# Patient Record
Sex: FEMALE | Race: WHITE | NOT HISPANIC OR LATINO | Employment: UNEMPLOYED | URBAN - METROPOLITAN AREA
[De-identification: names, ages, dates, MRNs, and addresses within clinical notes are randomized per-mention and may not be internally consistent; named-entity substitution may affect disease eponyms.]

---

## 2018-04-04 LAB
AMORPHOUS MATERIAL (HISTORICAL): NORMAL
BACTERIA UR QL AUTO: NORMAL
BILIRUB UR QL STRIP: NEGATIVE MG/DL
CASTS/CASTS TYPE (HISTORICAL): NORMAL /LPF
CLARITY UR: CLEAR
COLOR UR: YELLOW
CRYSTAL TYPE (HISTORICAL): NORMAL /HPF
DEPRECATED RDW RBC AUTO: 14.3 %
GLUCOSE UR STRIP-MCNC: NEGATIVE MG/DL
HCT VFR BLD AUTO: 27.9 % (ref 36–46)
HGB BLD-MCNC: 9.2 G/DL (ref 12–16)
HGB UR QL STRIP.AUTO: ABNORMAL
KETONES UR STRIP-MCNC: 50 MG/DL
LEUKOCYTE ESTERASE UR QL STRIP: NEGATIVE
MCH RBC QN AUTO: 27.7 PG (ref 26–34)
MCHC RBC AUTO-ENTMCNC: 33 % (ref 31–36)
MCV RBC AUTO: 84 FL (ref 80–100)
MUCOUS THREADS URNS QL MICRO: NORMAL
NITRITE UR QL STRIP: NEGATIVE
NON-SQ EPI CELLS URNS QL MICRO: NORMAL
OTHER STN SPEC: NORMAL
PH UR STRIP.AUTO: 6 [PH] (ref 4.5–8)
PLATELET # BLD AUTO: 430 K/MCL (ref 150–450)
PROT UR STRIP-MCNC: NEGATIVE MG/DL
RBC # BLD AUTO: 3.32 M/MCL (ref 4–5.2)
RBC #/AREA URNS AUTO: NORMAL /HPF
SP GR UR STRIP.AUTO: 1.02 (ref 1–1.04)
UROBILINOGEN UR QL STRIP.AUTO: NEGATIVE MG/DL (ref 0–1)
WBC # BLD AUTO: 9.4 K/MCL (ref 4.5–11)
WBC #/AREA URNS AUTO: NORMAL /HPF

## 2018-04-05 LAB
ABSOL LYMPHOCYTES (HISTORICAL): 1.2 K/UL (ref 0.5–4)
ALBUMIN SERPL BCP-MCNC: 2.3 G/DL (ref 3–5.2)
ANION GAP SERPL CALCULATED.3IONS-SCNC: 8 MMOL/L (ref 5–14)
BUN SERPL-MCNC: 7 MG/DL (ref 5–25)
CALCIUM SERPL-MCNC: 7.5 MG/DL (ref 8.4–10.2)
CHLORIDE SERPL-SCNC: 97 MEQ/L (ref 97–108)
CO2 SERPL-SCNC: 25 MMOL/L (ref 22–30)
COMMENT (HISTORICAL): ABNORMAL
CREATINE, SERUM (HISTORICAL): 0.46 MG/DL (ref 0.6–1.2)
DEPRECATED RDW RBC AUTO: 14.5 %
EGFR (HISTORICAL): >60 ML/MIN/1.73 M2
GLUCOSE FASTING (HISTORICAL): 199 MG/DL (ref 70–99)
HCT VFR BLD AUTO: 24.6 % (ref 36–46)
HGB BLD-MCNC: 8 G/DL (ref 12–16)
LYMPHOCYTES NFR BLD AUTO: 10 % (ref 25–45)
MCH RBC QN AUTO: 27.2 PG (ref 26–34)
MCHC RBC AUTO-ENTMCNC: 32.5 % (ref 31–36)
MCV RBC AUTO: 83 FL (ref 80–100)
MONOCYTES # BLD AUTO: 0.7 K/UL (ref 0.2–0.9)
MONOCYTES NFR BLD AUTO: 6 % (ref 1–10)
NEUTROPHILS ABS COUNT (HISTORICAL): 10.4 K/UL (ref 1.8–7.8)
NEUTS SEG NFR BLD AUTO: 84 % (ref 45–65)
PLATELET # BLD AUTO: 446 K/MCL (ref 150–450)
POTASSIUM SERPL-SCNC: 4.1 MEQ/L (ref 3.6–5)
RBC # BLD AUTO: 2.95 M/MCL (ref 4–5.2)
RBC MORPHOLOGY (HISTORICAL): ABNORMAL
SODIUM SERPL-SCNC: 130 MEQ/L (ref 137–147)
WBC # BLD AUTO: 12.3 K/MCL (ref 4.5–11)

## 2018-04-06 LAB
ABSOL LYMPHOCYTES (HISTORICAL): 2.1 K/UL (ref 0.5–4)
ANION GAP SERPL CALCULATED.3IONS-SCNC: 3 MMOL/L (ref 5–14)
BASOPHILS # BLD AUTO: 0.1 K/UL (ref 0–0.1)
BASOPHILS # BLD AUTO: 1 % (ref 0–1)
BUN SERPL-MCNC: 12 MG/DL (ref 5–25)
CALCIUM SERPL-MCNC: 7.3 MG/DL (ref 8.4–10.2)
CHLORIDE SERPL-SCNC: 105 MEQ/L (ref 97–108)
CO2 SERPL-SCNC: 27 MMOL/L (ref 22–30)
CREATINE, SERUM (HISTORICAL): 0.46 MG/DL (ref 0.6–1.2)
DEPRECATED RDW RBC AUTO: 14.2 %
EGFR (HISTORICAL): >60 ML/MIN/1.73 M2
EOSINOPHIL # BLD AUTO: 0.1 K/UL (ref 0–0.4)
EOSINOPHIL NFR BLD AUTO: 1 % (ref 0–6)
GLUCOSE FASTING (HISTORICAL): 96 MG/DL (ref 70–99)
HCT VFR BLD AUTO: 28.3 % (ref 36–46)
HGB BLD-MCNC: 9.6 G/DL (ref 12–16)
LYMPHOCYTES NFR BLD AUTO: 22 % (ref 25–45)
MCH RBC QN AUTO: 28.2 PG (ref 26–34)
MCHC RBC AUTO-ENTMCNC: 33.8 % (ref 31–36)
MCV RBC AUTO: 83 FL (ref 80–100)
MONOCYTES # BLD AUTO: 0.8 K/UL (ref 0.2–0.9)
MONOCYTES NFR BLD AUTO: 8 % (ref 1–10)
NEUTROPHILS ABS COUNT (HISTORICAL): 6.8 K/UL (ref 1.8–7.8)
NEUTS SEG NFR BLD AUTO: 68 % (ref 45–65)
PLATELET # BLD AUTO: 361 K/MCL (ref 150–450)
POTASSIUM SERPL-SCNC: 4 MEQ/L (ref 3.6–5)
RBC # BLD AUTO: 3.4 M/MCL (ref 4–5.2)
SODIUM SERPL-SCNC: 135 MEQ/L (ref 137–147)
WBC # BLD AUTO: 9.9 K/MCL (ref 4.5–11)

## 2018-04-17 LAB
ANION GAP SERPL CALCULATED.3IONS-SCNC: 8 MMOL/L (ref 5–14)
BUN SERPL-MCNC: 32 MG/DL (ref 5–25)
CALCIUM SERPL-MCNC: 8.9 MG/DL (ref 8.4–10.2)
CHLORIDE SERPL-SCNC: 100 MEQ/L (ref 97–108)
CO2 SERPL-SCNC: 29 MMOL/L (ref 22–30)
CREATINE, SERUM (HISTORICAL): 0.53 MG/DL (ref 0.6–1.2)
EGFR (HISTORICAL): >60 ML/MIN/1.73 M2
GLUCOSE SERPL-MCNC: 86 MG/DL (ref 70–99)
POTASSIUM SERPL-SCNC: 4.3 MEQ/L (ref 3.6–5)
SODIUM SERPL-SCNC: 137 MEQ/L (ref 137–147)

## 2018-04-19 LAB
DEPRECATED RDW RBC AUTO: 16.6 %
HCT VFR BLD AUTO: 32.3 % (ref 36–46)
HGB BLD-MCNC: 10.4 G/DL (ref 12–16)
MCH RBC QN AUTO: 27.7 PG (ref 26–34)
MCHC RBC AUTO-ENTMCNC: 32.3 % (ref 31–36)
MCV RBC AUTO: 86 FL (ref 80–100)
PLATELET # BLD AUTO: 341 K/MCL (ref 150–450)
RBC # BLD AUTO: 3.77 M/MCL (ref 4–5.2)
WBC # BLD AUTO: 8 K/MCL (ref 4.5–11)

## 2018-04-22 LAB — GLUCOSE SERPL-MCNC: 98 MG/DL (ref 70–99)

## 2018-04-23 LAB
ABSOL LYMPHOCYTES (HISTORICAL): 2 K/UL (ref 0.5–4)
ANION GAP SERPL CALCULATED.3IONS-SCNC: 9 MMOL/L (ref 5–14)
BASOPHILS # BLD AUTO: 0.1 K/UL (ref 0–0.1)
BASOPHILS # BLD AUTO: 1 % (ref 0–1)
BUN SERPL-MCNC: 26 MG/DL (ref 5–25)
CALCIUM SERPL-MCNC: 8.6 MG/DL (ref 8.4–10.2)
CHLORIDE SERPL-SCNC: 102 MEQ/L (ref 97–108)
CO2 SERPL-SCNC: 27 MMOL/L (ref 22–30)
CREATINE, SERUM (HISTORICAL): 0.54 MG/DL (ref 0.6–1.2)
DEPRECATED RDW RBC AUTO: 16.7 %
EGFR (HISTORICAL): >60 ML/MIN/1.73 M2
EOSINOPHIL # BLD AUTO: 0.4 K/UL (ref 0–0.4)
EOSINOPHIL NFR BLD AUTO: 6 % (ref 0–6)
GLUCOSE SERPL-MCNC: 89 MG/DL (ref 70–99)
HCT VFR BLD AUTO: 30.4 % (ref 36–46)
HGB BLD-MCNC: 10 G/DL (ref 12–16)
LYMPHOCYTES NFR BLD AUTO: 32 % (ref 25–45)
MCH RBC QN AUTO: 27.9 PG (ref 26–34)
MCHC RBC AUTO-ENTMCNC: 32.7 % (ref 31–36)
MCV RBC AUTO: 85 FL (ref 80–100)
MONOCYTES # BLD AUTO: 0.7 K/UL (ref 0.2–0.9)
MONOCYTES NFR BLD AUTO: 10 % (ref 1–10)
NEUTROPHILS ABS COUNT (HISTORICAL): 3.2 K/UL (ref 1.8–7.8)
NEUTS SEG NFR BLD AUTO: 51 % (ref 45–65)
PLATELET # BLD AUTO: 318 K/MCL (ref 150–450)
POTASSIUM SERPL-SCNC: 4.7 MEQ/L (ref 3.6–5)
RBC # BLD AUTO: 3.56 M/MCL (ref 4–5.2)
SODIUM SERPL-SCNC: 138 MEQ/L (ref 137–147)
WBC # BLD AUTO: 6.4 K/MCL (ref 4.5–11)

## 2018-04-30 LAB
ABSOL LYMPHOCYTES (HISTORICAL): 1.8 K/UL (ref 0.5–4)
ANION GAP SERPL CALCULATED.3IONS-SCNC: 7 MMOL/L (ref 5–14)
BASOPHILS # BLD AUTO: 0.1 K/UL (ref 0–0.1)
BASOPHILS # BLD AUTO: 1 % (ref 0–1)
BUN SERPL-MCNC: 22 MG/DL (ref 5–25)
CALCIUM SERPL-MCNC: 8.8 MG/DL (ref 8.4–10.2)
CHLORIDE SERPL-SCNC: 100 MEQ/L (ref 97–108)
CO2 SERPL-SCNC: 29 MMOL/L (ref 22–30)
CREATINE, SERUM (HISTORICAL): 0.43 MG/DL (ref 0.6–1.2)
DEPRECATED RDW RBC AUTO: 16.7 %
EGFR (HISTORICAL): >60 ML/MIN/1.73 M2
EOSINOPHIL # BLD AUTO: 0.4 K/UL (ref 0–0.4)
EOSINOPHIL NFR BLD AUTO: 6 % (ref 0–6)
GLUCOSE SERPL-MCNC: 84 MG/DL (ref 70–99)
HCT VFR BLD AUTO: 31.4 % (ref 36–46)
HGB BLD-MCNC: 10.5 G/DL (ref 12–16)
LYMPHOCYTES NFR BLD AUTO: 29 % (ref 25–45)
MCH RBC QN AUTO: 28.4 PG (ref 26–34)
MCHC RBC AUTO-ENTMCNC: 33.4 % (ref 31–36)
MCV RBC AUTO: 85 FL (ref 80–100)
MONOCYTES # BLD AUTO: 0.7 K/UL (ref 0.2–0.9)
MONOCYTES NFR BLD AUTO: 10 % (ref 1–10)
NEUTROPHILS ABS COUNT (HISTORICAL): 3.5 K/UL (ref 1.8–7.8)
NEUTS SEG NFR BLD AUTO: 54 % (ref 45–65)
PLATELET # BLD AUTO: 314 K/MCL (ref 150–450)
POTASSIUM SERPL-SCNC: 4.5 MEQ/L (ref 3.6–5)
RBC # BLD AUTO: 3.7 M/MCL (ref 4–5.2)
SODIUM SERPL-SCNC: 136 MEQ/L (ref 137–147)
WBC # BLD AUTO: 6.4 K/MCL (ref 4.5–11)

## 2018-05-07 LAB
ABSOL LYMPHOCYTES (HISTORICAL): 1.8 K/UL (ref 0.5–4)
ANION GAP SERPL CALCULATED.3IONS-SCNC: 9 MMOL/L (ref 5–14)
BASOPHILS # BLD AUTO: 0 K/UL (ref 0–0.1)
BASOPHILS # BLD AUTO: 1 % (ref 0–1)
BUN SERPL-MCNC: 30 MG/DL (ref 5–25)
CALCIUM SERPL-MCNC: 8.8 MG/DL (ref 8.4–10.2)
CHLORIDE SERPL-SCNC: 101 MEQ/L (ref 97–108)
CO2 SERPL-SCNC: 28 MMOL/L (ref 22–30)
CREATINE, SERUM (HISTORICAL): 0.46 MG/DL (ref 0.6–1.2)
DEPRECATED RDW RBC AUTO: 17 %
EGFR (HISTORICAL): >60 ML/MIN/1.73 M2
EOSINOPHIL # BLD AUTO: 0.4 K/UL (ref 0–0.4)
EOSINOPHIL NFR BLD AUTO: 8 % (ref 0–6)
GLUCOSE SERPL-MCNC: 91 MG/DL (ref 70–99)
HCT VFR BLD AUTO: 31.8 % (ref 36–46)
HGB BLD-MCNC: 10.7 G/DL (ref 12–16)
LYMPHOCYTES NFR BLD AUTO: 32 % (ref 25–45)
MCH RBC QN AUTO: 28.4 PG (ref 26–34)
MCHC RBC AUTO-ENTMCNC: 33.5 % (ref 31–36)
MCV RBC AUTO: 85 FL (ref 80–100)
MONOCYTES # BLD AUTO: 0.5 K/UL (ref 0.2–0.9)
MONOCYTES NFR BLD AUTO: 10 % (ref 1–10)
NEUTROPHILS ABS COUNT (HISTORICAL): 2.8 K/UL (ref 1.8–7.8)
NEUTS SEG NFR BLD AUTO: 49 % (ref 45–65)
PLATELET # BLD AUTO: 260 K/MCL (ref 150–450)
POTASSIUM SERPL-SCNC: 4.5 MEQ/L (ref 3.6–5)
RBC # BLD AUTO: 3.75 M/MCL (ref 4–5.2)
SODIUM SERPL-SCNC: 137 MEQ/L (ref 137–147)
WBC # BLD AUTO: 5.6 K/MCL (ref 4.5–11)

## 2018-05-14 LAB
ABSOL LYMPHOCYTES (HISTORICAL): 2.2 K/UL (ref 0.5–4)
ANION GAP SERPL CALCULATED.3IONS-SCNC: 7 MMOL/L (ref 5–14)
BASOPHILS # BLD AUTO: 0.1 K/UL (ref 0–0.1)
BASOPHILS # BLD AUTO: 1 % (ref 0–1)
BUN SERPL-MCNC: 22 MG/DL (ref 5–25)
CALCIUM SERPL-MCNC: 8.6 MG/DL (ref 8.4–10.2)
CHLORIDE SERPL-SCNC: 102 MEQ/L (ref 97–108)
CO2 SERPL-SCNC: 29 MMOL/L (ref 22–30)
CREATINE, SERUM (HISTORICAL): 0.59 MG/DL (ref 0.6–1.2)
DEPRECATED RDW RBC AUTO: 17.5 %
EGFR (HISTORICAL): >60 ML/MIN/1.73 M2
EOSINOPHIL # BLD AUTO: 0.3 K/UL (ref 0–0.4)
EOSINOPHIL NFR BLD AUTO: 5 % (ref 0–6)
GLUCOSE SERPL-MCNC: 77 MG/DL (ref 70–99)
HCT VFR BLD AUTO: 32.5 % (ref 36–46)
HGB BLD-MCNC: 10.6 G/DL (ref 12–16)
LYMPHOCYTES NFR BLD AUTO: 38 % (ref 25–45)
MCH RBC QN AUTO: 28.1 PG (ref 26–34)
MCHC RBC AUTO-ENTMCNC: 32.7 % (ref 31–36)
MCV RBC AUTO: 86 FL (ref 80–100)
MONOCYTES # BLD AUTO: 0.5 K/UL (ref 0.2–0.9)
MONOCYTES NFR BLD AUTO: 8 % (ref 1–10)
NEUTROPHILS ABS COUNT (HISTORICAL): 2.9 K/UL (ref 1.8–7.8)
NEUTS SEG NFR BLD AUTO: 48 % (ref 45–65)
PLATELET # BLD AUTO: 250 K/MCL (ref 150–450)
POTASSIUM SERPL-SCNC: 4.6 MEQ/L (ref 3.6–5)
RBC # BLD AUTO: 3.78 M/MCL (ref 4–5.2)
SODIUM SERPL-SCNC: 138 MEQ/L (ref 137–147)
WBC # BLD AUTO: 6 K/MCL (ref 4.5–11)

## 2018-05-22 LAB
CREATINE, SERUM (HISTORICAL): 0.43 MG/DL (ref 0.6–1.2)
DEPRECATED RDW RBC AUTO: 17.5 %
EGFR (HISTORICAL): >60 ML/MIN/1.73 M2
HCT VFR BLD AUTO: 29.5 % (ref 36–46)
HGB BLD-MCNC: 9.9 G/DL (ref 12–16)
MCH RBC QN AUTO: 29.2 PG (ref 26–34)
MCHC RBC AUTO-ENTMCNC: 33.7 % (ref 31–36)
MCV RBC AUTO: 87 FL (ref 80–100)
PLATELET # BLD AUTO: 222 K/MCL (ref 150–450)
RBC # BLD AUTO: 3.41 M/MCL (ref 4–5.2)
WBC # BLD AUTO: 8 K/MCL (ref 4.5–11)

## 2018-05-28 LAB
ABSOL LYMPHOCYTES (HISTORICAL): 2.2 K/UL (ref 0.5–4)
ANION GAP SERPL CALCULATED.3IONS-SCNC: 8 MMOL/L (ref 5–14)
BASOPHILS # BLD AUTO: 0.1 K/UL (ref 0–0.1)
BASOPHILS # BLD AUTO: 1 % (ref 0–1)
BUN SERPL-MCNC: 16 MG/DL (ref 5–25)
CALCIUM SERPL-MCNC: 8.8 MG/DL (ref 8.4–10.2)
CHLORIDE SERPL-SCNC: 102 MEQ/L (ref 97–108)
CO2 SERPL-SCNC: 28 MMOL/L (ref 22–30)
CREATINE, SERUM (HISTORICAL): 0.53 MG/DL (ref 0.6–1.2)
DEPRECATED RDW RBC AUTO: 17.6 %
EGFR (HISTORICAL): >60 ML/MIN/1.73 M2
EOSINOPHIL # BLD AUTO: 0.4 K/UL (ref 0–0.4)
EOSINOPHIL NFR BLD AUTO: 6 % (ref 0–6)
GLUCOSE FASTING (HISTORICAL): 97 MG/DL (ref 70–99)
HCT VFR BLD AUTO: 30 % (ref 36–46)
HGB BLD-MCNC: 10.1 G/DL (ref 12–16)
LYMPHOCYTES NFR BLD AUTO: 35 % (ref 25–45)
MCH RBC QN AUTO: 29.4 PG (ref 26–34)
MCHC RBC AUTO-ENTMCNC: 33.7 % (ref 31–36)
MCV RBC AUTO: 87 FL (ref 80–100)
MONOCYTES # BLD AUTO: 0.7 K/UL (ref 0.2–0.9)
MONOCYTES NFR BLD AUTO: 11 % (ref 1–10)
NEUTROPHILS ABS COUNT (HISTORICAL): 3 K/UL (ref 1.8–7.8)
NEUTS SEG NFR BLD AUTO: 47 % (ref 45–65)
PLATELET # BLD AUTO: 450 K/MCL (ref 150–450)
POTASSIUM SERPL-SCNC: 4.9 MEQ/L (ref 3.6–5)
RBC # BLD AUTO: 3.43 M/MCL (ref 4–5.2)
SODIUM SERPL-SCNC: 138 MEQ/L (ref 137–147)
WBC # BLD AUTO: 6.4 K/MCL (ref 4.5–11)

## 2018-09-14 ENCOUNTER — APPOINTMENT (OUTPATIENT)
Dept: LAB | Facility: HOSPITAL | Age: 68
End: 2018-09-14
Payer: COMMERCIAL

## 2018-09-14 ENCOUNTER — TRANSCRIBE ORDERS (OUTPATIENT)
Dept: ADMINISTRATIVE | Facility: HOSPITAL | Age: 68
End: 2018-09-14

## 2018-09-14 ENCOUNTER — APPOINTMENT (OUTPATIENT)
Dept: WOUND CARE | Facility: CLINIC | Age: 68
End: 2018-09-14
Payer: COMMERCIAL

## 2018-09-14 DIAGNOSIS — L89.214 PRESSURE ULCER OF TROCHANTER, RIGHT, STAGE IV (HCC): ICD-10-CM

## 2018-09-14 DIAGNOSIS — L89.214 PRESSURE ULCER OF TROCHANTER, RIGHT, STAGE IV (HCC): Primary | ICD-10-CM

## 2018-09-14 PROCEDURE — 87186 SC STD MICRODIL/AGAR DIL: CPT

## 2018-09-14 PROCEDURE — 87077 CULTURE AEROBIC IDENTIFY: CPT

## 2018-09-14 PROCEDURE — 87205 SMEAR GRAM STAIN: CPT

## 2018-09-14 PROCEDURE — 87070 CULTURE OTHR SPECIMN AEROBIC: CPT

## 2018-09-16 LAB
BACTERIA WND AEROBE CULT: ABNORMAL
BACTERIA WND AEROBE CULT: ABNORMAL
GRAM STN SPEC: ABNORMAL

## 2018-09-18 ENCOUNTER — ANESTHESIA EVENT (OUTPATIENT)
Dept: PERIOP | Facility: HOSPITAL | Age: 68
DRG: 570 | End: 2018-09-18
Payer: COMMERCIAL

## 2018-09-18 RX ORDER — SENNA AND DOCUSATE SODIUM 50; 8.6 MG/1; MG/1
1 TABLET, FILM COATED ORAL DAILY
Status: ON HOLD | COMMUNITY
End: 2019-02-27 | Stop reason: ALTCHOICE

## 2018-09-18 RX ORDER — DIPHENOXYLATE HYDROCHLORIDE AND ATROPINE SULFATE 2.5; .025 MG/1; MG/1
1 TABLET ORAL DAILY
COMMUNITY

## 2018-09-18 RX ORDER — PYRIDOXINE HCL (VITAMIN B6) 100 MG
1 TABLET ORAL DAILY
Status: ON HOLD | COMMUNITY
End: 2021-06-13 | Stop reason: ALTCHOICE

## 2018-09-18 RX ORDER — MELATONIN
1000 DAILY
Status: ON HOLD | COMMUNITY
End: 2021-06-13 | Stop reason: ALTCHOICE

## 2018-09-18 NOTE — ANESTHESIA PREPROCEDURE EVALUATION
Review of Systems/Medical History          Cardiovascular   Pulmonary  Negative pulmonary ROS        GI/Hepatic  Negative GI/hepatic ROS          Chronic kidney disease ,        Endo/Other     GYN       Hematology  Anemia iron deficiency anemia,     Musculoskeletal    Arthritis     Neurology  Negative neurology ROS   Motor deficit , paraplegia,    Psychology   Depression ,              Physical Exam    Airway    Mallampati score: I  TM Distance: >3 FB  Neck ROM: full     Dental       Cardiovascular  Cardiovascular exam normal    Pulmonary  Pulmonary exam normal     Other Findings        Anesthesia Plan  ASA Score- 3     Anesthesia Type- IV sedation with anesthesia and general with ASA Monitors  Additional Monitors:   Airway Plan:         Plan Factors-    Induction-     Postoperative Plan-     Informed Consent- Anesthetic plan and risks discussed with patient  I personally reviewed this patient with the CRNA  Discussed and agreed on the Anesthesia Plan with the CRNA  Zana Gaxiola

## 2018-09-19 ENCOUNTER — HOSPITAL ENCOUNTER (INPATIENT)
Facility: HOSPITAL | Age: 68
LOS: 2 days | Discharge: HOME | DRG: 570 | End: 2018-09-22
Attending: PLASTIC SURGERY | Admitting: PLASTIC SURGERY
Payer: COMMERCIAL

## 2018-09-19 ENCOUNTER — ANESTHESIA (OUTPATIENT)
Dept: PERIOP | Facility: HOSPITAL | Age: 68
DRG: 570 | End: 2018-09-19
Payer: COMMERCIAL

## 2018-09-19 DIAGNOSIS — L89.210: Primary | ICD-10-CM

## 2018-09-19 DIAGNOSIS — L89.213 DECUBITUS ULCER OF RIGHT HIP, STAGE 3 (HCC): ICD-10-CM

## 2018-09-19 DIAGNOSIS — L98.8 OTHER SPECIFIED DISORDERS OF THE SKIN AND SUBCUTANEOUS TISSUE: ICD-10-CM

## 2018-09-19 DIAGNOSIS — G82.20 PARAPLEGIA FOLLOWING SPINAL CORD INJURY (HCC): ICD-10-CM

## 2018-09-19 DIAGNOSIS — L89.219: ICD-10-CM

## 2018-09-19 DIAGNOSIS — L89.213 STAGE III PRESSURE ULCER OF RIGHT HIP (HCC): ICD-10-CM

## 2018-09-19 DIAGNOSIS — Z98.890 STATUS POST DEBRIDEMENT: ICD-10-CM

## 2018-09-19 PROBLEM — D50.9 IRON DEFICIENCY ANEMIA: Chronic | Status: ACTIVE | Noted: 2018-09-19

## 2018-09-19 PROBLEM — E87.6 HYPOKALEMIA: Status: ACTIVE | Noted: 2018-09-19

## 2018-09-19 PROBLEM — L89.159 PRESSURE ULCER, SACRUM: Chronic | Status: ACTIVE | Noted: 2018-09-19

## 2018-09-19 PROBLEM — R63.6 UNDERWEIGHT: Chronic | Status: ACTIVE | Noted: 2018-09-19

## 2018-09-19 PROBLEM — M85.80 OSTEOPENIA: Status: ACTIVE | Noted: 2018-09-19

## 2018-09-19 PROBLEM — D50.9 IRON DEFICIENCY ANEMIA: Status: ACTIVE | Noted: 2018-09-19

## 2018-09-19 PROBLEM — R63.6 UNDERWEIGHT: Status: ACTIVE | Noted: 2018-09-19

## 2018-09-19 PROBLEM — L89.159 PRESSURE ULCER, SACRUM: Status: ACTIVE | Noted: 2018-09-19

## 2018-09-19 PROBLEM — M85.80 OSTEOPENIA: Chronic | Status: ACTIVE | Noted: 2018-09-19

## 2018-09-19 LAB
ANION GAP SERPL CALCULATED.3IONS-SCNC: 6 MMOL/L (ref 5–14)
ANION GAP SERPL CALCULATED.3IONS-SCNC: 8 MMOL/L (ref 5–14)
BASOPHILS # BLD AUTO: 0 THOUSANDS/ΜL (ref 0–0.1)
BASOPHILS NFR BLD AUTO: 0 % (ref 0–1)
BUN SERPL-MCNC: 11 MG/DL (ref 5–25)
BUN SERPL-MCNC: 8 MG/DL (ref 5–25)
CALCIUM SERPL-MCNC: 8.2 MG/DL (ref 8.4–10.2)
CALCIUM SERPL-MCNC: 8.3 MG/DL (ref 8.4–10.2)
CHLORIDE SERPL-SCNC: 100 MMOL/L (ref 97–108)
CHLORIDE SERPL-SCNC: 102 MMOL/L (ref 97–108)
CO2 SERPL-SCNC: 28 MMOL/L (ref 22–30)
CO2 SERPL-SCNC: 29 MMOL/L (ref 22–30)
CREAT SERPL-MCNC: 0.46 MG/DL (ref 0.6–1.2)
CREAT SERPL-MCNC: 0.57 MG/DL (ref 0.6–1.2)
EOSINOPHIL # BLD AUTO: 0.1 THOUSAND/ΜL (ref 0–0.4)
EOSINOPHIL NFR BLD AUTO: 1 % (ref 0–6)
ERYTHROCYTE [DISTWIDTH] IN BLOOD BY AUTOMATED COUNT: 15.2 %
GFR SERPL CREATININE-BSD FRML MDRD: 103 ML/MIN/1.73SQ M
GFR SERPL CREATININE-BSD FRML MDRD: 96 ML/MIN/1.73SQ M
GLUCOSE SERPL-MCNC: 120 MG/DL (ref 70–99)
GLUCOSE SERPL-MCNC: 155 MG/DL (ref 70–99)
HCT VFR BLD AUTO: 26.5 % (ref 36–46)
HCT VFR BLD AUTO: 31.5 % (ref 36–46)
HGB BLD-MCNC: 10.7 G/DL (ref 12–16)
HGB BLD-MCNC: 8.8 G/DL (ref 12–16)
LYMPHOCYTES # BLD AUTO: 1.4 THOUSANDS/ΜL (ref 0.5–4)
LYMPHOCYTES NFR BLD AUTO: 19 % (ref 20–50)
MAGNESIUM SERPL-MCNC: 2.1 MG/DL (ref 1.6–2.3)
MCH RBC QN AUTO: 28.5 PG (ref 26–34)
MCHC RBC AUTO-ENTMCNC: 34 G/DL (ref 31–36)
MCV RBC AUTO: 84 FL (ref 80–100)
MONOCYTES # BLD AUTO: 0.8 THOUSAND/ΜL (ref 0.2–0.9)
MONOCYTES NFR BLD AUTO: 11 % (ref 1–10)
NEUTROPHILS # BLD AUTO: 5.1 THOUSANDS/ΜL (ref 1.8–7.8)
NEUTS SEG NFR BLD AUTO: 69 % (ref 45–65)
PLATELET # BLD AUTO: 365 THOUSANDS/UL (ref 150–450)
PMV BLD AUTO: 8.4 FL (ref 8.9–12.7)
POTASSIUM SERPL-SCNC: 3.3 MMOL/L (ref 3.6–5)
POTASSIUM SERPL-SCNC: 3.5 MMOL/L (ref 3.6–5)
RBC # BLD AUTO: 3.76 MILLION/UL (ref 4–5.2)
SODIUM SERPL-SCNC: 136 MMOL/L (ref 137–147)
SODIUM SERPL-SCNC: 137 MMOL/L (ref 137–147)
WBC # BLD AUTO: 7.4 THOUSAND/UL (ref 4.5–11)

## 2018-09-19 PROCEDURE — 0JBL0ZZ EXCISION OF RIGHT UPPER LEG SUBCUTANEOUS TISSUE AND FASCIA, OPEN APPROACH: ICD-10-PCS | Performed by: PLASTIC SURGERY

## 2018-09-19 PROCEDURE — 88304 TISSUE EXAM BY PATHOLOGIST: CPT | Performed by: PATHOLOGY

## 2018-09-19 PROCEDURE — 87186 SC STD MICRODIL/AGAR DIL: CPT | Performed by: PLASTIC SURGERY

## 2018-09-19 PROCEDURE — 87077 CULTURE AEROBIC IDENTIFY: CPT | Performed by: PLASTIC SURGERY

## 2018-09-19 PROCEDURE — 99220 PR INITIAL OBSERVATION CARE/DAY 70 MINUTES: CPT | Performed by: HOSPITALIST

## 2018-09-19 PROCEDURE — 80048 BASIC METABOLIC PNL TOTAL CA: CPT | Performed by: HOSPITALIST

## 2018-09-19 PROCEDURE — 85018 HEMOGLOBIN: CPT | Performed by: HOSPITALIST

## 2018-09-19 PROCEDURE — 87147 CULTURE TYPE IMMUNOLOGIC: CPT | Performed by: PLASTIC SURGERY

## 2018-09-19 PROCEDURE — 85014 HEMATOCRIT: CPT | Performed by: HOSPITALIST

## 2018-09-19 PROCEDURE — 80048 BASIC METABOLIC PNL TOTAL CA: CPT | Performed by: PLASTIC SURGERY

## 2018-09-19 PROCEDURE — 83735 ASSAY OF MAGNESIUM: CPT | Performed by: HOSPITALIST

## 2018-09-19 PROCEDURE — 85025 COMPLETE CBC W/AUTO DIFF WBC: CPT | Performed by: PLASTIC SURGERY

## 2018-09-19 PROCEDURE — 87070 CULTURE OTHR SPECIMN AEROBIC: CPT | Performed by: PLASTIC SURGERY

## 2018-09-19 PROCEDURE — 93005 ELECTROCARDIOGRAM TRACING: CPT

## 2018-09-19 PROCEDURE — 87176 TISSUE HOMOGENIZATION CULTR: CPT | Performed by: PLASTIC SURGERY

## 2018-09-19 PROCEDURE — 87205 SMEAR GRAM STAIN: CPT | Performed by: PLASTIC SURGERY

## 2018-09-19 RX ORDER — MELATONIN
1000 DAILY
Status: DISCONTINUED | OUTPATIENT
Start: 2018-09-20 | End: 2018-09-22 | Stop reason: HOSPADM

## 2018-09-19 RX ORDER — B-COMPLEX WITH VITAMIN C
1 TABLET ORAL DAILY
Status: DISCONTINUED | OUTPATIENT
Start: 2018-09-20 | End: 2018-09-22 | Stop reason: HOSPADM

## 2018-09-19 RX ORDER — LANOLIN ALCOHOL/MO/W.PET/CERES
500 CREAM (GRAM) TOPICAL DAILY
Status: DISCONTINUED | OUTPATIENT
Start: 2018-09-20 | End: 2018-09-22 | Stop reason: HOSPADM

## 2018-09-19 RX ORDER — SODIUM CHLORIDE, SODIUM LACTATE, POTASSIUM CHLORIDE, CALCIUM CHLORIDE 600; 310; 30; 20 MG/100ML; MG/100ML; MG/100ML; MG/100ML
125 INJECTION, SOLUTION INTRAVENOUS CONTINUOUS
Status: DISCONTINUED | OUTPATIENT
Start: 2018-09-19 | End: 2018-09-20

## 2018-09-19 RX ORDER — FENTANYL CITRATE 50 UG/ML
INJECTION, SOLUTION INTRAMUSCULAR; INTRAVENOUS AS NEEDED
Status: DISCONTINUED | OUTPATIENT
Start: 2018-09-19 | End: 2018-09-19 | Stop reason: SURG

## 2018-09-19 RX ORDER — MAGNESIUM HYDROXIDE 1200 MG/15ML
LIQUID ORAL AS NEEDED
Status: DISCONTINUED | OUTPATIENT
Start: 2018-09-19 | End: 2018-09-19 | Stop reason: HOSPADM

## 2018-09-19 RX ORDER — HYDROCODONE BITARTRATE AND ACETAMINOPHEN 5; 325 MG/1; MG/1
1 TABLET ORAL EVERY 4 HOURS PRN
Status: DISCONTINUED | OUTPATIENT
Start: 2018-09-19 | End: 2018-09-22 | Stop reason: HOSPADM

## 2018-09-19 RX ORDER — ACETIC ACID 0.25 G/100ML
IRRIGANT IRRIGATION AS NEEDED
Status: DISCONTINUED | OUTPATIENT
Start: 2018-09-19 | End: 2018-09-19 | Stop reason: HOSPADM

## 2018-09-19 RX ORDER — DIPHENHYDRAMINE HYDROCHLORIDE 50 MG/ML
12.5 INJECTION INTRAMUSCULAR; INTRAVENOUS ONCE AS NEEDED
Status: DISCONTINUED | OUTPATIENT
Start: 2018-09-19 | End: 2018-09-19 | Stop reason: HOSPADM

## 2018-09-19 RX ORDER — MIDAZOLAM HYDROCHLORIDE 1 MG/ML
INJECTION INTRAMUSCULAR; INTRAVENOUS AS NEEDED
Status: DISCONTINUED | OUTPATIENT
Start: 2018-09-19 | End: 2018-09-19 | Stop reason: SURG

## 2018-09-19 RX ORDER — ONDANSETRON 4 MG/1
4 TABLET, ORALLY DISINTEGRATING ORAL EVERY 6 HOURS PRN
Status: DISCONTINUED | OUTPATIENT
Start: 2018-09-19 | End: 2018-09-22 | Stop reason: HOSPADM

## 2018-09-19 RX ORDER — AMOXICILLIN 250 MG
1 CAPSULE ORAL DAILY
Status: DISCONTINUED | OUTPATIENT
Start: 2018-09-20 | End: 2018-09-22 | Stop reason: HOSPADM

## 2018-09-19 RX ORDER — POTASSIUM CHLORIDE 20 MEQ/1
40 TABLET, EXTENDED RELEASE ORAL ONCE
Status: COMPLETED | OUTPATIENT
Start: 2018-09-19 | End: 2018-09-19

## 2018-09-19 RX ORDER — HEPARIN SODIUM 5000 [USP'U]/ML
5000 INJECTION, SOLUTION INTRAVENOUS; SUBCUTANEOUS EVERY 8 HOURS SCHEDULED
Status: DISCONTINUED | OUTPATIENT
Start: 2018-09-19 | End: 2018-09-22 | Stop reason: HOSPADM

## 2018-09-19 RX ORDER — LANOLIN ALCOHOL/MO/W.PET/CERES
3 CREAM (GRAM) TOPICAL
Status: DISCONTINUED | OUTPATIENT
Start: 2018-09-19 | End: 2018-09-22 | Stop reason: HOSPADM

## 2018-09-19 RX ORDER — HEPARIN SODIUM 5000 [USP'U]/ML
5000 INJECTION, SOLUTION INTRAVENOUS; SUBCUTANEOUS EVERY 8 HOURS SCHEDULED
Status: DISCONTINUED | OUTPATIENT
Start: 2018-09-19 | End: 2018-09-19

## 2018-09-19 RX ORDER — LIDOCAINE HYDROCHLORIDE AND EPINEPHRINE 5; 5 MG/ML; UG/ML
INJECTION, SOLUTION INFILTRATION; PERINEURAL AS NEEDED
Status: DISCONTINUED | OUTPATIENT
Start: 2018-09-19 | End: 2018-09-19 | Stop reason: HOSPADM

## 2018-09-19 RX ORDER — FENTANYL CITRATE/PF 50 MCG/ML
25 SYRINGE (ML) INJECTION
Status: DISCONTINUED | OUTPATIENT
Start: 2018-09-19 | End: 2018-09-19 | Stop reason: HOSPADM

## 2018-09-19 RX ORDER — PROPOFOL 10 MG/ML
INJECTION, EMULSION INTRAVENOUS CONTINUOUS PRN
Status: DISCONTINUED | OUTPATIENT
Start: 2018-09-19 | End: 2018-09-19 | Stop reason: SURG

## 2018-09-19 RX ORDER — EPHEDRINE SULFATE 50 MG/ML
INJECTION, SOLUTION INTRAVENOUS AS NEEDED
Status: DISCONTINUED | OUTPATIENT
Start: 2018-09-19 | End: 2018-09-19 | Stop reason: SURG

## 2018-09-19 RX ADMIN — POTASSIUM CHLORIDE 40 MEQ: 20 TABLET, EXTENDED RELEASE ORAL at 23:46

## 2018-09-19 RX ADMIN — EPHEDRINE SULFATE 10 MG: 50 INJECTION INTRAMUSCULAR; INTRAVENOUS; SUBCUTANEOUS at 15:39

## 2018-09-19 RX ADMIN — SODIUM CHLORIDE, POTASSIUM CHLORIDE, SODIUM LACTATE AND CALCIUM CHLORIDE: 600; 310; 30; 20 INJECTION, SOLUTION INTRAVENOUS at 15:13

## 2018-09-19 RX ADMIN — SODIUM CHLORIDE, POTASSIUM CHLORIDE, SODIUM LACTATE AND CALCIUM CHLORIDE 125 ML/HR: 600; 310; 30; 20 INJECTION, SOLUTION INTRAVENOUS at 20:35

## 2018-09-19 RX ADMIN — CEFEPIME 1 G: 2 INJECTION, POWDER, FOR SOLUTION INTRAVENOUS at 15:29

## 2018-09-19 RX ADMIN — SODIUM CHLORIDE, POTASSIUM CHLORIDE, SODIUM LACTATE AND CALCIUM CHLORIDE 125 ML/HR: 600; 310; 30; 20 INJECTION, SOLUTION INTRAVENOUS at 15:16

## 2018-09-19 RX ADMIN — FENTANYL CITRATE 100 MCG: 50 INJECTION INTRAMUSCULAR; INTRAVENOUS at 15:25

## 2018-09-19 RX ADMIN — PROPOFOL 100 MCG/KG/MIN: 10 INJECTION, EMULSION INTRAVENOUS at 15:25

## 2018-09-19 RX ADMIN — EPHEDRINE SULFATE 10 MG: 50 INJECTION INTRAMUSCULAR; INTRAVENOUS; SUBCUTANEOUS at 15:30

## 2018-09-19 RX ADMIN — FENTANYL CITRATE 50 MCG: 50 INJECTION INTRAMUSCULAR; INTRAVENOUS at 16:10

## 2018-09-19 RX ADMIN — MIDAZOLAM HYDROCHLORIDE 2 MG: 1 INJECTION, SOLUTION INTRAMUSCULAR; INTRAVENOUS at 15:25

## 2018-09-19 RX ADMIN — EPHEDRINE SULFATE 10 MG: 50 INJECTION INTRAMUSCULAR; INTRAVENOUS; SUBCUTANEOUS at 15:55

## 2018-09-19 RX ADMIN — FENTANYL CITRATE 50 MCG: 50 INJECTION INTRAMUSCULAR; INTRAVENOUS at 15:49

## 2018-09-19 RX ADMIN — HEPARIN SODIUM 5000 UNITS: 5000 INJECTION, SOLUTION INTRAVENOUS; SUBCUTANEOUS at 21:08

## 2018-09-19 RX ADMIN — EPHEDRINE SULFATE 10 MG: 50 INJECTION INTRAMUSCULAR; INTRAVENOUS; SUBCUTANEOUS at 15:45

## 2018-09-19 NOTE — PLAN OF CARE
Problem: INFECTION - ADULT  Goal: Absence of fever/infection during neutropenic period  INTERVENTIONS:  - Monitor WBC  - Implement neutropenic guidelines  Outcome: Completed Date Met: 09/19/18  Patient does not have neutropenia

## 2018-09-19 NOTE — OP NOTE
OPERATIVE REPORT  PATIENT NAME: Courtney Cabrera    :  1950  MRN: [id number]  Pt Location:  OR ROOM 07    SURGERY DATE: 2018    Surgeon(s) and Role:     * Kalli Jimenez MD - Primary    Preop and postop Diagnosis:   Right hip pressure sore    Operative Procedure(s) (LRB):  DEBRIDEMENT OF HIP PRESSURE SORE (Right)    Operative history: This T8 level fracture paraplegic, about a month ago was lying on her right hip on a mirror she was unaware of and developed a pressure sore  She has purulent drainage from this area  At this time what appeared to be necrotic calcified fat infected fat was removed, with a large well walled off cavity about it  No bone was exposed  All gross debris was removed  Operative procedure:   Patient was taken to the operating room placed in a lateral position with her left side down on the operating table  She was prepped and draped in the usual fashion  Anesthesia was general supplemented with xylocaine 1% with epinephrine  A currette   was used to remove as much debris as possible from right hip  Sharp excision otherwise was necessary with a scissors and scalpel, all supplemented with the Bovie for cutting and coagulation purposes  The cavity was copiously irrigated and then Kerlix soaked in 0 25% ascetic acid was placed for dressings  The patient tolerated the procedure well and taken to recovery area in good condition       Specimen(s):  ID Type Source Tests Collected by Time Destination   1 : right hip pressure sore tissue Tissue Soft Tissue, Other TISSUE EXAM Kalli Jimenez MD 2018 1552    A : right hip pressure sore tissue culture Tissue Soft Tissue, Debridement CULTURE, TISSUE AND GRAM STAIN Kalli Jimenez MD 2018 1553          Drains:  Urethral Catheter (Active)   Collection Container Leg bag 2018 12:32 PM   Number of days:          SIGNATURE: Kalli Jimenez MD  DATE: 2018  TIME: 4:39 PM

## 2018-09-19 NOTE — NURSING NOTE
Pt admitted for debridement right pressure sore  Pt has dry dressing intact on right hip and sacral area  Pt has isabel catheter to leg bag   Urine C&S sent to lab

## 2018-09-19 NOTE — ANESTHESIA POSTPROCEDURE EVALUATION
Post-Op Assessment Note      CV Status:  Stable    Mental Status:  Alert and awake    Hydration Status:  Euvolemic    PONV Controlled:  Controlled    Airway Patency:  Patent    Post Op Vitals Reviewed: Yes          Staff: Anesthesiologist           BP   /56   Temp   98 3   Pulse  85   Resp   16   SpO2   98 %

## 2018-09-20 PROBLEM — L89.202: Status: ACTIVE | Noted: 2018-09-19

## 2018-09-20 PROBLEM — L89.219 DECUBITUS ULCER OF RIGHT HIP: Status: ACTIVE | Noted: 2018-09-19

## 2018-09-20 LAB
ANION GAP SERPL CALCULATED.3IONS-SCNC: 3 MMOL/L (ref 5–14)
ATRIAL RATE: 93 BPM
BASOPHILS # BLD AUTO: 0.1 THOUSANDS/ΜL (ref 0–0.1)
BASOPHILS NFR BLD AUTO: 1 % (ref 0–1)
BUN SERPL-MCNC: 11 MG/DL (ref 5–25)
CALCIUM SERPL-MCNC: 7.7 MG/DL (ref 8.4–10.2)
CHLORIDE SERPL-SCNC: 105 MMOL/L (ref 97–108)
CO2 SERPL-SCNC: 28 MMOL/L (ref 22–30)
CREAT SERPL-MCNC: 0.51 MG/DL (ref 0.6–1.2)
EOSINOPHIL # BLD AUTO: 0.2 THOUSAND/ΜL (ref 0–0.4)
EOSINOPHIL NFR BLD AUTO: 4 % (ref 0–6)
ERYTHROCYTE [DISTWIDTH] IN BLOOD BY AUTOMATED COUNT: 15.1 %
FERRITIN SERPL-MCNC: 327 NG/ML (ref 8–388)
GFR SERPL CREATININE-BSD FRML MDRD: 100 ML/MIN/1.73SQ M
GLUCOSE SERPL-MCNC: 106 MG/DL (ref 70–99)
HCT VFR BLD AUTO: 25.5 % (ref 36–46)
HCT VFR BLD AUTO: 25.6 % (ref 36–46)
HGB BLD-MCNC: 8.5 G/DL (ref 12–16)
HGB BLD-MCNC: 8.6 G/DL (ref 12–16)
LYMPHOCYTES # BLD AUTO: 1.6 THOUSANDS/ΜL (ref 0.5–4)
LYMPHOCYTES NFR BLD AUTO: 30 % (ref 20–50)
MCH RBC QN AUTO: 28 PG (ref 26–34)
MCHC RBC AUTO-ENTMCNC: 33.2 G/DL (ref 31–36)
MCV RBC AUTO: 84 FL (ref 80–100)
MONOCYTES # BLD AUTO: 0.5 THOUSAND/ΜL (ref 0.2–0.9)
MONOCYTES NFR BLD AUTO: 10 % (ref 1–10)
NEUTROPHILS # BLD AUTO: 3 THOUSANDS/ΜL (ref 1.8–7.8)
NEUTS SEG NFR BLD AUTO: 56 % (ref 45–65)
P AXIS: 36 DEGREES
PLATELET # BLD AUTO: 285 THOUSANDS/UL (ref 150–450)
PMV BLD AUTO: 8.9 FL (ref 8.9–12.7)
POTASSIUM SERPL-SCNC: 4.1 MMOL/L (ref 3.6–5)
PR INTERVAL: 152 MS
QRS AXIS: 35 DEGREES
QRSD INTERVAL: 84 MS
QT INTERVAL: 606 MS
QTC INTERVAL: 753 MS
RBC # BLD AUTO: 3.02 MILLION/UL (ref 4–5.2)
SODIUM SERPL-SCNC: 136 MMOL/L (ref 137–147)
T WAVE AXIS: 16 DEGREES
VENTRICULAR RATE: 93 BPM
WBC # BLD AUTO: 5.4 THOUSAND/UL (ref 4.5–11)

## 2018-09-20 PROCEDURE — 85014 HEMATOCRIT: CPT | Performed by: HOSPITALIST

## 2018-09-20 PROCEDURE — 93010 ELECTROCARDIOGRAM REPORT: CPT | Performed by: INTERNAL MEDICINE

## 2018-09-20 PROCEDURE — 85018 HEMOGLOBIN: CPT | Performed by: HOSPITALIST

## 2018-09-20 PROCEDURE — 99232 SBSQ HOSP IP/OBS MODERATE 35: CPT | Performed by: FAMILY MEDICINE

## 2018-09-20 PROCEDURE — 80048 BASIC METABOLIC PNL TOTAL CA: CPT | Performed by: HOSPITALIST

## 2018-09-20 PROCEDURE — 85025 COMPLETE CBC W/AUTO DIFF WBC: CPT | Performed by: HOSPITALIST

## 2018-09-20 PROCEDURE — 82728 ASSAY OF FERRITIN: CPT | Performed by: FAMILY MEDICINE

## 2018-09-20 PROCEDURE — 99222 1ST HOSP IP/OBS MODERATE 55: CPT | Performed by: INTERNAL MEDICINE

## 2018-09-20 RX ADMIN — CEFEPIME 2000 MG: 2 INJECTION, POWDER, FOR SOLUTION INTRAVENOUS at 17:28

## 2018-09-20 RX ADMIN — OYSTER SHELL CALCIUM WITH VITAMIN D 1 TABLET: 500; 200 TABLET, FILM COATED ORAL at 08:54

## 2018-09-20 RX ADMIN — HEPARIN SODIUM 5000 UNITS: 5000 INJECTION, SOLUTION INTRAVENOUS; SUBCUTANEOUS at 05:00

## 2018-09-20 RX ADMIN — CEFTAROLINE FOSAMIL 600 MG: 600 POWDER, FOR SOLUTION INTRAVENOUS at 11:26

## 2018-09-20 RX ADMIN — SENNOSIDES AND DOCUSATE SODIUM 1 TABLET: 8.6; 5 TABLET ORAL at 08:54

## 2018-09-20 RX ADMIN — Medication 1 TABLET: at 08:55

## 2018-09-20 RX ADMIN — CYANOCOBALAMIN TAB 1000 MCG 500 MCG: 1000 TAB at 08:54

## 2018-09-20 RX ADMIN — SODIUM CHLORIDE, POTASSIUM CHLORIDE, SODIUM LACTATE AND CALCIUM CHLORIDE 125 ML/HR: 600; 310; 30; 20 INJECTION, SOLUTION INTRAVENOUS at 04:32

## 2018-09-20 RX ADMIN — HEPARIN SODIUM 5000 UNITS: 5000 INJECTION, SOLUTION INTRAVENOUS; SUBCUTANEOUS at 21:40

## 2018-09-20 RX ADMIN — VITAMIN D, TAB 1000IU (100/BT) 1000 UNITS: 25 TAB at 08:55

## 2018-09-20 RX ADMIN — HEPARIN SODIUM 5000 UNITS: 5000 INJECTION, SOLUTION INTRAVENOUS; SUBCUTANEOUS at 15:27

## 2018-09-20 NOTE — NURSING NOTE
Assessment unchanged   PT resting comfortable no distress noted  Denies any pain  Will continue to monitor call bell within reach  [facility]

## 2018-09-20 NOTE — CONSULTS
Patient well known to me from previous admission [facility] unit  Does not want [facility] Shidler as previously  Explained protein needs of 70 gm/day for healing-pt agreeable to try Ensure Enlive once daily  Patient reports possible discharge tomorrow  Full assessment to be completed if patient remains here
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
## 2018-09-20 NOTE — CASE MANAGEMENT
Initial Clinical Review    Admission: Date/Time/Statement:   Admission, Transfer, Discharge Orders   Comment  Expand  Hide      Last edited by  on  at    Start   Ordered   09/20/18 1007  Inpatient Admission Once      09/20/18 1014       History of Illness: A 80 y/o female stayed in the hospital, post elective surgery      Procedure: DEBRIDEMENT OF HIP PRESSURE SORE (Right)      Temperature Pulse Respirations Blood Pressure SpO2   09/19/18 1159 09/19/18 1159 09/19/18 1159 09/19/18 1159 09/19/18 1159   98 5 °F (36 9 °C) 97 16 114/64 96 %      Temp Source Heart Rate Source Patient Position - Orthostatic VS BP Location FiO2 (%)   09/19/18 1159 09/19/18 1730 09/19/18 1730 09/19/18 1730 --   Temporal Radial Lying Right arm       Pain Score       09/19/18 1215       5        Wt Readings from Last 1 Encounters:   09/19/18 54 4 kg (120 lb)     Past Medical/Surgical History:   Diagnosis    Anemia    Arthritis    Back injury    Chronic kidney disease    Depression    Osteopenia    Osteoporosis    Paraplegia (HCC)    Poor circulation    Pressure injury of skin    Pressure sore of hip    Tibial plateau fracture       Admitting Diagnosis: Stage III pressure ulcer of right hip (HCC) [C72 692]  Other specified disorders of the skin and subcutaneous tissue [L98 8]    Age/Sex: 79 y o  female    Assessment/Plan:   * Status post debridement   Assessment & Plan     Patient developed a purulent draining right hip pressure ulcer about a month ago after she was lying  on her right hip on a mirror she was unaware of     Dr Omid Woodard proceeded with debridement of the wound today in OR  Patient denies any pain given paraplegia at T 8 level  Continue DVT prophylaxis as per plastic surgeon  IV fluids          Paraplegia following spinal cord injury [facility])   Assessment & Plan     Supportive care  Patient is wheelchair-bound  PT OT evaluation          Pressure ulcer, sacrum   Assessment & Plan     Unable to assess  depth and size , wound covered with dressing  Patient follows with plastic surgeon ,wound care specialist          Iron deficiency anemia   Assessment & Plan     Hemoglobin 10 7 at baseline   Will repeat post hope hemoglobin hematocrit  Off iron supplement on B12 supplement  Follow up with PCP          Osteopenia   Assessment & Plan     Continue vitamin-D   and calcium supplement  Patient follows with endocrinologist Dr Gigi Armstrong at [facility]          Hypokalemia   Assessment & Plan     Will repeat BMP          Underweight   Assessment & Plan     BMI 19 9 most likely secondary to decreased calories intake  Will request nutritionist consult to optimize nutrition need            VTE Prophylaxis: Heparin  / sequential compression device      Recommendations for Discharge:  · Follow up with PCP Plastic Surgeon Endocrinology         Admission Orders:  Inpatient/MedSurg  OP Care per Protocol  Consult Internal Medicine r/e medical management  Consult ID r/e  Pressure sore of right hip  Bilateral Sequential Compression Device  Urine Culture Pending  PT Evaluation  [facility]  LR @ 125    Scheduled Meds:   Current Facility-Administered Medications:  calcium carbonate-vitamin D 1 tablet Oral Daily   cholecalciferol 1,000 Units Oral Daily   cyanocobalamin 500 mcg Oral Daily   heparin (porcine) 5,000 Units Subcutaneous Q8H [address]   multivitamin-minerals 1 tablet Oral Daily   senna-docusate sodium 1 tablet Oral Daily

## 2018-09-20 NOTE — CONSULTS
Consultation - Infectious Disease   Mary Hernandez 79 y o  female MRN: [id number]  Unit/Bed#: 7T [facility] 701-01 Encounter: [id number]      IMPRESSION & RECOMMENDATIONS:   1  Infected right hip wound-status post I and D of the wound  Wound culture previously had grown Morganella  The current wound culture is already growing 4+ gram-negative rods  I suspect that this is the same organism  Fortunately the patient is not systemically ill, is hemodynamically stable, nontoxic  The wound did not track down to any osteoarticular structures   -discontinue ceftaroline  -cefepime 2 g IV q 12 hours  -follow up wound cultures and adjust antibiotics as needed  -if the patient is found to have have the same a organism, Morganella, can likely transition to oral Bactrim DS tab p o  q 12 hours  -would plan 7 days of antibiotics postoperatively  -local wound care  -close plastics follow-up    2  Fever-no recurrence during the patient's brief hospital stay  Possibly all secondary to 1  No other clear source appreciated   -antibiotics as above  -if the patient would spike a fever, recommend blood cultures x2 sets and chest x-ray  -monitor temperature  -no additional ID workup for now    3  Anemia-felt to be iron deficiency  H&H has dropped during her brief admission  Felt to be secondary to blood loss with some delusional flecks from IV fluid   -monitor CBC with diff  -no additional ID workup for now    4  Paraplegia-following spinal cord injury  HISTORY OF PRESENT ILLNESS:  Reason for Consult:   Infected right hip wound  HPI: Mary Hernandez is a 79y o  year old female with a history of paraplegia admitted to [facility] yesterday for persistent, infected right hip wound who I am asked to assist with management  The patient has a history of paraplegia related to a T8 traumatic injury in the past   Back in April of this year she had undergone a flap procedure for sacral decubitus ulceration    Flap is never completely healed and there has been and persistent open clean base wound for quite some time  More recently over the last month she developed a right hip ulceration  She had been seen at the wound Center but the wound had persisted  Last week there was a concerned that the hip wound may be infected as she was having some subjective fever and while the [address] [facility] [address] [address] actually spiked a fever  She had a wound culture obtained and was started on Keflex  Her fever and drainage persisted and she was seen back on Monday and changed to doxycycline  Since starting the doxycycline the fever has resolved  She was seen by Dr Lillie Barnett on Tuesday and he was concerned about the appearance of the wound  He therefore admitted the patient, took her to the OR and debrided with a right hip wound  The wound did not appear as severe as he had initially anticipated and he evacuated the entire abscess  There is no tunneling down to any osteoarticular structures  During the patient's brief hospital stay she has remained afebrile and hemodynamically stable  She received a dose of cefepime yesterday and today was started on ceftaroline  She denies any headache or stiff neck, denies any sore throat or rhinorrhea or nasal congestion, denies any cough or difficulty breathing, denies any chest pain or abdominal pain, denies any other rash or skin lesions  REVIEW OF SYSTEMS:  A complete 12 point system-based review of systems is negative other than that noted in the HPI      PAST MEDICAL HISTORY:  Past Medical History:   Diagnosis Date    Anemia     iron defiencey    Arthritis     osteo    Back injury     Chronic kidney disease     nephritis    Depression     Osteopenia     Osteoporosis     Paraplegia (HCC)     Poor circulation     Pressure injury of skin     right hip    Pressure sore of hip     right    Tibial plateau fracture      Past Surgical History:   Procedure Laterality Date    HIP DEBRIDEMENT Right 2017    right hip sore debridement    NH DEBRIDEMENT, SKIN, SUB-Q TISSUE,MUSCLE,BONE,=<20 SQ CM Right 2018    Procedure: DEBRIDEMENT OF HIP PRESSURE SORE;  Surgeon: Dallas Pandya MD;  Location: [facility] MAIN OR;  Service: Plastics    TOE AMPUTATION Left 2017    small toe left foot amputation    WISDOM TOOTH EXTRACTION         FAMILY HISTORY:  Non-contributory    SOCIAL HISTORY:  Social History   History   Alcohol Use    4 2 oz/week    7 Glasses of wine per week     Comment: per weekly, 1 glass HS     History   Drug Use No     History   Smoking Status    Never Smoker   Smokeless Tobacco    Never Used       ALLERGIES:  Allergies   Allergen Reactions    Vancomycin      Unknown     Ciprofloxacin Rash and Swelling     Looked like suburn, itching  Bed sores  Swelling, itching    Latex Rash       MEDICATIONS:  All current active medications have been reviewed  Antibiotics ceftaroline 1, total antibiotics 2, postop day 1    PHYSICAL EXAM:  HR:  [80-89] 89  Resp:  [18-22] 18  BP: ()/(48-56) 98/55  SpO2:  [95 %-100 %] 97 %  Temp (24hrs), Av 1 °F (36 2 °C), Min:96 °F (35 6 °C), Max:98 3 °F (36 8 °C)  Current: Temperature: (!) 97 4 °F (36 3 °C)    Intake/Output Summary (Last 24 hours) at 18 1415  Last data filed at 18 1246   Gross per 24 hour   Intake             2590 ml   Output             1175 ml   Net             1415 ml       General Appearance:  Appearing well, nontoxic, and in no distress   Head:  Normocephalic, without obvious abnormality, atraumatic   Eyes:  Conjunctiva pink and sclera anicteric, both eyes   Nose: Nares normal, mucosa normal, no drainage   Throat: Oropharynx moist without lesions   Neck: Supple, symmetrical, no adenopathy, no tenderness/mass/nodules   Back:   Symmetric, no curvature, ROM normal, no CVA tenderness  Sacrum status post flap with clean base ulceration noted the incision line    No purulent drainage   Lungs:   Clear to auscultation bilaterally, respirations unlabored   Chest Wall:  No tenderness or deformity   Heart:  RRR; no murmur, rub or gallop   Abdomen:   Soft, non-tender, non-distended, positive bowel sounds    Extremities: No cyanosis, clubbing or edema  Right hip ulcerations status post I and D with some faint surrounding erythema but no purulence  Skin: No other rashes or lesions  No other draining wounds noted  Lymph nodes: Cervical, supraclavicular nodes normal   Neurologic: Alert and oriented times 3, paraplegic       LABS, IMAGING, & OTHER STUDIES:  Lab Results:  I have personally reviewed pertinent labs      Results from last 7 days  Lab Units 09/20/18  0436 09/20/18  0147 09/19/18  2218 09/19/18  1205   WBC Thousand/uL 5 40  --   --  7 40   HEMOGLOBIN g/dL 8 5* 8 6* 8 8* 10 7*   PLATELETS Thousands/uL 285  --   --  365       Results from last 7 days  Lab Units 09/20/18  0435 09/19/18  2217 09/19/18  1205   SODIUM mmol/L 136* 136* 137   POTASSIUM mmol/L 4 1 3 5* 3 3*   CHLORIDE mmol/L 105 102 100   CO2 mmol/L 28 28 29   BUN mg/dL 11 11 8   CREATININE mg/dL 0 51* 0 57* 0 46*   EGFR ml/min/1 73sq m 100 96 103   CALCIUM mg/dL 7 7* 8 2* 8 3*       Results from last 7 days  Lab Units 09/19/18  1553 09/14/18  1626   GRAM STAIN RESULT  1+ Polys  4+ Gram positive cocci in pairs  3+ Gram positive cocci in pairs  3+ Gram negative rods 1+ Polys  Rare Gram positive rods  Rare Gram negative rods  Rare Gram positive cocci in pairs   WOUND CULTURE   --  2+ Growth of Morganella morganii*  1+ Growth of

## 2018-09-20 NOTE — SOCIAL WORK
Pt admitted undergoing a debridement of (R) hip decubitus ulcer yesterday  Awaiting ID eval for duration and type of Abx needed  If none required could potentially be d/c home from a surgical/medical standpoint- will make referral to [facility] and Home infusion if long term Abx needed- pt has administered by self in the past and prefers this  Pt lives alone in a 1st floor apartment with direct access-no steps  She is a paraplegic using a manual w/c (in room) and is independent with all aspects of care as well as driving  Pt was brought to hospital using the [facility] and will need assistance with same when d/c'd  Pt has a h/o of depression not on medication  PCP is Dr Jose Angel Harrington with pt using [facility] on [address] in [place]    Will continue to follow for d/c needs/disposition

## 2018-09-20 NOTE — PROGRESS NOTES
Progress Note - Nam Young 1950, 79 y o  female MRN: [id number]    Unit/Bed#: 7T [facility] 701-01 Encounter: [id number]    Primary Care Provider: Radha Restrepo   Date and time admitted to hospital: 9/19/2018 11:41 AM        Underweight   Assessment & Plan    BMI 19 9 most likely secondary to decreased calories intake  Will request nutritionist consult to optimize nutrition need        Hypokalemia   Assessment & Plan    Resolved        Osteopenia   Assessment & Plan    Continue vitamin-D   and calcium supplement  Patient follows with endocrinologist Dr Lew Connelly at [address]        Iron deficiency anemia   Assessment & Plan    Hemoglobin 10 7 at baseline and now dropped to 8 5 prob secondary to some blood loss with surgery and dilutional from iv fluids  check iron panel and ferritin stop iv fluids        Pressure ulcer, sacrum   Assessment & Plan    Unable to assess  depth as it tracts deep is 2x2 cm sacral decubitus pressure ulcer   Patient following with dr Sheree Cohen plastic surgeon  further as per them  On antibiotics        Paraplegia following spinal cord injury [facility])   Assessment & Plan    Supportive care  Patient is wheelchair-bound  PT OT evaluation        * Decubitus ulcer of hip, stage 2   Assessment & Plan    Patient developed a purulent draining right hip pressure ulcer for about a month after she was lying  on her right hip on a mirror she was unaware of and underwent debridement yesterday by dr Sheree Cohen  now on teflaro as per ID  WILL DISCUSS ANTIBIOTIC USE WITH INFECTIOUS DISEASE and discussed with case management about discharge planning  Patient denies any pain given paraplegia at T 8 level  Continue DVT prophylaxis as per plastic surgeon          acute blood loss anemia:secondary to recent surgery and iv fluids as expected  check iron panel and stop iv fluids and observe    VTE Pharmacologic Prophylaxis:   Pharmacologic: Heparin  Mechanical VTE Prophylaxis in Place: Yes    Patient Centered Rounds: I have performed bedside rounds with nursing staff today  Discussions with Specialists or Other Care Team Provider:   Will discuss the case with Infectious Disease    Education and Discussions with Family / Patient:   Discussed with the patient about her hospital course    Time Spent for Care: 30 minutes  More than 50% of total time spent on counseling and coordination of care as described above  Current Length of Stay: 0 day(s)    Current Patient Status: Outpatient Surgery   Certification Statement: The patient will continue to require additional inpatient hospital stay due to as determined by plastic surgery for wound care needs    Discharge Plan: as per plastics    Code Status: Level 1 - Full Code      Subjective:   Patient denies any chest pain or shortness of breath  No nausea vomiting or abdominal pain  She has no sensation below her waist and hence does not feel any pain in her pressure ulcers  Objective:     Vitals:   Temp (24hrs), Av 3 °F (36 3 °C), Min:96 °F (35 6 °C), Max:98 5 °F (36 9 °C)    HR:  [80-97] 84  Resp:  [16-22] 18  BP: ()/(48-64) 95/48  SpO2:  [95 %-100 %] 98 %  Body mass index is 19 97 kg/m²  Input and Output Summary (last 24 hours): Intake/Output Summary (Last 24 hours) at 18 1006  Last data filed at 18 6817   Gross per 24 hour   Intake             2190 ml   Output              675 ml   Net             1515 ml       Physical Exam:     Physical Exam   Constitutional: She is oriented to person, place, and time  She appears well-developed and well-nourished  HENT:   Head: Normocephalic and atraumatic  Right Ear: External ear normal    Left Ear: External ear normal    Mouth/Throat: Oropharynx is clear and moist    Eyes: Conjunctivae and EOM are normal  Pupils are equal, round, and reactive to light  Neck: Normal range of motion  Neck supple  Cardiovascular: Normal rate, regular rhythm, normal heart sounds and intact distal pulses  Pulmonary/Chest: Effort normal and breath sounds normal    Abdominal: Soft  Bowel sounds are normal  She exhibits no mass  There is no tenderness  There is no rebound and no guarding  Genitourinary:   Genitourinary Comments: deferred   Musculoskeletal:   2x2 cm stage II pressure ulcer on the right hip status post debridement  2x2 cm stage 2-3 pressure ulcer on the sacrum    Neurological: She is alert and oriented to person, place, and time  She has normal reflexes  Skin: Skin is warm and dry  No rash noted  Psychiatric: She has a normal mood and affect  Nursing note and vitals reviewed  Additional Data:     Labs:      Results from last 7 days  Lab Units 09/20/18  0436   WBC Thousand/uL 5 40   HEMOGLOBIN g/dL 8 5*   HEMATOCRIT % 25 5*   PLATELETS Thousands/uL 285   NEUTROS PCT % 56   LYMPHS PCT % 30   MONOS PCT % 10   EOS PCT % 4       Results from last 7 days  Lab Units 09/20/18  0435   SODIUM mmol/L 136*   POTASSIUM mmol/L 4 1   CHLORIDE mmol/L 105   CO2 mmol/L 28   BUN mg/dL 11   CREATININE mg/dL 0 51*   CALCIUM mg/dL 7 7*                     * I Have Reviewed All Lab Data Listed Above  * Additional Pertinent Lab Tests Reviewed:  [facility] Admission Reviewed    Imaging:    Imaging Reports Reviewed Today Include: none  Imaging Personally Reviewed by Myself Includes:  none    Recent Cultures (last 7 days):       Results from last 7 days  Lab Units 09/14/18  1626   GRAM STAIN RESULT  1+ Polys  Rare Gram positive rods  Rare Gram negative rods  Rare Gram positive cocci in pairs   WOUND CULTURE  2+ Growth of Morganella morganii*  1+ Growth of        Last 24 Hours Medication List:     Current Facility-Administered Medications:  calcium carbonate-vitamin D 1 tablet Oral Daily Mabel Damon MD   ceftaroline (TEFLARO)  mg Intravenous Q12H Chantal Muniz MD   cholecalciferol 1,000 Units Oral Daily Mabel Damon MD   cyanocobalamin 500 mcg Oral Daily Mabel Damon MD heparin (porcine) 5,000 Units Subcutaneous [facility] Jacquelin Burns MD   HYDROcodone-acetaminophen 1 tablet Oral Q4H PRN Jacquelin Burns MD   melatonin 3 mg Oral HS PRN Jed Morocho MD   multivitamin-minerals 1 tablet Oral Daily Jacquelin Burns MD   ondansetron 4 mg Oral Q6H PRN Jacquelin Burns MD   senna-docusate sodium 1 tablet Oral Daily Jacquelin Burns MD        Today, Patient Was Seen By: Nesha Barlow MD    ** Please Note: Dictation voice to text software may have been used in the creation of this document   **

## 2018-09-20 NOTE — NURSING NOTE
AOX4 HR regular Lungs decreased clear + bowel sounds x4 + PP ABD soft  R hip with dressing dry intact  Denies any pain  Will continue to monitor call bell within reach

## 2018-09-20 NOTE — PROGRESS NOTES
Right hip wound inspected  No pus  Tract about 5 cm deep by about 2 cm in width  Will check in 48 hours to see if will heal by itself was otherwise a flap will be necessary to fill this defect with skin closure as well  That would be planned for the Monday morning the 24th September  Await cultures as well to see how antibiotic coverage will be provided

## 2018-09-21 ENCOUNTER — ANESTHESIA EVENT (OUTPATIENT)
Dept: PERIOP | Facility: HOSPITAL | Age: 68
End: 2018-09-21

## 2018-09-21 PROBLEM — E87.6 HYPOKALEMIA: Status: RESOLVED | Noted: 2018-09-19 | Resolved: 2018-09-21

## 2018-09-21 PROBLEM — R21 RASH DUE TO ALLERGY: Status: ACTIVE | Noted: 2018-09-21

## 2018-09-21 PROBLEM — T78.40XA RASH DUE TO ALLERGY: Status: ACTIVE | Noted: 2018-09-21

## 2018-09-21 LAB
BASOPHILS # BLD AUTO: 0 THOUSANDS/ΜL (ref 0–0.1)
BASOPHILS NFR BLD AUTO: 1 % (ref 0–1)
EOSINOPHIL # BLD AUTO: 0.5 THOUSAND/ΜL (ref 0–0.4)
EOSINOPHIL NFR BLD AUTO: 7 % (ref 0–6)
ERYTHROCYTE [DISTWIDTH] IN BLOOD BY AUTOMATED COUNT: 15.6 %
HCT VFR BLD AUTO: 32.1 % (ref 36–46)
HGB BLD-MCNC: 10.6 G/DL (ref 12–16)
LYMPHOCYTES # BLD AUTO: 1.7 THOUSANDS/ΜL (ref 0.5–4)
LYMPHOCYTES NFR BLD AUTO: 23 % (ref 20–50)
MCH RBC QN AUTO: 27.9 PG (ref 26–34)
MCHC RBC AUTO-ENTMCNC: 33.1 G/DL (ref 31–36)
MCV RBC AUTO: 84 FL (ref 80–100)
MONOCYTES # BLD AUTO: 0.6 THOUSAND/ΜL (ref 0.2–0.9)
MONOCYTES NFR BLD AUTO: 9 % (ref 1–10)
NEUTROPHILS # BLD AUTO: 4.4 THOUSANDS/ΜL (ref 1.8–7.8)
NEUTS SEG NFR BLD AUTO: 61 % (ref 45–65)
PLATELET # BLD AUTO: 420 THOUSANDS/UL (ref 150–450)
PMV BLD AUTO: 8.3 FL (ref 8.9–12.7)
RBC # BLD AUTO: 3.8 MILLION/UL (ref 4–5.2)
WBC # BLD AUTO: 7.2 THOUSAND/UL (ref 4.5–11)

## 2018-09-21 PROCEDURE — 85025 COMPLETE CBC W/AUTO DIFF WBC: CPT | Performed by: FAMILY MEDICINE

## 2018-09-21 PROCEDURE — 99233 SBSQ HOSP IP/OBS HIGH 50: CPT | Performed by: INTERNAL MEDICINE

## 2018-09-21 PROCEDURE — 99232 SBSQ HOSP IP/OBS MODERATE 35: CPT | Performed by: FAMILY MEDICINE

## 2018-09-21 RX ORDER — DIPHENHYDRAMINE HYDROCHLORIDE 50 MG/ML
12.5 INJECTION INTRAMUSCULAR; INTRAVENOUS ONCE
Status: COMPLETED | OUTPATIENT
Start: 2018-09-21 | End: 2018-09-21

## 2018-09-21 RX ORDER — DIPHENHYDRAMINE HYDROCHLORIDE 50 MG/ML
12.5 INJECTION INTRAMUSCULAR; INTRAVENOUS EVERY 6 HOURS PRN
Status: DISCONTINUED | OUTPATIENT
Start: 2018-09-21 | End: 2018-09-22 | Stop reason: HOSPADM

## 2018-09-21 RX ADMIN — PIPERACILLIN AND TAZOBACTAM 3.38 G: 3; .375 INJECTION, POWDER, LYOPHILIZED, FOR SOLUTION INTRAVENOUS; PARENTERAL at 22:20

## 2018-09-21 RX ADMIN — OYSTER SHELL CALCIUM WITH VITAMIN D 1 TABLET: 500; 200 TABLET, FILM COATED ORAL at 09:23

## 2018-09-21 RX ADMIN — SENNOSIDES AND DOCUSATE SODIUM 1 TABLET: 8.6; 5 TABLET ORAL at 09:23

## 2018-09-21 RX ADMIN — PIPERACILLIN SODIUM,TAZOBACTAM SODIUM 4.5 G: 4; .5 INJECTION, POWDER, FOR SOLUTION INTRAVENOUS at 12:10

## 2018-09-21 RX ADMIN — Medication 1 TABLET: at 09:23

## 2018-09-21 RX ADMIN — HEPARIN SODIUM 5000 UNITS: 5000 INJECTION, SOLUTION INTRAVENOUS; SUBCUTANEOUS at 13:33

## 2018-09-21 RX ADMIN — DIPHENHYDRAMINE HYDROCHLORIDE 12.5 MG: 50 INJECTION INTRAMUSCULAR; INTRAVENOUS at 06:54

## 2018-09-21 RX ADMIN — VITAMIN D, TAB 1000IU (100/BT) 1000 UNITS: 25 TAB at 09:23

## 2018-09-21 RX ADMIN — HEPARIN SODIUM 5000 UNITS: 5000 INJECTION, SOLUTION INTRAVENOUS; SUBCUTANEOUS at 05:20

## 2018-09-21 RX ADMIN — DIPHENHYDRAMINE HYDROCHLORIDE 12.5 MG: 50 INJECTION INTRAMUSCULAR; INTRAVENOUS at 06:00

## 2018-09-21 RX ADMIN — CYANOCOBALAMIN TAB 1000 MCG 500 MCG: 1000 TAB at 09:23

## 2018-09-21 RX ADMIN — HEPARIN SODIUM 5000 UNITS: 5000 INJECTION, SOLUTION INTRAVENOUS; SUBCUTANEOUS at 22:04

## 2018-09-21 RX ADMIN — PIPERACILLIN AND TAZOBACTAM 3.38 G: 3; .375 INJECTION, POWDER, LYOPHILIZED, FOR SOLUTION INTRAVENOUS; PARENTERAL at 16:25

## 2018-09-21 NOTE — PROGRESS NOTES
Progress Note - Infectious Disease   Nathalia Powell 79 y o  female MRN: [id number]  Unit/Bed#: 7T [facility] 701-02 Encounter: [id number]      Impression/Plan:  1  Infected right hip wound-status post I and D of the wound  Polymicrobial including Morganella and Enterococcus  Fortunately the patient is not systemically ill, is hemodynamically stable, nontoxic  The wound did not track down to any osteoarticular structures   -discontinue cefepime  -Zosyn 3 375 g IV q 6 hours  -follow up wound cultures and adjust antibiotics as needed  -will continue the IV antibiotics until a decision is made on Monday whether to proceed with additional surgery  -likely to oral antibiotics on Monday  -local wound care  -close plastics follow-up  -recheck CBC with diff and BMP 2018     2  Fever-no recurrence during the patient's brief hospital stay  Possibly all secondary to 1  No other clear source appreciated   -antibiotics as above  -if the patient would spike a fever, recommend blood cultures x2 sets and chest x-ray  -monitor temperature  -no additional ID workup for now     3  Anemia-felt to be iron deficiency  H&H has dropped during her brief admission  Felt to be secondary to blood loss  He H&H now increased  -monitor CBC with diff  -no additional ID workup for now     4   Paraplegia-following spinal cord injury  5 Rash-presumed secondary to cefepime  Seems to have tolerated penicillins in the past   -no additional cefepime  -monitor rash    Discussed in detail with [facility] Internal Medicine    Antibiotics:  Zosyn 1  Antibiotics 2  Postop day 2    Subjective:  Patient has no fever, chills, sweats; no nausea, vomiting, diarrhea; no cough, shortness of breath; no pain  She developed a rash yesterday on cefepime and therefore the cefepime was discontinued      Objective:  Vitals:  HR:  [71-82] 71  Resp:  [18-20] 18  BP: ()/(50-62) 91/50  SpO2:  [93 %-96 %] 95 %  Temp (24hrs), Av 6 °F (36 4 °C), Min:97 °F (36 1 °C), Max:98 °F (36 7 °C)  Current: Temperature: 97 5 °F (36 4 °C)    Physical Exam:   General Appearance:  Alert, interactive, nontoxic, no acute distress  Throat: Oropharynx moist without lesions  Lungs:   Clear to auscultation bilaterally; no wheezes, rhonchi or rales; respirations unlabored   Heart:  RRR; no murmur, rub or gallop   Abdomen:   Soft, non-tender, non-distended, positive bowel sounds  Extremities: No clubbing, cyanosis or edema  Right hip wound with stable very faint erythema without purulence  Skin: Fading maculopapular rash  Labs, Imaging, & Other studies:   All pertinent labs and imaging studies were personally reviewed    Results from last 7 days  Lab Units 09/21/18  0512 09/20/18  0436 09/20/18  0147  09/19/18  1205   WBC Thousand/uL 7 20 5 40  --   --  7 40   HEMOGLOBIN g/dL 10 6* 8 5* 8 6*  < > 10 7*   PLATELETS Thousands/uL 420 285  --   --  365   < > = values in this interval not displayed      Results from last 7 days  Lab Units 09/20/18  0435 09/19/18  2217 09/19/18  1205   SODIUM mmol/L 136* 136* 137   POTASSIUM mmol/L 4 1 3 5* 3 3*   CHLORIDE mmol/L 105 102 100   CO2 mmol/L 28 28 29   BUN mg/dL 11 11 8   CREATININE mg/dL 0 51* 0 57* 0 46*   EGFR ml/min/1 73sq m 100 96 103   CALCIUM mg/dL 7 7* 8 2* 8 3*       Results from last 7 days  Lab Units 09/19/18  1553 09/14/18  1626   GRAM STAIN RESULT  1+ Polys  4+ Gram positive cocci in pairs  3+ Gram positive cocci in pairs  3+ Gram negative rods 1+ Polys  Rare Gram positive rods  Rare Gram negative rods  Rare Gram positive cocci in pairs   WOUND CULTURE   --  2+ Growth of Morganella morganii*  1+ Growth of

## 2018-09-21 NOTE — SOCIAL WORK
Per attending, pt with a tract from hip ulcer to be monitored for 48 hrs if to close which will direct further POC  Pt cont on IV Abx with final wound cx's pending  Pt will need transportation with Martha Manzanaresl home when medically/surgically cleared  Will cont to follow

## 2018-09-21 NOTE — NURSING NOTE
Pt received this am, a+o, no distress, denies pain, placed on air matress, hip dressing and sacrum dressing changed, offers no complaints, will monitor

## 2018-09-21 NOTE — NURSING NOTE
On initial rounds patient in no apparent distress  Skin warm and dry to touch  Pleasant and cooperative  Denies pain but verbalizes understanding of pain scale 0-10  Hays to leg bag with adequate amount of clear yellow urine  Legs remain contracted  Dressing to right hip intact  Vitals stable  All safety maintained  Will continue to monitor

## 2018-09-21 NOTE — PROGRESS NOTES
Patient got a 1st dose of cefepime tonight ,at 5:00 a m  she reported itchy  rash on her back   upon assessment she found to have  maculopapular rash on the top of her back  Patient will be given Benadryl   ID will be informed by RN nurse early in a m  MS  was instructed to stop antibiotic until ID further recommendations  Patient also allergic to vancomycin and fluoroquinolones

## 2018-09-21 NOTE — CONSULTS
Consult- Joan Cayden 1950, 79 y o  female MRN: [id number]    Unit/Bed#: 7T [facility] 701-01 Encounter: [id number]    Primary Care Provider: Maxx Young   Date and time admitted to hospital: 9/19/2018 11:41 AM      Consults    Rash due to allergy   Assessment & Plan    · Hives on her back secondary to cefepime now resolved cefepime was stopped Benadryl was given        Wound infection   Assessment & Plan    · Right  hip s/p I and d - she was placed on cefepime but developed allergic reaction with rash - stop  · Her tissue culture grew grew Morganella and  enterococci is for now ID placed on Zosyn final antibiotics duration t i d  · There is a trach at the ulcer area  Plastic surgery awaiting 48 hours to see if it resolves not she is planned to have a flap done on Monday does which she will remain inpatient        Osteopenia   Assessment & Plan    Continue vitamin-D   and calcium supplement  Patient follows with endocrinologist Dr Cornelio Baptiste at [address]        Iron deficiency anemia   Assessment & Plan    Hemoglobin 10 6  Very to 327 but that could be acute phase respond and iron panel still pending        Paraplegia following spinal cord injury [facility])   Assessment & Plan    Supportive care  Patient is wheelchair-bound  PT OT evaluation- to see        * Decubitus ulcer of right hip   Assessment & Plan    · Patient developed a purulent draining right hip pressure ulcer for about a month after she was lying  on her right hip on a mirror she was unaware of and underwent debridement yesterday by dr Geovanna Diaz  now on teflaro as per ID  · Cefepime was stopped she had developed allergic reaction to it Zosyn placed by ID final tissue cultures Morganella and Enterococcus species final antibiotic duration to be determined by Infectious Disease there is also a tract and the ulcer    Plastic surgery monitor for 48 hours if it does not resolve plan flow flap on Monday will remain inpatient                VTE Pharmacologic Prophylaxis:   Pharmacologic: Heparin  Mechanical VTE Prophylaxis in Place: Yes    Patient Centered Rounds: I have performed bedside rounds with nursing staff today  Discussions with Specialists or Other Care Team Provider: dr Geovanna Diaz note     Education and Discussions with Family / Patient: patient     Time Spent for Care: 45 minutes  More than 50% of total time spent on counseling and coordination of care as described above  Current Length of Stay: 1 day(s)    Current Patient Status: Inpatient   Certification Statement: The patient will continue to require additional inpatient hospital stay due to infected wound with track - needs monitoring for 48 hrs and then possible flap on monday    Discharge Plan: when cleared by plastics     Code Status: Level 1 - Full Code      Subjective:   Patient seen examined denies any chest pain or shortness of breath eating  An abdominal pain and nausea vomiting  Objective:     Vitals:   Temp (24hrs), Av 6 °F (36 4 °C), Min:97 °F (36 1 °C), Max:98 °F (36 7 °C)    HR:  [71-82] 71  Resp:  [18-20] 18  BP: ()/(50-62) 91/50  SpO2:  [93 %-96 %] 95 %  Body mass index is 19 97 kg/m²  Input and Output Summary (last 24 hours): Intake/Output Summary (Last 24 hours) at 18 1119  Last data filed at 18 6362   Gross per 24 hour   Intake             1780 ml   Output             2050 ml   Net             -270 ml       Physical Exam:     Physical Exam   Constitutional: She is oriented to person, place, and time  She appears cachectic  HENT:   Head: Normocephalic and atraumatic  Eyes: EOM are normal  Pupils are equal, round, and reactive to light  Neck: Normal range of motion  Cardiovascular: Normal rate, regular rhythm and normal heart sounds  Pulmonary/Chest: Effort normal and breath sounds normal    Abdominal: Soft  Bowel sounds are normal    Musculoskeletal: Normal range of motion     Right hip dressing clean   Neurological: She is alert and oriented to person, place, and time  parapalegic lower extremities   Skin: Skin is warm  No rash on back   Psychiatric: She has a normal mood and affect  Additional Data:     Labs:      Results from last 7 days  Lab Units 09/21/18  0512   WBC Thousand/uL 7 20   HEMOGLOBIN g/dL 10 6*   HEMATOCRIT % 32 1*   PLATELETS Thousands/uL 420   NEUTROS PCT % 61   LYMPHS PCT % 23   MONOS PCT % 9   EOS PCT % 7*       Results from last 7 days  Lab Units 09/20/18  0435   SODIUM mmol/L 136*   POTASSIUM mmol/L 4 1   CHLORIDE mmol/L 105   CO2 mmol/L 28   BUN mg/dL 11   CREATININE mg/dL 0 51*   CALCIUM mg/dL 7 7*                     * I Have Reviewed All Lab Data Listed Above  * Additional Pertinent Lab Tests Reviewed:  All Labs Within Last 24 Hours Reviewed    Imaging:    Imaging Reports Reviewed Today Include: none ordered  Imaging Personally Reviewed by Myself Includes:      Recent Cultures (last 7 days):       Results from last 7 days  Lab Units 09/19/18  1553 09/14/18  1626   GRAM STAIN RESULT  1+ Polys  4+ Gram positive cocci in pairs  3+ Gram positive cocci in pairs  3+ Gram negative rods 1+ Polys  Rare Gram positive rods  Rare Gram negative rods  Rare Gram positive cocci in pairs   WOUND CULTURE   --  2+ Growth of Morganella morganii*  1+ Growth of        Last 24 Hours Medication List:     Current Facility-Administered Medications:  calcium carbonate-vitamin D 1 tablet Oral Daily Aline Tom MD   cholecalciferol 1,000 Units Oral Daily Aline Tom MD   cyanocobalamin 500 mcg Oral Daily Aline Tom MD   diphenhydrAMINE 12 5 mg Intravenous Q6H PRN Jacquelin York MD   heparin (porcine) 5,000 Units Subcutaneous Q8H [address] Aline Tom MD   HYDROcodone-acetaminophen 1 tablet Oral Q4H PRN Aline Tom MD   melatonin 3 mg Oral HS PRN Jacquelin York MD   multivitamin-minerals 1 tablet Oral Daily Aline Tom MD   ondansetron 4 mg Oral Q6H PRN Aline Tom MD piperacillin-tazobactam 4 5 g Intravenous Q6H Sharmin Zacarias MD   senna-docusate sodium 1 tablet Oral Daily Karla Montes De Oca MD        Today, Patient Was Seen By: Kerri Mccurdy MD    ** Please Note: Dictation voice to text software may have been used in the creation of this document   **

## 2018-09-21 NOTE — NURSING NOTE
Right hip dressing changed  About a quarter size opening, packed with a piece of kerlex soaked in acetic acid and covered with 2 ABDs  Slight reddness around the opening  Tanish  drainage was on the packing which was taken out  This morning around 0430, had to change pts gown, pillow case and top sheet due to sweating over night  Did not have a fever  Continue to monitor

## 2018-09-21 NOTE — NURSING NOTE
Medicated with benadryl 12 5mg IV as per order  Will hold maxipime for now, 0600 dose, as per Hospitalist

## 2018-09-21 NOTE — NURSING NOTE
Pt alert and oriented times three  Vital signs stable, afebrile  Right hip dressing dry and intact  No complaints of any pain  This am noticed a rash on back, she states she has been itching  Trying to page physcian  Will continue to monitor  Hays patent for yellow, foul smelling urine

## 2018-09-21 NOTE — ANESTHESIA PREPROCEDURE EVALUATION
Review of Systems/Medical History  Patient summary reviewed  Chart reviewed      Cardiovascular  No hypertension (low BP - labile) ,    Pulmonary  Negative pulmonary ROS        GI/Hepatic  Negative GI/hepatic ROS          Negative  ROS        Endo/Other  Negative endo/other ROS      GYN       Hematology  Anemia iron deficiency anemia,     Musculoskeletal    Comment: Rash - due to cefepime D/C ed Arthritis     Neurology    Motor deficit , paraplegia,    Psychology   Depression ,              Physical Exam    Airway    Mallampati score: I  TM Distance: >3 FB  Neck ROM: full     Dental       Cardiovascular  Cardiovascular exam normal    Pulmonary  Pulmonary exam normal     Other Findings        Anesthesia Plan  ASA Score- 3     Anesthesia Type- IV sedation with anesthesia with ASA Monitors.   Additional Monitors:   Airway Plan:         Plan Factors-    Induction-     Postoperative Plan-     Informed Consent- Anesthetic plan and risks discussed with patient.  I personally reviewed this patient with the CRNA. Discussed and agreed on the Anesthesia Plan with the CRNA..

## 2018-09-22 VITALS
BODY MASS INDEX: 19.99 KG/M2 | TEMPERATURE: 97.4 F | OXYGEN SATURATION: 94 % | RESPIRATION RATE: 18 BRPM | SYSTOLIC BLOOD PRESSURE: 100 MMHG | WEIGHT: 120 LBS | HEIGHT: 65 IN | HEART RATE: 80 BPM | DIASTOLIC BLOOD PRESSURE: 58 MMHG

## 2018-09-22 LAB
BACTERIA TISS AEROBE CULT: ABNORMAL
BACTERIA TISS AEROBE CULT: ABNORMAL
GLUCOSE SERPL-MCNC: 170 MG/DL (ref 70–99)
GRAM STN SPEC: ABNORMAL

## 2018-09-22 PROCEDURE — 82948 REAGENT STRIP/BLOOD GLUCOSE: CPT

## 2018-09-22 PROCEDURE — 99232 SBSQ HOSP IP/OBS MODERATE 35: CPT | Performed by: FAMILY MEDICINE

## 2018-09-22 RX ORDER — SULFAMETHOXAZOLE AND TRIMETHOPRIM 800; 160 MG/1; MG/1
1 TABLET ORAL EVERY 12 HOURS SCHEDULED
Qty: 28 TABLET | Refills: 0 | Status: SHIPPED | OUTPATIENT
Start: 2018-09-22 | End: 2018-09-22

## 2018-09-22 RX ORDER — SULFAMETHOXAZOLE AND TRIMETHOPRIM 800; 160 MG/1; MG/1
1 TABLET ORAL EVERY 12 HOURS SCHEDULED
Qty: 28 TABLET | Refills: 0 | Status: SHIPPED | OUTPATIENT
Start: 2018-09-22 | End: 2018-10-06

## 2018-09-22 RX ORDER — SODIUM CHLORIDE, SODIUM LACTATE, POTASSIUM CHLORIDE, CALCIUM CHLORIDE 600; 310; 30; 20 MG/100ML; MG/100ML; MG/100ML; MG/100ML
75 INJECTION, SOLUTION INTRAVENOUS CONTINUOUS
Status: CANCELLED | OUTPATIENT
Start: 2018-09-22

## 2018-09-22 RX ADMIN — Medication 1 TABLET: at 08:48

## 2018-09-22 RX ADMIN — VITAMIN D, TAB 1000IU (100/BT) 1000 UNITS: 25 TAB at 08:48

## 2018-09-22 RX ADMIN — OYSTER SHELL CALCIUM WITH VITAMIN D 1 TABLET: 500; 200 TABLET, FILM COATED ORAL at 08:48

## 2018-09-22 RX ADMIN — CYANOCOBALAMIN TAB 1000 MCG 500 MCG: 1000 TAB at 08:49

## 2018-09-22 RX ADMIN — PIPERACILLIN AND TAZOBACTAM 3.38 G: 3; .375 INJECTION, POWDER, LYOPHILIZED, FOR SOLUTION INTRAVENOUS; PARENTERAL at 05:13

## 2018-09-22 RX ADMIN — HEPARIN SODIUM 5000 UNITS: 5000 INJECTION, SOLUTION INTRAVENOUS; SUBCUTANEOUS at 05:13

## 2018-09-22 RX ADMIN — SENNOSIDES AND DOCUSATE SODIUM 1 TABLET: 8.6; 5 TABLET ORAL at 08:49

## 2018-09-22 NOTE — CASE MANAGEMENT
Continued Stay Review  Date: 9/22/18  Vital Signs: BP 95/51 (BP Location: Right arm)   Pulse 77   Temp 99 2 °F (37 3 °C) (Temporal)   Resp 20   Ht 5' 5" (1 651 m)   Wt 54 4 kg (120 lb)   SpO2 92%   Breastfeeding? No   BMI 19 97 kg/m²   Medications:   Scheduled Meds:   Current Facility-Administered Medications:  calcium carbonate-vitamin D 1 tablet Oral Daily Janie Morris MD    cholecalciferol 1,000 Units Oral Daily Janie Morris MD    cyanocobalamin 500 mcg Oral Daily Janie Morris MD    diphenhydrAMINE 12 5 mg Intravenous Q6H PRN Kingston Stanley MD    heparin (porcine) 5,000 Units Subcutaneous [facility] Janie Morris MD    HYDROcodone-acetaminophen 1 tablet Oral Q4H PRN Janie Morris MD    melatonin 3 mg Oral HS PRN Kingston Stanley MD    multivitamin-minerals 1 tablet Oral Daily Janie Morris MD    ondansetron 4 mg Oral Q6H PRN Janie Morris MD    piperacillin-tazobactam 3 375 g Intravenous Naoma MD Magan    senna-docusate sodium 1 tablet Oral Daily Janie Morris MD    Continuous Infusions:    PRN Meds: diphenhydrAMINE    HYDROcodone-acetaminophen    melatonin    ondansetron  Abnormal Labs/Diagnostic Results:   Age/Sex: 79 y o  female   Assessment/Plan:   IVABT THERAPY PER ID IN PROGRESS FOR INFECTED R HIP  S/P DEBRIDEMENT R HIP PRESSURE SORE WITH TRACKING  ABT CHANGED TO ZOSYN FOR ALLERGIC RASH FROM CEFEPIME, AWAIT FINAL WOUND CULTURE RESULTS  USING IV BENADRYL PRN FOR ITCHING  R HIP DRESSING C,D,&I, NO PAIN ISSUES 2ND PARAPLEGIA  Discharge Plan: TBD  Thank you,  [facility] Utilization Review Department  Phone: [phone]; Fax [phone]  ATTENTION: Please call with any questions or concerns to [phone]  and carefully follow the prompts so that you are directed to the right person  Send all requests for admission clinical reviews, approved or denied determinations and any other requests to fax [phone]   All voicemails are confidential

## 2018-09-22 NOTE — NURSING NOTE
Patient in bed  Dressing changed by provider  Patient tolerated well  See shift flowsheet for details  Call bell in reach  Will continue to monitor

## 2018-09-22 NOTE — SOCIAL WORK
CM informed by surgeon of pt being surgically cleared for d/c- pt's friend to transport home  Wound care ordered with pt declining VNA as able to perform task independently  Maikel Hebert Dr notified of need for supplies which will be provided for pt on d/c  No further d/c needs identified  AMOL#2 does not require signature (admitted within 48hrs)

## 2018-09-22 NOTE — NURSING NOTE
Pt alert and oriented times three  No complaints of itching, back rash much better  Low grade temp 99 2  Dressing intact on right hip  Lungs clear  Continue to monitor

## 2018-09-22 NOTE — DISCHARGE SUMMARY
Date of admission:   19 September 2018    Date of discharge:  2 2 September 2018    Diagnosis treated on this admission:  Infected right hip pressure sore    Secondary diagnoses:  1  Left ischial pressure sore  2  T8 level paraplegia  3  Iron deficiency anemia  4  Malnutrition    Consultation:  1  Infectious disease  2  [facility]    Operations performed:   Debridement right hip pressure sore by Dr Cirilo Thomas 19 September 2018    History & physical:   This patient has some 20 years ago sustained the T8  Level fracture while hang gliding  She was lying on a mirror on her right hip about a month ago and developed a pressure sore  Pus was pouring out of the wound when seen in my office the day prior to admission  Pertinent physical examination revealed the material losing out of the right hip with a 2 cm orifice  She has no sensation below the umbilicus that is normal  She is unable to ambulate  She has a 1 x 2 cm open area on the left ischium with the bone palpable below this  Hospital course: The patient  was taken to the operating room on the date of admission for debridement of the right hip pressure sore  All fat was necrotic but no bone was exposed  There was a well off cavity about this  She was started on intravenous cefepime  The the erythema is dissipated  The wound is omari spontaneously  Disposition:  Home  Condition on discharge: improved    Discharge instructions: The patient is to keep pressure off all potential areas as always  Medications in addition to her outpatient medications as per epic will be Bactrim DS number b i d  she is to do 0 25% ascetic acid dressings b i d  to both the right hip and left ischial pressure sores    She will return to my office in follow-up in 2 weeks

## 2018-09-22 NOTE — PROGRESS NOTES
Progress Note - Nicolasa Ray 1950, 79 y o  female MRN: [id number]    Unit/Bed#: 7T [facility] 701-02 Encounter: [id number]    Primary Care Provider: Isabelle Rodrigues   Date and time admitted to hospital: 9/19/2018 11:41 AM        Osteopenia   Assessment & Plan    Continue vitamin-D   and calcium supplement  Patient follows with endocrinologist Dr Blayne Li at [facility]        Iron deficiency anemia   Assessment & Plan    Hemoglobin 10 6 and ferritin level is normal   Continue oral supplements and vitamins  Pressure ulcer, sacrum   Assessment & Plan    Present on admission  Stage 2-3 sacral decubitus ulcer  Unable to assess  depth as it tracts deep is 2x2 cm sacral decubitus pressure ulcer   Patient following with dr Sandhya Lackey plastic surgeon  further as per them  On antibiotics        Paraplegia following spinal cord injury [facility])   Assessment & Plan    Supportive care  Patient is wheelchair-bound          * Decubitus ulcer of right hip   Assessment & Plan    · Patient developed a purulent draining right hip pressure ulcer for about a month after she was lying  on her right hip on a mirror she was unaware of and underwent debridement yesterday by dr Sandhya Lackey  · right hip decubitus ulcer infected was present on admission  It was draining pus on admission and she was taken to the OR for debridement by Dr Sandhya Lackey  · Cefepime was stopped she had developed allergic reaction to it Zosyn placed by ID final tissue cultures Morganella and Enterococcus species   · The ulcer is deep   I discussed with Plastic surgery Dr Sandhya Lackey and placed on a 2 week course of Bactrim and have her follow up in the office to see if any further interventions required  VTE Pharmacologic Prophylaxis:   Pharmacologic: Heparin  Mechanical VTE Prophylaxis in Place: Yes    Patient Centered Rounds: I have performed bedside rounds with nursing staff today      Discussions with Specialists or Other Care Team Provider: Discussed with Plastic surgery    Education and Discussions with Family / Patient:   Discussed with the patient about her discharge and antibiotics and wound care  Time Spent for Care: 30 minutes  More than 50% of total time spent on counseling and coordination of care as described above  Current Length of Stay: 2 day(s)    Current Patient Status: Inpatient   Certification Statement:     Discharge Plan:   Discharge home today    Code Status: Level 1 - Full Code      Subjective:   Patient denies any chest pain or shortness of breath  She has no feeling below her waist and hence does not feel any pain in her hip area    Objective:     Vitals:   Temp (24hrs), Av 3 °F (36 8 °C), Min:97 4 °F (36 3 °C), Max:99 2 °F (37 3 °C)    HR:  [69-80] 80  Resp:  [18-20] 18  BP: ()/(51-58) 100/58  SpO2:  [92 %-96 %] 94 %  Body mass index is 19 97 kg/m²  Input and Output Summary (last 24 hours): Intake/Output Summary (Last 24 hours) at 18 1107  Last data filed at 18 0601   Gross per 24 hour   Intake              540 ml   Output             2051 ml   Net            -1511 ml       Physical Exam:     Physical Exam   Constitutional: She is oriented to person, place, and time  She appears well-developed and well-nourished  HENT:   Head: Normocephalic and atraumatic  Right Ear: External ear normal    Left Ear: External ear normal    Mouth/Throat: Oropharynx is clear and moist    Eyes: Conjunctivae and EOM are normal  Pupils are equal, round, and reactive to light  Neck: Normal range of motion  Neck supple  Cardiovascular: Normal rate, regular rhythm, normal heart sounds and intact distal pulses  Pulmonary/Chest: Effort normal and breath sounds normal    Abdominal: Soft  Bowel sounds are normal  She exhibits no mass  There is no tenderness  There is no rebound and no guarding     Genitourinary:   Genitourinary Comments: deferred   Musculoskeletal:   2 x 2 cm deep wound on the right hip with clean margins  And packing in as it is deep  2 x 2 cm ulcer on the sacrum with mild serosanguineous drainage noted  Neurological: She is alert and oriented to person, place, and time  She has normal reflexes  Skin: Skin is warm and dry  No rash noted  Psychiatric: She has a normal mood and affect  Nursing note and vitals reviewed  Additional Data:     Labs:      Results from last 7 days  Lab Units 09/21/18  0512   WBC Thousand/uL 7 20   HEMOGLOBIN g/dL 10 6*   HEMATOCRIT % 32 1*   PLATELETS Thousands/uL 420   NEUTROS PCT % 61   LYMPHS PCT % 23   MONOS PCT % 9   EOS PCT % 7*       Results from last 7 days  Lab Units 09/20/18  0435   SODIUM mmol/L 136*   POTASSIUM mmol/L 4 1   CHLORIDE mmol/L 105   CO2 mmol/L 28   BUN mg/dL 11   CREATININE mg/dL 0 51*   CALCIUM mg/dL 7 7*                     * I Have Reviewed All Lab Data Listed Above  * Additional Pertinent Lab Tests Reviewed:  [facility] Admission Reviewed    Imaging:    Imaging Reports Reviewed Today Include: none  Imaging Personally Reviewed by Myself Includes:  none    Recent Cultures (last 7 days):       Results from last 7 days  Lab Units 09/19/18  1553   GRAM STAIN RESULT  1+ Polys  4+ Gram positive cocci in pairs  3+ Gram positive cocci in pairs  3+ Gram negative rods       Last 24 Hours Medication List:     Current Facility-Administered Medications:  calcium carbonate-vitamin D 1 tablet Oral Daily Renita Rm MD    cholecalciferol 1,000 Units Oral Daily Reinta Rm MD    cyanocobalamin 500 mcg Oral Daily Renita Rm MD    diphenhydrAMINE 12 5 mg Intravenous Q6H PRN Babar Hamilton MD    heparin (porcine) 5,000 Units Subcutaneous [facility] Renita Rm MD    HYDROcodone-acetaminophen 1 tablet Oral Q4H PRN Renita Rm MD    melatonin 3 mg Oral HS PRN Babar Hamilton MD    multivitamin-minerals 1 tablet Oral Daily Renita Rm MD    ondansetron 4 mg Oral Q6H PRN Renita Rm MD piperacillin-tazobactam 3 375 g Intravenous Q6H Melissa Hathaway MD Last Rate: 3 375 g (09/22/18 0513)   senna-docusate sodium 1 tablet Oral Daily Sarah Noel MD         Today, Patient Was Seen By: Eugene Adamson MD    ** Please Note: Dictation voice to text software may have been used in the creation of this document   **

## 2018-09-22 NOTE — PROGRESS NOTES
Right hip pressure sore closing and spontaneously by contraction  Wound is clean  Erythema gone  Patient does not want to stay in hospital to close left ischial pressure sore  She wants to do that this winter  There is no reason she just can't go home now and be followed in the outpatient clinic  She will do her own ascetic acid dressings  Follow-up will be in my office in 2 weeks

## 2018-09-24 ENCOUNTER — ANESTHESIA (OUTPATIENT)
Dept: PERIOP | Facility: HOSPITAL | Age: 68
End: 2018-09-24

## 2018-09-24 NOTE — CASE MANAGEMENT
Notification of Discharge  This is a Notification of Discharge from our facility [address]  Please be advised that this patient has been discharge from our facility  Below you will find the admission and discharge date and time including the patients disposition  PRESENTATION DATE: 9/19/2018 11:41 AM  IP ADMISSION DATE: 9/20/18 1014  DISCHARGE DATE: 9/22/2018  2:00 PM  DISPOSITION: Home/Self Care    [facility] in the [facility] by [place] Utilization Review Department  Phone: [phone]; Fax [phone]  ATTENTION: The Network Utilization Review Department is now centralized for our 9 Facilities  Make a note that we have a new phone and fax numbers for our Department  Please call with any questions or concerns to [phone] and carefully follow the prompts so that you are directed to the right person  All voicemails are confidential  Fax any determinations, approvals, denials, and requests for initial or continue stay review clinical to [phone]  Due to HIGH CALL volume, it would be easier if you could please send faxed requests to expedite your requests and in part, help us provide discharge notifications faster

## 2018-10-08 ENCOUNTER — HOSPITAL ENCOUNTER (OUTPATIENT)
Dept: RADIOLOGY | Facility: HOSPITAL | Age: 68
Discharge: HOME | End: 2018-10-08
Payer: COMMERCIAL

## 2018-10-08 ENCOUNTER — TRANSCRIBE ORDERS (OUTPATIENT)
Dept: ADMINISTRATIVE | Facility: HOSPITAL | Age: 68
End: 2018-10-08

## 2018-10-08 ENCOUNTER — HOSPITAL ENCOUNTER (EMERGENCY)
Facility: HOSPITAL | Age: 68
Discharge: HOME | End: 2018-10-08
Attending: EMERGENCY MEDICINE | Admitting: EMERGENCY MEDICINE
Payer: COMMERCIAL

## 2018-10-08 VITALS
RESPIRATION RATE: 18 BRPM | OXYGEN SATURATION: 94 % | TEMPERATURE: 97.4 F | SYSTOLIC BLOOD PRESSURE: 124 MMHG | BODY MASS INDEX: 19.63 KG/M2 | HEART RATE: 94 BPM | WEIGHT: 115 LBS | DIASTOLIC BLOOD PRESSURE: 62 MMHG | HEIGHT: 64 IN

## 2018-10-08 DIAGNOSIS — S72.402A CLOSED FRACTURE OF LEFT DISTAL FEMUR (HCC): Primary | ICD-10-CM

## 2018-10-08 DIAGNOSIS — M25.462 SWELLING OF LEFT KNEE JOINT: ICD-10-CM

## 2018-10-08 DIAGNOSIS — M21.952 ACQUIRED DEFORMITY OF LEFT THIGH: ICD-10-CM

## 2018-10-08 DIAGNOSIS — M21.961 DEFORMITY OF BOTH FEET: ICD-10-CM

## 2018-10-08 DIAGNOSIS — M21.962 DEFORMITY OF BOTH FEET: ICD-10-CM

## 2018-10-08 DIAGNOSIS — M25.462 SWELLING OF LEFT KNEE JOINT: Primary | ICD-10-CM

## 2018-10-08 PROCEDURE — 73552 X-RAY EXAM OF FEMUR 2/>: CPT

## 2018-10-08 PROCEDURE — 99283 EMERGENCY DEPT VISIT LOW MDM: CPT

## 2018-10-08 PROCEDURE — 73560 X-RAY EXAM OF KNEE 1 OR 2: CPT

## 2018-10-08 PROCEDURE — 73590 X-RAY EXAM OF LOWER LEG: CPT

## 2018-10-08 RX ORDER — AMOXICILLIN AND CLAVULANATE POTASSIUM 875; 125 MG/1; MG/1
TABLET, FILM COATED ORAL
Status: ON HOLD | COMMUNITY
End: 2019-02-27 | Stop reason: ALTCHOICE

## 2018-10-08 NOTE — ED NOTES
Patient got into wheel chair from stretcher with little assistance by this RN  This RN and ED ishaan Diaz with patient during discharge to car  Patient refused assistance from staff with pushing wheelchair  Patient was able to transfer herself to car with very minimal assistance   Patient states, "I can always call people at home to come help me but I need to take my car home "     Akbar Albright, STEPAN  10/08/18 0310

## 2018-10-08 NOTE — DISCHARGE INSTRUCTIONS
Elevate leg  Call ortho tomorrow for a follow up appointment  Wear knee immobilizer until seen by ortho  Take aspirin daily  If change in skin color or temperature of skin call ortho sooner  Leg Fracture   WHAT YOU NEED TO KNOW:   A leg fracture is a break in any of the 3 long bones of your leg  The femur is the largest bone and goes from your hip to your knee  The fibula and tibia are the 2 bones in your lower leg that go from your knee to your ankle  DISCHARGE INSTRUCTIONS:   Return to the emergency department if:   · Your leg feels warm, tender, and painful  It may look swollen and red  · The pain in your injured leg gets worse even after you rest and take medicine  · Your cast gets wet or damaged  · Your leg or toes are numb  · The skin or toes of your injured leg become swollen, cold, or blue  Contact your healthcare provider or bone specialist if:   · You have a fever  · Your cast or brace is too tight  · There are new blood stains or a bad smell coming from under the cast     · You have new or worsening trouble moving your leg  · You have questions or concerns about your condition or care  Medicines:   · Prescription pain medicine  may be given  Ask how to take this medicine safely  · NSAIDs , such as ibuprofen, help decrease swelling, pain, and fever  NSAIDs can cause stomach bleeding or kidney problems in certain people  If you take blood thinner medicine, always ask your healthcare provider if NSAIDs are safe for you  Always read the medicine label and follow directions  · Acetaminophen  decreases pain and fever  It is available without a doctor's order  Ask how much to take and how often to take it  Follow directions  Read the labels of all other medicines you are using to see if they also contain acetaminophen, or ask your doctor or pharmacist  Acetaminophen can cause liver damage if not taken correctly   Do not use more than 4 grams (4,000 milligrams) total of acetaminophen in one day  · Take your medicine as directed  Contact your healthcare provider if you think your medicine is not helping or if you have side effects  Tell him of her if you are allergic to any medicine  Keep a list of the medicines, vitamins, and herbs you take  Include the amounts, and when and why you take them  Bring the list or the pill bottles to follow-up visits  Carry your medicine list with you in case of an emergency  Cast or splint care:   · Check the skin around your cast and splint daily for any redness or open areas  · Do not use a sharp or pointed object to scratch your skin under the cast or splint  · Do not remove your splint unless your healthcare provider says it is okay  Bathing with a cast or splint:  Do not let your cast or splint get wet  Before bathing, cover the cast or splint with a plastic bag  Tape the bag to your skin above the cast or splint to seal out the water  Keep your leg out of the water in case the bag leaks  Ask when it is okay to take a bath or shower  Self-care:   · Rest  your leg as directed and avoid activities that cause leg pain  · Apply ice  on your leg for 15 to 20 minutes every hour or as directed  Use an ice pack, or put crushed ice in a plastic bag  Cover it with a towel  Ice helps prevent tissue damage and decreases swelling and pain  · Elevate  your leg above the level of your heart as often as you can  This will help decrease swelling and pain  Prop your leg on pillows or blankets to keep it elevated comfortably  · Use crutches or a walker  as directed  Crutches will help you walk and take some weight off your injured leg while it heals  · Physical therapy  may be recommended  A physical therapist teaches you exercises to help improve movement and strength, and to decrease pain  Follow up with your healthcare provider or bone specialist as directed: You may need to return to have your splint or cast removed   You may need an x-ray of your leg to check how well the bone has healed  Write down your questions so you remember to ask them during your visits  © 2017 [address] [employer] Information is for End User's use only and may not be sold, redistributed or otherwise used for commercial purposes  All illustrations and images included in CareNotes® are the copyrighted property of A D A M [employer] Inc  or [employer]  The above information is an  only  It is not intended as medical advice for individual conditions or treatments  Talk to your doctor, nurse or pharmacist before following any medical regimen to see if it is safe and effective for you

## 2018-10-08 NOTE — ED PROVIDER NOTES
History  Chief Complaint   Patient presents with    Leg Injury - Major     Patient brought over from xray with a R dital femur fx  Patient denies any pain states she felt something crunch  Patient is a 67y/o F that presents to the ED from xray dept  For left leg femur fracture  Patient is a paraplegic and states she was putting her pants on yesterday at [facility] and heard a crack  Today she noticed her left leg was swollen and deformed  She had outpatient xrays and was found to have a displaced distal femur fracture  Patient denies any other injuries  She states she had a tibial plateau fracture right leg 1 month ago while inpatient at [facility]  History provided by:  Patient  Leg Pain   Location:  Leg  Time since incident:  1 day  Injury: no    Leg location:  L upper leg  Pain details:     Quality: no pain  Chronicity:  New  Prior injury to area:  No  Ineffective treatments:  None tried  Associated symptoms: swelling    Associated symptoms: no fever        Prior to Admission Medications   Prescriptions Last Dose Informant Patient Reported? Taking?    Calcium Carb-Cholecalciferol 600-200 MG-UNIT TABS   Yes No   Sig: Take 1 tablet by mouth daily   amoxicillin-clavulanate (AUGMENTIN) 875-125 mg per tablet   Yes No   Sig: amoxicillin 875 mg-potassium clavulanate 125 mg tablet   cholecalciferol (VITAMIN D3) 1,000 units tablet   Yes No   Sig: Take 1,000 Units by mouth daily   cyanocobalamin 100 MCG tablet   Yes No   Sig: Take 100 mcg by mouth daily   multivitamin (THERAGRAN) TABS   Yes No   Sig: Take 1 tablet by mouth daily   senna-docusate sodium (SENOKOT-S) 8 6-50 mg per tablet   Yes No   Sig: Take 1 tablet by mouth daily      Facility-Administered Medications: None       Past Medical History:   Diagnosis Date    Anemia     iron defiencey    Arthritis     osteo    Back injury     Chronic kidney disease     nephritis    Depression     Osteopenia     Osteoporosis     Paraplegia (HCC)     Poor circulation     Pressure injury of skin     right hip    Pressure sore of hip     right    Tibial plateau fracture        Past Surgical History:   Procedure Laterality Date    HIP DEBRIDEMENT Right 2017    right hip sore debridement    HI DEBRIDEMENT, SKIN, SUB-Q TISSUE,MUSCLE,BONE,=<20 SQ CM Right 9/19/2018    Procedure: DEBRIDEMENT OF HIP PRESSURE SORE;  Surgeon: Francis Maza MD;  Location: [address] [place] [address] OR;  Service: Plastics    TOE AMPUTATION Left 2017    small toe left foot amputation    WISDOM TOOTH EXTRACTION         Family History   Problem Relation Age of Onset    Depression Father      I have reviewed and agree with the history as documented  Social History   Substance Use Topics    Smoking status: Never Smoker    Smokeless tobacco: Never Used    Alcohol use 4 2 oz/week     7 Glasses of wine per week      Comment: per weekly, 1 glass HS        Review of Systems   Constitutional: Negative for chills and fever  Musculoskeletal:        Left leg deformity  Skin: Negative for color change and wound  Neurological: Positive for numbness (chronic)  Psychiatric/Behavioral: Negative for confusion  All other systems reviewed and are negative  Physical Exam  Physical Exam   Constitutional: She appears well-developed and well-nourished  HENT:   Head: Normocephalic and atraumatic  Eyes: Conjunctivae are normal    Cardiovascular: Normal rate and regular rhythm  Pulses:       Dorsalis pedis pulses are 2+ on the right side, and 2+ on the left side  Pulmonary/Chest: Effort normal    Musculoskeletal:        Left knee: She exhibits swelling and deformity  Skin: Skin is warm and dry  No bruising and no rash noted  She is not diaphoretic  No pallor  Nursing note and vitals reviewed        Vital Signs  ED Triage Vitals [10/08/18 1526]   Temperature Pulse Respirations Blood Pressure SpO2   (!) 97 4 °F (36 3 °C) (!) 107 20 111/65 100 %      Temp src Heart Rate Source Patient Position - Orthostatic VS BP Location FiO2 (%)   -- -- -- -- --      Pain Score       No Pain           Vitals:    10/08/18 1526   BP: 111/65   Pulse: (!) 107       Visual Acuity      ED Medications  Medications - No data to display    Diagnostic Studies  Results Reviewed     None                 No orders to display              Procedures  Orthopedic Injury  Date/Time: 10/8/2018 4:15 PM  Performed by: Radha Means by: Duong Lombardi     Patient Location:  ED  Other Assisting Provider: Yes (comment) (Dr Kristen Chaudhary, applied traction while knee immobilizer was applied  )    Injury location:  Upper leg  Location details:  Left upper leg  Injury type:  Fracture  Fracture type: femoral shaft    Distal perfusion: normal    Range of motion: reduced    Range of motion comment:  Patient paraplegic  Skeletal traction used?: Yes    Immobilization:  Knee immobilizer  Distal perfusion: normal    Patient tolerance:  Patient tolerated the procedure well with no immediate complications           Phone Contacts  ED Phone Contact    ED Course  ED Course as of Oct 08 1637   [facility] Oct 08, 2018   1540 Ortho paged    9036 SPoke with Pj Benedict PA-C she suggests applying traction and knee immobilizer and f/u in the office  MDM  Number of Diagnoses or Management Options  Closed fracture of left distal femur [facility]): new and requires workup  Diagnosis management comments: Patient with distal femur fracture  D/w ortho, they suggest applying knee immobilizer and f/u in the office           Amount and/or Complexity of Data Reviewed  Tests in the radiology section of CPT®: reviewed  Discuss the patient with other providers: yes Yamile Tamez PA-C and Dr Rasheeda Blandon  )  Independent visualization of images, tracings, or specimens: yes    Patient Progress  Patient progress: stable    CritCare Time    Disposition  Final diagnoses:   Closed fracture of left distal femur [facility])     Time reflects when diagnosis was documented in both MDM as applicable and the Disposition within this note     Time User Action Codes Description Comment    10/8/2018  3:40 PM Rubi Graves Add [D93 402A] Closed fracture of left distal femur [facility])       ED Disposition     ED Disposition Condition Comment    Discharge  Suyapa Kirby discharge to home/self care  Condition at discharge: Stable        Follow-up Information     Follow up With Specialties Details Why [address], MD Orthopedic Surgery Call in 1 day For recheck [address]  [phone]            Patient's Medications   Discharge Prescriptions    No medications on file     No discharge procedures on file      ED Provider  Electronically Signed by           Delio Granda PA-C  10/08/18 9694

## 2018-10-11 ENCOUNTER — TELEPHONE (OUTPATIENT)
Dept: OBGYN CLINIC | Facility: HOSPITAL | Age: 68
End: 2018-10-11

## 2018-10-11 NOTE — TELEPHONE ENCOUNTER
Patient Evelyn York broke her left leg on Sun 10/07/18  She was seen @ [facility] ED on 10/08/18, had testing done  Patient cannot drive and needs transportation to see an orthopedic doctor  Info link advised patient to have the doctor request transport through [employer]  Please reach out to patient to schedule appointment and transportation as soon as possible      Patient callback # [phone]

## 2018-10-15 ENCOUNTER — TELEPHONE (OUTPATIENT)
Dept: OBGYN CLINIC | Facility: HOSPITAL | Age: 68
End: 2018-10-15

## 2018-10-15 NOTE — TELEPHONE ENCOUNTER
Patient has a leg fracture and was referred from the ER  She has an appointment tomorrow with Dr Maricel Calero  She is calling to ask if she can remove her knee brace in order to drive to her appointment?

## 2018-10-15 NOTE — TELEPHONE ENCOUNTER
Spoke with pt  Who stated she is wheelchair bound and has a fracture of left femur and has no ride to appointment, pt  Also stated she has a brace on and wanted to know if she can take brace off and drive to her appointment since she drove home from the ED  I advised pt  Not to take off brace and it would not be a good idea to drive with injury  Pt  Also stated it would be weeks before she can schedule to have someone drive her  Pt  Will continue to look for a ride to go to appt  Tomorrow and will call back f she needs to cancel appt

## 2018-10-18 ENCOUNTER — OFFICE VISIT (OUTPATIENT)
Dept: OBGYN CLINIC | Facility: HOSPITAL | Age: 68
End: 2018-10-18
Payer: COMMERCIAL

## 2018-10-18 ENCOUNTER — HOSPITAL ENCOUNTER (OUTPATIENT)
Dept: RADIOLOGY | Facility: HOSPITAL | Age: 68
Discharge: HOME | End: 2018-10-18
Attending: ORTHOPAEDIC SURGERY
Payer: COMMERCIAL

## 2018-10-18 VITALS — HEART RATE: 89 BPM | DIASTOLIC BLOOD PRESSURE: 79 MMHG | SYSTOLIC BLOOD PRESSURE: 121 MMHG

## 2018-10-18 DIAGNOSIS — G82.20 PARAPLEGIA FOLLOWING SPINAL CORD INJURY (HCC): ICD-10-CM

## 2018-10-18 DIAGNOSIS — Z09 FRACTURE FOLLOW-UP: ICD-10-CM

## 2018-10-18 DIAGNOSIS — Z09 FRACTURE FOLLOW-UP: Primary | ICD-10-CM

## 2018-10-18 DIAGNOSIS — S72.492D OTHER CLOSED FRACTURE OF DISTAL END OF LEFT FEMUR WITH ROUTINE HEALING, SUBSEQUENT ENCOUNTER: ICD-10-CM

## 2018-10-18 PROBLEM — S72.402D CLOSED FRACTURE OF DISTAL END OF LEFT FEMUR WITH ROUTINE HEALING: Status: ACTIVE | Noted: 2018-10-18

## 2018-10-18 PROCEDURE — 73552 X-RAY EXAM OF FEMUR 2/>: CPT

## 2018-10-18 PROCEDURE — 99203 OFFICE O/P NEW LOW 30 MIN: CPT | Performed by: ORTHOPAEDIC SURGERY

## 2018-10-18 NOTE — PROGRESS NOTES
Assessment/Plan:        Patient seen and examined with Dr Marii Baez  She is a paraplegic with no motor or sensory function in the lower extremities  About 10 days ago she sustained a left distal femur fracture which is significantly displaced and comminuted  Today, there is significant erythema overlying obvious hematoma  Given this finding plan is to admit the patient for IV antibiotics and monitor  She is unable to be admitted to the hospital tonight due to social issues, so the plan is to have her return to the hospital for direct admission tomorrow  This has been set up are ready with patient access team and they will call her tomorrow morning with a bed  In the interim we will start her on Keflex  We will initiate IV Ancef tomorrow in the hospital     Operative versus non operative treatment for the distal femur fracture will be determined based on her clinical response to IV antibiotics  She will maintained in a knee immobilizer with NWB to the LLE  Problem List Items Addressed This Visit     Paraplegia following spinal cord injury ([facility] [address] )    Closed fracture of distal end of left femur with routine healing      Other Visit Diagnoses     Fracture follow-up    -  Primary    Relevant Orders    XR femur 2 vw left            Subjective:      Patient ID: Lupis Cobb is a 76 y o  female  HPI     The patient is a 59-year-old female who presents for initial evaluation with Dr Marii Baez  Patient states that about 10 days ago she presented to the ER after she was putting her pants on and felt a crack and crunching left leg  She is a paraplegic status post spinal cord injury in [address] and has no motor or sensation in the lower extremities  In the ER x-ray was done that showed a left distal femur fracture she was placed in a knee immobilizer with instructions for outpatient follow-up  No adds no additional complaints to today's visit    She has no sensation in the lower extremities  She denies any fever or chills  She notes increasing erythema distal femur region  The following portions of the patient's history were reviewed and updated as appropriate: allergies, current medications, past family history, past medical history, past social history, past surgical history and problem list     Review of Systems   Constitutional: Negative for chills, diaphoresis, fatigue and fever  Respiratory: Negative for shortness of breath and wheezing  Objective:      /79   Pulse 89   LMP  (LMP Unknown)          Physical Exam   Constitutional: She is oriented to person, place, and time  Musculoskeletal: She exhibits edema  Neurological: She is alert and oriented to person, place, and time  Skin: Skin is warm  There is erythema  Psychiatric: She has a normal mood and affect  Nursing note and vitals reviewed  Patient in no acute distress seated comfortably in a wheelchair  Left lower extremity is obviously deformed inspection reveals erythema and mild warmth overlying hematoma the distal femur region  There is no open area or drainage  She has no motor or sensory function of the bilateral lower extremities

## 2018-10-19 ENCOUNTER — HOSPITAL ENCOUNTER (INPATIENT)
Facility: HOSPITAL | Age: 68
LOS: 7 days | Discharge: HOME | DRG: 481 | End: 2018-10-26
Attending: ORTHOPAEDIC SURGERY | Admitting: ORTHOPAEDIC SURGERY
Payer: COMMERCIAL

## 2018-10-19 DIAGNOSIS — L03.116 CELLULITIS OF LEFT THIGH: Primary | ICD-10-CM

## 2018-10-19 DIAGNOSIS — S72.402D CLOSED FRACTURE OF DISTAL END OF LEFT FEMUR WITH ROUTINE HEALING, UNSPECIFIED FRACTURE MORPHOLOGY, SUBSEQUENT ENCOUNTER: ICD-10-CM

## 2018-10-19 LAB
ANION GAP SERPL CALCULATED.3IONS-SCNC: 6 MMOL/L (ref 4–13)
BUN SERPL-MCNC: 8 MG/DL (ref 5–25)
CALCIUM SERPL-MCNC: 8 MG/DL (ref 8.3–10.1)
CHLORIDE SERPL-SCNC: 102 MMOL/L (ref 100–108)
CO2 SERPL-SCNC: 27 MMOL/L (ref 21–32)
CREAT SERPL-MCNC: 0.42 MG/DL (ref 0.6–1.3)
ERYTHROCYTE [DISTWIDTH] IN BLOOD BY AUTOMATED COUNT: 16.4 % (ref 11.6–15.1)
GFR SERPL CREATININE-BSD FRML MDRD: 106 ML/MIN/1.73SQ M
GLUCOSE SERPL-MCNC: 87 MG/DL (ref 65–140)
HCT VFR BLD AUTO: 25.6 % (ref 34.8–46.1)
HGB BLD-MCNC: 8.1 G/DL (ref 11.5–15.4)
MCH RBC QN AUTO: 27.6 PG (ref 26.8–34.3)
MCHC RBC AUTO-ENTMCNC: 31.6 G/DL (ref 31.4–37.4)
MCV RBC AUTO: 87 FL (ref 82–98)
PLATELET # BLD AUTO: 555 THOUSANDS/UL (ref 149–390)
PMV BLD AUTO: 9.6 FL (ref 8.9–12.7)
POTASSIUM SERPL-SCNC: 3.6 MMOL/L (ref 3.5–5.3)
RBC # BLD AUTO: 2.94 MILLION/UL (ref 3.81–5.12)
SODIUM SERPL-SCNC: 135 MMOL/L (ref 136–145)
WBC # BLD AUTO: 9.22 THOUSAND/UL (ref 4.31–10.16)

## 2018-10-19 PROCEDURE — 80048 BASIC METABOLIC PNL TOTAL CA: CPT | Performed by: ORTHOPAEDIC SURGERY

## 2018-10-19 PROCEDURE — 99231 SBSQ HOSP IP/OBS SF/LOW 25: CPT | Performed by: PHYSICIAN ASSISTANT

## 2018-10-19 PROCEDURE — 85027 COMPLETE CBC AUTOMATED: CPT | Performed by: ORTHOPAEDIC SURGERY

## 2018-10-19 RX ORDER — UBIDECARENONE 75 MG
100 CAPSULE ORAL DAILY
Status: DISCONTINUED | OUTPATIENT
Start: 2018-10-20 | End: 2018-10-26 | Stop reason: HOSPADM

## 2018-10-19 RX ORDER — ONDANSETRON 2 MG/ML
4 INJECTION INTRAMUSCULAR; INTRAVENOUS EVERY 6 HOURS PRN
Status: DISCONTINUED | OUTPATIENT
Start: 2018-10-19 | End: 2018-10-26 | Stop reason: HOSPADM

## 2018-10-19 RX ORDER — MELATONIN
1000 DAILY
Status: DISCONTINUED | OUTPATIENT
Start: 2018-10-20 | End: 2018-10-26 | Stop reason: HOSPADM

## 2018-10-19 RX ORDER — DOCUSATE SODIUM 100 MG/1
100 CAPSULE, LIQUID FILLED ORAL 2 TIMES DAILY
Status: DISCONTINUED | OUTPATIENT
Start: 2018-10-19 | End: 2018-10-26 | Stop reason: HOSPADM

## 2018-10-19 RX ORDER — SENNOSIDES 8.6 MG
1 TABLET ORAL DAILY
Status: DISCONTINUED | OUTPATIENT
Start: 2018-10-19 | End: 2018-10-26 | Stop reason: HOSPADM

## 2018-10-19 RX ADMIN — CEFAZOLIN SODIUM 1000 MG: 1 SOLUTION INTRAVENOUS at 15:44

## 2018-10-19 RX ADMIN — CEFAZOLIN SODIUM 1000 MG: 1 SOLUTION INTRAVENOUS at 22:40

## 2018-10-19 RX ADMIN — SENNOSIDES 8.6 MG: 8.6 TABLET, FILM COATED ORAL at 15:23

## 2018-10-19 RX ADMIN — ENOXAPARIN SODIUM 40 MG: 40 INJECTION SUBCUTANEOUS at 15:23

## 2018-10-19 NOTE — H&P
H&P - Orthopedics   Roberto Felipe 76 y o  female MRN: [id number]  [facility]      Subjective:    76 y  o female who presented to the clinic on 10/18/2018 for initial evaluation with Dr René Ruiz  She stated that about 10 days ago she presented to the ER after putting on pants and felt a crack / crunch LLE  She is a paraplegic s/p spinal cord injury at T[address] in [address] and has no motor or sensation in the lower extremities  X-rays done showed a left distal femur fracture and she was placed in immobilizer and referred to Dr René Ruiz  She notes increasing erythema left distal femur region  No fever or chills  No history of surgery on the LLE          Labs:    0  Lab Value Date/Time   HCT 25 6 (L) 10/19/2018 1516   HCT 32 1 (L) 09/21/2018 0512   HCT 25 5 (L) 09/20/2018 0436   HCT 30 0 (L) 05/28/2018 0530   HCT 29 5 (L) 05/22/2018 0515   HCT 32 5 (L) 05/14/2018 0555   HGB 8 1 (L) 10/19/2018 1516   HGB 10 6 (L) 09/21/2018 0512   HGB 8 5 (L) 09/20/2018 0436   HGB 10 1 (L) 05/28/2018 0530   HGB 9 9 (L) 05/22/2018 0515   HGB 10 6 (L) 05/14/2018 0555   WBC 9 22 10/19/2018 1516   WBC 7 20 09/21/2018 0512   WBC 5 40 09/20/2018 0436   WBC 6 4 05/28/2018 0530   WBC 8 0 05/22/2018 0515   WBC 6 0 05/14/2018 0555       Meds:    Current Facility-Administered Medications:     ceFAZolin (ANCEF) IVPB (premix) 1,000 mg, 1,000 mg, Intravenous, Q8H, Fabrizio Amaral MD, Last Rate: 100 mL/hr at 10/19/18 1544, 1,000 mg at 10/19/18 1544    [START ON 10/20/2018] cholecalciferol (VITAMIN D3) tablet 1,000 Units, 1,000 Units, Oral, Daily, Fabrizio Amaral MD  [facility] ON 10/20/2018] cyanocobalamin (VITAMIN B-12) tablet 100 mcg, 100 mcg, Oral, Daily, Fabrizio Amaral MD    docusate sodium (COLACE) capsule 100 mg, 100 mg, Oral, BID, Fabrizio Amaral MD    enoxaparin (LOVENOX) subcutaneous injection 40 mg, 40 mg, Subcutaneous, Q24H, Fabrizio Amaral MD, 40 mg at 10/19/18 1523    ondansetron (ZOFRAN) injection 4 mg, 4 mg, Intravenous, Q6H PRN, Carrie Nuno Garza MD    [facility]) tablet 8 6 mg, 1 tablet, Oral, Daily, Amanda Marcano MD, 8 6 mg at 10/19/18 1523    Blood Culture:   No results found for: BLOODCX    Wound Culture:   Lab Results   Component Value Date    WOUNDCULT 2+ Growth of Morganella morganii (A) 09/14/2018    WOUNDCULT 1+ Growth of  09/14/2018       Ins and Outs:  No intake/output data recorded  Physical:  Vitals:    10/19/18 1644   BP: 119/58   Pulse: 96   Resp: 18   Temp: 98 5 °F (36 9 °C)   SpO2: 100%     · Musculoskeletal:    Patient in no acute distress seated comfortably in a wheelchair     Left lower extremity is obviously deformed inspection reveals erythema and mild warmth overlying hematoma the distal femur region   There is no open area or drainage  She has no motor or sensory function of the bilateral lower extremities     _*_*_*_*_*_*_*_*_*_*_*_*_*_*_*_*_*_*_*_*_*_*_*_*_*_*_*_*_*_*_*_*_*_*_*_*_*_*_*_*_*    Assessment:    76 y  o female paraplegic with no motor or sensory function in the lower extremities who sustained a left distal femur fracture about 10 days concern  Now concerning for cellulitis overlying hematoma formation  Plan:  · NWB LLE  · Continue standing IV Ancef  · Knee immobilizer LLE  · Operative vs non-operative treatment will be  Determined    Need to clear infection first   · Pain control  · DVT ppx with SQ Lovenox  · Dispo: Ortho admission        Serge Cohen PA-C

## 2018-10-20 LAB
ANION GAP SERPL CALCULATED.3IONS-SCNC: 7 MMOL/L (ref 4–13)
BASOPHILS # BLD AUTO: 0.05 THOUSANDS/ΜL (ref 0–0.1)
BASOPHILS NFR BLD AUTO: 1 % (ref 0–1)
BUN SERPL-MCNC: 10 MG/DL (ref 5–25)
CALCIUM SERPL-MCNC: 7.7 MG/DL (ref 8.3–10.1)
CHLORIDE SERPL-SCNC: 103 MMOL/L (ref 100–108)
CO2 SERPL-SCNC: 27 MMOL/L (ref 21–32)
CREAT SERPL-MCNC: 0.47 MG/DL (ref 0.6–1.3)
EOSINOPHIL # BLD AUTO: 0.23 THOUSAND/ΜL (ref 0–0.61)
EOSINOPHIL NFR BLD AUTO: 3 % (ref 0–6)
ERYTHROCYTE [DISTWIDTH] IN BLOOD BY AUTOMATED COUNT: 16.6 % (ref 11.6–15.1)
GFR SERPL CREATININE-BSD FRML MDRD: 102 ML/MIN/1.73SQ M
GLUCOSE SERPL-MCNC: 99 MG/DL (ref 65–140)
HCT VFR BLD AUTO: 24 % (ref 34.8–46.1)
HGB BLD-MCNC: 7.4 G/DL (ref 11.5–15.4)
IMM GRANULOCYTES # BLD AUTO: 0.04 THOUSAND/UL (ref 0–0.2)
IMM GRANULOCYTES NFR BLD AUTO: 1 % (ref 0–2)
LYMPHOCYTES # BLD AUTO: 2.06 THOUSANDS/ΜL (ref 0.6–4.47)
LYMPHOCYTES NFR BLD AUTO: 26 % (ref 14–44)
MCH RBC QN AUTO: 26.8 PG (ref 26.8–34.3)
MCHC RBC AUTO-ENTMCNC: 30.8 G/DL (ref 31.4–37.4)
MCV RBC AUTO: 87 FL (ref 82–98)
MONOCYTES # BLD AUTO: 0.9 THOUSAND/ΜL (ref 0.17–1.22)
MONOCYTES NFR BLD AUTO: 11 % (ref 4–12)
NEUTROPHILS # BLD AUTO: 4.71 THOUSANDS/ΜL (ref 1.85–7.62)
NEUTS SEG NFR BLD AUTO: 58 % (ref 43–75)
NRBC BLD AUTO-RTO: 0 /100 WBCS
PLATELET # BLD AUTO: 579 THOUSANDS/UL (ref 149–390)
PMV BLD AUTO: 10 FL (ref 8.9–12.7)
POTASSIUM SERPL-SCNC: 3.6 MMOL/L (ref 3.5–5.3)
RBC # BLD AUTO: 2.76 MILLION/UL (ref 3.81–5.12)
SODIUM SERPL-SCNC: 137 MMOL/L (ref 136–145)
WBC # BLD AUTO: 7.99 THOUSAND/UL (ref 4.31–10.16)

## 2018-10-20 PROCEDURE — G8988 SELF CARE GOAL STATUS: HCPCS

## 2018-10-20 PROCEDURE — 97167 OT EVAL HIGH COMPLEX 60 MIN: CPT

## 2018-10-20 PROCEDURE — 80048 BASIC METABOLIC PNL TOTAL CA: CPT | Performed by: ORTHOPAEDIC SURGERY

## 2018-10-20 PROCEDURE — G8979 MOBILITY GOAL STATUS: HCPCS

## 2018-10-20 PROCEDURE — G8987 SELF CARE CURRENT STATUS: HCPCS

## 2018-10-20 PROCEDURE — 97163 PT EVAL HIGH COMPLEX 45 MIN: CPT

## 2018-10-20 PROCEDURE — G8978 MOBILITY CURRENT STATUS: HCPCS

## 2018-10-20 PROCEDURE — 85025 COMPLETE CBC W/AUTO DIFF WBC: CPT | Performed by: ORTHOPAEDIC SURGERY

## 2018-10-20 RX ADMIN — ENOXAPARIN SODIUM 40 MG: 40 INJECTION SUBCUTANEOUS at 17:41

## 2018-10-20 RX ADMIN — CEFAZOLIN SODIUM 1000 MG: 1 SOLUTION INTRAVENOUS at 17:41

## 2018-10-20 RX ADMIN — CEFAZOLIN SODIUM 1000 MG: 1 SOLUTION INTRAVENOUS at 05:33

## 2018-10-20 RX ADMIN — VITAMIN D, TAB 1000IU (100/BT) 1000 UNITS: 25 TAB at 10:05

## 2018-10-20 RX ADMIN — CEFAZOLIN SODIUM 1000 MG: 1 SOLUTION INTRAVENOUS at 22:36

## 2018-10-20 RX ADMIN — VITAM B12 100 MCG: 100 TAB at 10:03

## 2018-10-20 NOTE — UTILIZATION REVIEW
Initial Clinical Review    Admission: Date/Time/Statement: 10/19/18 @ 1226     Orders Placed This Encounter   Procedures    Inpatient Admission     Standing Status:   Standing     Number of Occurrences:   1     Order Specific Question:   Admitting Physician     Answer:   Alejandro Fernandez [[id number]]     Order Specific Question:   Level of Care     Answer:   Med Surg [[id number]]     Order Specific Question:   Estimated length of stay     Answer:   More than 2 Midnights     Order Specific Question:   Certification     Answer:   I certify that inpatient services are medically necessary for this patient for a duration of greater than two midnights  See H&P and MD Progress Notes for additional information about the patient's course of treatment  ED: Date/Time/Mode of Arrival:  Not seen in ED -- direct admission    Chief Complaint: felt a crack / crunch in LLE    History of Illness: 76 y  o female who presented to the clinic on 10/18/2018 for initial evaluation with Dr Jinny Recinos  She stated that about 10 days ago she presented to the ER after putting on pants and felt a crack / crunch LLE  She is a paraplegic s/p spinal cord injury at T8 in [address] [place] [address] and has no motor or sensation in the lower extremities  X-rays done showed a left distal femur fracture and she was placed in immobilizer and referred to Dr Jinny Recinos  She notes increasing erythema left distal femur region        Vital Signs:  98 3--89--18--121/57  Pox 97% RA    Abnormal Labs/Diagnostic Test Results: Na 135, Cr 0 42, Ca 8/0  RBC 2 95, Hgb 8 1, Hct 25 6,     Past Medical/Surgical History:    Active Ambulatory Problems     Diagnosis Date Noted    Decubitus ulcer of right hip 09/19/2018    Paraplegia following spinal cord injury ([facility] [address] ) 09/19/2018    Pressure ulcer, sacrum 09/19/2018    Iron deficiency anemia 09/19/2018    Osteopenia 09/19/2018    Underweight 09/19/2018    Wound infection     Rash due to allergy 09/21/2018    Closed fracture of distal end of left femur with routine healing 10/18/2018     Past Medical History:   Diagnosis Date    Anemia     Arthritis     Back injury     Chronic kidney disease     Depression     Osteopenia     Osteoporosis     Paraplegia (HCC)     Poor circulation     Pressure injury of skin     Pressure sore of hip     Tibial plateau fracture        Admitting Diagnosis: Fracture follow-up [Z09]    Age/Sex: 76 y o  female    Assessment/Plan:   Assessment:    76 y  o female with left femur fracture and overlying cellulitis     Plan:  · NWB LLE  · Continue IV Abx  · Monitor for improvement in erythema  · Will discuss plans for fixation following improvement in cellulitis  · PT/OT  · Pain control  · DVT ppx  · Dispo: pending improvement in cellulitis          Admission Orders:  Scheduled Meds:   cefazolin 1,000 mg Intravenous Q8H   cholecalciferol 1,000 Units Oral Daily   cyanocobalamin 100 mcg Oral Daily   docusate sodium 100 mg Oral BID   enoxaparin 40 mg Subcutaneous Q24H   ondansetron 4 mg Intravenous Q6H PRN   senna 1 tablet Oral Daily     Regular diet  Consult ID  PT/OT jourdan  SCD's  Up with assist  IS q 1h  Knee immobilizer to L knee

## 2018-10-20 NOTE — OCCUPATIONAL THERAPY NOTE
[address] Evaluation     Patient Name: Leonor   Today's Date: 10/20/2018  Problem List  Patient Active Problem List   Diagnosis    Decubitus ulcer of right hip    Paraplegia following spinal cord injury ([facility] [address] )    Pressure ulcer, sacrum    Iron deficiency anemia    Osteopenia    Underweight    Wound infection    Rash due to allergy    Closed fracture of distal end of left femur with routine healing     Past Medical History  Past Medical History:   Diagnosis Date    Anemia     iron defiencey    Arthritis     osteo    Back injury     Chronic kidney disease     nephritis    Depression     Osteopenia     Osteoporosis     Paraplegia ([facility] [address] )     Poor circulation     Pressure injury of skin     right hip    Pressure sore of hip     right    Tibial plateau fracture      Past Surgical History  Past Surgical History:   Procedure Laterality Date    HIP DEBRIDEMENT Right 2017    right hip sore debridement    MI DEBRIDEMENT, SKIN, SUB-Q TISSUE,MUSCLE,BONE,=<20 SQ CM Right 9/19/2018    Procedure: DEBRIDEMENT OF HIP PRESSURE SORE;  Surgeon: Prasanth Lo MD;  Location: [address] [place] [address] OR;  Service: Plastics    TOE AMPUTATION Left 2017    small toe left foot amputation    WISDOM TOOTH EXTRACTION             10/20/18 1120   Note Type   Note type Eval only   Restrictions/Precautions   Weight Bearing Precautions Per Order Yes   RUE Weight Bearing Per Order WBAT   LUE Weight Bearing Per Order WBAT   RLE Weight Bearing Per Order (PARAPLEGIA)   LLE Weight Bearing Per Order (PARAPLEGIA)   Braces or Orthoses LE Immobilizer   Pain Assessment   Pain Assessment No/denies pain   Home Living   Type of Home Apartment   Home Layout Ramped entrance;Elevator   Prior Function   Level of [place] Independent with ADLs and functional mobility   Lives With Alone   Receives Help From Family; Neighbor   ADL Assistance Independent   IADLs Needs assistance   Falls in the last 6 months 0   Vocational Retired Lifestyle   Autonomy I ADLS AND TRANSFERS TO/FROM W/C - REPORTS SOME ASSIST FOR IADLS DEPENDING ON CURRENT STATE/LEVEL OF FUNCTION WITH ONGOING MEDICAL ISSUES - +   Reciprocal Relationships SUPPORTIVE FAMILY AND NEIGHBORS   Service to Others RETIRED   Intrinsic Gratification MOSTLY SEDENTARY   Subjective   Subjective OFFERS NO C/O    ADL   Eating Assistance 7  Independent   Grooming Assistance 5  Supervision/Setup   UB Bathing Assistance 5  Supervision/Setup   LB Bathing Assistance 2  Maximal Assistance   UB Dressing Assistance 4  Minimal Assistance   LB Dressing Assistance 2  Maximal Assistance   Bed Mobility   Supine to Sit 5  Supervision   Additional items (LONG SIT)   Transfers   Additional Comments MIN A X 2 BACKWARDS SCOOT INTO RECLINER CHAIR (ASSIST  X 1 FOR TRANSFER AND ASSIST OF 1 TO MANAGE LE'S)   Activity Tolerance   Activity Tolerance Patient tolerated treatment well   RUE Assessment   RUE Assessment WFL   LUE Assessment   LUE Assessment WFL   Cognition   Overall Cognitive Status WFL   Assessment   Limitation Decreased ADL status; Decreased endurance;Decreased self-care trans;Decreased high-level ADLs   Prognosis Good   Assessment Pt is a 76 y o  female who was [facility] to [facility] on 10/19/2018 with closed distal femur fx - pt reports she was dressing her LB and heard a pop   Pt's problem list also includes PMH of anemia, arthritis, ckd, depression, osteopenia, osteoporosis, paraplegia since 1981, decubitus ulcer, tibial plateau fx  At baseline pt was completing adls independently and was able to transfer to/from  independently  Pt lives alone in apt with support from family/neighbors prn  Currently pt requires mod  assist for overall ADLS (1* LB) and min assist for functional transfers   Pt currently presents with impairments in the following categories -limited home support, difficulty performing ADLS, difficulty performing IADLS  and health management  activity tolerance, endurance and sitting balance/tolerance  These impairments, as well as pt's fatigue, orthopedic restricitions , WBS  and decreased caregiver support  limit pt's ability to safely engage in all baseline areas of occupation, includingbathing, dressing, toileting, functional mobility/transfers, house maintenance, meal prep, cleaning, social participation  and leisure activities  From OT standpoint, recommend inpt rehab upon D/C  OT will continue to follow to address the below stated goals  Goals   Patient Goals sit up in chair   LTG Time Frame 10-14   Long Term Goal #1 refer to established goals below   Plan   Treatment Interventions ADL retraining;Functional transfer training;UE strengthening/ROM; Endurance training;Patient/family training;Equipment evaluation/education; Compensatory technique education; Activityengagement   Goal Expiration Date 11/03/18   OT Frequency 2-3x/wk   Recommendation   OT Discharge Recommendation Short Term Rehab   Barthel Index   Feeding 10   Bathing 0   Grooming Score 5   Dressing Score 5   Bladder Score 0   Bowels Score 5   Toilet Use Score 5   Transfers (Bed/Chair) Score 5   Mobility (Level Surface) Score 0   Stairs Score 0   Barthel Index Score 35       OCCUPATIONAL THERAPY GOALS:    *SBA ADLS AFTER SETUP WITH USE OF ADAPTIVE DEVICES PRN  *SBA TOILETING AND CLOTHING MANAGEMENT   *SBA FUNCTIONAL  TRANSFERS TO ALL SURFACES WITH FAIR TO FAIR+ BALANCE/SAFETY FOR PARTICIPATION IN DYNAMIC ADLS   *ASSESS DME NEEDS  *INCREASE ACTIVITY TOLERANCE TO 35-40 MIN FOR PARTICIPATION IN ADLS AND ENJOYABLE ACTIVITIES     * ASSIST WITH SAFE D/C RECOMMENDATIONS     Rock Shepherd, OT

## 2018-10-20 NOTE — PHYSICAL THERAPY NOTE
Physical Therapy Evaluation     Patient's Name: Lupis Cobb    Admitting Diagnosis  Fracture follow-up [Z09]    Problem List  Patient Active Problem List   Diagnosis    Decubitus ulcer of right hip    Paraplegia following spinal cord injury ([facility] [address] )    Pressure ulcer, sacrum    Iron deficiency anemia    Osteopenia    Underweight    Wound infection    Rash due to allergy    Closed fracture of distal end of left femur with routine healing       Past Medical History  Past Medical History:   Diagnosis Date    Anemia     iron defiencey    Arthritis     osteo    Back injury     Chronic kidney disease     nephritis    Depression     Osteopenia     Osteoporosis     Paraplegia ([facility] [address] )     Poor circulation     Pressure injury of skin     right hip    Pressure sore of hip     right    Tibial plateau fracture        Past Surgical History  Past Surgical History:   Procedure Laterality Date    HIP DEBRIDEMENT Right 2017    right hip sore debridement    IN DEBRIDEMENT, SKIN, SUB-Q TISSUE,MUSCLE,BONE,=<20 SQ CM Right 9/19/2018    Procedure: DEBRIDEMENT OF HIP PRESSURE SORE;  Surgeon: Lizette Taveras MD;  Location: [facility] MAIN OR;  Service: Plastics    TOE AMPUTATION Left 2017    small toe left foot amputation    WISDOM TOOTH EXTRACTION        10/20/18 1125   Note Type   Note type Eval/Treat   Pain Assessment   Pain Assessment No/denies pain   Pain Score No Pain   Home Living   Type of Home Apartment   Home Layout Ramped entrance   Prior Function   Level of [place] Independent with ADLs and functional mobility   Lives With Alone   Receives Help From Family; Neighbor   ADL Assistance Independent   IADLs Needs assistance   Falls in the last 6 months 0   Vocational Retired   Comments Pt is a T8 paraplegia since 1981   Restrictions/Precautions   Weight Bearing Precautions Per Order Yes   RLE Weight Bearing Per Order (paraplegia)   LLE Weight Bearing Per Order NWB  (pt is paraplegia)   Braces or Orthoses LE Immobilizer   Other Precautions Fall Risk;Multiple lines;WBS   Cognition   Orientation Level Oriented X4   RLE Assessment   RLE Assessment X   Strength RLE   RLE Overall Strength 0/5   Tone RLE   RLE Tone Hyperreflexia   LLE Assessment   LLE Assessment X   Strength LLE   LLE Overall Strength 0/5   Tone LLE   LLE Tone Hyperreflexia   Coordination   Movements are Fluid and Coordinated 0   Bed Mobility   Rolling R 4  Minimal assistance   Additional items Assist x 1; Increased time required   Supine to Sit 5  Supervision   Additional items (long sit)   Transfers   Sit to Stand Unable to assess   Additional items Assist x 2   Additional Comments Karely x2 to backward scoot to chair  Required A for buttock clearance   Ambulation/Elevation   Gait pattern Not appropriate; Not tested   Wheelchair Activities   Wheelchair Cushion Pressure relieving   Pressure Relief Type Push up   Level of Assistance for Pressure Relief Activities Directs care (Comment)   Balance   Static Sitting Good   Dynamic Sitting Poor +   Endurance Deficit   Endurance Deficit Yes   Endurance Deficit Description fatigue   Activity Tolerance   Activity Tolerance Patient tolerated treatment well   Nurse Made Aware yes, nsg [place] gave clearance to work with pt  Updated on backward scoot performance and level of A back to bed   Assessment   Prognosis Fair   Problem List Decreased endurance; Impaired balance;Decreased mobility; Decreased safety awareness;Orthopedic restrictions;Decreased strength;Decreased range of motion   Assessment Pt is 76 y o  female seen for PT evaluation s/p admit to [facility] on 10/19/2018 w/ distal femur fx  PT consulted to assess pt's functional mobility and d/c needs  Order placed for PT eval and tx, w/ up w/ A and NWB L LE order  Comorbidities affecting pt's physical performance at time of assessment include: T8 paraplegia, LE tone  PTA, pt was I with W/C mobility, I with driving and all transfers   Personal factors affecting pt at time of IE include: inability to navigate community distances, unable to perform physical activity, inability to perform IADLs and inability to perform ADLs  Please find objective findings from PT assessment regarding body systems outlined above with impairments and limitations including weakness, decreased ROM, impaired balance, decreased endurance, decreased activity tolerance, decreased functional mobility tolerance, decreased safety awareness and fall risk  Pt educated in NWB precautions  LE mobilizer adjusted as pt LE tone had her positioned in severe flexion and twisted stay which was irritating skin and allowing increased mobility at knee 2* to severe tone  Unable to determine exact transfer technique to chair at home although reports she places foot on W/C which explained to pt would still produce some WB despite that pt has no sensory or motor in B/L LE  Completed backward scoot with MinAx2 to ensure buttock clearance as pt presents with wounds  Pt excited to be OOB, requested frequent A for OOB  Anticipate at this time pt may require STR as she lives alone and unable to perform transfers as at baseline mobility  The following objective measures performed on IE also reveal limitations: Barthel Index: 35/100  Pt's clinical presentation is currently unstable/unpredictable seen in pt's presentation of distal femur fx, cellulitis  Pt to benefit from continued PT tx to address deficits as defined above and maximize level of functional independent mobility and consistency  From PT/mobility standpoint, recommendation at time of d/c would be IP rehab pending progress in order to facilitate return to PLOF  Barriers to Discharge Decreased caregiver support   Goals   Patient Goals To continue to be able to sit up in chair   STG Expiration Date 11/01/18   Short Term Goal #1 1  Complete bed mobility with distant S  2  Complete backward scoot with S and ability to clear buttock completely   3  I with W/C mobility x 100'   Plan   Treatment/Interventions Functional transfer training;LE strengthening/ROM; Endurance training;Patient/family training;Bed mobility;Spoke to nursing;OT   PT Frequency (3-5x/wk)   Recommendation   Recommendation Post acute IP rehab   PT - OK to Discharge (to rehab when medically stable)   Barthel Index   Feeding 10   Bathing 0   Grooming Score 5   Dressing Score 5   Bladder Score 0   Bowels Score 5   Toilet Use Score 5   Transfers (Bed/Chair) Score 5   Mobility (Level Surface) Score 0   Stairs Score 0   Barthel Index Score 35           Francisco Herndon, PT

## 2018-10-20 NOTE — PROGRESS NOTES
Progress Note - Orthopedics   Melany Walter 76 y o  female MRN: [id number]  [facility]      Subjective:    76 y  o female with left femur fracture  No acute events, no complaints  Pt doing well  Pain controlled  Denies fevers chills, CP, SOB    Labs:    0  Lab Value Date/Time   HCT 24 0 (L) 10/20/2018 0502   HCT 25 6 (L) 10/19/2018 1516   HCT 32 1 (L) 09/21/2018 0512   HCT 30 0 (L) 05/28/2018 0530   HCT 29 5 (L) 05/22/2018 0515   HCT 32 5 (L) 05/14/2018 0555   HGB 7 4 (L) 10/20/2018 0502   HGB 8 1 (L) 10/19/2018 1516   HGB 10 6 (L) 09/21/2018 0512   HGB 10 1 (L) 05/28/2018 0530   HGB 9 9 (L) 05/22/2018 0515   HGB 10 6 (L) 05/14/2018 0555   WBC 7 99 10/20/2018 0502   WBC 9 22 10/19/2018 1516   WBC 7 20 09/21/2018 0512   WBC 6 4 05/28/2018 0530   WBC 8 0 05/22/2018 0515   WBC 6 0 05/14/2018 0555       Meds:    Current Facility-Administered Medications:     ceFAZolin (ANCEF) IVPB (premix) 1,000 mg, 1,000 mg, Intravenous, Q8H, Ilene Ruiz MD, Stopped at 10/20/18 0610    cholecalciferol (VITAMIN D3) tablet 1,000 Units, 1,000 Units, Oral, Daily, Ilene Ruiz MD    cyanocobalamin (VITAMIN B-12) tablet 100 mcg, 100 mcg, Oral, Daily, Ilene Ruiz MD    docusate sodium (COLACE) capsule 100 mg, 100 mg, Oral, BID, Ilene Ruiz MD    enoxaparin (LOVENOX) subcutaneous injection 40 mg, 40 mg, Subcutaneous, Q24H, Ilene Ruiz MD, 40 mg at 10/19/18 1523    ondansetron (ZOFRAN) injection 4 mg, 4 mg, Intravenous, Q6H PRN, Ilene Ruiz MD    [facility]) tablet 8 6 mg, 1 tablet, Oral, Daily, Ilene Ruiz MD, 8 6 mg at 10/19/18 1523    Blood Culture:   No results found for: BLOODCX    Wound Culture:   Lab Results   Component Value Date    WOUNDCULT 2+ Growth of Morganella morganii (A) 09/14/2018    WOUNDCULT 1+ Growth of  09/14/2018       Ins and Outs:  I/O last 24 hours:   In: 460 [P O :360; IV Piggyback:100]  Out: 475 [Urine:475]          Physical:  Vitals:    10/19/18 2358   BP: 104/50   Pulse: 87   Resp: 18 Temp: 98 2 °F (36 8 °C)   SpO2: 98%     Musculoskeletal: left Lower Extremity    · Skin: erythema noted over fracture site appears to be improving based on previous discussions with staff  · Obvious deformity secondary to fracture  · Baseline patient has no sensation over s/s/sp/dp/t distribution  Patient without motor function in the LLE   +DP pulse    _*_*_*_*_*_*_*_*_*_*_*_*_*_*_*_*_*_*_*_*_*_*_*_*_*_*_*_*_*_*_*_*_*_*_*_*_*_*_*_*_*    Assessment:    76 y  o female with left femur fracture and overlying cellulitis    Plan:  · NWB LLE  · Continue IV Abx  · Monitor for improvement in erythema  · Will discuss plans for fixation following improvement in cellulitis  · PT/OT  · Pain control  · DVT ppx  · Dispo: pending improvement in cellulitis    Frankey Edman, MD

## 2018-10-20 NOTE — SOCIAL WORK
CM met with Pt to discuss the recommendation of SNF but Pt reported not being ready to discuss rehab at this time, as she would like to discuss the MD and work some more with PT  CM will follow up

## 2018-10-20 NOTE — PLAN OF CARE
Problem: DISCHARGE PLANNING - CARE MANAGEMENT  Goal: Discharge to post-acute care or home with appropriate resources  INTERVENTIONS:  - Conduct assessment to determine patient/family and health care team treatment goals, and need for post-acute services based on payer coverage, community resources, and patient preferences, and barriers to discharge  - Address psychosocial, clinical, and financial barriers to discharge as identified in assessment in conjunction with the patient/family and health care team  - Arrange appropriate level of post-acute services according to patient's   needs and preference and payer coverage in collaboration with the physician and health care team  - Communicate with and update the patient/family, physician, and health care team regarding progress on the discharge plan  - Arrange appropriate transportation to post-acute venues  - CM will assist with SNF placement upon d/c, Pt is agreeable  Outcome: Progressing

## 2018-10-20 NOTE — PLAN OF CARE
Problem: PHYSICAL THERAPY ADULT  Goal: Performs mobility at highest level of function for planned discharge setting  See evaluation for individualized goals  Treatment/Interventions: Functional transfer training, LE strengthening/ROM, Endurance training, Patient/family training, Bed mobility, Spoke to nursing, OT          See flowsheet documentation for full assessment, interventions and recommendations  Prognosis: Fair  Problem List: Decreased endurance, Impaired balance, Decreased mobility, Decreased safety awareness, Orthopedic restrictions, Decreased strength, Decreased range of motion  Assessment: Pt is 76 y o  female seen for PT evaluation s/p admit to [facility] on 10/19/2018 w/ distal femur fx  PT consulted to assess pt's functional mobility and d/c needs  Order placed for PT eval and tx, w/ up w/ A and NWB L LE order  Comorbidities affecting pt's physical performance at time of assessment include: T8 paraplegia, LE tone  PTA, pt was I with W/C mobility, I with driving and all transfers  Personal factors affecting pt at time of IE include: inability to navigate community distances, unable to perform physical activity, inability to perform IADLs and inability to perform ADLs  Please find objective findings from PT assessment regarding body systems outlined above with impairments and limitations including weakness, decreased ROM, impaired balance, decreased endurance, decreased activity tolerance, decreased functional mobility tolerance, decreased safety awareness and fall risk  Pt educated in NWB precautions  LE mobilizer adjusted as pt LE tone had her positioned in severe flexion and twisted stay which was irritating skin and allowing increased mobility at knee 2* to severe tone  Unable to determine exact transfer technique to chair at home although reports she places foot on W/C which explained to pt would still produce some WB despite that pt has no sensory or motor in B/L LE   Completed backward scoot with MinAx2 to ensure buttock clearance as pt presents with wounds  Pt excited to be OOB, requested frequent A for OOB  Anticipate at this time pt may require STR as she lives alone and unable to perform transfers as at baseline mobility  The following objective measures performed on IE also reveal limitations: Barthel Index: 35/100  Pt's clinical presentation is currently unstable/unpredictable seen in pt's presentation of distal femur fx, cellulitis  Pt to benefit from continued PT tx to address deficits as defined above and maximize level of functional independent mobility and consistency  From PT/mobility standpoint, recommendation at time of d/c would be IP rehab pending progress in order to facilitate return to PLOF  Barriers to Discharge: Decreased caregiver support     Recommendation: Post acute IP rehab     PT - OK to Discharge:  (to rehab when medically stable)    See flowsheet documentation for full assessment

## 2018-10-20 NOTE — SOCIAL WORK
CM met with Pt with an introduction and explanation of role  Pt reported residing alone in a 1st floor apt with the use of a wheelchair and not steps to enter the apt  Pt reported being independent with ADLs, has refused the recommendation of VNA in the past, no hx of SNF, mental health or drug/alcohol placements  Pt denied having a living will, reported using [address] on [facility] in [facility] and has Dr Irasema Mock as a PCP  CM reviewed d/c planning process including the following: identifying help at home, patient preference for d/c planning needs, Discharge Lounge, [facility] Meds to Bed program, availability of treatment team to discuss questions or concerns patient and/or family may have regarding understanding medications and recognizing signs and symptoms once discharged  CM also encouraged patient to follow up with all recommended appointments after discharge  Patient advised of importance for patient and family to participate in managing patients medical well being

## 2018-10-20 NOTE — PLAN OF CARE
Problem: OCCUPATIONAL THERAPY ADULT  Goal: Performs self-care activities at highest level of function for planned discharge setting  See evaluation for individualized goals  Treatment Interventions: ADL retraining, Functional transfer training, UE strengthening/ROM, Endurance training, Patient/family training, Equipment evaluation/education, Compensatory technique education, Activityengagement          See flowsheet documentation for full assessment, interventions and recommendations  Limitation: Decreased ADL status, Decreased endurance, Decreased self-care trans, Decreased high-level ADLs  Prognosis: Good  Assessment: Pt is a 76 y o  female who was admitted to [facility] on 10/19/2018 with closed distal femur fx - pt reports she was dressing her LB and heard a pop   Pt's problem list also includes PMH of anemia, arthritis, ckd, depression, osteopenia, osteoporosis, paraplegia since 1981, decubitus ulcer, tibial plateau fx  At baseline pt was completing adls independently and was able to transfer to/from  independently  Pt lives alone in apt with support from family/neighbors prn  Currently pt requires mod  assist for overall ADLS (1* LB) and min assist for functional transfers  Pt currently presents with impairments in the following categories -limited home support, difficulty performing ADLS, difficulty performing IADLS  and health management  activity tolerance, endurance and sitting balance/tolerance  These impairments, as well as pt's fatigue, orthopedic restricitions , WBS  and decreased caregiver support  limit pt's ability to safely engage in all baseline areas of occupation, includingbathing, dressing, toileting, functional mobility/transfers, house maintenance, meal prep, cleaning, social participation  and leisure activities  From OT standpoint, recommend inpt rehab upon D/C  OT will continue to follow to address the below stated goals        OT Discharge Recommendation: Short Term Rehab

## 2018-10-21 PROCEDURE — 99222 1ST HOSP IP/OBS MODERATE 55: CPT | Performed by: INTERNAL MEDICINE

## 2018-10-21 RX ADMIN — CEFAZOLIN SODIUM 1000 MG: 1 SOLUTION INTRAVENOUS at 21:07

## 2018-10-21 RX ADMIN — VITAMIN D, TAB 1000IU (100/BT) 1000 UNITS: 25 TAB at 10:47

## 2018-10-21 RX ADMIN — VITAM B12 100 MCG: 100 TAB at 10:47

## 2018-10-21 RX ADMIN — CEFAZOLIN SODIUM 1000 MG: 1 SOLUTION INTRAVENOUS at 05:38

## 2018-10-21 RX ADMIN — CEFAZOLIN SODIUM 1000 MG: 1 SOLUTION INTRAVENOUS at 15:01

## 2018-10-21 RX ADMIN — ENOXAPARIN SODIUM 40 MG: 40 INJECTION SUBCUTANEOUS at 18:32

## 2018-10-21 NOTE — PROGRESS NOTES
Progress Note - Orthopedics   Deangelo Love 76 y o  female MRN: [id number]  [facility]      Subjective:    76 y  o female with left femur fracture and overlying cellulitis  No acute events, no complaints  Pt doing well  Pain controlled  Denies fevers chills, CP, SOB    Labs:    0  Lab Value Date/Time   HCT 24 0 (L) 10/20/2018 0502   HCT 25 6 (L) 10/19/2018 1516   HCT 32 1 (L) 09/21/2018 0512   HCT 30 0 (L) 05/28/2018 0530   HCT 29 5 (L) 05/22/2018 0515   HCT 32 5 (L) 05/14/2018 0555   HGB 7 4 (L) 10/20/2018 0502   HGB 8 1 (L) 10/19/2018 1516   HGB 10 6 (L) 09/21/2018 0512   HGB 10 1 (L) 05/28/2018 0530   HGB 9 9 (L) 05/22/2018 0515   HGB 10 6 (L) 05/14/2018 0555   WBC 7 99 10/20/2018 0502   WBC 9 22 10/19/2018 1516   WBC 7 20 09/21/2018 0512   WBC 6 4 05/28/2018 0530   WBC 8 0 05/22/2018 0515   WBC 6 0 05/14/2018 0555       Meds:    Current Facility-Administered Medications:     ceFAZolin (ANCEF) IVPB (premix) 1,000 mg, 1,000 mg, Intravenous, Q8H, Lucio Oviedo MD, Last Rate: 100 mL/hr at 10/21/18 0538, 1,000 mg at 10/21/18 0538    cholecalciferol (VITAMIN D3) tablet 1,000 Units, 1,000 Units, Oral, Daily, Lucio Oviedo MD, 1,000 Units at 10/20/18 1005    cyanocobalamin (VITAMIN B-12) tablet 100 mcg, 100 mcg, Oral, Daily, Lucio Oviedo MD, 100 mcg at 10/20/18 1003    docusate sodium (COLACE) capsule 100 mg, 100 mg, Oral, BID, Lucio Oviedo MD    enoxaparin (LOVENOX) subcutaneous injection 40 mg, 40 mg, Subcutaneous, Q24H, Lucio Oviedo MD, 40 mg at 10/20/18 1741    ondansetron [facility]) injection 4 mg, 4 mg, Intravenous, Q6H PRN, Lucio Oviedo MD    [facility]) tablet 8 6 mg, 1 tablet, Oral, Daily, Lucio Oviedo MD, 8 6 mg at 10/19/18 1523    Blood Culture:   No results found for: BLOODCX    Wound Culture:   Lab Results   Component Value Date    WOUNDCULT 2+ Growth of Morganella morganii (A) 09/14/2018    WOUNDCULT 1+ Growth of  09/14/2018       Ins and Outs:  I/O last 24 hours:   In: 1270 [P O :1170; IV Piggyback:100]  Out: 1225 [Urine:1225]          Physical:  Vitals:    10/20/18 2300   BP: 123/58   Pulse: 71   Resp: 18   Temp: 98 1 °F (36 7 °C)   SpO2: 97%     Musculoskeletal: left Lower Extremity  · Skin erythema continues to improve  · Dressing: patient placed in KI  · Patient remains at there baseline of no motor or sensation to that extremity  · Patient has lightly palpable DP pulses    _*_*_*_*_*_*_*_*_*_*_*_*_*_*_*_*_*_*_*_*_*_*_*_*_*_*_*_*_*_*_*_*_*_*_*_*_*_*_*_*_*    Assessment:    76 y  o female sp fall with left femur fracture and overlying cellulitis     Plan:  · NWB LLE  · Appreciate ID input regarding skin infection   · Operative plans pending improvement in skin infection  · PT/OT  · Pain control  · DVT ppx  · Dispo: plan pending     Marcello Hansen MD

## 2018-10-21 NOTE — CONSULTS
Consultation - Infectious Disease   Manual Daniel 76 y o  female MRN: [id number]  Unit/Bed#: [facility] 928-01 Encounter: [id number]      IMPRESSION & RECOMMENDATIONS:   Impression/Recommendations:  1  Left thigh cellulitis  Mild in appearance without drainage or abscess  Consider all due to hematoma, fracture as this appears to correlate to displaced fracture on x-ray  Clinically stable without sepsis  Patient reports improvement since admission  Rec:  · Consider cefazolin for now  · Serial exams  · As this appears mild and possibly due to fracture itself, seems low risk from an ID perspective for planned orthopedic intervention  · If rachel purulence encountered intraoperatively, would avoid hardware placement at that time  · Follow temperatures closely  · Check CBC in AM  · Supportive care as per the primary service    2  Comminuted displaced left femur fracture  With delay to orthopedic evaluation  Rec:  · Continue immobilizer per Orthopedics  · Eventual operative intervention per Orthopedics    3  Chronic right hip decubitus wound with recent superinfection  Appears superficial and clean without purulence or cellulitis  Rec:  · Continue [facility]  · Outpatient Plastic surgery follow-up ongoing    4  History of sacral decubitus ulcer  Status phos flap with some nonhealing  No evidence of purulence or superinfection  Rec:  · Continue [facility]  · Outpatient Plastic surgery follow-up ongoing    5  T8 paraplegia  Due to remote traumatic spinal cord injury    Antibiotics:  Cefazolin # 3    Thank you for this consultation  We will follow along with you  HISTORY OF PRESENT ILLNESS:  Reason for Consult:  Left thigh cellulitis    HPI: Sher Sanchez is a 76 y o  female with a history of chronic paraplegia due to a T-spine fracture many years ago  She has a history of a sacral decubitus ulcer and underwent flap procedure in April  This is been complicated by poor wound healing    More recently over the last month she developed a right hip ulceration with subsequent wound infection  She was recently admitted at [facility] in mid September where she underwent debridement and evacuation of an abscess  This was felt to be superficial and did not track down to any osteoarticular structures  Wound cultures ultimately grew Morganella and enterococcus in the patient was discharged on Bactrim  More recently on 10/08 she developed a comminuted displaced left femur fracture  She was seen in the emergency department and sent home withDelong in the office on 10/18 and was noted to have significant erythema and hematoma over the fracture site  She was started on Keflex and admitted to the hospital on 10/19  Upon arrival she was noted to be afebrile with a normal heart rate  She was started on cefazolin  Since admission she thinks the redness of her leg is markedly improved  We are asked to comment on further evaluation and management  REVIEW OF SYSTEMS:  Denies fevers, chills, sweats, nausea, vomiting, or diarrhea  A complete system-based review of systems is otherwise negative      PAST MEDICAL HISTORY:  Past Medical History:   Diagnosis Date    Anemia     iron defiencey    Arthritis     osteo    Back injury     Chronic kidney disease     nephritis    Depression     Osteopenia     Osteoporosis     Paraplegia (HCC)     Poor circulation     Pressure injury of skin     right hip    Pressure sore of hip     right    Tibial plateau fracture      Past Surgical History:   Procedure Laterality Date    HIP DEBRIDEMENT Right 2017    right hip sore debridement    MN DEBRIDEMENT, SKIN, SUB-Q TISSUE,MUSCLE,BONE,=<20 SQ CM Right 9/19/2018    Procedure: DEBRIDEMENT OF HIP PRESSURE SORE;  Surgeon: Agnie Castillo MD;  Location[facility] MAIN OR;  Service: Plastics    TOE AMPUTATION Left 2017    small toe left foot amputation    WISDOM TOOTH EXTRACTION         FAMILY HISTORY:  Non-contributory    SOCIAL HISTORY:  History Alcohol Use    4 2 oz/week    7 Glasses of wine per week     Comment: per weekly, 1 glass HS     History   Drug Use No     History   Smoking Status    Never Smoker   Smokeless Tobacco    Never Used       ALLERGIES:  Allergies   Allergen Reactions    Vancomycin      Unknown     Cefepime Rash    Ciprofloxacin Rash and Swelling     Looked like suburn, itching  Bed sores  Swelling, itching    Latex Rash       MEDICATIONS:  All current active medications have been reviewed  PHYSICAL EXAM:  Vitals:  Temp:  [97 8 °F (36 6 °C)-98 1 °F (36 7 °C)] 98 °F (36 7 °C)  HR:  [71-94] 85  Resp:  [18-19] 18  BP: ()/(49-58) 102/49  SpO2:  [97 %-98 %] 98 %  Temp (24hrs), Av °F (36 7 °C), Min:97 8 °F (36 6 °C), Max:98 1 °F (36 7 °C)  Current: Temperature: 98 °F (36 7 °C)     Physical Exam:  General:  Thin, chronically ill-appearing female, in no acute distress  Eyes:  Conjunctive clear with no hemorrhages or effusions  Oropharynx:  No ulcers, no lesions  Neck:  Supple, no lymphadenopathy  Lungs:  Clear to auscultation bilaterally, no accessory muscle use  Cardiac:  Regular rate and rhythm, no murmurs  Abdomen:  Soft, non-tender, non-distended  Extremities:  No peripheral cyanosis, clubbing, or edema  Skin:  No rashes, sacral region with flap in place  Small 3 cm area of dehiscence and undermining without purulence, malodor, or cellulitis  2 cm oval Ryan skin ulcer over right hip with some brownish drainage on the gauze dressing  The wound base appears clean without purulence, fluctuance, or surrounding cellulitis  Neurological:  Paraplegia  Left thigh:  Approximately 5 x 5 cm area of swelling and faint erythema which is cool  There is no purulence or drainage noted  LABS, IMAGING, & OTHER STUDIES:  Lab Results:  I have personally reviewed pertinent labs      Results from last 7 days  Lab Units 10/20/18  0502 10/19/18  1516   SODIUM mmol/L 137 135*   POTASSIUM mmol/L 3 6 3 6   CHLORIDE mmol/L 103 102   CO2 mmol/L 27 27   BUN mg/dL 10 8   CREATININE mg/dL 0 47* 0 42*   EGFR ml/min/1 73sq m 102 106   CALCIUM mg/dL 7 7* 8 0*       Results from last 7 days  Lab Units 10/20/18  0502 10/19/18  1516   WBC Thousand/uL 7 99 9 22   HEMOGLOBIN g/dL 7 4* 8 1*   PLATELETS Thousands/uL 579* 555*           Imaging Studies:   I have personally reviewed pertinent imaging study reports and images in PACS  X-ray right femur comminuted displaced and angulated distal left femur fracture    EKG, Pathology, and Other Studies:   I have personally reviewed pertinent reports

## 2018-10-22 ENCOUNTER — ANESTHESIA EVENT (INPATIENT)
Dept: PERIOP | Facility: HOSPITAL | Age: 68
DRG: 481 | End: 2018-10-22
Payer: COMMERCIAL

## 2018-10-22 ENCOUNTER — ANESTHESIA (INPATIENT)
Dept: PERIOP | Facility: HOSPITAL | Age: 68
DRG: 481 | End: 2018-10-22
Payer: COMMERCIAL

## 2018-10-22 ENCOUNTER — APPOINTMENT (INPATIENT)
Dept: RADIOLOGY | Facility: HOSPITAL | Age: 68
DRG: 481 | End: 2018-10-22
Payer: COMMERCIAL

## 2018-10-22 PROBLEM — S72.402D CLOSED FRACTURE OF LOWER END OF LEFT FEMUR WITH ROUTINE HEALING: Status: ACTIVE | Noted: 2018-10-18

## 2018-10-22 LAB
ABO GROUP BLD: NORMAL
ANION GAP SERPL CALCULATED.3IONS-SCNC: 5 MMOL/L (ref 4–13)
APTT PPP: 34 SECONDS (ref 24–36)
ATRIAL RATE: 90 BPM
BLD GP AB SCN SERPL QL: NEGATIVE
BUN SERPL-MCNC: 10 MG/DL (ref 5–25)
CALCIUM SERPL-MCNC: 8 MG/DL (ref 8.3–10.1)
CHLORIDE SERPL-SCNC: 102 MMOL/L (ref 100–108)
CO2 SERPL-SCNC: 30 MMOL/L (ref 21–32)
CREAT SERPL-MCNC: 0.49 MG/DL (ref 0.6–1.3)
ERYTHROCYTE [DISTWIDTH] IN BLOOD BY AUTOMATED COUNT: 16.6 % (ref 11.6–15.1)
GFR SERPL CREATININE-BSD FRML MDRD: 100 ML/MIN/1.73SQ M
GLUCOSE SERPL-MCNC: 93 MG/DL (ref 65–140)
HCT VFR BLD AUTO: 26.8 % (ref 34.8–46.1)
HGB BLD-MCNC: 8.3 G/DL (ref 11.5–15.4)
INR PPP: 1.11 (ref 0.86–1.17)
MCH RBC QN AUTO: 26.9 PG (ref 26.8–34.3)
MCHC RBC AUTO-ENTMCNC: 31 G/DL (ref 31.4–37.4)
MCV RBC AUTO: 87 FL (ref 82–98)
P AXIS: 42 DEGREES
PLATELET # BLD AUTO: 567 THOUSANDS/UL (ref 149–390)
PMV BLD AUTO: 9.7 FL (ref 8.9–12.7)
POTASSIUM SERPL-SCNC: 4 MMOL/L (ref 3.5–5.3)
PR INTERVAL: 130 MS
PROTHROMBIN TIME: 14.4 SECONDS (ref 11.8–14.2)
QRS AXIS: 6 DEGREES
QRSD INTERVAL: 84 MS
QT INTERVAL: 372 MS
QTC INTERVAL: 455 MS
RBC # BLD AUTO: 3.08 MILLION/UL (ref 3.81–5.12)
RH BLD: POSITIVE
SODIUM SERPL-SCNC: 137 MMOL/L (ref 136–145)
SPECIMEN EXPIRATION DATE: NORMAL
T WAVE AXIS: 9 DEGREES
VENTRICULAR RATE: 90 BPM
WBC # BLD AUTO: 8.33 THOUSAND/UL (ref 4.31–10.16)

## 2018-10-22 PROCEDURE — 85027 COMPLETE CBC AUTOMATED: CPT | Performed by: STUDENT IN AN ORGANIZED HEALTH CARE EDUCATION/TRAINING PROGRAM

## 2018-10-22 PROCEDURE — C1713 ANCHOR/SCREW BN/BN,TIS/BN: HCPCS | Performed by: ORTHOPAEDIC SURGERY

## 2018-10-22 PROCEDURE — 86920 COMPATIBILITY TEST SPIN: CPT

## 2018-10-22 PROCEDURE — 71045 X-RAY EXAM CHEST 1 VIEW: CPT

## 2018-10-22 PROCEDURE — 93005 ELECTROCARDIOGRAM TRACING: CPT

## 2018-10-22 PROCEDURE — 99232 SBSQ HOSP IP/OBS MODERATE 35: CPT | Performed by: INTERNAL MEDICINE

## 2018-10-22 PROCEDURE — P9016 RBC LEUKOCYTES REDUCED: HCPCS

## 2018-10-22 PROCEDURE — 80048 BASIC METABOLIC PNL TOTAL CA: CPT | Performed by: STUDENT IN AN ORGANIZED HEALTH CARE EDUCATION/TRAINING PROGRAM

## 2018-10-22 PROCEDURE — 85730 THROMBOPLASTIN TIME PARTIAL: CPT | Performed by: STUDENT IN AN ORGANIZED HEALTH CARE EDUCATION/TRAINING PROGRAM

## 2018-10-22 PROCEDURE — 93010 ELECTROCARDIOGRAM REPORT: CPT | Performed by: INTERNAL MEDICINE

## 2018-10-22 PROCEDURE — 86850 RBC ANTIBODY SCREEN: CPT | Performed by: STUDENT IN AN ORGANIZED HEALTH CARE EDUCATION/TRAINING PROGRAM

## 2018-10-22 PROCEDURE — 30233N1 TRANSFUSION OF NONAUTOLOGOUS RED BLOOD CELLS INTO PERIPHERAL VEIN, PERCUTANEOUS APPROACH: ICD-10-PCS | Performed by: ORTHOPAEDIC SURGERY

## 2018-10-22 PROCEDURE — 86900 BLOOD TYPING SEROLOGIC ABO: CPT | Performed by: STUDENT IN AN ORGANIZED HEALTH CARE EDUCATION/TRAINING PROGRAM

## 2018-10-22 PROCEDURE — 0QS706Z REPOSITION LEFT UPPER FEMUR WITH INTRAMEDULLARY INTERNAL FIXATION DEVICE, OPEN APPROACH: ICD-10-PCS | Performed by: ORTHOPAEDIC SURGERY

## 2018-10-22 PROCEDURE — 86901 BLOOD TYPING SEROLOGIC RH(D): CPT | Performed by: STUDENT IN AN ORGANIZED HEALTH CARE EDUCATION/TRAINING PROGRAM

## 2018-10-22 PROCEDURE — 85610 PROTHROMBIN TIME: CPT | Performed by: STUDENT IN AN ORGANIZED HEALTH CARE EDUCATION/TRAINING PROGRAM

## 2018-10-22 PROCEDURE — 73552 X-RAY EXAM OF FEMUR 2/>: CPT

## 2018-10-22 PROCEDURE — 27506 TREATMENT OF THIGH FRACTURE: CPT | Performed by: ORTHOPAEDIC SURGERY

## 2018-10-22 PROCEDURE — C1769 GUIDE WIRE: HCPCS | Performed by: ORTHOPAEDIC SURGERY

## 2018-10-22 DEVICE — 5.0MM TI LOCKING SCREW W/T25 STARDRIVE 50MM F/IM NAIL-STER: Type: IMPLANTABLE DEVICE | Site: LEG | Status: FUNCTIONAL

## 2018-10-22 DEVICE — 12MM TI CANN RETRO/ANTEGRADE FEMORAL NAIL-EX/340MM-STERILE
Type: IMPLANTABLE DEVICE | Site: LEG | Status: FUNCTIONAL
Brand: EXPERT NAIL

## 2018-10-22 DEVICE — 5.0MM TI LOCKING SCREW W/T25 STARDRIVE 42MM F/IM NAIL-STER: Type: IMPLANTABLE DEVICE | Site: LEG | Status: FUNCTIONAL

## 2018-10-22 DEVICE — 5.0MM TI LOCKING SCREW W/T25 STARDRIVE 32MM F/IM NAIL-STER: Type: IMPLANTABLE DEVICE | Site: LEG | Status: FUNCTIONAL

## 2018-10-22 DEVICE — TI SPIRAL BLD 65MM-STER F/TI RETROGRADE FEMORAL NAILS-EX
Type: IMPLANTABLE DEVICE | Site: LEG | Status: FUNCTIONAL
Brand: EXPERT NAIL

## 2018-10-22 DEVICE — TI END CAP T40 STRDRV-0MM EXT RETR FEM NAIL-EX SPRL BLD-STER
Type: IMPLANTABLE DEVICE | Site: LEG | Status: FUNCTIONAL
Brand: EXPERT NAIL

## 2018-10-22 RX ORDER — FENTANYL CITRATE/PF 50 MCG/ML
25 SYRINGE (ML) INJECTION
Status: DISCONTINUED | OUTPATIENT
Start: 2018-10-22 | End: 2018-10-22 | Stop reason: HOSPADM

## 2018-10-22 RX ORDER — FENTANYL CITRATE 50 UG/ML
INJECTION, SOLUTION INTRAMUSCULAR; INTRAVENOUS AS NEEDED
Status: DISCONTINUED | OUTPATIENT
Start: 2018-10-22 | End: 2018-10-22 | Stop reason: SURG

## 2018-10-22 RX ORDER — SUCCINYLCHOLINE CHLORIDE 20 MG/ML
INJECTION INTRAMUSCULAR; INTRAVENOUS AS NEEDED
Status: DISCONTINUED | OUTPATIENT
Start: 2018-10-22 | End: 2018-10-22 | Stop reason: SURG

## 2018-10-22 RX ORDER — SODIUM CHLORIDE, SODIUM LACTATE, POTASSIUM CHLORIDE, CALCIUM CHLORIDE 600; 310; 30; 20 MG/100ML; MG/100ML; MG/100ML; MG/100ML
INJECTION, SOLUTION INTRAVENOUS CONTINUOUS PRN
Status: DISCONTINUED | OUTPATIENT
Start: 2018-10-22 | End: 2018-10-22 | Stop reason: SURG

## 2018-10-22 RX ORDER — MEPERIDINE HYDROCHLORIDE 25 MG/ML
12.5 INJECTION INTRAMUSCULAR; INTRAVENOUS; SUBCUTANEOUS ONCE AS NEEDED
Status: DISCONTINUED | OUTPATIENT
Start: 2018-10-22 | End: 2018-10-22 | Stop reason: HOSPADM

## 2018-10-22 RX ORDER — MAGNESIUM HYDROXIDE 1200 MG/15ML
LIQUID ORAL AS NEEDED
Status: DISCONTINUED | OUTPATIENT
Start: 2018-10-22 | End: 2018-10-22 | Stop reason: HOSPADM

## 2018-10-22 RX ORDER — OXYCODONE HYDROCHLORIDE 5 MG/1
TABLET ORAL
Qty: 30 TABLET | Refills: 0 | Status: ON HOLD | OUTPATIENT
Start: 2018-10-22 | End: 2019-01-28 | Stop reason: ALTCHOICE

## 2018-10-22 RX ORDER — SODIUM CHLORIDE, SODIUM LACTATE, POTASSIUM CHLORIDE, CALCIUM CHLORIDE 600; 310; 30; 20 MG/100ML; MG/100ML; MG/100ML; MG/100ML
50 INJECTION, SOLUTION INTRAVENOUS CONTINUOUS
Status: DISCONTINUED | OUTPATIENT
Start: 2018-10-22 | End: 2018-10-26 | Stop reason: HOSPADM

## 2018-10-22 RX ORDER — METOCLOPRAMIDE HYDROCHLORIDE 5 MG/ML
10 INJECTION INTRAMUSCULAR; INTRAVENOUS ONCE AS NEEDED
Status: DISCONTINUED | OUTPATIENT
Start: 2018-10-22 | End: 2018-10-22 | Stop reason: HOSPADM

## 2018-10-22 RX ORDER — ONDANSETRON 2 MG/ML
4 INJECTION INTRAMUSCULAR; INTRAVENOUS ONCE AS NEEDED
Status: DISCONTINUED | OUTPATIENT
Start: 2018-10-22 | End: 2018-10-22 | Stop reason: HOSPADM

## 2018-10-22 RX ORDER — PROPOFOL 10 MG/ML
INJECTION, EMULSION INTRAVENOUS AS NEEDED
Status: DISCONTINUED | OUTPATIENT
Start: 2018-10-22 | End: 2018-10-22 | Stop reason: SURG

## 2018-10-22 RX ADMIN — DEXAMETHASONE SODIUM PHOSPHATE 4 MG: 10 INJECTION INTRAMUSCULAR; INTRAVENOUS at 16:32

## 2018-10-22 RX ADMIN — CEFAZOLIN SODIUM 1000 MG: 1 SOLUTION INTRAVENOUS at 21:40

## 2018-10-22 RX ADMIN — ONDANSETRON 4 MG: 2 INJECTION INTRAMUSCULAR; INTRAVENOUS at 17:10

## 2018-10-22 RX ADMIN — CEFAZOLIN SODIUM 1000 MG: 1 SOLUTION INTRAVENOUS at 16:25

## 2018-10-22 RX ADMIN — PROPOFOL 100 MG: 10 INJECTION, EMULSION INTRAVENOUS at 16:02

## 2018-10-22 RX ADMIN — SODIUM CHLORIDE, SODIUM LACTATE, POTASSIUM CHLORIDE, AND CALCIUM CHLORIDE: .6; .31; .03; .02 INJECTION, SOLUTION INTRAVENOUS at 16:08

## 2018-10-22 RX ADMIN — PROPOFOL 50 MG: 10 INJECTION, EMULSION INTRAVENOUS at 16:03

## 2018-10-22 RX ADMIN — CEFAZOLIN SODIUM 1000 MG: 1 SOLUTION INTRAVENOUS at 05:39

## 2018-10-22 RX ADMIN — FENTANYL CITRATE 25 MCG: 50 INJECTION, SOLUTION INTRAMUSCULAR; INTRAVENOUS at 16:21

## 2018-10-22 RX ADMIN — FENTANYL CITRATE 50 MCG: 50 INJECTION, SOLUTION INTRAMUSCULAR; INTRAVENOUS at 17:07

## 2018-10-22 RX ADMIN — PROPOFOL 30 MG: 10 INJECTION, EMULSION INTRAVENOUS at 16:21

## 2018-10-22 RX ADMIN — CEFAZOLIN SODIUM 1000 MG: 1 SOLUTION INTRAVENOUS at 13:40

## 2018-10-22 RX ADMIN — SUCCINYLCHOLINE CHLORIDE 60 MG: 20 INJECTION, SOLUTION INTRAMUSCULAR; INTRAVENOUS at 16:04

## 2018-10-22 RX ADMIN — FENTANYL CITRATE 25 MCG: 50 INJECTION, SOLUTION INTRAMUSCULAR; INTRAVENOUS at 16:01
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
## 2018-10-22 NOTE — PROGRESS NOTES
Progress Note - Infectious Disease   Jennifer Vickers 76 y o  female MRN: [id number]  [facility] Encounter: [id number]      Impression/Recommendations:  1  Left thigh cellulitis  Mild in appearance without drainage or abscess  Consider all due to hematoma, fracture as this appears to correlate to displaced fracture on x-ray  Clinically stable without sepsis  Patient reports improvement of erythema since admission  Developed spontaneous drainage from hematoma overnight  Rec:  ? Continue cefazolin for now  ? Serial exams  ? As this appears mild and possibly due to fracture itself, seems low risk from an ID perspective for planned orthopedic intervention  ? If rachel purulence encountered intraoperatively, would send cultures and avoid hardware placement at that time  ? Follow temperatures and WBC closely  ? Supportive care as per the primary service     2  Comminuted displaced left femur fracture  With delay to orthopedic evaluation  Rec:  ? Continue immobilizer per Orthopedics  ? Await operative intervention today per Orthopedics     3  Chronic right hip decubitus wound with recent superinfection  Appears superficial and clean without purulence or cellulitis  Rec:  ? Continue [address] [facility] while in house  ? Outpatient Plastic surgery follow-up ongoing     4  History of sacral decubitus ulcer  Status phos flap with some nonhealing  No evidence of purulence or superinfection  Rec:  ? Continue [facility] while in house  ? Outpatient Plastic surgery follow-up ongoing     5  T8 paraplegia  Due to remote traumatic spinal cord injury     Antibiotics:  Cefazolin # 4    Subjective:  Patient seen on AM rounds  Developed spontaneous bloody drainage from left medial thigh hematoma  Leg now splinted and unable to be visualized  24 Hour Events:  No documented fevers, chills, sweats, nausea, vomiting, or diarrhea  OR pending later today      Objective:  Vitals:  Temp:  [98 1 °F (36 7 °C)-99 1 °F (37 3 °C)] 98 1 °F (36 7 °C)  HR:  [82-92] 92  Resp:  [17] 17  BP: (103-109)/(51-57) 109/57  SpO2:  [98 %-99 %] 99 %  Temp (24hrs), Av 6 °F (37 °C), Min:98 1 °F (36 7 °C), Max:99 1 °F (37 3 °C)  Current: Temperature: 98 1 °F (36 7 °C)    Physical Exam:   General:  No acute distress  Psychiatric:  Awake and alert  Pulmonary:  Normal respiratory excursion without accessory muscle use  Abdomen:  Soft, nontender  Extremities:  Left leg in splint/Ace wrap  Left thigh unable to be visualized  Skin:  No rashes    Lab Results:  I have personally reviewed pertinent labs  Results from last 7 days  Lab Units 10/22/18  0748 10/20/18  0502 10/19/18  1516   SODIUM mmol/L 137 137 135*   POTASSIUM mmol/L 4 0 3 6 3 6   CHLORIDE mmol/L 102 103 102   CO2 mmol/L 30 27 27   BUN mg/dL 10 10 8   CREATININE mg/dL 0 49* 0 47* 0 42*   EGFR ml/min/1 73sq m 100 102 106   CALCIUM mg/dL 8 0* 7 7* 8 0*       Results from last 7 days  Lab Units 10/22/18  0748 10/20/18  0502 10/19/18  1516   WBC Thousand/uL 8 33 7 99 9 22   HEMOGLOBIN g/dL 8 3* 7 4* 8 1*   PLATELETS Thousands/uL 567* 579* 555*           Imaging Studies:   I have personally reviewed pertinent imaging study reports and images in PACS  EKG, Pathology, and Other Studies:   I have personally reviewed pertinent reports

## 2018-10-22 NOTE — PLAN OF CARE
Problem: PAIN - ADULT  Goal: Verbalizes/displays adequate comfort level or baseline comfort level  Interventions:  - Encourage patient to monitor pain and request assistance  - Assess pain using appropriate pain scale  - Administer analgesics based on type and severity of pain and evaluate response  - Implement non-pharmacological measures as appropriate and evaluate response  - Consider cultural and social influences on pain and pain management  - Notify physician/advanced practitioner if interventions unsuccessful or patient reports new pain   Outcome: Progressing      Problem: INFECTION - ADULT  Goal: Absence or prevention of progression during hospitalization  INTERVENTIONS:  - Assess and monitor for signs and symptoms of infection  - Monitor lab/diagnostic results  - Monitor all insertion sites, i e  indwelling lines, tubes, and drains  - Monitor endotracheal (as able) and nasal secretions for changes in amount and color  - [place] appropriate cooling/warming therapies per order  - Administer medications as ordered  - Instruct and encourage patient and family to use good hand hygiene technique  - Identify and instruct in appropriate isolation precautions for identified infection/condition   Outcome: Progressing      Problem: SAFETY ADULT  Goal: Patient will remain free of falls  INTERVENTIONS:  - Assess patient frequently for physical needs  -  Identify cognitive and physical deficits and behaviors that affect risk of falls    -  [place] fall precautions as indicated by assessment   - Educate patient/family on patient safety including physical limitations  - Instruct patient to call for assistance with activity based on assessment  - Modify environment to reduce risk of injury  - Consider OT/PT consult to assist with strengthening/mobility   Outcome: Progressing    Goal: Maintain or return to baseline ADL function  INTERVENTIONS:  -  Assess patient's ability to carry out ADLs; assess patient's baseline for ADL function and identify physical deficits which impact ability to perform ADLs (bathing, care of mouth/teeth, toileting, grooming, dressing, etc )  - Assess/evaluate cause of self-care deficits   - Assess range of motion  - Assess patient's mobility; develop plan if impaired  - Assess patient's need for assistive devices and provide as appropriate  - Encourage maximum independence but intervene and supervise when necessary  ¯ Involve family in performance of ADLs  ¯ Assess for home care needs following discharge   ¯ Request OT consult to assist with ADL evaluation and planning for discharge  ¯ Provide patient education as appropriate   Outcome: Progressing    Goal: Maintain or return mobility status to optimal level  INTERVENTIONS:  - Assess patient's baseline mobility status (ambulation, transfers, stairs, etc )    - Identify cognitive and physical deficits and behaviors that affect mobility  - Identify mobility aids required to assist with transfers and/or ambulation (gait belt, sit-to-stand, lift, walker, cane, etc )  - [place] fall precautions as indicated by assessment  - Record patient progress and toleration of activity level on Mobility SBAR; progress patient to next Phase/Stage  - Instruct patient to call for assistance with activity based on assessment  - Request Rehabilitation consult to assist with strengthening/weightbearing, etc    Outcome: Progressing      Problem: DISCHARGE PLANNING  Goal: Discharge to home or other facility with appropriate resources  INTERVENTIONS:  - Identify barriers to discharge w/patient and caregiver  - Arrange for needed discharge resources and transportation as appropriate  - Identify discharge learning needs (meds, wound care, etc )  - Arrange for interpretive services to assist at discharge as needed  - Refer to Case Management Department for coordinating discharge planning if the patient needs post-hospital services based on physician/advanced practitioner order or complex needs related to functional status, cognitive ability, or social support system   Outcome: Progressing      Problem: Prexisting or High Potential for Compromised Skin Integrity  Goal: Skin integrity is maintained or improved  INTERVENTIONS:  - Identify patients at risk for skin breakdown  - Assess and monitor skin integrity  - Assess and monitor nutrition and hydration status  - Monitor labs (i e  albumin)  - Assess for incontinence   - Turn and reposition patient  - Assist with mobility/ambulation  - Relieve pressure over bony prominences  - Avoid friction and shearing  - Provide appropriate hygiene as needed including keeping skin clean and dry  - Evaluate need for skin moisturizer/barrier cream  - Collaborate with interdisciplinary team (i e  Nutrition, Rehabilitation, etc )   - Patient/family teaching   Outcome: Progressing      Problem: DISCHARGE PLANNING - CARE MANAGEMENT  Goal: Discharge to post-acute care or home with appropriate resources  INTERVENTIONS:  - Conduct assessment to determine patient/family and health care team treatment goals, and need for post-acute services based on payer coverage, community resources, and patient preferences, and barriers to discharge  - Address psychosocial, clinical, and financial barriers to discharge as identified in assessment in conjunction with the patient/family and health care team  - Arrange appropriate level of post-acute services according to patient's   needs and preference and payer coverage in collaboration with the physician and health care team  - Communicate with and update the patient/family, physician, and health care team regarding progress on the discharge plan  - Arrange appropriate transportation to post-acute venues  - CM will assist with SNF placement upon d/c, Pt is agreeable   Outcome: Progressing

## 2018-10-22 NOTE — CONSULTS
Progress Note - Wound   Ron Echevarria 76 y o  female MRN: [id number]  Unit/Bed#: [facility] Encounter: [id number]      Assessment: Patient is seen for wound care consult   The patient is a T 8 paraplegic from a spinal cord injury in 1981   The patient has a isabel catheter and does her own bowel program   The patient has her own wheelchair and seating cushion   The patient has pressure injuries all - POA and confirmed by the patient   She see's Dr Vickie Mendoza for a long time for management of the wounds   Sacral wound is a failed flap that the center has not closed and drains as per the patient   This is considered a surgical wound after a failed flap  Right hip is a unstageable pressure injury - POA with yellow slough in the wound bed that he debrides on the office visits   Left buttocks has 3  Areas  stage 3 - POA as confirmed by the patient  Wound beds are mixed with thin yellow slough and pink wound beds   Right heel is intact   Left heel covered with splint by ortho prior to going to the OR for the fractured femor   Patient is able to turn self   Will place on a low air loss mattress   Patient will follow with Dr Vickie Mendoza upon discharge for wound management   Plan:   1  Hydraguard to bilateral heels bid and prn   2  Skin nourishing cream daily to the entire skin   3  Turn and reposition every 2 hours to offload   4  Sacral - cleanse with NSS then loosely pack with mesalt ribbon and secure with tape to the sacral then top with ABD and secure with paper tape change daily   5  Right hip - cleanse with NSS then apply maxorb and DSD secure with tape change daily   6  Left buttocks 3 wounds - cleanse with NSS then apply maxorb top with allevyn foam change every other day   [place] with a T for treatment and date   7  Elevate heels off of the bed   8  Patient has her own seating cushion and wheelchair   9   P - 500 mattress           Objective:    Vitals: Blood pressure 99/58, pulse 80, temperature 98 4 °F (36 9 °C), resp  rate 16, height 5' (1 524 m), weight 52 2 kg (115 lb), SpO2 96 %, not currently breastfeeding  ,Body mass index is 22 46 kg/m²  Pressure Ulcer 09/19/18 R Hip (Active)   Staging Unstagable 10/22/2018  3:12 PM   Wound Description Fragile;Pink;Slough 10/22/2018  3:12 PM   Yasemin-wound Assessment Clean;Dry; Intact 10/22/2018  3:12 PM   Shape oval  10/22/2018  3:12 PM   Wound Length (cm) 1 5 cm 10/22/2018  3:12 PM   Wound Width (cm) 0 8 cm 10/22/2018  3:12 PM   Wound Depth (cm) 0 3 10/22/2018  3:12 PM   Calculated Wound Area (cm^2) 1 2 cm^2 10/22/2018  3:12 PM   Calculated Wound Volume (cm^3) 0 36 cm^3 10/22/2018  3:12 PM   Drainage Amount Small 10/22/2018  3:12 PM   Drainage Description Serous 10/22/2018  3:12 PM   Treatment Cleansed; Offload; Turn & reposition 10/22/2018  3:12 PM   Dressing Calcium Alginate 10/22/2018  3:12 PM   Dressing Changed New dressing applied 10/22/2018  3:12 PM   Patient Tolerance Tolerated well 10/22/2018  3:12 PM   Dressing Status Clean 10/22/2018  8:30 AM       Pressure Ulcer 10/19/18 Leg Right;Posterior (Active)   Staging Stage III 10/22/2018  8:30 AM   Wound Description COLEMAN 10/22/2018  3:12 PM   Yasemin-wound Assessment COLEMAN 10/22/2018  8:30 AM   Wound Length (cm) 2 5 cm 10/19/2018 12:30 PM   Wound Width (cm) 1 5 cm 10/19/2018 12:30 PM   Calculated Wound Area (cm^2) 3 75 cm^2 10/19/2018 12:30 PM   Drainage Amount COLEMAN 10/22/2018  8:30 AM   Drainage Description COLEMAN 10/22/2018  8:30 AM   Dressing Dry dressing 10/22/2018  8:30 AM   Dressing Changed Changed 10/21/2018  8:00 PM   Patient Tolerance Tolerated well 10/21/2018  8:00 PM   Dressing Status Clean;Dry; Intact 10/22/2018  8:30 AM       Pressure Ulcer 10/19/18 Buttocks Left;Upper lateral  (Active)   Staging Stage III 10/22/2018  3:12 PM   Wound Description Clean;Fragile;Pink;Slough 10/22/2018  3:12 PM   Yasemin-wound Assessment Clean;Dry; Intact 10/22/2018  3:12 PM   Wound Length (cm) 1 5 cm 10/22/2018  3:12 PM Wound Width (cm) 1 cm 10/22/2018  3:12 PM   Calculated Wound Area (cm^2) 1 5 cm^2 10/22/2018  3:12 PM   Drainage Amount Small 10/22/2018  3:12 PM   Drainage Description Serous 10/22/2018  3:12 PM   Treatment Cleansed; Offload; Turn & reposition 10/22/2018  3:12 PM   Dressing Calcium Alginate; Foam, Silicone (eg  Allevyn, etc) 10/22/2018  3:12 PM   Dressing Changed New dressing applied 10/22/2018  3:12 PM   Patient Tolerance Tolerated well 10/22/2018  3:12 PM   Dressing Status Clean;Dry; Intact 10/22/2018  8:30 AM       Pressure Ulcer 10/19/18 Buttocks Left; Lower (Active)   Staging Stage III 10/22/2018  2:46 PM   Wound Description Clean;Fragile;Pink;Slough 10/22/2018  2:46 PM   Yasemin-wound Assessment Clean;Dry; Intact 10/22/2018  2:46 PM   Wound Length (cm) 1 5 cm 10/22/2018  2:46 PM   Wound Width (cm) 2 cm 10/22/2018  2:46 PM   Calculated Wound Area (cm^2) 3 cm^2 10/22/2018  2:46 PM   Drainage Amount Scant 10/22/2018  2:46 PM   Drainage Description Serous 10/22/2018  2:46 PM   Treatment Cleansed; Offload 10/22/2018  2:46 PM   Dressing Calcium Alginate; Foam, Silicone (eg  Allevyn, etc) 10/22/2018  2:46 PM   Dressing Changed New dressing applied 10/22/2018  2:46 PM   Patient Tolerance Tolerated well 10/22/2018  2:46 PM   Dressing Status Clean;Dry; Intact 10/22/2018  8:30 AM       Pressure Ulcer 10/22/18 Buttocks Left; Inner POA as per the patient stage 3  (Active)   Staging Stage III 10/22/2018  3:12 PM   Wound Description Fragile;Pink;Slough 10/22/2018  3:12 PM   Yasemin-wound Assessment Clean;Dry; Intact 10/22/2018  3:12 PM   Wound Length (cm) 0 5 cm 10/22/2018  3:12 PM   Wound Width (cm) 1 5 cm 10/22/2018  3:12 PM   Calculated Wound Area (cm^2) 0 75 cm^2 10/22/2018  3:12 PM   Drainage Amount Small 10/22/2018  3:12 PM   Drainage Description Serous 10/22/2018  3:12 PM   Treatment Cleansed;Irrigation with NSS 10/22/2018  3:12 PM   Dressing Calcium Alginate; Foam, Silicone (eg   Allevyn, etc) 10/22/2018  3:12 PM   Dressing Changed New dressing applied 10/22/2018  3:12 PM   Patient Tolerance Tolerated well 10/22/2018  3:12 PM       Wound 10/22/18 Other (Comment) Sacrum Mid failed flap as per patient   - POA  (Active)   Wound Description Clean;Fragile;Pink;Slough 10/22/2018  3:12 PM   Yasemin-wound Assessment Clean;Fragile; Maceration 10/22/2018  3:12 PM   Shape linear oval  10/22/2018  3:12 PM   Wound Length (cm) 1 5 cm 10/22/2018  3:12 PM   Wound Width (cm) 2 5 cm 10/22/2018  3:12 PM   Wound Depth (cm) 3 10/22/2018  3:12 PM   Calculated Wound Volume (cm^3) 11 25 cm^3 10/22/2018  3:12 PM   Drainage Amount Moderate 10/22/2018  3:12 PM   Drainage Description Serous 10/22/2018  3:12 PM   Non-staged Wound Description Full thickness 10/22/2018  3:12 PM   Treatments Cleansed;Site care 10/22/2018  3:12 PM   Dressing Other (Comment) 10/22/2018  3:12 PM   Wound packed?  Yes 10/22/2018  3:12 PM   Packing- # removed 0 10/22/2018  3:12 PM   Packing- # inserted 1 10/22/2018  3:12 PM   Dressing Changed New 10/22/2018  3:12 PM   Patient Tolerance Tolerated well 10/22/2018  3:12 PM       Incision 09/19/18 Hip Right (Active)         Wound care will follow weekly     Wally RUFFIN [facility]

## 2018-10-22 NOTE — ANESTHESIA POSTPROCEDURE EVALUATION
Post-Op Assessment Note      CV Status:  Stable    Mental Status:  Alert and awake    Hydration Status:  Euvolemic    PONV Controlled:  Controlled    Airway Patency:  Patent    Post Op Vitals Reviewed: Yes          Staff: Anesthesiologist           /67 (10/22/18 1858)    Temp (!) 97 3 °F (36 3 °C) (10/22/18 1858)    Pulse 75 (10/22/18 1858)   Resp (P) 18 (10/22/18 1853)    SpO2 100 % (10/22/18 1858)

## 2018-10-22 NOTE — DISCHARGE INSTR - OTHER ORDERS
Plan:   1  Hydraguard to bilateral heels bid and prn   2  Skin nourishing cream daily to the entire skin   3  Turn and reposition every 2 hours to offload   4  Sacral - cleanse with NSS then loosely pack with mesalt ribbon and secure with tape to the sacral then top with ABD and secure with paper tape change daily   5  Right hip - cleanse with NSS then apply maxorb and DSD secure with tape change daily   6  Left buttocks 3 wounds - cleanse with NSS then apply maxorb top with allevyn foam change every other day   [place] with a T for treatment and date   7  Elevate heels off of the bed   8  Patient has her own seating cushion and wheelchair   9   P - 500 mattress

## 2018-10-22 NOTE — PROGRESS NOTES
Progress Note  Allan Guthrie 76 y o  female MRN: [id number]  Unit/Bed#: [facility] 928-01          ASSESSMENT: 68F L distal femur fx      PLAN:   -NWB LLE in splint  -Abx  -DVTppx  -Pain Mgmt  -PT/OT  -Dispo: Ortho will follow        PE:   LLE: at baseline patient has minimal sensation and no movement both of which continue, bloody drainage present from area of hematoma overlying left knee  Knee immobilizer in place         SUBJECTIVE: No acute events, no complaints    Data:     _*_*_*_*_*_*_*_*_*_*_*_*_*_*_*_*_*_*_*_*_*_*_*_*_*_*_*_*_*_*_*_*_*_*_*_*_*_*_*_*_*    Vitals:    10/21/18 2353   BP: 103/51   Pulse: 82   Resp: 17   Temp: 98 7 °F (37 1 °C)   SpO2: 98%         0  Lab Value Date/Time   HCT 24 0 (L) 10/20/2018 0502   HCT 30 0 (L) 05/28/2018 0530   HGB 7 4 (L) 10/20/2018 0502   HGB 10 1 (L) 05/28/2018 0530   WBC 7 99 10/20/2018 0502   WBC 6 4 05/28/2018 0530         Current Facility-Administered Medications:  cefazolin 1,000 mg Intravenous Bel Bennett MD Last Rate: 1,000 mg (10/22/18 0539)   cholecalciferol 1,000 Units Oral Daily Emma Salter MD    cyanocobalamin 100 mcg Oral Daily Emma Salter MD    docusate sodium 100 mg Oral BID Emma Salter MD    enoxaparin 40 mg Subcutaneous Q24H Emma Salter MD    ondansetron 4 mg Intravenous Q6H PRN Emma Salter MD    senna 1 tablet Oral Daily MD Maximiliano Ingram MD

## 2018-10-22 NOTE — OP NOTE
OPERATIVE REPORT  PATIENT NAME: Lupis Cobb    :  1950  MRN: [id number]  Pt Location: [facility] OR ROOM 18    SURGERY DATE: 10/22/2018    Surgeon(s) and Role:     * Cordelia Evans MD - Primary     * Bhaskar Healy PA-C - Assisting     * Wyatt Yousif - Assisting    Preop Diagnosis:  Closed fracture of distal end of left femur with routine healing, unspecified fracture morphology, subsequent encounter [S72 402D]    Post-Op Diagnosis Codes:     * Closed fracture of distal end of left femur with routine healing, unspecified fracture morphology, subsequent encounter [S72 402D]    Procedure(s) (LRB):  INSERTION NAIL IM LEFT FEMUR RETROGRADE (Left)    Specimen(s):  * No specimens in log *    Estimated Blood Loss:   100 mL    Drains:  Urethral Catheter Non-latex 14 Fr  (Active)   Site Assessment Clean;Skin intact 10/22/2018  6:53 PM   Collection Container Leg bag 10/22/2018  6:53 PM   Securement Method Leg strap 10/22/2018  6:53 PM   Output (mL) 400 mL 10/22/2018  2:01 PM   Number of days: 4       Anesthesia Type:   General    Operative Indications:  Closed fracture of distal end of left femur with routine healing, unspecified fracture morphology, subsequent encounter [S72 402D]  This was a distal femur fracture in a patient without protective sensation causing threatened skin at medial aspect of the distal femur  Operative Findings:  No deep communicating wounds medially at the area of hematoma  Significantly osteopenic bone  Satisfactory reduction and fixation with intramedullary retrograde nail  Complications:   None    Procedure and Technique:  Patient's operative extremity, laterality, site, planned procedure was confirmed in the preoperative area and the patient was transported to the operating room and put on the radiolucent flat top table  The left lower extremity was prepped and draped in the usual sterile fashion   After time-out for safety, A linear incision overlying the patellar tendon was made with the scalpel, using sharp dissection, flaps were elevated around the patellar tendon medially and laterally and the dissection was carried down to the level of the patellar tendon  This was sharply incised with scalpel providing access to the joint  After attempts to drive the guidewire to the appropriate starting point, it was noted that the patella was in a prohibitive position, and the incision was extended and a medial parapatellar approach incision was made with capsular incision with deep scalpel in usual fashion  At this point, the guidewire was advanced in line with the femoral canal on the AP view and at the apex of Blumensaat's line on the lateral view ensuring that the starting point was anterior to the cruciate ligaments  At this point the fracture was held reduced and the ball-tip guidewire was passed to a point proximal to the lesser trochanter and sequential reaming took place to 14  mm diameter reamer  This was carried out under direct fluoroscopic guidance  At this point the Synthes retrograde 12 mm x 340 mm  femoral nail was introduced into the femoral canal and was advanced without difficulty  Under fluoroscopic guidance the nail was advanced to a level proximal to the lesser trochanter and ensuring that the distal aspect of the nail was contained within the bone and not impinging into the knee joint  Ball-tip guidewire was removed, and Using the aiming arm, advancement of the guidewire took place for the distal locking blade, and while this was held in place, drilling took place for a single distal static locking bolt which was placed in usual fashion  At this point, drilling took place for the blade which was then introduced and advanced and locked in place with end cap  An additional static locking bolt was placed through separate small lateral based incision using perfect [place] technique   Using perfect [place] technique and a anterior based thigh incision, a single proximal locking bolt was placed in the static position  Aiming arm was removed, final fluoroscopic imaging revealed satisfactory reduction and maintenance of our reduction at the fracture  All wounds were copiously irrigated with sterile saline and the fascia layer at the ITB band was closed with interrupted 0 Vicryl sutures  Interrupted 0 Vicryl Sutures were used to repair the patellar tendon, 2-0 Vicryl sutures used to approximate the paratenon and deep layers at the knee  2-0 interrupted Vicryl sutures were used for subcutaneous layer closure at all surgically created incisions  Staples were used for skin closure of all surgically created incisions  Sterile dressing consisted of Acticoat for all wounds with staples, gauze, ABD pads, sterile Webril, and Ace wraps  A Mepilex square was placed at the proximal thigh dressing  All final instrument and sponge counts were correct  Dr Herrera Saenz was present for the entire procedure  The patient was extubated and transported to the PACU in stable condition        Patient Disposition:  PACU     SIGNATURE: Nguyễn Quintana  DATE: October 22, 2018  TIME: 7:17 PM

## 2018-10-22 NOTE — SOCIAL WORK
Cm reviewed patient during care coordination rounds  Patient is not medically stable for d/c today  Patient is for the OR today  Cm awaiting finale recommendations for d/c

## 2018-10-23 LAB
ABO GROUP BLD BPU: NORMAL
ABO GROUP BLD BPU: NORMAL
ANION GAP SERPL CALCULATED.3IONS-SCNC: 8 MMOL/L (ref 4–13)
BASOPHILS # BLD AUTO: 0.04 THOUSANDS/ΜL (ref 0–0.1)
BASOPHILS NFR BLD AUTO: 0 % (ref 0–1)
BPU ID: NORMAL
BPU ID: NORMAL
BUN SERPL-MCNC: 9 MG/DL (ref 5–25)
CALCIUM SERPL-MCNC: 7.4 MG/DL (ref 8.3–10.1)
CHLORIDE SERPL-SCNC: 103 MMOL/L (ref 100–108)
CO2 SERPL-SCNC: 26 MMOL/L (ref 21–32)
CREAT SERPL-MCNC: 0.7 MG/DL (ref 0.6–1.3)
CROSSMATCH: NORMAL
CROSSMATCH: NORMAL
EOSINOPHIL # BLD AUTO: 0 THOUSAND/ΜL (ref 0–0.61)
EOSINOPHIL NFR BLD AUTO: 0 % (ref 0–6)
ERYTHROCYTE [DISTWIDTH] IN BLOOD BY AUTOMATED COUNT: 16.1 % (ref 11.6–15.1)
GFR SERPL CREATININE-BSD FRML MDRD: 89 ML/MIN/1.73SQ M
GLUCOSE SERPL-MCNC: 164 MG/DL (ref 65–140)
HCT VFR BLD AUTO: 28 % (ref 34.8–46.1)
HGB BLD-MCNC: 9 G/DL (ref 11.5–15.4)
IMM GRANULOCYTES # BLD AUTO: 0.07 THOUSAND/UL (ref 0–0.2)
IMM GRANULOCYTES NFR BLD AUTO: 1 % (ref 0–2)
LYMPHOCYTES # BLD AUTO: 1.79 THOUSANDS/ΜL (ref 0.6–4.47)
LYMPHOCYTES NFR BLD AUTO: 15 % (ref 14–44)
MCH RBC QN AUTO: 28.1 PG (ref 26.8–34.3)
MCHC RBC AUTO-ENTMCNC: 32.1 G/DL (ref 31.4–37.4)
MCV RBC AUTO: 88 FL (ref 82–98)
MONOCYTES # BLD AUTO: 0.86 THOUSAND/ΜL (ref 0.17–1.22)
MONOCYTES NFR BLD AUTO: 7 % (ref 4–12)
NEUTROPHILS # BLD AUTO: 9.44 THOUSANDS/ΜL (ref 1.85–7.62)
NEUTS SEG NFR BLD AUTO: 77 % (ref 43–75)
NRBC BLD AUTO-RTO: 0 /100 WBCS
PLATELET # BLD AUTO: 457 THOUSANDS/UL (ref 149–390)
PMV BLD AUTO: 10.1 FL (ref 8.9–12.7)
POTASSIUM SERPL-SCNC: 3.9 MMOL/L (ref 3.5–5.3)
RBC # BLD AUTO: 3.2 MILLION/UL (ref 3.81–5.12)
SODIUM SERPL-SCNC: 137 MMOL/L (ref 136–145)
UNIT DISPENSE STATUS: NORMAL
UNIT DISPENSE STATUS: NORMAL
UNIT PRODUCT CODE: NORMAL
UNIT PRODUCT CODE: NORMAL
UNIT RH: NORMAL
UNIT RH: NORMAL
WBC # BLD AUTO: 12.2 THOUSAND/UL (ref 4.31–10.16)

## 2018-10-23 PROCEDURE — 85025 COMPLETE CBC W/AUTO DIFF WBC: CPT | Performed by: ORTHOPAEDIC SURGERY

## 2018-10-23 PROCEDURE — 80048 BASIC METABOLIC PNL TOTAL CA: CPT | Performed by: ORTHOPAEDIC SURGERY

## 2018-10-23 PROCEDURE — 99232 SBSQ HOSP IP/OBS MODERATE 35: CPT | Performed by: INTERNAL MEDICINE

## 2018-10-23 RX ADMIN — CEFAZOLIN SODIUM 1000 MG: 1 SOLUTION INTRAVENOUS at 05:49

## 2018-10-23 RX ADMIN — CEFAZOLIN SODIUM 1000 MG: 1 SOLUTION INTRAVENOUS at 22:10

## 2018-10-23 RX ADMIN — ENOXAPARIN SODIUM 40 MG: 40 INJECTION SUBCUTANEOUS at 09:32

## 2018-10-23 RX ADMIN — CEFAZOLIN SODIUM 1000 MG: 1 SOLUTION INTRAVENOUS at 15:02

## 2018-10-23 RX ADMIN — VITAMIN D, TAB 1000IU (100/BT) 1000 UNITS: 25 TAB at 09:31

## 2018-10-23 RX ADMIN — VITAM B12 100 MCG: 100 TAB at 09:31

## 2018-10-23 RX ADMIN — SODIUM CHLORIDE, SODIUM LACTATE, POTASSIUM CHLORIDE, AND CALCIUM CHLORIDE 50 ML/HR: .6; .31; .03; .02 INJECTION, SOLUTION INTRAVENOUS at 20:29

## 2018-10-23 RX ADMIN — SODIUM CHLORIDE, SODIUM LACTATE, POTASSIUM CHLORIDE, AND CALCIUM CHLORIDE 50 ML/HR: .6; .31; .03; .02 INJECTION, SOLUTION INTRAVENOUS at 00:15

## 2018-10-23 RX ADMIN — SENNOSIDES 8.6 MG: 8.6 TABLET, FILM COATED ORAL at 09:32

## 2018-10-23 NOTE — PROGRESS NOTES
Progress Note - Infectious Disease   Richelle Rios 76 y o  female MRN: [id number]  Unit/Bed#: [facility] 911-01 Encounter: [id number]      Impression/Recommendations:  1   Left thigh hematoma/cellulitis  Mild in appearance without drainage or abscess  Consider all due to hematoma, fracture as this appears to correlate to displaced fracture on x-ray  Clinically stable without sepsis  Patient reports improvement of erythema since admission  Not noted to communicate deep intraoperatively  Rec:  ? Continue cefazolin for 2 more days  ? Re-evaluate exam on postoperative dressing change  ? Follow temperatures and WBC closely  ? Supportive care as per the primary service     2   Comminuted displaced left femur fracture  With delay to orthopedic evaluation  Now status post retrograde IM nail  Rec:  ? Continue postoperative care per orthopedics     3   Chronic right hip decubitus wound with recent superinfection  Appears superficial and clean without purulence or cellulitis  Rec:  ? Continue [address] per [facility] while in house  ? Outpatient Plastic surgery follow-up ongoing     4   History of sacral decubitus ulcer  Status phos flap with some nonhealing  No evidence of purulence or superinfection  Rec:  ? Continue [facility] while in house  ? Outpatient Plastic surgery follow-up ongoing     5   T8 paraplegia  Due to remote traumatic spinal cord injury     Antibiotics:  Cefazolin # 5    Subjective:  Patient seen on AM rounds  Denies fevers, chills, sweats, nausea, vomiting, or diarrhea  Leg feels much better now postoperatively    24 Hour Events:  Went to the OR yesterday for retrograde IM nail insertion into femur  Area of hematoma noted to be separate and not communicating with deep bone structures  No documented fevers, chills, sweats, nausea, vomiting, or diarrhea      Objective:  Vitals:  Temp:  [97 3 °F (36 3 °C)-98 9 °F (37 2 °C)] 98 9 °F (37 2 °C)  HR:  [66-94] 94  Resp:  [14-24] 16  BP: ()/(54-67) 103/54  SpO2:  [95 %-100 %] 95 %  Temp (24hrs), Av 1 °F (36 7 °C), Min:97 3 °F (36 3 °C), Max:98 9 °F (37 2 °C)  Current: Temperature: 98 9 °F (37 2 °C)    Physical Exam:   General:  No acute distress  Psychiatric:  Awake and alert  Pulmonary:  Normal respiratory excursion without accessory muscle use  Abdomen:  Soft, nontender  Extremities:  Left leg in OR Ace wrap  Skin:  No rashes    Lab Results:  I have personally reviewed pertinent labs  Results from last 7 days  Lab Units 10/23/18  0451 10/22/18  0748 10/20/18  0502   SODIUM mmol/L 137 137 137   POTASSIUM mmol/L 3 9 4 0 3 6   CHLORIDE mmol/L 103 102 103   CO2 mmol/L 26 30 27   BUN mg/dL 9 10 10   CREATININE mg/dL 0 70 0 49* 0 47*   EGFR ml/min/1 73sq m 89 100 102   CALCIUM mg/dL 7 4* 8 0* 7 7*       Results from last 7 days  Lab Units 10/23/18  0451 10/22/18  0748 10/20/18  0502   WBC Thousand/uL 12 20* 8 33 7 99   HEMOGLOBIN g/dL 9 0* 8 3* 7 4*   PLATELETS Thousands/uL 457* 567* 579*           Imaging Studies:   I have personally reviewed pertinent imaging study reports and images in PACS  EKG, Pathology, and Other Studies:   I have personally reviewed pertinent reports

## 2018-10-23 NOTE — PROGRESS NOTES
Subjective:  Patient seen examined this morning, no acute events overnight  Patient's pain is well controlled  She has no other complaints at this time      Objective:  Lab Results   Component Value Date/Time    WBC 12 20 (H) 10/23/2018 04:51 AM    WBC 6 4 05/28/2018 05:30 AM    HGB 9 0 (L) 10/23/2018 04:51 AM    HGB 10 1 (L) 05/28/2018 05:30 AM       Vitals:    10/23/18 0244   BP: 111/54   Pulse: 83   Resp: 16   Temp: 98 9 °F (37 2 °C)   SpO2: 96%     Left lower extremity  Dressing c/d/i  Motor & sensation at baseline  Extremity well-perfused    Assessment: Post op from retrograde IM nail of the left femur an operative debridement of open fracture site    Plan:  Weightbear as tolerated to the left lower extremity  Continue Ancef q 8 hours for a total of 48 hours postop  Wound check tomorrow morning  Pain control  DVT ppx  PT  Dispo:  Ortho will continue to follow

## 2018-10-23 NOTE — SOCIAL WORK
Cm reviewed patient during care coordination rounds  Cm continues to work on discharge plan  Cm attempted to meet with patient to discuss discharge planning  Patient was open and receptive to the recommendation for rehab, but stated she wanted to discuss with physician  Cm contacted Ortho resident and requested that he follow up with patient  Cm also requested that patient provide Cm with an emergency contact  Per patient she does not have any family or friends that she is close with  Cm advise that the hospital would need to have someone to contact in case of emergencies  Patient stated that she will work on identifying someone

## 2018-10-24 PROCEDURE — 97535 SELF CARE MNGMENT TRAINING: CPT

## 2018-10-24 PROCEDURE — 99232 SBSQ HOSP IP/OBS MODERATE 35: CPT | Performed by: INTERNAL MEDICINE

## 2018-10-24 RX ORDER — CEPHALEXIN 500 MG/1
500 CAPSULE ORAL EVERY 6 HOURS SCHEDULED
Status: COMPLETED | OUTPATIENT
Start: 2018-10-24 | End: 2018-10-25

## 2018-10-24 RX ADMIN — ONDANSETRON 4 MG: 2 INJECTION INTRAMUSCULAR; INTRAVENOUS at 16:56

## 2018-10-24 RX ADMIN — DOCUSATE SODIUM 100 MG: 100 CAPSULE, LIQUID FILLED ORAL at 17:42

## 2018-10-24 RX ADMIN — CEPHALEXIN 500 MG: 500 CAPSULE ORAL at 17:42

## 2018-10-24 RX ADMIN — VITAM B12 100 MCG: 100 TAB at 10:05

## 2018-10-24 RX ADMIN — CEPHALEXIN 500 MG: 500 CAPSULE ORAL at 13:49

## 2018-10-24 RX ADMIN — CEFAZOLIN SODIUM 1000 MG: 1 SOLUTION INTRAVENOUS at 06:00

## 2018-10-24 RX ADMIN — VITAMIN D, TAB 1000IU (100/BT) 1000 UNITS: 25 TAB at 10:05

## 2018-10-24 RX ADMIN — ENOXAPARIN SODIUM 40 MG: 40 INJECTION SUBCUTANEOUS at 10:05

## 2018-10-24 NOTE — PLAN OF CARE
Problem: OCCUPATIONAL THERAPY ADULT  Goal: Performs self-care activities at highest level of function for planned discharge setting  See evaluation for individualized goals  Treatment Interventions: ADL retraining, Functional transfer training, UE strengthening/ROM, Endurance training, Patient/family training, Equipment evaluation/education, Compensatory technique education, Activityengagement          See flowsheet documentation for full assessment, interventions and recommendations  Outcome: Progressing  Limitation: Decreased ADL status, Decreased endurance, Decreased self-care trans, Decreased high-level ADLs  Prognosis: Good  Assessment: spoke to otr/l, pt was to or for im nail of l femur however goals and status remain unchanged therefore plan to continue focusing ot tx on goals from i e  pt is approved for transfers (was non ambulatory pta) pt participated in am ot session and was seen focusing on  am care, bed mobility, ub dressing, grooming and backward scoot transfer bed to chair  pt was able to weight shift self seated in drop arm recliner on cushion from home  pt requires  asst  for lb adls and transfers at this time  currently has with with footplates from home  pt needs to incrase independence with adls and transfers inorder to return home as she lives i'ly  plan to contniue to focus on goals from ie   pt is motivated and cooperative and was able to direct own care this session  pt usually sponge bathes from w/c or uses tub bench  a few times a week, prior to surgery       OT Discharge Recommendation: Short Term Rehab     April Albaro [place]

## 2018-10-24 NOTE — PLAN OF CARE
DISCHARGE PLANNING     Discharge to home or other facility with appropriate resources Progressing        DISCHARGE PLANNING - CARE MANAGEMENT     Discharge to post-acute care or home with appropriate resources Progressing        INFECTION - ADULT     Absence or prevention of progression during hospitalization Progressing        Nutrition/Hydration-ADULT     Nutrient/Hydration intake appropriate for improving, restoring or maintaining nutritional needs Progressing        PAIN - ADULT     Verbalizes/displays adequate comfort level or baseline comfort level Progressing        Potential for Falls     Patient will remain free of falls Progressing        Prexisting or High Potential for Compromised Skin Integrity     Skin integrity is maintained or improved Progressing        SAFETY ADULT     Patient will remain free of falls Progressing     Maintain or return to baseline ADL function Progressing     Maintain or return mobility status to optimal level Progressing

## 2018-10-24 NOTE — OCCUPATIONAL THERAPY NOTE
Occupational Therapy Treatment Note:       10/24/18 1136   Restrictions/Precautions   LLE Weight Bearing Per Order (non ambulatory , baseline transfers)   Pain Assessment   Pain Assessment No/denies pain   Pain Score No Pain   ADL   Grooming Assistance 5  Supervision/Setup   UB Bathing Assistance 5  Supervision/Setup   LB Bathing Assistance 4  Minimal Assistance   UB Dressing Assistance 5  Supervision/Setup   LB Dressing Assistance 3  Moderate Assistance   Toileting Comments pt with catheter, wears leg bag during day   Bed Mobility   Rolling R 5  Supervision   Supine to Sit 5  Supervision   Additional items (pt was able to get self into long sit position using ue's )   Transfers   Additional Comments pt performed backward scoot transfer from bed to wide drop arm recliner requiring min asst x 2   pt currently in a hosp bed with air matterss which is taller in jordana than pts w/c, thus  transfered to recliner this sesion  Activity Tolerance   Activity Tolerance Patient tolerated treatment well   Assessment   Assessment spoke to otr/l, pt was to or for im nail of l femur however goals and status remain unchanged therefore plan to continue focusing ot tx on goals from i e  pt is approved for transfers (was non ambulatory pta) pt participated in am ot session and was seen focusing on  am care, bed mobility, ub dressing, grooming and backward scoot transfer bed to chair  pt was able to weight shift self seated in drop arm recliner on cushion from home  pt requires  asst  for lb adls and transfers at this time  currently has with with footplates from home  pt needs to incrase independence with adls and transfers inorder to return home as she lives i'ly  plan to contniue to focus on goals from ie   pt is motivated and cooperative and was able to direct own care this session  pt usually sponge bathes from w/c or uses tub bench  a few times a week, prior to surgery     Plan   Treatment Interventions ADL retraining;Functional transfer training; Endurance training; Activityengagement;Patient/family training;Equipment evaluation/education   Goal Expiration Date 11/03/18   Treatment Day 1   OT Frequency 2-3x/wk   Recommendation   OT Discharge Recommendation Short Term Rehab   Barthel Index   Feeding 10   Bathing 5   Grooming Score 5   Dressing Score 5   Bladder Score 0  (catheter)   Bowels Score 5   Toilet Use Score 5   Transfers (Bed/Chair) Score 5   Mobility (Level Surface) Score 0   Stairs Score 0   Barthel Index Score 40   Modified Fortuna Scale   Modified Fortuna Scale 4   April A NICHOLAS Kearney

## 2018-10-24 NOTE — PROGRESS NOTES
Called ortho because patients isabel came out  Per Brissa Lobato, when she went in to patients room the entire isabel was lying on the bed and the balloon was still inflated  Called to clarify as to whether or not we should insert another isabel  Per lorrie Hgaan to insert one of our foleys  Raina Case RN who is oncoming nurse was notified

## 2018-10-24 NOTE — PROGRESS NOTES
Subjective: No acute events overnight  No acute distress  POD 2 s/p L retrograde femoral IMN      Objective:  Lab Results   Component Value Date/Time    WBC 12 20 (H) 10/23/2018 04:51 AM    WBC 6 4 05/28/2018 05:30 AM    HGB 9 0 (L) 10/23/2018 04:51 AM    HGB 10 1 (L) 05/28/2018 05:30 AM       Vitals:    10/24/18 0300   BP: 97/51   Pulse: 90   Resp: 18   Temp: 98 5 °F (36 9 °C)   SpO2: 98%     left extremity  Dressing c/d/i  Extremity warm and well perfused    Assessment: Post op from left retrograde femoral IMN    Plan:  Baseline non-ambulatory  Pain control  DVT ppx  PT  Dispo

## 2018-10-24 NOTE — RESTORATIVE TECHNICIAN NOTE
Restorative Specialist Mobility Note       Activity: Chair (Assisted OT, please refer to OT notes for all information)     Assistive Device: None     Ambulation Response: Tolerated well (Longsit backwards scoot)  Repositioned: Sitting, Up in chair         Patient left resting comfortably in bedside recliner, with OT present in room for end of session

## 2018-10-24 NOTE — UTILIZATION REVIEW
Continued Stay Review    Date: 10/24    Vital Signs: BP 97/52 (BP Location: Right arm)   Pulse 84   Temp 98 7 °F (37 1 °C) (Oral)   Resp 18   Ht 5' (1 524 m)   Wt 52 2 kg (115 lb)   LMP  (LMP Unknown)   SpO2 98%   BMI 22 46 kg/m²     Medications:   Scheduled Meds:   Current Facility-Administered Medications:  cephalexin 500 mg Oral Q6H [address]   cholecalciferol 1,000 Units Oral Daily   cyanocobalamin 100 mcg Oral Daily   docusate sodium 100 mg Oral BID   enoxaparin 40 mg Subcutaneous Q24H [address]   senna 1 tablet Oral Daily     Continuous Infusions:   lactated ringers 50 mL/hr Last Rate: 50 mL/hr (10/23/18 2029)     PRN Meds: ondansetron    Abnormal Labs/Diagnostic Results:   No new    Age/Sex: 76 y o  female     Assessment/Plan:   10/24 Progress notes:  Post op from left retrograde femoral IMN  Baseline non-ambulatory  Pain control  DVT ppx  PT    Left thigh hematoma/cellulitis  Mild in appearance without drainage or abscess  Consider all due to hematoma, fracture as this appears to correlate to displaced fracture on x-ray  Change to Keflex to continue for 1 more day  Continue with serial exams  Follow temperatures and [address]    Discharge Plan: TBD

## 2018-10-24 NOTE — PROGRESS NOTES
Progress Note - Infectious Disease   Kala Vincent 76 y o  female MRN: [id number]  Unit/Bed#: [facility] 911-01 Encounter: [id number]      Impression/Recommendations:  1   Left thigh hematoma/cellulitis  Mild in appearance without drainage or abscess  Consider all due to hematoma, fracture as this appears to correlate to displaced fracture on x-ray  Clinically stable without sepsis  Patient reports improvement of erythema since admission  Not noted to communicate deep intraoperatively  Overall improving  Rec:  ? Change to Keflex to continue for 1 more day  ? Continue with serial exams  ? Follow temperatures and WBC closely  ? Supportive care as per the primary service     2   Comminuted displaced left femur fracture  With delay to orthopedic evaluation  Now status post retrograde IM nail  Rec:  ? Continue postoperative care per orthopedics     3   Chronic right hip decubitus wound with recent superinfection  Appears superficial and clean without purulence or cellulitis  Rec:  ? Continue LWC per [facility] while in house  ? Outpatient Plastic surgery follow-up ongoing     4   History of sacral decubitus ulcer  Status phos flap with some nonhealing  No evidence of purulence or superinfection  Rec:  ? Continue [facility] while in house  ? Outpatient Plastic surgery follow-up ongoing     5   T8 paraplegia  Due to remote traumatic spinal cord injury    The patient is stable from an ID standpoint      Antibiotics:  Cefazolin # 6    Subjective:  Patient seen on AM rounds  Denies fevers, chills, sweats, nausea, vomiting, or diarrhea  24 Hour Events:  No documented fevers, chills, sweats, nausea, vomiting, or diarrhea      Objective:  Vitals:  Temp:  [98 4 °F (36 9 °C)-99 5 °F (37 5 °C)] 98 7 °F (37 1 °C)  HR:  [84-97] 84  Resp:  [16-18] 18  BP: ()/(51-60) 97/52  SpO2:  [96 %-99 %] 98 %  Temp (24hrs), Av 7 °F (37 1 °C), Min:98 4 °F (36 9 °C), Max:99 5 °F (37 5 °C)  Current: Temperature: 98 7 °F (37 1 °C)    Physical Exam:   General:  No acute distress  Psychiatric:  Awake and alert  Pulmonary:  Normal respiratory excursion without accessory muscle use  Abdomen:  Soft, nontender  Extremities:  Left distal medial thigh with resolving hematoma  Skin:  No rashes    Lab Results:  I have personally reviewed pertinent labs  Results from last 7 days  Lab Units 10/23/18  0451 10/22/18  0748 10/20/18  0502   SODIUM mmol/L 137 137 137   POTASSIUM mmol/L 3 9 4 0 3 6   CHLORIDE mmol/L 103 102 103   CO2 mmol/L 26 30 27   BUN mg/dL 9 10 10   CREATININE mg/dL 0 70 0 49* 0 47*   EGFR ml/min/1 73sq m 89 100 102   CALCIUM mg/dL 7 4* 8 0* 7 7*       Results from last 7 days  Lab Units 10/23/18  0451 10/22/18  0748 10/20/18  0502   WBC Thousand/uL 12 20* 8 33 7 99   HEMOGLOBIN g/dL 9 0* 8 3* 7 4*   PLATELETS Thousands/uL 457* 567* 579*           Imaging Studies:   I have personally reviewed pertinent imaging study reports and images in PACS  EKG, Pathology, and Other Studies:   I have personally reviewed pertinent reports

## 2018-10-24 NOTE — RESTORATIVE TECHNICIAN NOTE
Restorative Specialist Mobility Note       Activity: Other (Comment) (Patient returned BTB via sit pivot transfer)     Assistive Device: Other (Comment) (slide board)     Ambulation Response: Tolerated well  Repositioned: Supine      Patient left resting comfortably in bed, with PCA and RN present in room

## 2018-10-24 NOTE — PHYSICAL THERAPY NOTE
PT Cx:  Spoke nelson/ Gary Garrett PT re: need for PT re-eval post op  However, pt does not have active PT orders  Attempted to call ortho but unable to reach  Will re-evaluate pt when she has new PT orders     Larry Puckett PT

## 2018-10-25 PROCEDURE — 97164 PT RE-EVAL EST PLAN CARE: CPT

## 2018-10-25 PROCEDURE — 97530 THERAPEUTIC ACTIVITIES: CPT

## 2018-10-25 PROCEDURE — 97535 SELF CARE MNGMENT TRAINING: CPT

## 2018-10-25 PROCEDURE — 99232 SBSQ HOSP IP/OBS MODERATE 35: CPT | Performed by: INTERNAL MEDICINE

## 2018-10-25 RX ADMIN — VITAM B12 100 MCG: 100 TAB at 09:51

## 2018-10-25 RX ADMIN — CEPHALEXIN 500 MG: 500 CAPSULE ORAL at 11:54

## 2018-10-25 RX ADMIN — ENOXAPARIN SODIUM 40 MG: 40 INJECTION SUBCUTANEOUS at 09:50

## 2018-10-25 RX ADMIN — CEPHALEXIN 500 MG: 500 CAPSULE ORAL at 17:53

## 2018-10-25 RX ADMIN — CEPHALEXIN 500 MG: 500 CAPSULE ORAL at 01:51

## 2018-10-25 RX ADMIN — CEPHALEXIN 500 MG: 500 CAPSULE ORAL at 07:58

## 2018-10-25 RX ADMIN — VITAMIN D, TAB 1000IU (100/BT) 1000 UNITS: 25 TAB at 09:50

## 2018-10-25 RX ADMIN — DOCUSATE SODIUM 100 MG: 100 CAPSULE, LIQUID FILLED ORAL at 17:53

## 2018-10-25 NOTE — SOCIAL WORK
Cm reviewed patient during care coordination rounds  Cm continues to work on d/c plan  PT/OT continues to work on discharge recommendations  Patient is stating that if recommendations does not change to home discharge, patient will still want to d/c home  Patient is requesting that Cm contact [facility]ff[facility] to schedule transport home  Cm updated Ortho resident

## 2018-10-25 NOTE — SOCIAL WORK
CM met with patient regarding transport with [facility] for d/c tomorrow  Per pt she does not have an ID#  CM called [facility] [phone] spoke with Usha James who stated hours to make reservations are M-F 8am-4:30pm, per Usha James we can call back in the am to make reservations, however they cannot be made for the same day  CM will follow for d/c transport plan

## 2018-10-25 NOTE — PLAN OF CARE
Problem: OCCUPATIONAL THERAPY ADULT  Goal: Performs self-care activities at highest level of function for planned discharge setting  See evaluation for individualized goals  Treatment Interventions: ADL retraining, Functional transfer training, UE strengthening/ROM, Endurance training, Patient/family training, Equipment evaluation/education, Compensatory technique education, Activityengagement          See flowsheet documentation for full assessment, interventions and recommendations  Outcome: Progressing  Limitation: Decreased ADL status, Decreased endurance, Decreased self-care trans, Decreased high-level ADLs  Prognosis: Good  Assessment: pt participated in pm ot session and was seen focusing on ub and lb dressing, bed mobility, in flat homelike bed, sit pivot transfer to from  21" bed (even with w/c) pt was able to transfer with close sba and support for w/c as tire pressure slightly low  pt ws able to tessa over head shirt with sba and hosp pants and underwear with incresed time and close sba  pt was noted to get l le caught between w/c and bed during transfer  pt reports she needs to trnasfer to from bed, w/c and eventually car and tub   pt currently is not functionaning at mod I level to date therefore recommend in pt rehab while continuing to focus on goals from ie       OT Discharge Recommendation: Short Term Rehab    April Janeal [place], [place]

## 2018-10-25 NOTE — PROGRESS NOTES
Progress Note - Infectious Disease   [place] 76 y o  female MRN: [id number]  Unit/Bed#: [facility] 911-01 Encounter: [id number]      Impression/Recommendations:  1   Left thigh hematoma/cellulitis  Mild in appearance without drainage or abscess  Likely due to fracture as appears to correlate to displaced fracture on x-ray  Clinically stable without sepsis  Not noted to communicate deep intraoperatively  Overall improving  Rec:  ? Discontinue antibiotics to complete treatment course  ? Continue with serial exams  ? Follow temperatures and WBC closely  ? Supportive care as per the primary service     2   Comminuted displaced left femur fracture  With delay to orthopedic evaluation  Now status post retrograde IM nail  Rec:  ? Continue postoperative care per orthopedics     3   Chronic right hip decubitus wound with recent superinfection  Appears superficial and clean without purulence or cellulitis  Rec:  ? Continue LWC per [address] while in house  ? Outpatient Plastic surgery follow-up ongoing     4   History of sacral decubitus ulcer  Status phos flap with some nonhealing  No evidence of purulence or superinfection  Rec:  ? Continue LWC [address] while in house  ? Outpatient Plastic surgery follow-up ongoing     5   T8 paraplegia  Due to remote traumatic spinal cord injury     The patient is stable from an ID standpoint       Antibiotics:  Cefazolin # 6  Keflex #2  Antibiotics #7    Subjective:  Patient seen on AM rounds  Working with PT on transfers  Denies fevers, chills, sweats, nausea, vomiting, or diarrhea  24 Hour Events:  No documented fevers, chills, sweats, nausea, vomiting, or diarrhea      Objective:  Vitals:  Temp:  [97 4 °F (36 3 °C)-99 7 °F (37 6 °C)] 97 4 °F (36 3 °C)  HR:  [] 60  Resp:  [16-18] 18  BP: (102-120)/(52-65) 102/54  SpO2:  [97 %-99 %] 99 %  Temp (24hrs), Av 5 °F (36 9 °C), Min:97 4 °F (36 3 °C), Max:99 7 °F (37 6 °C)  Current: Temperature: (!) 97 4 °F (36 3 °C)    Physical Exam: General:  No acute distress  Psychiatric:  Awake and alert  Pulmonary:  Normal respiratory excursion without accessory muscle use  Abdomen:  Soft, nontender  Extremities:  Left leg Ace wrap intact without strike through  Skin:  No rashes    Lab Results:  I have personally reviewed pertinent labs  Results from last 7 days  Lab Units 10/23/18  0451 10/22/18  0748 10/20/18  0502   SODIUM mmol/L 137 137 137   POTASSIUM mmol/L 3 9 4 0 3 6   CHLORIDE mmol/L 103 102 103   CO2 mmol/L 26 30 27   BUN mg/dL 9 10 10   CREATININE mg/dL 0 70 0 49* 0 47*   EGFR ml/min/1 73sq m 89 100 102   CALCIUM mg/dL 7 4* 8 0* 7 7*       Results from last 7 days  Lab Units 10/23/18  0451 10/22/18  0748 10/20/18  0502   WBC Thousand/uL 12 20* 8 33 7 99   HEMOGLOBIN g/dL 9 0* 8 3* 7 4*   PLATELETS Thousands/uL 457* 567* 579*           Imaging Studies:   I have personally reviewed pertinent imaging study reports and images in PACS  EKG, Pathology, and Other Studies:   I have personally reviewed pertinent reports

## 2018-10-25 NOTE — PLAN OF CARE
Problem: PHYSICAL THERAPY ADULT  Goal: Performs mobility at highest level of function for planned discharge setting  See evaluation for individualized goals  Treatment/Interventions: Functional transfer training, Therapeutic exercise, Endurance training, Cognitive reorientation, Patient/family training, Bed mobility, Equipment eval/education, Compensatory technique education, Continued evaluation, Spoke to nursing, Spoke to case management, Spoke to MD, OT  Equipment Recommended: Wheelchair (pt owns one)       See flowsheet documentation for full assessment, interventions and recommendations  Prognosis: Fair  Problem List: Decreased strength, Decreased range of motion, Decreased endurance, Impaired balance, Decreased mobility, Decreased coordination, Impaired judgement, Decreased safety awareness, Pain, Orthopedic restrictions, Decreased skin integrity  Assessment: Pt reevaluated s/p IMN fixation of L fem fx currently WBAT; pt at baseline is paraplegic; requires min contact guard assist w setup for seated transf bed-chair wo transf board, from high to lower wc surface; able to direct care re positioning wc optimally and locking wc brakes; requires close supervision w wc locomotion - pt able to negotiate small and narrow spaces and environ obstacles; pt currently lives alone w limited support, has complex hx incl paraplegia- no funct use of LE, limited sensation high risk of LE injury and has ppreexisting skin breakdown; will benefit from cont PT establish safe mob, and to address goals as noted below  Barriers to Discharge: Decreased caregiver support     Recommendation: Post acute IP rehab     PT - OK to Discharge:  (to rehab when medically stable)    See flowsheet documentation for full assessment

## 2018-10-25 NOTE — OCCUPATIONAL THERAPY NOTE
Occupational Therapy Treatment Note:       10/25/18 1620   Restrictions/Precautions   RLE Weight Bearing Per Order WBAT   LLE Weight Bearing Per Order (non ambulatory, baseline transfers)   Pain Assessment   Pain Assessment No/denies pain   Pain Score No Pain   ADL   LB Dressing Assistance 5  Supervision/Setup   LB Dressing Deficit (increased time to tessa underwear and pants)   Toileting Assistance  5  Supervision/Setup   Toileting Comments pt was able to roll r and l to pull pants over hips however requires increased time  Bed Mobility   Rolling R 5  Supervision   Rolling L 5  Supervision   Supine to Sit 5  Supervision   Additional items (flat bed)   Transfers   Sit pivot 4  Minimal assistance   Additional items (spt x 4 to from tall bed and even bed with increased time)   Activity Tolerance   Activity Tolerance Patient tolerated treatment well   Assessment   Assessment pt participated in pm ot session and was seen focusing on ub and lb dressing, bed mobility, in flat homelike bed, sit pivot transfer to from  21" bed (even with w/c) pt was able to transfer with close sba and support for w/c as tire pressure slightly low  pt ws able to tessa over head shirt with sba and hosp pants and underwear with incresed time and close sba  pt was noted to get l le caught between w/c and bed during transfer  pt reports she needs to trnasfer to from bed, w/c and eventually car and tub   pt currently is not functionaning at mod I level to date therefore recommend in pt rehab while continuing to focus on goals from ie  Plan   Treatment Interventions ADL retraining;Functional transfer training; Activityengagement; Endurance training;Patient/family training;Equipment evaluation/education   Goal Expiration Date 11/03/18   Treatment Day 2   OT Frequency 3-5x/wk  (spoke to otr/l, incrase frequency to 3-5 days a week )   Recommendation   OT Discharge Recommendation Short Term Rehab   Barthel Index   Feeding 10   Bathing 0   Grooming Score 5   Dressing Score 10   Bladder Score 0   Bowels Score 0   Toilet Use Score 5   Transfers (Bed/Chair) Score 10   Mobility (Level Surface) Score 0   Stairs Score 0   Barthel Index Score 40   Modified McDonald Scale   Modified McDonald Scale 4   April A NICHOLAS Kearney

## 2018-10-25 NOTE — PHYSICAL THERAPY NOTE
PT Reevaluation      Patient's Name:Lizbeth Espinal    Today's Date:10/25/18    Patient Active Problem List   Diagnosis    Decubitus ulcer of right hip    Paraplegia following spinal cord injury ([facility] [address] )    Pressure ulcer, sacrum    Iron deficiency anemia    Osteopenia    Underweight    Wound infection    Rash due to allergy    Closed fracture of distal end of left femur with routine healing    Closed fracture of lower end of left femur with routine healing       Past Medical History:   Diagnosis Date    Anemia     iron defiencey    Arthritis     osteo    Back injury     Chronic kidney disease     nephritis    Depression     Osteopenia     Osteoporosis     Paraplegia ([facility] [address] [facility]     Poor circulation     Pressure injury of skin     right hip    Pressure sore of hip     right    Tibial plateau fracture        Past Surgical History:   Procedure Laterality Date    HIP DEBRIDEMENT Right 2017    right hip sore debridement    CT DEBRIDEMENT, SKIN, SUB-Q TISSUE,MUSCLE,BONE,=<20 SQ CM Right 9/19/2018    Procedure: DEBRIDEMENT OF HIP PRESSURE SORE;  Surgeon: Yandy Solomon MD;  Location: [facility] MAIN OR;  Service: Plastics    CT OPEN RX FEMUR FX+INTRAMED FELIX Left 10/22/2018    Procedure: INSERTION NAIL IM LEFT FEMUR RETROGRADE;  Surgeon: Reynold Murguia MD;  Location:  MAIN OR;  Service: Orthopedics    TOE AMPUTATION Left 2017    small toe left foot amputation    WISDOM TOOTH EXTRACTION          10/25/18 1040   Pain Assessment   Pain Assessment No/denies pain   Hospital Pain Intervention(s) Repositioned   Response to Interventions tolerated   Home Living   Type of Home Apartment   Home Layout Ramped entrance   Prior Function   Level of [place] Independent with ADLs and functional mobility   Lives With Alone   Comments pt reports no significant support - "everybody works"   Restrictions/Precautions   RLE [place] Bearing Per Order WBAT   General   Family/Caregiver Present No   Cognition Arousal/Participation Alert   Orientation Level Oriented to person;Oriented to place   Following Commands Follows multistep commands with increased time or repetition   RUE Assessment   RUE Assessment WFL   LUE Assessment   LUE Assessment WFL   RLE Assessment   RLE Assessment X   Tone RLE   RLE Tone Hypotonic   LLE Assessment   LLE Assessment X   Tone LLE   LLE Tone Hypotonic   Coordination   Movements are Fluid and Coordinated 0   Coordination and Movement Description paraplegic   Light Touch   RLE Light Touch Impaired   LLE Light Touch Impaired   Bed Mobility   Supine to Sit 5  Supervision   Additional items HOB elevated; Increased time required   Transfers   Sit pivot 4  Minimal assistance   Additional items Increased time required   Ambulation/Elevation   Gait pattern Not appropriate   Wheelchair Activities   Wheelchair Cushion Pressure relieving   Pressure Relief Type Push up;Self adjusts   Level of Assistance for Pressure Relief Activities Directs care (Comment); Independent   Wheelchair Parts Management Yes   All Wheelchair Parts Management able to direct need to lock brakes   Propulsion Yes   Propulsion Type 1 Manual   Level 1 Level tile   Method 1 Right upper extremity; Left upper extremity   Level of Assistance 1 Close supervision   Description/ Details 1 60 ft   Balance   Static Sitting Good   Dynamic Sitting Fair   Endurance Deficit   Endurance Deficit Yes   Endurance Deficit Description fatigue   Assessment   Prognosis Fair   Problem List Decreased strength;Decreased range of motion;Decreased endurance; Impaired balance;Decreased mobility; Decreased coordination; Impaired judgement;Decreased safety awareness;Pain;Orthopedic restrictions;Decreased skin integrity   Assessment Pt reevaluated s/p IMN fixation of L fem fx currently WBAT; pt at baseline is paraplegic; requires min contact guard assist w setup for seated transf bed-chair wo transf board, from high to lower wc surface; able to direct care re positioning wc optimally and locking wc brakes; requires close supervision w wc locomotion - pt able to negotiate small and narrow spaces and environ obstacles; pt currently lives alone w limited support, has complex hx incl paraplegia- no funct use of LE, limited sensation high risk of LE injury and has ppreexisting skin breakdown; will benefit from cont PT establish safe mob, and to address goals as noted below   Goals   Patient Goals need to know specifics of what I'm doing   STG Expiration Date 11/04/18   Short Term Goal #1 1  Independent with Bed Mobility Rolling Right and Left     2  Modified Independent with Bed Mobility Supine-Sit  To/from level surface   3  Modified Independent with seated transfer bed-wheelchair     4  Increase Dynamic Sitting Balance at least 1 Grade for improved stability with functional reach activities     5  Modified Independent with  wc locomotion 300 feet using BUE w safe negotiation of environ obstacles to facilitate home and community mobility 8  Able to perform wt shifts off multi surfaces to minimize progression of gluteal surface skin breakdown 9  Able to maneuver LE safely during multisurface transfers to minimize injury/preserve skin integrity 10  Able to direct care re: repositioning and skin check needs to minimize risk of skin breakdown   Plan   Treatment/Interventions Functional transfer training; Therapeutic exercise; Endurance training;Cognitive reorientation;Patient/family training;Bed mobility; Equipment eval/education; Compensatory technique education;Continued evaluation;Spoke to nursing;Spoke to case management;Spoke to MD;OT   PT Frequency 2-3x/wk   Recommendation   Recommendation Post acute IP rehab   Equipment Recommended Wheelchair  (pt owns one)   Barthel Index   Feeding 10   Bathing 0   Grooming Score 0   Dressing Score 5   Bladder Score 0   Bowels Score 5   Toilet Use Score 5   Transfers (Bed/Chair) Score 5   Mobility (Level Surface) Score 0   Stairs Score 0 Barthel Index Score 30     PT Progress Note  Total Time: 25 min  Additional time from reeval required for pt education on skilled PT services, and to educate pt on safe mob, safe disposition and care coordination w MD for optimal safe d/c; at end of session pt expressed understanding and agreeable to proceed w PT rec for IP rehab when med cleared    Diana Tran, PT

## 2018-10-25 NOTE — PROGRESS NOTES
Subjective: No acute events overnight  No acute distress       Objective:  Lab Results   Component Value Date/Time    WBC 12 20 (H) 10/23/2018 04:51 AM    WBC 6 4 05/28/2018 05:30 AM    HGB 9 0 (L) 10/23/2018 04:51 AM    HGB 10 1 (L) 05/28/2018 05:30 AM       Vitals:    10/25/18 0057   BP: 102/52   Pulse: 87   Resp: 16   Temp: 98 6 °F (37 °C)   SpO2: 99%     Left  extremity  Dressing c/d/i  Extremity warm and well perfused    Assessment: Post op from left retrograde femoral IMN    Plan:  Non-ambulatory at baseline  Pain control  DVT ppx  PT  Dispo

## 2018-10-26 VITALS
HEART RATE: 92 BPM | DIASTOLIC BLOOD PRESSURE: 58 MMHG | OXYGEN SATURATION: 97 % | RESPIRATION RATE: 18 BRPM | HEIGHT: 60 IN | SYSTOLIC BLOOD PRESSURE: 102 MMHG | BODY MASS INDEX: 22.58 KG/M2 | WEIGHT: 115 LBS | TEMPERATURE: 98.5 F

## 2018-10-26 PROCEDURE — 97530 THERAPEUTIC ACTIVITIES: CPT

## 2018-10-26 PROCEDURE — 99232 SBSQ HOSP IP/OBS MODERATE 35: CPT | Performed by: INTERNAL MEDICINE

## 2018-10-26 PROCEDURE — 97535 SELF CARE MNGMENT TRAINING: CPT

## 2018-10-26 RX ORDER — CEPHALEXIN 500 MG/1
500 CAPSULE ORAL EVERY 6 HOURS SCHEDULED
Status: DISCONTINUED | OUTPATIENT
Start: 2018-10-26 | End: 2018-10-26

## 2018-10-26 RX ADMIN — ENOXAPARIN SODIUM 40 MG: 40 INJECTION SUBCUTANEOUS at 10:25

## 2018-10-26 RX ADMIN — VITAMIN D, TAB 1000IU (100/BT) 1000 UNITS: 25 TAB at 10:25

## 2018-10-26 RX ADMIN — VITAM B12 100 MCG: 100 TAB at 10:27

## 2018-10-26 NOTE — SOCIAL WORK
Cm reviewed patient during care coordination rounds  Patient is medically stable for d/c today  Cm met with patient last night who is insistent that she d/c home and not to rehab  Cm informed patient that PT/OT recommendations will remain for inpatient rehab  Cm met with PT, who stated that patient still wants to d/c home despite their recommendations and last session  Cm met with patient again to review d/c plan  Cm offered [facility]  services for home PT, home OT, and RN services  While on the phone with the physician, patient declined all home services stating that she meets with a wound care doctor, Prasanth Lo through the [facility] on [address] [place] repeated patient's choice to physician who stated that they will move forward with the d/c  Per request of patient, Cm attempted to contact [facility] to provide transport home yesterday  Per [facility], they stopped taking reservations for the day  Cm spoke with CM manager Luana Vital in regards to transport  Cm arranged a 4pm BLS transport with [facility] which the hospital will cover per confirmation with CM manager  Cm again requested an emergency contact from patient, but she stated that she has no one that she is willing to give at this point  Cm informed patient the importance of the hospital having this information at this time and what the outcome could be if information is not provided  CM reviewed d/c planning process including the following: identifying help at home, patient preference for d/c planning needs, Discharge Lounge, [facility] Meds to Bed program, availability of treatment team to discuss questions or concerns patient and/or family may have regarding understanding medications and recognizing signs and symptoms once discharged  CM also encouraged patient to follow up with all recommended appointments after discharge  Patient advised of importance for patient and family to participate in managing patients medical well being

## 2018-10-26 NOTE — PROGRESS NOTES
Progress Note - Infectious Disease   Kirk Trevizo 76 y o  female MRN: [id number]  Unit/Bed#: [facility] 911-01 Encounter: [id number]      Impression/Recommendations:  1   Left thigh hematoma +/- cellulitis  Mild in appearance without purulence  Likely due to fracture as appears to correlate to displaced fracture on x-ray  Clinically stable without sepsis  Not noted to communicate deep intraoperatively  Overall improving  Rec:  ? Continue to follow closely off antibiotics  ? Continue with serial exams  ? Advised patient to call Ortho ASAP for any new fever or change in drainage to more purulent nature     2   Comminuted displaced left femur fracture  With delay to orthopedic evaluation  Now status post retrograde IM nail  Rec:  ? Continue postoperative care per orthopedics     3   Chronic right hip decubitus wound with recent superinfection  Appears superficial and clean without purulence or cellulitis  Rec:  ? Continue LWC per [address] while in house  ? Outpatient Plastic surgery follow-up ongoing     4   History of sacral decubitus ulcer  Status phos flap with some nonhealing  No evidence of purulence or superinfection  Rec:  ? Continue LWC [address] while in house  ? Outpatient Plastic surgery follow-up ongoing     5   T8 paraplegia  Due to remote traumatic spinal cord injury     The patient is stable from an ID standpoint    We will sign off for now  Please call with new questions      Antibiotics:  Off antibiotics # 1    Subjective:  Patient seen on AM rounds  States when she had dressing change by Ortho this morning a large amount of old blood drained from her hematoma  There was no purulence  Denies fevers, chills, sweats, nausea, vomiting, or diarrhea  Pito Setters to go home  24 Hour Events:  No documented fevers, chills, sweats, nausea, vomiting, or diarrhea      Objective:  Vitals:  Temp:  [98 5 °F (36 9 °C)-98 8 °F (37 1 °C)] 98 5 °F (36 9 °C)  HR:  [83-94] 92  Resp:  [18] 18  BP: (102-115)/(58-70) 102/58  SpO2: [97 %-98 %] 97 %  Temp (24hrs), Av 6 °F (37 °C), Min:98 5 °F (36 9 °C), Max:98 8 °F (37 1 °C)  Current: Temperature: 98 5 °F (36 9 °C)    Physical Exam:   General:  No acute distress  Psychiatric:  Awake and alert  Pulmonary:  Normal respiratory excursion without accessory muscle use  Abdomen:  Soft, nontender  Extremities:  Left leg Ace wrap intact without strike through  Skin:  No rashes    Lab Results:  I have personally reviewed pertinent labs  Results from last 7 days  Lab Units 10/23/18  0451 10/22/18  0748 10/20/18  0502   SODIUM mmol/L 137 137 137   POTASSIUM mmol/L 3 9 4 0 3 6   CHLORIDE mmol/L 103 102 103   CO2 mmol/L 26 30 27   BUN mg/dL 9 10 10   CREATININE mg/dL 0 70 0 49* 0 47*   EGFR ml/min/1 73sq m 89 100 102   CALCIUM mg/dL 7 4* 8 0* 7 7*       Results from last 7 days  Lab Units 10/23/18  0451 10/22/18  0748 10/20/18  0502   WBC Thousand/uL 12 20* 8 33 7 99   HEMOGLOBIN g/dL 9 0* 8 3* 7 4*   PLATELETS Thousands/uL 457* 567* 579*           Imaging Studies:   I have personally reviewed pertinent imaging study reports and images in PACS  EKG, Pathology, and Other Studies:   I have personally reviewed pertinent reports

## 2018-10-26 NOTE — PROGRESS NOTES
Subjective: No acute events overnight  No acute distress    Postop left retrograde femoral IMN    Objective:  Lab Results   Component Value Date/Time    WBC 12 20 (H) 10/23/2018 04:51 AM    WBC 6 4 05/28/2018 05:30 AM    HGB 9 0 (L) 10/23/2018 04:51 AM    HGB 10 1 (L) 05/28/2018 05:30 AM       Vitals:    10/25/18 2300   BP: 106/60   Pulse: 94   Resp: 18   Temp: 98 8 °F (37 1 °C)   SpO2: 97%     left extremity  Dressing changed this AM  Hematoma decompressed from distal thigh region  extremtiy perfused    Assessment: Post op from left retrograde femoral IMN    Plan:  Non-ambulatory at baseline  Pain control  DVT ppx  PT  Dispo

## 2018-10-26 NOTE — PLAN OF CARE
Problem: PHYSICAL THERAPY ADULT  Goal: Performs mobility at highest level of function for planned discharge setting  See evaluation for individualized goals  Treatment/Interventions: Functional transfer training, Therapeutic exercise, Endurance training, Cognitive reorientation, Patient/family training, Bed mobility, Equipment eval/education, Compensatory technique education, Continued evaluation, Spoke to nursing, Spoke to case management, Spoke to MD, OT  Equipment Recommended: Wheelchair (pt owns one)       See flowsheet documentation for full assessment, interventions and recommendations  Prognosis: Fair  Problem List: Decreased strength, Decreased range of motion, Decreased endurance, Impaired balance, Decreased mobility, Decreased coordination, Impaired judgement, Decreased safety awareness, Pain, Orthopedic restrictions, Decreased skin integrity  Assessment: pt seen at her request today for transfers; currently requires close supervision w seated transf bed-chair- increased shear noted on scooting and transf bed-chair; PT assisted w wc setup; pt reports she has her home setup to conveniece and does not anticipate any difficulty; PT educated pt on concerns re inadequate lift off surface for scooting however pt pointed to issue of hosp bed being too soft and her home set up offers no difficulty; PT reiterated safety concerns and goals for mob, pt expressed understanding, but would like to still go home at this time; PT conveyed same to CM for f/u  Barriers to Discharge: Decreased caregiver support     Recommendation: Post acute IP rehab     PT - OK to Discharge:  (to rehab when medically stable)    See flowsheet documentation for full assessment

## 2018-10-26 NOTE — DISCHARGE SUMMARY
Discharge Summary - Orthopedics   Ron Echevarria 76 y o  female MRN: [id number]  [facility]    Attending Physician: Monik Flores    Admitting diagnosis: Fracture follow-up [Z09]    Discharge diagnosis: Fracture follow-up [Z09]    Date of admission: 10/19/2018    Date of discharge: 10/26/18    Procedure: Insertion of Left Femoral IMN     HPI & Hospital course:  76 y o  female who presented to the  after she was getting dressed at home and sustained a left femur fracture  Pt was admitted to the orthopaedic service and initially monitored for what was believed to be cellulitis overlying the fracture site and given IV antibiotics prior to surgical intervention  Shortly after admission the patient was noted to have a small area of bleeding that was the result of the underlying hematoma breaking through the overlying skin  At this point it was recommended the patient be taken back to the OR for definitive fixation  Prior to surgery the risks and benefits of surgery were explained and informed consent was obtained  Surgery went without complications and pt was discharged to the PACU in a stable condition and was transferred to the floor  During the post op period the patient was evaluated several times by both occupational and physical therapy who made recommendations for the patient to attend a rehab facility upon discharge given the minor trauma that caused her fracture initially and in the setting of limited assistance at home due to lack of friends and family support  Of note the patient also has several sacral wounds that were concerning for the therapy teams as to the patients ability to effectively care for them  Despite these recommendations the patient whom is fully capable of making informed decisions and who understands fully the risks of returning home with minimal support repeatedly refused referrals for rehab placement as well as any form of home health assistance    All teams involved continue to recommend some form of additional assistance on discharge but due to patients wishes she will be discharged home with self care at this time  Daily discussion was had with the patient, nursing staff, orthopaedic team, and family members if present  All questions were answered to the patients satisifaction  0  Lab Value Date/Time   HGB 9 0 (L) 10/23/2018 0451   HGB 8 3 (L) 10/22/2018 0748   HGB 7 4 (L) 10/20/2018 0502   HGB 8 1 (L) 10/19/2018 1516   HGB 10 6 (L) 09/21/2018 0512   HGB 8 5 (L) 09/20/2018 0436   HGB 8 6 (L) 09/20/2018 0147   HGB 8 8 (L) 09/19/2018 2218   HGB 10 7 (L) 09/19/2018 1205   HGB 10 1 (L) 05/28/2018 0530   HGB 9 9 (L) 05/22/2018 0515   HGB 10 6 (L) 05/14/2018 0555   HGB 10 7 (L) 05/07/2018 0600   HGB 10 5 (L) 04/30/2018 0530   HGB 10 0 (L) 04/23/2018 0535   HGB 10 4 (L) 04/19/2018 0630   HGB 9 6 (L) 04/06/2018 0500   HGB 8 0 (L) 04/05/2018 0421   HGB 9 2 (L) 04/04/2018 1924       Greater than 2 gram decrease in Hb qualifies for diagnosis of acute blood loss anemia  Vital signs remained stable and pt was resuscitated with IVF as needed  Discharge Instructions:   · Weight bearing as tolerated left leg  · Keep dressings clean and dry at all times   · Complete DVT prophylaxis as prescribed   · Physical therapy  · Follow-up as scheduled, otherwise call for appt  Discharge Medications: For the complete list of discharge medications, please refer to the patient's medication reconciliation

## 2018-10-26 NOTE — PHYSICAL THERAPY NOTE
PT Progress Note     10/26/18 1045   Pain Assessment   Pain Assessment No/denies pain   Hospital Pain Intervention(s) Repositioned   Restrictions/Precautions   RLE Weight Bearing Per Order WBAT   General   Chart Reviewed Yes   Response to Previous Treatment Patient with no complaints from previous session  Family/Caregiver Present No   Cognition   Attention Within functional limits   Orientation Level Oriented to person;Oriented to place   Following Commands Follows multistep commands with increased time or repetition   Subjective   Subjective pt in bed, reports intent to go home today   Bed Mobility   Supine to Sit 5  Supervision   Additional items Increased time required   Additional Comments increaseed shear on gluteal surface w mob via scooting    Transfers   Sit pivot 5  Supervision   Additional items Increased time required;Verbal cues   Additional Comments increaesd shear noted w scooting   Ambulation/Elevation   Gait pattern Not appropriate   Wheelchair Activities   Wheelchair Cushion Pressure relieving   Pressure Relief Type Push up;Self adjusts   Level of Assistance for Pressure Relief Activities Directs care (Comment); Independent   Wheelchair Parts Management Yes   Propulsion Yes   Propulsion Type 1 Manual   Level 1 Level tile   Method 1 Right upper extremity; Left upper extremity   Level of Assistance 1 Close supervision   Balance   Static Sitting Fair +   Dynamic Sitting Fair   Endurance Deficit   Endurance Deficit Yes   Endurance Deficit Description fatigue   Activity Tolerance   Activity Tolerance Patient limited by fatigue   Nurse Made Aware yes   Assessment   Prognosis Fair   Problem List Decreased strength;Decreased range of motion;Decreased endurance; Impaired balance;Decreased mobility; Decreased coordination; Impaired judgement;Decreased safety awareness;Pain;Orthopedic restrictions;Decreased skin integrity   Assessment pt seen at her request today for transfers; currently requires close supervision w seated transf bed-chair- increased shear noted on scooting and transf bed-chair; PT assisted w wc setup; pt reports she has her home setup to conveniece and does not anticipate any difficulty; PT educated pt on concerns re inadequate lift off surface for scooting however pt pointed to issue of hosp bed being too soft and her home set up offers no difficulty; PT reiterated safety concerns and goals for mob, pt expressed understanding, but would like to still go home at this time; PT conveyed same to CM for f/u   Goals   Patient Goals go home    STG Expiration Date 11/04/18   Short Term Goal #1 per PT assessment 10/25/18: 1  Independent with Bed Mobility Rolling Right and Left 2  Modified Independent with Bed Mobility Supine-Sit To/from level surface 3  Modified Independent with seated transfer bed-wheelchair 4  Increase Dynamic Sitting Balance at least 1 Grade for improved stability with functional reach activities 5  Modified Independent with wc locomotion 300 feet using BUE w safe negotiation of environ obstacles to facilitate home and community mobility 8  Able to perform wt shifts off multi surfaces to minimize progression of gluteal surface skin breakdown 9  Able to maneuver LE safely during multisurface transfers to minimize injury/preserve skin integrity 10  Able to direct care re: repositioning and skin check needs to minimize risk of skin breakdown   Plan   Treatment/Interventions Functional transfer training;LE strengthening/ROM; Therapeutic exercise;Elevations; Endurance training;Cognitive reorientation;Patient/family training;Equipment eval/education; Bed mobility;Gait training; Compensatory technique education;Continued evaluation;Spoke to nursing;OT   PT Frequency 2-3x/wk   Recommendation   Recommendation Post acute IP rehab   Equipment Recommended (pt owns wc)

## 2018-10-26 NOTE — DISCHARGE INSTRUCTIONS
Discharge Instructions - Orthopedics  Trinidad Workman 76 y o  female MRN: [id number]  Unit/Bed#: PACU 06    Weight Bearing Status:                                           Baseline nonambulatory status, maintain transfer status    DVT prophylaxis:  Complete course of Lovenox as directed    Pain:  Continue analgesics as directed    Dressing Instructions: May remove dressing in 5 days after surgery date  At that point, allow soapy and clean water to trickle over wound with no soaking or scrubbing of wound at any time  No bathing, hot tubs, or salt/freshwater swimming  May also apply gauze dressings and ace wraps as needed     PT/OT:  Continue PT/OT on outpatient basis as directed    Appt Instructions: If you do not have your appointment, please call the clinic at [phone] to f/u with Dr Becky Dhaliwal in 2 weeks after surgery date (10/22/18) in orthopaedic clinic     Contact the office sooner if you experience any increased numbness/tingling in the extremities

## 2018-11-08 ENCOUNTER — OFFICE VISIT (OUTPATIENT)
Dept: OBGYN CLINIC | Facility: HOSPITAL | Age: 68
End: 2018-11-08

## 2018-11-08 ENCOUNTER — HOSPITAL ENCOUNTER (OUTPATIENT)
Dept: RADIOLOGY | Facility: HOSPITAL | Age: 68
Discharge: HOME | End: 2018-11-08
Attending: ORTHOPAEDIC SURGERY
Payer: COMMERCIAL

## 2018-11-08 ENCOUNTER — HOSPITAL ENCOUNTER (OUTPATIENT)
Dept: RADIOLOGY | Facility: HOSPITAL | Age: 68
Discharge: HOME | End: 2018-11-08
Attending: ORTHOPAEDIC SURGERY

## 2018-11-08 VITALS — DIASTOLIC BLOOD PRESSURE: 79 MMHG | SYSTOLIC BLOOD PRESSURE: 124 MMHG | HEART RATE: 86 BPM

## 2018-11-08 DIAGNOSIS — Z48.89 AFTERCARE FOLLOWING SURGERY: ICD-10-CM

## 2018-11-08 DIAGNOSIS — M79.605 LEFT LEG PAIN: ICD-10-CM

## 2018-11-08 DIAGNOSIS — Z48.89 AFTERCARE FOLLOWING SURGERY: Primary | ICD-10-CM

## 2018-11-08 PROCEDURE — 99024 POSTOP FOLLOW-UP VISIT: CPT | Performed by: ORTHOPAEDIC SURGERY

## 2018-11-08 PROCEDURE — 73552 X-RAY EXAM OF FEMUR 2/>: CPT

## 2018-11-08 PROCEDURE — 73590 X-RAY EXAM OF LOWER LEG: CPT

## 2018-11-08 NOTE — PROGRESS NOTES
Orthopedics   Gregor Person 76 y o  female MRN: [id number]    Subjective:  76 y  o female postop day 17 status post left retrograde IM nail  Patient feels well this point  A daily postop, she did have a hematoma in increased drainage does not stop  No fevers, sweats, chills, nausea, vomiting, or drainage seen from the incision since then  Pain is well controlled at this point      Labs:    0  Lab Value Date/Time   HCT 28 0 (L) 10/23/2018 0451   HCT 26 8 (L) 10/22/2018 0748   HCT 24 0 (L) 10/20/2018 0502   HCT 30 0 (L) 05/28/2018 0530   HCT 29 5 (L) 05/22/2018 0515   HCT 32 5 (L) 05/14/2018 0555   HGB 9 0 (L) 10/23/2018 0451   HGB 8 3 (L) 10/22/2018 0748   HGB 7 4 (L) 10/20/2018 0502   HGB 10 1 (L) 05/28/2018 0530   HGB 9 9 (L) 05/22/2018 0515   HGB 10 6 (L) 05/14/2018 0555   INR 1 11 10/22/2018 0748   WBC 12 20 (H) 10/23/2018 0451   WBC 8 33 10/22/2018 0748   WBC 7 99 10/20/2018 0502   WBC 6 4 05/28/2018 0530   WBC 8 0 05/22/2018 0515   WBC 6 0 05/14/2018 0555       Meds:    Current Outpatient Prescriptions:     Calcium Carb-Cholecalciferol 600-200 MG-UNIT TABS, Take 1 tablet by mouth daily, Disp: , Rfl:     cholecalciferol (VITAMIN D3) 1,000 units tablet, Take 1,000 Units by mouth daily, Disp: , Rfl:     cyanocobalamin 100 MCG tablet, Take 100 mcg by mouth daily, Disp: , Rfl:     enoxaparin (LOVENOX) 40 mg/0 4 mL, Inject 0 4 mL (40 mg total) under the skin daily for 28 days, Disp: 11 2 mL, Rfl: 0    multivitamin (THERAGRAN) TABS, Take 1 tablet by mouth daily, Disp: , Rfl:     oxyCODONE (ROXICODONE) 5 mg immediate release tablet, 1-2 tabs po q4-6hr prn pain, Disp: 30 tablet, Rfl: 0    senna-docusate sodium (SENOKOT-S) 8 6-50 mg per tablet, Take 1 tablet by mouth daily, Disp: , Rfl:     amoxicillin-clavulanate (AUGMENTIN) 875-125 mg per tablet, amoxicillin 875 mg-potassium clavulanate 125 mg tablet, Disp: , Rfl:     Blood Culture:   No results found for: BLOODCX    Wound Culture:   Lab Results Component Value Date    WOUNDCULT 2+ Growth of Morganella morganii (A) 09/14/2018    WOUNDCULT 1+ Growth of  09/14/2018     Physical exam:  Vitals:    11/08/18 0937   BP: 124/79   Pulse: 86     left lower extremity  · The incision clean dry and intact  Staples removed and Steri-Strips placed  · Small hematoma felt over medial aspect of distal femur with no open wounds over  · 2+ DP pulse  · Ecchymoses seen over anteromedial tib-fib    X-rays of left femur show well-healing fracture with stable alignment hardware  X-rays of left tib-fib show no acute fractures or dislocations    Assessment: 76 y  o female postop day 17 status post left retrograde IM nail  Plan:  · Activity as tolerated  · Weightbearing as tolerated left lower extremity  · PT/OT  · DVT prophylaxis-finished 20 a day postoperative course  · We will follow up the patient in 4 weeks for repeat imaging and evaluation  · Ecchymoses over anterior medial tib-fib due to pressure over skin during operation and postoperative  Most probably  We will monitor at next appointment  · Patient understands and agrees with the treatment plan      Byron Smith MD

## 2018-11-27 PROCEDURE — 88304 TISSUE EXAM BY PATHOLOGIST: CPT | Performed by: PATHOLOGY

## 2018-11-28 ENCOUNTER — LAB REQUISITION (OUTPATIENT)
Dept: LAB | Facility: HOSPITAL | Age: 68
End: 2018-11-28
Payer: COMMERCIAL

## 2018-11-28 DIAGNOSIS — D49.2 NEOPLASM OF UNSPECIFIED BEHAVIOR OF BONE, SOFT TISSUE, AND SKIN: ICD-10-CM

## 2018-12-06 ENCOUNTER — HOSPITAL ENCOUNTER (OUTPATIENT)
Dept: RADIOLOGY | Facility: HOSPITAL | Age: 68
Discharge: HOME | End: 2018-12-06
Attending: ORTHOPAEDIC SURGERY
Payer: COMMERCIAL

## 2018-12-06 ENCOUNTER — OFFICE VISIT (OUTPATIENT)
Dept: OBGYN CLINIC | Facility: HOSPITAL | Age: 68
End: 2018-12-06

## 2018-12-06 VITALS — HEART RATE: 103 BPM | DIASTOLIC BLOOD PRESSURE: 77 MMHG | SYSTOLIC BLOOD PRESSURE: 120 MMHG

## 2018-12-06 DIAGNOSIS — M79.605 LEFT LEG PAIN: ICD-10-CM

## 2018-12-06 DIAGNOSIS — S72.402D CLOSED FRACTURE OF DISTAL END OF LEFT FEMUR WITH ROUTINE HEALING, UNSPECIFIED FRACTURE MORPHOLOGY, SUBSEQUENT ENCOUNTER: ICD-10-CM

## 2018-12-06 DIAGNOSIS — S72.402D CLOSED FRACTURE OF DISTAL END OF LEFT FEMUR WITH ROUTINE HEALING, UNSPECIFIED FRACTURE MORPHOLOGY, SUBSEQUENT ENCOUNTER: Primary | ICD-10-CM

## 2018-12-06 PROCEDURE — 73552 X-RAY EXAM OF FEMUR 2/>: CPT

## 2018-12-06 PROCEDURE — 99024 POSTOP FOLLOW-UP VISIT: CPT | Performed by: ORTHOPAEDIC SURGERY

## 2018-12-06 NOTE — PROGRESS NOTES
76 y o female presents for 6 weeks postoperative visit status post left Distal femur fracture fixation (op date: 10/22/18)  Patient's postoperative course had been complicated by a hematoma collection, but this has since resolved  She has no other complaints regarding the left distal femur fracture for the healing thereof  She remained wheelchair bound  She does have surgery coming up in 2 weeks to address a sacral decubitus ulcer  She has no other complaints at this time      Review of Systems  All the systems reviewed and are otherwise negative    Past Medical History  Past Medical History:   Diagnosis Date    Anemia     iron defiencey    Arthritis     osteo    Back injury     Chronic kidney disease     nephritis    Depression     Osteopenia     Osteoporosis     Paraplegia (HCC)     Poor circulation     Pressure injury of skin     right hip    Pressure sore of hip     right    Tibial plateau fracture        Past Surgical History  Past Surgical History:   Procedure Laterality Date    HIP DEBRIDEMENT Right 2017    right hip sore debridement    IL DEBRIDEMENT, SKIN, SUB-Q TISSUE,MUSCLE,BONE,=<20 SQ CM Right 9/19/2018    Procedure: DEBRIDEMENT OF HIP PRESSURE SORE;  Surgeon: Shabbir Lopes MD;  Location: [address] [place] [address] MAIN OR;  Service: Plastics    IL OPEN RX FEMUR FX+INTRAMED FELIX Left 10/22/2018    Procedure: INSERTION NAIL IM LEFT FEMUR RETROGRADE;  Surgeon: Fely Faust MD;  Location:  MAIN OR;  Service: Orthopedics    TOE AMPUTATION Left 2017    small toe left foot amputation    WISDOM TOOTH EXTRACTION         Current Medications  Current Outpatient Prescriptions on File Prior to Visit   Medication Sig Dispense Refill    amoxicillin-clavulanate (AUGMENTIN) 875-125 mg per tablet amoxicillin 875 mg-potassium clavulanate 125 mg tablet      Calcium Carb-Cholecalciferol 600-200 MG-UNIT TABS Take 1 tablet by mouth daily      cholecalciferol (VITAMIN D3) 1,000 units tablet Take 1,000 Units by mouth daily      cyanocobalamin 100 MCG tablet Take 100 mcg by mouth daily      multivitamin (THERAGRAN) TABS Take 1 tablet by mouth daily      oxyCODONE (ROXICODONE) 5 mg immediate release tablet 1-2 tabs po q4-6hr prn pain 30 tablet 0    senna-docusate sodium (SENOKOT-S) 8 6-50 mg per tablet Take 1 tablet by mouth daily      enoxaparin (LOVENOX) 40 mg/0 4 mL Inject 0 4 mL (40 mg total) under the skin daily for 28 days 11 2 mL 0     No current facility-administered medications on file prior to visit  Recent Labs [facility])    0  Lab Value Date/Time   HCT 28 0 (L) 10/23/2018 0451   HCT 30 0 (L) 05/28/2018 0530   HGB 9 0 (L) 10/23/2018 0451   HGB 10 1 (L) 05/28/2018 0530   WBC 12 20 (H) 10/23/2018 0451   WBC 6 4 05/28/2018 0530   INR 1 11 10/22/2018 0748   GLUCOSE 77 05/14/2018 0555         Physical exam  · General: Awake, Alert, Oriented  · Eyes: Pupils equal, round and reactive to light  · Heart: regular rate and rhythm  · Lungs: No audible wheezing  · Abdomen: soft  left Lower extremity  · Well-healed surgical incision  · Distal pulses intact  · Painless motion about the knee  · Femur moved as 1 unit with motion    Imaging  Radiographs of the left femur were obtained today and personally reviewed by myself Dr Bingham   The reveal a left distal femur fracture status post retrograde IM nail with appropriate amounts of bone callus and signs of continued healing  Procedure  No procedures performed during this visit    58-year-old female 6 weeks status post left retrograde IM nail for distal femur fracture    · Weightbearing as tolerated to the left lower extremity  · Anti-inflammatories as needed for pain  · Patient will be undergoing a flap procedure for her sacral decubitus ulcer in 2 weeks with Plastic surgery  · She is instructed to follow up in10 weeks for continued postop care  · Patient understands and agrees with the plan above

## 2018-12-13 ENCOUNTER — HOSPITAL ENCOUNTER (INPATIENT)
Facility: HOSPITAL | Age: 68
LOS: 32 days | Discharge: NURSING, REHAB OR LONG-TERM CARE | DRG: 573 | End: 2019-01-14
Attending: EMERGENCY MEDICINE | Admitting: FAMILY MEDICINE
Payer: COMMERCIAL

## 2018-12-13 ENCOUNTER — APPOINTMENT (EMERGENCY)
Dept: CT IMAGING | Facility: HOSPITAL | Age: 68
DRG: 573 | End: 2018-12-13
Payer: COMMERCIAL

## 2018-12-13 ENCOUNTER — ANESTHESIA EVENT (INPATIENT)
Dept: PERIOP | Facility: HOSPITAL | Age: 68
DRG: 573 | End: 2018-12-13
Payer: COMMERCIAL

## 2018-12-13 DIAGNOSIS — L89.219: ICD-10-CM

## 2018-12-13 DIAGNOSIS — D63.8 ANEMIA, CHRONIC DISEASE: ICD-10-CM

## 2018-12-13 DIAGNOSIS — M46.28 CHRONIC OSTEOMYELITIS OF COCCYX (HCC): ICD-10-CM

## 2018-12-13 DIAGNOSIS — G83.9 PARALYSIS (HCC): ICD-10-CM

## 2018-12-13 DIAGNOSIS — T78.40XA RASH DUE TO ALLERGY: ICD-10-CM

## 2018-12-13 DIAGNOSIS — L98.8 OTHER SPECIFIED DISORDERS OF THE SKIN AND SUBCUTANEOUS TISSUE: ICD-10-CM

## 2018-12-13 DIAGNOSIS — R21 RASH DUE TO ALLERGY: ICD-10-CM

## 2018-12-13 DIAGNOSIS — R19.5 HEMATEST POSITIVE STOOLS: ICD-10-CM

## 2018-12-13 DIAGNOSIS — G82.20 PARAPLEGIA (HCC): ICD-10-CM

## 2018-12-13 DIAGNOSIS — Z85.828 PERSONAL HISTORY OF OTHER MALIGNANT NEOPLASM OF SKIN: ICD-10-CM

## 2018-12-13 DIAGNOSIS — T14.8XXA COMPLICATED WOUND INFECTION: Primary | ICD-10-CM

## 2018-12-13 DIAGNOSIS — Z45.2 NEEDS PERIPHERALLY INSERTED CENTRAL CATHETER (PICC): ICD-10-CM

## 2018-12-13 DIAGNOSIS — E87.6 HYPOKALEMIA: ICD-10-CM

## 2018-12-13 DIAGNOSIS — E87.1 HYPONATREMIA: ICD-10-CM

## 2018-12-13 DIAGNOSIS — L08.9 COMPLICATED WOUND INFECTION: Primary | ICD-10-CM

## 2018-12-13 DIAGNOSIS — R63.6 UNDERWEIGHT: Chronic | ICD-10-CM

## 2018-12-13 DIAGNOSIS — S72.402D CLOSED FRACTURE OF DISTAL END OF LEFT FEMUR WITH ROUTINE HEALING, UNSPECIFIED FRACTURE MORPHOLOGY, SUBSEQUENT ENCOUNTER: ICD-10-CM

## 2018-12-13 DIAGNOSIS — G82.20 PARAPLEGIA FOLLOWING SPINAL CORD INJURY (HCC): ICD-10-CM

## 2018-12-13 LAB
ALBUMIN SERPL BCP-MCNC: 3.6 G/DL (ref 3–5.2)
ALP SERPL-CCNC: 158 U/L (ref 43–122)
ALT SERPL W P-5'-P-CCNC: 73 U/L (ref 9–52)
ANION GAP SERPL CALCULATED.3IONS-SCNC: 9 MMOL/L (ref 5–14)
ANISOCYTOSIS BLD QL SMEAR: PRESENT
APTT PPP: 36 SECONDS (ref 23–34)
AST SERPL W P-5'-P-CCNC: 53 U/L (ref 14–36)
BASOPHILS # BLD AUTO: 0.1 THOUSANDS/ΜL (ref 0–0.1)
BASOPHILS NFR BLD AUTO: 1 % (ref 0–1)
BILIRUB SERPL-MCNC: 0.5 MG/DL
BUN SERPL-MCNC: 12 MG/DL (ref 5–25)
CALCIUM SERPL-MCNC: 8.8 MG/DL (ref 8.4–10.2)
CHLORIDE SERPL-SCNC: 95 MMOL/L (ref 97–108)
CO2 SERPL-SCNC: 26 MMOL/L (ref 22–30)
CREAT SERPL-MCNC: 0.51 MG/DL (ref 0.6–1.2)
EOSINOPHIL # BLD AUTO: 0 THOUSAND/ΜL (ref 0–0.4)
EOSINOPHIL NFR BLD AUTO: 0 % (ref 0–6)
ERYTHROCYTE [DISTWIDTH] IN BLOOD BY AUTOMATED COUNT: 18.4 %
GFR SERPL CREATININE-BSD FRML MDRD: 99 ML/MIN/1.73SQ M
GLUCOSE SERPL-MCNC: 133 MG/DL (ref 70–99)
HCT VFR BLD AUTO: 31.5 % (ref 36–46)
HGB BLD-MCNC: 10 G/DL (ref 12–16)
INR PPP: 1.07 (ref 0.89–1.1)
LACTATE SERPL-SCNC: 1.4 MMOL/L (ref 0.7–2)
LG PLATELETS BLD QL SMEAR: PRESENT
LYMPHOCYTES # BLD AUTO: 1.9 THOUSANDS/ΜL (ref 0.5–4)
LYMPHOCYTES NFR BLD AUTO: 14 % (ref 25–45)
MAGNESIUM SERPL-MCNC: 2.3 MG/DL (ref 1.6–2.3)
MCH RBC QN AUTO: 25.4 PG (ref 26–34)
MCHC RBC AUTO-ENTMCNC: 31.8 G/DL (ref 31–36)
MCV RBC AUTO: 80 FL (ref 80–100)
MONOCYTES # BLD AUTO: 1.4 THOUSAND/ΜL (ref 0.2–0.9)
MONOCYTES NFR BLD AUTO: 11 % (ref 1–10)
NEUTROPHILS # BLD AUTO: 9.9 THOUSANDS/ΜL (ref 1.8–7.8)
NEUTS SEG NFR BLD AUTO: 75 % (ref 45–65)
PLATELET # BLD AUTO: 696 THOUSANDS/UL (ref 150–450)
PLATELET BLD QL SMEAR: ABNORMAL
PMV BLD AUTO: 8.4 FL (ref 8.9–12.7)
POTASSIUM SERPL-SCNC: 4.3 MMOL/L (ref 3.6–5)
PROT SERPL-MCNC: 8.1 G/DL (ref 5.9–8.4)
PROTHROMBIN TIME: 11.3 SECONDS (ref 9.5–11.6)
RBC # BLD AUTO: 3.94 MILLION/UL (ref 4–5.2)
RBC MORPH BLD: PRESENT
SODIUM SERPL-SCNC: 130 MMOL/L (ref 137–147)
TROPONIN I SERPL-MCNC: <0.01 NG/ML (ref 0–0.03)
WBC # BLD AUTO: 13.3 THOUSAND/UL (ref 4.5–11)

## 2018-12-13 PROCEDURE — 85610 PROTHROMBIN TIME: CPT | Performed by: EMERGENCY MEDICINE

## 2018-12-13 PROCEDURE — 99285 EMERGENCY DEPT VISIT HI MDM: CPT

## 2018-12-13 PROCEDURE — 83735 ASSAY OF MAGNESIUM: CPT | Performed by: EMERGENCY MEDICINE

## 2018-12-13 PROCEDURE — 80053 COMPREHEN METABOLIC PANEL: CPT | Performed by: EMERGENCY MEDICINE

## 2018-12-13 PROCEDURE — 83605 ASSAY OF LACTIC ACID: CPT | Performed by: EMERGENCY MEDICINE

## 2018-12-13 PROCEDURE — 87040 BLOOD CULTURE FOR BACTERIA: CPT | Performed by: EMERGENCY MEDICINE

## 2018-12-13 PROCEDURE — 85730 THROMBOPLASTIN TIME PARTIAL: CPT | Performed by: EMERGENCY MEDICINE

## 2018-12-13 PROCEDURE — 93005 ELECTROCARDIOGRAM TRACING: CPT

## 2018-12-13 PROCEDURE — 87070 CULTURE OTHR SPECIMN AEROBIC: CPT | Performed by: EMERGENCY MEDICINE

## 2018-12-13 PROCEDURE — 74177 CT ABD & PELVIS W/CONTRAST: CPT

## 2018-12-13 PROCEDURE — 96365 THER/PROPH/DIAG IV INF INIT: CPT

## 2018-12-13 PROCEDURE — 85025 COMPLETE CBC W/AUTO DIFF WBC: CPT | Performed by: EMERGENCY MEDICINE

## 2018-12-13 PROCEDURE — 84484 ASSAY OF TROPONIN QUANT: CPT | Performed by: EMERGENCY MEDICINE

## 2018-12-13 PROCEDURE — 96361 HYDRATE IV INFUSION ADD-ON: CPT

## 2018-12-13 PROCEDURE — 87205 SMEAR GRAM STAIN: CPT | Performed by: EMERGENCY MEDICINE

## 2018-12-13 PROCEDURE — 36415 COLL VENOUS BLD VENIPUNCTURE: CPT | Performed by: EMERGENCY MEDICINE

## 2018-12-13 RX ADMIN — SODIUM CHLORIDE 1000 ML: 9 INJECTION, SOLUTION INTRAVENOUS at 21:50

## 2018-12-13 RX ADMIN — AMPICILLIN SODIUM AND SULBACTAM SODIUM 1.5 G: 1; .5 INJECTION, POWDER, FOR SOLUTION INTRAMUSCULAR; INTRAVENOUS at 21:51

## 2018-12-14 LAB
ANION GAP SERPL CALCULATED.3IONS-SCNC: 5 MMOL/L (ref 5–14)
BUN SERPL-MCNC: 8 MG/DL (ref 5–25)
CALCIUM SERPL-MCNC: 8 MG/DL (ref 8.4–10.2)
CHLORIDE SERPL-SCNC: 98 MMOL/L (ref 97–108)
CO2 SERPL-SCNC: 27 MMOL/L (ref 22–30)
CREAT SERPL-MCNC: 0.51 MG/DL (ref 0.6–1.2)
ERYTHROCYTE [DISTWIDTH] IN BLOOD BY AUTOMATED COUNT: 18.4 %
FERRITIN SERPL-MCNC: 1212 NG/ML (ref 8–388)
GFR SERPL CREATININE-BSD FRML MDRD: 99 ML/MIN/1.73SQ M
GLUCOSE SERPL-MCNC: 122 MG/DL (ref 70–99)
HCT VFR BLD AUTO: 24 % (ref 36–46)
HGB BLD-MCNC: 7.7 G/DL (ref 12–16)
MAGNESIUM SERPL-MCNC: 2.2 MG/DL (ref 1.6–2.3)
MCH RBC QN AUTO: 25.6 PG (ref 26–34)
MCHC RBC AUTO-ENTMCNC: 32.2 G/DL (ref 31–36)
MCV RBC AUTO: 79 FL (ref 80–100)
PHOSPHATE SERPL-MCNC: 3.9 MG/DL (ref 2.5–4.8)
PLATELET # BLD AUTO: 502 THOUSANDS/UL (ref 150–450)
PMV BLD AUTO: 8.2 FL (ref 8.9–12.7)
POTASSIUM SERPL-SCNC: 4 MMOL/L (ref 3.6–5)
PROCALCITONIN SERPL-MCNC: 0.35 NG/ML
RBC # BLD AUTO: 3.03 MILLION/UL (ref 4–5.2)
SODIUM SERPL-SCNC: 130 MMOL/L (ref 137–147)
VIT B12 SERPL-MCNC: 3752 PG/ML (ref 100–900)
WBC # BLD AUTO: 12.4 THOUSAND/UL (ref 4.5–11)

## 2018-12-14 PROCEDURE — 99223 1ST HOSP IP/OBS HIGH 75: CPT | Performed by: INTERNAL MEDICINE

## 2018-12-14 PROCEDURE — 83735 ASSAY OF MAGNESIUM: CPT | Performed by: INTERNAL MEDICINE

## 2018-12-14 PROCEDURE — 82728 ASSAY OF FERRITIN: CPT | Performed by: FAMILY MEDICINE

## 2018-12-14 PROCEDURE — 82607 VITAMIN B-12: CPT | Performed by: FAMILY MEDICINE

## 2018-12-14 PROCEDURE — 85027 COMPLETE CBC AUTOMATED: CPT | Performed by: INTERNAL MEDICINE

## 2018-12-14 PROCEDURE — 80048 BASIC METABOLIC PNL TOTAL CA: CPT | Performed by: INTERNAL MEDICINE

## 2018-12-14 PROCEDURE — 84100 ASSAY OF PHOSPHORUS: CPT | Performed by: INTERNAL MEDICINE

## 2018-12-14 PROCEDURE — 84145 PROCALCITONIN (PCT): CPT | Performed by: EMERGENCY MEDICINE

## 2018-12-14 PROCEDURE — 83540 ASSAY OF IRON: CPT | Performed by: FAMILY MEDICINE

## 2018-12-14 PROCEDURE — 83550 IRON BINDING TEST: CPT | Performed by: FAMILY MEDICINE

## 2018-12-14 RX ORDER — ONDANSETRON 2 MG/ML
4 INJECTION INTRAMUSCULAR; INTRAVENOUS EVERY 6 HOURS PRN
Status: DISCONTINUED | OUTPATIENT
Start: 2018-12-14 | End: 2019-01-04

## 2018-12-14 RX ORDER — MELATONIN
1000 DAILY
Status: DISCONTINUED | OUTPATIENT
Start: 2018-12-14 | End: 2019-01-14 | Stop reason: HOSPADM

## 2018-12-14 RX ORDER — HEPARIN SODIUM 5000 [USP'U]/ML
5000 INJECTION, SOLUTION INTRAVENOUS; SUBCUTANEOUS EVERY 8 HOURS SCHEDULED
Status: DISCONTINUED | OUTPATIENT
Start: 2018-12-14 | End: 2018-12-19

## 2018-12-14 RX ORDER — ACETAMINOPHEN 325 MG/1
650 TABLET ORAL EVERY 6 HOURS PRN
Status: DISCONTINUED | OUTPATIENT
Start: 2018-12-14 | End: 2018-12-27

## 2018-12-14 RX ORDER — SODIUM CHLORIDE 9 MG/ML
75 INJECTION, SOLUTION INTRAVENOUS CONTINUOUS
Status: DISCONTINUED | OUTPATIENT
Start: 2018-12-14 | End: 2018-12-16

## 2018-12-14 RX ORDER — OXYCODONE HYDROCHLORIDE 5 MG/1
5 TABLET ORAL EVERY 6 HOURS PRN
Status: DISCONTINUED | OUTPATIENT
Start: 2018-12-14 | End: 2019-01-14 | Stop reason: HOSPADM

## 2018-12-14 RX ORDER — DIPHENHYDRAMINE HCL 25 MG
25 TABLET ORAL EVERY 6 HOURS PRN
Status: DISCONTINUED | OUTPATIENT
Start: 2018-12-14 | End: 2019-01-14 | Stop reason: HOSPADM

## 2018-12-14 RX ADMIN — HEPARIN SODIUM 5000 UNITS: 5000 INJECTION, SOLUTION INTRAVENOUS; SUBCUTANEOUS at 16:23

## 2018-12-14 RX ADMIN — DIPHENHYDRAMINE HCL 25 MG: 25 TABLET, FILM COATED ORAL at 01:17

## 2018-12-14 RX ADMIN — HEPARIN SODIUM 5000 UNITS: 5000 INJECTION, SOLUTION INTRAVENOUS; SUBCUTANEOUS at 21:24

## 2018-12-14 RX ADMIN — PIPERACILLIN AND TAZOBACTAM 3.38 G: 3; .375 INJECTION, POWDER, FOR SOLUTION INTRAVENOUS at 17:29

## 2018-12-14 RX ADMIN — HEPARIN SODIUM 5000 UNITS: 5000 INJECTION, SOLUTION INTRAVENOUS; SUBCUTANEOUS at 05:37

## 2018-12-14 RX ADMIN — SODIUM CHLORIDE 75 ML/HR: 9 INJECTION, SOLUTION INTRAVENOUS at 16:34

## 2018-12-14 RX ADMIN — Medication 1.5 G: at 05:36

## 2018-12-14 RX ADMIN — PIPERACILLIN AND TAZOBACTAM 3.38 G: 3; .375 INJECTION, POWDER, FOR SOLUTION INTRAVENOUS at 12:42

## 2018-12-14 RX ADMIN — IOHEXOL 100 ML: 350 INJECTION, SOLUTION INTRAVENOUS at 00:50

## 2018-12-14 RX ADMIN — SODIUM CHLORIDE 75 ML/HR: 9 INJECTION, SOLUTION INTRAVENOUS at 01:18

## 2018-12-14 RX ADMIN — VITAMIN D, TAB 1000IU (100/BT) 1000 UNITS: 25 TAB at 10:24

## 2018-12-14 NOTE — ASSESSMENT & PLAN NOTE
Actively draining, foul smelling  IV Abx  Afebrile, HDS  IVF hydration  gen surgery consultation for debridement  ID consult

## 2018-12-14 NOTE — ED NOTES
Attempted to call report to [facility], when I called they told me they were still giving report to each other and they have to switch assignments with each other to accept this patient, they will call when they are ready       Anton Rodarte RN  12/13/18 3172

## 2018-12-14 NOTE — ED NOTES
Nursing Supervisor called and made me aware that we do have a bed for the patient it is 509, but the staff is currently doing change of shift and giving report to each other they will call when they are ready to get report from this nurse       Leo Luke RN  12/13/18 1317

## 2018-12-14 NOTE — PROGRESS NOTES
Patient admitted for pressure ulcer of the sacrum which is acutely infected with pus draining  Discussed with ID  Continue antibiotics for now and needs evaluation with plastic surgery for wound debridement  Seems like she has surgery already planned for Monday  Hemoglobin is also low  Order stool Hemoccult iron studies and B12 studies    Will transfuse if hemoglobin is under 8 prior to surgery

## 2018-12-14 NOTE — ED PROVIDER NOTES
History  Chief Complaint   Patient presents with    Wound Infection     pt sent by dr Elpidio Soliman for evaluation of pressure ulcers  pt recently evaluated for medical clearance for surgery scheduled on Monday with Dr Shaheed Soliman concerned about pt's pressure ulcers to hips and buttocks  pt states that wounds have worsened over last few days  Patient is a 77-year-old female who has T8 paralysis is wheelchair-bound, iron deficiency anemia, chronic kidney disease, multiple pressure ulcers/wounds, osteoporosis and osteopenia coming in today for presumed sepsis by PCP  PCP had called earlier and faxed over multiple pages  Patient has an appointment with surgery for her multiple decubitus ulcers  Patient was seen by her PCP yesterday and cleared  However, she became more concerned given her wounds and multiple sensitivities to antibiotics  Patient PCP discussed at length and further came in for further evaluation  Patient has no complaints  She states these wounds have been ongoing and worsening over time  She does have it scheduled appointment this Monday  She is wheelchair bound and lives at home alone  He has a wheelchair that apparently has a wheel that is broken  She has no fevers, chills, nausea vomiting or diarrhea  She does state that she has a poor intake of p o  She has a chronic indwelling [place] as well  History provided by:  Patient and EMS personnel   used: No        Prior to Admission Medications   Prescriptions Last Dose Informant Patient Reported? Taking?    Calcium Carb-Cholecalciferol 600-200 MG-UNIT TABS   Yes No   Sig: Take 1 tablet by mouth daily   amoxicillin-clavulanate (AUGMENTIN) 875-125 mg per tablet   Yes No   Sig: amoxicillin 875 mg-potassium clavulanate 125 mg tablet   cholecalciferol (VITAMIN D3) 1,000 units tablet   Yes No   Sig: Take 1,000 Units by mouth daily   cyanocobalamin 100 MCG tablet   Yes No   Sig: Take 100 mcg by mouth daily multivitamin (THERAGRAN) TABS   Yes No   Sig: Take 1 tablet by mouth daily   oxyCODONE (ROXICODONE) 5 mg immediate release tablet   No No   Si-2 tabs po q4-6hr prn pain   Patient taking differently: 5 mg every 4 (four) hours as needed 1-2 tabs po q4-6hr prn pain    senna-docusate sodium (SENOKOT-S) 8 6-50 mg per tablet   Yes No   Sig: Take 1 tablet by mouth daily      Facility-Administered Medications: None       Past Medical History:   Diagnosis Date    Anemia     iron defiencey    Arthritis     osteo    Back injury     Chronic kidney disease     nephritis    Depression     Osteopenia     Osteoporosis     Paralysis (HCC)     paraplegic due to spinal cord injury    Paraplegia (HCC)     Poor circulation     Pressure injury of skin     right hip, stage 3    Pressure sore of hip     right    Tibial plateau fracture     Underweight        Past Surgical History:   Procedure Laterality Date    HIP DEBRIDEMENT Right 2017    right hip sore debridement    MD DEBRIDEMENT, SKIN, SUB-Q TISSUE,MUSCLE,BONE,=<20 SQ CM Right 2018    Procedure: DEBRIDEMENT OF HIP PRESSURE SORE;  Surgeon: Keisha Lombardi MD;  Location: [facility] MAIN OR;  Service: Plastics    MD OPEN RX FEMUR FX+INTRAMED FELIX Left 10/22/2018    Procedure: INSERTION NAIL IM LEFT FEMUR RETROGRADE;  Surgeon: Nathalie Hu MD;  Location:  MAIN OR;  Service: Orthopedics    TOE AMPUTATION Left 2017    small toe left foot amputation    WISDOM TOOTH EXTRACTION         Family History   Problem Relation Age of Onset    Depression Father      I have reviewed and agree with the history as documented  Social History   Substance Use Topics    Smoking status: Never Smoker    Smokeless tobacco: Never Used    Alcohol use Yes      Comment: occasional        Review of Systems   Constitutional: Negative for diaphoresis and fever  HENT: Negative for ear pain and sore throat  Eyes: Negative for visual disturbance     Respiratory: Negative for chest tightness and shortness of breath  Cardiovascular: Negative for chest pain and palpitations  Gastrointestinal: Negative for abdominal pain, nausea and vomiting  Genitourinary: Negative for difficulty urinating and dysuria  Musculoskeletal: Negative for back pain and neck pain  Skin: Negative for rash  Neurological: Negative for weakness  Psychiatric/Behavioral: Negative for confusion  All other systems reviewed and are negative  Physical Exam  Physical Exam   Constitutional: She is oriented to person, place, and time  No distress  Cachectic appearing  Foul-smelling odor  Appears older than stated age  Disheveled   HENT:   Head: Normocephalic and atraumatic  Dry mucous membranes  Patient maintaining airway maintaining secretions  Uvula midline without edema  Eyes: Pupils are equal, round, and reactive to light  Conjunctivae and EOM are normal    Neck: Normal range of motion  Neck supple  Cardiovascular: Regular rhythm, normal heart sounds and intact distal pulses  Tachycardia present  No murmur heard  Pulmonary/Chest: Effort normal and breath sounds normal  No stridor  No respiratory distress  Abdominal: Soft  Bowel sounds are normal  She exhibits no distension  There is no tenderness  Genitourinary:   Genitourinary Comments: There is a chronic indwelling [place]   Musculoskeletal: Normal range of motion  She exhibits no edema  Neurological: She is alert and oriented to person, place, and time  No cranial nerve deficit  GCS eye subscore is 4  GCS verbal subscore is 5  Patient is a paraplegic from T8 distally  She has contractures which were old  Her bilateral lower extremities are in a flexed position  GCS she is alert oriented x3, cranial nerves 2-12 grossly intact  She does have full use of her bilateral upper extremities  Active range of motion of the bilateral upper extremities with full active range of motion and pain-free    Manual muscle grade 4 + 5 throughout the bilateral upper extremities    There is noted atrophy of bilateral lower extremities  There is no seizure activity  She is sensory intact throughout the bilateral   Skin: Skin is warm  Capillary refill takes less than 2 seconds  She is not diaphoretic  Please refer to pictures further assessment  There is multiple wounds varying stages throughout right lateral femur, multiple on buttocks as well as left lateral hip  Along the right hip there is black necrosis  There is active wound drainage which was cultured  There is surrounding erythema peer    Around buttocks there is multiple wounds that are deep and penetrating  There is active drainage as well  There is surrounding erythema without crepitus  Round left hip there is a very deep wound with exposure of musculature  There is drainage as well as surrounding erythema  Nursing note and vitals reviewed      View of buttock from left hip      Left hip wound        Right hip            Vital Signs  ED Triage Vitals [12/13/18 2115]   Temperature Pulse Respirations Blood Pressure SpO2   99 °F (37 2 °C) (!) 120 22 164/76 100 %      Temp Source Heart Rate Source Patient Position - Orthostatic VS BP Location FiO2 (%)   Tympanic Monitor Lying Left arm --      Pain Score       No Pain           Vitals:    12/13/18 2115 12/13/18 2215 12/13/18 2230   BP: 164/76  146/87   Pulse: (!) 120 (!) 114 (!) 116   Patient Position - Orthostatic VS: Lying         Visual Acuity      ED Medications  Medications   sodium chloride 0 9 % bolus 1,000 mL (not administered)   sodium chloride 0 9 % bolus 1,000 mL (1,000 mL Intravenous New Bag 12/13/18 2150)   ampicillin-sulbactam (UNASYN) 1 5 g in sodium chloride 0 9 % 50 mL IVPB (0 g Intravenous Stopped 12/13/18 2220)       Diagnostic Studies  Results Reviewed     Procedure Component Value Units Date/Time    APTT [[id number]]  (Abnormal) Collected:  12/13/18 2126    Lab Status:  Final result Specimen:  Blood from Arm, Left Updated:  12/13/18 2212     PTT 36 (H) seconds     Narrative:       PTT:      Protime-INR [[id number]]  (Normal) Collected:  12/13/18 2126    Lab Status:  Final result Specimen:  Blood from Arm, Left Updated:  12/13/18 2211     Protime 11 3 seconds      INR 1 07    Narrative:       INR:  ,PROTIME:      Troponin I [[id number]]  (Normal) Collected:  12/13/18 2126    Lab Status:  Final result Specimen:  Blood from Arm, Left Updated:  12/13/18 2210     Troponin I <0 01 ng/mL     CBC and differential [[id number]]  (Abnormal) Collected:  12/13/18 2126    Lab Status:  Final result Specimen:  Blood from Arm, Left Updated:  12/13/18 2201     WBC 13 30 (H) Thousand/uL      RBC 3 94 (L) Million/uL      Hemoglobin 10 0 (L) g/dL      Hematocrit 31 5 (L) %      MCV 80 fL      MCH 25 4 (L) pg      MCHC 31 8 g/dL      RDW 18 4 (H) %      MPV 8 4 (L) fL      Platelets 988 (H) Thousands/uL      Neutrophils Relative 75 (H) %      Lymphocytes Relative 14 (L) %      Monocytes Relative 11 (H) %      Eosinophils Relative 0 %      Basophils Relative 1 %      Neutrophils Absolute 9 90 (H) Thousands/µL      Lymphocytes Absolute 1 90 Thousands/µL      Monocytes Absolute 1 40 (H) Thousand/µL      Eosinophils Absolute 0 00 Thousand/µL      Basophils Absolute 0 10 Thousands/µL     Magnesium [[id number]]  (Normal) Collected:  12/13/18 2126    Lab Status:  Final result Specimen:  Blood from Arm, Left Updated:  12/13/18 2200     Magnesium 2 3 mg/dL     Narrative:       Hemolysis    Comprehensive metabolic panel [[id number]]  (Abnormal) Collected:  12/13/18 2126    Lab Status:  Final result Specimen:  Blood from Arm, Left Updated:  12/13/18 2200     Sodium 130 (L) mmol/L      Potassium 4 3 mmol/L      Chloride 95 (L) mmol/L      CO2 26 mmol/L      ANION GAP 9 mmol/L      BUN 12 mg/dL      Creatinine 0 51 (L) mg/dL      Glucose 133 (H) mg/dL      Calcium 8 8 mg/dL      AST 53 (H) U/L      ALT 73 (H) U/L      Alkaline Phosphatase 158 (H) U/L      Total Protein 8 1 g/dL      Albumin 3 6 g/dL      Total Bilirubin 0 50 mg/dL      eGFR 99 ml/min/1 73sq m     Narrative:       Hemolysis  National Kidney Disease Education Program recommendations are as follows:  GFR calculation is accurate only with a steady state creatinine  Chronic Kidney disease less than 60 ml/min/1 73 sq  meters  Kidney failure less than 15 ml/min/1 73 sq  meters  Lactic acid, plasma [[id number]]  (Normal) Collected:  12/13/18 2126    Lab Status:  Final result Specimen:  Blood from Arm, Left Updated:  12/13/18 2159     LACTIC ACID 1 4 mmol/L     Narrative:         Result may be elevated if tourniquet was used during collection  Wound culture and Gram stain [[id number]] Collected:  12/13/18 2126    Lab Status: In process Specimen:  Wound from Leg, Right Updated:  12/13/18 2143    Blood culture #2 [[id number]] Collected:  12/13/18 2127    Lab Status: In process Specimen:  Blood from Hand, Left Updated:  12/13/18 2143    Blood culture #1 [[id number]] Collected:  12/13/18 2127    Lab Status: In process Specimen:  Blood from Arm, Left Updated:  12/13/18 2143    Procalcitonin [[id number]]     Lab Status:  No result Specimen:  Blood     UA w Reflex to Microscopic w Reflex to Culture [[id number]]     Lab Status:  No result Specimen:  Urine from Urine, Indwelling [place] Catheter                  CT abdomen pelvis with contrast    (Results Pending)              Procedures  Procedures       Phone Contacts  ED Phone Contact    ED Course                               MDM  Number of Diagnoses or Management Options  Complicated wound infection:   Hyponatremia:   Paralysis [facility]):   Diagnosis management comments: Patient is a 25-year-old female coming in today with multiple wounds  On exam there is foul-smelling discharge from multiple wounds  There is visible musculature from multiple wounds  Please refer to pictures in media  Patient is tachycardic but afebrile  Will start sepsis workup    She does have a scheduled appointment with Dr Raffaele Hay for surgery  EKG INTERPRETATION @ 2132  RHYTHM:  Sinus tachycardia 120 beats per minute  AXIS:  Normal axis  INTERVALS:  NM interval measured at 118 millisecond  QRS COMPLEX:  QRS measured at 74 milliseconds  ST SEGMENT:  Nonspecific ST segment changes  Diffuse artifact  QT INTERVAL:  QTC measured at 407 milliseconde  COMPARED WITH PRIOR   Interpretation by Joel Haney DO    10:12 PM  Patient has mild leukocytosis  No evidence of lactic acidosis or renal failure  Mild transaminitis with no old recently to compare  She does have a hemoglobin of 10 which is her old  Patient to CT now  Patient did tolerate cephalosporins in October  10:52 PM  Discussed with admitting regarding admission  She will need infectious disease consult as well as surgery consult  Her surgeon does come to this hospital   Discuss other options for antibiotics but they agree with [facility] at this time  Pending CT  Patient states she is due for [place] change  Will place that as well  Portions of the record may have been created with voice recognition software  Occasional wrong word or "sound a like" substitutions may have occurred due to the inherent limitations of voice recognition software  Read the chart carefully and recognize, using context, where substitutions have occurred  Amount and/or Complexity of Data Reviewed  Clinical lab tests: ordered and reviewed  Tests in the radiology section of CPT®: ordered  Tests in the medicine section of CPT®: ordered and reviewed      CritCare Time    Disposition  Final diagnoses:   Complicated wound infection   Hyponatremia   Paralysis ([address] )     Time reflects when diagnosis was documented in both MDM as applicable and the Disposition within this note     Time User Action Codes Description Comment    12/13/2018 10:15 PM [facility]  8XXA,  J83 3] Complicated wound infection     12/13/2018 10:15 PM Maurilio Jordan [E87 1] Hyponatremia     12/13/2018 10:15 PM Carine Jordan Add [G83 9] Paralysis [facility])       ED Disposition     ED Disposition Condition Comment    Admit  Case was discussed with Dr Juani Webster and the patient's admission status was agreed to be Admission Status: inpatient status to the service of Dr Juani Webster   Follow-up Information    None         Patient's Medications   Discharge Prescriptions    No medications on file     No discharge procedures on file      ED Provider  Electronically Signed by           Savanah Bennett DO  12/19/18 5634

## 2018-12-14 NOTE — H&P
H&P- Marissa Son 1950, 76 y o  female MRN: [id number]    Unit/Bed#: 5T -01 Encounter: [id number]    Primary Care Provider: Melanie Zuluaga   Date and time admitted to hospital: 12/13/2018  9:04 PM        Pressure ulcer, sacrum   Assessment & Plan    Wound care consult       Decubitus ulcer of right hip   Assessment & Plan    Wound care consult     * Wound infection   Assessment & Plan    Actively draining, foul smelling  IV Abx  Afebrile, HDS  IVF hydration  gen surgery consultation for debridement  ID consult         Anticipated Length of Stay:  Patient will be admitted on an Inpatient basis with an anticipated length of stay of   2 midnights  Justification for Hospital Stay: wound infection    Total Time for Visit, including Counseling / Coordination of Care: 30 minutes  Greater than 50% of this total time spent on direct patient counseling and coordination of care  Chief Complaint:   Wound infection    History of Present Illness:    Marissa Son is a 76 y o  female sent by PCP for worsening sacral wound infections  Pt was evaluated by PCP and referred for surgical debdridement and has an upcoming appt but due to acute worsening of her wounds, her PCP referred her to ED  Pt was medically cleared for hip and buttock wound debridement  Pt is wheelchair 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  Denies any f/c/n/v/d//abd pain  Review of Systems:    12 point review of systems is grossly negative       Past Medical and Surgical History:     Past Medical History:   Diagnosis Date    Anemia     iron defiencey    Arthritis     osteo    Back injury     Chronic kidney disease     nephritis    Depression     Osteopenia     Osteoporosis     Paralysis ([address] )     paraplegic due to spinal cord injury    Paraplegia (HCC)     Poor circulation     Pressure injury of skin     right hip, stage 3    Pressure sore of hip     right    Tibial plateau fracture     Underweight        Past Surgical History:   Procedure Laterality Date    HIP DEBRIDEMENT Right 2017    right hip sore debridement    GA DEBRIDEMENT, SKIN, SUB-Q TISSUE,MUSCLE,BONE,=<20 SQ CM Right 9/19/2018    Procedure: DEBRIDEMENT OF HIP PRESSURE SORE;  Surgeon: Kayleigh Rob MD;  Location: [facility] MAIN OR;  Service: Plastics    GA OPEN RX FEMUR FX+INTRAMED FELIX Left 10/22/2018    Procedure: INSERTION NAIL IM LEFT FEMUR RETROGRADE;  Surgeon: Moshe Milan MD;  Location:  MAIN OR;  Service: Orthopedics    TOE AMPUTATION Left 2017    small toe left foot amputation    WISDOM TOOTH EXTRACTION         Meds/Allergies:    Prior to Admission medications    Medication Sig Start Date End Date Taking? Authorizing Provider   Calcium Carb-Cholecalciferol 600-200 MG-UNIT TABS Take 1 tablet by mouth daily   Yes Historical Provider, MD   cholecalciferol (VITAMIN D3) 1,000 units tablet Take 1,000 Units by mouth daily   Yes Historical Provider, MD   cyanocobalamin 100 MCG tablet Take 100 mcg by mouth daily   Yes Historical Provider, MD   multivitamin (THERAGRAN) TABS Take 1 tablet by mouth daily   Yes Historical Provider, MD   senna-docusate sodium (SENOKOT-S) 8 6-50 mg per tablet Take 1 tablet by mouth daily   Yes Historical Provider, MD   amoxicillin-clavulanate (AUGMENTIN) 875-125 mg per tablet amoxicillin 875 mg-potassium clavulanate 125 mg tablet    Historical Provider, MD   oxyCODONE (ROXICODONE) 5 mg immediate release tablet 1-2 tabs po q4-6hr prn pain  Patient taking differently: 5 mg every 4 (four) hours as needed 1-2 tabs po q4-6hr prn pain  10/22/18   Camillia Lay     I have reviewed home medications using allscripts  Allergies:    Allergies   Allergen Reactions    Vancomycin      Unknown     Cefepime Rash    Ciprofloxacin Rash and Swelling     Looked like suburn, itching  Bed sores  Swelling, itching    Latex Rash       Social History:     Marital Status: Single     Substance Use History:   History   Alcohol Use    Yes     Comment: occasional     History   Smoking Status    Never Smoker   Smokeless Tobacco    Never Used     History   Drug Use No       Family History:    Family History   Problem Relation Age of Onset    Depression Father        Physical Exam:     Vitals:   Blood Pressure: (!) 104/48 (12/14/18 0100)  Pulse: (!) 113 (12/14/18 0100)  Temperature: 98 9 °F (37 2 °C) (12/14/18 0100)  Temp Source: Temporal (12/14/18 0100)  Respirations: 20 (12/14/18 0100)  Weight - Scale: 52 2 kg (115 lb) (12/13/18 2115)  SpO2: 96 % (12/14/18 0100)    General:  Cachectic appearing  HEENT:  EOMI  Mucous membranes moist   Cardiovascular:  S1-S2  Pulmonary: Clear to auscultation b/l  No rales, rhonchi   Abdomen: soft, NT/ND  BS +ve  Extremities: LE muscle wasting  Skin: multiple hip and sacral skin wounds deep  Neurological: Alert and oriented x 3  Additional Data:     Lab Results: I have personally reviewed pertinent reports  Results from last 7 days  Lab Units 12/13/18 2126   WBC Thousand/uL 13 30*   HEMOGLOBIN g/dL 10 0*   HEMATOCRIT % 31 5*   PLATELETS Thousands/uL 696*   NEUTROS PCT % 75*   LYMPHS PCT % 14*   MONOS PCT % 11*   EOS PCT % 0       Results from last 7 days  Lab Units 12/13/18 2126   POTASSIUM mmol/L 4 3   CHLORIDE mmol/L 95*   CO2 mmol/L 26   BUN mg/dL 12   CREATININE mg/dL 0 51*   CALCIUM mg/dL 8 8   ALK PHOS U/L 158*   ALT U/L 73*   AST U/L 53*       Results from last 7 days  Lab Units 12/13/18 2126   INR  1 07       Imaging: I have personally reviewed pertinent reports  Xr Femur 2 Vw Left    Result Date: 12/12/2018  Narrative: LEFT FEMUR INDICATION:   S72 [id number]: Unspecified fracture of lower end of left femur, subsequent encounter for closed fracture with routine healing  COMPARISON:  11/8/2018 VIEWS:  XR FEMUR 2 VW LEFT FINDINGS: Previously seen complex fracture of the distal left femur has been repaired with ORIF and placement of an intramedullary nail 3 distal and 2 proximal interlocking screws  Unchanged alignment  No evidence for hardware-related complication  Unchanged disuse osteopenia  Interval removal of the skin staples  Some calcified formation is noted  No degenerative changes  No lytic or blastic lesions are seen  Soft tissues are unremarkable  Impression: Continued healing post ORIF of distal femoral fracture in unchanged satisfactory alignment    Workstation performed: [id number]       EKG, Pathology, and Other Studies Reviewed on Admission:   · EKG: ST @ 120 bpm    Allscripts / Epic Records Reviewed: Yes     ** Please Note: This note has been constructed using a voice recognition system   **

## 2018-12-14 NOTE — ED NOTES
Patient will go to CT scan for CT of abdomen and pelvis prior to admission to the floor       Sia Knox RN  12/14/18 0097

## 2018-12-14 NOTE — SOCIAL WORK
Pt sent to [facility] ED by her PCP due to worsening infected pressure ulcers of the sacrum and decub ulcers of right and left hip  ID and plastic surgery have been consulted  Pt has a hx of parapalegia s/p T8 spinal cord injury  Pt lives alone in a 1st floor apartment with no AMY and is ambulatory via electric w/c  Pt is independent with self-care tasks and drives or uses [employer]  Pt also is self employed as an   In the past pt has declined VNA referrals and has had no recent SNF admissions  Pt's PCP is Dr Brielle Betancourt and plastic surgeon is Dr Kayleigh Rob  Preferred pharmacy is the [facility] on [address] in [facility]  No MH or substance use reported  Pt's most recent hospital admission was 10/19/18 at [facility] for a left femur fracture  Josej  placement was completed by Dr Moshe Milan on 10/22/18  Pt was evaluated s/p procedure and was recommended for rehab; however, pt declined preferring to discharge home w/o VNA services  Although there was concern due to minimal support from family or friends (pt declines to provide an emergency contact), pt was noted to have the capacity to make informed decisions  Pt reports to been seen by her plastic surgeon frequently for debridements and wound assessments  ID evaluated pt today and is recommending continuation of IV ABX, wound/blood cultures, ortho consult, and CT imaging of the femur fracture due to small draining lesion  If skin grafting is planned, ID recommended possibly ostomy to promote wound healing  SW will continue to follow for plan of care

## 2018-12-14 NOTE — MALNUTRITION/BMI
This medical record reflects one or more clinical indicators suggestive of malnutrition and/or morbid obesity  Malnutrition Findings:   Malnutrition type: Chronic illness  Degree of Malnutrition: Malnutrition of moderate degree  Malnutrition Characteristics: Muscle loss, Inadequate energy, Other (comment) (multiple areas of impaired skin integrity, poor wound healing)    BMI Findings: Body mass index is 20 15 kg/m²  See Nutrition note dated 12/14/18 for additional details  Completed nutrition assessment is viewable in the nutrition documentation

## 2018-12-14 NOTE — ANESTHESIA PREPROCEDURE EVALUATION
Review of Systems/Medical History  Patient summary reviewed  Chart reviewed      Cardiovascular  Negative cardio ROS    Pulmonary  Negative pulmonary ROS        GI/Hepatic  Negative GI/hepatic ROS   GERD ,        Negative  ROS   Comment: Hyponatremia     Endo/Other  Negative endo/other ROS      GYN       Hematology  Anemia ,     Musculoskeletal  Osteoarthritis,   Arthritis     Neurology      Comment: Paraplegia due to T8 injury 1981 Psychology   Depression ,              Physical Exam    Airway    Mallampati score: I  TM Distance: >3 FB  Neck ROM: full     Dental       Cardiovascular  Comment: Negative ROS, Cardiovascular exam normal    Pulmonary  Pulmonary exam normal     Other Findings        Anesthesia Plan  ASA Score- 3     Anesthesia Type- general with ASA Monitors  Additional Monitors:   Airway Plan: ETT  Comment: Autonomic hyperreflexia, check H/H  Plan Factors-    Induction- intravenous  Postoperative Plan-     Informed Consent- Anesthetic plan and risks discussed with patient  I personally reviewed this patient with the CRNA  Discussed and agreed on the Anesthesia Plan with the CRNA  Nena Forrest

## 2018-12-14 NOTE — RESPIRATORY THERAPY NOTE
RT Protocol Note  Wesley Majestic 76 y o  female MRN: [id number]  Unit/Bed#: 5T -01 Encounter: [id number]    Assessment    Principal Problem:    Wound infection  Active Problems:    Decubitus ulcer of right hip    Pressure ulcer, sacrum      Home Pulmonary Medications:         Past Medical History:   Diagnosis Date    Anemia     iron defiencey    Arthritis     osteo    Back injury     Chronic kidney disease     nephritis    Depression     Osteopenia     Osteoporosis     Paralysis ([address] )     paraplegic due to spinal cord injury    Paraplegia (HCC)     Poor circulation     Pressure injury of skin     right hip, stage 3    Pressure sore of hip     right    Tibial plateau fracture     Underweight      Social History     Social History    Marital status: Single     Spouse name: N/A    Number of children: N/A    Years of education: N/A     Social History Main Topics    Smoking status: Never Smoker    Smokeless tobacco: Never Used    Alcohol use Yes      Comment: occasional    Drug use: No    Sexual activity: Not Asked     Other Topics Concern    None     Social History Narrative    None       Subjective         Objective no respiratory distress noted    Physical Exam:   Assessment Type: (P) Assess only  General Appearance: (P) Alert, Awake  Respiratory Pattern: (P) Normal  Chest Assessment: (P) Chest expansion symmetrical  Bilateral Breath Sounds: (P) Clear  Cough: (P) None    Vitals:  Blood pressure 120/60, pulse (P) 88, temperature (!) 97 °F (36 1 °C), temperature source Temporal, resp  rate (P) 16, height 5' (1 524 m), weight 46 8 kg (103 lb 2 8 oz), SpO2 (P) 97 %, not currently breastfeeding  Imaging and other studies: I have personally reviewed pertinent reports              Plan    Respiratory Plan: (P) Discontinue Protocol

## 2018-12-14 NOTE — ED NOTES
Pt requesting water- Ruma Ba made aware, ok to give pt water   Water given to pt     Corky Walter RN  12/13/18 1838

## 2018-12-14 NOTE — UTILIZATION REVIEW
Initial Clinical Review    Admission: Date/Time/Statement: 12/13/18 @ 2254 INPATIENT    Orders Placed This Encounter   Procedures    Inpatient Admission (expected length of stay for this patient is greater than two midnights)     Standing Status:   Standing     Number of Occurrences:   1     Order Specific Question:   Admitting Physician     Answer:   Cherelle Snow     Order Specific Question:   Level of Care     Answer:   Med Surg [16]     Order Specific Question:   Estimated length of stay     Answer:   More than 2 Midnights     Order Specific Question:   Certification     Answer:   I certify that inpatient services are medically necessary for this patient for a duration of greater than two midnights  See H&P and MD Progress Notes for additional information about the patient's course of treatment  ED: Date/Time/Mode of Arrival:   ED Arrival Information     Expected Arrival Acuity Means of Arrival Escorted By Service Admission Type    - 12/13/2018 21:04 Urgent [facility] [address] [facility] EMS General Medicine Urgent    Arrival Complaint    bedsores        Chief Complaint:   Chief Complaint   Patient presents with    Wound Infection     pt sent by dr Jacques Condon for evaluation of pressure ulcers  pt recently evaluated for medical clearance for surgery scheduled on Monday with Dr Erica Condon concerned about pt's pressure ulcers to hips and buttocks  pt states that wounds have worsened over last few days  History of Illness:      Patient is a 80-year-old female who has T8 paralysis is wheelchair-bound, iron deficiency anemia, chronic kidney disease, multiple pressure ulcers/wounds, osteoporosis and osteopenia coming in today for presumed sepsis by PCP  Patient states the wounds have been ongoing and worsening over time  She is wheelchair bound and lives at home alone  She does state that she has a poor intake of p o    She has a chronic indwelling [place] as well        ED Vital Signs:   ED Triage Vitals [12/13/18 2115]   Temperature Pulse Respirations Blood Pressure SpO2   99 °F (37 2 °C) (!) 120 22 164/76 100 %   No Pain        Wt Readings from Last 1 Encounters:   12/14/18 46 8 kg (103 lb 2 8 oz)       Vital Signs (abnormal):     12/14/18 0410  98 4 °F (36 9 °C)  101  20 112/56   12/14/18 0100  98 9 °F (37 2 °C)   113  20  104/48   12/13/18 2330  --   109   24 107/57   12/13/18 2300  --   120  18 114/62   12/13/18 2239  99 8 °F (37 7 °C)  --  -- --   12/13/18 2230  --   116   32 146/87   12/13/18 2215  --   114   28 --     Physical Exam:   She has an open ulcer over the tailbone with clear drainage and no signs of cellulitis surrounding  She has a lesion on the left hip that seems to track deep and has some ongoing cellulitis around the skin  The wound on the right hip is noted with a large patch of necrotic tissue and there is an opening that is just draining purulent material upon palpation  Over her left leg there is a small area of erythema which the patient reports was a previous hematoma after her fracture  Again there is a small hole in that area draining purulent material     Abnormal Labs/Diagnostic Test Results:     CT abdomen and pelvis:     Numerous decubitus ulcers are noted  A large large ulcer overlies the right greater trochanter and is associated with soft tissue emphysema, calcification and induration  Another large decubitus ulcer overlies the coccyx with exposure of the coccyx bone  Additional large ulcer overlies left greater trochanter  There is destructive changes and increased sclerosis at the coccyx, consistent with possible chronic osteomyelitis      Sodium = 130, repeat in a m  = 130, Glucose = 133, AST = 53, ALT = 73, Alk Phos = 158, WBC = 13 30, ANC = 9 90, H&H = 10 0/31 5, repeat in a m  = 7 7/24 0, platelets = 658,117, Procalcitonin = 0 35    ED Treatment:   Medication Administration from 12/13/2018 2103 to 12/14/2018 0050       Date/Time Order Dose Route Action 12/13/2018 2150 sodium chloride 0 9 % bolus 1,000 mL 1,000 mL Intravenous New Bag     12/13/2018 2151 ampicillin-sulbactam (UNASYN) 1 5 g in sodium chloride 0 9 % 50 mL IVPB 1 5 g Intravenous New Bag     12/14/2018 0050 iohexol (OMNIPAQUE) 350 MG/ML injection (MULTI-DOSE) 100 mL 100 mL Intravenous Given        Past Medical/Surgical History:     Past Medical History:   Diagnosis Date    Anemia     Arthritis     Back injury     Chronic kidney disease     Depression     Osteopenia     Osteoporosis     Paralysis (HCC)     Paraplegia (HCC)     Poor circulation     Pressure injury of skin     Pressure sore of hip     Tibial plateau fracture     Underweight        Admitting Diagnosis: Hyponatremia [E87 1]  Paralysis (HCC) [G83 9]  Wound infection [T14  8XXA, W40 6]  Complicated wound infection [T14  8XXA, L08 9]    Age/Sex: 76 y o  female    Assessment/Plan: Trinidad Workman is a 76 y o  female sent to ED by PCP  for worsening sacral wound infections  Patient is scheduled to have surgical debridement on Monday  Pt is wheelchair bound secondary to T8 paralysis  Patient with actively draining, foul smelling wounds  Admit to Medical Surgical unit, Wound Care, Infectious Disease and General Surgery consult, IV hydration, IV antibiotics  Admission Orders: Infectious Disease and Acute Care Surgery consults, blood cultures, urine culture, up with assistance, sequential compression device, incentive spirometry  12/14 Infectious Disease on consult: continue IV antibiotics, monitor CBC and chemistry, follow up on would cultures       Scheduled Meds:   Current Facility-Administered Medications:  acetaminophen 650 mg Oral Q6H PRN   cholecalciferol 1,000 Units Oral Daily   diphenhydrAMINE 25 mg Oral Q6H PRN   heparin (porcine) 5,000 Units Subcutaneous Q8H [address]   ondansetron 4 mg Intravenous Q6H PRN   oxyCODONE 5 mg Oral Q6H PRN   piperacillin-tazobactam 3 375 g Intravenous Q6H           Continuous Infusions:   sodium chloride 75 mL/hr Last Rate: 75 mL/hr (12/14/18 0118)     ========================================================               [address] Utilization Review Department  Phone: [phone]; Fax [phone]  [email or web address]  ATTENTION: Please call with any questions or concerns to [phone]  and carefully listen to the prompts so that you are directed to the right person  Send all requests for admission clinical reviews, approved or denied determinations and any other requests to fax [phone]   All voicemails are confidential

## 2018-12-14 NOTE — CONSULTS
Consultation - Infectious Disease   Sabine Little 76 y o  female MRN: [id number]  Unit/Bed#: [facility] 509-01 Encounter: [id number]      IMPRESSION & RECOMMENDATIONS:   1  Pressure ulcers of the sacrum:  Patient's chronic ulcer on her sacrum noted without significant amount of erythema present or drainage  I suspect that this wound itself has been chronic in progression it may not actually be acutely infected at this time  Though the patient's other wounds are unfortunately infected  -will continue antibiotics as below  -will continue to monitor patient's fever curve/vitals  -will continue to trend white blood cell count  -will continue to monitor the patient for tolerance of antibiotics  -additional wound care as per plastics  -possible wound debridement on Monday  -additional supportive care as per primary    2  Decubitus ulcer right hip and left hip:  Ulcer on the patient's right hip is clearly infected and has overt purulent material draining when palpated  She is otherwise hemodynamically stable but is noted to have leukocytosis and relative hypotension  At this time will continue antibiotics pending debridement in the OR and additional surgical finding  Ultimately course of antibiotics will depend on if the patient is plan for additional procedure such as skin flap for closure of her wounds   -will change patient's Zosyn based on prior culture results  -will follow up pending wound cultures  -continue to monitor CBC and chemistry while inpatient  -continue to trend fever curve/vitals  -please order blood cultures if patient is febrile  -ongoing care as per plastics  -additional supportive care as per primary    3  Chronic osteomyelitis at the coccyx:  Based on CT patient's history is likely that the breakdown at the patient's sacrum is chronic in nature will remain a chronic draining sinus  No acute skin changes noted at this time    Patient is plan for antibiotics as above given the changes noted at her right hip  Additional antibiotic course for this issue if the patient is plan for skin grafting and closure  4  Displaced femur fracture status post jony placement:  Patient was noted with a prior hematoma at this area  She reports that the overall lesion decreased in size and now is left with a small punctate lesion that seems to be draining some purulent material   Unclear if there is any deeper infection at this site or if this is just a superficial skin and soft tissue infection   -continue antibiotics as above  -would recommend CT imaging or US of this area to further evaluate  -wound consult Orthopedics to evaluate  -continue to trend fever curve/vitals  -continue monitor CBC and chemistry    5  Paraplegia:  Patient at baseline is wheelchair bound and provides for ongoing care at home  If she is ultimately planned for skin grafting would discuss possibility ostomy with the patient to promote wound healing  Above plan discussed in detail with the patient  ID consult service will formally re-evaluate the patient again on Monday  Please call if any additional questions or concerns  HISTORY OF PRESENT ILLNESS:  Reason for Consult:  Complicated wound infection    HPI: Allan Guthrie is a 76y o  year old female patient was sent in by her primary care physician for worsening sacral wounds  Patient is paraplegic and wheelchair bound due to spinal cord injury  She has baseline chronic kidney disease and these chronic ulcers  Patient was noted to be afebrile and hemodynamically stable  Wounds were noted with foul smell  She was started on IV antibiotics and general surgery was consulted for debridement  White blood cell count has trended down from 13 to 12 from admission  She remains on Unasyn  Blood and wound cultures are pending  CT abdomen pelvis was done and changes are consistent with chronic osteomyelitis    Patient noted to have tachycardia on admission and variable blood pressures  Patient has a history of paraplegia related to a T8 traumatic injury in the past and back in April she had undergone a flap procedure for sacral decubitus ulceration  The flap itself never completely healed  Patient had an admission in September for infection at the flap site with abscess which drained is treated as severe soft tissue infection  She also had an admission in October for left thigh cellulitis after a distal left femur fracture  Her skin changes were all felt to be due to hematoma at the fracture site  Antibiotics were eventually stopped at that time  Images reviewed from the emergency department  Patient's prior culture result reviewed  We are consulted at this time to evaluate and given the patient's complicated wound previous history of infection  On evaluation, patient reports that she was seen by Plastic surgery earlier in the week and then was subsequently evaluated by her primary care physician and both in discussion had decided that the patient should be admitted  She was noted to have low blood pressures on her visit  Systemically she denied having any fevers, chills, nausea or any vomiting  Baseline she has a chronic [place] catheter and she is able to provide her own self-care at home  She sees plastic surgery on a weekly basis for frequent debridements and wound assessment  She has had previous flap on her right hip as well as her sacral which have both ended up with further opening and drainage from the wounds  She reports having purulent drainage now from the right hip  Reviewed her allergies and she reports a clear allergy of facial swelling with ciprofloxacin and a rash that developed all while she was on cefepime admitted in the hospital   She noted that she was on vancomycin at the time of her ciprofloxacin allergy but her symptoms persisted after stopping vancomycin she suspect that she is not allergic to it    She was previously asked about ostomy placement to promote wound healing which she did not want  She has no other complaints on exam at this time  REVIEW OF SYSTEMS:  A complete 12 point system-based review of systems is negative other than that noted in the HPI  PAST MEDICAL HISTORY:  Past Medical History:   Diagnosis Date    Anemia     iron defiencey    Arthritis     osteo    Back injury     Chronic kidney disease     nephritis    Depression     Osteopenia     Osteoporosis     Paralysis (HCC)     paraplegic due to spinal cord injury    Paraplegia (HCC)     Poor circulation     Pressure injury of skin     right hip, stage 3    Pressure sore of hip     right    Tibial plateau fracture     Underweight      Past Surgical History:   Procedure Laterality Date    HIP DEBRIDEMENT Right 2017    right hip sore debridement    MI DEBRIDEMENT, SKIN, SUB-Q TISSUE,MUSCLE,BONE,=<20 SQ CM Right 9/19/2018    Procedure: DEBRIDEMENT OF HIP PRESSURE SORE;  Surgeon: Shu Means MD;  Location: [facility] MAIN OR;  Service: Plastics    MI OPEN RX FEMUR FX+INTRAMED FELIX Left 10/22/2018    Procedure: INSERTION NAIL IM LEFT FEMUR RETROGRADE;  Surgeon: Anna Harding MD;  Location:  MAIN OR;  Service: Orthopedics    TOE AMPUTATION Left 2017    small toe left foot amputation    WISDOM TOOTH EXTRACTION         FAMILY HISTORY:  Non-contributory    SOCIAL HISTORY:  Social History   History   Alcohol Use    Yes     Comment: occasional     History   Drug Use No     History   Smoking Status    Never Smoker   Smokeless Tobacco    Never Used       ALLERGIES:  Allergies   Allergen Reactions    Vancomycin      Unknown     Cefepime Rash    Ciprofloxacin Rash and Swelling     Looked like suburn, itching  Bed sores  Swelling, itching    Latex Rash       MEDICATIONS:  All current active medications have been reviewed      PHYSICAL EXAM:  Temp:  [97 °F (36 1 °C)-99 8 °F (37 7 °C)] 97 °F (36 1 °C)  HR:  [] 95  Resp:  [18-32] 20  BP: ()/(47-87) 84/47  SpO2:  [81 %-100 %] 98 %  Temp (24hrs), Av 6 °F (37 °C), Min:97 °F (36 1 °C), Max:99 8 °F (37 7 °C)  Current: Temperature: (!) 97 °F (36 1 °C)    Intake/Output Summary (Last 24 hours) at 18 0846  Last data filed at 18 0600   Gross per 24 hour   Intake            472 5 ml   Output              450 ml   Net             22 5 ml       General Appearance:  Chronically ill-appearing in no acute distress  Head:  Normocephalic, without obvious abnormality, atraumatic   Eyes:  Conjunctiva pink and sclera anicteric, both eyes   Nose: Nares normal, mucosa normal, no drainage   Throat: Oropharynx moist without lesions   Neck: Supple, symmetrical, no adenopathy, no tenderness/mass/nodules   Back:   Sacral lesions noted on the patient's back  She has an open ulcer over the tailbone with clear drainage and no signs of cellulitis surrounding  She has a lesion on the left hip that seems to track deep and has some ongoing cellulitis around the skin  The wound on the right hip is noted with a large patch of necrotic tissue and there is an opening that is just draining purulent material upon palpation  Over her left leg there is a small area of erythema which the patient reports was a previous hematoma after her fracture  Again there is a small hole in that area draining purulent material    Lungs:   Clear to auscultation bilaterally, respirations unlabored   Chest Wall:  No tenderness or deformity   Heart:  RRR; no murmur, rub or gallop   Abdomen:   Soft, non-tender, non-distended, positive bowel sounds    Extremities: No cyanosis, clubbing or edema; legs are contracted and have no mobility  Skin: As above  Patient has no other draining wounds on exposed skin  Lymph nodes: Cervical, supraclavicular nodes normal   Neurologic: Alert and oriented times 3, patient has no mobility or strength in her lower extremities bilaterally    She is able to freely move her upper extremities and no cranial nerve deficits noted on exam        LABS, IMAGING, & OTHER STUDIES:  Lab Results:  I have personally reviewed pertinent labs  Results from last 7 days  Lab Units 12/14/18  0519 12/13/18 2126   WBC Thousand/uL 12 40* 13 30*   HEMOGLOBIN g/dL 7 7* 10 0*   PLATELETS Thousands/uL 502* 696*       Results from last 7 days  Lab Units 12/14/18  0519 12/13/18 2126   POTASSIUM mmol/L 4 0 4 3   CHLORIDE mmol/L 98 95*   CO2 mmol/L 27 26   BUN mg/dL 8 12   CREATININE mg/dL 0 51* 0 51*   EGFR ml/min/1 73sq m 99 99   CALCIUM mg/dL 8 0* 8 8   AST U/L  --  53*   ALT U/L  --  73*   ALK PHOS U/L  --  158*           Imaging Studies:   I have personally reviewed pertinent imaging study reports and images in PACS  Other Studies:   I have personally reviewed pertinent reports

## 2018-12-15 PROBLEM — E87.1 HYPONATREMIA: Status: ACTIVE | Noted: 2018-12-15

## 2018-12-15 PROBLEM — M46.28 CHRONIC OSTEOMYELITIS OF COCCYX (HCC): Status: ACTIVE | Noted: 2018-12-15

## 2018-12-15 PROBLEM — D63.8 ANEMIA, CHRONIC DISEASE: Status: ACTIVE | Noted: 2018-12-15

## 2018-12-15 PROBLEM — K59.00 CONSTIPATION: Status: ACTIVE | Noted: 2018-12-15

## 2018-12-15 LAB
AMORPH URATE CRY URNS QL MICRO: ABNORMAL /HPF
BACTERIA UR QL AUTO: ABNORMAL /HPF
BILIRUB UR QL STRIP: NEGATIVE
CAOX CRY URNS QL MICRO: ABNORMAL /HPF
CLARITY UR: ABNORMAL
COLOR UR: YELLOW
EOSINOPHIL # BLD AUTO: 0.12 THOUSAND/UL (ref 0–0.4)
EOSINOPHIL NFR BLD MANUAL: 1 % (ref 0–6)
ERYTHROCYTE [DISTWIDTH] IN BLOOD BY AUTOMATED COUNT: 18.4 %
FERRITIN SERPL-MCNC: 1192 NG/ML (ref 8–388)
GLUCOSE UR STRIP-MCNC: NEGATIVE MG/DL
HCT VFR BLD AUTO: 25.7 % (ref 36–46)
HGB BLD-MCNC: 8.4 G/DL (ref 12–16)
HGB UR QL STRIP.AUTO: 50
HYPERCHROMIA BLD QL SMEAR: PRESENT
IRON SATN MFR SERPL: 12 %
IRON SERPL-MCNC: 13 UG/DL (ref 50–170)
KETONES UR STRIP-MCNC: NEGATIVE MG/DL
LEUKOCYTE ESTERASE UR QL STRIP: 500
LYMPHOCYTES # BLD AUTO: 1.74 THOUSAND/UL (ref 0.5–4)
LYMPHOCYTES # BLD AUTO: 15 % (ref 25–45)
MCH RBC QN AUTO: 25.7 PG (ref 26–34)
MCHC RBC AUTO-ENTMCNC: 32.5 G/DL (ref 31–36)
MCV RBC AUTO: 79 FL (ref 80–100)
MONOCYTES # BLD AUTO: 0.81 THOUSAND/UL (ref 0.2–0.9)
MONOCYTES NFR BLD AUTO: 7 % (ref 1–10)
NEUTS BAND NFR BLD MANUAL: 6 % (ref 0–8)
NEUTS SEG # BLD: 8.93 THOUSAND/UL (ref 1.8–7.8)
NEUTS SEG NFR BLD AUTO: 71 %
NITRITE UR QL STRIP: NEGATIVE
NON-SQ EPI CELLS URNS QL MICRO: ABNORMAL /HPF
PH UR STRIP.AUTO: 5 [PH] (ref 4.5–8)
PLATELET # BLD AUTO: 465 THOUSANDS/UL (ref 150–450)
PLATELET BLD QL SMEAR: ABNORMAL
PMV BLD AUTO: 9 FL (ref 8.9–12.7)
PROT UR STRIP-MCNC: ABNORMAL MG/DL
RBC # BLD AUTO: 3.25 MILLION/UL (ref 4–5.2)
RBC #/AREA URNS AUTO: ABNORMAL /HPF
RBC MORPH BLD: ABNORMAL
SP GR UR STRIP.AUTO: 1.01 (ref 1–1.04)
TIBC SERPL-MCNC: 111 UG/DL (ref 250–450)
TOTAL CELLS COUNTED SPEC: 100
UROBILINOGEN UA: NEGATIVE MG/DL
WBC # BLD AUTO: 11.6 THOUSAND/UL (ref 4.5–11)
WBC #/AREA URNS AUTO: ABNORMAL /HPF

## 2018-12-15 PROCEDURE — 85007 BL SMEAR W/DIFF WBC COUNT: CPT | Performed by: FAMILY MEDICINE

## 2018-12-15 PROCEDURE — 81001 URINALYSIS AUTO W/SCOPE: CPT | Performed by: EMERGENCY MEDICINE

## 2018-12-15 PROCEDURE — 81003 URINALYSIS AUTO W/O SCOPE: CPT | Performed by: EMERGENCY MEDICINE

## 2018-12-15 PROCEDURE — 87086 URINE CULTURE/COLONY COUNT: CPT | Performed by: EMERGENCY MEDICINE

## 2018-12-15 PROCEDURE — 99232 SBSQ HOSP IP/OBS MODERATE 35: CPT | Performed by: INTERNAL MEDICINE

## 2018-12-15 PROCEDURE — 85027 COMPLETE CBC AUTOMATED: CPT | Performed by: FAMILY MEDICINE

## 2018-12-15 RX ORDER — SENNOSIDES 8.6 MG
2 TABLET ORAL DAILY
Status: DISCONTINUED | OUTPATIENT
Start: 2018-12-15 | End: 2019-01-02

## 2018-12-15 RX ADMIN — PIPERACILLIN AND TAZOBACTAM 3.38 G: 3; .375 INJECTION, POWDER, FOR SOLUTION INTRAVENOUS at 12:16

## 2018-12-15 RX ADMIN — PIPERACILLIN AND TAZOBACTAM 3.38 G: 3; .375 INJECTION, POWDER, FOR SOLUTION INTRAVENOUS at 06:39

## 2018-12-15 RX ADMIN — VITAMIN D, TAB 1000IU (100/BT) 1000 UNITS: 25 TAB at 08:52

## 2018-12-15 RX ADMIN — HEPARIN SODIUM 5000 UNITS: 5000 INJECTION, SOLUTION INTRAVENOUS; SUBCUTANEOUS at 13:57

## 2018-12-15 RX ADMIN — HEPARIN SODIUM 5000 UNITS: 5000 INJECTION, SOLUTION INTRAVENOUS; SUBCUTANEOUS at 06:39

## 2018-12-15 RX ADMIN — PIPERACILLIN AND TAZOBACTAM 3.38 G: 3; .375 INJECTION, POWDER, FOR SOLUTION INTRAVENOUS at 00:22

## 2018-12-15 RX ADMIN — SODIUM CHLORIDE 75 ML/HR: 9 INJECTION, SOLUTION INTRAVENOUS at 22:22

## 2018-12-15 RX ADMIN — SENNOSIDES 17.2 MG: 8.6 TABLET, FILM COATED ORAL at 14:00

## 2018-12-15 RX ADMIN — HEPARIN SODIUM 5000 UNITS: 5000 INJECTION, SOLUTION INTRAVENOUS; SUBCUTANEOUS at 20:54

## 2018-12-15 RX ADMIN — PIPERACILLIN AND TAZOBACTAM 3.38 G: 3; .375 INJECTION, POWDER, FOR SOLUTION INTRAVENOUS at 17:18

## 2018-12-15 NOTE — PROGRESS NOTES
Progress Note - Silver Friends 1950, 76 y o  female MRN: [id number]    Unit/Bed#: 5T -01 Encounter: [id number]    Primary Care Provider: Jessica Gutierrez   Date and time admitted to hospital: 12/13/2018  9:04 PM        Assessment and Plan  * Decubitus skin ulcer   Assessment & Plan    Appreciate infectious disease evaluation  Plastic surgery consulted as surgery scheduled for Monday  Continue zosyn  Hyponatremia   Assessment & Plan    For completeness check TSH and a m  cortisol     Anemia, chronic disease   Assessment & Plan    Anemia chronic disease with evidence of dilutional effect     Constipation   Assessment & Plan    Patient requesting senna  Chronic osteomyelitis of coccyx [facility])   Assessment & Plan    Chronic osteomyelitis of coccyx  Patient be evaluated by Plastic surgery     Paraplegia following spinal cord injury [facility])   Assessment & Plan    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     VTE Pharmacologic Prophylaxis: Heparin    Patient Centered Rounds: I have performed bedside rounds with nursing staff today  Discussions with Specialists or Other Care Team Provider:   Education and Discussions with Family / Patient:     Discharge Plan / Estimated Discharge Date:   ______________________________________________________________________________    Subjective:   Patient seen and examined  Complains of constipation  Objective:   Vitals: Blood pressure 112/54, pulse 90, temperature (!) 97 4 °F (36 3 °C), temperature source Tympanic, resp  rate 20, height 5' (1 524 m), weight 47 4 kg (104 lb 8 oz), SpO2 95 %, not currently breastfeeding      Physical Exam:   General appearance: alert, appears stated age and cooperative  Head: atraumatic  Lungs: clear to auscultation bilaterally  Heart: regular rate and rhythm  Abdomen: soft, non-tender, positive bowel sounds   Extremities: Contracted lower extremities    Additional Data:   Labs:    Results from last 7 days  Lab Units 12/15/18  0443 12/14/18 0519 12/13/18 2126   WBC Thousand/uL 11 60* 12 40* 13 30*   HEMOGLOBIN g/dL 8 4* 7 7* 10 0*   HEMATOCRIT % 25 7* 24 0* 31 5*   MCV fL 79* 79* 80   BANDS PCT % 6  --   --    PLATELETS Thousands/uL 465* 502* 696*   INR   --   --  1 07       Results from last 7 days  Lab Units 12/14/18 0519 12/13/18 2126   SODIUM mmol/L 130* 130*   POTASSIUM mmol/L 4 0 4 3   CHLORIDE mmol/L 98 95*   CO2 mmol/L 27 26   ANION GAP mmol/L 5 9   BUN mg/dL 8 12   CREATININE mg/dL 0 51* 0 51*   CALCIUM mg/dL 8 0* 8 8   ALBUMIN g/dL  --  3 6   TOTAL BILIRUBIN mg/dL  --  0 50   ALK PHOS U/L  --  158*   ALT U/L  --  73*   AST U/L  --  53*   EGFR ml/min/1 73sq m 99 99   GLUCOSE RANDOM mg/dL 122* 133*       Results from last 7 days  Lab Units 12/14/18 0519 12/13/18 2126   MAGNESIUM mg/dL 2 2 2 3   PHOSPHORUS mg/dL 3 9  --        Results from last 7 days  Lab Units 12/13/18 2126   TROPONIN I ng/mL <0 01            Results from last 7 days  Lab Units 12/14/18 0527 12/13/18 2126   LACTIC ACID mmol/L  --  1 4   PROCALCITONIN ng/ml 0 35*  --                  * I Have Reviewed All Lab Data Listed Above  Cultures:     Results from last 7 days  Lab Units 12/13/18 2127 12/13/18 2126   BLOOD CULTURE  No Growth at 24 hrs  No Growth at 24 hrs   --    GRAM STAIN RESULT   --  1+ Polys  2+ Gram negative rods  3+ Gram positive cocci in pairs   WOUND CULTURE   --  2+ Growth of Staphylococcus species*         Imaging:  Imaging Reports Reviewed Today Include:   Procedure: Ct Abdomen Pelvis With Contrast  Result Date: 12/14/2018  Impression: Numerous large decubitus ulcers as detailed above overlying the bilateral greater trochanters and coccyx  Destructive change and increased sclerosis at the coccyx may represent underlying chronic osteomyelitis  The study was marked in [facility] for immediate notification   Workstation performed: [id number]     Scheduled Meds:  Current Facility-Administered Medications:  acetaminophen 650 mg Oral Q6H PRN Bilal A Taras Severs, MD    cholecalciferol 1,000 Units Oral Daily Bilkoki Lenz MD    diphenhydrAMINE 25 mg Oral Q6H PRN Bilal A Taras Severs, MD    heparin (porcine) 5,000 Units Subcutaneous Q8H [facility] Ginette Lenz MD    ondansetron 4 mg Intravenous Q6H PRN Bilkoki Lenz MD    oxyCODONE 5 mg Oral Q6H PRN Bilal A Taras Severs, MD    piperacillin-tazobactam 3 375 g Intravenous Q6H Dagoberto Rodriguez MD Last Rate: 3 375 g (12/15/18 1216)   sodium chloride 1,000 mL Intravenous Once Maria C Jordan DO Last Rate: Stopped (12/14/18 0109)   sodium chloride 75 mL/hr Intravenous Continuous Bilal A Taras Severs, MD Last Rate: Stopped (12/15/18 0643)     Current Length of Stay: 2 day(s)  Current Patient Status: Inpatient   Certification Statement: The patient will continue to require additional inpatient hospital stay due to Decubitus skin ulcer  Code Status: Level 1 - Full Code  Time Spent for Care: 25 mins  More than 50% of total time spent on counseling and coordination of care as described above  DO Mica Burns 73 Internal Medicine  Hospitalist    ** Please Note: This note has been constructed using a voice recognition system   **

## 2018-12-15 NOTE — ASSESSMENT & PLAN NOTE
Appreciate infectious disease evaluation  Plastic surgery consulted as surgery scheduled for Monday  Continue zosyn

## 2018-12-16 PROBLEM — K21.9 GERD (GASTROESOPHAGEAL REFLUX DISEASE): Status: ACTIVE | Noted: 2018-12-16

## 2018-12-16 LAB
ABO GROUP BLD: NORMAL
ALBUMIN SERPL BCP-MCNC: 2.3 G/DL (ref 3–5.2)
ALP SERPL-CCNC: 100 U/L (ref 43–122)
ALT SERPL W P-5'-P-CCNC: 35 U/L (ref 9–52)
ANION GAP SERPL CALCULATED.3IONS-SCNC: 5 MMOL/L (ref 5–14)
AST SERPL W P-5'-P-CCNC: 31 U/L (ref 14–36)
ATRIAL RATE: 117 BPM
BACTERIA UR CULT: NORMAL
BACTERIA WND AEROBE CULT: ABNORMAL
BACTERIA WND AEROBE CULT: ABNORMAL
BILIRUB SERPL-MCNC: 0.2 MG/DL
BLD GP AB SCN SERPL QL: NEGATIVE
BUN SERPL-MCNC: 8 MG/DL (ref 5–25)
CALCIUM SERPL-MCNC: 7.7 MG/DL (ref 8.4–10.2)
CHLORIDE SERPL-SCNC: 103 MMOL/L (ref 97–108)
CO2 SERPL-SCNC: 26 MMOL/L (ref 22–30)
CORTIS AM PEAK SERPL-MCNC: 12.5 UG/DL (ref 4.2–22.4)
CREAT SERPL-MCNC: 0.56 MG/DL (ref 0.6–1.2)
ERYTHROCYTE [DISTWIDTH] IN BLOOD BY AUTOMATED COUNT: 17.9 %
GFR SERPL CREATININE-BSD FRML MDRD: 96 ML/MIN/1.73SQ M
GLUCOSE SERPL-MCNC: 103 MG/DL (ref 70–99)
GRAM STN SPEC: ABNORMAL
HCT VFR BLD AUTO: 22 % (ref 36–46)
HEMOCCULT STL QL: POSITIVE
HGB BLD-MCNC: 7 G/DL (ref 12–16)
MCH RBC QN AUTO: 25.1 PG (ref 26–34)
MCHC RBC AUTO-ENTMCNC: 31.6 G/DL (ref 31–36)
MCV RBC AUTO: 79 FL (ref 80–100)
P AXIS: 21 DEGREES
PLATELET # BLD AUTO: 513 THOUSANDS/UL (ref 150–450)
PMV BLD AUTO: 8.1 FL (ref 8.9–12.7)
POTASSIUM SERPL-SCNC: 3.4 MMOL/L (ref 3.6–5)
PR INTERVAL: 118 MS
PROT SERPL-MCNC: 5.5 G/DL (ref 5.9–8.4)
QRS AXIS: -4 DEGREES
QRSD INTERVAL: 74 MS
QT INTERVAL: 292 MS
QTC INTERVAL: 407 MS
RBC # BLD AUTO: 2.77 MILLION/UL (ref 4–5.2)
RH BLD: POSITIVE
SODIUM SERPL-SCNC: 134 MMOL/L (ref 137–147)
SPECIMEN EXPIRATION DATE: NORMAL
T WAVE AXIS: 0 DEGREES
TSH SERPL DL<=0.05 MIU/L-ACNC: 2.92 UIU/ML (ref 0.47–4.68)
VENTRICULAR RATE: 117 BPM
WBC # BLD AUTO: 9.9 THOUSAND/UL (ref 4.5–11)

## 2018-12-16 PROCEDURE — 80053 COMPREHEN METABOLIC PANEL: CPT | Performed by: INTERNAL MEDICINE

## 2018-12-16 PROCEDURE — 99232 SBSQ HOSP IP/OBS MODERATE 35: CPT | Performed by: INTERNAL MEDICINE

## 2018-12-16 PROCEDURE — 86900 BLOOD TYPING SEROLOGIC ABO: CPT | Performed by: INTERNAL MEDICINE

## 2018-12-16 PROCEDURE — 82533 TOTAL CORTISOL: CPT | Performed by: INTERNAL MEDICINE

## 2018-12-16 PROCEDURE — 86920 COMPATIBILITY TEST SPIN: CPT

## 2018-12-16 PROCEDURE — 30233N1 TRANSFUSION OF NONAUTOLOGOUS RED BLOOD CELLS INTO PERIPHERAL VEIN, PERCUTANEOUS APPROACH: ICD-10-PCS | Performed by: INTERNAL MEDICINE

## 2018-12-16 PROCEDURE — 86850 RBC ANTIBODY SCREEN: CPT | Performed by: INTERNAL MEDICINE

## 2018-12-16 PROCEDURE — 82272 OCCULT BLD FECES 1-3 TESTS: CPT | Performed by: FAMILY MEDICINE

## 2018-12-16 PROCEDURE — P9021 RED BLOOD CELLS UNIT: HCPCS

## 2018-12-16 PROCEDURE — 87081 CULTURE SCREEN ONLY: CPT | Performed by: INTERNAL MEDICINE

## 2018-12-16 PROCEDURE — 86901 BLOOD TYPING SEROLOGIC RH(D): CPT | Performed by: INTERNAL MEDICINE

## 2018-12-16 PROCEDURE — 85027 COMPLETE CBC AUTOMATED: CPT | Performed by: INTERNAL MEDICINE

## 2018-12-16 PROCEDURE — 93010 ELECTROCARDIOGRAM REPORT: CPT | Performed by: INTERNAL MEDICINE

## 2018-12-16 PROCEDURE — 84443 ASSAY THYROID STIM HORMONE: CPT | Performed by: INTERNAL MEDICINE

## 2018-12-16 RX ORDER — SODIUM CHLORIDE AND POTASSIUM CHLORIDE .9; .15 G/100ML; G/100ML
75 SOLUTION INTRAVENOUS CONTINUOUS
Status: DISCONTINUED | OUTPATIENT
Start: 2018-12-16 | End: 2018-12-18

## 2018-12-16 RX ORDER — PANTOPRAZOLE SODIUM 40 MG/1
40 TABLET, DELAYED RELEASE ORAL
Status: DISCONTINUED | OUTPATIENT
Start: 2018-12-16 | End: 2018-12-16

## 2018-12-16 RX ADMIN — PIPERACILLIN AND TAZOBACTAM 3.38 G: 3; .375 INJECTION, POWDER, FOR SOLUTION INTRAVENOUS at 11:39

## 2018-12-16 RX ADMIN — SENNOSIDES 17.2 MG: 8.6 TABLET, FILM COATED ORAL at 17:33

## 2018-12-16 RX ADMIN — B-COMPLEX W/ C & FOLIC ACID TAB 1 TABLET: TAB at 09:37

## 2018-12-16 RX ADMIN — HEPARIN SODIUM 5000 UNITS: 5000 INJECTION, SOLUTION INTRAVENOUS; SUBCUTANEOUS at 06:08

## 2018-12-16 RX ADMIN — VITAMIN D, TAB 1000IU (100/BT) 1000 UNITS: 25 TAB at 08:07

## 2018-12-16 RX ADMIN — PIPERACILLIN AND TAZOBACTAM 3.38 G: 3; .375 INJECTION, POWDER, FOR SOLUTION INTRAVENOUS at 00:27

## 2018-12-16 RX ADMIN — HEPARIN SODIUM 5000 UNITS: 5000 INJECTION, SOLUTION INTRAVENOUS; SUBCUTANEOUS at 22:32

## 2018-12-16 RX ADMIN — PIPERACILLIN AND TAZOBACTAM 3.38 G: 3; .375 INJECTION, POWDER, FOR SOLUTION INTRAVENOUS at 06:08

## 2018-12-16 RX ADMIN — POTASSIUM CHLORIDE AND SODIUM CHLORIDE 75 ML/HR: 900; 150 INJECTION, SOLUTION INTRAVENOUS at 09:36

## 2018-12-16 NOTE — PROGRESS NOTES
Progress Note - Mary [place] 1950, 76 y o  female MRN: [id number]    Unit/Bed#: 5T -01 Encounter: [id number]    Primary Care Provider: Malena Rocha   Date and time admitted to hospital: 12/13/2018  9:04 PM        Assessment and Plan  * Decubitus skin ulcer   Assessment & Plan    Appreciate infectious disease evaluation  Plastic surgery consulted as surgery scheduled for Monday  Continue zosyn  GERD (gastroesophageal reflux disease)   Assessment & Plan    GERD with cough  Patient's PCP at recommended medication however patient declined at this moment     Hyponatremia   Assessment & Plan    Hyponatremia  Improved with IV fluids  TSH within limits  A m  cortisol pending  Anemia, chronic disease   Assessment & Plan    Anemia chronic disease with evidence of dilutional effect  Hemoglobin within threshold for transfusion  Patient agreeable  Will transfuse 2 units PRBC and give Venofer x1 stool occult pending     Constipation   Assessment & Plan    Senna added at patient request     Chronic osteomyelitis of coccyx [facility])   Assessment & Plan    Chronic osteomyelitis of coccyx  Patient to be evaluated by plastic surgery     Hypokalemia   Assessment & Plan    Add potassium to IV fluids     Paraplegia following spinal cord injury [facility])   Assessment & Plan    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     VTE Pharmacologic Prophylaxis: Heparin    Patient Centered Rounds: I have performed bedside rounds with nursing staff today  Discussions with Specialists or Other Care Team Provider:   Education and Discussions with Family / Patient:     Discharge Plan / Estimated Discharge Date:   ______________________________________________________________________________    Subjective:   Patient seen and examined  Still no bowel movement    Complains of insomnia but does not want medication    Objective:   Vitals: Blood pressure 100/50, pulse 88, temperature 97 6 °F (36 4 °C), temperature source Temporal, resp  rate 16, height 5' (1 524 m), weight 51 1 kg (112 lb 10 5 oz), SpO2 93 %, not currently breastfeeding  Physical Exam:   General appearance: alert, appears stated age and cooperative  Head: Normocephalic, without obvious abnormality, atraumatic  Lungs: diminished breath sounds  Heart: regular rate and rhythm  Abdomen: soft, non-tender, positive bowel sounds   Extremities: Contracted lower extremities  Neurologic:  Speech intact    Additional Data:   Labs:    Results from last 7 days  Lab Units 12/16/18  0511 12/15/18  0443 12/14/18 0519 12/13/18 2126   WBC Thousand/uL 9 90 11 60* 12 40* 13 30*   HEMOGLOBIN g/dL 7 0* 8 4* 7 7* 10 0*   HEMATOCRIT % 22 0* 25 7* 24 0* 31 5*   MCV fL 79* 79* 79* 80   BANDS PCT %  --  6  --   --    PLATELETS Thousands/uL 513* 465* 502* 696*   INR   --   --   --  1 07       Results from last 7 days  Lab Units 12/16/18  0511 12/14/18 0519 12/13/18 2126   SODIUM mmol/L 134* 130* 130*   POTASSIUM mmol/L 3 4* 4 0 4 3   CHLORIDE mmol/L 103 98 95*   CO2 mmol/L 26 27 26   ANION GAP mmol/L 5 5 9   BUN mg/dL 8 8 12   CREATININE mg/dL 0 56* 0 51* 0 51*   CALCIUM mg/dL 7 7* 8 0* 8 8   ALBUMIN g/dL 2 3*  --  3 6   TOTAL BILIRUBIN mg/dL 0 20  --  0 50   ALK PHOS U/L 100  --  158*   ALT U/L 35  --  73*   AST U/L 31  --  53*   EGFR ml/min/1 73sq m 96 99 99   GLUCOSE RANDOM mg/dL 103* 122* 133*       Results from last 7 days  Lab Units 12/14/18  0519 12/13/18 2126   MAGNESIUM mg/dL 2 2 2 3   PHOSPHORUS mg/dL 3 9  --        Results from last 7 days  Lab Units 12/13/18 2126   TROPONIN I ng/mL <0 01            Results from last 7 days  Lab Units 12/14/18  0527 12/13/18 2126   LACTIC ACID mmol/L  --  1 4   PROCALCITONIN ng/ml 0 35*  --                Results from last 7 days  Lab Units 12/16/18  0511   TSH 3RD GENERATON uIU/mL 2 920     * I Have Reviewed All Lab Data Listed Above      Cultures:     Results from last 7 days  Lab Units 12/13/18 2127 12/13/18 2126 BLOOD CULTURE  No Growth at 24 hrs  No Growth at 24 hrs   --    GRAM STAIN RESULT   --  1+ Polys  2+ Gram negative rods  3+ Gram positive cocci in pairs   WOUND CULTURE   --  2+ Growth of Staphylococcus species*         Imaging:  Imaging Reports Reviewed Today Include:   Procedure: Ct Abdomen Pelvis With Contrast  Result Date: 12/14/2018  Impression: Numerous large decubitus ulcers as detailed above overlying the bilateral greater trochanters and coccyx  Destructive change and increased sclerosis at the coccyx may represent underlying chronic osteomyelitis  The study was marked in [facility] for immediate notification  Workstation performed: [id number]     Scheduled Meds:  Current Facility-Administered Medications:  acetaminophen 650 mg Oral Q6H PRN Bilkoki Zavala MD    cholecalciferol 1,000 Units Oral Daily Ginette Lenz MD    diphenhydrAMINE 25 mg Oral Q6H PRN Ginette Zavala MD    heparin (porcine) 5,000 Units Subcutaneous Q8H [address] Bilkoki Lenz MD    ondansetron 4 mg Intravenous Q6H PRN Ginette Lenz MD    oxyCODONE 5 mg Oral Q6H PRN Bilkoki Zavala MD    piperacillin-tazobactam 3 375 g Intravenous Q6H aPtty Silva MD Last Rate: 3 375 g (12/16/18 4081)   senna 2 tablet Oral Daily Deshaun Chandler DO    sodium chloride 1,000 mL Intravenous Once Dotty Benigno Jordan DO Last Rate: Stopped (12/14/18 0109)   sodium chloride 75 mL/hr Intravenous Continuous Bilal LINDA Zavala MD Last Rate: 75 mL/hr (12/15/18 2222)     Current Length of Stay: 3 day(s)  Current Patient Status: Inpatient   Certification Statement: The patient will continue to require additional inpatient hospital stay due to Decubitus skin ulcer  Code Status: Level 1 - Full Code  Time Spent for Care: 25 mins  More than 50% of total time spent on counseling and coordination of care as described above  DO Mica Guerin 73 Internal Medicine  Hospitalist    ** Please Note: This note has been constructed using a voice recognition system  **

## 2018-12-16 NOTE — ASSESSMENT & PLAN NOTE
Anemia chronic disease with evidence of dilutional effect  Hemoglobin within threshold for transfusion  Patient agreeable    Will transfuse 2 units PRBC and give Venofer x1 stool occult pending

## 2018-12-16 NOTE — CONSULTS
Some 20 years ago in hang gliding accident she a spinal injury leading her paraplegic although she does have some sensation below the pelvis  In any event being wheelchair-bound in the last few years she has had recurrent sacral and hip pressure sores  Last year a right trochanteric pressure sore was covered with a right anterior lateral thigh local flap  In this spring she developed a left ischial pressure sore from too much wheelchair sitting  That was treated with a thigh rotation flap based on a profunda artery , it never really healed however the tissues underneath viable local bone can be palpated  She was doing well on till this fall she sustained a left femur fracture  This required her to the spinning lot of her time on the right hip to protect the fracture site  She now developed an eschar in the center of the flap of the right hip as a consequence of that recovery  To minimize the pressure on the right hip she Decided to sit more in her left hip  The pressure sores there over the left trochanter are probably now down to bone with minimal debris  So then she decided to put more pressure on the right hip to protect that developing probably which now has an abscess in that area  Impression:  1  Bilateral trochanteric pressure sores: The plan is to debride both these areas tomorrow and then proceed to coverage as to what is most practical for least 1 hip  It will take a least 4-6 weeks for that flap to heal satisfactory performing proceed to take care of the other hip pressure sore  2  Left ischial pressure sore:  Attempted to recovery to the left ischial pressure sore will follow treatment of the left trochanteric pressure sore  Surgery for debridement of pressure sores will be in the a m

## 2018-12-17 ENCOUNTER — ANESTHESIA (INPATIENT)
Dept: PERIOP | Facility: HOSPITAL | Age: 68
DRG: 573 | End: 2018-12-17
Payer: COMMERCIAL

## 2018-12-17 LAB
HCT VFR BLD AUTO: 33.3 % (ref 36–46)
HCT VFR BLD AUTO: 34.6 % (ref 36–46)
HGB BLD-MCNC: 10.7 G/DL (ref 12–16)
HGB BLD-MCNC: 11.4 G/DL (ref 12–16)

## 2018-12-17 PROCEDURE — 0QB20ZZ EXCISION OF RIGHT PELVIC BONE, OPEN APPROACH: ICD-10-PCS | Performed by: INTERNAL MEDICINE

## 2018-12-17 PROCEDURE — 85018 HEMOGLOBIN: CPT | Performed by: INTERNAL MEDICINE

## 2018-12-17 PROCEDURE — 87186 SC STD MICRODIL/AGAR DIL: CPT | Performed by: PLASTIC SURGERY

## 2018-12-17 PROCEDURE — 87176 TISSUE HOMOGENIZATION CULTR: CPT | Performed by: PLASTIC SURGERY

## 2018-12-17 PROCEDURE — 87205 SMEAR GRAM STAIN: CPT | Performed by: PLASTIC SURGERY

## 2018-12-17 PROCEDURE — 99232 SBSQ HOSP IP/OBS MODERATE 35: CPT | Performed by: INTERNAL MEDICINE

## 2018-12-17 PROCEDURE — 0HBHXZZ EXCISION OF RIGHT UPPER LEG SKIN, EXTERNAL APPROACH: ICD-10-PCS | Performed by: PLASTIC SURGERY

## 2018-12-17 PROCEDURE — 0QB30ZZ EXCISION OF LEFT PELVIC BONE, OPEN APPROACH: ICD-10-PCS | Performed by: INTERNAL MEDICINE

## 2018-12-17 PROCEDURE — 87076 CULTURE ANAEROBE IDENT EACH: CPT | Performed by: PLASTIC SURGERY

## 2018-12-17 PROCEDURE — 85014 HEMATOCRIT: CPT | Performed by: PLASTIC SURGERY

## 2018-12-17 PROCEDURE — 85018 HEMOGLOBIN: CPT | Performed by: PLASTIC SURGERY

## 2018-12-17 PROCEDURE — 88304 TISSUE EXAM BY PATHOLOGIST: CPT | Performed by: PATHOLOGY

## 2018-12-17 PROCEDURE — 87185 SC STD ENZYME DETCJ PER NZM: CPT | Performed by: PLASTIC SURGERY

## 2018-12-17 PROCEDURE — 85014 HEMATOCRIT: CPT | Performed by: INTERNAL MEDICINE

## 2018-12-17 PROCEDURE — 87147 CULTURE TYPE IMMUNOLOGIC: CPT | Performed by: PLASTIC SURGERY

## 2018-12-17 PROCEDURE — 87070 CULTURE OTHR SPECIMN AEROBIC: CPT | Performed by: PLASTIC SURGERY

## 2018-12-17 PROCEDURE — 87077 CULTURE AEROBIC IDENTIFY: CPT | Performed by: PLASTIC SURGERY

## 2018-12-17 PROCEDURE — 87075 CULTR BACTERIA EXCEPT BLOOD: CPT | Performed by: PLASTIC SURGERY

## 2018-12-17 PROCEDURE — 0HRHX74 REPLACEMENT OF RIGHT UPPER LEG SKIN WITH AUTOLOGOUS TISSUE SUBSTITUTE, PARTIAL THICKNESS, EXTERNAL APPROACH: ICD-10-PCS | Performed by: PLASTIC SURGERY

## 2018-12-17 RX ORDER — NEOSTIGMINE METHYLSULFATE 1 MG/ML
INJECTION INTRAVENOUS AS NEEDED
Status: DISCONTINUED | OUTPATIENT
Start: 2018-12-17 | End: 2018-12-17 | Stop reason: SURG

## 2018-12-17 RX ORDER — ACETIC ACID 0.25 G/100ML
IRRIGANT IRRIGATION AS NEEDED
Status: DISCONTINUED | OUTPATIENT
Start: 2018-12-17 | End: 2018-12-17 | Stop reason: HOSPADM

## 2018-12-17 RX ORDER — MAGNESIUM HYDROXIDE 1200 MG/15ML
LIQUID ORAL AS NEEDED
Status: DISCONTINUED | OUTPATIENT
Start: 2018-12-17 | End: 2018-12-17 | Stop reason: HOSPADM

## 2018-12-17 RX ORDER — ONDANSETRON 2 MG/ML
INJECTION INTRAMUSCULAR; INTRAVENOUS AS NEEDED
Status: DISCONTINUED | OUTPATIENT
Start: 2018-12-17 | End: 2018-12-17 | Stop reason: SURG

## 2018-12-17 RX ORDER — MIDAZOLAM HYDROCHLORIDE 1 MG/ML
INJECTION INTRAMUSCULAR; INTRAVENOUS AS NEEDED
Status: DISCONTINUED | OUTPATIENT
Start: 2018-12-17 | End: 2018-12-17 | Stop reason: SURG

## 2018-12-17 RX ORDER — GLYCOPYRROLATE 0.2 MG/ML
INJECTION INTRAMUSCULAR; INTRAVENOUS AS NEEDED
Status: DISCONTINUED | OUTPATIENT
Start: 2018-12-17 | End: 2018-12-17 | Stop reason: SURG

## 2018-12-17 RX ORDER — PROPOFOL 10 MG/ML
INJECTION, EMULSION INTRAVENOUS AS NEEDED
Status: DISCONTINUED | OUTPATIENT
Start: 2018-12-17 | End: 2018-12-17 | Stop reason: SURG

## 2018-12-17 RX ORDER — LIDOCAINE HYDROCHLORIDE 10 MG/ML
INJECTION, SOLUTION INFILTRATION; PERINEURAL AS NEEDED
Status: DISCONTINUED | OUTPATIENT
Start: 2018-12-17 | End: 2018-12-17 | Stop reason: SURG

## 2018-12-17 RX ORDER — SODIUM CHLORIDE, SODIUM LACTATE, POTASSIUM CHLORIDE, CALCIUM CHLORIDE 600; 310; 30; 20 MG/100ML; MG/100ML; MG/100ML; MG/100ML
INJECTION, SOLUTION INTRAVENOUS CONTINUOUS PRN
Status: DISCONTINUED | OUTPATIENT
Start: 2018-12-17 | End: 2018-12-17 | Stop reason: SURG

## 2018-12-17 RX ORDER — FENTANYL CITRATE 50 UG/ML
INJECTION, SOLUTION INTRAMUSCULAR; INTRAVENOUS AS NEEDED
Status: DISCONTINUED | OUTPATIENT
Start: 2018-12-17 | End: 2018-12-17 | Stop reason: SURG

## 2018-12-17 RX ORDER — MINERAL OIL
OIL (ML) MISCELLANEOUS AS NEEDED
Status: DISCONTINUED | OUTPATIENT
Start: 2018-12-17 | End: 2018-12-17 | Stop reason: HOSPADM

## 2018-12-17 RX ORDER — ROCURONIUM BROMIDE 10 MG/ML
INJECTION, SOLUTION INTRAVENOUS AS NEEDED
Status: DISCONTINUED | OUTPATIENT
Start: 2018-12-17 | End: 2018-12-17 | Stop reason: SURG

## 2018-12-17 RX ADMIN — NEOSTIGMINE METHYLSULFATE 2 MG: 1 INJECTION INTRAVENOUS at 12:28

## 2018-12-17 RX ADMIN — DEXAMETHASONE SODIUM PHOSPHATE 4 MG: 10 INJECTION INTRAMUSCULAR; INTRAVENOUS at 08:20

## 2018-12-17 RX ADMIN — IRON SUCROSE 200 MG: 20 INJECTION, SOLUTION INTRAVENOUS at 02:25

## 2018-12-17 RX ADMIN — POTASSIUM CHLORIDE AND SODIUM CHLORIDE 75 ML/HR: 900; 150 INJECTION, SOLUTION INTRAVENOUS at 14:11

## 2018-12-17 RX ADMIN — SODIUM CHLORIDE, SODIUM LACTATE, POTASSIUM CHLORIDE, AND CALCIUM CHLORIDE: .6; .31; .03; .02 INJECTION, SOLUTION INTRAVENOUS at 10:59

## 2018-12-17 RX ADMIN — ONDANSETRON HYDROCHLORIDE 4 MG: 2 INJECTION, SOLUTION INTRAMUSCULAR; INTRAVENOUS at 08:20

## 2018-12-17 RX ADMIN — FENTANYL CITRATE 50 MCG: 50 INJECTION INTRAMUSCULAR; INTRAVENOUS at 08:14

## 2018-12-17 RX ADMIN — MIDAZOLAM HYDROCHLORIDE 1 MG: 1 INJECTION, SOLUTION INTRAMUSCULAR; INTRAVENOUS at 08:11

## 2018-12-17 RX ADMIN — HEPARIN SODIUM 5000 UNITS: 5000 INJECTION, SOLUTION INTRAVENOUS; SUBCUTANEOUS at 21:47

## 2018-12-17 RX ADMIN — PROPOFOL 110 MG: 10 INJECTION, EMULSION INTRAVENOUS at 08:14

## 2018-12-17 RX ADMIN — PIPERACILLIN AND TAZOBACTAM 3.38 G: 3; .375 INJECTION, POWDER, FOR SOLUTION INTRAVENOUS at 05:03

## 2018-12-17 RX ADMIN — Medication 3.38 G: at 10:20

## 2018-12-17 RX ADMIN — LIDOCAINE HYDROCHLORIDE 50 MG: 10 INJECTION, SOLUTION INFILTRATION; PERINEURAL at 08:14

## 2018-12-17 RX ADMIN — FENTANYL CITRATE 50 MCG: 50 INJECTION INTRAMUSCULAR; INTRAVENOUS at 11:03

## 2018-12-17 RX ADMIN — ROCURONIUM BROMIDE 10 MG: 10 INJECTION INTRAVENOUS at 10:03

## 2018-12-17 RX ADMIN — PIPERACILLIN AND TAZOBACTAM 3.38 G: 3; .375 INJECTION, POWDER, FOR SOLUTION INTRAVENOUS at 17:57

## 2018-12-17 RX ADMIN — MIDAZOLAM HYDROCHLORIDE 1 MG: 1 INJECTION, SOLUTION INTRAMUSCULAR; INTRAVENOUS at 08:42

## 2018-12-17 RX ADMIN — ROCURONIUM BROMIDE 40 MG: 10 INJECTION INTRAVENOUS at 08:15

## 2018-12-17 RX ADMIN — GLYCOPYRROLATE 0.4 MG: 0.2 INJECTION, SOLUTION INTRAMUSCULAR; INTRAVENOUS at 12:28

## 2018-12-17 RX ADMIN — FENTANYL CITRATE 25 MCG: 50 INJECTION INTRAMUSCULAR; INTRAVENOUS at 12:30

## 2018-12-17 RX ADMIN — SODIUM CHLORIDE, SODIUM LACTATE, POTASSIUM CHLORIDE, AND CALCIUM CHLORIDE: .6; .31; .03; .02 INJECTION, SOLUTION INTRAVENOUS at 08:01

## 2018-12-17 NOTE — OP NOTE
OPERATIVE REPORT  PATIENT NAME: Kala Vincent    :  1950  MRN: [id number]  Pt Location:  OR ROOM 08    SURGERY DATE: 2018    Surgeon(s) and Role:     * Shaq Pelaez MD - Primary     * Jie Glover - Assisting    Preop and postoperative Diagnosis:  Bilateral hip pressure sores    Operative Procedure(s) (LRB):  DEBRIDE/FLAP CLOSURE HIP PRESSURE SORE W/SKIN GRAFT (Right)  DEBRIDEMENT HIP PRESSURE SORE (Left)    Operative history:  Patient had a an anterolateral thigh local flap to cover a right trochanteric pressure sore 1 year ago  This summer she sustained a left femur fracture and could not lie in her left hip  Consequently she developed wound breakdown over the right hip right through the previously placed local flap  At this time this was debrided and going down exposing the femur  Local options were limited  A tensor fascia alonso proximal based peninsular flap measuring 10 x 15 cm in size was rotated over the area bring good fat padding  The tip of the flap was somewhat cyanotic at the completion of the procedure  A skin graft was necessary over the vastus lateralis muscle at the site of the donor site  Operative procedure:  Patient was taken to the operating room placed supine a slightly elevated quarter left side to expose the right hip  Anesthesia was general via endotracheal tube  She was prepped and draped in the usual fashion  An incision was made around the eschar the right hip  Using the Bovie for cutting coagulation purposes this was taken down to a cavity underneath the previous flap  The somewhat soupy lining of that flap was totally removed as well as all necrotic tissue  Careful hemostasis was achieved with the Bovie  A flap was then designed using the anterior border as the previous scar from the anterior lateral thigh flap being based over the lateral thigh and tensor fascia alonso  The width was that of the defect    This was incised and at elevated using the Bovie cutting coagulation purposes taken of the fascia alonso back to the flap could be rotated into the defect  The intervening skin bridge between the flap and the defect had to be divided as it was too thick to be placed and an interpolation fashion  All flaps after hemostasis with the Bovie were inset with 3-0 nylon interrupted simple and vertical mattress sutures  3 draining site distally that was curretted a Bard fluted drain was placed  More distally and posteriorly a 2nd Bard drain was placed through a stab wound  These were sewn to the skin with a oa silk whipstitch  The flaps were advanced slightly to make sure the femur was covered anterior using 3-0 nylon horizontal half buried mattress sutures  A Bette electric dermatome was used to harvest a 14 1000 of an inch split-thickness skin graft from the right lateral thigh  The graft was meshed 1/2 to 1 and placed over the donor site of the flap using staples for fixation  A bulky dressing was then placed over the skin graft much like a stent being held in place with 2-0 silk simple sutures  Light dressings were placed around the flap edges  Patient was then turned quite low laterally to expose the left trochanteric pressure sore  Sharp debridement minimal necrotic tissue was performed  0 25% acetic acid dressings were placed on this area  The patient tolerated these procedures well taken to recovery in good condition    Specimen(s):  ID Type Source Tests Collected by Time Destination   3 : products of debridement right hip Tissue Wound TISSUE EXAM Devonte Razo MD 12/17/2018 4918    4 : products of debridement right hip Tissue Wound TISSUE EXAM Devonte Razo MD 12/17/2018 1127    A : right hip tissue    please do aerobic culture Tissue Wound ANAEROBIC CULTURE AND GRAM STAIN, CULTURE, TISSUE AND GRAM STAIN Devonte Razo MD 12/17/2018 8521    B : bone culture right hip Tissue Bone CULTURE, TISSUE AND GRAM STAIN Dafne Chain Elsy Tellez MD 12/17/2018 5699        Estimated Blood Loss:   275 mL    Drains:  Closed/Suction Drain Right Hip Bulb 10 Fr  (Active)   Number of days: 0       Closed/Suction Drain Right Hip Bulb 10 Fr  (Active)   Number of days: 0       Urethral Catheter Non-latex 14 Fr   (Active)   Amt returned on insertion(mL) 100 mL 12/16/2018  8:30 PM   Site Assessment Clean 12/16/2018  8:30 PM   Collection Container Standard drainage bag 12/16/2018  8:30 PM   Securement Method Securing device (Describe) 12/16/2018  8:30 PM   Number of days: 1         SIGNATURE: Luther Carlson MD  DATE: December 17, 2018  TIME: 12:54 PM

## 2018-12-17 NOTE — ASSESSMENT & PLAN NOTE
Anemia chronic disease with iron deficiency  Given Venofer and 2 unit PRBC  Hemoglobin stable  Unfortunately stool occult positive but patient declined gastroenterology evaluation    Will recheck heme occult

## 2018-12-17 NOTE — PROGRESS NOTES
Progress Note - Leonor  1950, 76 y o  female MRN: [id number]    Unit/Bed#: 5T -01 Encounter: [id number]    Primary Care Provider: Skip Claros   Date and time admitted to hospital: 12/13/2018  9:04 PM        Assessment and Plan  * Decubitus skin ulcer   Assessment & Plan    Appreciate infectious disease evaluation  Plastic surgery consulted as s/p surgery today     GERD (gastroesophageal reflux disease)   Assessment & Plan    GERD with cough  Patient's PCP at recommended medication however patient declined at this moment     Hyponatremia   Assessment & Plan    Hyponatremia  Improved with IV fluids  TSH within limits  A m  cortisol within limits  Anemia, chronic disease   Assessment & Plan    Anemia chronic disease with iron deficiency  Given Venofer and 2 unit PRBC  Hemoglobin stable  Unfortunately stool occult positive but patient declined gastroenterology evaluation  Will recheck heme occult     Constipation   Assessment & Plan    Continue senna     Chronic osteomyelitis of coccyx (HCC)   Assessment & Plan    Chronic osteomyelitis of coccyx  Continue on zosyn per ID     Hypokalemia   Assessment & Plan    Add potassium to IV fluids; recheck in am     Paraplegia following spinal cord injury [facility])   Assessment & Plan    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     VTE Pharmacologic Prophylaxis: Heparin  Discharge Plan / Estimated Discharge Date:   ______________________________________________________________________________    Subjective:   Patient seen and examined  Returns from [address] [place] [address]  Was noted to be hypotensive but hemoglobin appropriate after transfusion    Objective:   Vitals: Blood pressure 96/68, pulse 70, temperature 98 1 °F (36 7 °C), temperature source Temporal, resp  rate 18, height 5' (1 524 m), weight 52 3 kg (115 lb 4 8 oz), SpO2 95 %, not currently breastfeeding      Physical Exam:   General appearance: alert, appears stated age and cooperative  Head: atraumatic  Lungs: clear to auscultation bilaterally  Heart: regular rate and rhythm  Abdomen: soft, non-tender; bowel sounds normal; no masses,  no organomegaly  Extremities: contracted lower extremities  Neurologic: speech intact    Additional Data:   Labs:    Results from last 7 days  Lab Units 12/17/18  1546 12/17/18  0454 12/16/18  0511 12/15/18  0443 12/14/18  0519 12/13/18 2126   WBC Thousand/uL  --   --  9 90 11 60* 12 40* 13 30*   HEMOGLOBIN g/dL 11 4* 10 7* 7 0* 8 4* 7 7* 10 0*   HEMATOCRIT % 34 6* 33 3* 22 0* 25 7* 24 0* 31 5*   MCV fL  --   --  79* 79* 79* 80   BANDS PCT %  --   --   --  6  --   --    PLATELETS Thousands/uL  --   --  513* 465* 502* 696*   INR   --   --   --   --   --  1 07       Results from last 7 days  Lab Units 12/16/18  0511 12/14/18  0519 12/13/18 2126   SODIUM mmol/L 134* 130* 130*   POTASSIUM mmol/L 3 4* 4 0 4 3   CHLORIDE mmol/L 103 98 95*   CO2 mmol/L 26 27 26   ANION GAP mmol/L 5 5 9   BUN mg/dL 8 8 12   CREATININE mg/dL 0 56* 0 51* 0 51*   CALCIUM mg/dL 7 7* 8 0* 8 8   ALBUMIN g/dL 2 3*  --  3 6   TOTAL BILIRUBIN mg/dL 0 20  --  0 50   ALK PHOS U/L 100  --  158*   ALT U/L 35  --  73*   AST U/L 31  --  53*   EGFR ml/min/1 73sq m 96 99 99   GLUCOSE RANDOM mg/dL 103* 122* 133*       Results from last 7 days  Lab Units 12/14/18  0519 12/13/18 2126   MAGNESIUM mg/dL 2 2 2 3   PHOSPHORUS mg/dL 3 9  --        Results from last 7 days  Lab Units 12/13/18 2126   TROPONIN I ng/mL <0 01            Results from last 7 days  Lab Units 12/14/18  0527 12/13/18 2126   LACTIC ACID mmol/L  --  1 4   PROCALCITONIN ng/ml 0 35*  --                Results from last 7 days  Lab Units 12/16/18  0511   TSH 3RD GENERATON uIU/mL 2 920     * I Have Reviewed All Lab Data Listed Above  Cultures:     Results from last 7 days  Lab Units 12/17/18  0906 12/17/18  0857 12/15/18  0101 12/13/18 2127 12/13/18 2126   BLOOD CULTURE   --   --   --  No Growth at 72 hrs    No Growth at 72 hrs   --    GRAM STAIN RESULT  No Polys or Bacteria seen No Polys or Bacteria seen  --   --  1+ Polys  2+ Gram negative rods  3+ Gram positive cocci in pairs   URINE CULTURE   --   --  No Growth <1000 cfu/mL  --   --    WOUND CULTURE   --   --   --   --  2+ Growth of Corynebacterium striatum*  Growth in Broth culture only Staphylococcus coagulase negative*       Results from last 7 days  Lab Units 12/16/18  1844   FECAL OCCULT BLOOD DIAGNOSTIC  Positive*     Imaging:  Imaging Reports Reviewed Today Include:   No results found  Scheduled Meds:  Current Facility-Administered Medications:  acetaminophen 650 mg Oral Q6H PRN Ginette Mcintyre MD    cholecalciferol 1,000 Units Oral Daily Ginette Lenz MD    diphenhydrAMINE 25 mg Oral Q6H PRN Ginette Mcintyre MD    heparin (porcine) 5,000 Units Subcutaneous Q8H [facility] Ginette Lenz MD    multivitamin stress formula 1 tablet Oral Daily Deshaun Chandler DO    ondansetron 4 mg Intravenous Q6H PRN Ginette Lenz MD    oxyCODONE 5 mg Oral Q6H PRN Ginette Mcintyre MD    piperacillin-tazobactam 3 375 g Intravenous Q6H Nguyễn Rojas MD Last Rate: 3 375 g (12/17/18 0503)   senna 2 tablet Oral Daily Deshaun Chandler DO    sodium chloride 0 9 % with KCl 20 mEq/L 75 mL/hr Intravenous Continuous Jesus Urbano DO Last Rate: 75 mL/hr (12/17/18 1411)     Patient Centered Rounds: I have performed bedside rounds with nursing staff today  Discussions with Specialists or Other Care Team Provider:   Education and Discussions with Family / Patient:   Current Length of Stay: 4 day(s)  Current Patient Status: Inpatient   Certification Statement: The patient will continue to require additional inpatient hospital stay due to Decubitus skin ulcer  Code Status: Level 1 - Full Code  Time Spent for Care: 25 mins  More than 50% of total time spent on counseling and coordination of care as described above      DO Mica Pagan [facility] Internal Medicine  Hospitalist    ** Please Note: This note has been constructed using a voice recognition system   **

## 2018-12-17 NOTE — QUICK NOTE
Unable to see patient today and she has done in the OR for wound debridement  Noted that the patient's leukocytosis has resolved  She has not had any fevers over the weekend  Wound cultures largely with skin contaminant  Blood cultures remain without growth and fecal occult blood noted to be positive in the stool  No other acute events were noted overnight  Will continue the patient on Zosyn at this time pending surgical findings and OR cultures  Will formally re-evaluate the patient again tomorrow  Please call if any additional questions or concerns

## 2018-12-17 NOTE — ANESTHESIA POSTPROCEDURE EVALUATION
Post-Op Assessment Note      CV Status:  Stable    Mental Status:  Alert and awake    Hydration Status:  Euvolemic    PONV Controlled:  Controlled    Airway Patency:  Patent    Post Op Vitals Reviewed: Yes          Staff: CRNA           BP   103/58   Temp   97 6   Pulse  70   Resp   16   SpO2   97

## 2018-12-17 NOTE — PLAN OF CARE
PAIN - ADULT     Verbalizes/displays adequate comfort level or baseline comfort level Adequate for Discharge        Potential for Falls     Patient will remain free of falls Adequate for Discharge          DISCHARGE PLANNING     Discharge to home or other facility with appropriate resources Progressing        INFECTION - ADULT     Absence or prevention of progression during hospitalization Progressing        Knowledge Deficit     Patient/family/caregiver demonstrates understanding of disease process, treatment plan, medications, and discharge instructions Progressing        Nutrition/Hydration-ADULT     Nutrient/Hydration intake appropriate for improving, restoring or maintaining nutritional needs Progressing        Prexisting or High Potential for Compromised Skin Integrity     Skin integrity is maintained or improved Progressing        SAFETY ADULT     Maintain or return to baseline ADL function Progressing     Maintain or return mobility status to optimal level Progressing

## 2018-12-18 PROBLEM — E87.6 HYPOKALEMIA: Status: RESOLVED | Noted: 2018-09-19 | Resolved: 2018-12-18

## 2018-12-18 LAB
ANION GAP SERPL CALCULATED.3IONS-SCNC: 4 MMOL/L (ref 5–14)
BUN SERPL-MCNC: 12 MG/DL (ref 5–25)
CALCIUM SERPL-MCNC: 7.9 MG/DL (ref 8.4–10.2)
CHLORIDE SERPL-SCNC: 103 MMOL/L (ref 97–108)
CO2 SERPL-SCNC: 28 MMOL/L (ref 22–30)
CREAT SERPL-MCNC: 0.68 MG/DL (ref 0.6–1.2)
ERYTHROCYTE [DISTWIDTH] IN BLOOD BY AUTOMATED COUNT: 17.7 %
GFR SERPL CREATININE-BSD FRML MDRD: 90 ML/MIN/1.73SQ M
GLUCOSE SERPL-MCNC: 110 MG/DL (ref 70–99)
HCT VFR BLD AUTO: 25.6 % (ref 36–46)
HGB BLD-MCNC: 8.4 G/DL (ref 12–16)
MCH RBC QN AUTO: 27 PG (ref 26–34)
MCHC RBC AUTO-ENTMCNC: 32.7 G/DL (ref 31–36)
MCV RBC AUTO: 83 FL (ref 80–100)
PLATELET # BLD AUTO: 429 THOUSANDS/UL (ref 150–450)
PMV BLD AUTO: 7.9 FL (ref 8.9–12.7)
POTASSIUM SERPL-SCNC: 4.3 MMOL/L (ref 3.6–5)
RBC # BLD AUTO: 3.1 MILLION/UL (ref 4–5.2)
SODIUM SERPL-SCNC: 135 MMOL/L (ref 137–147)
WBC # BLD AUTO: 9.6 THOUSAND/UL (ref 4.5–11)

## 2018-12-18 PROCEDURE — 85027 COMPLETE CBC AUTOMATED: CPT | Performed by: INTERNAL MEDICINE

## 2018-12-18 PROCEDURE — 99232 SBSQ HOSP IP/OBS MODERATE 35: CPT | Performed by: INTERNAL MEDICINE

## 2018-12-18 PROCEDURE — 80048 BASIC METABOLIC PNL TOTAL CA: CPT | Performed by: INTERNAL MEDICINE

## 2018-12-18 PROCEDURE — 99233 SBSQ HOSP IP/OBS HIGH 50: CPT | Performed by: INTERNAL MEDICINE

## 2018-12-18 RX ADMIN — HEPARIN SODIUM 5000 UNITS: 5000 INJECTION, SOLUTION INTRAVENOUS; SUBCUTANEOUS at 14:49

## 2018-12-18 RX ADMIN — PIPERACILLIN AND TAZOBACTAM 3.38 G: 3; .375 INJECTION, POWDER, FOR SOLUTION INTRAVENOUS at 11:42

## 2018-12-18 RX ADMIN — PIPERACILLIN AND TAZOBACTAM 3.38 G: 3; .375 INJECTION, POWDER, FOR SOLUTION INTRAVENOUS at 23:27

## 2018-12-18 RX ADMIN — HEPARIN SODIUM 5000 UNITS: 5000 INJECTION, SOLUTION INTRAVENOUS; SUBCUTANEOUS at 06:12

## 2018-12-18 RX ADMIN — HEPARIN SODIUM 5000 UNITS: 5000 INJECTION, SOLUTION INTRAVENOUS; SUBCUTANEOUS at 21:14

## 2018-12-18 RX ADMIN — VITAMIN D, TAB 1000IU (100/BT) 1000 UNITS: 25 TAB at 08:58

## 2018-12-18 RX ADMIN — SENNOSIDES 17.2 MG: 8.6 TABLET, FILM COATED ORAL at 14:49

## 2018-12-18 RX ADMIN — PIPERACILLIN AND TAZOBACTAM 3.38 G: 3; .375 INJECTION, POWDER, FOR SOLUTION INTRAVENOUS at 17:56

## 2018-12-18 RX ADMIN — PIPERACILLIN AND TAZOBACTAM 3.38 G: 3; .375 INJECTION, POWDER, FOR SOLUTION INTRAVENOUS at 01:37

## 2018-12-18 RX ADMIN — PIPERACILLIN AND TAZOBACTAM 3.38 G: 3; .375 INJECTION, POWDER, FOR SOLUTION INTRAVENOUS at 06:11

## 2018-12-18 RX ADMIN — POTASSIUM CHLORIDE AND SODIUM CHLORIDE 75 ML/HR: 900; 150 INJECTION, SOLUTION INTRAVENOUS at 03:33

## 2018-12-18 RX ADMIN — B-COMPLEX W/ C & FOLIC ACID TAB 1 TABLET: TAB at 08:58

## 2018-12-18 NOTE — PROGRESS NOTES
Patient resting on left side  Flap appears 100% viable  All visible sutures intact  Dressings intact

## 2018-12-18 NOTE — ASSESSMENT & PLAN NOTE
Anemia chronic disease with iron deficiency  Given Venofer and 2 unit PRBC  Unfortunately stool occult positive but patient declined gastroenterology evaluation    Recheck heme occult pending

## 2018-12-18 NOTE — PROGRESS NOTES
Progress Note - Jose Manuel Mendoza 1950, 76 y o  female MRN: [id number]    Unit/Bed#: 5T -01 Encounter: [id number]    Primary Care Provider: Marilee Coronel   Date and time admitted to hospital: 12/13/2018  9:04 PM        Assessment and Plan  * Decubitus skin ulcer   Assessment & Plan    Appreciate infectious disease and plastic surgery evaluations  S/p debridement and skin grafting     GERD (gastroesophageal reflux disease)   Assessment & Plan    GERD with cough  Patient's PCP recommended medication however patient declined at this moment     Hyponatremia   Assessment & Plan    Hyponatremia  Improved with IV fluids  TSH and am cortisol within limits  Anemia, chronic disease   Assessment & Plan    Anemia chronic disease with iron deficiency  Given Venofer and 2 unit PRBC  Unfortunately stool occult positive but patient declined gastroenterology evaluation  Recheck heme occult pending     Constipation   Assessment & Plan    Continue senna     Chronic osteomyelitis of coccyx (HCC)   Assessment & Plan    Chronic osteomyelitis of coccyx  Continue zosyn per ID     Paraplegia following spinal cord injury [facility])   Assessment & Plan    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     Hypokalemiaresolved as of 12/18/2018   Assessment & Plan    REPLETED     VTE Pharmacologic Prophylaxis: Heparin  Discharge Plan / Estimated Discharge Date:   ______________________________________________________________________________    Subjective:   Patient seen and examined  No new complaints  Objective:   Vitals: Blood pressure 108/59, pulse 62, temperature 97 8 °F (36 6 °C), temperature source Tympanic, resp  rate 18, height 5' (1 524 m), weight 53 3 kg (117 lb 8 1 oz), SpO2 93 %, not currently breastfeeding      Physical Exam:   General appearance: alert, appears stated age and cooperative  Head: Normocephalic, without obvious abnormality, atraumatic  Lungs: diminished breath sounds  Heart: regular rate and rhythm  Abdomen: soft, non-tender, positive bowel sounds   Extremities: No edema lower extremities  Neurologic:  Speech and cranial nerves grossly intact    Additional Data:   Labs:    Results from last 7 days  Lab Units 12/18/18  0435 12/17/18  1546 12/17/18  0454 12/16/18  0511 12/15/18  0443 12/14/18  0519 12/13/18  2126   WBC Thousand/uL 9 60  --   --  9 90 11 60* 12 40* 13 30*   HEMOGLOBIN g/dL 8 4* 11 4* 10 7* 7 0* 8 4* 7 7* 10 0*   HEMATOCRIT % 25 6* 34 6* 33 3* 22 0* 25 7* 24 0* 31 5*   MCV fL 83  --   --  79* 79* 79* 80   BANDS PCT %  --   --   --   --  6  --   --    PLATELETS Thousands/uL 429  --   --  513* 465* 502* 696*   INR   --   --   --   --   --   --  1 07       Results from last 7 days  Lab Units 12/18/18  0702 12/16/18  0511 12/14/18  0519 12/13/18  2126   SODIUM mmol/L 135* 134* 130* 130*   POTASSIUM mmol/L 4 3 3 4* 4 0 4 3   CHLORIDE mmol/L 103 103 98 95*   CO2 mmol/L 28 26 27 26   ANION GAP mmol/L 4* 5 5 9   BUN mg/dL 12 8 8 12   CREATININE mg/dL 0 68 0 56* 0 51* 0 51*   CALCIUM mg/dL 7 9* 7 7* 8 0* 8 8   ALBUMIN g/dL  --  2 3*  --  3 6   TOTAL BILIRUBIN mg/dL  --  0 20  --  0 50   ALK PHOS U/L  --  100  --  158*   ALT U/L  --  35  --  73*   AST U/L  --  31  --  53*   EGFR ml/min/1 73sq m 90 96 99 99   GLUCOSE RANDOM mg/dL 110* 103* 122* 133*       Results from last 7 days  Lab Units 12/14/18  0519 12/13/18 2126   MAGNESIUM mg/dL 2 2 2 3   PHOSPHORUS mg/dL 3 9  --        Results from last 7 days  Lab Units 12/13/18  2126   TROPONIN I ng/mL <0 01            Results from last 7 days  Lab Units 12/14/18  0527 12/13/18 2126   LACTIC ACID mmol/L  --  1 4   PROCALCITONIN ng/ml 0 35*  --                Results from last 7 days  Lab Units 12/16/18  0511   TSH 3RD GENERATON uIU/mL 2 920     * I Have Reviewed All Lab Data Listed Above      Cultures:     Results from last 7 days  Lab Units 12/17/18  0906 12/17/18  0857 12/15/18  0101 12/13/18 2127 12/13/18 2126   BLOOD CULTURE   --   --   -- No Growth After 4 Days  No Growth After 4 Days  --    GRAM STAIN RESULT  No Polys or Bacteria seen No Polys or Bacteria seen  --   --  1+ Polys  2+ Gram negative rods  3+ Gram positive cocci in pairs   URINE CULTURE   --   --  No Growth <1000 cfu/mL  --   --    WOUND CULTURE   --   --   --   --  2+ Growth of Corynebacterium striatum*  Growth in Broth culture only Staphylococcus coagulase negative*       Results from last 7 days  Lab Units 12/16/18  1844   FECAL OCCULT BLOOD DIAGNOSTIC  Positive*     Imaging:  Imaging Reports Reviewed Today Include:   No results found  Scheduled Meds:  Current Facility-Administered Medications:  acetaminophen 650 mg Oral Q6H PRN Ginette Zavala MD    cholecalciferol 1,000 Units Oral Daily Ginette Lenz MD    diphenhydrAMINE 25 mg Oral Q6H PRN Ginette Zavala MD    heparin (porcine) 5,000 Units Subcutaneous Q8H [address] Bilkoki Lenz MD    multivitamin stress formula 1 tablet Oral Daily Deshaun Chandler DO    ondansetron 4 mg Intravenous Q6H PRN Ginette Lenz MD    oxyCODONE 5 mg Oral Q6H PRN Ginette Zavala MD    piperacillin-tazobactam 3 375 g Intravenous Q6H Patty Silva MD Last Rate: Stopped (12/18/18 1242)   senna 2 tablet Oral Daily Eze August DO      Patient Centered Rounds: I have performed bedside rounds with nursing staff today  Discussions with Specialists or Other Care Team Provider:  ID  Education and Discussions with Family / Patient:   Current Length of Stay: 5 day(s)  Current Patient Status: Inpatient   Certification Statement: The patient will continue to require additional inpatient hospital stay due to Decubitus skin ulcer  Code Status: Level 1 - Full Code  Time Spent for Care: 30 mins  More than 50% of total time spent on counseling and coordination of care as described above  DO Jose Manuel Guerin Internal Medicine  Hospitalist    ** Please Note: This note has been constructed using a voice recognition system   **

## 2018-12-18 NOTE — PROGRESS NOTES
Progress Note - Infectious Disease   Wyatt Bee 76 y o  female MRN: [id number]  Unit/Bed#:  -01 Encounter: [id number]      Impression/Plan:  1  Pressure ulcer of the right hip with flap necrosis:  Patient is status post wound debridement of her right hip the OR  Reviewed images with Radiology and no signs of changes of osteomyelitis on the right hip  Reviewed OR findings with plastics though the bone was visualized on debridement there was no concern for bone being infected at this time  OR cultures are pending  Blood cultures are without growth  Patient otherwise hemodynamically stable and without leukocytosis  -will continue Zosyn for now pending OR cultures  -will continue to monitor CBC and creatinine  -will continue to monitor fever curve/vitals  -will ultimately plan to treat this site as skin and soft tissue infection    2  Pressure ulcer of the sacrum and chronic osteomyelitis of the coccyx:   based on patient's imaging along with exam this is likely a chronic osteomyelitis with a draining sinus  The site itself did not appear acutely infected on initial evaluation  After discussion with plastics there is plan for possible skin grafting to this site in the near future   -will continue antibiotics above for right hip wound  -if flap closure is undertaken on this admission for this ulcer would recommend bone biopsy and will plan for prolonged IV antibiotic course after closure  -would continue to monitor this site clinically for now  -continue local care as per plastics    3  Decubitus ulcer of left hip:  Ulcer on the patient's right hip was clearly infected on admission  Left hip wound appeared stable on exam initially  CT was without findings consistent for osteo at this site  It is unclear at this time the timeline for closure for this wound    If closure is undertaken on this admission will need to evaluate OR findings to determine course of antibiotics for this issue   -for now will continue antibiotics as above for problem 1   -continue local care as per plastics        4  Displaced femur fracture status post jony placement:  Patient was noted with a prior hematoma at this area  She reports that the overall lesion decreased in size and now is left with a small punctate lesion that seems to be draining some purulent material   Unclear if there is any deeper infection at this site or if this is just a superficial skin and soft tissue infection   -continue antibiotics as above  -will follow this site clinically  -if there is progression would recommend orthopedic evaluation imaging  -continue to trend fever curve/vitals as above  -continue monitor CBC and chemistry above     5  Paraplegia:  Patient at baseline is wheelchair bound and provides for ongoing care at home  If she is ultimately planned for skin grafting would discuss possibility ostomy with the patient to promote further wound healing      Case reviewed with Radiology and plastics  Above plan discussed in detail with medicine, plastics and the patient  ID consult service will continue to follow  24 hr events:  Patient was taken to the operating room given the significant wound breakdown of her right hip flap  For operative report debridement of necrotic tissue and drainage was taken down to the femur  New flap was put into place  Operative cultures collected  Re-evaluation by Plastic this morning notes a viable graft  Patient noted to be afebrile overnight  White blood cell count 9 6  OR cultures pending  Patient's other vitals are stable    Antibiotics:  Zosyn 5    Subjective:  Patient patient denies having any nausea, vomiting, chest pain or shortness of breath  She denies having significant pain at her surgical site  She is tolerating p o  She notes some mild pain at her other wounds sites  She denies any issues with the current antibiotics including rash or diarrhea      Objective:  Vitals:  Temp:  [96 7 °F (35 9 °C)-120 °F (48 9 °C)] 97 4 °F (36 3 °C)  HR:  [59-74] 74  Resp:  [16-22] 18  BP: ()/(45-68) 93/52  SpO2:  [94 %-98 %] 97 %  Temp (24hrs), Av 7 °F (37 6 °C), Min:96 7 °F (35 9 °C), Max:120 °F (48 9 °C)  Current: Temperature: (!) 97 4 °F (36 3 °C)    Physical Exam:   General Appearance:  Alert, interactive, nontoxic, no acute distress  Throat: Oropharynx moist without lesions  Lungs:   Clear to auscultation bilaterally; no wheezes, rhonchi or rales; respirations unlabored   Heart:  RRR; no murmur, rub or gallop   Abdomen:   Soft, non-tender, non-distended, positive bowel sounds  Neuro: Patient is alert and oriented x3  No cranial nerve deficits noted on exam   She has no movement in her lower extremities bilaterally  She is able to freely move her upper extremities against gravity  Extremities: No clubbing, cyanosis or edema   Skin: Patient's right hip surgical site noted with healthy healing tissue  CECI drains with serosanguineous drainage  Suture sites appear intact without any drainage  Labs, Imaging, & Other studies:   All pertinent labs and imaging studies were personally reviewed    Results from last 7 days  Lab Units 18  0435 18  1546 18  0454 18  0511 12/15/18  0443   WBC Thousand/uL 9 60  --   --  9 90 11 60*   HEMOGLOBIN g/dL 8 4* 11 4* 10 7* 7 0* 8 4*   PLATELETS Thousands/uL 429  --   --  513* 465*       Results from last 7 days  Lab Units 18  0702 18  0511  18  2126   POTASSIUM mmol/L 4 3 3 4*  < > 4 3   CHLORIDE mmol/L 103 103  < > 95*   CO2 mmol/L 28 26  < > 26   BUN mg/dL 12 8  < > 12   CREATININE mg/dL 0 68 0 56*  < > 0 51*   EGFR ml/min/1 73sq m 90 96  < > 99   CALCIUM mg/dL 7 9* 7 7*  < > 8 8   AST U/L  --  31  --  53*   ALT U/L  --  35  --  73*   ALK PHOS U/L  --  100  --  158*   < > = values in this interval not displayed      Results from last 7 days  Lab Units 18  0906 18  0857 12/15/18  0101 18  9713 12/13/18 2126   BLOOD CULTURE   --   --   --  No Growth at 72 hrs    No Growth at 72 hrs   --    GRAM STAIN RESULT  No Polys or Bacteria seen No Polys or Bacteria seen  --   --  1+ Polys  2+ Gram negative rods  3+ Gram positive cocci in pairs   URINE CULTURE   --   --  No Growth <1000 cfu/mL  --   --    WOUND CULTURE   --   --   --   --  2+ Growth of Corynebacterium striatum*  Growth in Broth culture only Staphylococcus coagulase negative*

## 2018-12-18 NOTE — UTILIZATION REVIEW
Continued Stay Review    Date: 12/18/18     Vital Signs: /55 (BP Location: Right arm)   Pulse 90   Temp 97 8 °F (36 6 °C) (Temporal)   Resp 18   Ht 5' (1 524 m)   Wt 53 3 kg (117 lb 8 1 oz)   LMP  (LMP Unknown)   SpO2 91%   BMI 22 95 kg/m²     Medications:   Scheduled Meds:   Current Facility-Administered Medications:  acetaminophen 650 mg Oral Q6H PRN   cholecalciferol 1,000 Units Oral Daily   diphenhydrAMINE 25 mg Oral Q6H PRN   heparin (porcine) 5,000 Units Subcutaneous Q8H [facility]   multivitamin stress formula 1 tablet Oral Daily   ondansetron 4 mg Intravenous Q6H PRN   oxyCODONE 5 mg Oral Q6H PRN   piperacillin-tazobactam 3 375 g Intravenous Q6H   senna 2 tablet Oral Daily       Abnormal Labs/Diagnostic Results:     Age/Sex: 76 y o  female     Assessment/Plan:       * Decubitus skin ulcer   Assessment & Plan     S/p debridement and skin grafting 12/17              Anemia, chronic disease   Assessment & Plan     Anemia chronic disease with iron deficiency  Given Venofer and 2 unit PRBC  Unfortunately stool occult positive but patient declined gastroenterology evaluation  Recheck heme occult pending  Chronic osteomyelitis of coccyx [facility])   Assessment & Plan     Chronic osteomyelitis of coccyx  Continue zosyn per ID       12/18 Infectious Disease Progress Note:     Continue Zosyn pending OR cultures, monitor CBC and creatinine, fever curve/vitals  12/17 Operative Note:    Operative Procedure(s) (LRB):  DEBRIDE/FLAP CLOSURE HIP PRESSURE SORE W/SKIN GRAFT (Right)  DEBRIDEMENT HIP PRESSURE SORE (Left)      Operative history:  Patient had a an anterolateral thigh local flap to cover a right trochanteric pressure sore 1 year ago  This summer she sustained a left femur fracture and could not lie in her left hip  Consequently she developed wound breakdown over the right hip right through the previously placed local flap  At this time this was debrided and going down exposing the femur    Local options were limited  A tensor fascia alonso proximal based peninsular flap measuring 10 x 15 cm in size was rotated over the area bring good fat padding  The tip of the flap was somewhat cyanotic at the completion of the procedure  A skin graft was necessary over the vastus lateralis muscle at the site of the donor site  Discharge Plan: TBD          [address]  Utilization Review Department  Phone: [phone]; Fax [phone]  [email or web address]  ATTENTION: Please call with any questions or concerns to [phone]  and carefully listen to the prompts so that you are directed to the right person  Send all requests for admission clinical reviews, approved or denied determinations and any other requests to fax [phone]   All voicemails are confidential

## 2018-12-18 NOTE — CONSULTS
Nutrition consult ordered for pt 11/17/18  Full nutrition assessment completed 11/14/18  Nutrition services following pt as high nutrition risk

## 2018-12-19 ENCOUNTER — APPOINTMENT (INPATIENT)
Dept: RADIOLOGY | Facility: HOSPITAL | Age: 68
DRG: 573 | End: 2018-12-19
Payer: COMMERCIAL

## 2018-12-19 ENCOUNTER — APPOINTMENT (INPATIENT)
Dept: INTERVENTIONAL RADIOLOGY/VASCULAR | Facility: HOSPITAL | Age: 68
DRG: 573 | End: 2018-12-19
Payer: COMMERCIAL

## 2018-12-19 LAB
ALBUMIN SERPL BCP-MCNC: 2.6 G/DL (ref 3–5.2)
ALP SERPL-CCNC: 94 U/L (ref 43–122)
ALT SERPL W P-5'-P-CCNC: 33 U/L (ref 9–52)
ANION GAP SERPL CALCULATED.3IONS-SCNC: 4 MMOL/L (ref 5–14)
AST SERPL W P-5'-P-CCNC: 29 U/L (ref 14–36)
BACTERIA BLD CULT: NORMAL
BACTERIA BLD CULT: NORMAL
BILIRUB SERPL-MCNC: 0.2 MG/DL
BUN SERPL-MCNC: 17 MG/DL (ref 5–25)
CALCIUM SERPL-MCNC: 8 MG/DL (ref 8.4–10.2)
CHLORIDE SERPL-SCNC: 101 MMOL/L (ref 97–108)
CO2 SERPL-SCNC: 29 MMOL/L (ref 22–30)
CREAT SERPL-MCNC: 0.6 MG/DL (ref 0.6–1.2)
ERYTHROCYTE [DISTWIDTH] IN BLOOD BY AUTOMATED COUNT: 18.4 %
GFR SERPL CREATININE-BSD FRML MDRD: 94 ML/MIN/1.73SQ M
GLUCOSE SERPL-MCNC: 114 MG/DL (ref 70–99)
HCT VFR BLD AUTO: 30.5 % (ref 36–46)
HGB BLD-MCNC: 9.8 G/DL (ref 12–16)
MCH RBC QN AUTO: 26.4 PG (ref 26–34)
MCHC RBC AUTO-ENTMCNC: 32.1 G/DL (ref 31–36)
MCV RBC AUTO: 82 FL (ref 80–100)
MRSA NOSE QL CULT: NORMAL
PLATELET # BLD AUTO: 515 THOUSANDS/UL (ref 150–450)
PMV BLD AUTO: 7.6 FL (ref 8.9–12.7)
POTASSIUM SERPL-SCNC: 4.3 MMOL/L (ref 3.6–5)
PROT SERPL-MCNC: 5.8 G/DL (ref 5.9–8.4)
RBC # BLD AUTO: 3.71 MILLION/UL (ref 4–5.2)
SODIUM SERPL-SCNC: 134 MMOL/L (ref 137–147)
WBC # BLD AUTO: 12.1 THOUSAND/UL (ref 4.5–11)

## 2018-12-19 PROCEDURE — 02HV33Z INSERTION OF INFUSION DEVICE INTO SUPERIOR VENA CAVA, PERCUTANEOUS APPROACH: ICD-10-PCS | Performed by: INTERNAL MEDICINE

## 2018-12-19 PROCEDURE — 80053 COMPREHEN METABOLIC PANEL: CPT | Performed by: INTERNAL MEDICINE

## 2018-12-19 PROCEDURE — 36569 INSJ PICC 5 YR+ W/O IMAGING: CPT

## 2018-12-19 PROCEDURE — 99232 SBSQ HOSP IP/OBS MODERATE 35: CPT | Performed by: INTERNAL MEDICINE

## 2018-12-19 PROCEDURE — 76937 US GUIDE VASCULAR ACCESS: CPT

## 2018-12-19 PROCEDURE — 71045 X-RAY EXAM CHEST 1 VIEW: CPT

## 2018-12-19 PROCEDURE — C1751 CATH, INF, PER/CENT/MIDLINE: HCPCS

## 2018-12-19 PROCEDURE — 85027 COMPLETE CBC AUTOMATED: CPT | Performed by: INTERNAL MEDICINE

## 2018-12-19 RX ORDER — LIDOCAINE HYDROCHLORIDE 10 MG/ML
INJECTION, SOLUTION INFILTRATION; PERINEURAL CODE/TRAUMA/SEDATION MEDICATION
Status: COMPLETED | OUTPATIENT
Start: 2018-12-19 | End: 2018-12-19

## 2018-12-19 RX ADMIN — LIDOCAINE HYDROCHLORIDE 5 ML: 10 INJECTION, SOLUTION INFILTRATION; PERINEURAL at 08:48

## 2018-12-19 RX ADMIN — PIPERACILLIN AND TAZOBACTAM 3.38 G: 3; .375 INJECTION, POWDER, FOR SOLUTION INTRAVENOUS at 12:17

## 2018-12-19 RX ADMIN — B-COMPLEX W/ C & FOLIC ACID TAB 1 TABLET: TAB at 08:14

## 2018-12-19 RX ADMIN — HEPARIN SODIUM 5000 UNITS: 5000 INJECTION, SOLUTION INTRAVENOUS; SUBCUTANEOUS at 06:26

## 2018-12-19 RX ADMIN — PIPERACILLIN AND TAZOBACTAM 3.38 G: 3; .375 INJECTION, POWDER, FOR SOLUTION INTRAVENOUS at 06:26

## 2018-12-19 RX ADMIN — VITAMIN D, TAB 1000IU (100/BT) 1000 UNITS: 25 TAB at 08:14

## 2018-12-19 RX ADMIN — ENOXAPARIN SODIUM 40 MG: 40 INJECTION SUBCUTANEOUS at 13:29

## 2018-12-19 RX ADMIN — PIPERACILLIN AND TAZOBACTAM 3.38 G: 3; .375 INJECTION, POWDER, FOR SOLUTION INTRAVENOUS at 17:32

## 2018-12-19 RX ADMIN — SENNOSIDES 8.6 MG: 8.6 TABLET, FILM COATED ORAL at 13:29

## 2018-12-19 NOTE — DISCHARGE INSTRUCTIONS
How to Care for Your Peripherally Inserted Central Catheter   AMBULATORY CARE:   A peripherally inserted central catheter (PICC)  is an IV placed into a large blood vessel near your heart  It is usually inserted through a blood vessel in your arm  Your PICC may have multiple ports  Ports are tubes where you can inject medicine  A PICC can stay in place for several weeks or months  You may need a PICC to get nutrition, medicine, or fluids  Blood samples can be removed from your PICC and sent to the lab for tests  How a PICC is inserted:   · You may be given local anesthesia to numb the area  With local anesthesia you may feel pressure or pushing, but you should not feel any pain  Your healthcare provider will place a tight band around your arm  This helps your healthcare provider see your veins  Your healthcare provider will insert a needle into a vein in your arm  He will guide a wire through the needle into a larger vein near your heart  He may use ultrasound or x-ray pictures to place the wire in the correct place  When the wire is in the correct place, he will move a catheter over the wire  · The needle and wire will be removed, and the catheter will be left in your vein  Healthcare providers may secure the catheter to your skin with a securement device or stitches  A bandage will be placed over your catheter  Your arm may be bruised, swollen, or sore after the procedure  This should get better in a few days  Rest as directed after your procedure  Do not lift anything heavier than 5 pounds  Do not bathe, swim, or drive until your healthcare provider says it is okay  Call 911 for any of the following:   · You feel lightheaded, short of breath, and have chest pain  · You cough up blood  · You have trouble breathing  Seek care immediately if:   · Blood soaks through your bandage  · Your arm or leg feels warm, tender, and painful  It may look swollen and red  · You have trouble moving your arm  · Your catheter falls out  Contact your healthcare provider if:   · You have a fever or swelling, redness, pain, or pus where the catheter was inserted  · You cannot flush your catheter, or you feel pain when you flush your catheter  · You see a hole or crack in the tubing of your catheter  · You see fluid leaking from the insertion site  · You run out of supplies to care for your catheter  · You have questions or concerns about your condition or care  How to change your bandage and clean your skin:  Change your bandage every 7 days or as directed  Change the bandage any time it becomes wet, dirty, or moves out of place  Keep your catheter covered with a bandage at all times  · Get a bandage kit and place it on a clean surface  · Wash your hands with soap and water or use an alcohol-based hand rub  · Put on clean gloves and a mask  If someone else is helping you, that person also needs to wear a mask and gloves  · Carefully remove the old bandage and securement device  Remove your gloves and throw them away  · Wash your hands with soap and water or use an alcohol-based hand rub  · Open the bandage kit with the folded side facing up  Carefully unfold the corners of the bandage kit  Do not touch anything inside of the bandage kit  Everything inside of the bandage kit is sterile  ·  each glove by the folded part and put them on  Do not touch the outside of the gloves with your bare hand  Do not let them touch anything that is not sterile  · Open the cleaning pads and lay them on the bandage kit  · Use the cleaning pads to scrub the area where the catheter is inserted into your skin (insertion site)  Scrub the area around the insertion site as directed  Start at the insertion site and clean outward from it in circles  · Clean the tubing that comes out of your skin as directed  · Place the pad that fits around the insertion site as directed   Place the securement device around your catheter as directed  · Apply the bandage as directed  If the bandage is clear, make sure you can see the insertion site  How to care for caps and tubing:   · Clean the injection cap before and after each use  Wash your hands and put on gloves before you clean each injection cap  Hold the catheter above the cap with 1 hand  Scrub the injection cap with an alcohol pad for 15 seconds  · Change the injection caps every 3 to 7 days or as directed  Wash your hands and put on gloves before you change the caps  If there are clamps on your catheter, close the clamps on each port  Twist the caps to remove them from the end of each port  Scrub the end of each port with an alcohol pad for 15 seconds  Place a new cap on the end of each port  Your healthcare provider may tell you to place protective caps over the injection caps  The caps will protect your catheter from infection when it is not being used  · Change and clean the medicine tubing as directed  You may need to attach extra tubing to your catheter when you get medicine  Ask your healthcare provider how often to change medicine tubing  Wash your hands and put on gloves before you touch medicine tubing  Wipe the end of the tubing with an alcohol wipe before you attach it the injection cap  Always place a cap over the end of medicine tubing when you are not using it  How to flush your PICC:  Your healthcare provider will tell you how often to flush your catheter  He will also tell you how much saline or heparin to flush the catheter with  Always flush your catheter before and after you get medicine through it  Do the following to flush your catheter:  · Wash your hands with soap and water or an alcohol-based hand rub  Put on clean gloves  · Scrub the injection cap with an alcohol pad for 15 seconds  · Attach the saline or heparin syringe to the injection cap  Open the clamp if your catheter has one       · Slowly push on the plunger of the syringe to flush your catheter  Do not force the saline or heparin into your catheter  This could damage the catheter or your vein  Call your healthcare provider if you cannot flush your catheter  · Detach the syringe and throw it away  Scrub the injection cap with an alcohol pad for 15 seconds  Prevent a bloodstream infection:   · Wash your hands often  Wash your hands before and after you touch your catheter  Use soap and water or an alcohol-based hand rub  Tell others to wash their hands before and after they visit  This will decrease germs in your home  · Limit contact with your catheter  Only touch your catheter when you need to give yourself medicine or clean it  Do not let others touch your catheter or medicine tubing  · Keep the tubing clamped when not in use  This will prevent air and water from getting into your catheter  · Do not swim or take a bath  These actions can cause germs to get into your catheter  · Cover your catheter with a waterproof cover before you shower  Ask your healthcare provider where to buy a waterproof cover  He may instead tell you to place a plastic bag or wrap over your catheter  Keep your catheter out of the water as much as possible  Change the bandage if it gets wet  · Check your catheter every day for signs of infection  Look for redness, swelling, pus, or fluid  Report any pain at the insertion site or signs of infection to your healthcare provider right away  Self-care:   · Ask your healthcare provider which activities are safe to do  Do not lift anything heavier than 10 pounds with your arm that has the catheter  · Drink plenty of liquids  Liquids will help prevent dehydration and blood clots  Ask your healthcare provider how much liquid to drink each day and which liquids are best for you  · Tell healthcare providers that you have a catheter    Tell them not to do, IVs, blood draws, and blood pressure readings in the arm with your catheter  Do not allow flu shots or vaccinations in the arm with your catheter  Follow up with your healthcare provider as directed:  Write down your questions so you remember to ask them during your visits  © 2017 [address] [employer] Information is for End User's use only and may not be sold, redistributed or otherwise used for commercial purposes  All illustrations and images included in CareNotes® are the copyrighted property of [employer]  or [employer]  The above information is an  only  It is not intended as medical advice for individual conditions or treatments  Talk to your doctor, nurse or pharmacist before following any medical regimen to see if it is safe and effective for you

## 2018-12-19 NOTE — ASSESSMENT & PLAN NOTE
Anemia chronic disease with iron deficiency  Given Venofer and 2 unit PRBC  Unfortunately stool occult positive but patient declined gastroenterology evaluation  Hemoglobin stabilized    Recheck hemoccult pending

## 2018-12-19 NOTE — ASSESSMENT & PLAN NOTE
Decubitus skin ulcer, multiple  Appreciate infectious disease and plastic surgery evaluations  S/p debridement of bilateral hips and flap of right hip    On zosyn per ID

## 2018-12-19 NOTE — PROCEDURES
PICC Line Insertion  Date/Time: 12/19/2018 9:01 AM  Performed by: Rob Ross by: Alysha Luna     Patient location:  Bedside  Consent:     Consent obtained:  Written    Consent given by:  Patient  Universal protocol:     Procedure explained and questions answered to patient or proxy's satisfaction: yes      Relevant documents present and verified: yes      Test results available and properly labeled: yes      Radiology Images displayed and confirmed  If images not available, report reviewed: yes      Required blood products, implants, devices, and special equipment available: yes      Site/side marked: yes      Immediately prior to procedure, a time out was called: yes      Patient identity confirmed:  Verbally with patient, arm band and hospital-assigned identification number  Pre-procedure details:     Hand hygiene: Hand hygiene performed prior to insertion      Sterile barrier technique: All elements of maximal sterile technique followed      Skin preparation:  ChloraPrep    Skin preparation agent: Skin preparation agent completely dried prior to procedure    Indications:     PICC line indications: long term antibiotics    Procedure details:     Location:  [place]    Vessel type: vein      Laterality:  Left    Site selection rationale:  Per patient    Approach: percutaneous technique used      Procedural supplies:  Single lumen    Catheter size:  4 Fr    Landmarks identified: yes      Ultrasound guidance: yes      Sterile ultrasound techniques: Sterile gel and sterile probe covers were used      Number of attempts:  1    Successful placement: yes      Vessel of catheter tip end:  Chest Xray needed to confirm placement    Total catheter length (cm):  45    Catheter out on skin (cm):  5    Arm circumference:  24  Post-procedure details:     Post-procedure:  Dressing applied    Assessment:  Blood return through all ports    Patient tolerance of procedure:   Tolerated well, no immediate complications

## 2018-12-19 NOTE — PROGRESS NOTES
Progress Note - Infectious Disease   Glenys Caballero 76 y o  female MRN: [id number]  Unit/Bed#: 5T -01 Encounter: [id number]      Impression/Plan:  1  Pressure ulcer of the right hip with flap necrosis:  Patient is status post wound debridement of her right hip the OR  Reviewed images with Radiology and no signs of changes of osteomyelitis on the right hip  Reviewed OR findings with plastics though the bone was visualized on debridement there was no concern for bone being infected at this time  OR cultures are pending  Blood cultures are without growth  Patient otherwise hemodynamically stable and without leukocytosis  -will continue Zosyn for now pending OR cultures  -will continue to monitor CBC and creatinine  -will continue to monitor fever curve/vitals  -will ultimately plan to treat this site as skin and soft tissue infection with 10 days of antibiotics      2  Pressure ulcer of the sacrum and chronic osteomyelitis of the coccyx:   based on patient's imaging along with exam this is likely a chronic osteomyelitis with a draining sinus  The site itself did not appear acutely infected on initial evaluation  After discussion with plastics there is plan for possible skin grafting to this site in the near future   -will continue antibiotics above for right hip wound  -if flap closure is undertaken on this admission for this ulcer would recommend bone biopsy and will plan for prolonged IV antibiotic course prior to after closure  -would continue to monitor this site clinically for now  -continue local care as per plastics     3  Decubitus ulcer of left hip:  Ulcer on the patient's right hip was clearly infected on admission  Left hip wound appeared stable on exam initially  CT was without findings consistent for osteo at this site  It is unclear at this time the timeline for closure for this wound    If closure is undertaken on this admission will need to evaluate OR findings to determine course of antibiotics for this issue   -for now will continue antibiotics as above for problem 1   -continue local care as per plastics        4  Displaced femur fracture status post jony placement:  Patient was noted with a prior hematoma at this area   She reports that the overall lesion decreased in size and now is left with a small punctate lesion that seems to be draining   Unclear if there is any deeper infection at this site or if this is just a superficial skin and soft tissue infection   -continue antibiotics as above  -will follow this site clinically  -if there is progression would recommend orthopedic evaluation imaging  -continue to trend fever curve/vitals as above  -continue monitor CBC and chemistry above     5  Paraplegia:  Patient at baseline is wheelchair bound and provides for ongoing care at home   If she is ultimately planned for skin grafting would discuss possibility ostomy with the patient to promote further wound healing      Above plan reviewed in detail with the patient      ID consult service will continue to follow  Antibiotics:  Zosyn 6    24 hr events:  No acute events noted overnight  Patient remains afebrile  White blood cell count of 12 1  Wound cultures remain without growth  No new images  Patient's other vitals are stable    Subjective:  Patient reports having intermittent chills and sweats which she believes is due to pain  She is unable to sense pain in her lower extremity  She otherwise denies any nausea, vomiting, diarrhea, shortness of breath or rash  She is tolerating antibiotics without other issue      Objective:  Vitals:  Temp:  [97 5 °F (36 4 °C)-98 3 °F (36 8 °C)] 97 5 °F (36 4 °C)  HR:  [62-97] 90  Resp:  [18-20] 18  BP: ()/(55-59) 92/55  SpO2:  [91 %-98 %] 98 %  Temp (24hrs), Av 9 °F (36 6 °C), Min:97 5 °F (36 4 °C), Max:98 3 °F (36 8 °C)  Current: Temperature: 97 5 °F (36 4 °C)    Physical Exam:   General Appearance:  Alert, interactive, nontoxic, no acute distress  Throat: Oropharynx moist without lesions  Lungs:   Clear to auscultation bilaterally; no wheezes, rhonchi or rales; respirations unlabored   Heart:  Tachycardic; no murmur, rub or gallop   Abdomen:   Soft, non-tender, non-distended, positive bowel sounds  Extremities: No clubbing, cyanosis or edema; lower extremities remain contracted   Skin: No new rashes or lesions  No new draining wounds noted  Patient's surgical site with sutures noted to be intact and without any drainage or erythema  Majority of the wound is covered still by surgical dressing  Labs, Imaging, & Other studies:   All pertinent labs and imaging studies were personally reviewed    Results from last 7 days  Lab Units 12/19/18  0511 12/18/18  0435 12/17/18  1546  12/16/18  0511   WBC Thousand/uL 12 10* 9 60  --   --  9 90   HEMOGLOBIN g/dL 9 8* 8 4* 11 4*  < > 7 0*   PLATELETS Thousands/uL 515* 429  --   --  513*   < > = values in this interval not displayed  Results from last 7 days  Lab Units 12/19/18  0511  12/16/18  0511  12/13/18 2126   POTASSIUM mmol/L 4 3  < > 3 4*  < > 4 3   CHLORIDE mmol/L 101  < > 103  < > 95*   CO2 mmol/L 29  < > 26  < > 26   BUN mg/dL 17  < > 8  < > 12   CREATININE mg/dL 0 60  < > 0 56*  < > 0 51*   EGFR ml/min/1 73sq m 94  < > 96  < > 99   CALCIUM mg/dL 8 0*  < > 7 7*  < > 8 8   AST U/L 29  --  31  --  53*   ALT U/L 33  --  35  --  73*   ALK PHOS U/L 94  --  100  --  158*   < > = values in this interval not displayed  Results from last 7 days  Lab Units 12/17/18  0906 12/17/18  0857 12/16/18  1844 12/15/18  0101 12/13/18 2127 12/13/18 2126   BLOOD CULTURE   --   --   --   --  No Growth After 4 Days  No Growth After 4 Days    --    GRAM STAIN RESULT  No Polys or Bacteria seen No Polys or Bacteria seen  --   --   --  1+ Polys  2+ Gram negative rods  3+ Gram positive cocci in pairs   URINE CULTURE   --   --   --  No Growth <1000 cfu/mL  --   --    WOUND CULTURE   --   --   --   --   -- 2+ Growth of Corynebacterium striatum*  Growth in Broth culture only Staphylococcus coagulase negative*   MRSA CULTURE ONLY   --   --  Culture results to follow    --   --   --

## 2018-12-19 NOTE — PROGRESS NOTES
Progress Note - Gregor Person 1950, 76 y o  female MRN: [id number]    Unit/Bed#: 5T -01 Encounter: [id number]    Primary Care Provider: Rina Junior   Date and time admitted to hospital: 12/13/2018  9:04 PM        Assessment and Plan  * Decubitus skin ulcer   Assessment & Plan    Decubitus skin ulcer, multiple  Appreciate infectious disease and plastic surgery evaluations  S/p debridement of bilateral hips and flap of right hip  On zosyn per ID     GERD (gastroesophageal reflux disease)   Assessment & Plan    GERD with cough  Patient's PCP recommended medication however patient declined at this moment     Hyponatremia   Assessment & Plan    Hyponatremia  Improved with and stable off IV fluids  TSH and am cortisol within limits  Anemia, chronic disease   Assessment & Plan    Anemia chronic disease with iron deficiency  Given Venofer and 2 unit PRBC  Unfortunately stool occult positive but patient declined gastroenterology evaluation  Hemoglobin stabilized  Recheck hemoccult pending     Constipation   Assessment & Plan    Continue senna     Chronic osteomyelitis of coccyx (HCC)   Assessment & Plan    Chronic osteomyelitis of coccyx  Continue zosyn per ID     Paraplegia following spinal cord injury [facility])   Assessment & Plan    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     Hypokalemiaresolved as of 12/18/2018   Assessment & Plan    REPLETED     VTE Pharmacologic Prophylaxis:  Change heparin to lovenox at patient request  Discharge Plan / Estimated Discharge Date:   ______________________________________________________________________________    Subjective:   Patient seen and examined  Requesting changing heparin to lovenox for ease    Objective:   Vitals: Blood pressure 92/55, pulse 90, temperature 97 5 °F (36 4 °C), temperature source Temporal, resp  rate 18, height 5' (1 524 m), weight 53 6 kg (118 lb 2 7 oz), SpO2 98 %, not currently breastfeeding      Physical Exam: General appearance: alert, appears stated age and cooperative  Head: Normocephalic, without obvious abnormality, atraumatic  Lungs: clear to auscultation bilaterally  Heart: regular rate and rhythm  Abdomen: soft, non-tender; bowel sounds normal; no masses,  no organomegaly  Extremities: edema with contractures lower extremities  Neurologic:  paraplegic    Additional Data:   Labs:    Results from last 7 days  Lab Units 12/19/18  0511 12/18/18  0435 12/17/18  1546 12/17/18  0454 12/16/18  0511 12/15/18  0443 12/14/18  0519 12/13/18  2126   WBC Thousand/uL 12 10* 9 60  --   --  9 90 11 60* 12 40* 13 30*   HEMOGLOBIN g/dL 9 8* 8 4* 11 4* 10 7* 7 0* 8 4* 7 7* 10 0*   HEMATOCRIT % 30 5* 25 6* 34 6* 33 3* 22 0* 25 7* 24 0* 31 5*   MCV fL 82 83  --   --  79* 79* 79* 80   BANDS PCT %  --   --   --   --   --  6  --   --    PLATELETS Thousands/uL 515* 429  --   --  513* 465* 502* 696*   INR   --   --   --   --   --   --   --  1 07       Results from last 7 days  Lab Units 12/19/18  0511 12/18/18  0702 12/16/18  0511 12/14/18  0519 12/13/18  2126   SODIUM mmol/L 134* 135* 134* 130* 130*   POTASSIUM mmol/L 4 3 4 3 3 4* 4 0 4 3   CHLORIDE mmol/L 101 103 103 98 95*   CO2 mmol/L 29 28 26 27 26   ANION GAP mmol/L 4* 4* 5 5 9   BUN mg/dL 17 12 8 8 12   CREATININE mg/dL 0 60 0 68 0 56* 0 51* 0 51*   CALCIUM mg/dL 8 0* 7 9* 7 7* 8 0* 8 8   ALBUMIN g/dL 2 6*  --  2 3*  --  3 6   TOTAL BILIRUBIN mg/dL 0 20  --  0 20  --  0 50   ALK PHOS U/L 94  --  100  --  158*   ALT U/L 33  --  35  --  73*   AST U/L 29  --  31  --  53*   EGFR ml/min/1 73sq m 94 90 96 99 99   GLUCOSE RANDOM mg/dL 114* 110* 103* 122* 133*       Results from last 7 days  Lab Units 12/14/18  0519 12/13/18 2126   MAGNESIUM mg/dL 2 2 2 3   PHOSPHORUS mg/dL 3 9  --        Results from last 7 days  Lab Units 12/13/18 2126   TROPONIN I ng/mL <0 01            Results from last 7 days  Lab Units 12/14/18  0527 12/13/18 2126   LACTIC ACID mmol/L  --  1 4   PROCALCITONIN ng/ml 0 35*  --                Results from last 7 days  Lab Units 12/16/18  0511   TSH 3RD GENERATON uIU/mL 2 920     * I Have Reviewed All Lab Data Listed Above  Cultures:     Results from last 7 days  Lab Units 12/17/18  0906 12/17/18  0857 12/15/18  0101 12/13/18 2127 12/13/18 2126   BLOOD CULTURE   --   --   --  No Growth After 5 Days  No Growth After 5 Days  --    GRAM STAIN RESULT  No Polys or Bacteria seen No Polys or Bacteria seen  --   --  1+ Polys  2+ Gram negative rods  3+ Gram positive cocci in pairs   URINE CULTURE   --   --  No Growth <1000 cfu/mL  --   --    WOUND CULTURE   --   --   --   --  2+ Growth of Corynebacterium striatum*  Growth in Broth culture only Staphylococcus coagulase negative*       Results from last 7 days  Lab Units 12/16/18  1844   FECAL OCCULT BLOOD DIAGNOSTIC  Positive*     Imaging:  Imaging Reports Reviewed Today Include:   Procedure: Xr Chest Picc Line Portable  Result Date: 12/19/2018  Impression: Limited study  Left PICC appears to be in satisfactory position  No acute cardiopulmonary disease  Workstation performed: [id number]     Scheduled Meds:  Current Facility-Administered Medications:  acetaminophen 650 mg Oral Q6H PRN Ginette Mcintyre MD    cholecalciferol 1,000 Units Oral Daily Ginette Lenz MD    diphenhydrAMINE 25 mg Oral Q6H PRN Ginette Mcintyre MD    heparin (porcine) 5,000 Units Subcutaneous Q8H [address] Ginette Lenz MD    multivitamin stress formula 1 tablet Oral Daily Deshaun Chandler DO    ondansetron 4 mg Intravenous Q6H PRN Ginette Lenz MD    oxyCODONE 5 mg Oral Q6H PRN Ginette Mcintyre MD    piperacillin-tazobactam 3 375 g Intravenous Q6H Nguyễn Rojas MD Last Rate: 3 375 g (12/19/18 2138)   senna 2 tablet Oral Daily Jesus Urbano DO      Patient Centered Rounds: I have performed bedside rounds with nursing staff today    Discussions with Specialists or Other Care Team Provider:   Education and Discussions with Family / Patient: Current Length of Stay: 6 day(s)  Current Patient Status: Inpatient   Certification Statement: The patient will continue to require additional inpatient hospital stay due to Decubitus skin ulcer  Code Status: Level 1 - Full Code  Time Spent for Care: 25 mins  More than 50% of total time spent on counseling and coordination of care as described above  DO Mica Rivera [facility] Internal Medicine  Hospitalist    ** Please Note: This note has been constructed using a voice recognition system   **

## 2018-12-20 PROBLEM — E44.0 MODERATE PROTEIN-CALORIE MALNUTRITION (HCC): Status: ACTIVE | Noted: 2018-12-20

## 2018-12-20 LAB
ANION GAP SERPL CALCULATED.3IONS-SCNC: 5 MMOL/L (ref 5–14)
BACTERIA TISS AEROBE CULT: ABNORMAL
BUN SERPL-MCNC: 13 MG/DL (ref 5–25)
CALCIUM SERPL-MCNC: 8.2 MG/DL (ref 8.4–10.2)
CHLORIDE SERPL-SCNC: 100 MMOL/L (ref 97–108)
CK SERPL-CCNC: <20 U/L (ref 30–135)
CO2 SERPL-SCNC: 28 MMOL/L (ref 22–30)
CREAT SERPL-MCNC: 0.64 MG/DL (ref 0.6–1.2)
ERYTHROCYTE [DISTWIDTH] IN BLOOD BY AUTOMATED COUNT: 19.4 %
GFR SERPL CREATININE-BSD FRML MDRD: 92 ML/MIN/1.73SQ M
GIANT PLATELETS BLD QL SMEAR: PRESENT
GLUCOSE SERPL-MCNC: 119 MG/DL (ref 70–99)
GRAM STN SPEC: ABNORMAL
GRAM STN SPEC: ABNORMAL
HCT VFR BLD AUTO: 31.5 % (ref 36–46)
HGB BLD-MCNC: 10.2 G/DL (ref 12–16)
LYMPHOCYTES # BLD AUTO: 2.32 THOUSAND/UL (ref 0.5–4)
LYMPHOCYTES # BLD AUTO: 20 % (ref 25–45)
MCH RBC QN AUTO: 26.6 PG (ref 26–34)
MCHC RBC AUTO-ENTMCNC: 32.3 G/DL (ref 31–36)
MCV RBC AUTO: 83 FL (ref 80–100)
MONOCYTES # BLD AUTO: 0.81 THOUSAND/UL (ref 0.2–0.9)
MONOCYTES NFR BLD AUTO: 7 % (ref 1–10)
NEUTS SEG # BLD: 8.47 THOUSAND/UL (ref 1.8–7.8)
NEUTS SEG NFR BLD AUTO: 73 %
PLATELET # BLD AUTO: 541 THOUSANDS/UL (ref 150–450)
PLATELET BLD QL SMEAR: ADEQUATE
PMV BLD AUTO: 7.6 FL (ref 8.9–12.7)
POIKILOCYTOSIS BLD QL SMEAR: PRESENT
POTASSIUM SERPL-SCNC: 4.4 MMOL/L (ref 3.6–5)
RBC # BLD AUTO: 3.82 MILLION/UL (ref 4–5.2)
RBC MORPH BLD: PRESENT
SODIUM SERPL-SCNC: 133 MMOL/L (ref 137–147)
TOTAL CELLS COUNTED SPEC: 100
WBC # BLD AUTO: 11.6 THOUSAND/UL (ref 4.5–11)

## 2018-12-20 PROCEDURE — 85027 COMPLETE CBC AUTOMATED: CPT | Performed by: INTERNAL MEDICINE

## 2018-12-20 PROCEDURE — 85007 BL SMEAR W/DIFF WBC COUNT: CPT | Performed by: INTERNAL MEDICINE

## 2018-12-20 PROCEDURE — 82550 ASSAY OF CK (CPK): CPT | Performed by: INTERNAL MEDICINE

## 2018-12-20 PROCEDURE — 80048 BASIC METABOLIC PNL TOTAL CA: CPT | Performed by: INTERNAL MEDICINE

## 2018-12-20 PROCEDURE — 99233 SBSQ HOSP IP/OBS HIGH 50: CPT | Performed by: INTERNAL MEDICINE

## 2018-12-20 RX ADMIN — PIPERACILLIN AND TAZOBACTAM 3.38 G: 3; .375 INJECTION, POWDER, FOR SOLUTION INTRAVENOUS at 05:20

## 2018-12-20 RX ADMIN — PIPERACILLIN AND TAZOBACTAM 3.38 G: 3; .375 INJECTION, POWDER, FOR SOLUTION INTRAVENOUS at 00:23

## 2018-12-20 RX ADMIN — DAPTOMYCIN 325 MG: 500 INJECTION, POWDER, LYOPHILIZED, FOR SOLUTION INTRAVENOUS at 09:40

## 2018-12-20 RX ADMIN — VITAMIN D, TAB 1000IU (100/BT) 1000 UNITS: 25 TAB at 08:29

## 2018-12-20 RX ADMIN — B-COMPLEX W/ C & FOLIC ACID TAB 1 TABLET: TAB at 08:29

## 2018-12-20 RX ADMIN — ENOXAPARIN SODIUM 40 MG: 40 INJECTION SUBCUTANEOUS at 08:29

## 2018-12-20 NOTE — PLAN OF CARE
INFECTION - ADULT     Absence or prevention of progression during hospitalization Not Progressing          DISCHARGE PLANNING     Discharge to home or other facility with appropriate resources Progressing        Knowledge Deficit     Patient/family/caregiver demonstrates understanding of disease process, treatment plan, medications, and discharge instructions Progressing        Nutrition/Hydration-ADULT     Nutrient/Hydration intake appropriate for improving, restoring or maintaining nutritional needs Progressing        PAIN - ADULT     Verbalizes/displays adequate comfort level or baseline comfort level Progressing        Potential for Falls     Patient will remain free of falls Progressing        Prexisting or High Potential for Compromised Skin Integrity     Skin integrity is maintained or improved Progressing        SAFETY ADULT     Maintain or return to baseline ADL function Progressing     Maintain or return mobility status to optimal level Progressing

## 2018-12-20 NOTE — SOCIAL WORK
Pt presented to [facility] with a R hip pressure ulcer, a sacral ulcer, chronic osteo of the coccyx, 2 stage II L hip ulcers and underwent debridement with flap graft of the R hip with Dr Blaise King on 12/17/18  Pt was seen by ID today and documentation indicates the R hip flap is now showing necrosis  Pt has a PICC line and has been receiving IV abx  Her L hip ulcers are being covered with dry dressings  Pt is normally independent at home with all of her activities which includes self-employment as an  and regular use of an electric wheelchair due to LE paraplegia  Pt has been dealing with issues regarding wounds for several years  She prefers to return home after hospital stays regardless of need and has done IV abx by herself at home  SW is following the pt's progress in treatment

## 2018-12-20 NOTE — ASSESSMENT & PLAN NOTE
Decubitus skin ulcer, multiple  Appreciate infectious disease and plastic surgery evaluations  S/p debridement of bilateral hips and flap of right hip    On zosyn per ID, cultures reviewed bone cultures growing MRSA  This was discussed with ID attending currently contemplating vancomycin versus daptomycin

## 2018-12-20 NOTE — PROGRESS NOTES
Progress Note - Leonor  1950, 76 y o  female MRN: [id number]    Unit/Bed#: 5T -01 Encounter: [id number]    Primary Care Provider: Skip Claros   Date and time admitted to hospital: 12/13/2018  9:04 PM        Assessment and Plan  GERD (gastroesophageal reflux disease)   Assessment & Plan    GERD with cough  Patient's PCP recommended medication however patient declined at this moment     Hyponatremia   Assessment & Plan    Hyponatremia  Improved with and stable off IV fluids  TSH and am cortisol within limits  Anemia, chronic disease   Assessment & Plan    Anemia chronic disease with iron deficiency  Given Venofer and 2 unit PRBC  Unfortunately stool occult positive but patient declined gastroenterology evaluation  Hemoglobin stabilized  Recheck hemoccult pending     Constipation   Assessment & Plan    Continue senna     Chronic osteomyelitis of coccyx (HCC)   Assessment & Plan    Chronic osteomyelitis of coccyx  Continue zosyn per ID     Pressure ulcer, sacrum   Assessment & Plan    Wound care consult       Paraplegia following spinal cord injury [facility])   Assessment & Plan    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  No new symptoms reported  Continue current treatment plan including frequent turning  To avoid complications with decubitus     * Decubitus skin ulcer   Assessment & Plan    Decubitus skin ulcer, multiple  Appreciate infectious disease and plastic surgery evaluations  S/p debridement of bilateral hips and flap of right hip  On zosyn per ID, cultures reviewed bone cultures growing MRSA  This was discussed with ID attending currently contemplating vancomycin versus daptomycin     VTE Pharmacologic Prophylaxis:  Change heparin to lovenox at patient request  Discharge Plan / Estimated Discharge Date:   ______________________________________________________________________________    Subjective:   Patient seen and examined    Requesting changing heparin to lovenox for ease    Objective:   Vitals: Blood pressure 103/51, pulse 97, temperature (!) 97 °F (36 1 °C), temperature source Tympanic, resp  rate 18, height 5' (1 524 m), weight 52 6 kg (115 lb 15 4 oz), SpO2 91 %, not currently breastfeeding      Physical Exam:   General appearance: alert, appears stated age and cooperative  Head: Normocephalic, without obvious abnormality, atraumatic  Lungs: clear to auscultation bilaterally  Heart: regular rate and rhythm  Abdomen: soft, non-tender; bowel sounds normal; no masses,  no organomegaly  Extremities: edema with contractures lower extremities  Neurologic:  paraplegic    Additional Data:   Labs:    Results from last 7 days  Lab Units 12/20/18  0525 12/19/18  0511 12/18/18  0435 12/17/18  1546 12/17/18  0454 12/16/18  0511 12/15/18  0443 12/14/18  0519 12/13/18  2126   WBC Thousand/uL 11 60* 12 10* 9 60  --   --  9 90 11 60* 12 40* 13 30*   HEMOGLOBIN g/dL 10 2* 9 8* 8 4* 11 4* 10 7* 7 0* 8 4* 7 7* 10 0*   HEMATOCRIT % 31 5* 30 5* 25 6* 34 6* 33 3* 22 0* 25 7* 24 0* 31 5*   MCV fL 83 82 83  --   --  79* 79* 79* 80   BANDS PCT %  --   --   --   --   --   --  6  --   --    PLATELETS Thousands/uL 541* 515* 429  --   --  513* 465* 502* 696*   INR   --   --   --   --   --   --   --   --  1 07       Results from last 7 days  Lab Units 12/20/18  0525 12/19/18  0511 12/18/18  0702 12/16/18  0511 12/14/18  0519 12/13/18  2126   SODIUM mmol/L 133* 134* 135* 134* 130* 130*   POTASSIUM mmol/L 4 4 4 3 4 3 3 4* 4 0 4 3   CHLORIDE mmol/L 100 101 103 103 98 95*   CO2 mmol/L 28 29 28 26 27 26   ANION GAP mmol/L 5 4* 4* 5 5 9   BUN mg/dL 13 17 12 8 8 12   CREATININE mg/dL 0 64 0 60 0 68 0 56* 0 51* 0 51*   CALCIUM mg/dL 8 2* 8 0* 7 9* 7 7* 8 0* 8 8   ALBUMIN g/dL  --  2 6*  --  2 3*  --  3 6   TOTAL BILIRUBIN mg/dL  --  0 20  --  0 20  --  0 50   ALK PHOS U/L  --  94  --  100  --  158*   ALT U/L  --  33  --  35  --  73*   AST U/L  --  29  --  31  --  53*   EGFR ml/min/1 73sq m 92 94 90 96 99 99   GLUCOSE RANDOM mg/dL 119* 114* 110* 103* 122* 133*       Results from last 7 days  Lab Units 12/14/18  0519 12/13/18 2126   MAGNESIUM mg/dL 2 2 2 3   PHOSPHORUS mg/dL 3 9  --        Results from last 7 days  Lab Units 12/13/18 2126   TROPONIN I ng/mL <0 01            Results from last 7 days  Lab Units 12/14/18  0527 12/13/18 2126   LACTIC ACID mmol/L  --  1 4   PROCALCITONIN ng/ml 0 35*  --                Results from last 7 days  Lab Units 12/16/18  0511   TSH 3RD GENERATON uIU/mL 2 920     * I Have Reviewed All Lab Data Listed Above  Cultures:     Results from last 7 days  Lab Units 12/17/18  0906 12/17/18  0857 12/15/18  0101 12/13/18 2127 12/13/18 2126   BLOOD CULTURE   --   --   --  No Growth After 5 Days  No Growth After 5 Days  --    GRAM STAIN RESULT  No Polys or Bacteria seen No Polys or Bacteria seen  --   --  1+ Polys  2+ Gram negative rods  3+ Gram positive cocci in pairs   URINE CULTURE   --   --  No Growth <1000 cfu/mL  --   --    WOUND CULTURE   --   --   --   --  2+ Growth of Corynebacterium striatum*  Growth in Broth culture only Staphylococcus coagulase negative*       Results from last 7 days  Lab Units 12/16/18  1844   FECAL OCCULT BLOOD DIAGNOSTIC  Positive*     Imaging:  Imaging Reports Reviewed Today Include:   Procedure: Xr Chest Picc Line Portable  Result Date: 12/19/2018  Impression: Limited study  Left PICC appears to be in satisfactory position  No acute cardiopulmonary disease  Workstation performed: [id number]     Scheduled Meds:    Current Facility-Administered Medications:  acetaminophen 650 mg Oral Q6H PRN Ginette Carroll MD   cholecalciferol 1,000 Units Oral Daily Ginette Lenz MD   DAPTOmycin 6 mg/kg Intravenous Q24H Reyes Galaviz MD   diphenhydrAMINE 25 mg Oral Q6H PRN Ginette Lenz MD   enoxaparin 40 mg Subcutaneous Q24H [address] Deshaun Chandler,    multivitamin stress formula 1 tablet Oral Daily Deshaun Chandler DO   ondansetron 4 mg Intravenous Q6H PRN Ginette Lenz MD   oxyCODONE 5 mg Oral Q6H PRN Ginette Ga MD   senna 2 tablet Oral Daily Maribell Shelton DO     Patient Centered Rounds: I have performed bedside rounds with nursing staff today  Discussions with Specialists or Other Care Team Provider:   Education and Discussions with Family / Patient:   Current Length of Stay: 7 day(s)  Current Patient Status: Inpatient   Certification Statement: The patient will continue to require additional inpatient hospital stay due to Decubitus skin ulcer  Code Status: Level 1 - Full Code  Time Spent for Care: 25 mins  More than 50% of total time spent on counseling and coordination of care as described above  DO Mica Burris [facility] Internal Medicine  Hospitalist    ** Please Note: This note has been constructed using a voice recognition system   **      Progress Note - Glenys Caballero 1950, 76 y o  female MRN: [id number]    Unit/Bed#: 5T -01 Encounter: [id number]    Primary Care Provider: Ronnie Vargas   Date and time admitted to hospital: 12/13/2018  9:04 PM

## 2018-12-20 NOTE — PROGRESS NOTES
Dressings changed both hips  Rt hip flaps fine  Reasonably clean left hip, ischium flaps  Continue nutrition

## 2018-12-20 NOTE — PROGRESS NOTES
Progress Note - Infectious Disease   Nimco Sinha 76 y o  female MRN: [id number]  Unit/Bed#: 5T -01 Encounter: [id number]      Impression/Plan:  1  Pressure ulcer of the right hip with flap necrosis:  Patient is status post wound debridement of her right hip in the OR   Reviewed images with Radiology and no signs of changes of osteomyelitis on the right hip   Reviewed OR findings with plastics though the bone was visualized on debridement there was no concern for bone being infected at this time  Blood cultures are without growth  Patient currently hemodynamically stable though OR cultures have now isolated MRSA along with a 2nd staph organism  -will change the patient to daptomycin  -baseline CPK added to morning labs  -continue to monitor CBC and creatinine  -continue to monitor fever curve/vitals  -tentative plan is to continue antibiotics through 12/27 given operative findings     2  Leukocytosis:  Patient noted to have elevation in her white blood cell count after procedure on 12/17  Currently this it remains elevated 11 6  She is otherwise not having any fevers  I suspect that this is due to her recent procedure   -will monitor CBC  -will continue to monitor exam for additional possible sources  -additional care as per primary  -continue antibiotics as above      3   Pressure ulcer of the sacrum and chronic osteomyelitis of the coccyx:   based on patient's imaging along with exam this is likely a chronic osteomyelitis with a draining sinus   The site itself did not appear acutely infected on initial evaluation   After discussion with plastics there is plan for possible skin grafting to this site in the near future   -will continue antibiotics above for right hip wound  -if flap closure is undertaken on this admission for this ulcer would recommend bone biopsy and will plan for prolonged IV antibiotic course prior to/after closure  -would continue to monitor this site clinically for now  -continue local care as per plastics     4  Decubitus ulcer of left hip: Ko Pack on the patient's right hip was clearly infected on admission   Left hip wound appeared stable on exam initially   CT was without findings consistent for osteo at this site  Gia Hector is unclear at this time the timeline for closure for this wound   If closure is undertaken on this admission will need to evaluate OR findings to determine course of antibiotics for this issue   -for now will continue antibiotics as above for problem 1   -continue local care as per plastics        5  Displaced femur fracture status post jony placement:  Patient was noted with a prior hematoma at this area   She reports that the overall lesion decreased in size and now is left with a small punctate lesion that seems to be draining   Unclear if there is any deeper infection at this site or if this is just a superficial skin and soft tissue infection   -continue antibiotics as above  -will follow this site clinically  -if there is progression would recommend orthopedic evaluation imaging  -continue to trend fever curve/vitals as above  -continue monitor CBC and chemistry above     6  Paraplegia:  Patient at baseline is wheelchair bound and provides for ongoing care at home   If she is ultimately planned for skin grafting would discuss possibility ostomy with the patient to promote further wound healing      Above plan reviewed in detail with the patient and primary service       ID consult service will continue to follow  Antibiotics:  Zosyn 6     24 hr events:  No acute events noted overnight  Patient remains afebrile  White blood cell count 11 6  Cultures noted with Staph aureus and second staph species   No images  Other vitals stable  Subjective:  Patient denies having any nausea, vomiting, chest pain or shortness of breath  She is currently tolerating antibiotics and denies any development of rash or diarrhea    Updated the patient on her culture results and she is willing to make change to her antibiotics  She is tolerating p o  And reports adequate appetite  Objective:  Vitals:  Temp:  [97 4 °F (36 3 °C)-98 7 °F (37 1 °C)] 97 8 °F (36 6 °C)  HR:  [] 86  Resp:  [18-20] 20  BP: ()/(53-64) 92/53  SpO2:  [92 %-98 %] 92 %  Temp (24hrs), Av °F (36 7 °C), Min:97 4 °F (36 3 °C), Max:98 7 °F (37 1 °C)  Current: Temperature: 97 8 °F (36 6 °C)    Physical Exam:   General Appearance:  Alert, interactive, nontoxic, no acute distress  Throat: Oropharynx moist without lesions  Lungs:   Clear to auscultation bilaterally; no wheezes, rhonchi or rales; respirations unlabored   Heart:  RRR; no murmur, rub or gallop   Abdomen:   Soft, non-tender, non-distended, positive bowel sounds  Extremities: No clubbing, cyanosis or edema; lower extremities remain contracted  Skin: No new rashes or lesions  No new draining wounds noted  Patient's skin graft site remains intact and without any drainage or breakdown  Labs, Imaging, & Other studies:   All pertinent labs and imaging studies were personally reviewed    Results from last 7 days  Lab Units 18  0525 18  0511 18  0435   WBC Thousand/uL 11 60* 12 10* 9 60   HEMOGLOBIN g/dL 10 2* 9 8* 8 4*   PLATELETS Thousands/uL 541* 515* 429       Results from last 7 days  Lab Units 18  0525 18  0511  18  0511  18  2126   POTASSIUM mmol/L 4 4 4 3  < > 3 4*  < > 4 3   CHLORIDE mmol/L 100 101  < > 103  < > 95*   CO2 mmol/L 28 29  < > 26  < > 26   BUN mg/dL 13 17  < > 8  < > 12   CREATININE mg/dL 0 64 0 60  < > 0 56*  < > 0 51*   EGFR ml/min/1 73sq m 92 94  < > 96  < > 99   CALCIUM mg/dL 8 2* 8 0*  < > 7 7*  < > 8 8   AST U/L  --  29  --  31  --  53*   ALT U/L  --  33  --  35  --  73*   ALK PHOS U/L  --  94  --  100  --  158*   < > = values in this interval not displayed      Results from last 7 days  Lab Units 18  0906 18  0857 18  1844 12/15/18  0101 12/13/18 2127 12/13/18 2126   BLOOD CULTURE   --   --   --   --  No Growth After 5 Days  No Growth After 5 Days    --    GRAM STAIN RESULT  No Polys or Bacteria seen No Polys or Bacteria seen  --   --   --  1+ Polys  2+ Gram negative rods  3+ Gram positive cocci in pairs   URINE CULTURE   --   --   --  No Growth <1000 cfu/mL  --   --    WOUND CULTURE   --   --   --   --   --  2+ Growth of Corynebacterium striatum*  Growth in Broth culture only Staphylococcus coagulase negative*   MRSA CULTURE ONLY   --   --  No Methicillin Resistant Staphlyococcus aureus (MRSA) isolated  --   --   --

## 2018-12-20 NOTE — ASSESSMENT & PLAN NOTE
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

## 2018-12-21 LAB — BACTERIA SPEC ANAEROBE CULT: ABNORMAL

## 2018-12-21 PROCEDURE — 99233 SBSQ HOSP IP/OBS HIGH 50: CPT | Performed by: INTERNAL MEDICINE

## 2018-12-21 PROCEDURE — 99232 SBSQ HOSP IP/OBS MODERATE 35: CPT | Performed by: NURSE PRACTITIONER

## 2018-12-21 RX ORDER — MAGNESIUM HYDROXIDE/ALUMINUM HYDROXICE/SIMETHICONE 120; 1200; 1200 MG/30ML; MG/30ML; MG/30ML
30 SUSPENSION ORAL EVERY 4 HOURS PRN
Status: DISCONTINUED | OUTPATIENT
Start: 2018-12-21 | End: 2019-01-14 | Stop reason: HOSPADM

## 2018-12-21 RX ORDER — PANTOPRAZOLE SODIUM 40 MG/1
40 TABLET, DELAYED RELEASE ORAL
Status: DISCONTINUED | OUTPATIENT
Start: 2018-12-22 | End: 2019-01-14 | Stop reason: HOSPADM

## 2018-12-21 RX ORDER — METRONIDAZOLE 500 MG/1
500 TABLET ORAL EVERY 8 HOURS SCHEDULED
Status: DISPENSED | OUTPATIENT
Start: 2018-12-21 | End: 2018-12-26

## 2018-12-21 RX ADMIN — DAPTOMYCIN 325 MG: 500 INJECTION, POWDER, LYOPHILIZED, FOR SOLUTION INTRAVENOUS at 09:29

## 2018-12-21 RX ADMIN — VITAMIN D, TAB 1000IU (100/BT) 1000 UNITS: 25 TAB at 09:28

## 2018-12-21 RX ADMIN — METRONIDAZOLE 500 MG: 500 TABLET ORAL at 21:58

## 2018-12-21 RX ADMIN — METRONIDAZOLE 500 MG: 500 TABLET ORAL at 09:28

## 2018-12-21 RX ADMIN — B-COMPLEX W/ C & FOLIC ACID TAB 1 TABLET: TAB at 09:29

## 2018-12-21 RX ADMIN — ENOXAPARIN SODIUM 40 MG: 40 INJECTION SUBCUTANEOUS at 09:28

## 2018-12-21 RX ADMIN — METRONIDAZOLE 500 MG: 500 TABLET ORAL at 15:14

## 2018-12-21 NOTE — ASSESSMENT & PLAN NOTE
Hyponatremia  Reportedly improved with IVF  Last sodium was 133  Will repeat in AM   TSH and am cortisol within limits

## 2018-12-21 NOTE — ASSESSMENT & PLAN NOTE
Malnutrition Findings:   Malnutrition type: Chronic illness  Degree of Malnutrition: Malnutrition of moderate degree    BMI Findings: Body mass index is 22 13 kg/m²  Continue supplements

## 2018-12-21 NOTE — PROGRESS NOTES
Progress Note - Jennifer Vickers 1950, 76 y o  female MRN: [id number]    Unit/Bed#: 5T -01 Encounter: [id number]    Primary Care Provider: Zuhair Turk   Date and time admitted to hospital: 12/13/2018  9:04 PM        * Decubitus skin ulcer   Assessment & Plan    Decubitus skin ulcer, multiple sites: b/l hips and coccyx  See infectious disease and plastic surgery evaluations  S/p debridement of bilateral hips and flap of right hip  On flagyl and daptomycin per ID  Cultures reviewed and growing MRSA  -repeat CPK on Monday  -repeat CBC and creatinine on Monday  -continue to monitor fever curve/vitals  -tentative plan is to continue antibiotics through 12/27 given operative findings    Per Dr Juan Alberto Sesay: It will take a least 4-6 weeks for that flap to heal satisfactory  Chronic osteomyelitis of coccyx [facility])   Assessment & Plan    Chronic osteomyelitis of coccyx  Continue current treatment per ID  Paraplegia following spinal cord injury [facility])   Assessment & Plan    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  No new symptoms reported  Continue current treatment plan including frequent turning  To avoid complications with decubitus     Moderate protein-calorie malnutrition (HCC)   Assessment & Plan    Malnutrition Findings:   Malnutrition type: Chronic illness  Degree of Malnutrition: Malnutrition of moderate degree    BMI Findings: Body mass index is 22 13 kg/m²  Continue supplements  GERD (gastroesophageal reflux disease)   Assessment & Plan    GERD with cough  Will place on protonix and also order maalox prn  Hyponatremia   Assessment & Plan    Hyponatremia  Reportedly improved with IVF  Last sodium was 133  Will repeat in AM   TSH and am cortisol within limits  Anemia, chronic disease   Assessment & Plan    Anemia chronic disease with iron deficiency  Previously given Venofer and 2 unit PRBC    Unfortunately stool occult positive but patient reportedly declined gastroenterology evaluation  Hemoglobin stabilize at 10 2  Repeat in AM       Constipation   Assessment & Plan    Continue senna daily  VTE Pharmacologic Prophylaxis:   Pharmacologic: Enoxaparin (Lovenox)  Mechanical VTE Prophylaxis in Place: No    Patient Centered Rounds: I have performed bedside rounds with nursing staff today  Discussions with Specialists or Other Care Team Provider:     Education and Discussions with Family / Patient: Problems and plan of care discussed with pt  Time Spent for Care: 20 minutes  More than 50% of total time spent on counseling and coordination of care as described above  Current Length of Stay: 8 day(s)    Current Patient Status: Inpatient   Certification Statement: The patient will continue to require additional inpatient hospital stay due to Infected decubitius ulcers    Discharge Plan: Pt is not yet medically stable for d/c  Code Status: Level 1 - Full Code      Subjective:   Patient reports she has burning epigastric pain after she eats, and has diagnosed GERD,but previously refused medication  She is currently asking to have something now  No chest pain or shortness of breath she does have some chronic nausea related to her GERD  No vomiting  Appetite is good  No fever chills    Objective:     Vitals:   Temp (24hrs), Av 5 °F (36 4 °C), Min:97 2 °F (36 2 °C), Max:98 3 °F (36 8 °C)    Temp:  [97 2 °F (36 2 °C)-98 3 °F (36 8 °C)] 97 2 °F (36 2 °C)  HR:  [] 95  Resp:  [18-20] 20  BP: ()/(44-68) 100/56  SpO2:  [93 %-100 %] 93 %  Body mass index is 22 13 kg/m²  Input and Output Summary (last 24 hours): Intake/Output Summary (Last 24 hours) at 18 1503  Last data filed at 18 1400   Gross per 24 hour   Intake             1160 ml   Output             1275 ml   Net             -115 ml       Physical Exam:     Physical Exam   Constitutional: She is oriented to person, place, and time  No distress     HENT:   Head: Normocephalic and atraumatic  Eyes: Right eye exhibits no discharge  Left eye exhibits no discharge  Neck: No JVD present  Cardiovascular: Normal rate, regular rhythm, normal heart sounds and intact distal pulses  Exam reveals no gallop and no friction rub  No murmur heard  Pulmonary/Chest: Effort normal and breath sounds normal  No stridor  No respiratory distress  She has no wheezes  She has no rales  Abdominal: Soft  Bowel sounds are normal  She exhibits no distension  There is no tenderness  There is no rebound and no guarding  Genitourinary:   Genitourinary Comments: Chronic isabel with clear yellow  Urine     Musculoskeletal: She exhibits no edema  Neurological: She is alert and oriented to person, place, and time  Skin: Skin is warm and dry  She is not diaphoretic  Ulcers on hips and sacrum covered with dressing - dry   Psychiatric: She has a normal mood and affect  Additional Data:     Labs:      Results from last 7 days  Lab Units 12/20/18  0525  12/15/18  0443   WBC Thousand/uL 11 60*  < > 11 60*   HEMOGLOBIN g/dL 10 2*  < > 8 4*   HEMATOCRIT % 31 5*  < > 25 7*   PLATELETS Thousands/uL 541*  < > 465*   BANDS PCT %  --   --  6   LYMPHO PCT % 20*  --  15*   MONO PCT % 7  --  7   EOS PCT %  --   --  1   < > = values in this interval not displayed  Results from last 7 days  Lab Units 12/20/18  0525 12/19/18  0511   SODIUM mmol/L 133* 134*   POTASSIUM mmol/L 4 4 4 3   CHLORIDE mmol/L 100 101   CO2 mmol/L 28 29   BUN mg/dL 13 17   CREATININE mg/dL 0 64 0 60   ANION GAP mmol/L 5 4*   CALCIUM mg/dL 8 2* 8 0*   ALBUMIN g/dL  --  2 6*   TOTAL BILIRUBIN mg/dL  --  0 20   ALK PHOS U/L  --  94   ALT U/L  --  33   AST U/L  --  29   GLUCOSE RANDOM mg/dL 119* 114*                           * I Have Reviewed All Lab Data Listed Above  * Additional Pertinent Lab Tests Reviewed:  Most recent labs reviewed    Imaging:    Imaging Reports Reviewed Today Include: none  Imaging Personally Reviewed by Myself Includes:  none    Recent Cultures (last 7 days):       Results from last 7 days  Lab Units 12/17/18  0906 12/17/18  0857 12/15/18  0101   GRAM STAIN RESULT  No Polys or Bacteria seen No Polys or Bacteria seen  --    URINE CULTURE   --   --  No Growth <1000 cfu/mL       Last 24 Hours Medication List:     Current Facility-Administered Medications:  acetaminophen 650 mg Oral Q6H PRN Ginette Ashley MD    cholecalciferol 1,000 Units Oral Daily Ginette Lenz MD    DAPTOmycin 6 mg/kg Intravenous Q24H Jose F Cabezas MD Last Rate: 325 mg (12/21/18 0929)   diphenhydrAMINE 25 mg Oral Q6H PRN Ginette Ashley MD    enoxaparin 40 mg Subcutaneous Q24H [facility] Deshaun Chandler DO    metroNIDAZOLE 500 mg Oral [facility] Jose F Cabezas MD    multivitamin stress formula 1 tablet Oral Daily Deshaun Chandler DO    ondansetron 4 mg Intravenous Q6H PRN Ginette Lenz MD    oxyCODONE 5 mg Oral Q6H PRN Ginette Ashley MD    senna 2 tablet Oral Daily Radha Em, DO         Today, Patient Was Seen By: CONCEPCION Marte

## 2018-12-21 NOTE — ASSESSMENT & PLAN NOTE
Decubitus skin ulcer, multiple sites: b/l hips and coccyx  See infectious disease and plastic surgery evaluations  S/p debridement of bilateral hips and flap of right hip  On flagyl and daptomycin per ID  Cultures reviewed and growing MRSA  -repeat CPK on Monday  -repeat CBC and creatinine on Monday  -continue to monitor fever curve/vitals  -tentative plan is to continue antibiotics through 12/27 given operative findings    Per Dr Judie Cowden: It will take a least 4-6 weeks for that flap to heal satisfactory

## 2018-12-21 NOTE — PROGRESS NOTES
Progress Note - Infectious Disease   Roberto Felipe 76 y o  female MRN: [id number]  Unit/Bed#:  -01 Encounter: [id number]      Impression/Plan:  1  Pressure ulcer of the right hip with flap necrosis with Staph aureus:  Patient is status post wound debridement of her right hip in the OR   Reviewed images with Radiology and no signs of changes of osteomyelitis on the right hip   Reviewed OR findings with plastics though the bone was visualized on debridement there was no concern for bone being infected at this time  Blood cultures are without growth  Patient remains hemodynamically stable  New culture results noted  -continue daptomycin  -Flagyl added  -repeat CPK on Monday  -repeat CBC and creatinine on Monday  -continue to monitor fever curve/vitals  -tentative plan is to continue antibiotics through 12/27 given operative findings     2  Leukocytosis:  Patient noted to have elevation in her white blood cell count after procedure on 12/17  Remains elevated 11 6 yesterday  She is otherwise not having any fevers  I suspect that this is due to her recent procedure   -will monitor CBC  -will continue to monitor exam for additional possible sources  -additional care as per primary  -continue antibiotics as above      3  Pressure ulcer of the sacrum and chronic osteomyelitis of the coccyx:   based on patient's imaging along with exam this is likely a chronic osteomyelitis with a draining sinus   The site itself did not appear acutely infected on initial evaluation   After discussion with plastics there is plan for possible skin grafting to this site in the future   -will continue antibiotics above for right hip wound  -if flap closure is undertaken would recommend bone biopsy and possible prolonged antibiotic after closure  -would discuss ostomy with patient to prevent further contamination of wound  -would continue to monitor this site clinically for now  -continue local care as per plastics     4  Decubitus ulcer of left hip: Ezzard American on the patient's right hip was clearly infected on admission   Left hip wound appeared stable on exam initially   CT was without findings consistent for osteo at this site  Godwin Dixon is unclear at this time the timeline for closure for this wound   If closure is undertaken on this admission will need to evaluate OR findings to determine course of antibiotics for this issue   -for now will continue antibiotics as above for problem 1   -continue local care as per plastics        5  Displaced femur fracture status post jony placement:  Patient was noted with a prior hematoma at this area   She reports that the overall lesion decreased in size and now is left with a small punctate lesion that seems to be draining   Unclear if there is any deeper infection at this site or if this is just a superficial skin and soft tissue infection   -continue antibiotics as above  -will follow this site clinically  -continue to trend fever curve/vitals as above  -continue monitor CBC and chemistry above     6  Paraplegia:  Patient at baseline is wheelchair bound and provides for ongoing care at home   If she is ultimately planned for skin grafting would discuss possibility ostomy with the patient to promote further wound healing  Unclear if patient is plan for discharge to facility she continues to go through these various skin grafting procedures      Above plan reviewed in detail with the patient and primary service       ID consult service will formally re-evaluate the patient again on Monday  Please call ID attending on call if any additional questions or concerns  Antibiotics:  Daptomycin 2     24 hr events:  No acute events noted overnight on chart review  Patient remains afebrile  Cultures with MRSA, Corynebacterium, Bacteroides, S  pseudintermedius  No new images  Patient's other vitals are stable    Subjective:  Patient reports some uneasyness notice of her stomach    She denies any vomiting, chest pain, shortness of breath  She denies any rash or facial swelling while being started on daptomycin  She is currently tolerating breakfast   She continues to be on offloading as her right hip wound is healing  Objective:  Vitals:  Temp:  [97 °F (36 1 °C)-98 3 °F (36 8 °C)] 97 2 °F (36 2 °C)  HR:  [] 95  Resp:  [18-20] 20  BP: ()/(44-68) 100/56  SpO2:  [91 %-100 %] 93 %  Temp (24hrs), Av 4 °F (36 3 °C), Min:97 °F (36 1 °C), Max:98 3 °F (36 8 °C)  Current: Temperature: (!) 97 2 °F (36 2 °C)    Physical Exam:   General Appearance:  Alert, interactive, nontoxic, no acute distress  Throat: Oropharynx moist without lesions  Poor dentition noted   Lungs:   Clear to auscultation anteriorly bilaterally; no wheezes, rhonchi or rales; respirations unlabored   Heart:  Tachycardia; no murmur, rub or gallop   Abdomen:   Soft, non-tender, non-distended, positive bowel sounds  Extremities: No clubbing, cyanosis or edema; legs remain contracted   Skin: No new rashes or lesions  No new draining wounds noted  Patient does not wish to be turned at this time to evaluate her other wounds and she has just been cleaned  Her right hip wound is noted with sutures intact and without any erythema or drainage  CECI drains remain in place         Labs, Imaging, & Other studies:   All pertinent labs and imaging studies were personally reviewed    Results from last 7 days  Lab Units 18  0518  0511 18  0435   WBC Thousand/uL 11 60* 12 10* 9 60   HEMOGLOBIN g/dL 10 2* 9 8* 8 4*   PLATELETS Thousands/uL 541* 515* 429       Results from last 7 days  Lab Units 18  0525 18  0511  18  0511   POTASSIUM mmol/L 4 4 4 3  < > 3 4*   CHLORIDE mmol/L 100 101  < > 103   CO2 mmol/L 28 29  < > 26   BUN mg/dL 13 17  < > 8   CREATININE mg/dL 0 64 0 60  < > 0 56*   EGFR ml/min/1 73sq m 92 94  < > 96   CALCIUM mg/dL 8 2* 8 0*  < > 7 7*   AST U/L  --  29  --  31   ALT U/L  --  33  --  35   ALK PHOS U/L  --  94  --  100   < > = values in this interval not displayed      Results from last 7 days  Lab Units 12/17/18  0906 12/17/18  0857 12/16/18  1844 12/15/18  0101   GRAM STAIN RESULT  No Polys or Bacteria seen No Polys or Bacteria seen  --   --    URINE CULTURE   --   --   --  No Growth <1000 cfu/mL   MRSA CULTURE ONLY   --   --  No Methicillin Resistant Staphlyococcus aureus (MRSA) isolated  --

## 2018-12-21 NOTE — PLAN OF CARE
Problem: DISCHARGE PLANNING - CARE MANAGEMENT  Goal: Discharge to post-acute care or home with appropriate resources  INTERVENTIONS:  - Conduct assessment to determine patient/family and health care team treatment goals, and need for post-acute services based on payer coverage, community resources, and patient preferences, and barriers to discharge  - Address psychosocial, clinical, and financial barriers to discharge as identified in assessment in conjunction with the patient/family and health care team  - Arrange appropriate level of post-acute services according to patients   needs and preference and payer coverage in collaboration with the physician and health care team  - Communicate with and update the patient/family, physician, and health care team regarding progress on the discharge plan  - Arrange appropriate transportation to post-acute venues  Outcome: Progressing  Plan for pt at this time is for SNF follow-up with wound care and flap maintenance, likely including IV abx  SW will continue to follow pt's case for additional needs, as pt is stubbornly independent and may refuse SNF

## 2018-12-21 NOTE — ASSESSMENT & PLAN NOTE
Anemia chronic disease with iron deficiency  Previously given Venofer and 2 unit PRBC  Unfortunately stool occult positive but patient reportedly declined gastroenterology evaluation  Hemoglobin stabilize at 10 2    Repeat in AM

## 2018-12-22 LAB
ANION GAP SERPL CALCULATED.3IONS-SCNC: 6 MMOL/L (ref 5–14)
ANISOCYTOSIS BLD QL SMEAR: PRESENT
BUN SERPL-MCNC: 21 MG/DL (ref 5–25)
CALCIUM SERPL-MCNC: 8.4 MG/DL (ref 8.4–10.2)
CHLORIDE SERPL-SCNC: 98 MMOL/L (ref 97–108)
CO2 SERPL-SCNC: 29 MMOL/L (ref 22–30)
CREAT SERPL-MCNC: 0.55 MG/DL (ref 0.6–1.2)
EOSINOPHIL # BLD AUTO: 0.39 THOUSAND/UL (ref 0–0.4)
EOSINOPHIL NFR BLD MANUAL: 3 % (ref 0–6)
ERYTHROCYTE [DISTWIDTH] IN BLOOD BY AUTOMATED COUNT: 20.2 %
GFR SERPL CREATININE-BSD FRML MDRD: 97 ML/MIN/1.73SQ M
GIANT PLATELETS BLD QL SMEAR: PRESENT
GLUCOSE SERPL-MCNC: 119 MG/DL (ref 70–99)
HCT VFR BLD AUTO: 34.5 % (ref 36–46)
HGB BLD-MCNC: 11 G/DL (ref 12–16)
LG PLATELETS BLD QL SMEAR: PRESENT
LYMPHOCYTES # BLD AUTO: 17 % (ref 25–45)
LYMPHOCYTES # BLD AUTO: 2.19 THOUSAND/UL (ref 0.5–4)
MCH RBC QN AUTO: 26.3 PG (ref 26–34)
MCHC RBC AUTO-ENTMCNC: 31.8 G/DL (ref 31–36)
MCV RBC AUTO: 83 FL (ref 80–100)
MONOCYTES # BLD AUTO: 0.77 THOUSAND/UL (ref 0.2–0.9)
MONOCYTES NFR BLD AUTO: 6 % (ref 1–10)
NEUTS BAND NFR BLD MANUAL: 1 % (ref 0–8)
NEUTS SEG # BLD: 9.55 THOUSAND/UL (ref 1.8–7.8)
NEUTS SEG NFR BLD AUTO: 73 %
PLATELET # BLD AUTO: 589 THOUSANDS/UL (ref 150–450)
PLATELET BLD QL SMEAR: ABNORMAL
PMV BLD AUTO: 7.6 FL (ref 8.9–12.7)
POIKILOCYTOSIS BLD QL SMEAR: PRESENT
POTASSIUM SERPL-SCNC: 3.9 MMOL/L (ref 3.6–5)
RBC # BLD AUTO: 4.17 MILLION/UL (ref 4–5.2)
RBC MORPH BLD: PRESENT
SODIUM SERPL-SCNC: 133 MMOL/L (ref 137–147)
TOTAL CELLS COUNTED SPEC: 100
WBC # BLD AUTO: 12.9 THOUSAND/UL (ref 4.5–11)

## 2018-12-22 PROCEDURE — 87070 CULTURE OTHR SPECIMN AEROBIC: CPT | Performed by: PHYSICIAN ASSISTANT

## 2018-12-22 PROCEDURE — 85007 BL SMEAR W/DIFF WBC COUNT: CPT | Performed by: NURSE PRACTITIONER

## 2018-12-22 PROCEDURE — 80048 BASIC METABOLIC PNL TOTAL CA: CPT | Performed by: NURSE PRACTITIONER

## 2018-12-22 PROCEDURE — 99233 SBSQ HOSP IP/OBS HIGH 50: CPT | Performed by: INTERNAL MEDICINE

## 2018-12-22 PROCEDURE — 85027 COMPLETE CBC AUTOMATED: CPT | Performed by: NURSE PRACTITIONER

## 2018-12-22 PROCEDURE — 87205 SMEAR GRAM STAIN: CPT | Performed by: PHYSICIAN ASSISTANT

## 2018-12-22 RX ADMIN — METRONIDAZOLE 500 MG: 500 TABLET ORAL at 22:32

## 2018-12-22 RX ADMIN — METRONIDAZOLE 500 MG: 500 TABLET ORAL at 05:43

## 2018-12-22 RX ADMIN — PANTOPRAZOLE SODIUM 40 MG: 40 TABLET, DELAYED RELEASE ORAL at 05:43

## 2018-12-22 RX ADMIN — VITAMIN D, TAB 1000IU (100/BT) 1000 UNITS: 25 TAB at 08:58

## 2018-12-22 RX ADMIN — ENOXAPARIN SODIUM 40 MG: 40 INJECTION SUBCUTANEOUS at 08:58

## 2018-12-22 RX ADMIN — METRONIDAZOLE 500 MG: 500 TABLET ORAL at 15:23

## 2018-12-22 RX ADMIN — B-COMPLEX W/ C & FOLIC ACID TAB 1 TABLET: TAB at 08:58

## 2018-12-22 RX ADMIN — DAPTOMYCIN 325 MG: 500 INJECTION, POWDER, LYOPHILIZED, FOR SOLUTION INTRAVENOUS at 08:58

## 2018-12-22 NOTE — ASSESSMENT & PLAN NOTE
Decubitus skin ulcer, multiple sites: b/l hips and coccyx  See infectious disease and plastic surgery evaluations  S/p debridement of bilateral hips and flap of right hip  On flagyl and daptomycin per ID  Cultures reviewed and growing MRSA  -repeat CPK on Monday  -repeat CBC and creatinine on Monday  -continue to monitor fever curve/vitals  -tentative plan is to continue antibiotics through 12/27 given operative findings    Per Dr Brant Suarez: It will take a least 4-6 weeks for that flap to heal satisfactory

## 2018-12-22 NOTE — PROGRESS NOTES
Progress Note - Leonor  1950, 76 y o  female MRN: [id number]    Unit/Bed#: 5T -01 Encounter: [id number]    Primary Care Provider: Skip Claros   Date and time admitted to hospital: 12/13/2018  9:04 PM        Moderate protein-calorie malnutrition ([address] )   Assessment & Plan    Malnutrition Findings:   Malnutrition type: Chronic illness  Degree of Malnutrition: Malnutrition of moderate degree    BMI Findings: Body mass index is 22 13 kg/m²  Continue supplements  GERD (gastroesophageal reflux disease)   Assessment & Plan    GERD with cough  Will place on protonix and also order maalox prn  Hyponatremia   Assessment & Plan    Hyponatremia  Reportedly improved with IVF  Last sodium was 133  Will repeat in AM   TSH and am cortisol within limits  Anemia, chronic disease   Assessment & Plan    Anemia chronic disease with iron deficiency  Previously given Venofer and 2 unit PRBC  Unfortunately stool occult positive but patient reportedly declined gastroenterology evaluation  Hemoglobin stabilize at 10 2  Repeat in AM       Constipation   Assessment & Plan    Continue senna daily  Chronic osteomyelitis of coccyx [facility])   Assessment & Plan    Chronic osteomyelitis of coccyx  Continue current treatment per ID  Pressure ulcer, sacrum   Assessment & Plan    Overall patient has remained hemodynamically stable continues to make slow steady progress  No fundamental changes will be required to current treatment plan today  Labs in a m  Paraplegia following spinal cord injury [facility])   Assessment & Plan    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  No new symptoms reported  Continue current treatment plan including frequent turning  To avoid complications with decubitus     * Decubitus skin ulcer   Assessment & Plan    Decubitus skin ulcer, multiple sites: b/l hips and coccyx  See infectious disease and plastic surgery evaluations    S/p debridement of bilateral hips and flap of right hip  On flagyl and daptomycin per ID  Cultures reviewed and growing MRSA  -repeat CPK on Monday  -repeat CBC and creatinine on Monday  -continue to monitor fever curve/vitals  -tentative plan is to continue antibiotics through  given operative findings    Per Dr Zara Del Rio: It will take a least 4-6 weeks for that flap to heal satisfactory  VTE Pharmacologic Prophylaxis:   Pharmacologic: Enoxaparin (Lovenox)  Mechanical VTE Prophylaxis in Place: No    Patient Centered Rounds: I have performed bedside rounds with nursing staff today  Discussions with Specialists or Other Care Team Provider:     Education and Discussions with Family / Patient: Problems and plan of care discussed with pt  Time Spent for Care: 20 minutes  More than 50% of total time spent on counseling and coordination of care as described above  Current Length of Stay: 9 day(s)    Current Patient Status: Inpatient   Certification Statement: The patient will continue to require additional inpatient hospital stay due to Infected decubitius ulcers    Discharge Plan: Pt is not yet medically stable for d/c  Code Status: Level 1 - Full Code      Subjective:   Patient reports no new symptoms today    Objective:     Vitals:   Temp (24hrs), Av 5 °F (36 4 °C), Min:97 °F (36 1 °C), Max:98 5 °F (36 9 °C)    Temp:  [97 °F (36 1 °C)-98 5 °F (36 9 °C)] 97 1 °F (36 2 °C)  HR:  [] 90  Resp:  [18-20] 18  BP: ()/(50-63) 99/63  SpO2:  [91 %-92 %] 92 %  Body mass index is 22 13 kg/m²  Input and Output Summary (last 24 hours): Intake/Output Summary (Last 24 hours) at 18 0848  Last data filed at 18 0600   Gross per 24 hour   Intake             1020 ml   Output              725 ml   Net              295 ml       Physical Exam:     Physical Exam   Constitutional: She is oriented to person, place, and time  No distress  HENT:   Head: Normocephalic and atraumatic     Eyes: Right eye exhibits no discharge  Left eye exhibits no discharge  Neck: No JVD present  Cardiovascular: Normal rate, regular rhythm, normal heart sounds and intact distal pulses  Exam reveals no gallop and no friction rub  No murmur heard  Pulmonary/Chest: Effort normal and breath sounds normal  No stridor  No respiratory distress  She has no wheezes  She has no rales  Abdominal: Soft  Bowel sounds are normal  She exhibits no distension  There is no tenderness  There is no rebound and no guarding  Genitourinary:   Genitourinary Comments: Chronic isabel with clear yellow  Urine     Musculoskeletal: She exhibits no edema  Neurological: She is alert and oriented to person, place, and time  Skin: Skin is warm and dry  She is not diaphoretic  Ulcers on hips and sacrum covered with dressing - dry   Psychiatric: She has a normal mood and affect  Additional Data:     Labs:      Results from last 7 days  Lab Units 12/22/18  0501   WBC Thousand/uL 12 90*   HEMOGLOBIN g/dL 11 0*   HEMATOCRIT % 34 5*   PLATELETS Thousands/uL 589*   BANDS PCT % 1   LYMPHO PCT % 17*   MONO PCT % 6   EOS PCT % 3       Results from last 7 days  Lab Units 12/22/18  0501  12/19/18  0511   SODIUM mmol/L 133*  < > 134*   POTASSIUM mmol/L 3 9  < > 4 3   CHLORIDE mmol/L 98  < > 101   CO2 mmol/L 29  < > 29   BUN mg/dL 21  < > 17   CREATININE mg/dL 0 55*  < > 0 60   ANION GAP mmol/L 6  < > 4*   CALCIUM mg/dL 8 4  < > 8 0*   ALBUMIN g/dL  --   --  2 6*   TOTAL BILIRUBIN mg/dL  --   --  0 20   ALK PHOS U/L  --   --  94   ALT U/L  --   --  33   AST U/L  --   --  29   GLUCOSE RANDOM mg/dL 119*  < > 114*   < > = values in this interval not displayed  * I Have Reviewed All Lab Data Listed Above  * Additional Pertinent Lab Tests Reviewed:  Most recent labs reviewed    Imaging:    Imaging Reports Reviewed Today Include: none  Imaging Personally Reviewed by Myself Includes:  none    Recent Cultures (last 7 days): Results from last 7 days  Lab Units 12/17/18  0906 12/17/18  0857   GRAM STAIN RESULT  No Polys or Bacteria seen No Polys or Bacteria seen       Last 24 Hours Medication List:     Current Facility-Administered Medications:  acetaminophen 650 mg Oral Q6H PRN Ginette Lenz MD    aluminum-magnesium hydroxide-simethicone 30 mL Oral Q4H PRN CONCEPCION Jackson    cholecalciferol 1,000 Units Oral Daily Bilkoki Lenz MD    DAPTOmycin 6 mg/kg Intravenous Q24H Gia Lincoln MD Last Rate: 325 mg (12/21/18 0929)   diphenhydrAMINE 25 mg Oral Q6H PRN Ginette Juan MD    enoxaparin 40 mg Subcutaneous Q24H [address] Deshaun Chandler DO    metroNIDAZOLE 500 mg Oral [facility] Gia Lincoln MD    multivitamin stress formula 1 tablet Oral Daily Deshaun Chandler DO    ondansetron 4 mg Intravenous Q6H PRN Ginette Lenz MD    oxyCODONE 5 mg Oral Q6H PRN Ginette Juan MD    pantoprazole 40 mg Oral Early Morning CONCEPCION Jackson    senna 2 tablet Oral Daily Gauri Davis DO         Today, Patient Was Seen By: Sherryle Shells

## 2018-12-22 NOTE — ASSESSMENT & PLAN NOTE
Overall patient has remained hemodynamically stable continues to make slow steady progress  No fundamental changes will be required to current treatment plan today  Labs in a m

## 2018-12-23 LAB — GLUCOSE SERPL-MCNC: 115 MG/DL (ref 70–99)

## 2018-12-23 PROCEDURE — 99233 SBSQ HOSP IP/OBS HIGH 50: CPT | Performed by: INTERNAL MEDICINE

## 2018-12-23 PROCEDURE — 82948 REAGENT STRIP/BLOOD GLUCOSE: CPT

## 2018-12-23 RX ADMIN — METRONIDAZOLE 500 MG: 500 TABLET ORAL at 15:23

## 2018-12-23 RX ADMIN — B-COMPLEX W/ C & FOLIC ACID TAB 1 TABLET: TAB at 08:36

## 2018-12-23 RX ADMIN — METRONIDAZOLE 500 MG: 500 TABLET ORAL at 05:23

## 2018-12-23 RX ADMIN — ONDANSETRON 4 MG: 2 INJECTION INTRAMUSCULAR; INTRAVENOUS at 12:19

## 2018-12-23 RX ADMIN — DAPTOMYCIN 325 MG: 500 INJECTION, POWDER, LYOPHILIZED, FOR SOLUTION INTRAVENOUS at 08:35

## 2018-12-23 RX ADMIN — PANTOPRAZOLE SODIUM 40 MG: 40 TABLET, DELAYED RELEASE ORAL at 05:23

## 2018-12-23 RX ADMIN — METRONIDAZOLE 500 MG: 500 TABLET ORAL at 22:33

## 2018-12-23 RX ADMIN — ENOXAPARIN SODIUM 40 MG: 40 INJECTION SUBCUTANEOUS at 08:36

## 2018-12-23 RX ADMIN — VITAMIN D, TAB 1000IU (100/BT) 1000 UNITS: 25 TAB at 08:36

## 2018-12-23 NOTE — PLAN OF CARE
PAIN - ADULT     Verbalizes/displays adequate comfort level or baseline comfort level Adequate for Discharge        Potential for Falls     Patient will remain free of falls Adequate for Discharge          DISCHARGE PLANNING     Discharge to home or other facility with appropriate resources Progressing        DISCHARGE PLANNING - CARE MANAGEMENT     Discharge to post-acute care or home with appropriate resources Progressing        INFECTION - ADULT     Absence or prevention of progression during hospitalization Progressing        Knowledge Deficit     Patient/family/caregiver demonstrates understanding of disease process, treatment plan, medications, and discharge instructions Progressing        Nutrition/Hydration-ADULT     Nutrient/Hydration intake appropriate for improving, restoring or maintaining nutritional needs Progressing        Prexisting or High Potential for Compromised Skin Integrity     Skin integrity is maintained or improved Progressing        SAFETY ADULT     Maintain or return to baseline ADL function Progressing     Maintain or return mobility status to optimal level Progressing

## 2018-12-23 NOTE — QUICK NOTE
Notified per nursing for new area of drainage on the inside of the patient's left thigh just above the knee  Patient is currently on antibiotics with MRSA coverage, ID following  Patient had noticed earlier this evening her left thigh was damp, upon assessment found to have yellow purulent drainage from site above left knee which was reportedly covered with Mepilex for protection  Approximately 1 in x 1 in area of mild erythema noted; patient states that is a site of an old hematoma from her femur fracture  On palpation able to express small amount of purulent-blood tinged discharge from pintpoint opening in skin  Area is somewhat fluctuant, suspicious for abscess    Will make day team aware, may need consult to general surgery for possible I & D

## 2018-12-23 NOTE — PROGRESS NOTES
Progress Note - Deangelo Love 1950, 76 y o  female MRN: [id number]    Unit/Bed#: 5T -01 Encounter: [id number]    Primary Care Provider: Elsy Davenport   Date and time admitted to hospital: 12/13/2018  9:04 PM        Moderate protein-calorie malnutrition ([address] )   Assessment & Plan    Malnutrition Findings:   Malnutrition type: Chronic illness  Degree of Malnutrition: Malnutrition of moderate degree    BMI Findings: Body mass index is 20 18 kg/m²  Continue supplements  GERD (gastroesophageal reflux disease)   Assessment & Plan    GERD with cough  Will place on protonix and also order maalox prn  Hyponatremia   Assessment & Plan    Hyponatremia  Reportedly improved with IVF  Last sodium was 133  Will repeat in AM   TSH and am cortisol within limits  Anemia, chronic disease   Assessment & Plan    Anemia chronic disease with iron deficiency  Previously given Venofer and 2 unit PRBC  Unfortunately stool occult positive but patient reportedly declined gastroenterology evaluation  Hemoglobin stabilize at 10 2  Repeat in AM         Constipation   Assessment & Plan    Continue senna daily  Chronic osteomyelitis of coccyx [facility])   Assessment & Plan    Chronic osteomyelitis of coccyx  Continue current treatment per ID  Pressure ulcer, sacrum   Assessment & Plan    Overall patient has remained hemodynamically stable continues to make slow steady progress  No fundamental changes will be required to current treatment plan today  Labs in a m         Paraplegia following spinal cord injury [facility])   Assessment & Plan    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  No new symptoms reported  Continue current treatment plan including frequent turning  To avoid complications with decubitus  A punctuate new site of drainage around me was seen  Currently not draining  Patient is on broad-spectrum antibiotics Will continue with current treatment  Will await for surgery to evaluate the patient     * Decubitus skin ulcer   Assessment & Plan    Decubitus skin ulcer, multiple sites: b/l hips and coccyx  See infectious disease and plastic surgery evaluations  S/p debridement of bilateral hips and flap of right hip  On flagyl and daptomycin per ID  Cultures reviewed and growing MRSA  -repeat CPK on Monday  -repeat CBC and creatinine on Monday  -continue to monitor fever curve/vitals  -tentative plan is to continue antibiotics through  given operative findings    Per Dr Vickie Mendoza: It will take a least 4-6 weeks for that flap to heal satisfactory  VTE Pharmacologic Prophylaxis:   Pharmacologic: Enoxaparin (Lovenox)  Mechanical VTE Prophylaxis in Place: No    Patient Centered Rounds: I have performed bedside rounds with nursing staff today  Discussions with Specialists or Other Care Team Provider:     Education and Discussions with Family / Patient: Problems and plan of care discussed with pt  Time Spent for Care: 20 minutes  More than 50% of total time spent on counseling and coordination of care as described above  Current Length of Stay: 10 day(s)    Current Patient Status: Inpatient   Certification Statement: The patient will continue to require additional inpatient hospital stay due to Infected decubitius ulcers    Discharge Plan: Pt is not yet medically stable for d/c  Code Status: Level 1 - Full Code    Subjective:     Patient reports no new symptoms today  Drainage from my knee is where I had my hematoma     Objective:     Vitals:   Temp (24hrs), Av 3 °F (36 8 °C), Min:97 3 °F (36 3 °C), Max:99 6 °F (37 6 °C)    Temp:  [97 3 °F (36 3 °C)-99 6 °F (37 6 °C)] 97 3 °F (36 3 °C)  HR:  [83-87] 83  Resp:  [18-20] 18  BP: ()/(58-69) 97/58  SpO2:  [93 %-96 %] 95 %  Body mass index is 20 18 kg/m²  Input and Output Summary (last 24 hours):        Intake/Output Summary (Last 24 hours) at 18 0952  Last data filed at 18 2206   Gross per 24 hour Intake             1330 ml   Output              150 ml   Net             1180 ml       Physical Exam:     Physical Exam   Constitutional: She is oriented to person, place, and time  No distress  HENT:   Head: Normocephalic and atraumatic  Eyes: Right eye exhibits no discharge  Left eye exhibits no discharge  Neck: No JVD present  Cardiovascular: Normal rate, regular rhythm, normal heart sounds and intact distal pulses  Exam reveals no gallop and no friction rub  No murmur heard  Pulmonary/Chest: Effort normal and breath sounds normal  No stridor  No respiratory distress  She has no wheezes  She has no rales  Abdominal: Soft  Bowel sounds are normal  She exhibits no distension  There is no tenderness  There is no rebound and no guarding  Genitourinary:   Genitourinary Comments: Chronic isabel with clear yellow  Urine     Musculoskeletal: She exhibits no edema  Neurological: She is alert and oriented to person, place, and time  Skin: Skin is warm and dry  She is not diaphoretic  Ulcers on hips and sacrum covered with dressing - dry   Psychiatric: She has a normal mood and affect  Additional Data:     Labs:      Results from last 7 days  Lab Units 12/22/18  0501   WBC Thousand/uL 12 90*   HEMOGLOBIN g/dL 11 0*   HEMATOCRIT % 34 5*   PLATELETS Thousands/uL 589*   BANDS PCT % 1   LYMPHO PCT % 17*   MONO PCT % 6   EOS PCT % 3       Results from last 7 days  Lab Units 12/22/18  0501  12/19/18  0511   SODIUM mmol/L 133*  < > 134*   POTASSIUM mmol/L 3 9  < > 4 3   CHLORIDE mmol/L 98  < > 101   CO2 mmol/L 29  < > 29   BUN mg/dL 21  < > 17   CREATININE mg/dL 0 55*  < > 0 60   ANION GAP mmol/L 6  < > 4*   CALCIUM mg/dL 8 4  < > 8 0*   ALBUMIN g/dL  --   --  2 6*   TOTAL BILIRUBIN mg/dL  --   --  0 20   ALK PHOS U/L  --   --  94   ALT U/L  --   --  33   AST U/L  --   --  29   GLUCOSE RANDOM mg/dL 119*  < > 114*   < > = values in this interval not displayed          Results from last 7 days  Lab Units 12/23/18  0653   POC GLUCOSE mg/dl 115*                   * I Have Reviewed All Lab Data Listed Above  * Additional Pertinent Lab Tests Reviewed: Most recent labs reviewed    Imaging:    Imaging Reports Reviewed Today Include: none  Imaging Personally Reviewed by Myself Includes:  none    Recent Cultures (last 7 days):       Results from last 7 days  Lab Units 12/17/18  0906 12/17/18  0857   GRAM STAIN RESULT  No Polys or Bacteria seen No Polys or Bacteria seen       Last 24 Hours Medication List:     Current Facility-Administered Medications:  acetaminophen 650 mg Oral Q6H PRN Bilkoki Lenz MD    aluminum-magnesium hydroxide-simethicone 30 mL Oral Q4H PRN CONCEPCION Jackson    cholecalciferol 1,000 Units Oral Daily Bilkoki Lenz MD    DAPTOmycin 6 mg/kg Intravenous Q24H Mor Downey MD Last Rate: 325 mg (12/23/18 0835)   diphenhydrAMINE 25 mg Oral Q6H PRN Ginette May MD    enoxaparin 40 mg Subcutaneous Q24H [address] Deshaun Chandler DO    metroNIDAZOLE 500 mg Oral [facility] Mor Downey MD    multivitamin stress formula 1 tablet Oral Daily Deshaun Chandler DO    ondansetron 4 mg Intravenous Q6H PRN Ginette Lenz MD    oxyCODONE 5 mg Oral Q6H PRN Ginette May MD    pantoprazole 40 mg Oral Early Morning CONCEPCION Jackson    senna 2 tablet Oral Daily Kapil Owusu DO         Today, Patient Was Seen By: Josselin Martins

## 2018-12-23 NOTE — ASSESSMENT & PLAN NOTE
Malnutrition Findings:   Malnutrition type: Chronic illness  Degree of Malnutrition: Malnutrition of moderate degree    BMI Findings: Body mass index is 20 18 kg/m²  Continue supplements

## 2018-12-23 NOTE — PROGRESS NOTES
Flap ok rt hip  She says she expressed pus from where hematoma lt medial thigh after femur fx  Always worry about infected jony  Will change donor site sTSG area Wed 26Dec

## 2018-12-23 NOTE — ASSESSMENT & PLAN NOTE
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  No new symptoms reported  Continue current treatment plan including frequent turning  To avoid complications with decubitus  A punctuate new site of drainage around me was seen  Currently not draining  Patient is on broad-spectrum antibiotics Will continue with current treatment  Will await for surgery to evaluate the patient

## 2018-12-23 NOTE — QUICK NOTE
Notified per nursing patient with new area of drainage located on her left medial thigh just above the knee  Patient currently on antibiotics including MRSA coverage  Patient states that this area was the old site of a hematoma after femur fracture  The areas located just above the knee on the medial aspect, patient had Mepilex applied for protection as patient is bed-bound  On examination, patient approximately has a 2 inch x 2 inch area of erythema and skin breakdown  Able to express a small amount of purulent blood-tinged discharge through pinhole opening and skin  Area appears mildly fluctuant, possible underlying abscess  Will make day team aware  Surgery currently following  Patient with no complaints at this time, has remained afebrile

## 2018-12-23 NOTE — ASSESSMENT & PLAN NOTE
Decubitus skin ulcer, multiple sites: b/l hips and coccyx  See infectious disease and plastic surgery evaluations  S/p debridement of bilateral hips and flap of right hip  On flagyl and daptomycin per ID  Cultures reviewed and growing MRSA  -repeat CPK on Monday  -repeat CBC and creatinine on Monday  -continue to monitor fever curve/vitals  -tentative plan is to continue antibiotics through 12/27 given operative findings    Per Dr Fabiana Byrnes: It will take a least 4-6 weeks for that flap to heal satisfactory

## 2018-12-24 ENCOUNTER — APPOINTMENT (INPATIENT)
Dept: RADIOLOGY | Facility: HOSPITAL | Age: 68
DRG: 573 | End: 2018-12-24
Payer: COMMERCIAL

## 2018-12-24 ENCOUNTER — APPOINTMENT (INPATIENT)
Dept: CT IMAGING | Facility: HOSPITAL | Age: 68
DRG: 573 | End: 2018-12-24
Payer: COMMERCIAL

## 2018-12-24 LAB
ANION GAP SERPL CALCULATED.3IONS-SCNC: 5 MMOL/L (ref 5–14)
BASOPHILS # BLD AUTO: 0.1 THOUSANDS/ΜL (ref 0–0.1)
BASOPHILS NFR BLD AUTO: 1 % (ref 0–1)
BUN SERPL-MCNC: 17 MG/DL (ref 5–25)
CALCIUM SERPL-MCNC: 8.4 MG/DL (ref 8.4–10.2)
CHLORIDE SERPL-SCNC: 99 MMOL/L (ref 97–108)
CK SERPL-CCNC: <20 U/L (ref 30–135)
CO2 SERPL-SCNC: 30 MMOL/L (ref 22–30)
CREAT SERPL-MCNC: 0.67 MG/DL (ref 0.6–1.2)
EOSINOPHIL # BLD AUTO: 0.3 THOUSAND/ΜL (ref 0–0.4)
EOSINOPHIL NFR BLD AUTO: 3 % (ref 0–6)
ERYTHROCYTE [DISTWIDTH] IN BLOOD BY AUTOMATED COUNT: 20.3 %
GFR SERPL CREATININE-BSD FRML MDRD: 91 ML/MIN/1.73SQ M
GLUCOSE SERPL-MCNC: 109 MG/DL (ref 70–99)
HCT VFR BLD AUTO: 34.4 % (ref 36–46)
HGB BLD-MCNC: 11.1 G/DL (ref 12–16)
LYMPHOCYTES # BLD AUTO: 2.5 THOUSANDS/ΜL (ref 0.5–4)
LYMPHOCYTES NFR BLD AUTO: 24 % (ref 25–45)
MCH RBC QN AUTO: 26.8 PG (ref 26–34)
MCHC RBC AUTO-ENTMCNC: 32.3 G/DL (ref 31–36)
MCV RBC AUTO: 83 FL (ref 80–100)
MONOCYTES # BLD AUTO: 0.8 THOUSAND/ΜL (ref 0.2–0.9)
MONOCYTES NFR BLD AUTO: 8 % (ref 1–10)
NEUTROPHILS # BLD AUTO: 6.4 THOUSANDS/ΜL (ref 1.8–7.8)
NEUTS SEG NFR BLD AUTO: 64 % (ref 45–65)
PLATELET # BLD AUTO: 548 THOUSANDS/UL (ref 150–450)
PMV BLD AUTO: 7.9 FL (ref 8.9–12.7)
POTASSIUM SERPL-SCNC: 4 MMOL/L (ref 3.6–5)
RBC # BLD AUTO: 4.14 MILLION/UL (ref 4–5.2)
SODIUM SERPL-SCNC: 134 MMOL/L (ref 137–147)
WBC # BLD AUTO: 10.1 THOUSAND/UL (ref 4.5–11)

## 2018-12-24 PROCEDURE — 73701 CT LOWER EXTREMITY W/DYE: CPT

## 2018-12-24 PROCEDURE — 85025 COMPLETE CBC W/AUTO DIFF WBC: CPT | Performed by: NURSE PRACTITIONER

## 2018-12-24 PROCEDURE — 82550 ASSAY OF CK (CPK): CPT | Performed by: NURSE PRACTITIONER

## 2018-12-24 PROCEDURE — 99232 SBSQ HOSP IP/OBS MODERATE 35: CPT | Performed by: INTERNAL MEDICINE

## 2018-12-24 PROCEDURE — 80048 BASIC METABOLIC PNL TOTAL CA: CPT | Performed by: NURSE PRACTITIONER

## 2018-12-24 PROCEDURE — 99233 SBSQ HOSP IP/OBS HIGH 50: CPT | Performed by: INTERNAL MEDICINE

## 2018-12-24 RX ORDER — DIPHENHYDRAMINE HYDROCHLORIDE 50 MG/ML
INJECTION INTRAMUSCULAR; INTRAVENOUS
Status: COMPLETED
Start: 2018-12-24 | End: 2018-12-24

## 2018-12-24 RX ORDER — METHYLPREDNISOLONE SODIUM SUCCINATE 40 MG/ML
INJECTION, POWDER, LYOPHILIZED, FOR SOLUTION INTRAMUSCULAR; INTRAVENOUS
Status: DISCONTINUED
Start: 2018-12-24 | End: 2018-12-24 | Stop reason: WASHOUT

## 2018-12-24 RX ORDER — METHYLPREDNISOLONE SODIUM SUCCINATE 125 MG/2ML
60 INJECTION, POWDER, LYOPHILIZED, FOR SOLUTION INTRAMUSCULAR; INTRAVENOUS ONCE
Status: COMPLETED | OUTPATIENT
Start: 2018-12-24 | End: 2018-12-24

## 2018-12-24 RX ORDER — METHYLPREDNISOLONE SODIUM SUCCINATE 125 MG/2ML
INJECTION, POWDER, LYOPHILIZED, FOR SOLUTION INTRAMUSCULAR; INTRAVENOUS
Status: COMPLETED
Start: 2018-12-24 | End: 2018-12-24

## 2018-12-24 RX ADMIN — METHYLPREDNISOLONE SODIUM SUCCINATE 60 MG: 125 INJECTION, POWDER, FOR SOLUTION INTRAMUSCULAR; INTRAVENOUS at 15:16

## 2018-12-24 RX ADMIN — DIPHENHYDRAMINE HYDROCHLORIDE 50 MG: 50 INJECTION, SOLUTION INTRAMUSCULAR; INTRAVENOUS at 15:15

## 2018-12-24 RX ADMIN — PANTOPRAZOLE SODIUM 40 MG: 40 TABLET, DELAYED RELEASE ORAL at 05:31

## 2018-12-24 RX ADMIN — VITAMIN D, TAB 1000IU (100/BT) 1000 UNITS: 25 TAB at 10:13

## 2018-12-24 RX ADMIN — DAPTOMYCIN 325 MG: 500 INJECTION, POWDER, LYOPHILIZED, FOR SOLUTION INTRAVENOUS at 10:15

## 2018-12-24 RX ADMIN — METRONIDAZOLE 500 MG: 500 TABLET ORAL at 21:39

## 2018-12-24 RX ADMIN — METHYLPREDNISOLONE SODIUM SUCCINATE 60 MG: 125 INJECTION, POWDER, LYOPHILIZED, FOR SOLUTION INTRAMUSCULAR; INTRAVENOUS at 15:16

## 2018-12-24 RX ADMIN — IOHEXOL 100 ML: 350 INJECTION, SOLUTION INTRAVENOUS at 14:37

## 2018-12-24 RX ADMIN — B-COMPLEX W/ C & FOLIC ACID TAB 1 TABLET: TAB at 10:12

## 2018-12-24 RX ADMIN — METRONIDAZOLE 500 MG: 500 TABLET ORAL at 05:31

## 2018-12-24 RX ADMIN — ENOXAPARIN SODIUM 40 MG: 40 INJECTION SUBCUTANEOUS at 10:12

## 2018-12-24 RX ADMIN — DIPHENHYDRAMINE HYDROCHLORIDE 25 MG: 50 INJECTION, SOLUTION INTRAMUSCULAR; INTRAVENOUS at 11:38

## 2018-12-24 NOTE — UTILIZATION REVIEW
Continued Stay Review    ADMISSION 12/13 -- CONTINUE ACUTE CARE LEVEL OF CARE --- MED/SURG UNIT    Date: 12/23/18    Vital Signs:  98 1--86--20--120/60  POX 93% RA    Medications:   Scheduled Meds:   acetaminophen 650 mg Oral Q6H PRN   aluminum-magnesium hydroxide-simethicone 30 mL Oral Q4H PRN   cholecalciferol 1,000 Units Oral Daily   DAPTOmycin 6 mg/kg Intravenous Q24H   diphenhydrAMINE 25 mg Oral Q6H PRN   enoxaparin 40 mg Subcutaneous Q24H JESSEE   metroNIDAZOLE 500 mg Oral Q8H [address]   multivitamin stress formula 1 tablet Oral Daily   ondansetron 4 mg Intravenous Q6H PRN 12/23 X1   oxyCODONE 5 mg Oral Q6H PRN   pantoprazole 40 mg Oral Early Morning   senna 2 tablet Oral Daily     Abnormal Labs/Diagnostic Results:  12/22 -- WBC 12 90, HGB 11 0, HCT 34 5, , , CR 0 55, GLUCOSE 119    Age/Sex: 76 y o  female     Assessment/Plan:   Chronic osteomyelitis of coccyx (HCC)   Assessment & Plan     Chronic osteomyelitis of coccyx  Continue current treatment per ID       Pressure ulcer, sacrum   Assessment & Plan     Overall patient has remained hemodynamically stable continues to make slow steady progress  No fundamental changes will be required to current treatment plan today  Labs in a m          * Decubitus skin ulcer   Assessment & Plan     Decubitus skin ulcer, multiple sites: b/l hips and coccyx  See infectious disease and plastic surgery evaluations  S/p debridement of bilateral hips and flap of right hip  On flagyl and daptomycin per ID  Cultures reviewed and growing MRSA  -repeat CPK on Monday  -repeat CBC and creatinine on Monday  -continue to monitor fever curve/vitals  -tentative plan is to continue antibiotics through 12/27 given operative findings     Per Dr Sandrita Luna: It will take a least 4-6 weeks for that flap to heal satisfactory  ID NOTE 12/23---  Impression/Plan:  1   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:  Patient is status post wound debridement of her right hip in the OR   Reviewed images with Radiology and no signs of changes of osteomyelitis on the right hip  Reviewed OR findings with plastics though the bone was visualized on debridement there was no concern for bone being infected at this time   Blood cultures are without growth   Patient remains hemodynamically stable   Culture results noted    -continue daptomycin  -continue flagyl   -would continue to trend CBC and creatinine  -continue to monitor fever curve/vitals  -tentative plan is to continue antibiotics through 12/26 given operative findings (7 days based on culture)

## 2018-12-24 NOTE — TREATMENT PLAN
Due to newly found sinus tract drainage  In patient's left anterior medial femur consultation was obtained with Dr Farrell/orthopedics from bedtime  They have recommended to continue current antibiotics and transfer the patient for orthopedic evaluation on Wednesday 12/26 2008  For formal orthopedic evaluation and possibly removal of hardware  This was discussed with patient she agrees with current treatment plan  Dr Hipolito Burgos

## 2018-12-24 NOTE — PROGRESS NOTES
Progress Note - Infectious Disease   Gregor Person 76 y o  female MRN: [id number]  Unit/Bed#:  -01 Encounter: [id number]      Impression/Plan:  1  Pressure ulcer of the right hip with flap necrosis with Staph aureus:  Patient is status post wound debridement of her right hip in the OR   Reviewed images with Radiology and no signs of changes of osteomyelitis on the right hip  Reviewed OR findings with plastics though the bone was visualized on debridement there was no concern for bone being infected at this time   Blood cultures are without growth  Patient remains hemodynamically stable  Culture results noted  -continue daptomycin  -continue flagyl   -would continue to trend CBC and creatinine  -continue to monitor fever curve/vitals  -tentative plan is to continue antibiotics through 12/26 given operative findings (7 days based on culture)     2  Displaced femur fracture status post jony placement now with suspected abscess:  Patient was noted with a prior hematoma at this area   She reports that the overall lesion decreased in size and had small punctate lesion that seemed to be draining on initial evaluations that seemed to stop subsequently  Drainage worsened over the weekend and cultures were collected  Patient otherwise is systemically well   -continue antibiotics as above  -will follow up wound culture  -would obtain CT with contrast of the site to look for collection/osteomyelitis  -patient will require orthopedics evaluation  -continue to trend fever curve/vitals as above  -continue monitor CBC and chemistry    3  Leukocytosis:  Patient noted to have elevation in her white blood cell count after procedure on 12/17  White blood cell count has trended down   She is otherwise not having any fevers   I suspect that this was due to her recent procedure    Unclear if problem 2 was contributing   -will monitor CBC  -will continue to monitor exam   -additional care as per primary  -continue antibiotics as above     4  Pressure ulcer of the sacrum and chronic osteomyelitis of the coccyx:   based on patient's imaging along with exam this is likely a chronic osteomyelitis with a draining sinus   The site itself did not appear acutely infected on initial evaluation   After discussion with plastics there is plan for possible skin grafting to this site in the future   -will continue antibiotics above for right hip wound  -if flap closure is undertaken would recommend bone biopsy and possible prolonged antibiotic after closure  -would discuss ostomy with patient to prevent further contamination of wound  -would continue to monitor this site clinically for now  -continue local care as per plastics     5  Decubitus ulcer of left hip: Viviane Counter on the patient's right hip was clearly infected on admission   Left hip wound appeared stable on exam initially   CT was without findings consistent for osteo at this site  Tyler Christianson is unclear at this time the timeline for closure for this wound   If closure is undertaken on this admission will need to evaluate OR findings to determine course of antibiotics for this issue   -for now will continue antibiotics as above for problem 1   -continue local care as per plastics        6  Paraplegia:  Patient at baseline is wheelchair bound and provides for ongoing care at home   If she is ultimately planned for skin grafting would discuss possibility ostomy with the patient to promote further wound healing  Unclear if patient is plan for discharge to facility she continues to go through these various skin grafting procedures  Above plan discussed in detail with the patient and primary service  ID consult service will continue to follow      Antibiotics:  Daptomycin/flagyl 5     24 hr events:  Patient noted to still have drainage from the right prior hematoma  Patient remains afebrile  White blood cell count 10  Wound cultures were collected from that site on 12/22  No new images  Patient's other vitals remained stable    Subjective:  Patient reports that over the weekend the lesion on her left knee had started to open up  She reported that she felt some drainage running down her leg over the weekend which prompted her to asked nursing to remove the dressing to evaluate the wound  There is purulent drainage that was present and that was sent for culture  She is systemically otherwise denies having any fevers, chills, nausea, vomiting or chest pain  She denies any rash or diarrhea due to antibiotics  She is upset about the possibility of a deeper infection involving her hardware  Objective:  Vitals:  Temp:  [97 8 °F (36 6 °C)-98 3 °F (36 8 °C)] 98 1 °F (36 7 °C)  HR:  [90-93] 90  Resp:  [18-20] 18  BP: ()/(55-66) 101/66  SpO2:  [92 %-93 %] 93 %  Temp (24hrs), Av °F (36 7 °C), Min:97 8 °F (36 6 °C), Max:98 3 °F (36 8 °C)  Current: Temperature: 98 1 °F (36 7 °C)    Physical Exam:   General Appearance:  Alert, interactive, nontoxic, no acute distress  Throat: Oropharynx moist without lesions  Lungs:   Clear to auscultation bilaterally anteriorly; no wheezes, rhonchi or rales; respirations unlabored   Heart:  RRR; no murmur, rub or gallop   Abdomen:   Soft, non-tender, non-distended, positive bowel sounds  Extremities: No clubbing, cyanosis or edema; lower extremities are contracted inward   Skin: Patient's right hip site appears without any erythema or significant skin changes in the areas that are exposed from the dressing  Her left knee site is noted with cloudy yellow drainage that starts to drain further with only minimal pressure for palpation  No surrounding skin changes noted         Labs, Imaging, & Other studies:   All pertinent labs and imaging studies were personally reviewed    Results from last 7 days  Lab Units 18  0432 18  0501 18  0525   WBC Thousand/uL 10 10 12 90* 11 60*   HEMOGLOBIN g/dL 11 1* 11 0* 10 2*   PLATELETS Thousands/uL 548* 589* 541*       Results from last 7 days  Lab Units 12/24/18  0432  12/19/18  0511   POTASSIUM mmol/L 4 0  < > 4 3   CHLORIDE mmol/L 99  < > 101   CO2 mmol/L 30  < > 29   BUN mg/dL 17  < > 17   CREATININE mg/dL 0 67  < > 0 60   EGFR ml/min/1 73sq m 91  < > 94   CALCIUM mg/dL 8 4  < > 8 0*   AST U/L  --   --  29   ALT U/L  --   --  33   ALK PHOS U/L  --   --  94   < > = values in this interval not displayed      Results from last 7 days  Lab Units 12/22/18  2236 12/17/18  0906 12/17/18  0857   GRAM STAIN RESULT  No Polys or Bacteria seen No Polys or Bacteria seen No Polys or Bacteria seen

## 2018-12-24 NOTE — ASSESSMENT & PLAN NOTE
Decubitus skin ulcer, multiple sites: b/l hips and coccyx  See infectious disease and plastic surgery evaluations  S/p debridement of bilateral hips and flap of right hip  On flagyl and daptomycin per ID  Cultures reviewed and growing MRSA  -repeat CPK on Monday  -repeat CBC and creatinine on Monday  -continue to monitor fever curve/vitals  -tentative plan is to continue antibiotics through 12/27 given operative findings  Per Dr Fabiana Byrnes: It will take a least 4-6 weeks for that flap to heal satisfactory  New sinus tract infection at distal femur/left will obtain CT of the left thigh to further evaluate surgeries aware  Case discussed with ID attending as well  Please follow up on that CT

## 2018-12-24 NOTE — ASSESSMENT & PLAN NOTE
Malnutrition Findings:   Malnutrition type: Chronic illness  Degree of Malnutrition: Malnutrition of moderate degree    BMI Findings: Body mass index is 20 51 kg/m²  Continue supplements  Continue all current care

## 2018-12-24 NOTE — ASSESSMENT & PLAN NOTE
Stable  Despite melena positive FOB T  Patient does not want any further diagnostic management studies or procedures

## 2018-12-24 NOTE — ASSESSMENT & PLAN NOTE
Anemia chronic disease with iron deficiency  Previously given Venofer and 2 unit PRBC  Unfortunately stool occult positive but patient reportedly declined gastroenterology evaluation  Hemoglobin stable  Repeat in AM    Transfuse x1 unit if hemoglobin less than 7 grams/deciliter

## 2018-12-24 NOTE — PROGRESS NOTES
Progress Note - Mary Stapleton 1950, 76 y o  female MRN: [id number]    Unit/Bed#: 5T -01 Encounter: [id number]    Primary Care Provider: Malena Rocha   Date and time admitted to hospital: 12/13/2018  9:04 PM        Moderate protein-calorie malnutrition ([address] )   Assessment & Plan    Malnutrition Findings:   Malnutrition type: Chronic illness  Degree of Malnutrition: Malnutrition of moderate degree    BMI Findings: Body mass index is 20 51 kg/m²  Continue supplements  Continue all current care  GERD (gastroesophageal reflux disease)   Assessment & Plan    GERD with cough  Will place on protonix and also order maalox prn  No symptoms reported today  Hyponatremia   Assessment & Plan    Resolved     Anemia, chronic disease   Assessment & Plan    Anemia chronic disease with iron deficiency  Previously given Venofer and 2 unit PRBC  Unfortunately stool occult positive but patient reportedly declined gastroenterology evaluation  Hemoglobin stable  Repeat in AM    Transfuse x1 unit if hemoglobin less than 7 grams/deciliter  Constipation   Assessment & Plan    Soap jony enema today x1  Continue senna daily  Chronic osteomyelitis of coccyx [facility])   Assessment & Plan    Chronic osteomyelitis of coccyx  Continue current treatment per ID  Iron deficiency anemia   Assessment & Plan    Stable  Despite melena positive FOB T  Patient does not want any further diagnostic management studies or procedures  Pressure ulcer, sacrum   Assessment & Plan    Overall patient has remained hemodynamically stable continues to make slow steady progress  No fundamental changes will be required to current treatment plan today  Labs in a m  Unfortunately there is a new sinus tract drainage concerning for possible hardware infection of the left knee  Please follow up on CT results in a m         Paraplegia following spinal cord injury [facility])   Assessment & Plan    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  No new symptoms reported  Continue current treatment plan including frequent turning  To avoid complications with decubitus  A punctuate new site of drainage around me was seen  Currently not draining  Patient is on broad-spectrum antibiotics Will continue with current treatment  Will await for surgery to evaluate the patient     * Decubitus skin ulcer   Assessment & Plan    Decubitus skin ulcer, multiple sites: b/l hips and coccyx  See infectious disease and plastic surgery evaluations  S/p debridement of bilateral hips and flap of right hip  On flagyl and daptomycin per ID  Cultures reviewed and growing MRSA  -repeat CPK on Monday  -repeat CBC and creatinine on Monday  -continue to monitor fever curve/vitals  -tentative plan is to continue antibiotics through 12/27 given operative findings  Per Dr Brant Suarez: It will take a least 4-6 weeks for that flap to heal satisfactory  New sinus tract infection at distal femur/left will obtain CT of the left thigh to further evaluate surgeries aware  Case discussed with ID attending as well  Please follow up on that CT        VTE Pharmacologic Prophylaxis:   Pharmacologic: Enoxaparin (Lovenox)  Mechanical VTE Prophylaxis in Place: No    Patient Centered Rounds: I have performed bedside rounds with nursing staff today  Discussions with Specialists or Other Care Team Provider:     Education and Discussions with Family / Patient: Problems and plan of care discussed with pt  Time Spent for Care: 20 minutes  More than 50% of total time spent on counseling and coordination of care as described above  Current Length of Stay: 11 day(s)    Current Patient Status: Inpatient   Certification Statement: The patient will continue to require additional inpatient hospital stay due to Infected decubitius ulcers    Discharge Plan: Pt is not yet medically stable for d/c      Code Status: Level 1 - Full Code    Subjective:     Patient reports no new symptoms today  Drainage from my knee is where I had my hematoma I hope it is not infected   Reports increased drainage from new sinus tract    Objective:     Vitals:   Temp (24hrs), Av °F (36 7 °C), Min:97 8 °F (36 6 °C), Max:98 3 °F (36 8 °C)    Temp:  [97 8 °F (36 6 °C)-98 3 °F (36 8 °C)] 98 1 °F (36 7 °C)  HR:  [90-93] 90  Resp:  [18-20] 18  BP: ()/(55-66) 101/66  SpO2:  [92 %-93 %] 93 %  Body mass index is 20 51 kg/m²  Input and Output Summary (last 24 hours): Intake/Output Summary (Last 24 hours) at 18 1205  Last data filed at 18 0601   Gross per 24 hour   Intake              210 ml   Output              700 ml   Net             -490 ml       Physical Exam:     Physical Exam   Constitutional: She is oriented to person, place, and time  No distress  HENT:   Head: Normocephalic and atraumatic  Eyes: Right eye exhibits no discharge  Left eye exhibits no discharge  Neck: No JVD present  Cardiovascular: Normal rate, regular rhythm, normal heart sounds and intact distal pulses  Exam reveals no gallop and no friction rub  No murmur heard  Pulmonary/Chest: Effort normal and breath sounds normal  No stridor  No respiratory distress  She has no wheezes  She has no rales  Abdominal: Soft  Bowel sounds are normal  She exhibits no distension  There is no tenderness  There is no rebound and no guarding  Genitourinary:   Genitourinary Comments: Chronic isabel with clear yellow  Urine     Musculoskeletal: She exhibits no edema  Neurological: She is alert and oriented to person, place, and time  Skin: Skin is warm and dry  She is not diaphoretic  Ulcers on hips and sacrum covered with dressing - dry   Psychiatric: She has a normal mood and affect           Additional Data:     Labs:      Results from last 7 days  Lab Units 18  0432 18  0501   WBC Thousand/uL 10 10 12 90*   HEMOGLOBIN g/dL 11 1* 11 0*   HEMATOCRIT % 34 4* 34 5*   PLATELETS Thousands/uL 548* 589* BANDS PCT %  --  1   NEUTROS PCT % 64  --    LYMPHS PCT % 24*  --    LYMPHO PCT %  --  17*   MONOS PCT % 8  --    MONO PCT %  --  6   EOS PCT % 3 3       Results from last 7 days  Lab Units 12/24/18  0432  12/19/18  0511   SODIUM mmol/L 134*  < > 134*   POTASSIUM mmol/L 4 0  < > 4 3   CHLORIDE mmol/L 99  < > 101   CO2 mmol/L 30  < > 29   BUN mg/dL 17  < > 17   CREATININE mg/dL 0 67  < > 0 60   ANION GAP mmol/L 5  < > 4*   CALCIUM mg/dL 8 4  < > 8 0*   ALBUMIN g/dL  --   --  2 6*   TOTAL BILIRUBIN mg/dL  --   --  0 20   ALK PHOS U/L  --   --  94   ALT U/L  --   --  33   AST U/L  --   --  29   GLUCOSE RANDOM mg/dL 109*  < > 114*   < > = values in this interval not displayed  Results from last 7 days  Lab Units 12/23/18  0653   POC GLUCOSE mg/dl 115*                   * I Have Reviewed All Lab Data Listed Above  * Additional Pertinent Lab Tests Reviewed: Most recent labs reviewed    Imaging:    Imaging Reports Reviewed Today Include: none  Imaging Personally Reviewed by Myself Includes:  none    Recent Cultures (last 7 days):       Results from last 7 days  Lab Units 12/22/18  2236   GRAM STAIN RESULT  No Polys or Bacteria seen   WOUND CULTURE  Culture too young- will reincubate       Last 24 Hours Medication List:     Current Facility-Administered Medications:  acetaminophen 650 mg Oral Q6H PRN Ginette Lenz MD    aluminum-magnesium hydroxide-simethicone 30 mL Oral Q4H PRN CONCEPCION Jackson    cholecalciferol 1,000 Units Oral Daily Ginette Lenz MD    DAPTOmycin 6 mg/kg Intravenous Q24H Edison Henry MD Last Rate: 325 mg (12/24/18 1015)   diphenhydrAMINE (BENADRYL) IVPB 25 mg Intravenous Once Melinda Carter    diphenhydrAMINE 25 mg Oral Q6H PRN Ginette Mendez MD    enoxaparin 40 mg Subcutaneous Q24H [facility] Deshaun Chandler DO    metroNIDAZOLE 500 mg Oral [facility] Edison Henry MD    multivitamin stress formula 1 tablet Oral Daily Deshaun Chandler DO    ondansetron 4 mg Intravenous Q6H PRN Bilal A  MD Yoanna    oxyCODONE 5 mg Oral Q6H PRN Bilal A Rozanne Spatz, MD    pantoprazole 40 mg Oral Early Morning CONCEPCION Jackson 2 tablet Oral Daily Emely Murguia DO         Today, Patient Was Seen By: Crispin Espinosa

## 2018-12-24 NOTE — ASSESSMENT & PLAN NOTE
Overall patient has remained hemodynamically stable continues to make slow steady progress  No fundamental changes will be required to current treatment plan today  Labs in a m  Unfortunately there is a new sinus tract drainage concerning for possible hardware infection of the left knee  Please follow up on CT results in a m

## 2018-12-25 LAB
ANION GAP SERPL CALCULATED.3IONS-SCNC: 7 MMOL/L (ref 5–14)
BACTERIA WND AEROBE CULT: NORMAL
BASOPHILS # BLD AUTO: 0 THOUSANDS/ΜL (ref 0–0.1)
BASOPHILS NFR BLD AUTO: 0 % (ref 0–1)
BUN SERPL-MCNC: 23 MG/DL (ref 5–25)
CALCIUM SERPL-MCNC: 8.7 MG/DL (ref 8.4–10.2)
CHLORIDE SERPL-SCNC: 101 MMOL/L (ref 97–108)
CO2 SERPL-SCNC: 27 MMOL/L (ref 22–30)
CREAT SERPL-MCNC: 0.71 MG/DL (ref 0.6–1.2)
EOSINOPHIL # BLD AUTO: 0 THOUSAND/ΜL (ref 0–0.4)
EOSINOPHIL NFR BLD AUTO: 0 % (ref 0–6)
ERYTHROCYTE [DISTWIDTH] IN BLOOD BY AUTOMATED COUNT: 20.6 %
GFR SERPL CREATININE-BSD FRML MDRD: 88 ML/MIN/1.73SQ M
GLUCOSE SERPL-MCNC: 170 MG/DL (ref 70–99)
GRAM STN SPEC: NORMAL
HCT VFR BLD AUTO: 36 % (ref 36–46)
HEMOCCULT STL QL: POSITIVE
HGB BLD-MCNC: 11.5 G/DL (ref 12–16)
LYMPHOCYTES # BLD AUTO: 1.9 THOUSANDS/ΜL (ref 0.5–4)
LYMPHOCYTES NFR BLD AUTO: 16 % (ref 25–45)
MCH RBC QN AUTO: 26.8 PG (ref 26–34)
MCHC RBC AUTO-ENTMCNC: 32 G/DL (ref 31–36)
MCV RBC AUTO: 84 FL (ref 80–100)
MONOCYTES # BLD AUTO: 0.6 THOUSAND/ΜL (ref 0.2–0.9)
MONOCYTES NFR BLD AUTO: 5 % (ref 1–10)
NEUTROPHILS # BLD AUTO: 9.9 THOUSANDS/ΜL (ref 1.8–7.8)
NEUTS SEG NFR BLD AUTO: 80 % (ref 45–65)
PLATELET # BLD AUTO: 552 THOUSANDS/UL (ref 150–450)
PMV BLD AUTO: 7.9 FL (ref 8.9–12.7)
POTASSIUM SERPL-SCNC: 4.3 MMOL/L (ref 3.6–5)
RBC # BLD AUTO: 4.3 MILLION/UL (ref 4–5.2)
SODIUM SERPL-SCNC: 135 MMOL/L (ref 137–147)
WBC # BLD AUTO: 12.4 THOUSAND/UL (ref 4.5–11)

## 2018-12-25 PROCEDURE — 80048 BASIC METABOLIC PNL TOTAL CA: CPT | Performed by: INTERNAL MEDICINE

## 2018-12-25 PROCEDURE — 82272 OCCULT BLD FECES 1-3 TESTS: CPT | Performed by: INTERNAL MEDICINE

## 2018-12-25 PROCEDURE — 99232 SBSQ HOSP IP/OBS MODERATE 35: CPT | Performed by: INTERNAL MEDICINE

## 2018-12-25 PROCEDURE — 85025 COMPLETE CBC W/AUTO DIFF WBC: CPT | Performed by: INTERNAL MEDICINE

## 2018-12-25 RX ADMIN — METRONIDAZOLE 500 MG: 500 TABLET ORAL at 13:55

## 2018-12-25 RX ADMIN — PANTOPRAZOLE SODIUM 40 MG: 40 TABLET, DELAYED RELEASE ORAL at 05:21

## 2018-12-25 RX ADMIN — METRONIDAZOLE 500 MG: 500 TABLET ORAL at 05:20

## 2018-12-25 RX ADMIN — METRONIDAZOLE 500 MG: 500 TABLET ORAL at 21:32

## 2018-12-25 RX ADMIN — B-COMPLEX W/ C & FOLIC ACID TAB 1 TABLET: TAB at 08:09

## 2018-12-25 RX ADMIN — DAPTOMYCIN 325 MG: 500 INJECTION, POWDER, LYOPHILIZED, FOR SOLUTION INTRAVENOUS at 08:11

## 2018-12-25 RX ADMIN — VITAMIN D, TAB 1000IU (100/BT) 1000 UNITS: 25 TAB at 08:10

## 2018-12-25 RX ADMIN — ENOXAPARIN SODIUM 40 MG: 40 INJECTION SUBCUTANEOUS at 08:10

## 2018-12-25 NOTE — ASSESSMENT & PLAN NOTE
Soap jony enema today x1  Continue senna daily    Patient refused enema no BM till morning  Discussed with the patient no BM may be again try to have enema she agrees

## 2018-12-25 NOTE — PROGRESS NOTES
Progress Note - Infectious Disease   Deangelo Love 76 y o  female MRN: [id number]  Unit/Bed#:  -01 Encounter: [id number]      Impression/Plan:  1  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:  Patient is status post wound debridement of her right hip in the OR   Reviewed images with Radiology and no signs of changes of osteomyelitis on the right hip  Reviewed OR findings with plastics though the bone was visualized on debridement there was no concern for bone being infected at this time   Blood cultures are without growth   Patient remains hemodynamically stable   Culture results noted  -continue daptomycin  -continue flagyl   -would continue to trend CBC and creatinine  -continue to monitor fever curve/vitals  -tentative plan is to continue antibiotics through 12/26 given operative findings (7 days based on culture)     2  Displaced femur fracture status post jony placement now with abscess and bone infection:  Patient was noted with a prior hematoma at this area   She reports that the overall lesion decreased in size and had small punctate lesion that seemed to be draining on initial evaluations that stopped subsequently  Drainage worsened over the weekend and cultures were collected  CT imaging consistent with bone infection and drainage tracks straight to the fracture site  Wound cultures only with skin naun   -monitor off antibiotics after tomorrow, may improve the yield of OR cultures  -OR intervention as per Orthopedics, possible hardware removal  -patient is plan for transfer to [facility]  -continue to trend fever curve/vitals as above  -continue monitor CBC and chemistry     3  Leukocytosis:  Patient noted to have elevation in her white blood cell count after procedure on 12/17  White blood cell count noted to remain elevated today    This is likely multifactorial with her patient's recent procedure and now due to problem 2     -will monitor CBC  -will continue to monitor exam   -additional care as per primary  -continue antibiotics as above     4  Pressure ulcer of the sacrum and chronic osteomyelitis of the coccyx:   based on patient's imaging along with exam this is likely a chronic osteomyelitis with a draining sinus   The site itself did not appear acutely infected on evaluation   After discussion with plastics there is plan for possible skin grafting in the future   -if flap closure is undertaken would recommend bone biopsy and possible prolonged antibiotic after closure  -would discuss ostomy with patient to prevent further contamination of wound  -would continue to monitor this site clinically for now  -continue local care as per plastics     5  Decubitus ulcer of left hip: Tyrel Jackson on the patient's right hip was clearly infected on admission   Left hip wound appears stable on exam   CT was without findings consistent for osteo at this site  Camacho Sergio is unclear at this time the timeline for closure for this wound   If closure is undertaken on this admission will need to evaluate OR findings to determine course of antibiotics for this issue   -for now will continue antibiotics as above for problem 1   -continue local care as per plastics        6  Paraplegia:  Patient at baseline is wheelchair bound and provides for ongoing care at home   If she is ultimately planned for skin grafting would discuss possibility ostomy with the patient to promote further wound healing        Above plan discussed in detail with the patient and primary service      ID consult service will continue to follow  Antibiotics:  Daptomycin/flagyl 6     24 hr events:  Patient case discussed with Ortho yesterday  She had allergic reaction to IV contrast yesterday  WBC noted at 12  Patient remains afebrile  Wound cultures only with mixed skin naun  CT images show a tract from the fracture site to the skin (likely infected)  Patients other vitals are stable       Subjective:  Patient is upset by the fact that her hardware is infected  She reports again that she was seen by Orthopedics a week prior to her admission and there were no issues at the site  She noted that the hematoma there never completely healed and remained always discolored with intermittent drainage but she never felt poorly or thought that it could be infected  Her graft site is not causing her any issues otherwise and she is tolerating antibiotics without any nausea, vomiting, diarrhea or rash  She did experience rash yesterday with IV contrast     Objective:  Vitals:  Temp:  [97 2 °F (36 2 °C)-98 3 °F (36 8 °C)] 97 8 °F (36 6 °C)  HR:  [74-95] 74  Resp:  [16-18] 18  BP: (100-108)/(51-60) 100/58  SpO2:  [90 %-100 %] 93 %  Temp (24hrs), Av 8 °F (36 6 °C), Min:97 2 °F (36 2 °C), Max:98 3 °F (36 8 °C)  Current: Temperature: 97 8 °F (36 6 °C)    Physical Exam:   General Appearance:  Alert, interactive, nontoxic, no acute distress  Chronically ill-appearing female that appears older than stated age  Throat: Oropharynx moist without lesions  Lungs:   Clear to auscultation bilaterally; no wheezes, rhonchi or rales; respirations unlabored   Heart:  RRR; no murmur, rub or gallop   Abdomen:   Soft, non-tender, non-distended, positive bowel sounds  Extremities: No clubbing, cyanosis or edema; legs remain contracted   Skin: No new rashes or lesions  No new draining wounds noted  The patient's right hip graft site continues to do well without any signs of necrosis or drainage  Unable to fully examine the wound its full extent as some of the dressings have been sutured down  The left hip wound has a pink base and is without any foul smell  The lesion on the left lower leg continues to drain purulent material with just minimal pressure applied  There is no erythema or other skin changes at that site  Sacral wound remains unchanged per nursing         Labs, Imaging, & Other studies:   All pertinent labs and imaging studies were personally reviewed    Results from last 7 days  Lab Units 12/25/18  0519 12/24/18  0432 12/22/18  0501   WBC Thousand/uL 12 40* 10 10 12 90*   HEMOGLOBIN g/dL 11 5* 11 1* 11 0*   PLATELETS Thousands/uL 552* 548* 589*       Results from last 7 days  Lab Units 12/25/18  0519  12/19/18  0511   POTASSIUM mmol/L 4 3  < > 4 3   CHLORIDE mmol/L 101  < > 101   CO2 mmol/L 27  < > 29   BUN mg/dL 23  < > 17   CREATININE mg/dL 0 71  < > 0 60   EGFR ml/min/1 73sq m 88  < > 94   CALCIUM mg/dL 8 7  < > 8 0*   AST U/L  --   --  29   ALT U/L  --   --  33   ALK PHOS U/L  --   --  94   < > = values in this interval not displayed      Results from last 7 days  Lab Units 12/22/18  3376   GRAM STAIN RESULT  No Polys or Bacteria seen   WOUND CULTURE  1+ Growth of

## 2018-12-25 NOTE — ASSESSMENT & PLAN NOTE
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  No new symptoms reported  Continue current treatment plan including frequent turning  To avoid complications with decubitus  A punctuate new site of drainage around me was seen  Currently not draining  Patient is on broad-spectrum antibiotics Will continue with current treatment  Will await for surgery to evaluate the patient patient will be going tomorrow to orthopedic service for left hip infection

## 2018-12-25 NOTE — ASSESSMENT & PLAN NOTE
Decubitus skin ulcer, multiple sites: b/l hips and coccyx  See infectious disease and plastic surgery evaluations  S/p debridement of bilateral hips and flap of right hip  On flagyl and daptomycin per ID  Cultures reviewed and growing MRSA  -repeat CPK on Monday  -repeat CBC and creatinine on Monday  -continue to monitor fever curve/vitals  -tentative plan is to continue antibiotics through 12/27 given operative findings  Per Dr Kristen Hester: It will take a least 4-6 weeks for that flap to heal satisfactory  New sinus tract infection at distal femur/left will obtain CT of the left thigh to further evaluate surgeries aware  Case discussed with ID attending as well  Please follow up on that CT    Id recommended it transfer the patient tomorrow to [facility] for left have reassessment after seen by Dr Kristen Hester

## 2018-12-25 NOTE — ASSESSMENT & PLAN NOTE
GERD with cough  Will place on protonix and also order maalox prn  No symptoms reported today no GI complaints

## 2018-12-25 NOTE — PROGRESS NOTES
Progress Note - Jose Manuel Mendoza 1950, 76 y o  female MRN: [id number]    Unit/Bed#: 5T -01 Encounter: [id number]    Primary Care Provider: Marilee Coronel   Date and time admitted to hospital: 12/13/2018  9:04 PM        Moderate protein-calorie malnutrition ([address] )   Assessment & Plan    Malnutrition Findings:   Malnutrition type: Chronic illness  Degree of Malnutrition: Malnutrition of moderate degree    BMI Findings: Body mass index is 17 83 kg/m²  Continue supplements  Continue all current care  GERD (gastroesophageal reflux disease)   Assessment & Plan    GERD with cough  Will place on protonix and also order maalox prn  No symptoms reported today no GI complaints  Hyponatremia   Assessment & Plan    Resolved     Constipation   Assessment & Plan    Soap jony enema today x1  Continue senna daily  Patient refused enema no BM till morning  Discussed with the patient no BM may be again try to have enema she agrees     Chronic osteomyelitis of coccyx [facility])   Assessment & Plan    Chronic osteomyelitis of coccyx  Continue current treatment per ID  Iron deficiency anemia   Assessment & Plan    Stable  Despite melena positive FOB T  Patient does not want any further diagnostic management studies or procedures  Paraplegia following spinal cord injury [facility])   Assessment & Plan    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  No new symptoms reported  Continue current treatment plan including frequent turning  To avoid complications with decubitus  A punctuate new site of drainage around me was seen  Currently not draining  Patient is on broad-spectrum antibiotics Will continue with current treatment  Will await for surgery to evaluate the patient patient will be going tomorrow to orthopedic service for left hip infection     * Decubitus skin ulcer   Assessment & Plan    Decubitus skin ulcer, multiple sites: b/l hips and coccyx    See infectious disease and plastic surgery evaluations  S/p debridement of bilateral hips and flap of right hip  On flagyl and daptomycin per ID  Cultures reviewed and growing MRSA  -repeat CPK on Monday  -repeat CBC and creatinine on Monday  -continue to monitor fever curve/vitals  -tentative plan is to continue antibiotics through 12/27 given operative findings  Per Dr Prema Hu: It will take a least 4-6 weeks for that flap to heal satisfactory  New sinus tract infection at distal femur/left will obtain CT of the left thigh to further evaluate surgeries aware  Case discussed with ID attending as well  Please follow up on that CT  Id recommended it transfer the patient tomorrow to [facility] for left have reassessment after seen by Dr Prema Hu         VTE Pharmacologic Prophylaxis:   Pharmacologic: Enoxaparin (Lovenox)  Mechanical VTE Prophylaxis in Place: Yes    Patient Centered Rounds: I have performed bedside rounds with nursing staff today  Discussions with Specialists or Other Care Team Provider:  Id    Education and Discussions with Family / Patient:  Patient    Time Spent for Care: 30 minutes  More than 50% of total time spent on counseling and coordination of care as described above      Current Length of Stay: 12 day(s)    Current Patient Status: Inpatient   Certification Statement: The patient will continue to require additional inpatient hospital stay due to Wound care    Discharge Plan:  Surgical intervention    Code Status: Level 1 - Full Code      Subjective:   Patient wanted to see Dr Prema Hu in the morning before going to [facility] for left hip assessment GI symptom is better did not move bowel need enema discussed with patient yesterday she refused discussed with the patient no family member or emergency contact is available she refused she does not have anybody and does not want to be informed anybody if something happens nobody wants to know if she does not want to disclose any information    Objective:     Vitals:   Temp (24hrs), Av 8 °F (36 6 °C), Min:97 2 °F (36 2 °C), Max:98 3 °F (36 8 °C)    Temp:  [97 2 °F (36 2 °C)-98 3 °F (36 8 °C)] 97 8 °F (36 6 °C)  HR:  [74-95] 74  Resp:  [16-18] 18  BP: (100-108)/(51-60) 100/58  SpO2:  [90 %-100 %] 93 %  Body mass index is 17 83 kg/m²  Input and Output Summary (last 24 hours): Intake/Output Summary (Last 24 hours) at 18 1234  Last data filed at 18 0500   Gross per 24 hour   Intake              480 ml   Output              425 ml   Net               55 ml       Physical Exam:     Physical Exam      Additional Data:     Labs:      Results from last 7 days  Lab Units 18  0519  18  0501   WBC Thousand/uL 12 40*  < > 12 90*   HEMOGLOBIN g/dL 11 5*  < > 11 0*   HEMATOCRIT % 36 0  < > 34 5*   PLATELETS Thousands/uL 552*  < > 589*   BANDS PCT %  --   --  1   NEUTROS PCT % 80*  < >  --    LYMPHS PCT % 16*  < >  --    LYMPHO PCT %  --   --  17*   MONOS PCT % 5  < >  --    MONO PCT %  --   --  6   EOS PCT % 0  < > 3   < > = values in this interval not displayed  Results from last 7 days  Lab Units 18  0519  18  0511   SODIUM mmol/L 135*  < > 134*   POTASSIUM mmol/L 4 3  < > 4 3   CHLORIDE mmol/L 101  < > 101   CO2 mmol/L 27  < > 29   BUN mg/dL 23  < > 17   CREATININE mg/dL 0 71  < > 0 60   ANION GAP mmol/L 7  < > 4*   CALCIUM mg/dL 8 7  < > 8 0*   ALBUMIN g/dL  --   --  2 6*   TOTAL BILIRUBIN mg/dL  --   --  0 20   ALK PHOS U/L  --   --  94   ALT U/L  --   --  33   AST U/L  --   --  29   GLUCOSE RANDOM mg/dL 170*  < > 114*   < > = values in this interval not displayed  Results from last 7 days  Lab Units 18  0653   POC GLUCOSE mg/dl 115*                   * I Have Reviewed All Lab Data Listed Above  * Additional Pertinent Lab Tests Reviewed: All Labs For Current Hospital Admission Reviewed    Imaging:    Imaging Reports Reviewed Today Include:  None today  Imaging Personally Reviewed by Myself Includes:   Old reviewed    Recent Cultures (last 7 days):       Results from last 7 days  Lab Units 12/22/18  0196   GRAM STAIN RESULT  No Polys or Bacteria seen   WOUND CULTURE  1+ Growth of        Last 24 Hours Medication List:     Current Facility-Administered Medications:  acetaminophen 650 mg Oral Q6H PRN Ginette Lenz MD    aluminum-magnesium hydroxide-simethicone 30 mL Oral Q4H PRN CONCEPCION Jackson    cholecalciferol 1,000 Units Oral Daily Bilkoki Lenz MD    DAPTOmycin 6 mg/kg Intravenous Q24H Jane Bertrand MD Last Rate: 325 mg (12/25/18 0811)   diphenhydrAMINE 25 mg Oral Q6H PRN Bilal A Irish Canavan, MD    enoxaparin 40 mg Subcutaneous Q24H [facility] Deshaun Chandler DO    metroNIDAZOLE 500 mg Oral [facility] Jane Bertrand MD    multivitamin stress formula 1 tablet Oral Daily Deshaun Chandler DO    ondansetron 4 mg Intravenous Q6H PRN Ginette Lenz MD    oxyCODONE 5 mg Oral Q6H PRN Bilal A Irish Canavan, MD    pantoprazole 40 mg Oral Early Morning CONCEPCION Jackson    senna 2 tablet Oral Daily Sweta Ruiz DO         Today, Patient Was Seen By: Yadira Cordero MD    ** Please Note: Dictation voice to text software may have been used in the creation of this document   **

## 2018-12-25 NOTE — ASSESSMENT & PLAN NOTE
Malnutrition Findings:   Malnutrition type: Chronic illness  Degree of Malnutrition: Malnutrition of moderate degree    BMI Findings: Body mass index is 17 83 kg/m²  Continue supplements  Continue all current care

## 2018-12-25 NOTE — ACP (ADVANCE CARE PLANNING)
VSS  Awake, alert and oriented  No c/o pain  Lungs clear with decreased breath sounds at bases  Wound drain attached to 100mm suction on wall  Sero-sangious drainage noted in tubing  Currently eating breakfast  Call bell at pt's reach

## 2018-12-26 LAB
ANION GAP SERPL CALCULATED.3IONS-SCNC: 4 MMOL/L (ref 5–14)
BUN SERPL-MCNC: 26 MG/DL (ref 5–25)
CALCIUM SERPL-MCNC: 8.1 MG/DL (ref 8.4–10.2)
CHLORIDE SERPL-SCNC: 100 MMOL/L (ref 97–108)
CO2 SERPL-SCNC: 29 MMOL/L (ref 22–30)
CREAT SERPL-MCNC: 0.56 MG/DL (ref 0.6–1.2)
GFR SERPL CREATININE-BSD FRML MDRD: 96 ML/MIN/1.73SQ M
GLUCOSE SERPL-MCNC: 106 MG/DL (ref 70–99)
POTASSIUM SERPL-SCNC: 4.1 MMOL/L (ref 3.6–5)
SODIUM SERPL-SCNC: 133 MMOL/L (ref 137–147)

## 2018-12-26 PROCEDURE — 99233 SBSQ HOSP IP/OBS HIGH 50: CPT | Performed by: FAMILY MEDICINE

## 2018-12-26 PROCEDURE — 80048 BASIC METABOLIC PNL TOTAL CA: CPT | Performed by: INTERNAL MEDICINE

## 2018-12-26 PROCEDURE — 99232 SBSQ HOSP IP/OBS MODERATE 35: CPT | Performed by: INTERNAL MEDICINE

## 2018-12-26 RX ADMIN — DAPTOMYCIN 325 MG: 500 INJECTION, POWDER, LYOPHILIZED, FOR SOLUTION INTRAVENOUS at 08:32

## 2018-12-26 RX ADMIN — PANTOPRAZOLE SODIUM 40 MG: 40 TABLET, DELAYED RELEASE ORAL at 06:21

## 2018-12-26 RX ADMIN — METRONIDAZOLE 500 MG: 500 TABLET ORAL at 14:22

## 2018-12-26 RX ADMIN — B-COMPLEX W/ C & FOLIC ACID TAB 1 TABLET: TAB at 08:31

## 2018-12-26 RX ADMIN — ENOXAPARIN SODIUM 40 MG: 40 INJECTION SUBCUTANEOUS at 08:31

## 2018-12-26 RX ADMIN — METRONIDAZOLE 500 MG: 500 TABLET ORAL at 06:20

## 2018-12-26 RX ADMIN — VITAMIN D, TAB 1000IU (100/BT) 1000 UNITS: 25 TAB at 08:31

## 2018-12-26 NOTE — ASSESSMENT & PLAN NOTE
Decubitus skin ulcer, multiple sites: b/l hips and coccyx  See infectious disease and plastic surgery evaluations  S/p debridement of bilateral hips and flap of right hip  On flagyl and daptomycin per ID  Cultures reviewed and growing MRSA   -last dose of daptomycin and flagyl is today  -repeat CBC and creatinine tomorrow  -continue to monitor fever curve/vitals  Per Dr Scot Joaquin: It will take a least 4-6 weeks for that flap to heal satisfactory  New sinus tract infection at distal femur/left   Ct scan done and shows air flecks and sinus tract to fracture site  Case discussed previously with ortho and plastic surgery and ID  Will ask dr Sada Elmore with Orthopedics to see if hardware needs to be removed from the left hip or just wash out needs to be done  did also discuss with dr Al Palmer orthopedics at [place]  Dr Scot Joaquin plastic surgeon will then plan further skin grafting post removal of hardware

## 2018-12-26 NOTE — ASSESSMENT & PLAN NOTE
Malnutrition Findings:   Malnutrition type: Chronic illness  Degree of Malnutrition: Malnutrition of moderate degree    BMI Findings: Body mass index is 17 72 kg/m²  Continue supplements  Continue all current care

## 2018-12-26 NOTE — ASSESSMENT & PLAN NOTE
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  No new symptoms reported  Continue current treatment plan including frequent turning and specialty mattress

## 2018-12-26 NOTE — PROGRESS NOTES
Dressings changed  2/3 STSG take donor site rt thigh flap  Staples out  R  Hip flaps all viable  Lt UE sores with granulations  Lt femur XR ? Collection around fx site  Need ortho to give opinion as regards jony removal   To avoid fx management could fillet lt leg to cover 4 pressure sores she has with one flap  Need to discuss with ortho

## 2018-12-26 NOTE — ASSESSMENT & PLAN NOTE
Stable hb at 11 5  Despite melena positive FOBT  Patient does not want any further diagnostic management studies or procedures

## 2018-12-26 NOTE — PROGRESS NOTES
Progress Note - Trinidad Workman 1950, 76 y o  female MRN: [id number]    Unit/Bed#: 5T -01 Encounter: [id number]    Primary Care Provider: Sabine Leroy   Date and time admitted to hospital: 12/13/2018  9:04 PM        * Decubitus skin ulcer   Assessment & Plan    Decubitus skin ulcer, multiple sites: b/l hips and coccyx  See infectious disease and plastic surgery evaluations  S/p debridement of bilateral hips and flap of right hip  On flagyl and daptomycin per ID  Cultures reviewed and growing MRSA   -last dose of daptomycin and flagyl is today  -repeat CBC and creatinine tomorrow  -continue to monitor fever curve/vitals  Per Dr Tristen Sim: It will take a least 4-6 weeks for that flap to heal satisfactory  New sinus tract infection at distal femur/left   Ct scan done and shows air flecks and sinus tract to fracture site  Case discussed previously with ortho and plastic surgery and ID  Will ask dr Dodson Fleischer with Orthopedics to see if hardware needs to be removed from the left hip or just wash out needs to be done  did also discuss with dr Glenda Rodriguez orthopedics at [place]  Dr Tristen Sim plastic surgeon will then plan further skin grafting post removal of hardware  Moderate protein-calorie malnutrition ([address] )   Assessment & Plan    Malnutrition Findings:   Malnutrition type: Chronic illness  Degree of Malnutrition: Malnutrition of moderate degree    BMI Findings: Body mass index is 17 72 kg/m²  Continue supplements  Continue all current care  GERD (gastroesophageal reflux disease)   Assessment & Plan    GERD with cough  Will place on protonix and also order maalox prn  No symptoms reported today no GI complaints  Hyponatremia   Assessment & Plan    Resolved     Anemia, chronic disease   Assessment & Plan    Anemia chronic disease with iron deficiency  Previously given Venofer and 2 unit PRBC    Unfortunately stool occult positive but patient reportedly declined gastroenterology evaluation  Hemoglobin stable  Repeat in AM    Transfuse x1 unit if hemoglobin less than 7 grams/deciliter  Constipation   Assessment & Plan    Constipation resolved  Patient had bowel movement yesterday evening     Chronic osteomyelitis of coccyx (HCC)   Assessment & Plan    Chronic osteomyelitis of coccyx  Continue current treatment per ID  Iron deficiency anemia   Assessment & Plan    Stable hb at 11 5  Despite melena positive FOBT  Patient does not want any further diagnostic management studies or procedures  Paraplegia following spinal cord injury [facility])   Assessment & Plan    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  No new symptoms reported  Continue current treatment plan including frequent turning and specialty mattress  VTE Pharmacologic Prophylaxis:   Pharmacologic: Enoxaparin (Lovenox)  Mechanical VTE Prophylaxis in Place: No    Patient Centered Rounds: I have performed bedside rounds with nursing staff today  Discussions with Specialists or Other Care Team Provider: orthopedics,id and plastic surgery    Education and Discussions with Family / Patient: discussed with patient at bedside about hospital course  Time Spent for Care: 45 minutes  More than 50% of total time spent on counseling and coordination of care as described above  Current Length of Stay: 13 day(s)    Current Patient Status: Inpatient   Certification Statement: The patient will continue to require additional inpatient hospital stay due to infected left femur    Discharge Plan: unclear at this time    Code Status: Level 1 - Full Code      Subjective:   Patient denies any chest pain or sob  she denies any pain anywhere  anxious to know what will happen to her left leg    Objective:     Vitals:   Temp (24hrs), Av 8 °F (36 6 °C), Min:97 5 °F (36 4 °C), Max:98 2 °F (36 8 °C)    Temp:  [97 5 °F (36 4 °C)-98 2 °F (36 8 °C)] 98 2 °F (36 8 °C)  HR:  [82-90] 85  Resp:  [18] 18  BP: (107-135)/(58-65) 107/58  SpO2:  [96 %] 96 %  Body mass index is 17 72 kg/m²  Input and Output Summary (last 24 hours): Intake/Output Summary (Last 24 hours) at 12/26/18 1307  Last data filed at 12/26/18 0700   Gross per 24 hour   Intake              660 ml   Output              435 ml   Net              225 ml       Physical Exam:     Physical Exam   Constitutional: She is oriented to person, place, and time  She appears well-developed  HENT:   Head: Normocephalic and atraumatic  Eyes: Conjunctivae and EOM are normal    Neck: Normal range of motion  Neck supple  Cardiovascular: Normal rate, regular rhythm and normal heart sounds  Pulmonary/Chest: Effort normal and breath sounds normal  No respiratory distress  She has no wheezes  She has no rales  She exhibits no tenderness  Abdominal: Soft  Bowel sounds are normal  She exhibits no distension  There is no tenderness  There is no rebound  Musculoskeletal:   Multiple open pressure ulcers stage 3 on sacral area and dressing on right hip graft site small area of crusting noted on left anteromedial thigh   Lymphadenopathy:     She has no cervical adenopathy  Neurological: She is alert and oriented to person, place, and time  Skin: Skin is warm and dry  Psychiatric:   anxious         Additional Data:     Labs:      Results from last 7 days  Lab Units 12/25/18  0519  12/22/18  0501   WBC Thousand/uL 12 40*  < > 12 90*   HEMOGLOBIN g/dL 11 5*  < > 11 0*   HEMATOCRIT % 36 0  < > 34 5*   PLATELETS Thousands/uL 552*  < > 589*   BANDS PCT %  --   --  1   NEUTROS PCT % 80*  < >  --    LYMPHS PCT % 16*  < >  --    LYMPHO PCT %  --   --  17*   MONOS PCT % 5  < >  --    MONO PCT %  --   --  6   EOS PCT % 0  < > 3   < > = values in this interval not displayed      Results from last 7 days  Lab Units 12/26/18  0448   SODIUM mmol/L 133*   POTASSIUM mmol/L 4 1   CHLORIDE mmol/L 100   CO2 mmol/L 29   BUN mg/dL 26*   CREATININE mg/dL 0 56*   ANION GAP mmol/L 4*   CALCIUM mg/dL 8 1*   GLUCOSE RANDOM mg/dL 106*           Results from last 7 days  Lab Units 12/23/18  0653   POC GLUCOSE mg/dl 115*                   * I Have Reviewed All Lab Data Listed Above  * Additional Pertinent Lab Tests Reviewed: [address] Admission Reviewed    Imaging:    Imaging Reports Reviewed Today Include: ct left leg  Imaging Personally Reviewed by Myself Includes:  Ct left leg    Recent Cultures (last 7 days):       Results from last 7 days  Lab Units 12/22/18  2236   GRAM STAIN RESULT  No Polys or Bacteria seen   WOUND CULTURE  1+ Growth of        Last 24 Hours Medication List:     Current Facility-Administered Medications:  acetaminophen 650 mg Oral Q6H PRN Ginette Lenz MD   aluminum-magnesium hydroxide-simethicone 30 mL Oral Q4H PRN CONCEPCION Jackson   cholecalciferol 1,000 Units Oral Daily Ginette Lenz MD   diphenhydrAMINE 25 mg Oral Q6H PRN Ginette Carroll MD   enoxaparin 40 mg Subcutaneous Q24H [address] Deshaun Chandler DO   metroNIDAZOLE 500 mg Oral [facility] Reyes Galaviz MD   multivitamin stress formula 1 tablet Oral Daily Deshaun Chandler DO   ondansetron 4 mg Intravenous Q6H PRN Ginette Lenz MD   oxyCODONE 5 mg Oral Q6H PRN Ginette Carroll MD   pantoprazole 40 mg Oral Early Morning CONCEPCION Jackson   senna 2 tablet Oral Daily Sergio Arriaga DO        Today, Patient Was Seen By: Ayden Mcrae MD    ** Please Note: Dictation voice to text software may have been used in the creation of this document   **

## 2018-12-26 NOTE — PROGRESS NOTES
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:  Patient is status post wound debridement of her right hip in the OR   Reviewed images with Radiology and no signs of changes of osteomyelitis on the right hip  Reviewed OR findings with plastics though the bone was visualized on debridement there was no concern for bone being infected at this time   Blood cultures were without growth   Patient remains hemodynamically stable   Culture results noted  This area was treated largely as a skin and soft tissue infection with graft failure   -last dose of daptomycin today  -last dose of flagyl today  -holding further antibiotics as below  -would repeat CBC and Cre tomorrow  -continue to monitor fever curve/vitals     2  Displaced femur fracture status post jony placement now with abscess and bone infection:  Patient was noted with a prior hematoma at this area   She reports that the overall lesion decreased in size and had small punctate lesion that seemed to be draining on initial evaluations that stopped subsequently   Drainage worsened over the weekend and cultures were collected  CT imaging consistent with bone infection and drainage tracks straight to the fracture site  Wound cultures only with skin naun  No further drainage on exam today  Patient is plan for evaluation in the OR with Orthopedics and possible hardware removal   -will monitor off antibiotics after today, may improve the yield of OR cultures  -OR intervention as per Orthopedics, possible hardware removal  -patient is planned for transfer to [place] today  -continue to trend fever curve/vitals as above  -would repeat CBC and creatinine tomorrow      3  Leukocytosis:  Patient noted to have elevation in her white blood cell count after procedure on 12/17    White blood cell count noted to remain elevated yesterday  This is likely multifactorial with the patient's recent procedure, her multiple other wounds and now due to problem 2     -will monitor CBC  -will continue to monitor exam   -additional care as per primary  -continue antibiotics as above     4  Pressure ulcer of the sacrum and chronic osteomyelitis of the coccyx:   based on patient's imaging along with exam this is likely a chronic osteomyelitis with a draining sinus   The site itself did not appear acutely infected on evaluation again today   After discussion with plastics there is plan for possible skin grafting in the future   -would discuss ostomy with patient to prevent further contamination of wound  -would continue to monitor this site clinically for now  -continue local care as per plastics     5  Decubitus ulcer of left hip: Goodman Bolds on the patient's right hip was clearly infected on admission   Left hip wound appears stable on exam again today   CT was without findings consistent for osteo at this site  Stephen Laser is unclear at this time the timeline for closure for this wound   If closure is undertaken on this admission will need to evaluate OR findings to determine course of antibiotics for this issue   -continue local care as per plastics        6  Paraplegia:  Patient at baseline is wheelchair bound and provides for ongoing care at home   If she is ultimately planned for skin grafting would discuss possibility ostomy with the patient to promote further wound healing  It remains unclear to me how the patient is able to keep any of these wounds adequately clean on her own       Above plan discussed in detail with the patient and primary service      ID consult service will continue to follow  Antibiotics:  Daptomycin/flagyl 7     24 hr events:  Patient had dark black stools yesterday-FOBT +  She remains afebrile  No new micro data  No new images  Patients other vitals are stable  No labs collected today  Subjective:  Patient denies any nausea, vomiting, chest pain or shortness of breath  She is tolerating antibiotics without development of rash or diarrhea  She remains upset at the possibility of having have hardware removal from her left knee  She is currently tolerating p o  Objective:  Vitals:  Temp:  [97 5 °F (36 4 °C)-98 2 °F (36 8 °C)] 98 2 °F (36 8 °C)  HR:  [82-90] 85  Resp:  [18] 18  BP: (107-135)/(58-65) 107/58  SpO2:  [96 %] 96 %  Temp (24hrs), Av 8 °F (36 6 °C), Min:97 5 °F (36 4 °C), Max:98 2 °F (36 8 °C)  Current: Temperature: 98 2 °F (36 8 °C)    Physical Exam:   General Appearance:  Alert, interactive, nontoxic, no acute distress  Chronically ill-appearing woman with depressed affect  Throat: Oropharynx moist without lesions  Lungs:   Clear to auscultation bilaterally; no wheezes, rhonchi or rales; respirations unlabored   Heart:  RRR; no murmur, rub or gallop   Abdomen:   Soft, non-tender, non-distended, positive bowel sounds  Extremities: No clubbing, cyanosis or edema; lower extremities are contracted and words and lower back noted to be curved in words  Patient has no sensation in her lower extremities  She is able to freely move her upper extremities without issue  Skin: No new rashes or lesions  No new draining wounds noted  Patient's right hip site appears to be healing well  Some dressings remains sutured in place  Patient's left hip wound appears to have a clean base without significant drainage  Patient's sacral wound again with clean base and without significant drainage  Patient's medial left leg wound without any erythema or skin changes and no drainage could be expressed on exam today  No new wounds noted on exam with nursing         Labs, Imaging, & Other studies:   All pertinent labs and imaging studies were personally reviewed    Results from last 7 days  Lab Units 18  0519 18  0432 18  0501   WBC Thousand/uL 12 40* 10 10 12 90* HEMOGLOBIN g/dL 11 5* 11 1* 11 0*   PLATELETS Thousands/uL 552* 548* 589*       Results from last 7 days  Lab Units 12/26/18  0448   POTASSIUM mmol/L 4 1   CHLORIDE mmol/L 100   CO2 mmol/L 29   BUN mg/dL 26*   CREATININE mg/dL 0 56*   EGFR ml/min/1 73sq m 96   CALCIUM mg/dL 8 1*       Results from last 7 days  Lab Units 12/22/18  2236   GRAM STAIN RESULT  No Polys or Bacteria seen   WOUND CULTURE  1+ Growth of

## 2018-12-27 PROBLEM — R73.9 HYPERGLYCEMIA: Status: ACTIVE | Noted: 2018-12-27

## 2018-12-27 PROBLEM — R79.89 ELEVATED PLATELET COUNT: Status: ACTIVE | Noted: 2018-12-27

## 2018-12-27 LAB
ANION GAP SERPL CALCULATED.3IONS-SCNC: 5 MMOL/L (ref 5–14)
BUN SERPL-MCNC: 18 MG/DL (ref 5–25)
CALCIUM SERPL-MCNC: 8.7 MG/DL (ref 8.4–10.2)
CHLORIDE SERPL-SCNC: 100 MMOL/L (ref 97–108)
CO2 SERPL-SCNC: 30 MMOL/L (ref 22–30)
CREAT SERPL-MCNC: 0.56 MG/DL (ref 0.6–1.2)
GFR SERPL CREATININE-BSD FRML MDRD: 96 ML/MIN/1.73SQ M
GLUCOSE SERPL-MCNC: 118 MG/DL (ref 70–99)
POTASSIUM SERPL-SCNC: 4.2 MMOL/L (ref 3.6–5)
SODIUM SERPL-SCNC: 135 MMOL/L (ref 137–147)

## 2018-12-27 PROCEDURE — 87205 SMEAR GRAM STAIN: CPT | Performed by: INTERNAL MEDICINE

## 2018-12-27 PROCEDURE — 99232 SBSQ HOSP IP/OBS MODERATE 35: CPT | Performed by: INTERNAL MEDICINE

## 2018-12-27 PROCEDURE — 99024 POSTOP FOLLOW-UP VISIT: CPT | Performed by: ORTHOPAEDIC SURGERY

## 2018-12-27 PROCEDURE — 80048 BASIC METABOLIC PNL TOTAL CA: CPT | Performed by: INTERNAL MEDICINE

## 2018-12-27 PROCEDURE — 87070 CULTURE OTHR SPECIMN AEROBIC: CPT | Performed by: INTERNAL MEDICINE

## 2018-12-27 RX ORDER — NYSTATIN 100000 [USP'U]/G
POWDER TOPICAL 2 TIMES DAILY
Status: DISCONTINUED | OUTPATIENT
Start: 2018-12-27 | End: 2019-01-14 | Stop reason: HOSPADM

## 2018-12-27 RX ORDER — B-COMPLEX WITH VITAMIN C
1 TABLET ORAL
Status: DISCONTINUED | OUTPATIENT
Start: 2018-12-28 | End: 2019-01-14 | Stop reason: HOSPADM

## 2018-12-27 RX ORDER — LANOLIN ALCOHOL/MO/W.PET/CERES
500 CREAM (GRAM) TOPICAL DAILY
Status: DISCONTINUED | OUTPATIENT
Start: 2018-12-28 | End: 2019-01-14 | Stop reason: HOSPADM

## 2018-12-27 RX ORDER — ACETAMINOPHEN 325 MG/1
650 TABLET ORAL EVERY 6 HOURS PRN
Status: DISCONTINUED | OUTPATIENT
Start: 2018-12-27 | End: 2019-01-05

## 2018-12-27 RX ADMIN — PANTOPRAZOLE SODIUM 40 MG: 40 TABLET, DELAYED RELEASE ORAL at 05:03

## 2018-12-27 RX ADMIN — VITAMIN D, TAB 1000IU (100/BT) 1000 UNITS: 25 TAB at 09:58

## 2018-12-27 RX ADMIN — ENOXAPARIN SODIUM 40 MG: 40 INJECTION SUBCUTANEOUS at 09:58

## 2018-12-27 RX ADMIN — B-COMPLEX W/ C & FOLIC ACID TAB 1 TABLET: TAB at 09:58

## 2018-12-27 NOTE — ASSESSMENT & PLAN NOTE
· 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  · No new symptoms reported  · Continue current treatment plan including frequent turning and specialty mattress

## 2018-12-27 NOTE — ASSESSMENT & PLAN NOTE
· Anemia of chronic disease with iron deficiency  · Previously given IV Venofer and 2 units of PRBC's    · Unfortunately, stool occult testing was positive, but the patient reportedly declined a Gastroenterology evaluation at this time  · The hemoglobin is stable  · Transfuse for a hemoglobin less than 7

## 2018-12-27 NOTE — ASSESSMENT & PLAN NOTE
· Decubitus skin ulcer, multiple sites: b/l hips and coccyx  · Please see Infectious Disease and Plastic Surgery evaluations  · S/p debridement of bilateral hips and flap of right hip  · Previously on flagyl and daptomycin per Infectious Disease  Cultures reviewed and growing MRSA  · Last dose of daptomycin and flagyl was on 12/26/2018  · Per Dr Zara Del Rio (Plastic Surgery): It will take a least 4-6 weeks for that flap to heal satisfactory  · New sinus tract infection at the distal femur/left   · CT scan of the left femur was completed on 12/24/2018 with the following results/impression:  IMPRESSION:     Patient is status post intramedullary nail fixation of comminuted distal left femur fracture    There is sinus tract from the anterior medial aspect of the distal thigh leading to the fracture site (series 5 images 102 through 117 ) There are several small flecks of air within the distal femoral metaphysis portion of the fracture site (series 5 images 116 through 127) therefore the bony structures are likely infected      · Wound cultures were taken today, 12/27/2018, by Orthopedic Surgery  · I discussed the case with Dr Poli Echevarria (Infectious Disease)  · We will hold on any additional IV antibiotics until the cultures are resulted  · Additional known decubitus ulcers proximally as described above  · Dr Zara Del Rio (Plastic surgery) will then plan on further skin grafting status-post removal of hardware

## 2018-12-27 NOTE — ASSESSMENT & PLAN NOTE
Malnutrition Findings:   Malnutrition type: Chronic illness  Degree of Malnutrition: Malnutrition of moderate degree    BMI Findings: Body mass index is 17 98 kg/m²       · Continue nutritional supplements

## 2018-12-27 NOTE — PROGRESS NOTES
Progress Note - Infectious Disease   Silver Parish 76 y o  female MRN: [id number]  Unit/Bed#: 5T -01 Encounter: [id number]      Impression/Plan:  1  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:  Patient is status post wound debridement of her right hip in the OR   Reviewed images with Radiology and no signs of changes of osteomyelitis on the right hip  Reviewed OR findings with plastics though the bone was visualized on debridement there was no concern for bone being infected at this time   Blood cultures were without growth   Patient remains hemodynamically stable   Culture results noted  This area was treated largely as a skin and soft tissue infection with graft failure   -no further antibiotics for this issue    -continue to monitor fever curve/vitals  -continue to monitor site on exam       2  Displaced femur fracture status post jony placement now with abscess and bone infection:  Patient was noted with a prior hematoma at this area   She reports that the overall lesion decreased in size and had small punctate lesion that seemed to be draining on initial evaluations that stopped subsequently   Drainage worsened over the weekend and cultures were collected   CT imaging consistent with bone infection and drainage tracks straight to the fracture site  Wound cultures only with skin naun  Drainage expressed with light palpation  She is pending Ortho eval   Fortunately the patient is hemodynamically stable and afebrile    Monitoring off antibiotics at this time to potentially improve the yield of cultures in the OR given the patient has isolated many multi-drug resistant pathogens and antibiotics options are limited    -continue to monitor off antibiotics  -orthopedic evaluation pending  -continue to trend fever curve/vitals as above  -would continue to monitor CBC and creatinine  -would order procalcitonin if there is clinical change      3  Leukocytosis:  Patient noted to have elevation in her white blood cell count after procedure on 12/17   White blood cell count noted to remain elevated   This is likely multifactorial with the patient's recent procedure, her multiple other wounds and now due to problem 2     -will monitor CBC  -will continue to monitor exam   -additional care as per primary     4  Pressure ulcer of the sacrum and chronic osteomyelitis of the coccyx:   based on patient's imaging along with exam this is likely a chronic osteomyelitis with a draining sinus   The site remains stable on subsequent evaluations   After discussion with plastics there is plan for possible skin grafting in the future   -would discuss ostomy with patient to prevent further contamination of wound  -would continue to monitor this site clinically for now  -continue local care as per plastics     5  Decubitus ulcer of left hip: Raoul Keto on the patient's right hip was clearly infected on admission   Left hip wound appears stable on subsequent evaluations   CT was without findings consistent for osteo at this site  Letitia Fleming is unclear at this time the timeline for closure for this wound   If closure is undertaken on this admission will need to evaluate OR findings to determine course of antibiotics for this issue   -continue local care as per plastics        6  Paraplegia:  Patient at baseline is wheelchair bound and provides for ongoing care at home   If she is ultimately planned for skin grafting would discuss possibility ostomy with the patient to promote further wound healing  It remains unclear to me how the patient is able to keep any of these wounds adequately clean on her own       Above plan discussed in detail with the patient and primary service       ID consult service will continue to follow      Antibiotics:  none    24 hour events:  Patient re-evaluated by plastics yesterday with staples taken down  She is pending evaluation by Orthopedics  Patient remains afebrile  No new culture data  Patient's other vitals are stable  Creatinine remains stable on lab    Subjective:  Patient denies having any nausea, vomiting, chest pain or shortness of breath  She denies development of any rash or diarrhea  She is unable to feel any sensation from her hips down at baseline  She is nervous/anxious about potential surgeries coming up and her evaluation by Orthopedics  She is also depressed given her complicated hospital course thus far  Objective:  Vitals:  Temp:  [96 8 °F (36 °C)-98 5 °F (36 9 °C)] 96 8 °F (36 °C)  HR:  [74-84] 74  Resp:  [18] 18  BP: (100-119)/(61-87) 100/61  SpO2:  [95 %-98 %] 95 %  Temp (24hrs), Av 5 °F (36 4 °C), Min:96 8 °F (36 °C), Max:98 5 °F (36 9 °C)  Current: Temperature: (!) 96 8 °F (36 °C)    Physical Exam:   General Appearance:  Alert, interactive, nontoxic, no acute distress  She appears older than her stated age   Throat: Oropharynx moist without lesions  Poor dentition noted   Lungs:   Clear to auscultation bilaterally; no wheezes, rhonchi or rales; respirations unlabored   Heart:  RRR; no murmur, rub or gallop   Abdomen:   Soft, non-tender, non-distended, positive bowel sounds  Extremities: No clubbing, cyanosis or edema; legs remain contracted inward without any sensation on exam    Skin: No new rashes or lesions  No new draining wounds noted  The right hip site appears to be healing well and is without significant drainage or erythema  The left hip appears stable with good granulation tissue  Sacral lesion also appears stable  The medial knee drainage tract is without significant erythema, induration or other skin changes  There continues to be yellowish thick drainage on just light palpation         Labs, Imaging, & Other studies:   All pertinent labs and imaging studies were personally reviewed    Results from last 7 days  Lab Units 18  0519 18  0432 18  0501   WBC Thousand/uL 12 40* 10 10 12 90*   HEMOGLOBIN g/dL 11 5* 11 1* 11 0* PLATELETS Thousands/uL 552* 548* 589*       Results from last 7 days  Lab Units 12/27/18  0523   POTASSIUM mmol/L 4 2   CHLORIDE mmol/L 100   CO2 mmol/L 30   BUN mg/dL 18   CREATININE mg/dL 0 56*   EGFR ml/min/1 73sq m 96   CALCIUM mg/dL 8 7       Results from last 7 days  Lab Units 12/22/18  2236   GRAM STAIN RESULT  No Polys or Bacteria seen   WOUND CULTURE  1+ Growth of

## 2018-12-27 NOTE — CONSULTS
Chief Complaint   Patient presents with    Wound Infection     pt sent by dr Gregorio Eastman for evaluation of pressure ulcers  pt recently evaluated for medical clearance for surgery scheduled on Monday with Dr Jan Eastman concerned about pt's pressure ulcers to hips and buttocks  pt states that wounds have worsened over last few days  Assessment:   Sinus tract left distal femur-rule out osteomyelitis left femur            Status post retrograde IM nailing  of comminuted fracture left distal femoral shaft-Dr Cruz-10/22/2018    Plan : This is a very difficult and complicated problem  The patient has infected sacral decubitus I over the right hip and now has developed a draining sinus from the left distal femur at the site of the previous IM jony inserted for a comminuted fracture of her distal femur  The x-rays show that the jony is in good position and there is no breakage of the metal and there is beginning of periosteal callus formation seen  This drainage is almost certainly related to the infection of the sacrum from above which is growing a very resistant MRSA organism and this  complicates matters worse  We cannot remove the jony now as this will leave an  unstable fracture with infection and the usual orthopedic treatment course is to leave the jony in until the fracture heals, use suppressive oral antibiotics to quiet the infection, and then remove the jony once there is bony stability enough that the jony can be removed  Unfortunately, this MRSA organism is resistant to almost all the oral antibiotics and this will cause infectious disease problems on how to adequately treat this patient  I cultured the drainage after sterilely prepping the skin today and even put a needle down through the sinus to the bone and no gross pus was encountered  I sent what small amount of fluid that I obtained to the lab for culture and sensitivity    If this is definitive and similar to the organisms found in the sacrum, then antibiotics alone will be started  If this culture is not successful in delineating a specific organism, then formal incision, drainage, and irrigation followed by wound VAC may be needed  This appears that the bone itself is infected and this is a major complication in this patient with paraplegia    HPI:     This unfortunate 44-year-old white female has had paraplegia now for over 45 years  She developed sacral decubiti Iseveral months ago which had been treated progressively by the plastic surgeon, leaving tried skin grafting over the sacrum but the graft broke down and necrosed  She is growing MRSA from the right sacral wound which is resistant to almost all oral antibiotics and she has been treated with daptomycin, because she is allergic to vancomycin which was the only antibiotic that this organism was sensitive to  During this time she sustained a fracture of her left distal femur which was comminuted supracondylar  This was treated by Dr Luiz Woo at [facility] with a retrograde IM jony on 10/22/2018 and x-rays had showed good position and early healing when he had last seen the patient on 12/6/18  There was no drainage, swelling, redness of her leg seen at that visit she had developed postop hematoma but that resolved on its own  She then had a sacral flap procedure as noted above which did break down and she was readmitted to the hospital with skin breakdown and infection over the right sacrum on 12/13/2018  IV antibiotics were given and then just about 5 days ago she developed a sinus tract draining some pus through the left distal femur at the site of the fracture  There is only a pinhole opening and therefore appropriate cultures were difficult to obtain  The patient has no feeling from waist down and this is not painful to her    She has been on a 10 days of IV daptomycin but the drainage persists from this distal femoral site    PMHx:         Past Medical History:   Diagnosis Date    Anemia     iron defiencey    Arthritis     osteo    Back injury     Chronic kidney disease     nephritis    Depression     Osteopenia     Osteoporosis     Paralysis (HCC)     paraplegic due to spinal cord injury    Paraplegia (HCC)     Poor circulation     Pressure injury of skin     right hip, stage 3    Pressure sore of hip     right    Tibial plateau fracture     Underweight        Past Surgical History:   Procedure Laterality Date    FLAP LOCAL TRUNK Right 12/17/2018    Procedure: DEBRIDE/FLAP CLOSURE HIP PRESSURE SORE Sarah Cutting GRAFT;  Surgeon: Niharika Miller MD;  Location: [facility] MAIN OR;  Service: Plastics    HIP DEBRIDEMENT Right 2017    right hip sore debridement    MT DEBRIDEMENT, SKIN, SUB-Q TISSUE,MUSCLE,BONE,=<20 SQ CM Right 9/19/2018    Procedure: DEBRIDEMENT OF HIP PRESSURE SORE;  Surgeon: Niharika Miller MD;  Location: [facility] MAIN OR;  Service: Plastics    MT OPEN RX FEMUR FX+INTRAMED FELIX Left 10/22/2018    Procedure: INSERTION NAIL IM LEFT FEMUR RETROGRADE;  Surgeon: Volodymyr Grace MD;  Location:  MAIN OR;  Service: Orthopedics    TOE AMPUTATION Left 2017    small toe left foot amputation    WISDOM TOOTH EXTRACTION      WOUND DEBRIDEMENT Left 12/17/2018    Procedure: DEBRIDEMENT HIP PRESSURE SORE;  Surgeon: Niharika Miller MD;  Location: [facility] MAIN OR;  Service: Plastics       Family History   Problem Relation Age of Onset    Depression Father        Social History     Social History    Marital status: Single     Spouse name: N/A    Number of children: N/A    Years of education: N/A     Occupational History    Not on file       Social History Main Topics    Smoking status: Never Smoker    Smokeless tobacco: Never Used    Alcohol use Yes      Comment: occasional    Drug use: No    Sexual activity: Not on file     Other Topics Concern    Not on file     Social History Narrative    No narrative on file       Current Facility-Administered Medications   Medication Dose Route Frequency Provider Last Rate Last Dose    acetaminophen (TYLENOL) tablet 650 mg  650 mg Oral Q6H PRN Ginette Lenz MD        aluminum-magnesium hydroxide-simethicone (MYLANTA) 200-200-20 mg/5 mL oral suspension 30 mL  30 mL Oral Q4H PRN CONCEPCION Jackson        cholecalciferol (VITAMIN D3) tablet 1,000 Units  1,000 Units Oral Daily Ginette Ashley MD   1,000 Units at 12/27/18 0958    diphenhydrAMINE (BENADRYL) tablet 25 mg  25 mg Oral Q6H PRN Ginette Lenz MD   25 mg at 12/14/18 0117    enoxaparin (LOVENOX) subcutaneous injection 40 mg  40 mg Subcutaneous Q24H [facility], DO   40 mg at 12/27/18 8817    multivitamin stress formula tablet 1 tablet  1 tablet Oral Daily Radha Em, DO   1 tablet at 12/27/18 0958    ondansetron (ZOFRAN) injection 4 mg  4 mg Intravenous Q6H PRN Ginette Ashley MD   4 mg at 12/23/18 1219    oxyCODONE (ROXICODONE) IR tablet 5 mg  5 mg Oral Q6H PRN Ginette Lenz MD        pantoprazole (PROTONIX) EC tablet 40 mg  40 mg Oral Early Morning CONCEPCION Jackson   40 mg at 12/27/18 0503    senna (SENOKOT) tablet 17 2 mg  2 tablet Oral Daily Radha Em, DO   Stopped at 12/24/18 1521       Allergies: Iv contrast [iodinated diagnostic agents]; Cefepime; Ciprofloxacin; Latex; and Vancomycin    ROS:  Positive for depression, skin wounds, back pain, mobility issues, poor appetite, and the orthopedic complaints as noted above  The remaining 7/12 systems on the intake sheet that I reviewed were negative  PE:  /61 (BP Location: Right arm)   Pulse 74   Temp (!) 96 8 °F (36 °C) (Temporal)   Resp 18   Ht 5' 5" (1 651 m)   Wt 49 kg (108 lb 0 4 oz)   LMP  (LMP Unknown)   SpO2 95%   BMI 17 98 kg/m²   Constitutional: The patient was  oriented to person, place, and time  She was thin and contracted in her lower legs  In no acute distress  HEENT: Vision intact  Hearing normal  Swallowing normal   Head: Normocephalic  Cardiovascular: Intact distal pulses  Pulse regular  Pulmonary/Chest: Effort normal  No respiratory distress  Neurological: Alert and oriented to person, place, and time  Skin: Skin is warm  Psychiatric: Normal mood and affect  Ortho Exam:  She had a well-healed scar over her left knee with no knee effusion or redness  There is a small pinpoint opening in the distal medial left femur approximately 3 inches above the knee  There is a firm area medial to that which was not fluctuant  She had no cellulitis over the area  She had some cloudy pus that could be expressed from the sinus tract  Her knees were contracted at about 100° of flexion with no feeling in her lower extremities  She has spasticity with involuntary motion noted  She had no voluntary motion of her toes but capillary refill  appeared intact  Her thighs were quite thin    Studies reviewed:  X-rays of her femur personally reviewed, as well as the radiologist's report, and showed the distal 3rd/supracondylar left femoral fracture holding in good position  The jony and locking screws are intact both proximally and distally  There is periosteal callus formation noted  I cannot say that there is any true bone destruction noted on these films

## 2018-12-27 NOTE — PROGRESS NOTES
Progress Note - Gupta Panning 1950, 76 y o  female MRN: [id number]    Unit/Bed#: 5T -01 Encounter: [id number]    Primary Care Provider: Danielle Walter   Date and time admitted to hospital: 12/13/2018  9:04 PM        * Decubitus skin ulcer   Assessment & Plan    · Decubitus skin ulcer, multiple sites: b/l hips and coccyx  · Please see Infectious Disease and Plastic Surgery evaluations  · S/p debridement of bilateral hips and flap of right hip  · Previously on flagyl and daptomycin per Infectious Disease  Cultures reviewed and growing MRSA  · Last dose of daptomycin and flagyl was on 12/26/2018  · Per Dr Yani Ware (Plastic Surgery): It will take a least 4-6 weeks for that flap to heal satisfactory  · New sinus tract infection at the distal femur/left   · CT scan of the left femur was completed on 12/24/2018 with the following results/impression:  IMPRESSION:     Patient is status post intramedullary nail fixation of comminuted distal left femur fracture    There is sinus tract from the anterior medial aspect of the distal thigh leading to the fracture site (series 5 images 102 through 117 ) There are several small flecks of air within the distal femoral metaphysis portion of the fracture site (series 5 images 116 through 127) therefore the bony structures are likely infected      · Wound cultures were taken today, 12/27/2018, by Orthopedic Surgery  · I discussed the case with Dr Leandro Browning (Infectious Disease)  · We will hold on any additional IV antibiotics until the cultures are resulted  · Additional known decubitus ulcers proximally as described above  · Dr Yani Ware (Plastic surgery) will then plan on further skin grafting status-post removal of hardware     BMI less than 19,adult   Assessment & Plan    · Continue nutritional supplements     Hyperglycemia   Assessment & Plan    · Check a HgbA1c level     Elevated platelet count   Assessment & Plan    · Likely reactive in the setting of acute infection  · Follow the CBC     Moderate protein-calorie malnutrition ([facility] [address] )   Assessment & Plan    Malnutrition Findings:   Malnutrition type: Chronic illness  Degree of Malnutrition: Malnutrition of moderate degree    BMI Findings: Body mass index is 17 98 kg/m²  · Continue nutritional supplements     GERD (gastroesophageal reflux disease)   Assessment & Plan    · Continue PPI     Hyponatremia   Assessment & Plan    · Mild hyponatremia  · Follow the sodium level     Anemia of chronic disease   Assessment & Plan    · Anemia of chronic disease with iron deficiency  · Previously given IV Venofer and 2 units of PRBC's  · Unfortunately, stool occult testing was positive, but the patient reportedly declined a Gastroenterology evaluation at this time  · The hemoglobin is stable  · Transfuse for a hemoglobin less than 7       Constipation   Assessment & Plan    · Improved  · Continue senokot     Chronic osteomyelitis of coccyx (HCC)   Assessment & Plan    · Chronic osteomyelitis of coccyx  · I appreciate Infectious Disease's input     Paraplegia following spinal cord injury ([facility] [address] )   Assessment & Plan    · 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  · No new symptoms reported  · Continue current treatment plan including frequent turning and specialty mattress         VTE Pharmacologic Prophylaxis:   Pharmacologic: Enoxaparin (Lovenox)  Mechanical VTE Prophylaxis in Place: Yes    Discussions with Specialists or Other Care Team Provider:  I discussed the case with Dr Gurjit Boykin (Infectious Disease)  Time Spent for Care: 30 minutes  More than 50% of total time spent on counseling and coordination of care as described above  Current Length of Stay: 14 day(s)    Current Patient Status: Inpatient   Certification Statement: The patient will continue to require additional inpatient hospital stay due to awaiting culture results and the need for additional IV antibiotics        Code Status: Level 1 - Full Code      Subjective: The patient was seen and examined  The patient is doing better  No chest pain  No shortness of breath  No abdominal pain  No nausea or vomiting  No fevers or chills  Objective:     Vitals:   Temp (24hrs), Av 7 °F (36 5 °C), Min:96 8 °F (36 °C), Max:98 5 °F (36 9 °C)    Temp:  [96 8 °F (36 °C)-98 5 °F (36 9 °C)] 96 8 °F (36 °C)  HR:  [74-77] 74  Resp:  [18] 18  BP: (100-109)/(61-87) 100/61  SpO2:  [95 %-98 %] 95 %  Body mass index is 17 98 kg/m²  Input and Output Summary (last 24 hours): Intake/Output Summary (Last 24 hours) at 18 1636  Last data filed at 18 1400   Gross per 24 hour   Intake             1120 ml   Output              650 ml   Net              470 ml       Physical Exam:     Physical Exam  General:  NAD, awake, alert, follows commands  HEENT:  NC/AT, mucous membranes moist  Neck:  Supple, No JVP elevation  CV:  + S1, + S2, RRR  Pulm:  Lung fields are CTA bilaterally  Abd:  Soft, Non-tender, Non-distended  Ext:  No clubbing/cyanosis/edema  Skin:  No rashes      Additional Data:    Labs:      Results from last 7 days  Lab Units 18  0519  18  0501   WBC Thousand/uL 12 40*  < > 12 90*   HEMOGLOBIN g/dL 11 5*  < > 11 0*   HEMATOCRIT % 36 0  < > 34 5*   PLATELETS Thousands/uL 552*  < > 589*   BANDS PCT %  --   --  1   NEUTROS PCT % 80*  < >  --    LYMPHS PCT % 16*  < >  --    LYMPHO PCT %  --   --  17*   MONOS PCT % 5  < >  --    MONO PCT %  --   --  6   EOS PCT % 0  < > 3   < > = values in this interval not displayed  Results from last 7 days  Lab Units 18  0523   SODIUM mmol/L 135*   POTASSIUM mmol/L 4 2   CHLORIDE mmol/L 100   CO2 mmol/L 30   BUN mg/dL 18   CREATININE mg/dL 0 56*   ANION GAP mmol/L 5   CALCIUM mg/dL 8 7   GLUCOSE RANDOM mg/dL 118*           Results from last 7 days  Lab Units 18  0653   POC GLUCOSE mg/dl 115*                   * I Have Reviewed All Lab Data Listed Above    * Additional Pertinent Lab Tests Reviewed: [address] Admission Reviewed      Recent Cultures (last 7 days):       Results from last 7 days  Lab Units 12/22/18  2236   GRAM STAIN RESULT  No Polys or Bacteria seen   WOUND CULTURE  1+ Growth of        Last 24 Hours Medication List:     Current Facility-Administered Medications:  acetaminophen 650 mg Oral Q6H PRN Carley Abdul DO   aluminum-magnesium hydroxide-simethicone 30 mL Oral Q4H PRN CONCEPCION Jackson   [START ON 12/28/2018] calcium carbonate-vitamin D 1 tablet Oral Daily With Breakfast Carley Abdul DO   cholecalciferol 1,000 Units Oral Daily Ginette Hickman MD   [START ON 12/28/2018] vitamin B-12 500 mcg Oral Daily Carley Abdul DO   diphenhydrAMINE 25 mg Oral Q6H PRN Ginette Lenz MD   enoxaparin 40 mg Subcutaneous Q24H [facility] Deshaun Chandler DO   multivitamin stress formula 1 tablet Oral Daily Deshaun Chandler DO   nystatin  Topical BID Mj Howard DO   ondansetron 4 mg Intravenous Q6H PRN Ginette Lenz MD   oxyCODONE 5 mg Oral Q6H PRN Ginette Hickman MD   pantoprazole 40 mg Oral Early Morning CONCEPCION Jackson   senna 2 tablet Oral Daily Carley Abdul DO        Today, Patient Was Seen By: Carley Abdul DO    ** Please Note: Dictation voice to text software may have been used in the creation of this document   **

## 2018-12-28 PROBLEM — E83.41 HYPERMAGNESEMIA: Status: ACTIVE | Noted: 2018-12-28

## 2018-12-28 LAB
ALBUMIN SERPL BCP-MCNC: 3.1 G/DL (ref 3–5.2)
ALP SERPL-CCNC: 87 U/L (ref 43–122)
ALT SERPL W P-5'-P-CCNC: 26 U/L (ref 9–52)
ANION GAP SERPL CALCULATED.3IONS-SCNC: 6 MMOL/L (ref 5–14)
AST SERPL W P-5'-P-CCNC: 22 U/L (ref 14–36)
BASOPHILS # BLD AUTO: 0.1 THOUSANDS/ΜL (ref 0–0.1)
BASOPHILS NFR BLD AUTO: 1 % (ref 0–1)
BILIRUB SERPL-MCNC: 0.2 MG/DL
BUN SERPL-MCNC: 21 MG/DL (ref 5–25)
CALCIUM SERPL-MCNC: 8.8 MG/DL (ref 8.4–10.2)
CHLORIDE SERPL-SCNC: 101 MMOL/L (ref 97–108)
CK SERPL-CCNC: <20 U/L (ref 30–135)
CO2 SERPL-SCNC: 29 MMOL/L (ref 22–30)
CREAT SERPL-MCNC: 0.59 MG/DL (ref 0.6–1.2)
EOSINOPHIL # BLD AUTO: 0.2 THOUSAND/ΜL (ref 0–0.4)
EOSINOPHIL NFR BLD AUTO: 2 % (ref 0–6)
ERYTHROCYTE [DISTWIDTH] IN BLOOD BY AUTOMATED COUNT: 21.6 %
EST. AVERAGE GLUCOSE BLD GHB EST-MCNC: 137 MG/DL
GFR SERPL CREATININE-BSD FRML MDRD: 94 ML/MIN/1.73SQ M
GLUCOSE SERPL-MCNC: 132 MG/DL (ref 70–99)
HBA1C MFR BLD: 6.4 % (ref 4.2–6.3)
HCT VFR BLD AUTO: 35.7 % (ref 36–46)
HGB BLD-MCNC: 11.8 G/DL (ref 12–16)
LACTATE SERPL-SCNC: 1 MMOL/L (ref 0.7–2)
LYMPHOCYTES # BLD AUTO: 2.4 THOUSANDS/ΜL (ref 0.5–4)
LYMPHOCYTES NFR BLD AUTO: 22 % (ref 25–45)
MAGNESIUM SERPL-MCNC: 2.4 MG/DL (ref 1.6–2.3)
MCH RBC QN AUTO: 27.4 PG (ref 26–34)
MCHC RBC AUTO-ENTMCNC: 33.1 G/DL (ref 31–36)
MCV RBC AUTO: 83 FL (ref 80–100)
MONOCYTES # BLD AUTO: 0.8 THOUSAND/ΜL (ref 0.2–0.9)
MONOCYTES NFR BLD AUTO: 7 % (ref 1–10)
NEUTROPHILS # BLD AUTO: 7.3 THOUSANDS/ΜL (ref 1.8–7.8)
NEUTS SEG NFR BLD AUTO: 67 % (ref 45–65)
PHOSPHATE SERPL-MCNC: 4.1 MG/DL (ref 2.5–4.8)
PLATELET # BLD AUTO: 485 THOUSANDS/UL (ref 150–450)
PMV BLD AUTO: 8.4 FL (ref 8.9–12.7)
POTASSIUM SERPL-SCNC: 3.9 MMOL/L (ref 3.6–5)
PROCALCITONIN SERPL-MCNC: 0.07 NG/ML
PROT SERPL-MCNC: 7 G/DL (ref 5.9–8.4)
RBC # BLD AUTO: 4.31 MILLION/UL (ref 4–5.2)
SODIUM SERPL-SCNC: 136 MMOL/L (ref 137–147)
WBC # BLD AUTO: 10.8 THOUSAND/UL (ref 4.5–11)

## 2018-12-28 PROCEDURE — 85025 COMPLETE CBC W/AUTO DIFF WBC: CPT | Performed by: INTERNAL MEDICINE

## 2018-12-28 PROCEDURE — 80053 COMPREHEN METABOLIC PANEL: CPT | Performed by: INTERNAL MEDICINE

## 2018-12-28 PROCEDURE — 82550 ASSAY OF CK (CPK): CPT | Performed by: INTERNAL MEDICINE

## 2018-12-28 PROCEDURE — 84100 ASSAY OF PHOSPHORUS: CPT | Performed by: INTERNAL MEDICINE

## 2018-12-28 PROCEDURE — 99232 SBSQ HOSP IP/OBS MODERATE 35: CPT | Performed by: INTERNAL MEDICINE

## 2018-12-28 PROCEDURE — 83735 ASSAY OF MAGNESIUM: CPT | Performed by: INTERNAL MEDICINE

## 2018-12-28 PROCEDURE — 99233 SBSQ HOSP IP/OBS HIGH 50: CPT | Performed by: INTERNAL MEDICINE

## 2018-12-28 PROCEDURE — 83036 HEMOGLOBIN GLYCOSYLATED A1C: CPT | Performed by: INTERNAL MEDICINE

## 2018-12-28 PROCEDURE — 84145 PROCALCITONIN (PCT): CPT | Performed by: INTERNAL MEDICINE

## 2018-12-28 PROCEDURE — 83605 ASSAY OF LACTIC ACID: CPT | Performed by: INTERNAL MEDICINE

## 2018-12-28 RX ORDER — SODIUM PHOSPHATE, DIBASIC AND SODIUM PHOSPHATE, MONOBASIC 7; 19 G/133ML; G/133ML
1 ENEMA RECTAL ONCE
Status: COMPLETED | OUTPATIENT
Start: 2018-12-28 | End: 2018-12-28

## 2018-12-28 RX ORDER — ACETIC ACID 0.25 G/100ML
IRRIGANT IRRIGATION DAILY
Status: DISCONTINUED | OUTPATIENT
Start: 2018-12-28 | End: 2019-01-14 | Stop reason: HOSPADM

## 2018-12-28 RX ADMIN — SODIUM PHOSPHATE, DIBASIC AND SODIUM PHOSPHATE, MONOBASIC 1 ENEMA: 7; 19 ENEMA RECTAL at 17:54

## 2018-12-28 RX ADMIN — PANTOPRAZOLE SODIUM 40 MG: 40 TABLET, DELAYED RELEASE ORAL at 05:06

## 2018-12-28 RX ADMIN — VITAMIN D, TAB 1000IU (100/BT) 1000 UNITS: 25 TAB at 09:22

## 2018-12-28 RX ADMIN — ENOXAPARIN SODIUM 40 MG: 40 INJECTION SUBCUTANEOUS at 09:23

## 2018-12-28 RX ADMIN — B-COMPLEX W/ C & FOLIC ACID TAB 1 TABLET: TAB at 09:22

## 2018-12-28 RX ADMIN — OYSTER SHELL CALCIUM WITH VITAMIN D 1 TABLET: 500; 200 TABLET, FILM COATED ORAL at 09:22

## 2018-12-28 RX ADMIN — ACETIC ACID: 250 IRRIGANT IRRIGATION at 17:53

## 2018-12-28 RX ADMIN — CYANOCOBALAMIN TAB 1000 MCG 500 MCG: 1000 TAB at 09:22

## 2018-12-28 NOTE — ASSESSMENT & PLAN NOTE
Malnutrition Findings:   Malnutrition type: Chronic illness  Degree of Malnutrition: Malnutrition of moderate degree    BMI Findings: Body mass index is 18 01 kg/m²       · Continue nutritional supplements

## 2018-12-28 NOTE — ASSESSMENT & PLAN NOTE
Results for Bubba Link (MRN [id number]) as of 12/28/2018 15:43   Ref   Range 12/28/2018 04:32   Hemoglobin A1C Latest Ref Range: 4 2 - 6 3 % 6 4 (H)   EAG Latest Units: mg/dl 137     · Outpatient follow-up with her PCP in regards to this matter

## 2018-12-28 NOTE — ASSESSMENT & PLAN NOTE
· Decubitus skin ulcer, multiple sites: b/l hips and coccyx  · Please see Infectious Disease and Plastic Surgery evaluations  · S/p debridement of bilateral hips and flap of right hip  · Previously on flagyl and daptomycin per Infectious Disease  Cultures reviewed and growing MRSA  · Last dose of daptomycin and flagyl was on 12/26/2018  · Per Dr Linda Sinclair (Plastic Surgery): It will take a least 4-6 weeks for that flap to heal satisfactory  · New sinus tract infection at the distal femur/left   · CT scan of the left femur was completed on 12/24/2018 with the following results/impression:  IMPRESSION:     Patient is status post intramedullary nail fixation of comminuted distal left femur fracture    There is sinus tract from the anterior medial aspect of the distal thigh leading to the fracture site (series 5 images 102 through 117 ) There are several small flecks of air within the distal femoral metaphysis portion of the fracture site (series 5 images 116 through 127) therefore the bony structures are likely infected      · Wound cultures were taken on 12/27/2018 by Orthopedic Surgery  · I discussed the case with Dr Hayden Craig (Infectious Disease)  · We will hold on any additional IV antibiotics until the cultures are resulted  · Additional known decubitus ulcers proximally as described above  · Dr Linda Sinclair (Plastic surgery) will then plan on further skin grafting status-post removal of hardware

## 2018-12-28 NOTE — PROGRESS NOTES
Progress Note - Infectious Disease   Silver Parish 76 y o  female MRN: [id number]  Unit/Bed#: 5T -01 Encounter: [id number]      Impression/Plan:  1  Displaced femur fracture status post jony placement now with drainage and bone infection:  Patient was noted with a prior hematoma at this area   She reports that the overall lesion decreased in size and had small punctate lesion that seemed to be draining on initial evaluations that stopped subsequently   Drainage worsened over the weekend and cultures were collected   CT imaging consistent with bone infection and drainage tracks straight to the fracture site  Wound cultures only with skin naun  Patient seen by Orthopedics yesterday and attempt made at bedside needle aspiration  Cultures are pending  Had detailed discussion with Orthopedics and given that the jony will need to remain in place to stabilize her bone until it is healed have discussed the possibility of OR washout and deep cultures off antibiotics cultures from yesterday are without growth  Unfortunately the patient has isolated various multi-drug resistant organisms and options for antibiotics are limited and so culture data will be essential for prolonged therapy  -continue to monitor off antibiotics  -follow-up wound cultures from yesterday  -continue to trend fever curve/vitals as above  -would repeat CBC and procalcitonin if there is a clinical change   -will follow-up with orthopedics for further intervention pending culture results    2   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:  Patient is status post wound debridement of her right hip in the OR   Reviewed images with Radiology and no signs of changes of osteomyelitis on the right hip  Reviewed OR findings with plastics though the bone was visualized on debridement there was no concern for bone being infected at this time   Blood cultures were without growth   Patient remains hemodynamically stable   Culture results noted  This area was treated largely as a skin and soft tissue infection with graft failure   -no further antibiotics for this issue    -continue to monitor fever curve/vitals  -continue to monitor site on exam       3  Leukocytosis:  Patient noted to have elevation in her white blood cell count after procedure on 12/17   White blood cell count noted to remain elevated   This is likely multifactorial with the patient's recent procedure, her multiple other wounds and now due to problem 2  Currently normalized on labs today   -would repeat CBC and procalcitonin if there is a clinical change   -will continue to monitor exam   -additional care as per primary     4  Pressure ulcer of the sacrum and chronic osteomyelitis of the coccyx:   based on patient's imaging along with exam this is likely a chronic osteomyelitis with a draining sinus   The site remains stable on subsequent evaluations   After discussion with plastics there is plan for possible skin grafting in the future   -would discuss ostomy with patient to prevent further contamination of wound  -would continue to monitor this site clinically for now  -continue local care as per plastics     5  Decubitus ulcer of left hip: Brie Dingwall on the patient's right hip was clearly infected on admission   Left hip wound appears stable on subsequent evaluations   CT was without findings consistent for osteo at this site  Candelario Mooring is unclear at this time the timeline for closure for this wound   If closure is undertaken on this admission will need to evaluate OR findings to determine course of antibiotics for this issue   -continue local care as per plastics        6   Paraplegia:  Patient at baseline is wheelchair bound and provides for ongoing care at home   If she is ultimately planned for skin grafting would discuss possibility ostomy with the patient to promote further wound healing   It remains unclear to me how the patient is able to keep any of these wounds adequately clean on her own       Above plan discussed in detail with the patient and primary service and in great detail with Orthopedics yesterday      ID consult service will formally re-evaluate the patient again on Monday  Please contact ID attending on call if any additional questions or concerns      Antibiotics:  None    24 hr events:  Patient is status post bedside wound sampling by Orthopedics  White blood cell count 10 8  Patient remains afebrile  Wound cultures without growth thus far  No new images  Patient's other vitals remained stable    Subjective:  Patient denies having any nausea, vomiting, chest pain or shortness of breath  She currently remains off antibiotics  She tolerated bedside needle aspiration yesterday  She is currently tolerating p o  She remains anxious and nervous about culture data and the possibility of antibiotics prolonged to save her leg  She again reiterates to me that she does not wish to lose her leg  Objective:  Vitals:  Temp:  [96 8 °F (36 °C)-98 7 °F (37 1 °C)] 97 5 °F (36 4 °C)  HR:  [74-90] 90  Resp:  [18] 18  BP: ()/(61-74) 109/74  SpO2:  [95 %-96 %] 95 %  Temp (24hrs), Av 7 °F (36 5 °C), Min:96 8 °F (36 °C), Max:98 7 °F (37 1 °C)  Current: Temperature: 97 5 °F (36 4 °C)    Physical Exam:   General Appearance:  Alert, interactive, nontoxic, no acute distress  Appears older than stated age   Throat: Oropharynx moist without lesions  Lungs:   Clear to auscultation bilaterally; no wheezes, rhonchi or rales; respirations unlabored   Heart:  RRR; no murmur, rub or gallop   Abdomen:   Soft, non-tender, non-distended, positive bowel sounds  Extremities: No clubbing, cyanosis or edema lower extremities are contracted   Skin: No new rashes or lesions  No new draining wounds noted  Patient's skin graft site remains intact  Sutures are in place along with a significant amount of dressing that has essentially caked onto the patient's wound    The medial knee wound is noted with just some bloody drainage but no pus and there is no redness or erythema around the site  Her other wounds are stable per nursing assessment         Labs, Imaging, & Other studies:   All pertinent labs and imaging studies were personally reviewed    Results from last 7 days  Lab Units 12/28/18  0432 12/25/18  0519 12/24/18  0432   WBC Thousand/uL 10 80 12 40* 10 10   HEMOGLOBIN g/dL 11 8* 11 5* 11 1*   PLATELETS Thousands/uL 485* 552* 548*       Results from last 7 days  Lab Units 12/28/18  0432   POTASSIUM mmol/L 3 9   CHLORIDE mmol/L 101   CO2 mmol/L 29   BUN mg/dL 21   CREATININE mg/dL 0 59*   EGFR ml/min/1 73sq m 94   CALCIUM mg/dL 8 8   AST U/L 22   ALT U/L 26   ALK PHOS U/L 87       Results from last 7 days  Lab Units 12/27/18  1118 12/22/18  2236   GRAM STAIN RESULT  No Polys or Bacteria seen  Rare Polys  No bacteria seen No Polys or Bacteria seen   WOUND CULTURE   --  1+ Growth of

## 2018-12-28 NOTE — PROGRESS NOTES
Progress Note - Allan Guthrie 1950, 76 y o  female MRN: [id number]    Unit/Bed#: 5T -01 Encounter: [id number]    Primary Care Provider: Shelby Ceja   Date and time admitted to hospital: 12/13/2018  9:04 PM        * Decubitus skin ulcer   Assessment & Plan    · Decubitus skin ulcer, multiple sites: b/l hips and coccyx  · Please see Infectious Disease and Plastic Surgery evaluations  · S/p debridement of bilateral hips and flap of right hip  · Previously on flagyl and daptomycin per Infectious Disease  Cultures reviewed and growing MRSA  · Last dose of daptomycin and flagyl was on 12/26/2018  · Per Dr Ruthie Dinero (Plastic Surgery): It will take a least 4-6 weeks for that flap to heal satisfactory  · New sinus tract infection at the distal femur/left   · CT scan of the left femur was completed on 12/24/2018 with the following results/impression:  IMPRESSION:     Patient is status post intramedullary nail fixation of comminuted distal left femur fracture    There is sinus tract from the anterior medial aspect of the distal thigh leading to the fracture site (series 5 images 102 through 117 ) There are several small flecks of air within the distal femoral metaphysis portion of the fracture site (series 5 images 116 through 127) therefore the bony structures are likely infected      · Wound cultures were taken on 12/27/2018 by Orthopedic Surgery  · I discussed the case with Dr Clinton Ball (Infectious Disease)  · We will hold on any additional IV antibiotics until the cultures are resulted  · Additional known decubitus ulcers proximally as described above  · Dr Ruthie Dinero (Plastic surgery) will then plan on further skin grafting status-post removal of hardware     Hypermagnesemia   Assessment & Plan    · Follow the magnesium level     BMI less than 19,adult   Assessment & Plan    · Continue nutritional supplements     Hyperglycemia   Assessment & Plan    Results for Duong Mrurayhernandez (MRN [id number]) as of 12/28/2018 15:43   Ref  Range 12/28/2018 04:32   Hemoglobin A1C Latest Ref Range: 4 2 - 6 3 % 6 4 (H)   EAG Latest Units: mg/dl 137     · Outpatient follow-up with her PCP in regards to this matter     Elevated platelet count   Assessment & Plan    · Likely reactive in the setting of acute infection  · Follow the CBC     Moderate protein-calorie malnutrition ([facility] [address] )   Assessment & Plan    Malnutrition Findings:   Malnutrition type: Chronic illness  Degree of Malnutrition: Malnutrition of moderate degree    BMI Findings: Body mass index is 18 01 kg/m²  · Continue nutritional supplements     GERD (gastroesophageal reflux disease)   Assessment & Plan    · Continue PPI     Hyponatremia   Assessment & Plan    · Mild hyponatremia  · Improving  · Follow the sodium level     Anemia of chronic disease   Assessment & Plan    · Anemia of chronic disease with iron deficiency  · Previously given IV Venofer and 2 units of PRBC's  · Unfortunately, stool occult testing was positive, but the patient reportedly declined a Gastroenterology evaluation at this time  · The hemoglobin is stable  · Transfuse for a hemoglobin less than 7       Constipation   Assessment & Plan    · Improved  · The patient is refusing senokot today  · She is agreeable to a fleet enema today, 12/28/2018     Chronic osteomyelitis of coccyx ([facility] [address] )   Assessment & Plan    · Chronic osteomyelitis of coccyx  · I appreciate Infectious Disease's input     Paraplegia following spinal cord injury ([facility] [address] )   Assessment & Plan    · 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  · No new symptoms reported  · Continue current treatment plan including frequent turning and specialty mattress         VTE Pharmacologic Prophylaxis:   Pharmacologic: Enoxaparin (Lovenox)  Mechanical VTE Prophylaxis in Place: Yes    Time Spent for Care: 20 minutes  More than 50% of total time spent on counseling and coordination of care as described above      Current Length of Stay: 15 day(s)    Current Patient Status: Inpatient   Certification Statement: The patient will continue to require additional inpatient hospital stay due to awaiting wound culture results and for possible additional IV antibiotics  Code Status: Level 1 - Full Code      Subjective: The patient was seen and examined  The patient complains of constipation  No chest pain  No shortness of breath  Objective:     Vitals:   Temp (24hrs), Av 9 °F (36 6 °C), Min:97 5 °F (36 4 °C), Max:98 3 °F (36 8 °C)    Temp:  [97 5 °F (36 4 °C)-98 3 °F (36 8 °C)] 98 3 °F (36 8 °C)  HR:  [78-90] 78  Resp:  [18] 18  BP: (109-111)/(61-74) 111/61  SpO2:  [95 %-96 %] 96 %  Body mass index is 18 01 kg/m²  Input and Output Summary (last 24 hours): Intake/Output Summary (Last 24 hours) at 18 1605  Last data filed at 18 1100   Gross per 24 hour   Intake              960 ml   Output              800 ml   Net              160 ml       Physical Exam:     Physical Exam  General:  NAD, awake, alert, follows commands  HEENT:  NC/AT, mucous membranes moist  Neck:  Supple, No JVP elevation  CV:  + S1, + S2, RRR  Pulm:  Lung fields are CTA bilaterally  Abd:  Soft, Non-tender, Non-distended  Ext:  No clubbing/cyanosis/edema  Skin:  No rashes      Additional Data:    Labs:      Results from last 7 days  Lab Units 18  0432  18  0501   WBC Thousand/uL 10 80  < > 12 90*   HEMOGLOBIN g/dL 11 8*  < > 11 0*   HEMATOCRIT % 35 7*  < > 34 5*   PLATELETS Thousands/uL 485*  < > 589*   BANDS PCT %  --   --  1   NEUTROS PCT % 67*  < >  --    LYMPHS PCT % 22*  < >  --    LYMPHO PCT %  --   --  17*   MONOS PCT % 7  < >  --    MONO PCT %  --   --  6   EOS PCT % 2  < > 3   < > = values in this interval not displayed      Results from last 7 days  Lab Units 18  0432   SODIUM mmol/L 136*   POTASSIUM mmol/L 3 9   CHLORIDE mmol/L 101   CO2 mmol/L 29   BUN mg/dL 21   CREATININE mg/dL 0 59*   ANION GAP mmol/L 6   CALCIUM mg/dL 8  8   ALBUMIN g/dL 3 1   TOTAL BILIRUBIN mg/dL 0 20   ALK PHOS U/L 87   ALT U/L 26   AST U/L 22   GLUCOSE RANDOM mg/dL 132*           Results from last 7 days  Lab Units 12/23/18  0653   POC GLUCOSE mg/dl 115*       Results from last 7 days  Lab Units 12/28/18  0432   HEMOGLOBIN A1C % 6 4*       Results from last 7 days  Lab Units 12/28/18  0432   LACTIC ACID mmol/L 1 0   PROCALCITONIN ng/ml 0 07           * I Have Reviewed All Lab Data Listed Above  * Additional Pertinent Lab Tests Reviewed: [address] Admission Reviewed      Recent Cultures (last 7 days):       Results from last 7 days  Lab Units 12/27/18  1118 12/22/18  2236   GRAM STAIN RESULT  No Polys or Bacteria seen  Rare Polys  No bacteria seen No Polys or Bacteria seen   WOUND CULTURE  No growth  No growth 1+ Growth of        Last 24 Hours Medication List:     Current Facility-Administered Medications:  acetaminophen 650 mg Oral Q6H PRN Dulce Granda DO   acetic acid  Topical Daily Kylee Dumont MD   aluminum-magnesium hydroxide-simethicone 30 mL Oral Q4H PRN CONCEPCION Jackson   calcium carbonate-vitamin D 1 tablet Oral Daily With Breakfast Dulce Granda DO   cholecalciferol 1,000 Units Oral Daily Ginette Lenz MD   vitamin B-12 500 mcg Oral Daily Dulce Granda DO   diphenhydrAMINE 25 mg Oral Q6H PRN Ginette Lenz MD   enoxaparin 40 mg Subcutaneous Q24H [address] Deshaun Chandler DO   multivitamin stress formula 1 tablet Oral Daily Deshaun Chandler DO   nystatin  Topical BID Mj Howard DO   ondansetron 4 mg Intravenous Q6H PRN Ginette Lenz MD   oxyCODONE 5 mg Oral Q6H PRN Ginette Lenz MD   pantoprazole 40 mg Oral Early Morning CONCEPCION Jackson   senna 2 tablet Oral Daily Dulce Granda DO   sodium phosphate-biphosphate 1 enema Rectal Once Dulce Granda DO        Today, Patient Was Seen By: Dulce Granda DO    ** Please Note: Dictation voice to text software may have been used in the creation of this document   **

## 2018-12-28 NOTE — ASSESSMENT & PLAN NOTE
· Improved  · The patient is refusing senokot today  · She is agreeable to a fleet enema today, 12/28/2018

## 2018-12-29 PROBLEM — D75.839 THROMBOCYTOSIS: Status: ACTIVE | Noted: 2018-12-27

## 2018-12-29 LAB
ALBUMIN SERPL BCP-MCNC: 3.2 G/DL (ref 3–5.2)
ALP SERPL-CCNC: 88 U/L (ref 43–122)
ALT SERPL W P-5'-P-CCNC: 24 U/L (ref 9–52)
ANION GAP SERPL CALCULATED.3IONS-SCNC: 7 MMOL/L (ref 5–14)
AST SERPL W P-5'-P-CCNC: 25 U/L (ref 14–36)
BACTERIA WND AEROBE CULT: NORMAL
BASOPHILS # BLD AUTO: 0.1 THOUSANDS/ΜL (ref 0–0.1)
BASOPHILS NFR BLD AUTO: 1 % (ref 0–1)
BILIRUB SERPL-MCNC: 0.3 MG/DL
BUN SERPL-MCNC: 21 MG/DL (ref 5–25)
CALCIUM SERPL-MCNC: 8.8 MG/DL (ref 8.4–10.2)
CHLORIDE SERPL-SCNC: 99 MMOL/L (ref 97–108)
CO2 SERPL-SCNC: 29 MMOL/L (ref 22–30)
CREAT SERPL-MCNC: 0.52 MG/DL (ref 0.6–1.2)
EOSINOPHIL # BLD AUTO: 0.2 THOUSAND/ΜL (ref 0–0.4)
EOSINOPHIL NFR BLD AUTO: 1 % (ref 0–6)
ERYTHROCYTE [DISTWIDTH] IN BLOOD BY AUTOMATED COUNT: 21.7 %
GFR SERPL CREATININE-BSD FRML MDRD: 98 ML/MIN/1.73SQ M
GLUCOSE SERPL-MCNC: 141 MG/DL (ref 70–99)
GRAM STN SPEC: NORMAL
GRAM STN SPEC: NORMAL
HCT VFR BLD AUTO: 36.8 % (ref 36–46)
HGB BLD-MCNC: 11.8 G/DL (ref 12–16)
LYMPHOCYTES # BLD AUTO: 2.2 THOUSANDS/ΜL (ref 0.5–4)
LYMPHOCYTES NFR BLD AUTO: 16 % (ref 25–45)
MAGNESIUM SERPL-MCNC: 2.1 MG/DL (ref 1.6–2.3)
MCH RBC QN AUTO: 26.8 PG (ref 26–34)
MCHC RBC AUTO-ENTMCNC: 32.1 G/DL (ref 31–36)
MCV RBC AUTO: 83 FL (ref 80–100)
MONOCYTES # BLD AUTO: 1 THOUSAND/ΜL (ref 0.2–0.9)
MONOCYTES NFR BLD AUTO: 8 % (ref 1–10)
NEUTROPHILS # BLD AUTO: 10 THOUSANDS/ΜL (ref 1.8–7.8)
NEUTS SEG NFR BLD AUTO: 74 % (ref 45–65)
PHOSPHATE SERPL-MCNC: 4 MG/DL (ref 2.5–4.8)
PLATELET # BLD AUTO: 506 THOUSANDS/UL (ref 150–450)
PMV BLD AUTO: 8.5 FL (ref 8.9–12.7)
POTASSIUM SERPL-SCNC: 3.7 MMOL/L (ref 3.6–5)
PROCALCITONIN SERPL-MCNC: 0.08 NG/ML
PROT SERPL-MCNC: 7 G/DL (ref 5.9–8.4)
RBC # BLD AUTO: 4.42 MILLION/UL (ref 4–5.2)
SODIUM SERPL-SCNC: 135 MMOL/L (ref 137–147)
WBC # BLD AUTO: 13.5 THOUSAND/UL (ref 4.5–11)

## 2018-12-29 PROCEDURE — 80053 COMPREHEN METABOLIC PANEL: CPT | Performed by: INTERNAL MEDICINE

## 2018-12-29 PROCEDURE — 84100 ASSAY OF PHOSPHORUS: CPT | Performed by: INTERNAL MEDICINE

## 2018-12-29 PROCEDURE — 85025 COMPLETE CBC W/AUTO DIFF WBC: CPT | Performed by: INTERNAL MEDICINE

## 2018-12-29 PROCEDURE — 99232 SBSQ HOSP IP/OBS MODERATE 35: CPT | Performed by: INTERNAL MEDICINE

## 2018-12-29 PROCEDURE — 83735 ASSAY OF MAGNESIUM: CPT | Performed by: INTERNAL MEDICINE

## 2018-12-29 PROCEDURE — 84145 PROCALCITONIN (PCT): CPT | Performed by: INTERNAL MEDICINE

## 2018-12-29 RX ADMIN — CYANOCOBALAMIN TAB 1000 MCG 500 MCG: 1000 TAB at 09:29

## 2018-12-29 RX ADMIN — B-COMPLEX W/ C & FOLIC ACID TAB 1 TABLET: TAB at 09:29

## 2018-12-29 RX ADMIN — NYSTATIN: 100000 POWDER TOPICAL at 09:29

## 2018-12-29 RX ADMIN — OYSTER SHELL CALCIUM WITH VITAMIN D 1 TABLET: 500; 200 TABLET, FILM COATED ORAL at 09:29

## 2018-12-29 RX ADMIN — ACETIC ACID 1000 ML: 250 IRRIGANT IRRIGATION at 09:29

## 2018-12-29 RX ADMIN — VITAMIN D, TAB 1000IU (100/BT) 1000 UNITS: 25 TAB at 09:29

## 2018-12-29 RX ADMIN — ENOXAPARIN SODIUM 40 MG: 40 INJECTION SUBCUTANEOUS at 09:29

## 2018-12-29 RX ADMIN — PANTOPRAZOLE SODIUM 40 MG: 40 TABLET, DELAYED RELEASE ORAL at 05:02

## 2018-12-29 RX ADMIN — NYSTATIN 1 APPLICATION: 100000 POWDER TOPICAL at 17:44

## 2018-12-29 NOTE — PROGRESS NOTES
Progress Note - Wyatt Bee 1950, 76 y o  female MRN: [id number]    Unit/Bed#: 5T -01 Encounter: [id number]    Primary Care Provider: Sandra Rojas   Date and time admitted to hospital: 12/13/2018  9:04 PM        Assessment and Plan  * Decubitus skin ulcer   Assessment & Plan    Multiple decubitus skin ulcers including bilateral hips and coccyx  Being followed closely by orthopedics, plastic surgery, and ID; the the finished course of antibiotics most recently metronidazole and daptomycin (last dose 12/26/2018) -- holding further antibiotics until the cultures with sensitivities available  Status post flap by plastic Dr Bradford Elizabeth  Unfortunately now has sinus tract to left distal femur at site of previous IM jony -- orthopedics following  BMI less than 19,adult   Assessment & Plan    Body mass index is 17 35 kg/m²  Continue Ensure supplement     Thrombocytosis (HCC)   Assessment & Plan    Thrombocytosis secondary to acute/chronic infection     Moderate protein-calorie malnutrition (HCC)   Assessment & Plan    Malnutrition Findings:   Malnutrition type: Chronic illness  Degree of Malnutrition: Malnutrition of moderate degree  BMI Findings: Body mass index is 17 35 kg/m²  Continue nutritional supplements     GERD (gastroesophageal reflux disease)   Assessment & Plan    Continue pantoprazole     Anemia of chronic disease   Assessment & Plan    Anemia of chronic disease with iron deficiency; status post venofer and 2 units PRBC  Stool occult was positive but patient declined GI evaluation     Constipation   Assessment & Plan    Has had several bowel movements after fleets enema yesterday       Chronic osteomyelitis of coccyx [facility])   Assessment & Plan    Chronic osteomyelitis of coccyx     Paraplegia following spinal cord injury [facility])   Assessment & Plan    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; continue frequent turning and specialty mattress     VTE Pharmacologic Prophylaxis: Enoxaparin (Lovenox)  Discharge Plan / Estimated Discharge Date:   ______________________________________________________________________________    Subjective:   Patient seen and examined  States that after enema has had frequent stooling however requesting manual disimpaction of solid stool  Nursing notes sedimentation in urinary catheter    Objective:   Vitals: Blood pressure 137/65, pulse 72, temperature 97 6 °F (36 4 °C), temperature source Temporal, resp  rate 18, height 5' 5" (1 651 m), weight 47 3 kg (104 lb 4 4 oz), SpO2 98 %, not currently breastfeeding      Physical Exam:   General appearance: alert, appears stated age and cooperative  Head: Normocephalic, without obvious abnormality, atraumatic  Lungs: diminished breath sounds  Heart: regular rate and rhythm  Abdomen: soft, non-tender, positive bowel sounds   Extremities: contracted lower extremities  Neurologic:  Speech intact    Additional Data:   Labs:    Results from last 7 days  Lab Units 12/29/18  0439 12/28/18  0432 12/25/18  0519 12/24/18  0432   WBC Thousand/uL 13 50* 10 80 12 40* 10 10   HEMOGLOBIN g/dL 11 8* 11 8* 11 5* 11 1*   HEMATOCRIT % 36 8 35 7* 36 0 34 4*   MCV fL 83 83 84 83   PLATELETS Thousands/uL 506* 485* 552* 548*       Results from last 7 days  Lab Units 12/29/18  0439 12/28/18  0432 12/27/18  0523 12/26/18  0448 12/25/18  0519 12/24/18  0432   SODIUM mmol/L 135* 136* 135* 133* 135* 134*   POTASSIUM mmol/L 3 7 3 9 4 2 4 1 4 3 4 0   CHLORIDE mmol/L 99 101 100 100 101 99   CO2 mmol/L 29 29 30 29 27 30   ANION GAP mmol/L 7 6 5 4* 7 5   BUN mg/dL 21 21 18 26* 23 17   CREATININE mg/dL 0 52* 0 59* 0 56* 0 56* 0 71 0 67   CALCIUM mg/dL 8 8 8 8 8 7 8 1* 8 7 8 4   ALBUMIN g/dL 3 2 3 1  --   --   --   --    TOTAL BILIRUBIN mg/dL 0 30 0 20  --   --   --   --    ALK PHOS U/L 88 87  --   --   --   --    ALT U/L 24 26  --   --   --   --    AST U/L 25 22  --   --   --   --    EGFR ml/min/1 73sq m 98 94 96 96 88 91   GLUCOSE RANDOM mg/dL 141* 132* 118* 106* 170* 109*       Results from last 7 days  Lab Units 12/29/18  0439 12/28/18  0432   MAGNESIUM mg/dL 2 1 2 4*   PHOSPHORUS mg/dL 4 0 4 1       Results from last 7 days  Lab Units 12/28/18  0432 12/24/18  0432   CK TOTAL U/L <20* <20*            Results from last 7 days  Lab Units 12/28/18  0432   LACTIC ACID mmol/L 1 0   PROCALCITONIN ng/ml 0 07       Results from last 7 days  Lab Units 12/23/18  0653   POC GLUCOSE mg/dl 115*       Results from last 7 days  Lab Units 12/28/18  0432   HEMOGLOBIN A1C % 6 4*         * I Have Reviewed All Lab Data Listed Above  Cultures:     Results from last 7 days  Lab Units 12/27/18  1118 12/22/18  2236   GRAM STAIN RESULT  No Polys or Bacteria seen  Rare Polys  No bacteria seen No Polys or Bacteria seen   WOUND CULTURE  No growth  Few Colonies of Diphtheroids 1+ Growth of        Results from last 7 days  Lab Units 12/25/18  1418   FECAL OCCULT BLOOD DIAGNOSTIC  Positive*     Imaging:  Imaging Reports Reviewed Today Include:   No results found  Scheduled Meds:  Current Facility-Administered Medications:  acetaminophen 650 mg Oral Q6H PRN Chelsey Gagnon DO   acetic acid  Topical Daily Luther Carlson MD   aluminum-magnesium hydroxide-simethicone 30 mL Oral Q4H PRN CONCEPCION Jackson   calcium carbonate-vitamin D 1 tablet Oral Daily With Breakfast Chelsey Gagnon DO   cholecalciferol 1,000 Units Oral Daily Ginette Lenz MD   vitamin B-12 500 mcg Oral Daily Chelsey Gagnon DO   diphenhydrAMINE 25 mg Oral Q6H PRN Ginette Lenz MD   enoxaparin 40 mg Subcutaneous Q24H [facility] Deshaun Chandler DO   multivitamin stress formula 1 tablet Oral Daily Deshaun Chandler DO   nystatin  Topical BID Mj Howard DO   ondansetron 4 mg Intravenous Q6H PRN Ginette Lenz MD   oxyCODONE 5 mg Oral Q6H PRN Ginette Lenz MD   pantoprazole 40 mg Oral Early Morning CONCEPCION Jackson   senna 2 tablet Oral Daily Chelsey Gagnon DO     Patient Centered Rounds: I have performed bedside rounds with nursing staff today  Discussions with Specialists or Other Care Team Provider:   Education and Discussions with Family / Patient:   Current Length of Stay: 16 day(s)  Current Patient Status: Inpatient   Certification Statement: The patient will continue to require additional inpatient hospital stay due to Decubitus skin ulcer  Code Status: Level 1 - Full Code  Time Spent for Care: 25 mins  More than 50% of total time spent on counseling and coordination of care as described above  DO Mica Portillo [facility] Internal Medicine  Hospitalist    ** Please Note: This note has been constructed using a voice recognition system   **

## 2018-12-29 NOTE — ASSESSMENT & PLAN NOTE
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; continue frequent turning and specialty mattress

## 2018-12-29 NOTE — ASSESSMENT & PLAN NOTE
Anemia of chronic disease with iron deficiency; status post venofer and 2 units PRBC    Stool occult was positive but patient declined GI evaluation

## 2018-12-29 NOTE — ASSESSMENT & PLAN NOTE
Multiple decubitus skin ulcers including bilateral hips and coccyx  Being followed closely by orthopedics, plastic surgery, and ID; the the finished course of antibiotics most recently metronidazole and daptomycin (last dose 12/26/2018) -- holding further antibiotics until the cultures with sensitivities available  Status post flap by plastic Dr Brant Suarez  Unfortunately now has sinus tract to left distal femur at site of previous IM jony -- orthopedics following

## 2018-12-29 NOTE — ASSESSMENT & PLAN NOTE
Malnutrition Findings:   Malnutrition type: Chronic illness  Degree of Malnutrition: Malnutrition of moderate degree  BMI Findings: Body mass index is 17 35 kg/m²     Continue nutritional supplements

## 2018-12-30 LAB
BACTERIA WND AEROBE CULT: NO GROWTH
GRAM STN SPEC: NORMAL

## 2018-12-30 PROCEDURE — 99232 SBSQ HOSP IP/OBS MODERATE 35: CPT | Performed by: INTERNAL MEDICINE

## 2018-12-30 RX ADMIN — ACETIC ACID 1 ML: 250 IRRIGANT IRRIGATION at 08:55

## 2018-12-30 RX ADMIN — OYSTER SHELL CALCIUM WITH VITAMIN D 1 TABLET: 500; 200 TABLET, FILM COATED ORAL at 08:49

## 2018-12-30 RX ADMIN — VITAMIN D, TAB 1000IU (100/BT) 1000 UNITS: 25 TAB at 08:49

## 2018-12-30 RX ADMIN — NYSTATIN 1 APPLICATION: 100000 POWDER TOPICAL at 17:31

## 2018-12-30 RX ADMIN — PANTOPRAZOLE SODIUM 40 MG: 40 TABLET, DELAYED RELEASE ORAL at 06:00

## 2018-12-30 RX ADMIN — ENOXAPARIN SODIUM 40 MG: 40 INJECTION SUBCUTANEOUS at 08:49

## 2018-12-30 RX ADMIN — B-COMPLEX W/ C & FOLIC ACID TAB 1 TABLET: TAB at 08:50

## 2018-12-30 RX ADMIN — NYSTATIN 1 APPLICATION: 100000 POWDER TOPICAL at 08:55

## 2018-12-30 RX ADMIN — CYANOCOBALAMIN TAB 1000 MCG 500 MCG: 1000 TAB at 08:49

## 2018-12-30 NOTE — ASSESSMENT & PLAN NOTE
Multiple decubitus skin ulcers including bilateral hips and coccyx  Being followed closely by orthopedics, plastic surgery, and ID; finished course of antibiotics most recently metronidazole and daptomycin (last dose 12/26/2018) -- holding further antibiotics until deep cultures with sensitivities available  Status post flap by plastic Dr Imani Anderson  Unfortunately now has sinus tract to left distal femur at site of previous IM jony -- orthopedics following

## 2018-12-30 NOTE — ASSESSMENT & PLAN NOTE
Malnutrition Findings:   Malnutrition type: Chronic illness  Degree of Malnutrition: Malnutrition of moderate degree  BMI Findings: Body mass index is 19 08 kg/m²     Continue nutritional supplements

## 2018-12-30 NOTE — PROGRESS NOTES
Progress Note - Neo Weller 1950, 76 y o  female MRN: [id number]    Unit/Bed#: 5T -01 Encounter: [id number]    Primary Care Provider: Darvin Srinivasan   Date and time admitted to hospital: 12/13/2018  9:04 PM        Assessment and Plan  * Decubitus skin ulcer   Assessment & Plan    Multiple decubitus skin ulcers including bilateral hips and coccyx  Being followed closely by orthopedics, plastic surgery, and ID; finished course of antibiotics most recently metronidazole and daptomycin (last dose 12/26/2018) -- holding further antibiotics until deep cultures with sensitivities available  Status post flap by plastic Dr Thony Coppola  Unfortunately now has sinus tract to left distal femur at site of previous IM jony -- orthopedics following  BMI less than 19,adult   Assessment & Plan    Body mass index is 19 08 kg/m²  Continue Ensure supplement     Hyperglycemia   Assessment & Plan    Hyperglycemia with borderline diabetes based on A1c  Results from last 7 days  Lab Units 12/28/18  0432   HEMOGLOBIN A1C % 6 4*        Thrombocytosis (HCC)   Assessment & Plan    Thrombocytosis secondary to acute/chronic infection     Moderate protein-calorie malnutrition (HCC)   Assessment & Plan    Malnutrition Findings:   Malnutrition type: Chronic illness  Degree of Malnutrition: Malnutrition of moderate degree  BMI Findings: Body mass index is 19 08 kg/m²  Continue nutritional supplements     GERD (gastroesophageal reflux disease)   Assessment & Plan    Continue pantoprazole     Anemia of chronic disease   Assessment & Plan    Anemia of chronic disease with iron deficiency; status post venofer and 2 units PRBC  Stool occult was positive but patient declined GI evaluation     Constipation   Assessment & Plan    Has had several bowel movements after fleets enema    On senna     Chronic osteomyelitis of coccyx [facility])   Assessment & Plan    Chronic osteomyelitis of coccyx     Paraplegia following spinal cord injury [facility])   Assessment & Plan    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; continue frequent turning and specialty mattress     VTE Pharmacologic Prophylaxis: Enoxaparin (Lovenox)    Patient Centered Rounds: I have performed bedside rounds with nursing staff today  Discussions with Specialists or Other Care Team Provider:   Education and Discussions with Family / Patient:     Time Spent for Care: 25 mins  More than 50% of total time spent on counseling and coordination of care as described above  Current Length of Stay: 17 day(s)  Current Patient Status: Inpatient     Certification Statement: The patient will continue to require additional inpatient hospital stay due to Decubitus skin ulcer  Discharge Plan / Estimated Discharge Date:  Awaiting surgical plans this coming week    Code Status: Level 1 - Full Code  ______________________________________________________________________________    Subjective:   Patient seen and examined  Still complains of overflow incontinence    Objective:   Vitals: Blood pressure 99/55, pulse 94, temperature 97 8 °F (36 6 °C), temperature source Temporal, resp  rate 21, height 5' 5" (1 651 m), weight 52 kg (114 lb 10 2 oz), SpO2 95 %, not currently breastfeeding      Physical Exam:   General appearance: alert, appears stated age and cooperative  Head: Normocephalic, without obvious abnormality, atraumatic  Lungs: clear to auscultation bilaterally  Heart: regular rate and rhythm  Abdomen: soft, non-tender, positive bowel sounds     Extremities: contracted lower extremities  Neurologic: CN grossly intact    Additional Data:   Labs:    Results from last 7 days  Lab Units 12/29/18 0439 12/28/18 0432 12/25/18  0519 12/24/18 0432   WBC Thousand/uL 13 50* 10 80 12 40* 10 10   HEMOGLOBIN g/dL 11 8* 11 8* 11 5* 11 1*   HEMATOCRIT % 36 8 35 7* 36 0 34 4*   MCV fL 83 83 84 83   PLATELETS Thousands/uL 506* 485* 552* 548*       Results from last 7 days  Lab Units 12/29/18  0439 12/28/18  0432 12/27/18  0523 12/26/18  0448 12/25/18  0519 12/24/18  0432   SODIUM mmol/L 135* 136* 135* 133* 135* 134*   POTASSIUM mmol/L 3 7 3 9 4 2 4 1 4 3 4 0   CHLORIDE mmol/L 99 101 100 100 101 99   CO2 mmol/L 29 29 30 29 27 30   ANION GAP mmol/L 7 6 5 4* 7 5   BUN mg/dL 21 21 18 26* 23 17   CREATININE mg/dL 0 52* 0 59* 0 56* 0 56* 0 71 0 67   CALCIUM mg/dL 8 8 8 8 8 7 8 1* 8 7 8 4   ALBUMIN g/dL 3 2 3 1  --   --   --   --    TOTAL BILIRUBIN mg/dL 0 30 0 20  --   --   --   --    ALK PHOS U/L 88 87  --   --   --   --    ALT U/L 24 26  --   --   --   --    AST U/L 25 22  --   --   --   --    EGFR ml/min/1 73sq m 98 94 96 96 88 91   GLUCOSE RANDOM mg/dL 141* 132* 118* 106* 170* 109*       Results from last 7 days  Lab Units 12/29/18  0439 12/28/18  0432   MAGNESIUM mg/dL 2 1 2 4*   PHOSPHORUS mg/dL 4 0 4 1       Results from last 7 days  Lab Units 12/28/18  0432 12/24/18  0432   CK TOTAL U/L <20* <20*            Results from last 7 days  Lab Units 12/29/18  0439 12/28/18  0432   LACTIC ACID mmol/L  --  1 0   PROCALCITONIN ng/ml 0 08 0 07           Results from last 7 days  Lab Units 12/28/18  0432   HEMOGLOBIN A1C % 6 4*         * I Have Reviewed All Lab Data Listed Above  Cultures:     Results from last 7 days  Lab Units 12/27/18  1118   GRAM STAIN RESULT  No Polys or Bacteria seen  Rare Polys  No bacteria seen   WOUND CULTURE  No growth  Few Colonies of Diphtheroids       Results from last 7 days  Lab Units 12/25/18  1418   FECAL OCCULT BLOOD DIAGNOSTIC  Positive*     Imaging:  Imaging Reports Reviewed Today Include:   No results found    Scheduled Meds:  Current Facility-Administered Medications:  acetaminophen 650 mg Oral Q6H PRN [place] Alysia, DO   acetic acid  Topical Daily Kiara Sánchez MD   aluminum-magnesium hydroxide-simethicone 30 mL Oral Q4H PRN CONCEPCION Jackson   calcium carbonate-vitamin D 1 tablet Oral Daily With Breakfast Preston Hatfield DO   cholecalciferol 1,000 Units Oral Daily Ginette Lenz MD   vitamin B-12 500 mcg Oral Daily Asiya Johnson,    diphenhydrAMINE 25 mg Oral Q6H PRN Ginette Lenz MD   enoxaparin 40 mg Subcutaneous Q24H [facility] Deshaun Chandler,    multivitamin stress formula 1 tablet Oral Daily Deshaun Chandler DO   nystatin  Topical BID Mj Howard DO   ondansetron 4 mg Intravenous Q6H PRN Ginette Lenz MD   oxyCODONE 5 mg Oral Q6H PRN Ginette Lenz MD   pantoprazole 40 mg Oral Early Morning Jenifer Ciminichayo, CONCEPCION   senna 2 tablet Oral Daily DO Josef Newman DO [address] Internal Medicine  Hospitalist    ** Please Note: This note has been constructed using a voice recognition system   **

## 2018-12-31 ENCOUNTER — TELEPHONE (OUTPATIENT)
Dept: PAIN MEDICINE | Facility: CLINIC | Age: 68
End: 2018-12-31

## 2018-12-31 PROBLEM — L08.9 COMPLICATED WOUND INFECTION: Status: ACTIVE | Noted: 2018-12-13

## 2018-12-31 PROBLEM — T14.8XXA COMPLICATED WOUND INFECTION: Status: ACTIVE | Noted: 2018-12-13

## 2018-12-31 LAB
ALBUMIN SERPL BCP-MCNC: 3 G/DL (ref 3–5.2)
ALP SERPL-CCNC: 80 U/L (ref 43–122)
ALT SERPL W P-5'-P-CCNC: 25 U/L (ref 9–52)
ANION GAP SERPL CALCULATED.3IONS-SCNC: 5 MMOL/L (ref 5–14)
AST SERPL W P-5'-P-CCNC: 19 U/L (ref 14–36)
BILIRUB SERPL-MCNC: 0.3 MG/DL
BUN SERPL-MCNC: 18 MG/DL (ref 5–25)
CALCIUM SERPL-MCNC: 8.4 MG/DL (ref 8.4–10.2)
CHLORIDE SERPL-SCNC: 98 MMOL/L (ref 97–108)
CO2 SERPL-SCNC: 29 MMOL/L (ref 22–30)
CREAT SERPL-MCNC: 0.55 MG/DL (ref 0.6–1.2)
ERYTHROCYTE [DISTWIDTH] IN BLOOD BY AUTOMATED COUNT: 22.1 %
GFR SERPL CREATININE-BSD FRML MDRD: 97 ML/MIN/1.73SQ M
GLUCOSE SERPL-MCNC: 141 MG/DL (ref 70–99)
HCT VFR BLD AUTO: 32.5 % (ref 36–46)
HGB BLD-MCNC: 10.5 G/DL (ref 12–16)
MCH RBC QN AUTO: 27 PG (ref 26–34)
MCHC RBC AUTO-ENTMCNC: 32.4 G/DL (ref 31–36)
MCV RBC AUTO: 83 FL (ref 80–100)
PLATELET # BLD AUTO: 411 THOUSANDS/UL (ref 150–450)
PMV BLD AUTO: 8.6 FL (ref 8.9–12.7)
POTASSIUM SERPL-SCNC: 3.8 MMOL/L (ref 3.6–5)
PROT SERPL-MCNC: 6.6 G/DL (ref 5.9–8.4)
RBC # BLD AUTO: 3.9 MILLION/UL (ref 4–5.2)
SODIUM SERPL-SCNC: 132 MMOL/L (ref 137–147)
WBC # BLD AUTO: 10.8 THOUSAND/UL (ref 4.5–11)

## 2018-12-31 PROCEDURE — 85027 COMPLETE CBC AUTOMATED: CPT | Performed by: INTERNAL MEDICINE

## 2018-12-31 PROCEDURE — 99232 SBSQ HOSP IP/OBS MODERATE 35: CPT | Performed by: INTERNAL MEDICINE

## 2018-12-31 PROCEDURE — 99233 SBSQ HOSP IP/OBS HIGH 50: CPT | Performed by: INTERNAL MEDICINE

## 2018-12-31 PROCEDURE — 80053 COMPREHEN METABOLIC PANEL: CPT | Performed by: INTERNAL MEDICINE

## 2018-12-31 RX ADMIN — NYSTATIN: 100000 POWDER TOPICAL at 18:30

## 2018-12-31 RX ADMIN — B-COMPLEX W/ C & FOLIC ACID TAB 1 TABLET: TAB at 09:42

## 2018-12-31 RX ADMIN — VITAMIN D, TAB 1000IU (100/BT) 1000 UNITS: 25 TAB at 09:42

## 2018-12-31 RX ADMIN — OYSTER SHELL CALCIUM WITH VITAMIN D 1 TABLET: 500; 200 TABLET, FILM COATED ORAL at 09:42

## 2018-12-31 RX ADMIN — ACETIC ACID 1000 ML: 250 IRRIGANT IRRIGATION at 10:14

## 2018-12-31 RX ADMIN — PANTOPRAZOLE SODIUM 40 MG: 40 TABLET, DELAYED RELEASE ORAL at 06:13

## 2018-12-31 RX ADMIN — CYANOCOBALAMIN TAB 1000 MCG 500 MCG: 1000 TAB at 09:42

## 2018-12-31 RX ADMIN — ENOXAPARIN SODIUM 40 MG: 40 INJECTION SUBCUTANEOUS at 09:42

## 2018-12-31 NOTE — UTILIZATION REVIEW
Continued Stay Review    Date: 12/28/18    Vital Signs:      Temp:  [97 5 °F (36 4 °C)-98 3 °F (36 8 °C)] 98 3 °F (36 8 °C)  HR:  [78-90] 78  Resp:  [18] 18  BP: (109-111)/(61-74) 111/61  SpO2:  [95 %-96 %] 96 %  Body mass index is 18 01 kg/m²  Abnormal Labs/Diagnostic Results:     Lab Units 12/28/18  0432   HEMOGLOBIN g/dL 11 8*   HEMATOCRIT % 35 7*   PLATELETS Thousands/uL 485*   BANDS PCT %  --    NEUTROS PCT % 67*   LYMPHS PCT % 22*       Lab Units 12/28/18  0432   SODIUM mmol/L 136*   POTASSIUM mmol/L 3 9   CHLORIDE mmol/L 101   CO2 mmol/L 29   BUN mg/dL 21   CREATININE mg/dL 0 59*   ANION GAP mmol/L 6   CALCIUM mg/dL 8 8   ALBUMIN g/dL 3 1   TOTAL BILIRUBIN mg/dL 0 20   ALK PHOS U/L 87   ALT U/L 26   AST U/L 22   GLUCOSE RANDOM mg/dL 132*       Age/Sex: 76 y o  female     Assessment/Plan:     * Decubitus skin ulcer   Assessment & Plan     · Decubitus skin ulcer, multiple sites: b/l hips and coccyx  · Please see Infectious Disease evaluation  · S/p debridement of bilateral hips and flap of right hip  · Previously on flagyl and daptomycin per Infectious Disease  Cultures reviewed and growing MRSA  · Last dose of daptomycin and flagyl was on 12/26/2018  · Per Dr Rik Golden (Plastic Surgery): It will take a least 4-6 weeks for that flap to heal satisfactory     · New sinus tract infection at the distal femur/left     Wound cultures were taken on 12/27/2018 by Orthopedic Surgery  · I discussed the case with Dr Naren Coronado (Infectious Disease)  · We will hold on any additional IV antibiotics until the cultures are resulted  · Additional known decubitus ulcers proximally as described above  · Dr Rik Golden (Plastic surgery) will then plan on further skin grafting status-post removal of hardware      Hypermagnesemia   Assessment & Plan     · Follow the magnesium level      BMI less than 19,adult   Assessment & Plan     · Continue nutritional supplements      Hyperglycemia   Assessment & Plan     Results for [facility], Stormy Torres (MRN [id number]) as of 12/28/2018 15:43    Ref  Range 12/28/2018 04:32   Hemoglobin A1C Latest Ref Range: 4 2 - 6 3 % 6 4 (H)   EAG Latest Units: mg/dl 137      · Outpatient follow-up with her PCP in regards to this matter      Elevated platelet count   Assessment & Plan     · Likely reactive in the setting of acute infection  · Follow the CBC      Moderate protein-calorie malnutrition (HCC)   Assessment & Plan     Malnutrition Findings:   Malnutrition type: Chronic illness  Degree of Malnutrition: Malnutrition of moderate degree     BMI Findings: Body mass index is 18 01 kg/m²       · Continue nutritional supplements      GERD (gastroesophageal reflux disease)   Assessment & Plan     · Continue PPI      Hyponatremia   Assessment & Plan     · Mild hyponatremia  · Improving  · Follow the sodium level      Anemia of chronic disease   Assessment & Plan     · Anemia of chronic disease with iron deficiency  · Previously given IV Venofer and 2 units of PRBC's  · Unfortunately, stool occult testing was positive, but the patient reportedly declined a Gastroenterology evaluation at this time  · The hemoglobin is stable  · Transfuse for a hemoglobin less than 7         Constipation   Assessment & Plan     · Improved  · The patient is refusing senokot today  · She is agreeable to a fleet enema today, 12/28/2018      Chronic osteomyelitis of coccyx (HCC)   Assessment & Plan     · Chronic osteomyelitis of coccyx  · I appreciate Infectious Disease's input      Paraplegia following spinal cord injury [facility])   Assessment & Plan     · 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  · No new symptoms reported  · Continue current treatment plan including frequent turning and specialty mattress       12/28 Infectious Disease Progress Note:    1   Displaced femur fracture status post jony placement now with drainage and bone infection:  Patient was noted with a prior hematoma at this area   She reports that the overall lesion decreased in size and had small punctate lesion that seemed to be draining on initial evaluations that stopped subsequently   Drainage worsened over the weekend and cultures were collected   CT imaging consistent with bone infection and drainage tracks straight to the fracture site  Wound cultures only with skin naun  Patient seen by Orthopedics yesterday and attempt made at bedside needle aspiration  Cultures are pending  Had detailed discussion with Orthopedics and given that the jony will need to remain in place to stabilize her bone until it is healed have discussed the possibility of OR washout and deep cultures off antibiotics cultures from yesterday are without growth  Unfortunately the patient has isolated various multi-drug resistant organisms and options for antibiotics are limited and so culture data will be essential for prolonged therapy  -continue to monitor off antibiotics  -follow-up wound cultures from yesterday  -continue to trend fever curve/vitals as above  -would repeat CBC and procalcitonin if there is a clinical change   -will follow-up with orthopedics for further intervention pending culture results     2  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:  Patient is status post wound debridement of her right hip in the OR   Reviewed images with Radiology and no signs of changes of osteomyelitis on the right hip  Reviewed OR findings with plastics though the bone was visualized on debridement there was no concern for bone being infected at this time   Blood cultures were without growth    Culture results noted   This area was treated largely as a skin and soft tissue infection with graft failure   -no further antibiotics for this issue    -continue to monitor fever curve/vitals  -continue to monitor site on exam       3  Leukocytosis:  Patient noted to have elevation in her white blood cell count after procedure on 12/17   White blood cell count noted to remain elevated   This is likely multifactorial with the patient's recent procedure, her multiple other wounds and now due to problem 2  Currently normalized on labs today   -would repeat CBC and procalcitonin if there is a clinical change   -will continue to monitor exam   -additional care as per primary     4  Pressure ulcer of the sacrum and chronic osteomyelitis of the coccyx:   based on patient's imaging along with exam this is likely a chronic osteomyelitis with a draining sinus   The site remains stable on subsequent evaluations   After discussion with plastics there is plan for possible skin grafting in the future   -would discuss ostomy with patient to prevent further contamination of wound  -would continue to monitor this site clinically for now  -continue local care as per plastics     5  Decubitus ulcer of left hip: Ronnie [place] Sch[place] on the patient's right hip was clearly infected on admission   Left hip wound appears stable on subsequent evaluations   CT was without findings consistent for osteo at this site  Gabi Boots is unclear at this time the timeline for closure for this wound   If closure is undertaken on this admission will need to evaluate OR findings to determine course of antibiotics for this issue   -continue local care as per plastics        6  Paraplegia:  Patient at baseline is wheelchair bound and provides for ongoing care at home   If she is ultimately planned for skin grafting would discuss possibility ostomy with the patient to promote further wound healing   It remains unclear to me how the patient is able to keep any of these wounds adequately clean on her own               Discharge Plan: LETA

## 2018-12-31 NOTE — ASSESSMENT & PLAN NOTE
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; continue frequent turning and specialty mattress  No changes in neurologic status

## 2018-12-31 NOTE — TELEPHONE ENCOUNTER
ID would like patient to have an I&D washout with deep cultures taken  This is not emergent   Vicky set up the case request

## 2018-12-31 NOTE — ASSESSMENT & PLAN NOTE
Multiple decubitus skin ulcers including bilateral hips and coccyx  Unfortunately feeling is be impeded by patient is bed ridden status and incontinence  Being followed closely by orthopedics, plastic surgery, and ID; finished course of antibiotics most recently metronidazole and daptomycin (last dose 12/26/2018) -- holding further antibiotics until deep cultures with sensitivities available  Status post flap by plastic Dr Classie Carrel  Unfortunately now has sinus tract to left distal femur at site of previous IM jony -- orthopedics following  Case discussed with ID, no new recommendations will be made today in terms of antibiotics  He still awaiting for cultures

## 2018-12-31 NOTE — PROGRESS NOTES
Progress Note - Allan Guthrie 1950, 76 y o  female MRN: [id number]    Unit/Bed#: 5T -01 Encounter: [id number]    Primary Care Provider: Shelby Ceja   Date and time admitted to hospital: 12/13/2018  9:04 PM        Hypermagnesemia   Assessment & Plan    · Follow the magnesium level     BMI less than 19,adult   Assessment & Plan    Body mass index is 19 19 kg/m²  Continue Ensure supplement     Hyperglycemia   Assessment & Plan    Hyperglycemia with borderline diabetes based on A1c  Results from last 7 days  Lab Units 12/28/18  0432   HEMOGLOBIN A1C % 6 4*        Thrombocytosis (HCC)   Assessment & Plan    Thrombocytosis secondary to acute/chronic infection     Moderate protein-calorie malnutrition (HCC)   Assessment & Plan    Malnutrition Findings:   Malnutrition type: Chronic illness  Degree of Malnutrition: Malnutrition of moderate degree  BMI Findings: Body mass index is 19 19 kg/m²  Continue nutritional supplements     GERD (gastroesophageal reflux disease)   Assessment & Plan    Continue pantoprazole     Hyponatremia   Assessment & Plan    · Mild hyponatremia  · Improving  · Follow the sodium level     Anemia of chronic disease   Assessment & Plan    Anemia of chronic disease with iron deficiency; status post venofer and 2 units PRBC  Stool occult was positive but patient declined GI evaluation  No need for transfusion unless hemoglobin drops to less than 7 grams/deciliter  Constipation   Assessment & Plan    Has had several bowel movements after fleets enema  On senna  Now complaining of having too many bowel movements that I can not control   I explained this could be due to Fleet's enema and she will be monitored to see if symptoms will resolve  Chronic osteomyelitis of coccyx [facility])   Assessment & Plan    Chronic osteomyelitis of coccyx  Treatment as per above       Iron deficiency anemia   Assessment & Plan    Stable hb at 11 5  Despite melena positive FOBT  Patient does not want any further diagnostic management studies or procedures  Pressure ulcer, sacrum   Assessment & Plan    Overall patient has remained hemodynamically stable continues to make slow steady progress  No fundamental changes will be required to current treatment plan today  Labs in a m  Unfortunately there is a new sinus tract drainage concerning for possible hardware infection of the left knee  Please follow up on CT results in a m  Paraplegia following spinal cord injury [facility])   Assessment & Plan    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; continue frequent turning and specialty mattress  No changes in neurologic status   * Decubitus skin ulcer   Assessment & Plan    Multiple decubitus skin ulcers including bilateral hips and coccyx  Unfortunately feeling is be impeded by patient is bed ridden status and incontinence  Being followed closely by orthopedics, plastic surgery, and ID; finished course of antibiotics most recently metronidazole and daptomycin (last dose 12/26/2018) -- holding further antibiotics until deep cultures with sensitivities available  Status post flap by plastic Dr Cory Hopper  Unfortunately now has sinus tract to left distal femur at site of previous IM jony -- orthopedics following  Case discussed with ID, no new recommendations will be made today in terms of antibiotics  He still awaiting for cultures  VTE Pharmacologic Prophylaxis:   Pharmacologic: Enoxaparin (Lovenox)  Mechanical VTE Prophylaxis in Place: Yes    Patient Centered Rounds: I have performed bedside rounds with nursing staff today  Discussions with Specialists or Other Care Team Provider:  Yes    Education and Discussions with Family / Patient:  No    Time Spent for Care: 30 minutes  More than 50% of total time spent on counseling and coordination of care as described above      Current Length of Stay: 18 day(s)    Current Patient Status: Inpatient Certification Statement: The patient will continue to require additional inpatient hospital stay due to Osteomyelitis/    Discharge Plan / Estimated Discharge Date:  Yet to be determined    Code Status: Level 1 - Full Code      Subjective:   No new complaints reported    Objective:     Vitals:   Temp (24hrs), Av 9 °F (36 6 °C), Min:97 2 °F (36 2 °C), Max:98 3 °F (36 8 °C)    Temp:  [97 2 °F (36 2 °C)-98 3 °F (36 8 °C)] 97 2 °F (36 2 °C)  HR:  [] 92  Resp:  [18] 18  BP: (101-114)/(55-66) 101/55  SpO2:  [94 %-97 %] 94 %  Body mass index is 19 19 kg/m²  Input and Output Summary (last 24 hours): Intake/Output Summary (Last 24 hours) at 18 1013  Last data filed at 18 0800   Gross per 24 hour   Intake             1380 ml   Output              650 ml   Net              730 ml       Physical Exam:     Physical Exam     Physical Exam:   General appearance: alert, appears stated age and cooperative  Head: Normocephalic, without obvious abnormality, atraumatic  Lungs: clear to auscultation bilaterally  Heart: regular rate and rhythm  Abdomen: soft, non-tender, positive bowel sounds      Extremities: contracted lower extremities  Neurologic: CN grossly intact     Additional Data:     Labs:      Results from last 7 days  Lab Units 18  0523 18  0439   WBC Thousand/uL 10 80 13 50*   HEMOGLOBIN g/dL 10 5* 11 8*   HEMATOCRIT % 32 5* 36 8   PLATELETS Thousands/uL 411 506*   NEUTROS PCT %  --  74*   LYMPHS PCT %  --  16*   MONOS PCT %  --  8   EOS PCT %  --  1       Results from last 7 days  Lab Units 18  0523   POTASSIUM mmol/L 3 8   CHLORIDE mmol/L 98   CO2 mmol/L 29   BUN mg/dL 18   CREATININE mg/dL 0 55*   CALCIUM mg/dL 8 4   ALK PHOS U/L 80   ALT U/L 25   AST U/L 19           * I Have Reviewed All Lab Data Listed Above  * Additional Pertinent Lab Tests Reviewed:  All Labs For Current Hospital Admission Reviewed    Imaging:    Imaging Reports Reviewed Today Include:  Yes  Imaging Personally Reviewed by Myself Includes:  S    Recent Cultures (last 7 days):       Results from last 7 days  Lab Units 12/27/18  1118   GRAM STAIN RESULT  No Polys or Bacteria seen  Rare Polys  No bacteria seen   WOUND CULTURE  No growth  Few Colonies of Diphtheroids       Last 24 Hours Medication List:     Current Facility-Administered Medications:  acetaminophen 650 mg Oral Q6H PRN Orvan Pott, DO   acetic acid  Topical Daily German Horton MD   aluminum-magnesium hydroxide-simethicone 30 mL Oral Q4H PRN CONCEPCION Jackson   calcium carbonate-vitamin D 1 tablet Oral Daily With Breakfast Leandro Weber, DO   cholecalciferol 1,000 Units Oral Daily Ginette Lenz MD   vitamin B-12 500 mcg Oral Daily Leanrdo Weber, DO   diphenhydrAMINE 25 mg Oral Q6H PRN Ginette Lenz MD   enoxaparin 40 mg Subcutaneous Q24H [address] Deshaun Chandler,    multivitamin stress formula 1 tablet Oral Daily Deshaun Chandler DO   nystatin  Topical BID Mj Howard DO   ondansetron 4 mg Intravenous Q6H PRN Ginette Lenz MD   oxyCODONE 5 mg Oral Q6H PRN Ginette Mendez MD   pantoprazole 40 mg Oral Early Morning CONCEPCION Jackson   senna 2 tablet Oral Daily Leandro Weber DO        Today, Patient Was Seen By: Germania Waters    ** Please Note: This note has been constructed using a voice recognition system   **

## 2018-12-31 NOTE — ASSESSMENT & PLAN NOTE
Has had several bowel movements after fleets enema  On senna  Now complaining of having too many bowel movements that I can not control   I explained this could be due to Fleet's enema and she will be monitored to see if symptoms will resolve

## 2018-12-31 NOTE — ASSESSMENT & PLAN NOTE
Anemia of chronic disease with iron deficiency; status post venofer and 2 units PRBC  Stool occult was positive but patient declined GI evaluation  No need for transfusion unless hemoglobin drops to less than 7 grams/deciliter

## 2018-12-31 NOTE — TELEPHONE ENCOUNTER
Dr Lynette Son - # Cell [phone]  Pager- [phone]    Dr Mor Son from internal medicine  called to speak with you about patient  She stated that patient was to be able to have surgery if her labs came back normal  They have and Dr Mor Son would like her on the schedule for patient to have her surgery asap  My issue is that Dr Lynette Burns is out of the office and Dr Mor Son stated that Dr Lynette Burns knew she would be calling to day if patients labs were good for her to have the surgery  I have no idea how to reach Dr Lynette Burns since he is not in the office  I contacted Ethyl Clear who was not able to help as there is nothing in patient chart stating what type of surgery  Is someone able to help Dr Mor Son with this situation?

## 2018-12-31 NOTE — ASSESSMENT & PLAN NOTE
Hyperglycemia with borderline diabetes based on A1c      Results from last 7 days  Lab Units 12/28/18  0432   HEMOGLOBIN A1C % 6 4*

## 2018-12-31 NOTE — ASSESSMENT & PLAN NOTE
Malnutrition Findings:   Malnutrition type: Chronic illness  Degree of Malnutrition: Malnutrition of moderate degree  BMI Findings: Body mass index is 19 19 kg/m²     Continue nutritional supplements

## 2019-01-01 PROCEDURE — 99232 SBSQ HOSP IP/OBS MODERATE 35: CPT | Performed by: INTERNAL MEDICINE

## 2019-01-01 PROCEDURE — 99233 SBSQ HOSP IP/OBS HIGH 50: CPT | Performed by: INTERNAL MEDICINE

## 2019-01-01 RX ADMIN — NYSTATIN: 100000 POWDER TOPICAL at 17:26

## 2019-01-01 RX ADMIN — ENOXAPARIN SODIUM 40 MG: 40 INJECTION SUBCUTANEOUS at 08:54

## 2019-01-01 RX ADMIN — VITAMIN D, TAB 1000IU (100/BT) 1000 UNITS: 25 TAB at 08:55

## 2019-01-01 RX ADMIN — NYSTATIN 1 APPLICATION: 100000 POWDER TOPICAL at 08:55

## 2019-01-01 RX ADMIN — PANTOPRAZOLE SODIUM 40 MG: 40 TABLET, DELAYED RELEASE ORAL at 06:32

## 2019-01-01 RX ADMIN — CYANOCOBALAMIN TAB 1000 MCG 500 MCG: 1000 TAB at 08:55

## 2019-01-01 RX ADMIN — B-COMPLEX W/ C & FOLIC ACID TAB 1 TABLET: TAB at 08:54

## 2019-01-01 RX ADMIN — OYSTER SHELL CALCIUM WITH VITAMIN D 1 TABLET: 500; 200 TABLET, FILM COATED ORAL at 08:55

## 2019-01-01 RX ADMIN — ACETIC ACID 1000 ML: 250 IRRIGANT IRRIGATION at 08:56

## 2019-01-01 NOTE — PROGRESS NOTES
Progress Note - Lupis Jordyn 1950, 76 y o  female MRN: [id number]    Unit/Bed#: 5T -01 Encounter: [id number]    Primary Care Provider: Neema Nguyen   Date and time admitted to hospital: 12/13/2018  9:04 PM      Hypermagnesemia   Assessment & Plan     · Follow the magnesium level      BMI less than 19,adult   Assessment & Plan     Body mass index is 19 19 kg/m²  Continue Ensure supplement      Hyperglycemia   Assessment & Plan     Hyperglycemia with borderline diabetes based on A1c      Results from last 7 days  Lab Units 12/28/18  0432   HEMOGLOBIN A1C % 6 4*          Thrombocytosis (HCC)   Assessment & Plan     Thrombocytosis secondary to acute/chronic infection      Moderate protein-calorie malnutrition (HCC)   Assessment & Plan     Malnutrition Findings:   Malnutrition type: Chronic illness  Degree of Malnutrition: Malnutrition of moderate degree  BMI Findings: Body mass index is 19 19 kg/m²  Continue nutritional supplements      GERD (gastroesophageal reflux disease)   Assessment & Plan     Continue pantoprazole      Hyponatremia   Assessment & Plan     · Mild hyponatremia  · Improving  · Follow the sodium level      Anemia of chronic disease   Assessment & Plan     Anemia of chronic disease with iron deficiency; status post venofer and 2 units PRBC  Stool occult was positive but patient declined GI evaluation  No need for transfusion unless hemoglobin drops to less than 7 grams/deciliter       Constipation   Assessment & Plan     Has had several bowel movements after fleets enema    On senna  Now complaining of having too many bowel movements that I can not control   I explained this could be due to Fleet's enema and she will be monitored to see if symptoms will resolve          Chronic osteomyelitis of coccyx (HCC)   Assessment & Plan     Chronic osteomyelitis of coccyx  Treatment as per above       Iron deficiency anemia   Assessment & Plan     Stable hb at 11 5  Despite melena positive FOBT  Patient does not want any further diagnostic management studies or procedures       Pressure ulcer, sacrum   Assessment & Plan     Overall patient has remained hemodynamically stable continues to make slow steady progress  No fundamental changes will be required to current treatment plan today  Labs in a m  Unfortunately there is a new sinus tract drainage concerning for possible hardware infection of the left knee  Please follow up on CT results in a m             Paraplegia following spinal cord injury [facility])   Assessment & Plan     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; continue frequent turning and specialty mattress  No changes in neurologic status        * Decubitus skin ulcer   Assessment & Plan     Multiple decubitus skin ulcers including bilateral hips and coccyx  Unfortunately feeling is be impeded by patient is bed ridden status and incontinence  Being followed closely by orthopedics, plastic surgery, and ID; finished course of antibiotics most recently metronidazole and daptomycin (last dose 12/26/2018) -- holding further antibiotics until deep cultures with sensitivities available  Status post flap by plastic Dr Rik Golden  Unfortunately now has sinus tract to left distal femur at site of previous IM jony -- orthopedics following  Case discussed with ID, no new recommendations will be made today in terms of antibiotics  He still awaiting for cultures  Case discussed with Orthopedics and ID teams again today ideal treatment would be removal of hardware that appears to be infected repeating surgery is aware now contemplating about setting up surgery Thursday or Friday              VTE Pharmacologic Prophylaxis:   Pharmacologic: Enoxaparin (Lovenox)  Mechanical VTE Prophylaxis in Place: Yes    Patient Centered Rounds: I have performed bedside rounds with nursing staff today      Discussions with Specialists or Other Care Team Provider:  Yes    Education and Discussions with Family / Patient:  No    Time Spent for Care: 30 minutes  More than 50% of total time spent on counseling and coordination of care as described above  Current Length of Stay: 19 day(s)    Current Patient Status: Inpatient   Certification Statement: The patient will continue to require additional inpatient hospital stay due to Osteomyelitis/    Discharge Plan / Estimated Discharge Date:  Yet to be determined    Code Status: Level 1 - Full Code      Subjective:   No new complaints reported    Objective:     Vitals:   Temp (24hrs), Av 4 °F (36 3 °C), Min:96 8 °F (36 °C), Max:97 7 °F (36 5 °C)    Temp:  [96 8 °F (36 °C)-97 7 °F (36 5 °C)] 96 8 °F (36 °C)  HR:  [68-99] 68  Resp:  [16-20] 16  BP: ()/(61-63) 111/63  SpO2:  [95 %-96 %] 95 %  Body mass index is 21 13 kg/m²  Input and Output Summary (last 24 hours):        Intake/Output Summary (Last 24 hours) at 19 0952  Last data filed at 19 0467   Gross per 24 hour   Intake             1497 ml   Output              750 ml   Net              747 ml       Physical Exam:     Physical Exam     Physical Exam:   General appearance: alert, appears stated age and cooperative  Head: Normocephalic, without obvious abnormality, atraumatic  Lungs: clear to auscultation bilaterally  Heart: regular rate and rhythm  Abdomen: soft, non-tender, positive bowel sounds      Extremities: contracted lower extremities  Neurologic: CN grossly intact     Additional Data:     Labs:      Results from last 7 days  Lab Units 18  0523 18  0439   WBC Thousand/uL 10 80 13 50*   HEMOGLOBIN g/dL 10 5* 11 8*   HEMATOCRIT % 32 5* 36 8   PLATELETS Thousands/uL 411 506*   NEUTROS PCT %  --  74*   LYMPHS PCT %  --  16*   MONOS PCT %  --  8   EOS PCT %  --  1       Results from last 7 days  Lab Units 18  0523   POTASSIUM mmol/L 3 8   CHLORIDE mmol/L 98   CO2 mmol/L 29   BUN mg/dL 18   CREATININE mg/dL 0 55*   CALCIUM mg/dL 8 4   ALK PHOS U/L 80   ALT U/L 25 AST U/L 19           * I Have Reviewed All Lab Data Listed Above  * Additional Pertinent Lab Tests Reviewed: [address] Admission Reviewed    Imaging:    Imaging Reports Reviewed Today Include:  Yes  Imaging Personally Reviewed by Myself Includes:  S    Recent Cultures (last 7 days):       Results from last 7 days  Lab Units 12/27/18  1118   GRAM STAIN RESULT  No Polys or Bacteria seen  Rare Polys  No bacteria seen   WOUND CULTURE  No growth  Few Colonies of Diphtheroids       Last 24 Hours Medication List:     Current Facility-Administered Medications:  acetaminophen 650 mg Oral Q6H PRN [employer], DO   acetic acid  Topical Daily Robin Demarco MD   aluminum-magnesium hydroxide-simethicone 30 mL Oral Q4H PRN CONCEPCION Jackson   calcium carbonate-vitamin D 1 tablet Oral Daily With Breakfast [employer], DO   cholecalciferol 1,000 Units Oral Daily Ginette Lenz MD   vitamin B-12 500 mcg Oral Daily [employer], DO   diphenhydrAMINE 25 mg Oral Q6H PRN Ginette Lenz MD   enoxaparin 40 mg Subcutaneous Q24H [address] Deshaun Chandler,    multivitamin stress formula 1 tablet Oral Daily Deshaun Chandler, DO   nystatin  Topical BID Mj Howard DO   ondansetron 4 mg Intravenous Q6H PRN Ginette Lenz MD   oxyCODONE 5 mg Oral Q6H PRN Ginette Negro MD   pantoprazole 40 mg Oral Early Morning CONCEPCION Jackson   senna 2 tablet Oral Daily [employer], DO        Today, Patient Was Seen By: Matilda Rosa    ** Please Note: This note has been constructed using a voice recognition system   **

## 2019-01-01 NOTE — PROGRESS NOTES
Progress Note - Infectious Disease   Manual Daniel 76 y o  female MRN: [id number]  Unit/Bed#:  -01 Encounter: [id number]      Impression/Plan:  1  Displaced femur fracture status post jony placement now with drainage and bone infection:  Patient was noted with a prior hematoma at this area   She reports that the overall lesion decreased in size and had small punctate lesion that seemed to be draining on initial evaluations that stopped subsequently   Drainage worsened last week and cultures were collected   CT imaging consistent with bone infection and drainage tracks straight to the fracture site  Wound cultures along with the all aspiration by Ortho are unrevealing  Plan is for washout in the OR with deep surgical cultures this week  She is otherwise afebrile remains hemodynamically stable   -continue to monitor off antibiotics  -continue to trend fever curve/vitals as above  -would repeat CBC and procalcitonin if there is a clinical change   -additional surgical intervention as per Orthopedics     2  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:  Patient is status post wound debridement of her right hip in the OR   Per Radiology, no signs of changes of osteomyelitis on the right hip  Reviewed OR findings with plastics though the bone was visualized on debridement there was no concern for bone being infected at this time  This area was treated largely as a skin and soft tissue infection with graft failure   -no further antibiotics for this issue    -continue to monitor fever curve/vitals  -continue to monitor site on exam       3  Leukocytosis:  Patient noted to have intermittent elevation in her white blood cell count after procedure on 12/17  Likely multifactorial with patient's multiple wounds and problem 1    -would repeat CBC and procalcitonin if there is a clinical change   -will continue to monitor exam   -additional care as per primary     4   Pressure ulcer of the sacrum and chronic osteomyelitis of the coccyx:   based on patient's imaging along with exam this is chronic osteomyelitis with a draining sinus   The site remains stable on subsequent evaluations  Plan for possible skin grafting in the future   -would discuss ostomy with patient to prevent further contamination of wound  -continue to monitor this site clinically for now  -continue local care as per plastics     5  Decubitus ulcer of left hip:  Left hip wound appears stable on subsequent evaluations   CT was without findings consistent for osteo at this site  Tyler Christianson is unclear timeline for closure for this wound   If closure is undertaken on this admission will need to evaluate OR findings to determine course of antibiotics for this issue   -continue local care as per plastics        6  Paraplegia:  Patient at baseline is wheelchair bound and provides for ongoing care at home   If she is ultimately planned for skin grafting would discuss possibility ostomy with the patient to promote further wound healing       Above plan discussed in detail with the primary and discussed with Ortho call center yesterday for patient to be setup for the OR this week as per my initial discussion with Dr Eric Dacosta       ID consult service will continue to follow      Antibiotics:  None    24 hr events:  No acute events noted overnight on chart review  Patient remains afebrile  No CBC this morning  No new culture data  No new images  Patient's other vitals are stable    Subjective:  Patient denies any nausea, vomiting, chest pain or shortness of breath  She is tolerating p o  Without issue  She denies any new symptoms involving her wounds or new drainage felt  She denies any issues with her PICC line      Objective:  Vitals:  Temp:  [96 8 °F (36 °C)-97 7 °F (36 5 °C)] 96 8 °F (36 °C)  HR:  [68-99] 68  Resp:  [16-20] 16  BP: ()/(61-63) 111/63  SpO2:  [95 %-96 %] 95 %  Temp (24hrs), Av 4 °F (36 3 °C), Min:96 8 °F (36 °C), Max:97 7 °F (36 5 °C)  Current: Temperature: (!) 96 8 °F (36 °C)    Physical Exam:   General Appearance:  Alert, interactive, nontoxic, no acute distress  Appears older than stated age  Throat: Oropharynx moist without lesions  Lungs:   Clear to auscultation bilaterally; no wheezes, rhonchi or rales; respirations unlabored   Heart:  RRR; no rub or gallop; 2/6 systolic murmur   Abdomen:   Soft, non-tender, non-distended, positive bowel sounds  Neuro: Patient is alert and oriented x3, no cranial nerve deficits, she has no sensation in his unable to move her lower extremities bilaterally at baseline  She has full strength in her upper extremities bilaterally  Extremities: No clubbing, cyanosis or edema; lower extremities contracted in words  Skin: No new rashes or lesions  No new draining wounds noted  Patient's right hip graft site appears to be healing well and no drainage noted at sutures  The donor site remains with open wound and debris present but no smell or signs of cellulitis  Left hip wound is without any cellulitis and base is pink and healthy  Sacral wound also with healthy pink tissue at the base and no cellulitic changes  Left medial knee drainage tract with some seropurulent drainage but no local skin changes         Labs, Imaging, & Other studies:   All pertinent labs and imaging studies were personally reviewed    Results from last 7 days  Lab Units 12/31/18  0523 12/29/18  0439 12/28/18  0432   WBC Thousand/uL 10 80 13 50* 10 80   HEMOGLOBIN g/dL 10 5* 11 8* 11 8*   PLATELETS Thousands/uL 411 506* 485*       Results from last 7 days  Lab Units 12/31/18  0523 12/29/18  0439 12/28/18  0432   POTASSIUM mmol/L 3 8 3 7 3 9   CHLORIDE mmol/L 98 99 101   CO2 mmol/L 29 29 29   BUN mg/dL 18 21 21   CREATININE mg/dL 0 55* 0 52* 0 59*   EGFR ml/min/1 73sq m 97 98 94   CALCIUM mg/dL 8 4 8 8 8 8   AST U/L 19 25 22   ALT U/L 25 24 26   ALK PHOS U/L 80 88 87       Results from last 7 days  Lab Units 12/27/18  1118 GRAM STAIN RESULT  No Polys or Bacteria seen  Rare Polys  No bacteria seen   WOUND CULTURE  No growth  Few Colonies of Diphtheroids

## 2019-01-02 ENCOUNTER — TELEPHONE (OUTPATIENT)
Dept: OBGYN CLINIC | Facility: HOSPITAL | Age: 69
End: 2019-01-02

## 2019-01-02 LAB
ANION GAP SERPL CALCULATED.3IONS-SCNC: 6 MMOL/L (ref 5–14)
BASOPHILS # BLD AUTO: 0.1 THOUSANDS/ΜL (ref 0–0.1)
BASOPHILS NFR BLD AUTO: 1 % (ref 0–1)
BUN SERPL-MCNC: 20 MG/DL (ref 5–25)
CALCIUM SERPL-MCNC: 8.7 MG/DL (ref 8.4–10.2)
CHLORIDE SERPL-SCNC: 99 MMOL/L (ref 97–108)
CO2 SERPL-SCNC: 29 MMOL/L (ref 22–30)
CREAT SERPL-MCNC: 0.54 MG/DL (ref 0.6–1.2)
EOSINOPHIL # BLD AUTO: 0.4 THOUSAND/ΜL (ref 0–0.4)
EOSINOPHIL NFR BLD AUTO: 5 % (ref 0–6)
ERYTHROCYTE [DISTWIDTH] IN BLOOD BY AUTOMATED COUNT: 21.8 %
GFR SERPL CREATININE-BSD FRML MDRD: 97 ML/MIN/1.73SQ M
GLUCOSE SERPL-MCNC: 129 MG/DL (ref 70–99)
HCT VFR BLD AUTO: 33.2 % (ref 36–46)
HGB BLD-MCNC: 10.5 G/DL (ref 12–16)
LYMPHOCYTES # BLD AUTO: 2.1 THOUSANDS/ΜL (ref 0.5–4)
LYMPHOCYTES NFR BLD AUTO: 27 % (ref 25–45)
MCH RBC QN AUTO: 26.6 PG (ref 26–34)
MCHC RBC AUTO-ENTMCNC: 31.5 G/DL (ref 31–36)
MCV RBC AUTO: 84 FL (ref 80–100)
MONOCYTES # BLD AUTO: 0.8 THOUSAND/ΜL (ref 0.2–0.9)
MONOCYTES NFR BLD AUTO: 10 % (ref 1–10)
NEUTROPHILS # BLD AUTO: 4.6 THOUSANDS/ΜL (ref 1.8–7.8)
NEUTS SEG NFR BLD AUTO: 58 % (ref 45–65)
PLATELET # BLD AUTO: 404 THOUSANDS/UL (ref 150–450)
PLATELET BLD QL SMEAR: ABNORMAL
PMV BLD AUTO: 8.7 FL (ref 8.9–12.7)
POTASSIUM SERPL-SCNC: 3.9 MMOL/L (ref 3.6–5)
RBC # BLD AUTO: 3.93 MILLION/UL (ref 4–5.2)
RBC MORPH BLD: NORMAL
SODIUM SERPL-SCNC: 134 MMOL/L (ref 137–147)
WBC # BLD AUTO: 7.9 THOUSAND/UL (ref 4.5–11)

## 2019-01-02 PROCEDURE — 80048 BASIC METABOLIC PNL TOTAL CA: CPT | Performed by: INTERNAL MEDICINE

## 2019-01-02 PROCEDURE — 85025 COMPLETE CBC W/AUTO DIFF WBC: CPT | Performed by: INTERNAL MEDICINE

## 2019-01-02 PROCEDURE — 99232 SBSQ HOSP IP/OBS MODERATE 35: CPT | Performed by: FAMILY MEDICINE

## 2019-01-02 PROCEDURE — 99233 SBSQ HOSP IP/OBS HIGH 50: CPT | Performed by: INTERNAL MEDICINE

## 2019-01-02 RX ORDER — AMOXICILLIN 250 MG
2 CAPSULE ORAL 2 TIMES DAILY
Status: DISCONTINUED | OUTPATIENT
Start: 2019-01-02 | End: 2019-01-14 | Stop reason: HOSPADM

## 2019-01-02 RX ADMIN — CYANOCOBALAMIN TAB 1000 MCG 500 MCG: 1000 TAB at 08:05

## 2019-01-02 RX ADMIN — ACETIC ACID: 250 IRRIGANT IRRIGATION at 15:08

## 2019-01-02 RX ADMIN — OYSTER SHELL CALCIUM WITH VITAMIN D 1 TABLET: 500; 200 TABLET, FILM COATED ORAL at 08:05

## 2019-01-02 RX ADMIN — VITAMIN D, TAB 1000IU (100/BT) 1000 UNITS: 25 TAB at 08:05

## 2019-01-02 RX ADMIN — NYSTATIN: 100000 POWDER TOPICAL at 08:36

## 2019-01-02 RX ADMIN — B-COMPLEX W/ C & FOLIC ACID TAB 1 TABLET: TAB at 08:05

## 2019-01-02 RX ADMIN — ENOXAPARIN SODIUM 40 MG: 40 INJECTION SUBCUTANEOUS at 08:05

## 2019-01-02 RX ADMIN — PANTOPRAZOLE SODIUM 40 MG: 40 TABLET, DELAYED RELEASE ORAL at 06:07

## 2019-01-02 RX ADMIN — NYSTATIN: 100000 POWDER TOPICAL at 17:19

## 2019-01-02 NOTE — PROGRESS NOTES
Progress Note - Infectious Disease   Trinidad Workman 76 y o  female MRN: [id number]  Unit/Bed#: [facility] 509-01 Encounter: [id number]      Impression/Recommendations:  1  Left femur ORIF infection  In setting of recent displaced femur fracture 10/2018 with localized hematoma that was not noted to communicate deep during initial ORIF procedure  Draining sinus with air at fracture site now noted on admission CT  Clinically stable without cellulitis or sepsis  Wound aspirate by Ortho nondiagnostic  Rec:  · Continue to follow closely off antibiotics  · Await operative debridment and bone sampling per Ortho  · Once OR cultures obtained can start on Daptomycin 6 gm/kg IV Q24  · Will likely need 6 weeks of IV antibiotics followed by suppressive PO antibiotics until bone healed     2  Right hip pressure ulcer with flap necrosis   Status post debridement and flap readvancement   CT negative for osteomyelitis  Bone visualized intraoperatively after debridment but not overtly soft or infected and was subsequently covered  OR wound cultures with Staph pseudointermedius/B  Fragilis  OR bone cultures with 3 colonies MRSA  Status post 7 days daptomycin/Flagyl for soft tissue infection  Rec:  · LWC and offloading per Plastics  · Prolonged antibiotic course for # 1 should treat any residual osteomyelitis     3  Left hip pressure ulcer  No evidence of acute infection  Status post local debridement without purulence noted intraoperatively  No exposed bone  Rec:  · Continue LWC and offloading per Plastics  · Probably definitive management in 4-6 weeks once right hip flapped healed    4  Sacral pressure ulcer with probably chronic coccygeal osteomyelitis  Chronic draining sinus with destructive changes seen on admission CT  No evidence for cellulitis or sepsis  Rec:  · Continue LWC and offloading per Plastics    5   Paraplegia      Discussed in detail with Dr Barbara Pulido and   Donnell  Antibiotics:  None    Subjective:  Patient seen on AM rounds  Denies fevers, chills, sweats, nausea, vomiting, or diarrhea  24 Hour Events:  No documented fevers, chills, sweats, nausea, vomiting, or diarrhea  Objective:  Vitals:  Temp:  [97 9 °F (36 6 °C)-98 6 °F (37 °C)] 97 9 °F (36 6 °C)  HR:  [86-91] 88  Resp:  [18] 18  BP: (102-110)/(56-65) 109/65  SpO2:  [95 %-97 %] 97 %  Temp (24hrs), Av 3 °F (36 8 °C), Min:97 9 °F (36 6 °C), Max:98 6 °F (37 °C)  Current: Temperature: 97 9 °F (36 6 °C)    Physical Exam:   General:  No acute distress  Eyes:  Normal lids and conjunctivae  ENT:  Normal external ears and nose  Neck:  Neck symmetric with midline trachea  Pulmonary:  Normal respiratory effort without accessory muscle use  Cardiovascular:  Regular rate and rhythm; no peripheral edema  Gastrointestinal:  No tenderness or distention  Musculoskeletal:  No digital clubbing or cyanosis  Skin:  Left hip ulcer beefy red without purulence, necrosis, or cellulitis  Neurologic:  Sensation grossly intact to light touch  Psychiatric:  Alert and oriented; Normal mood    Lab Results:  I have personally reviewed pertinent labs      Results from last 7 days  Lab Units 19  0510 18  0523 18  0439 18  0432   POTASSIUM mmol/L 3 9 3 8 3 7 3 9   CHLORIDE mmol/L 99 98 99 101   CO2 mmol/L    BUN mg/dL 20 18 21 21   CREATININE mg/dL 0 54* 0 55* 0 52* 0 59*   EGFR ml/min/1 73sq m 97 97 98 94   CALCIUM mg/dL 8 7 8 4 8 8 8 8   AST U/L  --  19 25 22   ALT U/L  --  25 24 26   ALK PHOS U/L  --  80 88 87       Results from last 7 days  Lab Units 19  0508 18  0523 18  0439   WBC Thousand/uL 7 90 10 80 13 50*   HEMOGLOBIN g/dL 10 5* 10 5* 11 8*   PLATELETS Thousands/uL 404 411 506*       Results from last 7 days  Lab Units 18  1118   GRAM STAIN RESULT  No Polys or Bacteria seen  Rare Polys  No bacteria seen   WOUND CULTURE  No growth  Few Colonies of Diphtheroids Imaging Studies:   I have personally reviewed pertinent imaging study reports and images in PACS  CT left femur sinus tract from distal thigh leading to fracture site where there are several small flecks of air    EKG, Pathology, and Other Studies:   I have personally reviewed pertinent reports

## 2019-01-02 NOTE — TELEPHONE ENCOUNTER
Dr Emilia Molina is calling to speak to you regarding this patient  Her assistant is calling on her behalf and did not know what it was regarding specifically       Otilia gerard

## 2019-01-02 NOTE — PROGRESS NOTES
Progress Note - Mary Stapleton 1950, 76 y o  female MRN: [id number]    Unit/Bed#: 5T -01 Encounter: [id number]    Primary Care Provider: Malena Rocha   Date and time admitted to hospital: 12/13/2018  9:04 PM        * Decubitus skin ulcer   Assessment & Plan    Multiple decubitus skin ulcers including bilateral buttocks and sacral region  There stage 3-4  Unfortunately healing is be impeded by patient bed ridden status and incontinence  Being followed closely by orthopedics, plastic surgery, and ID; finished course of antibiotics most recently metronidazole and daptomycin (last dose 12/26/2018) -- holding further antibiotics    Status post flap by plastic Dr Brant Suarez  Unfortunately now has sinus tract to left distal femur at site of previous IM jony -- orthopedics following  Unlikely will have her jony removed but may require a washout of the area  To be revaluated by Orthopedics today     BMI less than 19,adult   Assessment & Plan    Body mass index is 20 43 kg/m²  Continue Ensure supplement  Patient has generalized muscle wasting secondary to being paraplegic     Hyperglycemia   Assessment & Plan    Hyperglycemia with borderline diabetes based on A1c  Results from last 7 days  Lab Units 12/28/18  0432   HEMOGLOBIN A1C % 6 4*    Blood sugars well controlled     Thrombocytosis (HCC)   Assessment & Plan    Thrombocytosis secondary to acute/chronic infection     Moderate protein-calorie malnutrition (HCC)   Assessment & Plan    Malnutrition Findings:   Malnutrition type: Chronic illness  Degree of Malnutrition: Malnutrition of moderate degree  BMI Findings: Body mass index is 20 43 kg/m²     Continue nutritional supplements     GERD (gastroesophageal reflux disease)   Assessment & Plan    Continue pantoprazole     Hyponatremia   Assessment & Plan    · Mild hyponatremia  · Improving  · Follow the sodium level     Anemia of chronic disease   Assessment & Plan    Anemia of chronic disease with iron deficiency; status post venofer and 2 units PRBC  Stool occult was positive but patient declined GI evaluation  No need for transfusion unless hemoglobin drops to less than 7 grams/deciliter  Hemoglobin today stable at 10 5     Constipation   Assessment & Plan    Complaining of constipation today  Will place her on enemas and increased dose of senna S       Chronic osteomyelitis of coccyx (HCC)   Assessment & Plan    Chronic osteomyelitis of coccyx  Treatment as per above  Closed fracture of lower end of left femur with routine healing   Assessment & Plan    Patient currently has a sinus tract to the area and she has of jony placed to stabilize the fracture site  Paraplegia following spinal cord injury [facility])   Assessment & Plan    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; continue frequent turning and specialty mattress  No changes in neurologic status   VTE Pharmacologic Prophylaxis:   Pharmacologic: Enoxaparin (Lovenox)  Mechanical VTE Prophylaxis in Place: Yes    Patient Centered Rounds: I have performed bedside rounds with nursing staff today  Discussions with Specialists or Other Care Team Provider: none  Will try to contact Orthopedics    Education and Discussions with Family / Patient: none    Time Spent for Care: 30 minutes  More than 50% of total time spent on counseling and coordination of care as described above  Current Length of Stay: 20 day(s)    Current Patient Status: Inpatient   Certification Statement: The patient will continue to require additional inpatient hospital stay due to Wound infection    Discharge Plan: unclear at this time    Code Status: Level 1 - Full Code      Subjective:   Patient denies any chest pain or shortness of breath  She complains of being constipated    She has no feeling below her waist and hence has no pain complaints    Objective:     Vitals:   Temp (24hrs), Av 3 °F (36 8 °C), Min:97 9 °F (36 6 °C), Max:98 6 °F (37 °C)    Temp:  [97 9 °F (36 6 °C)-98 6 °F (37 °C)] 97 9 °F (36 6 °C)  HR:  [86-91] 88  Resp:  [18] 18  BP: (102-110)/(56-65) 109/65  SpO2:  [95 %-97 %] 97 %  Body mass index is 20 43 kg/m²  Input and Output Summary (last 24 hours): Intake/Output Summary (Last 24 hours) at 01/02/19 1036  Last data filed at 01/02/19 0848   Gross per 24 hour   Intake              480 ml   Output              625 ml   Net             -145 ml       Physical Exam:     Physical Exam   Constitutional: She is oriented to person, place, and time  She appears well-developed and well-nourished  HENT:   Head: Normocephalic and atraumatic  Right Ear: External ear normal    Left Ear: External ear normal    Mouth/Throat: Oropharynx is clear and moist    Eyes: Pupils are equal, round, and reactive to light  Conjunctivae and EOM are normal    Neck: Normal range of motion  Neck supple  Cardiovascular: Normal rate, regular rhythm, normal heart sounds and intact distal pulses  Pulmonary/Chest: Effort normal and breath sounds normal    Abdominal: Soft  Bowel sounds are normal  She exhibits no mass  There is no tenderness  There is no rebound and no guarding  Genitourinary:   Genitourinary Comments: deferred   Musculoskeletal: She exhibits no edema or tenderness  Large buttock and sacral wounds   Neurological: She is alert and oriented to person, place, and time  Skin: Skin is warm and dry  No rash noted  Psychiatric: She has a normal mood and affect  Nursing note and vitals reviewed          Additional Data:     Labs:      Results from last 7 days  Lab Units 01/02/19  0508   WBC Thousand/uL 7 90   HEMOGLOBIN g/dL 10 5*   HEMATOCRIT % 33 2*   PLATELETS Thousands/uL 404   NEUTROS PCT % 58   LYMPHS PCT % 27   MONOS PCT % 10   EOS PCT % 5       Results from last 7 days  Lab Units 01/02/19  0510 12/31/18  0523   SODIUM mmol/L 134* 132*   POTASSIUM mmol/L 3 9 3 8   CHLORIDE mmol/L 99 98   CO2 mmol/L 29 29   BUN mg/dL 20 18 CREATININE mg/dL 0 54* 0 55*   ANION GAP mmol/L 6 5   CALCIUM mg/dL 8 7 8 4   ALBUMIN g/dL  --  3 0   TOTAL BILIRUBIN mg/dL  --  0 30   ALK PHOS U/L  --  80   ALT U/L  --  25   AST U/L  --  19   GLUCOSE RANDOM mg/dL 129* 141*               Results from last 7 days  Lab Units 12/28/18  0432   HEMOGLOBIN A1C % 6 4*       Results from last 7 days  Lab Units 12/29/18  0439 12/28/18  0432   LACTIC ACID mmol/L  --  1 0   PROCALCITONIN ng/ml 0 08 0 07           * I Have Reviewed All Lab Data Listed Above  * Additional Pertinent Lab Tests Reviewed: [place] [address] Admission Reviewed    Imaging:    Imaging Reports Reviewed Today Include: none  Imaging Personally Reviewed by Myself Includes:  none    Recent Cultures (last 7 days):       Results from last 7 days  Lab Units 12/27/18  1118   GRAM STAIN RESULT  No Polys or Bacteria seen  Rare Polys  No bacteria seen   WOUND CULTURE  No growth  Few Colonies of Diphtheroids       Last 24 Hours Medication List:     Current Facility-Administered Medications:  acetaminophen 650 mg Oral Q6H PRN Shirley Bal, DO   acetic acid  Topical Daily Estefani Arriaga MD   aluminum-magnesium hydroxide-simethicone 30 mL Oral Q4H PRN CONCEPCION Jackson   calcium carbonate-vitamin D 1 tablet Oral Daily With Breakfast Shirley Kee, DO   cholecalciferol 1,000 Units Oral Daily Ginette Lenz MD   vitamin B-12 500 mcg Oral Daily Shirley Chilango, DO   diphenhydrAMINE 25 mg Oral Q6H PRN Ginette Lenz MD   enoxaparin 40 mg Subcutaneous Q24H [facility] Deshaun Chandler, DO   multivitamin stress formula 1 tablet Oral Daily Deshaun Chandler, DO   nystatin  Topical BID Mj Howard DO   ondansetron 4 mg Intravenous Q6H PRN Ginette Lenz MD   oxyCODONE 5 mg Oral Q6H PRN Ginette Schumacher MD   pantoprazole 40 mg Oral Early Morning CONCEPCION Hernandez   senna-docusate sodium 2 tablet Oral BID Donte Martinez MD        Today, Patient Was Seen By: Donte Martinez MD    ** Please Note: Dictation voice to text software may have been used in the creation of this document   **

## 2019-01-02 NOTE — ASSESSMENT & PLAN NOTE
Multiple decubitus skin ulcers including bilateral buttocks and sacral region  There stage 3-4  Unfortunately healing is be impeded by patient bed ridden status and incontinence  Being followed closely by orthopedics, plastic surgery, and ID; finished course of antibiotics most recently metronidazole and daptomycin (last dose 12/26/2018) -- holding further antibiotics    Status post flap by plastic Dr Ruthie Dinero  Unfortunately now has sinus tract to left distal femur at site of previous IM jony -- orthopedics following  Unlikely will have her jony removed but may require a washout of the area    To be revaluated by Orthopedics today

## 2019-01-02 NOTE — QUICK NOTE
Apparently the superficial wound culture was not definitive enough to determine what antibiotic needs to be given to this patient long-term  For that reason, as we had discussed previously with the patient and the infectious disease consultant, I will do an I and D of the sinus tract down to the bone tomorrow in the operating room here  I will send wound cultures both aerobic and anaerobic, muscle, and bone cultures as indicated  I then intended thoroughly wash out the infected area with saline solution and either primarily close or close the wound over a wound VAC to promote any further drainage    This will not cure any underlying bone infection but will help in this patient's long-term treatment

## 2019-01-02 NOTE — PROGRESS NOTES
All dressings were changed on both hips  The flaps were doing fine on the right hip and the skin graft on the donor site is now may be 75% healed  The left hip has open wound with minor debris that was cleaned up at this time  She is actually healing at opening more posterior to this as well as with the left ischial flap was rotated  The left ischium wound is still open with a few cm diameter opening  All was re-dressed  She is going to be having a procedure on her left femur tomorrow and that will determine what the next course of action will be

## 2019-01-02 NOTE — ASSESSMENT & PLAN NOTE
Malnutrition Findings:   Malnutrition type: Chronic illness  Degree of Malnutrition: Malnutrition of moderate degree  BMI Findings: Body mass index is 20 43 kg/m²     Continue nutritional supplements

## 2019-01-02 NOTE — ASSESSMENT & PLAN NOTE
Patient currently has a sinus tract to the area and she has of jony placed to stabilize the fracture site

## 2019-01-02 NOTE — ASSESSMENT & PLAN NOTE
Body mass index is 20 43 kg/m²    Continue Ensure supplement  Patient has generalized muscle wasting secondary to being paraplegic

## 2019-01-02 NOTE — UTILIZATION REVIEW
Continued Stay Review    Date: 1/2/19    Vital Signs: /65 (BP Location: Right arm)   Pulse 88   Temp 97 9 °F (36 6 °C) (Tympanic)   Resp 18   Ht 5' 5" (1 651 m)   Wt 55 7 kg (122 lb 12 7 oz)   LMP  (LMP Unknown)   SpO2 97%   BMI 20 43 kg/m²     Medications:   Scheduled Meds:   Current Facility-Administered Medications:  acetaminophen 650 mg Oral Q6H PRN   acetic acid  Topical Daily   aluminum-magnesium hydroxide-simethicone 30 mL Oral Q4H PRN   calcium carbonate-vitamin D 1 tablet Oral Daily With Breakfast   cholecalciferol 1,000 Units Oral Daily   vitamin B-12 500 mcg Oral Daily   diphenhydrAMINE 25 mg Oral Q6H PRN   enoxaparin 40 mg Subcutaneous Q24H JESSEE   multivitamin stress formula 1 tablet Oral Daily   nystatin  Topical BID   ondansetron 4 mg Intravenous Q6H PRN   oxyCODONE 5 mg Oral Q6H PRN   pantoprazole 40 mg Oral Early Morning   senna-docusate sodium 2 tablet Oral BID       Abnormal Labs/Diagnostic Results:     Sodium = 134, Glucose = 129, H&H = 10 5/33 2    Age/Sex: 76 y o  female     Assessment/Plan:     * Decubitus skin ulcer   Assessment & Plan     Multiple decubitus skin ulcers including bilateral buttocks and sacral region  There stage 3-4  Unfortunately healing is be impeded by patient bed ridden status and incontinence  Being followed closely by orthopedics, plastic surgery, and ID; finished course of antibiotics most recently metronidazole and daptomycin (last dose 12/26/2018) -- holding further antibiotics       BMI less than 19,adult   Assessment & Plan     Body mass index is 20 43 kg/m²  Continue Ensure supplement  Patient has generalized muscle wasting secondary to being paraplegic      Hyponatremia   Assessment & Plan     · Mild hyponatremia  · Improving  · Follow the sodium level        1/2/19 Infectious Disease Progress Note:    Impression/Recommendations:    1  Left femur ORIF infection    In setting of recent displaced femur fracture 10/2018 with localized hematoma that was not noted to communicate deep during initial ORIF procedure  Draining sinus with air at fracture site now noted on admission CT  Wound aspirate by Ortho nondiagnostic  Rec:  ? Continue to follow closely off antibiotics  ? Await operative debridment and bone sampling per Ortho  ? Once OR cultures obtained can start on Daptomycin 6 gm/kg IV Q24  ? Will likely need 6 weeks of IV antibiotics followed by suppressive PO antibiotics until bone healed    2  Right hip pressure ulcer with flap necrosis   Status post debridement and flap readvancement   CT negative for osteomyelitis  Bone visualized intraoperatively after debridment but not overtly soft or infected and was subsequently covered  OR wound cultures with Staph pseudointermedius/B  Fragilis  OR bone cultures with 3 colonies MRSA  Status post 7 days daptomycin/Flagyl for soft tissue infection  Rec:  ? [address] and offloading per Plastics  ? Prolonged antibiotic course for # 1 should treat any residual osteomyelitis     3  Left hip pressure ulcer  No evidence of acute infection  Status post local debridement without purulence noted intraoperatively  No exposed bone  Rec:  ? Continue LWC and offloading per Plastics  ? Probably definitive management in 4-6 weeks once right hip flapped healed     4  Sacral pressure ulcer with probably chronic coccygeal osteomyelitis  Chronic draining sinus with destructive changes seen on admission CT  No evidence for cellulitis or sepsis  Rec:  ? Continue LWC and offloading per Plastics  =============================================================    1/2/19 Plastic Surgery Progress Note:    All dressings were changed on both hips  The flaps were doing fine on the right hip and the skin graft on the donor site is now may be 75% healed  The left hip has open wound with minor debris that was cleaned up at this time    She is actually healing at opening more posterior to this as well as with the left ischial flap was rotated  The left ischium wound is still open with a few cm diameter opening  All was re-dressed  She is going to be having a procedure on her left femur tomorrow and that will determine what the next course of action will be      Discharge Plan: [facility]

## 2019-01-02 NOTE — ASSESSMENT & PLAN NOTE
Anemia of chronic disease with iron deficiency; status post venofer and 2 units PRBC  Stool occult was positive but patient declined GI evaluation  No need for transfusion unless hemoglobin drops to less than 7 grams/deciliter    Hemoglobin today stable at 10 5

## 2019-01-02 NOTE — SOCIAL WORK
GORDON was informed by Dr Kristen Hester this afternoon that Ortho intends to take this pt to the OR on 1/3/19 to address the problem with her old hip replacement  When asked, Dr Chichi Martinez will largely depend on what happens with this surgical intervention  Apparently he is thinking of harvesting the pt's leg to utilize the healthy skin to cover her sacral and hip wounds  He reports he addressed the possibility with the pt today  GORDON continues to follow pt's progress

## 2019-01-02 NOTE — ASSESSMENT & PLAN NOTE
Hyperglycemia with borderline diabetes based on A1c      Results from last 7 days  Lab Units 12/28/18  0432   HEMOGLOBIN A1C % 6 4*    Blood sugars well controlled

## 2019-01-03 ENCOUNTER — ANESTHESIA (INPATIENT)
Dept: PERIOP | Facility: HOSPITAL | Age: 69
DRG: 573 | End: 2019-01-03
Payer: COMMERCIAL

## 2019-01-03 ENCOUNTER — ANESTHESIA EVENT (INPATIENT)
Dept: PERIOP | Facility: HOSPITAL | Age: 69
DRG: 573 | End: 2019-01-03
Payer: COMMERCIAL

## 2019-01-03 PROCEDURE — 87147 CULTURE TYPE IMMUNOLOGIC: CPT | Performed by: ORTHOPAEDIC SURGERY

## 2019-01-03 PROCEDURE — 87205 SMEAR GRAM STAIN: CPT | Performed by: ORTHOPAEDIC SURGERY

## 2019-01-03 PROCEDURE — 11044 DBRDMT BONE 1ST 20 SQ CM/<: CPT | Performed by: ORTHOPAEDIC SURGERY

## 2019-01-03 PROCEDURE — 87077 CULTURE AEROBIC IDENTIFY: CPT | Performed by: ORTHOPAEDIC SURGERY

## 2019-01-03 PROCEDURE — 11044 DBRDMT BONE 1ST 20 SQ CM/<: CPT | Performed by: PHYSICIAN ASSISTANT

## 2019-01-03 PROCEDURE — 87075 CULTR BACTERIA EXCEPT BLOOD: CPT | Performed by: ORTHOPAEDIC SURGERY

## 2019-01-03 PROCEDURE — 87176 TISSUE HOMOGENIZATION CULTR: CPT | Performed by: ORTHOPAEDIC SURGERY

## 2019-01-03 PROCEDURE — 99231 SBSQ HOSP IP/OBS SF/LOW 25: CPT | Performed by: FAMILY MEDICINE

## 2019-01-03 PROCEDURE — 87070 CULTURE OTHR SPECIMN AEROBIC: CPT | Performed by: ORTHOPAEDIC SURGERY

## 2019-01-03 PROCEDURE — 0J9M0ZZ DRAINAGE OF LEFT UPPER LEG SUBCUTANEOUS TISSUE AND FASCIA, OPEN APPROACH: ICD-10-PCS | Performed by: ORTHOPAEDIC SURGERY

## 2019-01-03 PROCEDURE — 10180 I&D COMPLEX PO WOUND INFCTJ: CPT | Performed by: PHYSICIAN ASSISTANT

## 2019-01-03 PROCEDURE — 10180 I&D COMPLEX PO WOUND INFCTJ: CPT | Performed by: ORTHOPAEDIC SURGERY

## 2019-01-03 PROCEDURE — 2W1PX6Z COMPRESSION OF LEFT UPPER LEG USING PRESSURE DRESSING: ICD-10-PCS | Performed by: ORTHOPAEDIC SURGERY

## 2019-01-03 RX ORDER — ROPIVACAINE HYDROCHLORIDE 2 MG/ML
INJECTION, SOLUTION EPIDURAL; INFILTRATION; PERINEURAL AS NEEDED
Status: DISCONTINUED | OUTPATIENT
Start: 2019-01-03 | End: 2019-01-03 | Stop reason: HOSPADM

## 2019-01-03 RX ORDER — ONDANSETRON 2 MG/ML
4 INJECTION INTRAMUSCULAR; INTRAVENOUS EVERY 6 HOURS PRN
Status: DISCONTINUED | OUTPATIENT
Start: 2019-01-03 | End: 2019-01-04

## 2019-01-03 RX ORDER — PROPOFOL 10 MG/ML
INJECTION, EMULSION INTRAVENOUS AS NEEDED
Status: DISCONTINUED | OUTPATIENT
Start: 2019-01-03 | End: 2019-01-03 | Stop reason: SURG

## 2019-01-03 RX ORDER — HYDROMORPHONE HCL/PF 1 MG/ML
0.5 SYRINGE (ML) INJECTION
Status: DISCONTINUED | OUTPATIENT
Start: 2019-01-03 | End: 2019-01-03 | Stop reason: HOSPADM

## 2019-01-03 RX ORDER — MAGNESIUM HYDROXIDE 1200 MG/15ML
LIQUID ORAL AS NEEDED
Status: DISCONTINUED | OUTPATIENT
Start: 2019-01-03 | End: 2019-01-03 | Stop reason: HOSPADM

## 2019-01-03 RX ORDER — SODIUM CHLORIDE, SODIUM LACTATE, POTASSIUM CHLORIDE, CALCIUM CHLORIDE 600; 310; 30; 20 MG/100ML; MG/100ML; MG/100ML; MG/100ML
INJECTION, SOLUTION INTRAVENOUS CONTINUOUS PRN
Status: DISCONTINUED | OUTPATIENT
Start: 2019-01-03 | End: 2019-01-03 | Stop reason: SURG

## 2019-01-03 RX ORDER — DIPHENHYDRAMINE HYDROCHLORIDE 50 MG/ML
12.5 INJECTION INTRAMUSCULAR; INTRAVENOUS ONCE AS NEEDED
Status: DISCONTINUED | OUTPATIENT
Start: 2019-01-03 | End: 2019-01-03 | Stop reason: HOSPADM

## 2019-01-03 RX ORDER — FENTANYL CITRATE/PF 50 MCG/ML
25 SYRINGE (ML) INJECTION
Status: DISCONTINUED | OUTPATIENT
Start: 2019-01-03 | End: 2019-01-03 | Stop reason: HOSPADM

## 2019-01-03 RX ORDER — SODIUM CHLORIDE 9 MG/ML
125 INJECTION, SOLUTION INTRAVENOUS CONTINUOUS
Status: DISCONTINUED | OUTPATIENT
Start: 2019-01-03 | End: 2019-01-04

## 2019-01-03 RX ORDER — MIDAZOLAM HYDROCHLORIDE 1 MG/ML
INJECTION INTRAMUSCULAR; INTRAVENOUS AS NEEDED
Status: DISCONTINUED | OUTPATIENT
Start: 2019-01-03 | End: 2019-01-03 | Stop reason: SURG

## 2019-01-03 RX ADMIN — OYSTER SHELL CALCIUM WITH VITAMIN D 1 TABLET: 500; 200 TABLET, FILM COATED ORAL at 16:39

## 2019-01-03 RX ADMIN — MIDAZOLAM HYDROCHLORIDE 1 MG: 1 INJECTION, SOLUTION INTRAMUSCULAR; INTRAVENOUS at 14:08

## 2019-01-03 RX ADMIN — ENOXAPARIN SODIUM 40 MG: 40 INJECTION SUBCUTANEOUS at 16:05

## 2019-01-03 RX ADMIN — ACETIC ACID 1000 ML: 250 IRRIGANT IRRIGATION at 10:22

## 2019-01-03 RX ADMIN — SODIUM CHLORIDE, SODIUM LACTATE, POTASSIUM CHLORIDE, AND CALCIUM CHLORIDE: .6; .31; .03; .02 INJECTION, SOLUTION INTRAVENOUS at 13:46

## 2019-01-03 RX ADMIN — PROPOFOL 20 MG: 10 INJECTION, EMULSION INTRAVENOUS at 14:26

## 2019-01-03 RX ADMIN — MIDAZOLAM HYDROCHLORIDE 1 MG: 1 INJECTION, SOLUTION INTRAMUSCULAR; INTRAVENOUS at 14:04

## 2019-01-03 RX ADMIN — PROPOFOL 20 MG: 10 INJECTION, EMULSION INTRAVENOUS at 14:12

## 2019-01-03 RX ADMIN — CYANOCOBALAMIN TAB 1000 MCG 500 MCG: 1000 TAB at 16:03

## 2019-01-03 RX ADMIN — SODIUM CHLORIDE 125 ML/HR: 9 INJECTION, SOLUTION INTRAVENOUS at 16:02

## 2019-01-03 RX ADMIN — NYSTATIN: 100000 POWDER TOPICAL at 10:23

## 2019-01-03 RX ADMIN — NYSTATIN: 100000 POWDER TOPICAL at 18:09

## 2019-01-03 RX ADMIN — VITAMIN D, TAB 1000IU (100/BT) 1000 UNITS: 25 TAB at 16:03

## 2019-01-03 RX ADMIN — DAPTOMYCIN 300 MG: 500 INJECTION, POWDER, LYOPHILIZED, FOR SOLUTION INTRAVENOUS at 16:59

## 2019-01-03 RX ADMIN — PANTOPRAZOLE SODIUM 40 MG: 40 TABLET, DELAYED RELEASE ORAL at 16:04

## 2019-01-03 RX ADMIN — B-COMPLEX W/ C & FOLIC ACID TAB 1 TABLET: TAB at 16:04

## 2019-01-03 NOTE — ASSESSMENT & PLAN NOTE
Malnutrition Findings:   Malnutrition type: Chronic illness  Degree of Malnutrition: Malnutrition of moderate degree  BMI Findings: Body mass index is 18 05 kg/m²     Continue nutritional supplements

## 2019-01-03 NOTE — OP NOTE
OPERATIVE REPORT  PATIENT NAME: Silver Parish    :  1950  MRN: [id number]  Pt Location:  OR ROOM 12    SURGERY DATE: 1/3/2019    Surgeon(s) and Role:     * Gonzalez Diane MD - Primary     * Vicky Ohara PA-C - Assisting    Preop Diagnosis:  Complicated wound infection [T14  8XXA, L08 9]  Closed fracture of distal end of left femur with routine healing, unspecified fracture morphology, subsequent encounter [S72 402D]    Post-Op Diagnosis Codes:     * Complicated wound infection [T14  8XXA, L08 9]     * Closed fracture of distal end of left femur with routine healing, unspecified fracture morphology, subsequent encounter [S72 402D]    Procedure(s) (LRB):  INCISION AND DRAINAGE (I&D) left distal femur  With deep wound cultures  Application of VAC DRAIN SPONGE (Left)    Specimen(s):  ID Type Source Tests Collected by Time Destination   A : Left thigh  Wound Wound ANAEROBIC CULTURE AND GRAM STAIN Gonzalez Diane MD 1/3/2019 1426    B : Necrotic Fat Left Thigh Tissue Soft Tissue, Other CULTURE, TISSUE AND GRAM STAIN Gonzalez Diane MD 1/3/2019 1428    C : Bone Biopsy Culture Left Thigh Tissue Bone CULTURE, TISSUE AND GRAM STAIN Gonzalez Diane MD 1/3/2019 1429        Estimated Blood Loss:   Minimal    Drains:  Negative pressure wound therapy ( VAC ) left distal femur  Closed/Suction Drain Right Hip Bulb 10 Fr  (Active)   Dressing Status Intact 2018  8:01 PM   Drainage Appearance Serosanguineous 2018  8:01 PM   Status Suction-low continuous 2018  8:01 PM   Intake (mL) 0 mL 2018 12:53 PM   Output (mL) 0 mL 2019  6:01 PM   Number of days: 17       Closed/Suction Drain Right Hip Bulb 10 Fr   (Active)   Dressing Status Intact 2018  8:01 PM   Drainage Appearance Serosanguineous 2018  8:01 PM   Status Suction-low continuous 2018  8:01 PM   Intake (mL) 45 mL 2018 12:53 PM   Output (mL) 200 mL 2018  3:00 PM   Number of days: 17       Negative Pressure Wound Therapy (V A C ) Other (Comment) Left (Active)   Number of days: 0       Urethral Catheter Non-latex 16 Fr  (Active)   Amt returned on insertion(mL) 80 mL 12/29/2018  8:01 PM   Site Assessment Clean 1/3/2019 12:00 PM   Collection Container Standard drainage bag 12/29/2018  8:01 PM   Securement Method Securing device (Describe) 1/3/2019 12:00 PM   Output (mL) 350 mL 1/3/2019  6:00 AM   Number of days: 5       [REMOVED] Urethral Catheter Latex 14 Fr  (Removed)   Number of days: 53       [REMOVED] Urethral Catheter (Removed)   Output (mL) 400 mL 12/16/2018  6:00 AM   Number of days: 3       [REMOVED] Urethral Catheter 16 Fr  (Removed)   Site Assessment Clean;Skin intact 12/16/2018  6:01 PM   Collection Container Standard drainage bag 12/16/2018  6:01 PM   Securement Method Securing device (Describe) 12/16/2018  6:01 PM   Output (mL) 100 mL 12/17/2018  6:45 AM   Number of days: 0       [REMOVED] Urethral Catheter Non-latex 14 Fr  (Removed)   Amt returned on insertion(mL) 100 mL 12/16/2018  8:30 PM   Site Assessment Clean;Skin intact 12/28/2018  2:01 PM   Collection Container Standard drainage bag 12/28/2018  2:01 PM   Securement Method Securing device (Describe) 12/22/2018  8:00 PM   Output (mL) 100 mL 12/29/2018  8:01 PM   Number of days: 13       Anesthesia Type:   Choice    Operative Indications:  Complicated wound infection [T14  8XXA, L08 9]  Closed fracture of distal end of left femur with routine healing, unspecified fracture morphology, subsequent encounter [B62 318Q]  Persistent sinus tract drainage left distal femur above site of distal femur fracture    Operative Findings:  Pus and fluid and necrotic fat in sinus tract area  No obvious bone destruction by direct vision at fracture site    Complications:   None    Procedure and Technique:  After satisfactory IV sedation was induced, the patient was carefully placed on the operating room table in the semi left lateral position with the left leg down    The the paralyzed right leg was abducted slightly for better visualization  The sinus tract was draining some fluid preoperatively and this area was scrubbed with ChloraPrep and draped in a sterile manner  Then the area around the sinus tract and the tract itself was injected with 8 cc 0 2% Naropin for supplemental sedation  Then a 3 5 cm incision over the sinus tract was made and deepened through skin and subcutaneous tissue  Egress of fluid came out immediately and aerobic and anaerobic cultures were taken  Necrotic fat was also sent to the lab for analysis  Further necrotic fat in this area was also removed down to the bone and the fracture site  The comminuted fragments could be palpated and a small portion bone was sent to the lab  A curette was used to curette down into the fracture site itself and small bone particles were also sent  There was no gross pus in the bone itself  The wound was then copiously irrigated with 6 L of saline using gravity irrigation to fully irrigate and clean out the area  Then a black VAC sponge was placed in depths of the wound with a larger black sponge on the skin to encompass the circumference of the suction catheter  The sponges were sealed to the skin with the adhesive dressing and then a small hole in the sponge was cut  The circular disc and suction drainage was then attached, also sealed and hooked up to wound VAC negative pressure  The sponge collapsed as per technique with no leak and this was deemed appropriate    The procedure was then terminated and the patient was  transferred to the recovery room in satisfactory stable condition   A physician assistant was required during the procedure for retraction tissue handling,dissection and suturing    Patient Disposition:  PACU     SIGNATURE: Aniket Nath MD  DATE: January 3, 2019  TIME: 3:04 PM

## 2019-01-03 NOTE — PROGRESS NOTES
Progress Note - Jose Manuel Mendoza 1950, 76 y o  female MRN: [id number]    Unit/Bed#: 5T -01 Encounter: [id number]    Primary Care Provider: Marilee Coronel   Date and time admitted to hospital: 12/13/2018  9:04 PM        * Decubitus skin ulcer   Assessment & Plan    Multiple decubitus skin ulcers including bilateral buttocks and sacral region  There stage 3-4  Unfortunately healing is being impeded by patient bed ridden status and incontinence  Being followed closely by orthopedics, plastic surgery, and ID; finished course of antibiotics most recently metronidazole and daptomycin (last dose 12/26/2018) -- holding further antibiotics    Status post flap by plastic Dr Blaise King  Unfortunately now has sinus tract to left distal femur at site of previous IM jony -- orthopedics following  Unlikely will have her jony removed but may require a washout of the area  For surgery this afternoon with Orthopedics     Thrombocytosis ([facility] [address] )   Assessment & Plan    Thrombocytosis secondary to acute/chronic infection     Moderate protein-calorie malnutrition (HCC)   Assessment & Plan    Malnutrition Findings:   Malnutrition type: Chronic illness  Degree of Malnutrition: Malnutrition of moderate degree  BMI Findings: Body mass index is 18 05 kg/m²  Continue nutritional supplements     GERD (gastroesophageal reflux disease)   Assessment & Plan    Continue pantoprazole     Hyponatremia   Assessment & Plan    · Mild hyponatremia  · Improving  · Follow the sodium level     Anemia of chronic disease   Assessment & Plan    Anemia of chronic disease with iron deficiency; status post venofer and 2 units PRBC  Stool occult was positive but patient declined GI evaluation  No need for transfusion unless hemoglobin drops to less than 7 grams/deciliter    Hemoglobin today stable at 10 5     Constipation   Assessment & Plan    Constipation resolved       Chronic osteomyelitis of coccyx [facility])   Assessment & Plan    Chronic osteomyelitis of coccyx  Treatment as per above  Closed fracture of lower end of left femur with routine healing   Assessment & Plan    Patient currently has a sinus tract to the area and she has of jony placed to stabilize the fracture site  Paraplegia following spinal cord injury [facility])   Assessment & Plan    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; continue frequent turning and specialty mattress  No changes in neurologic status   VTE Pharmacologic Prophylaxis:   Pharmacologic: Enoxaparin (Lovenox)  Mechanical VTE Prophylaxis in Place: Yes    Patient Centered Rounds: I have performed bedside rounds with nursing staff today  Discussions with Specialists or Other Care Team Provider: none    Education and Discussions with Family / Patient:  Discussed with patient at bedside about to surgery today    Time Spent for Care: 20 minutes  More than 50% of total time spent on counseling and coordination of care as described above  Current Length of Stay: 21 day(s)    Current Patient Status: Inpatient   Certification Statement: The patient will continue to require additional inpatient hospital stay due to Infected wounds    Discharge Plan:  Unclear at this time    Code Status: Level 1 - Full Code      Subjective:   Patient denies any chest pain or shortness of breath  She denies any pain and she does not have any sensation below her waist    Objective:     Vitals:   Temp (24hrs), Av 1 °F (36 7 °C), Min:97 2 °F (36 2 °C), Max:99 °F (37 2 °C)    Temp:  [97 2 °F (36 2 °C)-99 °F (37 2 °C)] 99 °F (37 2 °C)  HR:  [74-91] 76  Resp:  [18] 18  BP: ()/(60-66) 102/66  SpO2:  [96 %-97 %] 97 %  Body mass index is 18 05 kg/m²  Input and Output Summary (last 24 hours):        Intake/Output Summary (Last 24 hours) at 19 1131  Last data filed at 19 0858   Gross per 24 hour   Intake              760 ml   Output             1000 ml   Net             -240 ml       Physical Exam: Physical Exam   Constitutional: She is oriented to person, place, and time  She appears well-developed  HENT:   Head: Normocephalic and atraumatic  Eyes: Conjunctivae and EOM are normal    Neck: Neck supple  Cardiovascular: Normal rate, regular rhythm and normal heart sounds  Pulmonary/Chest: Effort normal and breath sounds normal  No respiratory distress  She has no wheezes  She has no rales  Abdominal: Soft  Bowel sounds are normal  She exhibits no distension  There is no tenderness  Musculoskeletal:   Right hip status post skin graft  Sutures present and healing well  Left hip and sacral area has 2 small decubitus ulcers which appear to be healthy and healing   Neurological: She is alert and oriented to person, place, and time  Skin: Skin is warm and dry  Psychiatric: She has a normal mood and affect  Additional Data:     Labs:      Results from last 7 days  Lab Units 01/02/19  0508   WBC Thousand/uL 7 90   HEMOGLOBIN g/dL 10 5*   HEMATOCRIT % 33 2*   PLATELETS Thousands/uL 404   NEUTROS PCT % 58   LYMPHS PCT % 27   MONOS PCT % 10   EOS PCT % 5       Results from last 7 days  Lab Units 01/02/19  0510 12/31/18  0523   SODIUM mmol/L 134* 132*   POTASSIUM mmol/L 3 9 3 8   CHLORIDE mmol/L 99 98   CO2 mmol/L 29 29   BUN mg/dL 20 18   CREATININE mg/dL 0 54* 0 55*   ANION GAP mmol/L 6 5   CALCIUM mg/dL 8 7 8 4   ALBUMIN g/dL  --  3 0   TOTAL BILIRUBIN mg/dL  --  0 30   ALK PHOS U/L  --  80   ALT U/L  --  25   AST U/L  --  19   GLUCOSE RANDOM mg/dL 129* 141*               Results from last 7 days  Lab Units 12/28/18  0432   HEMOGLOBIN A1C % 6 4*       Results from last 7 days  Lab Units 12/29/18  0439 12/28/18  0432   LACTIC ACID mmol/L  --  1 0   PROCALCITONIN ng/ml 0 08 0 07           * I Have Reviewed All Lab Data Listed Above  * Additional Pertinent Lab Tests Reviewed:  All Labs For Current Hospital Admission Reviewed    Imaging:    Imaging Reports Reviewed Today Include: none  Imaging Personally Reviewed by Myself Includes:  none    Recent Cultures (last 7 days):           Last 24 Hours Medication List:     Current Facility-Administered Medications:  acetaminophen 650 mg Oral Q6H PRN Madiha Diss, DO   acetic acid  Topical Daily Krista Magdaleno MD   aluminum-magnesium hydroxide-simethicone 30 mL Oral Q4H PRN CONCEPCION Jackson   calcium carbonate-vitamin D 1 tablet Oral Daily With Breakfast Madiha Govea, DO   cholecalciferol 1,000 Units Oral Daily Ginette Lenz MD   vitamin B-12 500 mcg Oral Daily Madiha Govea, DO   diphenhydrAMINE 25 mg Oral Q6H PRN Ginette Lenz MD   enoxaparin 40 mg Subcutaneous Q24H [facility] & half-way Deshaun Chandler, DO   multivitamin stress formula 1 tablet Oral Daily Deshaun Chandler, DO   nystatin  Topical BID Mj Howard DO   ondansetron 4 mg Intravenous Q6H PRN Ginette Lenz MD   oxyCODONE 5 mg Oral Q6H PRN Ginette Bronson MD   pantoprazole 40 mg Oral Early Morning Jacalyn Baumgarten, CRNP   senna-docusate sodium 2 tablet Oral BID Chioma Phillips MD        Today, Patient Was Seen By: Chioma Phillips MD    ** Please Note: Dictation voice to text software may have been used in the creation of this document   **

## 2019-01-03 NOTE — ASSESSMENT & PLAN NOTE
Multiple decubitus skin ulcers including bilateral buttocks and sacral region  There stage 3-4  Unfortunately healing is being impeded by patient bed ridden status and incontinence  Being followed closely by orthopedics, plastic surgery, and ID; finished course of antibiotics most recently metronidazole and daptomycin (last dose 12/26/2018) -- holding further antibiotics    Status post flap by plastic Dr Radha Mancuso  Unfortunately now has sinus tract to left distal femur at site of previous IM jony -- orthopedics following  Unlikely will have her jony removed but may require a washout of the area    For surgery this afternoon with Orthopedics

## 2019-01-03 NOTE — ANESTHESIA POSTPROCEDURE EVALUATION
Post-Op Assessment Note      CV Status:  Stable    Mental Status:  Alert and awake    Hydration Status:  Euvolemic    PONV Controlled:  Controlled    Airway Patency:  Patent    Post Op Vitals Reviewed: Yes          Staff: CRNA           BP   101/59   Temp   97 5   Pulse  76   Resp   16   SpO2   97

## 2019-01-03 NOTE — PROGRESS NOTES
Today discussed was the patient the fascia of a colostomy so that she would not have stool constantly beating all her wounds  She did admit to chronic obstipation  This would make life ear for herself and for her caregivers, as well as make management of her wounds easier  I also discussed with her orthopedic surgeon that if there were problems with healing of her left femur fracture, the fracture site and distal femur could be removed as well as the bones of the leg in a filet fashion with the foot then being transferred to service soft tissue coverage of all the pressure sores on her left lower extremity  This this option should always be kept and in mind and was see how she heals all her wounds

## 2019-01-04 LAB — GLUCOSE SERPL-MCNC: 118 MG/DL (ref 70–99)

## 2019-01-04 PROCEDURE — 99232 SBSQ HOSP IP/OBS MODERATE 35: CPT | Performed by: FAMILY MEDICINE

## 2019-01-04 PROCEDURE — 82948 REAGENT STRIP/BLOOD GLUCOSE: CPT

## 2019-01-04 PROCEDURE — 99232 SBSQ HOSP IP/OBS MODERATE 35: CPT | Performed by: INTERNAL MEDICINE

## 2019-01-04 RX ADMIN — PANTOPRAZOLE SODIUM 40 MG: 40 TABLET, DELAYED RELEASE ORAL at 05:05

## 2019-01-04 RX ADMIN — ENOXAPARIN SODIUM 40 MG: 40 INJECTION SUBCUTANEOUS at 10:06

## 2019-01-04 RX ADMIN — SODIUM CHLORIDE 125 ML/HR: 9 INJECTION, SOLUTION INTRAVENOUS at 00:35

## 2019-01-04 RX ADMIN — CYANOCOBALAMIN TAB 1000 MCG 500 MCG: 1000 TAB at 10:06

## 2019-01-04 RX ADMIN — VITAMIN D, TAB 1000IU (100/BT) 1000 UNITS: 25 TAB at 10:06

## 2019-01-04 RX ADMIN — ACETIC ACID 1000 ML: 250 IRRIGANT IRRIGATION at 10:10

## 2019-01-04 RX ADMIN — OYSTER SHELL CALCIUM WITH VITAMIN D 1 TABLET: 500; 200 TABLET, FILM COATED ORAL at 10:05

## 2019-01-04 RX ADMIN — DAPTOMYCIN 300 MG: 500 INJECTION, POWDER, LYOPHILIZED, FOR SOLUTION INTRAVENOUS at 16:29

## 2019-01-04 RX ADMIN — B-COMPLEX W/ C & FOLIC ACID TAB 1 TABLET: TAB at 10:06

## 2019-01-04 NOTE — PROGRESS NOTES
Progress Note - Infectious Disease   Mayr Stapleton 76 y o  female MRN: [id number]  Unit/Bed#: 5T -01 Encounter: [id number]      Impression/Recommendations:  1  Left femur ORIF infection  In setting of recent displaced femur fracture 10/2018 with localized hematoma  Draining sinus with air at fracture site now noted on admission CT  Now status post I&D of purulent/necrotic sinus tract that communicated to bone  Clinically stable without sepsis  Rec:  ? Continue daptomycin for now  ? Continue LWC/VAC per ortho  ? Follow up OR cultures and tailor antibiotics as indicated  ? Will likely need 6 weeks of IV antibiotics followed by suppressive PO antibiotics until bone healed     2  Right hip pressure ulcer with flap necrosis   Status post debridement and flap readvancement   CT negative for osteomyelitis  Bone visualized intraoperatively after debridment but not overtly soft or infected and was subsequently covered  OR wound cultures with Staph pseudointermedius/B  Fragilis  OR bone cultures with 3 colonies MRSA  Status post 7 days daptomycin/Flagyl for soft tissue infection  Rec:  ? [address] and offloading per Plastics  ? Prolonged antibiotic course for # 1 should treat any potential residual osteomyelitis     3  Left hip pressure ulcer  No evidence of acute infection  Status post local debridement without purulence noted intraoperatively  No exposed bone  Rec:  ? Continue LWC and offloading per Plastics  ? Probable definitive management in 4-6 weeks once right hip flap healed     4  Sacral pressure ulcer with probably chronic coccygeal osteomyelitis  Chronic draining sinus with destructive changes seen on admission CT  No evidence for cellulitis or sepsis  Rec:  ? Continue LWC and offloading per Plastics     5  Paraplegia       Discussed in detail with Dr Danae Govea  I will reassess the patient on Monday 1/7    Please call in the interim with new questions      Antibiotics:  Daptomycin #2    Subjective:  Patient seen on AM rounds  24 Hour Events:  Went to the OR yesterday with Ortho  Pus and necrotic fat was encountered and the sinus tract  This was per greeted down to bone and the fracture site  A small portion of bone was sent for culture  A VAC was placed  No documented fevers, chills, sweats, nausea, vomiting, or diarrhea  Objective:  Vitals:  Temp:  [97 5 °F (36 4 °C)-98 6 °F (37 °C)] 98 °F (36 7 °C)  HR:  [72-86] 79  Resp:  [12-20] 16  BP: ()/(44-59) 103/56  SpO2:  [92 %-98 %] 95 %  Temp (24hrs), Av 8 °F (36 6 °C), Min:97 5 °F (36 4 °C), Max:98 6 °F (37 °C)  Current: Temperature: 98 °F (36 7 °C)    Physical Exam:   General:  No acute distress  Psychiatric:  Awake and alert  Pulmonary:  Normal respiratory excursion without accessory muscle use  Abdomen:  Soft, nontender  Extremities:  No edema, VAC to inner left thigh without surrounding cellulitis  Skin:  No rashes    Lab Results:  I have personally reviewed pertinent labs  Results from last 7 days  Lab Units 19  0510 18  0523 18  0439   POTASSIUM mmol/L 3 9 3 8 3 7   CHLORIDE mmol/L 99 98 99   CO2 mmol/L 29 29 29   BUN mg/dL 20 18 21   CREATININE mg/dL 0 54* 0 55* 0 52*   EGFR ml/min/1 73sq m 97 97 98   CALCIUM mg/dL 8 7 8 4 8 8   AST U/L  --  19 25   ALT U/L  --  25 24   ALK PHOS U/L  --  80 88       Results from last 7 days  Lab Units 19  0508 18  0523 18  0439   WBC Thousand/uL 7 90 10 80 13 50*   HEMOGLOBIN g/dL 10 5* 10 5* 11 8*   PLATELETS Thousands/uL 404 411 506*       Results from last 7 days  Lab Units 19  1517 19  1429 19  1428   GRAM STAIN RESULT  1+ Polys  No bacteria seen Rare Polys  2+ RBC's  No bacteria seen Rare Polys  2+ RBC's  No bacteria seen       Imaging Studies:   I have personally reviewed pertinent imaging study reports and images in PACS  EKG, Pathology, and Other Studies:   I have personally reviewed pertinent reports

## 2019-01-04 NOTE — ASSESSMENT & PLAN NOTE
Patient currently has a sinus tract to the area and she has of jony placed to stabilize the fracture site  Will require 6 weeks of IV antibiotics  Final antibiotic selection will be based on cultures and sensitivities of left thigh INCISION AND DRAINAGE (I&D) left distal femur  With deep wound cultures  Currently has a wound VAC placed on the site  Will consult wound Care for further management  Discussed with the patient the need to consider SNF placement for IV antibiotics    She is considering it at this time

## 2019-01-04 NOTE — ASSESSMENT & PLAN NOTE
Multiple decubitus skin ulcers including bilateral buttocks and sacral region  These are stage 3-4  Unfortunately healing is being impeded by patient bed ridden status and incontinence  Being followed closely by orthopedics, plastic surgery, and ID; finished course of antibiotics most recently metronidazole and daptomycin (last dose 12/26/2018) --     Status post flap by plastic Dr Hu Velez  Now back on daptomycin IV  Will require 6 weeks of IV antibiotics  Final antibiotic selection will be based on cultures and sensitivities of left thigh INCISION AND DRAINAGE (I&D) left distal femur  With deep wound cultures    Patient was encouraged to consider a diverting colostomy to help with healing of her sacral and left buttock decubitus ulcers    Patient has so far declined

## 2019-01-04 NOTE — ASSESSMENT & PLAN NOTE
Malnutrition Findings:   Malnutrition type: Chronic illness  Degree of Malnutrition: Malnutrition of moderate degree  BMI Findings: Body mass index is 19 81 kg/m²     Continue nutritional supplements

## 2019-01-04 NOTE — PROGRESS NOTES
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, stage 3 ([address] )   Assessment & Plan    Multiple decubitus skin ulcers including bilateral buttocks and sacral region  These are stage 3-4  Unfortunately healing is being impeded by patient bed ridden status and incontinence  Being followed closely by orthopedics, plastic surgery, and ID; finished course of antibiotics most recently metronidazole and daptomycin (last dose 12/26/2018) --     Status post flap by plastic Dr Bradford Elizabeth  Now back on daptomycin IV  Will require 6 weeks of IV antibiotics  Final antibiotic selection will be based on cultures and sensitivities of left thigh INCISION AND DRAINAGE (I&D) left distal femur  With deep wound cultures    Patient was encouraged to consider a diverting colostomy to help with healing of her sacral and left buttock decubitus ulcers  Patient has so far declined     Hyperglycemia   Assessment & Plan    Hyperglycemia with borderline diabetes based on A1c  Blood sugars well controlled     Thrombocytosis (HCC)   Assessment & Plan    Thrombocytosis secondary to acute/chronic infection     Moderate protein-calorie malnutrition (HCC)   Assessment & Plan    Malnutrition Findings:   Malnutrition type: Chronic illness  Degree of Malnutrition: Malnutrition of moderate degree  BMI Findings: Body mass index is 19 81 kg/m²  Continue nutritional supplements     GERD (gastroesophageal reflux disease)   Assessment & Plan    Continue pantoprazole     Hyponatremia   Assessment & Plan    · Mild hyponatremia  · Improving  · Follow the sodium level     Anemia of chronic disease   Assessment & Plan    Anemia of chronic disease with iron deficiency; status post venofer and 2 units PRBC  Stool occult was positive but patient declined GI evaluation  No need for transfusion unless hemoglobin drops to less than 7 grams/deciliter  Hemoglobin today stable at 10 5     Constipation   Assessment & Plan    Constipation resolved       Chronic osteomyelitis of coccyx [facility])   Assessment & Plan    Chronic osteomyelitis of coccyx  Treatment as per above  Closed fracture of lower end of left femur with routine healing   Assessment & Plan    Patient currently has a sinus tract to the area and she has of jony placed to stabilize the fracture site  Will require 6 weeks of IV antibiotics  Final antibiotic selection will be based on cultures and sensitivities of left thigh INCISION AND DRAINAGE (I&D) left distal femur  With deep wound cultures  Currently has a wound VAC placed on the site  Will consult wound Care for further management  Discussed with the patient the need to consider SNF placement for IV antibiotics  She is considering it at this time     Paraplegia following spinal cord injury [facility])   Assessment & Plan    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; continue frequent turning and specialty mattress  No changes in neurologic status   VTE Pharmacologic Prophylaxis:   Pharmacologic: Enoxaparin (Lovenox)  Mechanical VTE Prophylaxis in Place: Yes    Patient Centered Rounds: I have performed bedside rounds with nursing staff today  Discussions with Specialists or Other Care Team Provider:  Discussed with Orthopedics and Plastic surgery yesterday  Education and Discussions with Family / Patient:  Discussed with patient at bedside about hospital course and long-term plans and the need for SNF placement for IV antibiotics    Time Spent for Care: 30 minutes  More than 50% of total time spent on counseling and coordination of care as described above      Current Length of Stay: 22 day(s)    Current Patient Status: Inpatient   Certification Statement: The patient will continue to require additional inpatient hospital stay due to IV antibiotics    Discharge Plan: To SNF on Monday    Code Status: Level 1 - Full Code      Subjective:   Patient denies any chest pain or shortness of breath  She denies any pain and she is paraplegic and does not feel any pain below her waist     Objective:     Vitals:   Temp (24hrs), Av 8 °F (36 6 °C), Min:97 5 °F (36 4 °C), Max:98 6 °F (37 °C)    Temp:  [97 5 °F (36 4 °C)-98 6 °F (37 °C)] 98 °F (36 7 °C)  HR:  [72-86] 79  Resp:  [12-20] 16  BP: ()/(44-59) 103/56  SpO2:  [92 %-98 %] 95 %  Body mass index is 19 81 kg/m²  Input and Output Summary (last 24 hours): Intake/Output Summary (Last 24 hours) at 19 1010  Last data filed at 19 0600   Gross per 24 hour   Intake             1000 ml   Output             1155 ml   Net             -155 ml       Physical Exam:     Physical Exam   Constitutional: She is oriented to person, place, and time  She appears well-developed and well-nourished  HENT:   Head: Normocephalic and atraumatic  Right Ear: External ear normal    Left Ear: External ear normal    Mouth/Throat: Oropharynx is clear and moist    Eyes: Pupils are equal, round, and reactive to light  Conjunctivae and EOM are normal    Neck: Normal range of motion  Neck supple  Cardiovascular: Normal rate, regular rhythm and normal heart sounds  Pulmonary/Chest: Effort normal and breath sounds normal    Abdominal: Soft  Bowel sounds are normal  She exhibits no mass  There is no tenderness  There is no rebound and no guarding  Genitourinary:   Genitourinary Comments: deferred   Musculoskeletal:   Left hip and sacral wound with dressings  Right hip wound with skin graft on  Left lower thigh with wound VAC on   Neurological: She is alert and oriented to person, place, and time  Skin: Skin is warm and dry  No rash noted  Psychiatric: She has a normal mood and affect  Nursing note and vitals reviewed          Additional Data: Labs:      Results from last 7 days  Lab Units 01/02/19  0508   WBC Thousand/uL 7 90   HEMOGLOBIN g/dL 10 5*   HEMATOCRIT % 33 2*   PLATELETS Thousands/uL 404   NEUTROS PCT % 58   LYMPHS PCT % 27   MONOS PCT % 10   EOS PCT % 5       Results from last 7 days  Lab Units 01/02/19  0510 12/31/18  0523   SODIUM mmol/L 134* 132*   POTASSIUM mmol/L 3 9 3 8   CHLORIDE mmol/L 99 98   CO2 mmol/L 29 29   BUN mg/dL 20 18   CREATININE mg/dL 0 54* 0 55*   ANION GAP mmol/L 6 5   CALCIUM mg/dL 8 7 8 4   ALBUMIN g/dL  --  3 0   TOTAL BILIRUBIN mg/dL  --  0 30   ALK PHOS U/L  --  80   ALT U/L  --  25   AST U/L  --  19   GLUCOSE RANDOM mg/dL 129* 141*                   Results from last 7 days  Lab Units 12/29/18  0439   PROCALCITONIN ng/ml 0 08           * I Have Reviewed All Lab Data Listed Above  * Additional Pertinent Lab Tests Reviewed: [address] Admission Reviewed    Imaging:    Imaging Reports Reviewed Today Include: none  Imaging Personally Reviewed by Myself Includes:  none    Recent Cultures (last 7 days):       Results from last 7 days  Lab Units 01/03/19  1517 01/03/19  1429 01/03/19  1428   GRAM STAIN RESULT  1+ Polys  No bacteria seen Rare Polys  2+ RBC's  No bacteria seen Rare Polys  2+ RBC's  No bacteria seen       Last 24 Hours Medication List:     Current Facility-Administered Medications:  acetaminophen 650 mg Oral Q6H PRN Delio Santacruz DO    acetic acid  Topical Daily Gio Yoder MD    aluminum-magnesium hydroxide-simethicone 30 mL Oral Q4H PRN CONCEPCION Jackson    calcium carbonate-vitamin D 1 tablet Oral Daily With Breakfast Delio Santacruz DO    cholecalciferol 1,000 Units Oral Daily Ginette Lenz MD    vitamin B-12 500 mcg Oral Daily Delio Santacruz DO    DAPTOmycin 6 mg/kg Intravenous Q24H Boubacar Houston MD Last Rate: 300 mg (01/03/19 0768)   diphenhydrAMINE 25 mg Oral Q6H PRN Ginette Lenz MD    enoxaparin 40 mg Subcutaneous Q24H [facility] Dee Dee Clements DO multivitamin stress formula 1 tablet Oral Daily Deshaun Chandler,     nystatin  Topical BID Vearl Marty, DO    oxyCODONE 5 mg Oral Q6H PRN Familiaal LINDA Hanley MD    pantoprazole 40 mg Oral Early Morning CONCEPCION Rhoades    senna-docusate sodium 2 tablet Oral BID Saran Galvan MD         Today, Patient Was Seen By: Saran Galvan MD    ** Please Note: Dictation voice to text software may have been used in the creation of this document   **

## 2019-01-05 ENCOUNTER — APPOINTMENT (INPATIENT)
Dept: RADIOLOGY | Facility: HOSPITAL | Age: 69
DRG: 573 | End: 2019-01-05
Payer: COMMERCIAL

## 2019-01-05 LAB — BACTERIA SPEC ANAEROBE CULT: NORMAL

## 2019-01-05 PROCEDURE — 87040 BLOOD CULTURE FOR BACTERIA: CPT | Performed by: PHYSICIAN ASSISTANT

## 2019-01-05 PROCEDURE — 71045 X-RAY EXAM CHEST 1 VIEW: CPT

## 2019-01-05 PROCEDURE — 99232 SBSQ HOSP IP/OBS MODERATE 35: CPT | Performed by: FAMILY MEDICINE

## 2019-01-05 RX ORDER — ACETAMINOPHEN 160 MG/5ML
650 SUSPENSION, ORAL (FINAL DOSE FORM) ORAL EVERY 4 HOURS PRN
Status: DISCONTINUED | OUTPATIENT
Start: 2019-01-05 | End: 2019-01-06

## 2019-01-05 RX ADMIN — DAPTOMYCIN 300 MG: 500 INJECTION, POWDER, LYOPHILIZED, FOR SOLUTION INTRAVENOUS at 17:11

## 2019-01-05 RX ADMIN — ACETAMINOPHEN 650 MG: 160 SUSPENSION ORAL at 22:38

## 2019-01-05 RX ADMIN — NYSTATIN 1 APPLICATION: 100000 POWDER TOPICAL at 08:51

## 2019-01-05 RX ADMIN — ACETIC ACID: 250 IRRIGANT IRRIGATION at 08:51

## 2019-01-05 RX ADMIN — NYSTATIN 1 APPLICATION: 100000 POWDER TOPICAL at 17:12

## 2019-01-05 RX ADMIN — ENOXAPARIN SODIUM 40 MG: 40 INJECTION SUBCUTANEOUS at 08:50

## 2019-01-05 RX ADMIN — OYSTER SHELL CALCIUM WITH VITAMIN D 1 TABLET: 500; 200 TABLET, FILM COATED ORAL at 08:49

## 2019-01-05 RX ADMIN — VITAMIN D, TAB 1000IU (100/BT) 1000 UNITS: 25 TAB at 08:50

## 2019-01-05 RX ADMIN — CYANOCOBALAMIN TAB 1000 MCG 500 MCG: 1000 TAB at 08:50

## 2019-01-05 RX ADMIN — PANTOPRAZOLE SODIUM 40 MG: 40 TABLET, DELAYED RELEASE ORAL at 05:26

## 2019-01-05 RX ADMIN — B-COMPLEX W/ C & FOLIC ACID TAB 1 TABLET: TAB at 08:50

## 2019-01-05 NOTE — ASSESSMENT & PLAN NOTE
Malnutrition Findings:   Malnutrition type: Chronic illness  Degree of Malnutrition: Malnutrition of moderate degree  BMI Findings: Body mass index is 17 61 kg/m²     Continue nutritional supplements

## 2019-01-05 NOTE — PROGRESS NOTES
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, stage 3 ([facility] )   Assessment & Plan    Multiple decubitus skin ulcers including bilateral buttocks and sacral region  These are stage 3-4  Unfortunately healing is being impeded by patient bed ridden status and incontinence  Being followed closely by orthopedics, plastic surgery, and ID; finished course of antibiotics most recently metronidazole and daptomycin (last dose 12/26/2018) --     Status post flap by plastic Dr Herrera Race  Now back on daptomycin IV  Will require 6 weeks of IV antibiotics  Final antibiotic selection will be based on cultures and sensitivities of left thigh INCISION AND DRAINAGE (I&D) left distal femur  With deep wound cultures  Culture so far going Staph coagulase negative  Sensitivities are pending at this time    Patient was encouraged to consider a diverting colostomy to help with healing of her sacral and left buttock decubitus ulcers  Patient has so far declined     Thrombocytosis ([facility] )   Assessment & Plan    Thrombocytosis secondary to acute/chronic infection     Moderate protein-calorie malnutrition (HCC)   Assessment & Plan    Malnutrition Findings:   Malnutrition type: Chronic illness  Degree of Malnutrition: Malnutrition of moderate degree  BMI Findings: Body mass index is 17 61 kg/m²  Continue nutritional supplements     GERD (gastroesophageal reflux disease)   Assessment & Plan    Continue pantoprazole     Hyponatremia   Assessment & Plan    · Mild hyponatremia  · Improving  · Follow the sodium level periodically     Anemia of chronic disease   Assessment & Plan    Anemia of chronic disease with iron deficiency; status post venofer and 2 units PRBC    Stool occult was positive but patient declined GI evaluation  No need for transfusion unless hemoglobin drops to less than 7 grams/deciliter  Hemoglobin stable at 10 5     Constipation   Assessment & Plan    Constipation resolved  PATIENT HAVING SOFT BOWEL MOVEMENTS     Chronic osteomyelitis of coccyx (HCC)   Assessment & Plan    Chronic osteomyelitis of coccyx  Treatment as per above  Closed fracture of lower end of left femur with routine healing   Assessment & Plan    Patient currently has a sinus tract to the area and she has of jony placed to stabilize the fracture site  Will require 6 weeks of IV antibiotics  Final antibiotic selection will be based on cultures and sensitivities of left thigh INCISION AND DRAINAGE (I&D) left distal femur  With deep wound cultures  Currently has a wound VAC placed on the site  Will consult wound Care for further management  Discussed with the patient the need to consider SNF placement for IV antibiotics  Agrees with TCF placement     Paraplegia following spinal cord injury [facility])   Assessment & Plan    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; continue frequent turning and specialty mattress  No changes in neurologic status   VTE Pharmacologic Prophylaxis:   Pharmacologic: Enoxaparin (Lovenox)  Mechanical VTE Prophylaxis in Place: Yes    Patient Centered Rounds: I have performed bedside rounds with nursing staff today  Discussions with Specialists or Other Care Team Provider:  None today    Education and Discussions with Family / Patient:  Discussed with patient at bedside about hospital course and need for subacute rehab placement for IV antibiotics    Time Spent for Care: 30 minutes  More than 50% of total time spent on counseling and coordination of care as described above      Current Length of Stay: 23 day(s)    Current Patient Status: Inpatient   Certification Statement: The patient will continue to require additional inpatient hospital stay due to Wound infection    Discharge Plan: To TCU in 1-2 days    Code Status: Level 1 - Full Code      Subjective:   Patient denies any chest pain or shortness of breath  She is having loose bowel movements but still wants to be manually disimpacted  Denies any pain    Objective:     Vitals:   Temp (24hrs), Av 3 °F (36 3 °C), Min:96 9 °F (36 1 °C), Max:97 7 °F (36 5 °C)    Temp:  [96 9 °F (36 1 °C)-97 7 °F (36 5 °C)] 97 2 °F (36 2 °C)  HR:  [] 117  Resp:  [18] 18  BP: ()/(57-72) 121/65  SpO2:  [91 %-98 %] 91 %  Body mass index is 17 61 kg/m²  Input and Output Summary (last 24 hours): Intake/Output Summary (Last 24 hours) at 19 0918  Last data filed at 19 1168   Gross per 24 hour   Intake              820 ml   Output              700 ml   Net              120 ml       Physical Exam:     Physical Exam   Constitutional: She is oriented to person, place, and time  She appears well-developed  Generalized muscle wasting and contractures noted   HENT:   Head: Normocephalic and atraumatic  Eyes: Pupils are equal, round, and reactive to light  Conjunctivae and EOM are normal    Neck: Normal range of motion  Neck supple  Cardiovascular: Normal rate, regular rhythm and normal heart sounds  Pulmonary/Chest: Effort normal and breath sounds normal    Abdominal: Soft  Bowel sounds are normal  She exhibits no mass  There is no tenderness  There is no rebound and no guarding  Genitourinary:   Genitourinary Comments: deferred   Musculoskeletal:   Right buttock wound status post skin grafting with donor site also appearing to be healing  Left buttock and sacral area still have open wounds which are stage III ulcers   Neurological: She is alert and oriented to person, place, and time  Skin: Skin is warm and dry  No rash noted  Psychiatric: She has a normal mood and affect  Nursing note and vitals reviewed          Additional Data:     Labs:      Results from last 7 days  Lab Units 01/02/19  0508   WBC Thousand/uL 7 90   HEMOGLOBIN g/dL 10 5*   HEMATOCRIT % 33 2*   PLATELETS Thousands/uL 404   NEUTROS PCT % 58   LYMPHS PCT % 27   MONOS PCT % 10   EOS PCT % 5       Results from last 7 days  Lab Units 01/02/19  0510 12/31/18  0523   SODIUM mmol/L 134* 132*   POTASSIUM mmol/L 3 9 3 8   CHLORIDE mmol/L 99 98   CO2 mmol/L 29 29   BUN mg/dL 20 18   CREATININE mg/dL 0 54* 0 55*   ANION GAP mmol/L 6 5   CALCIUM mg/dL 8 7 8 4   ALBUMIN g/dL  --  3 0   TOTAL BILIRUBIN mg/dL  --  0 30   ALK PHOS U/L  --  80   ALT U/L  --  25   AST U/L  --  19   GLUCOSE RANDOM mg/dL 129* 141*           Results from last 7 days  Lab Units 01/04/19  1141   POC GLUCOSE mg/dl 118*                   * I Have Reviewed All Lab Data Listed Above  * Additional Pertinent Lab Tests Reviewed: [address] Admission Reviewed    Imaging:    Imaging Reports Reviewed Today Include: none  Imaging Personally Reviewed by Myself Includes:  none    Recent Cultures (last 7 days):       Results from last 7 days  Lab Units 01/03/19  1517 01/03/19  1429 01/03/19  1428   GRAM STAIN RESULT  1+ Polys  No bacteria seen Rare Polys  2+ RBC's  No bacteria seen Rare Polys  2+ RBC's  No bacteria seen   WOUND CULTURE  1+ Growth of Staphylococcus coagulase negative*  --   --        Last 24 Hours Medication List:     Current Facility-Administered Medications:  acetaminophen 650 mg Oral Q6H PRN Renee Cola, DO    acetic acid  Topical Daily Shaq Pelaez MD    aluminum-magnesium hydroxide-simethicone 30 mL Oral Q4H PRN CONCEPCION Jackson    calcium carbonate-vitamin D 1 tablet Oral Daily With Breakfast Renee Cola, DO    cholecalciferol 1,000 Units Oral Daily Ginette Lenz MD    vitamin B-12 500 mcg Oral Daily Renee Cola, DO    DAPTOmycin 6 mg/kg Intravenous Q24H Curtis Andrade MD Last Rate: 300 mg (01/04/19 1629)   diphenhydrAMINE 25 mg Oral Q6H PRN Ginette Lenz MD    enoxaparin 40 mg Subcutaneous Q24H [address] Deshaun Chandler,     multivitamin stress formula 1 tablet Oral Daily Deshaun Chandler,     nystatin  Topical BID Martin Cisneros DO    oxyCODONE 5 mg Oral Q6H PRN Bilal LINDA Louis MD    pantoprazole 40 mg Oral Early Morning CONCEPCION Bravo    senna-docusate sodium 2 tablet Oral BID Judith Rodriguez MD         Today, Patient Was Seen By: Judith Rodriguez MD    ** Please Note: Dictation voice to text software may have been used in the creation of this document   **

## 2019-01-05 NOTE — ASSESSMENT & PLAN NOTE
Patient currently has a sinus tract to the area and she has of jony placed to stabilize the fracture site  Will require 6 weeks of IV antibiotics  Final antibiotic selection will be based on cultures and sensitivities of left thigh INCISION AND DRAINAGE (I&D) left distal femur  With deep wound cultures  Currently has a wound VAC placed on the site  Will consult wound Care for further management  Discussed with the patient the need to consider SNF placement for IV antibiotics    Agrees with TCF placement

## 2019-01-05 NOTE — ASSESSMENT & PLAN NOTE
· Mild hyponatremia  · Improving  · Follow the sodium level periodically Goal: Pt will perform sit to stand, bed <-> chair, toilet transfers with supervision and rolling walker in 4 weeks

## 2019-01-05 NOTE — SOCIAL WORK
Pt agreeable at this time to TCF admission for 6 weeks of IVABX  Possible discharge Monday-pending authorization and clearance from plastic surgery and ortho

## 2019-01-05 NOTE — PLAN OF CARE
Problem: DISCHARGE PLANNING - CARE MANAGEMENT  Goal: Discharge to post-acute care or home with appropriate resources  INTERVENTIONS:  - Conduct assessment to determine patient/family and health care team treatment goals, and need for post-acute services based on payer coverage, community resources, and patient preferences, and barriers to discharge  - Address psychosocial, clinical, and financial barriers to discharge as identified in assessment in conjunction with the patient/family and health care team  - Arrange appropriate level of post-acute services according to patients   needs and preference and payer coverage in collaboration with the physician and health care team  - Communicate with and update the patient/family, physician, and health care team regarding progress on the discharge plan  - Arrange appropriate transportation to post-acute venues    Outcome: Progressing  Pt preferring TCF admission for 6 weeks of IVABX once medically cleared and pending auth

## 2019-01-05 NOTE — ASSESSMENT & PLAN NOTE
Multiple decubitus skin ulcers including bilateral buttocks and sacral region  These are stage 3-4  Unfortunately healing is being impeded by patient bed ridden status and incontinence  Being followed closely by orthopedics, plastic surgery, and ID; finished course of antibiotics most recently metronidazole and daptomycin (last dose 12/26/2018) --     Status post flap by plastic Dr Sherren Corrigan  Now back on daptomycin IV  Will require 6 weeks of IV antibiotics  Final antibiotic selection will be based on cultures and sensitivities of left thigh INCISION AND DRAINAGE (I&D) left distal femur  With deep wound cultures  Culture so far going Staph coagulase negative  Sensitivities are pending at this time    Patient was encouraged to consider a diverting colostomy to help with healing of her sacral and left buttock decubitus ulcers    Patient has so far declined

## 2019-01-05 NOTE — ASSESSMENT & PLAN NOTE
Anemia of chronic disease with iron deficiency; status post venofer and 2 units PRBC  Stool occult was positive but patient declined GI evaluation  No need for transfusion unless hemoglobin drops to less than 7 grams/deciliter    Hemoglobin stable at 10 5

## 2019-01-06 PROBLEM — A41.4 SEPSIS DUE TO ANAEROBES (HCC): Status: ACTIVE | Noted: 2019-01-06

## 2019-01-06 LAB
ALBUMIN SERPL BCP-MCNC: 2.4 G/DL (ref 3–5.2)
ALP SERPL-CCNC: 71 U/L (ref 43–122)
ALT SERPL W P-5'-P-CCNC: 29 U/L (ref 9–52)
ANION GAP SERPL CALCULATED.3IONS-SCNC: 4 MMOL/L (ref 5–14)
ANION GAP SERPL CALCULATED.3IONS-SCNC: 7 MMOL/L (ref 5–14)
ANISOCYTOSIS BLD QL SMEAR: PRESENT
ANISOCYTOSIS BLD QL SMEAR: PRESENT
AST SERPL W P-5'-P-CCNC: 24 U/L (ref 14–36)
BACTERIA TISS AEROBE CULT: ABNORMAL
BACTERIA UR QL AUTO: ABNORMAL /HPF
BACTERIA WND AEROBE CULT: ABNORMAL
BILIRUB SERPL-MCNC: 0.1 MG/DL
BILIRUB UR QL STRIP: NEGATIVE
BUN SERPL-MCNC: 17 MG/DL (ref 5–25)
BUN SERPL-MCNC: 18 MG/DL (ref 5–25)
CALCIUM SERPL-MCNC: 7.8 MG/DL (ref 8.4–10.2)
CALCIUM SERPL-MCNC: 7.9 MG/DL (ref 8.4–10.2)
CHLORIDE SERPL-SCNC: 105 MMOL/L (ref 97–108)
CHLORIDE SERPL-SCNC: 107 MMOL/L (ref 97–108)
CLARITY UR: ABNORMAL
CO2 SERPL-SCNC: 25 MMOL/L (ref 22–30)
CO2 SERPL-SCNC: 26 MMOL/L (ref 22–30)
COLOR UR: ABNORMAL
CREAT SERPL-MCNC: 0.53 MG/DL (ref 0.6–1.2)
CREAT SERPL-MCNC: 0.59 MG/DL (ref 0.6–1.2)
EOSINOPHIL # BLD AUTO: 0.16 THOUSAND/UL (ref 0–0.4)
EOSINOPHIL NFR BLD MANUAL: 1 % (ref 0–6)
ERYTHROCYTE [DISTWIDTH] IN BLOOD BY AUTOMATED COUNT: 22.2 %
ERYTHROCYTE [DISTWIDTH] IN BLOOD BY AUTOMATED COUNT: 22.3 %
FLUAV + FLUBV RNA ISLT NAA+PROBE: NOT DETECTED
FLUAV + FLUBV RNA ISLT NAA+PROBE: NOT DETECTED
GFR SERPL CREATININE-BSD FRML MDRD: 94 ML/MIN/1.73SQ M
GFR SERPL CREATININE-BSD FRML MDRD: 98 ML/MIN/1.73SQ M
GLUCOSE SERPL-MCNC: 104 MG/DL (ref 70–99)
GLUCOSE SERPL-MCNC: 121 MG/DL (ref 70–99)
GLUCOSE UR STRIP-MCNC: NEGATIVE MG/DL
GRAM STN SPEC: ABNORMAL
HCT VFR BLD AUTO: 28 % (ref 36–46)
HCT VFR BLD AUTO: 28.2 % (ref 36–46)
HGB BLD-MCNC: 8.9 G/DL (ref 12–16)
HGB BLD-MCNC: 8.9 G/DL (ref 12–16)
HGB UR QL STRIP.AUTO: 250
KETONES UR STRIP-MCNC: NEGATIVE MG/DL
LACTATE SERPL-SCNC: 1.2 MMOL/L (ref 0.7–2)
LEUKOCYTE ESTERASE UR QL STRIP: 500
LYMPHOCYTES # BLD AUTO: 1.29 THOUSAND/UL (ref 0.5–4)
LYMPHOCYTES # BLD AUTO: 1.82 THOUSAND/UL (ref 0.5–4)
LYMPHOCYTES # BLD AUTO: 12 % (ref 25–45)
LYMPHOCYTES # BLD AUTO: 8 % (ref 25–45)
MCH RBC QN AUTO: 26.6 PG (ref 26–34)
MCH RBC QN AUTO: 27.1 PG (ref 26–34)
MCHC RBC AUTO-ENTMCNC: 31.6 G/DL (ref 31–36)
MCHC RBC AUTO-ENTMCNC: 31.9 G/DL (ref 31–36)
MCV RBC AUTO: 84 FL (ref 80–100)
MCV RBC AUTO: 85 FL (ref 80–100)
MONOCYTES # BLD AUTO: 0.46 THOUSAND/UL (ref 0.2–0.9)
MONOCYTES # BLD AUTO: 1.45 THOUSAND/UL (ref 0.2–0.9)
MONOCYTES NFR BLD AUTO: 3 % (ref 1–10)
MONOCYTES NFR BLD AUTO: 9 % (ref 1–10)
MUCOUS THREADS UR QL AUTO: ABNORMAL
NEUTS BAND NFR BLD MANUAL: 3 % (ref 0–8)
NEUTS BAND NFR BLD MANUAL: 4 % (ref 0–8)
NEUTS SEG # BLD: 12.92 THOUSAND/UL (ref 1.8–7.8)
NEUTS SEG # BLD: 13.2 THOUSAND/UL (ref 1.8–7.8)
NEUTS SEG NFR BLD AUTO: 78 %
NEUTS SEG NFR BLD AUTO: 82 %
NITRITE UR QL STRIP: POSITIVE
NON-SQ EPI CELLS URNS QL MICRO: ABNORMAL /HPF
PH UR STRIP.AUTO: 8 [PH] (ref 4.5–8)
PLATELET # BLD AUTO: 343 THOUSANDS/UL (ref 150–450)
PLATELET # BLD AUTO: 348 THOUSANDS/UL (ref 150–450)
PLATELET BLD QL SMEAR: ADEQUATE
PLATELET BLD QL SMEAR: ADEQUATE
PMV BLD AUTO: 8.3 FL (ref 8.9–12.7)
PMV BLD AUTO: 8.7 FL (ref 8.9–12.7)
POTASSIUM SERPL-SCNC: 3.4 MMOL/L (ref 3.6–5)
POTASSIUM SERPL-SCNC: 3.8 MMOL/L (ref 3.6–5)
PROCALCITONIN SERPL-MCNC: 27.73 NG/ML
PROT SERPL-MCNC: 5.4 G/DL (ref 5.9–8.4)
PROT UR STRIP-MCNC: ABNORMAL MG/DL
RBC # BLD AUTO: 3.29 MILLION/UL (ref 4–5.2)
RBC # BLD AUTO: 3.35 MILLION/UL (ref 4–5.2)
RBC #/AREA URNS AUTO: ABNORMAL /HPF
RBC MORPH BLD: ABNORMAL
RBC MORPH BLD: ABNORMAL
SODIUM SERPL-SCNC: 137 MMOL/L (ref 137–147)
SODIUM SERPL-SCNC: 137 MMOL/L (ref 137–147)
SP GR UR STRIP.AUTO: 1.01 (ref 1–1.04)
TOTAL CELLS COUNTED SPEC: 100
TOTAL CELLS COUNTED SPEC: 100
UROBILINOGEN UA: NEGATIVE MG/DL
WBC # BLD AUTO: 15.2 THOUSAND/UL (ref 4.5–11)
WBC # BLD AUTO: 16.1 THOUSAND/UL (ref 4.5–11)
WBC #/AREA URNS AUTO: ABNORMAL /HPF

## 2019-01-06 PROCEDURE — 83605 ASSAY OF LACTIC ACID: CPT | Performed by: FAMILY MEDICINE

## 2019-01-06 PROCEDURE — 87070 CULTURE OTHR SPECIMN AEROBIC: CPT | Performed by: FAMILY MEDICINE

## 2019-01-06 PROCEDURE — 81003 URINALYSIS AUTO W/O SCOPE: CPT | Performed by: PHYSICIAN ASSISTANT

## 2019-01-06 PROCEDURE — 87040 BLOOD CULTURE FOR BACTERIA: CPT | Performed by: FAMILY MEDICINE

## 2019-01-06 PROCEDURE — 85007 BL SMEAR W/DIFF WBC COUNT: CPT | Performed by: FAMILY MEDICINE

## 2019-01-06 PROCEDURE — 87077 CULTURE AEROBIC IDENTIFY: CPT | Performed by: FAMILY MEDICINE

## 2019-01-06 PROCEDURE — 87086 URINE CULTURE/COLONY COUNT: CPT | Performed by: PHYSICIAN ASSISTANT

## 2019-01-06 PROCEDURE — 85007 BL SMEAR W/DIFF WBC COUNT: CPT | Performed by: PHYSICIAN ASSISTANT

## 2019-01-06 PROCEDURE — 87077 CULTURE AEROBIC IDENTIFY: CPT | Performed by: PHYSICIAN ASSISTANT

## 2019-01-06 PROCEDURE — 85027 COMPLETE CBC AUTOMATED: CPT | Performed by: FAMILY MEDICINE

## 2019-01-06 PROCEDURE — 87205 SMEAR GRAM STAIN: CPT | Performed by: FAMILY MEDICINE

## 2019-01-06 PROCEDURE — 84145 PROCALCITONIN (PCT): CPT | Performed by: FAMILY MEDICINE

## 2019-01-06 PROCEDURE — 99291 CRITICAL CARE FIRST HOUR: CPT | Performed by: FAMILY MEDICINE

## 2019-01-06 PROCEDURE — 80048 BASIC METABOLIC PNL TOTAL CA: CPT | Performed by: FAMILY MEDICINE

## 2019-01-06 PROCEDURE — 87186 SC STD MICRODIL/AGAR DIL: CPT | Performed by: FAMILY MEDICINE

## 2019-01-06 PROCEDURE — 87186 SC STD MICRODIL/AGAR DIL: CPT | Performed by: PHYSICIAN ASSISTANT

## 2019-01-06 PROCEDURE — 87502 INFLUENZA DNA AMP PROBE: CPT | Performed by: PHYSICIAN ASSISTANT

## 2019-01-06 PROCEDURE — 81001 URINALYSIS AUTO W/SCOPE: CPT | Performed by: PHYSICIAN ASSISTANT

## 2019-01-06 PROCEDURE — 80053 COMPREHEN METABOLIC PANEL: CPT | Performed by: PHYSICIAN ASSISTANT

## 2019-01-06 PROCEDURE — 85027 COMPLETE CBC AUTOMATED: CPT | Performed by: PHYSICIAN ASSISTANT

## 2019-01-06 RX ORDER — POTASSIUM CHLORIDE 20 MEQ/1
40 TABLET, EXTENDED RELEASE ORAL ONCE
Status: COMPLETED | OUTPATIENT
Start: 2019-01-06 | End: 2019-01-06

## 2019-01-06 RX ORDER — SODIUM CHLORIDE 9 MG/ML
75 INJECTION, SOLUTION INTRAVENOUS CONTINUOUS
Status: DISCONTINUED | OUTPATIENT
Start: 2019-01-06 | End: 2019-01-07

## 2019-01-06 RX ADMIN — CYANOCOBALAMIN TAB 1000 MCG 500 MCG: 1000 TAB at 09:18

## 2019-01-06 RX ADMIN — VITAMIN D, TAB 1000IU (100/BT) 1000 UNITS: 25 TAB at 09:18

## 2019-01-06 RX ADMIN — OYSTER SHELL CALCIUM WITH VITAMIN D 1 TABLET: 500; 200 TABLET, FILM COATED ORAL at 09:18

## 2019-01-06 RX ADMIN — PANTOPRAZOLE SODIUM 40 MG: 40 TABLET, DELAYED RELEASE ORAL at 06:48

## 2019-01-06 RX ADMIN — ACETIC ACID: 250 IRRIGANT IRRIGATION at 18:25

## 2019-01-06 RX ADMIN — SODIUM CHLORIDE 1000 ML: 9 INJECTION, SOLUTION INTRAVENOUS at 03:19

## 2019-01-06 RX ADMIN — ENOXAPARIN SODIUM 40 MG: 40 INJECTION SUBCUTANEOUS at 09:22

## 2019-01-06 RX ADMIN — B-COMPLEX W/ C & FOLIC ACID TAB 1 TABLET: TAB at 09:41

## 2019-01-06 RX ADMIN — METRONIDAZOLE 500 MG: 500 INJECTION, SOLUTION INTRAVENOUS at 09:36

## 2019-01-06 RX ADMIN — POTASSIUM CHLORIDE 40 MEQ: 20 TABLET, EXTENDED RELEASE ORAL at 16:10

## 2019-01-06 RX ADMIN — NOREPINEPHRINE BITARTRATE 4 MCG/MIN: 1 INJECTION, SOLUTION, CONCENTRATE INTRAVENOUS at 10:50

## 2019-01-06 RX ADMIN — METRONIDAZOLE 500 MG: 500 INJECTION, SOLUTION INTRAVENOUS at 17:28

## 2019-01-06 RX ADMIN — SODIUM CHLORIDE 75 ML/HR: 9 INJECTION, SOLUTION INTRAVENOUS at 23:47

## 2019-01-06 RX ADMIN — SODIUM CHLORIDE 75 ML/HR: 9 INJECTION, SOLUTION INTRAVENOUS at 10:47

## 2019-01-06 RX ADMIN — DAPTOMYCIN 300 MG: 500 INJECTION, POWDER, LYOPHILIZED, FOR SOLUTION INTRAVENOUS at 16:18

## 2019-01-06 RX ADMIN — SODIUM CHLORIDE 500 ML: 9 INJECTION, SOLUTION INTRAVENOUS at 01:36

## 2019-01-06 NOTE — ASSESSMENT & PLAN NOTE
Patient started to have hypotension yesterday with blood pressure dropping to the 80s  She had worsening leukocytosis ,hypotension and tachycardia yesterday  She responded to a bolus of fluid transiently yesterday and again this morning was hypotensive again  I have repeated blood cultures and wound cultures  Will give a bolus of 1 L of normal saline and if required will place on Levophed to maintain a mean arterial pressure of more than 65  Continue IV daptomycin and add Flagyl IV as she does have history of previously having a lot of anaerobes in her cultures    Patient transferred to ICU

## 2019-01-06 NOTE — ASSESSMENT & PLAN NOTE
Body mass index is 17 79 kg/m²    Continue Ensure supplement  Patient has generalized muscle wasting secondary to being paraplegic

## 2019-01-06 NOTE — PROGRESS NOTES
Notified per nursing that patient spiked a fever of 101, experiencing shaking chills and non-productive cough  Also admits to mild nausea, felt like she was going to vomit but has not  Patient is currently on daptomycin due to multiple areas of ulcers/recent I&D of left distal femur  Vitals:  Febrile at 101 7, Tachycardic at 110, RR 20, /49, SpO2 93% on RA    Physical Exam:  · General: patient appears chronically ill, mild rigors noted  Patient does not appear in any acute distress  · Cardiac: tachycardic, no murmurs/rubs/gallops  · Pulm: no accessory muscle use, diminished to auscultation bilaterally but appear otherwise clear  · Abd: +BS, soft, non-tender  · Msk: no edema    Plan:  · Patient has been on daptomycin, given spike in temp will obtain new blood cultures  · Provide tylenol for fever  · Even though patient has been admitted for quite some time, r/o influenza  · Obtain UA  · CXR given new cough

## 2019-01-06 NOTE — PROGRESS NOTES
Progress Note - Valdo Given 1950, 76 y o  female MRN: [id number]    Unit/Bed#:  Encounter: [id number]    Primary Care Provider: Kirit Hudson   Date and time admitted to hospital: 12/13/2018  9:04 PM        Sepsis due to anaerobes [facility])   Assessment & Plan    Patient started to have hypotension yesterday with blood pressure dropping to the 80s  She had worsening leukocytosis ,hypotension and tachycardia yesterday  She responded to a bolus of fluid transiently yesterday and again this morning was hypotensive again  I have repeated blood cultures and wound cultures  Will give a bolus of 1 L of normal saline and if required will place on Levophed to maintain a mean arterial pressure of more than 65  Continue IV daptomycin and add Flagyl IV as she does have history of previously having a lot of anaerobes in her cultures  Patient transferred to ICU      * 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, stage 3 (HCC)   Assessment & Plan    Multiple decubitus skin ulcers including bilateral buttocks and sacral region  These are stage 3-4  Unfortunately healing is being impeded by patient bed ridden status and incontinence  Being followed closely by orthopedics, plastic surgery, and ID    Status post RIGHT HIP AND BUTTOCK flap by Dr Adnoay Gatica    Now back on daptomycin IV  Will require 6 weeks of IV antibiotics  Final antibiotic selection will be based on cultures and sensitivities of left thigh INCISION AND DRAINAGE (I&D) left distal femur  With deep wound cultures  Culture so far growing corynebacterium Sensitivities are pending at this time  Today she started to have leukocytosis, fever and hypotension and I have added Flagyl to her antibiotics and also ordered repeat blood cultures  None of the wounds look any worse than previous      Patient was encouraged to consider a diverting colostomy to help with healing of her sacral and left buttock decubitus ulcers  Patient has so far declined     BMI less than 19,adult   Assessment & Plan    Body mass index is 17 79 kg/m²  Continue Ensure supplement  Patient has generalized muscle wasting secondary to being paraplegic     Hyperglycemia   Assessment & Plan    Hyperglycemia with borderline diabetes based on A1c  Blood sugars well controlled     Thrombocytosis ([facility])   Assessment & Plan    Thrombocytosis secondary to acute/chronic infection     Moderate protein-calorie malnutrition ([facility])   Assessment & Plan    Malnutrition Findings:   Malnutrition type: Chronic illness  Degree of Malnutrition: Malnutrition of moderate degree  BMI Findings: Body mass index is 17 79 kg/m²  Continue nutritional supplements     GERD (gastroesophageal reflux disease)   Assessment & Plan    Continue pantoprazole     Hyponatremia   Assessment & Plan    · Mild hyponatremia  · Improving  · Follow the sodium level periodically     Anemia of chronic disease   Assessment & Plan    Anemia of chronic disease with iron deficiency; status post venofer and 2 units PRBC  Stool occult was positive but patient declined GI evaluation  No need for transfusion unless hemoglobin drops to less than 7 grams/deciliter  Hemoglobin stable at 8 9     Constipation   Assessment & Plan    Constipation resolved  PATIENT HAVING SOFT BOWEL MOVEMENTS     Chronic osteomyelitis of coccyx ([facility])   Assessment & Plan    Chronic osteomyelitis of coccyx  Treatment as per above  Closed fracture of lower end of left femur with routine healing   Assessment & Plan    Patient currently has a sinus tract to the area and she has of jony placed to stabilize the fracture site  Will require 6 weeks of IV antibiotics  Final antibiotic selection will be based on cultures and sensitivities of left thigh INCISION AND DRAINAGE (I&D) left distal femur  With deep wound cultures  Currently has a wound VAC placed on the site  Will consult wound Care for further management    Discussed with the patient the need to consider SNF placement for IV antibiotics  Agrees with TCF placement     Paraplegia following spinal cord injury [facility])   Assessment & Plan    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; continue frequent turning and specialty mattress  No changes in neurologic status   VTE Pharmacologic Prophylaxis:   Pharmacologic: Enoxaparin (Lovenox)  Mechanical VTE Prophylaxis in Place: Yes    Patient Centered Rounds: I have performed bedside rounds with nursing staff today  Discussions with Specialists or Other Care Team Provider:  Discussed with Infectious Disease over the phone    Education and Discussions with Family / Patient:  Discussed with patient at bedside about hospital course    Time Spent for Care: 45 minutes  More than 50% of total time spent on counseling and coordination of care as described above  More than 45 minutes spent in direct ICU care  Patient given a fluid bolus and placed on IV Flagyl and also transferred to the ICU    Current Length of Stay: 24 day(s)    Current Patient Status: Inpatient   Certification Statement: The patient will continue to require additional inpatient hospital stay due to Sepsis    Discharge Plan: To TCF once medically cleared    Code Status: Level 1 - Full Code      Subjective:   Patient states that yesterday evening she felt cold sweats and chills  She felt her heart racing and also felt very weak  She feels a little bit better this morning but still does not feel right  Denies any chest pain or shortness of breath  She has no feeling waist done hence has no complaints further    Objective:     Vitals:   Temp (24hrs), Av 6 °F (37 °C), Min:97 2 °F (36 2 °C), Max:101 7 °F (38 7 °C)    Temp:  [97 2 °F (36 2 °C)-101 7 °F (38 7 °C)] 98 1 °F (36 7 °C)  HR:  [] 86  Resp:  [0-62] 25  BP: ()/(36-71) 92/44  SpO2:  [93 %-100 %] 98 %  Body mass index is 17 79 kg/m²       Input and Output Summary (last 24 hours): Intake/Output Summary (Last 24 hours) at 01/06/19 1010  Last data filed at 01/06/19 0936   Gross per 24 hour   Intake             1600 ml   Output              505 ml   Net             1095 ml       Physical Exam:     Physical Exam   Constitutional: She is oriented to person, place, and time  She appears distressed  HENT:   Head: Normocephalic and atraumatic  Mouth/Throat: Oropharynx is clear and moist    Eyes: Pupils are equal, round, and reactive to light  Conjunctivae and EOM are normal    Neck: Normal range of motion  Neck supple  Cardiovascular: Normal rate, regular rhythm and normal heart sounds  Pulmonary/Chest: Effort normal and breath sounds normal    Mild decreased breath sounds bilateral bases   Abdominal: Soft  Bowel sounds are normal  She exhibits no distension and no mass  There is no tenderness  There is no rebound and no guarding  Genitourinary:   Genitourinary Comments: deferred   Musculoskeletal:   Generalized muscle wasting noted with bilateral contractures noted  No redness or increased erythema noticed around the multiple wound site involving the left lower thigh medially  It has a wound VAC on minimal drainage  Right thigh and buttock skin graft with mild serosanguineous drainage noted  Left sacral wound to be assessed later as she is currently hypotensive   Neurological: She is alert and oriented to person, place, and time  Skin: Skin is warm and dry  No rash noted  Psychiatric: She has a normal mood and affect  Nursing note and vitals reviewed          Additional Data:     Labs:      Results from last 7 days  Lab Units 01/06/19  0801 01/06/19  0544  01/02/19  0508   WBC Thousand/uL 15 20* 16 10*  --  7 90   HEMOGLOBIN g/dL 8 9* 8 9*  --  10 5*   HEMATOCRIT % 28 2* 28 0*  --  33 2*   PLATELETS Thousands/uL 348 343  --  404   BANDS PCT % 3 4  < >  --    NEUTROS PCT %  --   --   --  58   LYMPHS PCT %  --   --   --  27   LYMPHO PCT % 12* 8*  < >  --    MONOS PCT %  -- --   --  10   MONO PCT % 3 9  < >  --    EOS PCT %  --  1  --  5   < > = values in this interval not displayed  Results from last 7 days  Lab Units 01/06/19  0801 01/06/19  0544   SODIUM mmol/L 137 137   POTASSIUM mmol/L 3 8 3 4*   CHLORIDE mmol/L 107 105   CO2 mmol/L 26 25   BUN mg/dL 17 18   CREATININE mg/dL 0 53* 0 59*   ANION GAP mmol/L 4* 7   CALCIUM mg/dL 7 9* 7 8*   ALBUMIN g/dL  --  2 4*   TOTAL BILIRUBIN mg/dL  --  0 10   ALK PHOS U/L  --  71   ALT U/L  --  29   AST U/L  --  24   GLUCOSE RANDOM mg/dL 104* 121*           Results from last 7 days  Lab Units 01/04/19  1141   POC GLUCOSE mg/dl 118*           Results from last 7 days  Lab Units 01/06/19  0802   LACTIC ACID mmol/L 1 2           * I Have Reviewed All Lab Data Listed Above  * Additional Pertinent Lab Tests Reviewed: [address] Admission Reviewed    Imaging:    Imaging Reports Reviewed Today Include: none  Imaging Personally Reviewed by Myself Includes:  none    Recent Cultures (last 7 days):       Results from last 7 days  Lab Units 01/03/19  1517 01/03/19  1429 01/03/19  1428   GRAM STAIN RESULT  1+ Polys  No bacteria seen Rare Polys  2+ RBC's  No bacteria seen Rare Polys  2+ RBC's  No bacteria seen   WOUND CULTURE  1+ Growth of Corynebacterium striatum*  --   --        Last 24 Hours Medication List:     Current Facility-Administered Medications:  acetic acid  Topical Daily Aida Lyn MD    aluminum-magnesium hydroxide-simethicone 30 mL Oral Q4H PRN CONCEPCION Jackson    calcium carbonate-vitamin D 1 tablet Oral Daily With Breakfast [employer], DO    cholecalciferol 1,000 Units Oral Daily Ginette Lenz MD    vitamin B-12 500 mcg Oral Daily [employer], DO    DAPTOmycin 6 mg/kg Intravenous Q24H Simi Melendez MD    diphenhydrAMINE 25 mg Oral Q6H PRN Ginette Lenz MD    enoxaparin 40 mg Subcutaneous Q24H [address] Deshaun Chandler,     metroNIDAZOLE 500 mg Intravenous Q8H Simi Melendez MD Last Rate: 500 mg (01/06/19 0936)   multivitamin stress formula 1 tablet Oral Daily Deshaun Chandler DO    norepinephrine 1-30 mcg/min Intravenous Titrated Saran Galvan MD    nystatin  Topical BID Samina Ferguson DO    oxyCODONE 5 mg Oral Q6H PRN Familiaal LINDA Hanley MD    pantoprazole 40 mg Oral Early Morning CONCEPCION Jackson    senna-docusate sodium 2 tablet Oral BID Saran Galvan MD    sodium chloride 1,000 mL Intravenous Once Saran Galvan MD    sodium chloride 75 mL/hr Intravenous Continuous Saran Galvan MD         Today, Patient Was Seen By: Saran Galvan MD    ** Please Note: Dictation voice to text software may have been used in the creation of this document   **

## 2019-01-06 NOTE — ASSESSMENT & PLAN NOTE
Malnutrition Findings:   Malnutrition type: Chronic illness  Degree of Malnutrition: Malnutrition of moderate degree  BMI Findings: Body mass index is 17 79 kg/m²     Continue nutritional supplements

## 2019-01-06 NOTE — ASSESSMENT & PLAN NOTE
Anemia of chronic disease with iron deficiency; status post venofer and 2 units PRBC  Stool occult was positive but patient declined GI evaluation  No need for transfusion unless hemoglobin drops to less than 7 grams/deciliter    Hemoglobin stable at 8 9

## 2019-01-06 NOTE — ASSESSMENT & PLAN NOTE
Multiple decubitus skin ulcers including bilateral buttocks and sacral region  These are stage 3-4  Unfortunately healing is being impeded by patient bed ridden status and incontinence  Being followed closely by orthopedics, plastic surgery, and ID    Status post RIGHT HIP AND BUTTOCK flap by Dr Maryann Good    Now back on daptomycin IV  Will require 6 weeks of IV antibiotics  Final antibiotic selection will be based on cultures and sensitivities of left thigh INCISION AND DRAINAGE (I&D) left distal femur  With deep wound cultures  Culture so far growing corynebacterium Sensitivities are pending at this time  Today she started to have leukocytosis, fever and hypotension and I have added Flagyl to her antibiotics and also ordered repeat blood cultures  None of the wounds look any worse than previous  Patient was encouraged to consider a diverting colostomy to help with healing of her sacral and left buttock decubitus ulcers    Patient has so far declined

## 2019-01-07 LAB
ANION GAP SERPL CALCULATED.3IONS-SCNC: 7 MMOL/L (ref 5–14)
ANISOCYTOSIS BLD QL SMEAR: PRESENT
BASOPHILS # BLD AUTO: 0.1 THOUSANDS/ΜL (ref 0–0.1)
BASOPHILS NFR BLD AUTO: 1 % (ref 0–1)
BUN SERPL-MCNC: 7 MG/DL (ref 5–25)
CALCIUM SERPL-MCNC: 7.7 MG/DL (ref 8.4–10.2)
CHLORIDE SERPL-SCNC: 106 MMOL/L (ref 97–108)
CO2 SERPL-SCNC: 24 MMOL/L (ref 22–30)
CREAT SERPL-MCNC: 0.31 MG/DL (ref 0.6–1.2)
EOSINOPHIL # BLD AUTO: 0.5 THOUSAND/ΜL (ref 0–0.4)
EOSINOPHIL NFR BLD AUTO: 6 % (ref 0–6)
ERYTHROCYTE [DISTWIDTH] IN BLOOD BY AUTOMATED COUNT: 22.4 %
GFR SERPL CREATININE-BSD FRML MDRD: 117 ML/MIN/1.73SQ M
GLUCOSE SERPL-MCNC: 123 MG/DL (ref 70–99)
HCT VFR BLD AUTO: 26.8 % (ref 36–46)
HGB BLD-MCNC: 8.6 G/DL (ref 12–16)
HYPERCHROMIA BLD QL SMEAR: PRESENT
LYMPHOCYTES # BLD AUTO: 1.9 THOUSANDS/ΜL (ref 0.5–4)
LYMPHOCYTES NFR BLD AUTO: 20 % (ref 25–45)
MCH RBC QN AUTO: 27.3 PG (ref 26–34)
MCHC RBC AUTO-ENTMCNC: 32.2 G/DL (ref 31–36)
MCV RBC AUTO: 85 FL (ref 80–100)
MONOCYTES # BLD AUTO: 0.7 THOUSAND/ΜL (ref 0.2–0.9)
MONOCYTES NFR BLD AUTO: 8 % (ref 1–10)
NEUTROPHILS # BLD AUTO: 6.2 THOUSANDS/ΜL (ref 1.8–7.8)
NEUTS SEG NFR BLD AUTO: 66 % (ref 45–65)
PLATELET # BLD AUTO: 331 THOUSANDS/UL (ref 150–450)
PLATELET BLD QL SMEAR: ADEQUATE
PMV BLD AUTO: 8.2 FL (ref 8.9–12.7)
POTASSIUM SERPL-SCNC: 3.8 MMOL/L (ref 3.6–5)
RBC # BLD AUTO: 3.16 MILLION/UL (ref 4–5.2)
RBC MORPH BLD: NORMAL
SODIUM SERPL-SCNC: 137 MMOL/L (ref 137–147)
WBC # BLD AUTO: 9.5 THOUSAND/UL (ref 4.5–11)

## 2019-01-07 PROCEDURE — 85025 COMPLETE CBC W/AUTO DIFF WBC: CPT | Performed by: FAMILY MEDICINE

## 2019-01-07 PROCEDURE — 99233 SBSQ HOSP IP/OBS HIGH 50: CPT | Performed by: INTERNAL MEDICINE

## 2019-01-07 PROCEDURE — 99253 IP/OBS CNSLTJ NEW/EST LOW 45: CPT | Performed by: FAMILY MEDICINE

## 2019-01-07 PROCEDURE — 99232 SBSQ HOSP IP/OBS MODERATE 35: CPT | Performed by: PHYSICIAN ASSISTANT

## 2019-01-07 PROCEDURE — 80048 BASIC METABOLIC PNL TOTAL CA: CPT | Performed by: FAMILY MEDICINE

## 2019-01-07 RX ADMIN — B-COMPLEX W/ C & FOLIC ACID TAB 1 TABLET: TAB at 08:07

## 2019-01-07 RX ADMIN — VITAMIN D, TAB 1000IU (100/BT) 1000 UNITS: 25 TAB at 08:03

## 2019-01-07 RX ADMIN — ACETIC ACID: 250 IRRIGANT IRRIGATION at 08:08

## 2019-01-07 RX ADMIN — NOREPINEPHRINE BITARTRATE 2 MCG/MIN: 1 INJECTION, SOLUTION, CONCENTRATE INTRAVENOUS at 06:52

## 2019-01-07 RX ADMIN — SODIUM CHLORIDE 1000 MG: 900 INJECTION INTRAVENOUS at 11:16

## 2019-01-07 RX ADMIN — METRONIDAZOLE 500 MG: 500 INJECTION, SOLUTION INTRAVENOUS at 02:20

## 2019-01-07 RX ADMIN — DAPTOMYCIN 300 MG: 500 INJECTION, POWDER, LYOPHILIZED, FOR SOLUTION INTRAVENOUS at 16:49

## 2019-01-07 RX ADMIN — PANTOPRAZOLE SODIUM 40 MG: 40 TABLET, DELAYED RELEASE ORAL at 06:39

## 2019-01-07 RX ADMIN — SODIUM CHLORIDE 1000 MG: 900 INJECTION INTRAVENOUS at 19:52

## 2019-01-07 RX ADMIN — NYSTATIN: 100000 POWDER TOPICAL at 08:08

## 2019-01-07 RX ADMIN — ENOXAPARIN SODIUM 40 MG: 40 INJECTION SUBCUTANEOUS at 08:02

## 2019-01-07 RX ADMIN — CYANOCOBALAMIN TAB 1000 MCG 500 MCG: 1000 TAB at 08:03

## 2019-01-07 RX ADMIN — OYSTER SHELL CALCIUM WITH VITAMIN D 1 TABLET: 500; 200 TABLET, FILM COATED ORAL at 08:03

## 2019-01-07 NOTE — CONSULTS
Petra Willams [age] y o  female MRN: [id number]  Unit/Bed#:  Encounter: [id number]      Assessment:  Stage IV pressure ulcer of the sacrum with chronic osteomyelitis of the coccyx  Stage II pressure ulcer of the left ischium x2  Left buttock stage IV pressure ulcer with some necrotic tissue in the depth  Open postop wound of the left distal thigh from incision and drainage with washout  Current treatment with negative pressure wound therapy  Recent right hip flap without any signs of  infection  Right thigh donor site with open area  No signs of infection  Stage I pressure ulcer of the left heel  Malnutrition  Infectious Disease notes appreciated  Plan:  Continue with wound VAC on the postop wound of the left thigh  However, we will place Adaptic over the bone followed by black foam   Continue at 125 mm negative pressure, continuous  Continue wound care as ordered by Dr Judie Cowden for sacrum and left buttock  The left buttock pressure ulcer will need some debridement of necrotic tissue  Thin hydrocolloid to the stage II pressure ulcers of the left ischium  Recommend Xeroform to the open area of the donor site of the right thigh  Patient is receiving Ensure Clear at this time which does not have sufficient protein  Would recommend either Valentin b i d  or Ensure Max Protein  History of Present Illness   HPI:  Gregor Person is a 76 y o  female with T8 paraplegia and a history of multiple pressure ulcers  In December of 2018 she was admitted for revision of flap to the right hip  She also had a failed flap of the sacrum  In October of 2018 she suffered a comminuted fracture of the left femur  A jony was placed  However, she developed a hematoma and since the hematoma decreased there is a small sinus tract draining purulent material   During this admission, she was seen by orthopedics and had incision and drainage with washout performed    Cultures were obtained along with bone biopsy  The jony was not removed  A wound VAC was placed  Wound care is now asked for management suggestions of the wound VAC  Unfortunately, she also has other pressure ulcers  In addition to the sacrum that also has chronic osteomyelitis of the coccyx, she has two pressure ulcers on the left ischial area, a left buttock pressure ulcer and an open wound at the donor site on the right thigh  During this hospitalization, she developed sepsis and is now on intensive care unit improving  Patient also has malnutrition with low total protein and albumin  She is currently receiving Ensure Clear supplement  Because of her paraplegia, she is confined to her power chair and bed  During this hospitalization, she is on a air fluidized bed  Historical Information   Past Medical History:   Diagnosis Date    Anemia     iron defiencey    Arthritis     osteo    Back injury     Chronic kidney disease     nephritis    Depression     Osteopenia     Osteoporosis     Paralysis ([address] )     paraplegic due to spinal cord injury    [facility])     Poor circulation     Pressure injury of skin     right hip, stage 3    Pressure sore of hip     right    Tibial plateau fracture     Underweight      Past Surgical History:   Procedure Laterality Date    FLAP LOCAL TRUNK Right 12/17/2018    Procedure: DEBRIDE/FLAP CLOSURE HIP PRESSURE SORE Aris Zohaib GRAFT;  Surgeon: Jose R Kerr MD;  Location: [address] [place] [address];  Service: Plastics    HIP DEBRIDEMENT Right 2017    right hip sore debridement    INCISION AND DRAINAGE OF WOUND Left 1/3/2019    Procedure: INCISION AND DRAINAGE (I&D) left distal femur  With deep wound cultures   Application of VAC DRAIN SPONGE;  Surgeon: Delroy Puentes MD;  Location: [address] [place] [address];  Service: Orthopedics    VT DEBRIDEMENT, SKIN, SUB-Q TISSUE,MUSCLE,BONE,=<20 SQ CM Right 9/19/2018    Procedure: DEBRIDEMENT OF HIP PRESSURE SORE;  Surgeon: Jose R Kerr MD;  Location: [address] [place] [address] MAIN OR;  Service: Plastics    CA OPEN RX FEMUR FX+INTRAMED FELIX Left 10/22/2018    Procedure: INSERTION NAIL IM LEFT FEMUR RETROGRADE;  Surgeon: Fátima Argueta MD;  Location:  MAIN OR;  Service: Orthopedics    TOE AMPUTATION Left 2017    small toe left foot amputation    WISDOM TOOTH EXTRACTION      WOUND DEBRIDEMENT Left 12/17/2018    Procedure: DEBRIDEMENT HIP PRESSURE SORE;  Surgeon: Gio Yoder MD;  Location: [address] [place] [address] MAIN OR;  Service: Plastics     Family History:   Family History   Problem Relation Age of Onset    Depression Father      Social History      History   Smoking Status    Never Smoker   Smokeless Tobacco    Never Used                   History   Alcohol Use    Yes     Comment: occasional                   History   Drug Use No     Assistive devices:  Power chair    Meds/Allergies   all current active meds have been reviewed  Allergies   Allergen Reactions    Iv Contrast [Iodinated Diagnostic Agents]      Tingling face, itching    Cefepime Rash    Ciprofloxacin Rash and Swelling     Looked like suburn, itching  Bed sores  Swelling, itching    Latex Rash    Vancomycin Rash     Unknown        Review of Systems   Constitutional: Positive for fatigue  Negative for chills, diaphoresis and fever  Eyes: Negative  Respiratory: Positive for cough (occ)  Negative for chest tightness and shortness of breath  Cardiovascular: Negative  Gastrointestinal: Negative for abdominal pain, constipation, diarrhea and nausea  Endocrine: Negative  Genitourinary: [place]   Skin: Positive for wound (see description)  Neurological:        T8 paraplegia   Psychiatric/Behavioral: Negative for agitation and confusion  PHYSICAL EXAM:  Vitals: Blood pressure 102/51, pulse 86, temperature (!) 97 4 °F (36 3 °C), temperature source Temporal, resp  rate (!) 23, height 5' 5" (1 651 m), weight 50 9 kg (112 lb 3 4 oz), SpO2 92 %, not currently breastfeeding     Physical Exam   Constitutional: She appears well-developed  thin   Neurological: She displays atrophy  A sensory deficit (lower extremities) is present  paraplegia   Skin:   Multiple Pressure ulcers and post op wound left thigh  Healing flap right hip  Open wound donor site right thigh  Wounds:   Examination of the left distal thigh reveals an open wound that measures 0 8 x 2 4 x 1 6 cm  There is undermining between six at 12 o'clock with a maximum distance of 2 0 cm  The wound is clean and the walls are granular  There is an area of bone exposure in the base  Some surrounding erythema is noted but no malodor  Examination of the left buttock reveals an ulceration that measures 5 5 x 6 0 x 2 2 cm  There is undermining between five and seven o'clock with a maximum distance of 2 0 cm  In the base there is necrotic tissue but the walls are cleaning granular  No sign of infection is noted  No purulent drainage  Examination the sacrum reveals an ulceration that measures 2 0 x 3 0 x 1 5 cm with undermining between six and three o'clock  Maximum undermined areas 2 5 cm  The ulcer is mostly granular  Again, no signs of infection at this time  Examination of the left ischium reveals an ulceration that measures 2 5 x 2 0 x 0 1 cm  Bases clean and mostly dry  Granulation tissue noted  No signs of infection  Examination of the left ischium, distal to the previous ulcer is another ulcer that measures 2 0 x 1 5 x 0 1 cm with the same characteristic six as the more proximal one  Examination of the right thigh donor site reveals an open wound that measures 2 0 x 10 0 x 0 1 cm  This wound is mostly granular with some slough and fibrin but the gauze was sticking and causing bleeding  No signs of infection  The right hip has a flap that appears to be healing well with sutures in place  No obvious signs of infection  Examination of the left heel reveals stage I pressure ulcer without any open areas    Examination of the right heel reveals no erythema  It is also intact  Left post op wound    Left thigh donor site    Left ischial ulcers    sacrum    Left burrock          Results from last 7 days  Lab Units 01/07/19  0555 01/06/19  0801 01/06/19  0544 01/02/19  0508   WBC Thousand/uL 9 50 15 20* 16 10* 7 90   HEMOGLOBIN g/dL 8 6* 8 9* 8 9* 10 5*   HEMATOCRIT % 26 8* 28 2* 28 0* 33 2*   PLATELETS Thousands/uL 331 348 343 404   SEGS PCT %  --  82* 78*  --    BANDS PCT %  --  3 4  --    LYMPHS PCT % 20*  --   --  27   LYMPHO PCT %  --  12* 8*  --    MONOS PCT % 8  --   --  10   MONO PCT %  --  3 9  --    EOS PCT % 6  --  1 5   BASOS PCT % 1  --   --  1         Results from last 7 days  Lab Units 01/07/19  0555 01/06/19  0801 01/06/19  0544   GLUCOSE RANDOM mg/dL 123* 104* 121*   POTASSIUM mmol/L 3 8 3 8 3 4*   CHLORIDE mmol/L 106 107 105   CO2 mmol/L 24 26 25   BUN mg/dL 7 17 18   CREATININE mg/dL 0 31* 0 53* 0 59*   EGFR ml/min/1 73sq m 117 98 94   CALCIUM mg/dL 7 7* 7 9* 7 8*   AST U/L  --   --  24   ALT U/L  --   --  29   ALK PHOS U/L  --   --  71   TOTAL PROTEIN g/dL  --   --  5 4*   ALBUMIN g/dL  --   --  2 4*               Results from last 7 days  Lab Units 01/06/19  1245 01/06/19  1239 01/03/19  1517   WOUND CULTURE  Few Colonies of Oxidase Positive gram negative jose j* 4+ Growth of Oxidase Positive gram negative jose j*  Few Colonies of Gram Negative Jose J Enteric Like*  Few Colonies of Oxidase Positive gram negative jose j* 1+ Growth of Corynebacterium striatum*         Lab Results:  I have personally reviewed pertinent lab results  Imaging: I have personally reviewed the pertinent imaging  EKG, Pathology, and Other Studies:   I have personally reviewed the pertinent test results

## 2019-01-07 NOTE — ASSESSMENT & PLAN NOTE
Patient currently has a sinus tract to the area and she has of jony placed to stabilize the fracture site  Will require 6 weeks of IV antibiotics  Final antibiotic selection will be based on cultures and sensitivities of left thigh INCISION AND DRAINAGE (I&D) left distal femur  With deep wound cultures  Currently has a wound VAC placed on the site  Draining serosanguineous fluid  Wound Care consulted  Discussed with the patient the need to consider SNF placement for IV antibiotics    Agrees with TCF placement

## 2019-01-07 NOTE — ASSESSMENT & PLAN NOTE
Body mass index is 18 67 kg/m²    Continue Ensure supplement  Patient has generalized muscle wasting secondary to being paraplegic

## 2019-01-07 NOTE — ASSESSMENT & PLAN NOTE
· Patient was noted to have mild hyponatremia previously during hospitalization  · Improving, 137 today    · Continue trending BMP occasionally

## 2019-01-07 NOTE — ASSESSMENT & PLAN NOTE
Multiple decubitus skin ulcers including bilateral buttocks and sacral region  These are stage 3-4  Unfortunately healing is being impeded by patient bed ridden status and incontinence  Patient with specialty mattress, which she is tolerating well  Being followed closely by orthopedics, plastic surgery, and ID    Status post RIGHT HIP AND BUTTOCK flap by Dr Jerri Cruz    Now back on daptomycin IV  Will require 6 weeks of IV antibiotics  Final antibiotic selection will be based on cultures and sensitivities of left thigh INCISION AND DRAINAGE (I&D) left distal femur  With deep wound cultures  Culture so far growing corynebacterium Sensitivities are pending at this time  History the patient was noted to have leukocytosis, fever and hypotension  Patient started on Flagyl and blood cultures were repeated, these are pending  Patient was previously encouraged to consider a diverting colostomy to help with healing of her sacral and left buttock decubitus ulcers    Patient has so far declined

## 2019-01-07 NOTE — PROGRESS NOTES
[facility]  Progress Note - Marissa Granados 1950, 76 y o  female MRN: [id number]  Unit/Bed#:  Encounter: [id number]  Primary Care Provider: Melanie Zuluaga   Date and time admitted to hospital: 12/13/2018  9:04 PM    Sepsis due to anaerobes [facility])   Assessment & Plan    History patient was noted to have hypotension with blood pressures dropping into the 80s, patient started on Levophed at 4 mcg, patient titrated down to 2 mcg yesterday evening  Blood pressure is improved to systolic , diastolic 78-48  Will attempt to wean today as tolerated to maintain mean arterial pressure of more than 65  Patient noted to have leukocytosis yesterday  The improvement to 9 5 today from 15 2  Procalcitonin elevated at 27 yesterday  Patient remains afebrile today  Repeat Wound cultures negative except left leg showing 2+ Gram-negative rods, and 1+ positive cocci  Awaiting sensitivities  UA positive for UTI with leukocyte and nitrates, will an antibiotic with gram-negative coverage with ID recommendation    All will continue IV daptomycin and IV Flagyl-of note patient has had previous anaerobes in her cultures  Chest x-ray negative for any acute cardiopulmonary disease     Patient will maintain ICU status  * 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, stage 3 (HCC)   Assessment & Plan    Multiple decubitus skin ulcers including bilateral buttocks and sacral region  These are stage 3-4  Unfortunately healing is being impeded by patient bed ridden status and incontinence  Patient with specialty mattress, which she is tolerating well  Being followed closely by orthopedics, plastic surgery, and ID    Status post RIGHT HIP AND BUTTOCK flap by Dr Seth Fernandez    Now back on daptomycin IV  Will require 6 weeks of IV antibiotics    Final antibiotic selection will be based on cultures and sensitivities of left thigh INCISION AND DRAINAGE (I&D) left distal femur  With deep wound cultures  Culture so far growing corynebacterium Sensitivities are pending at this time  History the patient was noted to have leukocytosis, fever and hypotension  Patient started on Flagyl and blood cultures were repeated, these are pending  Patient was previously encouraged to consider a diverting colostomy to help with healing of her sacral and left buttock decubitus ulcers  Patient has so far declined     GERD (gastroesophageal reflux disease)   Assessment & Plan    Continue pantoprazole       Chronic osteomyelitis of coccyx (HCC)   Assessment & Plan    Chronic osteomyelitis of coccyx  Treatment as per above  Closed fracture of lower end of left femur with routine healing   Assessment & Plan    Patient currently has a sinus tract to the area and she has of jony placed to stabilize the fracture site  Will require 6 weeks of IV antibiotics  Final antibiotic selection will be based on cultures and sensitivities of left thigh INCISION AND DRAINAGE (I&D) left distal femur  With deep wound cultures  Currently has a wound VAC placed on the site  Draining serosanguineous fluid  Wound Care consulted  Discussed with the patient the need to consider SNF placement for IV antibiotics  Agrees with TCF placement     BMI less than 19,adult   Assessment & Plan    Body mass index is 18 67 kg/m²  Continue Ensure supplement  Patient has generalized muscle wasting secondary to being paraplegic     Moderate protein-calorie malnutrition (HCC)   Assessment & Plan    Malnutrition Findings:   Malnutrition type: Chronic illness  Degree of Malnutrition: Malnutrition of moderate degree  BMI Findings: Body mass index is 18 67 kg/m²  Continue nutritional supplements  Encourage oral intake     Hyponatremia   Assessment & Plan    · Patient was noted to have mild hyponatremia previously during hospitalization  · Improving, 137 today    · Continue trending BMP occasionally     Anemia of chronic disease   Assessment & Plan    Anemia of chronic disease with iron deficiency; status post venofer and 2 units PRBC  Stool occult was positive but patient declined GI evaluation  No need for transfusion unless hemoglobin drops to less than 7 grams/deciliter  Hemoglobin stable at 8 6 today     Paraplegia following spinal cord injury [facility])   Assessment & Plan    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; continue frequent turning and specialty mattress  No changes in neurologic status   VTE Pharmacologic Prophylaxis:   Pharmacologic: Enoxaparin (Lovenox)  Mechanical VTE Prophylaxis in Place: Yes    Patient Centered Rounds: I have performed bedside rounds with nursing staff today  Discussions with Specialists or Other Care Team Provider: none    Education and Discussions with Family / Patient: Discussed with patient at bedside    Time Spent for Care: 30 minutes  More than 50% of total time spent on counseling and coordination of care as described above  Current Length of Stay: 25 day(s)    Current Patient Status: Inpatient   Certification Statement: The patient will continue to require additional inpatient hospital stay due to hypotension requiring blood pressure support, infection requiring IV antibiotics    Discharge Plan: TCF when medically stable    Code Status: Level 1 - Full Code      Subjective:   Patient reports she is feeling about the same as she did yesterday  Patient denies any chills overnight  Patient reports she is eating and drinking well  Denies any pain  Denies shortness of breath or difficulty breathing  Objective:     Vitals:   Temp (24hrs), Av 2 °F (36 8 °C), Min:97 4 °F (36 3 °C), Max:98 8 °F (37 1 °C)    Temp:  [97 4 °F (36 3 °C)-98 8 °F (37 1 °C)] 97 4 °F (36 3 °C)  HR:  [72-99] 78  Resp:  [0-39] 18  BP: ()/(33-76) 88/45  SpO2:  [93 %-100 %] 97 %  Body mass index is 18 67 kg/m²  Input and Output Summary (last 24 hours):        Intake/Output Summary (Last 24 hours) at 01/07/19 1016  Last data filed at 01/07/19 0901   Gross per 24 hour   Intake          1428 99 ml   Output             3675 ml   Net         -2246 01 ml       Physical Exam:     Physical Exam   Constitutional: She is oriented to person, place, and time  No distress  Malnourished    HENT:   Head: Normocephalic and atraumatic  Mouth/Throat: Oropharynx is clear and moist    Eyes: Conjunctivae and EOM are normal  No scleral icterus  Neck: Neck supple  No tracheal deviation present  Cardiovascular: Normal rate, regular rhythm and normal heart sounds  No murmur heard  Pulmonary/Chest: Effort normal and breath sounds normal  No stridor  No respiratory distress  She has no wheezes  She has no rales  Abdominal: Soft  Bowel sounds are normal  She exhibits no distension  There is no tenderness  There is no rebound  Musculoskeletal: Normal range of motion  She exhibits no edema  Neurological: She is alert and oriented to person, place, and time  paraplegic    Skin: Skin is warm and dry  Lesion (multiple wounds on left leg, hip and sacrum, drainage of serosang fluid   ) noted  Right hip incision, clean dry and intact with sutures in place    Psychiatric: She has a normal mood and affect  Her behavior is normal  Thought content normal    Vitals reviewed          Additional Data:     Labs:      Results from last 7 days  Lab Units 01/07/19  0555 01/06/19  0801   WBC Thousand/uL 9 50 15 20*   HEMOGLOBIN g/dL 8 6* 8 9*   HEMATOCRIT % 26 8* 28 2*   PLATELETS Thousands/uL 331 348   BANDS PCT %  --  3   NEUTROS PCT % 66*  --    LYMPHS PCT % 20*  --    LYMPHO PCT %  --  12*   MONOS PCT % 8  --    MONO PCT %  --  3   EOS PCT % 6  --        Results from last 7 days  Lab Units 01/07/19  0555  01/06/19  0544   SODIUM mmol/L 137  < > 137   POTASSIUM mmol/L 3 8  < > 3 4*   CHLORIDE mmol/L 106  < > 105   CO2 mmol/L 24  < > 25   BUN mg/dL 7  < > 18   CREATININE mg/dL 0 31*  < > 0 59*   ANION GAP mmol/L 7  < > 7   CALCIUM mg/dL 7 7*  < > 7 8*   ALBUMIN g/dL  --   --  2 4*   TOTAL BILIRUBIN mg/dL  --   --  0 10   ALK PHOS U/L  --   --  71   ALT U/L  --   --  29   AST U/L  --   --  24   GLUCOSE RANDOM mg/dL 123*  < > 121*   < > = values in this interval not displayed  Results from last 7 days  Lab Units 01/04/19  1141   POC GLUCOSE mg/dl 118*           Results from last 7 days  Lab Units 01/06/19  0802 01/06/19  0801   LACTIC ACID mmol/L 1 2  --    PROCALCITONIN ng/ml  --  27 73*           * I Have Reviewed All Lab Data Listed Above  * Additional Pertinent Lab Tests Reviewed: All Labs Within Last 24 Hours Reviewed    Imaging:    Imaging Reports Reviewed Today Include: chest XR  Imaging Personally Reviewed by Myself Includes:  none    Recent Cultures (last 7 days):       Results from last 7 days  Lab Units 01/06/19  1245 01/06/19  1239 01/06/19  0633 01/03/19  1517 01/03/19  1429 01/03/19  1428   GRAM STAIN RESULT  No Polys or Bacteria seen No polys seen  2+ Gram negative rods  1+ Gram positive cocci in pairs  No Polys or Bacteria seen  --  1+ Polys  No bacteria seen Rare Polys  2+ RBC's  No bacteria seen Rare Polys  2+ RBC's  No bacteria seen   URINE CULTURE   --   --  >100,000 cfu/ml Gram Negative Jose J Enteric Like*  --   --   --    WOUND CULTURE   --   --   --  1+ Growth of Corynebacterium striatum*  --   --        Last 24 Hours Medication List:     Current Facility-Administered Medications:  acetic acid  Topical Daily Robin Demarco MD    aluminum-magnesium hydroxide-simethicone 30 mL Oral Q4H PRN CONCEPCION Jackson    aztreonam 1,000 mg Intravenous Q8H Salas Resendiz MD    calcium carbonate-vitamin D 1 tablet Oral Daily With Breakfast Jumana Ahn,     cholecalciferol 1,000 Units Oral Daily Ginette Lenz MD    vitamin B-12 500 mcg Oral Daily Haven Anabelle, DO    DAPTOmycin 6 mg/kg Intravenous Q24H Bhavani Dao MD Last Rate: 300 mg (01/06/19 1618)   diphenhydrAMINE 25 mg Oral Q6H PRN Ginette Lenz MD    enoxaparin 40 mg Subcutaneous Q24H [address] Deshaun Chandler DO    metroNIDAZOLE 500 mg Intravenous Q8H Bhavani Dao MD Last Rate: 500 mg (01/07/19 0220)   multivitamin stress formula 1 tablet Oral Daily Deshaun Chandler DO    norepinephrine 1-30 mcg/min Intravenous Titrated Bhavani Dao MD Last Rate: 2 mcg/min (01/07/19 3128)   nystatin  Topical BID [facility], DO    oxyCODONE 5 mg Oral Q6H PRN Ginette Lenz MD    pantoprazole 40 mg Oral Early Morning CONCEPCION Burch    senna-docusate sodium 2 tablet Oral BID Bhavani Dao MD    sodium chloride 1,000 mL Intravenous Once Bhavani Dao MD    sodium chloride 75 mL/hr Intravenous Continuous Bhavani Dao MD Last Rate: 75 mL/hr (01/06/19 7959)        Today, Patient Was Seen By: Chastity Moy PA-C    ** Please Note: Dictation voice to text software may have been used in the creation of this document   **

## 2019-01-07 NOTE — PROGRESS NOTES
Progress Note - Infectious Disease   Darrell Doan 76 y o  female MRN: [id number]  Unit/Bed#:  Encounter: [id number]      Impression/Recommendations:  1  Septic shock  Evolving since admission  Fever, leukocytosis, refractory hypotension  Consider UTI (see below)  Blood cultures negative  Chest x-ray shows no pneumonia  Wounds stable per nursing  Clinically stable and nontoxic despite low level pressor requirement  Rec:  · Start aztreonam as below  · D/C Flagyl given low suspicion for anaerobic infection  · Follow up final blood cultures  · Follow up urine cultures as below  · Follow temperatures and vital signs closely  · Check CBC in a m  · Supportive care as per the primary service    2  Probable UTI  In the setting of bladder spasms and Hays removal   Urine culture with GNRs  No history of MDR-GNRs  Rec:  · Start aztreonam STAT  · Follow up urine culture and tailor antibiotics as indicated  · Follow urine output closely    3   Left femur ORIF infection   In setting of recent displaced femur fracture 10/2018 with localized hematoma   Draining sinus with air at fracture site now noted on admission CT   Now status post I&D of purulent/necrotic sinus tract that communicated to bone  OR culture with Corynebacterium from wound and bone  Clinically stable without sepsis  Rec:  ? Continue daptomycin for 6 weeks of IV antibiotics followed by suppressive PO antibiotics until bone healed  ? Check CBC-diff, creatinine while on IV antibiotics  ? D/C PICC after last dose of IV antibiotics  ? Will ask micro lab to run susceptibilities to tetracycline, FQ, linezolid to allow choice of eventual PO option  ? Continue LWC/VAC per ortho    4   Right hip pressure ulcer with flap necrosis   Status post debridement and flap readvancement   CT negative for osteomyelitis   Bone visualized intraoperatively after debridment but not overtly soft or infected and was subsequently covered   OR wound cultures with Staph pseudointermedius/B  Fragilis   OR bone cultures with 3 colonies MRSA   Status post 7 days daptomycin/Flagyl for soft tissue infection  Rec:  ? [address] and offloading per Plastics  ? Prolonged antibiotic course for # 1 should treat any potential residual osteomyelitis     5   Left hip pressure ulcer   No evidence of acute infection   Status post local debridement without purulence noted intraoperatively   No exposed bone  Rec:  ? Continue LWC and offloading per Plastics  ? Probable definitive management in 4-6 weeks once right hip flap healed     6   Sacral pressure ulcer with probably chronic coccygeal osteomyelitis   Chronic draining sinus with destructive changes seen on admission CT   No evidence for cellulitis or sepsis  Rec:  ? Continue LWC and offloading per Plastics     7  Paraplegia       Discussed in detail with Dr Jose F Torres      Antibiotics:  Daptomycin #5  Flagyl #2    Subjective:  Patient seen on AM rounds  Patient states she developed bladder spasms over weekend and thing that her Hays was pushed out  Has difficult feeling sensations in lower pelvis due to paraplegia  Denies fevers, chills, sweats, nausea, vomiting, or diarrhea  24 Hour Events:  Patient developed fever on Saturday  Patient developed bladder spasms and Hays came now  Hays reinserted by nursing  Developed hypotension requiring IV fluids and transferred ICU  Was briefly on pressors  Flagyl was added      Objective:  Vitals:  Temp:  [97 4 °F (36 3 °C)-98 8 °F (37 1 °C)] 97 4 °F (36 3 °C)  HR:  [72-99] 95  Resp:  [0-39] 22  BP: ()/(42-76) 92/45  SpO2:  [93 %-100 %] 95 %  Temp (24hrs), Av 2 °F (36 8 °C), Min:97 4 °F (36 3 °C), Max:98 8 °F (37 1 °C)  Current: Temperature: (!) 97 4 °F (36 3 °C)    Physical Exam:   General:  No acute distress  Eyes:  Normal lids and conjunctivae  ENT:  Normal external ears and nose  Neck:  Neck symmetric with midline trachea  Pulmonary:  Normal respiratory effort without accessory muscle use  Cardiovascular:  Regular rate and rhythm; no peripheral edema  Gastrointestinal:  No tenderness or distention  Musculoskeletal:  No digital clubbing or cyanosis  Skin:  No visible rashes; No palpable nodules  Neurologic:  Sensation grossly intact to light touch  Psychiatric:  Alert and oriented; Normal mood    Lab Results:  I have personally reviewed pertinent labs  Results from last 7 days  Lab Units 01/07/19  0555 01/06/19  0801 01/06/19  0544   POTASSIUM mmol/L 3 8 3 8 3 4*   CHLORIDE mmol/L 106 107 105   CO2 mmol/L 24 26 25   BUN mg/dL 7 17 18   CREATININE mg/dL 0 31* 0 53* 0 59*   EGFR ml/min/1 73sq m 117 98 94   CALCIUM mg/dL 7 7* 7 9* 7 8*   AST U/L  --   --  24   ALT U/L  --   --  29   ALK PHOS U/L  --   --  71       Results from last 7 days  Lab Units 01/07/19  0555 01/06/19  0801 01/06/19  0544   WBC Thousand/uL 9 50 15 20* 16 10*   HEMOGLOBIN g/dL 8 6* 8 9* 8 9*   PLATELETS Thousands/uL 331 348 343       Results from last 7 days  Lab Units 01/06/19  1245 01/06/19  1239 01/06/19  0633 01/03/19  1517 01/03/19  1429 01/03/19  1428   GRAM STAIN RESULT  No Polys or Bacteria seen No polys seen  2+ Gram negative rods  1+ Gram positive cocci in pairs  No Polys or Bacteria seen  --  1+ Polys  No bacteria seen Rare Polys  2+ RBC's  No bacteria seen Rare Polys  2+ RBC's  No bacteria seen   URINE CULTURE   --   --  >100,000 cfu/ml Gram Negative Jose J Enteric Like*  --   --   --    WOUND CULTURE   --   --   --  1+ Growth of Corynebacterium striatum*  --   --        Imaging Studies:   I have personally reviewed pertinent imaging study reports and images in PACS  CXr no pneumonia    EKG, Pathology, and Other Studies:   I have personally reviewed pertinent reports

## 2019-01-07 NOTE — ASSESSMENT & PLAN NOTE
Malnutrition Findings:   Malnutrition type: Chronic illness  Degree of Malnutrition: Malnutrition of moderate degree  BMI Findings: Body mass index is 18 67 kg/m²     Continue nutritional supplements  Encourage oral intake

## 2019-01-07 NOTE — UTILIZATION REVIEW
Continued Stay Review    Date: 1/7/19    Vital Signs: /51   Pulse 86   Temp (!) 97 4 °F (36 3 °C) (Temporal)   Resp (!) 23   Ht 5' 5" (1 651 m)   Wt 50 9 kg (112 lb 3 4 oz)   LMP  (LMP Unknown)   SpO2 92% on Room Air  BMI 18 67 kg/m²     Medications:   Scheduled Meds:   Current Facility-Administered Medications:  acetic acid  Topical Daily   aluminum-magnesium hydroxide-simethicone 30 mL Oral Q4H PRN   aztreonam 1,000 mg Intravenous Q8H   calcium carbonate-vitamin D 1 tablet Oral Daily With Breakfast   cholecalciferol 1,000 Units Oral Daily   vitamin B-12 500 mcg Oral Daily   DAPTOmycin 6 mg/kg Intravenous Q24H   diphenhydrAMINE 25 mg Oral Q6H PRN   enoxaparin 40 mg Subcutaneous Q24H JESSEE   multivitamin stress formula 1 tablet Oral Daily   nystatin  Topical BID   oxyCODONE 5 mg Oral Q6H PRN   pantoprazole 40 mg Oral Early Morning   senna-docusate sodium 2 tablet Oral BID   sodium chloride 1,000 mL Intravenous Once       Abnormal Labs/Diagnostic Results:     Glucose = 123, Calcium = 7 7, H&H = 8 6/26 8    01/06/19 0801     Procalcitonin <=0 25 ng/ml 27 73       Age/Sex: 76 y o  female     Assessment/Plan:     Sepsis due to anaerobes [facility])   Assessment & Plan     Patient was noted to have hypotension with blood pressures dropping into the 80s, patient started on Levophed at 4 mcg, patient titrated down to 2 mcg yesterday evening  Blood pressure is improved to systolic , diastolic 98-45  Patient noted to have leukocytosis yesterday  The improvement to 9 5 today from 15  2  Procalcitonin elevated at 27 yesterday  Patient remains afebrile today  Repeat Wound cultures negative except left leg showing 2+ Gram-negative rods, and 1+ positive cocci  Awaiting sensitivities  UA positive for UTI with leukocyte and nitrates,  antibiotic with gram-negative coverage with ID recommendation, continue IV daptomycin and IV Flagyl-of note patient has had previous anaerobes in her cultures  Chest x-ray negative for any acute cardiopulmonary disease  Patient will maintain ICU status           * 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, stage 3 (HCC)   Assessment & Plan    Multiple decubitus skin ulcers including bilateral buttocks and sacral region  These are stage 3-4  Unfortunately healing is being impeded by patient bed ridden status and incontinence  Patient with specialty mattress, which she is tolerating well  Being followed closely by orthopedics, plastic surgery, and ID     Status post RIGHT HIP AND BUTTOCK flap by Dr Hedwig Boeck  Now back on daptomycin IV  Will require 6 weeks of IV antibiotics  Final antibiotic selection will be based on cultures and sensitivities of left thigh INCISION AND DRAINAGE (I&D) left distal femur  With deep wound cultures  Culture so far growing corynebacterium Sensitivities are pending at this time  History the patient was noted to have leukocytosis, fever and hypotension  Patient started on Flagyl and blood cultures were repeated, these are pending  Patient was previously encouraged to consider a diverting colostomy to help with healing of her sacral and left buttock decubitus ulcers  Patient has so far declined     BMI less than 19,adult   Assessment & Plan     Body mass index is 18 67 kg/m²  Continue Ensure supplement  Patient has generalized muscle wasting secondary to being paraplegic      Moderate protein-calorie malnutrition (HCC)   Assessment & Plan     Malnutrition Findings:   Malnutrition type: Chronic illness  Degree of Malnutrition: Malnutrition of moderate degree  BMI Findings: Body mass index is 18 67 kg/m²  Continue nutritional supplements  Encourage oral intake        Paraplegia following spinal cord injury [facility])   Assessment & Plan     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; continue frequent turning and specialty mattress  No changes in neurologic status   =========================================================  1/7/19 Infectious Disease Progress Note:    Impression/Recommendations:   Septic shock  Evolving since admission  Fever, leukocytosis, refractory hypotension  Consider UTI (see below)  Blood cultures negative  Chest x-ray shows no pneumonia  Wounds stable per nursing  Rec:  ? Start aztreonam as below  ? D/C Flagyl given low suspicion for anaerobic infection  ? Follow up final blood cultures  ? Follow up urine cultures as below  ? Follow temperatures and vital signs closely  ? Check CBC in a m   ? Supportive care as per the primary service       Probable UTI  In the setting of bladder spasms and Hays removal   Urine culture with GNRs  No history of MDR-GNRs  Rec:  ? Start aztreonam STAT  ? Follow up urine culture and tailor antibiotics as indicated  ? Follow urine output closely       Left femur ORIF infection   In setting of recent displaced femur fracture 10/2018 with localized hematoma   Draining sinus with air at fracture site now noted on admission CT   Now status post I&D of purulent/necrotic sinus tract that communicated to bone   OR culture with Corynebacterium from wound and bone  Rec:  ? Continue daptomycin for 6 weeks of IV antibiotics followed by suppressive PO antibiotics until bone healed  ? Check CBC-diff, creatinine while on IV antibiotics  ? D/C PICC after last dose of IV antibiotics  ? Will ask micro lab to run susceptibilities to tetracycline, FQ, linezolid to allow choice of eventual PO option  ? Continue LWC/VAC per ortho      Right hip pressure ulcer with flap necrosis   Status post debridement and flap readvancement   CT negative for osteomyelitis   Bone visualized intraoperatively after debridment but not overtly soft or infected and was subsequently covered   OR wound cultures with Staph pseudointermedius/B   Fragilis   OR bone cultures with 3 colonies MRSA   Status post 7 days daptomycin/Flagyl for soft tissue infection  Rec:  ? [address] and offloading per Plastics  ?  Prolonged antibiotic course for # 1 should treat any potential residual osteomyelitis            Discharge Plan: TBD

## 2019-01-07 NOTE — ASSESSMENT & PLAN NOTE
Anemia of chronic disease with iron deficiency; status post venofer and 2 units PRBC  Stool occult was positive but patient declined GI evaluation  No need for transfusion unless hemoglobin drops to less than 7 grams/deciliter    Hemoglobin stable at 8 6 today

## 2019-01-07 NOTE — ASSESSMENT & PLAN NOTE
History patient was noted to have hypotension with blood pressures dropping into the 80s, patient started on Levophed at 4 mcg, patient titrated down to 2 mcg yesterday evening  Blood pressure is improved to systolic , diastolic 48-36  Will attempt to wean today as tolerated to maintain mean arterial pressure of more than 65  Patient noted to have leukocytosis yesterday  The improvement to 9 5 today from 15 2  Procalcitonin elevated at 27 yesterday  Patient remains afebrile today  Repeat Wound cultures negative except left leg showing 2+ Gram-negative rods, and 1+ positive cocci  Awaiting sensitivities  UA positive for UTI with leukocyte and nitrates, will an antibiotic with gram-negative coverage with ID recommendation    All will continue IV daptomycin and IV Flagyl-of note patient has had previous anaerobes in her cultures  Chest x-ray negative for any acute cardiopulmonary disease     Patient will maintain ICU status

## 2019-01-08 LAB
ANION GAP SERPL CALCULATED.3IONS-SCNC: 3 MMOL/L (ref 5–14)
BACTERIA WND AEROBE CULT: ABNORMAL
BASOPHILS # BLD AUTO: 0 THOUSANDS/ΜL (ref 0–0.1)
BASOPHILS NFR BLD AUTO: 1 % (ref 0–1)
BUN SERPL-MCNC: 11 MG/DL (ref 5–25)
CALCIUM SERPL-MCNC: 8.3 MG/DL (ref 8.4–10.2)
CHLORIDE SERPL-SCNC: 103 MMOL/L (ref 97–108)
CO2 SERPL-SCNC: 27 MMOL/L (ref 22–30)
CREAT SERPL-MCNC: 0.42 MG/DL (ref 0.6–1.2)
EOSINOPHIL # BLD AUTO: 0.4 THOUSAND/ΜL (ref 0–0.4)
EOSINOPHIL NFR BLD AUTO: 5 % (ref 0–6)
ERYTHROCYTE [DISTWIDTH] IN BLOOD BY AUTOMATED COUNT: 22.8 %
GFR SERPL CREATININE-BSD FRML MDRD: 106 ML/MIN/1.73SQ M
GLUCOSE SERPL-MCNC: 96 MG/DL (ref 70–99)
GRAM STN SPEC: ABNORMAL
HCT VFR BLD AUTO: 27.6 % (ref 36–46)
HGB BLD-MCNC: 9 G/DL (ref 12–16)
LACTATE SERPL-SCNC: 0.7 MMOL/L (ref 0.7–2)
LYMPHOCYTES # BLD AUTO: 1.7 THOUSANDS/ΜL (ref 0.5–4)
LYMPHOCYTES NFR BLD AUTO: 21 % (ref 25–45)
MCH RBC QN AUTO: 27.5 PG (ref 26–34)
MCHC RBC AUTO-ENTMCNC: 32.5 G/DL (ref 31–36)
MCV RBC AUTO: 85 FL (ref 80–100)
MONOCYTES # BLD AUTO: 0.7 THOUSAND/ΜL (ref 0.2–0.9)
MONOCYTES NFR BLD AUTO: 8 % (ref 1–10)
NEUTROPHILS # BLD AUTO: 5.4 THOUSANDS/ΜL (ref 1.8–7.8)
NEUTS SEG NFR BLD AUTO: 66 % (ref 45–65)
PLATELET # BLD AUTO: 305 THOUSANDS/UL (ref 150–450)
PMV BLD AUTO: 8.1 FL (ref 8.9–12.7)
POTASSIUM SERPL-SCNC: 3.8 MMOL/L (ref 3.6–5)
RBC # BLD AUTO: 3.26 MILLION/UL (ref 4–5.2)
SODIUM SERPL-SCNC: 133 MMOL/L (ref 137–147)
WBC # BLD AUTO: 8.2 THOUSAND/UL (ref 4.5–11)

## 2019-01-08 PROCEDURE — 99232 SBSQ HOSP IP/OBS MODERATE 35: CPT | Performed by: FAMILY MEDICINE

## 2019-01-08 PROCEDURE — 99232 SBSQ HOSP IP/OBS MODERATE 35: CPT | Performed by: INTERNAL MEDICINE

## 2019-01-08 PROCEDURE — 83605 ASSAY OF LACTIC ACID: CPT | Performed by: PHYSICIAN ASSISTANT

## 2019-01-08 PROCEDURE — 80048 BASIC METABOLIC PNL TOTAL CA: CPT | Performed by: PHYSICIAN ASSISTANT

## 2019-01-08 PROCEDURE — 85025 COMPLETE CBC W/AUTO DIFF WBC: CPT | Performed by: PHYSICIAN ASSISTANT

## 2019-01-08 RX ADMIN — SODIUM CHLORIDE 1000 MG: 900 INJECTION INTRAVENOUS at 17:49

## 2019-01-08 RX ADMIN — VITAMIN D, TAB 1000IU (100/BT) 1000 UNITS: 25 TAB at 08:52

## 2019-01-08 RX ADMIN — ENOXAPARIN SODIUM 40 MG: 40 INJECTION SUBCUTANEOUS at 08:51

## 2019-01-08 RX ADMIN — OYSTER SHELL CALCIUM WITH VITAMIN D 1 TABLET: 500; 200 TABLET, FILM COATED ORAL at 08:51

## 2019-01-08 RX ADMIN — SODIUM CHLORIDE 1000 ML: 9 INJECTION, SOLUTION INTRAVENOUS at 04:27

## 2019-01-08 RX ADMIN — CYANOCOBALAMIN TAB 1000 MCG 500 MCG: 1000 TAB at 08:52

## 2019-01-08 RX ADMIN — SODIUM CHLORIDE 1000 MG: 900 INJECTION INTRAVENOUS at 11:00

## 2019-01-08 RX ADMIN — NYSTATIN: 100000 POWDER TOPICAL at 08:53

## 2019-01-08 RX ADMIN — B-COMPLEX W/ C & FOLIC ACID TAB 1 TABLET: TAB at 08:51

## 2019-01-08 RX ADMIN — PANTOPRAZOLE SODIUM 40 MG: 40 TABLET, DELAYED RELEASE ORAL at 06:05

## 2019-01-08 RX ADMIN — DAPTOMYCIN 300 MG: 500 INJECTION, POWDER, LYOPHILIZED, FOR SOLUTION INTRAVENOUS at 17:09

## 2019-01-08 RX ADMIN — ACETIC ACID: 250 IRRIGANT IRRIGATION at 08:52

## 2019-01-08 RX ADMIN — SODIUM CHLORIDE 1000 MG: 900 INJECTION INTRAVENOUS at 01:59

## 2019-01-08 NOTE — ASSESSMENT & PLAN NOTE
Multiple decubitus skin ulcers including bilateral buttocks and sacral region  These are stage 3-4  Unfortunately healing is being impeded by patient bed ridden status and incontinence  Patient with specialty mattress, which she is tolerating well  Being followed closely by orthopedics, plastic surgery, and ID    Status post RIGHT HIP AND BUTTOCK flap by Dr Trace Mills    Now back on daptomycin IV  Will require 6 weeks of IV antibiotics  Final antibiotic selection will be based on cultures and sensitivities of left thigh INCISION AND DRAINAGE (I&D) left distal femur  With deep wound cultures  Culture so far growing corynebacterium, most recent wound cultures growing Pseudomonas  Patient seen by ID placed on aztreonam   Sensitivity showing strain is sensitive to aztreonam     Patient's urine culture also returned positive for gram-negative rods, sensitive to aztreonam      Patient was previously encouraged to consider a diverting colostomy to help with healing of her sacral and left buttock decubitus ulcers    Patient has so far declined

## 2019-01-08 NOTE — SOCIAL WORK
Pt remains in ICU for continued care  Pt continues to require 6 weeks of IVABX Daptomycin  Currently on day #6  Pt also receiving ABX Aztreonam day #2 for UTI  Referral sent to [facility] who continues to follow  Pt at this time is not agreeable to transfer to outside SNF once medically cleared  SW will continue to follow

## 2019-01-08 NOTE — ASSESSMENT & PLAN NOTE
· Patient was noted to have mild hyponatremia previously during hospitalization  · Improving, 137  · Continue trending BMP occasionally

## 2019-01-08 NOTE — ASSESSMENT & PLAN NOTE
Anemia of chronic disease with iron deficiency; status post venofer and 2 units PRBC  Stool occult was positive but patient declined GI evaluation  No need for transfusion unless hemoglobin drops to less than 7 grams/deciliter     Will follow up CBC today

## 2019-01-08 NOTE — ASSESSMENT & PLAN NOTE
Malnutrition Findings:   Malnutrition type: Chronic illness  Degree of Malnutrition: Malnutrition of moderate degree  BMI Findings: Body mass index is 18 53 kg/m²     Continue nutritional supplements  Encourage oral intake

## 2019-01-08 NOTE — PROGRESS NOTES
[facility]  Progress Note - Mary Stapleton 1950, 76 y o  female MRN: [id number]  Unit/Bed#:  Encounter: [id number]  Primary Care Provider: Malena Rocha   Date and time admitted to hospital: 12/13/2018  9:04 PM    Sepsis due to anaerobes [facility])   Assessment & Plan    Patient was noted to have hypotension with blood pressures dropping into the 80s 1/6/19, patient started on Levophed at 4 mcg, patient titrated down to 2 mcg, then was able to be titrated off completely yesterday  Blood pressure is improved  Patient became acutely hypotensive last night  With systolic blood pressures dropping into the 80s again  Blood pressure is currently in the 90s  Will continue to monitor  Also draw new lactic acid and labs this morning  Patient was without leukocytosis yesterday, will follow up labs today  Procalcitonin elevated at 27 on 1/7/19  Patient continues to remain afebrile  Repeat wound culture showing growth of Pseudomonas, was seen by ID and placed on aztreonam    UA positive for UTI with leukocyte and nitrates, urine culture with growth of Gram-negative rods, sensitive to aztreonam   All will continue IV daptomycin  Likely will need 6 weeks of antibiotics  Chest x-ray negative for any acute cardiopulmonary disease               * 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, stage 3 (HCC)   Assessment & Plan    Multiple decubitus skin ulcers including bilateral buttocks and sacral region  These are stage 3-4  Unfortunately healing is being impeded by patient bed ridden status and incontinence  Patient with specialty mattress, which she is tolerating well  Being followed closely by orthopedics, plastic surgery, and ID    Status post RIGHT HIP AND BUTTOCK flap by Dr Say Rivera    Now back on daptomycin IV  Will require 6 weeks of IV antibiotics    Final antibiotic selection will be based on cultures and sensitivities of left thigh INCISION AND DRAINAGE (I&D) left distal femur  With deep wound cultures  Culture so far growing corynebacterium, most recent wound cultures growing Pseudomonas  Patient seen by ID placed on aztreonam   Sensitivity showing strain is sensitive to aztreonam     Patient's urine culture also returned positive for gram-negative rods, sensitive to aztreonam      Patient was previously encouraged to consider a diverting colostomy to help with healing of her sacral and left buttock decubitus ulcers  Patient has so far declined     GERD (gastroesophageal reflux disease)   Assessment & Plan    Continue pantoprazole       Chronic osteomyelitis of coccyx (HCC)   Assessment & Plan    Chronic osteomyelitis of coccyx  Treatment as per above  Closed fracture of lower end of left femur with routine healing   Assessment & Plan    Patient currently has a sinus tract to the area and she has of jony placed to stabilize the fracture site  Will require 6 weeks of IV antibiotics  Final antibiotic selection will be based on cultures and sensitivities of left thigh INCISION AND DRAINAGE (I&D) left distal femur  With deep wound cultures  Currently has a wound VAC placed on the site  Draining serosanguineous fluid  Wound Care consulted  Discussed with the patient the need to consider SNF placement for IV antibiotics  Agrees with [facility]CF placement     BMI less than 19,adult   Assessment & Plan    Body mass index is 18 53 kg/m²  Continue Ensure supplement  Patient has generalized muscle wasting secondary to being paraplegic     Moderate protein-calorie malnutrition (HCC)   Assessment & Plan    Malnutrition Findings:   Malnutrition type: Chronic illness  Degree of Malnutrition: Malnutrition of moderate degree  BMI Findings: Body mass index is 18 53 kg/m²     Continue nutritional supplements  Encourage oral intake     Hyponatremia   Assessment & Plan    · Patient was noted to have mild hyponatremia previously during hospitalization  · Improving, 137  · Continue trending BMP occasionally     Anemia of chronic disease   Assessment & Plan    Anemia of chronic disease with iron deficiency; status post venofer and 2 units PRBC  Stool occult was positive but patient declined GI evaluation  No need for transfusion unless hemoglobin drops to less than 7 grams/deciliter  Will follow up CBC today     Paraplegia following spinal cord injury [facility])   Assessment & Plan    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; continue frequent turning and specialty mattress  No changes in neurologic status   VTE Pharmacologic Prophylaxis:   Pharmacologic: Enoxaparin (Lovenox)  Mechanical VTE Prophylaxis in Place: Yes    Patient Centered Rounds: I have performed bedside rounds with nursing staff today  Discussions with Specialists or Other Care Team Provider:  None    Education and Discussions with Family / Patient:  Patient at bedside    Time Spent for Care: 20 minutes  More than 50% of total time spent on counseling and coordination of care as described above  Current Length of Stay: 26 day(s)    Current Patient Status: Inpatient   Certification Statement: The patient will continue to require additional inpatient hospital stay due to Continued hypotension, need for IV antibiotics for wound    Discharge Plan:  Likely rehab when medically stable    Code Status: Level 1 - Full Code      Subjective:   Patient but she is doing well today  Patient denies any pain, shortness of breath, chest pain or palpitations  Patient is aware that she needs to remain on IV antibiotics for her infections  Patient aware of hypotensive episode, but reports she remained asymptomatic throughout  The patient currently denying any dizziness lightheadedness changes in vision  Patient reports she is comfortably situated in bed      Objective:     Vitals:   Temp (24hrs), Av 9 °F (37 2 °C), Min:97 1 °F (36 2 °C), Max:100 °F (37 8 °C)    Temp:  [97 1 °F (36 2 °C)-100 °F (37 8 °C)] 99 3 °F (37 4 °C)  HR:  [] 68  Resp:  [16-67] 16  BP: ()/(22-77) 93/52  SpO2:  [92 %-100 %] 97 %  Body mass index is 18 53 kg/m²  Input and Output Summary (last 24 hours): Intake/Output Summary (Last 24 hours) at 01/08/19 0950  Last data filed at 01/08/19 0900   Gross per 24 hour   Intake             1903 ml   Output             2450 ml   Net             -547 ml       Physical Exam:     Physical Exam   Constitutional: She is oriented to person, place, and time  She is cooperative  No distress  HENT:   Head: Normocephalic and atraumatic  Mouth/Throat: Oropharynx is clear and moist    Eyes: Conjunctivae and EOM are normal  No scleral icterus  Neck: Normal range of motion  Neck supple  No JVD present  Cardiovascular: Normal rate, regular rhythm and normal heart sounds  No murmur heard  Pulmonary/Chest: Effort normal and breath sounds normal  No respiratory distress  She has no wheezes  She has no rales  Abdominal: Soft  Bowel sounds are normal  She exhibits no distension  There is no tenderness  There is no rebound  Musculoskeletal: Normal range of motion  She exhibits no edema  Neurological: She is alert and oriented to person, place, and time  Skin: Skin is warm and dry  No rash noted  She is not diaphoretic  Wound VAC draining serosanguineous fluid on thigh  Multiple wounds appear to be healing, no increased redness or drainage   Psychiatric: She has a normal mood and affect  Her behavior is normal  Thought content normal    Vitals reviewed          Additional Data:     Labs:      Results from last 7 days  Lab Units 01/07/19  0555 01/06/19  0801   WBC Thousand/uL 9 50 15 20*   HEMOGLOBIN g/dL 8 6* 8 9*   HEMATOCRIT % 26 8* 28 2*   PLATELETS Thousands/uL 331 348   BANDS PCT %  --  3   NEUTROS PCT % 66*  --    LYMPHS PCT % 20*  --    LYMPHO PCT %  --  12*   MONOS PCT % 8  --    MONO PCT %  --  3   EOS PCT % 6  --        Results from last 7 days  Lab Units 01/07/19  0555 01/06/19  0544   SODIUM mmol/L 137  < > 137   POTASSIUM mmol/L 3 8  < > 3 4*   CHLORIDE mmol/L 106  < > 105   CO2 mmol/L 24  < > 25   BUN mg/dL 7  < > 18   CREATININE mg/dL 0 31*  < > 0 59*   ANION GAP mmol/L 7  < > 7   CALCIUM mg/dL 7 7*  < > 7 8*   ALBUMIN g/dL  --   --  2 4*   TOTAL BILIRUBIN mg/dL  --   --  0 10   ALK PHOS U/L  --   --  71   ALT U/L  --   --  29   AST U/L  --   --  24   GLUCOSE RANDOM mg/dL 123*  < > 121*   < > = values in this interval not displayed  Results from last 7 days  Lab Units 01/04/19  1141   POC GLUCOSE mg/dl 118*           Results from last 7 days  Lab Units 01/06/19  0802 01/06/19  0801   LACTIC ACID mmol/L 1 2  --    PROCALCITONIN ng/ml  --  27 73*           * I Have Reviewed All Lab Data Listed Above  * Additional Pertinent Lab Tests Reviewed: [facility] Admission Reviewed    Imaging:    Imaging Reports Reviewed Today Include: none  Imaging Personally Reviewed by Myself Includes:  none    Recent Cultures (last 7 days):       Results from last 7 days  Lab Units 01/06/19  1245 01/06/19  1239 01/06/19  0927 01/06/19  0633 01/05/19  2242 01/03/19  1517 01/03/19  1429 01/03/19  1428   BLOOD CULTURE   --   --  No Growth at 24 hrs  No Growth at 24 hrs   --  No Growth at 24 hrs    No Growth at 24 hrs   --   --   --    Gregory Honey STAIN RESULT  No Polys or Bacteria seen No Polys or Bacteria seen  No polys seen  2+ Gram negative rods  1+ Gram positive cocci in pairs  --   --   --  1+ Polys  No bacteria seen Rare Polys  2+ RBC's  No bacteria seen Rare Polys  2+ RBC's  No bacteria seen   URINE CULTURE   --   --   --  >100,000 cfu/ml Gram Negative Jose J Enteric Like*  --   --   --   --    WOUND CULTURE  Few Colonies of Pseudomonas aeruginosa* Few Colonies of Escherichia coli*  Few Colonies of Pseudomonas aeruginosa*  4+ Growth of Pseudomonas aeruginosa*  3+ Growth of   --   --   --  1+ Growth of Corynebacterium striatum*  --   --        Last 24 Hours Medication List:     Current Facility-Administered Medications:  acetic acid  Topical Daily Latosha Borjas MD    aluminum-magnesium hydroxide-simethicone 30 mL Oral Q4H PRN CONCEPCION Jackson    aztreonam 1,000 mg Intravenous Q8H Joy Huber MD Last Rate: 1,000 mg (01/08/19 0159)   calcium carbonate-vitamin D 1 tablet Oral Daily With Breakfast [employer], DO    cholecalciferol 1,000 Units Oral Daily Ginette Lenz MD    vitamin B-12 500 mcg Oral Daily [employer], DO    DAPTOmycin 6 mg/kg Intravenous Q24H Azul Hein MD Last Rate: 300 mg (01/07/19 1649)   diphenhydrAMINE 25 mg Oral Q6H PRN Ginette Lenz MD    enoxaparin 40 mg Subcutaneous Q24H [address] Deshaun Chandler, DO    multivitamin stress formula 1 tablet Oral Daily Deshaun Chandler, DO    nystatin  Topical BID [employer], DO    oxyCODONE 5 mg Oral Q6H PRN Ginette May MD    pantoprazole 40 mg Oral Early Morning CONCEPCION Jackson    senna-docusate sodium 2 tablet Oral BID Azul Hein MD    sodium chloride 1,000 mL Intravenous Once Azul Hein MD         Today, Patient Was Seen By: Ginny Hammond PA-C    ** Please Note: Dictation voice to text software may have been used in the creation of this document   **

## 2019-01-08 NOTE — ASSESSMENT & PLAN NOTE
Patient was noted to have hypotension with blood pressures dropping into the 80s 1/6/19, patient started on Levophed at 4 mcg, patient titrated down to 2 mcg, then was able to be titrated off completely yesterday  Blood pressure is improved  Patient became acutely hypotensive last night  With systolic blood pressures dropping into the 80s again  Blood pressure is currently in the 90s  Will continue to monitor  Also draw new lactic acid and labs this morning  Patient was without leukocytosis yesterday, will follow up labs today  Procalcitonin elevated at 27 on 1/7/19  Patient continues to remain afebrile  Repeat wound culture showing growth of Pseudomonas, was seen by ID and placed on aztreonam    UA positive for UTI with leukocyte and nitrates, urine culture with growth of Gram-negative rods, sensitive to aztreonam   All will continue IV daptomycin  Likely will need 6 weeks of antibiotics  Chest x-ray negative for any acute cardiopulmonary disease  Jamila Calixto

## 2019-01-08 NOTE — ASSESSMENT & PLAN NOTE
Body mass index is 18 53 kg/m²    Continue Ensure supplement  Patient has generalized muscle wasting secondary to being paraplegic

## 2019-01-09 LAB
ANION GAP SERPL CALCULATED.3IONS-SCNC: 7 MMOL/L (ref 5–14)
BACTERIA TISS AEROBE CULT: ABNORMAL
BACTERIA UR CULT: ABNORMAL
BACTERIA UR CULT: ABNORMAL
BASOPHILS # BLD AUTO: 0.1 THOUSANDS/ΜL (ref 0–0.1)
BASOPHILS NFR BLD AUTO: 1 % (ref 0–1)
BUN SERPL-MCNC: 12 MG/DL (ref 5–25)
CALCIUM SERPL-MCNC: 8.6 MG/DL (ref 8.4–10.2)
CHLORIDE SERPL-SCNC: 99 MMOL/L (ref 97–108)
CO2 SERPL-SCNC: 27 MMOL/L (ref 22–30)
CREAT SERPL-MCNC: 0.44 MG/DL (ref 0.6–1.2)
EOSINOPHIL # BLD AUTO: 0.4 THOUSAND/ΜL (ref 0–0.4)
EOSINOPHIL NFR BLD AUTO: 4 % (ref 0–6)
ERYTHROCYTE [DISTWIDTH] IN BLOOD BY AUTOMATED COUNT: 23 %
GFR SERPL CREATININE-BSD FRML MDRD: 104 ML/MIN/1.73SQ M
GLUCOSE SERPL-MCNC: 122 MG/DL (ref 70–99)
GRAM STN SPEC: ABNORMAL
HCT VFR BLD AUTO: 29.7 % (ref 36–46)
HGB BLD-MCNC: 9.6 G/DL (ref 12–16)
LYMPHOCYTES # BLD AUTO: 2 THOUSANDS/ΜL (ref 0.5–4)
LYMPHOCYTES NFR BLD AUTO: 21 % (ref 25–45)
MCH RBC QN AUTO: 27 PG (ref 26–34)
MCHC RBC AUTO-ENTMCNC: 32.2 G/DL (ref 31–36)
MCV RBC AUTO: 84 FL (ref 80–100)
MONOCYTES # BLD AUTO: 0.8 THOUSAND/ΜL (ref 0.2–0.9)
MONOCYTES NFR BLD AUTO: 8 % (ref 1–10)
NEUTROPHILS # BLD AUTO: 6.1 THOUSANDS/ΜL (ref 1.8–7.8)
NEUTS SEG NFR BLD AUTO: 66 % (ref 45–65)
PLATELET # BLD AUTO: 361 THOUSANDS/UL (ref 150–450)
PMV BLD AUTO: 8.4 FL (ref 8.9–12.7)
POTASSIUM SERPL-SCNC: 4.1 MMOL/L (ref 3.6–5)
RBC # BLD AUTO: 3.54 MILLION/UL (ref 4–5.2)
SODIUM SERPL-SCNC: 133 MMOL/L (ref 137–147)
WBC # BLD AUTO: 9.2 THOUSAND/UL (ref 4.5–11)

## 2019-01-09 PROCEDURE — 99232 SBSQ HOSP IP/OBS MODERATE 35: CPT | Performed by: INTERNAL MEDICINE

## 2019-01-09 PROCEDURE — 99233 SBSQ HOSP IP/OBS HIGH 50: CPT | Performed by: INTERNAL MEDICINE

## 2019-01-09 PROCEDURE — 85025 COMPLETE CBC W/AUTO DIFF WBC: CPT | Performed by: FAMILY MEDICINE

## 2019-01-09 PROCEDURE — 80048 BASIC METABOLIC PNL TOTAL CA: CPT | Performed by: FAMILY MEDICINE

## 2019-01-09 PROCEDURE — 99024 POSTOP FOLLOW-UP VISIT: CPT | Performed by: ORTHOPAEDIC SURGERY

## 2019-01-09 RX ORDER — AMOXICILLIN AND CLAVULANATE POTASSIUM 875; 125 MG/1; MG/1
1 TABLET, FILM COATED ORAL EVERY 12 HOURS SCHEDULED
Status: DISCONTINUED | OUTPATIENT
Start: 2019-01-09 | End: 2019-01-14 | Stop reason: HOSPADM

## 2019-01-09 RX ADMIN — VITAMIN D, TAB 1000IU (100/BT) 1000 UNITS: 25 TAB at 09:38

## 2019-01-09 RX ADMIN — DAPTOMYCIN 300 MG: 500 INJECTION, POWDER, LYOPHILIZED, FOR SOLUTION INTRAVENOUS at 16:24

## 2019-01-09 RX ADMIN — PANTOPRAZOLE SODIUM 40 MG: 40 TABLET, DELAYED RELEASE ORAL at 06:34

## 2019-01-09 RX ADMIN — ACETIC ACID 1000 ML: 250 IRRIGANT IRRIGATION at 09:34

## 2019-01-09 RX ADMIN — OYSTER SHELL CALCIUM WITH VITAMIN D 1 TABLET: 500; 200 TABLET, FILM COATED ORAL at 09:38

## 2019-01-09 RX ADMIN — ENOXAPARIN SODIUM 40 MG: 40 INJECTION SUBCUTANEOUS at 09:38

## 2019-01-09 RX ADMIN — SENNOSIDES AND DOCUSATE SODIUM 2 TABLET: 8.6; 5 TABLET ORAL at 16:25

## 2019-01-09 RX ADMIN — AMOXICILLIN AND CLAVULANATE POTASSIUM 1 TABLET: 875; 125 TABLET, FILM COATED ORAL at 20:28

## 2019-01-09 RX ADMIN — B-COMPLEX W/ C & FOLIC ACID TAB 1 TABLET: TAB at 09:38

## 2019-01-09 RX ADMIN — SODIUM CHLORIDE 1000 MG: 900 INJECTION INTRAVENOUS at 02:42

## 2019-01-09 RX ADMIN — CYANOCOBALAMIN TAB 1000 MCG 500 MCG: 1000 TAB at 09:39

## 2019-01-09 RX ADMIN — SODIUM CHLORIDE 1000 MG: 900 INJECTION INTRAVENOUS at 09:38

## 2019-01-09 NOTE — ASSESSMENT & PLAN NOTE
Chronic osteomyelitis of coccyx  Treatment as per above    Dr Sherren Corrigan do dressing change this morning according to nurse wound looks good satisfy  Continue antibiotic

## 2019-01-09 NOTE — PROGRESS NOTES
The open sours of her left hip  and ischium and good clean granulations  There is no sign of infection  Most of the sutures were removed the right hip flap except where there was a were some scabs  That is doing well  Apparently she had hypotensive episode due to urinary tract infection is in the intensive care unit for monitoring of that  No surgeries immediately plan to take care of the left hip until all these other issues have been resolved

## 2019-01-09 NOTE — ASSESSMENT & PLAN NOTE
Body mass index is 18 53 kg/m²  Continue Ensure supplement  Patient has generalized muscle wasting secondary to being paraplegic  Continue to encourage p o   Intake

## 2019-01-09 NOTE — PROGRESS NOTES
VTE Pharmacologic Prophylaxis:   Pharmacologic: Enoxaparin (Lovenox)  Mechanical VTE Prophylaxis in Place: Yes    Patient Centered Rounds: I have performed bedside rounds with nursing staff today  Discussions with Specialists or Other Care Team Provider:  Plastic surgeon    Education and Discussions with Family / Patient:  Patient discuss in detail encouraged to take p o  Intake    Time Spent for Care: 30 minutes  More than 50% of total time spent on counseling and coordination of care as described above  Current Length of Stay: 27 day(s)    Current Patient Status: Inpatient   Certification Statement: The patient will continue to require additional inpatient hospital stay due to IV antibiotic and wound care    Discharge Plan:  Go to nursing home or [facility] when stable    Code Status: Level 1 - Full Code      Subjective:   No complain did have dressing this morning wound looks good plastic surgeon was happy patient denied any chest pain pain is well controlled p o  Intake fair did not have BM for 3 days reluctant to take stool softener for fear of diarrhea because with antibiotic she gets diarrhea    Objective:     Vitals:   Temp (24hrs), Av 6 °F (37 °C), Min:97 8 °F (36 6 °C), Max:99 2 °F (37 3 °C)    Temp:  [97 8 °F (36 6 °C)-99 2 °F (37 3 °C)] 98 8 °F (37 1 °C)  HR:  [84-94] 94  Resp:  [17-26] 20  BP: ()/(36-62) 89/50  SpO2:  [96 %-100 %] 96 %  Body mass index is 18 53 kg/m²  Input and Output Summary (last 24 hours): Intake/Output Summary (Last 24 hours) at 19 1114  Last data filed at 19 0900   Gross per 24 hour   Intake              830 ml   Output             2275 ml   Net            -1445 ml       Physical Exam:     Physical Exam   Constitutional: She is oriented to person, place, and time  Thin build paraplegic female   HENT:   Head: Normocephalic and atraumatic     Right Ear: External ear normal    Left Ear: External ear normal    Mouth/Throat: Oropharynx is clear and moist    Eyes: Pupils are equal, round, and reactive to light  Conjunctivae and EOM are normal    Neck: Normal range of motion  Neck supple  Cardiovascular: Normal rate, regular rhythm, normal heart sounds and intact distal pulses  Pulmonary/Chest: Effort normal and breath sounds normal    Abdominal: Soft  Bowel sounds are normal  She exhibits no mass  There is no tenderness  There is no rebound and no guarding  Genitourinary:   Genitourinary Comments: deferred   Musculoskeletal: She exhibits deformity  Right hip surgery status post 6 infection Paraplegic multiple decubitus I ulcer with dressing on did not remove it   Neurological: She is alert and oriented to person, place, and time  She has normal reflexes  Skin: Skin is dry  No rash noted  Multiple open area did not remove the dressing this morning done by plastic surgeon and re-dressing done   Psychiatric: She has a normal mood and affect  Nursing note and vitals reviewed  Additional Data:     Labs:      Results from last 7 days  Lab Units 01/09/19  0452  01/06/19  0801   WBC Thousand/uL 9 20  < > 15 20*   HEMOGLOBIN g/dL 9 6*  < > 8 9*   HEMATOCRIT % 29 7*  < > 28 2*   PLATELETS Thousands/uL 361  < > 348   BANDS PCT %  --   --  3   NEUTROS PCT % 66*  < >  --    LYMPHS PCT % 21*  < >  --    LYMPHO PCT %  --   --  12*   MONOS PCT % 8  < >  --    MONO PCT %  --   --  3   EOS PCT % 4  < >  --    < > = values in this interval not displayed      Results from last 7 days  Lab Units 01/09/19 0452  01/06/19  0544   SODIUM mmol/L 133*  < > 137   POTASSIUM mmol/L 4 1  < > 3 4*   CHLORIDE mmol/L 99  < > 105   CO2 mmol/L 27  < > 25   BUN mg/dL 12  < > 18   CREATININE mg/dL 0 44*  < > 0 59*   ANION GAP mmol/L 7  < > 7   CALCIUM mg/dL 8 6  < > 7 8*   ALBUMIN g/dL  --   --  2 4*   TOTAL BILIRUBIN mg/dL  --   --  0 10   ALK PHOS U/L  --   --  71   ALT U/L  --   --  29   AST U/L  --   --  24   GLUCOSE RANDOM mg/dL 122*  < > 121*   < > = values in this interval not displayed  Results from last 7 days  Lab Units 01/04/19  1141   POC GLUCOSE mg/dl 118*           Results from last 7 days  Lab Units 01/08/19  1101 01/06/19  0802 01/06/19  0801   LACTIC ACID mmol/L 0 7 1 2  --    PROCALCITONIN ng/ml  --   --  27 73*           * I Have Reviewed All Lab Data Listed Above  * Additional Pertinent Lab Tests Reviewed: All Labs For Current Hospital Admission Reviewed    Imaging:    Imaging Reports Reviewed Today Include:  None today  Imaging Personally Reviewed by Myself Includes: Old reviewed    Recent Cultures (last 7 days):       Results from last 7 days  Lab Units 01/06/19  1245 01/06/19  1239 01/06/19  0927 01/06/19  0633 01/05/19  2242 01/03/19  1517 01/03/19  1429 01/03/19  1428   BLOOD CULTURE   --   --  No Growth at 48 hrs  No Growth at 48 hrs  --  No Growth at 48 hrs    No Growth at 48 hrs   --   --   --    GRAM STAIN RESULT  No Polys or Bacteria seen No Polys or Bacteria seen  No polys seen  2+ Gram negative rods  1+ Gram positive cocci in pairs  --   --   --  1+ Polys  No bacteria seen Rare Polys  2+ RBC's  No bacteria seen Rare Polys  2+ RBC's  No bacteria seen   URINE CULTURE   --   --   --  >100,000 cfu/ml Escherichia coli*  >100,000 cfu/ml Providencia rettgeri*  --   --   --   --    WOUND CULTURE  Few Colonies of Pseudomonas aeruginosa* Few Colonies of Escherichia coli*  Few Colonies of Pseudomonas aeruginosa*  4+ Growth of Pseudomonas aeruginosa*  3+ Growth of   --   --   --  1+ Growth of Corynebacterium striatum*  --   --        Last 24 Hours Medication List:     Current Facility-Administered Medications:  acetic acid  Topical Daily Shaq Pelaez MD    aluminum-magnesium hydroxide-simethicone 30 mL Oral Q4H PRN CONCEPCION Jackson    aztreonam 1,000 mg Intravenous Q8H Curtis Andrade MD Last Rate: 1,000 mg (01/09/19 6746)   calcium carbonate-vitamin D 1 tablet Oral Daily With Breakfast Renee Cola, DO    cholecalciferol 1,000 Units Oral Daily Ginette Lenz MD    vitamin B-12 500 mcg Oral Daily Asim Fast, DO    DAPTOmycin 6 mg/kg Intravenous Q24H Azul Hein MD Last Rate: Stopped (01/08/19 3447)   diphenhydrAMINE 25 mg Oral Q6H PRN Ginette Lenz MD    enoxaparin 40 mg Subcutaneous Q24H [address] Deshaunmuna Chandler, DO    multivitamin stress formula 1 tablet Oral Daily Deshaun Chandler, DO    nystatin  Topical BID Asim Fast, DO    oxyCODONE 5 mg Oral Q6H PRN Ginette May MD    pantoprazole 40 mg Oral Early Morning CONCEPCION Jackson    senna-docusate sodium 2 tablet Oral BID Azul Hein MD    sodium chloride 1,000 mL Intravenous Once Azul Hein MD         Today, Patient Was Seen By: Maribell Felipe MD    ** Please Note: Dictation voice to text software may have been used in the creation of this document  **          Progress Note - Jennifershannan Vickers 1950, 76 y o  female MRN: [id number]    Unit/Bed#:  Encounter: [id number]    Primary Care Provider: Zuhair Turk   Date and time admitted to hospital: 12/13/2018  9:04 PM        Sepsis due to anaerobes [facility])   Assessment & Plan    Patient was noted to have hypotension with blood pressures dropping into the 80s 1/6/19, patient started on Levophed at 4 mcg, patient titrated down to 2 mcg, then was able to be titrated off completely yesterday  Blood pressure is improved  Patient became acutely hypotensive last night  With systolic blood pressures dropping into the 80s again  Blood pressure is currently in the 90s  Will continue to monitor  Also draw new lactic acid and labs this morning  Patient was without leukocytosis yesterday, will follow up labs today  Procalcitonin elevated at 27 on 1/7/19    Patient continues to remain afebrile  Repeat wound culture showing growth of Pseudomonas, was seen by ID and placed on aztreonam    UA positive for UTI with leukocyte and nitrates, urine culture with growth of Gram-negative rods, sensitive to aztreonam   All will continue IV daptomycin  Likely will need 6 weeks of antibiotics  Chest x-ray negative for any acute cardiopulmonary disease  [place] BMI less than 19,adult   Assessment & Plan    Body mass index is 18 53 kg/m²  Continue Ensure supplement  Patient has generalized muscle wasting secondary to being paraplegic  Continue to encourage p o  Intake     Hyperglycemia   Assessment & Plan    Hyperglycemia with borderline diabetes based on A1c  Blood sugars well controlled     Thrombocytosis (HCC)   Assessment & Plan    Thrombocytosis secondary to acute/chronic infection     Moderate protein-calorie malnutrition (HCC)   Assessment & Plan    Malnutrition Findings:   Malnutrition type: Chronic illness  Degree of Malnutrition: Malnutrition of moderate degree  BMI Findings: Body mass index is 18 53 kg/m²  Continue nutritional supplements  Encourage oral intake     GERD (gastroesophageal reflux disease)   Assessment & Plan    Continue pantoprazole       Hyponatremia   Assessment & Plan    · Patient was noted to have mild hyponatremia previously during hospitalization  · Improving, 137  · Continue trending BMP occasionally  · Serum sodium 133 continue monitor     Anemia of chronic disease   Assessment & Plan    Anemia of chronic disease with iron deficiency; status post venofer and 2 units PRBC  Stool occult was positive but patient declined GI evaluation  No need for transfusion unless hemoglobin drops to less than 7 grams/deciliter  Will follow up CBC today     Constipation   Assessment & Plan    Constipation resolved  PATIENT HAVING SOFT BOWEL MOVEMENTS  Did not go for 3 days concerned reluctant to take something because with antibiotic according to her she gets diarrhea     Chronic osteomyelitis of coccyx [facility])   Assessment & Plan    Chronic osteomyelitis of coccyx  Treatment as per above    Dr Gildardo Beckett do dressing change this morning according to nurse wound looks good satisfy  Continue antibiotic     Closed fracture of lower end of left femur with routine healing   Assessment & Plan    Patient currently has a sinus tract to the area and she has of jony placed to stabilize the fracture site  Will require 6 weeks of IV antibiotics  Final antibiotic selection will be based on cultures and sensitivities of left thigh INCISION AND DRAINAGE (I&D) left distal femur  With deep wound cultures  Currently has a wound VAC placed on the site  Draining serosanguineous fluid  Wound Care consulted  Discussed with the patient the need to consider SNF placement for IV antibiotics  Agrees with TCF placement     Paraplegia following spinal cord injury [facility])   Assessment & Plan    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; continue frequent turning and specialty mattress  No changes in neurologic status   * 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, stage 3 (HCC)   Assessment & Plan    Multiple decubitus skin ulcers including bilateral buttocks and sacral region  These are stage 3-4  Unfortunately healing is being impeded by patient bed ridden status and incontinence  Patient with specialty mattress, which she is tolerating well  Being followed closely by orthopedics, plastic surgery, and ID    Status post RIGHT HIP AND BUTTOCK flap by Dr Gui Sandoval    Now back on daptomycin IV  Will require 6 weeks of IV antibiotics  Final antibiotic selection will be based on cultures and sensitivities of left thigh INCISION AND DRAINAGE (I&D) left distal femur  With deep wound cultures  Culture so far growing corynebacterium, most recent wound cultures growing Pseudomonas    Patient seen by ID placed on aztreonam   Sensitivity showing strain is sensitive to aztreonam   Continue antibiotic seen by plastic surgeon morning to be pleased with wound care  Patient's urine culture also returned positive for gram-negative rods, sensitive to aztreonam      Patient was previously encouraged to consider a diverting colostomy to help with healing of her sacral and left buttock decubitus ulcers    Patient has so far declined

## 2019-01-09 NOTE — PLAN OF CARE
Problem: Potential for Falls  Goal: Patient will remain free of falls  INTERVENTIONS:  - Assess patient frequently for physical needs  -  Identify cognitive and physical deficits and behaviors that affect risk of falls  -  [place] fall precautions as indicated by assessment   - Educate patient/family on patient safety including physical limitations  - Instruct patient to call for assistance with activity based on assessment  - Modify environment to reduce risk of injury  - Consider OT/PT consult to assist with strengthening/mobility   Outcome: Progressing      Problem: Prexisting or High Potential for Compromised Skin Integrity  Goal: Skin integrity is maintained or improved  INTERVENTIONS:  - Identify patients at risk for skin breakdown  - Assess and monitor skin integrity  - Assess and monitor nutrition and hydration status  - Monitor labs (i e  albumin)  - Assess for incontinence   - Turn and reposition patient  - Assist with mobility/ambulation  - Relieve pressure over bony prominences  - Avoid friction and shearing  - Provide appropriate hygiene as needed including keeping skin clean and dry  - Evaluate need for skin moisturizer/barrier cream  - Collaborate with interdisciplinary team (i e  Nutrition, Rehabilitation, etc )   - Patient/family teaching   Outcome: Progressing      Problem: Nutrition/Hydration-ADULT  Goal: Nutrient/Hydration intake appropriate for improving, restoring or maintaining nutritional needs  Monitor and assess patient's nutrition/hydration status for malnutrition (ex- brittle hair, bruises, dry skin, pale skin and conjunctiva, muscle wasting, smooth red tongue, and disorientation)  Collaborate with interdisciplinary team and initiate plan and interventions as ordered  Monitor patient's weight and dietary intake as ordered or per policy  Utilize nutrition screening tool and intervene per policy   Determine patient's food preferences and provide high-protein, high-caloric foods as appropriate       INTERVENTIONS:  - Monitor oral intake, urinary output, labs, and treatment plans  - Assess nutrition and hydration status and recommend course of action  - Evaluate amount of meals eaten  - Assist patient with eating if necessary   - Allow adequate time for meals  - Recommend/ encourage appropriate diets, oral nutritional supplements, and vitamin/mineral supplements  - Order, calculate, and assess calorie counts as needed  - Recommend, monitor, and adjust tube feedings and TPN/PPN based on assessed needs  - Assess need for intravenous fluids  - Provide specific nutrition/hydration education as appropriate  - Include patient/family/caregiver in decisions related to nutrition   Outcome: Progressing      Problem: PAIN - ADULT  Goal: Verbalizes/displays adequate comfort level or baseline comfort level  Interventions:  - Encourage patient to monitor pain and request assistance  - Assess pain using appropriate pain scale  - Administer analgesics based on type and severity of pain and evaluate response  - Implement non-pharmacological measures as appropriate and evaluate response  - Consider cultural and social influences on pain and pain management  - Notify physician/advanced practitioner if interventions unsuccessful or patient reports new pain   Outcome: Progressing      Problem: INFECTION - ADULT  Goal: Absence or prevention of progression during hospitalization  INTERVENTIONS:  - Assess and monitor for signs and symptoms of infection  - Monitor lab/diagnostic results  - Monitor all insertion sites, i e  indwelling lines, tubes, and drains  - Monitor endotracheal (as able) and nasal secretions for changes in amount and color  - [place] appropriate cooling/warming therapies per order  - Administer medications as ordered  - Instruct and encourage patient and family to use good hand hygiene technique  - Identify and instruct in appropriate isolation precautions for identified infection/condition Outcome: Progressing      Problem: SAFETY ADULT  Goal: Maintain or return to baseline ADL function  INTERVENTIONS:  -  Assess patient's ability to carry out ADLs; assess patient's baseline for ADL function and identify physical deficits which impact ability to perform ADLs (bathing, care of mouth/teeth, toileting, grooming, dressing, etc )  - Assess/evaluate cause of self-care deficits   - Assess range of motion  - Assess patient's mobility; develop plan if impaired  - Assess patient's need for assistive devices and provide as appropriate  - Encourage maximum independence but intervene and supervise when necessary  ¯ Involve family in performance of ADLs  ¯ Assess for home care needs following discharge   ¯ Request OT consult to assist with ADL evaluation and planning for discharge  ¯ Provide patient education as appropriate   Outcome: Progressing    Goal: Maintain or return mobility status to optimal level  INTERVENTIONS:  - Assess patient's baseline mobility status (ambulation, transfers, stairs, etc )    - Identify cognitive and physical deficits and behaviors that affect mobility  - Identify mobility aids required to assist with transfers and/or ambulation (gait belt, sit-to-stand, lift, walker, cane, etc )  - [place] fall precautions as indicated by assessment  - Record patient progress and toleration of activity level on Mobility SBAR; progress patient to next Phase/Stage  - Instruct patient to call for assistance with activity based on assessment  - Request Rehabilitation consult to assist with strengthening/weightbearing, etc    Outcome: Progressing      Problem: DISCHARGE PLANNING  Goal: Discharge to home or other facility with appropriate resources  INTERVENTIONS:  - Identify barriers to discharge w/patient and caregiver  - Arrange for needed discharge resources and transportation as appropriate  - Identify discharge learning needs (meds, wound care, etc )  - Arrange for interpretive services to assist at discharge as needed  - Refer to Case Management Department for coordinating discharge planning if the patient needs post-hospital services based on physician/advanced practitioner order or complex needs related to functional status, cognitive ability, or social support system   Outcome: Progressing      Problem: Knowledge Deficit  Goal: Patient/family/caregiver demonstrates understanding of disease process, treatment plan, medications, and discharge instructions  Complete learning assessment and assess knowledge base    Interventions:  - Provide teaching at level of understanding  - Provide teaching via preferred learning methods   Outcome: Progressing      Problem: DISCHARGE PLANNING - CARE MANAGEMENT  Goal: Discharge to post-acute care or home with appropriate resources  INTERVENTIONS:  - Conduct assessment to determine patient/family and health care team treatment goals, and need for post-acute services based on payer coverage, community resources, and patient preferences, and barriers to discharge  - Address psychosocial, clinical, and financial barriers to discharge as identified in assessment in conjunction with the patient/family and health care team  - Arrange appropriate level of post-acute services according to patients   needs and preference and payer coverage in collaboration with the physician and health care team  - Communicate with and update the patient/family, physician, and health care team regarding progress on the discharge plan  - Arrange appropriate transportation to post-acute venues    Outcome: Progressing

## 2019-01-09 NOTE — ASSESSMENT & PLAN NOTE
Constipation resolved  PATIENT HAVING SOFT BOWEL MOVEMENTS  Did not go for 3 days concerned reluctant to take something because with antibiotic according to her she gets diarrhea

## 2019-01-09 NOTE — ASSESSMENT & PLAN NOTE
· Patient was noted to have mild hyponatremia previously during hospitalization  · Improving, 137  · Continue trending BMP occasionally  · Serum sodium 133 continue monitor

## 2019-01-09 NOTE — ASSESSMENT & PLAN NOTE
Patient was noted to have hypotension with blood pressures dropping into the 80s 1/6/19, patient started on Levophed at 4 mcg, patient titrated down to 2 mcg, then was able to be titrated off completely yesterday  Blood pressure is improved  Patient became acutely hypotensive last night  With systolic blood pressures dropping into the 80s again  Blood pressure is currently in the 90s  Will continue to monitor  Also draw new lactic acid and labs this morning  Patient was without leukocytosis yesterday, will follow up labs today  Procalcitonin elevated at 27 on 1/7/19  Patient continues to remain afebrile  Repeat wound culture showing growth of Pseudomonas, was seen by ID and placed on aztreonam    UA positive for UTI with leukocyte and nitrates, urine culture with growth of Gram-negative rods, sensitive to aztreonam   All will continue IV daptomycin  Likely will need 6 weeks of antibiotics  Chest x-ray negative for any acute cardiopulmonary disease  Boris Huber

## 2019-01-09 NOTE — PROGRESS NOTES
Progress Note - Infectious Disease   Darrell Doan 76 y o  female MRN: [id number]  Unit/Bed#:  Encounter: [id number]      Impression/Recommendations:  1   Septic shock   Evolving since admission   Fever, leukocytosis, refractory hypotension  Misbah Sleek due to #2   Blood cultures negative   Chest x-ray shows no pneumonia   Wounds without infection per Wound Care photos   Clinically stable and nontoxic  Improved with antibiotics  Rec:  ? Continue antibiotics as below  ? Follow temperatures and WBC closely  ? Supportive care as per the primary service     2   GNR UTI   E coli/Providencia  In the setting of bladder spasms and Hays removal   No history of MDR-GNRs  Rec:  ? Change antibiotics to Augmentin with plan to complete 7 days total of antibiotics through 1/14/19  ? Follow urine output closely     3   Left femur ORIF infection   In setting of recent displaced femur fracture 10/2018 with localized hematoma   Draining sinus with air at fracture site now noted on admission CT   Now status post I&D of purulent/necrotic sinus tract that communicated to bone   OR culture with Corynebacterium from wound and bone   Clinically stable without sepsis  Rec:  ? Continue daptomycin for 6 weeks of IV antibiotics followed by suppressive PO antibiotics until bone healed  ? Check weekly CBC-diff, creatinine, CPK while on IV antibiotics  ? D/C PICC after last dose of IV antibiotics  ? Anticipate transition to doxycycline after IV antibiotics complete until bone healed  (Intermediate susceptibilities although no other good PO options)  ? Continue LWC/VAC per ortho     4  Right hip pressure ulcer with flap necrosis   Status post debridement and flap readvancement   CT negative for osteomyelitis   Bone visualized intraoperatively after debridment but not overtly soft or infected and was subsequently covered   OR wound cultures with Staph pseudointermedius/B   Fragilis   OR bone cultures with 3 colonies MRSA   Status post 7 days daptomycin/Flagyl for soft tissue infection  Rec  ? [address] and offloading per Plastics  ? Prolonged antibiotic course for # 1 should treat any potential residual osteomyelitis     5   Left hip pressure ulcer   No evidence of acute infection   Status post local debridement without purulence noted intraoperatively   No exposed bone  Suspect Pseudomonas from recent wound cultures are colonizers  Rec:  ? No additional antibiotics for now  ? Continue LWC and offloading per Plastics  ? 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 No additional antibiotics for now  ? Continue LWC and offloading per Plastics     7  Paraplegia       The above plan was discussed in detail with Dr Prabhakar Suarez    Antibiotics:  Daptomycin #7  Aztreonam # 3    Subjective:  Patient seen on AM rounds  Denies fevers, chills, sweats, nausea, vomiting, or diarrhea  24 Hour Events:  Evaluated by Plastic surgery who feels that wounds of left hip and ischium are clean without infection  Right hip flap doing well  No documented fevers, chills, sweats, nausea, vomiting, or diarrhea      Objective:  Vitals:  Temp:  [97 8 °F (36 6 °C)-99 2 °F (37 3 °C)] 98 8 °F (37 1 °C)  HR:  [84-94] 94  Resp:  [17-26] 20  BP: ()/(36-62) 89/50  SpO2:  [96 %-100 %] 96 %  Temp (24hrs), Av 6 °F (37 °C), Min:97 8 °F (36 6 °C), Max:99 2 °F (37 3 °C)  Current: Temperature: 98 8 °F (37 1 °C)    Physical Exam:   General:  No acute distress  Eyes:  Normal lids and conjunctivae  ENT:  Normal external ears and nose  Neck:  Neck symmetric with midline trachea  Pulmonary:  Normal respiratory effort without accessory muscle use  Cardiovascular:  Regular rate and rhythm; no peripheral edema  Gastrointestinal:  No tenderness or distention  Musculoskeletal:  No digital clubbing or cyanosis  Skin:  No visible rashes; No palpable nodules  Neurologic:  Sensation grossly intact to light touch  Psychiatric:  Alert and oriented; Normal mood  :  Hays with clear yellow urine    Lab Results:  I have personally reviewed pertinent labs  Results from last 7 days  Lab Units 01/09/19  0452 01/08/19  1101 01/07/19  0555  01/06/19  0544   POTASSIUM mmol/L 4 1 3 8 3 8  < > 3 4*   CHLORIDE mmol/L 99 103 106  < > 105   CO2 mmol/L 27 27 24  < > 25   BUN mg/dL 12 11 7  < > 18   CREATININE mg/dL 0 44* 0 42* 0 31*  < > 0 59*   EGFR ml/min/1 73sq m 104 106 117  < > 94   CALCIUM mg/dL 8 6 8 3* 7 7*  < > 7 8*   AST U/L  --   --   --   --  24   ALT U/L  --   --   --   --  29   ALK PHOS U/L  --   --   --   --  71   < > = values in this interval not displayed  Results from last 7 days  Lab Units 01/09/19  0452 01/08/19  1101 01/07/19  0555   WBC Thousand/uL 9 20 8 20 9 50   HEMOGLOBIN g/dL 9 6* 9 0* 8 6*   PLATELETS Thousands/uL 361 305 331       Results from last 7 days  Lab Units 01/06/19  1245 01/06/19  1239 01/06/19  0927 01/06/19  0633 01/05/19  2242 01/03/19  1517 01/03/19  1429 01/03/19  1428   BLOOD CULTURE   --   --  No Growth at 48 hrs  No Growth at 48 hrs  --  No Growth at 48 hrs    No Growth at 48 hrs   --   --   --    GRAM STAIN RESULT  No Polys or Bacteria seen No Polys or Bacteria seen  No polys seen  2+ Gram negative rods  1+ Gram positive cocci in pairs  --   --   --  1+ Polys  No bacteria seen Rare Polys  2+ RBC's  No bacteria seen Rare Polys  2+ RBC's  No bacteria seen   URINE CULTURE   --   --   --  >100,000 cfu/ml Escherichia coli*  >100,000 cfu/ml Providencia rettgeri*  --   --   --   --    WOUND CULTURE  Few Colonies of Pseudomonas aeruginosa* Few Colonies of Escherichia coli*  Few Colonies of Pseudomonas aeruginosa*  4+ Growth of Pseudomonas aeruginosa*  3+ Growth of --   --   --  1+ Growth of Corynebacterium striatum*  --   --        Imaging Studies:   I have personally reviewed pertinent imaging study reports and images in PACS  EKG, Pathology, and Other Studies:   I have personally reviewed pertinent reports

## 2019-01-09 NOTE — ASSESSMENT & PLAN NOTE
Multiple decubitus skin ulcers including bilateral buttocks and sacral region  These are stage 3-4  Unfortunately healing is being impeded by patient bed ridden status and incontinence  Patient with specialty mattress, which she is tolerating well  Being followed closely by orthopedics, plastic surgery, and ID    Status post RIGHT HIP AND BUTTOCK flap by Dr Trace Mills    Now back on daptomycin IV  Will require 6 weeks of IV antibiotics  Final antibiotic selection will be based on cultures and sensitivities of left thigh INCISION AND DRAINAGE (I&D) left distal femur  With deep wound cultures  Culture so far growing corynebacterium, most recent wound cultures growing Pseudomonas  Patient seen by ID placed on aztreonam   Sensitivity showing strain is sensitive to aztreonam   Continue antibiotic seen by plastic surgeon morning to be pleased with wound care  Patient's urine culture also returned positive for gram-negative rods, sensitive to aztreonam      Patient was previously encouraged to consider a diverting colostomy to help with healing of her sacral and left buttock decubitus ulcers    Patient has so far declined

## 2019-01-09 NOTE — PROGRESS NOTES
Progress Note - Orthopedics   Glenys Caballero 76 y o  female MRN: [id number]  Unit/Bed#:  Encounter: [id number]    Assessment:  Sinus track left distal femur    Plan:  Continue with wound VAC with gradual reduction of size of black sponge in the wound to allow healing from inside out  Wound care note and treatment appreciated by Dr Malick Barrera  Continue Q 2-3 day VAC changes  On IV daptomycin from Infectious Disease  Left thigh wound culture shows Corynebacterium not staph  Needs to continue nutritional supplementation to aid in wound healing of her multiple wound breakdown areas  Weight bearing: N/A    VTE Pharmacologic Prophylaxis: Reason for no pharmacologic prophylaxis Paralysis  VTE Mechanical Prophylaxis: reason for no mechanical VTE prophylaxis Hemiplegia    Subjective:   41-year-old white female paraplegic is now 6 days after incision and drainage and application of negative pressure wound VAC dressing to his sinus tract and left distal femur above the site of a previous retrograde IM nailing of her femur fracture  Wound cultures did not show Staph but showed curve any bacterium and is being treated aggressively by Infectious Disease  Excellent nursing care has helped with frequent turning and mattress protection in promoting  wound care  Photographs by the wound care physician showed  clean granulating base in most of her wounds including the left thigh wound  Vitals: Blood pressure (!) 89/50, pulse 94, temperature 98 8 °F (37 1 °C), temperature source Temporal, resp  rate 20, height 5' 5" (1 651 m), weight 50 5 kg (111 lb 5 3 oz), SpO2 96 %, not currently breastfeeding  ,Body mass index is 18 53 kg/m²        Intake/Output Summary (Last 24 hours) at 01/09/19 1024  Last data filed at 01/09/19 0900   Gross per 24 hour   Intake              830 ml   Output             2275 ml   Net            -1445 ml       Invasive Devices     Peripherally Inserted Central Catheter Line            PICC Line 65/63/13 Left Basilic 21 days          Drain            Closed/Suction Drain Right Hip Bulb 10 Fr  22 days    Closed/Suction Drain Right Hip Bulb 10 Fr  22 days    Urethral Catheter Non-latex 16 Fr  10 days    Negative Pressure Wound Therapy (V A C ) Other (Comment) Left 1 day                Physical Exam: No exam performed today  Ortho Exam:   Wound VAC is in place in left distal thigh with minimal drainage  No cellulitis beyond area of the wound  Remains paralyzed with no active motion of leg or foot with contractures of feet and knees noted  Does have involuntary muscle spasm at times      Lab, Imaging and other studies:  As noted above in HPI

## 2019-01-10 PROCEDURE — 99232 SBSQ HOSP IP/OBS MODERATE 35: CPT | Performed by: INTERNAL MEDICINE

## 2019-01-10 RX ADMIN — PANTOPRAZOLE SODIUM 40 MG: 40 TABLET, DELAYED RELEASE ORAL at 05:17

## 2019-01-10 RX ADMIN — AMOXICILLIN AND CLAVULANATE POTASSIUM 1 TABLET: 875; 125 TABLET, FILM COATED ORAL at 08:44

## 2019-01-10 RX ADMIN — DAPTOMYCIN 300 MG: 500 INJECTION, POWDER, LYOPHILIZED, FOR SOLUTION INTRAVENOUS at 17:22

## 2019-01-10 RX ADMIN — OYSTER SHELL CALCIUM WITH VITAMIN D 1 TABLET: 500; 200 TABLET, FILM COATED ORAL at 08:41

## 2019-01-10 RX ADMIN — ENOXAPARIN SODIUM 40 MG: 40 INJECTION SUBCUTANEOUS at 08:40

## 2019-01-10 RX ADMIN — ACETIC ACID: 250 IRRIGANT IRRIGATION at 10:12

## 2019-01-10 RX ADMIN — VITAMIN D, TAB 1000IU (100/BT) 1000 UNITS: 25 TAB at 08:41

## 2019-01-10 RX ADMIN — CYANOCOBALAMIN TAB 1000 MCG 500 MCG: 1000 TAB at 08:41

## 2019-01-10 RX ADMIN — B-COMPLEX W/ C & FOLIC ACID TAB 1 TABLET: TAB at 08:40

## 2019-01-10 RX ADMIN — AMOXICILLIN AND CLAVULANATE POTASSIUM 1 TABLET: 875; 125 TABLET, FILM COATED ORAL at 21:18

## 2019-01-10 NOTE — ASSESSMENT & PLAN NOTE
Malnutrition Findings:   Malnutrition type: Chronic illness  Degree of Malnutrition: Malnutrition of moderate degree  BMI Findings: Body mass index is 18 01 kg/m²     Continue nutritional supplements  Encourage oral intake

## 2019-01-10 NOTE — ASSESSMENT & PLAN NOTE
Chronic osteomyelitis of coccyx  Treatment as per above  Dr Ellison Stands do dressing change this morning according to nurse wound looks good satisfy  Continue antibiotic  Continue present treatment she is on IV daptomycin  And p o   Augmentin that is for UTI

## 2019-01-10 NOTE — ASSESSMENT & PLAN NOTE
Patient was noted to have hypotension with blood pressures dropping into the 80s 1/6/19, patient started on Levophed at 4 mcg, patient titrated down to 2 mcg, then was able to be titrated off completely yesterday  Blood pressure is improved  Patient became acutely hypotensive last night  With systolic blood pressures dropping into the 80s again  Blood pressure is currently in the 90s  Will continue to monitor  Also draw new lactic acid and labs this morning  Patient was without leukocytosis yesterday, will follow up labs today  Procalcitonin elevated at 27 on 1/7/19  Patient continues to remain afebrile  Repeat wound culture showing growth of Pseudomonas, was seen by ID and placed on aztreonam    UA positive for UTI with leukocyte and nitrates, urine culture with growth of Gram-negative rods, sensitive to aztreonam   All will continue IV daptomycin  Likely will need 6 weeks of antibiotics  Chest x-ray negative for any acute cardiopulmonary disease     P o   Augmentin

## 2019-01-10 NOTE — PROGRESS NOTES
VTE Pharmacologic Prophylaxis:   Pharmacologic: Enoxaparin (Lovenox)  Mechanical VTE Prophylaxis in Place: Yes    Patient Centered Rounds: I have performed bedside rounds with nursing staff today  Discussions with Specialists or Other Care Team Provider:  None    Education and Discussions with Family / Patient:  Patient    Time Spent for Care: 30 minutes  More than 50% of total time spent on counseling and coordination of care as described above  Current Length of Stay: 28 day(s)    Current Patient Status: Inpatient   Certification Statement: The patient will continue to require additional inpatient hospital stay due to IV antibiotic wound care    Discharge Plan:  tcf    Code Status: Level 1 - Full Code      Subjective:   No complain did not move yet bowel take some stool softener but no chest pain or short of breath encourage p o  Intake of Ensure for protein    Objective:     Vitals:   Temp (24hrs), Av 6 °F (37 °C), Min:97 7 °F (36 5 °C), Max:99 3 °F (37 4 °C)    Temp:  [97 7 °F (36 5 °C)-99 3 °F (37 4 °C)] 98 3 °F (36 8 °C)  HR:  [73-91] 73  Resp:  [18-22] 18  BP: (105-111)/(58-65) 111/65  SpO2:  [95 %-100 %] 96 %  Body mass index is 18 01 kg/m²  Input and Output Summary (last 24 hours):        Intake/Output Summary (Last 24 hours) at 01/10/19 1137  Last data filed at 01/10/19 0900   Gross per 24 hour   Intake              480 ml   Output             2050 ml   Net            -1570 ml       Physical Exam:     Physical Exam      Additional Data:     Labs:      Results from last 7 days  Lab Units 19  0452  19  0801   WBC Thousand/uL 9 20  < > 15 20*   HEMOGLOBIN g/dL 9 6*  < > 8 9*   HEMATOCRIT % 29 7*  < > 28 2*   PLATELETS Thousands/uL 361  < > 348   BANDS PCT %  --   --  3   NEUTROS PCT % 66*  < >  --    LYMPHS PCT % 21*  < >  --    LYMPHO PCT %  --   --  12*   MONOS PCT % 8  < >  --    MONO PCT %  --   --  3   EOS PCT % 4  < >  --    < > = values in this interval not displayed  Results from last 7 days  Lab Units 01/09/19  0452  01/06/19  0544   SODIUM mmol/L 133*  < > 137   POTASSIUM mmol/L 4 1  < > 3 4*   CHLORIDE mmol/L 99  < > 105   CO2 mmol/L 27  < > 25   BUN mg/dL 12  < > 18   CREATININE mg/dL 0 44*  < > 0 59*   ANION GAP mmol/L 7  < > 7   CALCIUM mg/dL 8 6  < > 7 8*   ALBUMIN g/dL  --   --  2 4*   TOTAL BILIRUBIN mg/dL  --   --  0 10   ALK PHOS U/L  --   --  71   ALT U/L  --   --  29   AST U/L  --   --  24   GLUCOSE RANDOM mg/dL 122*  < > 121*   < > = values in this interval not displayed  Results from last 7 days  Lab Units 01/04/19  1141   POC GLUCOSE mg/dl 118*           Results from last 7 days  Lab Units 01/08/19  1101 01/06/19  0802 01/06/19  0801   LACTIC ACID mmol/L 0 7 1 2  --    PROCALCITONIN ng/ml  --   --  27 73*           * I Have Reviewed All Lab Data Listed Above  * Additional Pertinent Lab Tests Reviewed: All Labs For Current Hospital Admission Reviewed    Imaging:    Imaging Reports Reviewed Today Include:  None today  Imaging Personally Reviewed by Myself Includes: Old reviewed    Recent Cultures (last 7 days):       Results from last 7 days  Lab Units 01/06/19  1245 01/06/19  1239 01/06/19  0927 01/06/19  0633 01/05/19  2242 01/03/19  1517 01/03/19  1429 01/03/19  1428   BLOOD CULTURE   --   --  No Growth at 72 hrs  No Growth at 72 hrs   --  No Growth at 72 hrs    No Growth at 72 hrs   --   --   --    GRAM STAIN RESULT  No Polys or Bacteria seen No Polys or Bacteria seen  No polys seen  2+ Gram negative rods  1+ Gram positive cocci in pairs  --   --   --  1+ Polys  No bacteria seen Rare Polys  2+ RBC's  No bacteria seen Rare Polys  2+ RBC's  No bacteria seen   URINE CULTURE   --   --   --  >100,000 cfu/ml Escherichia coli*  >100,000 cfu/ml Providencia rettgeri*  --   --   --   --    WOUND CULTURE  Few Colonies of Pseudomonas aeruginosa* Few Colonies of Escherichia coli*  Few Colonies of Pseudomonas aeruginosa*  4+ Growth of Pseudomonas aeruginosa*  3+ Growth of   --   --   --  1+ Growth of Corynebacterium striatum*  --   --        Last 24 Hours Medication List:     Current Facility-Administered Medications:  acetic acid  Topical Daily Noam Miller MD    aluminum-magnesium hydroxide-simethicone 30 mL Oral Q4H PRN CONCEPCION Jackson    amoxicillin-clavulanate 1 tablet Oral Q12H Tony Byers MD    calcium carbonate-vitamin D 1 tablet Oral Daily With Breakfast Janel Burrows, DO    cholecalciferol 1,000 Units Oral Daily Ginette Lenz MD    vitamin B-12 500 mcg Oral Daily Janel Burrows, DO    DAPTOmycin 6 mg/kg Intravenous Q24H Te Silva MD Last Rate: 300 mg (01/09/19 1624)   diphenhydrAMINE 25 mg Oral Q6H PRN Ginette Lenz MD    enoxaparin 40 mg Subcutaneous Q24H [facility] Deshaun Chandler, DO    multivitamin stress formula 1 tablet Oral Daily Deshaun Chandler, DO    nystatin  Topical BID Janel Burrows, DO    oxyCODONE 5 mg Oral Q6H PRN Ginette Webster MD    pantoprazole 40 mg Oral Early Morning CONCEPCION Jackson    senna-docusate sodium 2 tablet Oral BID Te Silva MD    sodium chloride 1,000 mL Intravenous Once Te Silva MD         Today, Patient Was Seen By: Corine Corrales MD    ** Please Note: Dictation voice to text software may have been used in the creation of this document  **          Progress Note - Jose Manuel Mendoza 1950, 76 y o  female MRN: [id number]    Unit/Bed#:  Encounter: [id number]    Primary Care Provider: Marilee Coronel   Date and time admitted to hospital: 12/13/2018  9:04 PM        Sepsis due to anaerobes [facility])   Assessment & Plan    Patient was noted to have hypotension with blood pressures dropping into the 80s 1/6/19, patient started on Levophed at 4 mcg, patient titrated down to 2 mcg, then was able to be titrated off completely yesterday  Blood pressure is improved  Patient became acutely hypotensive last night    With systolic blood pressures dropping into the 80s again  Blood pressure is currently in the 90s  Will continue to monitor  Also draw new lactic acid and labs this morning  Patient was without leukocytosis yesterday, will follow up labs today  Procalcitonin elevated at 27 on 1/7/19  Patient continues to remain afebrile  Repeat wound culture showing growth of Pseudomonas, was seen by ID and placed on aztreonam    UA positive for UTI with leukocyte and nitrates, urine culture with growth of Gram-negative rods, sensitive to aztreonam   All will continue IV daptomycin  Likely will need 6 weeks of antibiotics  Chest x-ray negative for any acute cardiopulmonary disease     P o  Augmentin            Complicated wound infection   Assessment & Plan    Continue local dressing and wound care  Continue antibiotic     Hypermagnesemia   Assessment & Plan    · Follow the magnesium level     BMI less than 19,adult   Assessment & Plan    Body mass index is 18 01 kg/m²  Continue Ensure supplement  Patient has generalized muscle wasting secondary to being paraplegic  Continue to encourage p o  Intake continue encourage to take some protein drinks     Hyperglycemia   Assessment & Plan    Hyperglycemia with borderline diabetes based on A1c  Blood sugars well controlled     Thrombocytosis (HCC)   Assessment & Plan    Thrombocytosis secondary to acute/chronic infection     Moderate protein-calorie malnutrition (HCC)   Assessment & Plan    Malnutrition Findings:   Malnutrition type: Chronic illness  Degree of Malnutrition: Malnutrition of moderate degree  BMI Findings: Body mass index is 18 01 kg/m²     Continue nutritional supplements  Encourage oral intake     GERD (gastroesophageal reflux disease)   Assessment & Plan    Continue pantoprazole       Hyponatremia   Assessment & Plan    · Patient was noted to have mild hyponatremia previously during hospitalization  · Improving, 137  · Continue trending BMP occasionally  · Serum sodium 133 continue monitor     Anemia of chronic disease   Assessment & Plan    Anemia of chronic disease with iron deficiency; status post venofer and 2 units PRBC  Stool occult was positive but patient declined GI evaluation  No need for transfusion unless hemoglobin drops to less than 7 grams/deciliter  Will follow up CBC today     Constipation   Assessment & Plan    Constipation resolved  PATIENT HAVING SOFT BOWEL MOVEMENTS  Did not go for 3 days concerned reluctant to take something because with antibiotic according to her she gets diarrhea     Chronic osteomyelitis of coccyx [facility])   Assessment & Plan    Chronic osteomyelitis of coccyx  Treatment as per above  Dr Brant Suarez do dressing change this morning according to nurse wound looks good satisfy  Continue antibiotic  Continue present treatment she is on IV daptomycin  And p o  Augmentin that is for UTI     Closed fracture of lower end of left femur with routine healing   Assessment & Plan    Patient currently has a sinus tract to the area and she has of jony placed to stabilize the fracture site  Will require 6 weeks of IV antibiotics  Final antibiotic selection will be based on cultures and sensitivities of left thigh INCISION AND DRAINAGE (I&D) left distal femur  With deep wound cultures  Currently has a wound VAC placed on the site  Draining serosanguineous fluid  Wound Care consulted  Discussed with the patient the need to consider SNF placement for IV antibiotics  Agrees with TCF placement     Osteopenia   Assessment & Plan    Calcium and vitamin-D p o  Iron deficiency anemia   Assessment & Plan    Stable hb at 11 5  Despite melena positive FOBT  Patient does not want any further diagnostic management studies or procedures  Paraplegia following spinal cord injury [facility])   Assessment & Plan    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; continue frequent turning and specialty mattress  No changes in neurologic status        * 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, stage 3 (HCC)   Assessment & Plan    Multiple decubitus skin ulcers including bilateral buttocks and sacral region  These are stage 3-4  Unfortunately healing is being impeded by patient bed ridden status and incontinence  Patient with specialty mattress, which she is tolerating well  Being followed closely by orthopedics, plastic surgery, and ID    Status post RIGHT HIP AND BUTTOCK flap by Dr Parker    Now back on daptomycin IV  Will require 6 weeks of IV antibiotics  Final antibiotic selection will be based on cultures and sensitivities of left thigh INCISION AND DRAINAGE (I&D) left distal femur  With deep wound cultures  Culture so far growing corynebacterium, most recent wound cultures growing Pseudomonas  Patient seen by ID placed on aztreonam   Sensitivity showing strain is sensitive to aztreonam   Continue antibiotic seen by plastic surgeon morning to be pleased with wound care  Patient's urine culture also returned positive for gram-negative rods, sensitive to aztreonam      Patient was previously encouraged to consider a diverting colostomy to help with healing of her sacral and left buttock decubitus ulcers    Patient has so far declined

## 2019-01-10 NOTE — ASSESSMENT & PLAN NOTE
Multiple decubitus skin ulcers including bilateral buttocks and sacral region  These are stage 3-4  Unfortunately healing is being impeded by patient bed ridden status and incontinence  Patient with specialty mattress, which she is tolerating well  Being followed closely by orthopedics, plastic surgery, and ID    Status post RIGHT HIP AND BUTTOCK flap by Dr Rere Mendoza    Now back on daptomycin IV  Will require 6 weeks of IV antibiotics  Final antibiotic selection will be based on cultures and sensitivities of left thigh INCISION AND DRAINAGE (I&D) left distal femur  With deep wound cultures  Culture so far growing corynebacterium, most recent wound cultures growing Pseudomonas  Patient seen by ID placed on aztreonam   Sensitivity showing strain is sensitive to aztreonam   Continue antibiotic seen by plastic surgeon morning to be pleased with wound care  Patient's urine culture also returned positive for gram-negative rods, sensitive to aztreonam      Patient was previously encouraged to consider a diverting colostomy to help with healing of her sacral and left buttock decubitus ulcers    Patient has so far declined

## 2019-01-10 NOTE — PROGRESS NOTES
Progress Note - Infectious Disease   Neo Weller 76 y o  female MRN: [id number]  Unit/Bed#:  Encounter: [id number]      Impression/Recommendations:  1   Septic shock   Evolving since admission   Fever, leukocytosis, refractory hypotension  Mariely [place] due to #2   Blood cultures negative   Chest x-ray shows no pneumonia   Wounds without infection per Wound Care photos   Clinically stable and nontoxic  Improved with antibiotics  Rec:  ? Continue antibiotics as below  ? Follow temperatures and WBC closely  ? Supportive care as per the primary service     2   GNR UTI   E coli/Providencia  In the setting of bladder spasms and [place] removal   No history of MDR-GNRs  Rec:  ? Continue Augmentin with plan to complete 7 days total of antibiotics through 1/14/19  ? Follow urine output closely     3   Left femur ORIF infection   In setting of recent displaced femur fracture 10/2018 with localized hematoma   Draining sinus with air at fracture site now noted on admission CT   Now status post I&D of purulent/necrotic sinus tract that communicated to bone   OR culture with Corynebacterium from wound and bone   Clinically stable without sepsis  Rec:  ? Continue daptomycin for 6 weeks of IV antibiotics through 2/13/19 followed by suppressive PO antibiotics until bone healed  ? Check weekly CBC-diff, creatinine, CPK while on IV antibiotics  ? D/C PICC after last dose of IV antibiotics  ? Anticipate transition to doxycycline after IV antibiotics complete until bone healed  (Intermediate susceptibilities although no other good PO options)  ? Continue LWC/VAC per ortho     4  Right hip pressure ulcer with flap necrosis   Status post debridement and flap readvancement   CT negative for osteomyelitis   Bone visualized intraoperatively after debridment but not overtly soft or infected and was subsequently covered   OR wound cultures with Staph pseudointermedius/B  Fragilis   OR bone cultures with 3 colonies MRSA     Rec  ? [facility] and offloading per Plastics  ? Prolonged antibiotic course for # 1 should treat any potential residual osteomyelitis     5   Left hip pressure ulcer   No evidence of acute infection   Status post local debridement without purulence noted intraoperatively   No exposed bone   Suspect Pseudomonas from recent wound cultures are colonizers  Rec:  ? No additional antibiotics for now  ? Continue LWC and offloading per Plastics  ? Probable definitive management in 4-6 weeks once right hip flap healed     6   Sacral pressure ulcer with probably chronic coccygeal osteomyelitis   Chronic draining sinus with destructive changes seen on admission CT   No evidence for cellulitis or sepsis   Suspect Pseudomonas from recent wound cultures are colonizers  Rec:  ? No additional antibiotics for now  ? Continue LWC and offloading per Plastics     7  Paraplegia       The patient is stable from an ID standpoint      Antibiotics:  Daptomycin #8  Augmentin #2 (GNR Tx #4)    Subjective:  Patient seen on AM rounds  Denies fevers, chills, sweats, nausea, vomiting, or diarrhea  24 Hour Events:  No documented fevers, chills, sweats, nausea, vomiting, or diarrhea  Objective:  Vitals:  Temp:  [97 7 °F (36 5 °C)-99 3 °F (37 4 °C)] 98 3 °F (36 8 °C)  HR:  [73-91] 73  Resp:  [18-22] 18  BP: (105-111)/(50-65) 111/65  SpO2:  [95 %-100 %] 96 %  Temp (24hrs), Av 5 °F (36 9 °C), Min:97 7 °F (36 5 °C), Max:99 3 °F (37 4 °C)  Current: Temperature: 98 3 °F (36 8 °C)    Physical Exam:   General:  No acute distress  Psychiatric:  Awake and alert  Pulmonary:  Normal respiratory excursion without accessory muscle use  Abdomen:  Soft, nontender  Extremities:  No edema  Skin:  No rashes    Lab Results:  I have personally reviewed pertinent labs      Results from last 7 days  Lab Units 19  0452 19  1101 19  0555  19  0544   POTASSIUM mmol/L 4 1 3 8 3 8  < > 3 4*   CHLORIDE mmol/L 99 103 106  < > 105   CO2 mmol/L 27 27 24  < > 25   BUN mg/dL 12 11 7  < > 18   CREATININE mg/dL 0 44* 0 42* 0 31*  < > 0 59*   EGFR ml/min/1 73sq m 104 106 117  < > 94   CALCIUM mg/dL 8 6 8 3* 7 7*  < > 7 8*   AST U/L  --   --   --   --  24   ALT U/L  --   --   --   --  29   ALK PHOS U/L  --   --   --   --  71   < > = values in this interval not displayed  Results from last 7 days  Lab Units 01/09/19  0452 01/08/19  1101 01/07/19  0555   WBC Thousand/uL 9 20 8 20 9 50   HEMOGLOBIN g/dL 9 6* 9 0* 8 6*   PLATELETS Thousands/uL 361 305 331       Results from last 7 days  Lab Units 01/06/19  1245 01/06/19  1239 01/06/19  0927 01/06/19  0633 01/05/19  2242 01/03/19  1517 01/03/19  1429 01/03/19  1428   BLOOD CULTURE   --   --  No Growth at 72 hrs  No Growth at 72 hrs   --  No Growth at 72 hrs  No Growth at 72 hrs   --   --   --    GRAM STAIN RESULT  No Polys or Bacteria seen No Polys or Bacteria seen  No polys seen  2+ Gram negative rods  1+ Gram positive cocci in pairs  --   --   --  1+ Polys  No bacteria seen Rare Polys  2+ RBC's  No bacteria seen Rare Polys  2+ RBC's  No bacteria seen   URINE CULTURE   --   --   --  >100,000 cfu/ml Escherichia coli*  >100,000 cfu/ml Providencia rettgeri*  --   --   --   --    WOUND CULTURE  Few Colonies of Pseudomonas aeruginosa* Few Colonies of Escherichia coli*  Few Colonies of Pseudomonas aeruginosa*  4+ Growth of Pseudomonas aeruginosa*  3+ Growth of   --   --   --  1+ Growth of Corynebacterium striatum*  --   --        Imaging Studies:   I have personally reviewed pertinent imaging study reports and images in PACS  EKG, Pathology, and Other Studies:   I have personally reviewed pertinent reports

## 2019-01-10 NOTE — PLAN OF CARE
Problem: Potential for Falls  Goal: Patient will remain free of falls  INTERVENTIONS:  - Assess patient frequently for physical needs  -  Identify cognitive and physical deficits and behaviors that affect risk of falls  -  [place] fall precautions as indicated by assessment   - Educate patient/family on patient safety including physical limitations  - Instruct patient to call for assistance with activity based on assessment  - Modify environment to reduce risk of injury  - Consider OT/PT consult to assist with strengthening/mobility   Outcome: Progressing      Problem: Prexisting or High Potential for Compromised Skin Integrity  Goal: Skin integrity is maintained or improved  INTERVENTIONS:  - Identify patients at risk for skin breakdown  - Assess and monitor skin integrity  - Assess and monitor nutrition and hydration status  - Monitor labs (i e  albumin)  - Assess for incontinence   - Turn and reposition patient  - Assist with mobility/ambulation  - Relieve pressure over bony prominences  - Avoid friction and shearing  - Provide appropriate hygiene as needed including keeping skin clean and dry  - Evaluate need for skin moisturizer/barrier cream  - Collaborate with interdisciplinary team (i e  Nutrition, Rehabilitation, etc )   - Patient/family teaching   Outcome: Progressing      Problem: Nutrition/Hydration-ADULT  Goal: Nutrient/Hydration intake appropriate for improving, restoring or maintaining nutritional needs  Monitor and assess patient's nutrition/hydration status for malnutrition (ex- brittle hair, bruises, dry skin, pale skin and conjunctiva, muscle wasting, smooth red tongue, and disorientation)  Collaborate with interdisciplinary team and initiate plan and interventions as ordered  Monitor patient's weight and dietary intake as ordered or per policy  Utilize nutrition screening tool and intervene per policy   Determine patient's food preferences and provide high-protein, high-caloric foods as appropriate       INTERVENTIONS:  - Monitor oral intake, urinary output, labs, and treatment plans  - Assess nutrition and hydration status and recommend course of action  - Evaluate amount of meals eaten  - Assist patient with eating if necessary   - Allow adequate time for meals  - Recommend/ encourage appropriate diets, oral nutritional supplements, and vitamin/mineral supplements  - Order, calculate, and assess calorie counts as needed  - Recommend, monitor, and adjust tube feedings and TPN/PPN based on assessed needs  - Assess need for intravenous fluids  - Provide specific nutrition/hydration education as appropriate  - Include patient/family/caregiver in decisions related to nutrition   Outcome: Progressing      Problem: PAIN - ADULT  Goal: Verbalizes/displays adequate comfort level or baseline comfort level  Interventions:  - Encourage patient to monitor pain and request assistance  - Assess pain using appropriate pain scale  - Administer analgesics based on type and severity of pain and evaluate response  - Implement non-pharmacological measures as appropriate and evaluate response  - Consider cultural and social influences on pain and pain management  - Notify physician/advanced practitioner if interventions unsuccessful or patient reports new pain   Outcome: Progressing      Problem: INFECTION - ADULT  Goal: Absence or prevention of progression during hospitalization  INTERVENTIONS:  - Assess and monitor for signs and symptoms of infection  - Monitor lab/diagnostic results  - Monitor all insertion sites, i e  indwelling lines, tubes, and drains  - Monitor endotracheal (as able) and nasal secretions for changes in amount and color  - [place] appropriate cooling/warming therapies per order  - Administer medications as ordered  - Instruct and encourage patient and family to use good hand hygiene technique  - Identify and instruct in appropriate isolation precautions for identified infection/condition Outcome: Progressing      Problem: SAFETY ADULT  Goal: Maintain or return to baseline ADL function  INTERVENTIONS:  -  Assess patient's ability to carry out ADLs; assess patient's baseline for ADL function and identify physical deficits which impact ability to perform ADLs (bathing, care of mouth/teeth, toileting, grooming, dressing, etc )  - Assess/evaluate cause of self-care deficits   - Assess range of motion  - Assess patient's mobility; develop plan if impaired  - Assess patient's need for assistive devices and provide as appropriate  - Encourage maximum independence but intervene and supervise when necessary  ¯ Involve family in performance of ADLs  ¯ Assess for home care needs following discharge   ¯ Request OT consult to assist with ADL evaluation and planning for discharge  ¯ Provide patient education as appropriate   Outcome: Progressing    Goal: Maintain or return mobility status to optimal level  INTERVENTIONS:  - Assess patient's baseline mobility status (ambulation, transfers, stairs, etc )    - Identify cognitive and physical deficits and behaviors that affect mobility  - Identify mobility aids required to assist with transfers and/or ambulation (gait belt, sit-to-stand, lift, walker, cane, etc )  - [place] fall precautions as indicated by assessment  - Record patient progress and toleration of activity level on Mobility SBAR; progress patient to next Phase/Stage  - Instruct patient to call for assistance with activity based on assessment  - Request Rehabilitation consult to assist with strengthening/weightbearing, etc    Outcome: Progressing      Problem: DISCHARGE PLANNING  Goal: Discharge to home or other facility with appropriate resources  INTERVENTIONS:  - Identify barriers to discharge w/patient and caregiver  - Arrange for needed discharge resources and transportation as appropriate  - Identify discharge learning needs (meds, wound care, etc )  - Arrange for interpretive services to assist at discharge as needed  - Refer to Case Management Department for coordinating discharge planning if the patient needs post-hospital services based on physician/advanced practitioner order or complex needs related to functional status, cognitive ability, or social support system   Outcome: Progressing      Problem: Knowledge Deficit  Goal: Patient/family/caregiver demonstrates understanding of disease process, treatment plan, medications, and discharge instructions  Complete learning assessment and assess knowledge base    Interventions:  - Provide teaching at level of understanding  - Provide teaching via preferred learning methods   Outcome: Progressing      Problem: DISCHARGE PLANNING - CARE MANAGEMENT  Goal: Discharge to post-acute care or home with appropriate resources  INTERVENTIONS:  - Conduct assessment to determine patient/family and health care team treatment goals, and need for post-acute services based on payer coverage, community resources, and patient preferences, and barriers to discharge  - Address psychosocial, clinical, and financial barriers to discharge as identified in assessment in conjunction with the patient/family and health care team  - Arrange appropriate level of post-acute services according to patients   needs and preference and payer coverage in collaboration with the physician and health care team  - Communicate with and update the patient/family, physician, and health care team regarding progress on the discharge plan  - Arrange appropriate transportation to post-acute venues    Outcome: Progressing

## 2019-01-10 NOTE — ASSESSMENT & PLAN NOTE
Body mass index is 18 01 kg/m²  Continue Ensure supplement  Patient has generalized muscle wasting secondary to being paraplegic  Continue to encourage p o   Intake continue encourage to take some protein drinks

## 2019-01-11 LAB
BACTERIA BLD CULT: NORMAL
CK SERPL-CCNC: <20 U/L (ref 30–135)

## 2019-01-11 PROCEDURE — 82550 ASSAY OF CK (CPK): CPT | Performed by: INTERNAL MEDICINE

## 2019-01-11 PROCEDURE — 99232 SBSQ HOSP IP/OBS MODERATE 35: CPT | Performed by: INTERNAL MEDICINE

## 2019-01-11 RX ADMIN — CYANOCOBALAMIN TAB 1000 MCG 500 MCG: 1000 TAB at 09:40

## 2019-01-11 RX ADMIN — SENNOSIDES AND DOCUSATE SODIUM 2 TABLET: 8.6; 5 TABLET ORAL at 16:50

## 2019-01-11 RX ADMIN — AMOXICILLIN AND CLAVULANATE POTASSIUM 1 TABLET: 875; 125 TABLET, FILM COATED ORAL at 09:42

## 2019-01-11 RX ADMIN — NYSTATIN: 100000 POWDER TOPICAL at 09:42

## 2019-01-11 RX ADMIN — DAPTOMYCIN 300 MG: 500 INJECTION, POWDER, LYOPHILIZED, FOR SOLUTION INTRAVENOUS at 16:50

## 2019-01-11 RX ADMIN — ACETIC ACID: 250 IRRIGANT IRRIGATION at 09:39

## 2019-01-11 RX ADMIN — AMOXICILLIN AND CLAVULANATE POTASSIUM 1 TABLET: 875; 125 TABLET, FILM COATED ORAL at 22:00

## 2019-01-11 RX ADMIN — ENOXAPARIN SODIUM 40 MG: 40 INJECTION SUBCUTANEOUS at 09:42

## 2019-01-11 RX ADMIN — PANTOPRAZOLE SODIUM 40 MG: 40 TABLET, DELAYED RELEASE ORAL at 07:00

## 2019-01-11 RX ADMIN — OYSTER SHELL CALCIUM WITH VITAMIN D 1 TABLET: 500; 200 TABLET, FILM COATED ORAL at 09:42

## 2019-01-11 RX ADMIN — B-COMPLEX W/ C & FOLIC ACID TAB 1 TABLET: TAB at 09:40

## 2019-01-11 RX ADMIN — VITAMIN D, TAB 1000IU (100/BT) 1000 UNITS: 25 TAB at 09:40

## 2019-01-11 RX ADMIN — NYSTATIN: 100000 POWDER TOPICAL at 17:05

## 2019-01-11 NOTE — ASSESSMENT & PLAN NOTE
Chronic osteomyelitis of coccyx  Treatment as per above  Dr Usha Leo do dressing change this morning according to nurse wound looks good satisfy  Continue antibiotic  Continue present treatment she is on IV daptomycin  And p o   Augmentin that is for UTI

## 2019-01-11 NOTE — ASSESSMENT & PLAN NOTE
Malnutrition Findings:   Malnutrition type: Chronic illness  Degree of Malnutrition: Malnutrition of moderate degree  BMI Findings: Body mass index is 17 79 kg/m²     Continue nutritional supplements  Encourage oral intake

## 2019-01-11 NOTE — ASSESSMENT & PLAN NOTE
Patient currently has a sinus tract to the area and she has of jony placed to stabilize the fracture site  Will require 6 weeks of IV antibiotics  Final antibiotic selection will be based on cultures and sensitivities of left thigh INCISION AND DRAINAGE (I&D) left distal femur  With deep wound cultures  Currently has a wound VAC placed on the site  Draining serosanguineous fluid  Wound Care consulted  Discussed with the patient the need to consider SNF placement for IV antibiotics    Agrees with [facility] placement  Unable to go today social service service inform me may be Saturday she can go to [facility]cf  At present time continue antibiotic please see Infectious Disease doctor's note

## 2019-01-11 NOTE — PROGRESS NOTES
VTE Pharmacologic Prophylaxis:   Pharmacologic: Enoxaparin (Lovenox)  Mechanical VTE Prophylaxis in Place: Yes    Patient Centered Rounds: I have performed bedside rounds with nursing staff today  Discussions with Specialists or Other Care Team Provider:  Id    Education and Discussions with Family / Patient:  Patient    Time Spent for Care: 30 minutes  More than 50% of total time spent on counseling and coordination of care as described above  Current Length of Stay: 29 day(s)    Current Patient Status: Inpatient   Certification Statement: The patient will continue to require additional inpatient hospital stay due to Antibiotic    Discharge Plan:  [facility]cf    Code Status: Level 1 - Full Code      Subjective:   No complain some complain of sore throat otherwise no chest pain or shortness of breath p o  Intake improving    Objective:     Vitals:   Temp (24hrs), Av 8 °F (37 1 °C), Min:97 9 °F (36 6 °C), Max:99 4 °F (37 4 °C)    Temp:  [97 9 °F (36 6 °C)-99 4 °F (37 4 °C)] 97 9 °F (36 6 °C)  HR:  [83-93] 93  Resp:  [17-20] 20  BP: ()/(49-75) 113/63  SpO2:  [94 %-98 %] 95 %  Body mass index is 17 79 kg/m²  Input and Output Summary (last 24 hours): Intake/Output Summary (Last 24 hours) at 19 1644  Last data filed at 19 1409   Gross per 24 hour   Intake              540 ml   Output              550 ml   Net              -10 ml       Physical Exam:     Physical Exam   Constitutional: She is oriented to person, place, and time  She appears well-developed and well-nourished  HENT:   Head: Normocephalic and atraumatic  Right Ear: External ear normal    Left Ear: External ear normal    Mouth/Throat: Oropharynx is clear and moist    Eyes: Pupils are equal, round, and reactive to light  Conjunctivae and EOM are normal    Neck: Normal range of motion  Neck supple  Cardiovascular: Normal rate, regular rhythm, normal heart sounds and intact distal pulses      Pulmonary/Chest: Effort normal and breath sounds normal    Abdominal: Soft  Bowel sounds are normal  She exhibits no mass  There is no tenderness  There is no rebound and no guarding  Genitourinary:   Genitourinary Comments: deferred   Musculoskeletal: Normal range of motion  She exhibits deformity  Paraplegic extensive multiple wound in the sacrum thigh all dressing is done did not remove the dressing   Neurological: She is alert and oriented to person, place, and time  Paraplegic   Skin: Skin is warm and dry  No rash noted  Extensive decubitus I of sacrum and buttock area all dressing is done   Psychiatric: She has a normal mood and affect  Nursing note and vitals reviewed  Additional Data:     Labs:      Results from last 7 days  Lab Units 01/09/19  0452  01/06/19  0801   WBC Thousand/uL 9 20  < > 15 20*   HEMOGLOBIN g/dL 9 6*  < > 8 9*   HEMATOCRIT % 29 7*  < > 28 2*   PLATELETS Thousands/uL 361  < > 348   BANDS PCT %  --   --  3   NEUTROS PCT % 66*  < >  --    LYMPHS PCT % 21*  < >  --    LYMPHO PCT %  --   --  12*   MONOS PCT % 8  < >  --    MONO PCT %  --   --  3   EOS PCT % 4  < >  --    < > = values in this interval not displayed  Results from last 7 days  Lab Units 01/09/19  0452  01/06/19  0544   SODIUM mmol/L 133*  < > 137   POTASSIUM mmol/L 4 1  < > 3 4*   CHLORIDE mmol/L 99  < > 105   CO2 mmol/L 27  < > 25   BUN mg/dL 12  < > 18   CREATININE mg/dL 0 44*  < > 0 59*   ANION GAP mmol/L 7  < > 7   CALCIUM mg/dL 8 6  < > 7 8*   ALBUMIN g/dL  --   --  2 4*   TOTAL BILIRUBIN mg/dL  --   --  0 10   ALK PHOS U/L  --   --  71   ALT U/L  --   --  29   AST U/L  --   --  24   GLUCOSE RANDOM mg/dL 122*  < > 121*   < > = values in this interval not displayed  Results from last 7 days  Lab Units 01/08/19  1101 01/06/19  0802 01/06/19  0801   LACTIC ACID mmol/L 0 7 1 2  --    PROCALCITONIN ng/ml  --   --  27 73*           * I Have Reviewed All Lab Data Listed Above  * Additional Pertinent Lab Tests Reviewed:  All Labs For Current Hospital Admission Reviewed    Imaging:    Imaging Reports Reviewed Today Include:  None today  Imaging Personally Reviewed by Myself Includes:  Old in reviewed    Recent Cultures (last 7 days):       Results from last 7 days  Lab Units 01/06/19  1245 01/06/19  1239 01/06/19  0927 01/06/19  0633 01/05/19  2242   BLOOD CULTURE   --   --  No Growth After 5 Days  No Growth After 5 Days  --  No Growth After 5 Days  No Growth After 5 Days  GRAM STAIN RESULT  No Polys or Bacteria seen No Polys or Bacteria seen  No polys seen  2+ Gram negative rods  1+ Gram positive cocci in pairs  --   --   --    URINE CULTURE   --   --   --  >100,000 cfu/ml Escherichia coli*  >100,000 cfu/ml Providencia rettgeri*  --    WOUND CULTURE  Few Colonies of Pseudomonas aeruginosa* Few Colonies of Escherichia coli*  Few Colonies of Pseudomonas aeruginosa*  4+ Growth of Pseudomonas aeruginosa*  3+ Growth of   --   --   --        Last 24 Hours Medication List:     Current Facility-Administered Medications:  acetic acid  Topical Daily Robert Diaz MD    aluminum-magnesium hydroxide-simethicone 30 mL Oral Q4H PRN CONCEPCION Jackson    amoxicillin-clavulanate 1 tablet Oral Q12H Tony Byers MD    calcium carbonate-vitamin D 1 tablet Oral Daily With Breakfast Ilean Salts, DO    cholecalciferol 1,000 Units Oral Daily Ginette Lenz MD    vitamin B-12 500 mcg Oral Daily Ilean Salts, DO    DAPTOmycin 6 mg/kg Intravenous Q24H Joe Mayfield MD Last Rate: Stopped (01/10/19 1811)   diphenhydrAMINE 25 mg Oral Q6H PRN Ginette Lenz MD    enoxaparin 40 mg Subcutaneous Q24H [address] Deshaun Chandler DO    multivitamin stress formula 1 tablet Oral Daily Deshaun Chandler DO    nystatin  Topical BID Ilean Salts, DO    oxyCODONE 5 mg Oral Q6H PRN Ginette Lenz MD    pantoprazole 40 mg Oral Early Morning CONCEPCION Jackson    senna-docusate sodium 2 tablet Oral BID Joe Mayfield MD    sodium chloride 1,000 mL Intravenous Once Jie Casper MD         Today, Patient Was Seen By: Marley Caballero MD    ** Please Note: Dictation voice to text software may have been used in the creation of this document  **          Progress Note - Sabine Little 1950, 76 y o  female MRN: [id number]    Unit/Bed#:  Encounter: [id number]    Primary Care Provider: Ren Lane   Date and time admitted to hospital: 12/13/2018  9:04 PM        Sepsis due to anaerobes [facility])   Assessment & Plan    Patient was noted to have hypotension with blood pressures dropping into the 80s 1/6/19, patient started on Levophed at 4 mcg, patient titrated down to 2 mcg, then was able to be titrated off completely yesterday  Blood pressure is improved  Patient became acutely hypotensive last night  With systolic blood pressures dropping into the 80s again  Blood pressure is currently in the 90s  Will continue to monitor  Also draw new lactic acid and labs this morning  Patient was without leukocytosis yesterday, will follow up labs today  Procalcitonin elevated at 27 on 1/7/19  Patient continues to remain afebrile  Repeat wound culture showing growth of Pseudomonas, was seen by ID and placed on aztreonam    UA positive for UTI with leukocyte and nitrates, urine culture with growth of Gram-negative rods, sensitive to aztreonam   All will continue IV daptomycin  Likely will need 6 weeks of antibiotics  Chest x-ray negative for any acute cardiopulmonary disease     P o  Augmentin for UTI            Complicated wound infection   Assessment & Plan    Continue local dressing and wound care  Continue antibiotic     BMI less than 19,adult   Assessment & Plan    Body mass index is 17 79 kg/m²  Continue Ensure supplement  Patient has generalized muscle wasting secondary to being paraplegic  Continue to encourage p o  Intake continue encourage to take some protein drinks  P o   Intake improving     Hyperglycemia   Assessment & Plan    Hyperglycemia with borderline diabetes based on A1c  Blood sugars well controlled     Thrombocytosis (HCC)   Assessment & Plan    Thrombocytosis secondary to acute/chronic infection     Moderate protein-calorie malnutrition (HCC)   Assessment & Plan    Malnutrition Findings:   Malnutrition type: Chronic illness  Degree of Malnutrition: Malnutrition of moderate degree  BMI Findings: Body mass index is 17 79 kg/m²  Continue nutritional supplements  Encourage oral intake     GERD (gastroesophageal reflux disease)   Assessment & Plan    Continue pantoprazole       Hyponatremia   Assessment & Plan    · Patient was noted to have mild hyponatremia previously during hospitalization  · Improving, 137  · Continue trending BMP occasionally  · Serum sodium 133 continue monitor     Anemia of chronic disease   Assessment & Plan    Anemia of chronic disease with iron deficiency; status post venofer and 2 units PRBC  Stool occult was positive but patient declined GI evaluation  No need for transfusion unless hemoglobin drops to less than 7 grams/deciliter  Will follow up CBC today     Constipation   Assessment & Plan    Constipation resolved  PATIENT HAVING SOFT BOWEL MOVEMENTS  Did not go for 3 days concerned reluctant to take something because with antibiotic according to her she gets diarrhea     Chronic osteomyelitis of coccyx [facility])   Assessment & Plan    Chronic osteomyelitis of coccyx  Treatment as per above  Dr Hu Velez do dressing change this morning according to nurse wound looks good satisfy  Continue antibiotic  Continue present treatment she is on IV daptomycin  And p o  Augmentin that is for UTI     Closed fracture of lower end of left femur with routine healing   Assessment & Plan    Patient currently has a sinus tract to the area and she has of jony placed to stabilize the fracture site  Will require 6 weeks of IV antibiotics    Final antibiotic selection will be based on cultures and sensitivities of left thigh INCISION AND DRAINAGE (I&D) left distal femur  With deep wound cultures  Currently has a wound VAC placed on the site  Draining serosanguineous fluid  Wound Care consulted  Discussed with the patient the need to consider SNF placement for IV antibiotics  Agrees with TCF placement  Unable to go today social service service inform me may be Saturday she can go to tcf  At present time continue antibiotic please see Infectious Disease doctor's note     Osteopenia   Assessment & Plan    Calcium and vitamin-D p o  Paraplegia following spinal cord injury [facility])   Assessment & Plan    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; continue frequent turning and specialty mattress  No changes in neurologic status   * 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, stage 3 ([facility])   Assessment & Plan    Multiple decubitus skin ulcers including bilateral buttocks and sacral region  These are stage 3-4  Unfortunately healing is being impeded by patient bed ridden status and incontinence  Patient with specialty mattress, which she is tolerating well  Being followed closely by orthopedics, plastic surgery, and ID    Status post RIGHT HIP AND BUTTOCK flap by Dr Maine Horton    Now back on daptomycin IV  Will require 6 weeks of IV antibiotics  Final antibiotic selection will be based on cultures and sensitivities of left thigh INCISION AND DRAINAGE (I&D) left distal femur  With deep wound cultures  Culture so far growing corynebacterium, most recent wound cultures growing Pseudomonas  Patient seen by ID placed on aztreonam   Sensitivity showing strain is sensitive to aztreonam   Continue antibiotic seen by plastic surgeon morning to be pleased with wound care  Patient's urine culture also returned positive for gram-negative rods, patient is now p o   Augmentin few more days see ID note    Patient was previously encouraged to consider a diverting colostomy to help with healing of her sacral and left buttock decubitus ulcers    Patient has so far declined

## 2019-01-11 NOTE — ASSESSMENT & PLAN NOTE
Body mass index is 17 79 kg/m²  Continue Ensure supplement  Patient has generalized muscle wasting secondary to being paraplegic  Continue to encourage p o  Intake continue encourage to take some protein drinks  P o   Intake improving

## 2019-01-11 NOTE — ASSESSMENT & PLAN NOTE
Multiple decubitus skin ulcers including bilateral buttocks and sacral region  These are stage 3-4  Unfortunately healing is being impeded by patient bed ridden status and incontinence  Patient with specialty mattress, which she is tolerating well  Being followed closely by orthopedics, plastic surgery, and ID    Status post RIGHT HIP AND BUTTOCK flap by Dr Maine Horton    Now back on daptomycin IV  Will require 6 weeks of IV antibiotics  Final antibiotic selection will be based on cultures and sensitivities of left thigh INCISION AND DRAINAGE (I&D) left distal femur  With deep wound cultures  Culture so far growing corynebacterium, most recent wound cultures growing Pseudomonas  Patient seen by ID placed on aztreonam   Sensitivity showing strain is sensitive to aztreonam   Continue antibiotic seen by plastic surgeon morning to be pleased with wound care  Patient's urine culture also returned positive for gram-negative rods, patient is now p o  Augmentin few more days see ID note    Patient was previously encouraged to consider a diverting colostomy to help with healing of her sacral and left buttock decubitus ulcers    Patient has so far declined

## 2019-01-11 NOTE — PROGRESS NOTES
Progress Note - Infectious Disease   Kirk Trevizo 76 y o  female MRN: [id number]  Unit/Bed#:  Encounter: [id number]      Impression/Recommendations:  1   Septic shock   Evolving since admission   Fever, leukocytosis, refractory hypotension  Carly Gain due to #2   Blood cultures negative   Chest x-ray shows no pneumonia   Wounds without infection per Wound Care photos   Clinically stable and nontoxic   Improved with antibiotics  Rec:  ? Continue antibiotics as below  ? Follow temperatures and WBC closely  ? Supportive care as per the primary service     2   GNR UTI   E coli/Providencia   In the setting of bladder spasms and Hays removal   No history of MDR-GNRs  Rec:  ? Continue Augmentin with plan to complete 7 days total of antibiotics through 1/14/19  ? Follow urine output closely     3   Left femur ORIF infection   In setting of recent displaced femur fracture 10/2018 with localized hematoma   Draining sinus with air at fracture site now noted on admission CT   Now status post I&D of purulent/necrotic sinus tract that communicated to bone   OR culture with Corynebacterium from wound and bone   Clinically stable without sepsis  Tolerating antibiotics well  Rec:  ? Continue daptomycin for 6 weeks of IV antibiotics through 2/13/19 followed by suppressive PO antibiotics until bone healed  ? Check weekly CBC-diff, creatinine, CPK while on IV antibiotics  ? D/C PICC after last dose of IV antibiotics  ? Anticipate transition to doxycycline after IV antibiotics complete until bone healed   (Intermediate susceptibilities although no other good PO options)  ? Continue LWC/VAC per ortho     4  Right hip pressure ulcer with flap necrosis   Status post debridement and flap readvancement   CT negative for osteomyelitis   Bone visualized intraoperatively after debridment but not overtly soft or infected and was subsequently covered   OR wound cultures with Staph pseudointermedius/B   Fragilis   OR bone cultures with 3 colonies MRSA     Rec  ? [address] and offloading per Plastics  ? Prolonged antibiotic course for # 1 should treat any potential residual osteomyelitis     5   Left hip pressure ulcer   No evidence of acute infection   Status post local debridement without purulence noted intraoperatively   No exposed bone   Suspect Pseudomonas from recent wound cultures are colonizers  Rec:  ? No additional antibiotics for now  ? Continue LWC and offloading per Plastics  ? Probable definitive management in 4-6 weeks once right hip flap healed     6   Sacral pressure ulcer with probably chronic coccygeal osteomyelitis   Chronic draining sinus with destructive changes seen on admission CT   No evidence for cellulitis or sepsis   Suspect Pseudomonas from recent wound cultures are colonizers  Rec:  ? No additional antibiotics for now  ? Continue LWC and offloading per Plastics     7  Paraplegia       The patient is stable from an ID standpoint for D/C to TCF      Antibiotics:  Daptomycin #9  Augmentin #3 (GNR Tx #5)    Subjective:  Patient seen on AM rounds  Has sore throat  No runny nose or cough  Denies fevers, chills, sweats, nausea, vomiting, or diarrhea  24 Hour Events:  No documented fevers, chills, sweats, nausea, vomiting, or diarrhea  Objective:  Vitals:  Temp:  [98 7 °F (37 1 °C)-99 4 °F (37 4 °C)] 98 7 °F (37 1 °C)  HR:  [83-93] 83  Resp:  [17-20] 18  BP: ()/(49-75) 140/70  SpO2:  [93 %-98 %] 94 %  Temp (24hrs), Av °F (37 2 °C), Min:98 7 °F (37 1 °C), Max:99 4 °F (37 4 °C)  Current: Temperature: 98 7 °F (37 1 °C)    Physical Exam:   General:  No acute distress  Psychiatric:  Awake and alert  Mouth:  No thrush  Pulmonary:  Normal respiratory excursion without accessory muscle use  Abdomen:  Soft, nontender  Extremities:  No edema  Skin:  No rashes    Lab Results:  I have personally reviewed pertinent labs      Results from last 7 days  Lab Units 19  0452 19  1101 19  0555  19  0544 POTASSIUM mmol/L 4 1 3 8 3 8  < > 3 4*   CHLORIDE mmol/L 99 103 106  < > 105   CO2 mmol/L 27 27 24  < > 25   BUN mg/dL 12 11 7  < > 18   CREATININE mg/dL 0 44* 0 42* 0 31*  < > 0 59*   EGFR ml/min/1 73sq m 104 106 117  < > 94   CALCIUM mg/dL 8 6 8 3* 7 7*  < > 7 8*   AST U/L  --   --   --   --  24   ALT U/L  --   --   --   --  29   ALK PHOS U/L  --   --   --   --  71   < > = values in this interval not displayed  Results from last 7 days  Lab Units 01/09/19  0452 01/08/19  1101 01/07/19  0555   WBC Thousand/uL 9 20 8 20 9 50   HEMOGLOBIN g/dL 9 6* 9 0* 8 6*   PLATELETS Thousands/uL 361 305 331       Results from last 7 days  Lab Units 01/06/19  1245 01/06/19  1239 01/06/19  0927 01/06/19  0633 01/05/19  2242   BLOOD CULTURE   --   --  No Growth After 4 Days  No Growth After 4 Days  --  No Growth After 4 Days  No Growth After 4 Days  GRAM STAIN RESULT  No Polys or Bacteria seen No Polys or Bacteria seen  No polys seen  2+ Gram negative rods  1+ Gram positive cocci in pairs  --   --   --    URINE CULTURE   --   --   --  >100,000 cfu/ml Escherichia coli*  >100,000 cfu/ml Providencia rettgeri*  --    WOUND CULTURE  Few Colonies of Pseudomonas aeruginosa* Few Colonies of Escherichia coli*  Few Colonies of Pseudomonas aeruginosa*  4+ Growth of Pseudomonas aeruginosa*  3+ Growth of   --   --   --        Imaging Studies:   I have personally reviewed pertinent imaging study reports and images in PACS  EKG, Pathology, and Other Studies:   I have personally reviewed pertinent reports

## 2019-01-11 NOTE — ASSESSMENT & PLAN NOTE
Patient was noted to have hypotension with blood pressures dropping into the 80s 1/6/19, patient started on Levophed at 4 mcg, patient titrated down to 2 mcg, then was able to be titrated off completely yesterday  Blood pressure is improved  Patient became acutely hypotensive last night  With systolic blood pressures dropping into the 80s again  Blood pressure is currently in the 90s  Will continue to monitor  Also draw new lactic acid and labs this morning  Patient was without leukocytosis yesterday, will follow up labs today  Procalcitonin elevated at 27 on 1/7/19  Patient continues to remain afebrile  Repeat wound culture showing growth of Pseudomonas, was seen by ID and placed on aztreonam    UA positive for UTI with leukocyte and nitrates, urine culture with growth of Gram-negative rods, sensitive to aztreonam   All will continue IV daptomycin  Likely will need 6 weeks of antibiotics  Chest x-ray negative for any acute cardiopulmonary disease     P o   Augmentin for UTI

## 2019-01-11 NOTE — SOCIAL WORK
GORDON advised by [address]  that there will be no available beds for pt until Monday  Attending and unit nurse notified

## 2019-01-12 PROBLEM — D75.839 THROMBOCYTOSIS: Status: RESOLVED | Noted: 2018-12-27 | Resolved: 2019-01-12

## 2019-01-12 LAB
ANION GAP SERPL CALCULATED.3IONS-SCNC: 7 MMOL/L (ref 5–14)
BASOPHILS # BLD AUTO: 0.1 THOUSANDS/ΜL (ref 0–0.1)
BASOPHILS NFR BLD AUTO: 1 % (ref 0–1)
BUN SERPL-MCNC: 21 MG/DL (ref 5–25)
CALCIUM SERPL-MCNC: 9 MG/DL (ref 8.4–10.2)
CHLORIDE SERPL-SCNC: 100 MMOL/L (ref 97–108)
CO2 SERPL-SCNC: 29 MMOL/L (ref 22–30)
CREAT SERPL-MCNC: 0.48 MG/DL (ref 0.6–1.2)
EOSINOPHIL # BLD AUTO: 0.3 THOUSAND/ΜL (ref 0–0.4)
EOSINOPHIL NFR BLD AUTO: 2 % (ref 0–6)
ERYTHROCYTE [DISTWIDTH] IN BLOOD BY AUTOMATED COUNT: 23 %
GFR SERPL CREATININE-BSD FRML MDRD: 101 ML/MIN/1.73SQ M
GLUCOSE SERPL-MCNC: 107 MG/DL (ref 70–99)
HCT VFR BLD AUTO: 32.4 % (ref 36–46)
HGB BLD-MCNC: 10.4 G/DL (ref 12–16)
LYMPHOCYTES # BLD AUTO: 2.8 THOUSANDS/ΜL (ref 0.5–4)
LYMPHOCYTES NFR BLD AUTO: 24 % (ref 25–45)
MCH RBC QN AUTO: 27.1 PG (ref 26–34)
MCHC RBC AUTO-ENTMCNC: 32 G/DL (ref 31–36)
MCV RBC AUTO: 85 FL (ref 80–100)
MONOCYTES # BLD AUTO: 1.2 THOUSAND/ΜL (ref 0.2–0.9)
MONOCYTES NFR BLD AUTO: 11 % (ref 1–10)
NEUTROPHILS # BLD AUTO: 7.1 THOUSANDS/ΜL (ref 1.8–7.8)
NEUTS SEG NFR BLD AUTO: 62 % (ref 45–65)
OSMOLALITY UR/SERPL-RTO: 288 MMOL/KG (ref 282–298)
OSMOLALITY UR: 736 MMOL/KG
PLATELET # BLD AUTO: 497 THOUSANDS/UL (ref 150–450)
PMV BLD AUTO: 8.4 FL (ref 8.9–12.7)
POTASSIUM SERPL-SCNC: 4.3 MMOL/L (ref 3.6–5)
PROCALCITONIN SERPL-MCNC: 1.26 NG/ML
RBC # BLD AUTO: 3.83 MILLION/UL (ref 4–5.2)
SODIUM 24H UR-SCNC: 50 MOL/L
SODIUM SERPL-SCNC: 136 MMOL/L (ref 137–147)
WBC # BLD AUTO: 11.5 THOUSAND/UL (ref 4.5–11)

## 2019-01-12 PROCEDURE — 83935 ASSAY OF URINE OSMOLALITY: CPT | Performed by: NURSE PRACTITIONER

## 2019-01-12 PROCEDURE — 80048 BASIC METABOLIC PNL TOTAL CA: CPT | Performed by: NURSE PRACTITIONER

## 2019-01-12 PROCEDURE — 99232 SBSQ HOSP IP/OBS MODERATE 35: CPT | Performed by: NURSE PRACTITIONER

## 2019-01-12 PROCEDURE — 83930 ASSAY OF BLOOD OSMOLALITY: CPT | Performed by: NURSE PRACTITIONER

## 2019-01-12 PROCEDURE — 85025 COMPLETE CBC W/AUTO DIFF WBC: CPT | Performed by: NURSE PRACTITIONER

## 2019-01-12 PROCEDURE — 84300 ASSAY OF URINE SODIUM: CPT | Performed by: NURSE PRACTITIONER

## 2019-01-12 PROCEDURE — 84145 PROCALCITONIN (PCT): CPT | Performed by: NURSE PRACTITIONER

## 2019-01-12 RX ORDER — SACCHAROMYCES BOULARDII 250 MG
250 CAPSULE ORAL 2 TIMES DAILY
Status: DISCONTINUED | OUTPATIENT
Start: 2019-01-12 | End: 2019-01-14 | Stop reason: HOSPADM

## 2019-01-12 RX ORDER — BISACODYL 10 MG
10 SUPPOSITORY, RECTAL RECTAL DAILY PRN
Status: DISCONTINUED | OUTPATIENT
Start: 2019-01-12 | End: 2019-01-14 | Stop reason: HOSPADM

## 2019-01-12 RX ADMIN — OYSTER SHELL CALCIUM WITH VITAMIN D 1 TABLET: 500; 200 TABLET, FILM COATED ORAL at 09:06

## 2019-01-12 RX ADMIN — BISACODYL 10 MG: 10 SUPPOSITORY RECTAL at 16:18

## 2019-01-12 RX ADMIN — DAPTOMYCIN 300 MG: 500 INJECTION, POWDER, LYOPHILIZED, FOR SOLUTION INTRAVENOUS at 16:19

## 2019-01-12 RX ADMIN — PANTOPRAZOLE SODIUM 40 MG: 40 TABLET, DELAYED RELEASE ORAL at 06:38

## 2019-01-12 RX ADMIN — SENNOSIDES AND DOCUSATE SODIUM 2 TABLET: 8.6; 5 TABLET ORAL at 16:18

## 2019-01-12 RX ADMIN — AMOXICILLIN AND CLAVULANATE POTASSIUM 1 TABLET: 875; 125 TABLET, FILM COATED ORAL at 21:18

## 2019-01-12 RX ADMIN — AMOXICILLIN AND CLAVULANATE POTASSIUM 1 TABLET: 875; 125 TABLET, FILM COATED ORAL at 09:06

## 2019-01-12 RX ADMIN — Medication 250 MG: at 16:19

## 2019-01-12 RX ADMIN — NYSTATIN: 100000 POWDER TOPICAL at 09:09

## 2019-01-12 RX ADMIN — VITAMIN D, TAB 1000IU (100/BT) 1000 UNITS: 25 TAB at 09:06

## 2019-01-12 RX ADMIN — CYANOCOBALAMIN TAB 1000 MCG 500 MCG: 1000 TAB at 09:06

## 2019-01-12 RX ADMIN — NYSTATIN: 100000 POWDER TOPICAL at 16:19

## 2019-01-12 RX ADMIN — ACETIC ACID 1000 ML: 250 IRRIGANT IRRIGATION at 09:09

## 2019-01-12 RX ADMIN — ENOXAPARIN SODIUM 40 MG: 40 INJECTION SUBCUTANEOUS at 09:06

## 2019-01-12 RX ADMIN — B-COMPLEX W/ C & FOLIC ACID TAB 1 TABLET: TAB at 09:06

## 2019-01-12 NOTE — ASSESSMENT & PLAN NOTE
· Multiple decubitus skin ulcers including bilateral buttocks and sacral region  These are stage 3-4  Unfortunately healing is being impeded by patient bed ridden status and incontinence  Pt has isabel in place for wound management  Patient with specialty mattress, which she is tolerating well  Being followed closely by orthopedics, plastic surgery, and ID  Also has wound with vac placed on distal left femur  Will need long term IV abx for wound infection  Medically stable for transfer to [facility] on Monday  Transfer to med surg now  · Status post RIGHT HIP AND BUTTOCK flap by Dr Sherryll Favre - has two drains to wall suction in right hip/buttock region  Wounds covered with dressing  Wound care orders per Dr Nancy Aviles  Completed by nursing  · On daptomycin IV  Cultures positive for pseudomonas and Corynebacterium striatum  Will require 6 weeks of IV antibiotics per ID  See wound culture results  · Patient's urine culture also returned positive for gram-negative rods, patient is now p o  Augmentin  · Patient was previously encouraged to consider a diverting colostomy to help with healing of her sacral and left buttock decubitus ulcers  Patient reportedly has so far declined

## 2019-01-12 NOTE — PLAN OF CARE
Problem: Potential for Falls  Goal: Patient will remain free of falls  INTERVENTIONS:  - Assess patient frequently for physical needs  -  Identify cognitive and physical deficits and behaviors that affect risk of falls  -  [place] fall precautions as indicated by assessment   - Educate patient/family on patient safety including physical limitations  - Instruct patient to call for assistance with activity based on assessment  - Modify environment to reduce risk of injury  - Consider OT/PT consult to assist with strengthening/mobility   Outcome: Progressing      Problem: Prexisting or High Potential for Compromised Skin Integrity  Goal: Skin integrity is maintained or improved  INTERVENTIONS:  - Identify patients at risk for skin breakdown  - Assess and monitor skin integrity  - Assess and monitor nutrition and hydration status  - Monitor labs (i e  albumin)  - Assess for incontinence   - Turn and reposition patient  - Assist with mobility/ambulation  - Relieve pressure over bony prominences  - Avoid friction and shearing  - Provide appropriate hygiene as needed including keeping skin clean and dry  - Evaluate need for skin moisturizer/barrier cream  - Collaborate with interdisciplinary team (i e  Nutrition, Rehabilitation, etc )   - Patient/family teaching   Outcome: Progressing      Problem: Nutrition/Hydration-ADULT  Goal: Nutrient/Hydration intake appropriate for improving, restoring or maintaining nutritional needs  Monitor and assess patient's nutrition/hydration status for malnutrition (ex- brittle hair, bruises, dry skin, pale skin and conjunctiva, muscle wasting, smooth red tongue, and disorientation)  Collaborate with interdisciplinary team and initiate plan and interventions as ordered  Monitor patient's weight and dietary intake as ordered or per policy  Utilize nutrition screening tool and intervene per policy   Determine patient's food preferences and provide high-protein, high-caloric foods as appropriate       INTERVENTIONS:  - Monitor oral intake, urinary output, labs, and treatment plans  - Assess nutrition and hydration status and recommend course of action  - Evaluate amount of meals eaten  - Assist patient with eating if necessary   - Allow adequate time for meals  - Recommend/ encourage appropriate diets, oral nutritional supplements, and vitamin/mineral supplements  - Order, calculate, and assess calorie counts as needed  - Recommend, monitor, and adjust tube feedings and TPN/PPN based on assessed needs  - Assess need for intravenous fluids  - Provide specific nutrition/hydration education as appropriate  - Include patient/family/caregiver in decisions related to nutrition   Outcome: Progressing      Problem: PAIN - ADULT  Goal: Verbalizes/displays adequate comfort level or baseline comfort level  Interventions:  - Encourage patient to monitor pain and request assistance  - Assess pain using appropriate pain scale  - Administer analgesics based on type and severity of pain and evaluate response  - Implement non-pharmacological measures as appropriate and evaluate response  - Consider cultural and social influences on pain and pain management  - Notify physician/advanced practitioner if interventions unsuccessful or patient reports new pain   Outcome: Progressing      Problem: INFECTION - ADULT  Goal: Absence or prevention of progression during hospitalization  INTERVENTIONS:  - Assess and monitor for signs and symptoms of infection  - Monitor lab/diagnostic results  - Monitor all insertion sites, i e  indwelling lines, tubes, and drains  - Monitor endotracheal (as able) and nasal secretions for changes in amount and color  - [place] appropriate cooling/warming therapies per order  - Administer medications as ordered  - Instruct and encourage patient and family to use good hand hygiene technique  - Identify and instruct in appropriate isolation precautions for identified infection/condition Outcome: Progressing      Problem: SAFETY ADULT  Goal: Maintain or return to baseline ADL function  INTERVENTIONS:  -  Assess patient's ability to carry out ADLs; assess patient's baseline for ADL function and identify physical deficits which impact ability to perform ADLs (bathing, care of mouth/teeth, toileting, grooming, dressing, etc )  - Assess/evaluate cause of self-care deficits   - Assess range of motion  - Assess patient's mobility; develop plan if impaired  - Assess patient's need for assistive devices and provide as appropriate  - Encourage maximum independence but intervene and supervise when necessary  ¯ Involve family in performance of ADLs  ¯ Assess for home care needs following discharge   ¯ Request OT consult to assist with ADL evaluation and planning for discharge  ¯ Provide patient education as appropriate   Outcome: Progressing    Goal: Maintain or return mobility status to optimal level  INTERVENTIONS:  - Assess patient's baseline mobility status (ambulation, transfers, stairs, etc )    - Identify cognitive and physical deficits and behaviors that affect mobility  - Identify mobility aids required to assist with transfers and/or ambulation (gait belt, sit-to-stand, lift, walker, cane, etc )  - [place] fall precautions as indicated by assessment  - Record patient progress and toleration of activity level on Mobility SBAR; progress patient to next Phase/Stage  - Instruct patient to call for assistance with activity based on assessment  - Request Rehabilitation consult to assist with strengthening/weightbearing, etc    Outcome: Progressing      Problem: DISCHARGE PLANNING  Goal: Discharge to home or other facility with appropriate resources  INTERVENTIONS:  - Identify barriers to discharge w/patient and caregiver  - Arrange for needed discharge resources and transportation as appropriate  - Identify discharge learning needs (meds, wound care, etc )  - Arrange for interpretive services to assist at discharge as needed  - Refer to Case Management Department for coordinating discharge planning if the patient needs post-hospital services based on physician/advanced practitioner order or complex needs related to functional status, cognitive ability, or social support system   Outcome: Progressing      Problem: Knowledge Deficit  Goal: Patient/family/caregiver demonstrates understanding of disease process, treatment plan, medications, and discharge instructions  Complete learning assessment and assess knowledge base    Interventions:  - Provide teaching at level of understanding  - Provide teaching via preferred learning methods   Outcome: Progressing      Problem: DISCHARGE PLANNING - CARE MANAGEMENT  Goal: Discharge to post-acute care or home with appropriate resources  INTERVENTIONS:  - Conduct assessment to determine patient/family and health care team treatment goals, and need for post-acute services based on payer coverage, community resources, and patient preferences, and barriers to discharge  - Address psychosocial, clinical, and financial barriers to discharge as identified in assessment in conjunction with the patient/family and health care team  - Arrange appropriate level of post-acute services according to patients   needs and preference and payer coverage in collaboration with the physician and health care team  - Communicate with and update the patient/family, physician, and health care team regarding progress on the discharge plan  - Arrange appropriate transportation to post-acute venues    Outcome: Progressing

## 2019-01-12 NOTE — ASSESSMENT & PLAN NOTE
· Patient was noted to have hypotension with blood pressures dropping into the 80s 1/6/19  Blood pressures now improved  · Patient was without leukocytosis or fever  I will repeat labs in AM    Urine culture:    Specimen: Urine from Urine, Indwelling Hays Catheter Updated: 01/09/19 3005      Urine Culture >100,000 cfu/ml Escherichia coli (A)     >100,000 cfu/ml Providencia rettgeri (A)     · Will continue Augmentin through 1/14/19  · Continue IV daptomycin for infected wounds through 2/13/19  · Chest x-ray negative for any acute cardiopulmonary disease  · Transfer to med surg and plan on TCF this Monday

## 2019-01-12 NOTE — ASSESSMENT & PLAN NOTE
Chronic osteomyelitis of coccyx  Treatment as above  Dr Hu Velez following pt  -See note on 1/7/19  Continue wound care per Dr Hu Velez

## 2019-01-12 NOTE — ASSESSMENT & PLAN NOTE
· Patient was noted to have mild hyponatremia, and trending down  Last Na+ was 133  · Urine studies and serum osmo and repeat BMP ordered

## 2019-01-12 NOTE — PROGRESS NOTES
Progress Note - Sabine Little 1950, 76 y o  female MRN: [id number]    Unit/Bed#:  Encounter: [id number]    Primary Care Provider: Ren Lane   Date and time admitted to hospital: 12/13/2018  9:04 PM        * 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, stage 3 ([address] )   Assessment & Plan    · Multiple decubitus skin ulcers including bilateral buttocks and sacral region  These are stage 3-4  Unfortunately healing is being impeded by patient bed ridden status and incontinence  Pt has isabel in place for wound management  Patient with specialty mattress, which she is tolerating well  Being followed closely by orthopedics, plastic surgery, and ID  Also has wound with vac placed on distal left femur  Will need long term IV abx for wound infection  Medically stable for transfer to [facility] on Monday  Transfer to med surg now  · Status post RIGHT HIP AND BUTTOCK flap by Dr Tray Hill - has two drains to wall suction in right hip/buttock region  Wounds covered with dressing  Wound care orders per Dr Caitlin Souza  Completed by nursing  · On daptomycin IV  Cultures positive for pseudomonas and Corynebacterium striatum  Will require 6 weeks of IV antibiotics per ID  See wound culture results  · Patient's urine culture also returned positive for gram-negative rods, patient is now p o  Augmentin  · Patient was previously encouraged to consider a diverting colostomy to help with healing of her sacral and left buttock decubitus ulcers  Patient reportedly has so far declined  Complicated wound infection   Assessment & Plan    Continue antibiotic therapy per ID  Daptomycin through 2/13/19, and then suppressive therapy with PO doxy  Continue wound care per Dr Caitlin Souza  Chronic osteomyelitis of coccyx [facility])   Assessment & Plan    Chronic osteomyelitis of coccyx  Treatment as above  Dr Linda Sinclair following pt  -See note on 1/7/19    Continue wound care per Dr Juan Alberto Sesay  Closed fracture of lower end of left femur with routine healing   Assessment & Plan    · Has closed fracture of distal end of left femur with routine healing, unspecified fracture morphology, and complicated wound infection     · I&D with wound cultures and placement of vac on 1/3/19  Vac in on left medial aspect of distal femur  Cultures positive for Corynebacterium striatum   · Also being followed by infectious disease, ortho, and wound care: On IV daptomycin through 2/13/19  Then will continue suppressive therapy with PO doxy  · Plan to continue IV abx on TCF  Transfer this Monday  Sepsis due to anaerobes [facility])   Assessment & Plan    · Patient was noted to have hypotension with blood pressures dropping into the 80s 1/6/19  Blood pressures now improved  · Patient was without leukocytosis or fever  I will repeat labs in AM    Urine culture:    Specimen: Urine from Urine, Indwelling [place] Catheter Updated: 01/09/19 1965      Urine Culture >100,000 cfu/ml Escherichia coli (A)     >100,000 cfu/ml Providencia rettgeri (A)     · Will continue Augmentin through 1/14/19  · Continue IV daptomycin for infected wounds through 2/13/19  · Chest x-ray negative for any acute cardiopulmonary disease  · Transfer to med surg and plan on TCF this Monday  Paraplegia following spinal cord injury [facility])   Assessment & Plan    · 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; continue frequent turning and specialty mattress  · No BM in several days  Usually has to disimpact herself  · Dulcolax suppository ordered and nurse made aware  Moderate protein-calorie malnutrition ([facility] [address] )   Assessment & Plan    Malnutrition Findings:   Malnutrition type: Chronic illness  Degree of Malnutrition: Malnutrition of moderate degree  BMI Findings: Body mass index is 18 53 kg/m²     Continue nutritional supplements  Encourage oral intake - Nurse states pt is now eating and drinking adequately  GERD (gastroesophageal reflux disease)   Assessment & Plan    Continue pantoprazole       Hyponatremia   Assessment & Plan    · Patient was noted to have mild hyponatremia, and trending down  Last Na+ was 133  · Urine studies and serum osmo and repeat BMP ordered  Anemia of chronic disease   Assessment & Plan    Anemia of chronic disease with iron deficiency; status post venofer and 2 units PRBC  Stool occult was reportedly positive but patient reportedly declined GI evaluation  No need for transfusion unless hemoglobin drops to less than 7 grams/deciliter  Has been stable  Last Hg 9 2  For CBC today  Constipation   Assessment & Plan    Pt reports she has not had a BM in several days  Usually disimpacts herself  Nurse made aware and dulcolax suppository was ordered  Continue senna as ordered  Also added florastor since she is on long term abx  Osteopenia   Assessment & Plan    Calcium and vitamin-D p o  Thrombocytosis (HCC)resolved as of 1/12/2019   Assessment & Plan    Thrombocytosis secondary to acute/chronic infection         VTE Pharmacologic Prophylaxis:   Pharmacologic: Enoxaparin (Lovenox)  Mechanical VTE Prophylaxis in Place: No    Patient Centered Rounds: I have performed bedside rounds with nursing staff today  Discussions with Specialists or Other Care Team Provider: Dr Madie Perdue,     Education and Discussions with Family / Patient: Progress, symptoms, and plan of care discussed with pt   Time Spent for Care: 20 minutes  More than 50% of total time spent on counseling and coordination of care as described above  Current Length of Stay: 30 day(s)    Current Patient Status: Inpatient   Certification Statement: The patient will continue to require additional inpatient hospital stay due to chronic wound infection, debility    Discharge Plan: Transfer to [facility] on Monday when bed available      Code Status: Level 1 - Full Code      Subjective:   Pt denies pain  Reports no BM in several days  No abdominal pain  RN states pt is eating and drinking adequately  No CP, SOB, N/V/D  No fever/chills  Objective:     Vitals:   Temp (24hrs), Av 7 °F (37 1 °C), Min:98 2 °F (36 8 °C), Max:99 1 °F (37 3 °C)    Temp:  [98 2 °F (36 8 °C)-99 1 °F (37 3 °C)] 99 1 °F (37 3 °C)  HR:  [] 93  Resp:  [20-22] 20  BP: (109-150)/(55-78) 144/78  SpO2:  [94 %-97 %] 94 %  Body mass index is 18 53 kg/m²  Input and Output Summary (last 24 hours): Intake/Output Summary (Last 24 hours) at 19 1652  Last data filed at 19 1400   Gross per 24 hour   Intake              780 ml   Output              350 ml   Net              430 ml       Physical Exam:     Physical Exam   Constitutional: She is oriented to person, place, and time  She appears well-developed and well-nourished  No distress  HENT:   Head: Normocephalic and atraumatic  Eyes: Right eye exhibits no discharge  Left eye exhibits no discharge  Neck: No JVD present  Cardiovascular: Normal rate, regular rhythm and intact distal pulses  Exam reveals no gallop and no friction rub  No murmur heard  Pulmonary/Chest: Effort normal and breath sounds normal  No stridor  No respiratory distress  She has no wheezes  She has no rales  Abdominal: Soft  Bowel sounds are normal  She exhibits no distension  There is no tenderness  There is no rebound and no guarding  Musculoskeletal: She exhibits no edema  Neurological: She is alert and oriented to person, place, and time  Skin: Skin is warm and dry  She is not diaphoretic  Psychiatric: She has a normal mood and affect         Additional Data:     Labs:      Results from last 7 days  Lab Units 19  0452  19  0801   WBC Thousand/uL 9 20  < > 15 20*   HEMOGLOBIN g/dL 9 6*  < > 8 9*   HEMATOCRIT % 29 7*  < > 28 2*   PLATELETS Thousands/uL 361  < > 348   BANDS PCT %  --   --  3   NEUTROS PCT % 66*  < >  --    LYMPHS PCT % 21*  < >  -- LYMPHO PCT %  --   --  12*   MONOS PCT % 8  < >  --    MONO PCT %  --   --  3   EOS PCT % 4  < >  --    < > = values in this interval not displayed  Results from last 7 days  Lab Units 01/09/19  0452  01/06/19  0544   SODIUM mmol/L 133*  < > 137   POTASSIUM mmol/L 4 1  < > 3 4*   CHLORIDE mmol/L 99  < > 105   CO2 mmol/L 27  < > 25   BUN mg/dL 12  < > 18   CREATININE mg/dL 0 44*  < > 0 59*   ANION GAP mmol/L 7  < > 7   CALCIUM mg/dL 8 6  < > 7 8*   ALBUMIN g/dL  --   --  2 4*   TOTAL BILIRUBIN mg/dL  --   --  0 10   ALK PHOS U/L  --   --  71   ALT U/L  --   --  29   AST U/L  --   --  24   GLUCOSE RANDOM mg/dL 122*  < > 121*   < > = values in this interval not displayed  Results from last 7 days  Lab Units 01/08/19  1101 01/06/19  0802 01/06/19  0801   LACTIC ACID mmol/L 0 7 1 2  --    PROCALCITONIN ng/ml  --   --  27 73*           * I Have Reviewed All Lab Data Listed Above  * Additional Pertinent Lab Tests Reviewed: Last CBC, CMP, urine cultures, and wound cultures reviewed    Imaging:    Imaging Reports Reviewed Today Include: cxr 1/5/19  Imaging Personally Reviewed by Myself Includes:  none    Recent Cultures (last 7 days):       Results from last 7 days  Lab Units 01/06/19  1245 01/06/19  1239 01/06/19  0927 01/06/19  0633 01/05/19  2242   BLOOD CULTURE   --   --  No Growth After 5 Days  No Growth After 5 Days  --  No Growth After 5 Days  No Growth After 5 Days     GRAM STAIN RESULT  No Polys or Bacteria seen No Polys or Bacteria seen  No polys seen  2+ Gram negative rods  1+ Gram positive cocci in pairs  --   --   --    URINE CULTURE   --   --   --  >100,000 cfu/ml Escherichia coli*  >100,000 cfu/ml Providencia rettgeri*  --    WOUND CULTURE  Few Colonies of Pseudomonas aeruginosa* Few Colonies of Escherichia coli*  Few Colonies of Pseudomonas aeruginosa*  4+ Growth of Pseudomonas aeruginosa*  3+ Growth of   --   --   --        Last 24 Hours Medication List:     Current Facility-Administered Medications:  acetic acid  Topical Daily Gio Yoder MD    aluminum-magnesium hydroxide-simethicone 30 mL Oral Q4H PRN CONCEPCION Jackson    amoxicillin-clavulanate 1 tablet Oral Q12H [facility] CONCEPCION Jackson    bisacodyl 10 mg Rectal Daily PRN CONCEPCION Jackosn    calcium carbonate-vitamin D 1 tablet Oral Daily With Breakfast [employer], DO    cholecalciferol 1,000 Units Oral Daily Ginette Lenz MD    vitamin B-12 500 mcg Oral Daily [employer], DO    DAPTOmycin 6 mg/kg Intravenous Q24H CONCEPCION Jackson Last Rate: 300 mg (01/12/19 1619)   diphenhydrAMINE 25 mg Oral Q6H PRN Ginette Lenz MD    enoxaparin 40 mg Subcutaneous Q24H [facility] Deshaun Chandler, DO    multivitamin stress formula 1 tablet Oral Daily Deshaun Chandler, DO    nystatin  Topical BID [employer], DO    oxyCODONE 5 mg Oral Q6H PRN Ginette Lenz MD    pantoprazole 40 mg Oral Early Morning CONCEPCION Jackson    saccharomyces boulardii 250 mg Oral BID CONCEPCION Jackson    senna-docusate sodium 2 tablet Oral BID Sae Greene MD    sodium chloride 1,000 mL Intravenous Once Sae rGeene MD         Today, Patient Was Seen By: CONCEPCION Alvarado

## 2019-01-12 NOTE — ASSESSMENT & PLAN NOTE
Anemia of chronic disease with iron deficiency; status post venofer and 2 units PRBC  Stool occult was reportedly positive but patient reportedly declined GI evaluation  No need for transfusion unless hemoglobin drops to less than 7 grams/deciliter  Has been stable  Last Hg 9 2  For CBC today

## 2019-01-12 NOTE — ASSESSMENT & PLAN NOTE
Pt reports she has not had a BM in several days  Usually disimpacts herself  Nurse made aware and dulcolax suppository was ordered  Continue senna as ordered  Also added florastor since she is on long term abx

## 2019-01-12 NOTE — ASSESSMENT & PLAN NOTE
· Has closed fracture of distal end of left femur with routine healing, unspecified fracture morphology, and complicated wound infection     · I&D with wound cultures and placement of vac on 1/3/19  Vac in on left medial aspect of distal femur  Cultures positive for Corynebacterium striatum   · Also being followed by infectious disease, ortho, and wound care: On IV daptomycin through 2/13/19  Then will continue suppressive therapy with PO doxy  · Plan to continue IV abx on TCF  Transfer this Monday

## 2019-01-12 NOTE — ASSESSMENT & PLAN NOTE
Continue antibiotic therapy per ID  Daptomycin through 2/13/19, and then suppressive therapy with PO doxy  Continue wound care per Dr Ricco Tran

## 2019-01-12 NOTE — ASSESSMENT & PLAN NOTE
Malnutrition Findings:   Malnutrition type: Chronic illness  Degree of Malnutrition: Malnutrition of moderate degree  BMI Findings: Body mass index is 18 53 kg/m²  Continue nutritional supplements  Encourage oral intake - Nurse states pt is now eating and drinking adequately

## 2019-01-12 NOTE — ASSESSMENT & PLAN NOTE
· 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; continue frequent turning and specialty mattress  · No BM in several days  Usually has to disimpact herself  · Dulcolax suppository ordered and nurse made aware

## 2019-01-13 PROCEDURE — 99232 SBSQ HOSP IP/OBS MODERATE 35: CPT | Performed by: NURSE PRACTITIONER

## 2019-01-13 RX ADMIN — BISACODYL 10 MG: 10 SUPPOSITORY RECTAL at 15:01

## 2019-01-13 RX ADMIN — B-COMPLEX W/ C & FOLIC ACID TAB 1 TABLET: TAB at 08:49

## 2019-01-13 RX ADMIN — VITAMIN D, TAB 1000IU (100/BT) 1000 UNITS: 25 TAB at 08:49

## 2019-01-13 RX ADMIN — OYSTER SHELL CALCIUM WITH VITAMIN D 1 TABLET: 500; 200 TABLET, FILM COATED ORAL at 08:50

## 2019-01-13 RX ADMIN — Medication 250 MG: at 08:49

## 2019-01-13 RX ADMIN — SENNOSIDES AND DOCUSATE SODIUM 1 TABLET: 8.6; 5 TABLET ORAL at 15:00

## 2019-01-13 RX ADMIN — ENOXAPARIN SODIUM 40 MG: 40 INJECTION SUBCUTANEOUS at 08:49

## 2019-01-13 RX ADMIN — CYANOCOBALAMIN TAB 1000 MCG 500 MCG: 1000 TAB at 08:49

## 2019-01-13 RX ADMIN — DAPTOMYCIN 300 MG: 500 INJECTION, POWDER, LYOPHILIZED, FOR SOLUTION INTRAVENOUS at 17:40

## 2019-01-13 RX ADMIN — NYSTATIN: 100000 POWDER TOPICAL at 17:40

## 2019-01-13 RX ADMIN — Medication 250 MG: at 17:40

## 2019-01-13 RX ADMIN — PANTOPRAZOLE SODIUM 40 MG: 40 TABLET, DELAYED RELEASE ORAL at 06:35

## 2019-01-13 RX ADMIN — AMOXICILLIN AND CLAVULANATE POTASSIUM 1 TABLET: 875; 125 TABLET, FILM COATED ORAL at 08:49

## 2019-01-13 RX ADMIN — AMOXICILLIN AND CLAVULANATE POTASSIUM 1 TABLET: 875; 125 TABLET, FILM COATED ORAL at 21:35

## 2019-01-13 RX ADMIN — ACETIC ACID 1000 ML: 250 IRRIGANT IRRIGATION at 08:52

## 2019-01-13 RX ADMIN — NYSTATIN: 100000 POWDER TOPICAL at 08:52

## 2019-01-13 NOTE — ASSESSMENT & PLAN NOTE
Pt reports she has not had a BM in several days  Usually disimpacts herself  Nurse made aware and dulcolax suppository was ordered  Continue senna as ordered  Also added florastor since she is on long term abx  She had a BM today

## 2019-01-13 NOTE — ASSESSMENT & PLAN NOTE
Continue antibiotic therapy per ID  Daptomycin through 2/13/19, and then suppressive therapy with PO doxy  Continue wound care per Dr Patricia Jacobsen

## 2019-01-13 NOTE — ASSESSMENT & PLAN NOTE
· Patient was noted to have mild hyponatremia, and trending down  Last Na+ was 133  · Urine studies and serum osmo and repeat BMP ordered   Appeared like SIADH  · Sodium level improved today - 136

## 2019-01-13 NOTE — ASSESSMENT & PLAN NOTE
· Multiple decubitus skin ulcers including bilateral buttocks and sacral region  These are stage 3-4  Unfortunately healing is being impeded by patient bed ridden status and incontinence  Pt has isabel in place for wound management  Patient with specialty mattress, which she is tolerating well  Being followed closely by orthopedics, plastic surgery, and ID  Also has wound with vac placed on distal left femur  Will need long term IV abx for wound infection  Medically stable for transfer to [facility] on Monday  Med surg level as of 1/12  · Status post RIGHT HIP AND BUTTOCK flap by Dr Cara Boas - has two drains to wall suction in right hip/buttock region  Wounds covered with dressing  Wound care orders per Dr Malick Barrera  Completed by nursing  · On daptomycin IV  Cultures positive for pseudomonas and Corynebacterium striatum  Will require 6 weeks of IV antibiotics per ID  See wound culture results  · Patient's urine culture also returned positive for gram-negative rods, patient is now p o  Augmentin  · Patient was previously encouraged to consider a diverting colostomy to help with healing of her sacral and left buttock decubitus ulcers  Patient reportedly has so far declined

## 2019-01-13 NOTE — ASSESSMENT & PLAN NOTE
Body mass index is 18 64 kg/m²  Continue Ensure supplement  Patient has generalized muscle wasting secondary to being paraplegic  Continue to encourage p o  Intake continue encourage to take some protein drinks  P o   Intake improving

## 2019-01-13 NOTE — UTILIZATION REVIEW
Continued Stay Review  Date: 1/12/19  Vital Signs: /52 (BP Location: Right arm)   Pulse 88   Temp 98 2 °F (36 8 °C) (Temporal)   Resp 20   Ht 5' 5" (1 651 m)   Wt 50 8 kg (111 lb 15 9 oz)   LMP  (LMP Unknown)   SpO2 94%   BMI 18 64 kg/m²   Assessment/Plan:   S/P R HIP & BUTTOCK FLAP  WOUND VAC IN PLACE WITH SEROSANGUINEOUS DRAINAGE NOTED  AYALA CATH IN PLACE FOR WOUND PROTECTION  REMAINS ON IVABT THERAPY FOR COMPLICATED WOUND INFECTION & UTI      Medications:   Scheduled Meds:   Current Facility-Administered Medications:  acetic acid  Topical Daily   aluminum-magnesium hydroxide-simethicone 30 mL Oral Q4H PRN   amoxicillin-clavulanate 1 tablet Oral Q12H JESSEE   bisacodyl 10 mg Rectal Daily PRN   calcium carbonate-vitamin D 1 tablet Oral Daily With Breakfast   cholecalciferol 1,000 Units Oral Daily   vitamin B-12 500 mcg Oral Daily   DAPTOmycin 6 mg/kg Intravenous Q24H   diphenhydrAMINE 25 mg Oral Q6H PRN   enoxaparin 40 mg Subcutaneous Q24H JESSEE   multivitamin stress formula 1 tablet Oral Daily   nystatin  Topical BID   oxyCODONE 5 mg Oral Q6H PRN   pantoprazole 40 mg Oral Early Morning   saccharomyces boulardii 250 mg Oral BID   senna-docusate sodium 2 tablet Oral BID   sodium chloride 1,000 mL Intravenous Once   Continuous Infusions:    PRN Meds: aluminum-magnesium hydroxide-simethicone    bisacodyl    diphenhydrAMINE    oxyCODONE  Pertinent Labs/Diagnostic Results:   WBC 11 50 HGB 10 4   CR 0 48 GLUC 107   Procalcitonin <=0 25 ng/ml 1 26   Age/Sex: 76 y o  female   Discharge Plan: TBD  [facility]  Utilization Review Department  Phone: [phone]; Fax [phone]  [email or web address]  ATTENTION: Please call with any questions or concerns to [phone]  and carefully listen to the prompts so that you are directed to the right person     Send all requests for admission clinical reviews, approved or denied determinations and any other requests to fax [phone]   All voicemails are confidential

## 2019-01-13 NOTE — PROGRESS NOTES
Progress Note - Ron Echevarria 1950, 76 y o  female MRN: [id number]    Unit/Bed#:  Encounter: [id number]    Primary Care Provider: Roberto Leigh   Date and time admitted to hospital: 12/13/2018  9:04 PM        * 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, stage 3 ([address] )   Assessment & Plan    · Multiple decubitus skin ulcers including bilateral buttocks and sacral region  These are stage 3-4  Unfortunately healing is being impeded by patient bed ridden status and incontinence  Pt has isabel in place for wound management  Patient with specialty mattress, which she is tolerating well  Being followed closely by orthopedics, plastic surgery, and ID  Also has wound with vac placed on distal left femur  Will need long term IV abx for wound infection  Medically stable for transfer to [facility] on Monday  Med surg level as of 1/12  · Status post RIGHT HIP AND BUTTOCK flap by Dr Beth Flores - has two drains to wall suction in right hip/buttock region  Wounds covered with dressing  Wound care orders per Dr Ricco Tran  Completed by nursing  · On daptomycin IV  Cultures positive for pseudomonas and Corynebacterium striatum  Will require 6 weeks of IV antibiotics per ID  See wound culture results  · Patient's urine culture also returned positive for gram-negative rods, patient is now p o  Augmentin  · Patient was previously encouraged to consider a diverting colostomy to help with healing of her sacral and left buttock decubitus ulcers  Patient reportedly has so far declined  Complicated wound infection   Assessment & Plan    Continue antibiotic therapy per ID  Daptomycin through 2/13/19, and then suppressive therapy with PO doxy  Continue wound care per Dr Ricco Tran  Chronic osteomyelitis of coccyx [facility])   Assessment & Plan    Chronic osteomyelitis of coccyx  Treatment as above  Dr Vickie Mendoza following pt  -See note on 1/7/19    Continue wound care per Dr Classie Carrel  Closed fracture of lower end of left femur with routine healing   Assessment & Plan    · Has closed fracture of distal end of left femur with routine healing, unspecified fracture morphology, and complicated wound infection     · I&D with wound cultures and placement of vac on 1/3/19  Vac in on left medial aspect of distal femur  Cultures positive for Corynebacterium striatum   · Also being followed by infectious disease, ortho, and wound care: On IV daptomycin through 2/13/19  Then will continue suppressive therapy with PO doxy  · Plan to continue IV abx on TCF  Transfer this Monday  Sepsis due to anaerobes [facility])   Assessment & Plan    · Patient was noted to have hypotension with blood pressures dropping into the 80s 1/6/19  Blood pressures now improved  · Patient was without leukocytosis or fever  I will repeat labs in AM    Urine culture:    Specimen: Urine from Urine, Indwelling Isabel Catheter Updated: 01/09/19 1590      Urine Culture >100,000 cfu/ml Escherichia coli (A)     >100,000 cfu/ml Providencia rettgeri (A)     · Will continue Augmentin through 1/14/19  · Continue IV daptomycin for infected wounds through 2/13/19  · Chest x-ray negative for any acute cardiopulmonary disease  · Transfer to med surg and plan on TCF this Monday  · Pt had isabel catheter for wound management  Paraplegia following spinal cord injury [facility])   Assessment & Plan    · 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; continue frequent turning and specialty mattress  · Prior to today, had no BM in several days  Usually has to disimpact herself, but goes daily  · Dulcolax suppository ordered and pt had BM today  She feels much better  BMI less than 19,adult   Assessment & Plan    Body mass index is 18 64 kg/m²  Continue Ensure supplement  Patient has generalized muscle wasting secondary to being paraplegic  Continue to encourage p o   Intake continue encourage to take some protein drinks  P o  Intake improving     Hyperglycemia   Assessment & Plan    Hyperglycemia with borderline diabetes based on A1c  Blood sugars well controlled     Moderate protein-calorie malnutrition (HCC)   Assessment & Plan    Malnutrition Findings:   Malnutrition type: Chronic illness  Degree of Malnutrition: Malnutrition of moderate degree  BMI Findings: Body mass index is 18 64 kg/m²  Continue nutritional supplements  Encourage oral intake - Nurse states pt is now eating and drinking adequately  GERD (gastroesophageal reflux disease)   Assessment & Plan    Continue pantoprazole       Hyponatremia   Assessment & Plan    · Patient was noted to have mild hyponatremia, and trending down  Last Na+ was 133  · Urine studies and serum osmo and repeat BMP ordered  Appeared like SIADH  · Sodium level improved today - 136     Anemia of chronic disease   Assessment & Plan    Anemia of chronic disease with iron deficiency; status post venofer and 2 units PRBC  Stool occult was reportedly positive but patient reportedly declined GI evaluation  No need for transfusion unless hemoglobin drops to less than 7 grams/deciliter  Has been stable  Last Hg 10 4  Constipation   Assessment & Plan    Pt reports she has not had a BM in several days  Usually disimpacts herself  Nurse made aware and dulcolax suppository was ordered  Continue senna as ordered  Also added florastor since she is on long term abx  She had a BM today  Osteopenia   Assessment & Plan    Calcium and vitamin-D p o  Pressure ulcer, sacrum   Assessment & Plan    Overall patient has remained hemodynamically stable continues to make slow steady progress  No fundamental changes will be required to current treatment plan today  Labs in a m  Unfortunately there is a new sinus tract drainage concerning for possible hardware infection of the left knee  Please follow up on CT results in a m               VTE Pharmacologic Prophylaxis: Pharmacologic: Enoxaparin (Lovenox)  Mechanical VTE Prophylaxis in Place: Yes    Patient Centered Rounds: I have performed bedside rounds with nursing staff today  Discussions with Specialists or Other Care Team Provider: nursing    Education and Discussions with Family / Patient: Progress and plan of care reviewed with pt  Time Spent for Care: 20 minutes  More than 50% of total time spent on counseling and coordination of care as described above  Current Length of Stay: 31 day(s)    Current Patient Status: Inpatient   Certification Statement: The patient will continue to require additional inpatient hospital stay due to infected wounds and need for long term antibiotics  Discharge Plan: TCF tomorrow    Code Status: Level 1 - Full Code      Subjective:   Pt reports feeling better today  No CP, SOB, N/V/D, constipation  Objective:     Vitals:   Temp (24hrs), Av 9 °F (37 2 °C), Min:98 2 °F (36 8 °C), Max:99 5 °F (37 5 °C)    Temp:  [98 2 °F (36 8 °C)-99 5 °F (37 5 °C)] 98 2 °F (36 8 °C)  HR:  [87-88] 88  Resp:  [20] 20  BP: (111-116)/(52-68) 111/52  SpO2:  [94 %-98 %] 94 %  Body mass index is 18 64 kg/m²  Input and Output Summary (last 24 hours): Intake/Output Summary (Last 24 hours) at 19 1457  Last data filed at 19 1423   Gross per 24 hour   Intake              360 ml   Output              400 ml   Net              -40 ml       Physical Exam:     Physical Exam   Constitutional: She is oriented to person, place, and time  No distress  HENT:   Head: Normocephalic and atraumatic  Eyes: Right eye exhibits no discharge  Left eye exhibits no discharge  Neck: No JVD present  Cardiovascular: Normal rate, regular rhythm, normal heart sounds and intact distal pulses  Exam reveals no gallop and no friction rub  No murmur heard  Pulmonary/Chest: Effort normal and breath sounds normal  No stridor  No respiratory distress  She has no wheezes  She has no rales  Abdominal: Soft  Bowel sounds are normal  She exhibits no distension  There is no tenderness  There is no rebound and no guarding  Musculoskeletal: She exhibits no edema  Neurological: She is alert and oriented to person, place, and time  Skin: Skin is warm and dry  She is not diaphoretic  Wounds on right hip and sacrum covered  Wound vac intact to left distal femur  Psychiatric: She has a normal mood and affect  Additional Data:     Labs:      Results from last 7 days  Lab Units 01/12/19  1659   WBC Thousand/uL 11 50*   HEMOGLOBIN g/dL 10 4*   HEMATOCRIT % 32 4*   PLATELETS Thousands/uL 497*   NEUTROS PCT % 62   LYMPHS PCT % 24*   MONOS PCT % 11*   EOS PCT % 2       Results from last 7 days  Lab Units 01/12/19  1659   SODIUM mmol/L 136*   POTASSIUM mmol/L 4 3   CHLORIDE mmol/L 100   CO2 mmol/L 29   BUN mg/dL 21   CREATININE mg/dL 0 48*   ANION GAP mmol/L 7   CALCIUM mg/dL 9 0   GLUCOSE RANDOM mg/dL 107*                   Results from last 7 days  Lab Units 01/12/19  1659 01/08/19  1101   LACTIC ACID mmol/L  --  0 7   PROCALCITONIN ng/ml 1 26*  --      Prior procalcitonin was 27 73        * I Have Reviewed All Lab Data Listed Above  * Additional Pertinent Lab Tests Reviewed: Most recent labs reviewed    Imaging:    Imaging Reports Reviewed Today Include: none  Imaging Personally Reviewed by Myself Includes:  none    Recent Cultures (last 7 days):           Last 24 Hours Medication List:     Current Facility-Administered Medications:  acetic acid  Topical Daily Kiara Sánchez MD    aluminum-magnesium hydroxide-simethicone 30 mL Oral Q4H PRN CONCEPCION Jackson    amoxicillin-clavulanate 1 tablet Oral Q12H [facility] CONCEPCION Jackson    bisacodyl 10 mg Rectal Daily PRN CONCEPCION Jackson    calcium carbonate-vitamin D 1 tablet Oral Daily With Breakfast Preston Hatfield DO    cholecalciferol 1,000 Units Oral Daily Ginette Lenz MD    vitamin B-12 500 mcg Oral Daily Preston Hatfield DO DAPTOmycin 6 mg/kg Intravenous Q24H CONCEPCION Jackson Last Rate: 300 mg (01/12/19 1619)   diphenhydrAMINE 25 mg Oral Q6H PRN Ginette Lenz MD    enoxaparin 40 mg Subcutaneous Q24H [address] Deshaun Chandler, DO    multivitamin stress formula 1 tablet Oral Daily Deshaun Chandler, DO    nystatin  Topical BID [employer], DO    oxyCODONE 5 mg Oral Q6H PRN Ginette Lenz MD    pantoprazole 40 mg Oral Early Morning CONCEPCION Jackson    saccharomyces boulardii 250 mg Oral BID CONCEPCION Jackson    senna-docusate sodium 2 tablet Oral BID Simi Melendez MD    sodium chloride 1,000 mL Intravenous Once Simi Melendez MD         Today, Patient Was Seen By: CONCEPCION Combs

## 2019-01-13 NOTE — ASSESSMENT & PLAN NOTE
Chronic osteomyelitis of coccyx  Treatment as above  Dr Anthony San following pt  -See note on 1/7/19  Continue wound care per Dr Anthony San

## 2019-01-13 NOTE — ASSESSMENT & PLAN NOTE
· Patient was noted to have hypotension with blood pressures dropping into the 80s 1/6/19  Blood pressures now improved  · Patient was without leukocytosis or fever  I will repeat labs in AM    Urine culture:    Specimen: Urine from Urine, Indwelling Isabel Catheter Updated: 01/09/19 9959      Urine Culture >100,000 cfu/ml Escherichia coli (A)     >100,000 cfu/ml Providencia rettgeri (A)     · Will continue Augmentin through 1/14/19  · Continue IV daptomycin for infected wounds through 2/13/19  · Chest x-ray negative for any acute cardiopulmonary disease  · Transfer to med surg and plan on TCF this Monday  · Pt had isabel catheter for wound management

## 2019-01-13 NOTE — ASSESSMENT & PLAN NOTE
· 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; continue frequent turning and specialty mattress  · Prior to today, had no BM in several days  Usually has to disimpact herself, but goes daily  · Dulcolax suppository ordered and pt had BM today  She feels much better

## 2019-01-13 NOTE — ASSESSMENT & PLAN NOTE
Malnutrition Findings:   Malnutrition type: Chronic illness  Degree of Malnutrition: Malnutrition of moderate degree  BMI Findings: Body mass index is 18 64 kg/m²  Continue nutritional supplements  Encourage oral intake - Nurse states pt is now eating and drinking adequately

## 2019-01-13 NOTE — ASSESSMENT & PLAN NOTE
Anemia of chronic disease with iron deficiency; status post venofer and 2 units PRBC  Stool occult was reportedly positive but patient reportedly declined GI evaluation  No need for transfusion unless hemoglobin drops to less than 7 grams/deciliter  Has been stable  Last Hg 10 4

## 2019-01-14 ENCOUNTER — HOSPITAL ENCOUNTER (INPATIENT)
Facility: HOSPITAL | Age: 69
LOS: 95 days | Discharge: HOME HEALTH | DRG: 573 | End: 2019-04-19
Attending: INTERNAL MEDICINE | Admitting: HOSPITALIST
Payer: COMMERCIAL

## 2019-01-14 VITALS
OXYGEN SATURATION: 97 % | SYSTOLIC BLOOD PRESSURE: 124 MMHG | DIASTOLIC BLOOD PRESSURE: 59 MMHG | WEIGHT: 114.64 LBS | TEMPERATURE: 97.4 F | HEIGHT: 65 IN | BODY MASS INDEX: 19.1 KG/M2 | HEART RATE: 90 BPM | RESPIRATION RATE: 20 BRPM

## 2019-01-14 DIAGNOSIS — L89.159 PRESSURE ULCER, SACRUM: Chronic | ICD-10-CM

## 2019-01-14 DIAGNOSIS — S72.402D: ICD-10-CM

## 2019-01-14 DIAGNOSIS — G82.20 PARAPLEGIA FOLLOWING SPINAL CORD INJURY (HCC): Primary | ICD-10-CM

## 2019-01-14 DIAGNOSIS — M46.28 CHRONIC OSTEOMYELITIS OF COCCYX (HCC): ICD-10-CM

## 2019-01-14 DIAGNOSIS — A04.72 C. DIFFICILE DIARRHEA: ICD-10-CM

## 2019-01-14 DIAGNOSIS — L89.43: ICD-10-CM

## 2019-01-14 DIAGNOSIS — L08.9 COMPLICATED WOUND INFECTION: ICD-10-CM

## 2019-01-14 DIAGNOSIS — T14.8XXA COMPLICATED WOUND INFECTION: ICD-10-CM

## 2019-01-14 PROCEDURE — 99232 SBSQ HOSP IP/OBS MODERATE 35: CPT | Performed by: FAMILY MEDICINE

## 2019-01-14 PROCEDURE — 99232 SBSQ HOSP IP/OBS MODERATE 35: CPT | Performed by: INTERNAL MEDICINE

## 2019-01-14 PROCEDURE — 99306 1ST NF CARE HIGH MDM 50: CPT | Performed by: INTERNAL MEDICINE

## 2019-01-14 PROCEDURE — 99239 HOSP IP/OBS DSCHRG MGMT >30: CPT | Performed by: INTERNAL MEDICINE

## 2019-01-14 RX ORDER — BISACODYL 10 MG
10 SUPPOSITORY, RECTAL RECTAL DAILY PRN
Status: DISCONTINUED | OUTPATIENT
Start: 2019-01-14 | End: 2019-04-19 | Stop reason: HOSPADM

## 2019-01-14 RX ORDER — SACCHAROMYCES BOULARDII 250 MG
250 CAPSULE ORAL 2 TIMES DAILY
Status: CANCELLED | OUTPATIENT
Start: 2019-01-14

## 2019-01-14 RX ORDER — NYSTATIN 100000 [USP'U]/G
POWDER TOPICAL 2 TIMES DAILY
Status: CANCELLED | OUTPATIENT
Start: 2019-01-14

## 2019-01-14 RX ORDER — DIPHENHYDRAMINE HCL 25 MG
25 TABLET ORAL EVERY 6 HOURS PRN
Status: DISCONTINUED | OUTPATIENT
Start: 2019-01-14 | End: 2019-02-28

## 2019-01-14 RX ORDER — AMOXICILLIN 250 MG
2 CAPSULE ORAL 2 TIMES DAILY
Status: CANCELLED | OUTPATIENT
Start: 2019-01-14

## 2019-01-14 RX ORDER — OXYCODONE HYDROCHLORIDE 5 MG/1
5 TABLET ORAL EVERY 6 HOURS PRN
Status: DISCONTINUED | OUTPATIENT
Start: 2019-01-14 | End: 2019-01-30

## 2019-01-14 RX ORDER — DIPHENHYDRAMINE HCL 25 MG
25 TABLET ORAL EVERY 6 HOURS PRN
Status: CANCELLED | OUTPATIENT
Start: 2019-01-14

## 2019-01-14 RX ORDER — AMOXICILLIN 250 MG
2 CAPSULE ORAL 2 TIMES DAILY
Status: DISCONTINUED | OUTPATIENT
Start: 2019-01-15 | End: 2019-04-19 | Stop reason: HOSPADM

## 2019-01-14 RX ORDER — MELATONIN
1000 DAILY
Status: DISCONTINUED | OUTPATIENT
Start: 2019-01-15 | End: 2019-04-19 | Stop reason: HOSPADM

## 2019-01-14 RX ORDER — LANOLIN ALCOHOL/MO/W.PET/CERES
500 CREAM (GRAM) TOPICAL DAILY
Status: DISCONTINUED | OUTPATIENT
Start: 2019-01-15 | End: 2019-04-19 | Stop reason: HOSPADM

## 2019-01-14 RX ORDER — AMOXICILLIN AND CLAVULANATE POTASSIUM 875; 125 MG/1; MG/1
1 TABLET, FILM COATED ORAL EVERY 12 HOURS SCHEDULED
Status: COMPLETED | OUTPATIENT
Start: 2019-01-14 | End: 2019-01-14

## 2019-01-14 RX ORDER — B-COMPLEX WITH VITAMIN C
1 TABLET ORAL
Status: CANCELLED | OUTPATIENT
Start: 2019-01-15

## 2019-01-14 RX ORDER — NYSTATIN 100000 [USP'U]/G
POWDER TOPICAL 2 TIMES DAILY
Status: DISCONTINUED | OUTPATIENT
Start: 2019-01-14 | End: 2019-04-19 | Stop reason: HOSPADM

## 2019-01-14 RX ORDER — PANTOPRAZOLE SODIUM 40 MG/1
40 TABLET, DELAYED RELEASE ORAL
Status: CANCELLED | OUTPATIENT
Start: 2019-01-15

## 2019-01-14 RX ORDER — OXYCODONE HYDROCHLORIDE 5 MG/1
5 TABLET ORAL EVERY 6 HOURS PRN
Status: CANCELLED | OUTPATIENT
Start: 2019-01-14

## 2019-01-14 RX ORDER — ACETIC ACID 0.25 G/100ML
IRRIGANT IRRIGATION DAILY
Status: DISCONTINUED | OUTPATIENT
Start: 2019-01-15 | End: 2019-02-25

## 2019-01-14 RX ORDER — B-COMPLEX WITH VITAMIN C
1 TABLET ORAL
Status: DISCONTINUED | OUTPATIENT
Start: 2019-01-15 | End: 2019-04-19 | Stop reason: HOSPADM

## 2019-01-14 RX ORDER — PANTOPRAZOLE SODIUM 40 MG/1
40 TABLET, DELAYED RELEASE ORAL
Status: DISCONTINUED | OUTPATIENT
Start: 2019-01-15 | End: 2019-02-08

## 2019-01-14 RX ORDER — MAGNESIUM HYDROXIDE/ALUMINUM HYDROXICE/SIMETHICONE 120; 1200; 1200 MG/30ML; MG/30ML; MG/30ML
30 SUSPENSION ORAL EVERY 4 HOURS PRN
Status: CANCELLED | OUTPATIENT
Start: 2019-01-14

## 2019-01-14 RX ORDER — AMOXICILLIN AND CLAVULANATE POTASSIUM 875; 125 MG/1; MG/1
1 TABLET, FILM COATED ORAL EVERY 12 HOURS SCHEDULED
Status: CANCELLED | OUTPATIENT
Start: 2019-01-14 | End: 2019-01-14

## 2019-01-14 RX ORDER — MELATONIN
1000 DAILY
Status: CANCELLED | OUTPATIENT
Start: 2019-01-15

## 2019-01-14 RX ORDER — ACETIC ACID 0.25 G/100ML
IRRIGANT IRRIGATION DAILY
Status: CANCELLED | OUTPATIENT
Start: 2019-01-15

## 2019-01-14 RX ORDER — SACCHAROMYCES BOULARDII 250 MG
250 CAPSULE ORAL 2 TIMES DAILY
Status: DISCONTINUED | OUTPATIENT
Start: 2019-01-14 | End: 2019-04-19 | Stop reason: HOSPADM

## 2019-01-14 RX ORDER — MAGNESIUM HYDROXIDE/ALUMINUM HYDROXICE/SIMETHICONE 120; 1200; 1200 MG/30ML; MG/30ML; MG/30ML
30 SUSPENSION ORAL EVERY 4 HOURS PRN
Status: DISCONTINUED | OUTPATIENT
Start: 2019-01-14 | End: 2019-04-19 | Stop reason: HOSPADM

## 2019-01-14 RX ORDER — BISACODYL 10 MG
10 SUPPOSITORY, RECTAL RECTAL DAILY PRN
Status: CANCELLED | OUTPATIENT
Start: 2019-01-14

## 2019-01-14 RX ORDER — LANOLIN ALCOHOL/MO/W.PET/CERES
500 CREAM (GRAM) TOPICAL DAILY
Status: CANCELLED | OUTPATIENT
Start: 2019-01-15

## 2019-01-14 RX ADMIN — NYSTATIN: 100000 POWDER TOPICAL at 18:17

## 2019-01-14 RX ADMIN — ENOXAPARIN SODIUM 40 MG: 40 INJECTION SUBCUTANEOUS at 08:36

## 2019-01-14 RX ADMIN — VITAMIN D, TAB 1000IU (100/BT) 1000 UNITS: 25 TAB at 08:36

## 2019-01-14 RX ADMIN — Medication 250 MG: at 18:17

## 2019-01-14 RX ADMIN — AMOXICILLIN AND CLAVULANATE POTASSIUM 1 TABLET: 875; 125 TABLET, FILM COATED ORAL at 21:26

## 2019-01-14 RX ADMIN — CYANOCOBALAMIN TAB 1000 MCG 500 MCG: 1000 TAB at 08:37

## 2019-01-14 RX ADMIN — B-COMPLEX W/ C & FOLIC ACID TAB 1 TABLET: TAB at 08:37

## 2019-01-14 RX ADMIN — PANTOPRAZOLE SODIUM 40 MG: 40 TABLET, DELAYED RELEASE ORAL at 06:05

## 2019-01-14 RX ADMIN — AMOXICILLIN AND CLAVULANATE POTASSIUM 1 TABLET: 875; 125 TABLET, FILM COATED ORAL at 08:36

## 2019-01-14 RX ADMIN — OYSTER SHELL CALCIUM WITH VITAMIN D 1 TABLET: 500; 200 TABLET, FILM COATED ORAL at 08:36

## 2019-01-14 RX ADMIN — Medication 250 MG: at 08:36

## 2019-01-14 RX ADMIN — ACETIC ACID: 250 IRRIGANT IRRIGATION at 08:38

## 2019-01-14 RX ADMIN — DAPTOMYCIN 300 MG: 500 INJECTION, POWDER, LYOPHILIZED, FOR SOLUTION INTRAVENOUS at 18:17

## 2019-01-14 NOTE — SOCIAL WORK
Pt medically cleared for discharge today to [facility] for IVABX and wound care  Clinical faxed to [facility] with confirmation report received  Currently awaiting determination

## 2019-01-14 NOTE — PROGRESS NOTES
Progress Note - Infectious Disease   Eulogio Hackett 76 y o  female MRN: [id number]  Unit/Bed#:  Encounter: [id number]      Impression/Recommendations:  1   Septic shock   Evolving since admission   Fever, leukocytosis, refractory hypotension  Adriana Code due to #2   Blood cultures negative   Chest x-ray shows no pneumonia   Wounds without infection per Wound Care photos   Clinically stable and nontoxic   Improved with antibiotics  Rec:  ? Continue antibiotics as below  ? Follow temperatures and WBC closely  ? Supportive care as per the primary service     2   GNR UTI   E coli/Providencia   In the setting of bladder spasms and Hays removal   No history of MDR-GNRs  Rec:  ? Discontinue antibiotics today to complete treatment course  ? Follow urine output closely     3   Left femur ORIF infection   In setting of recent displaced femur fracture 10/2018 with localized hematoma   Draining sinus with air at fracture site now noted on admission CT   Now status post I&D of purulent/necrotic sinus tract that communicated to bone   OR culture with Corynebacterium from wound and bone   Clinically stable without sepsis  Tolerating antibiotics well  Rec:  ? Continue daptomycin for 6 weeks of IV antibiotics through 2/13/19 followed by suppressive PO antibiotics until bone healed  ? Check weekly CBC-diff, creatinine, CPK while on IV antibiotics  ? D/C PICC after last dose of IV antibiotics  ? Anticipate transition to doxycycline after IV antibiotics complete until bone healed   (Intermediate susceptibilities although no other good PO options)  ? Continue LWC/VAC per ortho     4  Right hip pressure ulcer with flap necrosis   Status post debridement and flap readvancement   CT negative for osteomyelitis   Bone visualized intraoperatively after debridment but not overtly soft or infected and was subsequently covered   OR wound cultures with Staph pseudointermedius/B  Fragilis   OR bone cultures with 3 colonies MRSA     Rec  ? [address] and offloading per Plastics  ? Prolonged antibiotic course for # 1 should treat any potential residual osteomyelitis     5   Left hip pressure ulcer   No evidence of acute infection   Status post local debridement without purulence noted intraoperatively   No exposed bone   Suspect Pseudomonas from recent wound cultures are colonizers  Rec:  ? No additional antibiotics for now  ? Continue LWC and offloading per Plastics  ? Probable definitive management in 4-6 weeks once right hip flap healed     6   Sacral pressure ulcer with probably chronic coccygeal osteomyelitis   Chronic draining sinus with destructive changes seen on admission CT   No evidence for cellulitis or sepsis   Suspect Pseudomonas from recent wound cultures are colonizers  Rec:  ? No additional antibiotics for now  ? Continue LWC and offloading per Plastics     7  Paraplegia       The patient is stable from an ID standpoint for D/C to TCF      Antibiotics:  Daptomycin #12  Augmentin #6 (GNR Tx #7)    Subjective:  Patient seen on AM rounds  Denies fevers, chills, sweats, nausea, vomiting, or diarrhea  24 Hour Events:  No documented fevers, chills, sweats, nausea, vomiting, or diarrhea  Objective:  Vitals:  Temp:  [97 4 °F (36 3 °C)-98 8 °F (37 1 °C)] 97 4 °F (36 3 °C)  HR:  [90-95] 90  Resp:  [20] 20  BP: (124-153)/(59-80) 124/59  SpO2:  [97 %-100 %] 97 %  Temp (24hrs), Av °F (36 7 °C), Min:97 4 °F (36 3 °C), Max:98 8 °F (37 1 °C)  Current: Temperature: (!) 97 4 °F (36 3 °C)    Physical Exam:   General:  No acute distress  Psychiatric:  Awake and alert  Pulmonary:  Normal respiratory excursion without accessory muscle use  Abdomen:  Soft, nontender  Extremities:  No edema, PICC intact  Skin:  No rashes    Lab Results:  I have personally reviewed pertinent labs      Results from last 7 days  Lab Units 19  1659 19  0452 19  1101   POTASSIUM mmol/L 4 3 4 1 3 8   CHLORIDE mmol/L 100 99 103   CO2 mmol/L 29 27 27 BUN mg/dL 21 12 11   CREATININE mg/dL 0 48* 0 44* 0 42*   EGFR ml/min/1 73sq m 101 104 106   CALCIUM mg/dL 9 0 8 6 8 3*       Results from last 7 days  Lab Units 01/12/19  1659 01/09/19  0452 01/08/19  1101   WBC Thousand/uL 11 50* 9 20 8 20   HEMOGLOBIN g/dL 10 4* 9 6* 9 0*   PLATELETS Thousands/uL 497* 361 305           Imaging Studies:   I have personally reviewed pertinent imaging study reports and images in PACS  EKG, Pathology, and Other Studies:   I have personally reviewed pertinent reports

## 2019-01-14 NOTE — PLAN OF CARE

## 2019-01-14 NOTE — PROGRESS NOTES
Progress Note - Wound Care  Jose Manuel Mendoza 76 y o  female MRN: [id number]  Unit/Bed#:  Encounter: [id number]      Assessment:  Actual wounds were not examined today because they were all changed about 1 hr ago  The VAC was also change  However, I discussed the wounds with the RN while reviewing with previous photos  She states that all wounds are improving  Plan:  Continue same wound care as previously ordered including wound VAC  Adaptic does not need to be used if bone is no longer exposed and covered with granulation tissue  Subjective: Followup of multiple pressure ulcers and postop wound of the left thigh  Patient states that she has no pain  Her only complaint was of swelling of her left knee  Patient to be transferred to [facility] today  Problem Focused Physical Exam  Vitals: Blood pressure 124/59, pulse 90, temperature (!) 97 4 °F (36 3 °C), temperature source Temporal, resp  rate 20, height 5' 5" (1 651 m), weight 52 kg (114 lb 10 2 oz), SpO2 97 %, not currently breastfeeding  ,Body mass index is 19 08 kg/m²  Exam:   Wounds were not examined today  They were all just redressed and the wound VAC was just replaced  The left knee does not appear to be swollen or erythematous            Results from last 7 days  Lab Units 01/12/19  1659 01/09/19  0452 01/08/19  1101   WBC Thousand/uL 11 50* 9 20 8 20   HEMOGLOBIN g/dL 10 4* 9 6* 9 0*   HEMATOCRIT % 32 4* 29 7* 27 6*   PLATELETS Thousands/uL 497* 361 305   LYMPHS PCT % 24* 21* 21*   MONOS PCT % 11* 8 8   EOS PCT % 2 4 5   BASOS PCT % 1 1 1         Results from last 7 days  Lab Units 01/12/19  1659 01/09/19  0452 01/08/19  1101   GLUCOSE RANDOM mg/dL 107* 122* 96   POTASSIUM mmol/L 4 3 4 1 3 8   CHLORIDE mmol/L 100 99 103   CO2 mmol/L 29 27 27   BUN mg/dL 21 12 11   CREATININE mg/dL 0 48* 0 44* 0 42*   EGFR ml/min/1 73sq m 101 104 106   CALCIUM mg/dL 9 0 8 6 8 3*             Lab, Imaging and other studies: Pertinent studies have been reviewed

## 2019-01-14 NOTE — SOCIAL WORK
SW received call from Elizabeth Portillo ([phone] ext  [phone]) at [facility] with [employer] #[id number]  Pt approved admission to [facility] today 1/14 with NRD on 1/21 with nursing RUG  Next review fax to [phone]  TCF, Nursing and Attending notified

## 2019-01-14 NOTE — NURSING NOTE
A&Ox3  No c/o pain  Lungs CTA  No SOB  HR regular  No edema  +PP  ABD soft NT ND with +BS  Multiple wounds with dressings in place  VSS  Resting comfortably with call bell in reach 
A&Ox3  No c/o pain  Lungs CTA and diminished at bases  No SOB  HR regular  No edema  +PP  ABD soft NT ND with +BS  Hays in place draining light josie color urine with large amount of sediments  Left hip wound with dressing in place and D&I  Right hip dressing D&I  Left inner hip dressing intact  VSS  Repositioned for comfort  Resting comfortably with call bell in reach 
A&Ox3  No c/o pain  Lungs CTA and diminished at bases  No SOB  HR regular  No edema  +PP  ABD soft NT ND with +BS  Hays in place draining light josie color urine with sediments  Left hip wound with dressing in place and D&I  Right hip dressing D&I  Left inner hip dressing intact  VSS  Resting comfortably with call bell in reach 
Assessment remains unchanged  Resting comfortably in bed  Offers no complaints  Will continue to monitor  Call bell in reach 
Assisted with dressings change by Dr Gildardo Beckett  Pt with moderate soft brown BM  Pt cleaned prior to dressing change  Bedding changed and pt repositioned  Call bell with reach 
Blood pressure running low, 500cc bolus given  Rechecked, still 86/42  Temperature better, 99  Pt repositioned and isabel fell out 
Dr Geetha Ortiz notified of low blood pressure with manual cuff  No new orders at this time  Will monitor 
Dr Mary Frankel notified of heme + stool 
Dr Ted Alvarado made aware of significant culture results
Dressing changes performed by me on all wounds with new wound vac placed 
Dressings changed as per protocol  Pt tolerated well  Repositioned  Pt refused to be turned on right side  States " I can't"  Pt on specialty mattress  Call bell in reach  Will continue to monitor 
Dressings changed for daily change  Patient tolerated well  Patient now repositioned on the right side 
Dressings completed on right and left hip, back and left buttock according to the multiple detailed orders addressing each area  Patient tolerated it well 
Dressings to left ischial wounds done  Patient tolerated well  Patient leaking urine  Isabel irrigated  Thick yellow urine with sediment aspirated  After aspiration isabel irrigated freely and leaking subsided 
Hays irrigated with 50cc  [address] noted 
Hays reinserted, urine sent to lab for culture  Blood pressure better, 104/51  Continue to monitor 
IV obtained  First unit of PRBC sent back down to blood bank due to  time  Fresh unit obtained  Will begin transfusion 
Isabel dislodged and found lying on bed  New isabel inserted  Patient tolerated well  All dressings changed  See flowsheet 
Isabel was draining poorly, clogged with sediment  Irrigation attempted, but urine continued to come out around catheter  New latex free 16 Fr catheter placed without any difficulty  Large amount of thick drainage came out in isabel  Patent is contracted and a paraplegic and did not want a stat lock or tape placed on her leg, fearing more skin breakdown  Staff is aware to be mindful of tubing when repositioning patient  Patient tolerated well  Patient was incontinent of stool  Yasemin-care and linens changed  Soiled ABDs to R hip changed and replaced with new ones  Dressings to L hip/buttock changed as ordered  Patient repositioned for comfort, SCDs on  Specialty mattress  Contact precautions maintained  Call waller in reach  Will  Continue to monitor closely 
Left leg wound vac changed according to protocol  Pt tolerated well  VSS  Denies pain  Remains SR  Call bell in reach  Will continue to monitor 
Levophed started at 4 mcg/ min for BP in 80's
Manual BP 80/42  MD notified  Transferred to ICU  Report given to [facility] 
Monitor shows SR with stable vitals  Lungs are clear bilaterally  No sob or chest discomfort  Dressing are intact  Repositions her self and occasional with our help  Call light, TV remote, and bed remote within reach 
Monitor shows SR with stable vitals  No c/o pain  No changes in assessments for previous assessments  Dressing remain clean dry and intact from earlier dressing change  Call light within reach  PICC flushing with ease  Will monitor 
Monitor shows SR with stable vitals  No c/o pain  Tolerating diet  No changes in other assessments from this morning assessments  Patient is alert and oriented x4 and verbalizes needs  Bed, and TV remote within reach  Fresh water at the bedside  Lunch tray with patient  Call light within reach  Will continue to monitor 
New drainage noted coming from left inner thigh area pinhole wound  PA paged and wound cultures sent 
New dressings applied to right hip, left hip, and sacrum  New foam dressing applied to right heel due to reddened spongy heel 
No assessment changes  Pain free  Call bell at pt's reach 
Other RN able to irrigate isabel, isabel now draining  Will monitor 
PRBC transfusion initiated at 1200  IV site occluded and started leaking  IV d/c'd  Multiple attempts by multiple RNs performed  Unable to obtain site  MD aware  Will continue to attempt IV access 
PT alert and oriented, Lungs are clear on auscultation  Isabel output is low, irrigated isabel at 1755  Turned and repositioned   Will continue to monitor
Patient appears to be resting comfortably in bed in semi-lu's position turned to her left side; eyes closed and chest movements noted  Patient woke easily for vital signs and had no complaints of pain or discomfort at this time  Patient is calm and cooperative with care  Patient is awake and oriented times four  Vital signs remain stable  Call bell in reach and bed in low position  Assessment other wise unchanged at this time, will continue to monitor 
Patient appears to be resting comfortably in bed on her left side laterally, eyes closed and chest movements noted  She woke easily when staff entered the room and remains oriented times four  She is calm, pleasant, and cooperative with care  Blood products continue to infuse with no issue  Assessment remains unchanged at this time, will continue to monitor 
Patient assisted in having loose BM  Dressings that were soiled where changed fully and site care completed  Bedside heme-test completed and was negative  Yasemin-area care completed  Pt  Repositioned and set up for dinner tray  Bed left in lowest position and locked 
Patient awake and alert  Able to make needs known  Dressing to right hip and upper leg, right heel, and buttocks C/D/I  Pillows used for repositioning and reduce pressure on joints as well as specialty mattress  Denies pain  Resting comfortably in bed 
Patient awake and sitting up in bed  Patient denies pain at this time, VS WDL  Patient aware of PICC placement today 12/19  Patient encouraged to utilize call bell  Will continue to monitor 
Patient currently awake  VS WDL, patient denies pain at this time  PICC flushes well, blood return noted  Patient encouraged to utilize call bell  Will continue to monitor 
Patient had large BM, surya-care provided  Dressings to L hip/sacrum changed  Patient repositioned for comfort  No changes from previous assessment  Call bell in reach  Contact precautions maintained  Will continue to monitor closely 
Patient in bed  Alert and oriented  Denies pain at this time  VSS  See flowsheet for details  Call bell in reach  Will continue to monitor 
Patient in bed  Blood drawn for type and screen  Denies pain  Dressings intact  IVF changed as ordered  See flowsheet for details  Call bell in reach  Will continue to monitor 
Patient in bed  Lying on left side  RN only able to assess right hip dressing at this time  See flowsheet for shift assessment  Call bell in reach  Will continue to monitor 
Patient in bed sleeping  Dressing changes to be done after dinner per patient request  No changes to report at this time  Call bell in felisha  Will continue to monitor 
Patient is A+Ox3, VSS, denies pain  Dressing to R Hip, R upper thigh and L buttock are C/D/I  Patient repositioned frequently with pillows  Tolerating diet, good appetite  Will cont  To monitor closely 
Patient is A+Ox3, VSS, denies pain  Dressings remain intact and no drainage noted from R hip  No changes from previous assessment  Patient is repositioned frequently  Call bell in reach, will continue to monitor closely 
Patient is A+Ox3, VSS, denies pain  Patient is tolerating diet, good appetite  Repositioned with pillows  Dressings to R hip/thigh C/D/I, 2 drainage tubes sutured into R hip, no new drainage to wall suction  Call bell in reach, patient is napping comfortably in bed  Contact precautions maintained  Will continue to monitor closely 
Patient is A+Ox3, VSS, denies pain  Patient is tolerating diet, good appetite for lunch and dinner  Patient c/o nausea before lunch, Zofran given with relief and pt was able to eat without any problems  Dressings to L hip/buttocks changed as ordered  Pt requested to be disimpacted by RN  Stool is soft and mushy and unable to disimpact  Senna held yesterday and today  Patient denies any discomfort and abdomen is soft  Last BM was on 12/20/18  Patient repositioned for comfort with pillows  Call bell in reach, will continue to monitor closely  Contact precautions maintained 
Patient is A+Ox3, VSS, denies pain  Specialty mattress, patient is a paraplegic and is repositioned with pillows frequently  Tolerating diet, good appetite  Dressings to R hip C/D/I  Dressings to L hip/buttock changed as ordered  Patient tolerated well  Patient is presently sitting up and to the left comfortably in bed, call bell in reach  Patient makes no complaints at present  Will continue to monitor closely  Contact precautions maintained 
Patient is A+Ox3, VSS, denies pain  T+R every 2 hours  Tolerating diet, good appetite  Dressings to R hip, C/D/I  Dressings to L hip/buttocks changed as ordered  Wound vac to L inner thigh is patent for scant drainage  Hays is patent for yellow urine with much sediment  Patient is sitting up comfortably in bed, call bell in reach  Patient is able to move her arms and upper body to get what she needs, tray table in reach  Contact precautions maintained  Will continue to monitor closely 
Patient is A+Ox3, VSS, denies pain  Tolerating diet, good appetite  Patient is able to reposition her upper body ad jesus, pt repositioned with pillows every 2 hours for comfort  Isabel has been patent so far for yellow urine with a lot of sediment  Patient has been dry without any leaking from isabel noted  Dressings to R hip, L buttock and Mepilex to L inner thigh remain intact  Patient has been on the phone and watching TV  Call bell in reach, contact precautions maintained  Will continue to monitor closely 
Patient is A+Ox3, VSS, denies pain  Tolerating diet, good appetite  Repositioned for comfort with pillows frequently  Mepilex to B/L heels and knees for protection  Dressing to R hip/thigh is C/D/I   2 drains from R hip have no new drainage, just serosanguinous drainage in the tubing  Dressing to L buttock/hip are C/D/I  Patient requested to be digitally stimulated and disimpacted  Stool was too soft to remove  Patient agreed to wait until afternoon to see if she is able to go  Call bell in reach, SCDs on, air mattress  Contact precautions maintained  Will continue to monitor closely 
Patient is alert and oriented x4 with no c/o pain  Lungs remain clear but diminished  IV flushed on left arm with ease  Discussed with patient the idea of PICC line prior to having the weekend and holiday schedule  Educated on the ease of drawing blood too  Vitals are stable  Call light within reach  Dressing are unchanged  Patient is lying on left side for dinner  Hays intact draining sedimented yellow urine  Will monitor 
Patient is left side lying and right side is visually seen by me  Dressing in place with serious sanguinous drainage  Dressing are both sutured and stapled into place  2 CCEI's are exiting the wound  One is labeled 1 and the other is 2  Number two is draining serious sanguinous drainage  No c/o pain  Hays intact for yellow sedimented urine that is yellow  Pink dressing noted on inner aspect of the left thigh and the right heel  + palpable pulses on both pedals  and SCD's on  No edema noted  Lungs are clear but diminished bilaterally  Patient is awake alert and oriented x4  Patient is able to move both upper extremities  Systolic blood pressure is in the 90's  Call light within reach   Will continue to monitor
Patient is now fully awake alert and oriented  Soft spoken  Will answer questions appropriately but will not offer conversation  Color has improved and H & H has improved since morning labs  Bed is warm enough at the temp that it is set at  Dressing on the right hip remain intact with scant drainage  Drainage continues to be serious sanguinous  Dressing remain in place  Lungs are clear but diminished  Will continue to monitor  Call light within reach 
Patient isabel continues to drain, sediment noted in urine still  Pt inquiring as to flushing isabel routinely, will monitor to ensure adequate drainage and pass concern to oncoming team  Remaining assessment unchanged and WNL  JPs to wall suction  Dressings dry and intact  No BM overnight  Call bell in reach, HOB at 20 degrees  Will monitor 
Patient lying on L side  VS WDL  Patient denies pain and is absent from signs of distress  Previous assessment remains unchanged  Will continue to monitor 
Patient off the floor to the OR
Patient resting comfortably  No complaints of pain  All dressings dry and intact  Hays draining for concentrated yellow urine with sediment  SCDs in place  Patient requesting bed temperature to be increased, increased from 92 to 94 degrees at this time  No distress  Assessment WNL  Lungs clear, abdomen unremarkable  Call bell in reach, will monitor closely 
Patient resting in bed, SCDs applied  VS WDL, patient currently denies pain and is absent from signs of distress  Previous assessment remains unchanged  Patient encouraged to utilize call bell  Will continue to monitor 
Patient returned from PACU to her room on clinitron bed
Patient vital signs stable  Resting comfortably in bed, patient's eyes are closed and chest movement noted  No reports of pain or SOB at this time  Equal chest expansion, and no labored breathing noted  Assessment unchanged at this time  Call bell within reach, bed in lowest position, will continue to monitor 
Patient vital signs stable  Resting comfortably in bed, patient's eyes are closed and chest movement noted  No reports of pain or SOB at this time  Equal chest expansion, and no labored breathing noted  Assessment unchanged at this time  Call bell within reach, bed in lowest position, will continue to monitor 
Patient vital signs stable, NSR on the monitor  Resting comfortably in bed, patient's eyes are closed and chest movement noted  No reports of pain or SOB at this time  Equal chest expansion, and no labored breathing noted  Assessment unchanged at this time  Call bell within reach, bed in lowest position, will continue to monitor 
Patient's isabel did not drain overnight  Upon assessment patient had large bladder spasm  Patient also incontinent of bowel movement at this time  Dressing to sacrum changed  Isabel flushed  Patient changed and repositioned  Call bell in reach, will monitor 
Patient's isabel flushed at this time  Moderate amount of sediment noted, urine returned as well as flush  Will monitor 
Patient's isabel not draining, large amounts of urine noted beneath patient throughout shift  Patient stated her isabel often gets clogged  Nurse attempted to irrigate isabel with no success  Will replace isabel 
Patient's sacral dressing changed at this time  Patient incontinent of stool which saturated dressing  Patient tolerated well  Will monitor 
Pt  Incontinent of mod  Mushy dark brown/black BM  Sacral decub dressing became soiled  Debub dressing changed as per orders  Mod  Yellow/brown drainage noted on old dressing  Pt  Tolerated procedure well  Pt  Repositioned for comfort  Will continue to monitor 
Pt alert and oriented times three  Dressings on right are dry and intact  Hays for cloudy yellow urine with a lot of sediment in it  JPs to wall suction for serosangenous drainage  Pt denies any pain  Continue to monitor 
Pt alert and oriented times three  Dressings on right hip dry and intact  Hays output is low and has a lot of sediment,  Hays irrigated and flowing better  Turned and repositioned  Call bell with in reach,  Continue to monitor 
Pt alert and oriented times three  Pt has a non=productive cough  Also, isabel was out, incontinent  Also incontinent of stool  Dressings changed and pt has a fever of 101 7  Doctor called and came up to see pt  Tylenol given and blood cultures taken  Isabel reinserted  Chest xray taken  Will get a flu swab and continue to monitor 
Pt alert and oriented times three  She slept well  Had a soft formed MN  Dressings on sacrum changed  4x4s soaked in acetic acid and abds over  Also, isabel for 150cc urine, irrigated  Continue to monitor     Call bell with in reach 
Pt alert and oriented times three  Turned and repositioned q 2 hours  Dressings on bilateral hips and sacrum are dry and intact  Hays for yellow cloudy urine  Call bell with in reach, continue to monitor 
Pt awake and alert  No complaints at this time  BS clear and diminished  Breakfast tray set up for patient  Call bell within reach 
Pt awake most of the night   States she slept intermittently  Denies pain  PA notified of hypotension and recent intake  See new orders  No change in assessment otherwise 
Pt awake, alert and oriented  Pt reports "feeling tired"  VSS  Denies pain/SOB  No signs of acute distress at this time  Right leg dressings C/D/I  No drainage noted from either drains which are connected to wall suction  Assessment remains unchanged  Will continue to monitor  Call bell in reach 
Pt incontinent of moderate amount of soft brown stool  Pt cleaned  Dressings changed as per protocol  Pt tolerated well  Call bell in reach  Will continue to monitor 
Pt is alert and oriented  Denies pain  Moves upper body fair in bed  Lower extremities paralyzed by history  Heart tones clear with palpable pulses  Skin is warm and dry  No edema noted  Lungs sounds are clear to all fields except left base which has coarse crackles that do not clear with cough  Left side is dependent due to surgery on right hip defect  No shortness of breath  Using incentive independently  Abdomen is unremarkable  No BM  Urinary catheter is draining mildly cloudy yellow urine with mucus shreds  PICC line intact  Flushes well  Wound VAC dressing is sunken and intact  No new drainage to container  CECI drains to posterior wounds also have no new drainage  Both to wall suction  Dressing intact to right thigh Left hip and sacral wounds unseen at this time 
Pt received from 5T via bed into 407 - for hypotension  Pt is alert  oriented  Has a fluid bolus running  Via a picc  L arm    Respirations unlabored  On room air   SR on monitor
Pt received hti esperanza, a+o, resting in bed, pt was moved on air mattress this am, turned and positioned, katlyn hip wound with yellow drainage, also left upper thigh redness and swelling which decreased throughout the day  Turned and positioned throughout the day, denies pain   Will monitor
Pt received this am, a+o, resting in bed, wound vac in place and patent, spoke with dr Jose Garcia, ok to change Monday if ok with ortho, left message with dr Brie Olsen office but no call back  Dr Alexys Stapleton also aware  Will monitor 
Pt received this am, turned and positioned, had bm X2 this pm, wounds redressed as ordered, pt resting in bed, denies pain, will monitor 
Pt refuses SS enema  Offered twice  Refusing senna 
Pt refuses to supply any emergency names or phone numbers to staff  Dr Denise Leslie aware 
Pt refusing senna and soap suds enema 
Pt repositioned  Incontinent for small amount of dark black stool  Stool sent for hemoccult  Dr Jesús Krishna notified 
Pt resting on specialty mattress, tolerating well  Denies pain, Levophed drip tritiated down to 2 mcg  Or 7 5 cc/hr  SBP's in 120's  All drains intact  Remains afebrile 
Pt returned from CT of femur  Returned "I'm not feeling well" Pt flushed with rash and few scattered wheezes  Dr Jose Portillo notified  Methylprednisolone and diphenhydramine ordered and given  Vitals 98 7, Hr 101, RR 24, Room air 92%  Placed on 2L NC sat 99%  Pt feeling better after about 20 mins  Was refusing oral meds at this time, "I just want to rest"  Call bell within reach 
Pt returned to unit, alert and oriented times 4, wound vac placed to L medial thigh, no erythema  Hays remains and draining, cloudy urine  Pt's dressings changed, but pt stools frequently  Pt denied pain, tolerated dressing change and tolerated meals  Repositioned pt, neuro checks done, no deficits   Will continue to monitor
Pt seen by Dr Pavan Herzog  All wounds assessed and measured, photos taken  Redressed right thigh wound with xeroform  L lateral hip and sacrum with acetic acid soaked gauze  Upper and lower left ischial stage 2 wounds covered with duoderm thin  Wound vac to left distal inner thigh wound, adaptic over base of wound/ bone then black foam  Good vacuum achieved, no leak, scant serosanguinous drainage in tubing  See flowsheets for measurements 
Pt slept well  Incontinent of soft stool  Dressing had to be changed  Pt with 2 open areas,  Dressings soaked in acedic acid and abds over it    Hays irrigated, urine with a lot of sediment
Received patient this evening alert and oriented x4 in bed  Placed on left side  Dressing to right hip/thigh C/D/I  2 drains sutured and hooked up to wall suction  No new drainage noted  VSS  No complaints at this time  Will continue to monitor 
Rechecked blood pressure is the same as early 
Report called to Yousif Bianchi RN in [address]  AVS printed and to go with the patient  Patient to be transported via bed with personal items 
Report given to [facility] ROX[facility]  Pt ready for transfer to [facility] 
Rolled and changed bedding  Dressing all appeared dry and intact  No measurable drainage from either drains which are to wall suction  Pt refused soap suds enema  Pt states feeling better but wants to rest and still feels cold  Call bell within reach 
Upon assessment patient has no complaints of pain  Patient is alert and orientated x3  Lungs clear throughout, no shortness of breath, no chest pain  Abdomen soft, non tender, bowel sounds x4  No nausea, no vomiting  Dressings to coccyx and right hip are dry and intact  Hays draining for concentrated yellow urine with sediment  Hays flushed by previous RN at shift change to ensure patency  Patient tolerated same well  On special bed, SCDs in place  On left side  Call bell in reach, will monitor 
VSS  Pt  Denies SOB or chest pain  Pt  Turned and repositioned  Pt  Incontinent moderate amount of soft formed brown BM  Sacral wounds redressed with acetic acid and covered with 4x4 and ABD  Pt  Tolerated procedure well  No change in earlier assessment  Call bell within reach  Will continue to monitor 
VSS  Pt  Denies any pain  Pt  Repositioned for comfort  All dressings clean, dry and intact  Hays patent for clear yellow urine  No change in earlier assessment  Will continue to monitor 
VSS  Pt  Denies any pain at this time  Pt  Turned and repositioned for comfort  Pt  Remains on Klinitron bed  All wound dressings clean, dry and intact  Wound vac functioning at -125 mm/hg  Scant serosang drainage noted in tubing in cannister  Hays patent for josie colored urine with sediment  Call bell within reach  Will continue to monitor 
Wound care preformed as ordered  Dry dressing placed over two stage II ulcers on the left buttocks  Ulcers were producing minimal clear yellow tinged drainage 
Wound cultures sent  of the L hip,  Sacral, and L thigh   L hip dressing changed , sacral wound dressing changed - both wounds have tunneling - drain yellow green purulent material  Wound vac to  L anterior thigh intact drains pinkish in tubing  R thigh  Upper and lower drains to wall suction   Upper one drains serro - sang the lower one  drains serrous  Incontinent  Of a small amount of formed brown stool 
Wound vac dressing on left thigh changed by me with no leak detected by the machine 
presents with +epistaxis.  was here earlier today with same issue and states he had it cauterized.  denies pain, dizziness, sob at this moment.

## 2019-01-15 RX ADMIN — NYSTATIN: 100000 POWDER TOPICAL at 09:55

## 2019-01-15 RX ADMIN — VITAMIN D, TAB 1000IU (100/BT) 1000 UNITS: 25 TAB at 09:56

## 2019-01-15 RX ADMIN — PANTOPRAZOLE SODIUM 40 MG: 40 TABLET, DELAYED RELEASE ORAL at 05:23

## 2019-01-15 RX ADMIN — DAPTOMYCIN 300 MG: 500 INJECTION, POWDER, LYOPHILIZED, FOR SOLUTION INTRAVENOUS at 17:42

## 2019-01-15 RX ADMIN — ACETIC ACID: 250 IRRIGANT IRRIGATION at 10:58

## 2019-01-15 RX ADMIN — CYANOCOBALAMIN TAB 1000 MCG 500 MCG: 1000 TAB at 09:56

## 2019-01-15 RX ADMIN — SENNOSIDES AND DOCUSATE SODIUM 1 TABLET: 8.6; 5 TABLET ORAL at 14:08

## 2019-01-15 RX ADMIN — OYSTER SHELL CALCIUM WITH VITAMIN D 1 TABLET: 500; 200 TABLET, FILM COATED ORAL at 09:56

## 2019-01-15 RX ADMIN — SENNOSIDES AND DOCUSATE SODIUM 2 TABLET: 8.6; 5 TABLET ORAL at 17:42

## 2019-01-15 RX ADMIN — Medication 250 MG: at 09:56

## 2019-01-15 RX ADMIN — B-COMPLEX W/ C & FOLIC ACID TAB 1 TABLET: TAB at 09:57

## 2019-01-15 RX ADMIN — TUBERCULIN PURIFIED PROTEIN DERIVATIVE 5 UNITS: 5 INJECTION, SOLUTION INTRADERMAL at 09:57

## 2019-01-15 RX ADMIN — ENOXAPARIN SODIUM 40 MG: 40 INJECTION SUBCUTANEOUS at 09:57

## 2019-01-15 RX ADMIN — Medication 250 MG: at 17:42

## 2019-01-15 NOTE — UTILIZATION REVIEW
Notification of Discharge  This is a Notification of Discharge from our facility [address]  Please be advised that this patient has been discharge from our facility  Below you will find the admission and discharge date and time including the patients disposition  PRESENTATION DATE: 12/13/2018  9:04 PM  IP ADMISSION DATE: 12/13/18 2254  DISCHARGE DATE: 1/14/2019  3:19 PM  DISPOSITION: Released to SNF/TCU/SNU [facility] Utilization Review Department  Phone: [phone]; Fax [phone]  ATTENTION: Please call with any questions or concerns to [phone]  and carefully listen to the prompts so that you are directed to the right person  Send all requests for admission clinical reviews, approved or denied determinations and any other requests to fax [phone]   All voicemails are confidential

## 2019-01-16 RX ADMIN — ENOXAPARIN SODIUM 40 MG: 40 INJECTION SUBCUTANEOUS at 10:30

## 2019-01-16 RX ADMIN — VITAMIN D, TAB 1000IU (100/BT) 1000 UNITS: 25 TAB at 10:31

## 2019-01-16 RX ADMIN — B-COMPLEX W/ C & FOLIC ACID TAB 1 TABLET: TAB at 10:32

## 2019-01-16 RX ADMIN — NYSTATIN: 100000 POWDER TOPICAL at 18:00

## 2019-01-16 RX ADMIN — Medication 250 MG: at 18:00

## 2019-01-16 RX ADMIN — DAPTOMYCIN 300 MG: 500 INJECTION, POWDER, LYOPHILIZED, FOR SOLUTION INTRAVENOUS at 18:00

## 2019-01-16 RX ADMIN — OYSTER SHELL CALCIUM WITH VITAMIN D 1 TABLET: 500; 200 TABLET, FILM COATED ORAL at 10:31

## 2019-01-16 RX ADMIN — Medication 250 MG: at 10:32

## 2019-01-16 RX ADMIN — PANTOPRAZOLE SODIUM 40 MG: 40 TABLET, DELAYED RELEASE ORAL at 06:35

## 2019-01-16 RX ADMIN — CYANOCOBALAMIN TAB 1000 MCG 500 MCG: 1000 TAB at 10:31

## 2019-01-16 RX ADMIN — ACETIC ACID: 250 IRRIGANT IRRIGATION at 10:32

## 2019-01-16 RX ADMIN — NYSTATIN: 100000 POWDER TOPICAL at 10:34

## 2019-01-16 RX ADMIN — SENNOSIDES AND DOCUSATE SODIUM 2 TABLET: 8.6; 5 TABLET ORAL at 14:03

## 2019-01-17 RX ADMIN — PANTOPRAZOLE SODIUM 40 MG: 40 TABLET, DELAYED RELEASE ORAL at 05:32

## 2019-01-17 RX ADMIN — DAPTOMYCIN 300 MG: 500 INJECTION, POWDER, LYOPHILIZED, FOR SOLUTION INTRAVENOUS at 18:08

## 2019-01-17 RX ADMIN — VITAMIN D, TAB 1000IU (100/BT) 1000 UNITS: 25 TAB at 10:04

## 2019-01-17 RX ADMIN — ENOXAPARIN SODIUM 40 MG: 40 INJECTION SUBCUTANEOUS at 10:05

## 2019-01-17 RX ADMIN — CYANOCOBALAMIN TAB 1000 MCG 500 MCG: 1000 TAB at 10:04

## 2019-01-17 RX ADMIN — NYSTATIN: 100000 POWDER TOPICAL at 10:06

## 2019-01-17 RX ADMIN — Medication 250 MG: at 10:04

## 2019-01-17 RX ADMIN — Medication 250 MG: at 18:08

## 2019-01-17 RX ADMIN — B-COMPLEX W/ C & FOLIC ACID TAB 1 TABLET: TAB at 10:04

## 2019-01-17 RX ADMIN — OYSTER SHELL CALCIUM WITH VITAMIN D 1 TABLET: 500; 200 TABLET, FILM COATED ORAL at 10:05

## 2019-01-17 RX ADMIN — ACETIC ACID 1000 ML: 250 IRRIGANT IRRIGATION at 10:05

## 2019-01-18 RX ADMIN — ACETIC ACID: 250 IRRIGANT IRRIGATION at 17:37

## 2019-01-18 RX ADMIN — OYSTER SHELL CALCIUM WITH VITAMIN D 1 TABLET: 500; 200 TABLET, FILM COATED ORAL at 11:38

## 2019-01-18 RX ADMIN — CYANOCOBALAMIN TAB 1000 MCG 500 MCG: 1000 TAB at 11:34

## 2019-01-18 RX ADMIN — B-COMPLEX W/ C & FOLIC ACID TAB 1 TABLET: TAB at 11:37

## 2019-01-18 RX ADMIN — PANTOPRAZOLE SODIUM 40 MG: 40 TABLET, DELAYED RELEASE ORAL at 05:29

## 2019-01-18 RX ADMIN — VITAMIN D, TAB 1000IU (100/BT) 1000 UNITS: 25 TAB at 11:38

## 2019-01-18 RX ADMIN — Medication 250 MG: at 11:34

## 2019-01-18 RX ADMIN — NYSTATIN: 100000 POWDER TOPICAL at 17:36

## 2019-01-18 RX ADMIN — ENOXAPARIN SODIUM 40 MG: 40 INJECTION SUBCUTANEOUS at 11:34

## 2019-01-18 RX ADMIN — Medication 250 MG: at 17:36

## 2019-01-18 RX ADMIN — SENNOSIDES AND DOCUSATE SODIUM 2 TABLET: 8.6; 5 TABLET ORAL at 11:35

## 2019-01-18 RX ADMIN — DAPTOMYCIN 300 MG: 500 INJECTION, POWDER, LYOPHILIZED, FOR SOLUTION INTRAVENOUS at 17:36

## 2019-01-18 RX ADMIN — NYSTATIN: 100000 POWDER TOPICAL at 11:38

## 2019-01-19 RX ADMIN — OYSTER SHELL CALCIUM WITH VITAMIN D 1 TABLET: 500; 200 TABLET, FILM COATED ORAL at 10:18

## 2019-01-19 RX ADMIN — Medication 250 MG: at 17:27

## 2019-01-19 RX ADMIN — PANTOPRAZOLE SODIUM 40 MG: 40 TABLET, DELAYED RELEASE ORAL at 06:53

## 2019-01-19 RX ADMIN — NYSTATIN: 100000 POWDER TOPICAL at 10:14

## 2019-01-19 RX ADMIN — NYSTATIN: 100000 POWDER TOPICAL at 17:27

## 2019-01-19 RX ADMIN — VITAMIN D, TAB 1000IU (100/BT) 1000 UNITS: 25 TAB at 10:10

## 2019-01-19 RX ADMIN — Medication 250 MG: at 10:10

## 2019-01-19 RX ADMIN — CYANOCOBALAMIN TAB 1000 MCG 500 MCG: 1000 TAB at 10:17

## 2019-01-19 RX ADMIN — B-COMPLEX W/ C & FOLIC ACID TAB 1 TABLET: TAB at 10:13

## 2019-01-19 RX ADMIN — DAPTOMYCIN 300 MG: 500 INJECTION, POWDER, LYOPHILIZED, FOR SOLUTION INTRAVENOUS at 17:29

## 2019-01-19 RX ADMIN — ENOXAPARIN SODIUM 40 MG: 40 INJECTION SUBCUTANEOUS at 10:16

## 2019-01-19 NOTE — DISCHARGE SUMMARY
Discharge Summary - Wyatt Bee 76 y o  female MRN: [id number]    Unit/Bed#:  Encounter: [id number]    Admission Date:   Admission Orders     Ordered        12/13/18 2254  Inpatient Admission (expected length of stay for this patient is greater than two midnights)  Once               Admitting Diagnosis: Hyponatremia [E87 1]  Paralysis ([address] ) [G83 9]  Wound infection [T14  8XXA, J79 6]  Complicated wound infection [T14  8XXA, L08 9]        Procedures Performed: No orders of the defined types were placed in this encounter  Summary of Hospital Course: Yue Dempsey a 76 y  o  year old female patient was sent in by her primary care physician for worsening sacral wounds   Patient is paraplegic and wheelchair bound due to spinal cord injury   She has baseline chronic kidney disease and these chronic ulcers   Patient was noted to be afebrile and hemodynamically stable   Wounds were noted with foul smell   She was started on IV antibiotics and general surgery was consulted for debridement   White blood cell count has trended down from 13 to 12 from admission   She remains on Unasyn   Blood and wound cultures are pending   CT abdomen pelvis was done and changes are consistent with chronic osteomyelitis   Patient noted to have tachycardia on admission and variable blood pressures   Patient has a history of paraplegia related to a T8 traumatic injury in the past and back in April she had undergone a flap procedure for sacral decubitus ulceration   The flap itself never completely healed   Patient had an admission in September for infection at the flap site with abscess which drained is treated as severe soft tissue infection   She also had an admission in October for left thigh cellulitis after a distal left femur fracture   Her skin changes were all felt to be due to hematoma at the fracture site   Antibiotics were eventually stopped at that time   Images reviewed from the emergency department   Patient's prior culture result reviewed   We are consulted at this time to evaluate and given the patient's complicated wound previous history of infection  During her extremely prolonged admission multiple subspecialist evaluation and consultation has been done, patient reports that she was seen by Plastic surgery earlier in the week and then was subsequently evaluated by her primary care physician and both in discussion had decided that the patient should be admitted  Jewel Araya was noted to have low blood pressures on her visit   Systemically she denied having any fevers, chills, nausea or any vomiting   Baseline she has a chronic Hays catheter and she is able to provide her own self-care at home  Jewel Araya sees plastic surgery on a weekly basis for frequent debridements and wound assessment  Jewel Araya has had previous flap on her right hip as well as her sacral which have both ended up with further opening and drainage from the wounds   She reports having purulent drainage now from the right hip   Reviewed her allergies and she reports a clear allergy of facial swelling with ciprofloxacin and a rash that developed all while she was on cefepime admitted in the hospital   She noted that she was on vancomycin at the time of her ciprofloxacin allergy but her symptoms persisted after stopping vancomycin she suspect that she is not allergic to it  Jewel Araya was previously asked about ostomy placement to promote wound healing which she did not want   She has no other complaints on exam at this time  She has had extremely prolonged hospitalization with multiple debridements including multiple antibiotic regimens  There was also concern for draining sinus track from a left lower extremity as she has a previous jony insertion due to complicated fracture    Orthopedic evaluation was done they have recommended medical management only at this time as surgery was not going to be on option  at this time patient was subsequent started on daptomycin and has tolerated that well    After extensive conversation with patient in regards to being discharged home versus discharge to skilled nursing facility it was evident that patient would continue to benefit from close monitoring given complexity of the case and ongoing medical issues  She is at very high risk patient for readmission at this point      Significant Findings, Care, Treatment and Services Provided:  Extensive care with IV antibiotics multiple subspecialists including plastic surgery    Complications:  No known complications at this time      Discharge Diagnosis:   Hospital (Problems being addressed during this admission)    Paraplegia following spinal cord injury ([email or web address] ) Create Overview  Unprioritized Obed Bodo Dx YesNoResolved Not Applicable     Osteopenia Create Overview  Unprioritized Obed Bodo Dx Valetta Merritts?     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     Closed fracture of lower end of left femur with routine healing Edit Overview  Unprioritized Obed Bodo Dx YesNoResolved Not Applicable     Overview  Added automatically from request for surgery [id number]             Chronic osteomyelitis of coccyx [facility]) Create Overview  Unprioritized Obed Bodo Dx Valetta Merritts?     Constipation Create Overview  Unprioritized Obed Bodo Dx Valetta Merritts?     Anemia of chronic disease Create Overview  Unprioritized Obed Bodo Dx Valetta Merritts?     Hyponatremia Create Overview  Unprioritized Obed Bodo Dx Valetta Merritts?     GERD (gastroesophageal reflux disease) Create Overview  Unprioritized Obed Bodo Dx Valetta Merritts?     Moderate protein-calorie malnutrition ([email or web address] ) Create Overview  Unprioritized Obed Bodo Dx Valetta Merritts?     Hyperglycemia Create Overview  Unprioritized Obed Bodo Dx Valetta Merritts?     BMI less than 19,adult Create Overview  Unprioritized Obed Bodo Dx YesNoResolved Not Applicable     Complicated wound infection                Resolved Problems  Date Reviewed: 1/13/2019          Resolved    Thrombocytosis ([facility] [address] ) 1/12/2019     Resolved by  CONCEPCION Jacobs          Condition at Discharge: good         Discharge instructions/Information to patient and family:   See after visit summary for information provided to patient and family  Provisions for Follow-Up Care:  See after visit summary for information related to follow-up care and any pertinent home health orders  PCP: Rina Junior    Disposition: Short-term rehab at [facility] Readmission: No    Discharge Statement   I spent 45 minutes discharging the patient  This time was spent on the day of discharge  I had direct contact with the patient on the day of discharge  Additional documentation is required if more than 30 minutes were spent on discharge  Discharge Medications:  See after visit summary for reconciled discharge medications provided to patient and family

## 2019-01-20 RX ADMIN — VITAMIN D, TAB 1000IU (100/BT) 1000 UNITS: 25 TAB at 10:07

## 2019-01-20 RX ADMIN — B-COMPLEX W/ C & FOLIC ACID TAB 1 TABLET: TAB at 10:06

## 2019-01-20 RX ADMIN — CYANOCOBALAMIN TAB 1000 MCG 500 MCG: 1000 TAB at 10:07

## 2019-01-20 RX ADMIN — PANTOPRAZOLE SODIUM 40 MG: 40 TABLET, DELAYED RELEASE ORAL at 06:43

## 2019-01-20 RX ADMIN — DAPTOMYCIN 300 MG: 500 INJECTION, POWDER, LYOPHILIZED, FOR SOLUTION INTRAVENOUS at 16:51

## 2019-01-20 RX ADMIN — NYSTATIN: 100000 POWDER TOPICAL at 10:12

## 2019-01-20 RX ADMIN — NYSTATIN: 100000 POWDER TOPICAL at 18:03

## 2019-01-20 RX ADMIN — Medication 250 MG: at 18:03

## 2019-01-20 RX ADMIN — Medication 250 MG: at 10:07

## 2019-01-20 RX ADMIN — ENOXAPARIN SODIUM 40 MG: 40 INJECTION SUBCUTANEOUS at 10:06

## 2019-01-20 RX ADMIN — OYSTER SHELL CALCIUM WITH VITAMIN D 1 TABLET: 500; 200 TABLET, FILM COATED ORAL at 10:07

## 2019-01-21 PROCEDURE — 99308 SBSQ NF CARE LOW MDM 20: CPT | Performed by: INTERNAL MEDICINE

## 2019-01-21 RX ADMIN — NYSTATIN 1 APPLICATION: 100000 POWDER TOPICAL at 17:01

## 2019-01-21 RX ADMIN — NYSTATIN: 100000 POWDER TOPICAL at 09:30

## 2019-01-21 RX ADMIN — PANTOPRAZOLE SODIUM 40 MG: 40 TABLET, DELAYED RELEASE ORAL at 05:40

## 2019-01-21 RX ADMIN — CYANOCOBALAMIN TAB 1000 MCG 500 MCG: 1000 TAB at 09:30

## 2019-01-21 RX ADMIN — Medication 250 MG: at 17:01

## 2019-01-21 RX ADMIN — OYSTER SHELL CALCIUM WITH VITAMIN D 1 TABLET: 500; 200 TABLET, FILM COATED ORAL at 09:28

## 2019-01-21 RX ADMIN — Medication 250 MG: at 09:33

## 2019-01-21 RX ADMIN — VITAMIN D, TAB 1000IU (100/BT) 1000 UNITS: 25 TAB at 09:29

## 2019-01-21 RX ADMIN — DAPTOMYCIN 300 MG: 500 INJECTION, POWDER, LYOPHILIZED, FOR SOLUTION INTRAVENOUS at 17:01

## 2019-01-21 RX ADMIN — B-COMPLEX W/ C & FOLIC ACID TAB 1 TABLET: TAB at 09:32

## 2019-01-21 RX ADMIN — ENOXAPARIN SODIUM 40 MG: 40 INJECTION SUBCUTANEOUS at 09:30

## 2019-01-22 LAB
ALBUMIN SERPL BCP-MCNC: 2.9 G/DL (ref 3–5.2)
ALP SERPL-CCNC: 81 U/L (ref 43–122)
ALT SERPL W P-5'-P-CCNC: 34 U/L (ref 9–52)
ANION GAP SERPL CALCULATED.3IONS-SCNC: 6 MMOL/L (ref 5–14)
AST SERPL W P-5'-P-CCNC: 23 U/L (ref 14–36)
BILIRUB SERPL-MCNC: 0.4 MG/DL
BUN SERPL-MCNC: 17 MG/DL (ref 5–25)
CALCIUM SERPL-MCNC: 8.6 MG/DL (ref 8.4–10.2)
CHLORIDE SERPL-SCNC: 99 MMOL/L (ref 97–108)
CO2 SERPL-SCNC: 28 MMOL/L (ref 22–30)
CREAT SERPL-MCNC: 0.52 MG/DL (ref 0.6–1.2)
ERYTHROCYTE [DISTWIDTH] IN BLOOD BY AUTOMATED COUNT: 22.3 %
GFR SERPL CREATININE-BSD FRML MDRD: 98 ML/MIN/1.73SQ M
GLUCOSE P FAST SERPL-MCNC: 116 MG/DL (ref 70–99)
GLUCOSE SERPL-MCNC: 116 MG/DL (ref 70–99)
HCT VFR BLD AUTO: 28.2 % (ref 36–46)
HGB BLD-MCNC: 9.4 G/DL (ref 12–16)
MCH RBC QN AUTO: 27.8 PG (ref 26–34)
MCHC RBC AUTO-ENTMCNC: 33.2 G/DL (ref 31–36)
MCV RBC AUTO: 84 FL (ref 80–100)
PLATELET # BLD AUTO: 426 THOUSANDS/UL (ref 150–450)
PMV BLD AUTO: 8.2 FL (ref 8.9–12.7)
POTASSIUM SERPL-SCNC: 4 MMOL/L (ref 3.6–5)
PROT SERPL-MCNC: 6.5 G/DL (ref 5.9–8.4)
RBC # BLD AUTO: 3.36 MILLION/UL (ref 4–5.2)
SODIUM SERPL-SCNC: 133 MMOL/L (ref 137–147)
WBC # BLD AUTO: 8.6 THOUSAND/UL (ref 4.5–11)

## 2019-01-22 PROCEDURE — 82550 ASSAY OF CK (CPK): CPT | Performed by: INTERNAL MEDICINE

## 2019-01-22 PROCEDURE — 85027 COMPLETE CBC AUTOMATED: CPT | Performed by: INTERNAL MEDICINE

## 2019-01-22 PROCEDURE — 80053 COMPREHEN METABOLIC PANEL: CPT | Performed by: INTERNAL MEDICINE

## 2019-01-22 RX ADMIN — B-COMPLEX W/ C & FOLIC ACID TAB 1 TABLET: TAB at 10:37

## 2019-01-22 RX ADMIN — Medication 250 MG: at 10:37

## 2019-01-22 RX ADMIN — Medication 250 MG: at 17:12

## 2019-01-22 RX ADMIN — CYANOCOBALAMIN TAB 1000 MCG 500 MCG: 1000 TAB at 10:38

## 2019-01-22 RX ADMIN — ACETIC ACID: 250 IRRIGANT IRRIGATION at 10:41

## 2019-01-22 RX ADMIN — VITAMIN D, TAB 1000IU (100/BT) 1000 UNITS: 25 TAB at 10:37

## 2019-01-22 RX ADMIN — ENOXAPARIN SODIUM 40 MG: 40 INJECTION SUBCUTANEOUS at 10:37

## 2019-01-22 RX ADMIN — OYSTER SHELL CALCIUM WITH VITAMIN D 1 TABLET: 500; 200 TABLET, FILM COATED ORAL at 10:40

## 2019-01-22 RX ADMIN — DAPTOMYCIN 300 MG: 500 INJECTION, POWDER, LYOPHILIZED, FOR SOLUTION INTRAVENOUS at 17:05

## 2019-01-22 RX ADMIN — PANTOPRAZOLE SODIUM 40 MG: 40 TABLET, DELAYED RELEASE ORAL at 05:12

## 2019-01-22 RX ADMIN — NYSTATIN: 100000 POWDER TOPICAL at 10:42

## 2019-01-23 LAB — CK SERPL-CCNC: 21 U/L (ref 30–135)

## 2019-01-23 RX ADMIN — ENOXAPARIN SODIUM 40 MG: 40 INJECTION SUBCUTANEOUS at 09:41

## 2019-01-23 RX ADMIN — NYSTATIN: 100000 POWDER TOPICAL at 11:27

## 2019-01-23 RX ADMIN — SENNOSIDES AND DOCUSATE SODIUM 2 TABLET: 8.6; 5 TABLET ORAL at 09:42

## 2019-01-23 RX ADMIN — NYSTATIN: 100000 POWDER TOPICAL at 17:02

## 2019-01-23 RX ADMIN — OYSTER SHELL CALCIUM WITH VITAMIN D 1 TABLET: 500; 200 TABLET, FILM COATED ORAL at 09:42

## 2019-01-23 RX ADMIN — Medication 250 MG: at 17:03

## 2019-01-23 RX ADMIN — DAPTOMYCIN 300 MG: 500 INJECTION, POWDER, LYOPHILIZED, FOR SOLUTION INTRAVENOUS at 17:01

## 2019-01-23 RX ADMIN — PANTOPRAZOLE SODIUM 40 MG: 40 TABLET, DELAYED RELEASE ORAL at 05:49

## 2019-01-23 RX ADMIN — B-COMPLEX W/ C & FOLIC ACID TAB 1 TABLET: TAB at 09:41

## 2019-01-23 RX ADMIN — VITAMIN D, TAB 1000IU (100/BT) 1000 UNITS: 25 TAB at 09:42

## 2019-01-23 RX ADMIN — Medication 250 MG: at 09:42

## 2019-01-23 RX ADMIN — CYANOCOBALAMIN TAB 1000 MCG 500 MCG: 1000 TAB at 11:31

## 2019-01-24 ENCOUNTER — HOSPITAL ENCOUNTER (OUTPATIENT)
Facility: HOSPITAL | Age: 69
Setting detail: OUTPATIENT SURGERY
Discharge: HOME | End: 2019-01-24
Attending: PLASTIC SURGERY | Admitting: PLASTIC SURGERY
Payer: COMMERCIAL

## 2019-01-24 DIAGNOSIS — L89.229: ICD-10-CM

## 2019-01-24 DIAGNOSIS — T86.821 SKIN GRAFT FAILURE: ICD-10-CM

## 2019-01-24 RX ADMIN — ENOXAPARIN SODIUM 40 MG: 40 INJECTION SUBCUTANEOUS at 09:11

## 2019-01-24 RX ADMIN — OYSTER SHELL CALCIUM WITH VITAMIN D 1 TABLET: 500; 200 TABLET, FILM COATED ORAL at 09:12

## 2019-01-24 RX ADMIN — NYSTATIN 1 APPLICATION: 100000 POWDER TOPICAL at 18:04

## 2019-01-24 RX ADMIN — VITAMIN D, TAB 1000IU (100/BT) 1000 UNITS: 25 TAB at 09:15

## 2019-01-24 RX ADMIN — ACETIC ACID: 250 IRRIGANT IRRIGATION at 09:11

## 2019-01-24 RX ADMIN — CYANOCOBALAMIN TAB 1000 MCG 500 MCG: 1000 TAB at 09:15

## 2019-01-24 RX ADMIN — B-COMPLEX W/ C & FOLIC ACID TAB 1 TABLET: TAB at 09:12

## 2019-01-24 RX ADMIN — PANTOPRAZOLE SODIUM 40 MG: 40 TABLET, DELAYED RELEASE ORAL at 05:07

## 2019-01-24 RX ADMIN — DAPTOMYCIN 300 MG: 500 INJECTION, POWDER, LYOPHILIZED, FOR SOLUTION INTRAVENOUS at 18:04

## 2019-01-24 RX ADMIN — Medication 250 MG: at 09:12

## 2019-01-24 RX ADMIN — Medication 250 MG: at 18:04

## 2019-01-25 ENCOUNTER — ANESTHESIA EVENT (INPATIENT)
Dept: PERIOP | Facility: HOSPITAL | Age: 69
DRG: 573 | End: 2019-01-25
Payer: COMMERCIAL

## 2019-01-25 RX ADMIN — Medication 250 MG: at 09:48

## 2019-01-25 RX ADMIN — NYSTATIN: 100000 POWDER TOPICAL at 18:16

## 2019-01-25 RX ADMIN — Medication 250 MG: at 18:16

## 2019-01-25 RX ADMIN — NYSTATIN: 100000 POWDER TOPICAL at 09:48

## 2019-01-25 RX ADMIN — ENOXAPARIN SODIUM 40 MG: 40 INJECTION SUBCUTANEOUS at 09:47

## 2019-01-25 RX ADMIN — PANTOPRAZOLE SODIUM 40 MG: 40 TABLET, DELAYED RELEASE ORAL at 06:26

## 2019-01-25 RX ADMIN — SENNOSIDES AND DOCUSATE SODIUM 2 TABLET: 8.6; 5 TABLET ORAL at 09:47

## 2019-01-25 RX ADMIN — B-COMPLEX W/ C & FOLIC ACID TAB 1 TABLET: TAB at 09:47

## 2019-01-25 RX ADMIN — ACETIC ACID 1000 ML: 250 IRRIGANT IRRIGATION at 10:53

## 2019-01-25 RX ADMIN — VITAMIN D, TAB 1000IU (100/BT) 1000 UNITS: 25 TAB at 09:48

## 2019-01-25 RX ADMIN — OYSTER SHELL CALCIUM WITH VITAMIN D 1 TABLET: 500; 200 TABLET, FILM COATED ORAL at 09:47

## 2019-01-25 RX ADMIN — CYANOCOBALAMIN TAB 1000 MCG 500 MCG: 1000 TAB at 09:47

## 2019-01-25 RX ADMIN — DAPTOMYCIN 300 MG: 500 INJECTION, POWDER, LYOPHILIZED, FOR SOLUTION INTRAVENOUS at 18:16

## 2019-01-25 NOTE — ANESTHESIA PREPROCEDURE EVALUATION
Review of Systems/Medical History  Patient summary reviewed  Chart reviewed  No history of anesthetic complications     Cardiovascular  Negative cardio ROS Exercise tolerance (METS): <4,     Pulmonary  Negative pulmonary ROS        GI/Hepatic    GERD ,             Endo/Other  Negative endo/other ROS      GYN       Hematology  Anemia ,     Musculoskeletal  Osteoarthritis,   Arthritis     Neurology      Comment: Paraplegia T8 injury 1981 following hand gliding accident  Psychology   Depression ,              Physical Exam    Airway    Mallampati score: I  TM Distance: >3 FB  Neck ROM: full     Dental       Cardiovascular  Comment: Negative ROS, Cardiovascular exam normal    Pulmonary  Pulmonary exam normal     Other Findings        Anesthesia Plan  ASA Score- 3     Anesthesia Type- IV sedation with anesthesia with ASA Monitors  Additional Monitors:   Airway Plan:     Comment: Autonomic hyperreflexia  Plan Factors-Patient not instructed to abstain from smoking on day of procedure  Patient did not smoke on day of surgery  Induction-     Postoperative Plan- Plan for postoperative opioid use  Informed Consent- Anesthetic plan and risks discussed with patient  I personally reviewed this patient with the CRNA  Discussed and agreed on the Anesthesia Plan with the CRNA             Lab Results   Component Value Date    HGBA1C 6 4 (H) 12/28/2018       Lab Results   Component Value Date     05/28/2018    K 4 0 01/22/2019    CL 99 01/22/2019    CO2 28 01/22/2019    ANIONGAP 8 05/28/2018    BUN 17 01/22/2019    CREATININE 0 52 (L) 01/22/2019    GLUCOSE 77 05/14/2018    GLUF 116 (H) 01/22/2019    CALCIUM 8 6 01/22/2019    AST 23 01/22/2019    ALT 34 01/22/2019    ALKPHOS 81 01/22/2019    EGFR 98 01/22/2019       Lab Results   Component Value Date    WBC 8 60 01/22/2019    HGB 9 4 (L) 01/22/2019    HCT 28 2 (L) 01/22/2019    MCV 84 01/22/2019     01/22/2019   EKG NSR

## 2019-01-26 RX ADMIN — Medication 250 MG: at 18:03

## 2019-01-26 RX ADMIN — ENOXAPARIN SODIUM 40 MG: 40 INJECTION SUBCUTANEOUS at 10:15

## 2019-01-26 RX ADMIN — OYSTER SHELL CALCIUM WITH VITAMIN D 1 TABLET: 500; 200 TABLET, FILM COATED ORAL at 10:19

## 2019-01-26 RX ADMIN — Medication 250 MG: at 10:15

## 2019-01-26 RX ADMIN — B-COMPLEX W/ C & FOLIC ACID TAB 1 TABLET: TAB at 10:16

## 2019-01-26 RX ADMIN — CYANOCOBALAMIN TAB 1000 MCG 500 MCG: 1000 TAB at 10:16

## 2019-01-26 RX ADMIN — VITAMIN D, TAB 1000IU (100/BT) 1000 UNITS: 25 TAB at 10:17

## 2019-01-26 RX ADMIN — NYSTATIN: 100000 POWDER TOPICAL at 10:18

## 2019-01-26 RX ADMIN — NYSTATIN: 100000 POWDER TOPICAL at 18:02

## 2019-01-26 RX ADMIN — ACETIC ACID 1000 ML: 250 IRRIGANT IRRIGATION at 10:21

## 2019-01-26 RX ADMIN — DAPTOMYCIN 300 MG: 500 INJECTION, POWDER, LYOPHILIZED, FOR SOLUTION INTRAVENOUS at 18:03

## 2019-01-26 RX ADMIN — PANTOPRAZOLE SODIUM 40 MG: 40 TABLET, DELAYED RELEASE ORAL at 06:03

## 2019-01-27 PROCEDURE — 99308 SBSQ NF CARE LOW MDM 20: CPT | Performed by: FAMILY MEDICINE

## 2019-01-27 RX ADMIN — VITAMIN D, TAB 1000IU (100/BT) 1000 UNITS: 25 TAB at 09:15

## 2019-01-27 RX ADMIN — SENNOSIDES AND DOCUSATE SODIUM 2 TABLET: 8.6; 5 TABLET ORAL at 09:15

## 2019-01-27 RX ADMIN — Medication 250 MG: at 09:15

## 2019-01-27 RX ADMIN — DAPTOMYCIN 300 MG: 500 INJECTION, POWDER, LYOPHILIZED, FOR SOLUTION INTRAVENOUS at 18:24

## 2019-01-27 RX ADMIN — OYSTER SHELL CALCIUM WITH VITAMIN D 1 TABLET: 500; 200 TABLET, FILM COATED ORAL at 09:15

## 2019-01-27 RX ADMIN — NYSTATIN: 100000 POWDER TOPICAL at 09:21

## 2019-01-27 RX ADMIN — ENOXAPARIN SODIUM 40 MG: 40 INJECTION SUBCUTANEOUS at 09:15

## 2019-01-27 RX ADMIN — Medication 250 MG: at 18:23

## 2019-01-27 RX ADMIN — CYANOCOBALAMIN TAB 1000 MCG 500 MCG: 1000 TAB at 09:16

## 2019-01-27 RX ADMIN — PANTOPRAZOLE SODIUM 40 MG: 40 TABLET, DELAYED RELEASE ORAL at 06:18

## 2019-01-27 RX ADMIN — B-COMPLEX W/ C & FOLIC ACID TAB 1 TABLET: TAB at 09:15

## 2019-01-27 RX ADMIN — NYSTATIN: 100000 POWDER TOPICAL at 18:24

## 2019-01-28 ENCOUNTER — ANESTHESIA (INPATIENT)
Dept: PERIOP | Facility: HOSPITAL | Age: 69
DRG: 573 | End: 2019-01-28
Payer: COMMERCIAL

## 2019-01-28 VITALS
SYSTOLIC BLOOD PRESSURE: 118 MMHG | DIASTOLIC BLOOD PRESSURE: 62 MMHG | TEMPERATURE: 97.6 F | HEART RATE: 88 BPM | RESPIRATION RATE: 16 BRPM | OXYGEN SATURATION: 93 %

## 2019-01-28 LAB
ABO GROUP BLD: NORMAL
ALBUMIN SERPL BCP-MCNC: 3.1 G/DL (ref 3–5.2)
ALP SERPL-CCNC: 83 U/L (ref 43–122)
ALT SERPL W P-5'-P-CCNC: 28 U/L (ref 9–52)
ANION GAP SERPL CALCULATED.3IONS-SCNC: 6 MMOL/L (ref 5–14)
AST SERPL W P-5'-P-CCNC: 24 U/L (ref 14–36)
BASOPHILS # BLD AUTO: 0.1 THOUSANDS/ΜL (ref 0–0.1)
BASOPHILS NFR BLD AUTO: 0 % (ref 0–1)
BILIRUB SERPL-MCNC: 0.2 MG/DL
BLD GP AB SCN SERPL QL: NEGATIVE
BUN SERPL-MCNC: 18 MG/DL (ref 5–25)
CALCIUM SERPL-MCNC: 8.8 MG/DL (ref 8.4–10.2)
CHLORIDE SERPL-SCNC: 101 MMOL/L (ref 97–108)
CK SERPL-CCNC: 22 U/L (ref 30–135)
CO2 SERPL-SCNC: 30 MMOL/L (ref 22–30)
CREAT SERPL-MCNC: 0.53 MG/DL (ref 0.6–1.2)
EOSINOPHIL # BLD AUTO: 0 THOUSAND/ΜL (ref 0–0.4)
EOSINOPHIL NFR BLD AUTO: 0 % (ref 0–6)
ERYTHROCYTE [DISTWIDTH] IN BLOOD BY AUTOMATED COUNT: 22 %
GFR SERPL CREATININE-BSD FRML MDRD: 98 ML/MIN/1.73SQ M
GLUCOSE P FAST SERPL-MCNC: 104 MG/DL (ref 70–99)
GLUCOSE SERPL-MCNC: 104 MG/DL (ref 70–99)
HCT VFR BLD AUTO: 30.6 % (ref 36–46)
HGB BLD-MCNC: 9.9 G/DL (ref 12–16)
LYMPHOCYTES # BLD AUTO: 2.1 THOUSANDS/ΜL (ref 0.5–4)
LYMPHOCYTES NFR BLD AUTO: 15 % (ref 25–45)
MCH RBC QN AUTO: 27 PG (ref 26–34)
MCHC RBC AUTO-ENTMCNC: 32.4 G/DL (ref 31–36)
MCV RBC AUTO: 83 FL (ref 80–100)
MONOCYTES # BLD AUTO: 0.9 THOUSAND/ΜL (ref 0.2–0.9)
MONOCYTES NFR BLD AUTO: 6 % (ref 1–10)
NEUTROPHILS # BLD AUTO: 11.2 THOUSANDS/ΜL (ref 1.8–7.8)
NEUTS SEG NFR BLD AUTO: 79 % (ref 45–65)
PLATELET # BLD AUTO: 390 THOUSANDS/UL (ref 150–450)
PMV BLD AUTO: 8 FL (ref 8.9–12.7)
POTASSIUM SERPL-SCNC: 3.9 MMOL/L (ref 3.6–5)
PROT SERPL-MCNC: 6.7 G/DL (ref 5.9–8.4)
RBC # BLD AUTO: 3.67 MILLION/UL (ref 4–5.2)
RH BLD: POSITIVE
SODIUM SERPL-SCNC: 137 MMOL/L (ref 137–147)
SPECIMEN EXPIRATION DATE: NORMAL
WBC # BLD AUTO: 14.2 THOUSAND/UL (ref 4.5–11)

## 2019-01-28 PROCEDURE — 86850 RBC ANTIBODY SCREEN: CPT | Performed by: ANESTHESIOLOGY

## 2019-01-28 PROCEDURE — 85025 COMPLETE CBC W/AUTO DIFF WBC: CPT | Performed by: PLASTIC SURGERY

## 2019-01-28 PROCEDURE — 0HRJX74 REPLACEMENT OF LEFT UPPER LEG SKIN WITH AUTOLOGOUS TISSUE SUBSTITUTE, PARTIAL THICKNESS, EXTERNAL APPROACH: ICD-10-PCS | Performed by: PLASTIC SURGERY

## 2019-01-28 PROCEDURE — 87077 CULTURE AEROBIC IDENTIFY: CPT | Performed by: PLASTIC SURGERY

## 2019-01-28 PROCEDURE — 86900 BLOOD TYPING SEROLOGIC ABO: CPT | Performed by: ANESTHESIOLOGY

## 2019-01-28 PROCEDURE — 80053 COMPREHEN METABOLIC PANEL: CPT | Performed by: INTERNAL MEDICINE

## 2019-01-28 PROCEDURE — 87186 SC STD MICRODIL/AGAR DIL: CPT | Performed by: PLASTIC SURGERY

## 2019-01-28 PROCEDURE — 87205 SMEAR GRAM STAIN: CPT | Performed by: PLASTIC SURGERY

## 2019-01-28 PROCEDURE — 86901 BLOOD TYPING SEROLOGIC RH(D): CPT | Performed by: ANESTHESIOLOGY

## 2019-01-28 PROCEDURE — 82550 ASSAY OF CK (CPK): CPT | Performed by: INTERNAL MEDICINE

## 2019-01-28 PROCEDURE — 88304 TISSUE EXAM BY PATHOLOGIST: CPT | Performed by: PATHOLOGY

## 2019-01-28 PROCEDURE — 87176 TISSUE HOMOGENIZATION CULTR: CPT | Performed by: PLASTIC SURGERY

## 2019-01-28 PROCEDURE — 87070 CULTURE OTHR SPECIMN AEROBIC: CPT | Performed by: PLASTIC SURGERY

## 2019-01-28 RX ORDER — MIDAZOLAM HYDROCHLORIDE 1 MG/ML
INJECTION INTRAMUSCULAR; INTRAVENOUS AS NEEDED
Status: DISCONTINUED | OUTPATIENT
Start: 2019-01-28 | End: 2019-01-28 | Stop reason: SURG

## 2019-01-28 RX ORDER — SODIUM CHLORIDE, SODIUM LACTATE, POTASSIUM CHLORIDE, CALCIUM CHLORIDE 600; 310; 30; 20 MG/100ML; MG/100ML; MG/100ML; MG/100ML
75 INJECTION, SOLUTION INTRAVENOUS CONTINUOUS
Status: DISCONTINUED | OUTPATIENT
Start: 2019-01-28 | End: 2019-02-27

## 2019-01-28 RX ORDER — LIDOCAINE HYDROCHLORIDE AND EPINEPHRINE 5; 5 MG/ML; UG/ML
INJECTION, SOLUTION INFILTRATION; PERINEURAL AS NEEDED
Status: DISCONTINUED | OUTPATIENT
Start: 2019-01-28 | End: 2019-01-28 | Stop reason: HOSPADM

## 2019-01-28 RX ORDER — PROMETHAZINE HYDROCHLORIDE 25 MG/ML
12.5 INJECTION, SOLUTION INTRAMUSCULAR; INTRAVENOUS ONCE AS NEEDED
Status: DISCONTINUED | OUTPATIENT
Start: 2019-01-28 | End: 2021-12-09

## 2019-01-28 RX ORDER — FENTANYL CITRATE 50 UG/ML
INJECTION, SOLUTION INTRAMUSCULAR; INTRAVENOUS AS NEEDED
Status: DISCONTINUED | OUTPATIENT
Start: 2019-01-28 | End: 2019-01-28 | Stop reason: SURG

## 2019-01-28 RX ORDER — LIDOCAINE HYDROCHLORIDE 10 MG/ML
INJECTION, SOLUTION INFILTRATION; PERINEURAL AS NEEDED
Status: DISCONTINUED | OUTPATIENT
Start: 2019-01-28 | End: 2019-01-28 | Stop reason: SURG

## 2019-01-28 RX ORDER — HYDROCODONE BITARTRATE AND ACETAMINOPHEN 5; 325 MG/1; MG/1
1 TABLET ORAL EVERY 6 HOURS PRN
Status: DISCONTINUED | OUTPATIENT
Start: 2019-01-28 | End: 2019-04-19 | Stop reason: HOSPADM

## 2019-01-28 RX ORDER — ONDANSETRON 2 MG/ML
4 INJECTION INTRAMUSCULAR; INTRAVENOUS ONCE AS NEEDED
Status: DISCONTINUED | OUTPATIENT
Start: 2019-01-28 | End: 2021-12-09

## 2019-01-28 RX ORDER — LIDOCAINE HYDROCHLORIDE 20 MG/ML
INJECTION, SOLUTION INFILTRATION; PERINEURAL AS NEEDED
Status: DISCONTINUED | OUTPATIENT
Start: 2019-01-28 | End: 2019-01-28 | Stop reason: HOSPADM

## 2019-01-28 RX ORDER — ONDANSETRON 4 MG/1
4 TABLET, ORALLY DISINTEGRATING ORAL ONCE
Status: DISCONTINUED | OUTPATIENT
Start: 2019-01-28 | End: 2019-04-19 | Stop reason: HOSPADM

## 2019-01-28 RX ORDER — SODIUM CHLORIDE 9 MG/ML
INJECTION, SOLUTION INTRAVENOUS CONTINUOUS PRN
Status: DISCONTINUED | OUTPATIENT
Start: 2019-01-28 | End: 2019-01-28

## 2019-01-28 RX ORDER — MAGNESIUM HYDROXIDE 1200 MG/15ML
LIQUID ORAL AS NEEDED
Status: DISCONTINUED | OUTPATIENT
Start: 2019-01-28 | End: 2019-01-28 | Stop reason: HOSPADM

## 2019-01-28 RX ORDER — PROPOFOL 10 MG/ML
INJECTION, EMULSION INTRAVENOUS AS NEEDED
Status: DISCONTINUED | OUTPATIENT
Start: 2019-01-28 | End: 2019-01-28 | Stop reason: SURG

## 2019-01-28 RX ORDER — ONDANSETRON 2 MG/ML
INJECTION INTRAMUSCULAR; INTRAVENOUS AS NEEDED
Status: DISCONTINUED | OUTPATIENT
Start: 2019-01-28 | End: 2019-01-28 | Stop reason: SURG

## 2019-01-28 RX ORDER — HYDROMORPHONE HCL/PF 1 MG/ML
0.2 SYRINGE (ML) INJECTION
Status: DISCONTINUED | OUTPATIENT
Start: 2019-01-28 | End: 2019-11-15 | Stop reason: ALTCHOICE

## 2019-01-28 RX ORDER — SODIUM CHLORIDE, SODIUM LACTATE, POTASSIUM CHLORIDE, CALCIUM CHLORIDE 600; 310; 30; 20 MG/100ML; MG/100ML; MG/100ML; MG/100ML
50 INJECTION, SOLUTION INTRAVENOUS CONTINUOUS
Status: DISCONTINUED | OUTPATIENT
Start: 2019-01-29 | End: 2019-02-27

## 2019-01-28 RX ADMIN — PROPOFOL 200 MG: 10 INJECTION, EMULSION INTRAVENOUS at 07:48

## 2019-01-28 RX ADMIN — Medication 250 MG: at 19:16

## 2019-01-28 RX ADMIN — FENTANYL CITRATE 50 MCG: 50 INJECTION INTRAMUSCULAR; INTRAVENOUS at 09:45

## 2019-01-28 RX ADMIN — SODIUM CHLORIDE, SODIUM LACTATE, POTASSIUM CHLORIDE, AND CALCIUM CHLORIDE: .6; .31; .03; .02 INJECTION, SOLUTION INTRAVENOUS at 07:30

## 2019-01-28 RX ADMIN — FENTANYL CITRATE 50 MCG: 50 INJECTION INTRAMUSCULAR; INTRAVENOUS at 13:05

## 2019-01-28 RX ADMIN — ACETIC ACID: 250 IRRIGANT IRRIGATION at 16:10

## 2019-01-28 RX ADMIN — FENTANYL CITRATE 50 MCG: 50 INJECTION INTRAMUSCULAR; INTRAVENOUS at 07:45

## 2019-01-28 RX ADMIN — NYSTATIN: 100000 POWDER TOPICAL at 19:02

## 2019-01-28 RX ADMIN — MIDAZOLAM HYDROCHLORIDE 1 MG: 1 INJECTION, SOLUTION INTRAMUSCULAR; INTRAVENOUS at 07:37

## 2019-01-28 RX ADMIN — SODIUM CHLORIDE, POTASSIUM CHLORIDE, SODIUM LACTATE AND CALCIUM CHLORIDE 75 ML/HR: 600; 310; 30; 20 INJECTION, SOLUTION INTRAVENOUS at 07:12

## 2019-01-28 RX ADMIN — SODIUM CHLORIDE, SODIUM LACTATE, POTASSIUM CHLORIDE, AND CALCIUM CHLORIDE: .6; .31; .03; .02 INJECTION, SOLUTION INTRAVENOUS at 10:25

## 2019-01-28 RX ADMIN — FENTANYL CITRATE 50 MCG: 50 INJECTION INTRAMUSCULAR; INTRAVENOUS at 13:21

## 2019-01-28 RX ADMIN — DAPTOMYCIN 300 MG: 500 INJECTION, POWDER, LYOPHILIZED, FOR SOLUTION INTRAVENOUS at 19:14

## 2019-01-28 RX ADMIN — ACETIC ACID: 250 IRRIGANT IRRIGATION at 16:00

## 2019-01-28 RX ADMIN — LIDOCAINE HYDROCHLORIDE 30 MG: 10 INJECTION, SOLUTION INFILTRATION; PERINEURAL at 07:48

## 2019-01-28 RX ADMIN — ONDANSETRON HYDROCHLORIDE 4 MG: 2 INJECTION, SOLUTION INTRAMUSCULAR; INTRAVENOUS at 12:55

## 2019-01-28 RX ADMIN — MIDAZOLAM HYDROCHLORIDE 1 MG: 1 INJECTION, SOLUTION INTRAMUSCULAR; INTRAVENOUS at 07:41

## 2019-01-28 RX ADMIN — SODIUM CHLORIDE, SODIUM LACTATE, POTASSIUM CHLORIDE, AND CALCIUM CHLORIDE: .6; .31; .03; .02 INJECTION, SOLUTION INTRAVENOUS at 12:50

## 2019-01-28 NOTE — NURSING NOTE
Pt returned to [facility] awake,alert,IV infusing via picc line  Suture area to hip area is intact with a scant amount of bloody drainage on bed pad  Pt denies pain, taking po  Thigh dressing clean and dry

## 2019-01-28 NOTE — OP NOTE
OPERATIVE REPORT  PATIENT NAME: Gregor Solis    :  1950  MRN: [id number]  Pt Location:  OR ROOM 08    SURGERY DATE: 2019    Surgeon(s) and Role:     * Daryle Bottoms, MD - Primary     * Angely Palm - Assisting    Preop and postoperative Diagnosis:  Left hip pressure sore    Operative Procedure(s) (LRB):  THIGH FLAP & STSG TO CLOSE HIP WOUND (Left)   Debridement of left hip pressure sore with anterior lateral thigh free muscle  flap local flap coverage    Operative history: This paraplegic woman following a left femur fracture developed a pressure sore the of left posterior trochanteric area  This was debrided at this time down to bone  Coverage was achieved using a left anterior lateral thigh [place] rotation flap  Flap had 2 muscular cutaneous perforators  It measured 6 x 8 cm in size  The perforators came in proximally and the proximal half of the flap had to be de-epithelialized to fit in the tunnel through which the flap was rotated to the left hip  Because of the size the flap a split-thickness skin graft measuring about 6 x 8 cm in size had to be placed  Operative procedure: The patient was taken to the operating placed supine the operating table  She was prepped and draped in the usual fashion  Anesthesia was general by endotracheal tube  Findings were as described in the left hip  Granulations were coated with methylene blue  An incision was made about the perimeter of the left hip pressure sore  Then using the Bovie for cutting coagulation purposes the pressure sore was removed  Careful hemostasis was achieved with the Bovie  A line was then drawn from the left anterior superior iliac spine to the superior lateral border of the patella  At the midpoint aligned [place] of 3 cm diameter was drawn  In the center of this [place] using although Doppler loud  was heard    A flap was then marked E centric to this point so as to ensure along the flap to reach the defect  Medial border flap was incised down to and then through the fascia alonso  The subfascial approach was then used  The rectus femoris muscle was pulled medially  This exposed 2 perforators near where those were by the Doppler was found  They were dissected and found to be in continuity back to the descending branch of the lateral circumflex femoral vessels  An incision was made approximately through the subcutaneous tissues and deep fascia to allow the rectus femoris and vastus lateralis muscles to be   This allowed the pedicle to be dissected deep into the septum with side branches being coagulated a bipolar or hemoclips placed as needed  The lateral border flap was then incised in similar fashion through the deep fascia  Again careful hemostasis achieved with the Bovie  Subfascial dissection the flap in distal proximal was done with care to preserve the perforators  Perforators were dissected completely back into the flap  This made this an island flap with good perfusion noted  Next a suprafascial tunnel was made from the proximal end of the flap pedicle into the left hip defect using the Bovie for cutting coagulation purposes  The flap was drawn into this without tension on the pedicle  Some of the extra flap could be excised again with the Bovie for cutting coagulation purposes  The external portion of the flap was marked  The flap was then withdrawn into the anterior thigh with the part that would be buried de-epithelialized  The flap was returned to the left hip and sewn in place with 3-0 nylon simple and vertical mattress sutures as required  Donor site for the most part could be closed directly over Penrose drain using 2-0 Vicryl interrupted simple subcutaneous sutures followed by 3-0 nylon interrupted vertical mattress skin sutures    The skin was where the donor site could not be closed directly was brought down to the muscles with 3-0 nylon horizontal half buried mattress sutures  A Bette electric dermatome was used to harvest a 13,000 with split-thickness skin graft from the lateral left thigh  The graft was meshed 1/2 to 1 and placed over the donor site open defect with staples for fixation  Some of the extra skin was returned to the center of the donor site of the left lateral thigh were 2 stapled into position intent to hasten the donor site  A bulky dressing much like a stent was sewn over the graft with 2-0 silk simple sutures  Kerlix wrap was placed around the distal left thigh and light dressings otherwise on the open wounds  The flap the area itself was left open for observation and had good pink color  The patient was then taken to recovery area in good condition  Specimen(s):  ID Type Source Tests Collected by Time Destination   1 : Left Hip Pressure Sore Tissue Soft Tissue, Debridement TISSUE EXAM Wilbert Dhaliwal MD 1/28/2019 8788    A : Left Tissue Pressure Sore Tissue Soft Tissue, Debridement CULTURE, TISSUE AND GRAM STAIN Wilbert Dhaliwal MD 1/28/2019 0456        Estimated Blood Loss:   Minimal    Drains:  Closed/Suction Drain Right Hip Bulb 10 Fr  (Active)   Site Description Other (Comment) 1/26/2019  6:03 PM   Dressing Status Open to air 1/17/2019 11:00 PM   Drainage Appearance Milky 1/26/2019  6:03 PM   Status Other (Comment) 1/26/2019  6:03 PM   Intake (mL) 15 mL 1/24/2019  4:01 PM   Output (mL) 10 mL 1/28/2019  3:01 AM   Number of days: 42       Open Drain Left;Superior; Lateral Hip (Active)   Number of days: 0       Negative Pressure Wound Therapy (V A C ) Other (Comment) Left (Active)   Wound Vac change time out performed? Yes 1/26/2019  3:58 PM   Time Out conducted with?  Dr Caitlin Souza 1/7/2019  3:00 PM   Black foam- # applied 1 1/25/2019 10:50 AM   Nonadherent (Adaptic)- # applied 1 1/25/2019 10:50 AM   Cycle Continuous 1/28/2019  2:58 AM   Target Pressure (mmHg) 125 1/28/2019  2:58 AM   Canister Changed No 1/26/2019  3:58 PM Dressing Status Clean;Dry; Intact 1/28/2019  2:58 AM   Black foam- # removed 1 1/25/2019 10:50 AM   Nonadherent (Adaptic)- # removed 1 1/25/2019 10:50 AM   Output (mL) 0 mL 1/24/2019  1:07 AM   Number of days: 21       Urethral Catheter Non-latex 16 Fr   (Active)   Output (mL) 450 mL 1/28/2019  3:01 AM   Number of days: 5         SIGNATURE: Daryle Bottoms, MD  DATE: January 28, 2019  TIME: 2:03 PM

## 2019-01-28 NOTE — ANESTHESIA POSTPROCEDURE EVALUATION
Post-Op Assessment Note      CV Status:  Stable    Mental Status:  Alert and awake    Hydration Status:  Stable    PONV Controlled:  None    Airway Patency:  Patent    Post Op Vitals Reviewed: Yes          Staff: CRNA           BP   125/75   Temp   97 5   Pulse  92   Resp   22   SpO2   95

## 2019-01-28 NOTE — NURSING NOTE
Report given to Harman Saavedra staff RN [facility]  Pt tolerated diet, no change in status of surgical sites  Pt denies pain

## 2019-01-29 LAB
ALBUMIN SERPL BCP-MCNC: 2.8 G/DL (ref 3–5.2)
ALP SERPL-CCNC: 81 U/L (ref 43–122)
ALT SERPL W P-5'-P-CCNC: 22 U/L (ref 9–52)
ANION GAP SERPL CALCULATED.3IONS-SCNC: 4 MMOL/L (ref 5–14)
AST SERPL W P-5'-P-CCNC: 23 U/L (ref 14–36)
BILIRUB SERPL-MCNC: 0.3 MG/DL
BUN SERPL-MCNC: 18 MG/DL (ref 5–25)
CALCIUM SERPL-MCNC: 8.5 MG/DL (ref 8.4–10.2)
CHLORIDE SERPL-SCNC: 98 MMOL/L (ref 97–108)
CO2 SERPL-SCNC: 30 MMOL/L (ref 22–30)
CREAT SERPL-MCNC: 0.6 MG/DL (ref 0.6–1.2)
ERYTHROCYTE [DISTWIDTH] IN BLOOD BY AUTOMATED COUNT: 22 %
GFR SERPL CREATININE-BSD FRML MDRD: 94 ML/MIN/1.73SQ M
GLUCOSE SERPL-MCNC: 126 MG/DL (ref 70–99)
HCT VFR BLD AUTO: 29 % (ref 36–46)
HGB BLD-MCNC: 9.5 G/DL (ref 12–16)
MCH RBC QN AUTO: 27.6 PG (ref 26–34)
MCHC RBC AUTO-ENTMCNC: 32.8 G/DL (ref 31–36)
MCV RBC AUTO: 84 FL (ref 80–100)
PLATELET # BLD AUTO: 387 THOUSANDS/UL (ref 150–450)
PMV BLD AUTO: 8.4 FL (ref 8.9–12.7)
POTASSIUM SERPL-SCNC: 3.9 MMOL/L (ref 3.6–5)
PROT SERPL-MCNC: 6.1 G/DL (ref 5.9–8.4)
RBC # BLD AUTO: 3.44 MILLION/UL (ref 4–5.2)
SODIUM SERPL-SCNC: 132 MMOL/L (ref 137–147)
WBC # BLD AUTO: 10 THOUSAND/UL (ref 4.5–11)

## 2019-01-29 PROCEDURE — 85027 COMPLETE CBC AUTOMATED: CPT | Performed by: PLASTIC SURGERY

## 2019-01-29 PROCEDURE — 80053 COMPREHEN METABOLIC PANEL: CPT | Performed by: FAMILY MEDICINE

## 2019-01-29 RX ADMIN — B-COMPLEX W/ C & FOLIC ACID TAB 1 TABLET: TAB at 10:06

## 2019-01-29 RX ADMIN — VITAMIN D, TAB 1000IU (100/BT) 1000 UNITS: 25 TAB at 10:01

## 2019-01-29 RX ADMIN — NYSTATIN: 100000 POWDER TOPICAL at 10:06

## 2019-01-29 RX ADMIN — Medication 250 MG: at 17:37

## 2019-01-29 RX ADMIN — PANTOPRAZOLE SODIUM 40 MG: 40 TABLET, DELAYED RELEASE ORAL at 05:49

## 2019-01-29 RX ADMIN — CYANOCOBALAMIN TAB 1000 MCG 500 MCG: 1000 TAB at 10:03

## 2019-01-29 RX ADMIN — OYSTER SHELL CALCIUM WITH VITAMIN D 1 TABLET: 500; 200 TABLET, FILM COATED ORAL at 10:05

## 2019-01-29 RX ADMIN — SENNOSIDES AND DOCUSATE SODIUM 2 TABLET: 8.6; 5 TABLET ORAL at 10:02

## 2019-01-29 RX ADMIN — DAPTOMYCIN 300 MG: 500 INJECTION, POWDER, LYOPHILIZED, FOR SOLUTION INTRAVENOUS at 17:37

## 2019-01-29 RX ADMIN — NYSTATIN 1 APPLICATION: 100000 POWDER TOPICAL at 18:58

## 2019-01-29 RX ADMIN — Medication 250 MG: at 10:07

## 2019-01-29 NOTE — ANESTHESIA POST-OP FOLLOW-UP NOTE
Patient: Silver Jem  Procedure(s):  THIGH FLAP & STSG TO CLOSE HIP WOUND  Vitals:    01/28/19 1430   BP: 118/62   Pulse: 88   Resp: 16   Temp: 97 6 °F (36 4 °C)   SpO2: 93%       Patient doing fine  Pain ok controlled   Hemodynamically stable  No recall   C/o mild sorethroat  Feeling better today

## 2019-01-30 PROCEDURE — 99308 SBSQ NF CARE LOW MDM 20: CPT | Performed by: FAMILY MEDICINE

## 2019-01-30 RX ORDER — OXYCODONE HYDROCHLORIDE 5 MG/1
5 TABLET ORAL EVERY 6 HOURS PRN
Status: DISCONTINUED | OUTPATIENT
Start: 2019-01-30 | End: 2019-04-19 | Stop reason: HOSPADM

## 2019-01-30 RX ADMIN — ENOXAPARIN SODIUM 40 MG: 40 INJECTION SUBCUTANEOUS at 13:04

## 2019-01-30 RX ADMIN — CYANOCOBALAMIN TAB 1000 MCG 500 MCG: 1000 TAB at 08:36

## 2019-01-30 RX ADMIN — NYSTATIN: 100000 POWDER TOPICAL at 08:39

## 2019-01-30 RX ADMIN — PANTOPRAZOLE SODIUM 40 MG: 40 TABLET, DELAYED RELEASE ORAL at 06:58

## 2019-01-30 RX ADMIN — NYSTATIN: 100000 POWDER TOPICAL at 17:37

## 2019-01-30 RX ADMIN — Medication 250 MG: at 17:35

## 2019-01-30 RX ADMIN — B-COMPLEX W/ C & FOLIC ACID TAB 1 TABLET: TAB at 08:37

## 2019-01-30 RX ADMIN — Medication 250 MG: at 08:37

## 2019-01-30 RX ADMIN — DAPTOMYCIN 300 MG: 500 INJECTION, POWDER, LYOPHILIZED, FOR SOLUTION INTRAVENOUS at 17:37

## 2019-01-30 RX ADMIN — VITAMIN D, TAB 1000IU (100/BT) 1000 UNITS: 25 TAB at 08:37

## 2019-01-30 RX ADMIN — OYSTER SHELL CALCIUM WITH VITAMIN D 1 TABLET: 500; 200 TABLET, FILM COATED ORAL at 08:37

## 2019-01-31 LAB
ANION GAP SERPL CALCULATED.3IONS-SCNC: 4 MMOL/L (ref 5–14)
BACTERIA TISS AEROBE CULT: ABNORMAL
BUN SERPL-MCNC: 13 MG/DL (ref 5–25)
CALCIUM SERPL-MCNC: 8.2 MG/DL (ref 8.4–10.2)
CHLORIDE SERPL-SCNC: 100 MMOL/L (ref 97–108)
CO2 SERPL-SCNC: 29 MMOL/L (ref 22–30)
CREAT SERPL-MCNC: 0.42 MG/DL (ref 0.6–1.2)
GFR SERPL CREATININE-BSD FRML MDRD: 106 ML/MIN/1.73SQ M
GLUCOSE P FAST SERPL-MCNC: 108 MG/DL (ref 70–99)
GLUCOSE SERPL-MCNC: 108 MG/DL (ref 70–99)
GRAM STN SPEC: ABNORMAL
POTASSIUM SERPL-SCNC: 3.9 MMOL/L (ref 3.6–5)
SODIUM SERPL-SCNC: 133 MMOL/L (ref 137–147)

## 2019-01-31 PROCEDURE — 80048 BASIC METABOLIC PNL TOTAL CA: CPT | Performed by: FAMILY MEDICINE

## 2019-01-31 RX ADMIN — B-COMPLEX W/ C & FOLIC ACID TAB 1 TABLET: TAB at 10:14

## 2019-01-31 RX ADMIN — VITAMIN D, TAB 1000IU (100/BT) 1000 UNITS: 25 TAB at 10:12

## 2019-01-31 RX ADMIN — ACETIC ACID: 250 IRRIGANT IRRIGATION at 13:00

## 2019-01-31 RX ADMIN — ENOXAPARIN SODIUM 40 MG: 40 INJECTION SUBCUTANEOUS at 10:11

## 2019-01-31 RX ADMIN — OYSTER SHELL CALCIUM WITH VITAMIN D 1 TABLET: 500; 200 TABLET, FILM COATED ORAL at 10:13

## 2019-01-31 RX ADMIN — NYSTATIN: 100000 POWDER TOPICAL at 18:39

## 2019-01-31 RX ADMIN — NYSTATIN: 100000 POWDER TOPICAL at 11:10

## 2019-01-31 RX ADMIN — Medication 250 MG: at 18:39

## 2019-01-31 RX ADMIN — Medication 250 MG: at 10:16

## 2019-01-31 RX ADMIN — PANTOPRAZOLE SODIUM 40 MG: 40 TABLET, DELAYED RELEASE ORAL at 05:36

## 2019-01-31 RX ADMIN — CYANOCOBALAMIN TAB 1000 MCG 500 MCG: 1000 TAB at 10:12

## 2019-01-31 RX ADMIN — DAPTOMYCIN 300 MG: 500 INJECTION, POWDER, LYOPHILIZED, FOR SOLUTION INTRAVENOUS at 18:39

## 2019-02-01 RX ADMIN — B-COMPLEX W/ C & FOLIC ACID TAB 1 TABLET: TAB at 09:05

## 2019-02-01 RX ADMIN — Medication 250 MG: at 09:05

## 2019-02-01 RX ADMIN — ACETIC ACID 1000 ML: 250 IRRIGANT IRRIGATION at 09:07

## 2019-02-01 RX ADMIN — NYSTATIN: 100000 POWDER TOPICAL at 17:50

## 2019-02-01 RX ADMIN — DAPTOMYCIN 300 MG: 500 INJECTION, POWDER, LYOPHILIZED, FOR SOLUTION INTRAVENOUS at 17:36

## 2019-02-01 RX ADMIN — SENNOSIDES AND DOCUSATE SODIUM 1 TABLET: 8.6; 5 TABLET ORAL at 17:36

## 2019-02-01 RX ADMIN — ENOXAPARIN SODIUM 40 MG: 40 INJECTION SUBCUTANEOUS at 09:04

## 2019-02-01 RX ADMIN — OYSTER SHELL CALCIUM WITH VITAMIN D 1 TABLET: 500; 200 TABLET, FILM COATED ORAL at 09:05

## 2019-02-01 RX ADMIN — NYSTATIN: 100000 POWDER TOPICAL at 09:09

## 2019-02-01 RX ADMIN — CYANOCOBALAMIN TAB 1000 MCG 500 MCG: 1000 TAB at 09:05

## 2019-02-01 RX ADMIN — PANTOPRAZOLE SODIUM 40 MG: 40 TABLET, DELAYED RELEASE ORAL at 06:29

## 2019-02-01 RX ADMIN — VITAMIN D, TAB 1000IU (100/BT) 1000 UNITS: 25 TAB at 09:05

## 2019-02-01 RX ADMIN — Medication 250 MG: at 17:36

## 2019-02-01 RX ADMIN — SENNOSIDES AND DOCUSATE SODIUM 2 TABLET: 8.6; 5 TABLET ORAL at 09:04

## 2019-02-02 RX ADMIN — NYSTATIN: 100000 POWDER TOPICAL at 09:46

## 2019-02-02 RX ADMIN — ENOXAPARIN SODIUM 40 MG: 40 INJECTION SUBCUTANEOUS at 09:45

## 2019-02-02 RX ADMIN — PANTOPRAZOLE SODIUM 40 MG: 40 TABLET, DELAYED RELEASE ORAL at 05:55

## 2019-02-02 RX ADMIN — ACETIC ACID 1000 ML: 250 IRRIGANT IRRIGATION at 09:46

## 2019-02-02 RX ADMIN — NYSTATIN: 100000 POWDER TOPICAL at 18:20

## 2019-02-02 RX ADMIN — DAPTOMYCIN 300 MG: 500 INJECTION, POWDER, LYOPHILIZED, FOR SOLUTION INTRAVENOUS at 18:20

## 2019-02-02 RX ADMIN — CYANOCOBALAMIN TAB 1000 MCG 500 MCG: 1000 TAB at 09:46

## 2019-02-02 RX ADMIN — VITAMIN D, TAB 1000IU (100/BT) 1000 UNITS: 25 TAB at 09:47

## 2019-02-02 RX ADMIN — Medication 250 MG: at 18:20

## 2019-02-02 RX ADMIN — OYSTER SHELL CALCIUM WITH VITAMIN D 1 TABLET: 500; 200 TABLET, FILM COATED ORAL at 09:46

## 2019-02-02 RX ADMIN — Medication 250 MG: at 09:46

## 2019-02-02 RX ADMIN — B-COMPLEX W/ C & FOLIC ACID TAB 1 TABLET: TAB at 09:46

## 2019-02-03 RX ADMIN — ACETIC ACID 1000 ML: 250 IRRIGANT IRRIGATION at 09:06

## 2019-02-03 RX ADMIN — Medication 250 MG: at 17:39

## 2019-02-03 RX ADMIN — DAPTOMYCIN 300 MG: 500 INJECTION, POWDER, LYOPHILIZED, FOR SOLUTION INTRAVENOUS at 17:38

## 2019-02-03 RX ADMIN — B-COMPLEX W/ C & FOLIC ACID TAB 1 TABLET: TAB at 09:07

## 2019-02-03 RX ADMIN — ENOXAPARIN SODIUM 40 MG: 40 INJECTION SUBCUTANEOUS at 09:06

## 2019-02-03 RX ADMIN — NYSTATIN: 100000 POWDER TOPICAL at 17:38

## 2019-02-03 RX ADMIN — CYANOCOBALAMIN TAB 1000 MCG 500 MCG: 1000 TAB at 09:07

## 2019-02-03 RX ADMIN — PANTOPRAZOLE SODIUM 40 MG: 40 TABLET, DELAYED RELEASE ORAL at 06:31

## 2019-02-03 RX ADMIN — VITAMIN D, TAB 1000IU (100/BT) 1000 UNITS: 25 TAB at 09:07

## 2019-02-03 RX ADMIN — OYSTER SHELL CALCIUM WITH VITAMIN D 1 TABLET: 500; 200 TABLET, FILM COATED ORAL at 09:07

## 2019-02-03 RX ADMIN — Medication 250 MG: at 09:07

## 2019-02-03 RX ADMIN — NYSTATIN: 100000 POWDER TOPICAL at 09:06

## 2019-02-04 LAB
ALBUMIN SERPL BCP-MCNC: 3.2 G/DL (ref 3–5.2)
ALP SERPL-CCNC: 101 U/L (ref 43–122)
ALT SERPL W P-5'-P-CCNC: 23 U/L (ref 9–52)
ANION GAP SERPL CALCULATED.3IONS-SCNC: 8 MMOL/L (ref 5–14)
AST SERPL W P-5'-P-CCNC: 23 U/L (ref 14–36)
BILIRUB SERPL-MCNC: 0.2 MG/DL
BUN SERPL-MCNC: 17 MG/DL (ref 5–25)
CALCIUM SERPL-MCNC: 9 MG/DL (ref 8.4–10.2)
CHLORIDE SERPL-SCNC: 99 MMOL/L (ref 97–108)
CK SERPL-CCNC: <20 U/L (ref 30–135)
CO2 SERPL-SCNC: 29 MMOL/L (ref 22–30)
CREAT SERPL-MCNC: 0.44 MG/DL (ref 0.6–1.2)
GFR SERPL CREATININE-BSD FRML MDRD: 104 ML/MIN/1.73SQ M
GLUCOSE P FAST SERPL-MCNC: 103 MG/DL (ref 70–99)
GLUCOSE SERPL-MCNC: 103 MG/DL (ref 70–99)
POTASSIUM SERPL-SCNC: 4.3 MMOL/L (ref 3.6–5)
PROT SERPL-MCNC: 6.6 G/DL (ref 5.9–8.4)
SODIUM SERPL-SCNC: 136 MMOL/L (ref 137–147)

## 2019-02-04 PROCEDURE — 82550 ASSAY OF CK (CPK): CPT | Performed by: INTERNAL MEDICINE

## 2019-02-04 PROCEDURE — 80053 COMPREHEN METABOLIC PANEL: CPT | Performed by: INTERNAL MEDICINE

## 2019-02-04 RX ADMIN — VITAMIN D, TAB 1000IU (100/BT) 1000 UNITS: 25 TAB at 09:46

## 2019-02-04 RX ADMIN — NYSTATIN: 100000 POWDER TOPICAL at 09:45

## 2019-02-04 RX ADMIN — B-COMPLEX W/ C & FOLIC ACID TAB 1 TABLET: TAB at 09:47

## 2019-02-04 RX ADMIN — ENOXAPARIN SODIUM 40 MG: 40 INJECTION SUBCUTANEOUS at 09:46

## 2019-02-04 RX ADMIN — Medication 250 MG: at 17:49

## 2019-02-04 RX ADMIN — OYSTER SHELL CALCIUM WITH VITAMIN D 1 TABLET: 500; 200 TABLET, FILM COATED ORAL at 09:46

## 2019-02-04 RX ADMIN — CYANOCOBALAMIN TAB 1000 MCG 500 MCG: 1000 TAB at 09:46

## 2019-02-04 RX ADMIN — DAPTOMYCIN 300 MG: 500 INJECTION, POWDER, LYOPHILIZED, FOR SOLUTION INTRAVENOUS at 18:02

## 2019-02-04 RX ADMIN — NYSTATIN 1 APPLICATION: 100000 POWDER TOPICAL at 18:02

## 2019-02-04 RX ADMIN — Medication 250 MG: at 09:46

## 2019-02-05 RX ADMIN — NYSTATIN: 100000 POWDER TOPICAL at 18:41

## 2019-02-05 RX ADMIN — CYANOCOBALAMIN TAB 1000 MCG 500 MCG: 1000 TAB at 10:51

## 2019-02-05 RX ADMIN — NYSTATIN: 100000 POWDER TOPICAL at 10:54

## 2019-02-05 RX ADMIN — Medication 250 MG: at 10:52

## 2019-02-05 RX ADMIN — Medication 250 MG: at 18:40

## 2019-02-05 RX ADMIN — DAPTOMYCIN 300 MG: 500 INJECTION, POWDER, LYOPHILIZED, FOR SOLUTION INTRAVENOUS at 18:41

## 2019-02-05 RX ADMIN — PANTOPRAZOLE SODIUM 40 MG: 40 TABLET, DELAYED RELEASE ORAL at 07:01

## 2019-02-05 RX ADMIN — SENNOSIDES AND DOCUSATE SODIUM 2 TABLET: 8.6; 5 TABLET ORAL at 10:51

## 2019-02-05 RX ADMIN — VITAMIN D, TAB 1000IU (100/BT) 1000 UNITS: 25 TAB at 10:49

## 2019-02-05 RX ADMIN — ENOXAPARIN SODIUM 40 MG: 40 INJECTION SUBCUTANEOUS at 10:49

## 2019-02-05 RX ADMIN — B-COMPLEX W/ C & FOLIC ACID TAB 1 TABLET: TAB at 10:51

## 2019-02-05 RX ADMIN — OYSTER SHELL CALCIUM WITH VITAMIN D 1 TABLET: 500; 200 TABLET, FILM COATED ORAL at 10:53

## 2019-02-06 RX ADMIN — OYSTER SHELL CALCIUM WITH VITAMIN D 1 TABLET: 500; 200 TABLET, FILM COATED ORAL at 08:26

## 2019-02-06 RX ADMIN — B-COMPLEX W/ C & FOLIC ACID TAB 1 TABLET: TAB at 08:26

## 2019-02-06 RX ADMIN — Medication 250 MG: at 18:28

## 2019-02-06 RX ADMIN — ACETIC ACID: 250 IRRIGANT IRRIGATION at 12:57

## 2019-02-06 RX ADMIN — VITAMIN D, TAB 1000IU (100/BT) 1000 UNITS: 25 TAB at 08:25

## 2019-02-06 RX ADMIN — DAPTOMYCIN 300 MG: 500 INJECTION, POWDER, LYOPHILIZED, FOR SOLUTION INTRAVENOUS at 18:25

## 2019-02-06 RX ADMIN — ENOXAPARIN SODIUM 40 MG: 40 INJECTION SUBCUTANEOUS at 08:26

## 2019-02-06 RX ADMIN — Medication 250 MG: at 08:25

## 2019-02-06 RX ADMIN — CYANOCOBALAMIN TAB 1000 MCG 500 MCG: 1000 TAB at 08:24

## 2019-02-06 RX ADMIN — SENNOSIDES AND DOCUSATE SODIUM 2 TABLET: 8.6; 5 TABLET ORAL at 08:25

## 2019-02-06 RX ADMIN — NYSTATIN: 100000 POWDER TOPICAL at 12:56

## 2019-02-06 RX ADMIN — NYSTATIN 1 APPLICATION: 100000 POWDER TOPICAL at 18:36

## 2019-02-06 RX ADMIN — PANTOPRAZOLE SODIUM 40 MG: 40 TABLET, DELAYED RELEASE ORAL at 06:02

## 2019-02-07 RX ADMIN — OYSTER SHELL CALCIUM WITH VITAMIN D 1 TABLET: 500; 200 TABLET, FILM COATED ORAL at 08:24

## 2019-02-07 RX ADMIN — Medication 250 MG: at 18:28

## 2019-02-07 RX ADMIN — DAPTOMYCIN 300 MG: 500 INJECTION, POWDER, LYOPHILIZED, FOR SOLUTION INTRAVENOUS at 19:27

## 2019-02-07 RX ADMIN — NYSTATIN: 100000 POWDER TOPICAL at 08:27

## 2019-02-07 RX ADMIN — Medication 250 MG: at 08:25

## 2019-02-07 RX ADMIN — PANTOPRAZOLE SODIUM 40 MG: 40 TABLET, DELAYED RELEASE ORAL at 05:05

## 2019-02-07 RX ADMIN — ACETIC ACID 1000 ML: 250 IRRIGANT IRRIGATION at 08:27

## 2019-02-07 RX ADMIN — VITAMIN D, TAB 1000IU (100/BT) 1000 UNITS: 25 TAB at 08:25

## 2019-02-07 RX ADMIN — NYSTATIN: 100000 POWDER TOPICAL at 18:28

## 2019-02-07 RX ADMIN — B-COMPLEX W/ C & FOLIC ACID TAB 1 TABLET: TAB at 08:25

## 2019-02-07 RX ADMIN — CYANOCOBALAMIN TAB 1000 MCG 500 MCG: 1000 TAB at 08:25

## 2019-02-07 RX ADMIN — ENOXAPARIN SODIUM 40 MG: 40 INJECTION SUBCUTANEOUS at 08:24

## 2019-02-07 RX ADMIN — TUBERCULIN PURIFIED PROTEIN DERIVATIVE 5 UNITS: 5 INJECTION INTRADERMAL at 05:05

## 2019-02-08 PROCEDURE — 99308 SBSQ NF CARE LOW MDM 20: CPT | Performed by: FAMILY MEDICINE

## 2019-02-08 RX ORDER — PANTOPRAZOLE SODIUM 40 MG/1
40 TABLET, DELAYED RELEASE ORAL
Status: DISCONTINUED | OUTPATIENT
Start: 2019-02-09 | End: 2019-02-28

## 2019-02-08 RX ADMIN — B-COMPLEX W/ C & FOLIC ACID TAB 1 TABLET: TAB at 08:37

## 2019-02-08 RX ADMIN — NYSTATIN: 100000 POWDER TOPICAL at 18:57

## 2019-02-08 RX ADMIN — Medication 250 MG: at 18:57

## 2019-02-08 RX ADMIN — OYSTER SHELL CALCIUM WITH VITAMIN D 1 TABLET: 500; 200 TABLET, FILM COATED ORAL at 08:37

## 2019-02-08 RX ADMIN — ACETIC ACID: 250 IRRIGANT IRRIGATION at 08:37

## 2019-02-08 RX ADMIN — CYANOCOBALAMIN TAB 1000 MCG 500 MCG: 1000 TAB at 08:38

## 2019-02-08 RX ADMIN — NYSTATIN: 100000 POWDER TOPICAL at 08:37

## 2019-02-08 RX ADMIN — DAPTOMYCIN 300 MG: 500 INJECTION, POWDER, LYOPHILIZED, FOR SOLUTION INTRAVENOUS at 18:57

## 2019-02-08 RX ADMIN — Medication 250 MG: at 08:37

## 2019-02-08 RX ADMIN — ENOXAPARIN SODIUM 40 MG: 40 INJECTION SUBCUTANEOUS at 08:38

## 2019-02-08 RX ADMIN — VITAMIN D, TAB 1000IU (100/BT) 1000 UNITS: 25 TAB at 08:38

## 2019-02-09 RX ADMIN — B-COMPLEX W/ C & FOLIC ACID TAB 1 TABLET: TAB at 09:49

## 2019-02-09 RX ADMIN — VITAMIN D, TAB 1000IU (100/BT) 1000 UNITS: 25 TAB at 09:49

## 2019-02-09 RX ADMIN — NYSTATIN: 100000 POWDER TOPICAL at 09:49

## 2019-02-09 RX ADMIN — CYANOCOBALAMIN TAB 1000 MCG 500 MCG: 1000 TAB at 09:48

## 2019-02-09 RX ADMIN — Medication 250 MG: at 17:50

## 2019-02-09 RX ADMIN — DAPTOMYCIN 300 MG: 500 INJECTION, POWDER, LYOPHILIZED, FOR SOLUTION INTRAVENOUS at 17:50

## 2019-02-09 RX ADMIN — ACETIC ACID 5 ML: 250 IRRIGANT IRRIGATION at 18:05

## 2019-02-09 RX ADMIN — SENNOSIDES AND DOCUSATE SODIUM 2 TABLET: 8.6; 5 TABLET ORAL at 09:49

## 2019-02-09 RX ADMIN — NYSTATIN: 100000 POWDER TOPICAL at 17:50

## 2019-02-09 RX ADMIN — OYSTER SHELL CALCIUM WITH VITAMIN D 1 TABLET: 500; 200 TABLET, FILM COATED ORAL at 09:48

## 2019-02-09 RX ADMIN — PANTOPRAZOLE SODIUM 40 MG: 40 TABLET, DELAYED RELEASE ORAL at 16:18

## 2019-02-09 RX ADMIN — Medication 250 MG: at 09:48

## 2019-02-09 RX ADMIN — ENOXAPARIN SODIUM 40 MG: 40 INJECTION SUBCUTANEOUS at 09:48

## 2019-02-09 NOTE — ASSESSMENT & PLAN NOTE
Continue senna daily  Problem: Patient Care Overview  Goal: Plan of Care Review  Outcome: Ongoing (interventions implemented as appropriate)   02/09/19 5548   Coping/Psychosocial   Plan of Care Reviewed With patient;sibling   Plan of Care Review   Progress no change   OTHER   Outcome Summary Pt has been medicated for pain x 5 throughout shift. Pt declined ativan when offered. Pt has tolerated some po intake and really enjoys his orange magic cup shakes that dietary has made for him. Pt tried to rest this afternoon. Will continue to provide comfort measures and monitoring.      Goal: Individualization and Mutuality  Outcome: Ongoing (interventions implemented as appropriate)      Problem: Fall Risk (Adult)  Goal: Absence of Fall  Outcome: Ongoing (interventions implemented as appropriate)      Problem: Nutrition, Imbalanced: Inadequate Oral Intake (Adult)  Goal: Prevent Further Weight Loss  Outcome: Ongoing (interventions implemented as appropriate)      Problem: Palliative Care (Adult)  Goal: Maximized Comfort  Outcome: Ongoing (interventions implemented as appropriate)    Goal: Enhanced Quality of Life  Outcome: Ongoing (interventions implemented as appropriate)      Problem: Skin Injury Risk (Adult)  Goal: Skin Health and Integrity  Outcome: Ongoing (interventions implemented as appropriate)

## 2019-02-10 RX ADMIN — ENOXAPARIN SODIUM 40 MG: 40 INJECTION SUBCUTANEOUS at 09:28

## 2019-02-10 RX ADMIN — Medication 250 MG: at 09:28

## 2019-02-10 RX ADMIN — OYSTER SHELL CALCIUM WITH VITAMIN D 1 TABLET: 500; 200 TABLET, FILM COATED ORAL at 07:46

## 2019-02-10 RX ADMIN — ACETIC ACID: 250 IRRIGANT IRRIGATION at 09:31

## 2019-02-10 RX ADMIN — CYANOCOBALAMIN TAB 1000 MCG 500 MCG: 1000 TAB at 09:29

## 2019-02-10 RX ADMIN — PANTOPRAZOLE SODIUM 40 MG: 40 TABLET, DELAYED RELEASE ORAL at 17:10

## 2019-02-10 RX ADMIN — VITAMIN D, TAB 1000IU (100/BT) 1000 UNITS: 25 TAB at 09:28

## 2019-02-10 RX ADMIN — NYSTATIN: 100000 POWDER TOPICAL at 09:30

## 2019-02-10 RX ADMIN — B-COMPLEX W/ C & FOLIC ACID TAB 1 TABLET: TAB at 09:28

## 2019-02-10 RX ADMIN — Medication 250 MG: at 17:09

## 2019-02-10 RX ADMIN — DAPTOMYCIN 300 MG: 500 INJECTION, POWDER, LYOPHILIZED, FOR SOLUTION INTRAVENOUS at 17:18

## 2019-02-10 RX ADMIN — NYSTATIN: 100000 POWDER TOPICAL at 17:10

## 2019-02-11 LAB
ALBUMIN SERPL BCP-MCNC: 3.6 G/DL (ref 3–5.2)
ALP SERPL-CCNC: 97 U/L (ref 43–122)
ALT SERPL W P-5'-P-CCNC: 25 U/L (ref 9–52)
ANION GAP SERPL CALCULATED.3IONS-SCNC: 8 MMOL/L (ref 5–14)
AST SERPL W P-5'-P-CCNC: 21 U/L (ref 14–36)
BILIRUB SERPL-MCNC: 0.3 MG/DL
BUN SERPL-MCNC: 17 MG/DL (ref 5–25)
CALCIUM SERPL-MCNC: 9.1 MG/DL (ref 8.4–10.2)
CHLORIDE SERPL-SCNC: 99 MMOL/L (ref 97–108)
CK SERPL-CCNC: 22 U/L (ref 30–135)
CO2 SERPL-SCNC: 28 MMOL/L (ref 22–30)
CREAT SERPL-MCNC: 0.54 MG/DL (ref 0.6–1.2)
ERYTHROCYTE [DISTWIDTH] IN BLOOD BY AUTOMATED COUNT: 21 %
GFR SERPL CREATININE-BSD FRML MDRD: 97 ML/MIN/1.73SQ M
GLUCOSE P FAST SERPL-MCNC: 103 MG/DL (ref 70–99)
GLUCOSE SERPL-MCNC: 103 MG/DL (ref 70–99)
HCT VFR BLD AUTO: 31.7 % (ref 36–46)
HGB BLD-MCNC: 10.4 G/DL (ref 12–16)
MCH RBC QN AUTO: 27.5 PG (ref 26–34)
MCHC RBC AUTO-ENTMCNC: 32.8 G/DL (ref 31–36)
MCV RBC AUTO: 84 FL (ref 80–100)
PLATELET # BLD AUTO: 440 THOUSANDS/UL (ref 150–450)
PMV BLD AUTO: 7.3 FL (ref 8.9–12.7)
POTASSIUM SERPL-SCNC: 4.1 MMOL/L (ref 3.6–5)
PROT SERPL-MCNC: 7.1 G/DL (ref 5.9–8.4)
RBC # BLD AUTO: 3.78 MILLION/UL (ref 4–5.2)
SODIUM SERPL-SCNC: 135 MMOL/L (ref 137–147)
WBC # BLD AUTO: 8.5 THOUSAND/UL (ref 4.5–11)

## 2019-02-11 PROCEDURE — 80053 COMPREHEN METABOLIC PANEL: CPT | Performed by: INTERNAL MEDICINE

## 2019-02-11 PROCEDURE — 82550 ASSAY OF CK (CPK): CPT | Performed by: INTERNAL MEDICINE

## 2019-02-11 PROCEDURE — 85027 COMPLETE CBC AUTOMATED: CPT | Performed by: FAMILY MEDICINE

## 2019-02-11 RX ADMIN — B-COMPLEX W/ C & FOLIC ACID TAB 1 TABLET: TAB at 08:26

## 2019-02-11 RX ADMIN — NYSTATIN: 100000 POWDER TOPICAL at 18:35

## 2019-02-11 RX ADMIN — PANTOPRAZOLE SODIUM 40 MG: 40 TABLET, DELAYED RELEASE ORAL at 16:21

## 2019-02-11 RX ADMIN — DAPTOMYCIN 300 MG: 500 INJECTION, POWDER, LYOPHILIZED, FOR SOLUTION INTRAVENOUS at 18:33

## 2019-02-11 RX ADMIN — ACETIC ACID: 250 IRRIGANT IRRIGATION at 08:27

## 2019-02-11 RX ADMIN — OYSTER SHELL CALCIUM WITH VITAMIN D 1 TABLET: 500; 200 TABLET, FILM COATED ORAL at 08:25

## 2019-02-11 RX ADMIN — VITAMIN D, TAB 1000IU (100/BT) 1000 UNITS: 25 TAB at 08:26

## 2019-02-11 RX ADMIN — NYSTATIN: 100000 POWDER TOPICAL at 08:27

## 2019-02-11 RX ADMIN — CYANOCOBALAMIN TAB 1000 MCG 500 MCG: 1000 TAB at 08:25

## 2019-02-11 RX ADMIN — Medication 250 MG: at 08:25

## 2019-02-11 RX ADMIN — ENOXAPARIN SODIUM 40 MG: 40 INJECTION SUBCUTANEOUS at 08:26

## 2019-02-11 RX ADMIN — Medication 250 MG: at 18:33

## 2019-02-12 PROCEDURE — 99309 SBSQ NF CARE MODERATE MDM 30: CPT | Performed by: INTERNAL MEDICINE

## 2019-02-12 RX ADMIN — NYSTATIN: 100000 POWDER TOPICAL at 18:08

## 2019-02-12 RX ADMIN — ACETIC ACID: 250 IRRIGANT IRRIGATION at 08:00

## 2019-02-12 RX ADMIN — ENOXAPARIN SODIUM 40 MG: 40 INJECTION SUBCUTANEOUS at 10:28

## 2019-02-12 RX ADMIN — Medication 250 MG: at 18:08

## 2019-02-12 RX ADMIN — VITAMIN D, TAB 1000IU (100/BT) 1000 UNITS: 25 TAB at 10:27

## 2019-02-12 RX ADMIN — Medication 250 MG: at 10:27

## 2019-02-12 RX ADMIN — B-COMPLEX W/ C & FOLIC ACID TAB 1 TABLET: TAB at 10:29

## 2019-02-12 RX ADMIN — CYANOCOBALAMIN TAB 1000 MCG 500 MCG: 1000 TAB at 10:28

## 2019-02-12 RX ADMIN — PANTOPRAZOLE SODIUM 40 MG: 40 TABLET, DELAYED RELEASE ORAL at 18:08

## 2019-02-12 RX ADMIN — OYSTER SHELL CALCIUM WITH VITAMIN D 1 TABLET: 500; 200 TABLET, FILM COATED ORAL at 08:32

## 2019-02-12 RX ADMIN — DAPTOMYCIN 300 MG: 500 INJECTION, POWDER, LYOPHILIZED, FOR SOLUTION INTRAVENOUS at 18:08

## 2019-02-12 RX ADMIN — NYSTATIN: 100000 POWDER TOPICAL at 10:31

## 2019-02-13 PROCEDURE — G8987 SELF CARE CURRENT STATUS: HCPCS

## 2019-02-13 PROCEDURE — 97166 OT EVAL MOD COMPLEX 45 MIN: CPT

## 2019-02-13 PROCEDURE — G8989 SELF CARE D/C STATUS: HCPCS

## 2019-02-13 PROCEDURE — G8988 SELF CARE GOAL STATUS: HCPCS

## 2019-02-13 RX ADMIN — ENOXAPARIN SODIUM 40 MG: 40 INJECTION SUBCUTANEOUS at 10:30

## 2019-02-13 RX ADMIN — DAPTOMYCIN 300 MG: 500 INJECTION, POWDER, LYOPHILIZED, FOR SOLUTION INTRAVENOUS at 18:50

## 2019-02-13 RX ADMIN — Medication 250 MG: at 18:50

## 2019-02-13 RX ADMIN — B-COMPLEX W/ C & FOLIC ACID TAB 1 TABLET: TAB at 11:01

## 2019-02-13 RX ADMIN — OYSTER SHELL CALCIUM WITH VITAMIN D 1 TABLET: 500; 200 TABLET, FILM COATED ORAL at 11:01

## 2019-02-13 RX ADMIN — NYSTATIN: 100000 POWDER TOPICAL at 11:02

## 2019-02-13 RX ADMIN — VITAMIN D, TAB 1000IU (100/BT) 1000 UNITS: 25 TAB at 11:01

## 2019-02-13 RX ADMIN — Medication 250 MG: at 11:01

## 2019-02-13 RX ADMIN — CYANOCOBALAMIN TAB 1000 MCG 500 MCG: 1000 TAB at 11:01

## 2019-02-13 RX ADMIN — PANTOPRAZOLE SODIUM 40 MG: 40 TABLET, DELAYED RELEASE ORAL at 18:53

## 2019-02-13 RX ADMIN — ACETIC ACID: 250 IRRIGANT IRRIGATION at 11:02

## 2019-02-14 RX ADMIN — PANTOPRAZOLE SODIUM 40 MG: 40 TABLET, DELAYED RELEASE ORAL at 17:19

## 2019-02-14 RX ADMIN — Medication 250 MG: at 17:19

## 2019-02-14 RX ADMIN — OYSTER SHELL CALCIUM WITH VITAMIN D 1 TABLET: 500; 200 TABLET, FILM COATED ORAL at 09:49

## 2019-02-14 RX ADMIN — CYANOCOBALAMIN TAB 1000 MCG 500 MCG: 1000 TAB at 09:48

## 2019-02-14 RX ADMIN — B-COMPLEX W/ C & FOLIC ACID TAB 1 TABLET: TAB at 09:48

## 2019-02-14 RX ADMIN — ENOXAPARIN SODIUM 40 MG: 40 INJECTION SUBCUTANEOUS at 09:50

## 2019-02-14 RX ADMIN — VITAMIN D, TAB 1000IU (100/BT) 1000 UNITS: 25 TAB at 09:49

## 2019-02-14 RX ADMIN — Medication 250 MG: at 09:48

## 2019-02-15 RX ADMIN — OYSTER SHELL CALCIUM WITH VITAMIN D 1 TABLET: 500; 200 TABLET, FILM COATED ORAL at 09:57

## 2019-02-15 RX ADMIN — B-COMPLEX W/ C & FOLIC ACID TAB 1 TABLET: TAB at 09:57

## 2019-02-15 RX ADMIN — CYANOCOBALAMIN TAB 1000 MCG 500 MCG: 1000 TAB at 09:57

## 2019-02-15 RX ADMIN — PANTOPRAZOLE SODIUM 40 MG: 40 TABLET, DELAYED RELEASE ORAL at 18:16

## 2019-02-15 RX ADMIN — Medication 250 MG: at 09:00

## 2019-02-15 RX ADMIN — Medication 250 MG: at 18:16

## 2019-02-15 RX ADMIN — VITAMIN D, TAB 1000IU (100/BT) 1000 UNITS: 25 TAB at 09:58

## 2019-02-15 RX ADMIN — NYSTATIN: 100000 POWDER TOPICAL at 20:48

## 2019-02-15 RX ADMIN — ENOXAPARIN SODIUM 40 MG: 40 INJECTION SUBCUTANEOUS at 09:55

## 2019-02-16 RX ADMIN — PANTOPRAZOLE SODIUM 40 MG: 40 TABLET, DELAYED RELEASE ORAL at 18:24

## 2019-02-16 RX ADMIN — ACETIC ACID: 250 IRRIGANT IRRIGATION at 08:47

## 2019-02-16 RX ADMIN — NYSTATIN: 100000 POWDER TOPICAL at 08:48

## 2019-02-16 RX ADMIN — NYSTATIN: 100000 POWDER TOPICAL at 21:10

## 2019-02-16 RX ADMIN — CYANOCOBALAMIN TAB 1000 MCG 500 MCG: 1000 TAB at 08:46

## 2019-02-16 RX ADMIN — Medication 250 MG: at 08:45

## 2019-02-16 RX ADMIN — VITAMIN D, TAB 1000IU (100/BT) 1000 UNITS: 25 TAB at 08:46

## 2019-02-16 RX ADMIN — Medication 250 MG: at 18:25

## 2019-02-16 RX ADMIN — OYSTER SHELL CALCIUM WITH VITAMIN D 1 TABLET: 500; 200 TABLET, FILM COATED ORAL at 08:46

## 2019-02-16 RX ADMIN — B-COMPLEX W/ C & FOLIC ACID TAB 1 TABLET: TAB at 08:46

## 2019-02-16 RX ADMIN — ENOXAPARIN SODIUM 40 MG: 40 INJECTION SUBCUTANEOUS at 08:46

## 2019-02-17 RX ADMIN — Medication 250 MG: at 09:25

## 2019-02-17 RX ADMIN — B-COMPLEX W/ C & FOLIC ACID TAB 1 TABLET: TAB at 09:25

## 2019-02-17 RX ADMIN — NYSTATIN: 100000 POWDER TOPICAL at 20:39

## 2019-02-17 RX ADMIN — ENOXAPARIN SODIUM 40 MG: 40 INJECTION SUBCUTANEOUS at 09:00

## 2019-02-17 RX ADMIN — VITAMIN D, TAB 1000IU (100/BT) 1000 UNITS: 25 TAB at 09:24

## 2019-02-17 RX ADMIN — PANTOPRAZOLE SODIUM 40 MG: 40 TABLET, DELAYED RELEASE ORAL at 19:03

## 2019-02-17 RX ADMIN — OYSTER SHELL CALCIUM WITH VITAMIN D 1 TABLET: 500; 200 TABLET, FILM COATED ORAL at 09:24

## 2019-02-17 RX ADMIN — CYANOCOBALAMIN TAB 1000 MCG 500 MCG: 1000 TAB at 09:25

## 2019-02-17 RX ADMIN — NYSTATIN: 100000 POWDER TOPICAL at 11:28

## 2019-02-17 RX ADMIN — Medication 250 MG: at 19:03

## 2019-02-18 PROCEDURE — 99308 SBSQ NF CARE LOW MDM 20: CPT | Performed by: FAMILY MEDICINE

## 2019-02-18 RX ADMIN — Medication 250 MG: at 18:23

## 2019-02-18 RX ADMIN — PANTOPRAZOLE SODIUM 40 MG: 40 TABLET, DELAYED RELEASE ORAL at 18:23

## 2019-02-18 RX ADMIN — VITAMIN D, TAB 1000IU (100/BT) 1000 UNITS: 25 TAB at 09:15

## 2019-02-18 RX ADMIN — ENOXAPARIN SODIUM 40 MG: 40 INJECTION SUBCUTANEOUS at 09:14

## 2019-02-18 RX ADMIN — Medication 250 MG: at 09:15

## 2019-02-18 RX ADMIN — B-COMPLEX W/ C & FOLIC ACID TAB 1 TABLET: TAB at 09:15

## 2019-02-18 RX ADMIN — ACETIC ACID: 250 IRRIGANT IRRIGATION at 09:22

## 2019-02-18 RX ADMIN — NYSTATIN: 100000 POWDER TOPICAL at 21:27

## 2019-02-18 RX ADMIN — OYSTER SHELL CALCIUM WITH VITAMIN D 1 TABLET: 500; 200 TABLET, FILM COATED ORAL at 09:15

## 2019-02-18 RX ADMIN — NYSTATIN: 100000 POWDER TOPICAL at 09:16

## 2019-02-18 RX ADMIN — CYANOCOBALAMIN TAB 1000 MCG 500 MCG: 1000 TAB at 09:15

## 2019-02-19 RX ADMIN — B-COMPLEX W/ C & FOLIC ACID TAB 1 TABLET: TAB at 08:24

## 2019-02-19 RX ADMIN — SENNOSIDES AND DOCUSATE SODIUM 2 TABLET: 8.6; 5 TABLET ORAL at 08:24

## 2019-02-19 RX ADMIN — Medication 250 MG: at 18:31

## 2019-02-19 RX ADMIN — NYSTATIN: 100000 POWDER TOPICAL at 08:44

## 2019-02-19 RX ADMIN — ENOXAPARIN SODIUM 40 MG: 40 INJECTION SUBCUTANEOUS at 08:24

## 2019-02-19 RX ADMIN — OYSTER SHELL CALCIUM WITH VITAMIN D 1 TABLET: 500; 200 TABLET, FILM COATED ORAL at 08:24

## 2019-02-19 RX ADMIN — VITAMIN D, TAB 1000IU (100/BT) 1000 UNITS: 25 TAB at 08:24

## 2019-02-19 RX ADMIN — CYANOCOBALAMIN TAB 1000 MCG 500 MCG: 1000 TAB at 08:24

## 2019-02-19 RX ADMIN — PANTOPRAZOLE SODIUM 40 MG: 40 TABLET, DELAYED RELEASE ORAL at 18:31

## 2019-02-19 RX ADMIN — NYSTATIN: 100000 POWDER TOPICAL at 21:40

## 2019-02-19 RX ADMIN — ACETIC ACID: 250 IRRIGANT IRRIGATION at 09:00

## 2019-02-19 RX ADMIN — Medication 250 MG: at 08:24

## 2019-02-20 RX ADMIN — OYSTER SHELL CALCIUM WITH VITAMIN D 1 TABLET: 500; 200 TABLET, FILM COATED ORAL at 09:14

## 2019-02-20 RX ADMIN — Medication 250 MG: at 17:40

## 2019-02-20 RX ADMIN — CYANOCOBALAMIN TAB 1000 MCG 500 MCG: 1000 TAB at 09:14

## 2019-02-20 RX ADMIN — NYSTATIN: 100000 POWDER TOPICAL at 09:20

## 2019-02-20 RX ADMIN — PANTOPRAZOLE SODIUM 40 MG: 40 TABLET, DELAYED RELEASE ORAL at 17:40

## 2019-02-20 RX ADMIN — B-COMPLEX W/ C & FOLIC ACID TAB 1 TABLET: TAB at 09:14

## 2019-02-20 RX ADMIN — Medication 250 MG: at 09:14

## 2019-02-20 RX ADMIN — VITAMIN D, TAB 1000IU (100/BT) 1000 UNITS: 25 TAB at 09:14

## 2019-02-20 RX ADMIN — ENOXAPARIN SODIUM 40 MG: 40 INJECTION SUBCUTANEOUS at 09:13

## 2019-02-20 RX ADMIN — NYSTATIN 1 APPLICATION: 100000 POWDER TOPICAL at 20:49

## 2019-02-21 ENCOUNTER — HOSPITAL ENCOUNTER (OUTPATIENT)
Facility: HOSPITAL | Age: 69
Setting detail: OUTPATIENT SURGERY
Discharge: HOME | End: 2019-02-21
Attending: PLASTIC SURGERY | Admitting: PLASTIC SURGERY
Payer: COMMERCIAL

## 2019-02-21 DIAGNOSIS — L98.8 OTHER SPECIFIED DISORDERS OF THE SKIN AND SUBCUTANEOUS TISSUE: ICD-10-CM

## 2019-02-21 DIAGNOSIS — L89.229: ICD-10-CM

## 2019-02-21 RX ADMIN — PANTOPRAZOLE SODIUM 40 MG: 40 TABLET, DELAYED RELEASE ORAL at 16:58

## 2019-02-21 RX ADMIN — ENOXAPARIN SODIUM 40 MG: 40 INJECTION SUBCUTANEOUS at 08:17

## 2019-02-21 RX ADMIN — NYSTATIN: 100000 POWDER TOPICAL at 08:17

## 2019-02-21 RX ADMIN — Medication 250 MG: at 17:00

## 2019-02-21 RX ADMIN — OYSTER SHELL CALCIUM WITH VITAMIN D 1 TABLET: 500; 200 TABLET, FILM COATED ORAL at 08:16

## 2019-02-21 RX ADMIN — NYSTATIN: 100000 POWDER TOPICAL at 20:50

## 2019-02-21 RX ADMIN — CYANOCOBALAMIN TAB 1000 MCG 500 MCG: 1000 TAB at 08:16

## 2019-02-21 RX ADMIN — B-COMPLEX W/ C & FOLIC ACID TAB 1 TABLET: TAB at 08:16

## 2019-02-21 RX ADMIN — VITAMIN D, TAB 1000IU (100/BT) 1000 UNITS: 25 TAB at 08:16

## 2019-02-21 RX ADMIN — Medication 250 MG: at 08:16

## 2019-02-22 RX ADMIN — Medication 250 MG: at 17:42

## 2019-02-22 RX ADMIN — OYSTER SHELL CALCIUM WITH VITAMIN D 1 TABLET: 500; 200 TABLET, FILM COATED ORAL at 08:16

## 2019-02-22 RX ADMIN — ENOXAPARIN SODIUM 40 MG: 40 INJECTION SUBCUTANEOUS at 08:16

## 2019-02-22 RX ADMIN — NYSTATIN: 100000 POWDER TOPICAL at 08:19

## 2019-02-22 RX ADMIN — VITAMIN D, TAB 1000IU (100/BT) 1000 UNITS: 25 TAB at 08:16

## 2019-02-22 RX ADMIN — NYSTATIN: 100000 POWDER TOPICAL at 21:22

## 2019-02-22 RX ADMIN — CYANOCOBALAMIN TAB 1000 MCG 500 MCG: 1000 TAB at 08:16

## 2019-02-22 RX ADMIN — B-COMPLEX W/ C & FOLIC ACID TAB 1 TABLET: TAB at 08:16

## 2019-02-22 RX ADMIN — PANTOPRAZOLE SODIUM 40 MG: 40 TABLET, DELAYED RELEASE ORAL at 17:42

## 2019-02-22 RX ADMIN — ACETIC ACID 1000 ML: 250 IRRIGANT IRRIGATION at 08:19

## 2019-02-22 RX ADMIN — Medication 250 MG: at 08:16

## 2019-02-23 RX ADMIN — VITAMIN D, TAB 1000IU (100/BT) 1000 UNITS: 25 TAB at 08:38

## 2019-02-23 RX ADMIN — PANTOPRAZOLE SODIUM 40 MG: 40 TABLET, DELAYED RELEASE ORAL at 16:30

## 2019-02-23 RX ADMIN — NYSTATIN: 100000 POWDER TOPICAL at 21:24

## 2019-02-23 RX ADMIN — OYSTER SHELL CALCIUM WITH VITAMIN D 1 TABLET: 500; 200 TABLET, FILM COATED ORAL at 08:37

## 2019-02-23 RX ADMIN — B-COMPLEX W/ C & FOLIC ACID TAB 1 TABLET: TAB at 08:38

## 2019-02-23 RX ADMIN — ENOXAPARIN SODIUM 40 MG: 40 INJECTION SUBCUTANEOUS at 08:37

## 2019-02-23 RX ADMIN — CYANOCOBALAMIN TAB 1000 MCG 500 MCG: 1000 TAB at 08:37

## 2019-02-23 RX ADMIN — NYSTATIN: 100000 POWDER TOPICAL at 08:36

## 2019-02-23 RX ADMIN — Medication 250 MG: at 08:38

## 2019-02-23 RX ADMIN — Medication 250 MG: at 17:47

## 2019-02-23 RX ADMIN — ACETIC ACID 1000 ML: 250 IRRIGANT IRRIGATION at 08:36

## 2019-02-24 RX ADMIN — B-COMPLEX W/ C & FOLIC ACID TAB 1 TABLET: TAB at 08:17

## 2019-02-24 RX ADMIN — CYANOCOBALAMIN TAB 1000 MCG 500 MCG: 1000 TAB at 08:17

## 2019-02-24 RX ADMIN — PANTOPRAZOLE SODIUM 40 MG: 40 TABLET, DELAYED RELEASE ORAL at 16:44

## 2019-02-24 RX ADMIN — ENOXAPARIN SODIUM 40 MG: 40 INJECTION SUBCUTANEOUS at 08:18

## 2019-02-24 RX ADMIN — OYSTER SHELL CALCIUM WITH VITAMIN D 1 TABLET: 500; 200 TABLET, FILM COATED ORAL at 08:17

## 2019-02-24 RX ADMIN — ACETIC ACID 1000 ML: 250 IRRIGANT IRRIGATION at 08:16

## 2019-02-24 RX ADMIN — NYSTATIN: 100000 POWDER TOPICAL at 20:50

## 2019-02-24 RX ADMIN — NYSTATIN: 100000 POWDER TOPICAL at 08:17

## 2019-02-24 RX ADMIN — Medication 250 MG: at 08:17

## 2019-02-24 RX ADMIN — VITAMIN D, TAB 1000IU (100/BT) 1000 UNITS: 25 TAB at 08:17

## 2019-02-24 RX ADMIN — Medication 250 MG: at 18:05

## 2019-02-25 PROCEDURE — 97163 PT EVAL HIGH COMPLEX 45 MIN: CPT

## 2019-02-25 PROCEDURE — 97530 THERAPEUTIC ACTIVITIES: CPT

## 2019-02-25 PROCEDURE — 99309 SBSQ NF CARE MODERATE MDM 30: CPT | Performed by: INTERNAL MEDICINE

## 2019-02-25 RX ADMIN — VITAMIN D, TAB 1000IU (100/BT) 1000 UNITS: 25 TAB at 09:51

## 2019-02-25 RX ADMIN — NYSTATIN: 100000 POWDER TOPICAL at 09:59

## 2019-02-25 RX ADMIN — NYSTATIN: 100000 POWDER TOPICAL at 23:16

## 2019-02-25 RX ADMIN — Medication 250 MG: at 09:51

## 2019-02-25 RX ADMIN — Medication 250 MG: at 17:32

## 2019-02-25 RX ADMIN — CYANOCOBALAMIN TAB 1000 MCG 500 MCG: 1000 TAB at 09:52

## 2019-02-25 RX ADMIN — B-COMPLEX W/ C & FOLIC ACID TAB 1 TABLET: TAB at 09:51

## 2019-02-25 RX ADMIN — ENOXAPARIN SODIUM 40 MG: 40 INJECTION SUBCUTANEOUS at 09:41

## 2019-02-25 RX ADMIN — PANTOPRAZOLE SODIUM 40 MG: 40 TABLET, DELAYED RELEASE ORAL at 17:31

## 2019-02-25 RX ADMIN — OYSTER SHELL CALCIUM WITH VITAMIN D 1 TABLET: 500; 200 TABLET, FILM COATED ORAL at 09:52

## 2019-02-25 NOTE — PRE-PROCEDURE INSTRUCTIONS
No outpatient medications have been marked as taking for the 2/27/19 encounter [facility] Encounter)

## 2019-02-26 ENCOUNTER — ANESTHESIA EVENT (OUTPATIENT)
Dept: PERIOP | Facility: HOSPITAL | Age: 69
End: 2019-02-26
Payer: COMMERCIAL

## 2019-02-26 PROCEDURE — 97530 THERAPEUTIC ACTIVITIES: CPT

## 2019-02-26 RX ADMIN — PANTOPRAZOLE SODIUM 40 MG: 40 TABLET, DELAYED RELEASE ORAL at 16:46

## 2019-02-26 RX ADMIN — B-COMPLEX W/ C & FOLIC ACID TAB 1 TABLET: TAB at 09:37

## 2019-02-26 RX ADMIN — NYSTATIN: 100000 POWDER TOPICAL at 21:22

## 2019-02-26 RX ADMIN — CYANOCOBALAMIN TAB 1000 MCG 500 MCG: 1000 TAB at 09:38

## 2019-02-26 RX ADMIN — VITAMIN D, TAB 1000IU (100/BT) 1000 UNITS: 25 TAB at 09:37

## 2019-02-26 RX ADMIN — OYSTER SHELL CALCIUM WITH VITAMIN D 1 TABLET: 500; 200 TABLET, FILM COATED ORAL at 09:37

## 2019-02-26 RX ADMIN — ENOXAPARIN SODIUM 40 MG: 40 INJECTION SUBCUTANEOUS at 09:37

## 2019-02-26 RX ADMIN — Medication 250 MG: at 17:17

## 2019-02-26 RX ADMIN — NYSTATIN: 100000 POWDER TOPICAL at 10:28

## 2019-02-26 RX ADMIN — Medication 250 MG: at 09:37

## 2019-02-26 NOTE — ANESTHESIA PREPROCEDURE EVALUATION
Review of Systems/Medical History  Patient summary reviewed  Chart reviewed  No history of anesthetic complications     Cardiovascular  EKG reviewed, Exercise tolerance (METS): <4,  PVD (toe amp   Multiple local flaps),    Pulmonary       GI/Hepatic    GERD ,          Comment: Latex allergy     Endo/Other  Negative endo/other ROS      GYN  , Prior pregnancy/OB history : 0 Parity: 0,     Comment: postmenopausal     Hematology  Anemia ,     Musculoskeletal    Arthritis     Neurology      Comment: Paraplegic   T8 injury Psychology   Depression , being treated for depression,              Physical Exam    Airway    Mallampati score: II         Dental       Cardiovascular  Cardiovascular exam normal    Pulmonary  Pulmonary exam normal     Other Findings  Fixed upper and lower teeth and in good repair      Anesthesia Plan  ASA Score- 3     Anesthesia Type- general with ASA Monitors  Additional Monitors:   Airway Plan: ETT  Comment: Chart reviewed and pt states no new changes in health  No prior anesthesia issues  No questions voiced  Pt re consents to procedure   Old charts reviewed  Plan Factors-Patient not instructed to abstain from smoking on day of procedure  Patient did not smoke on day of surgery  Induction- intravenous  Postoperative Plan- Plan for postoperative opioid use  Informed Consent- Anesthetic plan and risks discussed with patient  I personally reviewed this patient with the CRNA  Discussed and agreed on the Anesthesia Plan with the ALEXANDRA To

## 2019-02-27 ENCOUNTER — ANESTHESIA (OUTPATIENT)
Dept: PERIOP | Facility: HOSPITAL | Age: 69
End: 2019-02-27
Payer: COMMERCIAL

## 2019-02-27 VITALS
SYSTOLIC BLOOD PRESSURE: 97 MMHG | TEMPERATURE: 97.5 F | BODY MASS INDEX: 22.46 KG/M2 | DIASTOLIC BLOOD PRESSURE: 53 MMHG | OXYGEN SATURATION: 93 % | RESPIRATION RATE: 14 BRPM | WEIGHT: 115 LBS | HEART RATE: 67 BPM

## 2019-02-27 PROCEDURE — 88304 TISSUE EXAM BY PATHOLOGIST: CPT | Performed by: PATHOLOGY

## 2019-02-27 PROCEDURE — 87186 SC STD MICRODIL/AGAR DIL: CPT | Performed by: PLASTIC SURGERY

## 2019-02-27 PROCEDURE — 87176 TISSUE HOMOGENIZATION CULTR: CPT | Performed by: PLASTIC SURGERY

## 2019-02-27 PROCEDURE — 87205 SMEAR GRAM STAIN: CPT | Performed by: PLASTIC SURGERY

## 2019-02-27 PROCEDURE — 87077 CULTURE AEROBIC IDENTIFY: CPT | Performed by: PLASTIC SURGERY

## 2019-02-27 PROCEDURE — 87070 CULTURE OTHR SPECIMN AEROBIC: CPT | Performed by: PLASTIC SURGERY

## 2019-02-27 RX ORDER — DEXAMETHASONE SODIUM PHOSPHATE 4 MG/ML
4 INJECTION, SOLUTION INTRA-ARTICULAR; INTRALESIONAL; INTRAMUSCULAR; INTRAVENOUS; SOFT TISSUE ONCE AS NEEDED
Status: DISCONTINUED | OUTPATIENT
Start: 2019-02-27 | End: 2021-12-09

## 2019-02-27 RX ORDER — MAGNESIUM HYDROXIDE 1200 MG/15ML
LIQUID ORAL AS NEEDED
Status: DISCONTINUED | OUTPATIENT
Start: 2019-02-27 | End: 2019-02-27 | Stop reason: HOSPADM

## 2019-02-27 RX ORDER — SODIUM CHLORIDE, SODIUM LACTATE, POTASSIUM CHLORIDE, CALCIUM CHLORIDE 600; 310; 30; 20 MG/100ML; MG/100ML; MG/100ML; MG/100ML
50 INJECTION, SOLUTION INTRAVENOUS CONTINUOUS
Status: DISCONTINUED | OUTPATIENT
Start: 2019-02-27 | End: 2021-12-09

## 2019-02-27 RX ORDER — GLYCOPYRROLATE 0.2 MG/ML
INJECTION INTRAMUSCULAR; INTRAVENOUS AS NEEDED
Status: DISCONTINUED | OUTPATIENT
Start: 2019-02-27 | End: 2019-02-27 | Stop reason: SURG

## 2019-02-27 RX ORDER — EPHEDRINE SULFATE 50 MG/ML
INJECTION, SOLUTION INTRAVENOUS AS NEEDED
Status: DISCONTINUED | OUTPATIENT
Start: 2019-02-27 | End: 2019-02-27 | Stop reason: SURG

## 2019-02-27 RX ORDER — SACCHAROMYCES BOULARDII 250 MG
250 CAPSULE ORAL 2 TIMES DAILY
Status: ON HOLD | COMMUNITY
End: 2019-04-19 | Stop reason: SDUPTHER

## 2019-02-27 RX ORDER — SODIUM CHLORIDE, SODIUM LACTATE, POTASSIUM CHLORIDE, CALCIUM CHLORIDE 600; 310; 30; 20 MG/100ML; MG/100ML; MG/100ML; MG/100ML
INJECTION, SOLUTION INTRAVENOUS CONTINUOUS PRN
Status: DISCONTINUED | OUTPATIENT
Start: 2019-02-27 | End: 2019-02-27 | Stop reason: SURG

## 2019-02-27 RX ORDER — MIDAZOLAM HYDROCHLORIDE 1 MG/ML
INJECTION INTRAMUSCULAR; INTRAVENOUS AS NEEDED
Status: DISCONTINUED | OUTPATIENT
Start: 2019-02-27 | End: 2019-02-27 | Stop reason: SURG

## 2019-02-27 RX ORDER — DIPHENHYDRAMINE HYDROCHLORIDE 50 MG/ML
12.5 INJECTION INTRAMUSCULAR; INTRAVENOUS ONCE
Status: DISCONTINUED | OUTPATIENT
Start: 2019-02-27 | End: 2020-03-10

## 2019-02-27 RX ORDER — FENTANYL CITRATE/PF 50 MCG/ML
12.5 SYRINGE (ML) INJECTION
Status: DISCONTINUED | OUTPATIENT
Start: 2019-02-27 | End: 2019-11-15 | Stop reason: ALTCHOICE

## 2019-02-27 RX ORDER — NEOSTIGMINE METHYLSULFATE 1 MG/ML
INJECTION INTRAVENOUS AS NEEDED
Status: DISCONTINUED | OUTPATIENT
Start: 2019-02-27 | End: 2019-02-27 | Stop reason: SURG

## 2019-02-27 RX ORDER — FENTANYL CITRATE/PF 50 MCG/ML
25 SYRINGE (ML) INJECTION
Status: DISCONTINUED | OUTPATIENT
Start: 2019-02-27 | End: 2019-11-15 | Stop reason: ALTCHOICE

## 2019-02-27 RX ORDER — FENTANYL CITRATE 50 UG/ML
INJECTION, SOLUTION INTRAMUSCULAR; INTRAVENOUS AS NEEDED
Status: DISCONTINUED | OUTPATIENT
Start: 2019-02-27 | End: 2019-02-27 | Stop reason: SURG

## 2019-02-27 RX ORDER — PROPOFOL 10 MG/ML
INJECTION, EMULSION INTRAVENOUS AS NEEDED
Status: DISCONTINUED | OUTPATIENT
Start: 2019-02-27 | End: 2019-02-27 | Stop reason: SURG

## 2019-02-27 RX ORDER — SODIUM CHLORIDE, SODIUM LACTATE, POTASSIUM CHLORIDE, CALCIUM CHLORIDE 600; 310; 30; 20 MG/100ML; MG/100ML; MG/100ML; MG/100ML
75 INJECTION, SOLUTION INTRAVENOUS CONTINUOUS
Status: DISCONTINUED | OUTPATIENT
Start: 2019-02-27 | End: 2021-12-09

## 2019-02-27 RX ORDER — LIDOCAINE HYDROCHLORIDE 10 MG/ML
INJECTION, SOLUTION INFILTRATION; PERINEURAL AS NEEDED
Status: DISCONTINUED | OUTPATIENT
Start: 2019-02-27 | End: 2019-02-27 | Stop reason: SURG

## 2019-02-27 RX ORDER — ONDANSETRON 2 MG/ML
INJECTION INTRAMUSCULAR; INTRAVENOUS AS NEEDED
Status: DISCONTINUED | OUTPATIENT
Start: 2019-02-27 | End: 2019-02-27 | Stop reason: SURG

## 2019-02-27 RX ORDER — ROCURONIUM BROMIDE 10 MG/ML
INJECTION, SOLUTION INTRAVENOUS AS NEEDED
Status: DISCONTINUED | OUTPATIENT
Start: 2019-02-27 | End: 2019-02-27 | Stop reason: SURG

## 2019-02-27 RX ORDER — VANCOMYCIN HYDROCHLORIDE 1 G/20ML
INJECTION, POWDER, LYOPHILIZED, FOR SOLUTION INTRAVENOUS AS NEEDED
Status: DISCONTINUED | OUTPATIENT
Start: 2019-02-27 | End: 2019-02-27 | Stop reason: SURG

## 2019-02-27 RX ORDER — PANTOPRAZOLE SODIUM 40 MG/1
40 TABLET, DELAYED RELEASE ORAL DAILY
COMMUNITY
End: 2021-12-09 | Stop reason: SDUPTHER

## 2019-02-27 RX ORDER — NYSTATIN 100000 [USP'U]/G
POWDER TOPICAL 2 TIMES DAILY
COMMUNITY
End: 2020-04-29 | Stop reason: HOSPADM

## 2019-02-27 RX ADMIN — FENTANYL CITRATE 25 MCG: 50 INJECTION INTRAMUSCULAR; INTRAVENOUS at 18:32

## 2019-02-27 RX ADMIN — MIDAZOLAM HYDROCHLORIDE 2 MG: 1 INJECTION, SOLUTION INTRAMUSCULAR; INTRAVENOUS at 16:08

## 2019-02-27 RX ADMIN — FENTANYL CITRATE 50 MCG: 50 INJECTION INTRAMUSCULAR; INTRAVENOUS at 17:00

## 2019-02-27 RX ADMIN — ONDANSETRON HYDROCHLORIDE 4 MG: 2 INJECTION, SOLUTION INTRAMUSCULAR; INTRAVENOUS at 19:22

## 2019-02-27 RX ADMIN — FENTANYL CITRATE 20 MCG: 50 INJECTION INTRAMUSCULAR; INTRAVENOUS at 17:44

## 2019-02-27 RX ADMIN — PROPOFOL 150 MG: 10 INJECTION, EMULSION INTRAVENOUS at 16:08

## 2019-02-27 RX ADMIN — EPHEDRINE SULFATE 10 MG: 50 INJECTION INTRAMUSCULAR; INTRAVENOUS; SUBCUTANEOUS at 16:19

## 2019-02-27 RX ADMIN — VANCOMYCIN 500 MG: 1 INJECTION, SOLUTION INTRAVENOUS at 16:07

## 2019-02-27 RX ADMIN — FENTANYL CITRATE 25 MCG: 50 INJECTION INTRAMUSCULAR; INTRAVENOUS at 20:07

## 2019-02-27 RX ADMIN — LIDOCAINE HYDROCHLORIDE 40 MG: 10 INJECTION, SOLUTION INFILTRATION; PERINEURAL at 16:08

## 2019-02-27 RX ADMIN — FENTANYL CITRATE 20 MCG: 50 INJECTION INTRAMUSCULAR; INTRAVENOUS at 18:22

## 2019-02-27 RX ADMIN — EPHEDRINE SULFATE 5 MG: 50 INJECTION INTRAMUSCULAR; INTRAVENOUS; SUBCUTANEOUS at 16:30

## 2019-02-27 RX ADMIN — ROCURONIUM BROMIDE 50 MG: 10 INJECTION INTRAVENOUS at 16:08

## 2019-02-27 RX ADMIN — NEOSTIGMINE METHYLSULFATE 2.5 MG: 1 INJECTION INTRAVENOUS at 20:03

## 2019-02-27 RX ADMIN — FENTANYL CITRATE 20 MCG: 50 INJECTION INTRAMUSCULAR; INTRAVENOUS at 17:39

## 2019-02-27 RX ADMIN — SODIUM CHLORIDE, SODIUM LACTATE, POTASSIUM CHLORIDE, AND CALCIUM CHLORIDE: .6; .31; .03; .02 INJECTION, SOLUTION INTRAVENOUS at 16:04

## 2019-02-27 RX ADMIN — GLYCOPYRROLATE 0.4 MG: 0.2 INJECTION, SOLUTION INTRAMUSCULAR; INTRAVENOUS at 20:04

## 2019-02-27 RX ADMIN — SODIUM CHLORIDE, SODIUM LACTATE, POTASSIUM CHLORIDE, AND CALCIUM CHLORIDE: .6; .31; .03; .02 INJECTION, SOLUTION INTRAVENOUS at 16:03

## 2019-02-27 RX ADMIN — FENTANYL CITRATE 20 MCG: 50 INJECTION INTRAMUSCULAR; INTRAVENOUS at 18:13

## 2019-02-27 RX ADMIN — SODIUM CHLORIDE, SODIUM LACTATE, POTASSIUM CHLORIDE, AND CALCIUM CHLORIDE 75 ML/HR: .6; .31; .03; .02 INJECTION, SOLUTION INTRAVENOUS at 14:21

## 2019-02-27 RX ADMIN — ROCURONIUM BROMIDE 20 MG: 10 INJECTION INTRAVENOUS at 17:42

## 2019-02-27 RX ADMIN — FENTANYL CITRATE 20 MCG: 50 INJECTION INTRAMUSCULAR; INTRAVENOUS at 18:03

## 2019-02-27 RX ADMIN — FENTANYL CITRATE 25 MCG: 50 INJECTION INTRAMUSCULAR; INTRAVENOUS at 19:13

## 2019-02-27 RX ADMIN — FENTANYL CITRATE 50 MCG: 50 INJECTION INTRAMUSCULAR; INTRAVENOUS at 16:08

## 2019-02-28 RX ORDER — DIPHENHYDRAMINE HCL 25 MG
25 TABLET ORAL
Status: COMPLETED | OUTPATIENT
Start: 2019-02-28 | End: 2019-03-01

## 2019-02-28 RX ORDER — LORATADINE 10 MG/1
10 TABLET ORAL DAILY
Status: COMPLETED | OUTPATIENT
Start: 2019-03-01 | End: 2019-03-03

## 2019-02-28 RX ORDER — FAMOTIDINE 20 MG/1
20 TABLET, FILM COATED ORAL 2 TIMES DAILY
Status: DISCONTINUED | OUTPATIENT
Start: 2019-02-28 | End: 2019-04-19 | Stop reason: HOSPADM

## 2019-02-28 RX ADMIN — ALUMINUM HYDROXIDE, MAGNESIUM HYDROXIDE, AND SIMETHICONE 30 ML: 200; 200; 20 SUSPENSION ORAL at 09:05

## 2019-02-28 RX ADMIN — ENOXAPARIN SODIUM 40 MG: 40 INJECTION SUBCUTANEOUS at 09:08

## 2019-02-28 RX ADMIN — B-COMPLEX W/ C & FOLIC ACID TAB 1 TABLET: TAB at 09:07

## 2019-02-28 RX ADMIN — VITAMIN D, TAB 1000IU (100/BT) 1000 UNITS: 25 TAB at 09:07

## 2019-02-28 RX ADMIN — DIPHENHYDRAMINE HYDROCHLORIDE 50 MG: 50 INJECTION INTRAMUSCULAR; INTRAVENOUS at 11:18

## 2019-02-28 RX ADMIN — FAMOTIDINE 20 MG: 20 TABLET ORAL at 17:24

## 2019-02-28 RX ADMIN — Medication 250 MG: at 09:07

## 2019-02-28 RX ADMIN — CYANOCOBALAMIN TAB 1000 MCG 500 MCG: 1000 TAB at 09:07

## 2019-02-28 RX ADMIN — FAMOTIDINE 20 MG: 20 TABLET ORAL at 11:18

## 2019-02-28 RX ADMIN — Medication 250 MG: at 17:24

## 2019-02-28 RX ADMIN — OYSTER SHELL CALCIUM WITH VITAMIN D 1 TABLET: 500; 200 TABLET, FILM COATED ORAL at 09:05

## 2019-02-28 RX ADMIN — NYSTATIN: 100000 POWDER TOPICAL at 09:08

## 2019-02-28 RX ADMIN — DIPHENHYDRAMINE HCL 25 MG: 25 TABLET, FILM COATED ORAL at 23:04

## 2019-02-28 NOTE — OP NOTE
OPERATIVE REPORT  PATIENT NAME: Jose Manuel Mendoza    :  1950  MRN: [id number]  Pt Location:  OR ROOM 07    SURGERY DATE: 2019    Surgeon(s) and Role:     * Naom Miller MD - Primary     * Ottoniel Blancas - Assisting    Preop and postoperative Diagnosis:  Left ischial pressure sore    Operative Procedure(s) (LRB):  BUTTOCK FLAP TO ISCHIAL SORE (Left)   Debridement left ischial pressure sore with coverage using inferior gluteal artery  propeller flap  Operative history:  The patient had attempted left ischial pressure sore closure with a both on the artery  flap from the upper thigh  This dehisced repeatedly while she was wheelchair-bound and did not heal   She has been left with exposed ischium in an area of unstable tissue about 3 cm in diameter  This pressure sore was debrided at this time including rongeur ring of the superficial surface of the ischium  That was sent for bone cultures  To cover the area from the lateral left buttock a propeller flap designed on a more medial inferior gluteal artery  that actually had a Y configuration  That allowed rotation of the flap into the area to close the pressure sore and facilitated donor site primary closure  Operative procedure:  Patient was taken to the operating room and placed prone  On the operating table she was prepped and draped in usual fashion  Anesthesia was general via endotracheal tube  Methylene blue was used to stain the granulations of the left ischial pressure sore  Incisions made going of normal skin about this area followed by the Bovie for cutting coagulation purposes debris going down  To the bone a rongeur was used to remove some of the surface of the bone with again the Bovie was used for hemostasis      With an audible Doppler a  could be heard near the defect in the left buttock a flap of with of that of the ischial opening was design proceeding laterally so that the distance from the ischial sore medial edge to the  would be equal to the distance from the  to the most lateral extent of the propeller flap  The sides of the flap were then entered to allow proper exploration Nery Burrows because of the longstanding nature of this problem  A infected going from lateral to medial with a the gluteus asif muscles was dissected in a subfascial plane below the flap with the 2 perforators found as described  Some larger perforators more laterally had had hemoclips placed  On them before being cut and discarded  The flap edges going medial to the  were then incised down to the gluteus asif muscle and to the pressure sore  The flap was then elevated going back to the medial side of the perforators  Using the Bovie  The perforators were dissected down through the gluteus asif muscle to where they joined to free them up so that they could be turned without kinking  Hemostasis achieved the bipolar  The flap was turned counterclockwise easily fill the defect had a good pink color  The flap was then inset into the defect and closed to the buttock skin with 3-0 nylon interrupted simple sutures  More laterally where the minor paddle the flap could not reach subcutaneous tissues were closed with 2-0 Vicryl interrupted simple sutures in the subcutaneous tissues and 3-0 nylon interrupted vertical mattress skin sutures  This was done over a Farhat drain placed through stab wound laterally  Only on the inferior aspect of the flap overlying the pressure sore so as not to put excessive pressure on the flap and compromised circulation the subcutaneous tissues were left exposed sewn to the skin with 3-0 Vicryl interrupted horizontal mattress half buried sutures  Light dressings were placed around the flap  Patient tolerated procedure well taken to recovery in good condition    Specimen(s):  ID Type Source Tests Collected by Time Destination   1 : PRODUCTS OF DEBRIDEMENT, LEFT ISCHIAL WOUND Tissue Soft Tissue, Debridement TISSUE EXAM Yandy Solomon MD 2/27/2019 1748    A : LEFT ISCHIAL Tissue Bone CULTURE, TISSUE AND GRAM STAIN Yandy Solomon MD 2/27/2019 1720        Estimated Blood Loss:   Minimal    Drains:  Closed/Suction Drain Left;Posterior Buttock Bulb 19 Fr   (Active)   Number of days: 0         SIGNATURE: Yandy Solomon MD  DATE: February 27, 2019  TIME: 9:01 PM

## 2019-02-28 NOTE — ANESTHESIA POSTPROCEDURE EVALUATION
Post-Op Assessment Note    CV Status:  Stable  Pain Score: 3    Pain management: adequate     Mental Status:  Alert and awake   Hydration Status:  Euvolemic   PONV Controlled:  Controlled   Airway Patency:  Patent  Airway: intubated   Post Op Vitals Reviewed: Yes      Staff: Anesthesiologist, CRNA           BP      Temp     Pulse     Resp      SpO2

## 2019-03-01 LAB
ANION GAP SERPL CALCULATED.3IONS-SCNC: 8 MMOL/L (ref 5–14)
BASOPHILS # BLD AUTO: 0 THOUSANDS/ΜL (ref 0–0.1)
BASOPHILS NFR BLD AUTO: 0 % (ref 0–1)
BUN SERPL-MCNC: 18 MG/DL (ref 5–25)
CALCIUM SERPL-MCNC: 8.1 MG/DL (ref 8.4–10.2)
CHLORIDE SERPL-SCNC: 100 MMOL/L (ref 97–108)
CO2 SERPL-SCNC: 23 MMOL/L (ref 22–30)
CREAT SERPL-MCNC: 0.54 MG/DL (ref 0.6–1.2)
EOSINOPHIL # BLD AUTO: 0.5 THOUSAND/ΜL (ref 0–0.4)
EOSINOPHIL NFR BLD AUTO: 8 % (ref 0–6)
ERYTHROCYTE [DISTWIDTH] IN BLOOD BY AUTOMATED COUNT: 19.8 %
GFR SERPL CREATININE-BSD FRML MDRD: 97 ML/MIN/1.73SQ M
GLUCOSE P FAST SERPL-MCNC: 94 MG/DL (ref 70–99)
GLUCOSE SERPL-MCNC: 94 MG/DL (ref 70–99)
HCT VFR BLD AUTO: 31.3 % (ref 36–46)
HGB BLD-MCNC: 10.3 G/DL (ref 12–16)
LYMPHOCYTES # BLD AUTO: 1 THOUSANDS/ΜL (ref 0.5–4)
LYMPHOCYTES NFR BLD AUTO: 16 % (ref 25–45)
MCH RBC QN AUTO: 28.4 PG (ref 26–34)
MCHC RBC AUTO-ENTMCNC: 32.8 G/DL (ref 31–36)
MCV RBC AUTO: 87 FL (ref 80–100)
MONOCYTES # BLD AUTO: 0.3 THOUSAND/ΜL (ref 0.2–0.9)
MONOCYTES NFR BLD AUTO: 5 % (ref 1–10)
NEUTROPHILS # BLD AUTO: 4.5 THOUSANDS/ΜL (ref 1.8–7.8)
NEUTS SEG NFR BLD AUTO: 71 % (ref 45–65)
PLATELET # BLD AUTO: 230 THOUSANDS/UL (ref 150–450)
PLATELET BLD QL SMEAR: ADEQUATE
PMV BLD AUTO: 9.1 FL (ref 8.9–12.7)
POTASSIUM SERPL-SCNC: 4.1 MMOL/L (ref 3.6–5)
RBC # BLD AUTO: 3.61 MILLION/UL (ref 4–5.2)
RBC MORPH BLD: NORMAL
SODIUM SERPL-SCNC: 131 MMOL/L (ref 137–147)
WBC # BLD AUTO: 6.4 THOUSAND/UL (ref 4.5–11)

## 2019-03-01 PROCEDURE — 80048 BASIC METABOLIC PNL TOTAL CA: CPT | Performed by: FAMILY MEDICINE

## 2019-03-01 PROCEDURE — 85025 COMPLETE CBC W/AUTO DIFF WBC: CPT | Performed by: FAMILY MEDICINE

## 2019-03-01 RX ORDER — SODIUM CHLORIDE 9 MG/ML
500 INJECTION, SOLUTION INTRAVENOUS ONCE
Status: COMPLETED | OUTPATIENT
Start: 2019-03-01 | End: 2019-03-01

## 2019-03-01 RX ADMIN — ENOXAPARIN SODIUM 40 MG: 40 INJECTION SUBCUTANEOUS at 08:16

## 2019-03-01 RX ADMIN — DIPHENHYDRAMINE HCL 25 MG: 25 TABLET, FILM COATED ORAL at 21:30

## 2019-03-01 RX ADMIN — NYSTATIN: 100000 POWDER TOPICAL at 08:14

## 2019-03-01 RX ADMIN — LORATADINE 10 MG: 10 TABLET ORAL at 08:15

## 2019-03-01 RX ADMIN — SODIUM CHLORIDE 500 ML/HR: 9 INJECTION, SOLUTION INTRAVENOUS at 07:35

## 2019-03-01 RX ADMIN — VITAMIN D, TAB 1000IU (100/BT) 1000 UNITS: 25 TAB at 08:15

## 2019-03-01 RX ADMIN — Medication 250 MG: at 08:15

## 2019-03-01 RX ADMIN — SENNOSIDES AND DOCUSATE SODIUM 2 TABLET: 8.6; 5 TABLET ORAL at 21:31

## 2019-03-01 RX ADMIN — CYANOCOBALAMIN TAB 1000 MCG 500 MCG: 1000 TAB at 08:14

## 2019-03-01 RX ADMIN — OYSTER SHELL CALCIUM WITH VITAMIN D 1 TABLET: 500; 200 TABLET, FILM COATED ORAL at 08:15

## 2019-03-01 RX ADMIN — B-COMPLEX W/ C & FOLIC ACID TAB 1 TABLET: TAB at 08:15

## 2019-03-01 RX ADMIN — FAMOTIDINE 20 MG: 20 TABLET ORAL at 17:39

## 2019-03-01 RX ADMIN — FAMOTIDINE 20 MG: 20 TABLET ORAL at 08:15

## 2019-03-01 RX ADMIN — Medication 250 MG: at 17:39

## 2019-03-02 RX ADMIN — NYSTATIN 1 APPLICATION: 100000 POWDER TOPICAL at 21:55

## 2019-03-02 RX ADMIN — NYSTATIN: 100000 POWDER TOPICAL at 08:32

## 2019-03-02 RX ADMIN — ENOXAPARIN SODIUM 40 MG: 40 INJECTION SUBCUTANEOUS at 08:27

## 2019-03-02 RX ADMIN — OYSTER SHELL CALCIUM WITH VITAMIN D 1 TABLET: 500; 200 TABLET, FILM COATED ORAL at 08:29

## 2019-03-02 RX ADMIN — Medication 250 MG: at 17:56

## 2019-03-02 RX ADMIN — B-COMPLEX W/ C & FOLIC ACID TAB 1 TABLET: TAB at 08:27

## 2019-03-02 RX ADMIN — LORATADINE 10 MG: 10 TABLET ORAL at 08:29

## 2019-03-02 RX ADMIN — FAMOTIDINE 20 MG: 20 TABLET ORAL at 08:27

## 2019-03-02 RX ADMIN — Medication 250 MG: at 08:28

## 2019-03-02 RX ADMIN — FAMOTIDINE 20 MG: 20 TABLET ORAL at 17:56

## 2019-03-02 RX ADMIN — VITAMIN D, TAB 1000IU (100/BT) 1000 UNITS: 25 TAB at 08:27

## 2019-03-02 RX ADMIN — CYANOCOBALAMIN TAB 1000 MCG 500 MCG: 1000 TAB at 08:28

## 2019-03-03 LAB
BACTERIA TISS AEROBE CULT: ABNORMAL
BACTERIA TISS AEROBE CULT: ABNORMAL
GRAM STN SPEC: ABNORMAL
GRAM STN SPEC: ABNORMAL

## 2019-03-03 RX ORDER — POLYVINYL ALCOHOL 14 MG/ML
1 SOLUTION/ DROPS OPHTHALMIC
Status: DISCONTINUED | OUTPATIENT
Start: 2019-03-03 | End: 2019-04-19 | Stop reason: HOSPADM

## 2019-03-03 RX ADMIN — FAMOTIDINE 20 MG: 20 TABLET ORAL at 18:27

## 2019-03-03 RX ADMIN — NYSTATIN: 100000 POWDER TOPICAL at 20:48

## 2019-03-03 RX ADMIN — POLYVINYL ALCOHOL 1 DROP: 14 SOLUTION/ DROPS OPHTHALMIC at 20:48

## 2019-03-03 RX ADMIN — B-COMPLEX W/ C & FOLIC ACID TAB 1 TABLET: TAB at 10:05

## 2019-03-03 RX ADMIN — LORATADINE 10 MG: 10 TABLET ORAL at 10:05

## 2019-03-03 RX ADMIN — OYSTER SHELL CALCIUM WITH VITAMIN D 1 TABLET: 500; 200 TABLET, FILM COATED ORAL at 06:43

## 2019-03-03 RX ADMIN — ENOXAPARIN SODIUM 40 MG: 40 INJECTION SUBCUTANEOUS at 10:06

## 2019-03-03 RX ADMIN — Medication 250 MG: at 18:27

## 2019-03-03 RX ADMIN — SENNOSIDES AND DOCUSATE SODIUM 1 TABLET: 8.6; 5 TABLET ORAL at 10:05

## 2019-03-03 RX ADMIN — NYSTATIN: 100000 POWDER TOPICAL at 10:03

## 2019-03-03 RX ADMIN — VITAMIN D, TAB 1000IU (100/BT) 1000 UNITS: 25 TAB at 10:03

## 2019-03-03 RX ADMIN — Medication 250 MG: at 10:04

## 2019-03-03 RX ADMIN — POLYVINYL ALCOHOL 1 DROP: 14 SOLUTION/ DROPS OPHTHALMIC at 16:50

## 2019-03-03 RX ADMIN — FAMOTIDINE 20 MG: 20 TABLET ORAL at 10:03

## 2019-03-03 RX ADMIN — CYANOCOBALAMIN TAB 1000 MCG 500 MCG: 1000 TAB at 10:04

## 2019-03-04 LAB
ANION GAP SERPL CALCULATED.3IONS-SCNC: 9 MMOL/L (ref 5–14)
BUN SERPL-MCNC: 10 MG/DL (ref 5–25)
CALCIUM SERPL-MCNC: 8 MG/DL (ref 8.4–10.2)
CHLORIDE SERPL-SCNC: 100 MMOL/L (ref 97–108)
CO2 SERPL-SCNC: 24 MMOL/L (ref 22–30)
CREAT SERPL-MCNC: 0.42 MG/DL (ref 0.6–1.2)
GFR SERPL CREATININE-BSD FRML MDRD: 106 ML/MIN/1.73SQ M
GLUCOSE P FAST SERPL-MCNC: 98 MG/DL (ref 70–99)
GLUCOSE SERPL-MCNC: 98 MG/DL (ref 70–99)
POTASSIUM SERPL-SCNC: 3.6 MMOL/L (ref 3.6–5)
SODIUM SERPL-SCNC: 133 MMOL/L (ref 137–147)

## 2019-03-04 PROCEDURE — 80048 BASIC METABOLIC PNL TOTAL CA: CPT | Performed by: FAMILY MEDICINE

## 2019-03-04 RX ADMIN — SENNOSIDES AND DOCUSATE SODIUM 2 TABLET: 8.6; 5 TABLET ORAL at 10:03

## 2019-03-04 RX ADMIN — B-COMPLEX W/ C & FOLIC ACID TAB 1 TABLET: TAB at 10:01

## 2019-03-04 RX ADMIN — POLYVINYL ALCOHOL 1 DROP: 14 SOLUTION/ DROPS OPHTHALMIC at 00:14

## 2019-03-04 RX ADMIN — NYSTATIN: 100000 POWDER TOPICAL at 10:04

## 2019-03-04 RX ADMIN — FAMOTIDINE 20 MG: 20 TABLET ORAL at 17:12

## 2019-03-04 RX ADMIN — NYSTATIN: 100000 POWDER TOPICAL at 22:12

## 2019-03-04 RX ADMIN — CYANOCOBALAMIN TAB 1000 MCG 500 MCG: 1000 TAB at 10:02

## 2019-03-04 RX ADMIN — Medication 250 MG: at 10:03

## 2019-03-04 RX ADMIN — POLYVINYL ALCOHOL 1 DROP: 14 SOLUTION/ DROPS OPHTHALMIC at 17:12

## 2019-03-04 RX ADMIN — VITAMIN D, TAB 1000IU (100/BT) 1000 UNITS: 25 TAB at 10:02

## 2019-03-04 RX ADMIN — Medication 250 MG: at 17:12

## 2019-03-04 RX ADMIN — SENNOSIDES AND DOCUSATE SODIUM 2 TABLET: 8.6; 5 TABLET ORAL at 22:12

## 2019-03-04 RX ADMIN — ENOXAPARIN SODIUM 40 MG: 40 INJECTION SUBCUTANEOUS at 10:01

## 2019-03-04 RX ADMIN — OYSTER SHELL CALCIUM WITH VITAMIN D 1 TABLET: 500; 200 TABLET, FILM COATED ORAL at 10:02

## 2019-03-04 RX ADMIN — FAMOTIDINE 20 MG: 20 TABLET ORAL at 10:02

## 2019-03-05 PROCEDURE — 99308 SBSQ NF CARE LOW MDM 20: CPT | Performed by: FAMILY MEDICINE

## 2019-03-05 RX ORDER — LANOLIN ALCOHOL/MO/W.PET/CERES
3 CREAM (GRAM) TOPICAL
Status: DISCONTINUED | OUTPATIENT
Start: 2019-03-05 | End: 2019-04-19 | Stop reason: HOSPADM

## 2019-03-05 RX ADMIN — B-COMPLEX W/ C & FOLIC ACID TAB 1 TABLET: TAB at 09:32

## 2019-03-05 RX ADMIN — VITAMIN D, TAB 1000IU (100/BT) 1000 UNITS: 25 TAB at 09:29

## 2019-03-05 RX ADMIN — Medication 250 MG: at 17:27

## 2019-03-05 RX ADMIN — MELATONIN TAB 3 MG 3 MG: 3 TAB at 22:56

## 2019-03-05 RX ADMIN — NYSTATIN: 100000 POWDER TOPICAL at 12:38

## 2019-03-05 RX ADMIN — FAMOTIDINE 20 MG: 20 TABLET ORAL at 09:30

## 2019-03-05 RX ADMIN — CYANOCOBALAMIN TAB 1000 MCG 500 MCG: 1000 TAB at 09:29

## 2019-03-05 RX ADMIN — ENOXAPARIN SODIUM 40 MG: 40 INJECTION SUBCUTANEOUS at 09:30

## 2019-03-05 RX ADMIN — OYSTER SHELL CALCIUM WITH VITAMIN D 1 TABLET: 500; 200 TABLET, FILM COATED ORAL at 09:29

## 2019-03-05 RX ADMIN — Medication 250 MG: at 09:29

## 2019-03-05 RX ADMIN — FAMOTIDINE 20 MG: 20 TABLET ORAL at 17:28

## 2019-03-05 RX ADMIN — NYSTATIN: 100000 POWDER TOPICAL at 22:58

## 2019-03-06 RX ADMIN — VITAMIN D, TAB 1000IU (100/BT) 1000 UNITS: 25 TAB at 10:03

## 2019-03-06 RX ADMIN — B-COMPLEX W/ C & FOLIC ACID TAB 1 TABLET: TAB at 10:02

## 2019-03-06 RX ADMIN — ENOXAPARIN SODIUM 40 MG: 40 INJECTION SUBCUTANEOUS at 10:05

## 2019-03-06 RX ADMIN — NYSTATIN: 100000 POWDER TOPICAL at 10:05

## 2019-03-06 RX ADMIN — FAMOTIDINE 20 MG: 20 TABLET ORAL at 18:22

## 2019-03-06 RX ADMIN — Medication 250 MG: at 10:04

## 2019-03-06 RX ADMIN — NYSTATIN: 100000 POWDER TOPICAL at 21:31

## 2019-03-06 RX ADMIN — CYANOCOBALAMIN TAB 1000 MCG 500 MCG: 1000 TAB at 10:04

## 2019-03-06 RX ADMIN — FAMOTIDINE 20 MG: 20 TABLET ORAL at 10:05

## 2019-03-06 RX ADMIN — MELATONIN TAB 3 MG 3 MG: 3 TAB at 21:33

## 2019-03-06 RX ADMIN — Medication 250 MG: at 18:22

## 2019-03-06 RX ADMIN — OYSTER SHELL CALCIUM WITH VITAMIN D 1 TABLET: 500; 200 TABLET, FILM COATED ORAL at 10:03

## 2019-03-07 RX ADMIN — OYSTER SHELL CALCIUM WITH VITAMIN D 1 TABLET: 500; 200 TABLET, FILM COATED ORAL at 09:39

## 2019-03-07 RX ADMIN — ENOXAPARIN SODIUM 40 MG: 40 INJECTION SUBCUTANEOUS at 09:55

## 2019-03-07 RX ADMIN — NYSTATIN: 100000 POWDER TOPICAL at 12:49

## 2019-03-07 RX ADMIN — FAMOTIDINE 20 MG: 20 TABLET ORAL at 09:38

## 2019-03-07 RX ADMIN — Medication 250 MG: at 09:37

## 2019-03-07 RX ADMIN — CYANOCOBALAMIN TAB 1000 MCG 500 MCG: 1000 TAB at 09:39

## 2019-03-07 RX ADMIN — B-COMPLEX W/ C & FOLIC ACID TAB 1 TABLET: TAB at 09:38

## 2019-03-07 RX ADMIN — FAMOTIDINE 20 MG: 20 TABLET ORAL at 17:46

## 2019-03-07 RX ADMIN — NYSTATIN: 100000 POWDER TOPICAL at 23:54

## 2019-03-07 RX ADMIN — VITAMIN D, TAB 1000IU (100/BT) 1000 UNITS: 25 TAB at 09:37

## 2019-03-07 RX ADMIN — Medication 250 MG: at 17:46

## 2019-03-08 RX ADMIN — B-COMPLEX W/ C & FOLIC ACID TAB 1 TABLET: TAB at 09:46

## 2019-03-08 RX ADMIN — ENOXAPARIN SODIUM 40 MG: 40 INJECTION SUBCUTANEOUS at 09:45

## 2019-03-08 RX ADMIN — NYSTATIN: 100000 POWDER TOPICAL at 09:47

## 2019-03-08 RX ADMIN — Medication 250 MG: at 18:27

## 2019-03-08 RX ADMIN — VITAMIN D, TAB 1000IU (100/BT) 1000 UNITS: 25 TAB at 09:45

## 2019-03-08 RX ADMIN — Medication 250 MG: at 09:45

## 2019-03-08 RX ADMIN — FAMOTIDINE 20 MG: 20 TABLET ORAL at 18:27

## 2019-03-08 RX ADMIN — OYSTER SHELL CALCIUM WITH VITAMIN D 1 TABLET: 500; 200 TABLET, FILM COATED ORAL at 09:47

## 2019-03-08 RX ADMIN — NYSTATIN: 100000 POWDER TOPICAL at 22:13

## 2019-03-08 RX ADMIN — FAMOTIDINE 20 MG: 20 TABLET ORAL at 09:47

## 2019-03-08 RX ADMIN — CYANOCOBALAMIN TAB 1000 MCG 500 MCG: 1000 TAB at 09:46

## 2019-03-09 RX ADMIN — B-COMPLEX W/ C & FOLIC ACID TAB 1 TABLET: TAB at 10:21

## 2019-03-09 RX ADMIN — OYSTER SHELL CALCIUM WITH VITAMIN D 1 TABLET: 500; 200 TABLET, FILM COATED ORAL at 10:25

## 2019-03-09 RX ADMIN — Medication 250 MG: at 17:01

## 2019-03-09 RX ADMIN — MELATONIN TAB 3 MG 3 MG: 3 TAB at 21:54

## 2019-03-09 RX ADMIN — ENOXAPARIN SODIUM 40 MG: 40 INJECTION SUBCUTANEOUS at 10:20

## 2019-03-09 RX ADMIN — Medication 250 MG: at 10:22

## 2019-03-09 RX ADMIN — NYSTATIN: 100000 POWDER TOPICAL at 10:23

## 2019-03-09 RX ADMIN — VITAMIN D, TAB 1000IU (100/BT) 1000 UNITS: 25 TAB at 10:21

## 2019-03-09 RX ADMIN — SENNOSIDES AND DOCUSATE SODIUM 2 TABLET: 8.6; 5 TABLET ORAL at 21:54

## 2019-03-09 RX ADMIN — FAMOTIDINE 20 MG: 20 TABLET ORAL at 17:01

## 2019-03-09 RX ADMIN — CYANOCOBALAMIN TAB 1000 MCG 500 MCG: 1000 TAB at 10:21

## 2019-03-09 RX ADMIN — FAMOTIDINE 20 MG: 20 TABLET ORAL at 10:20

## 2019-03-09 RX ADMIN — NYSTATIN: 100000 POWDER TOPICAL at 21:54

## 2019-03-10 RX ADMIN — B-COMPLEX W/ C & FOLIC ACID TAB 1 TABLET: TAB at 08:50

## 2019-03-10 RX ADMIN — FAMOTIDINE 20 MG: 20 TABLET ORAL at 08:50

## 2019-03-10 RX ADMIN — CYANOCOBALAMIN TAB 1000 MCG 500 MCG: 1000 TAB at 08:51

## 2019-03-10 RX ADMIN — FAMOTIDINE 20 MG: 20 TABLET ORAL at 18:20

## 2019-03-10 RX ADMIN — NYSTATIN: 100000 POWDER TOPICAL at 08:59

## 2019-03-10 RX ADMIN — NYSTATIN: 100000 POWDER TOPICAL at 21:35

## 2019-03-10 RX ADMIN — Medication 250 MG: at 18:20

## 2019-03-10 RX ADMIN — ENOXAPARIN SODIUM 40 MG: 40 INJECTION SUBCUTANEOUS at 08:50

## 2019-03-10 RX ADMIN — OYSTER SHELL CALCIUM WITH VITAMIN D 1 TABLET: 500; 200 TABLET, FILM COATED ORAL at 08:50

## 2019-03-10 RX ADMIN — VITAMIN D, TAB 1000IU (100/BT) 1000 UNITS: 25 TAB at 08:50

## 2019-03-10 RX ADMIN — Medication 250 MG: at 08:50

## 2019-03-10 NOTE — PROGRESS NOTES
The tip of the flap covering the ischium has some drainage and erythema with some minor separation consistent with the fact that even though she is on a [place] bed she is lying flat on her back complaining pressure on this  She also has a lot of stool getting into the area where the subcutaneous tissues were not closed directly and allowed to granulate as they are  She has a long way to go to heal this wound

## 2019-03-11 PROCEDURE — 99308 SBSQ NF CARE LOW MDM 20: CPT | Performed by: INTERNAL MEDICINE

## 2019-03-11 PROCEDURE — 99223 1ST HOSP IP/OBS HIGH 75: CPT | Performed by: INTERNAL MEDICINE

## 2019-03-11 RX ADMIN — CYANOCOBALAMIN TAB 1000 MCG 500 MCG: 1000 TAB at 10:09

## 2019-03-11 RX ADMIN — NYSTATIN: 100000 POWDER TOPICAL at 22:52

## 2019-03-11 RX ADMIN — Medication 250 MG: at 18:15

## 2019-03-11 RX ADMIN — FAMOTIDINE 20 MG: 20 TABLET ORAL at 10:09

## 2019-03-11 RX ADMIN — Medication 250 MG: at 10:10

## 2019-03-11 RX ADMIN — VITAMIN D, TAB 1000IU (100/BT) 1000 UNITS: 25 TAB at 10:09

## 2019-03-11 RX ADMIN — NYSTATIN: 100000 POWDER TOPICAL at 10:11

## 2019-03-11 RX ADMIN — B-COMPLEX W/ C & FOLIC ACID TAB 1 TABLET: TAB at 10:09

## 2019-03-11 RX ADMIN — FAMOTIDINE 20 MG: 20 TABLET ORAL at 18:15

## 2019-03-11 RX ADMIN — ENOXAPARIN SODIUM 40 MG: 40 INJECTION SUBCUTANEOUS at 10:10

## 2019-03-11 RX ADMIN — OYSTER SHELL CALCIUM WITH VITAMIN D 1 TABLET: 500; 200 TABLET, FILM COATED ORAL at 10:09

## 2019-03-12 ENCOUNTER — APPOINTMENT (OUTPATIENT)
Dept: RADIOLOGY | Facility: HOSPITAL | Age: 69
End: 2019-03-12
Payer: COMMERCIAL

## 2019-03-12 ENCOUNTER — APPOINTMENT (OUTPATIENT)
Dept: INTERVENTIONAL RADIOLOGY/VASCULAR | Facility: HOSPITAL | Age: 69
End: 2019-03-12
Attending: INTERNAL MEDICINE
Payer: COMMERCIAL

## 2019-03-12 LAB
BASOPHILS # BLD AUTO: 0.1 THOUSANDS/ΜL (ref 0–0.1)
BASOPHILS NFR BLD AUTO: 1 % (ref 0–1)
EOSINOPHIL # BLD AUTO: 0.7 THOUSAND/ΜL (ref 0–0.4)
EOSINOPHIL NFR BLD AUTO: 6 % (ref 0–6)
ERYTHROCYTE [DISTWIDTH] IN BLOOD BY AUTOMATED COUNT: 17.3 %
ERYTHROCYTE [SEDIMENTATION RATE] IN BLOOD: 87 MM/HOUR (ref 1–20)
HCT VFR BLD AUTO: 33.9 % (ref 36–46)
HGB BLD-MCNC: 10.8 G/DL (ref 12–16)
LYMPHOCYTES # BLD AUTO: 3.5 THOUSANDS/ΜL (ref 0.5–4)
LYMPHOCYTES NFR BLD AUTO: 32 % (ref 25–45)
MCH RBC QN AUTO: 27.4 PG (ref 26–34)
MCHC RBC AUTO-ENTMCNC: 32 G/DL (ref 31–36)
MCV RBC AUTO: 86 FL (ref 80–100)
MONOCYTES # BLD AUTO: 1 THOUSAND/ΜL (ref 0.2–0.9)
MONOCYTES NFR BLD AUTO: 9 % (ref 1–10)
NEUTROPHILS # BLD AUTO: 5.7 THOUSANDS/ΜL (ref 1.8–7.8)
NEUTS SEG NFR BLD AUTO: 51 % (ref 45–65)
PLATELET # BLD AUTO: 552 THOUSANDS/UL (ref 150–450)
PMV BLD AUTO: 7.9 FL (ref 8.9–12.7)
RBC # BLD AUTO: 3.95 MILLION/UL (ref 4–5.2)
WBC # BLD AUTO: 11 THOUSAND/UL (ref 4.5–11)

## 2019-03-12 PROCEDURE — 36569 INSJ PICC 5 YR+ W/O IMAGING: CPT

## 2019-03-12 PROCEDURE — 71045 X-RAY EXAM CHEST 1 VIEW: CPT

## 2019-03-12 PROCEDURE — C1751 CATH, INF, PER/CENT/MIDLINE: HCPCS

## 2019-03-12 PROCEDURE — 85652 RBC SED RATE AUTOMATED: CPT | Performed by: INTERNAL MEDICINE

## 2019-03-12 PROCEDURE — 85025 COMPLETE CBC W/AUTO DIFF WBC: CPT | Performed by: INTERNAL MEDICINE

## 2019-03-12 PROCEDURE — 02HV33Z INSERTION OF INFUSION DEVICE INTO SUPERIOR VENA CAVA, PERCUTANEOUS APPROACH: ICD-10-PCS | Performed by: FAMILY MEDICINE

## 2019-03-12 PROCEDURE — NC001 PR NO CHARGE: Performed by: INTERNAL MEDICINE

## 2019-03-12 RX ADMIN — SODIUM CHLORIDE 3 G: 900 INJECTION INTRAVENOUS at 22:19

## 2019-03-12 RX ADMIN — NYSTATIN: 100000 POWDER TOPICAL at 08:51

## 2019-03-12 RX ADMIN — CYANOCOBALAMIN TAB 1000 MCG 500 MCG: 1000 TAB at 08:48

## 2019-03-12 RX ADMIN — NYSTATIN: 100000 POWDER TOPICAL at 23:18

## 2019-03-12 RX ADMIN — B-COMPLEX W/ C & FOLIC ACID TAB 1 TABLET: TAB at 08:51

## 2019-03-12 RX ADMIN — ENOXAPARIN SODIUM 40 MG: 40 INJECTION SUBCUTANEOUS at 08:47

## 2019-03-12 RX ADMIN — OYSTER SHELL CALCIUM WITH VITAMIN D 1 TABLET: 500; 200 TABLET, FILM COATED ORAL at 08:51

## 2019-03-12 RX ADMIN — FAMOTIDINE 20 MG: 20 TABLET ORAL at 17:44

## 2019-03-12 RX ADMIN — FAMOTIDINE 20 MG: 20 TABLET ORAL at 08:51

## 2019-03-12 RX ADMIN — Medication 250 MG: at 08:51

## 2019-03-12 RX ADMIN — VITAMIN D, TAB 1000IU (100/BT) 1000 UNITS: 25 TAB at 08:51

## 2019-03-12 RX ADMIN — Medication 250 MG: at 17:44

## 2019-03-12 RX ADMIN — SODIUM CHLORIDE 3 G: 900 INJECTION INTRAVENOUS at 15:03

## 2019-03-13 RX ADMIN — SODIUM CHLORIDE 3 G: 900 INJECTION INTRAVENOUS at 23:03

## 2019-03-13 RX ADMIN — ENOXAPARIN SODIUM 40 MG: 40 INJECTION SUBCUTANEOUS at 11:42

## 2019-03-13 RX ADMIN — SODIUM CHLORIDE 3 G: 900 INJECTION INTRAVENOUS at 18:20

## 2019-03-13 RX ADMIN — FAMOTIDINE 20 MG: 20 TABLET ORAL at 18:18

## 2019-03-13 RX ADMIN — Medication 250 MG: at 11:43

## 2019-03-13 RX ADMIN — VITAMIN D, TAB 1000IU (100/BT) 1000 UNITS: 25 TAB at 11:42

## 2019-03-13 RX ADMIN — SODIUM CHLORIDE 3 G: 900 INJECTION INTRAVENOUS at 13:00

## 2019-03-13 RX ADMIN — NYSTATIN: 100000 POWDER TOPICAL at 22:43

## 2019-03-13 RX ADMIN — FAMOTIDINE 20 MG: 20 TABLET ORAL at 11:43

## 2019-03-13 RX ADMIN — SODIUM CHLORIDE 3 G: 900 INJECTION INTRAVENOUS at 05:59

## 2019-03-13 RX ADMIN — OYSTER SHELL CALCIUM WITH VITAMIN D 1 TABLET: 500; 200 TABLET, FILM COATED ORAL at 11:43

## 2019-03-13 RX ADMIN — B-COMPLEX W/ C & FOLIC ACID TAB 1 TABLET: TAB at 11:43

## 2019-03-13 RX ADMIN — Medication 250 MG: at 18:18

## 2019-03-13 RX ADMIN — CYANOCOBALAMIN TAB 1000 MCG 500 MCG: 1000 TAB at 11:42

## 2019-03-14 PROCEDURE — 99233 SBSQ HOSP IP/OBS HIGH 50: CPT | Performed by: INTERNAL MEDICINE

## 2019-03-14 RX ORDER — DIPHENHYDRAMINE HYDROCHLORIDE 50 MG/ML
25 INJECTION INTRAMUSCULAR; INTRAVENOUS EVERY 6 HOURS PRN
Status: DISCONTINUED | OUTPATIENT
Start: 2019-03-14 | End: 2019-04-19 | Stop reason: HOSPADM

## 2019-03-14 RX ORDER — DIPHENHYDRAMINE HCL 25 MG
25 TABLET ORAL EVERY 6 HOURS PRN
Status: DISCONTINUED | OUTPATIENT
Start: 2019-03-14 | End: 2019-04-19 | Stop reason: HOSPADM

## 2019-03-14 RX ORDER — DIAPER,BRIEF,INFANT-TODD,DISP
EACH MISCELLANEOUS 2 TIMES DAILY
Status: DISCONTINUED | OUTPATIENT
Start: 2019-03-14 | End: 2019-04-19 | Stop reason: HOSPADM

## 2019-03-14 RX ADMIN — Medication 250 MG: at 10:27

## 2019-03-14 RX ADMIN — CYANOCOBALAMIN TAB 1000 MCG 500 MCG: 1000 TAB at 10:28

## 2019-03-14 RX ADMIN — OYSTER SHELL CALCIUM WITH VITAMIN D 1 TABLET: 500; 200 TABLET, FILM COATED ORAL at 10:27

## 2019-03-14 RX ADMIN — B-COMPLEX W/ C & FOLIC ACID TAB 1 TABLET: TAB at 10:27

## 2019-03-14 RX ADMIN — Medication 250 MG: at 17:04

## 2019-03-14 RX ADMIN — ENOXAPARIN SODIUM 40 MG: 40 INJECTION SUBCUTANEOUS at 10:26

## 2019-03-14 RX ADMIN — HYDROCORTISONE: 1 CREAM TOPICAL at 17:04

## 2019-03-14 RX ADMIN — FAMOTIDINE 20 MG: 20 TABLET ORAL at 10:26

## 2019-03-14 RX ADMIN — DIPHENHYDRAMINE HYDROCHLORIDE 25 MG: 50 INJECTION INTRAMUSCULAR; INTRAVENOUS at 06:46

## 2019-03-14 RX ADMIN — NYSTATIN: 100000 POWDER TOPICAL at 21:24

## 2019-03-14 RX ADMIN — NYSTATIN: 100000 POWDER TOPICAL at 10:29

## 2019-03-14 RX ADMIN — FAMOTIDINE 20 MG: 20 TABLET ORAL at 17:04

## 2019-03-14 RX ADMIN — VITAMIN D, TAB 1000IU (100/BT) 1000 UNITS: 25 TAB at 10:27

## 2019-03-15 RX ADMIN — Medication 250 MG: at 17:15

## 2019-03-15 RX ADMIN — FAMOTIDINE 20 MG: 20 TABLET ORAL at 08:19

## 2019-03-15 RX ADMIN — ENOXAPARIN SODIUM 40 MG: 40 INJECTION SUBCUTANEOUS at 08:18

## 2019-03-15 RX ADMIN — Medication 250 MG: at 08:19

## 2019-03-15 RX ADMIN — HYDROCORTISONE: 1 CREAM TOPICAL at 17:16

## 2019-03-15 RX ADMIN — CYANOCOBALAMIN TAB 1000 MCG 500 MCG: 1000 TAB at 08:18

## 2019-03-15 RX ADMIN — OYSTER SHELL CALCIUM WITH VITAMIN D 1 TABLET: 500; 200 TABLET, FILM COATED ORAL at 08:18

## 2019-03-15 RX ADMIN — FAMOTIDINE 20 MG: 20 TABLET ORAL at 17:15

## 2019-03-15 RX ADMIN — HYDROCORTISONE: 1 CREAM TOPICAL at 08:23

## 2019-03-15 RX ADMIN — VITAMIN D, TAB 1000IU (100/BT) 1000 UNITS: 25 TAB at 08:19

## 2019-03-15 RX ADMIN — NYSTATIN: 100000 POWDER TOPICAL at 20:46

## 2019-03-15 RX ADMIN — B-COMPLEX W/ C & FOLIC ACID TAB 1 TABLET: TAB at 08:18

## 2019-03-16 RX ADMIN — Medication 250 MG: at 17:21

## 2019-03-16 RX ADMIN — HYDROCORTISONE: 1 CREAM TOPICAL at 08:26

## 2019-03-16 RX ADMIN — B-COMPLEX W/ C & FOLIC ACID TAB 1 TABLET: TAB at 08:27

## 2019-03-16 RX ADMIN — Medication 250 MG: at 08:26

## 2019-03-16 RX ADMIN — FAMOTIDINE 20 MG: 20 TABLET ORAL at 08:28

## 2019-03-16 RX ADMIN — OYSTER SHELL CALCIUM WITH VITAMIN D 1 TABLET: 500; 200 TABLET, FILM COATED ORAL at 08:27

## 2019-03-16 RX ADMIN — VITAMIN D, TAB 1000IU (100/BT) 1000 UNITS: 25 TAB at 08:27

## 2019-03-16 RX ADMIN — CYANOCOBALAMIN TAB 1000 MCG 500 MCG: 1000 TAB at 08:28

## 2019-03-16 RX ADMIN — HYDROCORTISONE: 1 CREAM TOPICAL at 17:20

## 2019-03-16 RX ADMIN — NYSTATIN: 100000 POWDER TOPICAL at 22:55

## 2019-03-16 RX ADMIN — ENOXAPARIN SODIUM 40 MG: 40 INJECTION SUBCUTANEOUS at 08:27

## 2019-03-16 RX ADMIN — FAMOTIDINE 20 MG: 20 TABLET ORAL at 17:21

## 2019-03-17 RX ADMIN — Medication 250 MG: at 17:24

## 2019-03-17 RX ADMIN — VITAMIN D, TAB 1000IU (100/BT) 1000 UNITS: 25 TAB at 08:17

## 2019-03-17 RX ADMIN — FAMOTIDINE 20 MG: 20 TABLET ORAL at 17:24

## 2019-03-17 RX ADMIN — OYSTER SHELL CALCIUM WITH VITAMIN D 1 TABLET: 500; 200 TABLET, FILM COATED ORAL at 08:17

## 2019-03-17 RX ADMIN — HYDROCORTISONE: 1 CREAM TOPICAL at 08:16

## 2019-03-17 RX ADMIN — CYANOCOBALAMIN TAB 1000 MCG 500 MCG: 1000 TAB at 08:17

## 2019-03-17 RX ADMIN — Medication 250 MG: at 08:17

## 2019-03-17 RX ADMIN — B-COMPLEX W/ C & FOLIC ACID TAB 1 TABLET: TAB at 08:17

## 2019-03-17 RX ADMIN — NYSTATIN: 100000 POWDER TOPICAL at 22:38

## 2019-03-17 RX ADMIN — NYSTATIN: 100000 POWDER TOPICAL at 08:18

## 2019-03-17 RX ADMIN — HYDROCORTISONE: 1 CREAM TOPICAL at 17:25

## 2019-03-17 RX ADMIN — FAMOTIDINE 20 MG: 20 TABLET ORAL at 08:17

## 2019-03-17 RX ADMIN — ENOXAPARIN SODIUM 40 MG: 40 INJECTION SUBCUTANEOUS at 08:18

## 2019-03-18 ENCOUNTER — HOSPITAL ENCOUNTER (OUTPATIENT)
Facility: HOSPITAL | Age: 69
Setting detail: OUTPATIENT SURGERY
Discharge: HOME | End: 2019-03-18
Attending: PLASTIC SURGERY | Admitting: PLASTIC SURGERY
Payer: COMMERCIAL

## 2019-03-18 DIAGNOSIS — L98.8 OTHER SPECIFIED DISORDERS OF THE SKIN AND SUBCUTANEOUS TISSUE: ICD-10-CM

## 2019-03-18 DIAGNOSIS — L89.229: ICD-10-CM

## 2019-03-18 PROCEDURE — 99233 SBSQ HOSP IP/OBS HIGH 50: CPT | Performed by: INTERNAL MEDICINE

## 2019-03-18 RX ADMIN — NYSTATIN: 100000 POWDER TOPICAL at 23:30

## 2019-03-18 RX ADMIN — HYDROCORTISONE 1 APPLICATION: 1 CREAM TOPICAL at 18:10

## 2019-03-18 RX ADMIN — B-COMPLEX W/ C & FOLIC ACID TAB 1 TABLET: TAB at 08:37

## 2019-03-18 RX ADMIN — CYANOCOBALAMIN TAB 1000 MCG 500 MCG: 1000 TAB at 08:37

## 2019-03-18 RX ADMIN — FAMOTIDINE 20 MG: 20 TABLET ORAL at 08:37

## 2019-03-18 RX ADMIN — ENOXAPARIN SODIUM 40 MG: 40 INJECTION SUBCUTANEOUS at 08:38

## 2019-03-18 RX ADMIN — NYSTATIN 1 APPLICATION: 100000 POWDER TOPICAL at 08:44

## 2019-03-18 RX ADMIN — Medication 250 MG: at 18:10

## 2019-03-18 RX ADMIN — ERTAPENEM SODIUM 1000 MG: 1 INJECTION, POWDER, LYOPHILIZED, FOR SOLUTION INTRAMUSCULAR; INTRAVENOUS at 18:09

## 2019-03-18 RX ADMIN — FAMOTIDINE 20 MG: 20 TABLET ORAL at 18:10

## 2019-03-18 RX ADMIN — PSYLLIUM HUSK 1 PACKET: 3.4 POWDER ORAL at 18:28

## 2019-03-18 RX ADMIN — Medication 250 MG: at 08:38

## 2019-03-18 RX ADMIN — HYDROCORTISONE 1 APPLICATION: 1 CREAM TOPICAL at 08:44

## 2019-03-18 RX ADMIN — OYSTER SHELL CALCIUM WITH VITAMIN D 1 TABLET: 500; 200 TABLET, FILM COATED ORAL at 08:38

## 2019-03-18 RX ADMIN — VITAMIN D, TAB 1000IU (100/BT) 1000 UNITS: 25 TAB at 08:38

## 2019-03-19 ENCOUNTER — ANESTHESIA EVENT (INPATIENT)
Dept: PERIOP | Facility: HOSPITAL | Age: 69
DRG: 573 | End: 2019-03-19
Payer: COMMERCIAL

## 2019-03-19 RX ADMIN — OYSTER SHELL CALCIUM WITH VITAMIN D 1 TABLET: 500; 200 TABLET, FILM COATED ORAL at 08:56

## 2019-03-19 RX ADMIN — NYSTATIN: 100000 POWDER TOPICAL at 09:02

## 2019-03-19 RX ADMIN — VITAMIN D, TAB 1000IU (100/BT) 1000 UNITS: 25 TAB at 08:56

## 2019-03-19 RX ADMIN — Medication 250 MG: at 18:49

## 2019-03-19 RX ADMIN — FAMOTIDINE 20 MG: 20 TABLET ORAL at 08:55

## 2019-03-19 RX ADMIN — B-COMPLEX W/ C & FOLIC ACID TAB 1 TABLET: TAB at 08:55

## 2019-03-19 RX ADMIN — ERTAPENEM SODIUM 1000 MG: 1 INJECTION, POWDER, LYOPHILIZED, FOR SOLUTION INTRAMUSCULAR; INTRAVENOUS at 18:49

## 2019-03-19 RX ADMIN — SENNOSIDES AND DOCUSATE SODIUM 2 TABLET: 8.6; 5 TABLET ORAL at 08:57

## 2019-03-19 RX ADMIN — NYSTATIN: 100000 POWDER TOPICAL at 21:32

## 2019-03-19 RX ADMIN — CYANOCOBALAMIN TAB 1000 MCG 500 MCG: 1000 TAB at 08:56

## 2019-03-19 RX ADMIN — ENOXAPARIN SODIUM 40 MG: 40 INJECTION SUBCUTANEOUS at 08:57

## 2019-03-19 RX ADMIN — Medication 250 MG: at 08:56

## 2019-03-19 RX ADMIN — HYDROCORTISONE: 1 CREAM TOPICAL at 09:02

## 2019-03-19 RX ADMIN — FAMOTIDINE 20 MG: 20 TABLET ORAL at 18:49

## 2019-03-19 NOTE — ANESTHESIA PREPROCEDURE EVALUATION
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   Sacral decubiti  present) ,    Psychology   Depression , being treated for depression,              Physical Exam    Airway    Mallampati score: II  TM Distance: >3 FB  Neck ROM: full     Dental       Cardiovascular  Cardiovascular exam normal    Pulmonary  Pulmonary exam normal     Other Findings        Anesthesia Plan  ASA Score- 3     Anesthesia Type- IV sedation with anesthesia with ASA Monitors  Additional Monitors:   Airway Plan:     Comment: Chart reviewed and pt states no new changes in health  No prior anesthesia issues  No questions voiced  Pt re consents to procedure  Old chart reviewed   Pt known to me  Plan Factors-Patient not instructed to abstain from smoking on day of procedure  Patient did not smoke on day of surgery  Induction- intravenous  Postoperative Plan- Plan for postoperative opioid use  Informed Consent- Anesthetic plan and risks discussed with patient  I personally reviewed this patient with the CRNA  Discussed and agreed on the Anesthesia Plan with the CRNA  Karrie Nissen

## 2019-03-19 NOTE — PRE-PROCEDURE INSTRUCTIONS
No outpatient medications have been marked as taking for the 3/20/19 encounter [facility] Encounter)

## 2019-03-20 ENCOUNTER — ANESTHESIA (INPATIENT)
Dept: PERIOP | Facility: HOSPITAL | Age: 69
DRG: 573 | End: 2019-03-20
Payer: COMMERCIAL

## 2019-03-20 VITALS
WEIGHT: 115 LBS | BODY MASS INDEX: 22.58 KG/M2 | OXYGEN SATURATION: 96 % | HEIGHT: 60 IN | RESPIRATION RATE: 16 BRPM | DIASTOLIC BLOOD PRESSURE: 62 MMHG | TEMPERATURE: 96.6 F | SYSTOLIC BLOOD PRESSURE: 112 MMHG | HEART RATE: 53 BPM

## 2019-03-20 PROCEDURE — 87205 SMEAR GRAM STAIN: CPT | Performed by: PLASTIC SURGERY

## 2019-03-20 PROCEDURE — 99308 SBSQ NF CARE LOW MDM 20: CPT | Performed by: FAMILY MEDICINE

## 2019-03-20 PROCEDURE — 87070 CULTURE OTHR SPECIMN AEROBIC: CPT | Performed by: PLASTIC SURGERY

## 2019-03-20 PROCEDURE — 87176 TISSUE HOMOGENIZATION CULTR: CPT | Performed by: PLASTIC SURGERY

## 2019-03-20 PROCEDURE — 0HQ8XZZ REPAIR BUTTOCK SKIN, EXTERNAL APPROACH: ICD-10-PCS | Performed by: PLASTIC SURGERY

## 2019-03-20 RX ORDER — LIDOCAINE HYDROCHLORIDE AND EPINEPHRINE 5; 5 MG/ML; UG/ML
INJECTION, SOLUTION INFILTRATION; PERINEURAL AS NEEDED
Status: DISCONTINUED | OUTPATIENT
Start: 2019-03-20 | End: 2019-03-20 | Stop reason: HOSPADM

## 2019-03-20 RX ORDER — FENTANYL CITRATE 50 UG/ML
INJECTION, SOLUTION INTRAMUSCULAR; INTRAVENOUS AS NEEDED
Status: DISCONTINUED | OUTPATIENT
Start: 2019-03-20 | End: 2019-03-20 | Stop reason: SURG

## 2019-03-20 RX ORDER — MIDAZOLAM HYDROCHLORIDE 1 MG/ML
INJECTION INTRAMUSCULAR; INTRAVENOUS AS NEEDED
Status: DISCONTINUED | OUTPATIENT
Start: 2019-03-20 | End: 2019-03-20 | Stop reason: SURG

## 2019-03-20 RX ORDER — SODIUM CHLORIDE, SODIUM LACTATE, POTASSIUM CHLORIDE, CALCIUM CHLORIDE 600; 310; 30; 20 MG/100ML; MG/100ML; MG/100ML; MG/100ML
INJECTION, SOLUTION INTRAVENOUS CONTINUOUS PRN
Status: DISCONTINUED | OUTPATIENT
Start: 2019-03-20 | End: 2019-03-20 | Stop reason: SURG

## 2019-03-20 RX ORDER — MAGNESIUM HYDROXIDE 1200 MG/15ML
LIQUID ORAL AS NEEDED
Status: DISCONTINUED | OUTPATIENT
Start: 2019-03-20 | End: 2019-03-20 | Stop reason: HOSPADM

## 2019-03-20 RX ORDER — GENTAMICIN SULFATE 40 MG/ML
2 INJECTION, SOLUTION INTRAMUSCULAR; INTRAVENOUS ONCE
Status: DISCONTINUED | OUTPATIENT
Start: 2019-03-20 | End: 2019-03-20 | Stop reason: SDUPTHER

## 2019-03-20 RX ORDER — EPHEDRINE SULFATE 50 MG/ML
INJECTION INTRAVENOUS AS NEEDED
Status: DISCONTINUED | OUTPATIENT
Start: 2019-03-20 | End: 2019-03-20 | Stop reason: SURG

## 2019-03-20 RX ORDER — SODIUM CHLORIDE, SODIUM LACTATE, POTASSIUM CHLORIDE, CALCIUM CHLORIDE 600; 310; 30; 20 MG/100ML; MG/100ML; MG/100ML; MG/100ML
75 INJECTION, SOLUTION INTRAVENOUS CONTINUOUS
Status: DISCONTINUED | OUTPATIENT
Start: 2019-03-20 | End: 2021-12-09

## 2019-03-20 RX ORDER — PROPOFOL 10 MG/ML
INJECTION, EMULSION INTRAVENOUS CONTINUOUS PRN
Status: DISCONTINUED | OUTPATIENT
Start: 2019-03-20 | End: 2019-03-20 | Stop reason: SURG

## 2019-03-20 RX ADMIN — ERTAPENEM SODIUM 1000 MG: 1 INJECTION, POWDER, LYOPHILIZED, FOR SOLUTION INTRAMUSCULAR; INTRAVENOUS at 19:50

## 2019-03-20 RX ADMIN — HYDROCORTISONE: 1 CREAM TOPICAL at 11:07

## 2019-03-20 RX ADMIN — PROPOFOL 75 MCG/KG/MIN: 10 INJECTION, EMULSION INTRAVENOUS at 16:41

## 2019-03-20 RX ADMIN — FAMOTIDINE 20 MG: 20 TABLET ORAL at 10:43

## 2019-03-20 RX ADMIN — EPHEDRINE SULFATE 5 MG: 50 INJECTION, SOLUTION INTRAVENOUS at 16:55

## 2019-03-20 RX ADMIN — MIDAZOLAM HYDROCHLORIDE 2 MG: 1 INJECTION, SOLUTION INTRAMUSCULAR; INTRAVENOUS at 16:36

## 2019-03-20 RX ADMIN — DIPHENHYDRAMINE HYDROCHLORIDE 50 MG: 50 INJECTION INTRAMUSCULAR; INTRAVENOUS at 16:23

## 2019-03-20 RX ADMIN — NYSTATIN: 100000 POWDER TOPICAL at 10:45

## 2019-03-20 RX ADMIN — DIPHENHYDRAMINE HCL 25 MG: 25 TABLET, FILM COATED ORAL at 10:58

## 2019-03-20 RX ADMIN — HYDROCORTISONE: 1 CREAM TOPICAL at 19:52

## 2019-03-20 RX ADMIN — SODIUM CHLORIDE, POTASSIUM CHLORIDE, SODIUM LACTATE AND CALCIUM CHLORIDE 75 ML/HR: 600; 310; 30; 20 INJECTION, SOLUTION INTRAVENOUS at 16:26

## 2019-03-20 RX ADMIN — SODIUM CHLORIDE, SODIUM LACTATE, POTASSIUM CHLORIDE, AND CALCIUM CHLORIDE: .6; .31; .03; .02 INJECTION, SOLUTION INTRAVENOUS at 16:23

## 2019-03-20 RX ADMIN — GENTAMICIN SULFATE 90 MG: 40 INJECTION, SOLUTION INTRAMUSCULAR; INTRAVENOUS at 17:05

## 2019-03-20 RX ADMIN — Medication 250 MG: at 19:49

## 2019-03-20 RX ADMIN — NYSTATIN: 100000 POWDER TOPICAL at 21:04

## 2019-03-20 RX ADMIN — EPHEDRINE SULFATE 5 MG: 50 INJECTION, SOLUTION INTRAVENOUS at 17:20

## 2019-03-20 RX ADMIN — FAMOTIDINE 20 MG: 20 TABLET ORAL at 19:49

## 2019-03-20 RX ADMIN — FENTANYL CITRATE 50 MCG: 50 INJECTION INTRAMUSCULAR; INTRAVENOUS at 16:36

## 2019-03-20 RX ADMIN — FENTANYL CITRATE 50 MCG: 50 INJECTION INTRAMUSCULAR; INTRAVENOUS at 17:08

## 2019-03-20 NOTE — PERIOPERATIVE NURSING NOTE
Returned from [employer] awake denying pain  C/o itching which has been chronic for few weeks  Dressing dry and intact  ivs running

## 2019-03-20 NOTE — OP NOTE
OPERATIVE REPORT  PATIENT NAME: Roberto Felipe    :  1950  MRN: [id number]  Pt Location:  OR ROOM 07    SURGERY DATE: 3/20/2019    Surgeon(s) and Role:     * Gm Vyas MD - Primary    Preop and postoperative diagnosis: dehisced flap left ischial pressure sore     Operative Procedure(s) (LRB):  OPHELIA ANAL WOUND RECLOSURE (N/A) secondarily    Operative history:  The patient had a left ischial pressure ulcer closed with a IGAP propeller flap  Subcutaneous tissues in the perianal area had to be left exposed was not to put excessive pressure on the flap  This area as dehisced and secondary closed at this time  Operative procedure: The patient take them room placed on lateral position with the right side down the operating table  She was prepped and draped in the usual fashion  Anesthesia is general supplemented xylocaine 1% with epinephrine  The ages of the flap and normal ischial skin were then reincised back to healthy tissues  This was still had with the Bovie for cutting coagulation purposes  Correct was used to remove granulations underneath flap in this area  Copious saline irrigation was performed  The flap was then inset skin the skin around this area with 3-0 nylon interrupted vertical mattress sutures  Light dressings were applied  She she tolerated procedure well taken to recovery in good condition  Specimen(s):  ID Type Source Tests Collected by Time Destination   A : left ischial debridement Tissue Soft Tissue, Debridement CULTURE, TISSUE AND GRAM STAIN Gm Vyas MD 3/20/2019 1708            Drains:  Closed/Suction Drain Left;Posterior Buttock Other (Comment) 19 Fr  (Active)   Site Description Unable to view 3/18/2019  3:15 PM   Dressing Status Clean;Dry; Intact 3/18/2019  3:15 PM   Drainage Appearance Serous 3/18/2019  3:15 PM   Status Suction-low continuous 3/18/2019  3:15 PM   Intake (mL) 0 mL 3/13/2019 11:03 PM   Output (mL) 0 mL 3/18/2019 10:11 PM   Number of days: 21       Urethral Catheter Non-latex 16 Fr  (Active)   Site Assessment Skin intact; Clean 3/18/2019  3:15 PM   Collection Container Standard drainage bag 3/18/2019  3:15 PM   Securement Method Other (Comment) 3/18/2019  3:15 PM   Output (mL) 200 mL 3/20/2019  5:44 AM   Number of days: 5           SIGNATURE: Lidia Rendon MD  DATE: March 20, 2019  TIME: 5:50 PM

## 2019-03-20 NOTE — ANESTHESIA POSTPROCEDURE EVALUATION
Post-Op Assessment Note    CV Status:  Stable    Pain management: adequate     Mental Status:  Awake   Hydration Status:  Euvolemic   PONV Controlled:  None   Airway Patency:  Patent    Staff: Anesthesiologist           BP   105/57   Temp  97 6   Pulse  56   Resp   18   SpO2   100

## 2019-03-21 LAB
ALBUMIN SERPL BCP-MCNC: 3.5 G/DL (ref 3–5.2)
ALP SERPL-CCNC: 80 U/L (ref 43–122)
ALT SERPL W P-5'-P-CCNC: 18 U/L (ref 9–52)
ANION GAP SERPL CALCULATED.3IONS-SCNC: 8 MMOL/L (ref 5–14)
AST SERPL W P-5'-P-CCNC: 26 U/L (ref 14–36)
BILIRUB SERPL-MCNC: 0.2 MG/DL
BUN SERPL-MCNC: 23 MG/DL (ref 5–25)
CALCIUM SERPL-MCNC: 8.9 MG/DL (ref 8.4–10.2)
CHLORIDE SERPL-SCNC: 99 MMOL/L (ref 97–108)
CO2 SERPL-SCNC: 27 MMOL/L (ref 22–30)
CREAT SERPL-MCNC: 0.58 MG/DL (ref 0.6–1.2)
ERYTHROCYTE [DISTWIDTH] IN BLOOD BY AUTOMATED COUNT: 17.1 %
GFR SERPL CREATININE-BSD FRML MDRD: 95 ML/MIN/1.73SQ M
GLUCOSE SERPL-MCNC: 89 MG/DL (ref 70–99)
HCT VFR BLD AUTO: 33.7 % (ref 36–46)
HGB BLD-MCNC: 11 G/DL (ref 12–16)
MCH RBC QN AUTO: 28.2 PG (ref 26–34)
MCHC RBC AUTO-ENTMCNC: 32.7 G/DL (ref 31–36)
MCV RBC AUTO: 87 FL (ref 80–100)
PLATELET # BLD AUTO: 355 THOUSANDS/UL (ref 150–450)
PMV BLD AUTO: 8.5 FL (ref 8.9–12.7)
POTASSIUM SERPL-SCNC: 4 MMOL/L (ref 3.6–5)
PROT SERPL-MCNC: 6.5 G/DL (ref 5.9–8.4)
RBC # BLD AUTO: 3.89 MILLION/UL (ref 4–5.2)
SODIUM SERPL-SCNC: 134 MMOL/L (ref 137–147)
WBC # BLD AUTO: 9.1 THOUSAND/UL (ref 4.5–11)

## 2019-03-21 PROCEDURE — 80053 COMPREHEN METABOLIC PANEL: CPT | Performed by: FAMILY MEDICINE

## 2019-03-21 PROCEDURE — 85027 COMPLETE CBC AUTOMATED: CPT | Performed by: FAMILY MEDICINE

## 2019-03-21 RX ADMIN — ENOXAPARIN SODIUM 40 MG: 40 INJECTION SUBCUTANEOUS at 10:33

## 2019-03-21 RX ADMIN — Medication 250 MG: at 17:32

## 2019-03-21 RX ADMIN — B-COMPLEX W/ C & FOLIC ACID TAB 1 TABLET: TAB at 10:26

## 2019-03-21 RX ADMIN — FAMOTIDINE 20 MG: 20 TABLET ORAL at 10:26

## 2019-03-21 RX ADMIN — OYSTER SHELL CALCIUM WITH VITAMIN D 1 TABLET: 500; 200 TABLET, FILM COATED ORAL at 10:26

## 2019-03-21 RX ADMIN — Medication 250 MG: at 10:26

## 2019-03-21 RX ADMIN — VITAMIN D, TAB 1000IU (100/BT) 1000 UNITS: 25 TAB at 10:25

## 2019-03-21 RX ADMIN — NYSTATIN: 100000 POWDER TOPICAL at 10:34

## 2019-03-21 RX ADMIN — SENNOSIDES AND DOCUSATE SODIUM 2 TABLET: 8.6; 5 TABLET ORAL at 10:25

## 2019-03-21 RX ADMIN — CYANOCOBALAMIN TAB 1000 MCG 500 MCG: 1000 TAB at 10:35

## 2019-03-21 RX ADMIN — HYDROCORTISONE: 1 CREAM TOPICAL at 10:38

## 2019-03-21 RX ADMIN — FAMOTIDINE 20 MG: 20 TABLET ORAL at 17:33

## 2019-03-21 RX ADMIN — PSYLLIUM HUSK 1 PACKET: 3.4 POWDER ORAL at 17:32

## 2019-03-21 RX ADMIN — ERTAPENEM SODIUM 1000 MG: 1 INJECTION, POWDER, LYOPHILIZED, FOR SOLUTION INTRAMUSCULAR; INTRAVENOUS at 17:32

## 2019-03-22 LAB
BACTERIA TISS AEROBE CULT: NORMAL
GRAM STN SPEC: NORMAL

## 2019-03-22 PROCEDURE — 99233 SBSQ HOSP IP/OBS HIGH 50: CPT | Performed by: INTERNAL MEDICINE

## 2019-03-22 RX ADMIN — Medication 250 MG: at 11:36

## 2019-03-22 RX ADMIN — Medication 250 MG: at 17:31

## 2019-03-22 RX ADMIN — OYSTER SHELL CALCIUM WITH VITAMIN D 1 TABLET: 500; 200 TABLET, FILM COATED ORAL at 11:30

## 2019-03-22 RX ADMIN — ENOXAPARIN SODIUM 40 MG: 40 INJECTION SUBCUTANEOUS at 11:33

## 2019-03-22 RX ADMIN — NYSTATIN: 100000 POWDER TOPICAL at 12:45

## 2019-03-22 RX ADMIN — HYDROCORTISONE: 1 CREAM TOPICAL at 11:48

## 2019-03-22 RX ADMIN — B-COMPLEX W/ C & FOLIC ACID TAB 1 TABLET: TAB at 12:45

## 2019-03-22 RX ADMIN — PSYLLIUM HUSK 1 PACKET: 3.4 POWDER ORAL at 17:31

## 2019-03-22 RX ADMIN — FAMOTIDINE 20 MG: 20 TABLET ORAL at 17:31

## 2019-03-22 RX ADMIN — VITAMIN D, TAB 1000IU (100/BT) 1000 UNITS: 25 TAB at 11:31

## 2019-03-22 RX ADMIN — CYANOCOBALAMIN TAB 1000 MCG 500 MCG: 1000 TAB at 11:38

## 2019-03-22 RX ADMIN — FAMOTIDINE 20 MG: 20 TABLET ORAL at 11:34

## 2019-03-22 RX ADMIN — ERTAPENEM SODIUM 1000 MG: 1 INJECTION, POWDER, LYOPHILIZED, FOR SOLUTION INTRAMUSCULAR; INTRAVENOUS at 17:31

## 2019-03-23 RX ADMIN — Medication 250 MG: at 17:29

## 2019-03-23 RX ADMIN — B-COMPLEX W/ C & FOLIC ACID TAB 1 TABLET: TAB at 10:21

## 2019-03-23 RX ADMIN — OYSTER SHELL CALCIUM WITH VITAMIN D 1 TABLET: 500; 200 TABLET, FILM COATED ORAL at 10:20

## 2019-03-23 RX ADMIN — NYSTATIN: 100000 POWDER TOPICAL at 21:30

## 2019-03-23 RX ADMIN — HYDROCORTISONE 1 APPLICATION: 1 CREAM TOPICAL at 10:22

## 2019-03-23 RX ADMIN — Medication 250 MG: at 10:20

## 2019-03-23 RX ADMIN — VITAMIN D, TAB 1000IU (100/BT) 1000 UNITS: 25 TAB at 10:21

## 2019-03-23 RX ADMIN — CYANOCOBALAMIN TAB 1000 MCG 500 MCG: 1000 TAB at 10:20

## 2019-03-23 RX ADMIN — FAMOTIDINE 20 MG: 20 TABLET ORAL at 17:29

## 2019-03-23 RX ADMIN — ERTAPENEM SODIUM 1000 MG: 1 INJECTION, POWDER, LYOPHILIZED, FOR SOLUTION INTRAMUSCULAR; INTRAVENOUS at 17:28

## 2019-03-23 RX ADMIN — ENOXAPARIN SODIUM 40 MG: 40 INJECTION SUBCUTANEOUS at 10:21

## 2019-03-23 RX ADMIN — NYSTATIN 1 APPLICATION: 100000 POWDER TOPICAL at 10:19

## 2019-03-23 RX ADMIN — FAMOTIDINE 20 MG: 20 TABLET ORAL at 10:21

## 2019-03-24 RX ADMIN — FAMOTIDINE 20 MG: 20 TABLET ORAL at 17:12

## 2019-03-24 RX ADMIN — VITAMIN D, TAB 1000IU (100/BT) 1000 UNITS: 25 TAB at 08:27

## 2019-03-24 RX ADMIN — Medication 250 MG: at 17:12

## 2019-03-24 RX ADMIN — NYSTATIN 1 APPLICATION: 100000 POWDER TOPICAL at 20:35

## 2019-03-24 RX ADMIN — MELATONIN TAB 3 MG 3 MG: 3 TAB at 22:47

## 2019-03-24 RX ADMIN — HYDROCORTISONE: 1 CREAM TOPICAL at 08:26

## 2019-03-24 RX ADMIN — ENOXAPARIN SODIUM 40 MG: 40 INJECTION SUBCUTANEOUS at 08:26

## 2019-03-24 RX ADMIN — NYSTATIN: 100000 POWDER TOPICAL at 08:26

## 2019-03-24 RX ADMIN — HYDROCORTISONE: 1 CREAM TOPICAL at 17:10

## 2019-03-24 RX ADMIN — Medication 250 MG: at 08:27

## 2019-03-24 RX ADMIN — OYSTER SHELL CALCIUM WITH VITAMIN D 1 TABLET: 500; 200 TABLET, FILM COATED ORAL at 08:27

## 2019-03-24 RX ADMIN — FAMOTIDINE 20 MG: 20 TABLET ORAL at 08:27

## 2019-03-24 RX ADMIN — B-COMPLEX W/ C & FOLIC ACID TAB 1 TABLET: TAB at 08:28

## 2019-03-24 RX ADMIN — ERTAPENEM SODIUM 1000 MG: 1 INJECTION, POWDER, LYOPHILIZED, FOR SOLUTION INTRAMUSCULAR; INTRAVENOUS at 17:11

## 2019-03-24 RX ADMIN — CYANOCOBALAMIN TAB 1000 MCG 500 MCG: 1000 TAB at 08:26

## 2019-03-25 PROCEDURE — 99232 SBSQ HOSP IP/OBS MODERATE 35: CPT | Performed by: HOSPITALIST

## 2019-03-25 RX ADMIN — VITAMIN D, TAB 1000IU (100/BT) 1000 UNITS: 25 TAB at 08:37

## 2019-03-25 RX ADMIN — FAMOTIDINE 20 MG: 20 TABLET ORAL at 08:37

## 2019-03-25 RX ADMIN — ENOXAPARIN SODIUM 40 MG: 40 INJECTION SUBCUTANEOUS at 08:37

## 2019-03-25 RX ADMIN — Medication 250 MG: at 08:37

## 2019-03-25 RX ADMIN — HYDROCORTISONE: 1 CREAM TOPICAL at 18:28

## 2019-03-25 RX ADMIN — FAMOTIDINE 20 MG: 20 TABLET ORAL at 18:27

## 2019-03-25 RX ADMIN — ERTAPENEM SODIUM 1000 MG: 1 INJECTION, POWDER, LYOPHILIZED, FOR SOLUTION INTRAMUSCULAR; INTRAVENOUS at 18:32

## 2019-03-25 RX ADMIN — NYSTATIN: 100000 POWDER TOPICAL at 08:40

## 2019-03-25 RX ADMIN — Medication 250 MG: at 18:27

## 2019-03-25 RX ADMIN — OYSTER SHELL CALCIUM WITH VITAMIN D 1 TABLET: 500; 200 TABLET, FILM COATED ORAL at 08:38

## 2019-03-25 RX ADMIN — CYANOCOBALAMIN TAB 1000 MCG 500 MCG: 1000 TAB at 08:38

## 2019-03-25 RX ADMIN — B-COMPLEX W/ C & FOLIC ACID TAB 1 TABLET: TAB at 08:37

## 2019-03-25 RX ADMIN — HYDROCORTISONE: 1 CREAM TOPICAL at 08:40

## 2019-03-25 RX ADMIN — NYSTATIN: 100000 POWDER TOPICAL at 20:56

## 2019-03-26 PROCEDURE — 99232 SBSQ HOSP IP/OBS MODERATE 35: CPT | Performed by: INTERNAL MEDICINE

## 2019-03-26 RX ADMIN — NYSTATIN 1 APPLICATION: 100000 POWDER TOPICAL at 09:23

## 2019-03-26 RX ADMIN — CYANOCOBALAMIN TAB 1000 MCG 500 MCG: 1000 TAB at 09:15

## 2019-03-26 RX ADMIN — FAMOTIDINE 20 MG: 20 TABLET ORAL at 17:21

## 2019-03-26 RX ADMIN — VITAMIN D, TAB 1000IU (100/BT) 1000 UNITS: 25 TAB at 09:15

## 2019-03-26 RX ADMIN — OYSTER SHELL CALCIUM WITH VITAMIN D 1 TABLET: 500; 200 TABLET, FILM COATED ORAL at 09:19

## 2019-03-26 RX ADMIN — ENOXAPARIN SODIUM 40 MG: 40 INJECTION SUBCUTANEOUS at 09:16

## 2019-03-26 RX ADMIN — Medication 250 MG: at 17:21

## 2019-03-26 RX ADMIN — Medication 250 MG: at 09:15

## 2019-03-26 RX ADMIN — FAMOTIDINE 20 MG: 20 TABLET ORAL at 09:15

## 2019-03-26 RX ADMIN — ERTAPENEM SODIUM 1000 MG: 1 INJECTION, POWDER, LYOPHILIZED, FOR SOLUTION INTRAMUSCULAR; INTRAVENOUS at 17:22

## 2019-03-26 RX ADMIN — B-COMPLEX W/ C & FOLIC ACID TAB 1 TABLET: TAB at 09:15

## 2019-03-26 RX ADMIN — HYDROCORTISONE 1 APPLICATION: 1 CREAM TOPICAL at 17:22

## 2019-03-27 RX ADMIN — Medication 250 MG: at 17:03

## 2019-03-27 RX ADMIN — B-COMPLEX W/ C & FOLIC ACID TAB 1 TABLET: TAB at 09:13

## 2019-03-27 RX ADMIN — Medication 250 MG: at 09:13

## 2019-03-27 RX ADMIN — NYSTATIN 1 APPLICATION: 100000 POWDER TOPICAL at 20:57

## 2019-03-27 RX ADMIN — CYANOCOBALAMIN TAB 1000 MCG 500 MCG: 1000 TAB at 09:13

## 2019-03-27 RX ADMIN — NYSTATIN 1 APPLICATION: 100000 POWDER TOPICAL at 09:14

## 2019-03-27 RX ADMIN — ERTAPENEM SODIUM 1000 MG: 1 INJECTION, POWDER, LYOPHILIZED, FOR SOLUTION INTRAMUSCULAR; INTRAVENOUS at 17:03

## 2019-03-27 RX ADMIN — OYSTER SHELL CALCIUM WITH VITAMIN D 1 TABLET: 500; 200 TABLET, FILM COATED ORAL at 09:13

## 2019-03-27 RX ADMIN — FAMOTIDINE 20 MG: 20 TABLET ORAL at 09:13

## 2019-03-27 RX ADMIN — ENOXAPARIN SODIUM 40 MG: 40 INJECTION SUBCUTANEOUS at 09:14

## 2019-03-27 RX ADMIN — FAMOTIDINE 20 MG: 20 TABLET ORAL at 17:03

## 2019-03-27 RX ADMIN — VITAMIN D, TAB 1000IU (100/BT) 1000 UNITS: 25 TAB at 09:13

## 2019-03-28 PROCEDURE — 99232 SBSQ HOSP IP/OBS MODERATE 35: CPT | Performed by: INTERNAL MEDICINE

## 2019-03-28 RX ADMIN — FAMOTIDINE 20 MG: 20 TABLET ORAL at 09:24

## 2019-03-28 RX ADMIN — OYSTER SHELL CALCIUM WITH VITAMIN D 1 TABLET: 500; 200 TABLET, FILM COATED ORAL at 09:24

## 2019-03-28 RX ADMIN — Medication 250 MG: at 09:24

## 2019-03-28 RX ADMIN — B-COMPLEX W/ C & FOLIC ACID TAB 1 TABLET: TAB at 09:24

## 2019-03-28 RX ADMIN — NYSTATIN 1 APPLICATION: 100000 POWDER TOPICAL at 09:33

## 2019-03-28 RX ADMIN — ENOXAPARIN SODIUM 40 MG: 40 INJECTION SUBCUTANEOUS at 09:25

## 2019-03-28 RX ADMIN — SENNOSIDES AND DOCUSATE SODIUM 2 TABLET: 8.6; 5 TABLET ORAL at 09:24

## 2019-03-28 RX ADMIN — CYANOCOBALAMIN TAB 1000 MCG 500 MCG: 1000 TAB at 09:24

## 2019-03-28 RX ADMIN — VITAMIN D, TAB 1000IU (100/BT) 1000 UNITS: 25 TAB at 09:24

## 2019-03-28 RX ADMIN — Medication 250 MG: at 17:03

## 2019-03-28 RX ADMIN — FAMOTIDINE 20 MG: 20 TABLET ORAL at 17:03

## 2019-03-28 RX ADMIN — ERTAPENEM SODIUM 1000 MG: 1 INJECTION, POWDER, LYOPHILIZED, FOR SOLUTION INTRAMUSCULAR; INTRAVENOUS at 18:00

## 2019-03-29 RX ADMIN — Medication 250 MG: at 08:28

## 2019-03-29 RX ADMIN — VITAMIN D, TAB 1000IU (100/BT) 1000 UNITS: 25 TAB at 08:29

## 2019-03-29 RX ADMIN — ERTAPENEM SODIUM 1000 MG: 1 INJECTION, POWDER, LYOPHILIZED, FOR SOLUTION INTRAMUSCULAR; INTRAVENOUS at 17:38

## 2019-03-29 RX ADMIN — Medication 250 MG: at 17:38

## 2019-03-29 RX ADMIN — HYDROCORTISONE: 1 CREAM TOPICAL at 17:38

## 2019-03-29 RX ADMIN — FAMOTIDINE 20 MG: 20 TABLET ORAL at 08:28

## 2019-03-29 RX ADMIN — CYANOCOBALAMIN TAB 1000 MCG 500 MCG: 1000 TAB at 08:29

## 2019-03-29 RX ADMIN — ENOXAPARIN SODIUM 40 MG: 40 INJECTION SUBCUTANEOUS at 08:29

## 2019-03-29 RX ADMIN — NYSTATIN: 100000 POWDER TOPICAL at 21:11

## 2019-03-29 RX ADMIN — B-COMPLEX W/ C & FOLIC ACID TAB 1 TABLET: TAB at 08:28

## 2019-03-29 RX ADMIN — NYSTATIN: 100000 POWDER TOPICAL at 08:28

## 2019-03-29 RX ADMIN — HYDROCORTISONE: 1 CREAM TOPICAL at 08:28

## 2019-03-29 RX ADMIN — FAMOTIDINE 20 MG: 20 TABLET ORAL at 17:38

## 2019-03-29 RX ADMIN — OYSTER SHELL CALCIUM WITH VITAMIN D 1 TABLET: 500; 200 TABLET, FILM COATED ORAL at 08:28

## 2019-03-30 RX ADMIN — FAMOTIDINE 20 MG: 20 TABLET ORAL at 17:34

## 2019-03-30 RX ADMIN — CYANOCOBALAMIN TAB 1000 MCG 500 MCG: 1000 TAB at 08:20

## 2019-03-30 RX ADMIN — FAMOTIDINE 20 MG: 20 TABLET ORAL at 08:20

## 2019-03-30 RX ADMIN — VITAMIN D, TAB 1000IU (100/BT) 1000 UNITS: 25 TAB at 08:19

## 2019-03-30 RX ADMIN — OYSTER SHELL CALCIUM WITH VITAMIN D 1 TABLET: 500; 200 TABLET, FILM COATED ORAL at 08:19

## 2019-03-30 RX ADMIN — B-COMPLEX W/ C & FOLIC ACID TAB 1 TABLET: TAB at 08:19

## 2019-03-30 RX ADMIN — ENOXAPARIN SODIUM 40 MG: 40 INJECTION SUBCUTANEOUS at 08:18

## 2019-03-30 RX ADMIN — Medication 250 MG: at 17:34

## 2019-03-30 RX ADMIN — ERTAPENEM SODIUM 1000 MG: 1 INJECTION, POWDER, LYOPHILIZED, FOR SOLUTION INTRAMUSCULAR; INTRAVENOUS at 17:34

## 2019-03-30 RX ADMIN — NYSTATIN: 100000 POWDER TOPICAL at 21:53

## 2019-03-30 RX ADMIN — Medication 250 MG: at 08:20

## 2019-03-31 RX ADMIN — ENOXAPARIN SODIUM 40 MG: 40 INJECTION SUBCUTANEOUS at 09:19

## 2019-03-31 RX ADMIN — FAMOTIDINE 20 MG: 20 TABLET ORAL at 18:01

## 2019-03-31 RX ADMIN — OYSTER SHELL CALCIUM WITH VITAMIN D 1 TABLET: 500; 200 TABLET, FILM COATED ORAL at 09:20

## 2019-03-31 RX ADMIN — Medication 250 MG: at 18:01

## 2019-03-31 RX ADMIN — NYSTATIN: 100000 POWDER TOPICAL at 09:20

## 2019-03-31 RX ADMIN — CYANOCOBALAMIN TAB 1000 MCG 500 MCG: 1000 TAB at 09:19

## 2019-03-31 RX ADMIN — NYSTATIN: 100000 POWDER TOPICAL at 20:13

## 2019-03-31 RX ADMIN — B-COMPLEX W/ C & FOLIC ACID TAB 1 TABLET: TAB at 09:19

## 2019-03-31 RX ADMIN — HYDROCORTISONE: 1 CREAM TOPICAL at 09:20

## 2019-03-31 RX ADMIN — ERTAPENEM SODIUM 1000 MG: 1 INJECTION, POWDER, LYOPHILIZED, FOR SOLUTION INTRAMUSCULAR; INTRAVENOUS at 18:01

## 2019-03-31 RX ADMIN — FAMOTIDINE 20 MG: 20 TABLET ORAL at 09:19

## 2019-03-31 RX ADMIN — VITAMIN D, TAB 1000IU (100/BT) 1000 UNITS: 25 TAB at 09:19

## 2019-04-01 PROCEDURE — 99232 SBSQ HOSP IP/OBS MODERATE 35: CPT | Performed by: INTERNAL MEDICINE

## 2019-04-01 RX ADMIN — FAMOTIDINE 20 MG: 20 TABLET ORAL at 08:24

## 2019-04-01 RX ADMIN — B-COMPLEX W/ C & FOLIC ACID TAB 1 TABLET: TAB at 08:24

## 2019-04-01 RX ADMIN — VITAMIN D, TAB 1000IU (100/BT) 1000 UNITS: 25 TAB at 08:24

## 2019-04-01 RX ADMIN — Medication 250 MG: at 17:45

## 2019-04-01 RX ADMIN — NYSTATIN: 100000 POWDER TOPICAL at 08:23

## 2019-04-01 RX ADMIN — Medication 250 MG: at 08:24

## 2019-04-01 RX ADMIN — CYANOCOBALAMIN TAB 1000 MCG 500 MCG: 1000 TAB at 08:23

## 2019-04-01 RX ADMIN — HYDROCORTISONE: 1 CREAM TOPICAL at 08:23

## 2019-04-01 RX ADMIN — NYSTATIN: 100000 POWDER TOPICAL at 22:20

## 2019-04-01 RX ADMIN — OYSTER SHELL CALCIUM WITH VITAMIN D 1 TABLET: 500; 200 TABLET, FILM COATED ORAL at 08:24

## 2019-04-01 RX ADMIN — ERTAPENEM SODIUM 1000 MG: 1 INJECTION, POWDER, LYOPHILIZED, FOR SOLUTION INTRAMUSCULAR; INTRAVENOUS at 17:46

## 2019-04-01 RX ADMIN — ENOXAPARIN SODIUM 40 MG: 40 INJECTION SUBCUTANEOUS at 08:23

## 2019-04-01 RX ADMIN — FAMOTIDINE 20 MG: 20 TABLET ORAL at 17:45

## 2019-04-02 PROCEDURE — 99232 SBSQ HOSP IP/OBS MODERATE 35: CPT | Performed by: INTERNAL MEDICINE

## 2019-04-02 PROCEDURE — 99309 SBSQ NF CARE MODERATE MDM 30: CPT | Performed by: FAMILY MEDICINE

## 2019-04-02 RX ADMIN — HYDROCORTISONE: 1 CREAM TOPICAL at 08:25

## 2019-04-02 RX ADMIN — FAMOTIDINE 20 MG: 20 TABLET ORAL at 08:27

## 2019-04-02 RX ADMIN — HYDROCORTISONE: 1 CREAM TOPICAL at 17:10

## 2019-04-02 RX ADMIN — CYANOCOBALAMIN TAB 1000 MCG 500 MCG: 1000 TAB at 08:26

## 2019-04-02 RX ADMIN — Medication 250 MG: at 17:06

## 2019-04-02 RX ADMIN — NYSTATIN: 100000 POWDER TOPICAL at 08:26

## 2019-04-02 RX ADMIN — NYSTATIN: 100000 POWDER TOPICAL at 21:34

## 2019-04-02 RX ADMIN — ENOXAPARIN SODIUM 40 MG: 40 INJECTION SUBCUTANEOUS at 08:27

## 2019-04-02 RX ADMIN — ERTAPENEM SODIUM 1000 MG: 1 INJECTION, POWDER, LYOPHILIZED, FOR SOLUTION INTRAMUSCULAR; INTRAVENOUS at 17:06

## 2019-04-02 RX ADMIN — OYSTER SHELL CALCIUM WITH VITAMIN D 1 TABLET: 500; 200 TABLET, FILM COATED ORAL at 08:27

## 2019-04-02 RX ADMIN — MELATONIN TAB 3 MG 3 MG: 3 TAB at 21:34

## 2019-04-02 RX ADMIN — VITAMIN D, TAB 1000IU (100/BT) 1000 UNITS: 25 TAB at 08:27

## 2019-04-02 RX ADMIN — B-COMPLEX W/ C & FOLIC ACID TAB 1 TABLET: TAB at 08:27

## 2019-04-02 RX ADMIN — FAMOTIDINE 20 MG: 20 TABLET ORAL at 17:07

## 2019-04-02 RX ADMIN — Medication 250 MG: at 08:27

## 2019-04-03 LAB
ALBUMIN SERPL BCP-MCNC: 3.6 G/DL (ref 3–5.2)
ALP SERPL-CCNC: 74 U/L (ref 43–122)
ALT SERPL W P-5'-P-CCNC: 16 U/L (ref 9–52)
ANION GAP SERPL CALCULATED.3IONS-SCNC: 9 MMOL/L (ref 5–14)
AST SERPL W P-5'-P-CCNC: 21 U/L (ref 14–36)
BILIRUB SERPL-MCNC: 0.2 MG/DL
BUN SERPL-MCNC: 23 MG/DL (ref 5–25)
CALCIUM SERPL-MCNC: 9 MG/DL (ref 8.4–10.2)
CHLORIDE SERPL-SCNC: 101 MMOL/L (ref 97–108)
CO2 SERPL-SCNC: 25 MMOL/L (ref 22–30)
CREAT SERPL-MCNC: 0.47 MG/DL (ref 0.6–1.2)
ERYTHROCYTE [DISTWIDTH] IN BLOOD BY AUTOMATED COUNT: 16.8 %
GFR SERPL CREATININE-BSD FRML MDRD: 102 ML/MIN/1.73SQ M
GLUCOSE SERPL-MCNC: 74 MG/DL (ref 70–99)
HCT VFR BLD AUTO: 35.2 % (ref 36–46)
HGB BLD-MCNC: 11.6 G/DL (ref 12–16)
MCH RBC QN AUTO: 28.5 PG (ref 26–34)
MCHC RBC AUTO-ENTMCNC: 32.9 G/DL (ref 31–36)
MCV RBC AUTO: 87 FL (ref 80–100)
PLATELET # BLD AUTO: 294 THOUSANDS/UL (ref 150–450)
PMV BLD AUTO: 8.8 FL (ref 8.9–12.7)
POTASSIUM SERPL-SCNC: 4.3 MMOL/L (ref 3.6–5)
PROT SERPL-MCNC: 6.8 G/DL (ref 5.9–8.4)
RBC # BLD AUTO: 4.06 MILLION/UL (ref 4–5.2)
SODIUM SERPL-SCNC: 135 MMOL/L (ref 137–147)
WBC # BLD AUTO: 6.9 THOUSAND/UL (ref 4.5–11)

## 2019-04-03 PROCEDURE — 85027 COMPLETE CBC AUTOMATED: CPT | Performed by: FAMILY MEDICINE

## 2019-04-03 PROCEDURE — 80053 COMPREHEN METABOLIC PANEL: CPT | Performed by: FAMILY MEDICINE

## 2019-04-03 PROCEDURE — 99232 SBSQ HOSP IP/OBS MODERATE 35: CPT | Performed by: INTERNAL MEDICINE

## 2019-04-03 RX ADMIN — OYSTER SHELL CALCIUM WITH VITAMIN D 1 TABLET: 500; 200 TABLET, FILM COATED ORAL at 10:51

## 2019-04-03 RX ADMIN — ERTAPENEM SODIUM 1000 MG: 1 INJECTION, POWDER, LYOPHILIZED, FOR SOLUTION INTRAMUSCULAR; INTRAVENOUS at 18:54

## 2019-04-03 RX ADMIN — Medication 250 MG: at 18:34

## 2019-04-03 RX ADMIN — ENOXAPARIN SODIUM 40 MG: 40 INJECTION SUBCUTANEOUS at 10:51

## 2019-04-03 RX ADMIN — NYSTATIN: 100000 POWDER TOPICAL at 23:48

## 2019-04-03 RX ADMIN — Medication 250 MG: at 10:52

## 2019-04-03 RX ADMIN — VITAMIN D, TAB 1000IU (100/BT) 1000 UNITS: 25 TAB at 10:51

## 2019-04-03 RX ADMIN — NYSTATIN: 100000 POWDER TOPICAL at 10:53

## 2019-04-03 RX ADMIN — CYANOCOBALAMIN TAB 1000 MCG 500 MCG: 1000 TAB at 10:52

## 2019-04-03 RX ADMIN — FAMOTIDINE 20 MG: 20 TABLET ORAL at 18:34

## 2019-04-03 RX ADMIN — FAMOTIDINE 20 MG: 20 TABLET ORAL at 10:52

## 2019-04-03 RX ADMIN — B-COMPLEX W/ C & FOLIC ACID TAB 1 TABLET: TAB at 10:52

## 2019-04-04 PROCEDURE — 99232 SBSQ HOSP IP/OBS MODERATE 35: CPT | Performed by: INTERNAL MEDICINE

## 2019-04-04 RX ADMIN — Medication 250 MG: at 10:45

## 2019-04-04 RX ADMIN — FAMOTIDINE 20 MG: 20 TABLET ORAL at 18:53

## 2019-04-04 RX ADMIN — NYSTATIN: 100000 POWDER TOPICAL at 21:02

## 2019-04-04 RX ADMIN — B-COMPLEX W/ C & FOLIC ACID TAB 1 TABLET: TAB at 10:44

## 2019-04-04 RX ADMIN — OYSTER SHELL CALCIUM WITH VITAMIN D 1 TABLET: 500; 200 TABLET, FILM COATED ORAL at 10:45

## 2019-04-04 RX ADMIN — FAMOTIDINE 20 MG: 20 TABLET ORAL at 10:43

## 2019-04-04 RX ADMIN — Medication 250 MG: at 18:53

## 2019-04-04 RX ADMIN — ENOXAPARIN SODIUM 40 MG: 40 INJECTION SUBCUTANEOUS at 11:25

## 2019-04-04 RX ADMIN — NYSTATIN: 100000 POWDER TOPICAL at 10:55

## 2019-04-04 RX ADMIN — ERTAPENEM SODIUM 1000 MG: 1 INJECTION, POWDER, LYOPHILIZED, FOR SOLUTION INTRAMUSCULAR; INTRAVENOUS at 19:38

## 2019-04-04 RX ADMIN — VITAMIN D, TAB 1000IU (100/BT) 1000 UNITS: 25 TAB at 10:44

## 2019-04-04 RX ADMIN — CYANOCOBALAMIN TAB 1000 MCG 500 MCG: 1000 TAB at 10:44

## 2019-04-05 PROCEDURE — 99232 SBSQ HOSP IP/OBS MODERATE 35: CPT | Performed by: INTERNAL MEDICINE

## 2019-04-05 PROCEDURE — 87493 C DIFF AMPLIFIED PROBE: CPT | Performed by: INTERNAL MEDICINE

## 2019-04-05 RX ADMIN — OYSTER SHELL CALCIUM WITH VITAMIN D 1 TABLET: 500; 200 TABLET, FILM COATED ORAL at 08:22

## 2019-04-05 RX ADMIN — CYANOCOBALAMIN TAB 1000 MCG 500 MCG: 1000 TAB at 08:22

## 2019-04-05 RX ADMIN — NYSTATIN: 100000 POWDER TOPICAL at 08:22

## 2019-04-05 RX ADMIN — ERTAPENEM SODIUM 1000 MG: 1 INJECTION, POWDER, LYOPHILIZED, FOR SOLUTION INTRAMUSCULAR; INTRAVENOUS at 17:27

## 2019-04-05 RX ADMIN — Medication 250 MG: at 17:30

## 2019-04-05 RX ADMIN — Medication 250 MG: at 08:22

## 2019-04-05 RX ADMIN — B-COMPLEX W/ C & FOLIC ACID TAB 1 TABLET: TAB at 08:22

## 2019-04-05 RX ADMIN — FAMOTIDINE 20 MG: 20 TABLET ORAL at 17:30

## 2019-04-05 RX ADMIN — NYSTATIN: 100000 POWDER TOPICAL at 21:48

## 2019-04-05 RX ADMIN — ENOXAPARIN SODIUM 40 MG: 40 INJECTION SUBCUTANEOUS at 08:23

## 2019-04-05 RX ADMIN — VITAMIN D, TAB 1000IU (100/BT) 1000 UNITS: 25 TAB at 08:22

## 2019-04-05 RX ADMIN — FAMOTIDINE 20 MG: 20 TABLET ORAL at 08:22

## 2019-04-06 RX ADMIN — NYSTATIN 1 APPLICATION: 100000 POWDER TOPICAL at 08:32

## 2019-04-06 RX ADMIN — OYSTER SHELL CALCIUM WITH VITAMIN D 1 TABLET: 500; 200 TABLET, FILM COATED ORAL at 08:32

## 2019-04-06 RX ADMIN — ENOXAPARIN SODIUM 40 MG: 40 INJECTION SUBCUTANEOUS at 08:32

## 2019-04-06 RX ADMIN — Medication 250 MG: at 08:32

## 2019-04-06 RX ADMIN — FAMOTIDINE 20 MG: 20 TABLET ORAL at 17:06

## 2019-04-06 RX ADMIN — VITAMIN D, TAB 1000IU (100/BT) 1000 UNITS: 25 TAB at 08:32

## 2019-04-06 RX ADMIN — ERTAPENEM SODIUM 1000 MG: 1 INJECTION, POWDER, LYOPHILIZED, FOR SOLUTION INTRAMUSCULAR; INTRAVENOUS at 17:06

## 2019-04-06 RX ADMIN — B-COMPLEX W/ C & FOLIC ACID TAB 1 TABLET: TAB at 08:32

## 2019-04-06 RX ADMIN — FAMOTIDINE 20 MG: 20 TABLET ORAL at 08:32

## 2019-04-06 RX ADMIN — Medication 250 MG: at 17:06

## 2019-04-06 RX ADMIN — CYANOCOBALAMIN TAB 1000 MCG 500 MCG: 1000 TAB at 08:32

## 2019-04-07 PROBLEM — A04.72 C. DIFFICILE DIARRHEA: Status: ACTIVE | Noted: 2019-04-07

## 2019-04-07 LAB — C DIFF TOX GENS STL QL NAA+PROBE: ABNORMAL

## 2019-04-07 PROCEDURE — 99309 SBSQ NF CARE MODERATE MDM 30: CPT | Performed by: FAMILY MEDICINE

## 2019-04-07 RX ORDER — METRONIDAZOLE 500 MG/1
500 TABLET ORAL EVERY 8 HOURS SCHEDULED
Status: DISCONTINUED | OUTPATIENT
Start: 2019-04-07 | End: 2019-04-19 | Stop reason: HOSPADM

## 2019-04-07 RX ADMIN — NYSTATIN 1 APPLICATION: 100000 POWDER TOPICAL at 08:28

## 2019-04-07 RX ADMIN — Medication 250 MG: at 17:13

## 2019-04-07 RX ADMIN — METRONIDAZOLE 500 MG: 500 TABLET ORAL at 21:45

## 2019-04-07 RX ADMIN — NYSTATIN: 100000 POWDER TOPICAL at 21:50

## 2019-04-07 RX ADMIN — OYSTER SHELL CALCIUM WITH VITAMIN D 1 TABLET: 500; 200 TABLET, FILM COATED ORAL at 08:27

## 2019-04-07 RX ADMIN — CYANOCOBALAMIN TAB 1000 MCG 500 MCG: 1000 TAB at 08:27

## 2019-04-07 RX ADMIN — B-COMPLEX W/ C & FOLIC ACID TAB 1 TABLET: TAB at 08:26

## 2019-04-07 RX ADMIN — METRONIDAZOLE 500 MG: 500 TABLET ORAL at 17:11

## 2019-04-07 RX ADMIN — FAMOTIDINE 20 MG: 20 TABLET ORAL at 17:13

## 2019-04-07 RX ADMIN — ENOXAPARIN SODIUM 40 MG: 40 INJECTION SUBCUTANEOUS at 08:27

## 2019-04-07 RX ADMIN — VITAMIN D, TAB 1000IU (100/BT) 1000 UNITS: 25 TAB at 08:26

## 2019-04-07 RX ADMIN — FAMOTIDINE 20 MG: 20 TABLET ORAL at 08:26

## 2019-04-07 RX ADMIN — ERTAPENEM SODIUM 1000 MG: 1 INJECTION, POWDER, LYOPHILIZED, FOR SOLUTION INTRAMUSCULAR; INTRAVENOUS at 17:11

## 2019-04-07 RX ADMIN — Medication 250 MG: at 08:26

## 2019-04-08 LAB
ANION GAP SERPL CALCULATED.3IONS-SCNC: 8 MMOL/L (ref 5–14)
BUN SERPL-MCNC: 20 MG/DL (ref 5–25)
CALCIUM SERPL-MCNC: 9 MG/DL (ref 8.4–10.2)
CHLORIDE SERPL-SCNC: 101 MMOL/L (ref 97–108)
CO2 SERPL-SCNC: 28 MMOL/L (ref 22–30)
CREAT SERPL-MCNC: 0.48 MG/DL (ref 0.6–1.2)
ERYTHROCYTE [DISTWIDTH] IN BLOOD BY AUTOMATED COUNT: 17 %
GFR SERPL CREATININE-BSD FRML MDRD: 101 ML/MIN/1.73SQ M
GLUCOSE P FAST SERPL-MCNC: 84 MG/DL (ref 70–99)
GLUCOSE SERPL-MCNC: 84 MG/DL (ref 70–99)
HCT VFR BLD AUTO: 34.1 % (ref 36–46)
HGB BLD-MCNC: 11.1 G/DL (ref 12–16)
MCH RBC QN AUTO: 28 PG (ref 26–34)
MCHC RBC AUTO-ENTMCNC: 32.5 G/DL (ref 31–36)
MCV RBC AUTO: 86 FL (ref 80–100)
PLATELET # BLD AUTO: 279 THOUSANDS/UL (ref 150–450)
PMV BLD AUTO: 8.4 FL (ref 8.9–12.7)
POTASSIUM SERPL-SCNC: 4 MMOL/L (ref 3.6–5)
RBC # BLD AUTO: 3.96 MILLION/UL (ref 4–5.2)
SODIUM SERPL-SCNC: 137 MMOL/L (ref 137–147)
WBC # BLD AUTO: 5.6 THOUSAND/UL (ref 4.5–11)

## 2019-04-08 PROCEDURE — 80048 BASIC METABOLIC PNL TOTAL CA: CPT | Performed by: FAMILY MEDICINE

## 2019-04-08 PROCEDURE — 85027 COMPLETE CBC AUTOMATED: CPT | Performed by: FAMILY MEDICINE

## 2019-04-08 PROCEDURE — 99233 SBSQ HOSP IP/OBS HIGH 50: CPT | Performed by: INTERNAL MEDICINE

## 2019-04-08 RX ADMIN — METRONIDAZOLE 500 MG: 500 TABLET ORAL at 22:23

## 2019-04-08 RX ADMIN — HYDROCORTISONE: 1 CREAM TOPICAL at 08:51

## 2019-04-08 RX ADMIN — METRONIDAZOLE 500 MG: 500 TABLET ORAL at 05:36

## 2019-04-08 RX ADMIN — Medication 250 MG: at 08:49

## 2019-04-08 RX ADMIN — VITAMIN D, TAB 1000IU (100/BT) 1000 UNITS: 25 TAB at 08:49

## 2019-04-08 RX ADMIN — NYSTATIN: 100000 POWDER TOPICAL at 08:52

## 2019-04-08 RX ADMIN — FAMOTIDINE 20 MG: 20 TABLET ORAL at 08:49

## 2019-04-08 RX ADMIN — CYANOCOBALAMIN TAB 1000 MCG 500 MCG: 1000 TAB at 08:49

## 2019-04-08 RX ADMIN — ENOXAPARIN SODIUM 40 MG: 40 INJECTION SUBCUTANEOUS at 08:49

## 2019-04-08 RX ADMIN — Medication 250 MG: at 18:09

## 2019-04-08 RX ADMIN — B-COMPLEX W/ C & FOLIC ACID TAB 1 TABLET: TAB at 08:48

## 2019-04-08 RX ADMIN — OYSTER SHELL CALCIUM WITH VITAMIN D 1 TABLET: 500; 200 TABLET, FILM COATED ORAL at 08:48

## 2019-04-08 RX ADMIN — NYSTATIN: 100000 POWDER TOPICAL at 22:24

## 2019-04-08 RX ADMIN — METRONIDAZOLE 500 MG: 500 TABLET ORAL at 13:43

## 2019-04-08 RX ADMIN — FAMOTIDINE 20 MG: 20 TABLET ORAL at 18:09

## 2019-04-08 RX ADMIN — ERTAPENEM SODIUM 1000 MG: 1 INJECTION, POWDER, LYOPHILIZED, FOR SOLUTION INTRAMUSCULAR; INTRAVENOUS at 17:31

## 2019-04-09 PROCEDURE — 99232 SBSQ HOSP IP/OBS MODERATE 35: CPT | Performed by: INTERNAL MEDICINE

## 2019-04-09 RX ADMIN — ENOXAPARIN SODIUM 40 MG: 40 INJECTION SUBCUTANEOUS at 08:49

## 2019-04-09 RX ADMIN — METRONIDAZOLE 500 MG: 500 TABLET ORAL at 14:33

## 2019-04-09 RX ADMIN — OYSTER SHELL CALCIUM WITH VITAMIN D 1 TABLET: 500; 200 TABLET, FILM COATED ORAL at 08:49

## 2019-04-09 RX ADMIN — ERTAPENEM SODIUM 1000 MG: 1 INJECTION, POWDER, LYOPHILIZED, FOR SOLUTION INTRAMUSCULAR; INTRAVENOUS at 16:52

## 2019-04-09 RX ADMIN — Medication 250 MG: at 08:49

## 2019-04-09 RX ADMIN — CYANOCOBALAMIN TAB 1000 MCG 500 MCG: 1000 TAB at 08:49

## 2019-04-09 RX ADMIN — Medication 250 MG: at 17:00

## 2019-04-09 RX ADMIN — B-COMPLEX W/ C & FOLIC ACID TAB 1 TABLET: TAB at 08:49

## 2019-04-09 RX ADMIN — FAMOTIDINE 20 MG: 20 TABLET ORAL at 08:49

## 2019-04-09 RX ADMIN — NYSTATIN: 100000 POWDER TOPICAL at 09:01

## 2019-04-09 RX ADMIN — METRONIDAZOLE 500 MG: 500 TABLET ORAL at 22:33

## 2019-04-09 RX ADMIN — VITAMIN D, TAB 1000IU (100/BT) 1000 UNITS: 25 TAB at 08:49

## 2019-04-09 RX ADMIN — METRONIDAZOLE 500 MG: 500 TABLET ORAL at 05:32

## 2019-04-09 RX ADMIN — MELATONIN TAB 3 MG 3 MG: 3 TAB at 22:33

## 2019-04-09 RX ADMIN — FAMOTIDINE 20 MG: 20 TABLET ORAL at 17:00

## 2019-04-10 PROCEDURE — 99232 SBSQ HOSP IP/OBS MODERATE 35: CPT | Performed by: INTERNAL MEDICINE

## 2019-04-10 RX ADMIN — B-COMPLEX W/ C & FOLIC ACID TAB 1 TABLET: TAB at 08:28

## 2019-04-10 RX ADMIN — CYANOCOBALAMIN TAB 1000 MCG 500 MCG: 1000 TAB at 08:28

## 2019-04-10 RX ADMIN — MELATONIN TAB 3 MG 3 MG: 3 TAB at 22:51

## 2019-04-10 RX ADMIN — VITAMIN D, TAB 1000IU (100/BT) 1000 UNITS: 25 TAB at 08:29

## 2019-04-10 RX ADMIN — FAMOTIDINE 20 MG: 20 TABLET ORAL at 17:51

## 2019-04-10 RX ADMIN — ERTAPENEM SODIUM 1000 MG: 1 INJECTION, POWDER, LYOPHILIZED, FOR SOLUTION INTRAMUSCULAR; INTRAVENOUS at 17:51

## 2019-04-10 RX ADMIN — NYSTATIN: 100000 POWDER TOPICAL at 08:29

## 2019-04-10 RX ADMIN — NYSTATIN: 100000 POWDER TOPICAL at 22:51

## 2019-04-10 RX ADMIN — Medication 250 MG: at 08:29

## 2019-04-10 RX ADMIN — METRONIDAZOLE 500 MG: 500 TABLET ORAL at 14:20

## 2019-04-10 RX ADMIN — METRONIDAZOLE 500 MG: 500 TABLET ORAL at 06:43

## 2019-04-10 RX ADMIN — OYSTER SHELL CALCIUM WITH VITAMIN D 1 TABLET: 500; 200 TABLET, FILM COATED ORAL at 06:43

## 2019-04-10 RX ADMIN — Medication 250 MG: at 17:50

## 2019-04-10 RX ADMIN — METRONIDAZOLE 500 MG: 500 TABLET ORAL at 22:51

## 2019-04-10 RX ADMIN — FAMOTIDINE 20 MG: 20 TABLET ORAL at 08:27

## 2019-04-10 RX ADMIN — ENOXAPARIN SODIUM 40 MG: 40 INJECTION SUBCUTANEOUS at 08:29

## 2019-04-11 PROCEDURE — 99232 SBSQ HOSP IP/OBS MODERATE 35: CPT | Performed by: INTERNAL MEDICINE

## 2019-04-11 PROCEDURE — 97530 THERAPEUTIC ACTIVITIES: CPT

## 2019-04-11 PROCEDURE — 97163 PT EVAL HIGH COMPLEX 45 MIN: CPT

## 2019-04-11 RX ADMIN — METRONIDAZOLE 500 MG: 500 TABLET ORAL at 22:33

## 2019-04-11 RX ADMIN — METRONIDAZOLE 500 MG: 500 TABLET ORAL at 05:57

## 2019-04-11 RX ADMIN — Medication 250 MG: at 17:00

## 2019-04-11 RX ADMIN — NYSTATIN: 100000 POWDER TOPICAL at 22:30

## 2019-04-11 RX ADMIN — FAMOTIDINE 20 MG: 20 TABLET ORAL at 09:47

## 2019-04-11 RX ADMIN — ERTAPENEM SODIUM 1000 MG: 1 INJECTION, POWDER, LYOPHILIZED, FOR SOLUTION INTRAMUSCULAR; INTRAVENOUS at 17:00

## 2019-04-11 RX ADMIN — FAMOTIDINE 20 MG: 20 TABLET ORAL at 17:00

## 2019-04-11 RX ADMIN — ENOXAPARIN SODIUM 40 MG: 40 INJECTION SUBCUTANEOUS at 09:47

## 2019-04-11 RX ADMIN — OYSTER SHELL CALCIUM WITH VITAMIN D 1 TABLET: 500; 200 TABLET, FILM COATED ORAL at 09:47

## 2019-04-11 RX ADMIN — METRONIDAZOLE 500 MG: 500 TABLET ORAL at 13:54

## 2019-04-11 RX ADMIN — Medication 250 MG: at 09:47

## 2019-04-11 RX ADMIN — CYANOCOBALAMIN TAB 1000 MCG 500 MCG: 1000 TAB at 09:46

## 2019-04-11 RX ADMIN — VITAMIN D, TAB 1000IU (100/BT) 1000 UNITS: 25 TAB at 09:46

## 2019-04-11 RX ADMIN — B-COMPLEX W/ C & FOLIC ACID TAB 1 TABLET: TAB at 09:47

## 2019-04-12 PROCEDURE — 99232 SBSQ HOSP IP/OBS MODERATE 35: CPT | Performed by: INTERNAL MEDICINE

## 2019-04-12 PROCEDURE — 97110 THERAPEUTIC EXERCISES: CPT

## 2019-04-12 PROCEDURE — 97530 THERAPEUTIC ACTIVITIES: CPT

## 2019-04-12 RX ADMIN — ERTAPENEM SODIUM 1000 MG: 1 INJECTION, POWDER, LYOPHILIZED, FOR SOLUTION INTRAMUSCULAR; INTRAVENOUS at 16:41

## 2019-04-12 RX ADMIN — MELATONIN TAB 3 MG 3 MG: 3 TAB at 21:43

## 2019-04-12 RX ADMIN — FAMOTIDINE 20 MG: 20 TABLET ORAL at 17:55

## 2019-04-12 RX ADMIN — METRONIDAZOLE 500 MG: 500 TABLET ORAL at 13:48

## 2019-04-12 RX ADMIN — VITAMIN D, TAB 1000IU (100/BT) 1000 UNITS: 25 TAB at 08:14

## 2019-04-12 RX ADMIN — CYANOCOBALAMIN TAB 1000 MCG 500 MCG: 1000 TAB at 08:14

## 2019-04-12 RX ADMIN — FAMOTIDINE 20 MG: 20 TABLET ORAL at 08:14

## 2019-04-12 RX ADMIN — ENOXAPARIN SODIUM 40 MG: 40 INJECTION SUBCUTANEOUS at 08:14

## 2019-04-12 RX ADMIN — METRONIDAZOLE 500 MG: 500 TABLET ORAL at 06:02

## 2019-04-12 RX ADMIN — B-COMPLEX W/ C & FOLIC ACID TAB 1 TABLET: TAB at 08:21

## 2019-04-12 RX ADMIN — NYSTATIN: 100000 POWDER TOPICAL at 08:20

## 2019-04-12 RX ADMIN — OYSTER SHELL CALCIUM WITH VITAMIN D 1 TABLET: 500; 200 TABLET, FILM COATED ORAL at 08:14

## 2019-04-12 RX ADMIN — Medication 250 MG: at 17:55

## 2019-04-12 RX ADMIN — Medication 250 MG: at 08:14

## 2019-04-12 RX ADMIN — METRONIDAZOLE 500 MG: 500 TABLET ORAL at 21:43

## 2019-04-12 RX ADMIN — NYSTATIN 1 APPLICATION: 100000 POWDER TOPICAL at 21:43

## 2019-04-13 PROCEDURE — 97530 THERAPEUTIC ACTIVITIES: CPT

## 2019-04-13 PROCEDURE — 97110 THERAPEUTIC EXERCISES: CPT

## 2019-04-13 RX ADMIN — Medication 250 MG: at 17:33

## 2019-04-13 RX ADMIN — METRONIDAZOLE 500 MG: 500 TABLET ORAL at 21:34

## 2019-04-13 RX ADMIN — NYSTATIN: 100000 POWDER TOPICAL at 21:34

## 2019-04-13 RX ADMIN — METRONIDAZOLE 500 MG: 500 TABLET ORAL at 14:28

## 2019-04-13 RX ADMIN — ERTAPENEM SODIUM 1000 MG: 1 INJECTION, POWDER, LYOPHILIZED, FOR SOLUTION INTRAMUSCULAR; INTRAVENOUS at 17:33

## 2019-04-13 RX ADMIN — B-COMPLEX W/ C & FOLIC ACID TAB 1 TABLET: TAB at 08:54

## 2019-04-13 RX ADMIN — OYSTER SHELL CALCIUM WITH VITAMIN D 1 TABLET: 500; 200 TABLET, FILM COATED ORAL at 08:54

## 2019-04-13 RX ADMIN — NYSTATIN: 100000 POWDER TOPICAL at 08:55

## 2019-04-13 RX ADMIN — VITAMIN D, TAB 1000IU (100/BT) 1000 UNITS: 25 TAB at 08:54

## 2019-04-13 RX ADMIN — FAMOTIDINE 20 MG: 20 TABLET ORAL at 17:33

## 2019-04-13 RX ADMIN — ENOXAPARIN SODIUM 40 MG: 40 INJECTION SUBCUTANEOUS at 08:53

## 2019-04-13 RX ADMIN — Medication 250 MG: at 08:54

## 2019-04-13 RX ADMIN — METRONIDAZOLE 500 MG: 500 TABLET ORAL at 05:49

## 2019-04-13 RX ADMIN — FAMOTIDINE 20 MG: 20 TABLET ORAL at 08:54

## 2019-04-13 RX ADMIN — CYANOCOBALAMIN TAB 1000 MCG 500 MCG: 1000 TAB at 08:53

## 2019-04-14 RX ADMIN — ERTAPENEM SODIUM 1000 MG: 1 INJECTION, POWDER, LYOPHILIZED, FOR SOLUTION INTRAMUSCULAR; INTRAVENOUS at 17:45

## 2019-04-14 RX ADMIN — Medication 250 MG: at 09:05

## 2019-04-14 RX ADMIN — NYSTATIN: 100000 POWDER TOPICAL at 09:06

## 2019-04-14 RX ADMIN — NYSTATIN: 100000 POWDER TOPICAL at 22:06

## 2019-04-14 RX ADMIN — CYANOCOBALAMIN TAB 1000 MCG 500 MCG: 1000 TAB at 09:04

## 2019-04-14 RX ADMIN — METRONIDAZOLE 500 MG: 500 TABLET ORAL at 13:21

## 2019-04-14 RX ADMIN — METRONIDAZOLE 500 MG: 500 TABLET ORAL at 06:40

## 2019-04-14 RX ADMIN — FAMOTIDINE 20 MG: 20 TABLET ORAL at 09:05

## 2019-04-14 RX ADMIN — Medication 250 MG: at 17:45

## 2019-04-14 RX ADMIN — VITAMIN D, TAB 1000IU (100/BT) 1000 UNITS: 25 TAB at 09:05

## 2019-04-14 RX ADMIN — FAMOTIDINE 20 MG: 20 TABLET ORAL at 17:45

## 2019-04-14 RX ADMIN — OYSTER SHELL CALCIUM WITH VITAMIN D 1 TABLET: 500; 200 TABLET, FILM COATED ORAL at 09:06

## 2019-04-14 RX ADMIN — METRONIDAZOLE 500 MG: 500 TABLET ORAL at 22:03

## 2019-04-14 RX ADMIN — ENOXAPARIN SODIUM 40 MG: 40 INJECTION SUBCUTANEOUS at 09:05

## 2019-04-14 RX ADMIN — B-COMPLEX W/ C & FOLIC ACID TAB 1 TABLET: TAB at 09:05

## 2019-04-15 ENCOUNTER — TELEPHONE (OUTPATIENT)
Dept: OBGYN CLINIC | Facility: HOSPITAL | Age: 69
End: 2019-04-15

## 2019-04-15 PROCEDURE — 99232 SBSQ HOSP IP/OBS MODERATE 35: CPT | Performed by: INTERNAL MEDICINE

## 2019-04-15 PROCEDURE — 97110 THERAPEUTIC EXERCISES: CPT

## 2019-04-15 PROCEDURE — 97530 THERAPEUTIC ACTIVITIES: CPT

## 2019-04-15 RX ADMIN — FAMOTIDINE 20 MG: 20 TABLET ORAL at 17:02

## 2019-04-15 RX ADMIN — VITAMIN D, TAB 1000IU (100/BT) 1000 UNITS: 25 TAB at 08:16

## 2019-04-15 RX ADMIN — METRONIDAZOLE 500 MG: 500 TABLET ORAL at 22:14

## 2019-04-15 RX ADMIN — METRONIDAZOLE 500 MG: 500 TABLET ORAL at 06:43

## 2019-04-15 RX ADMIN — FAMOTIDINE 20 MG: 20 TABLET ORAL at 08:16

## 2019-04-15 RX ADMIN — ENOXAPARIN SODIUM 40 MG: 40 INJECTION SUBCUTANEOUS at 08:17

## 2019-04-15 RX ADMIN — NYSTATIN: 100000 POWDER TOPICAL at 22:15

## 2019-04-15 RX ADMIN — CYANOCOBALAMIN TAB 1000 MCG 500 MCG: 1000 TAB at 08:16

## 2019-04-15 RX ADMIN — Medication 250 MG: at 08:16

## 2019-04-15 RX ADMIN — METRONIDAZOLE 500 MG: 500 TABLET ORAL at 14:18

## 2019-04-15 RX ADMIN — B-COMPLEX W/ C & FOLIC ACID TAB 1 TABLET: TAB at 08:16

## 2019-04-15 RX ADMIN — Medication 250 MG: at 17:02

## 2019-04-15 RX ADMIN — OYSTER SHELL CALCIUM WITH VITAMIN D 1 TABLET: 500; 200 TABLET, FILM COATED ORAL at 08:16

## 2019-04-15 RX ADMIN — NYSTATIN 1 APPLICATION: 100000 POWDER TOPICAL at 08:24

## 2019-04-15 RX ADMIN — ERTAPENEM SODIUM 1000 MG: 1 INJECTION, POWDER, LYOPHILIZED, FOR SOLUTION INTRAMUSCULAR; INTRAVENOUS at 17:04

## 2019-04-16 PROCEDURE — 99308 SBSQ NF CARE LOW MDM 20: CPT | Performed by: INTERNAL MEDICINE

## 2019-04-16 PROCEDURE — 99232 SBSQ HOSP IP/OBS MODERATE 35: CPT | Performed by: INTERNAL MEDICINE

## 2019-04-16 PROCEDURE — 97530 THERAPEUTIC ACTIVITIES: CPT

## 2019-04-16 PROCEDURE — 97110 THERAPEUTIC EXERCISES: CPT

## 2019-04-16 RX ADMIN — METRONIDAZOLE 500 MG: 500 TABLET ORAL at 06:50

## 2019-04-16 RX ADMIN — NYSTATIN: 100000 POWDER TOPICAL at 23:00

## 2019-04-16 RX ADMIN — ENOXAPARIN SODIUM 40 MG: 40 INJECTION SUBCUTANEOUS at 08:30

## 2019-04-16 RX ADMIN — NYSTATIN: 100000 POWDER TOPICAL at 08:36

## 2019-04-16 RX ADMIN — VITAMIN D, TAB 1000IU (100/BT) 1000 UNITS: 25 TAB at 08:30

## 2019-04-16 RX ADMIN — FAMOTIDINE 20 MG: 20 TABLET ORAL at 17:17

## 2019-04-16 RX ADMIN — B-COMPLEX W/ C & FOLIC ACID TAB 1 TABLET: TAB at 08:30

## 2019-04-16 RX ADMIN — FAMOTIDINE 20 MG: 20 TABLET ORAL at 08:31

## 2019-04-16 RX ADMIN — Medication 250 MG: at 08:30

## 2019-04-16 RX ADMIN — ERTAPENEM SODIUM 1000 MG: 1 INJECTION, POWDER, LYOPHILIZED, FOR SOLUTION INTRAMUSCULAR; INTRAVENOUS at 17:16

## 2019-04-16 RX ADMIN — METRONIDAZOLE 500 MG: 500 TABLET ORAL at 22:49

## 2019-04-16 RX ADMIN — METRONIDAZOLE 500 MG: 500 TABLET ORAL at 13:44

## 2019-04-16 RX ADMIN — OYSTER SHELL CALCIUM WITH VITAMIN D 1 TABLET: 500; 200 TABLET, FILM COATED ORAL at 06:50

## 2019-04-16 RX ADMIN — CYANOCOBALAMIN TAB 1000 MCG 500 MCG: 1000 TAB at 08:30

## 2019-04-16 RX ADMIN — Medication 250 MG: at 17:17

## 2019-04-17 LAB
ALBUMIN SERPL BCP-MCNC: 3.5 G/DL (ref 3–5.2)
ALP SERPL-CCNC: 59 U/L (ref 43–122)
ALT SERPL W P-5'-P-CCNC: 22 U/L (ref 9–52)
ANION GAP SERPL CALCULATED.3IONS-SCNC: 6 MMOL/L (ref 5–14)
AST SERPL W P-5'-P-CCNC: 31 U/L (ref 14–36)
BASOPHILS # BLD AUTO: 0 THOUSANDS/ΜL (ref 0–0.1)
BASOPHILS NFR BLD AUTO: 1 % (ref 0–1)
BILIRUB SERPL-MCNC: 0.2 MG/DL
BUN SERPL-MCNC: 29 MG/DL (ref 5–25)
CALCIUM SERPL-MCNC: 8.9 MG/DL (ref 8.4–10.2)
CHLORIDE SERPL-SCNC: 104 MMOL/L (ref 97–108)
CO2 SERPL-SCNC: 27 MMOL/L (ref 22–30)
CREAT SERPL-MCNC: 0.55 MG/DL (ref 0.6–1.2)
EOSINOPHIL # BLD AUTO: 0.2 THOUSAND/ΜL (ref 0–0.4)
EOSINOPHIL NFR BLD AUTO: 4 % (ref 0–6)
ERYTHROCYTE [DISTWIDTH] IN BLOOD BY AUTOMATED COUNT: 17.7 %
GFR SERPL CREATININE-BSD FRML MDRD: 97 ML/MIN/1.73SQ M
GLUCOSE P FAST SERPL-MCNC: 83 MG/DL (ref 70–99)
GLUCOSE SERPL-MCNC: 83 MG/DL (ref 70–99)
HCT VFR BLD AUTO: 35.4 % (ref 36–46)
HGB BLD-MCNC: 11.7 G/DL (ref 12–16)
LYMPHOCYTES # BLD AUTO: 2.5 THOUSANDS/ΜL (ref 0.5–4)
LYMPHOCYTES NFR BLD AUTO: 41 % (ref 25–45)
MCH RBC QN AUTO: 28.3 PG (ref 26–34)
MCHC RBC AUTO-ENTMCNC: 33 G/DL (ref 31–36)
MCV RBC AUTO: 86 FL (ref 80–100)
MONOCYTES # BLD AUTO: 0.5 THOUSAND/ΜL (ref 0.2–0.9)
MONOCYTES NFR BLD AUTO: 8 % (ref 1–10)
NEUTROPHILS # BLD AUTO: 2.8 THOUSANDS/ΜL (ref 1.8–7.8)
NEUTS SEG NFR BLD AUTO: 46 % (ref 45–65)
PLATELET # BLD AUTO: 311 THOUSANDS/UL (ref 150–450)
PMV BLD AUTO: 8.5 FL (ref 8.9–12.7)
POTASSIUM SERPL-SCNC: 4 MMOL/L (ref 3.6–5)
PROT SERPL-MCNC: 6.5 G/DL (ref 5.9–8.4)
RBC # BLD AUTO: 4.13 MILLION/UL (ref 4–5.2)
SODIUM SERPL-SCNC: 137 MMOL/L (ref 137–147)
WBC # BLD AUTO: 6.1 THOUSAND/UL (ref 4.5–11)

## 2019-04-17 PROCEDURE — 97530 THERAPEUTIC ACTIVITIES: CPT

## 2019-04-17 PROCEDURE — 97110 THERAPEUTIC EXERCISES: CPT

## 2019-04-17 PROCEDURE — 99232 SBSQ HOSP IP/OBS MODERATE 35: CPT | Performed by: INTERNAL MEDICINE

## 2019-04-17 PROCEDURE — 80053 COMPREHEN METABOLIC PANEL: CPT | Performed by: INTERNAL MEDICINE

## 2019-04-17 PROCEDURE — 85025 COMPLETE CBC W/AUTO DIFF WBC: CPT | Performed by: INTERNAL MEDICINE

## 2019-04-17 RX ADMIN — NYSTATIN: 100000 POWDER TOPICAL at 21:28

## 2019-04-17 RX ADMIN — METRONIDAZOLE 500 MG: 500 TABLET ORAL at 06:09

## 2019-04-17 RX ADMIN — FAMOTIDINE 20 MG: 20 TABLET ORAL at 17:01

## 2019-04-17 RX ADMIN — CYANOCOBALAMIN TAB 1000 MCG 500 MCG: 1000 TAB at 09:03

## 2019-04-17 RX ADMIN — ERTAPENEM SODIUM 1000 MG: 1 INJECTION, POWDER, LYOPHILIZED, FOR SOLUTION INTRAMUSCULAR; INTRAVENOUS at 16:52

## 2019-04-17 RX ADMIN — METRONIDAZOLE 500 MG: 500 TABLET ORAL at 13:55

## 2019-04-17 RX ADMIN — Medication 250 MG: at 17:01

## 2019-04-17 RX ADMIN — VITAMIN D, TAB 1000IU (100/BT) 1000 UNITS: 25 TAB at 09:03

## 2019-04-17 RX ADMIN — FAMOTIDINE 20 MG: 20 TABLET ORAL at 09:04

## 2019-04-17 RX ADMIN — NYSTATIN 1 APPLICATION: 100000 POWDER TOPICAL at 09:06

## 2019-04-17 RX ADMIN — ENOXAPARIN SODIUM 40 MG: 40 INJECTION SUBCUTANEOUS at 09:03

## 2019-04-17 RX ADMIN — Medication 250 MG: at 09:03

## 2019-04-17 RX ADMIN — B-COMPLEX W/ C & FOLIC ACID TAB 1 TABLET: TAB at 09:03

## 2019-04-17 RX ADMIN — METRONIDAZOLE 500 MG: 500 TABLET ORAL at 22:46

## 2019-04-17 RX ADMIN — OYSTER SHELL CALCIUM WITH VITAMIN D 1 TABLET: 500; 200 TABLET, FILM COATED ORAL at 09:03

## 2019-04-18 PROCEDURE — 97530 THERAPEUTIC ACTIVITIES: CPT

## 2019-04-18 PROCEDURE — 99232 SBSQ HOSP IP/OBS MODERATE 35: CPT | Performed by: INTERNAL MEDICINE

## 2019-04-18 PROCEDURE — 97110 THERAPEUTIC EXERCISES: CPT

## 2019-04-18 RX ADMIN — Medication 250 MG: at 09:21

## 2019-04-18 RX ADMIN — OYSTER SHELL CALCIUM WITH VITAMIN D 1 TABLET: 500; 200 TABLET, FILM COATED ORAL at 09:21

## 2019-04-18 RX ADMIN — NYSTATIN: 100000 POWDER TOPICAL at 18:46

## 2019-04-18 RX ADMIN — METRONIDAZOLE 500 MG: 500 TABLET ORAL at 21:48

## 2019-04-18 RX ADMIN — Medication 250 MG: at 17:51

## 2019-04-18 RX ADMIN — FAMOTIDINE 20 MG: 20 TABLET ORAL at 09:21

## 2019-04-18 RX ADMIN — METRONIDAZOLE 500 MG: 500 TABLET ORAL at 06:32

## 2019-04-18 RX ADMIN — FAMOTIDINE 20 MG: 20 TABLET ORAL at 17:51

## 2019-04-18 RX ADMIN — METRONIDAZOLE 500 MG: 500 TABLET ORAL at 14:26

## 2019-04-18 RX ADMIN — CYANOCOBALAMIN TAB 1000 MCG 500 MCG: 1000 TAB at 09:22

## 2019-04-18 RX ADMIN — NYSTATIN: 100000 POWDER TOPICAL at 21:50

## 2019-04-18 RX ADMIN — ENOXAPARIN SODIUM 40 MG: 40 INJECTION SUBCUTANEOUS at 09:22

## 2019-04-18 RX ADMIN — ERTAPENEM SODIUM 1000 MG: 1 INJECTION, POWDER, LYOPHILIZED, FOR SOLUTION INTRAMUSCULAR; INTRAVENOUS at 17:51

## 2019-04-18 RX ADMIN — B-COMPLEX W/ C & FOLIC ACID TAB 1 TABLET: TAB at 09:21

## 2019-04-18 RX ADMIN — VITAMIN D, TAB 1000IU (100/BT) 1000 UNITS: 25 TAB at 09:21

## 2019-04-18 RX ADMIN — MELATONIN TAB 3 MG 3 MG: 3 TAB at 21:48

## 2019-04-18 RX ADMIN — PSYLLIUM HUSK 1 PACKET: 3.4 POWDER ORAL at 17:51

## 2019-04-19 ENCOUNTER — APPOINTMENT (OUTPATIENT)
Dept: RADIOLOGY | Facility: HOSPITAL | Age: 69
DRG: 573 | End: 2019-04-19
Payer: COMMERCIAL

## 2019-04-19 VITALS
OXYGEN SATURATION: 98 % | SYSTOLIC BLOOD PRESSURE: 129 MMHG | RESPIRATION RATE: 19 BRPM | HEART RATE: 63 BPM | DIASTOLIC BLOOD PRESSURE: 73 MMHG | TEMPERATURE: 97.8 F | WEIGHT: 122.8 LBS | HEIGHT: 60 IN | BODY MASS INDEX: 24.11 KG/M2

## 2019-04-19 PROCEDURE — 99316 NF DSCHRG MGMT 30 MIN+: CPT | Performed by: HOSPITALIST

## 2019-04-19 PROCEDURE — 99232 SBSQ HOSP IP/OBS MODERATE 35: CPT | Performed by: INTERNAL MEDICINE

## 2019-04-19 PROCEDURE — 71045 X-RAY EXAM CHEST 1 VIEW: CPT

## 2019-04-19 RX ORDER — HYDROCODONE BITARTRATE AND ACETAMINOPHEN 5; 325 MG/1; MG/1
1 TABLET ORAL EVERY 6 HOURS PRN
Qty: 30 TABLET | Refills: 0 | Status: SHIPPED | OUTPATIENT
Start: 2019-04-19 | End: 2019-04-19 | Stop reason: HOSPADM

## 2019-04-19 RX ORDER — SACCHAROMYCES BOULARDII 250 MG
250 CAPSULE ORAL 2 TIMES DAILY
Qty: 60 CAPSULE | Refills: 0 | Status: ON HOLD | OUTPATIENT
Start: 2019-04-19 | End: 2021-06-13 | Stop reason: ALTCHOICE

## 2019-04-19 RX ORDER — OXYCODONE HYDROCHLORIDE 5 MG/1
5 TABLET ORAL EVERY 6 HOURS PRN
Qty: 30 TABLET | Refills: 0 | Status: SHIPPED | OUTPATIENT
Start: 2019-04-19 | End: 2019-04-19 | Stop reason: HOSPADM

## 2019-04-19 RX ORDER — METRONIDAZOLE 500 MG/1
500 TABLET ORAL EVERY 8 HOURS SCHEDULED
Qty: 39 TABLET | Refills: 0 | Status: SHIPPED | OUTPATIENT
Start: 2019-04-19 | End: 2019-05-02

## 2019-04-19 RX ORDER — METRONIDAZOLE 500 MG/1
500 TABLET ORAL EVERY 8 HOURS SCHEDULED
Refills: 0
Start: 2019-04-19 | End: 2019-04-19

## 2019-04-19 RX ADMIN — VITAMIN D, TAB 1000IU (100/BT) 1000 UNITS: 25 TAB at 08:06

## 2019-04-19 RX ADMIN — OYSTER SHELL CALCIUM WITH VITAMIN D 1 TABLET: 500; 200 TABLET, FILM COATED ORAL at 08:06

## 2019-04-19 RX ADMIN — ERTAPENEM SODIUM 1000 MG: 1 INJECTION, POWDER, LYOPHILIZED, FOR SOLUTION INTRAMUSCULAR; INTRAVENOUS at 16:28

## 2019-04-19 RX ADMIN — METRONIDAZOLE 500 MG: 500 TABLET ORAL at 14:33

## 2019-04-19 RX ADMIN — FAMOTIDINE 20 MG: 20 TABLET ORAL at 08:06

## 2019-04-19 RX ADMIN — ENOXAPARIN SODIUM 40 MG: 40 INJECTION SUBCUTANEOUS at 08:06

## 2019-04-19 RX ADMIN — NYSTATIN: 100000 POWDER TOPICAL at 08:08

## 2019-04-19 RX ADMIN — METRONIDAZOLE 500 MG: 500 TABLET ORAL at 05:58

## 2019-04-19 RX ADMIN — CYANOCOBALAMIN TAB 1000 MCG 500 MCG: 1000 TAB at 08:06

## 2019-04-19 RX ADMIN — Medication 250 MG: at 08:06

## 2019-04-19 RX ADMIN — B-COMPLEX W/ C & FOLIC ACID TAB 1 TABLET: TAB at 08:09

## 2019-04-23 ENCOUNTER — LAB REQUISITION (OUTPATIENT)
Dept: LAB | Facility: HOSPITAL | Age: 69
End: 2019-04-23
Payer: COMMERCIAL

## 2019-04-23 DIAGNOSIS — M46.28 OSTEOMYELITIS OF VERTEBRA, SACRAL AND SACROCOCCYGEAL REGION (HCC): ICD-10-CM

## 2019-04-23 DIAGNOSIS — B96.5 PSEUDOMONAS (AERUGINOSA) (MALLEI) (PSEUDOMALLEI) AS THE CAUSE OF DISEASES CLASSIFIED ELSEWHERE: ICD-10-CM

## 2019-04-23 LAB
ALBUMIN SERPL BCP-MCNC: 3.3 G/DL (ref 3.5–5)
ALP SERPL-CCNC: 70 U/L (ref 46–116)
ALT SERPL W P-5'-P-CCNC: 29 U/L (ref 12–78)
ANION GAP SERPL CALCULATED.3IONS-SCNC: 11 MMOL/L (ref 4–13)
AST SERPL W P-5'-P-CCNC: 31 U/L (ref 5–45)
BASOPHILS # BLD AUTO: 0.05 THOUSANDS/ΜL (ref 0–0.1)
BASOPHILS NFR BLD AUTO: 1 % (ref 0–1)
BILIRUB SERPL-MCNC: 0.2 MG/DL (ref 0.2–1)
BUN SERPL-MCNC: 10 MG/DL (ref 5–25)
CALCIUM SERPL-MCNC: 8.3 MG/DL (ref 8.3–10.1)
CHLORIDE SERPL-SCNC: 103 MMOL/L (ref 100–108)
CO2 SERPL-SCNC: 26 MMOL/L (ref 21–32)
CREAT SERPL-MCNC: 0.43 MG/DL (ref 0.6–1.3)
EOSINOPHIL # BLD AUTO: 0.13 THOUSAND/ΜL (ref 0–0.61)
EOSINOPHIL NFR BLD AUTO: 2 % (ref 0–6)
ERYTHROCYTE [DISTWIDTH] IN BLOOD BY AUTOMATED COUNT: 16.5 % (ref 11.6–15.1)
GFR SERPL CREATININE-BSD FRML MDRD: 105 ML/MIN/1.73SQ M
GLUCOSE SERPL-MCNC: 96 MG/DL (ref 65–140)
HCT VFR BLD AUTO: 35.4 % (ref 34.8–46.1)
HGB BLD-MCNC: 11.7 G/DL (ref 11.5–15.4)
IMM GRANULOCYTES # BLD AUTO: 0.02 THOUSAND/UL (ref 0–0.2)
IMM GRANULOCYTES NFR BLD AUTO: 0 % (ref 0–2)
LYMPHOCYTES # BLD AUTO: 1.96 THOUSANDS/ΜL (ref 0.6–4.47)
LYMPHOCYTES NFR BLD AUTO: 32 % (ref 14–44)
MCH RBC QN AUTO: 28.9 PG (ref 26.8–34.3)
MCHC RBC AUTO-ENTMCNC: 33.1 G/DL (ref 31.4–37.4)
MCV RBC AUTO: 87 FL (ref 82–98)
MONOCYTES # BLD AUTO: 0.63 THOUSAND/ΜL (ref 0.17–1.22)
MONOCYTES NFR BLD AUTO: 10 % (ref 4–12)
NEUTROPHILS # BLD AUTO: 3.37 THOUSANDS/ΜL (ref 1.85–7.62)
NEUTS SEG NFR BLD AUTO: 55 % (ref 43–75)
NRBC BLD AUTO-RTO: 0 /100 WBCS
PLATELET # BLD AUTO: 307 THOUSANDS/UL (ref 149–390)
PMV BLD AUTO: 11 FL (ref 8.9–12.7)
POTASSIUM SERPL-SCNC: 3.8 MMOL/L (ref 3.5–5.3)
PROT SERPL-MCNC: 6.4 G/DL (ref 6.4–8.2)
RBC # BLD AUTO: 4.05 MILLION/UL (ref 3.81–5.12)
SODIUM SERPL-SCNC: 140 MMOL/L (ref 136–145)
WBC # BLD AUTO: 6.16 THOUSAND/UL (ref 4.31–10.16)

## 2019-04-23 PROCEDURE — 80053 COMPREHEN METABOLIC PANEL: CPT | Performed by: INTERNAL MEDICINE

## 2019-04-23 PROCEDURE — 85025 COMPLETE CBC W/AUTO DIFF WBC: CPT | Performed by: INTERNAL MEDICINE

## 2019-06-11 PROCEDURE — 88304 TISSUE EXAM BY PATHOLOGIST: CPT | Performed by: PATHOLOGY

## 2019-06-12 ENCOUNTER — LAB REQUISITION (OUTPATIENT)
Dept: LAB | Facility: HOSPITAL | Age: 69
End: 2019-06-12
Payer: COMMERCIAL

## 2019-06-12 DIAGNOSIS — D49.2 NEOPLASM OF UNSPECIFIED BEHAVIOR OF BONE, SOFT TISSUE, AND SKIN: ICD-10-CM

## 2019-06-13 ENCOUNTER — OFFICE VISIT (OUTPATIENT)
Dept: OBGYN CLINIC | Facility: HOSPITAL | Age: 69
End: 2019-06-13
Payer: COMMERCIAL

## 2019-06-13 ENCOUNTER — HOSPITAL ENCOUNTER (OUTPATIENT)
Dept: RADIOLOGY | Facility: HOSPITAL | Age: 69
Discharge: HOME | End: 2019-06-13
Attending: ORTHOPAEDIC SURGERY
Payer: COMMERCIAL

## 2019-06-13 VITALS — SYSTOLIC BLOOD PRESSURE: 132 MMHG | DIASTOLIC BLOOD PRESSURE: 79 MMHG | HEART RATE: 92 BPM

## 2019-06-13 DIAGNOSIS — S72.402D CLOSED FRACTURE OF DISTAL END OF LEFT FEMUR WITH ROUTINE HEALING, UNSPECIFIED FRACTURE MORPHOLOGY, SUBSEQUENT ENCOUNTER: ICD-10-CM

## 2019-06-13 DIAGNOSIS — S72.402D CLOSED FRACTURE OF DISTAL END OF LEFT FEMUR WITH ROUTINE HEALING, UNSPECIFIED FRACTURE MORPHOLOGY, SUBSEQUENT ENCOUNTER: Primary | ICD-10-CM

## 2019-06-13 PROCEDURE — 73552 X-RAY EXAM OF FEMUR 2/>: CPT

## 2019-06-13 PROCEDURE — 99213 OFFICE O/P EST LOW 20 MIN: CPT | Performed by: ORTHOPAEDIC SURGERY

## 2019-06-25 PROCEDURE — 88304 TISSUE EXAM BY PATHOLOGIST: CPT | Performed by: PATHOLOGY

## 2019-06-25 PROCEDURE — 88312 SPECIAL STAINS GROUP 1: CPT | Performed by: PATHOLOGY

## 2019-06-26 ENCOUNTER — LAB REQUISITION (OUTPATIENT)
Dept: LAB | Facility: HOSPITAL | Age: 69
End: 2019-06-26
Payer: COMMERCIAL

## 2019-06-26 DIAGNOSIS — D49.2 NEOPLASM OF UNSPECIFIED BEHAVIOR OF BONE, SOFT TISSUE, AND SKIN: ICD-10-CM

## 2019-07-16 NOTE — ASSESSMENT & PLAN NOTE
BMI 19 9 most likely secondary to decreased calories intake  Will request nutritionist consult to optimize nutrition need
BMI 19 9 most likely secondary to decreased calories intake  Will request nutritionist consult to optimize nutrition need
Continue vitamin-D   and calcium supplement  Patient follows with endocrinologist Dr Anais Herrmann at [address]
Continue vitamin-D   and calcium supplement  Patient follows with endocrinologist Dr Celestino Barber at [address]
Continue vitamin-D   and calcium supplement  Patient follows with endocrinologist Dr Gigi Armstrong at [address]
Continue vitamin-D   and calcium supplement  Patient follows with endocrinologist Dr Jennifer Calvillo at [address] [facility] [address]
Hemoglobin 10 6  Very to 327 but that could be acute phase respond and iron panel still pending
Hemoglobin 10 6 and ferritin level is normal   Continue oral supplements and vitamins 
Hemoglobin 10 7 at baseline   Will repeat post hope hemoglobin hematocrit  Off iron supplement on B12 supplement  Follow up with PCP
Hemoglobin 10 7 at baseline and now dropped to 8 5 prob secondary to some blood loss with surgery and dilutional from iv fluids  check iron panel and ferritin stop iv fluids
Patient developed a purulent draining right hip pressure ulcer about a month ago after she was lying  on her right hip on a mirror she was unaware of     Dr Glen Gatica proceeded with debridement of the wound today in OR  Patient denies any pain given paraplegia at T 8 level  Continue DVT prophylaxis as per plastic surgeon  IV fluids
Patient developed a purulent draining right hip pressure ulcer for about a month after she was lying  on her right hip on a mirror she was unaware of and underwent debridement yesterday by dr Salima Wylie  now on teflaro as per ID    WILL DISCUSS ANTIBIOTIC USE WITH INFECTIOUS DISEASE and discussed with case management about discharge planning  Patient denies any pain given paraplegia at T 8 level  Continue DVT prophylaxis as per plastic surgeon
Present on admission  Stage 2-3 sacral decubitus ulcer  Unable to assess  depth as it tracts deep is 2x2 cm sacral decubitus pressure ulcer   Patient following with dr Jackie Hermosillo plastic surgeon  further as per them  On antibiotics
Resolved
Supportive care  Patient is wheelchair-bound
Supportive care  Patient is wheelchair-bound  PT OT evaluation
Supportive care  Patient is wheelchair-bound  PT OT evaluation
Supportive care  Patient is wheelchair-bound  PT OT evaluation- to see
Unable to assess  depth and size , wound covered with dressing  Patient follows with plastic surgeon ,wound care specialist
Unable to assess  depth as it tracts deep is 2x2 cm sacral decubitus pressure ulcer   Patient following with dr Abrahan Pérez plastic surgeon  further as per them  On antibiotics
Will repeat BMP
· Hives on her back secondary to cefepime now resolved cefepime was stopped Benadryl was given
· Patient developed a purulent draining right hip pressure ulcer for about a month after she was lying  on her right hip on a mirror she was unaware of and underwent debridement yesterday by dr Jesus Cedillo  · right hip decubitus ulcer infected was present on admission  It was draining pus on admission and she was taken to the OR for debridement by Dr Jesus Cedillo  · Cefepime was stopped she had developed allergic reaction to it Zosyn placed by ID final tissue cultures Morganella and Enterococcus species   · The ulcer is deep   I discussed with Plastic surgery Dr Jesus Cedillo and placed on a 2 week course of Bactrim and have her follow up in the office to see if any further interventions required 
· Patient developed a purulent draining right hip pressure ulcer for about a month after she was lying  on her right hip on a mirror she was unaware of and underwent debridement yesterday by dr Lisandra Lozoya  now on teflaro as per ID  · Cefepime was stopped she had developed allergic reaction to it Zosyn placed by ID final tissue cultures Morganella and Enterococcus species final antibiotic duration to be determined by Infectious Disease there is also a tract and the ulcer    Plastic surgery monitor for 48 hours if it does not resolve plan flow flap on Monday will remain inpatient
· Right  hip s/p I and d - she was placed on cefepime but developed allergic reaction with rash - stop  · Her tissue culture grew grew Morganella and  enterococci is for now ID placed on Zosyn final antibiotics duration t i d  · There is a trach at the ulcer area    Plastic surgery awaiting 48 hours to see if it resolves not she is planned to have a flap done on Monday does which she will remain inpatient
· Right  hip s/p I and d - she was placed on cefepime but developed allergic reaction with rash - stop  · Her tissue culture grew grew Morganella and  enterococci is for now ID placed on Zosyn final antibiotics duration t i d  · There is a trach at the ulcer area    Plastic surgery awaiting 48 hours to see if it resolves not she is planned to have a flap done on Monday does which she will remain inpatient
14

## 2019-08-02 PROCEDURE — 88304 TISSUE EXAM BY PATHOLOGIST: CPT | Performed by: PATHOLOGY

## 2019-08-05 ENCOUNTER — LAB REQUISITION (OUTPATIENT)
Dept: LAB | Facility: HOSPITAL | Age: 69
End: 2019-08-05
Payer: COMMERCIAL

## 2019-08-05 DIAGNOSIS — D49.2 NEOPLASM OF UNSPECIFIED BEHAVIOR OF BONE, SOFT TISSUE, AND SKIN: ICD-10-CM

## 2019-08-30 PROCEDURE — 88304 TISSUE EXAM BY PATHOLOGIST: CPT | Performed by: PATHOLOGY

## 2019-08-31 ENCOUNTER — LAB REQUISITION (OUTPATIENT)
Dept: LAB | Facility: HOSPITAL | Age: 69
End: 2019-08-31
Payer: COMMERCIAL

## 2019-08-31 DIAGNOSIS — D49.2 NEOPLASM OF UNSPECIFIED BEHAVIOR OF BONE, SOFT TISSUE, AND SKIN: ICD-10-CM

## 2019-09-13 PROCEDURE — 88304 TISSUE EXAM BY PATHOLOGIST: CPT | Performed by: PATHOLOGY

## 2019-09-14 ENCOUNTER — LAB REQUISITION (OUTPATIENT)
Dept: LAB | Facility: HOSPITAL | Age: 69
End: 2019-09-14
Payer: COMMERCIAL

## 2019-09-14 DIAGNOSIS — D49.2 NEOPLASM OF UNSPECIFIED BEHAVIOR OF BONE, SOFT TISSUE, AND SKIN: ICD-10-CM

## 2019-10-25 PROCEDURE — 88304 TISSUE EXAM BY PATHOLOGIST: CPT | Performed by: PATHOLOGY

## 2019-10-26 ENCOUNTER — LAB REQUISITION (OUTPATIENT)
Dept: LAB | Facility: HOSPITAL | Age: 69
End: 2019-10-26
Payer: COMMERCIAL

## 2019-10-26 DIAGNOSIS — D49.2 NEOPLASM OF UNSPECIFIED BEHAVIOR OF BONE, SOFT TISSUE, AND SKIN: ICD-10-CM

## 2019-11-10 ENCOUNTER — APPOINTMENT (EMERGENCY)
Dept: RADIOLOGY | Facility: HOSPITAL | Age: 69
DRG: 853 | End: 2019-11-10
Payer: COMMERCIAL

## 2019-11-10 ENCOUNTER — ANESTHESIA (INPATIENT)
Dept: PERIOP | Facility: HOSPITAL | Age: 69
DRG: 853 | End: 2019-11-10
Payer: COMMERCIAL

## 2019-11-10 ENCOUNTER — ANESTHESIA EVENT (INPATIENT)
Dept: PERIOP | Facility: HOSPITAL | Age: 69
DRG: 853 | End: 2019-11-10
Payer: COMMERCIAL

## 2019-11-10 ENCOUNTER — HOSPITAL ENCOUNTER (INPATIENT)
Facility: HOSPITAL | Age: 69
LOS: 10 days | Discharge: NURSING, REHAB OR LONG-TERM CARE | DRG: 853 | End: 2019-11-20
Attending: EMERGENCY MEDICINE | Admitting: SURGERY
Payer: COMMERCIAL

## 2019-11-10 DIAGNOSIS — A41.9 SEPSIS (HCC): ICD-10-CM

## 2019-11-10 DIAGNOSIS — G82.20 PARAPLEGIA FOLLOWING SPINAL CORD INJURY (HCC): ICD-10-CM

## 2019-11-10 DIAGNOSIS — L08.9 COMPLICATED WOUND INFECTION: ICD-10-CM

## 2019-11-10 DIAGNOSIS — A04.72 C. DIFFICILE DIARRHEA: ICD-10-CM

## 2019-11-10 DIAGNOSIS — L89.150 PRESSURE INJURY OF SACRAL REGION, UNSTAGEABLE (HCC): Chronic | ICD-10-CM

## 2019-11-10 DIAGNOSIS — M86.49 CHRONIC OSTEOMYELITIS WITH DRAINING SINUS OF MULTIPLE SITES (HCC): ICD-10-CM

## 2019-11-10 DIAGNOSIS — A41.4 SEPSIS DUE TO ANAEROBES (HCC): ICD-10-CM

## 2019-11-10 DIAGNOSIS — L89.90 DECUBITUS ULCER: Primary | ICD-10-CM

## 2019-11-10 DIAGNOSIS — R53.1 WEAKNESS: ICD-10-CM

## 2019-11-10 DIAGNOSIS — T14.8XXA COMPLICATED WOUND INFECTION: ICD-10-CM

## 2019-11-10 DIAGNOSIS — E86.0 DEHYDRATION: ICD-10-CM

## 2019-11-10 LAB
ABO GROUP BLD: NORMAL
ALBUMIN SERPL BCP-MCNC: 1.7 G/DL (ref 3.5–5)
ALP SERPL-CCNC: 139 U/L (ref 46–116)
ALT SERPL W P-5'-P-CCNC: 24 U/L (ref 12–78)
ANION GAP SERPL CALCULATED.3IONS-SCNC: 11 MMOL/L (ref 4–13)
ANION GAP SERPL CALCULATED.3IONS-SCNC: 13 MMOL/L (ref 4–13)
APTT PPP: 39 SECONDS (ref 23–37)
AST SERPL W P-5'-P-CCNC: 28 U/L (ref 5–45)
BACTERIA UR QL AUTO: ABNORMAL /HPF
BASE EX.OXY STD BLDV CALC-SCNC: 97.7 % (ref 60–80)
BASE EXCESS BLDV CALC-SCNC: -6.7 MMOL/L
BASOPHILS # BLD AUTO: 0.05 THOUSANDS/ΜL (ref 0–0.1)
BASOPHILS NFR BLD AUTO: 0 % (ref 0–1)
BILIRUB SERPL-MCNC: 0.6 MG/DL (ref 0.2–1)
BILIRUB UR QL STRIP: ABNORMAL
BLD GP AB SCN SERPL QL: NEGATIVE
BUN SERPL-MCNC: 10 MG/DL (ref 5–25)
BUN SERPL-MCNC: 15 MG/DL (ref 5–25)
CALCIUM SERPL-MCNC: 8 MG/DL (ref 8.3–10.1)
CALCIUM SERPL-MCNC: 8.8 MG/DL (ref 8.3–10.1)
CHLORIDE SERPL-SCNC: 104 MMOL/L (ref 100–108)
CHLORIDE SERPL-SCNC: 97 MMOL/L (ref 100–108)
CK SERPL-CCNC: 124 U/L (ref 26–192)
CLARITY UR: ABNORMAL
CO2 SERPL-SCNC: 19 MMOL/L (ref 21–32)
CO2 SERPL-SCNC: 22 MMOL/L (ref 21–32)
COLOR UR: ABNORMAL
CREAT SERPL-MCNC: 0.31 MG/DL (ref 0.6–1.3)
CREAT SERPL-MCNC: 0.52 MG/DL (ref 0.6–1.3)
EOSINOPHIL # BLD AUTO: 0.01 THOUSAND/ΜL (ref 0–0.61)
EOSINOPHIL NFR BLD AUTO: 0 % (ref 0–6)
ERYTHROCYTE [DISTWIDTH] IN BLOOD BY AUTOMATED COUNT: 17.7 % (ref 11.6–15.1)
GFR SERPL CREATININE-BSD FRML MDRD: 116 ML/MIN/1.73SQ M
GFR SERPL CREATININE-BSD FRML MDRD: 98 ML/MIN/1.73SQ M
GLUCOSE SERPL-MCNC: 112 MG/DL (ref 65–140)
GLUCOSE SERPL-MCNC: 146 MG/DL (ref 65–140)
GLUCOSE UR STRIP-MCNC: NEGATIVE MG/DL
HCO3 BLDV-SCNC: 17.8 MMOL/L (ref 24–30)
HCT VFR BLD AUTO: 30.6 % (ref 34.8–46.1)
HGB BLD-MCNC: 9.1 G/DL (ref 11.5–15.4)
HGB UR QL STRIP.AUTO: ABNORMAL
IMM GRANULOCYTES # BLD AUTO: 0.22 THOUSAND/UL (ref 0–0.2)
IMM GRANULOCYTES NFR BLD AUTO: 1 % (ref 0–2)
INR PPP: 1.4 (ref 0.84–1.19)
KETONES UR STRIP-MCNC: ABNORMAL MG/DL
LACTATE SERPL-SCNC: 1.7 MMOL/L (ref 0.5–2)
LACTATE SERPL-SCNC: 2.1 MMOL/L (ref 0.5–2)
LEUKOCYTE ESTERASE UR QL STRIP: ABNORMAL
LYMPHOCYTES # BLD AUTO: 1.5 THOUSANDS/ΜL (ref 0.6–4.47)
LYMPHOCYTES NFR BLD AUTO: 7 % (ref 14–44)
MAGNESIUM SERPL-MCNC: 1.6 MG/DL (ref 1.6–2.6)
MCH RBC QN AUTO: 22.2 PG (ref 26.8–34.3)
MCHC RBC AUTO-ENTMCNC: 29.7 G/DL (ref 31.4–37.4)
MCV RBC AUTO: 75 FL (ref 82–98)
MONOCYTES # BLD AUTO: 1.06 THOUSAND/ΜL (ref 0.17–1.22)
MONOCYTES NFR BLD AUTO: 5 % (ref 4–12)
NEUTROPHILS # BLD AUTO: 18.36 THOUSANDS/ΜL (ref 1.85–7.62)
NEUTS SEG NFR BLD AUTO: 87 % (ref 43–75)
NITRITE UR QL STRIP: NEGATIVE
NON-SQ EPI CELLS URNS QL MICRO: ABNORMAL /HPF
NRBC BLD AUTO-RTO: 0 /100 WBCS
O2 CT BLDV-SCNC: 9.3 ML/DL
PCO2 BLDV: 30.9 MM HG (ref 42–50)
PH BLDV: 7.38 [PH] (ref 7.3–7.4)
PH UR STRIP.AUTO: 6 [PH]
PHOSPHATE SERPL-MCNC: 3.9 MG/DL (ref 2.3–4.1)
PLATELET # BLD AUTO: 791 THOUSANDS/UL (ref 149–390)
PMV BLD AUTO: 9.3 FL (ref 8.9–12.7)
PO2 BLDV: 226.3 MM HG (ref 35–45)
POTASSIUM SERPL-SCNC: 3.6 MMOL/L (ref 3.5–5.3)
POTASSIUM SERPL-SCNC: 4 MMOL/L (ref 3.5–5.3)
PROCALCITONIN SERPL-MCNC: 2.91 NG/ML
PROT SERPL-MCNC: 7.6 G/DL (ref 6.4–8.2)
PROT UR STRIP-MCNC: ABNORMAL MG/DL
PROTHROMBIN TIME: 16.7 SECONDS (ref 11.6–14.5)
RBC # BLD AUTO: 4.1 MILLION/UL (ref 3.81–5.12)
RBC #/AREA URNS AUTO: ABNORMAL /HPF
RH BLD: POSITIVE
SODIUM SERPL-SCNC: 132 MMOL/L (ref 136–145)
SODIUM SERPL-SCNC: 134 MMOL/L (ref 136–145)
SP GR UR STRIP.AUTO: 1.02 (ref 1–1.03)
SPECIMEN EXPIRATION DATE: NORMAL
UROBILINOGEN UR QL STRIP.AUTO: 1 E.U./DL
WBC # BLD AUTO: 21.2 THOUSAND/UL (ref 4.31–10.16)
WBC #/AREA URNS AUTO: ABNORMAL /HPF

## 2019-11-10 PROCEDURE — 83605 ASSAY OF LACTIC ACID: CPT | Performed by: SURGERY

## 2019-11-10 PROCEDURE — P9016 RBC LEUKOCYTES REDUCED: HCPCS

## 2019-11-10 PROCEDURE — 80048 BASIC METABOLIC PNL TOTAL CA: CPT | Performed by: SURGERY

## 2019-11-10 PROCEDURE — 85027 COMPLETE CBC AUTOMATED: CPT | Performed by: SURGERY

## 2019-11-10 PROCEDURE — 99223 1ST HOSP IP/OBS HIGH 75: CPT | Performed by: SURGERY

## 2019-11-10 PROCEDURE — 83735 ASSAY OF MAGNESIUM: CPT | Performed by: SURGERY

## 2019-11-10 PROCEDURE — 99285 EMERGENCY DEPT VISIT HI MDM: CPT

## 2019-11-10 PROCEDURE — 84145 PROCALCITONIN (PCT): CPT | Performed by: EMERGENCY MEDICINE

## 2019-11-10 PROCEDURE — 86923 COMPATIBILITY TEST ELECTRIC: CPT

## 2019-11-10 PROCEDURE — 87147 CULTURE TYPE IMMUNOLOGIC: CPT | Performed by: EMERGENCY MEDICINE

## 2019-11-10 PROCEDURE — 96368 THER/DIAG CONCURRENT INF: CPT

## 2019-11-10 PROCEDURE — 86850 RBC ANTIBODY SCREEN: CPT | Performed by: NURSE ANESTHETIST, CERTIFIED REGISTERED

## 2019-11-10 PROCEDURE — 84100 ASSAY OF PHOSPHORUS: CPT | Performed by: SURGERY

## 2019-11-10 PROCEDURE — 84132 ASSAY OF SERUM POTASSIUM: CPT

## 2019-11-10 PROCEDURE — 11043 DBRDMT MUSC&/FSCA 1ST 20/<: CPT | Performed by: SURGERY

## 2019-11-10 PROCEDURE — 87077 CULTURE AEROBIC IDENTIFY: CPT | Performed by: EMERGENCY MEDICINE

## 2019-11-10 PROCEDURE — 86901 BLOOD TYPING SEROLOGIC RH(D): CPT | Performed by: NURSE ANESTHETIST, CERTIFIED REGISTERED

## 2019-11-10 PROCEDURE — 85014 HEMATOCRIT: CPT

## 2019-11-10 PROCEDURE — 87077 CULTURE AEROBIC IDENTIFY: CPT | Performed by: PHYSICIAN ASSISTANT

## 2019-11-10 PROCEDURE — 71250 CT THORAX DX C-: CPT

## 2019-11-10 PROCEDURE — 87040 BLOOD CULTURE FOR BACTERIA: CPT | Performed by: EMERGENCY MEDICINE

## 2019-11-10 PROCEDURE — 81001 URINALYSIS AUTO W/SCOPE: CPT | Performed by: EMERGENCY MEDICINE

## 2019-11-10 PROCEDURE — 87070 CULTURE OTHR SPECIMN AEROBIC: CPT | Performed by: EMERGENCY MEDICINE

## 2019-11-10 PROCEDURE — 86900 BLOOD TYPING SEROLOGIC ABO: CPT | Performed by: NURSE ANESTHETIST, CERTIFIED REGISTERED

## 2019-11-10 PROCEDURE — 96375 TX/PRO/DX INJ NEW DRUG ADDON: CPT

## 2019-11-10 PROCEDURE — 87086 URINE CULTURE/COLONY COUNT: CPT | Performed by: PHYSICIAN ASSISTANT

## 2019-11-10 PROCEDURE — 36415 COLL VENOUS BLD VENIPUNCTURE: CPT | Performed by: EMERGENCY MEDICINE

## 2019-11-10 PROCEDURE — 93005 ELECTROCARDIOGRAM TRACING: CPT

## 2019-11-10 PROCEDURE — 87186 SC STD MICRODIL/AGAR DIL: CPT | Performed by: EMERGENCY MEDICINE

## 2019-11-10 PROCEDURE — 82947 ASSAY GLUCOSE BLOOD QUANT: CPT

## 2019-11-10 PROCEDURE — 30233N1 TRANSFUSION OF NONAUTOLOGOUS RED BLOOD CELLS INTO PERIPHERAL VEIN, PERCUTANEOUS APPROACH: ICD-10-PCS | Performed by: SURGERY

## 2019-11-10 PROCEDURE — 82805 BLOOD GASES W/O2 SATURATION: CPT | Performed by: SURGERY

## 2019-11-10 PROCEDURE — 87186 SC STD MICRODIL/AGAR DIL: CPT | Performed by: PHYSICIAN ASSISTANT

## 2019-11-10 PROCEDURE — 82330 ASSAY OF CALCIUM: CPT

## 2019-11-10 PROCEDURE — 85730 THROMBOPLASTIN TIME PARTIAL: CPT | Performed by: EMERGENCY MEDICINE

## 2019-11-10 PROCEDURE — 85025 COMPLETE CBC W/AUTO DIFF WBC: CPT | Performed by: EMERGENCY MEDICINE

## 2019-11-10 PROCEDURE — 0KBP0ZZ EXCISION OF LEFT HIP MUSCLE, OPEN APPROACH: ICD-10-PCS | Performed by: SURGERY

## 2019-11-10 PROCEDURE — 85610 PROTHROMBIN TIME: CPT | Performed by: EMERGENCY MEDICINE

## 2019-11-10 PROCEDURE — 84295 ASSAY OF SERUM SODIUM: CPT

## 2019-11-10 PROCEDURE — 85007 BL SMEAR W/DIFF WBC COUNT: CPT | Performed by: SURGERY

## 2019-11-10 PROCEDURE — 96366 THER/PROPH/DIAG IV INF ADDON: CPT

## 2019-11-10 PROCEDURE — 83605 ASSAY OF LACTIC ACID: CPT | Performed by: EMERGENCY MEDICINE

## 2019-11-10 PROCEDURE — 82550 ASSAY OF CK (CPK): CPT | Performed by: EMERGENCY MEDICINE

## 2019-11-10 PROCEDURE — 96365 THER/PROPH/DIAG IV INF INIT: CPT

## 2019-11-10 PROCEDURE — 87205 SMEAR GRAM STAIN: CPT | Performed by: EMERGENCY MEDICINE

## 2019-11-10 PROCEDURE — 82803 BLOOD GASES ANY COMBINATION: CPT

## 2019-11-10 PROCEDURE — 11046 DBRDMT MUSC&/FSCA EA ADDL: CPT | Performed by: SURGERY

## 2019-11-10 PROCEDURE — 0KBN0ZZ EXCISION OF RIGHT HIP MUSCLE, OPEN APPROACH: ICD-10-PCS | Performed by: SURGERY

## 2019-11-10 PROCEDURE — 74176 CT ABD & PELVIS W/O CONTRAST: CPT

## 2019-11-10 PROCEDURE — 80053 COMPREHEN METABOLIC PANEL: CPT | Performed by: EMERGENCY MEDICINE

## 2019-11-10 PROCEDURE — 99285 EMERGENCY DEPT VISIT HI MDM: CPT | Performed by: EMERGENCY MEDICINE

## 2019-11-10 RX ORDER — ONDANSETRON 2 MG/ML
4 INJECTION INTRAMUSCULAR; INTRAVENOUS EVERY 6 HOURS PRN
Status: DISCONTINUED | OUTPATIENT
Start: 2019-11-10 | End: 2019-11-20 | Stop reason: HOSPADM

## 2019-11-10 RX ORDER — DIPHENHYDRAMINE HYDROCHLORIDE 50 MG/ML
25 INJECTION INTRAMUSCULAR; INTRAVENOUS ONCE
Status: COMPLETED | OUTPATIENT
Start: 2019-11-10 | End: 2019-11-10

## 2019-11-10 RX ORDER — HEPARIN SODIUM 5000 [USP'U]/ML
5000 INJECTION, SOLUTION INTRAVENOUS; SUBCUTANEOUS EVERY 8 HOURS SCHEDULED
Status: DISCONTINUED | OUTPATIENT
Start: 2019-11-10 | End: 2019-11-20 | Stop reason: HOSPADM

## 2019-11-10 RX ORDER — ROCURONIUM BROMIDE 10 MG/ML
INJECTION, SOLUTION INTRAVENOUS AS NEEDED
Status: DISCONTINUED | OUTPATIENT
Start: 2019-11-10 | End: 2019-11-10 | Stop reason: SURG

## 2019-11-10 RX ORDER — GLYCOPYRROLATE 0.2 MG/ML
INJECTION INTRAMUSCULAR; INTRAVENOUS AS NEEDED
Status: DISCONTINUED | OUTPATIENT
Start: 2019-11-10 | End: 2019-11-10 | Stop reason: SURG

## 2019-11-10 RX ORDER — MAGNESIUM HYDROXIDE 1200 MG/15ML
LIQUID ORAL AS NEEDED
Status: DISCONTINUED | OUTPATIENT
Start: 2019-11-10 | End: 2019-11-10 | Stop reason: HOSPADM

## 2019-11-10 RX ORDER — OXYCODONE HYDROCHLORIDE 10 MG/1
10 TABLET ORAL EVERY 4 HOURS PRN
Status: DISCONTINUED | OUTPATIENT
Start: 2019-11-10 | End: 2019-11-20 | Stop reason: HOSPADM

## 2019-11-10 RX ORDER — CHLORHEXIDINE GLUCONATE 0.12 MG/ML
15 RINSE ORAL EVERY 12 HOURS SCHEDULED
Status: DISCONTINUED | OUTPATIENT
Start: 2019-11-10 | End: 2019-11-11

## 2019-11-10 RX ORDER — LINEZOLID 2 MG/ML
600 INJECTION, SOLUTION INTRAVENOUS ONCE
Status: COMPLETED | OUTPATIENT
Start: 2019-11-10 | End: 2019-11-10

## 2019-11-10 RX ORDER — ALBUMIN, HUMAN INJ 5% 5 %
SOLUTION INTRAVENOUS CONTINUOUS PRN
Status: DISCONTINUED | OUTPATIENT
Start: 2019-11-10 | End: 2019-11-10 | Stop reason: SURG

## 2019-11-10 RX ORDER — CHLORHEXIDINE GLUCONATE 0.12 MG/ML
15 RINSE ORAL EVERY 12 HOURS SCHEDULED
Status: DISCONTINUED | OUTPATIENT
Start: 2019-11-10 | End: 2019-11-11 | Stop reason: SDUPTHER

## 2019-11-10 RX ORDER — ACETAMINOPHEN 325 MG/1
650 TABLET ORAL EVERY 6 HOURS PRN
Status: DISCONTINUED | OUTPATIENT
Start: 2019-11-10 | End: 2019-11-20 | Stop reason: HOSPADM

## 2019-11-10 RX ORDER — SODIUM CHLORIDE 9 MG/ML
125 INJECTION, SOLUTION INTRAVENOUS CONTINUOUS
Status: DISCONTINUED | OUTPATIENT
Start: 2019-11-10 | End: 2019-11-11

## 2019-11-10 RX ORDER — PROPOFOL 10 MG/ML
INJECTION, EMULSION INTRAVENOUS AS NEEDED
Status: DISCONTINUED | OUTPATIENT
Start: 2019-11-10 | End: 2019-11-10 | Stop reason: SURG

## 2019-11-10 RX ORDER — HEPARIN SODIUM 5000 [USP'U]/ML
5000 INJECTION, SOLUTION INTRAVENOUS; SUBCUTANEOUS EVERY 8 HOURS SCHEDULED
Status: DISCONTINUED | OUTPATIENT
Start: 2019-11-10 | End: 2019-11-11 | Stop reason: SDUPTHER

## 2019-11-10 RX ORDER — LIDOCAINE HYDROCHLORIDE 10 MG/ML
INJECTION, SOLUTION INFILTRATION; PERINEURAL AS NEEDED
Status: DISCONTINUED | OUTPATIENT
Start: 2019-11-10 | End: 2019-11-10 | Stop reason: SURG

## 2019-11-10 RX ORDER — CALCIUM CHLORIDE 100 MG/ML
INJECTION INTRAVENOUS; INTRAVENTRICULAR AS NEEDED
Status: DISCONTINUED | OUTPATIENT
Start: 2019-11-10 | End: 2019-11-10 | Stop reason: SURG

## 2019-11-10 RX ORDER — SODIUM CHLORIDE, SODIUM GLUCONATE, SODIUM ACETATE, POTASSIUM CHLORIDE, MAGNESIUM CHLORIDE, SODIUM PHOSPHATE, DIBASIC, AND POTASSIUM PHOSPHATE .53; .5; .37; .037; .03; .012; .00082 G/100ML; G/100ML; G/100ML; G/100ML; G/100ML; G/100ML; G/100ML
1000 INJECTION, SOLUTION INTRAVENOUS ONCE
Status: COMPLETED | OUTPATIENT
Start: 2019-11-11 | End: 2019-11-11

## 2019-11-10 RX ORDER — SACCHAROMYCES BOULARDII 250 MG
250 CAPSULE ORAL 2 TIMES DAILY
Status: DISCONTINUED | OUTPATIENT
Start: 2019-11-10 | End: 2019-11-20 | Stop reason: HOSPADM

## 2019-11-10 RX ORDER — OXYCODONE HYDROCHLORIDE 5 MG/1
5 TABLET ORAL EVERY 4 HOURS PRN
Status: DISCONTINUED | OUTPATIENT
Start: 2019-11-10 | End: 2019-11-20 | Stop reason: HOSPADM

## 2019-11-10 RX ORDER — SODIUM CHLORIDE 9 MG/ML
INJECTION, SOLUTION INTRAVENOUS CONTINUOUS PRN
Status: DISCONTINUED | OUTPATIENT
Start: 2019-11-10 | End: 2019-11-10 | Stop reason: SURG

## 2019-11-10 RX ORDER — HYDROMORPHONE HCL/PF 1 MG/ML
0.5 SYRINGE (ML) INJECTION
Status: DISCONTINUED | OUTPATIENT
Start: 2019-11-10 | End: 2019-11-15

## 2019-11-10 RX ORDER — NEOSTIGMINE METHYLSULFATE 1 MG/ML
INJECTION INTRAVENOUS AS NEEDED
Status: DISCONTINUED | OUTPATIENT
Start: 2019-11-10 | End: 2019-11-10 | Stop reason: SURG

## 2019-11-10 RX ADMIN — SODIUM CHLORIDE, SODIUM LACTATE, POTASSIUM CHLORIDE, AND CALCIUM CHLORIDE 1000 ML: .6; .31; .03; .02 INJECTION, SOLUTION INTRAVENOUS at 20:41

## 2019-11-10 RX ADMIN — PROPOFOL 50 MG: 10 INJECTION, EMULSION INTRAVENOUS at 21:21

## 2019-11-10 RX ADMIN — LINEZOLID 600 MG: 600 INJECTION, SOLUTION INTRAVENOUS at 18:02

## 2019-11-10 RX ADMIN — PHENYLEPHRINE HYDROCHLORIDE 200 MCG: 10 INJECTION INTRAVENOUS at 21:40

## 2019-11-10 RX ADMIN — PHENYLEPHRINE HYDROCHLORIDE 200 MCG: 10 INJECTION INTRAVENOUS at 21:35

## 2019-11-10 RX ADMIN — PHENYLEPHRINE HYDROCHLORIDE 70 MCG/MIN: 10 INJECTION INTRAVENOUS at 21:25

## 2019-11-10 RX ADMIN — PROPOFOL 50 MG: 10 INJECTION, EMULSION INTRAVENOUS at 21:23

## 2019-11-10 RX ADMIN — NEOSTIGMINE METHYLSULFATE 3 MG: 1 INJECTION, SOLUTION INTRAVENOUS at 22:21

## 2019-11-10 RX ADMIN — PHENYLEPHRINE HYDROCHLORIDE 300 MCG: 10 INJECTION INTRAVENOUS at 21:43

## 2019-11-10 RX ADMIN — PHENYLEPHRINE HYDROCHLORIDE 200 MCG: 10 INJECTION INTRAVENOUS at 21:21

## 2019-11-10 RX ADMIN — LIDOCAINE HYDROCHLORIDE 50 MG: 10 INJECTION, SOLUTION INFILTRATION; PERINEURAL at 21:21

## 2019-11-10 RX ADMIN — SODIUM CHLORIDE: 0.9 INJECTION, SOLUTION INTRAVENOUS at 21:23

## 2019-11-10 RX ADMIN — PHENYLEPHRINE HYDROCHLORIDE 200 MCG: 10 INJECTION INTRAVENOUS at 21:25

## 2019-11-10 RX ADMIN — DIPHENHYDRAMINE HYDROCHLORIDE 25 MG: 50 INJECTION, SOLUTION INTRAMUSCULAR; INTRAVENOUS at 17:30

## 2019-11-10 RX ADMIN — PROPOFOL 50 MG: 10 INJECTION, EMULSION INTRAVENOUS at 21:22

## 2019-11-10 RX ADMIN — PHENYLEPHRINE HYDROCHLORIDE 200 MCG: 10 INJECTION INTRAVENOUS at 21:23

## 2019-11-10 RX ADMIN — PHENYLEPHRINE HYDROCHLORIDE 50 MCG/MIN: 10 INJECTION INTRAVENOUS at 23:48

## 2019-11-10 RX ADMIN — Medication 0.2 G: at 22:24

## 2019-11-10 RX ADMIN — SODIUM CHLORIDE 125 ML/HR: 0.9 INJECTION, SOLUTION INTRAVENOUS at 23:13

## 2019-11-10 RX ADMIN — Medication 0.3 G: at 22:16

## 2019-11-10 RX ADMIN — PIPERACILLIN SODIUM AND TAZOBACTAM SODIUM 3.38 G: 36; 4.5 INJECTION, POWDER, FOR SOLUTION INTRAVENOUS at 17:26

## 2019-11-10 RX ADMIN — ROCURONIUM BROMIDE 20 MG: 50 INJECTION, SOLUTION INTRAVENOUS at 21:21

## 2019-11-10 RX ADMIN — GLYCOPYRROLATE 0.4 MG: 0.2 INJECTION, SOLUTION INTRAMUSCULAR; INTRAVENOUS at 22:21

## 2019-11-10 RX ADMIN — ALBUMIN (HUMAN): 12.5 SOLUTION INTRAVENOUS at 21:26

## 2019-11-10 RX ADMIN — SODIUM CHLORIDE 125 ML/HR: 0.9 INJECTION, SOLUTION INTRAVENOUS at 20:32

## 2019-11-10 RX ADMIN — SODIUM CHLORIDE, SODIUM LACTATE, POTASSIUM CHLORIDE, AND CALCIUM CHLORIDE 1000 ML: .6; .31; .03; .02 INJECTION, SOLUTION INTRAVENOUS at 17:08

## 2019-11-10 RX ADMIN — ALBUMIN (HUMAN): 12.5 SOLUTION INTRAVENOUS at 21:28

## 2019-11-10 NOTE — ED NOTES
Patient transported to [address] [place] [address], [address] [place] [address]  11/10/19 [address] [facility] [address]
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
## 2019-11-10 NOTE — ED ATTENDING ATTESTATION
11/10/2019  IAida MD, saw and evaluated the patient  I have discussed the patient with the resident/non-physician practitioner and agree with the resident's/non-physician practitioner's findings, Plan of Care, and MDM as documented in the resident's/non-physician practitioner's note, except where noted  All available labs and Radiology studies were reviewed  I was present for key portions of any procedure(s) performed by the resident/non-physician practitioner and I was immediately available to provide assistance  At this point I agree with the current assessment done in the Emergency Department  I have conducted an independent evaluation of this patient a history and physical is as follows:   The patient has history of paraplegia secondary to injury she has sacral decubiti I she has pelvic decubiti I  Patient is here because she is weak and tired can care for herself any longer she denies fever she denies abdominal pain denies vomiting  Exam she is a frail cachectic female she is pale-appearing she is in no acute distress rectal temperature 99 9°  Lungs clear heart regular abdomen soft nontender extremities no edema  Multiple decubiti I necrotic tissue noted no crepitation indwelling Hays noted  Patient will undergo septic workup she will have IV fluids cultures CT scan IV antibiotics  ED Course         Critical Care Time  Procedures

## 2019-11-11 PROBLEM — D62 ACUTE BLOOD LOSS ANEMIA: Status: ACTIVE | Noted: 2019-11-11

## 2019-11-11 LAB
ABO GROUP BLD BPU: NORMAL
ABO GROUP BLD BPU: NORMAL
ACANTHOCYTES BLD QL SMEAR: PRESENT
ANION GAP SERPL CALCULATED.3IONS-SCNC: 11 MMOL/L (ref 4–13)
ANION GAP SERPL CALCULATED.3IONS-SCNC: 12 MMOL/L (ref 4–13)
ANISOCYTOSIS BLD QL SMEAR: PRESENT
ARTERIAL PATENCY WRIST A: NO
ATRIAL RATE: 170 BPM
BASE EXCESS BLDA CALC-SCNC: -3.7 MMOL/L
BASE EXCESS BLDA CALC-SCNC: -5 MMOL/L (ref -2–3)
BASE EXCESS BLDA CALC-SCNC: -5.9 MMOL/L
BASOPHILS # BLD MANUAL: 0 THOUSAND/UL (ref 0–0.1)
BASOPHILS NFR MAR MANUAL: 0 % (ref 0–1)
BPU ID: NORMAL
BPU ID: NORMAL
BUN SERPL-MCNC: 7 MG/DL (ref 5–25)
BUN SERPL-MCNC: 8 MG/DL (ref 5–25)
CA-I BLD-SCNC: 1.03 MMOL/L (ref 1.12–1.32)
CALCIUM SERPL-MCNC: 7.3 MG/DL (ref 8.3–10.1)
CALCIUM SERPL-MCNC: 7.4 MG/DL (ref 8.3–10.1)
CHLORIDE SERPL-SCNC: 106 MMOL/L (ref 100–108)
CHLORIDE SERPL-SCNC: 107 MMOL/L (ref 100–108)
CO2 SERPL-SCNC: 17 MMOL/L (ref 21–32)
CO2 SERPL-SCNC: 19 MMOL/L (ref 21–32)
CREAT SERPL-MCNC: 0.3 MG/DL (ref 0.6–1.3)
CREAT SERPL-MCNC: 0.34 MG/DL (ref 0.6–1.3)
CROSSMATCH: NORMAL
CROSSMATCH: NORMAL
EOSINOPHIL # BLD MANUAL: 0 THOUSAND/UL (ref 0–0.4)
EOSINOPHIL NFR BLD MANUAL: 0 % (ref 0–6)
ERYTHROCYTE [DISTWIDTH] IN BLOOD BY AUTOMATED COUNT: 17.2 % (ref 11.6–15.1)
ERYTHROCYTE [DISTWIDTH] IN BLOOD BY AUTOMATED COUNT: 17.5 % (ref 11.6–15.1)
ERYTHROCYTE [SEDIMENTATION RATE] IN BLOOD: 54 MM/HOUR (ref 0–20)
GFR SERPL CREATININE-BSD FRML MDRD: 112 ML/MIN/1.73SQ M
GFR SERPL CREATININE-BSD FRML MDRD: 117 ML/MIN/1.73SQ M
GLUCOSE SERPL-MCNC: 120 MG/DL (ref 65–140)
GLUCOSE SERPL-MCNC: 130 MG/DL (ref 65–140)
GLUCOSE SERPL-MCNC: 154 MG/DL (ref 65–140)
HCO3 BLDA-SCNC: 18 MMOL/L (ref 22–28)
HCO3 BLDA-SCNC: 19.2 MMOL/L (ref 22–28)
HCO3 BLDA-SCNC: 21 MMOL/L (ref 22–28)
HCT VFR BLD AUTO: 18.3 % (ref 34.8–46.1)
HCT VFR BLD AUTO: 31.8 % (ref 34.8–46.1)
HCT VFR BLD AUTO: 35 % (ref 34.8–46.1)
HCT VFR BLD CALC: 16 % (ref 34.8–46.1)
HGB BLD-MCNC: 10.3 G/DL (ref 11.5–15.4)
HGB BLD-MCNC: 11.2 G/DL (ref 11.5–15.4)
HGB BLD-MCNC: 5.5 G/DL (ref 11.5–15.4)
HGB BLDA-MCNC: 5.4 G/DL (ref 11.5–15.4)
LACTATE SERPL-SCNC: 1.8 MMOL/L (ref 0.5–2)
LYMPHOCYTES # BLD AUTO: 10 % (ref 14–44)
LYMPHOCYTES # BLD AUTO: 2.58 THOUSAND/UL (ref 0.6–4.47)
MCH RBC QN AUTO: 22.7 PG (ref 26.8–34.3)
MCH RBC QN AUTO: 25.4 PG (ref 26.8–34.3)
MCHC RBC AUTO-ENTMCNC: 30.1 G/DL (ref 31.4–37.4)
MCHC RBC AUTO-ENTMCNC: 32 G/DL (ref 31.4–37.4)
MCV RBC AUTO: 76 FL (ref 82–98)
MCV RBC AUTO: 79 FL (ref 82–98)
MONOCYTES # BLD AUTO: 1.03 THOUSAND/UL (ref 0–1.22)
MONOCYTES NFR BLD: 4 % (ref 4–12)
NEUTROPHILS # BLD MANUAL: 21.89 THOUSAND/UL (ref 1.85–7.62)
NEUTS BAND NFR BLD MANUAL: 7 % (ref 0–8)
NEUTS SEG NFR BLD AUTO: 78 % (ref 43–75)
NON VENT ROOM AIR: 0.21 %
NRBC BLD AUTO-RTO: 0 /100 WBCS
O2 CT BLDA-SCNC: 14.8 ML/DL (ref 16–23)
O2 CT BLDA-SCNC: 15.5 ML/DL (ref 16–23)
OXYHGB MFR BLDA: 92.5 % (ref 94–97)
OXYHGB MFR BLDA: 93.8 % (ref 94–97)
PCO2 BLD: 22 MMOL/L (ref 21–32)
PCO2 BLD: 42.1 MM HG (ref 36–44)
PCO2 BLDA: 28.2 MM HG (ref 36–44)
PCO2 BLDA: 30.4 MM HG (ref 36–44)
PH BLD: 7.3 [PH] (ref 7.35–7.45)
PH BLDA: 7.39 [PH] (ref 7.35–7.45)
PH BLDA: 7.45 [PH] (ref 7.35–7.45)
PLATELET # BLD AUTO: 406 THOUSANDS/UL (ref 149–390)
PLATELET # BLD AUTO: 480 THOUSANDS/UL (ref 149–390)
PLATELET # BLD AUTO: 507 THOUSANDS/UL (ref 149–390)
PLATELET BLD QL SMEAR: ABNORMAL
PMV BLD AUTO: 10.2 FL (ref 8.9–12.7)
PMV BLD AUTO: 9.5 FL (ref 8.9–12.7)
PMV BLD AUTO: 9.5 FL (ref 8.9–12.7)
PO2 BLD: 336 MM HG (ref 75–129)
PO2 BLDA: 69.8 MM HG (ref 75–129)
PO2 BLDA: 76.2 MM HG (ref 75–129)
POIKILOCYTOSIS BLD QL SMEAR: PRESENT
POTASSIUM BLD-SCNC: 3.4 MMOL/L (ref 3.5–5.3)
POTASSIUM SERPL-SCNC: 3.6 MMOL/L (ref 3.5–5.3)
POTASSIUM SERPL-SCNC: 3.9 MMOL/L (ref 3.5–5.3)
QRS AXIS: 61 DEGREES
QRSD INTERVAL: 62 MS
QT INTERVAL: 310 MS
QTC INTERVAL: 401 MS
RBC # BLD AUTO: 2.42 MILLION/UL (ref 3.81–5.12)
RBC # BLD AUTO: 4.41 MILLION/UL (ref 3.81–5.12)
SAO2 % BLD FROM PO2: 100 % (ref 60–85)
SODIUM BLD-SCNC: 131 MMOL/L (ref 136–145)
SODIUM SERPL-SCNC: 136 MMOL/L (ref 136–145)
SODIUM SERPL-SCNC: 136 MMOL/L (ref 136–145)
SPECIMEN SOURCE: ABNORMAL
T WAVE AXIS: 47 DEGREES
TARGETS BLD QL SMEAR: PRESENT
TOTAL CELLS COUNTED SPEC: 100
UNIT DISPENSE STATUS: NORMAL
UNIT DISPENSE STATUS: NORMAL
UNIT PRODUCT CODE: NORMAL
UNIT PRODUCT CODE: NORMAL
UNIT RH: NORMAL
UNIT RH: NORMAL
VARIANT LYMPHS # BLD AUTO: 1 %
VENTRICULAR RATE: 101 BPM
WBC # BLD AUTO: 19.96 THOUSAND/UL (ref 4.31–10.16)
WBC # BLD AUTO: 25.75 THOUSAND/UL (ref 4.31–10.16)

## 2019-11-11 PROCEDURE — 99222 1ST HOSP IP/OBS MODERATE 55: CPT | Performed by: PHYSICIAN ASSISTANT

## 2019-11-11 PROCEDURE — 0JBL0ZZ EXCISION OF RIGHT UPPER LEG SUBCUTANEOUS TISSUE AND FASCIA, OPEN APPROACH: ICD-10-PCS | Performed by: SURGERY

## 2019-11-11 PROCEDURE — 85652 RBC SED RATE AUTOMATED: CPT | Performed by: INTERNAL MEDICINE

## 2019-11-11 PROCEDURE — 93010 ELECTROCARDIOGRAM REPORT: CPT | Performed by: INTERNAL MEDICINE

## 2019-11-11 PROCEDURE — 82805 BLOOD GASES W/O2 SATURATION: CPT | Performed by: SURGERY

## 2019-11-11 PROCEDURE — 0JB70ZZ EXCISION OF BACK SUBCUTANEOUS TISSUE AND FASCIA, OPEN APPROACH: ICD-10-PCS | Performed by: SURGERY

## 2019-11-11 PROCEDURE — 85014 HEMATOCRIT: CPT | Performed by: SURGERY

## 2019-11-11 PROCEDURE — NC001 PR NO CHARGE: Performed by: SURGERY

## 2019-11-11 PROCEDURE — 80048 BASIC METABOLIC PNL TOTAL CA: CPT | Performed by: STUDENT IN AN ORGANIZED HEALTH CARE EDUCATION/TRAINING PROGRAM

## 2019-11-11 PROCEDURE — 99222 1ST HOSP IP/OBS MODERATE 55: CPT | Performed by: ORTHOPAEDIC SURGERY

## 2019-11-11 PROCEDURE — 82805 BLOOD GASES W/O2 SATURATION: CPT | Performed by: PHYSICIAN ASSISTANT

## 2019-11-11 PROCEDURE — 99223 1ST HOSP IP/OBS HIGH 75: CPT | Performed by: INTERNAL MEDICINE

## 2019-11-11 PROCEDURE — 0QB70ZZ EXCISION OF LEFT UPPER FEMUR, OPEN APPROACH: ICD-10-PCS | Performed by: SURGERY

## 2019-11-11 PROCEDURE — 80048 BASIC METABOLIC PNL TOTAL CA: CPT | Performed by: PHYSICIAN ASSISTANT

## 2019-11-11 PROCEDURE — 85027 COMPLETE CBC AUTOMATED: CPT | Performed by: STUDENT IN AN ORGANIZED HEALTH CARE EDUCATION/TRAINING PROGRAM

## 2019-11-11 PROCEDURE — 99233 SBSQ HOSP IP/OBS HIGH 50: CPT | Performed by: SURGERY

## 2019-11-11 PROCEDURE — 85018 HEMOGLOBIN: CPT | Performed by: SURGERY

## 2019-11-11 PROCEDURE — 85049 AUTOMATED PLATELET COUNT: CPT | Performed by: SURGERY

## 2019-11-11 PROCEDURE — 83605 ASSAY OF LACTIC ACID: CPT | Performed by: SURGERY

## 2019-11-11 RX ORDER — POTASSIUM CHLORIDE 20 MEQ/1
40 TABLET, EXTENDED RELEASE ORAL ONCE
Status: COMPLETED | OUTPATIENT
Start: 2019-11-11 | End: 2019-11-11

## 2019-11-11 RX ORDER — SODIUM CHLORIDE 9 MG/ML
100 INJECTION, SOLUTION INTRAVENOUS CONTINUOUS
Status: DISCONTINUED | OUTPATIENT
Start: 2019-11-12 | End: 2019-11-11

## 2019-11-11 RX ORDER — SODIUM CHLORIDE, SODIUM GLUCONATE, SODIUM ACETATE, POTASSIUM CHLORIDE, MAGNESIUM CHLORIDE, SODIUM PHOSPHATE, DIBASIC, AND POTASSIUM PHOSPHATE .53; .5; .37; .037; .03; .012; .00082 G/100ML; G/100ML; G/100ML; G/100ML; G/100ML; G/100ML; G/100ML
100 INJECTION, SOLUTION INTRAVENOUS CONTINUOUS
Status: DISCONTINUED | OUTPATIENT
Start: 2019-11-11 | End: 2019-11-12

## 2019-11-11 RX ADMIN — ERTAPENEM SODIUM 1000 MG: 1 INJECTION, POWDER, LYOPHILIZED, FOR SOLUTION INTRAMUSCULAR; INTRAVENOUS at 01:36

## 2019-11-11 RX ADMIN — PHENYLEPHRINE HYDROCHLORIDE 65 MCG/MIN: 10 INJECTION INTRAVENOUS at 19:30

## 2019-11-11 RX ADMIN — SODIUM CHLORIDE, SODIUM GLUCONATE, SODIUM ACETATE, POTASSIUM CHLORIDE AND MAGNESIUM CHLORIDE 50 ML/HR: 526; 502; 368; 37; 30 INJECTION, SOLUTION INTRAVENOUS at 10:29

## 2019-11-11 RX ADMIN — HEPARIN SODIUM 5000 UNITS: 5000 INJECTION INTRAVENOUS; SUBCUTANEOUS at 14:08

## 2019-11-11 RX ADMIN — SODIUM CHLORIDE, SODIUM GLUCONATE, SODIUM ACETATE, POTASSIUM CHLORIDE AND MAGNESIUM CHLORIDE 1000 ML: 526; 502; 368; 37; 30 INJECTION, SOLUTION INTRAVENOUS at 01:26

## 2019-11-11 RX ADMIN — HEPARIN SODIUM 5000 UNITS: 5000 INJECTION INTRAVENOUS; SUBCUTANEOUS at 21:48

## 2019-11-11 RX ADMIN — HEPARIN SODIUM 5000 UNITS: 5000 INJECTION INTRAVENOUS; SUBCUTANEOUS at 05:14

## 2019-11-11 RX ADMIN — Medication 125 MG: at 21:48

## 2019-11-11 RX ADMIN — Medication 250 MG: at 10:28

## 2019-11-11 RX ADMIN — CHLORHEXIDINE GLUCONATE 0.12% ORAL RINSE 15 ML: 1.2 LIQUID ORAL at 08:23

## 2019-11-11 RX ADMIN — Medication 250 MG: at 17:16

## 2019-11-11 RX ADMIN — ACETAMINOPHEN 650 MG: 325 TABLET ORAL at 17:16

## 2019-11-11 RX ADMIN — DAPTOMYCIN 275 MG: 500 INJECTION, POWDER, LYOPHILIZED, FOR SOLUTION INTRAVENOUS at 17:15

## 2019-11-11 RX ADMIN — ACETAMINOPHEN 650 MG: 325 TABLET ORAL at 08:23

## 2019-11-11 RX ADMIN — POTASSIUM CHLORIDE 40 MEQ: 1500 TABLET, EXTENDED RELEASE ORAL at 10:29

## 2019-11-11 NOTE — ANESTHESIA POSTPROCEDURE EVALUATION
Post-Op Assessment Note    CV Status:  Stable  Pain Score: 2    Pain management: adequate     Mental Status:  Alert and awake   Hydration Status:  Euvolemic   PONV Controlled:  Controlled   Airway Patency:  Patent   Post Op Vitals Reviewed: Yes      Staff: CRNA           /59 (11/10/19 2300)    Temp      Pulse 84 (11/10/19 2300)   Resp (!) 27 (11/10/19 2300)    SpO2 100 % (11/10/19 2300)

## 2019-11-11 NOTE — PROGRESS NOTES
Progress Note - Smiley Pryor 1950, 71 y o  female MRN: [id number]    Unit/Bed#: [facility] 09 Encounter: [id number]    Primary Care Provider: Mira Sheppard   Date and time admitted to hospital: 11/10/2019  4:06 PM        Acute blood loss anemia  Assessment & Plan  ABLA and hemodilution causing Hb 5 5 post op       - S/P 2 u pRBC - Hb 10 5   - Continue to monitor Hb    * Pressure injury of sacral region, unstageable [facility])  Assessment & Plan  S/P excisional debridement 11/10  Sepsis 2/2 to above     - Bedside dressing change today - kerlix, trauma ABD   - Will need ortho input   - F/U wound cultures   - Currently on IV Ertapenem based on prior cx / allergies - f/u ID consult   - Continue Jack as needed for BP support, wean as able   - PRN pain control        Subjective/Objective   Chief Complaint:     Subjective: No major complains, some discomfort  Remains on low jack, 50 mcg for hypotension  Received 2 u pRBC post op, good Hb response    Objective:     Blood pressure 94/57, pulse 78, temperature 97 5 °F (36 4 °C), temperature source Oral, resp  rate (!) 31, height 5' (1 524 m), weight 45 6 kg (100 lb 8 5 oz), SpO2 96 %, not currently breastfeeding  ,Body mass index is 19 63 kg/m²        Intake/Output Summary (Last 24 hours) at 11/11/2019 0500  Last data filed at 11/11/2019 0215  Gross per 24 hour   Intake 3738 ml   Output 345 ml   Net 3393 ml       Invasive Devices     Peripheral Intravenous Line            Peripheral IV 11/10/19 Left Antecubital less than 1 day    Peripheral IV 11/10/19 Left Hand less than 1 day    Peripheral IV 11/10/19 Right Antecubital less than 1 day          Arterial Line            Arterial Line 11/10/19 Right Radial less than 1 day          Drain            Urethral Catheter Latex 16 Fr  less than 1 day                Physical Exam:   Gen: A&O, NAD  Cardio: RRR  Lungs: CTAB  Abd: Soft, non distended, non tender  Backside: Dressing c/d/i    Lab, Imaging and other studies:  CBC:   Lab Results   Component Value Date    WBC 25 75 (H) 11/10/2019    HGB 10 3 (L) 11/11/2019    HCT 31 8 (L) 11/11/2019    MCV 76 (L) 11/10/2019     (H) 11/11/2019    MCH 22 7 (L) 11/10/2019    MCHC 30 1 (L) 11/10/2019    RDW 17 5 (H) 11/10/2019    MPV 9 5 11/11/2019    NRBC 0 11/10/2019   , CMP:   Lab Results   Component Value Date    SODIUM 134 (L) 11/10/2019    K 3 6 11/10/2019     11/10/2019    CO2 19 (L) 11/10/2019    BUN 10 11/10/2019    CREATININE 0 31 (L) 11/10/2019    CALCIUM 8 0 (L) 11/10/2019    AST 28 11/10/2019    ALT 24 11/10/2019    ALKPHOS 139 (H) 11/10/2019    EGFR 116 11/10/2019   , Coagulation:   Lab Results   Component Value Date    INR 1 40 (H) 11/10/2019     VTE Pharmacologic Prophylaxis: Heparin  VTE Mechanical Prophylaxis: sequential compression device

## 2019-11-11 NOTE — SOCIAL WORK
LSW joined Tootie Ruiz (ANDRE) to introduce Palliative supports  Patient states that she lives independently and prior to the last 2 weeks she has been able to caregive for herself  She states that over the last two weeks she has steadily decreased in strength and energy which has also influenced her ability to take in fluids and nutrition  Patient identifies that she has no family but that she has lived at her apartment for many years  Due to the length of residence she has become close friends with her neighbors  One of her neighbors is taking care of her miniature poodle while currently admitted  Up until recently she has been active with volunteering at the [address] [place] [address] and going to [employer]  Patient identifies that she is very scared about "what will happen to me "  Patient does elude to potentially setting limits on the types of care she is in agreement with  LSW and Nadeem Abebe will regularly meet with patient to provide both emotional and medical support  Specifically, regarding her goals of care  Counseling / Coordination of Care  Total floor / unit time spent today 60 minutes  Greater than 50% of total time was spent with the patient and / or family counseling and / or coordination of care   A description of the counseling / coordination of care: support

## 2019-11-11 NOTE — QUICK NOTE
Sacral dressings changed this AM on rounds  x4 OR sponges removed  Foul smell without purulent discharge appreciated, see images below  Wounds re-packed with moistened Kerlex x3 total and covered with ABD pads  Will plan to continue dressing changes as BID with surgery team to perform AM change and nursing to repack in afternoon      Left ischium:          Left sacral area:      Right hip:      Right hip:

## 2019-11-11 NOTE — ASSESSMENT & PLAN NOTE
S/P excisional debridement 11/10  Sepsis 2/2 to above     - Bedside dressing change today - kerlix, trauma ABD   - Will need ortho input   - F/U wound cultures   - Currently on IV Ertapenem based on prior cx / allergies - f/u ID consult   - Continue Jack as needed for BP support, wean as able   - PRN pain control

## 2019-11-11 NOTE — ED NOTES
Surgery at bedside getting consents, then pt ready to go to OR       Smiley Davenport RN  11/10/19 5861

## 2019-11-11 NOTE — PROGRESS NOTES
Pastoral Care Progress Note    2019  Patient: Kasey Lewis : 1950  Admission Date & Time: 11/10/2019 1606  MRN: [id number] CSN: [id number]      Patient was visited by [facility] she was anointed and Malinda Ask provided a blessing       19 1300   Clinical Encounter Type   Visited With Patient   Routine Visit Introduction   Continue Visiting Yes   Evangelical Encounters   Evangelical Needs Prayer   Sacramental Encounters   Sacrament of Sick-Anointing Anointed

## 2019-11-11 NOTE — ANESTHESIA PROCEDURE NOTES
Arterial Line Insertion  Performed by: Lizandro Grady CRNA  Authorized by: Theodoro Councilman, MD   Preparation: Patient was prepped and draped in the usual sterile fashion  Indications: multiple ABGs and hemodynamic monitoring  Orientation:  Right  Location: radial artery  Sedation:  Patient sedated: geta      Procedure Details:  Mykel's test normal: yes  Needle gauge: 22  Seldinger technique: Seldinger technique used  Number of attempts: 3    Post-procedure:  Post-procedure: dressing applied  Waveform: good waveform  Post-procedure CNS: normal  Patient tolerance: Patient tolerated the procedure well with no immediate complications

## 2019-11-11 NOTE — ANESTHESIA PREPROCEDURE EVALUATION
Review of Systems/Medical History  Patient summary reviewed  Chart reviewed  No history of anesthetic complications     Cardiovascular  EKG reviewed, Negative cardio ROS Exercise tolerance (METS): <4,     Pulmonary  Negative pulmonary ROS        GI/Hepatic  Negative GI/hepatic ROS          Chronic kidney disease ,        Endo/Other  Negative endo/other ROS      GYN  Negative gynecology ROS          Hematology  Anemia ,     Musculoskeletal    Arthritis     Neurology      Comment: Paraplegia spinal cord injury at [employer]   Depression ,              Physical Exam    Airway    Mallampati score: II  TM Distance: >3 FB  Neck ROM: full     Dental   No notable dental hx     Cardiovascular  Comment: Negative ROS, Cardiovascular exam normal    Pulmonary  Pulmonary exam normal     Other Findings      Results from last 7 days   Lab Units 11/10/19  1706   WBC Thousand/uL 21 20*   HEMOGLOBIN g/dL 9 1*   HEMATOCRIT % 30 6*   PLATELETS Thousands/uL 791*     Results from last 7 days   Lab Units 11/10/19  1706   SODIUM mmol/L 132*   POTASSIUM mmol/L 4 0   CO2 mmol/L 22   CHLORIDE mmol/L 97*   BUN mg/dL 15   CREATININE mg/dL 0 52*   EGFR ml/min/1 73sq m 98     Results from last 7 days   Lab Units 11/10/19  1706   INR  1 40*   PROTIME seconds 16 7*   PTT seconds 39*     Anesthesia Plan  ASA Score- 3     Anesthesia Type- general with ASA Monitors  Additional Monitors:   Airway Plan: ETT  Plan Factors-    Induction- intravenous  Postoperative Plan- Plan for postoperative opioid use  Informed Consent- Anesthetic plan and risks discussed with patient  I personally reviewed this patient with the CRNA  Discussed and agreed on the Anesthesia Plan with the CRNA  Rahel Howell

## 2019-11-11 NOTE — PERIOPERATIVE NURSING NOTE
Lactic acid and Hgb reported to critical care  Dr Ciara Muñoz aware of labs, orders received  Phenylephrine gtt restarted per Dr Ciara Muñoz

## 2019-11-11 NOTE — ASSESSMENT & PLAN NOTE
ABLA and hemodilution causing Hb 5 5 post op       - S/P 2 u pRBC - Hb 10 5   - Continue to monitor Hb

## 2019-11-11 NOTE — UTILIZATION REVIEW
Initial Clinical Review    Admission: Date/Time/Statement: Inpatient Admission Orders (From admission, onward)     Ordered        11/10/19 2019  Inpatient Admission  Once                   Orders Placed This Encounter   Procedures    Inpatient Admission     Standing Status:   Standing     Number of Occurrences:   1     Order Specific Question:   Admitting Physician     Answer:   Barrie Meckel [[id number]]     Order Specific Question:   Level of Care     Answer:   Med Surg [[id number]]     Order Specific Question:   Estimated length of stay     Answer:   More than 2 Midnights     Order Specific Question:   Certification     Answer:   I certify that inpatient services are medically necessary for this patient for a duration of greater than two midnights  See H&P and MD Progress Notes for additional information about the patient's course of treatment  ED Arrival Information     Expected Arrival Acuity Means of Arrival Escorted By Service Admission Type    - 11/10/2019 16:05 Emergent Ambulance [facility] [address] [facility]/ICU Emergency    Arrival Complaint    Exhaustion        Chief Complaint   Patient presents with    Weakness - Generalized     Pt reports generalized weakness since last summer getting acutely worse over last 2 weeks  Pt reports being a paraplegic from a hang gliding accident  Pt reports living alone and that up until two weeks ago she was taking care of herself  Patient has several unstageable and odorous wounds on hips and sacrum  Assessment/Plan:   68y Female to ED presents  wounds worsening with increased drainage for past 2 wks  Past admissions for  multiple debridements through February and March  Multiple flap surgeries performed throughout 2018 and 2019, most recently 1/19  She is also c/o weakness and poor po intake  PMH of Paraplegia (accident in 0) with chronic sacral/ischial wounds and C diff  CT scan shows ulcers over the right femur greater trochanter, and left ischial tuberosity  There does appear to be gas in the soft tissues in both these regions, extending even into the obturator internus  Admit Inpatient level of care for 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 with worsening tissue necrosis, with leukocytosis and gas seen in deeper tissues  Plan for OR tonight  Start Iv antibiotics and Infectious Disease consult  May require plastics input for tissue coverage - however options extremely limited given history of failed grafts/ flaps  11/11 Progress notes; Acute blood loss anemia  Hgb 5 5 post op  S/P 2 u pRBC - Hb 10 5 and continue to monitor Hgb  Bedside dressing change today - kerlix, trauma ABD  F/u wound cultures, continue Iv antibiotics and Infectious Disease consult  Continue Jack as needed for BP support, wean as able and pain control  11/11 Orthopedic cons;  sacral decubitus wounds likely complicated by osteomyelitis  Patient is not a candidate for resection of osteomyelitis given the morbidity of the procedure  NWB BLE, continue IV antibiotics and pain control      11/10 OR - EXCISIONAL DEBRIDEMENT (N/A)  Operative Findings:  Left inferior ischial wound with necrosis and purulent drainage - approx 10 x 8 x 3 cm  Sacral wounds x 2 with necrosis, no drainage - approx 5 x 5 x 1 cm (left superior), approx 4 x 2 x 0 2 cm (center)  Right femoral trochanter wound (tracks from posterior to anterior) with minimal necrosis and purulent drainage (approx 3 x 4 x 10 cm)     ED Triage Vitals [11/10/19 1610]   Temperature Pulse Respirations Blood Pressure SpO2   99 9 °F (37 7 °C) (!) 114 20 160/66 96 %      Temp Source Heart Rate Source Patient Position - Orthostatic VS BP Location FiO2 (%)   Rectal Monitor Lying Right arm --      Pain Score       5        Wt Readings from Last 1 Encounters:   11/11/19 46 7 kg (102 lb 15 3 oz)     Additional Vital Signs:   O2 2L N/C    11/11/19 0700    86  26  Abnormal   95/48  Abnormal   66  94/54  72 mmHg  96 % /11/19 0000    74  20  102/40  Abnormal   78  92/60  74 mmHg  100 %  Nasal cannula       11/10/19 2351  97 8 °F (36 6 °C)    20  102/40  Abnormal     84/63    100 %  Nasal cannula       11/10/19 2345    80  24  Abnormal   103/53    94/70  82 mmHg  100 %  Nasal cannula         Pertinent Labs/Diagnostic Test Results:   11/10 CT Chest/Abd/Pelvis - Deep ulcers adjacent to the proximal right femur and the left ischial tuberosity with multiple collections of gas in the subjacent soft tissues, extending as deep as the obturator internus muscles  Ability to identify discrete abscess cavities limited without intravenous contrast   Questionable thickening of the endometrium       Results from last 7 days   Lab Units 11/11/19  0515 11/11/19  0312 11/10/19  2245 11/10/19  1706   WBC Thousand/uL 19 96*  --  25 75* 21 20*   HEMOGLOBIN g/dL 11 2* 10 3* 5 5* 9 1*   HEMATOCRIT % 35 0 31 8* 18 3* 30 6*   PLATELETS Thousands/uL 480* 406* 507* 791*   NEUTROS ABS Thousands/µL  --   --   --  18 36*   BANDS PCT %  --   --  7  --          Results from last 7 days   Lab Units 11/11/19  0515 11/10/19  2245 11/10/19  1706   SODIUM mmol/L 136 134* 132*   POTASSIUM mmol/L 3 6 3 6 4 0   CHLORIDE mmol/L 107 104 97*   CO2 mmol/L 17* 19* 22   ANION GAP mmol/L 12 11 13   BUN mg/dL 8 10 15   CREATININE mg/dL 0 30* 0 31* 0 52*   EGFR ml/min/1 73sq m 117 116 98   CALCIUM mg/dL 7 4* 8 0* 8 8   MAGNESIUM mg/dL  --  1 6  --    PHOSPHORUS mg/dL  --  3 9  --      Results from last 7 days   Lab Units 11/10/19  1706   AST U/L 28   ALT U/L 24   ALK PHOS U/L 139*   TOTAL PROTEIN g/dL 7 6   ALBUMIN g/dL 1 7*   TOTAL BILIRUBIN mg/dL 0 60         Results from last 7 days   Lab Units 11/11/19  0515 11/10/19  2245 11/10/19  1706   GLUCOSE RANDOM mg/dL 130 146* 112     Results from last 7 days   Lab Units 11/11/19  0320   PH ART  7 391   PCO2 ART mm Hg 30 4*   PO2 ART mm Hg 76 2   HCO3 ART mmol/L 18 0*   BASE EXC ART mmol/L -5 9   O2 CONTENT ART mL/dL 14 8*   O2 HGB, ARTERIAL % 93 8*   ABG SOURCE  Line, Arterial     Results from last 7 days   Lab Units 11/10/19  2245   PH BHAVANA  7 379   PCO2 BHAVANA mm Hg 30 9*   PO2 BHAVANA mm Hg 226 3*   HCO3 BHAVANA mmol/L 17 8*   BASE EXC BHAVANA mmol/L -6 7   O2 CONTENT BHAVANA ml/dL 9 3   O2 HGB, VENOUS % 97 7*         Results from last 7 days   Lab Units 11/10/19  1706   CK TOTAL U/L 124     Results from last 7 days   Lab Units 11/10/19  1706   PROTIME seconds 16 7*   INR  1 40*   PTT seconds 39*         Results from last 7 days   Lab Units 11/10/19  1706   PROCALCITONIN ng/ml 2 91*     Results from last 7 days   Lab Units 11/11/19  0312 11/10/19  2245 11/10/19  1706   LACTIC ACID mmol/L 1 8 2 1* 1 7     Results from last 7 days   Lab Units 11/10/19  1725   CLARITY UA  Turbid   COLOR UA  Dk Yellow   SPEC GRAV UA  1 018   PH UA  6 0   GLUCOSE UA mg/dl Negative   KETONES UA mg/dl 40 (2+)*   BLOOD UA  Large*   PROTEIN UA mg/dl 100 (2+)*   NITRITE UA  Negative   BILIRUBIN UA  Interference- unable to analyze*   UROBILINOGEN UA E U /dl 1 0   LEUKOCYTES UA  Moderate*   WBC UA /hpf 10-20*   RBC UA /hpf 10-20*   BACTERIA UA /hpf Innumerable*   EPITHELIAL CELLS WET PREP /hpf Moderate*     Results from last 7 days   Lab Units 11/10/19  1707 11/10/19  1706   BLOOD CULTURE  Received in Microbiology Lab  Culture in Progress  Received in Microbiology Lab  Culture in Progress       Results from last 7 days   Lab Units 11/10/19  2245   TOTAL COUNTED  100     ED Treatment:   Medication Administration from 11/10/2019 1605 to 11/10/2019 2054       Date/Time Order Dose Route Action     11/10/2019 1708 lactated ringers bolus 1,000 mL 1,000 mL Intravenous New Bag     11/10/2019 1726 piperacillin-tazobactam (ZOSYN) 3 375 g in sodium chloride 0 9 % 50 mL IVPB 3 375 g Intravenous New Bag     11/10/2019 1802 linezolid (ZYVOX) IVPB (premix) 600 mg 600 mg Intravenous New Bag     11/10/2019 1730 diphenhydrAMINE (BENADRYL) injection 25 mg 25 mg Intravenous Given 11/10/2019 2032 sodium chloride 0 9 % infusion 125 mL/hr Intravenous New Bag     11/10/2019 2041 lactated ringers bolus 1,000 mL 1,000 mL Intravenous New Bag        Past Medical History:   Diagnosis Date    Anemia     iron defiencey    Arthritis     osteo    Back injury     Chronic kidney disease     nephritis    Depression     Osteopenia     Osteoporosis     Paralysis (HCC)     paraplegic due to spinal cord injury    Paraplegia (HCC)     Poor circulation     Pressure injury of skin     right hip, stage 3    Pressure sore of hip     right    Tibial plateau fracture     Underweight      Present on Admission:  **None**      Admitting Diagnosis: Exhaustion [R53 83]  Sepsis due to anaerobes ([facility]) [A41 4]  Pressure injury of sacral region, unstageable ([facility] [address] ) [L89 150]  Decubitus ulcer [L89 90]     Age/Sex: 71 y o  female     Admission Orders:  11/10 OR - S/P EXCISIONAL DEBRIDEMENT (N/A Buttocks)  A Line monitoring  Bld culture x2  Neurological checks q4h  Orthopedic consult  Infectious Disease consult    Scheduled Medications:  Medications:  chlorhexidine 15 mL Swish & Spit Q12H [address]   ertapenem 1,000 mg Intravenous Q24H   heparin (porcine) 5,000 Units Subcutaneous Q8H [address]   saccharomyces boulardii 250 mg Oral BID     Continuous IV Infusions:  phenylephine  mcg/min Intravenous Titrated   sodium chloride 125 mL/hr Intravenous Continuous     PRN Meds:    acetaminophen 650 mg Oral Q6H PRN   HYDROmorphone 0 5 mg Intravenous Q3H PRN   ondansetron 4 mg Intravenous Q6H PRN   oxyCODONE 10 mg Oral Q4H PRN   oxyCODONE 5 mg Oral Q4H PRN     Network Utilization Review Department  [email or web address]  ATTENTION: Please call with any questions or concerns to [phone] and carefully listen to the prompts so that you are directed to the right person   All voicemails are confidential   Josefina Storm all requests for admission clinical reviews, approved or denied determinations and any other requests to dedicated fax number below belonging to the campus where the patient is receiving treatment    FACILITY NAME UR FAX NUMBER   ADMISSION DENIALS (Administrative/Medical Necessity) [address] [facility] (Maternity/NICU/Pediatrics) [phone]   [facility] [address] [facility] 300 [facility] [phone] 145 [facility] [address] [place] [address] [phone]   [facility] [address] [place] [address] [phone]

## 2019-11-11 NOTE — OP NOTE
OPERATIVE REPORT  PATIENT NAME: Bridger Olvera    :  1950  MRN: [id number]  Pt Location:  OR ROOM 05    SURGERY DATE: 11/10/2019    Surgeon(s) and Role:     * Amado Cerda MD - Primary     * Alayna Black MD - [address] [place] [address], DO - Assisting    Preop Diagnosis:  Pressure injury of sacral region, unstageable ([address] ) [place]    Post-Op Diagnosis Codes:     * Pressure injury of sacral region, unstageable ([address] ) [L89 150]    Procedure(s) (LRB):  EXCISIONAL DEBRIDEMENT (N/A)    Specimen(s):  * No specimens in log *    Estimated Blood Loss:   Minimal    Drains:  Urethral Catheter Other (Comment); Latex 16 Fr  (Active)   Number of days: 213       Urethral Catheter Latex 16 Fr  (Active)   Amt returned on insertion(mL) 10 mL 11/10/2019  5:38 PM   Reasons to continue Urinary Catheter  Stage III/IV sacral ulcers or open perineal wounds in incontinent patients;Chronic urinary catheter 11/10/2019  5:38 PM   Site Assessment Clean;Red 11/10/2019  5:38 PM   Collection Container Standard drainage bag 11/10/2019  5:38 PM   Securement Method Securing device (Describe) 11/10/2019  5:38 PM   Number of days: 0       Anesthesia Type:   General    Operative Indications:  Pressure injury of sacral region, unstageable ([address] ) [L89 150]      Operative Findings:  Left inferior ischial wound with necrosis and purulent drainage - approx 10 x 8 x 3 cm  Sacral wounds x 2 with necrosis, no drainage - approx 5 x 5 x 1 cm (left superior), approx 4 x 2 x 0 2 cm (center)  Right femoral trochanter wound (tracks from posterior to anterior) with minimal necrosis and purulent drainage (approx 3 x 4 x 10 cm)    Complications:   None    Procedure and Technique:  After informed consent was obtained explaining the risks/benefits/alternatives of the procedure, the patient was taken to the OR  General anesthesia was induced and the patient was prepped prone and draped in the usual sterile fashion   A time out was performed to verify correct site and procedure  There were several areas of necrotic ulceration  There was a left inferior ischial wound with necrosis and purulent drainage - approx 10 x 8 x 3 cm  There were also two more superior sacral wounds x 2 with necrosis, no drainage - approx 5 x 5 x 1 cm (left superior), approx 4 x 2 x 0 2 cm (center)  The  right femoral trochanter wound (tracks from posterior to anterior thigh) with minimal necrosis and some purulent drainage (approx 3 x 4 x 10 cm)  All necrotic tissue was sharply debrided with a combination of 15 blade, and Metzenbaum scissors  In some areas, the necrotic tissue involved muscle, all areas of necrosis were debrided to relatively healthy, non infected appearing fat or muscle  The left inferior ischial wound was the most necrotic, debridement was down to the level of the ischium, which was exposed  In the space between the ischium and the sacrum, a pocket of purulent drainage was and countered, this was evacuated and drained  The right femoral greater trochanter was also obviously exposed within the right hip wound  After satisfactory sharp excision, all wounds were copiously Pulsavac irrigated with a total of 3 L normal saline  Hemostasis was achieved with Bovie cautery  The wounds were then packed with a total of 4 x-ray detectable packing Kerlix, and a trauma ABD was applied  The patient was awakened and taken to the PACU without any immediate post op complications  Dr Lala Alvarez was present for the procedure      Patient Disposition:  PACU , then ICU    SIGNATURE: Alison Potter MD  DATE: November 10, 2019  TIME: 10:38 PM

## 2019-11-11 NOTE — UTILIZATION REVIEW
Lev Maza RN   Registered Nurse   Utilization Review   Utilization Review   Signed   Date of Service:  11/11/2019  8:22 AM               Signed        Expand All Collapse All      Show:Clear all  [x]Manual[x]Template[x]Copied    Added by:  Stacey Acevedo RN    []Wanda for details  Initial Clinical Review         Admission: Date/Time/Statement: Inpatient Admission Orders (From admission, onward)              Ordered          11/10/19 2019   Inpatient Admission  Once                             Orders Placed This Encounter   Procedures    Inpatient Admission       Standing Status:   Standing       Number of Occurrences:   1       Order Specific Question:   Admitting Physician       Answer:   Elle Fields [[id number]]       Order Specific Question:   Level of Care       Answer:   Med Surg [16]       Order Specific Question:   Estimated length of stay       Answer:   More than 2 Midnights       Order Specific Question:   Certification       Answer:   I certify that inpatient services are medically necessary for this patient for a duration of greater than two midnights  See H&P and MD Progress Notes for additional information about the patient's course of treatment                 ED Arrival Information      Expected Arrival Acuity Means of Arrival Escorted By Service Admission Type     - 11/10/2019 16:05 Emergent Ambulance [facility] EMS Critical Care/ICU Emergency     Arrival Complaint     Exhaustion               Chief Complaint   Patient presents with    Weakness - Generalized       Pt reports generalized weakness since last summer getting acutely worse over last 2 weeks  Pt reports being a paraplegic from a hang gliding accident  Pt reports living alone and that up until two weeks ago she was taking care of herself  Patient has several unstageable and odorous wounds on hips and sacrum        Assessment/Plan:   68y Female to ED presents  wounds worsening with increased drainage for past 2 wks  Past admissions for  multiple debridements through February and March  Multiple flap surgeries performed throughout 2018 and 2019, most recently 1/19  She is also c/o weakness and poor po intake  PMH of Paraplegia (accident in 1981) with chronic sacral/ischial wounds and C diff  CT scan shows ulcers over the right femur greater trochanter, and left ischial tuberosity   There does appear to be gas in the soft tissues in both these regions, extending even into the obturator internus  Admit Inpatient level of care for 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 with worsening tissue necrosis, with leukocytosis and gas seen in deeper tissues  Plan for OR tonight  Start Iv antibiotics and Infectious Disease consult  May require plastics input for tissue coverage - however options extremely limited given history of failed grafts/ flaps  11/11 Progress notes; Acute blood loss anemia  Hgb 5 5 post op  S/P 2 u pRBC - Hb 10 5 and continue to monitor Hgb   Bedside dressing change today - kerlix, trauma ABD  F/u wound cultures, continue Iv antibiotics and Infectious Disease consult  Continue Jack as needed for BP support, wean as able and pain control       11/11 Orthopedic cons;  sacral decubitus wounds likely complicated by osteomyelitis   Patient is not a candidate for resection of osteomyelitis given the morbidity of the procedure   NWB BLE, continue IV antibiotics and pain control      11/10 OR - EXCISIONAL DEBRIDEMENT (N/A)  Operative Findings:  Left inferior ischial wound with necrosis and purulent drainage - approx 10 x 8 x 3 cm  Sacral wounds x 2 with necrosis, no drainage - approx 5 x 5 x 1 cm (left superior), approx 4 x 2 x 0 2 cm (center)  Right femoral trochanter wound (tracks from posterior to anterior) with minimal necrosis and purulent drainage (approx 3 x 4 x 10 cm)              ED Triage Vitals [11/10/19 1610]   Temperature Pulse Respirations Blood Pressure SpO2   99 9 °F (37 7 °C) (!) 114 20 160/66 96 %       Temp Source Heart Rate Source Patient Position - Orthostatic VS BP Location FiO2 (%)   Rectal Monitor Lying Right arm --       Pain Score           5                Wt Readings from Last 1 Encounters:   11/11/19 46 7 kg (102 lb 15 3 oz)      Additional Vital Signs:   O2 2L N/C     11/11/19 0700      86   26  Abnormal    95/48  Abnormal    66   94/54   72 mmHg   96 %      /11/19 0000      74   20   102/40  Abnormal    78   92/60   74 mmHg   100 %   Nasal cannula         11/10/19 2351   97 8 °F (36 6 °C)      20   102/40  Abnormal       84/63      100 %   Nasal cannula         11/10/19 2345      80   24  Abnormal    103/53      94/70   82 mmHg   100 %   Nasal cannula              Pertinent Labs/Diagnostic Test Results:   11/10 CT Chest/Abd/Pelvis - Deep ulcers adjacent to the proximal right femur and the left ischial tuberosity with multiple collections of gas in the subjacent soft tissues, extending as deep as the obturator internus muscles   Ability to identify discrete abscess cavities limited without intravenous contrast   Questionable thickening of the endometrium               Results from last 7 days   Lab Units 11/11/19  0515 11/11/19  0312 11/10/19  2245 11/10/19  1706   WBC Thousand/uL 19 96*  --  25 75* 21 20*   HEMOGLOBIN g/dL 11 2* 10 3* 5 5* 9 1*   HEMATOCRIT % 35 0 31 8* 18 3* 30 6*   PLATELETS Thousands/uL 480* 406* 507* 791*   NEUTROS ABS Thousands/µL  --   --   --  18 36*   BANDS PCT %  --   --  7  --                  Results from last 7 days   Lab Units 11/11/19  0515 11/10/19  2245 11/10/19  1706   SODIUM mmol/L 136 134* 132*   POTASSIUM mmol/L 3 6 3 6 4 0   CHLORIDE mmol/L 107 104 97*   CO2 mmol/L 17* 19* 22   ANION GAP mmol/L 12 11 13   BUN mg/dL 8 10 15   CREATININE mg/dL 0 30* 0 31* 0 52*   EGFR ml/min/1 73sq m 117 116 98   CALCIUM mg/dL 7 4* 8 0* 8 8   MAGNESIUM mg/dL  --  1 6  --    PHOSPHORUS mg/dL  --  3 9  --            Results from last 7 days   Lab Units 11/10/19  1706   AST U/L 28   ALT U/L 24   ALK PHOS U/L 139*   TOTAL PROTEIN g/dL 7 6   ALBUMIN g/dL 1 7*   TOTAL BILIRUBIN mg/dL 0 60                 Results from last 7 days   Lab Units 11/11/19  0515 11/10/19  2245 11/10/19  1706   GLUCOSE RANDOM mg/dL 130 146* 112           Results from last 7 days   Lab Units 11/11/19  0320   PH ART   7 391   PCO2 ART mm Hg 30 4*   PO2 ART mm Hg 76 2   HCO3 ART mmol/L 18 0*   BASE EXC ART mmol/L -5 9   O2 CONTENT ART mL/dL 14 8*   O2 HGB, ARTERIAL % 93 8*   ABG SOURCE   Line, Arterial           Results from last 7 days   Lab Units 11/10/19  2245   PH BHAVANA   7 379   PCO2 BHAVANA mm Hg 30 9*   PO2 BHAVANA mm Hg 226 3*   HCO3 BHAVANA mmol/L 17 8*   BASE EXC BHAVANA mmol/L -6 7   O2 CONTENT BHAVANA ml/dL 9 3   O2 HGB, VENOUS % 97 7*               Results from last 7 days   Lab Units 11/10/19  1706   CK TOTAL U/L 124           Results from last 7 days   Lab Units 11/10/19  1706   PROTIME seconds 16 7*   INR   1 40*   PTT seconds 39*          Results from last 7 days   Lab Units 11/10/19  1706   PROCALCITONIN ng/ml 2 91*             Results from last 7 days   Lab Units 11/11/19  0312 11/10/19  2245 11/10/19  1706   LACTIC ACID mmol/L 1 8 2 1* 1 7           Results from last 7 days   Lab Units 11/10/19  1725   CLARITY UA   Turbid   COLOR UA   Dk Yellow   SPEC GRAV UA   1 018   PH UA   6 0   GLUCOSE UA mg/dl Negative   KETONES UA mg/dl 40 (2+)*   BLOOD UA   Large*   PROTEIN UA mg/dl 100 (2+)*   NITRITE UA   Negative   BILIRUBIN UA   Interference- unable to analyze*   UROBILINOGEN UA E U /dl 1 0   LEUKOCYTES UA   Moderate*   WBC UA /hpf 10-20*   RBC UA /hpf 10-20*   BACTERIA UA /hpf Innumerable*   EPITHELIAL CELLS WET PREP /hpf Moderate*            Results from last 7 days   Lab Units 11/10/19  1707 11/10/19  1706   BLOOD CULTURE   Received in Microbiology Lab  Culture in Progress  Received in Microbiology Lab   Culture in Progress            Results from last 7 days   Lab Units 11/10/19  2245   TOTAL COUNTED   100      ED Treatment:             Medication Administration from 11/10/2019 1605 to 11/10/2019 2054        Date/Time Order Dose Route Action       11/10/2019 1708 lactated ringers bolus 1,000 mL 1,000 mL Intravenous New Bag       11/10/2019 1726 piperacillin-tazobactam (ZOSYN) 3 375 g in sodium chloride 0 9 % 50 mL IVPB 3 375 g Intravenous New Bag       11/10/2019 1802 linezolid (ZYVOX) IVPB (premix) 600 mg 600 mg Intravenous New Bag       11/10/2019 1730 diphenhydrAMINE (BENADRYL) injection 25 mg 25 mg Intravenous Given       11/10/2019 2032 sodium chloride 0 9 % infusion 125 mL/hr Intravenous New Bag       11/10/2019 2041 lactated ringers bolus 1,000 mL 1,000 mL Intravenous New Bag          Medical History   Past Medical History:   Diagnosis Date    Anemia       iron defiencey    Arthritis       osteo    Back injury      Chronic kidney disease       nephritis    Depression      Osteopenia      Osteoporosis      Paralysis (HCC)       paraplegic due to spinal cord injury    Paraplegia (HCC)      Poor circulation      Pressure injury of skin       right hip, stage 3    Pressure sore of hip       right    Tibial plateau fracture      Underweight           Present on Admission:  **None**        Admitting Diagnosis: Exhaustion [R53 83]  Sepsis due to anaerobes (HCC) [A41 4]  Pressure injury of sacral region, unstageable ([facility] [address] ) [L89 150]  Decubitus ulcer [L89 90]      Age/Sex: 71 y o  female      Admission Orders:  11/10 OR - S/P EXCISIONAL DEBRIDEMENT (N/A Buttocks)  A Line monitoring  Bld culture x2  Neurological checks q4h  Orthopedic consult  Infectious Disease consult     Scheduled Medications:  Medications:  chlorhexidine 15 mL Swish & Spit Q12H [facility]   ertapenem 1,000 mg Intravenous Q24H   heparin (porcine) 5,000 Units Subcutaneous Q8H [facility]   saccharomyces boulardii 250 mg Oral BID      Continuous IV Infusions:  phenylephine  mcg/min Intravenous Titrated   sodium chloride 125 mL/hr Intravenous Continuous      PRN Meds:     acetaminophen 650 mg Oral Q6H PRN   HYDROmorphone 0 5 mg Intravenous Q3H PRN   ondansetron 4 mg Intravenous Q6H PRN   oxyCODONE 10 mg Oral Q4H PRN   oxyCODONE 5 mg Oral Q4H PRN      Network Utilization Review Department  [email or web address]  ATTENTION: Please call with any questions or concerns to [phone] and carefully listen to the prompts so that you are directed to the right person  All voicemails are confidential   Arron Carr all requests for admission clinical reviews, approved or denied determinations and any other requests to dedicated fax number below belonging to the campus where the patient is receiving treatment    FACILITY NAME UR FAX NUMBER   ADMISSION DENIALS (Administrative/Medical Necessity) [facility] (Maternity/NICU/Pediatrics) [phone]   [facility] [phone]   [facility] [phone]   [facility] [phone]   [address] [facility] [address] 1000 [facility] [phone]

## 2019-11-11 NOTE — CONSULTS
Consultation - Infectious Disease   Karla Lundy 71 y o  female MRN: [id number]  Unit/Bed#: MICU 09 Encounter: [id number]    Assessment/Plan     Severe sepsis secondary to L ischial ulceration and osteomyelitis  Pt has wounds to L inferior ischium (ischium exposed), two sacral wounds, and right femoral trochanter wound (greater trochanter exposed)  Meeting sepsis criteria with tachypnea, elevated WBC in the setting of skin infection, requiring tadeo-synephrine for hypotension  Pt received Zosyn and Linezolid yesterday evening on admission  · Currently on ertapenem 1000mg daily, can continue until cultures result  Pt reports symptomatic improvement since admission  · Tissue cultures from OR on 11/10/19 preliminarily with 2+ gram negative rods  · Blood cultures pending x2  · Urine culture was ordered this AM  Pt has chronic indwelling isabel catheter  Previously pt was noted to have UTI with E coli and Providencia retgerri on 1/6/2019 followed by wound dehiscence cultures growing these organisms on 2/27/2019  Hx of wound dehiscence of R hip flap  Earlier this year, pt received 6 weeks IV Ertapenem in 3/2019 secondary to wound dehiscence after R hip flap surgery in 1/2019 with re-closure of wound in 3/2019 with wound cultures at that time growing Providencia rettgeri and E coli  Hx of L femur ORIF infection  OR cultures at that time were positive for corynebacterium from wound and bone, completed 6 week cource of IV daptomycin on 2/13/2019  Hx of recent C Diff infection  Recent C diff infection in 4/2019  Will start C diff prophylaxis with Vancomycin 125 q12h  Pt reports prior reaction of red man syndrome after vancomycin, would not be a concern with oral vancomycin  History of Present Illness   Physician Requesting Consult: Shanda Rush MD  Reason for Consult / Principal Problem: chronic sacral osteo, with previous infection of L femur ORIF   Sepsis due to anaerobes, pressure injury of sacral region    HPI: Troy Hemphill is a 71y o  year old female with PMHx of T8 paraplegia after accident, history of Cdiff, osteoporosis who presented to the ED on 11/10/19 with worsening pressure ulcers over the last one month, fatigue/generalized weakness over the last 2 weeks, decreased PO intake, feeling dehydrated  Pt has had multiple flap procedures with Dr Austin Pond of Plastic Surgery (flap of right hip in 12/2018 with reclosure in 3/2019, flap of L hip/thigh in 1/2019, flap of L ischium 2/2019) and left femur IM nail placement in 10/2018 secondary to fracture  Pt reports that she lives at home and had had strength until recently  Pt reports that the wounds she had on her last admission had resolved and she noticed worsening decubitus wounds approximately 1 month ago  CT chest abdomen and pelvis was performed showing large decubitus ulcers overlying greater trochanter of the right femur and left ischial tuberosity with increased attenuation soft tissue within buttocks and hip girdles and and gas collections extending as deep as obturator internus muscle  CT could not exclude discrete abscess cavities as IV contrast was not performed  Pt was taken to OR on evening of presentation for debridement with acute care surgery service  Pt received linezolid and Zosyn prior to procedure and was started on ertapenem  Intra-op pt was noted to have hypotension requiring tadeo-synephrine support and Hgb was low at 5 5, Hgb had been 9 1 prior to procedure  Pt received 3U PRBC overnight, evening Hgb was likely diluted as Hgb improved to 11 2 this AM  Pt was admitted to MICU  Infectious disease was consulted for antibiotic recommendations  Per chart review, pt had received 6 weeks ertapenem in 3/2019 and 6 weeks daptomycin in 2/2019 as above in A/P  Allergies received with patient - pt reports vancomycin "red man" syndrome reaction and that rashes occurred with ciprofloxacin and cefepime   Pt reports that ciprofloxacin additionally caused rapid facial swelling - pt denies any SOB or throat involvement but states she stopped taking it immediately  Per chart review, appears that pruritic rash occurred after IV Unasyn on previous admission in March 2019  This morning, pt was seen and examined and reported improvement in fatigue and weakness since admission  Reports that she was living at home caring for herself since last admission  Pt states she knew her wounds were worsening but did not think she had an infection  Pt reports caring for her wounds on her own  Inpatient consult to Infectious Diseases     Performed by  Jamal Beard DO     Authorized by Chris Mancuso DO              Review of Systems   Constitutional: Negative for chills, fatigue and fever  HENT: Positive for sore throat (secondary to procedure yesterday, per pt)  Negative for rhinorrhea  Respiratory: Negative for choking, chest tightness, shortness of breath, wheezing and stridor  Cardiovascular: Negative for chest pain, palpitations and leg swelling  Gastrointestinal: Negative for abdominal pain, blood in stool, constipation, diarrhea, nausea (reports nausea secondary to GERD, had recently run out of PPI) and vomiting  Genitourinary: Negative for hematuria  Neurological: Positive for weakness (reports improvement since yesterday)  Negative for dizziness and light-headedness          Historical Information   Past Medical History:   Diagnosis Date    Anemia     iron defiencey    Arthritis     osteo    Back injury     Chronic kidney disease     nephritis    Depression     Osteopenia     Osteoporosis     Paralysis ([address] )     paraplegic due to spinal cord injury    Paraplegia (HCC)     Poor circulation     Pressure injury of skin     right hip, stage 3    Pressure sore of hip     right    Tibial plateau fracture     Underweight      Past Surgical History:   Procedure Laterality Date    CLOSURE DELAYED PRIMARY N/A 3/20/2019    Procedure: OPHELIA ANAL WOUND RECLOSURE;  Surgeon: Judson Castillo MD;  Location: [facility] MAIN OR;  Service: Plastics    FLAP LOCAL EXTREMITY Left 1/28/2019    Procedure: THIGH FLAP & STSG TO CLOSE HIP WOUND;  Surgeon: Judson Castillo MD;  Location: [facility] MAIN OR;  Service: Plastics    FLAP LOCAL TRUNK Right 12/17/2018    Procedure: DEBRIDE/FLAP CLOSURE HIP PRESSURE SORE Jessica Young GRAFT;  Surgeon: Judson Castillo MD;  Location: [facility] MAIN OR;  Service: Plastics    FLAP LOCAL TRUNK Left 2/27/2019    Procedure: BUTTOCK FLAP TO ISCHIAL SORE;  Surgeon: Judson Castillo MD;  Location: [facility] MAIN OR;  Service: Plastics    HIP DEBRIDEMENT Right 2017    right hip sore debridement    INCISION AND DRAINAGE OF WOUND Left 1/3/2019    Procedure: INCISION AND DRAINAGE (I&D) left distal femur  With deep wound cultures   Application of VAC DRAIN SPONGE;  Surgeon: Cali Chapa MD;  Location:  MAIN OR;  Service: Orthopedics    IR PICC LINE  12/19/2018    IR PICC LINE  3/12/2019    TX DEBRIDEMENT, SKIN, SUB-Q TISSUE,MUSCLE,BONE,=<20 SQ CM Right 9/19/2018    Procedure: DEBRIDEMENT OF HIP PRESSURE SORE;  Surgeon: Judson Castillo MD;  Location: [facility] MAIN OR;  Service: Plastics    TX OPEN RX FEMUR FX+INTRAMED FELIX Left 10/22/2018    Procedure: INSERTION NAIL IM LEFT FEMUR RETROGRADE;  Surgeon: Sascha Agee MD;  Location:  MAIN OR;  Service: Orthopedics    TOE AMPUTATION Left 2017    small toe left foot amputation    WISDOM TOOTH EXTRACTION      WOUND DEBRIDEMENT Left 12/17/2018    Procedure: DEBRIDEMENT HIP PRESSURE SORE;  Surgeon: Judson Castillo MD;  Location: [facility] MAIN OR;  Service: Plastics     Social History   Social History     Substance and Sexual Activity   Alcohol Use Yes    Comment: occasional     Social History     Substance and Sexual Activity   Drug Use No     Social History     Tobacco Use   Smoking Status Never Smoker   Smokeless Tobacco Never Used     Family History: non-contributory    Meds/Allergies all current active meds have been reviewed    Allergies   Allergen Reactions    Iv Contrast [Iodinated Diagnostic Agents]      Tingling face, itching    Cefepime Rash    Ciprofloxacin Rash and Swelling     Looked like suburn, itching  Bed sores  Swelling, itching    Latex Rash    Vancomycin Rash     Unknown        Objective       Intake/Output Summary (Last 24 hours) at 11/11/2019 0940  Last data filed at 11/11/2019 0817  Gross per 24 hour   Intake 3814 1 ml   Output 685 ml   Net 3129 1 ml       Invasive Devices:   Peripheral IV 11/10/19 Right Antecubital (Active)   Site Assessment Clean;Dry; Intact 11/11/2019  7:30 AM   Dressing Type Transparent;Securing device 11/11/2019  7:30 AM   Line Status Infusing 11/11/2019  7:30 AM   Dressing Status Clean;Dry; Intact 11/11/2019  7:30 AM   Dressing Change Due 11/14/19 11/11/2019  7:30 AM       Peripheral IV 11/10/19 Left Antecubital (Active)   Site Assessment Clean;Dry; Intact 11/11/2019  7:30 AM   Dressing Type Transparent;Securing device 11/11/2019  7:30 AM   Line Status Flushed;Saline locked 11/11/2019  7:30 AM   Dressing Status Clean;Dry; Intact 11/11/2019  7:30 AM   Dressing Change Due 11/14/19 11/11/2019  7:30 AM       Peripheral IV 11/10/19 Left Hand (Active)   Site Assessment Clean;Dry; Intact 11/11/2019  7:30 AM   Dressing Type Transparent;Securing device 11/11/2019  7:30 AM   Line Status Flushed;Saline locked 11/11/2019  7:30 AM   Dressing Status Clean;Dry; Intact 11/11/2019  7:30 AM   Dressing Change Due 11/14/19 11/11/2019  7:30 AM       Urethral Catheter Latex 16 Fr   (Active)   Amt returned on insertion(mL) 10 mL 11/10/2019  5:38 PM   Reasons to continue Urinary Catheter  Stage III/IV sacral ulcers or open perineal wounds in incontinent patients 11/11/2019  4:00 AM   Site Assessment Clean;Skin intact 11/11/2019  4:00 AM   Collection Container Standard drainage bag 11/11/2019  4:00 AM   Securement Method Securing device (Describe) 11/11/2019  4:00 AM   Urethral Irrigation Intake (mL) 200 mL 11/11/2019  5:38 AM   Output (mL) 190 mL 11/11/2019  8:17 AM       Arterial Line 11/10/19 Right Radial (Active)   Site Assessment Clean;Dry; Intact 11/11/2019  7:30 AM   Line Status Positional 11/11/2019  7:30 AM   Art Line Waveform Appropriate 11/11/2019  7:30 AM   Art Line Interventions Zeroed and calibrated; Leveled; Connections checked and tightened;Flushed per protocol 11/11/2019  7:30 AM   Color/Movement/Sensation Capillary refill less than 3 sec 11/11/2019  7:30 AM   Dressing Type Transparent 11/11/2019  7:30 AM   Dressing Status Clean;Dry; Intact 11/11/2019  7:30 AM       Physical Exam   Constitutional: She is oriented to person, place, and time  She appears well-developed and well-nourished  HENT:   Head: Normocephalic and atraumatic  Nose: Nose normal    Mouth/Throat: Oropharynx is clear and moist    Eyes: Right eye exhibits no discharge  Left eye exhibits no discharge  Cardiovascular: Normal rate, regular rhythm and normal heart sounds  Exam reveals no gallop and no friction rub  No murmur heard  Pulmonary/Chest: Effort normal and breath sounds normal  Tachypnea noted  No respiratory distress  She has no wheezes  She has no rales  Abdominal: Soft  Bowel sounds are normal  She exhibits no distension  There is no tenderness  There is no guarding  Musculoskeletal: She exhibits deformity  She exhibits no edema  Pt reports some pressure sensation at ankle with palpation   Neurological: She is alert and oriented to person, place, and time  4/5 muscle strength bilateral upper extremities   Skin: Skin is warm and dry  Dressing was left intact - Images of wound from 7am reviewed in EMR   Psychiatric: Her speech is normal        Lab Results: I have personally reviewed pertinent labs  Imaging Studies: I have personally reviewed pertinent reports  EKG, Pathology, and Other Studies: I have personally reviewed pertinent reports        Counseling / Coordination of Care  Total floor / unit time spent today 30 minutes  Greater than 50% of total time was spent with the patient and / or family counseling and / or coordination of care       Blake Shirley DO  Internal Medicine PGY-2  11/11/2019 9:55 AM

## 2019-11-11 NOTE — PLAN OF CARE
Problem: Prexisting or High Potential for Compromised Skin Integrity  Goal: Skin integrity is maintained or improved  Description  INTERVENTIONS:  - Identify patients at risk for skin breakdown  - Assess and monitor skin integrity  - Assess and monitor nutrition and hydration status  - Monitor labs   - Assess for incontinence   - Turn and reposition patient  - Assist with mobility/ambulation  - Relieve pressure over bony prominences  - Avoid friction and shearing  - Provide appropriate hygiene as needed including keeping skin clean and dry  - Evaluate need for skin moisturizer/barrier cream  - Collaborate with interdisciplinary team   - Patient/family teaching  - Consider wound care consult   Outcome: Progressing     Problem: Potential for Falls  Goal: Patient will remain free of falls  Description  INTERVENTIONS:  - Assess patient frequently for physical needs  -  Identify cognitive and physical deficits and behaviors that affect risk of falls    -  [place] fall precautions as indicated by assessment   - Educate patient/family on patient safety including physical limitations  - Instruct patient to call for assistance with activity based on assessment  - Modify environment to reduce risk of injury  - Consider OT/PT consult to assist with strengthening/mobility  Outcome: Progressing     Problem: PAIN - ADULT  Goal: Verbalizes/displays adequate comfort level or baseline comfort level  Description  Interventions:  - Encourage patient to monitor pain and request assistance  - Assess pain using appropriate pain scale  - Administer analgesics based on type and severity of pain and evaluate response  - Implement non-pharmacological measures as appropriate and evaluate response  - Consider cultural and social influences on pain and pain management  - Notify physician/advanced practitioner if interventions unsuccessful or patient reports new pain  Outcome: Progressing     Problem: INFECTION - ADULT  Goal: Absence or prevention of progression during hospitalization  Description  INTERVENTIONS:  - Assess and monitor for signs and symptoms of infection  - Monitor lab/diagnostic results  - Monitor all insertion sites, i e  indwelling lines, tubes, and drains  - Monitor endotracheal if appropriate and nasal secretions for changes in amount and color  - [place] appropriate cooling/warming therapies per order  - Administer medications as ordered  - Instruct and encourage patient and family to use good hand hygiene technique  - Identify and instruct in appropriate isolation precautions for identified infection/condition  Outcome: Progressing  Goal: Absence of fever/infection during neutropenic period  Description  INTERVENTIONS:  - Monitor WBC    Outcome: Progressing     Problem: DISCHARGE PLANNING  Goal: Discharge to home or other facility with appropriate resources  Description  INTERVENTIONS:  - Identify barriers to discharge w/patient and caregiver  - Arrange for needed discharge resources and transportation as appropriate  - Identify discharge learning needs (meds, wound care, etc )  - Arrange for interpretive services to assist at discharge as needed  - Refer to Case Management Department for coordinating discharge planning if the patient needs post-hospital services based on physician/advanced practitioner order or complex needs related to functional status, cognitive ability, or social support system  Outcome: Progressing     Problem: Knowledge Deficit  Goal: Patient/family/caregiver demonstrates understanding of disease process, treatment plan, medications, and discharge instructions  Description  Complete learning assessment and assess knowledge base    Interventions:  - Provide teaching at level of understanding  - Provide teaching via preferred learning methods  Outcome: Progressing     Problem: Nutrition/Hydration-ADULT  Goal: Nutrient/Hydration intake appropriate for improving, restoring or maintaining nutritional needs  Description  Monitor and assess patient's nutrition/hydration status for malnutrition  Collaborate with interdisciplinary team and initiate plan and interventions as ordered  Monitor patient's weight and dietary intake as ordered or per policy  Utilize nutrition screening tool and intervene as necessary  Determine patient's food preferences and provide high-protein, high-caloric foods as appropriate       INTERVENTIONS:  - Monitor oral intake, urinary output, labs, and treatment plans  - Assess nutrition and hydration status and recommend course of action  - Evaluate amount of meals eaten  - Assist patient with eating if necessary   - Allow adequate time for meals  - Recommend/ encourage appropriate diets, oral nutritional supplements, and vitamin/mineral supplements  - Order, calculate, and assess calorie counts as needed  - Recommend, monitor, and adjust tube feedings and TPN/PPN based on assessed needs  - Assess need for intravenous fluids  - Provide specific nutrition/hydration education as appropriate  - Include patient/family/caregiver in decisions related to nutrition  Outcome: Progressing

## 2019-11-11 NOTE — H&P
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 with worsening tissue necrosis, with leukocytosis and gas seen in deeper tissues    Plan:  Requires operative debridement tonight - booked for OR  Admit to general surgery  Started on Ertapenem - was on this previously for infected sacral wounds, multiple antibiotic allergies  Hx c diff - however allergic to Vancomycin - will place on home Florastor  ID consult for antibiotic guidance given complicated infectious hx  May require ortho input depending on bony involvement  May require plastics input for tissue coverage - however options extremely limited given history of failed grafts/ flaps    History of Present Illness     HPI:  Elida Koyanagi is a 71 y o  female with history of paraplegia (accident in 0) with chronic sacral/ischial wounds  The patient has had multiple flap surgeries performed throughout 2018 and [id number], most recently 1/19 with Dr Phong Sánchez, and multiple debridements through February and March  She also has a history of a left femur ORIF infection  She has been treated multiple times with prolonged antibiotics for these infections  She manages her own wound care, however over the past 2 weeks she felt that her wounds have been getting worse, with increased drainage  She has also been feeling weak, and over the past 2 days her symptoms worsen to the point she came to the emergency department  She denies fevers but endorses chills  She has not been eating or drinking much at home, and feels dehydrated  Denies nausea or vomiting  On presentation, her white count is 21  CT scan shows ulcers over the right femur greater trochanter, and left ischial tuberosity    There does appear to be gas in the soft tissues in both these regions, extending even into the obturator internus  Review of Systems  Fourteen point review of systems completed and negative unless noted otherwise in HPI    Historical Information   Past Medical History:   Diagnosis Date    Anemia     iron defiencey    Arthritis     osteo    Back injury     Chronic kidney disease     nephritis    Depression     Osteopenia     Osteoporosis     Paralysis (HCC)     paraplegic due to spinal cord injury    Paraplegia (HCC)     Poor circulation     Pressure injury of skin     right hip, stage 3    Pressure sore of hip     right    Tibial plateau fracture     Underweight      Past Surgical History:   Procedure Laterality Date    CLOSURE DELAYED PRIMARY N/A 3/20/2019    Procedure: OPHELIA ANAL WOUND RECLOSURE;  Surgeon: Vinayak Damon MD;  Location: [facility] MAIN OR;  Service: Plastics    FLAP LOCAL EXTREMITY Left 1/28/2019    Procedure: THIGH FLAP & STSG TO CLOSE HIP WOUND;  Surgeon: Vinayak Damon MD;  Location: [facility] MAIN OR;  Service: Plastics    FLAP LOCAL TRUNK Right 12/17/2018    Procedure: DEBRIDE/FLAP CLOSURE HIP PRESSURE SORE Jonothan Majors GRAFT;  Surgeon: Vinayak Damon MD;  Location: [facility] MAIN OR;  Service: Plastics    FLAP LOCAL TRUNK Left 2/27/2019    Procedure: BUTTOCK FLAP TO ISCHIAL SORE;  Surgeon: Vinayak Damon MD;  Location: [facility] MAIN OR;  Service: Plastics    HIP DEBRIDEMENT Right 2017    right hip sore debridement    INCISION AND DRAINAGE OF WOUND Left 1/3/2019    Procedure: INCISION AND DRAINAGE (I&D) left distal femur  With deep wound cultures   Application of VAC DRAIN SPONGE;  Surgeon: Dafne Chilel MD;  Location:  MAIN OR;  Service: Orthopedics    IR PICC LINE  12/19/2018    IR PICC LINE  3/12/2019    MS DEBRIDEMENT, SKIN, SUB-Q TISSUE,MUSCLE,BONE,=<20 SQ CM Right 9/19/2018    Procedure: DEBRIDEMENT OF HIP PRESSURE SORE;  Surgeon: Vinayak Damon MD;  Location: [facility] MAIN OR;  Service: Plastics    MS OPEN RX FEMUR FX+INTRAMED FELIX Left 10/22/2018    Procedure: Geovani Leslie NAIL IM LEFT FEMUR RETROGRADE;  Surgeon: Duane Henri, MD;  Location:  MAIN OR;  Service: Orthopedics    TOE AMPUTATION Left 2017    small toe left foot amputation    WISDOM TOOTH EXTRACTION      WOUND DEBRIDEMENT Left 12/17/2018    Procedure: DEBRIDEMENT HIP PRESSURE SORE;  Surgeon: Yolande Collet, MD;  Location: [address] [place] [address] MAIN OR;  Service: Plastics     Social History   Social History     Substance and Sexual Activity   Alcohol Use Yes    Comment: occasional     Social History     Substance and Sexual Activity   Drug Use No     Social History     Tobacco Use   Smoking Status Never Smoker   Smokeless Tobacco Never Used     Family History:   Family History   Problem Relation Age of Onset    Depression Father        Meds/Allergies   PTA meds:   Prior to Admission Medications   Prescriptions Last Dose Informant Patient Reported? Taking?    Calcium Carb-Cholecalciferol 600-200 MG-UNIT TABS Not Taking at Unknown time  Yes No   Sig: Take 1 tablet by mouth daily   Probiotic Product (PROBIOTIC PEARLS ADVANTAGE PO) Not Taking at Unknown time  Yes No   Sig: Take by mouth   cholecalciferol (VITAMIN D3) 1,000 units tablet Not Taking at Unknown time  Yes No   Sig: Take 1,000 Units by mouth daily   cyanocobalamin 100 MCG tablet Not Taking at Unknown time  Yes No   Sig: Take 100 mcg by mouth daily   multivitamin (THERAGRAN) TABS Not Taking at Unknown time  Yes No   Sig: Take 1 tablet by mouth daily   nystatin (MYCOSTATIN) powder Not Taking at Unknown time  Yes No   Sig: Apply topically 2 (two) times a day   pantoprazole (PROTONIX) 40 mg tablet Not Taking at Unknown time Other (Specify) Yes No   Sig: Take 40 mg by mouth daily   saccharomyces boulardii (FLORASTOR) 250 mg capsule Not Taking at Unknown time  No No   Sig: Take 1 capsule (250 mg total) by mouth 2 (two) times a day   Patient not taking: Reported on 6/13/2019      Facility-Administered Medications: None     Allergies   Allergen Reactions    Iv Contrast [Iodinated Diagnostic Agents]      Tingling face, itching    Cefepime Rash    Ciprofloxacin Rash and Swelling     Looked like suburn, itching  Bed sores  Swelling, itching    Latex Rash    Vancomycin Rash     Unknown        Objective   First Vitals:   Blood Pressure: 160/66 (11/10/19 1610)  Pulse: (!) 114 (11/10/19 1610)  Temperature: 99 9 °F (37 7 °C) (11/10/19 1610)  Temp Source: Rectal (11/10/19 1610)  Respirations: 20 (11/10/19 1610)  SpO2: 96 % (11/10/19 1610)    Current Vitals:   Blood Pressure: 105/58 (11/10/19 1949)  Pulse: 102 (11/10/19 1949)  Temperature: 99 9 °F (37 7 °C) (11/10/19 1610)  Temp Source: Rectal (11/10/19 1610)  Respirations: 20 (11/10/19 1949)  SpO2: 98 % (11/10/19 1949)      Intake/Output Summary (Last 24 hours) at 11/10/2019 2029  Last data filed at 11/10/2019 1920  Gross per 24 hour   Intake 1350 ml   Output 10 ml   Net 1340 ml       Invasive Devices     Peripheral Intravenous Line            Peripheral IV 11/10/19 Left Antecubital less than 1 day    Peripheral IV 11/10/19 Right Antecubital less than 1 day          Drain            Urethral Catheter Other (Comment); Latex 16 Fr  212 days    Urethral Catheter Latex 16 Fr  less than 1 day                Physical Exam   Constitutional: She appears lethargic  She appears cachectic  She appears ill  HENT:   Head: Normocephalic and atraumatic  Eyes: Lids are normal    Neck: Trachea normal    Cardiovascular: Normal rate, regular rhythm, normal heart sounds, intact distal pulses and normal pulses  Pulmonary/Chest: Effort normal and breath sounds normal  No accessory muscle usage  No respiratory distress  Abdominal: Soft  Normal appearance and bowel sounds are normal  There is no tenderness  Neurological: She appears lethargic  She displays atrophy  A sensory deficit is present  GCS eye subscore is 4  GCS verbal subscore is 5  GCS motor subscore is 6  Skin: Capillary refill takes less than 2 seconds          Necrotic foul smelling ulcers of bilateral hips, grey drainage on palpation of left sided ischial wound  Erythema around borders of ulcers  Unstageable  Psychiatric: She has a normal mood and affect  Her behavior is normal  Judgment and thought content normal  Cognition and memory are normal              Lab Results:   CBC:   Lab Results   Component Value Date    WBC 21 20 (H) 11/10/2019    HGB 9 1 (L) 11/10/2019    HCT 30 6 (L) 11/10/2019    MCV 75 (L) 11/10/2019     (H) 11/10/2019    MCH 22 2 (L) 11/10/2019    MCHC 29 7 (L) 11/10/2019    RDW 17 7 (H) 11/10/2019    MPV 9 3 11/10/2019    NRBC 0 11/10/2019   , CMP:   Lab Results   Component Value Date    SODIUM 132 (L) 11/10/2019    K 4 0 11/10/2019    CL 97 (L) 11/10/2019    CO2 22 11/10/2019    BUN 15 11/10/2019    CREATININE 0 52 (L) 11/10/2019    CALCIUM 8 8 11/10/2019    AST 28 11/10/2019    ALT 24 11/10/2019    ALKPHOS 139 (H) 11/10/2019    EGFR 98 11/10/2019   , Coagulation:   Lab Results   Component Value Date    INR 1 40 (H) 11/10/2019     Imaging: I have personally reviewed pertinent reports  CT chest abdomen pelvis wo contrast   Final Result by Loretta Wolf MD (11/10 1945)      1  No evidence of acute abnormality in the chest    2   Deep ulcers adjacent to the proximal right femur and the left ischial tuberosity with multiple collections of gas in the subjacent soft tissues, extending as deep as the obturator internus muscles  Ability to identify discrete abscess cavities    limited without intravenous contrast    3   Questionable thickening of the endometrium  If clinically indicated, this can be reevaluated with nonemergent pelvic sonography after the patient's more acute problems have been addressed  Workstation performed: [id number]             EKG, Pathology, and Other Studies: I have personally reviewed pertinent reports        Code Status: Level 1 - Full Code  Advance Directive and Living Will:      Power of :    POLST:      Counseling / Coordination of Care  Total floor / unit time spent today 30 minutes  Greater than 50% of total time was spent with the patient and / or family counseling and / or coordination of care  A description of the counseling / coordination of care: Reji Fernández

## 2019-11-11 NOTE — CONSULTS
Orthopedics   Nam Iqbal 71 y o  female MRN: [id number]  Unit/Bed#: [facility] 9-01      Chief Complaint:   left buttock pain    HPI:   71 y  o female complaining of left flank erythema and pain  Patient has a past medical history of paraplegia with chronic decubitus wounds that required debridement and flap coverage  Additionally she has history of a left retrograde femoral nail for a femur fracture, which subsequently needed washout for wound infection/draining sinus of the distal femur  She was admitted last night for sepsis and underwent debridement with General surgery  Orthopedics was consulted for osteomyelitis/exposed bone  At this time she reports no significant sensation in the lower extremities bilaterally    Only complaint is thirst     Review Of Systems:   · Skin:  Wounds per above  · Neuro: See HPI  · Musculoskeletal: See HPI  · 14 point review of systems negative except as stated above     Past Medical History:   Past Medical History:   Diagnosis Date    Anemia     iron defiencey    Arthritis     osteo    Back injury     Chronic kidney disease     nephritis    Depression     Osteopenia     Osteoporosis     Paralysis ([address] )     paraplegic due to spinal cord injury    Paraplegia (HCC)     Poor circulation     Pressure injury of skin     right hip, stage 3    Pressure sore of hip     right    Tibial plateau fracture     Underweight        Past Surgical History:   Past Surgical History:   Procedure Laterality Date    CLOSURE DELAYED PRIMARY N/A 3/20/2019    Procedure: OPHELIA ANAL WOUND RECLOSURE;  Surgeon: Quang Ramon MD;  Location: [facility] MAIN OR;  Service: Plastics    FLAP LOCAL EXTREMITY Left 1/28/2019    Procedure: THIGH FLAP & STSG TO CLOSE HIP WOUND;  Surgeon: Quang Ramon MD;  Location: [facility] MAIN OR;  Service: Plastics    FLAP LOCAL TRUNK Right 12/17/2018    Procedure: DEBRIDE/FLAP CLOSURE HIP PRESSURE SORE [place] GRAFT;  Surgeon: Quang Ramon MD;  Location: [facility] MAIN OR;  Service: Plastics    FLAP LOCAL TRUNK Left 2/27/2019    Procedure: BUTTOCK FLAP TO ISCHIAL SORE;  Surgeon: Esmer Tian MD;  Location: [address] [place] [address] OR;  Service: Plastics    HIP DEBRIDEMENT Right 2017    right hip sore debridement    INCISION AND DRAINAGE OF WOUND Left 1/3/2019    Procedure: INCISION AND DRAINAGE (I&D) left distal femur  With deep wound cultures   Application of VAC DRAIN SPONGE;  Surgeon: Everlina Lefort, MD;  Location:  MAIN OR;  Service: Orthopedics    IR PICC LINE  12/19/2018    IR PICC LINE  3/12/2019    IL DEBRIDEMENT, SKIN, SUB-Q TISSUE,MUSCLE,BONE,=<20 SQ CM Right 9/19/2018    Procedure: DEBRIDEMENT OF HIP PRESSURE SORE;  Surgeon: Esmer iTan MD;  Location: [address] [place] [address] OR;  Service: Plastics    IL OPEN RX FEMUR FX+INTRAMED FELIX Left 10/22/2018    Procedure: INSERTION NAIL IM LEFT FEMUR RETROGRADE;  Surgeon: Jeancarlos Scruggs MD;  Location: [facility] OR;  Service: Orthopedics    TOE AMPUTATION Left 2017    small toe left foot amputation    WISDOM TOOTH EXTRACTION      WOUND DEBRIDEMENT Left 12/17/2018    Procedure: DEBRIDEMENT HIP PRESSURE SORE;  Surgeon: Esmer Tian MD;  Location: [address] [place] [address] OR;  Service: Plastics       Family History:  Family history reviewed and non-contributory  Family History   Problem Relation Age of Onset    Depression Father        Social History:  Social History     Socioeconomic History    Marital status: Single     Spouse name: None    Number of children: None    Years of education: None    Highest education level: None   Occupational History    None   Social Needs    Financial resource strain: None    Food insecurity:     Worry: None     Inability: None    Transportation needs:     Medical: None     Non-medical: None   Tobacco Use    Smoking status: Never Smoker    Smokeless tobacco: Never Used   Substance and Sexual Activity    Alcohol use: Yes     Comment: occasional    Drug use: No    Sexual activity: None   Lifestyle    Physical activity:     Days per week: None     Minutes per session: None    Stress: None   Relationships    Social connections:     Talks on phone: None     Gets together: None     Attends Nondenominational service: None     Active member of club or organization: None     Attends meetings of clubs or organizations: None     Relationship status: None    Intimate partner violence:     Fear of current or ex partner: None     Emotionally abused: None     Physically abused: None     Forced sexual activity: None   Other Topics Concern    None   Social History Narrative    Lives at home alone  Pt denies getting assistance from outside persons or agencies including wound care  Allergies:    Allergies   Allergen Reactions    Iv Contrast [Iodinated Diagnostic Agents]      Tingling face, itching    Cefepime Rash    Ciprofloxacin Rash and Swelling     Looked like suburn, itching  Bed sores  Swelling, itching    Latex Rash    Vancomycin Rash     Unknown            Labs:  0   Lab Value Date/Time    HCT 35 0 11/11/2019 0515    HCT 31 8 (L) 11/11/2019 0312    HCT 18 3 (L) 11/10/2019 2245    HCT 30 0 (L) 05/28/2018 0530    HCT 29 5 (L) 05/22/2018 0515    HCT 32 5 (L) 05/14/2018 0555    HGB 11 2 (L) 11/11/2019 0515    HGB 10 3 (L) 11/11/2019 0312    HGB 5 5 (LL) 11/10/2019 2245    HGB 10 1 (L) 05/28/2018 0530    HGB 9 9 (L) 05/22/2018 0515    HGB 10 6 (L) 05/14/2018 0555    INR 1 40 (H) 11/10/2019 1706    WBC 19 96 (H) 11/11/2019 0515    WBC 25 75 (H) 11/10/2019 2245    WBC 21 20 (H) 11/10/2019 1706    WBC 6 4 05/28/2018 0530    WBC 8 0 05/22/2018 0515    WBC 6 0 05/14/2018 0555    ESR 87 (H) 03/12/2019 0539       Meds:    Current Facility-Administered Medications:     acetaminophen (TYLENOL) tablet 650 mg, 650 mg, Oral, Q6H PRN, Christy Lazo MD    chlorhexidine (PERIDEX) 0 12 % oral rinse 15 mL, 15 mL, [place], Q12H [facility], Keeley Caraballo DO    ertapenem [place]) 1,000 mg in sodium chloride 0 9 % 50 mL IVPB, 1,000 mg, Intravenous, Q24H, Keeley Caraballo DO, Stopped at 11/11/19 0215    heparin (porcine) subcutaneous injection 5,000 Units, 5,000 Units, Subcutaneous, Q8H [address], 5,000 Units at 11/11/19 0514 **AND** [COMPLETED] Platelet count, , , Once, Ward Soriano MD    HYDROmorphone (DILAUDID) injection 0 5 mg, 0 5 mg, Intravenous, Q3H PRN, Ward Soriano MD    ondansetron [facility]) injection 4 mg, 4 mg, Intravenous, Q6H PRN, Aakash Kyle DO    oxyCODONE (ROXICODONE) IR tablet 10 mg, 10 mg, Oral, Q4H PRN, Ward Soriano MD    oxyCODONE (ROXICODONE) IR tablet 5 mg, 5 mg, Oral, Q4H PRN, Ward Soriano MD    phenylephrine (ELADIO-SYNEPHRINE) 50 mg (STANDARD CONCENTRATION) in sodium chloride 0 9% 250 mL,  mcg/min, Intravenous, Titrated, Keeley Caraballo DO, Last Rate: 15 mL/hr at 11/11/19 0345, 50 mcg/min at 11/11/19 0345    saccharomyces boulardii (FLORASTOR) capsule 250 mg, 250 mg, Oral, BID, Keeley Caraballo DO    sodium chloride 0 9 % infusion, 125 mL/hr, Intravenous, Continuous, Keeley Caraballo DO, Last Rate: 125 mL/hr at 11/10/19 2313, 125 mL/hr at 11/10/19 2313    Facility-Administered Medications Ordered in Other Encounters:     dexamethasone (DECADRON) injection 4 mg, 4 mg, Intravenous, Once PRN, Иванbret Tovarre, DO    diphenhydrAMINE (BENADRYL) injection 12 5 mg, 12 5 mg, Intravenous, Once, Иван Garre, DO    fentaNYL (SUBLIMAZE) injection 12 5 mcg, 12 5 mcg, Intravenous, Q10 Min PRN, Иван Garre, DO    fentaNYL (SUBLIMAZE) injection 25 mcg, 25 mcg, Intravenous, Q5 Min PRN, Иван Garre, DO    HYDROmorphone (DILAUDID) injection 0 2 mg, 0 2 mg, Intravenous, Q10 Min PRN, Ander Drew MD    lactated ringers infusion, 75 mL/hr, Intravenous, Continuous, Иван Bernal DO, Last Rate: 75 mL/hr at 11/10/19 2105    lactated ringers infusion, 50 mL/hr, Intravenous, Continuous, Osman Arnold CRNA    lactated ringers infusion, 75 mL/hr, Intravenous, Continuous, Иван Bernal DO, Stopped at 03/20/19 1841    lactated ringers infusion, 75 mL/hr, Intravenous, Continuous, Rome Arthur MD, Stopped at 03/20/19 1842    ondansetron (ZOFRAN) injection 4 mg, 4 mg, Intravenous, Once PRN, Barney Mckeon MD    promethazine (PHENERGAN) injection 12 5 mg, 12 5 mg, Intravenous, Once PRN, Barney Mckeon MD    Blood Culture:   Lab Results   Component Value Date    BLOODCX Received in Microbiology Lab  Culture in Progress  11/10/2019       Wound Culture:   Lab Results   Component Value Date    WOUNDCULT Few Colonies of Pseudomonas aeruginosa (A) 01/06/2019       Ins and Outs:  I/O last 24 hours: In: 1036 [I V :1108; Blood:700; IV Piggyback:1930]  Out: 495 [Urine:495]          Physical Exam:   BP (!) 85/64   Pulse 82   Temp 97 5 °F (36 4 °C) (Oral)   Resp (!) 27   Ht 5' (1 524 m)   Wt 46 7 kg (102 lb 15 3 oz)   LMP  (LMP Unknown)   SpO2 96%   BMI 20 11 kg/m²   Gen: Alert and oriented to person, place, time  HEENT: EOMI, eyes clear, moist mucus membranes, hearing intact  Respiratory: Bilateral chest rise  No audible wheezing found  Cardiovascular: Regular Rate and Rhythm  Abdomen: soft nontender/nondistended  Musculoskeletal: left gluteal region  · Skin:  Large complex sacral ulcer, with packing in place, overlying the left greater trochanter  Surrounding erythema  Well-healed incisions to the distal left femur without associated erythema or fluctuance  · Diminished sensation L3-S1  · 0/5 motor hip flexion, knee flexion/extension, ankle dorsiflexion/plantar flexion, EHL/FHL  · Foot warm and well eprfused    Radiology:   I personally reviewed the films  CT of the pelvis shows deep ulcers extending down to the level of the ischial tuberosity and right greater trochanter, consistent with probable osteomyelitis     _*_*_*_*_*_*_*_*_*_*_*_*_*_*_*_*_*_*_*_*_*_*_*_*_*_*_*_*_*_*_*_*_*_*_*_*_*_*_*_*_*    Assessment:  71 y  o female with sacral decubitus wounds likely complicated by osteomyelitis  Patient is not a candidate for resection of osteomyelitis given the morbidity of the procedure  Would recommend local wound care by General surgery and plastics  No indication for removal of hardware as it is not exposed or likely contributing to her current symptoms  IV antibiotic therapy per primary/infectious disease  Plan:   · Cont  abx per primary team  · Nonweight bearing to bilateral lower extremity  · Analgesics for pain  · Body mass index is 20 11 kg/m²  Recommend nutrition    · Dispo: Ortho signing off      Mik James MD

## 2019-11-11 NOTE — CONSULTS
Consultation - Palliative and Supportive Care   [place] García 71 y o  female [id number]    Assessment:  Patient Active Problem List   Diagnosis    Paraplegia following spinal cord injury ([facility] [address]    Pressure ulcer, sacrum    Iron deficiency anemia    Osteopenia    Underweight    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    Rash due to allergy    Closed fracture of distal end of left femur with routine healing    Closed fracture of lower end of left femur with routine healing    Chronic osteomyelitis of coccyx ([facility] )    Constipation    Anemia of chronic disease    Hyponatremia    GERD (gastroesophageal reflux disease)    Moderate protein-calorie malnutrition (HCC)    Hyperglycemia    BMI less than 19,adult    Hypermagnesemia    Complicated wound infection    Sepsis due to anaerobes ([facility] )    C  difficile diarrhea    Pressure injury of sacral region, unstageable ([facility] )    Acute blood loss anemia     Plan:  1  Symptom management - per primary team   - acetaminophen 650mg Q6H PRN mild pain (consider scheduling 975mg Q8H)   - oxycodone 5-10mg PO Q4H PRN moderate-severe pain   - dilaudid 0 5mg IV Q3H PRN breakthrough pain   - consider addition of mirtazapine for depression, poor sleep, and poor appetite  Patient does not wish to start new medication at this time  2  Goals - level 1 full code   - No limits at this time  - Long-term goal to return home and regain maximum functional independence  Wishes to "get her life in order" and fears having to go to a facility    - Patient recognizes the severity of her condition and wishes for maximum medical management to recover if possible  Marietta Veras worries about the need for invasive surgeries or lack of options if her wounds do not heal    - Patient will benefit from 5 wishes document to designate her wishes and an alternate decision maker       3  Psychosocial support   - Patient relies on neighbors/friends for support, but is without reliable family members to provide support/assistance  - Lambert Wyman was not receiving home services prior to admission    - JOSHUA Akbar following for support  - Patient will benefit from ongoing palliative support throughout this admission and on discharge  Code Status: full - Level 1   Decisional apparatus:  Patient is competent on my exam today  She denies having family members for support but does report that her neighbors are supportive and would likely name one of them to make decisions  Advance Directive / Living Will / POLST:  5 wishes to be discussed tomorrow  We appreciate the invitation to be involved in this patient's care  We will continue to follow  Please do not hesitate to reach our on call provider through our clinic answering service at  should you have acute symptom control concerns  IDENTIFICATION:  Inpatient consult to Palliative Care  Consult performed by: Indio Izaguirre PA-C  Consult ordered by: Alessandra Diggs MD        Physician Requesting Consult: Erum Zayas MD  Reason for Consult / Principal Problem: support, goals of care  Hx and PE limited by: n/a    HISTORY OF PRESENT ILLNESS:       Elida Koyanagi is a 71 y o  female , PMH paraplegia, acute on chronic sacral decubitus ulcer s/p multiple revision surgeries, malnutrition, and anemia, presented to [facility] on 11/10 with generalized fatigue progressing over 2 weeks  Patient was found to have worsening decubitus ulcer for which she was taken to the OR for washout/debriedment  Lambert Wyman experienced a T8 spinal cord injury resulting in paraplegia over 35 years ago  Fortunately she was able to regain her functional independence to care for herself, drive, go to the grocery store, and even volunteer for an animal shelter  In January of 2019 patient had a right hip flap surgery for sacral would associated with osteomyelitis   Unfortunately she had a prolonged hospitalization involving dehiscence, revision of dehisced flap in 3/20  She reports being hospitalized/in rehab mostly from [facility]  In October patient suffered L femur fracture requiring ORIF  Abilio Triana states that the past 2 weeks she has had progressive weakness, 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, increased difficulty getting around, daytime fatigue for which she called her PCP yesterday and subsequently presented to the ER as recommended  Patient does not have any children or family members that she deems to be reliable support  She does have several neighbors who help to take care of her dog while she is admitted and can check on her if needed  Denies 1 particular point person  Abilio Triana reports pain to be well controlled (with minimal sensation in buttock/LE)  She admits to feeling scared, uncertain, and worried about the future  Patient worries that she will need a LE amputation or that her options for treatment will become limited by lack of progress or antibiotics  She does wish to get as well as possible to return home where she is most comfortable and is in agreement with the current care plan  Discussed recommendation for advanced care planning and ongoing conversations regarding goals of care as clinical course evolves  Review of Systems   Constitution: Positive for decreased appetite, malaise/fatigue and weight loss  HENT: Positive for sore throat  Eyes: Negative for visual disturbance  Respiratory: Negative for shortness of breath  Skin: Positive for poor wound healing  Musculoskeletal:        Mild wound pain   Gastrointestinal: Positive for nausea (prior to admission)  Neurological: Positive for weakness  Psychiatric/Behavioral: Positive for depression  The patient has insomnia          Past Medical History:   Diagnosis Date    Anemia     iron defiencey    Arthritis     osteo    Back injury     Chronic kidney disease     nephritis    Depression     Osteopenia  Osteoporosis     Paralysis ([address] )     paraplegic due to spinal cord injury    Paraplegia (HCC)     Poor circulation     Pressure injury of skin     right hip, stage 3    Pressure sore of hip     right    Tibial plateau fracture     Underweight      Past Surgical History:   Procedure Laterality Date    CLOSURE DELAYED PRIMARY N/A 3/20/2019    Procedure: OPHELIA ANAL WOUND RECLOSURE;  Surgeon: Esmer Tian MD;  Location: [address] [place] [address];  Service: Plastics    FLAP LOCAL EXTREMITY Left 1/28/2019    Procedure: THIGH FLAP & STSG TO CLOSE HIP WOUND;  Surgeon: Esmer Tian MD;  Location: [address] [place] [address] OR;  Service: Plastics    FLAP LOCAL TRUNK Right 12/17/2018    Procedure: DEBRIDE/FLAP CLOSURE HIP PRESSURE SORE Cindia [place] GRAFT;  Surgeon: Esmer Tian MD;  Location: [address] [place] [address] OR;  Service: Plastics    FLAP LOCAL TRUNK Left 2/27/2019    Procedure: BUTTOCK FLAP TO ISCHIAL SORE;  Surgeon: Esmer Tian MD;  Location: [address] [place] [address] OR;  Service: Plastics    HIP DEBRIDEMENT Right 2017    right hip sore debridement    INCISION AND DRAINAGE OF WOUND Left 1/3/2019    Procedure: INCISION AND DRAINAGE (I&D) left distal femur  With deep wound cultures   Application of VAC DRAIN SPONGE;  Surgeon: Everlina Lefort, MD;  Location:  MAIN OR;  Service: Orthopedics    IR PICC LINE  12/19/2018    IR PICC LINE  3/12/2019    OK DEBRIDEMENT, SKIN, SUB-Q TISSUE,MUSCLE,BONE,=<20 SQ CM Right 9/19/2018    Procedure: DEBRIDEMENT OF HIP PRESSURE SORE;  Surgeon: Esmer Tian MD;  Location: [address] [place] [address] OR;  Service: Plastics    OK OPEN RX FEMUR FX+INTRAMED FELIX Left 10/22/2018    Procedure: INSERTION NAIL IM LEFT FEMUR RETROGRADE;  Surgeon: Jeancarlos Scruggs MD;  Location:  MAIN OR;  Service: Orthopedics    TOE AMPUTATION Left 2017    small toe left foot amputation    WISDOM TOOTH EXTRACTION      WOUND DEBRIDEMENT Left 12/17/2018    Procedure: Nellie Ayala;  Surgeon: Esmer Tian MD;  Location: [address] [place] [address];  Service: Plastics     Social History     Socioeconomic History    Marital status: Single     Spouse name: Not on file    Number of children: Not on file    Years of education: Not on file    Highest education level: Not on file   Occupational History    Not on file   Social Needs    Financial resource strain: Not on file    Food insecurity:     Worry: Not on file     Inability: Not on file    Transportation needs:     Medical: Not on file     Non-medical: Not on file   Tobacco Use    Smoking status: Never Smoker    Smokeless tobacco: Never Used   Substance and Sexual Activity    Alcohol use: Yes     Comment: occasional    Drug use: No    Sexual activity: Not on file   Lifestyle    Physical activity:     Days per week: Not on file     Minutes per session: Not on file    Stress: Not on file   Relationships    Social connections:     Talks on phone: Not on file     Gets together: Not on file     Attends Jain service: Not on file     Active member of club or organization: Not on file     Attends meetings of clubs or organizations: Not on file     Relationship status: Not on file    Intimate partner violence:     Fear of current or ex partner: Not on file     Emotionally abused: Not on file     Physically abused: Not on file     Forced sexual activity: Not on file   Other Topics Concern    Not on file   Social History Narrative    Lives at home alone  Pt denies getting assistance from outside persons or agencies including wound care        Family History   Problem Relation Age of Onset    Depression Father        MEDICATIONS / ALLERGIES:    all current active meds have been reviewed    Allergies   Allergen Reactions    Iv Contrast [Iodinated Diagnostic Agents]      Tingling face, itching    Cefepime Rash    Ciprofloxacin Rash and Swelling     Looked like suburn, itching  Bed sores  Swelling, itching    Latex Rash    Vancomycin Rash     Unknown        OBJECTIVE:    Physical Exam  Physical Exam Constitutional: She is oriented to person, place, and time  Frail, cachectic   HENT:   Head: Normocephalic and atraumatic  Eyes: Conjunctivae are normal    Cardiovascular: Normal rate  hypotensive   Pulmonary/Chest: Effort normal  No respiratory distress  Abdominal: She exhibits no distension  There is no guarding  Neurological: She is alert and oriented to person, place, and time  Skin: Skin is warm and dry  Wound photos reviewed in media   Psychiatric:   Flat affect, depressed mood       Lab Results:   I have personally reviewed pertinent labs  , CBC:   Lab Results   Component Value Date    WBC 19 96 (H) 11/11/2019    HGB 11 2 (L) 11/11/2019    HCT 35 0 11/11/2019    MCV 79 (L) 11/11/2019     (H) 11/11/2019    MCH 25 4 (L) 11/11/2019    MCHC 32 0 11/11/2019    RDW 17 2 (H) 11/11/2019    MPV 9 5 11/11/2019    NRBC 0 11/10/2019   , CMP:   Lab Results   Component Value Date    SODIUM 136 11/11/2019    K 3 6 11/11/2019     11/11/2019    CO2 17 (L) 11/11/2019    CO2 22 11/10/2019    BUN 8 11/11/2019    CREATININE 0 30 (L) 11/11/2019    GLUCOSE 154 (H) 11/10/2019    CALCIUM 7 4 (L) 11/11/2019    AST 28 11/10/2019    ALT 24 11/10/2019    ALKPHOS 139 (H) 11/10/2019    EGFR 117 11/11/2019     Imaging Studies: reviewed pertinent studies  EKG, Pathology, and Other Studies: reviewed pertinent studies    Counseling / Coordination of Care  Total floor / unit time spent today 60+ minutes  Greater than 50% of total time was spent with the patient and / or family counseling and / or coordination of care  A description of the counseling / coordination of care: time spent from approximately 1pm-2pm discussing symptom management, goals of care, and providing psychosocial support in addition to communicating with primary team and RN

## 2019-11-11 NOTE — PROCEDURES
Informed consent obtained by explaining the risks, benefits, and alternatives of the procedure for which patient was agreeable  A time out was performed to verify the correct site and procedure which was performed at the bedside  Patient was positioned on her right side and the area was prepped with Betadine paint  Three moistened Kerlex bandages were removed  Excisional debridement of left inferior ischial wound (approx  10 x 8 x 3cm) with necrotic appearing soft tissue and slough sharply debrided with scissors and #10 blade to freshly bleeding tissue  Exposed left ischium, necrotic in appearance, debrided with Rongeur to bleeding bone  At this time we turned our attention to the patient's left sacral wound and right hip wounds which demonstrated minimal necrotic soft tissue and sloughing which were sharply debrided in a similar fashion to healthy bleeding tissue  Adequate hemostasis was achieved at the conclusion of the debridement  All wounds were repacked with saline moistened Kerlex (x3 total) and covered with dry gauze and ABD pads  The patient tolerated the procedure well and without complication

## 2019-11-11 NOTE — PROGRESS NOTES
ICU Accept Note- Surgical Critical Care  Frank Lenz 71 y o  female MRN: [id number]  Unit/Bed#: OR [place] Encounter: [id number]     Reason for Consultation / Chief Complaint: Chronic decubitus ulcers of bilateral hips/femur/iliac/sacrum with worsening necrosis     History of Present Illness:  Frank Lenz is a 71 y o  female w/ hx of paraplegia, C  diff, who presented to the hospital earlier this evening with worsening chronic decubitus ulcers of the bilateral hips, femur, iliac, and sacral regions  Patient has surgical hx of multiple flap procedures with Dr Darrell Williamson of Plastic Surgery (flap of right hip in 12/2018, flat of left hip/thigh in 01/2019, flap of the left ischium in 02/2019), as well as left femur IM nail in 10/2018  Patient performs her own wound care at home  Over the past 2 weeks, patient was feeling that her wounds were progressively looking worse in appearance, with increased amounts of drainage  In the past 2 days, she had began feeling generalized weakness, worsening to the point where she presented today to the emergency department  CT of the chest, abdomen, and pelvis obtained in the ED showed deep ulcers adjacent to the proximal right femur and left ischial tuberosity with multiple collections of gas in the subadjacent soft tissues, extending as deep as the obturator internus muscles with discrete abscess cavities  Patient was then taken urgently to the operating room with the acute care surgery service for operative debridement  She was started on ertapenem, which she was treated with previously for infected decubitus ulcers (patient has history of multiple antibiotic allergies)  Intra-op, patient had required hemodynamic support with phenylephrine, as well as low hemoglobin level of 5 0, necessitating Critical Care admission  Of note, patient has history of C diff colitis, for which she takes Florastor at home  She is allergic to Vancomycin   WBC count of 21 upon admission  Currently, pt doing well post-operatively  C/o 2/10 sacral pain  History obtained from chart review and the patient  Past Medical History:  Past Medical History:   Diagnosis Date    Anemia     iron defiencey    Arthritis     osteo    Back injury     Chronic kidney disease     nephritis    Depression     Osteopenia     Osteoporosis     Paralysis ([address] )     paraplegic due to spinal cord injury    Paraplegia (HCC)     Poor circulation     Pressure injury of skin     right hip, stage 3    Pressure sore of hip     right    Tibial plateau fracture     Underweight         Past Surgical History:  Past Surgical History:   Procedure Laterality Date    CLOSURE DELAYED PRIMARY N/A 3/20/2019    Procedure: OPHELIA ANAL WOUND RECLOSURE;  Surgeon: Meche Bentley MD;  Location: [facility] MAIN OR;  Service: Plastics    FLAP LOCAL EXTREMITY Left 1/28/2019    Procedure: THIGH FLAP & STSG TO CLOSE HIP WOUND;  Surgeon: Meche Bentley MD;  Location: [facility] MAIN OR;  Service: Plastics    FLAP LOCAL TRUNK Right 12/17/2018    Procedure: DEBRIDE/FLAP CLOSURE HIP PRESSURE SORE Darrel Lemming GRAFT;  Surgeon: Meche Bentley MD;  Location: [facility] MAIN OR;  Service: Plastics    FLAP LOCAL TRUNK Left 2/27/2019    Procedure: BUTTOCK FLAP TO ISCHIAL SORE;  Surgeon: Meche Bentley MD;  Location: [facility] MAIN OR;  Service: Plastics    HIP DEBRIDEMENT Right 2017    right hip sore debridement    INCISION AND DRAINAGE OF WOUND Left 1/3/2019    Procedure: INCISION AND DRAINAGE (I&D) left distal femur  With deep wound cultures   Application of VAC DRAIN SPONGE;  Surgeon: Telly Haskins MD;  Location:  MAIN OR;  Service: Orthopedics    IR PICC LINE  12/19/2018    IR PICC LINE  3/12/2019    HI DEBRIDEMENT, SKIN, SUB-Q TISSUE,MUSCLE,BONE,=<20 SQ CM Right 9/19/2018    Procedure: DEBRIDEMENT OF HIP PRESSURE SORE;  Surgeon: Meche Bentley MD;  Location: [facility] MAIN OR;  Service: Plastics    HI OPEN RX FEMUR FX+INTRAMED FELIX Left 10/22/2018    Procedure: INSERTION NAIL IM LEFT FEMUR RETROGRADE;  Surgeon: Rashi Portillo MD;  Location:  MAIN OR;  Service: Orthopedics    TOE AMPUTATION Left 2017    small toe left foot amputation    WISDOM TOOTH EXTRACTION      WOUND DEBRIDEMENT Left 12/17/2018    Procedure: DEBRIDEMENT HIP PRESSURE SORE;  Surgeon: Erik Sher MD;  Location: [address] [place] [address] MAIN OR;  Service: Plastics        Past Family History:  Family History   Problem Relation Age of Onset    Depression Father         Social History:  Social History     Tobacco Use   Smoking Status Never Smoker   Smokeless Tobacco Never Used     Social History     Substance and Sexual Activity   Alcohol Use Yes    Comment: occasional     Social History     Substance and Sexual Activity   Drug Use No     Marital Status: Single    Home Medications:   Prior to Admission medications    Medication Sig Start Date End Date Taking?  Authorizing Provider   Calcium Carb-Cholecalciferol 600-200 MG-UNIT TABS Take 1 tablet by mouth daily    Historical Provider, MD   cholecalciferol (VITAMIN D3) 1,000 units tablet Take 1,000 Units by mouth daily    Historical Provider, MD   cyanocobalamin 100 MCG tablet Take 100 mcg by mouth daily    Historical Provider, MD   multivitamin (THERAGRAN) TABS Take 1 tablet by mouth daily    Historical Provider, MD   nystatin (MYCOSTATIN) powder Apply topically 2 (two) times a day    Historical Provider, MD   pantoprazole (PROTONIX) 40 mg tablet Take 40 mg by mouth daily    Historical Provider, MD   Probiotic Product (PROBIOTIC PEARLS ADVANTAGE PO) Take by mouth    Historical Provider, MD   saccharomyces boulardii (FLORASTOR) 250 mg capsule Take 1 capsule (250 mg total) by mouth 2 (two) times a day  Patient not taking: Reported on 6/13/2019 4/19/19   Yulissa Guerrero, DO        Inpatient Medications:  Scheduled Meds:  Current Facility-Administered Medications:  [MAR Hold] ertapenem 1,000 mg Intravenous Q24H Andriy Rodarte MD    [facility] Hold] heparin (porcine) 5,000 Units Subcutaneous [facility] Hubert Garcia MD    lactated ringers 1,000 mL Intravenous Once Hubert Garcia MD Last Rate: 1,000 mL (11/10/19 2041)   [MAR Hold] ondansetron 4 mg Intravenous Q6H PRN Hubert Garcia MD    [place] [facility] Hold] saccharomyces boulardii 250 mg Oral BID Hubert Garcia MD    sodium chloride 125 mL/hr Intravenous Continuous Hubert Garcia MD Last Rate: 125 mL/hr (11/10/19 2032)   sodium chloride   PRN Alanna Disla MD      Facility-Administered Medications Ordered in Other Encounters:  albumin human   Continuous PRN Rosaura Sams, CRNA Last Rate: Stopped (11/10/19 2137)   calcium chloride   PRN Rosaura Sams, CRNA    dexamethasone 4 mg Intravenous Once PRN Blanca Oldhams, DO    diphenhydrAMINE 12 5 mg Intravenous Once Blanca Oldhams, DO    epinephrine   PRN Rosaura Sams, CRNA    fentaNYL 12 5 mcg Intravenous Q10 Min PRN Blanca Oldhams, DO    fentaNYL 25 mcg Intravenous Q5 Min PRN Blanca Devan, DO    HYDROmorphone 0 2 mg Intravenous Q10 Min PRN Arvind Martins MD    lactated ringers 75 mL/hr Intravenous Continuous Blanca Devan, DO Last Rate: 75 mL/hr (11/10/19 2105)   lactated ringers 50 mL/hr Intravenous Continuous Lalo Meres, CRNA    lactated ringers 75 mL/hr Intravenous Continuous Blanca Devan, DO Last Rate: Stopped (03/20/19 1841)   lactated ringers 75 mL/hr Intravenous Continuous Naldo Langford MD Last Rate: Stopped (03/20/19 1842)   lidocaine   PRN Rosaura Sams, CRNA    ondansetron 4 mg Intravenous Once PRN Arvind Martins MD    phenylephrine (ELADIO-SYNEPHRINE) 50 mg (STANDARD CONCENTRATION) in sodium chloride 0 9% 250 mL   Continuous PRN Rosaura Sams, CRNA Last Rate: 120 mcg/min (11/10/19 2206)   phenylephrine   PRN Rosaura Sams, CRNA    promethazine 12 5 mg Intravenous Once PRN Arvind Martins MD    propofol  Intravenous PRN Rosaura Midkiff, CRNA    ROCuronium   PRN Rosaura Midkiff, CRNA    sodium chloride   Continuous PRN Rosaura Midkiff, CRNA      Continuous Infusions:  sodium chloride 125 mL/hr Last Rate: 125 mL/hr (11/10/19 2032)     PRN Meds:    [ALBINA Hold] ondansetron 4 mg Q6H PRN   sodium chloride  PRN        Allergies: Allergies   Allergen Reactions    Iv Contrast [Iodinated Diagnostic Agents]      Tingling face, itching    Cefepime Rash    Ciprofloxacin Rash and Swelling     Looked like suburn, itching  Bed sores  Swelling, itching    Latex Rash    Vancomycin Rash     Unknown         ROS:   Review of Systems   Constitutional: Negative for chills, diaphoresis, fatigue and fever  HENT: Negative for congestion, postnasal drip, rhinorrhea, sinus pressure, sinus pain, sore throat and trouble swallowing  Eyes: Negative for visual disturbance  Respiratory: Negative for cough, chest tightness, shortness of breath, wheezing and stridor  Cardiovascular: Negative for chest pain, palpitations and leg swelling  Gastrointestinal: Negative for abdominal distention, abdominal pain, constipation, diarrhea, nausea and vomiting  Endocrine: Negative for polyphagia  Genitourinary: Negative for flank pain and urgency  Musculoskeletal: Negative for arthralgias, back pain, gait problem, joint swelling, myalgias, neck pain and neck stiffness  Skin: Positive for wound  Negative for color change, pallor and rash  Bilateral ischial, sacral, L femur wounds   Allergic/Immunologic: Negative for environmental allergies, food allergies and immunocompromised state  Neurological: Negative for dizziness, facial asymmetry, light-headedness and headaches  Psychiatric/Behavioral: Negative for agitation, behavioral problems and confusion          Vitals:  Vitals:    11/10/19 1715 11/10/19 1800 11/10/19 1845 11/10/19 1949   BP: 118/56 98/54 100/50 105/58   BP Location:    Right arm   Pulse: (!) 106 104 102 102   Resp: 18 18 20 20   Temp:       TempSrc:       SpO2: 100% 100% 100% 98%     Temperature:   Temp (24hrs), Av 9 °F (37 7 °C), Min:99 9 °F (37 7 °C), Max:99 9 °F (37 7 °C)    Current Temperature: 99 9 °F (37 7 °C)     Weights: There is no height or weight on file to calculate BMI  Hemodynamic Monitoring:  N/A     Non-Invasive/Invasive Ventilation Settings:  Respiratory    Lab Data (Last 4 hours)    None         O2/Vent Data (Last 4 hours)    None              No results found for: PHART, UOS8WKC, PO2ART, AQL3UEM, B9BSZBQU, BEART, SOURCE  SpO2: SpO2: 98 %     Physical Exam:  Physical Exam   Constitutional: She is oriented to person, place, and time  No distress  Cachectic   HENT:   Head: Normocephalic and atraumatic  Eyes: EOM are normal    Neck: Normal range of motion  Neck supple  Cardiovascular: Normal rate and regular rhythm  Pulmonary/Chest: Effort normal    Abdominal: Soft  She exhibits no distension  There is no tenderness  Neurological: She is alert and oriented to person, place, and time  Skin: Skin is warm  She is not diaphoretic  Bilateral ischial, sacral, L femur wounds packed with wet-to-dry Kerlix   Psychiatric: She has a normal mood and affect  Her behavior is normal    Vitals reviewed  Labs:  Results from last 7 days   Lab Units 11/10/19  1706   WBC Thousand/uL 21 20*   HEMOGLOBIN g/dL 9 1*   HEMATOCRIT % 30 6*   PLATELETS Thousands/uL 791*   NEUTROS PCT % 87*   MONOS PCT % 5    Results from last 7 days   Lab Units 11/10/19  1706   SODIUM mmol/L 132*   POTASSIUM mmol/L 4 0   CHLORIDE mmol/L 97*   CO2 mmol/L 22   BUN mg/dL 15   CREATININE mg/dL 0 52*   CALCIUM mg/dL 8 8   ALK PHOS U/L 139*   ALT U/L 24   AST U/L 28              Results from last 7 days   Lab Units 11/10/19  1706   INR  1 40*   PTT seconds 39*     Results from last 7 days   Lab Units 11/10/19  1706   LACTIC ACID mmol/L 1 7     0   Lab Value Date/Time    TROPONINI <0 01 12/13/2018 2126        Imaging:     CT chest abdomen pelvis wo contrast   Final Result by Andrew Cook MD (11/10 1945)      1    No evidence of acute abnormality in the chest    2   Deep ulcers adjacent to the proximal right femur and the left ischial tuberosity with multiple collections of gas in the subjacent soft tissues, extending as deep as the obturator internus muscles  Ability to identify discrete abscess cavities    limited without intravenous contrast    3   Questionable thickening of the endometrium  If clinically indicated, this can be reevaluated with nonemergent pelvic sonography after the patient's more acute problems have been addressed  Workstation performed: [id number]             Micro:  Lab Results   Component Value Date    BLOODCX No Growth After 5 Days  01/06/2019    BLOODCX No Growth After 5 Days  01/06/2019    BLOODCX No Growth After 5 Days  01/05/2019    BLOODCX No Growth After 5 Days   01/05/2019    URINECX >100,000 cfu/ml Escherichia coli (A) 01/06/2019    URINECX >100,000 cfu/ml Providencia rettgeri (A) 01/06/2019    URINECX No Growth <1000 cfu/mL 12/15/2018    WOUNDCULT Few Colonies of Pseudomonas aeruginosa (A) 01/06/2019    WOUNDCULT Few Colonies of Escherichia coli (A) 01/06/2019    WOUNDCULT Few Colonies of Pseudomonas aeruginosa (A) 01/06/2019    WOUNDCULT 4+ Growth of Pseudomonas aeruginosa (A) 01/06/2019    WOUNDCULT 3+ Growth of  01/06/2019       Assessment: 71 y o  female w/ hx of paraplegia, C  diff , who presented to the hospital earlier this evening with worsening chronic decubitus ulcers of the bilateral hips, femur, iliac, and sacral regions, s/p operative debridement on 11/10        Plan:                 Neuro:   --Hx of paraplegia following spinal cord injury in 1981  --Neuro checks qshift  --Stable, continue to monitor                 CV:   --Continue hemodynamic support with Phenylephrine  --Wean pressor support as tolerated  --Goal MAP >65                Lung:   --No acute issues, extubated successfully postop                 GI:   --No acute issues  --Regular diet                 FEN:  --Fluids: Kendra@[employer]   --Replete electrolytes PRN :   --Hx of CKD   --Strict I/Os                 ID:   --On Ertapenem for multiple necrotic decubitus wounds, Florastor for hx of C  Diff    --Monitor fever curve, trend WBC count  --Infectious Disease consult placed for further Abx management recommendations                 Heme:   --Hx of iron-deficiency anemia  --Hgb of 5 0 intra-op from 9 1  --Trend H/H  --Transfuse PRN for Hgb <7                 Endo:   --No acute issues                 Msk/Skin:   --Multiple decubitus ulcers packed with wet-to-dry Kerlix/heavy drainage packs  --Acute Care Surgery following for wound management   --Orthopedics c/s placed for chronic sacral osteomyelitis, with previous infection of left femur ORIF  --Routine skin care  --PT/OT  --Frequent turning, offloading                 Disposition: Critical Care     VTE Pharmacologic Prophylaxis: Heparin  VTE Mechanical Prophylaxis: sequential compression device     Invasive lines and devices: Invasive Devices     Peripheral Intravenous Line            Peripheral IV 11/10/19 Left Antecubital less than 1 day    Peripheral IV 11/10/19 Left Hand less than 1 day    Peripheral IV 11/10/19 Right Antecubital less than 1 day          Arterial Line            Arterial Line 11/10/19 Right Radial less than 1 day          Drain            Urethral Catheter Other (Comment); Latex 16 Fr  213 days    Urethral Catheter Latex 16 Fr  less than 1 day                 Code Status: Level 1 - Full Code  POA:    POLST:       Given critical illness, patient length of stay will require greater than two midnights  Counseling / Coordination of Care  Total Critical Care time spent 30 minutes excluding procedures, teaching and family updates  Portions of the record may have been created with voice recognition software  Occasional wrong word or "sound a like" substitutions may have occurred due to the inherent limitations of voice recognition software    Read the chart carefully and recognize, using context, where substitutions have occurred          Xuan Benjamin MD

## 2019-11-11 NOTE — MALNUTRITION/BMI
This medical record reflects one or more clinical indicators suggestive of malnutrition  Malnutrition Findings:   Malnutrition type: Chronic illness(in the setting of decreased appetite <75% energy intake compared to est needs for >1 month, somewhat hollow orbitals (body fat loss), temples slight depression and clavicle protrusion (muscle mass loss) treated wit PO diet and nutritional supplement)  Degree of Malnutrition: Malnutrition of moderate degree  Malnutrition Characteristics: Muscle loss, Inadequate energy, Weight loss    BMI Findings: Body mass index is 20 11 kg/m²  See Nutrition note dated 6 - 11- 19 for additional details  Completed nutrition assessment is viewable in the nutrition documentation

## 2019-11-11 NOTE — PERIOPERATIVE NURSING NOTE
Arrived PACU supine in bed  HOB flat  Phenylephrine gtt infusing  Blood transfusion given and maintained by anesthesia  Labs drawn and sent  No c/o pain

## 2019-11-12 ENCOUNTER — ANESTHESIA EVENT (INPATIENT)
Dept: PERIOP | Facility: HOSPITAL | Age: 69
DRG: 853 | End: 2019-11-12
Payer: COMMERCIAL

## 2019-11-12 ENCOUNTER — ANESTHESIA (INPATIENT)
Dept: PERIOP | Facility: HOSPITAL | Age: 69
DRG: 853 | End: 2019-11-12
Payer: COMMERCIAL

## 2019-11-12 LAB
ABO GROUP BLD BPU: NORMAL
ANION GAP SERPL CALCULATED.3IONS-SCNC: 8 MMOL/L (ref 4–13)
BASOPHILS # BLD AUTO: 0.05 THOUSANDS/ΜL (ref 0–0.1)
BASOPHILS NFR BLD AUTO: 0 % (ref 0–1)
BPU ID: NORMAL
BUN SERPL-MCNC: 9 MG/DL (ref 5–25)
CA-I BLD-SCNC: 0.98 MMOL/L (ref 1.12–1.32)
CALCIUM SERPL-MCNC: 7 MG/DL (ref 8.3–10.1)
CHLORIDE SERPL-SCNC: 108 MMOL/L (ref 100–108)
CK SERPL-CCNC: 37 U/L (ref 26–192)
CO2 SERPL-SCNC: 22 MMOL/L (ref 21–32)
CREAT SERPL-MCNC: 0.43 MG/DL (ref 0.6–1.3)
CROSSMATCH: NORMAL
EOSINOPHIL # BLD AUTO: 0.05 THOUSAND/ΜL (ref 0–0.61)
EOSINOPHIL NFR BLD AUTO: 0 % (ref 0–6)
ERYTHROCYTE [DISTWIDTH] IN BLOOD BY AUTOMATED COUNT: 17.6 % (ref 11.6–15.1)
GFR SERPL CREATININE-BSD FRML MDRD: 104 ML/MIN/1.73SQ M
GLUCOSE SERPL-MCNC: 121 MG/DL (ref 65–140)
GLUCOSE SERPL-MCNC: 123 MG/DL (ref 65–140)
HCT VFR BLD AUTO: 34.8 % (ref 34.8–46.1)
HGB BLD-MCNC: 11.2 G/DL (ref 11.5–15.4)
IMM GRANULOCYTES # BLD AUTO: 0.16 THOUSAND/UL (ref 0–0.2)
IMM GRANULOCYTES NFR BLD AUTO: 1 % (ref 0–2)
LYMPHOCYTES # BLD AUTO: 2.48 THOUSANDS/ΜL (ref 0.6–4.47)
LYMPHOCYTES NFR BLD AUTO: 17 % (ref 14–44)
MAGNESIUM SERPL-MCNC: 1.9 MG/DL (ref 1.6–2.6)
MCH RBC QN AUTO: 25.3 PG (ref 26.8–34.3)
MCHC RBC AUTO-ENTMCNC: 32.2 G/DL (ref 31.4–37.4)
MCV RBC AUTO: 79 FL (ref 82–98)
MONOCYTES # BLD AUTO: 0.63 THOUSAND/ΜL (ref 0.17–1.22)
MONOCYTES NFR BLD AUTO: 4 % (ref 4–12)
NEUTROPHILS # BLD AUTO: 11.08 THOUSANDS/ΜL (ref 1.85–7.62)
NEUTS SEG NFR BLD AUTO: 78 % (ref 43–75)
NRBC BLD AUTO-RTO: 0 /100 WBCS
PHOSPHATE SERPL-MCNC: 2 MG/DL (ref 2.3–4.1)
PLATELET # BLD AUTO: 455 THOUSANDS/UL (ref 149–390)
PMV BLD AUTO: 9.3 FL (ref 8.9–12.7)
POTASSIUM SERPL-SCNC: 3.6 MMOL/L (ref 3.5–5.3)
RBC # BLD AUTO: 4.43 MILLION/UL (ref 3.81–5.12)
SODIUM SERPL-SCNC: 138 MMOL/L (ref 136–145)
UNIT DISPENSE STATUS: NORMAL
UNIT PRODUCT CODE: NORMAL
UNIT RH: NORMAL
WBC # BLD AUTO: 14.45 THOUSAND/UL (ref 4.31–10.16)

## 2019-11-12 PROCEDURE — 82948 REAGENT STRIP/BLOOD GLUCOSE: CPT

## 2019-11-12 PROCEDURE — 11045 DBRDMT SUBQ TISS EACH ADDL: CPT | Performed by: SURGERY

## 2019-11-12 PROCEDURE — 83735 ASSAY OF MAGNESIUM: CPT | Performed by: PHYSICIAN ASSISTANT

## 2019-11-12 PROCEDURE — 11047 DBRDMT BONE EACH ADDL: CPT | Performed by: SURGERY

## 2019-11-12 PROCEDURE — NC001 PR NO CHARGE: Performed by: SURGERY

## 2019-11-12 PROCEDURE — 99232 SBSQ HOSP IP/OBS MODERATE 35: CPT | Performed by: SURGERY

## 2019-11-12 PROCEDURE — 0QBB0ZZ EXCISION OF RIGHT LOWER FEMUR, OPEN APPROACH: ICD-10-PCS | Performed by: SURGERY

## 2019-11-12 PROCEDURE — 80048 BASIC METABOLIC PNL TOTAL CA: CPT | Performed by: PHYSICIAN ASSISTANT

## 2019-11-12 PROCEDURE — 99233 SBSQ HOSP IP/OBS HIGH 50: CPT | Performed by: PHYSICIAN ASSISTANT

## 2019-11-12 PROCEDURE — 0JD70ZZ EXTRACTION OF BACK SUBCUTANEOUS TISSUE AND FASCIA, OPEN APPROACH: ICD-10-PCS | Performed by: SURGERY

## 2019-11-12 PROCEDURE — 82550 ASSAY OF CK (CPK): CPT | Performed by: INTERNAL MEDICINE

## 2019-11-12 PROCEDURE — 11042 DBRDMT SUBQ TIS 1ST 20SQCM/<: CPT | Performed by: SURGERY

## 2019-11-12 PROCEDURE — 84100 ASSAY OF PHOSPHORUS: CPT | Performed by: PHYSICIAN ASSISTANT

## 2019-11-12 PROCEDURE — 85025 COMPLETE CBC W/AUTO DIFF WBC: CPT | Performed by: PHYSICIAN ASSISTANT

## 2019-11-12 PROCEDURE — 99233 SBSQ HOSP IP/OBS HIGH 50: CPT | Performed by: INTERNAL MEDICINE

## 2019-11-12 PROCEDURE — 0QB70ZZ EXCISION OF LEFT UPPER FEMUR, OPEN APPROACH: ICD-10-PCS | Performed by: SURGERY

## 2019-11-12 PROCEDURE — 11044 DBRDMT BONE 1ST 20 SQ CM/<: CPT | Performed by: SURGERY

## 2019-11-12 PROCEDURE — 82330 ASSAY OF CALCIUM: CPT | Performed by: PHYSICIAN ASSISTANT

## 2019-11-12 RX ORDER — MAGNESIUM HYDROXIDE 1200 MG/15ML
LIQUID ORAL AS NEEDED
Status: DISCONTINUED | OUTPATIENT
Start: 2019-11-12 | End: 2019-11-12 | Stop reason: HOSPADM

## 2019-11-12 RX ORDER — SODIUM CHLORIDE, SODIUM GLUCONATE, SODIUM ACETATE, POTASSIUM CHLORIDE, MAGNESIUM CHLORIDE, SODIUM PHOSPHATE, DIBASIC, AND POTASSIUM PHOSPHATE .53; .5; .37; .037; .03; .012; .00082 G/100ML; G/100ML; G/100ML; G/100ML; G/100ML; G/100ML; G/100ML
50 INJECTION, SOLUTION INTRAVENOUS CONTINUOUS
Status: DISCONTINUED | OUTPATIENT
Start: 2019-11-12 | End: 2019-11-13

## 2019-11-12 RX ORDER — ROCURONIUM BROMIDE 10 MG/ML
INJECTION, SOLUTION INTRAVENOUS AS NEEDED
Status: DISCONTINUED | OUTPATIENT
Start: 2019-11-12 | End: 2019-11-12 | Stop reason: SURG

## 2019-11-12 RX ORDER — XYLITOL/YERBA SANTA
5 AEROSOL, SPRAY WITH PUMP (ML) MUCOUS MEMBRANE AS NEEDED
Status: DISCONTINUED | OUTPATIENT
Start: 2019-11-12 | End: 2019-11-20 | Stop reason: HOSPADM

## 2019-11-12 RX ORDER — LIDOCAINE HYDROCHLORIDE 10 MG/ML
INJECTION, SOLUTION INFILTRATION; PERINEURAL AS NEEDED
Status: DISCONTINUED | OUTPATIENT
Start: 2019-11-12 | End: 2019-11-12 | Stop reason: SURG

## 2019-11-12 RX ORDER — NEOSTIGMINE METHYLSULFATE 1 MG/ML
INJECTION INTRAVENOUS AS NEEDED
Status: DISCONTINUED | OUTPATIENT
Start: 2019-11-12 | End: 2019-11-12 | Stop reason: SURG

## 2019-11-12 RX ORDER — FENTANYL CITRATE 50 UG/ML
INJECTION, SOLUTION INTRAMUSCULAR; INTRAVENOUS AS NEEDED
Status: DISCONTINUED | OUTPATIENT
Start: 2019-11-12 | End: 2019-11-12 | Stop reason: SURG

## 2019-11-12 RX ORDER — ONDANSETRON 2 MG/ML
4 INJECTION INTRAMUSCULAR; INTRAVENOUS ONCE AS NEEDED
Status: DISCONTINUED | OUTPATIENT
Start: 2019-11-12 | End: 2019-11-12 | Stop reason: HOSPADM

## 2019-11-12 RX ORDER — PANTOPRAZOLE SODIUM 40 MG/1
40 TABLET, DELAYED RELEASE ORAL
Status: DISCONTINUED | OUTPATIENT
Start: 2019-11-13 | End: 2019-11-20 | Stop reason: HOSPADM

## 2019-11-12 RX ORDER — PROPOFOL 10 MG/ML
INJECTION, EMULSION INTRAVENOUS AS NEEDED
Status: DISCONTINUED | OUTPATIENT
Start: 2019-11-12 | End: 2019-11-12 | Stop reason: SURG

## 2019-11-12 RX ORDER — ONDANSETRON 2 MG/ML
INJECTION INTRAMUSCULAR; INTRAVENOUS AS NEEDED
Status: DISCONTINUED | OUTPATIENT
Start: 2019-11-12 | End: 2019-11-12 | Stop reason: SURG

## 2019-11-12 RX ORDER — FENTANYL CITRATE/PF 50 MCG/ML
25 SYRINGE (ML) INJECTION
Status: DISCONTINUED | OUTPATIENT
Start: 2019-11-12 | End: 2019-11-12 | Stop reason: HOSPADM

## 2019-11-12 RX ORDER — GLYCOPYRROLATE 0.2 MG/ML
INJECTION INTRAMUSCULAR; INTRAVENOUS AS NEEDED
Status: DISCONTINUED | OUTPATIENT
Start: 2019-11-12 | End: 2019-11-12 | Stop reason: SURG

## 2019-11-12 RX ADMIN — ROCURONIUM BROMIDE 20 MG: 50 INJECTION, SOLUTION INTRAVENOUS at 10:48

## 2019-11-12 RX ADMIN — POTASSIUM PHOSPHATE, MONOBASIC AND POTASSIUM PHOSPHATE, DIBASIC 9 MMOL: 224; 236 INJECTION, SOLUTION INTRAVENOUS at 09:19

## 2019-11-12 RX ADMIN — PHENYLEPHRINE HYDROCHLORIDE 75 MCG/MIN: 10 INJECTION INTRAVENOUS at 23:15

## 2019-11-12 RX ADMIN — CALCIUM GLUCONATE 1 G: 98 INJECTION, SOLUTION INTRAVENOUS at 09:19

## 2019-11-12 RX ADMIN — HEPARIN SODIUM 5000 UNITS: 5000 INJECTION INTRAVENOUS; SUBCUTANEOUS at 05:36

## 2019-11-12 RX ADMIN — Medication 125 MG: at 09:43

## 2019-11-12 RX ADMIN — PIPERACILLIN AND TAZOBACTAM 4.5 G: 4; .5 INJECTION, POWDER, LYOPHILIZED, FOR SOLUTION INTRAVENOUS at 17:51

## 2019-11-12 RX ADMIN — Medication 250 MG: at 14:00

## 2019-11-12 RX ADMIN — HEPARIN SODIUM 5000 UNITS: 5000 INJECTION INTRAVENOUS; SUBCUTANEOUS at 21:00

## 2019-11-12 RX ADMIN — HEPARIN SODIUM 5000 UNITS: 5000 INJECTION INTRAVENOUS; SUBCUTANEOUS at 15:00

## 2019-11-12 RX ADMIN — NOREPINEPHRINE BITARTRATE 5 MCG/MIN: 1 INJECTION INTRAVENOUS at 11:09

## 2019-11-12 RX ADMIN — ERTAPENEM SODIUM 1000 MG: 1 INJECTION, POWDER, LYOPHILIZED, FOR SOLUTION INTRAMUSCULAR; INTRAVENOUS at 00:35

## 2019-11-12 RX ADMIN — ONDANSETRON 4 MG: 2 INJECTION INTRAMUSCULAR; INTRAVENOUS at 11:44

## 2019-11-12 RX ADMIN — FENTANYL CITRATE 50 MCG: 50 INJECTION, SOLUTION INTRAMUSCULAR; INTRAVENOUS at 10:48

## 2019-11-12 RX ADMIN — PIPERACILLIN AND TAZOBACTAM 4.5 G: 4; .5 INJECTION, POWDER, LYOPHILIZED, FOR SOLUTION INTRAVENOUS at 20:50

## 2019-11-12 RX ADMIN — LIDOCAINE HYDROCHLORIDE 40 MG: 10 INJECTION, SOLUTION INFILTRATION; PERINEURAL at 10:48

## 2019-11-12 RX ADMIN — Medication 250 MG: at 18:08

## 2019-11-12 RX ADMIN — SODIUM CHLORIDE, SODIUM GLUCONATE, SODIUM ACETATE, POTASSIUM CHLORIDE AND MAGNESIUM CHLORIDE 100 ML/HR: 526; 502; 368; 37; 30 INJECTION, SOLUTION INTRAVENOUS at 03:58

## 2019-11-12 RX ADMIN — PHENYLEPHRINE HYDROCHLORIDE 20 MCG/MIN: 10 INJECTION INTRAVENOUS at 12:42

## 2019-11-12 RX ADMIN — NEOSTIGMINE METHYLSULFATE 2.5 MG: 1 INJECTION, SOLUTION INTRAVENOUS at 11:47

## 2019-11-12 RX ADMIN — SODIUM CHLORIDE, SODIUM GLUCONATE, SODIUM ACETATE, POTASSIUM CHLORIDE AND MAGNESIUM CHLORIDE 50 ML/HR: 526; 502; 368; 37; 30 INJECTION, SOLUTION INTRAVENOUS at 12:55

## 2019-11-12 RX ADMIN — Medication 125 MG: at 20:45

## 2019-11-12 RX ADMIN — PROPOFOL 100 MG: 10 INJECTION, EMULSION INTRAVENOUS at 10:48

## 2019-11-12 RX ADMIN — GLYCOPYRROLATE 0.2 MG: 0.2 INJECTION, SOLUTION INTRAMUSCULAR; INTRAVENOUS at 11:47

## 2019-11-12 NOTE — ASSESSMENT & PLAN NOTE
ABLA and hemodilution causing Hb 5 5 post op       - S/P 11/10 2 u pRBC - Hb 10 5   - Continue to monitor Hb   - T&S for OR today

## 2019-11-12 NOTE — OP NOTE
OPERATIVE REPORT  PATIENT NAME: Karla Lundy    :  1950  MRN: [id number]  Pt Location: [facility] OR ROOM 06    SURGERY DATE: 2019    Surgeon(s) and Role:     * Diamond Alcantara,  - Primary     * Aj Dumont MD - Brittney Avila MD - Assisting    Preop Diagnosis:  Pressure injury of sacral region, unstageable ([address] ) Jono Sarahll    Post-Op Diagnosis Codes:     * Pressure injury of sacral region, unstageable ([address] ) [L89 150]    Procedure(s) (LRB):  DEBRIDEMENT DECUBITI ([address] OUT) (Bilateral)    Specimen(s):  * No specimens in log *    Estimated Blood Loss:   Minimal    Drains:  Urethral Catheter Latex 16 Fr   (Active)   Amt returned on insertion(mL) 10 mL 11/10/2019  5:38 PM   Reasons to continue Urinary Catheter  Stage III/IV sacral ulcers or open perineal wounds in incontinent patients 2019 12:00 AM   Goal for Removal N/A- chronic isabel 2019 12:47 PM   Site Assessment Clean;Skin intact 2019  1:00 PM   Collection Container Standard drainage bag 2019  1:00 PM   Securement Method Securing device (Describe) 2019  1:00 PM   Urethral Irrigation Intake (mL) 200 mL 2019  5:38 AM   Output (mL) 100 mL 2019  1:00 PM   Number of days: 2       Anesthesia Type:   General    Operative Indications:  Pressure injury of sacral region, unstageable ([address] ) [L89 150]    Operative Findings:  Left inferior ischial wound with exposed bone and extensive fibropurulent exudate and drainage, measuring approx 10 x 8 x 3 cm, debrided down to bone  Right inferior ischial wound with exposed bone and fibrous exudate, measuring approx 3 x 4 x 10 cm and tracking from posterior to anterior, debrided down to bone  Left superior sacral wound with fibrinous necrotic tissue, measuring approx 5 x 5 x 1 cm  Central sacral wound with small amount fibrinous tissue, measuring 4 x 2 x 0 2 cm    Complications:   None      Procedure and Technique:  After informed consent was obtained explaining the risks/benefits/alternatives of the procedure, the patient was taken to the OR  General anesthesia was induced and the patient was prepped and draped in the usual sterile fashion  A time out was performed to verify correct site and procedure  Three kerlix total were removed from the patient's wounds  There was a large amount of fibropurulent exudate noted to all of the patient's sacral wounds  The patient's B/L femoral heads were easily visible within the left and right hip wounds  Much of the fibrous exudate was debrided using gulu.com-jet system, with the setting at level 5  Any necrotic-appearing tissue was then debrided down to bleeding tissue using curved gutierrez scissors  Hemostasis was achieved using Bovie electrocautery  All sacral wounds were then irrigated using hydrogen peroxide, followed by copious irrigation with normal saline  The left hip wound was repacked using 1 roll of saline-moistened kerlix  The right hip wound was also repacked using 1 roll of saline-moistened kerlix  A third roll of kerlix was placed over the more centrally located ulcers  A trauma ABD pad and two regular ABD pads were placed over the wounds, covered by surgical mesh underwear  The patient was awakened and taken to the PACU without any immediate post op complications  Dr Shyam Olivas was present and scrubbed for the entirety of the case        Patient Disposition:  PACU     SIGNATURE: Mandy Parra MD  DATE: November 12, 2019  TIME: 1:25 PM

## 2019-11-12 NOTE — ANESTHESIA PREPROCEDURE EVALUATION
Review of Systems/Medical History  Patient summary reviewed  Chart reviewed  No history of anesthetic complications     Cardiovascular  EKG reviewed, Negative cardio ROS Exercise tolerance (METS): <4,     Pulmonary  Negative pulmonary ROS        GI/Hepatic    GERD ,        Chronic kidney disease ,        Endo/Other  Negative endo/other ROS      GYN  Negative gynecology ROS          Hematology  Anemia (hgb 11) ,     Musculoskeletal    Arthritis     Neurology      Comment: Paraplegia spinal cord injury at [employer]   Depression ,              Physical Exam    Airway    Mallampati score: II  TM Distance: >3 FB  Neck ROM: full     Dental   No notable dental hx     Cardiovascular  Comment: Negative ROS, Cardiovascular exam normal    Pulmonary  Pulmonary exam normal     Other Findings  [place] in place    Results from last 7 days   Lab Units 11/12/19  0443 11/11/19  0515 11/11/19  0312 11/10/19  2245   WBC Thousand/uL 14 45* 19 96*  --  25 75*   HEMOGLOBIN g/dL 11 2* 11 2* 10 3* 5 5*   HEMATOCRIT % 34 8 35 0 31 8* 18 3*   PLATELETS Thousands/uL 455* 480* 406* 507*     Results from last 7 days   Lab Units 11/12/19  0443 11/11/19  1409 11/11/19  0515  11/10/19  2209   SODIUM mmol/L 138 136 136   < >  --    POTASSIUM mmol/L 3 6 3 9 3 6   < >  --    CO2 mmol/L 22 19* 17*   < >  --    CO2, I-STAT mmol/L  --   --   --   --  22   CHLORIDE mmol/L 108 106 107   < >  --    BUN mg/dL 9 7 8   < >  --    CREATININE mg/dL 0 43* 0 34* 0 30*   < >  --    EGFR ml/min/1 73sq m 104 112 117   < >  --    GLUCOSE, ISTAT mg/dl  --   --   --   --  154*    < > = values in this interval not displayed  Results from last 7 days   Lab Units 11/10/19  1706   INR  1 40*   PROTIME seconds 16 7*   PTT seconds 39*     Anesthesia Plan  ASA Score- 3     Anesthesia Type- general with ASA Monitors  Additional Monitors:   Airway Plan: ETT  Plan Factors-    Induction- intravenous  Postoperative Plan- Plan for postoperative opioid use  Planned trial extubation    Informed Consent- Anesthetic plan and risks discussed with patient  I personally reviewed this patient with the CRNA  Discussed and agreed on the Anesthesia Plan with the CRNA  William Sutherland

## 2019-11-12 NOTE — ANESTHESIA POSTPROCEDURE EVALUATION
Post-Op Assessment Note    CV Status:  Stable    Pain management: adequate     Mental Status:  Alert and awake   Hydration Status:  Euvolemic   PONV Controlled:  Controlled   Airway Patency:  Patent   Post Op Vitals Reviewed: Yes      Staff: CRNA           BP  92/66   Temp  96 4   Pulse 95   Resp  18   SpO2   97

## 2019-11-12 NOTE — PROGRESS NOTES
Progress Note - Infectious Disease   [employer] 71 y o  female MRN: [id number]  Unit/Bed#: OR POOL Encounter: [id number]    Assessment:  Septic shock suspected secondary to L ischial ulceration and osteomyelitis  Pt has wounds to L inferior ischium (ischium exposed), two sacral wounds, and right femoral trochanter wound (greater trochanter exposed)  Meeting sepsis criteria with tachypnea, elevated WBC in the setting of skin infection, requiring tadeo-synephrine for hypotension  Pt received Zosyn and Linezolid on admission  · Currently on ertapenem 1000mg daily, daptomycin, can continue until cultures result further  On oral vanc for C Diff prophylaxis  · Tissue cultures from OR on 11/10/19 with 3+ oxidase positive gram negative rods, 2+ growth of diphtheroids, 2+ gram negative rods  · Blood cultures from 11/10 pending  One blood culture with coagulase negative staph in culture, other culture NG at 24h  Pt was started on daptomycin yesterday after blood culture showed gram positive cocci in clusters  Will discuss with attending about discontinuing  · Urine culture collected 11/10 growing >100k gram negative rods  Pt has chronic indwelling isabel catheter  Previously pt was noted to have UTI with E coli and Providencia retgerri on 1/6/2019 followed by wound dehiscence cultures growing these organisms on 2/27/2019    Will discuss with attending    Hx of wound dehiscence of R hip flap  Earlier this year, pt received 6 weeks IV Ertapenem in 3/2019 secondary to wound dehiscence after R hip flap surgery in 1/2019 with re-closure of wound in 3/2019 with wound cultures at that time growing Providencia rettgeri and E coli      Hx of L femur ORIF infection  OR cultures at that time were positive for corynebacterium from wound and bone, completed 6 week course of IV daptomycin on 2/13/2019      Hx of recent C Diff infection  Recent C diff infection in 4/2019  Will continue C diff prophylaxis with Vancomycin 125 q12h  Pt reports prior reaction of red man syndrome after vancomycin, would not be a concern with oral vancomycin  Subjective/Objective   Chief Complaint: "scared"    Subjective: Pt seen and evaluated today, pt reports feeling scared about what will happen  Pt reports feeling somewhat worse than yesterday  Reports continued weakness, fatigue, chronic nausea, pain at site of wounds  Reports muscle spasms that she states she had yesterday  Denies abdominal pain, vomiting, diarrhea  Pt reports constipation but had bowel movement 2 days ago  Denies fevers  Pt is planned for OR washout/debridement today of bilateral ulcerations with Surgery  Objective: Physical Exam   Constitutional: She is oriented to person, place, and time  She appears well-developed and well-nourished  HENT:   Head: Normocephalic and atraumatic  Nose: Nose normal    Mouth/Throat: Oropharynx is clear and moist    Eyes: Right eye exhibits no discharge  Left eye exhibits no discharge  Cardiovascular: Normal rate, regular rhythm and normal heart sounds  Exam reveals no gallop and no friction rub  No murmur heard  Pulmonary/Chest: Effort normal and breath sounds normal  No respiratory distress  She has no wheezes  She has no rales  Abdominal: Soft  Bowel sounds are normal  She exhibits no distension  There is no tenderness  There is no guarding  Musculoskeletal: She exhibits deformity  She exhibits no edema  R hip ulceration visualized briefly during dressing change, deep ulceration remains with no significant purulence noted  Neurological: She is alert and oriented to person, place, and time  Skin: Skin is warm and dry     Psychiatric: Her speech is normal        Temp:  [97 6 °F (36 4 °C)-99 1 °F (37 3 °C)] 98 2 °F (36 8 °C)  HR:  [] 100  Resp:  [25-35] 30  BP: (74)/(54) 74/54  SpO2:  [91 %-96 %] 92 %  Temp (24hrs), Av 3 °F (36 8 °C), Min:97 6 °F (36 4 °C), Max:99 1 °F (37 3 °C)  Current: Temperature: 98 2 °F (36 8 °C)    Invasive Devices     Peripheral Intravenous Line            Peripheral IV 11/10/19 Left Hand 1 day    Peripheral IV 11/10/19 Right Antecubital 1 day          Arterial Line            Arterial Line 11/10/19 Right Radial 1 day          Drain            Urethral Catheter Latex 16 Fr  1 day          Airway            ETT  Cuffed;Oral 7 mm less than 1 day                Lab, Imaging and other studies: I have personally reviewed pertinent reports

## 2019-11-12 NOTE — PROGRESS NOTES
Post-Operative Exam    S: 72 yo F with paraplegia, with multiple sacral, b/l hip, left posterior thigh decubitus ulcers s/p debridement in the OR on 11/10, who went to the OR again today for further debridement and washout of her wounds  Per operative report, there was extensive fibropurulent exudate, debrided to bleeding tissue, with exposure of bilateral femoral heads in the wounds  Copious irrigation took place  Intra-operatively, MAP with a low to 54, placed on 40 of phenylephrine for goal MAP >65  In addition, pt was hypothermic to 96 4, placed under marion hugger  Currently complaining of feeling "cold" and has blanket placed over her head, not complaining of any pain, lightheadedness, dizziness, chest pain, SOB, abdominal pain  O:  Vitals:    11/12/19 1245 11/12/19 1300 11/12/19 1315 11/12/19 1400   BP: (!) 75/50 (!) 82/58  96/58   BP Location:    Left arm   Pulse: 76 78 80 82   Resp: 22 (!) 23 (!) 25 (!) 26   Temp:  97 7 °F (36 5 °C)     TempSrc:  Temporal     SpO2: 99% 97% 94%    Weight:       Height:         Physical Exam   Physical Exam   Constitutional: She is oriented to person, place, and time  She appears well-developed  No distress  Cachectic   HENT:   Head: Normocephalic and atraumatic  Eyes: Pupils are equal, round, and reactive to light  Conjunctivae are normal    Neck: Normal range of motion  Pulmonary/Chest: Effort normal and breath sounds normal  No respiratory distress  She has no wheezes  She has no rales  Abdominal: Soft  Bowel sounds are normal  She exhibits no distension  There is no tenderness  Genitourinary:   Genitourinary Comments: [place] catheter in place  Musculoskeletal: She exhibits no edema  Neurological: She is alert and oriented to person, place, and time  No cranial nerve deficit  GCS 14, slightly confused verbal response at times  Flaccid paralysis of LEs  Diminished sensation below the waist, no sensation just distal to thighs b/l      Skin: Skin is warm and dry  She is not diaphoretic  Patient laying supine with posterior dressings in place  Psychiatric: She has a normal mood and affect  A/P:   -Continue with BID wound dressing changes, wet-dry, monitor for deterioration and need for further debridement or washout    -Sepsis 2/2 decubitus ulcers, currently on phenylephrine 40 mcg/min  As per discussion at rounds, will likely need central line if requires >50 mcg/min  Wean as tolerated for MAPs >65    -Continue treatment for sepsis with ertapenem, also on vancomycin PO for c diff prophylaxis

## 2019-11-12 NOTE — SOCIAL WORK
CM met with pt at bedside and introduced self/role with dcp  Pt resides a lone in a first floor apartment with 0 AMY  Pt is independent with ADLs and uses a w/c at baseline  Pt able to self propel and transfer independently PTA  Pt retired this past winter  Pt able to drive  Pt reports hx of VNA and STR  Pt reports she has been feeling depressed and anxious since she was first hospitalized in 2018 - no hx of IP MH tx  Pt reports no hx of drug/alcohol abuse  Pharmacy is [facility] in [place]  Pt reports she does not have an emergency contact because she does not have any "close" friends  Pt reports she has friends in her building but feels that asking one to be a POA is too big of a responsibility for the person  Pt reports no LW or AD  Pt reports her parents are  and feel their LW was used to "kill" them  Pt reports having a younger sister - estranged for a few years  Pt reports she has attempted to make amends with her sister but she has not been receptive  Pt reports she feels like her younger sister sees her as a "threat " Pt did not elaborate more  Pt reports she is unsure if her sister still lives near [place]  Pt reports she has more family but not "close" family  Pt reports she has a Poodle dog at home - her friend/neighbor is Jhonny Crawford is helping take care of her dog  CM to follow for dcp  CM reviewed d/c planning process including the following: identifying help at home, patient preference for d/c planning needs, Discharge Lounge, [facility] Meds to Bed program, availability of treatment team to discuss questions or concerns patient and/or family may have regarding understanding medications and recognizing signs and symptoms once discharged  CM also encouraged patient to follow up with all recommended appointments after discharge  Patient advised of importance for patient and family to participate in managing patients medical well being

## 2019-11-12 NOTE — ASSESSMENT & PLAN NOTE
S/P excisional debridement 11/10  11/11 bedside excisional debridement to bone   Sepsis 2/2 to above     - OR today for washout/debridement   - NPO/IVF   - diet and ensure after OR   - F/U wound cultures- GNR   - palliative care following   - ortho singed off: not a candidate for osteo resection   - blood cx: 1/2 GPC in clusters   - ID for abx: daptomycin/ertapenem/vancomycin   - Continue Jack as needed for BP support, wean as able   - PRN pain control   - primary per ICU

## 2019-11-12 NOTE — CONSULTS
Consultation - Plastic Surgery   Naty Kirkpatrick 71 y o  female MRN: [id number]  Unit/Bed#: MICU 09 Encounter: [id number]    Assessment/Plan     Assessment:  69F with paraplegia and chronic decubitus ulcers with underlying osteomyelitis  Plastics is consulted for flap coverage of the wounds  Plan:  -Patient is not a candidate given chronic osteomyelitis in the wound beds  -Continue discussions with palliative care  -continue local wound care  -Please call plastic surgery with questions  History of Present Illness   HPI:  Naty Kirkpatrick is a 71 y o  female who presents with chronic decubitus ulcers of the buttocks and sacrum  Chronic osteomyelitis is also present  She had previous attempts at coverage in Feb/March of 2019 without success  Ortho was consulted, however, has signed off since she is not a candidate for OM resection  Plastics is consulted for evaluation of wound coverage  Given chronic osteo, current need for BP support with tadeo, and bedbound status, she will not heal a flap  At this time, there are no satisfactory coverage options  Inpatient consult to Plastic Surgery     Date/Time 11/12/2019 2:10 PM     Performed by  Oswald Haskins MD     Authorized by Sascha Yu MD              Review of Systems   Constitutional: Negative  Negative for activity change and appetite change  HENT: Negative  Negative for hearing loss and trouble swallowing  Eyes: Negative  Negative for photophobia and redness  Respiratory: Negative  Negative for chest tightness and shortness of breath  Cardiovascular: Negative  Negative for chest pain and palpitations  Gastrointestinal: Negative  Negative for abdominal distention and abdominal pain  Endocrine: Negative  Negative for cold intolerance and heat intolerance  Genitourinary: Negative  Negative for flank pain and genital sores  Musculoskeletal: Negative  Negative for arthralgias and back pain  Skin: Positive for wound  Chronic buttock and sacral decubitus ulcers   Allergic/Immunologic: Negative  Neurological: Negative for facial asymmetry, speech difficulty and light-headedness  Hematological: Negative  Negative for adenopathy  Psychiatric/Behavioral: Negative  Negative for agitation and behavioral problems  Historical Information   Past Medical History:   Diagnosis Date    Anemia     iron defiencey    Arthritis     osteo    Back injury     Chronic kidney disease     nephritis    Depression     Osteopenia     Osteoporosis     Paralysis (HCC)     paraplegic due to spinal cord injury    Paraplegia (HCC)     Poor circulation     Pressure injury of skin     right hip, stage 3    Pressure sore of hip     right    Tibial plateau fracture     Underweight      Past Surgical History:   Procedure Laterality Date    CLOSURE DELAYED PRIMARY N/A 3/20/2019    Procedure: OPHELIA ANAL WOUND RECLOSURE;  Surgeon: Jan Calderon MD;  Location: [address] [place] [address];  Service: Plastics    FLAP LOCAL EXTREMITY Left 1/28/2019    Procedure: THIGH FLAP & STSG TO CLOSE HIP WOUND;  Surgeon: Jan Calderon MD;  Location: [address] [place] [address];  Service: Plastics    FLAP LOCAL TRUNK Right 12/17/2018    Procedure: DEBRIDE/FLAP CLOSURE HIP PRESSURE SORE Keisha Stager GRAFT;  Surgeon: Jan Calderon MD;  Location: [address] [place] [address];  Service: Plastics    FLAP LOCAL TRUNK Left 2/27/2019    Procedure: BUTTOCK FLAP TO ISCHIAL SORE;  Surgeon: Jan Calderon MD;  Location: [address] [place] [address];  Service: Plastics    HIP DEBRIDEMENT Right 2017    right hip sore debridement    INCISION AND DRAINAGE OF WOUND Left 1/3/2019    Procedure: INCISION AND DRAINAGE (I&D) left distal femur  With deep wound cultures   Application of VAC DRAIN SPONGE;  Surgeon: Tan Corrales MD;  Location: [facility];  Service: Orthopedics    IR PICC LINE  12/19/2018    IR PICC LINE  3/12/2019    NH DEBRIDEMENT, SKIN, SUB-Q TISSUE,MUSCLE,BONE,=<20 SQ CM Right 9/19/2018    Procedure: DEBRIDEMENT OF HIP PRESSURE SORE;  Surgeon: Meche Bentley MD;  Location: [address] [place] [address] MAIN OR;  Service: Plastics    IA OPEN RX FEMUR FX+INTRAMED FELIX Left 10/22/2018    Procedure: INSERTION NAIL IM LEFT FEMUR RETROGRADE;  Surgeon: Jennifer Zayas MD;  Location: [facility] MAIN [facility];  Service: Orthopedics    TOE AMPUTATION Left 2017    small toe left foot amputation    WISDOM TOOTH EXTRACTION      WOUND DEBRIDEMENT Left 12/17/2018    Procedure: DEBRIDEMENT HIP PRESSURE SORE;  Surgeon: Meche Bentley MD;  Location: [address] [place] [address] MAIN OR;  Service: Plastics    WOUND DEBRIDEMENT N/A 11/10/2019    Procedure: EXCISIONAL DEBRIDEMENT;  Surgeon: Irby Fleischer, MD;  Location: [facility] MAIN [facility];  Service: General     Social History   Social History     Substance and Sexual Activity   Alcohol Use Yes    Comment: occasional     Social History     Substance and Sexual Activity   Drug Use No     Social History     Tobacco Use   Smoking Status Never Smoker   Smokeless Tobacco Never Used     Family History:   Family History   Problem Relation Age of Onset    Depression Father        Meds/Allergies   current meds:   Current Facility-Administered Medications   Medication Dose Route Frequency    acetaminophen (TYLENOL) tablet 650 mg  650 mg Oral Q6H PRN    ertapenem (INVanz) 1,000 mg in sodium chloride 0 9 % 50 mL IVPB  1,000 mg Intravenous Q24H    heparin (porcine) subcutaneous injection 5,000 Units  5,000 Units Subcutaneous Q8H [address]    HYDROmorphone (DILAUDID) injection 0 5 mg  0 5 mg Intravenous Q3H PRN    multi-electrolyte (PLASMALYTE-A/ISOLYTE-S PH 7 4) IV solution  50 mL/hr Intravenous Continuous    ondansetron (ZOFRAN) injection 4 mg  4 mg Intravenous Q6H PRN    oxyCODONE (ROXICODONE) IR tablet 10 mg  10 mg Oral Q4H PRN    oxyCODONE (ROXICODONE) IR tablet 5 mg  5 mg Oral Q4H PRN    [START ON 11/13/2019] pantoprazole (PROTONIX) EC tablet 40 mg  40 mg Oral Early Morning    phenylephrine (ELADIO-SYNEPHRINE) 50 mg (STANDARD CONCENTRATION) in sodium chloride 0 9% 250 mL   mcg/min Intravenous Titrated    saccharomyces boulardii (FLORASTOR) capsule 250 mg  250 mg Oral BID    vancomycin (VANCOCIN) oral solution 125 mg  125 mg Oral Q12H [facility]     Facility-Administered Medications Ordered in Other Encounters   Medication Dose Route Frequency    dexamethasone (DECADRON) injection 4 mg  4 mg Intravenous Once PRN    diphenhydrAMINE (BENADRYL) injection 12 5 mg  12 5 mg Intravenous Once    fentaNYL (SUBLIMAZE) injection 12 5 mcg  12 5 mcg Intravenous Q10 Min PRN    fentaNYL (SUBLIMAZE) injection 25 mcg  25 mcg Intravenous Q5 Min PRN    HYDROmorphone (DILAUDID) injection 0 2 mg  0 2 mg Intravenous Q10 Min PRN    lactated ringers infusion  75 mL/hr Intravenous Continuous    lactated ringers infusion  50 mL/hr Intravenous Continuous    lactated ringers infusion  75 mL/hr Intravenous Continuous    lactated ringers infusion  75 mL/hr Intravenous Continuous    ondansetron (ZOFRAN) injection 4 mg  4 mg Intravenous Once PRN    promethazine (PHENERGAN) injection 12 5 mg  12 5 mg Intravenous Once PRN    and PTA meds:   Prior to Admission Medications   Prescriptions Last Dose Informant Patient Reported? Taking?    Calcium Carb-Cholecalciferol 600-200 MG-UNIT TABS Not Taking at Unknown time  Yes No   Sig: Take 1 tablet by mouth daily   Probiotic Product (PROBIOTIC PEARLS ADVANTAGE PO) Not Taking at Unknown time  Yes No   Sig: Take by mouth   cholecalciferol (VITAMIN D3) 1,000 units tablet Not Taking at Unknown time  Yes No   Sig: Take 1,000 Units by mouth daily   cyanocobalamin 100 MCG tablet Not Taking at Unknown time  Yes No   Sig: Take 100 mcg by mouth daily   multivitamin (THERAGRAN) TABS Not Taking at Unknown time  Yes No   Sig: Take 1 tablet by mouth daily   nystatin (MYCOSTATIN) powder Not Taking at Unknown time  Yes No   Sig: Apply topically 2 (two) times a day   pantoprazole (PROTONIX) 40 mg tablet Not Taking at Unknown time Other (Specify) Yes No Sig: Take 40 mg by mouth daily   saccharomyces boulardii (FLORASTOR) 250 mg capsule Not Taking at Unknown time  No No   Sig: Take 1 capsule (250 mg total) by mouth 2 (two) times a day   Patient not taking: Reported on 6/13/2019      Facility-Administered Medications: None     Allergies   Allergen Reactions    Iv Contrast [Iodinated Diagnostic Agents]      Tingling face, itching    Cefepime Rash    Ciprofloxacin Rash and Swelling     Looked like suburn, itching  Bed sores  Swelling, itching    Latex Rash    Vancomycin Rash     Unknown        Objective   First Vitals:   Blood Pressure: 160/66 (11/10/19 1610)  Pulse: (!) 114 (11/10/19 1610)  Temperature: 99 9 °F (37 7 °C) (11/10/19 1610)  Temp Source: Rectal (11/10/19 1610)  Respirations: 20 (11/10/19 1610)  Height: 5' (152 4 cm) (11/11/19 0025)  Weight - Scale: 45 6 kg (100 lb 8 5 oz) (11/11/19 0025)  SpO2: 96 % (11/10/19 1610)    Current Vitals:   Blood Pressure: (!) 82/58 (11/12/19 1300)  Pulse: 80 (11/12/19 1315)  Temperature: 97 7 °F (36 5 °C) (11/12/19 1300)  Temp Source: Temporal (11/12/19 1300)  Respirations: (!) 25 (11/12/19 1315)  Height: 5' (152 4 cm) (11/11/19 1653)  Weight - Scale: 46 6 kg (102 lb 11 7 oz) (11/12/19 0542)  SpO2: 94 % (11/12/19 1315)      Intake/Output Summary (Last 24 hours) at 11/12/2019 1404  Last data filed at 11/12/2019 1300  Gross per 24 hour   Intake 2448 78 ml   Output 1045 ml   Net 1403 78 ml       Invasive Devices     Peripheral Intravenous Line            Peripheral IV 11/10/19 Right Antecubital 1 day          Arterial Line            Arterial Line 11/10/19 Right Radial 1 day          Drain            Urethral Catheter Latex 16 Fr  1 day                Physical Exam   Constitutional: She appears well-developed and well-nourished  No distress  HENT:   Head: Normocephalic and atraumatic  Right Ear: External ear normal    Left Ear: External ear normal    Eyes: Pupils are equal, round, and reactive to light   EOM are normal  Right eye exhibits no discharge  Left eye exhibits no discharge  No scleral icterus  Neck: No tracheal deviation present  Cardiovascular: Normal rate  Pulmonary/Chest: Effort normal  No respiratory distress  Abdominal: Soft  She exhibits no distension  There is no tenderness  Neurological: She is alert  Skin: She is not diaphoretic  Vitals reviewed  Lab Results:   CBC:   Lab Results   Component Value Date    WBC 14 45 (H) 11/12/2019    HGB 11 2 (L) 11/12/2019    HCT 34 8 11/12/2019    MCV 79 (L) 11/12/2019     (H) 11/12/2019    MCH 25 3 (L) 11/12/2019    MCHC 32 2 11/12/2019    RDW 17 6 (H) 11/12/2019    MPV 9 3 11/12/2019    NRBC 0 11/12/2019   , CMP:   Lab Results   Component Value Date    SODIUM 138 11/12/2019    K 3 6 11/12/2019     11/12/2019    CO2 22 11/12/2019    BUN 9 11/12/2019    CREATININE 0 43 (L) 11/12/2019    CALCIUM 7 0 (L) 11/12/2019    EGFR 104 11/12/2019     Imaging: I have personally reviewed pertinent reports  and I have personally reviewed pertinent films in PACS  EKG, Pathology, and Other Studies: I have personally reviewed pertinent reports

## 2019-11-12 NOTE — PROGRESS NOTES
Progress Note - Critical Care   George Harrison 71 y o  female MRN: [id number]  [facility]: [id number]    Impression:  Principal Problem:    Pressure injury of sacral region, unstageable [facility])  Active Problems:    Paraplegia following spinal cord injury ([facility] [address] )    C  difficile diarrhea    Acute blood loss anemia      Plan:    Neuro:   --Hx of paraplegia following spinal cord injury in 1981  --Neuro checks qshift  --Stable, continue to monitor  --PRN acetaminophen, dilaudid 0 5 mg q6h PRN, oxycodone PO   CV:   --Continue hemodynamic support with Phenylephrine, currently at 25  --Wean pressor support as tolerated  --Goal MAP >65  Lung:   --No acute issues, extubated successfully postop  GI:   --NPO currently for OR  FEN:   --Fluids: plasmalyte @100         --Replete electrolytes PRN  --Replete electrolytes with goals: K >4 0, Mag >2 0, and Phos >3 0  :   --Hx of CKD   --Strict I/Os  --Hays in place   ID:   --Sepsis due to severe necrotic pressure ulcers of sacrum and buttocks  --On Ertapenem for multiple necrotic decubitus wounds, Florastor for hx of C  Diff, daptomycin began yesterday   --Monitor fever curve, trend WBC count  --Infectious Disease consult placed for further Abx management recommendations  --Wound cultures: 2+ gram neg rods   --Blood cultures: coag neg staph from 11/10  Heme:   --Hx of iron-deficiency anemia  --Hgb of 5 0 intra-op from 9 1, today at 10 5  --Trend H/H  --Transfuse PRN for Hgb <7  Endo:   --No acute issues  MSK/Skin:  --Multiple decubitus ulcers packed with wet-to-dry Kerlix/heavy drainage packs  Bedside excisional debridement to bone yesterday  *Today OR for washout/debridement   --Acute Care Surgery following for wound management   --Orthopedics c/s placed for chronic sacral osteomyelitis, with previous infection of left femur ORIF  They have signed off, no surgical management necessary     --Routine skin care  --PT/OT  --Frequent turning, offloading  Lines:  --Arterial line in right radial  --2 peripheral   VTE Prophylaxis:  · Pharmacologic Prophylaxis: heparin  · Mechanical Prophylaxis: SCDs    Disposition: Continue ICU care    Treatment Team/Consultants: Acute care surgery     Date of admission: 11/10/2019    Reason for Admission: Decubitus ulcers adjacent to proximal right femur and left ischial tuberosity with collections of gas present, required operative debridement on 11/10/19  HPI/24hr events: "Claudine Ramires is a 71 y o  female w/ hx of paraplegia, C  diff, who presented to the hospital earlier this evening with worsening chronic decubitus ulcers of the bilateral hips, femur, iliac, and sacral regions  Patient has surgical hx of multiple flap procedures with Dr Davey Haywood of Plastic Surgery (flap of right hip in 12/2018, flat of left hip/thigh in 01/2019, flap of the left ischium in 02/2019), as well as left femur IM nail in 10/2018  Patient performs her own wound care at home  Over the past 2 weeks, patient was feeling that her wounds were progressively looking worse in appearance, with increased amounts of drainage  In the past 2 days, she had began feeling generalized weakness, worsening to the point where she presented today to the emergency department  CT of the chest, abdomen, and pelvis obtained in the ED showed deep ulcers adjacent to the proximal right femur and left ischial tuberosity with multiple collections of gas in the subadjacent soft tissues, extending as deep as the obturator internus muscles with discrete abscess cavities  Patient was then taken urgently to the operating room with the acute care surgery service for operative debridement  She was started on ertapenem, which she was treated with previously for infected decubitus ulcers (patient has history of multiple antibiotic allergies)    Intra-op, patient had required hemodynamic support with phenylephrine, as well as low hemoglobin level of 5 0, necessitating Critical Care admission  Of note, patient has history of C diff colitis, for which she takes Florastor at home  She is allergic to Vancomycin  WBC count of 21 upon admission  "     No acute events overnight  Is complaining of muscle spasms in torso, says this is worse than her baseline  Planned for OR today with red surgery for further debridement  ROS: 14 point ROS is negative and/or as stated in the HPI  Physical Exam:    Physical Exam   Constitutional: She is oriented to person, place, and time  She appears well-developed  No distress  Cachectic  HENT:   Head: Normocephalic and atraumatic  Dry mucous membranes  Eyes: Pupils are equal, round, and reactive to light  Conjunctivae are normal    Neck: Normal range of motion  Cardiovascular: Normal rate, regular rhythm and normal heart sounds  No murmur heard  Pulmonary/Chest: Effort normal and breath sounds normal  No respiratory distress  She has no wheezes  She has no rales  Abdominal: Soft  Bowel sounds are normal  She exhibits no distension  There is no tenderness  Genitourinary:   Genitourinary Comments: Hays in place   Musculoskeletal: Normal range of motion  She exhibits no edema  Slightly contracted lower extremities  Neurological: She is alert and oriented to person, place, and time  A sensory deficit (Diminished sensation at proximal thigh with no sensation distally  ) is present  No cranial nerve deficit  Coordination normal    GCS 15 for slightly confused verbal response  4+/5 strength in upper extremities  No motor response in lower extremities  Skin: Skin is warm and dry  Sacral ulcer, left sided buttock lesion, both with dressings applied and serosanguinous drainage present on dressings  Psychiatric: She has a normal mood and affect  Nursing note and vitals reviewed        Vitals:   Vitals:    11/12/19 0400 11/12/19 0500 11/12/19 0542 11/12/19 0600   BP:       BP Location:       Pulse: 94 98  96 Resp: (!) 26 (!) 33  (!) 35   Temp: 98 2 °F (36 8 °C)      TempSrc:       SpO2: 92% 93%  91%   Weight:   46 6 kg (102 lb 11 7 oz)    Height:           Arterial Line:  Arterial Line BP: 96/62  Arterial Line MAP (mmHg): 72 mmHg    Temperature: Temp (24hrs), Av 1 °F (36 7 °C), Min:97 6 °F (36 4 °C), Max:99 1 °F (37 3 °C)  Current: Temperature: 98 2 °F (36 8 °C)    Weights: IBW: 45 5 kg  Body mass index is 20 06 kg/m²  Respiratory:  SpO2: SpO2: 91 %  O2 Flow Rate (L/min): 2 L/min    Intake and Outputs:    Intake/Output Summary (Last 24 hours) at 2019 0707  Last data filed at 2019 0601  Gross per 24 hour   Intake 1665 9 ml   Output 1118 ml   Net 547 9 ml     I/O last 24 hours: In: 1665 9 [I V :1615 9; IV Piggyback:50]  Out: 6323 [Urine:1118]    UOP: 0 8 cc/kg/hr overnight        Nutrition:        Diet Orders   (From admission, onward)             Start     Ordered    19 0001  Diet NPO  Diet effective midnight     Question Answer Comment   Diet Type NPO    RD to adjust diet per protocol? Yes        19 1905    19 1714  Dietary nutrition supplements  Once     Question Answer Comment   Select Supplement: Ensure Compact-Vanilla    Frequency Breakfast, Lunch, Dinner        19 1713                  Labs:   Results from last 7 days   Lab Units 19  0443 19  0515 19  0312 11/10/19  2245  11/10/19  1706   WBC Thousand/uL 14 45* 19 96*  --  25 75*  --  21 20*   HEMOGLOBIN g/dL 11 2* 11 2* 10 3* 5 5*  --  9 1*   I STAT HEMOGLOBIN   --   --   --   --    < >  --    HEMATOCRIT % 34 8 35 0 31 8* 18 3*  --  30 6*   HEMATOCRIT, ISTAT   --   --   --   --    < >  --    PLATELETS Thousands/uL 455* 480* 406* 507*  --  791*   NEUTROS PCT % 78*  --   --   --   --  87*   MONOS PCT % 4  --   --   --   --  5   MONO PCT %  --   --   --  4  --   --     < > = values in this interval not displayed       Results from last 7 days   Lab Units 19  3563 19  7510 11/11/19  0515  11/10/19  2209 11/10/19  1706   SODIUM mmol/L 138 136 136   < >  --  132*   POTASSIUM mmol/L 3 6 3 9 3 6   < >  --  4 0   CHLORIDE mmol/L 108 106 107   < >  --  97*   CO2 mmol/L 22 19* 17*   < >  --  22   CO2, I-STAT mmol/L  --   --   --   --  22  --    BUN mg/dL 9 7 8   < >  --  15   CREATININE mg/dL 0 43* 0 34* 0 30*   < >  --  0 52*   CALCIUM mg/dL 7 0* 7 3* 7 4*   < >  --  8 8   ALK PHOS U/L  --   --   --   --   --  139*   ALT U/L  --   --   --   --   --  24   AST U/L  --   --   --   --   --  28   GLUCOSE, ISTAT mg/dl  --   --   --   --  154*  --     < > = values in this interval not displayed  Results from last 7 days   Lab Units 11/12/19  0443 11/10/19  2245   MAGNESIUM mg/dL 1 9 1 6     Results from last 7 days   Lab Units 11/12/19  0443 11/10/19  2245   PHOSPHORUS mg/dL 2 0* 3 9      Results from last 7 days   Lab Units 11/10/19  1706   INR  1 40*   PTT seconds 39*     Results from last 7 days   Lab Units 11/11/19  0312 11/10/19  2245 11/10/19  1706   LACTIC ACID mmol/L 1 8 2 1* 1 7         Results from last 7 days   Lab Units 11/11/19  1604 11/11/19  0320   PH ART  7 450 7  391   PCO2 ART mm Hg 28 2* 30 4*   PO2 ART mm Hg 69 8* 76 2   HCO3 ART mmol/L 19 2* 18 0*   BASE EXC ART mmol/L -3 7 -5 9   ABG SOURCE  Line, Arterial Line, Arterial     Results from last 7 days   Lab Units 11/10/19  1706   PROCALCITONIN ng/ml 2 91*     No results found for: Grovercarl Lancaster               Micro:   Blood Culture:   Lab Results   Component Value Date    BLOODCX Staphylococcus coagulase negative (A) 11/10/2019    BLOODCX No Growth at 24 hrs  11/10/2019    BLOODCX No Growth After 5 Days  01/06/2019    BLOODCX No Growth After 5 Days  01/06/2019    BLOODCX No Growth After 5 Days  01/05/2019    BLOODCX No Growth After 5 Days  01/05/2019    BLOODCX No Growth After 5 Days  12/13/2018    BLOODCX No Growth After 5 Days   12/13/2018     Urine Culture:   Lab Results   Component Value Date    URINECX >100,000 cfu/ml Escherichia coli (A) 01/06/2019    URINECX >100,000 cfu/ml Providencia rettgeri (A) 01/06/2019    URINECX No Growth <1000 cfu/mL 12/15/2018     Sputum Culture: No components found for: SPUTUMCX  Wound Culure:   Lab Results   Component Value Date    WOUNDCULT Culture too young- will reincubate 11/10/2019    WOUNDCULT Few Colonies of Pseudomonas aeruginosa (A) 01/06/2019    WOUNDCULT Few Colonies of Escherichia coli (A) 01/06/2019    WOUNDCULT Few Colonies of Pseudomonas aeruginosa (A) 01/06/2019    WOUNDCULT 4+ Growth of Pseudomonas aeruginosa (A) 01/06/2019    WOUNDCULT 3+ Growth of  01/06/2019    WOUNDCULT 1+ Growth of Corynebacterium striatum (A) 01/03/2019     Results from last 7 days   Lab Units 11/10/19  1723 11/10/19  1707 11/10/19  1706   BLOOD CULTURE   --  Staphylococcus coagulase negative* No Growth at 24 hrs  GRAM STAIN RESULT  No polys seen*  2+ Gram negative rods* Gram positive cocci in clusters*  --    WOUND CULTURE  Culture too young- will reincubate  --   --        Allergies:    Allergies   Allergen Reactions    Iv Contrast [Iodinated Diagnostic Agents]      Tingling face, itching    Cefepime Rash    Ciprofloxacin Rash and Swelling     Looked like suburn, itching  Bed sores  Swelling, itching    Latex Rash    Vancomycin Rash     Unknown        Medications:   Scheduled Meds:    Current Facility-Administered Medications:  acetaminophen 650 mg Oral Q6H PRN Abby Charlton MD    calcium gluconate 1 g Intravenous Once Se Sevilla MD    DAPTOmycin 6 mg/kg Intravenous Q24H Lorraine Hamilton, DO Last Rate: Stopped (11/11/19 1935)   ertapenem 1,000 mg Intravenous Q24H Noris Caraballo, DO Last Rate: Stopped (11/12/19 0132)   heparin (porcine) 5,000 Units Subcutaneous [facility] Abby Charlton MD    HYDROmorphone 0 5 mg Intravenous Q3H PRN Abby Charlton MD    multi-electrolyte 100 mL/hr Intravenous Continuous Armando A Paster, DO Last Rate: 100 mL/hr (11/12/19 0296)   ondansetron 4 mg Intravenous Q6H PRN Becky Roth DO    oxyCODONE 10 mg Oral Q4H PRN Bella Hobson MD    oxyCODONE 5 mg Oral Q4H PRN Bella Hobson MD    phenylephine  mcg/min Intravenous Titrated Becky Roth DO Last Rate: 25 mcg/min (11/12/19 0539)   potassium phosphate 9 mmol Intravenous Once Mckay Luo MD    saccharomyces boulardii 250 mg Oral BID Keeley Caraballo DO    vancomycin 125 mg Oral Q12H [facility] NeonlaDO      Facility-Administered Medications Ordered in Other Encounters:  dexamethasone 4 mg Intravenous Once PRN Angelic Rafter, DO    diphenhydrAMINE 12 5 mg Intravenous Once Angelic Rafter, DO    fentaNYL 12 5 mcg Intravenous Q10 Min PRN Angelic Rafter, DO    fentaNYL 25 mcg Intravenous Q5 Min PRN Angelic Rafter, DO    HYDROmorphone 0 2 mg Intravenous Q10 Min PRN Martin Brennan MD    lactated ringers 75 mL/hr Intravenous Continuous Angelic Rafter, DO Last Rate: 75 mL/hr (11/10/19 2105)   lactated ringers 50 mL/hr Intravenous Continuous Jayda Randall CRNA    lactated ringers 75 mL/hr Intravenous Continuous Angelic Rafter, DO Last Rate: Stopped (03/20/19 1841)   lactated ringers 75 mL/hr Intravenous Continuous Elena Griffin MD Last Rate: Stopped (03/20/19 1842)   ondansetron 4 mg Intravenous Once PRN Martin Brennan MD    promethazine 12 5 mg Intravenous Once PRN Martin Brennan MD      Continuous Infusions:  multi-electrolyte 100 mL/hr Last Rate: 100 mL/hr (11/12/19 0358)   phenylephine  mcg/min Last Rate: 25 mcg/min (11/12/19 0539)     PRN Meds:    acetaminophen 650 mg Q6H PRN   HYDROmorphone 0 5 mg Q3H PRN   ondansetron 4 mg Q6H PRN   oxyCODONE 10 mg Q4H PRN   oxyCODONE 5 mg Q4H PRN       Invasive lines and devices:   Invasive Devices     Peripheral Intravenous Line            Peripheral IV 11/10/19 Left Hand 1 day    Peripheral IV 11/10/19 Right Antecubital 1 day          Arterial Line            Arterial Line 11/10/19 Right Radial 1 day          Drain            Urethral Catheter Latex 16 Fr  1 day                Code Status: Level 1 - Full Code    Counseling / Coordination of Care  Total Critical Care time spent 30 minutes excluding procedures, teaching and family updates            SIGNATURE: Tomas Reyes MD  DATE: November 12, 2019  TIME: 7:07 AM

## 2019-11-12 NOTE — PROGRESS NOTES
Progress note - Palliative and Supportive Care   Troy Hemphill 71 y o  female [id number]    Assessment:  Patient Active Problem List   Diagnosis    Paraplegia following spinal cord injury ([facility] )    Pressure ulcer, sacrum    Iron deficiency anemia    Osteopenia    Underweight    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    Rash due to allergy    Closed fracture of distal end of left femur with routine healing    Closed fracture of lower end of left femur with routine healing    [facility]    Constipation    Anemia of chronic disease    Hyponatremia    GERD (gastroesophageal reflux disease)    Moderate protein-calorie malnutrition (HCC)    Hyperglycemia    BMI less than 19,adult    Hypermagnesemia    Complicated wound infection    Sepsis due to anaerobes ([facility] )    C  difficile diarrhea    Pressure injury of sacral region, unstageable ([facility] )    Acute blood loss anemia     Plan:  1  Symptom management -    - per primary team   - Mouth kote added for xerostomia, particularly on days when NPO for procedures  2  Malnutrition   - recommend nutrition consult   - suggest nutritional supplements such as ensure TID    3  Goals - level 1 full code   - Ongoing goals of care discussions  Patient is agreeable to completion of 5 wishes documentation but is hesitant to provide any contacts as potential alternate decision makers given lack of family which she keeps contact with    - Patient is agreeable to current level of care including OR for washouts/debriedments as indicated, vasopressors for support, and maximizing nutrition  She is aware that she is not a candidate for any advanced therapies such as a flap based on Plastic Surgery recommendations  She is agreeable to artificial nutrition if thought to benefit her overall outcome     - Discussed concern that despite maximum management wound healing may be minimal and ongoing systemic infection can pose a life-threatening scenario  - Patient considering code status  4  psychsosocial support   - time spent providing supportive listening   - book provided at patient's request   - pastoral care following   - JOSHUA Stein following     Code Status: full - Level 1   Decisional apparatus:  Patient is competent on my exam today  If competence is lost, patient's substitute decision maker would default to younger sister by [place]  Patient's younger sister is only family member identified, but they have been estranged for several years  She has several neighbors which she identifies as supports/friends, but is not willing to offer any of their names as emergency contacts or consider them as alternate decision makers   Advance Directive / Living Will / POLST:  5 wishes to be discussed    Interval history:       Patient went back to the OR for washout/debriedment this morning  She required re-initiation of vasopressors thereafter and was placed on marion hugger  She currently denies any pain or stomach upset  Does admit to having a dry mouth and feeling cold  Provided medical update to patient  Thereafter patient expressed desire to call a friend of hers who cares for her dog to "let him know how bad it is"  She did not wish for palliative assistance in this phonecall  Patient did express interest in working on a 5 wishes but does not know who she would want to be an alternate decision maker  She admits to feeling overwhelmed by inability to "get things together" but is appreciative of palliative support with 5 wishes  She does still worry about her dog and estate  She remains hopeful that current treatment will improve wound healing but is realistic that it may not       MEDICATIONS / ALLERGIES:     all current active meds have been reviewed and current meds:   Current Facility-Administered Medications   Medication Dose Route Frequency    acetaminophen (TYLENOL) tablet 650 mg  650 mg Oral Q6H PRN    heparin (porcine) subcutaneous injection 5,000 Units  5,000 Units Subcutaneous Q8H [address]    HYDROmorphone (DILAUDID) injection 0 5 mg  0 5 mg Intravenous Q3H PRN    multi-electrolyte (PLASMALYTE-A/ISOLYTE-S PH 7 4) IV solution  50 mL/hr Intravenous Continuous    ondansetron (ZOFRAN) injection 4 mg  4 mg Intravenous Q6H PRN    oxyCODONE (ROXICODONE) IR tablet 10 mg  10 mg Oral Q4H PRN    oxyCODONE (ROXICODONE) IR tablet 5 mg  5 mg Oral Q4H PRN    [START ON 11/13/2019] pantoprazole (PROTONIX) EC tablet 40 mg  40 mg Oral Early Morning    phenylephrine (ELADIO-SYNEPHRINE) 50 mg (STANDARD CONCENTRATION) in sodium chloride 0 9% 250 mL   mcg/min Intravenous Titrated    piperacillin-tazobactam (ZOSYN) 4 5 g in sodium chloride 0 9 % 100 mL IVPB  4 5 g Intravenous Q6H    saccharomyces boulardii (FLORASTOR) capsule 250 mg  250 mg Oral BID    saliva substitute (MOUTH KOTE) mucosal solution 5 spray  5 spray Mouth/Throat PRN    vancomycin (VANCOCIN) oral solution 125 mg  125 mg Oral Q12H [address]     Facility-Administered Medications Ordered in Other Encounters   Medication Dose Route Frequency    dexamethasone (DECADRON) injection 4 mg  4 mg Intravenous Once PRN    diphenhydrAMINE (BENADRYL) injection 12 5 mg  12 5 mg Intravenous Once    fentaNYL (SUBLIMAZE) injection 12 5 mcg  12 5 mcg Intravenous Q10 Min PRN    fentaNYL (SUBLIMAZE) injection 25 mcg  25 mcg Intravenous Q5 Min PRN    HYDROmorphone (DILAUDID) injection 0 2 mg  0 2 mg Intravenous Q10 Min PRN    lactated ringers infusion  75 mL/hr Intravenous Continuous    lactated ringers infusion  50 mL/hr Intravenous Continuous    lactated ringers infusion  75 mL/hr Intravenous Continuous    lactated ringers infusion  75 mL/hr Intravenous Continuous    ondansetron (ZOFRAN) injection 4 mg  4 mg Intravenous Once PRN    promethazine (PHENERGAN) injection 12 5 mg  12 5 mg Intravenous Once PRN       Allergies   Allergen Reactions    Iv Contrast [Iodinated Diagnostic Agents]      Tingling face, itching    Cefepime Rash    Ciprofloxacin Rash and Swelling     Looked like suburn, itching  Bed sores  Swelling, itching    Latex Rash    Vancomycin Rash     Unknown        OBJECTIVE:    Physical Exam  Physical Exam   Constitutional: She is oriented to person, place, and time  Cachectic, frail   HENT:   Head: Normocephalic and atraumatic  Eyes: Conjunctivae are normal    Cardiovascular: Normal rate  hypotensive   Pulmonary/Chest: Effort normal    Abdominal: She exhibits no distension  There is no guarding  Neurological: She is alert and oriented to person, place, and time  Skin: Skin is warm and dry  Dressing on sacral and femoral wound   Psychiatric:   Depressed mood and affect       Lab Results:   I have personally reviewed pertinent labs  , CBC:   Lab Results   Component Value Date    WBC 14 45 (H) 11/12/2019    HGB 11 2 (L) 11/12/2019    HCT 34 8 11/12/2019    MCV 79 (L) 11/12/2019     (H) 11/12/2019    MCH 25 3 (L) 11/12/2019    MCHC 32 2 11/12/2019    RDW 17 6 (H) 11/12/2019    MPV 9 3 11/12/2019    NRBC 0 11/12/2019   , CMP:   Lab Results   Component Value Date    SODIUM 138 11/12/2019    K 3 6 11/12/2019     11/12/2019    CO2 22 11/12/2019    BUN 9 11/12/2019    CREATININE 0 43 (L) 11/12/2019    CALCIUM 7 0 (L) 11/12/2019    EGFR 104 11/12/2019     Imaging Studies: reviewed pertinent studies  EKG, Pathology, and Other Studies: reviewed telemetry at bedside    Counseling / Coordination of Care  Total floor / unit time spent today 35+ minutes  Greater than 50% of total time was spent with the patient and / or family counseling and / or coordination of care  A description of the counseling / coordination of care: time spent with patient, communication with primary team, and RN

## 2019-11-12 NOTE — PROGRESS NOTES
Progress Note - [employer] 1950, 71 y o  female MRN: [id number]    Unit/Bed#: MICU 09 Encounter: [id number]    Primary Care Provider: Dru Crowley   Date and time admitted to hospital: 11/10/2019  4:06 PM        Acute blood loss anemia  Assessment & Plan  ABLA and hemodilution causing Hb 5 5 post op       - S/P 11/10 2 u pRBC - Hb 10 5   - Continue to monitor Hb   - T&S for OR today    * Pressure injury of sacral region, unstageable [facility])  Assessment & Plan  S/P excisional debridement 11/10  11/11 bedside excisional debridement to bone   Sepsis 2/2 to above     - OR today for washout/debridement   - NPO/IVF   - diet and ensure after OR   - F/U wound cultures- GNR   - palliative care following   - ortho singed off: not a candidate for osteo resection   - blood cx: 1/2 GPC in clusters   - ID for abx: daptomycin/ertapenem/vancomycin   - Continue Jack as needed for BP support, wean as able   - PRN pain control   - primary per ICU                Subjective/Objective   Chief Complaint:     Subjective: JOSE LUIS  Still on jack at 25  Afebrile  No complaints/pain  Objective:     Blood pressure (!) 74/54, pulse 98, temperature 99 1 °F (37 3 °C), resp  rate (!) 32, height 5' (1 524 m), weight 46 6 kg (102 lb 11 7 oz), SpO2 93 %, not currently breastfeeding  ,Body mass index is 20 06 kg/m²        Intake/Output Summary (Last 24 hours) at 11/12/2019 0544  Last data filed at 11/12/2019 0357  Gross per 24 hour   Intake 1396 72 ml   Output 768 ml   Net 628 72 ml       Invasive Devices     Peripheral Intravenous Line            Peripheral IV 11/10/19 Left Hand 1 day    Peripheral IV 11/10/19 Right Antecubital 1 day          Arterial Line            Arterial Line 11/10/19 Right Radial 1 day          Drain            Urethral Catheter Latex 16 Fr  1 day                Physical Exam: NAD  Atraumatic/normocephalic  AAOX3  Normal mood and affect  Normal respiratory effort  Soft, non tender, ND  Skin warm/dry  Cap refil <2 sec  Sacral dressings with serosang staining  paraplegic       Lab, Imaging and other studies:  I have personally reviewed pertinent lab results    , CBC:   Lab Results   Component Value Date    WBC 14 45 (H) 11/12/2019    HGB 11 2 (L) 11/12/2019    HCT 34 8 11/12/2019    MCV 79 (L) 11/12/2019     (H) 11/12/2019    MCH 25 3 (L) 11/12/2019    MCHC 32 2 11/12/2019    RDW 17 6 (H) 11/12/2019    MPV 9 3 11/12/2019    NRBC 0 11/12/2019   , CMP:   Lab Results   Component Value Date    SODIUM 138 11/12/2019    K 3 6 11/12/2019     11/12/2019    CO2 22 11/12/2019    BUN 9 11/12/2019    CREATININE 0 43 (L) 11/12/2019    CALCIUM 7 0 (L) 11/12/2019    EGFR 104 11/12/2019     VTE Pharmacologic Prophylaxis: Heparin  VTE Mechanical Prophylaxis: sequential compression device

## 2019-11-13 PROBLEM — A41.9 SEPSIS (HCC): Status: ACTIVE | Noted: 2019-11-13

## 2019-11-13 LAB
ANION GAP SERPL CALCULATED.3IONS-SCNC: 10 MMOL/L (ref 4–13)
BACTERIA BLD CULT: ABNORMAL
BACTERIA WND AEROBE CULT: ABNORMAL
BASOPHILS # BLD AUTO: 0.04 THOUSANDS/ΜL (ref 0–0.1)
BASOPHILS NFR BLD AUTO: 0 % (ref 0–1)
BUN SERPL-MCNC: 6 MG/DL (ref 5–25)
CALCIUM SERPL-MCNC: 7 MG/DL (ref 8.3–10.1)
CHLORIDE SERPL-SCNC: 107 MMOL/L (ref 100–108)
CO2 SERPL-SCNC: 22 MMOL/L (ref 21–32)
CREAT SERPL-MCNC: 0.34 MG/DL (ref 0.6–1.3)
EOSINOPHIL # BLD AUTO: 0.06 THOUSAND/ΜL (ref 0–0.61)
EOSINOPHIL NFR BLD AUTO: 1 % (ref 0–6)
ERYTHROCYTE [DISTWIDTH] IN BLOOD BY AUTOMATED COUNT: 18.7 % (ref 11.6–15.1)
GFR SERPL CREATININE-BSD FRML MDRD: 112 ML/MIN/1.73SQ M
GLUCOSE SERPL-MCNC: 108 MG/DL (ref 65–140)
GRAM STN SPEC: ABNORMAL
HCT VFR BLD AUTO: 33.6 % (ref 34.8–46.1)
HGB BLD-MCNC: 10.8 G/DL (ref 11.5–15.4)
IMM GRANULOCYTES # BLD AUTO: 0.19 THOUSAND/UL (ref 0–0.2)
IMM GRANULOCYTES NFR BLD AUTO: 1 % (ref 0–2)
LYMPHOCYTES # BLD AUTO: 2.51 THOUSANDS/ΜL (ref 0.6–4.47)
LYMPHOCYTES NFR BLD AUTO: 19 % (ref 14–44)
MCH RBC QN AUTO: 25.5 PG (ref 26.8–34.3)
MCHC RBC AUTO-ENTMCNC: 32.1 G/DL (ref 31.4–37.4)
MCV RBC AUTO: 79 FL (ref 82–98)
MONOCYTES # BLD AUTO: 0.87 THOUSAND/ΜL (ref 0.17–1.22)
MONOCYTES NFR BLD AUTO: 7 % (ref 4–12)
NEUTROPHILS # BLD AUTO: 9.55 THOUSANDS/ΜL (ref 1.85–7.62)
NEUTS SEG NFR BLD AUTO: 72 % (ref 43–75)
NRBC BLD AUTO-RTO: 0 /100 WBCS
PHOSPHATE SERPL-MCNC: 2.7 MG/DL (ref 2.3–4.1)
PLATELET # BLD AUTO: 401 THOUSANDS/UL (ref 149–390)
PMV BLD AUTO: 9.5 FL (ref 8.9–12.7)
POTASSIUM SERPL-SCNC: 3.4 MMOL/L (ref 3.5–5.3)
RBC # BLD AUTO: 4.24 MILLION/UL (ref 3.81–5.12)
SODIUM SERPL-SCNC: 139 MMOL/L (ref 136–145)
WBC # BLD AUTO: 13.22 THOUSAND/UL (ref 4.31–10.16)

## 2019-11-13 PROCEDURE — 99222 1ST HOSP IP/OBS MODERATE 55: CPT | Performed by: PLASTIC SURGERY

## 2019-11-13 PROCEDURE — 84100 ASSAY OF PHOSPHORUS: CPT | Performed by: STUDENT IN AN ORGANIZED HEALTH CARE EDUCATION/TRAINING PROGRAM

## 2019-11-13 PROCEDURE — 99232 SBSQ HOSP IP/OBS MODERATE 35: CPT | Performed by: SURGERY

## 2019-11-13 PROCEDURE — 85025 COMPLETE CBC W/AUTO DIFF WBC: CPT | Performed by: STUDENT IN AN ORGANIZED HEALTH CARE EDUCATION/TRAINING PROGRAM

## 2019-11-13 PROCEDURE — 99233 SBSQ HOSP IP/OBS HIGH 50: CPT | Performed by: INTERNAL MEDICINE

## 2019-11-13 PROCEDURE — 99232 SBSQ HOSP IP/OBS MODERATE 35: CPT | Performed by: INTERNAL MEDICINE

## 2019-11-13 PROCEDURE — 80048 BASIC METABOLIC PNL TOTAL CA: CPT | Performed by: STUDENT IN AN ORGANIZED HEALTH CARE EDUCATION/TRAINING PROGRAM

## 2019-11-13 RX ORDER — POTASSIUM CHLORIDE 20 MEQ/1
40 TABLET, EXTENDED RELEASE ORAL ONCE
Status: COMPLETED | OUTPATIENT
Start: 2019-11-13 | End: 2019-11-13

## 2019-11-13 RX ORDER — MIDODRINE HYDROCHLORIDE 5 MG/1
10 TABLET ORAL
Status: DISCONTINUED | OUTPATIENT
Start: 2019-11-13 | End: 2019-11-15

## 2019-11-13 RX ORDER — ALBUMIN, HUMAN INJ 5% 5 %
25 SOLUTION INTRAVENOUS ONCE
Status: COMPLETED | OUTPATIENT
Start: 2019-11-13 | End: 2019-11-13

## 2019-11-13 RX ORDER — POTASSIUM CHLORIDE 20 MEQ/1
20 TABLET, EXTENDED RELEASE ORAL ONCE
Status: COMPLETED | OUTPATIENT
Start: 2019-11-13 | End: 2019-11-13

## 2019-11-13 RX ORDER — SODIUM CHLORIDE, SODIUM GLUCONATE, SODIUM ACETATE, POTASSIUM CHLORIDE, MAGNESIUM CHLORIDE, SODIUM PHOSPHATE, DIBASIC, AND POTASSIUM PHOSPHATE .53; .5; .37; .037; .03; .012; .00082 G/100ML; G/100ML; G/100ML; G/100ML; G/100ML; G/100ML; G/100ML
75 INJECTION, SOLUTION INTRAVENOUS CONTINUOUS
Status: DISCONTINUED | OUTPATIENT
Start: 2019-11-14 | End: 2019-11-15

## 2019-11-13 RX ADMIN — ALBUMIN (HUMAN) 25 G: 12.5 INJECTION, SOLUTION INTRAVENOUS at 16:48

## 2019-11-13 RX ADMIN — POTASSIUM CHLORIDE 20 MEQ: 1500 TABLET, EXTENDED RELEASE ORAL at 08:35

## 2019-11-13 RX ADMIN — HEPARIN SODIUM 5000 UNITS: 5000 INJECTION INTRAVENOUS; SUBCUTANEOUS at 05:29

## 2019-11-13 RX ADMIN — Medication 125 MG: at 21:23

## 2019-11-13 RX ADMIN — MIDODRINE HYDROCHLORIDE 10 MG: 5 TABLET ORAL at 10:48

## 2019-11-13 RX ADMIN — PANTOPRAZOLE SODIUM 40 MG: 40 TABLET, DELAYED RELEASE ORAL at 05:30

## 2019-11-13 RX ADMIN — PHENYLEPHRINE HYDROCHLORIDE 66.67 MCG/MIN: 10 INJECTION INTRAVENOUS at 16:15

## 2019-11-13 RX ADMIN — SODIUM CHLORIDE, SODIUM GLUCONATE, SODIUM ACETATE, POTASSIUM CHLORIDE AND MAGNESIUM CHLORIDE 50 ML/HR: 526; 502; 368; 37; 30 INJECTION, SOLUTION INTRAVENOUS at 08:40

## 2019-11-13 RX ADMIN — PIPERACILLIN AND TAZOBACTAM 4.5 G: 4; .5 INJECTION, POWDER, LYOPHILIZED, FOR SOLUTION INTRAVENOUS at 03:10

## 2019-11-13 RX ADMIN — HEPARIN SODIUM 5000 UNITS: 5000 INJECTION INTRAVENOUS; SUBCUTANEOUS at 21:25

## 2019-11-13 RX ADMIN — Medication 125 MG: at 08:35

## 2019-11-13 RX ADMIN — Medication 250 MG: at 17:48

## 2019-11-13 RX ADMIN — PIPERACILLIN AND TAZOBACTAM 4.5 G: 4; .5 INJECTION, POWDER, LYOPHILIZED, FOR SOLUTION INTRAVENOUS at 21:25

## 2019-11-13 RX ADMIN — POTASSIUM CHLORIDE 40 MEQ: 1500 TABLET, EXTENDED RELEASE ORAL at 08:35

## 2019-11-13 RX ADMIN — Medication 250 MG: at 13:03

## 2019-11-13 RX ADMIN — PIPERACILLIN AND TAZOBACTAM 4.5 G: 4; .5 INJECTION, POWDER, LYOPHILIZED, FOR SOLUTION INTRAVENOUS at 08:35

## 2019-11-13 RX ADMIN — MIDODRINE HYDROCHLORIDE 10 MG: 5 TABLET ORAL at 16:48

## 2019-11-13 RX ADMIN — PIPERACILLIN AND TAZOBACTAM 4.5 G: 4; .5 INJECTION, POWDER, LYOPHILIZED, FOR SOLUTION INTRAVENOUS at 17:05

## 2019-11-13 RX ADMIN — HEPARIN SODIUM 5000 UNITS: 5000 INJECTION INTRAVENOUS; SUBCUTANEOUS at 13:57

## 2019-11-13 NOTE — ASSESSMENT & PLAN NOTE
S/P excisional debridement 11/10, 11, and 12  Sepsis 2/2 to above     - OR 11/14 for repeat washout/debridement   - Regular diet   - IVF    - F/U wound cultures- GNR   - palliative care following - ongoing [address] discussion   - ortho singed off: not a candidate for osteo resection   - blood cx: 1/2 GPC in clusters   - ID for abx: daptomycin/ertapenem/vancomycin   - Continue Jack as needed for BP support, wean as able   - PRN pain control   - primary per ICU

## 2019-11-13 NOTE — PROGRESS NOTES
Progress Note - Pako Gonzalez 1950, 71 y o  female MRN: [id number]    Unit/Bed#: MICU 09 Encounter: [id number]    Primary Care Provider: Sandip Pitts   Date and time admitted to hospital: 11/10/2019  4:06 PM        Acute blood loss anemia  Assessment & Plan  ABLA and hemodilution causing Hb 5 5 post op       - S/P 11/10 2 u pRBC - Hb 10 5   - Continue to monitor Hb   - 11/13 Hgb 10 8    * Pressure injury of sacral region, unstageable [facility])  Assessment & Plan  S/P excisional debridement 11/10, 11, and 12  Sepsis 2/2 to above     - OR 11/14 for repeat washout/debridement   - Regular diet   - IVF    - F/U wound cultures- GNR   - palliative care following - ongoing [address] discussion   - ortho singed off: not a candidate for osteo resection   - blood cx: 1/2 GPC in clusters   - ID for abx: daptomycin/ertapenem/vancomycin   - Continue Jack as needed for BP support, wean as able   - PRN pain control   - primary per ICU                Subjective/Objective   Subjective: No acute events    Objective:   Blood pressure 104/77, pulse 94, temperature 98 2 °F (36 8 °C), temperature source Oral, resp  rate (!) 40, height 5' (1 524 m), weight 46 6 kg (102 lb 11 7 oz), SpO2 94 %, not currently breastfeeding  ,Body mass index is 20 06 kg/m²  Intake/Output Summary (Last 24 hours) at 11/13/2019 0617  Last data filed at 11/13/2019 0545  Gross per 24 hour   Intake 4256 46 ml   Output 1400 ml   Net 2856 46 ml       Invasive Devices     Peripheral Intravenous Line            Peripheral IV 11/10/19 Right Antecubital 2 days    Peripheral IV 11/12/19 Right Forearm less than 1 day          Drain            Urethral Catheter Latex 16 Fr  2 days                Physical Exam:  GEN: NAD  HEENT: MMM  CV: RRR  Lung: Normal effort  Ab: Soft, NT/ND  Extrem: Multiple wounds packed in OR yesterday  Dressings CDI  Neuro:  A+Ox3    Lab, Imaging and other studies:  CBC:   Lab Results   Component Value Date    WBC 13 22 (H) 11/13/2019 HGB 10 8 (L) 11/13/2019    HCT 33 6 (L) 11/13/2019    MCV 79 (L) 11/13/2019     (H) 11/13/2019    MCH 25 5 (L) 11/13/2019    MCHC 32 1 11/13/2019    RDW 18 7 (H) 11/13/2019    MPV 9 5 11/13/2019    NRBC 0 11/13/2019   , CMP: No results found for: SODIUM, K, CL, CO2, ANIONGAP, BUN, CREATININE, GLUCOSE, CALCIUM, AST, ALT, ALKPHOS, PROT, BILITOT, EGFR  VTE Pharmacologic Prophylaxis: Heparin  VTE Mechanical Prophylaxis: sequential compression device

## 2019-11-13 NOTE — QUICK NOTE
Sacral dressings removed  No foul smell or purulent drainage appreciated  No debridement necessary at this time  Wounds re-packed with moistened Kerlex x3 total and covered with ABD/heavy drainage pads  Plan for OR tomorrow for excisional debridement and possible VAC placement

## 2019-11-13 NOTE — PLAN OF CARE
Problem: Prexisting or High Potential for Compromised Skin Integrity  Goal: Skin integrity is maintained or improved  Description  INTERVENTIONS:  - Identify patients at risk for skin breakdown  - Assess and monitor skin integrity  - Assess and monitor nutrition and hydration status  - Monitor labs   - Assess for incontinence   - Turn and reposition patient  - Assist with mobility/ambulation  - Relieve pressure over bony prominences  - Avoid friction and shearing  - Provide appropriate hygiene as needed including keeping skin clean and dry  - Evaluate need for skin moisturizer/barrier cream  - Collaborate with interdisciplinary team   - Patient/family teaching  - Consider wound care consult   Outcome: Progressing     Problem: Potential for Falls  Goal: Patient will remain free of falls  Description  INTERVENTIONS:  - Assess patient frequently for physical needs  -  Identify cognitive and physical deficits and behaviors that affect risk of falls    -  [place] fall precautions as indicated by assessment   - Educate patient/family on patient safety including physical limitations  - Instruct patient to call for assistance with activity based on assessment  - Modify environment to reduce risk of injury  - Consider OT/PT consult to assist with strengthening/mobility  Outcome: Progressing     Problem: PAIN - ADULT  Goal: Verbalizes/displays adequate comfort level or baseline comfort level  Description  Interventions:  - Encourage patient to monitor pain and request assistance  - Assess pain using appropriate pain scale  - Administer analgesics based on type and severity of pain and evaluate response  - Implement non-pharmacological measures as appropriate and evaluate response  - Consider cultural and social influences on pain and pain management  - Notify physician/advanced practitioner if interventions unsuccessful or patient reports new pain  Outcome: Progressing     Problem: INFECTION - ADULT  Goal: Absence or prevention of progression during hospitalization  Description  INTERVENTIONS:  - Assess and monitor for signs and symptoms of infection  - Monitor lab/diagnostic results  - Monitor all insertion sites, i e  indwelling lines, tubes, and drains  - Monitor endotracheal if appropriate and nasal secretions for changes in amount and color  - [place] appropriate cooling/warming therapies per order  - Administer medications as ordered  - Instruct and encourage patient and family to use good hand hygiene technique  - Identify and instruct in appropriate isolation precautions for identified infection/condition  Outcome: Progressing  Goal: Absence of fever/infection during neutropenic period  Description  INTERVENTIONS:  - Monitor WBC    Outcome: Progressing     Problem: DISCHARGE PLANNING  Goal: Discharge to home or other facility with appropriate resources  Description  INTERVENTIONS:  - Identify barriers to discharge w/patient and caregiver  - Arrange for needed discharge resources and transportation as appropriate  - Identify discharge learning needs (meds, wound care, etc )  - Arrange for interpretive services to assist at discharge as needed  - Refer to Case Management Department for coordinating discharge planning if the patient needs post-hospital services based on physician/advanced practitioner order or complex needs related to functional status, cognitive ability, or social support system  Outcome: Progressing     Problem: Knowledge Deficit  Goal: Patient/family/caregiver demonstrates understanding of disease process, treatment plan, medications, and discharge instructions  Description  Complete learning assessment and assess knowledge base    Interventions:  - Provide teaching at level of understanding  - Provide teaching via preferred learning methods  Outcome: Progressing     Problem: Nutrition/Hydration-ADULT  Goal: Nutrient/Hydration intake appropriate for improving, restoring or maintaining nutritional needs  Description  Monitor and assess patient's nutrition/hydration status for malnutrition  Collaborate with interdisciplinary team and initiate plan and interventions as ordered  Monitor patient's weight and dietary intake as ordered or per policy  Utilize nutrition screening tool and intervene as necessary  Determine patient's food preferences and provide high-protein, high-caloric foods as appropriate       INTERVENTIONS:  - Monitor oral intake, urinary output, labs, and treatment plans  - Assess nutrition and hydration status and recommend course of action  - Evaluate amount of meals eaten  - Assist patient with eating if necessary   - Allow adequate time for meals  - Recommend/ encourage appropriate diets, oral nutritional supplements, and vitamin/mineral supplements  - Order, calculate, and assess calorie counts as needed  - Recommend, monitor, and adjust tube feedings and TPN/PPN based on assessed needs  - Assess need for intravenous fluids  - Provide specific nutrition/hydration education as appropriate  - Include patient/family/caregiver in decisions related to nutrition  Outcome: Progressing     Problem: SKIN/TISSUE INTEGRITY - ADULT  Goal: Incision(s), wounds(s) or drain site(s) healing without S/S of infection  Description  INTERVENTIONS  - Assess and document risk factors for skin impairment   - Assess and document dressing, incision, wound bed, drain sites and surrounding tissue  - Consider nutrition services referral as needed  - Oral mucous membranes remain intact  - Provide patient/ family education  Outcome: Progressing

## 2019-11-13 NOTE — PROGRESS NOTES
Progress Note - Critical Care   Phong German 71 y o  female MRN: [id number]  [facility] En[facility]: [id number]    Impression:  Principal Problem:    Pressure injury of sacral region, unstageable [facility])  Active Problems:    Paraplegia following spinal cord injury ([facility] [address] )    C  difficile diarrhea    Acute blood loss anemia      Plan:    Neuro:   --Hx of paraplegia following spinal cord injury in 1981  --Neuro checks qshift  --Stable, continue to monitor  --PRN acetaminophen, dilaudid 0 5 mg q6h PRN, oxycodone PO   CV:   --Continue hemodynamic support with Phenylephrine, currently at 55  --Wean pressor support as tolerated  --Goal MAP >65  --Patient does not want central line, will continue to check in with her about goals of care  Lung:   --No acute issues, extubated successfully postop  GI:   --Regular diet with ensure supplement as tolerated  FEN:   --Fluids: plasmalyte @50 cc/hr post-op, encourage PO intake, discuss discontinuing if PO intake is adequate  --Replete electrolytes with goals: K >4 0, Mag >2 0, and Phos >3 0  Today given 60 mEq K PO  Ca low on BMP, checking ionized Ca today, will replete as necessary  :   --Hx of CKD   --Strict I/Os  --Hays in place   ID:   --Sepsis due to severe necrotic pressure ulcers of sacrum and buttocks  --On Ertapenem for multiple necrotic decubitus wounds, Florastor for hx of C  Diff, daptomycin began yesterday   --Monitor fever curve, trend WBC count  Today leukocytosis decreased to 13 from 14 yesterday  --Infectious Disease consult placed for further Abx management recommendations  Narrowed to high dose IV zosyn yesterday, discontinued daptomycin, only one of blood cultures was positive for coag neg staph, suspect contaminant  --Wound cultures: positive for pseudomonas aerurginosa and providencia stuartii, awaiting final culture     --Blood cultures: coag neg staph from 11/10  --Chronic ischial osteomyelitis: no opportunity for operative debridement per ortho, supportive care  --H/o c diff: on prophylactic oral vanco 125 mg q12h; monitor closely for diarrhea  Heme:   --Hx of iron-deficiency anemia  --Hgb of 5 0 intra-op from 9 1, today at 10 8  Has been holding stable  --Trend H/H  --Transfuse PRN for Hgb <7  Endo:   --No acute issues  MSK/Skin:  --Multiple decubitus ulcers packed with wet-to-dry Kerlix/heavy drainage packs  S/p OR debridement and washout yesterday, planned for repeat tomorrow  --Acute Care Surgery following for wound management   --Orthopedics c/s placed for chronic sacral osteomyelitis, with previous infection of left femur ORIF  They have signed off, no surgical management necessary  --Routine skin care  --PT/OT  --Frequent turning, offloading  Lines:  --Arterial line in right radial  --2 peripheral   VTE Prophylaxis:  · Pharmacologic Prophylaxis: heparin  · Mechanical Prophylaxis: SCDs    Disposition: Continue ICU care    Treatment Team/Consultants: Acute care surgery     Date of admission: 11/10/2019    Reason for Admission: Decubitus ulcers adjacent to proximal right femur and left ischial tuberosity with collections of gas present, required operative debridement on 11/10/19  HPI/24hr events: "Maxim Li is a 71 y o  female w/ hx of paraplegia, C  diff, who presented to the hospital earlier this evening with worsening chronic decubitus ulcers of the bilateral hips, femur, iliac, and sacral regions  Patient has surgical hx of multiple flap procedures with Dr Lizandro Harrington of Plastic Surgery (flap of right hip in 12/2018, flat of left hip/thigh in 01/2019, flap of the left ischium in 02/2019), as well as left femur IM nail in 10/2018  Patient performs her own wound care at home  Over the past 2 weeks, patient was feeling that her wounds were progressively looking worse in appearance, with increased amounts of drainage    In the past 2 days, she had began feeling generalized weakness, worsening to the point where she presented today to the emergency department  CT of the chest, abdomen, and pelvis obtained in the ED showed deep ulcers adjacent to the proximal right femur and left ischial tuberosity with multiple collections of gas in the subadjacent soft tissues, extending as deep as the obturator internus muscles with discrete abscess cavities  Patient was then taken urgently to the operating room with the acute care surgery service for operative debridement  She was started on ertapenem, which she was treated with previously for infected decubitus ulcers (patient has history of multiple antibiotic allergies)  Intra-op, patient had required hemodynamic support with phenylephrine, as well as low hemoglobin level of 5 0, necessitating Critical Care admission  Of note, patient has history of C diff colitis, for which she takes Florastor at home  She is allergic to Vancomycin  WBC count of 21 upon admission  "     Went to the OR yesterday, post-op was on 40 mcg/min of tadeo for MAP >65, was increased to 75 overnight, now down to 45  Patient refused central line overnight  Had goals of care discussion yesterday with palliative care, understanding that her condition is serious and she likely needs to think about PEG and ostomy for long-term success at home  Feeling much more "clear" today, no specific complaints  Muscle spasms are better today  ROS: 14 point ROS is negative and/or as stated in the HPI  Physical Exam:    Physical Exam   Constitutional: She is oriented to person, place, and time  She appears well-developed and well-nourished  No distress  HENT:   Head: Normocephalic and atraumatic  Eyes: Pupils are equal, round, and reactive to light  Conjunctivae are normal    Neck: Normal range of motion  Cardiovascular: Normal rate, regular rhythm and normal heart sounds  No murmur heard  Pulmonary/Chest: Effort normal and breath sounds normal  No respiratory distress  She has no wheezes   She has no rales    Abdominal: Soft  Bowel sounds are normal  There is no tenderness  There is no guarding  Musculoskeletal: Normal range of motion  She exhibits no edema  Neurological: She is alert and oriented to person, place, and time  A sensory deficit (bilateral LE's) is present  No cranial nerve deficit  GCS 15 today, increased alertness compared to yesterday  Flaccid paralysis of LE's  Skin: Skin is warm and dry  No rash noted  No pallor  Posterior wounds with large ABD's in place, appear dry at this time  Psychiatric: She has a normal mood and affect  Nursing note and vitals reviewed  Vitals:   Vitals:    19 0545 19 0600 19 0615 19 0630   BP: 104/77 (!) 84/61 (!) 74/52 (!) 87/60   Pulse: 94 96 96 94   Resp: (!) 40 (!) 30 (!) 24 (!) 26   Temp:       TempSrc:       SpO2: 94% 92% 93% 94%   Weight:       Height:           Arterial Line:  Arterial Line BP: 96/60  Arterial Line MAP (mmHg): 74 mmHg    Temperature: Temp (24hrs), Av 7 °F (36 5 °C), Min:96 4 °F (35 8 °C), Max:99 °F (37 2 °C)  Current: Temperature: 98 2 °F (36 8 °C)    Weights: IBW: 45 5 kg  Body mass index is 20 06 kg/m²  Respiratory:  SpO2: SpO2: 93 %  O2 Flow Rate (L/min): 2 L/min    Intake and Outputs:    Intake/Output Summary (Last 24 hours) at 2019 0639  Last data filed at 2019 0545  Gross per 24 hour   Intake 4256 46 ml   Output 1400 ml   Net 2856 46 ml     I/O last 24 hours: In: 5436 3 [P O :1680; I V :3246 3; IV Piggyback:510]  Out:  [Urine:]    UOP: 0 7 cc/kg/hr overnight        Nutrition:        Diet Orders   (From admission, onward)             Start     Ordered    19 1210  Diet Regular; Regular House  Diet effective midnight     Question Answer Comment   Diet Type Regular    Regular Regular House    RD to adjust diet per protocol?  Yes        19 1209    19 1714  Dietary nutrition supplements  Once     Question Answer Comment   Select Supplement: Ensure Compact-Vanilla    Frequency Breakfast, Lunch, Dinner        11/11/19 1713                  Labs:   Results from last 7 days   Lab Units 11/13/19  0534 11/12/19 0443 11/11/19  0515  11/10/19  2245  11/10/19  1706   WBC Thousand/uL 13 22* 14 45* 19 96*  --  25 75*  --  21 20*   HEMOGLOBIN g/dL 10 8* 11 2* 11 2*   < > 5 5*  --  9 1*   I STAT HEMOGLOBIN   --   --   --   --   --    < >  --    HEMATOCRIT % 33 6* 34 8 35 0   < > 18 3*  --  30 6*   HEMATOCRIT, ISTAT   --   --   --   --   --    < >  --    PLATELETS Thousands/uL 401* 455* 480*   < > 507*  --  791*   NEUTROS PCT % 72 78*  --   --   --   --  87*   MONOS PCT % 7 4  --   --   --   --  5   MONO PCT %  --   --   --   --  4  --   --     < > = values in this interval not displayed  Results from last 7 days   Lab Units 11/13/19  0534 11/12/19 0443 11/11/19  1409  11/10/19  2209 11/10/19  1706   SODIUM mmol/L 139 138 136   < >  --  132*   POTASSIUM mmol/L 3 4* 3 6 3 9   < >  --  4 0   CHLORIDE mmol/L 107 108 106   < >  --  97*   CO2 mmol/L 22 22 19*   < >  --  22   CO2, I-STAT mmol/L  --   --   --   --  22  --    BUN mg/dL 6 9 7   < >  --  15   CREATININE mg/dL 0 34* 0 43* 0 34*   < >  --  0 52*   CALCIUM mg/dL 7 0* 7 0* 7 3*   < >  --  8 8   ALK PHOS U/L  --   --   --   --   --  139*   ALT U/L  --   --   --   --   --  24   AST U/L  --   --   --   --   --  28   GLUCOSE, ISTAT mg/dl  --   --   --   --  154*  --     < > = values in this interval not displayed       Results from last 7 days   Lab Units 11/12/19  0443 11/10/19  2245   MAGNESIUM mg/dL 1 9 1 6     Results from last 7 days   Lab Units 11/13/19  0534 11/12/19  0443 11/10/19  2245   PHOSPHORUS mg/dL 2 7 2 0* 3 9      Results from last 7 days   Lab Units 11/10/19  1706   INR  1 40*   PTT seconds 39*     Results from last 7 days   Lab Units 11/11/19  0312 11/10/19  2245 11/10/19  1706   LACTIC ACID mmol/L 1 8 2 1* 1 7         Results from last 7 days   Lab Units 11/11/19  1604 11/11/19  0320   PH ART 7 450 7  391   PCO2 ART mm Hg 28 2* 30 4*   PO2 ART mm Hg 69 8* 76 2   HCO3 ART mmol/L 19 2* 18 0*   BASE EXC ART mmol/L -3 7 -5 9   ABG SOURCE  Line, Arterial Line, Arterial     Results from last 7 days   Lab Units 11/10/19  1706   PROCALCITONIN ng/ml 2 91*     No results found for: Dariana Stark               Micro:   Blood Culture:   Lab Results   Component Value Date    BLOODCX Staphylococcus coagulase negative (A) 11/10/2019    BLOODCX No Growth at 48 hrs  11/10/2019    BLOODCX No Growth After 5 Days  01/06/2019    BLOODCX No Growth After 5 Days  01/06/2019    BLOODCX No Growth After 5 Days  01/05/2019    BLOODCX No Growth After 5 Days  01/05/2019    BLOODCX No Growth After 5 Days  12/13/2018    BLOODCX No Growth After 5 Days   12/13/2018     Urine Culture:   Lab Results   Component Value Date    URINECX >100,000 cfu/ml Proteus mirabilis (A) 11/10/2019    URINECX >100,000 cfu/ml Escherichia coli (A) 01/06/2019    URINECX >100,000 cfu/ml Providencia rettgeri (A) 01/06/2019    URINECX No Growth <1000 cfu/mL 12/15/2018     Sputum Culture: No components found for: SPUTUMCX  Wound Culure:   Lab Results   Component Value Date    WOUNDCULT 3+ Growth of Pseudomonas aeruginosa (A) 11/10/2019    WOUNDCULT 2+ Growth of Providencia stuartii (A) 11/10/2019    WOUNDCULT 2+ Growth of Diphtheroids 11/10/2019    WOUNDCULT Few Colonies of Pseudomonas aeruginosa (A) 01/06/2019    WOUNDCULT Few Colonies of Escherichia coli (A) 01/06/2019    WOUNDCULT Few Colonies of Pseudomonas aeruginosa (A) 01/06/2019    WOUNDCULT 4+ Growth of Pseudomonas aeruginosa (A) 01/06/2019    WOUNDCULT 3+ Growth of  01/06/2019     Results from last 7 days   Lab Units 11/10/19  1723 11/10/19  1707 11/10/19  1706 11/10/19  0528   BLOOD CULTURE   --  Staphylococcus coagulase negative* No Growth at 48 hrs   --    GRAM STAIN RESULT  No polys seen*  2+ Gram negative rods* Gram positive cocci in clusters*  --   --    URINE CULTURE   --   --   --  >100,000 cfu/ml Proteus mirabilis*   WOUND CULTURE  3+ Growth of Pseudomonas aeruginosa*  2+ Growth of Providencia stuartii*  2+ Growth of Diphtheroids  --   --   --        Allergies:    Allergies   Allergen Reactions    Iv Contrast [Iodinated Diagnostic Agents]      Tingling face, itching    Cefepime Rash    Ciprofloxacin Rash and Swelling     Looked like suburn, itching  Bed sores  Swelling, itching    Latex Rash    Vancomycin Rash     Unknown        Medications:   Scheduled Meds:    Current Facility-Administered Medications:  acetaminophen 650 mg Oral Q6H PRN Christian Alegria MD    heparin (porcine) 5,000 Units Subcutaneous [facility] Christian Alegria MD    HYDROmorphone 0 5 mg Intravenous Q3H PRN Christian Alegria MD    multi-electrolyte 50 mL/hr Intravenous Continuous Christian Alegria MD Last Rate: 50 mL/hr (11/12/19 1255)   ondansetron 4 mg Intravenous Q6H PRN Christian Alegria MD    oxyCODONE 10 mg Oral Q4H PRN Christian Alegria MD    oxyCODONE 5 mg Oral Q4H PRN Christian Alegria MD    pantoprazole 40 mg Oral Early Morning Christian Alegria MD    phenylephine  mcg/min Intravenous Titrated Christian Alegria MD Last Rate: 45 mcg/min (11/13/19 0615)   piperacillin-tazobactam 4 5 g Intravenous Q6H Lorraine Hamilton DO Last Rate: Stopped (11/13/19 0355)   saccharomyces boulardii 250 mg Oral BID Christian Alegria MD    saliva substitute 5 spray Mouth/Throat PRN Enid Corbin PA-C    vancomycin 125 mg Oral Q12H [facility] Christian Alegria MD      Facility-Administered Medications Ordered in Other Encounters:  dexamethasone 4 mg Intravenous Once PRN Angelic Rafter, DO    diphenhydrAMINE 12 5 mg Intravenous Once Angelic Rafter, DO    fentaNYL 12 5 mcg Intravenous Q10 Min PRN Angelic Rafter, DO    fentaNYL 25 mcg Intravenous Q5 Min PRN Angelic Rafter, DO    HYDROmorphone 0 2 mg Intravenous Q10 Min PRN Martin Brennan MD    lactated ringers 75 mL/hr Intravenous Continuous Angelic Karolyner, DO Last Rate: 75 mL/hr (11/10/19 2105)   lactated ringers 50 mL/hr Intravenous Continuous Mayuri Lane CRNA    lactated ringers 75 mL/hr Intravenous Continuous Layla Villatoro DO Last Rate: Stopped (03/20/19 1841)   lactated ringers 75 mL/hr Intravenous Continuous Arelis Shelley MD Last Rate: Stopped (03/20/19 1842)   ondansetron 4 mg Intravenous Once PRN David Rm MD    promethazine 12 5 mg Intravenous Once PRN David Rm MD      Continuous Infusions:    multi-electrolyte 50 mL/hr Last Rate: 50 mL/hr (11/12/19 1255)   phenylephine  mcg/min Last Rate: 45 mcg/min (11/13/19 0615)     PRN Meds:    acetaminophen 650 mg Q6H PRN   HYDROmorphone 0 5 mg Q3H PRN   ondansetron 4 mg Q6H PRN   oxyCODONE 10 mg Q4H PRN   oxyCODONE 5 mg Q4H PRN   saliva substitute 5 spray PRN       Invasive lines and devices: Invasive Devices     Peripheral Intravenous Line            Peripheral IV 11/10/19 Right Antecubital 2 days    Peripheral IV 11/12/19 Right Forearm less than 1 day          Drain            Urethral Catheter Latex 16 Fr  2 days                Code Status: Level 1 - Full Code    Counseling / Coordination of Care  Total Critical Care time spent 30 minutes excluding procedures, teaching and family updates            SIGNATURE: Deya Toro MD  DATE: November 13, 2019  TIME: 6:39 AM

## 2019-11-13 NOTE — PROGRESS NOTES
Progress note - Palliative and Supportive Care   Karla Lundy 71 y o  female [id number]    Assessment:  Patient Active Problem List   Diagnosis    Paraplegia following spinal cord injury ([facility] [address] )    Pressure ulcer, sacrum    Iron deficiency anemia    Osteopenia    Underweight    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    Rash due to allergy    Closed fracture of distal end of left femur with routine healing    Closed fracture of lower end of left femur with routine healing    Chronic osteomyelitis of coccyx ([facility] [address] )    Constipation    Anemia of chronic disease    Hyponatremia    GERD (gastroesophageal reflux disease)    Moderate protein-calorie malnutrition (HCC)    Hyperglycemia    BMI less than 19,adult    Hypermagnesemia    Complicated wound infection    Sepsis due to anaerobes ([facility])    C  difficile diarrhea    Pressure injury of sacral region, unstageable ([address] )    Acute blood loss anemia    Sepsis ([address] )     Plan:  1  Symptom management -    - acetaminophen 650mg Q6H PRN mild pain, headache, or fever   - oxycodone 5-10mg PO Q4H PRN moderate-severe pain   - dilaudid 0 5mg IV Q3H PRN breakthrough pain   - mouth kote PRN xerostomia    2  Goals - level 1 full code   - Continue treatment directed care without limits at this time  - Patient refused central line last evening, running through peripheral line at this time without complications but may need to readdress pending clinical course  - Christian Vanessa is agreeable to ongoing washouts as indicated, but understands that her nutritional status, lack of mobility, and history of previous revisions limits maximal wound healing     - Discussed recommendation for patient to complete 5 wishes  She remains hesitant to provide emergency contact for alternate decision maker but is agreeable to consider 5 wishes document  Code Status: full - Level 1   Decisional apparatus:  Patient is competent on my exam today    If competence is lost, patient's substitute decision maker would default to sister [place]  Patient's younger sister is only family member identified, but they have been estranged for several years  She has several neighbors which she identifies as supports/friends, but is not willing to offer any of their names as emergency contacts or consider them as alternate decision makers   Advance Directive / Living Will / POLST:  None yet completed    Interval history:       No events overnight  Patient remains on eladio-synephrine for hypotension  She reports that her day is going well  Reports 100% completion of breakfast and lunch, but is apprehensive of nutritional supplements secondary to poor taste and inability to "have the same thing over and over"  She is not receptive to alternatives such as fortified icecream or pudding  Continues to have poor sleep but is resistant to even melatonin HS  Patient reports her dry mouth is improving and denies any other complaints      MEDICATIONS / ALLERGIES:     all current active meds have been reviewed and current meds:   Current Facility-Administered Medications   Medication Dose Route Frequency    acetaminophen (TYLENOL) tablet 650 mg  650 mg Oral Q6H PRN    albumin human (FLEXBUMIN) 5 % injection 25 g  25 g Intravenous Once    heparin (porcine) subcutaneous injection 5,000 Units  5,000 Units Subcutaneous Q8H [address]    HYDROmorphone (DILAUDID) injection 0 5 mg  0 5 mg Intravenous Q3H PRN    midodrine (PROAMATINE) tablet 10 mg  10 mg Oral TID AC    [START ON 11/14/2019] multi-electrolyte (PLASMALYTE-A/ISOLYTE-S PH 7 4) IV solution  75 mL/hr Intravenous Continuous    ondansetron (ZOFRAN) injection 4 mg  4 mg Intravenous Q6H PRN    oxyCODONE (ROXICODONE) IR tablet 10 mg  10 mg Oral Q4H PRN    oxyCODONE (ROXICODONE) IR tablet 5 mg  5 mg Oral Q4H PRN    pantoprazole (PROTONIX) EC tablet 40 mg  40 mg Oral Early Morning    phenylephrine (ELADIO-SYNEPHRINE) 50 mg (STANDARD CONCENTRATION) in sodium chloride 0 9% 250 mL   mcg/min Intravenous Titrated    piperacillin-tazobactam (ZOSYN) 4 5 g in sodium chloride 0 9 % 100 mL IVPB  4 5 g Intravenous Q6H    saccharomyces boulardii (FLORASTOR) capsule 250 mg  250 mg Oral BID    saliva substitute (MOUTH KOTE) mucosal solution 5 spray  5 spray Mouth/Throat PRN    vancomycin (VANCOCIN) oral solution 125 mg  125 mg Oral Q12H [facility]     Facility-Administered Medications Ordered in Other Encounters   Medication Dose Route Frequency    dexamethasone (DECADRON) injection 4 mg  4 mg Intravenous Once PRN    diphenhydrAMINE (BENADRYL) injection 12 5 mg  12 5 mg Intravenous Once    fentaNYL (SUBLIMAZE) injection 12 5 mcg  12 5 mcg Intravenous Q10 Min PRN    fentaNYL (SUBLIMAZE) injection 25 mcg  25 mcg Intravenous Q5 Min PRN    HYDROmorphone (DILAUDID) injection 0 2 mg  0 2 mg Intravenous Q10 Min PRN    lactated ringers infusion  75 mL/hr Intravenous Continuous    lactated ringers infusion  50 mL/hr Intravenous Continuous    lactated ringers infusion  75 mL/hr Intravenous Continuous    lactated ringers infusion  75 mL/hr Intravenous Continuous    ondansetron (ZOFRAN) injection 4 mg  4 mg Intravenous Once PRN    promethazine (PHENERGAN) injection 12 5 mg  12 5 mg Intravenous Once PRN       Allergies   Allergen Reactions    Iv Contrast [Iodinated Diagnostic Agents]      Tingling face, itching    Cefepime Rash    Ciprofloxacin Rash and Swelling     Looked like suburn, itching  Bed sores  Swelling, itching    Latex Rash    Vancomycin Rash     Unknown        OBJECTIVE:    Physical Exam  Physical Exam   Constitutional: She is oriented to person, place, and time  Appears frail, cachetic, in no distress   HENT:   Head: Normocephalic and atraumatic  Eyes: Conjunctivae are normal    Cardiovascular: Normal rate  hypotensive   Pulmonary/Chest: Effort normal    Abdominal: She exhibits no distension  There is no guarding     Neurological: She is alert and oriented to person, place, and time  Psychiatric: She has a normal mood and affect  Her behavior is normal        Lab Results:   I have personally reviewed pertinent labs  , CBC:   Lab Results   Component Value Date    WBC 13 22 (H) 11/13/2019    HGB 10 8 (L) 11/13/2019    HCT 33 6 (L) 11/13/2019    MCV 79 (L) 11/13/2019     (H) 11/13/2019    MCH 25 5 (L) 11/13/2019    MCHC 32 1 11/13/2019    RDW 18 7 (H) 11/13/2019    MPV 9 5 11/13/2019    NRBC 0 11/13/2019   , CMP:   Lab Results   Component Value Date    SODIUM 139 11/13/2019    K 3 4 (L) 11/13/2019     11/13/2019    CO2 22 11/13/2019    BUN 6 11/13/2019    CREATININE 0 34 (L) 11/13/2019    CALCIUM 7 0 (L) 11/13/2019    EGFR 112 11/13/2019     Imaging Studies: reviewed pertinent studies  EKG, Pathology, and Other Studies: reviewed telemetry at bedside    Counseling / Coordination of Care  Total floor / unit time spent today 25+ minutes  Greater than 50% of total time was spent with the patient and / or family counseling and / or coordination of care   A description of the counseling / coordination of care: time spent with patient and communicating with primary team

## 2019-11-13 NOTE — ASSESSMENT & PLAN NOTE
ABLA and hemodilution causing Hb 5 5 post op       - S/P 11/10 2 u pRBC - Hb 10 5   - Continue to monitor Hb   - 11/13 Hgb 10 8

## 2019-11-13 NOTE — PROGRESS NOTES
Progress Note - Infectious Disease   Smiley Pryor 71 y o  female MRN: [id number]  Unit/Bed#: MICU 09 Encounter: [id number]    Assessment:  Septic shock suspected secondary to L ischial ulceration and osteomyelitis  Pt has wounds to L inferior ischium (ischium exposed), two sacral wounds, and right femoral trochanter wound (greater trochanter exposed)  Meeting sepsis criteria with tachypnea, elevated WBC in the setting of skin infection, requiring tadeo-synephrine for hypotension  Pt received Zosyn and Linezolid on admission  · Currently on Zosyn and on oral vanc for C Diff prophylaxis  Will discuss with attending  · Would not recommend prolonged antibiotic course without adequate wound coverage and wound offloading  · Tissue cultures from OR on 11/10/19 with pan sensitive pseudomonas, as well as providencia stuartii  · Blood cultures were drawn 11/10  One blood culture with coagulase negative staph in culture, other culture NG at 48h  Likely contaminant, will not require further workup  · Urine culture collected 11/10 growing >100k Proteus mirabilis  Pt has chronic indwelling isabel catheter  Will discuss with attending     Hx of wound dehiscence of R hip flap  Earlier this year, pt received 6 weeks IV Ertapenem in 3/2019 secondary to wound dehiscence after R hip flap surgery in 1/2019 with re-closure of wound in 3/2019 with wound cultures at that time growing Providencia rettgeri and E coli      Hx of L femur ORIF infection  OR cultures at that time were positive for corynebacterium from wound and bone, completed 6 week course of IV daptomycin on 2/13/2019      Hx of recent C Diff infection  Recent C diff infection in 4/2019  Will continue C diff prophylaxis with Vancomycin 125 q12h  Pt reports prior reaction of red man syndrome after vancomycin, would not be a concern with oral vancomycin  Subjective/Objective   Subjective: Pt reports feeling better today compared to yesterday   Pt reports improvement In strength  Pt still reports fear regarding her medical condition  Objective: Physical Exam   Constitutional: She is oriented to person, place, and time  She appears well-developed and well-nourished  HENT:   Head: Normocephalic and atraumatic  Nose: Nose normal    Mouth/Throat: Oropharynx is clear and moist    Eyes: Right eye exhibits no discharge  Left eye exhibits no discharge  Cardiovascular: Normal rate, regular rhythm and normal heart sounds  Exam reveals no gallop and no friction rub  No murmur heard  Pulmonary/Chest: Effort normal and breath sounds normal  No respiratory distress  She has no wheezes  She has no rales  Abdominal: Soft  Bowel sounds are normal  She exhibits distension (mild)  There is no tenderness  There is no guarding  Musculoskeletal: She exhibits edema and deformity  Neurological: She is alert and oriented to person, place, and time  Skin: Skin is warm and dry  Psychiatric: Her speech is normal        Temp:  [96 4 °F (35 8 °C)-99 °F (37 2 °C)] 97 8 °F (36 6 °C)  HR:  [] 102  Resp:  [20-49] 26  BP: ()/(43-77) 99/70  SpO2:  [90 %-99 %] 91 %  Temp (24hrs), Av 8 °F (36 6 °C), Min:96 4 °F (35 8 °C), Max:99 °F (37 2 °C)  Current: Temperature: 97 8 °F (36 6 °C)      Invasive Devices     Peripheral Intravenous Line            Peripheral IV 11/10/19 Right Antecubital 2 days    Peripheral IV 19 Right Forearm less than 1 day          Drain            Urethral Catheter Latex 16 Fr  2 days                Lab, Imaging and other studies: I have personally reviewed pertinent reports         Gino Cortés DO  Internal Medicine PGY-2  2019 11:49 AM

## 2019-11-13 NOTE — PLAN OF CARE
Problem: Prexisting or High Potential for Compromised Skin Integrity  Goal: Skin integrity is maintained or improved  Description  INTERVENTIONS:  - Identify patients at risk for skin breakdown  - Assess and monitor skin integrity  - Assess and monitor nutrition and hydration status  - Monitor labs   - Assess for incontinence   - Turn and reposition patient  - Assist with mobility/ambulation  - Relieve pressure over bony prominences  - Avoid friction and shearing  - Provide appropriate hygiene as needed including keeping skin clean and dry  - Evaluate need for skin moisturizer/barrier cream  - Collaborate with interdisciplinary team   - Patient/family teaching  - Consider wound care consult   Outcome: Progressing     Problem: Potential for Falls  Goal: Patient will remain free of falls  Description  INTERVENTIONS:  - Assess patient frequently for physical needs  -  Identify cognitive and physical deficits and behaviors that affect risk of falls    -  Alviso fall precautions as indicated by assessment   - Educate patient/family on patient safety including physical limitations  - Instruct patient to call for assistance with activity based on assessment  - Modify environment to reduce risk of injury  - Consider OT/PT consult to assist with strengthening/mobility  Outcome: Progressing     Problem: PAIN - ADULT  Goal: Verbalizes/displays adequate comfort level or baseline comfort level  Description  Interventions:  - Encourage patient to monitor pain and request assistance  - Assess pain using appropriate pain scale  - Administer analgesics based on type and severity of pain and evaluate response  - Implement non-pharmacological measures as appropriate and evaluate response  - Consider cultural and social influences on pain and pain management  - Notify physician/advanced practitioner if interventions unsuccessful or patient reports new pain  Outcome: Progressing     Problem: INFECTION - ADULT  Goal: Absence or prevention of progression during hospitalization  Description  INTERVENTIONS:  - Assess and monitor for signs and symptoms of infection  - Monitor lab/diagnostic results  - Monitor all insertion sites, i e  indwelling lines, tubes, and drains  - Monitor endotracheal if appropriate and nasal secretions for changes in amount and color  - [place] appropriate cooling/warming therapies per order  - Administer medications as ordered  - Instruct and encourage patient and family to use good hand hygiene technique  - Identify and instruct in appropriate isolation precautions for identified infection/condition  Outcome: Progressing  Goal: Absence of fever/infection during neutropenic period  Description  INTERVENTIONS:  - Monitor WBC    Outcome: Progressing     Problem: DISCHARGE PLANNING  Goal: Discharge to home or other facility with appropriate resources  Description  INTERVENTIONS:  - Identify barriers to discharge w/patient and caregiver  - Arrange for needed discharge resources and transportation as appropriate  - Identify discharge learning needs (meds, wound care, etc )  - Arrange for interpretive services to assist at discharge as needed  - Refer to Case Management Department for coordinating discharge planning if the patient needs post-hospital services based on physician/advanced practitioner order or complex needs related to functional status, cognitive ability, or social support system  Outcome: Progressing     Problem: Knowledge Deficit  Goal: Patient/family/caregiver demonstrates understanding of disease process, treatment plan, medications, and discharge instructions  Description  Complete learning assessment and assess knowledge base    Interventions:  - Provide teaching at level of understanding  - Provide teaching via preferred learning methods  Outcome: Progressing     Problem: Nutrition/Hydration-ADULT  Goal: Nutrient/Hydration intake appropriate for improving, restoring or maintaining nutritional needs  Description  Monitor and assess patient's nutrition/hydration status for malnutrition  Collaborate with interdisciplinary team and initiate plan and interventions as ordered  Monitor patient's weight and dietary intake as ordered or per policy  Utilize nutrition screening tool and intervene as necessary  Determine patient's food preferences and provide high-protein, high-caloric foods as appropriate       INTERVENTIONS:  - Monitor oral intake, urinary output, labs, and treatment plans  - Assess nutrition and hydration status and recommend course of action  - Evaluate amount of meals eaten  - Assist patient with eating if necessary   - Allow adequate time for meals  - Recommend/ encourage appropriate diets, oral nutritional supplements, and vitamin/mineral supplements  - Order, calculate, and assess calorie counts as needed  - Recommend, monitor, and adjust tube feedings and TPN/PPN based on assessed needs  - Assess need for intravenous fluids  - Provide specific nutrition/hydration education as appropriate  - Include patient/family/caregiver in decisions related to nutrition  Outcome: Progressing     Problem: SKIN/TISSUE INTEGRITY - ADULT  Goal: Incision(s), wounds(s) or drain site(s) healing without S/S of infection  Description  INTERVENTIONS  - Assess and document risk factors for skin impairment   - Assess and document dressing, incision, wound bed, drain sites and surrounding tissue  - Consider nutrition services referral as needed  - Oral mucous membranes remain intact  - Provide patient/ family education  Outcome: Progressing

## 2019-11-14 LAB
ABO GROUP BLD: NORMAL
ANION GAP SERPL CALCULATED.3IONS-SCNC: 8 MMOL/L (ref 4–13)
BACTERIA UR CULT: ABNORMAL
BASOPHILS # BLD AUTO: 0.06 THOUSANDS/ΜL (ref 0–0.1)
BASOPHILS NFR BLD AUTO: 1 % (ref 0–1)
BLD GP AB SCN SERPL QL: NEGATIVE
BUN SERPL-MCNC: 8 MG/DL (ref 5–25)
CA-I BLD-SCNC: 1.01 MMOL/L (ref 1.12–1.32)
CALCIUM SERPL-MCNC: 7.5 MG/DL (ref 8.3–10.1)
CHLORIDE SERPL-SCNC: 109 MMOL/L (ref 100–108)
CO2 SERPL-SCNC: 22 MMOL/L (ref 21–32)
CREAT SERPL-MCNC: 0.39 MG/DL (ref 0.6–1.3)
EOSINOPHIL # BLD AUTO: 0.15 THOUSAND/ΜL (ref 0–0.61)
EOSINOPHIL NFR BLD AUTO: 1 % (ref 0–6)
ERYTHROCYTE [DISTWIDTH] IN BLOOD BY AUTOMATED COUNT: 19.9 % (ref 11.6–15.1)
GFR SERPL CREATININE-BSD FRML MDRD: 108 ML/MIN/1.73SQ M
GLUCOSE SERPL-MCNC: 113 MG/DL (ref 65–140)
HCT VFR BLD AUTO: 31.4 % (ref 34.8–46.1)
HGB BLD-MCNC: 9.8 G/DL (ref 11.5–15.4)
IMM GRANULOCYTES # BLD AUTO: 0.17 THOUSAND/UL (ref 0–0.2)
IMM GRANULOCYTES NFR BLD AUTO: 1 % (ref 0–2)
LYMPHOCYTES # BLD AUTO: 2.51 THOUSANDS/ΜL (ref 0.6–4.47)
LYMPHOCYTES NFR BLD AUTO: 21 % (ref 14–44)
MCH RBC QN AUTO: 25.5 PG (ref 26.8–34.3)
MCHC RBC AUTO-ENTMCNC: 31.2 G/DL (ref 31.4–37.4)
MCV RBC AUTO: 82 FL (ref 82–98)
MONOCYTES # BLD AUTO: 0.81 THOUSAND/ΜL (ref 0.17–1.22)
MONOCYTES NFR BLD AUTO: 7 % (ref 4–12)
NEUTROPHILS # BLD AUTO: 8.27 THOUSANDS/ΜL (ref 1.85–7.62)
NEUTS SEG NFR BLD AUTO: 69 % (ref 43–75)
NRBC BLD AUTO-RTO: 0 /100 WBCS
PLATELET # BLD AUTO: 376 THOUSANDS/UL (ref 149–390)
PMV BLD AUTO: 9.5 FL (ref 8.9–12.7)
POTASSIUM SERPL-SCNC: 4.2 MMOL/L (ref 3.5–5.3)
RBC # BLD AUTO: 3.84 MILLION/UL (ref 3.81–5.12)
RH BLD: POSITIVE
SODIUM SERPL-SCNC: 139 MMOL/L (ref 136–145)
SPECIMEN EXPIRATION DATE: NORMAL
WBC # BLD AUTO: 11.97 THOUSAND/UL (ref 4.31–10.16)

## 2019-11-14 PROCEDURE — 85025 COMPLETE CBC W/AUTO DIFF WBC: CPT | Performed by: SURGERY

## 2019-11-14 PROCEDURE — 99231 SBSQ HOSP IP/OBS SF/LOW 25: CPT | Performed by: PHYSICIAN ASSISTANT

## 2019-11-14 PROCEDURE — 86901 BLOOD TYPING SEROLOGIC RH(D): CPT | Performed by: PHYSICIAN ASSISTANT

## 2019-11-14 PROCEDURE — 80048 BASIC METABOLIC PNL TOTAL CA: CPT | Performed by: SURGERY

## 2019-11-14 PROCEDURE — C1751 CATH, INF, PER/CENT/MIDLINE: HCPCS

## 2019-11-14 PROCEDURE — 36569 INSJ PICC 5 YR+ W/O IMAGING: CPT

## 2019-11-14 PROCEDURE — NC001 PR NO CHARGE: Performed by: SURGERY

## 2019-11-14 PROCEDURE — 82330 ASSAY OF CALCIUM: CPT | Performed by: EMERGENCY MEDICINE

## 2019-11-14 PROCEDURE — 99232 SBSQ HOSP IP/OBS MODERATE 35: CPT | Performed by: SURGERY

## 2019-11-14 PROCEDURE — 99233 SBSQ HOSP IP/OBS HIGH 50: CPT | Performed by: INTERNAL MEDICINE

## 2019-11-14 PROCEDURE — 86900 BLOOD TYPING SEROLOGIC ABO: CPT | Performed by: PHYSICIAN ASSISTANT

## 2019-11-14 PROCEDURE — 2W15X6Z COMPRESSION OF BACK USING PRESSURE DRESSING: ICD-10-PCS | Performed by: SURGERY

## 2019-11-14 PROCEDURE — 86850 RBC ANTIBODY SCREEN: CPT | Performed by: PHYSICIAN ASSISTANT

## 2019-11-14 PROCEDURE — 87081 CULTURE SCREEN ONLY: CPT | Performed by: PHYSICIAN ASSISTANT

## 2019-11-14 RX ORDER — SENNOSIDES 8.6 MG
1 TABLET ORAL
Status: DISCONTINUED | OUTPATIENT
Start: 2019-11-14 | End: 2019-11-20 | Stop reason: HOSPADM

## 2019-11-14 RX ORDER — BISACODYL 10 MG
10 SUPPOSITORY, RECTAL RECTAL DAILY
Status: DISCONTINUED | OUTPATIENT
Start: 2019-11-14 | End: 2019-11-20 | Stop reason: HOSPADM

## 2019-11-14 RX ADMIN — Medication 125 MG: at 08:12

## 2019-11-14 RX ADMIN — MIDODRINE HYDROCHLORIDE 10 MG: 5 TABLET ORAL at 11:24

## 2019-11-14 RX ADMIN — SENNOSIDES 8.6 MG: 8.6 TABLET, FILM COATED ORAL at 21:45

## 2019-11-14 RX ADMIN — SODIUM CHLORIDE, SODIUM GLUCONATE, SODIUM ACETATE, POTASSIUM CHLORIDE AND MAGNESIUM CHLORIDE 75 ML/HR: 526; 502; 368; 37; 30 INJECTION, SOLUTION INTRAVENOUS at 12:15

## 2019-11-14 RX ADMIN — HEPARIN SODIUM 5000 UNITS: 5000 INJECTION INTRAVENOUS; SUBCUTANEOUS at 21:46

## 2019-11-14 RX ADMIN — SODIUM CHLORIDE, SODIUM GLUCONATE, SODIUM ACETATE, POTASSIUM CHLORIDE AND MAGNESIUM CHLORIDE 75 ML/HR: 526; 502; 368; 37; 30 INJECTION, SOLUTION INTRAVENOUS at 00:24

## 2019-11-14 RX ADMIN — Medication 125 MG: at 21:44

## 2019-11-14 RX ADMIN — PHENYLEPHRINE HYDROCHLORIDE 55 MCG/MIN: 10 INJECTION INTRAVENOUS at 07:29

## 2019-11-14 RX ADMIN — PIPERACILLIN AND TAZOBACTAM 4.5 G: 4; .5 INJECTION, POWDER, LYOPHILIZED, FOR SOLUTION INTRAVENOUS at 21:41

## 2019-11-14 RX ADMIN — MIDODRINE HYDROCHLORIDE 10 MG: 5 TABLET ORAL at 15:57

## 2019-11-14 RX ADMIN — Medication 250 MG: at 18:10

## 2019-11-14 RX ADMIN — HEPARIN SODIUM 5000 UNITS: 5000 INJECTION INTRAVENOUS; SUBCUTANEOUS at 14:34

## 2019-11-14 RX ADMIN — PHENYLEPHRINE HYDROCHLORIDE 55 MCG/MIN: 10 INJECTION INTRAVENOUS at 10:31

## 2019-11-14 RX ADMIN — Medication 250 MG: at 08:12

## 2019-11-14 RX ADMIN — MIDODRINE HYDROCHLORIDE 10 MG: 5 TABLET ORAL at 07:14

## 2019-11-14 RX ADMIN — PIPERACILLIN AND TAZOBACTAM 4.5 G: 4; .5 INJECTION, POWDER, LYOPHILIZED, FOR SOLUTION INTRAVENOUS at 08:12

## 2019-11-14 RX ADMIN — PIPERACILLIN AND TAZOBACTAM 4.5 G: 4; .5 INJECTION, POWDER, LYOPHILIZED, FOR SOLUTION INTRAVENOUS at 03:55

## 2019-11-14 RX ADMIN — PIPERACILLIN AND TAZOBACTAM 4.5 G: 4; .5 INJECTION, POWDER, LYOPHILIZED, FOR SOLUTION INTRAVENOUS at 14:36

## 2019-11-14 RX ADMIN — CALCIUM GLUCONATE 2 G: 98 INJECTION, SOLUTION INTRAVENOUS at 15:56

## 2019-11-14 NOTE — PROGRESS NOTES
11/14/19 1300   Clinical Encounter Type   Visited With Patient   [facility]eterian Encounters   [facility]eterian Needs Prayer  ( blessing)   Sacramental Encounters   Sacrament of Sick-Anointing Anointed

## 2019-11-14 NOTE — PROGRESS NOTES
Stefany damon/ Georgi Forte, RN  Per surgical lorrie Childs to place piccline  Blood culture positive due to contaminated  Not repeating blood cultures

## 2019-11-14 NOTE — QUICK NOTE
[facility] Placement Note  Kasey Inch 71 y o  female MRN: [id number]  Unit/Bed#: MICU 09 Encounter: [id number]     Date: 11/14/19      Time: 3:10 PM      Location of wounds: Sacrum (bilateral)     Description of wounds: No foul smell or purulent drainage appreciated  See below for pictures of wounds  Sponges removed:  N/A     Dimensions of wounds: (see below)    Left inferior sacral wound: 11 cm x 7 cm x 2 cm      Left superior sacral wound: 7 cm x 5 cm x 1 cm      Right sacral wound: 5 cm x 2 cm x 0 5 cm         Sponges placed:  6 Black Sponge(s)     VAC settings:  125 mmHg  Continuous     The patient tolerated the procedure well  A VAC sticker was placed to wound dressing, per protocol  *Note: One (1) roll of saline-moistened Kerlix was additionally packed into the patient's right hip wound, which tracks from posterior to anterior  Two (2) ABD pads were placed over the right hip wound          William Cunha MD  PGY1, General Surgery

## 2019-11-14 NOTE — ASSESSMENT & PLAN NOTE
S/P excisional debridement 11/10, 11, and 12  Sepsis 2/2 to above     - Regular diet   - bedside VAC change today   - wound cx: pseudomonas, providencia   - palliative care following - ongoing [address] discussion   - ortho signed off: not a candidate for osteo resection   - ID for abx: vanc/zosyn   - Continue midrodrine   - PRN pain control   - if BP maintained without pressors consider transfer out of unit

## 2019-11-14 NOTE — PROGRESS NOTES
Progress Note - Maxim Li 1950, 71 y o  female MRN: [id number]    [facility] [id number]    Primary Care Provider: Lucy Murcia   Date and time admitted to hospital: 11/10/2019  4:06 PM        * Pressure injury of sacral region, unstageable [facility])  Assessment & Plan  S/P excisional debridement 11/10, 11, and 12  Sepsis 2/2 to above     - OR 11/14 for repeat washout/debridement   - Regular diet   - IVF    - F/U wound cultures- GNR   - palliative care following - ongoing [address] discussion   - ortho signed off: not a candidate for osteo resection   - blood cx: 1/2 GPC in clusters   - ID for abx: daptomycin/ertapenem/vancomycin   - Continue Jack as needed for BP support, wean as able   - PRN pain control   - primary per ICU              Subjective/Objective   Subjective: No acute events    Objective:   Blood pressure 105/69, pulse 82, temperature 97 8 °F (36 6 °C), temperature source Oral, resp  rate 20, height 5' (1 524 m), weight 46 6 kg (102 lb 11 7 oz), SpO2 99 %, not currently breastfeeding  ,Body mass index is 20 06 kg/m²  Intake/Output Summary (Last 24 hours) at 11/14/2019 0347  Last data filed at 11/14/2019 0200  Gross per 24 hour   Intake 2057 48 ml   Output 1075 ml   Net 982 48 ml       Invasive Devices     Peripheral Intravenous Line            Peripheral IV 11/10/19 Right Antecubital 3 days    Peripheral IV 11/12/19 Right Forearm 1 day          Drain            Urethral Catheter Latex 16 Fr  3 days                Physical Exam:  GEN: NAD  HEENT: MMM  CV: RRR  Lung: Normal effort  Ab: Soft, NT/ND  Extrem: Multiple wounds packed bedside yesterday  Dressings CDI  Neuro:  A+Ox3    Lab, Imaging and other studies:  CBC:   Lab Results   Component Value Date    WBC 13 22 (H) 11/13/2019    HGB 10 8 (L) 11/13/2019    HCT 33 6 (L) 11/13/2019    MCV 79 (L) 11/13/2019     (H) 11/13/2019    MCH 25 5 (L) 11/13/2019    MCHC 32 1 11/13/2019    RDW 18 7 (H) 11/13/2019    MPV 9 5 11/13/2019    NRBC 0 11/13/2019   , CMP:   Lab Results   Component Value Date    SODIUM 139 11/13/2019    K 3 4 (L) 11/13/2019     11/13/2019    CO2 22 11/13/2019    BUN 6 11/13/2019    CREATININE 0 34 (L) 11/13/2019    CALCIUM 7 0 (L) 11/13/2019    EGFR 112 11/13/2019     VTE Pharmacologic Prophylaxis: Heparin  VTE Mechanical Prophylaxis: sequential compression device

## 2019-11-14 NOTE — OR NURSING
Patient surgery postponed by anes after applesauce (teaspoonful) given with meds   transferred back to MICU

## 2019-11-14 NOTE — PROGRESS NOTES
Progress Note - Infectious Disease   Monique Villagomez 71 y o  female MRN: [id number]  Unit/Bed#: MICU 09 Encounter: [id number]    Assessment:  Septic shock suspected secondary to L ischial ulceration and osteomyelitis  Pt has wounds to L inferior ischium (ischium exposed), two sacral wounds, and right femoral trochanter wound (greater trochanter exposed)  Meeting sepsis criteria with tachypnea, elevated WBC in the setting of skin infection, requiring tadeo-synephrine for hypotension  Pt received Zosyn and Linezolid on admission  · Currently on Zosyn given wound culture findings of pseudomonas and providencia and on oral vanc for C Diff prophylaxis, will continue these antibiotics  · Would not recommend prolonged antibiotic course without adequate wound coverage and wound offloading  · Tissue cultures from OR on 11/10/19 with pan sensitive pseudomonas, as well as providencia stuartii  · Blood cultures were drawn 11/10  One blood culture with coagulase negative staph in culture, other culture NG at 48h  Likely contaminant, will not require further workup  · Urine culture collected 11/10 growing >100k Proteus mirabilis and algaligenes faecalis  Pt has chronic indwelling isabel catheter     Will discuss with attending     Hx of wound dehiscence of R hip flap  Earlier this year, pt received 6 weeks IV Ertapenem in 3/2019 secondary to wound dehiscence after R hip flap surgery in 1/2019 with re-closure of wound in 3/2019 with wound cultures at that time growing Providencia rettgeri and E coli      Hx of L femur ORIF infection  OR cultures at that time were positive for corynebacterium from wound and bone, completed 6 week course of IV daptomycin on 2/13/2019      Hx of recent C Diff infection  Recent C diff infection in 4/2019  Will continue C diff prophylaxis with Vancomycin 125 q12h  Pt reports prior reaction of red man syndrome after vancomycin, would not be a concern with oral vancomycin        Subjective/Objective Chief Complaint: feeling better    Subjective: Pt seen and evaluated today, reports overall feeling better  Reports improvement in wound pain  Denies any acute symptoms but does report episode of SOB yesterday evening that occurred while she was talking  Denies SOB today, denies nausea, vomiting  Continues to report small bowel movement  Denies fevers, chills  Pt received PICC line this AM      Per chart review, plan  Is for additional debridement today in OR  Objective: Physical Exam   Constitutional: She is oriented to person, place, and time  She appears well-developed and well-nourished  HENT:   Head: Normocephalic and atraumatic  Mouth/Throat: Oropharynx is clear and moist    Eyes: Right eye exhibits no discharge  Left eye exhibits no discharge  Neck: Neck supple  Cardiovascular: Normal rate, regular rhythm and normal heart sounds  Exam reveals no gallop and no friction rub  No murmur heard  Pulmonary/Chest: Effort normal and breath sounds normal  No stridor  No respiratory distress  She has no wheezes  She has no rales  She exhibits no tenderness  Abdominal: Soft  She exhibits no distension  There is no tenderness  There is no guarding  Musculoskeletal: She exhibits deformity  She exhibits no edema or tenderness  Dressing intact over hip wounds  Neurological: She is alert and oriented to person, place, and time  Skin: Skin is warm and dry  Psychiatric: She has a normal mood and affect  Her behavior is normal    Vitals reviewed        Temp:  [97 6 °F (36 4 °C)-97 9 °F (36 6 °C)] 97 8 °F (36 6 °C)  HR:  [] 84  Resp:  [20-36] 28  BP: ()/(49-77) 107/69  SpO2:  [90 %-99 %] 96 %  Temp (24hrs), Av 7 °F (36 5 °C), Min:97 6 °F (36 4 °C), Max:97 9 °F (36 6 °C)  Current: Temperature: 97 8 °F (36 6 °C)        Invasive Devices     Peripherally Inserted Central Catheter Line            PICC Line 19 Left Brachial less than 1 day          Peripheral Intravenous Line Peripheral IV 11/12/19 Right Forearm 1 day          Drain            Urethral Catheter Latex 16 Fr  3 days                Lab, Imaging and other studies: I have personally reviewed pertinent reports        Karly Jarquin DO  Internal Medicine PGY-2  11/14/2019 11:37 AM

## 2019-11-14 NOTE — PROGRESS NOTES
Progress note - Palliative and Supportive Care   Mikal Concepcion 71 y o  female [id number]    Assessment:  Patient Active Problem List   Diagnosis    Paraplegia following spinal cord injury ([facility] [address] )    Pressure ulcer, sacrum    Iron deficiency anemia    Osteopenia    Underweight    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    Rash due to allergy    Closed fracture of distal end of left femur with routine healing    Closed fracture of lower end of left femur with routine healing    Chronic osteomyelitis of coccyx ([facility] )    Constipation    Anemia of chronic disease    Hyponatremia    GERD (gastroesophageal reflux disease)    Moderate protein-calorie malnutrition (HCC)    Hyperglycemia    BMI less than 19,adult    Hypermagnesemia    Complicated wound infection    [facility] to anaerobes ([facility])    C  difficile diarrhea    Pressure injury of sacral region, unstageable ([facility] [address] )    Acute blood loss anemia    Sepsis ([facility] )     Plan:  1  Symptom management - well controlled on current regimen   - acetaminophen 650mg Q6H PRN mild pain, headache, or fever   - oxycodone 5-10mg PO Q4H PRN moderate-severe pain   - dilaudid 0 5mg IV Q3H PRN breakthrough pain   - mouth kote PRN xerostomia    2  Goals - level 1 full code   - Continue treatment directed care without limits at this time  - Shon Galindo is agreeable to ongoing washouts/debriedments as indicated, but understands that her nutritional status, lack of mobility, and history of previous revisions limits ideal wound healing     - Discussed recommendation for patient to complete 5 wishes  She remains hesitant to provide emergency contact for alternate decision maker but is agreeable to consider 5 wishes document  Code Status: full - Level 1   Decisional apparatus:  Patient is competent on my exam today  If competence is lost, patient's substitute decision maker would default to sister [place]   Patient's younger sister is only family member identified, but they have been estranged for several years  She has several neighbors which she identifies as supports/friends, but is not willing to offer any of their names as emergency contacts or consider them as alternate decision makers   Advance Directive / Living Will / POLST:  None yet completed    Interval history:       Patient underwent bedside dressing change and vac placement today  She denies any complaints  Currently eating her lunch, tolerating it well  Agreeable to ensure milkshake  Reports having a "busy day" with PICC placement and VAC change  Still has not called her friend, Yao Velazquez, to inform him of her hospitalization but continues to consider this       MEDICATIONS / ALLERGIES:     all current active meds have been reviewed and current meds:   Current Facility-Administered Medications   Medication Dose Route Frequency    acetaminophen (TYLENOL) tablet 650 mg  650 mg Oral Q6H PRN    bisacodyl (DULCOLAX) rectal suppository 10 mg  10 mg Rectal Daily    calcium gluconate 2 g in sodium chloride 0 9 % 100 mL IVPB  2 g Intravenous Once    heparin (porcine) subcutaneous injection 5,000 Units  5,000 Units Subcutaneous Q8H [address]    HYDROmorphone (DILAUDID) injection 0 5 mg  0 5 mg Intravenous Q3H PRN    midodrine (PROAMATINE) tablet 10 mg  10 mg Oral TID AC    multi-electrolyte (PLASMALYTE-A/ISOLYTE-S PH 7 4) IV solution  75 mL/hr Intravenous Continuous    ondansetron (ZOFRAN) injection 4 mg  4 mg Intravenous Q6H PRN    oxyCODONE (ROXICODONE) IR tablet 10 mg  10 mg Oral Q4H PRN    oxyCODONE (ROXICODONE) IR tablet 5 mg  5 mg Oral Q4H PRN    pantoprazole (PROTONIX) EC tablet 40 mg  40 mg Oral Early Morning    phenylephrine (ELADIO-SYNEPHRINE) 50 mg (STANDARD CONCENTRATION) in sodium chloride 0 9% 250 mL   mcg/min Intravenous Titrated    piperacillin-tazobactam (ZOSYN) 4 5 g in sodium chloride 0 9 % 100 mL IVPB  4 5 g Intravenous Q6H    saccharomyces boulardii (FLORASTOR) capsule 250 mg  250 mg Oral BID    saliva substitute (MOUTH KOTE) mucosal solution 5 spray  5 spray Mouth/Throat PRN    senna (SENOKOT) tablet 8 6 mg  1 tablet Oral HS    vancomycin (VANCOCIN) oral solution 125 mg  125 mg Oral Q12H [facility]     Facility-Administered Medications Ordered in Other Encounters   Medication Dose Route Frequency    dexamethasone (DECADRON) injection 4 mg  4 mg Intravenous Once PRN    diphenhydrAMINE (BENADRYL) injection 12 5 mg  12 5 mg Intravenous Once    fentaNYL (SUBLIMAZE) injection 12 5 mcg  12 5 mcg Intravenous Q10 Min PRN    fentaNYL (SUBLIMAZE) injection 25 mcg  25 mcg Intravenous Q5 Min PRN    HYDROmorphone (DILAUDID) injection 0 2 mg  0 2 mg Intravenous Q10 Min PRN    lactated ringers infusion  75 mL/hr Intravenous Continuous    lactated ringers infusion  50 mL/hr Intravenous Continuous    lactated ringers infusion  75 mL/hr Intravenous Continuous    lactated ringers infusion  75 mL/hr Intravenous Continuous    ondansetron (ZOFRAN) injection 4 mg  4 mg Intravenous Once PRN    promethazine (PHENERGAN) injection 12 5 mg  12 5 mg Intravenous Once PRN       Allergies   Allergen Reactions    Iv Contrast [Iodinated Diagnostic Agents]      Tingling face, itching    Cefepime Rash    Ciprofloxacin Rash and Swelling     Looked like suburn, itching  Bed sores  Swelling, itching    Latex Rash    Vancomycin Rash     Unknown        OBJECTIVE:    Physical Exam  Physical Exam   Constitutional: She is oriented to person, place, and time  Frail, in no distress   HENT:   Head: Normocephalic and atraumatic  Eyes: Conjunctivae are normal    Cardiovascular: Normal rate  hypotensive   Pulmonary/Chest: Effort normal    Abdominal: She exhibits no distension  There is no guarding  Neurological: She is alert and oriented to person, place, and time     Skin:   Wound vac in place on sacrum   Psychiatric:   Improved mood, conversational, but still with flat affect       Lab Results:   I have personally reviewed pertinent labs  , CBC:   Lab Results   Component Value Date    WBC 11 97 (H) 11/14/2019    HGB 9 8 (L) 11/14/2019    HCT 31 4 (L) 11/14/2019    MCV 82 11/14/2019     11/14/2019    MCH 25 5 (L) 11/14/2019    MCHC 31 2 (L) 11/14/2019    RDW 19 9 (H) 11/14/2019    MPV 9 5 11/14/2019    NRBC 0 11/14/2019   , CMP:   Lab Results   Component Value Date    SODIUM 139 11/14/2019    K 4 2 11/14/2019     (H) 11/14/2019    CO2 22 11/14/2019    BUN 8 11/14/2019    CREATININE 0 39 (L) 11/14/2019    CALCIUM 7 5 (L) 11/14/2019    EGFR 108 11/14/2019     Imaging Studies: reviewed pertinent studies  EKG, Pathology, and Other Studies: reviewed pertinent studies    Counseling / Coordination of Care  Total floor / unit time spent today 15+ minutes  Greater than 50% of total time was spent with the patient and / or family counseling and / or coordination of care  A description of the counseling / coordination of care: time spent with patient and communicating with RN

## 2019-11-14 NOTE — PROGRESS NOTES
Pastoral Care Progress Note    2019  Patient: Delroy Madsen : 1950  Admission Date & Time: 11/10/2019 1606  MRN: [id number] [facility]: [id number]                     Chaplaincy Interventions Utilized:       Exploration: Explored hope        Relationship Building: Listened empathically    Ritual: Provided prayer            Chaplaincy Outcomes Achieved:  Expressed gratitude          Spiritual Coping Strategies Utilized:   Spiritual practices       19 1000   Clinical Encounter Type   Visited With Patient   Routine Visit Introduction   Continue Visiting Yes   Alevism Encounters   Alevism Needs Prayer   Patient Spiritual Encounters   Spiritual Assessment 3   Suffering Severity 3   Feelings of Loneliness yes   Coping 5   Social Interaction COLEMAN

## 2019-11-14 NOTE — PROCEDURES
Insert PICC line  Date/Time: 11/14/2019 10:12 AM  Performed by: Sangeeta Pascual RN  Authorized by: Ramesh Betancourt MD     Patient location:  Bedside  Other Assisting Provider: Yes (comment) (KATALINAoston PCA)    Consent:     Consent obtained:  Written (obtained by MD)    Consent given by:  Patient    Procedural risks discussed: by MD   Universal protocol:     Procedure explained and questions answered to patient or proxy's satisfaction: yes      Relevant documents present and verified: yes      Test results available and properly labeled: yes      Radiology Images displayed and confirmed  If images not available, report reviewed: yes      Required blood products, implants, devices, and special equipment available: yes      Site/side marked: yes      Immediately prior to procedure, a time out was called: yes      Patient identity confirmed:  Verbally with patient and arm band  Pre-procedure details:     Hand hygiene: Hand hygiene performed prior to insertion      Sterile barrier technique: All elements of maximal sterile technique followed      Skin preparation:  ChloraPrep    Skin preparation agent: Skin preparation agent completely dried prior to procedure    Indications:     PICC line indications: medications requiring central line    Anesthesia (see MAR for exact dosages):      Anesthesia method:  Local infiltration    Local anesthetic:  Lidocaine 1% w/o epi (2mL)  Procedure details:     Location:  Brachial    Vessel type: vein      Laterality:  Left    Site selection rationale:  Multiple sites already running on right    Approach: percutaneous technique used      Patient position:  Flat    Procedural supplies:  Triple lumen    Catheter size:  5 Fr    Landmarks identified: yes      Ultrasound guidance: yes      Sterile ultrasound techniques: Sterile gel and sterile probe covers were used      Number of attempts:  1    Successful placement: yes      Vessel of catheter tip end:  Sherlock 3CG confirmed    Total catheter length (cm):  37    Catheter out on skin (cm):  0    Max flow rate:  999    Arm circumference:  23  Post-procedure details:     Post-procedure:  Dressing applied and securement device placed    Assessment:  Blood return through all ports and free fluid flow    Post-procedure complications: none      Patient tolerance of procedure: Tolerated well, no immediate complications  Comments:      Pt ecchymotic on left forearm from previous IVs noted prior to piccline placement

## 2019-11-14 NOTE — PROGRESS NOTES
Progress Note - Critical Care   Andi Fontanez 71 y o  female MRN: [id number]  Unit/Bed#: [facility] 09 Encounter: [id number]    Impression:  Principal Problem:    Pressure injury of sacral region, unstageable [facility])  Active Problems:    Paraplegia following spinal cord injury ([facility] [address] )    C  difficile diarrhea    Acute blood loss anemia    Sepsis ([facility] )      Plan:    Neuro:   --Hx of paraplegia following spinal cord injury in 1981  --Neuro checks qshift  --Stable, continue to monitor  --PRN acetaminophen, dilaudid 0 5 mg q6h PRN, oxycodone PO   CV:   --Continue hemodynamic support with Phenylephrine, currently at 65  --Has been on phenylephrine for 3 full days now, pt does not want an IJ due to fear of having a line put into her neck and causing "complications in her heart" however would be okay with a PICC as she has tolerated them before  Order placed for PICC, consented yesterday  --Wean pressor support as tolerated  --Goal MAP >65  --Mentating normally, MAP to a low of 56 overnight, this morning in 70's  --Given albumin 25 g yesterday  Started midodrine 10 mg TID yesterday  Lung:   --No acute issues, extubated successfully postop  GI:   --NPO for OR today then back to Regular diet with ensure supplement as tolerated  --Discussed increasing diet as much as possible with pt  --Has not had a BM since admission, usually does digital stimulation at home to have a BM, may need to use suppository to help pt go here  Added senna nightly today  FEN:   --Discontinued IVF yesterday, now getting 75 cc/hr plasmalyte in setting of plan to go to OR today  --Replete electrolytes with goals: K >4 0, Mag >2 0, and Phos >3 0  Recheck of ionized Ca today  :   --Hx of CKD   --Strict I/Os  --Hays in place, chronic   ID:   --Sepsis due to severe necrotic pressure ulcers of sacrum and buttocks  --Monitor fever curve, trend WBC count   Today leukocytosis decreased to 11 9  --Infectious Disease consult placed for further Abx management recommendations  Narrowed to high dose IV zosyn, discontinued daptomycin, only one of blood cultures was positive for coag neg staph, suspect contaminant, no need for repeat cultures  --Wound cultures: positive for pseudomonas aerurginosa and providencia stuartii, awaiting final culture with susceptibilities  --Blood cultures: coag neg staph from 11/10  --Chronic ischial osteomyelitis: no opportunity for operative debridement per ortho, supportive care  --H/o c diff: on prophylactic oral vanco 125 mg q12h, florastor capsule; monitor closely for diarrhea  Heme:   --Hx of iron-deficiency anemia  --Hgb of 5 0 intra-op from 9 1, today at 9 8 from 10 8 yesterday, do not suspect ongoing bleeding at this time, drainage from wounds has been serosanguinous, will continue to monitor closely  --Trend H/H  --Transfuse PRN for Hgb <7  Endo:   --No acute issues  MSK/Skin:  --Multiple decubitus ulcers packed with wet-to-dry Kerlix/heavy drainage packs  Back to OR today for debridement and washout, likely place wound vacs if possible  --Acute Care Surgery following for wound management   --Orthopedics c/s placed for chronic sacral osteomyelitis, with previous infection of left femur ORIF  They have signed off, no surgical management necessary  --Routine skin care  --PT/OT  --Frequent turning, offloading  Lines:  --2 peripheral   VTE Prophylaxis:  · Pharmacologic Prophylaxis: heparin  · Mechanical Prophylaxis: SCDs    Disposition: Continue ICU care    Treatment Team/Consultants: Acute care surgery     Date of admission: 11/10/2019    Reason for Admission: Decubitus ulcers adjacent to proximal right femur and left ischial tuberosity with collections of gas present, required operative debridement on 11/10/19  HPI/24hr events:   Ongoing discussion about central line with patient yesterday, she would still not like an IJ, however would be amenable to a PICC because she has had one before   Jack has been titrated up and down over past 24 hours, is up to 65 this morning  Patient reports feeling much more "clear" and like herself currently  Slept well overnight  Before bed had a brief episode of feeling like she couldn't catch her breath, resolved spontaneously, no symptoms currently, thinks it may have been related to her anxiety about her health  ROS: 14 point ROS is negative and/or as stated in the HPI  Physical Exam:    Physical Exam   Constitutional: She is oriented to person, place, and time  She appears well-developed  No distress  Cachectic    HENT:   Head: Normocephalic and atraumatic  Slightly dry MM   Eyes: Pupils are equal, round, and reactive to light  Conjunctivae are normal    Neck: Normal range of motion  Neck supple  Cardiovascular: Normal rate, regular rhythm and normal heart sounds  No murmur heard  Pulmonary/Chest: Effort normal and breath sounds normal  No respiratory distress  She has no wheezes  She has no rales  Abdominal: Soft  Bowel sounds are normal  She exhibits no distension  There is no tenderness  Musculoskeletal: She exhibits no edema  Neurological: She is alert and oriented to person, place, and time  A sensory deficit (LEs) is present  No cranial nerve deficit  She exhibits abnormal muscle tone  GCS 15  Flaccid paralysis of LEs   Skin: Skin is warm and dry  She is not diaphoretic  No pallor  Sacral/buttock dressings are dry and intact  Psychiatric: She has a normal mood and affect  Nursing note and vitals reviewed        Vitals:   Vitals:    19 0146 19 0255 19 0400 19 0530   BP: 103/70 105/69 99/70    BP Location:   Left arm    Pulse: 78 82 84    Resp: 21 20 (!) 29    Temp:   97 9 °F (36 6 °C)    TempSrc:   Rectal    SpO2: 99% 99% 93%    Weight:    51 9 kg (114 lb 6 7 oz)   Height:           Arterial Line:  Arterial Line BP: 96/60  Arterial Line MAP (mmHg): 74 mmHg    Temperature: Temp (24hrs), Av 7 °F (36 5 °C), Min:97 6 °F (36 4 °C), Max:97 9 °F (36 6 °C)  Current: Temperature: 97 9 °F (36 6 °C)    Weights: IBW: 45 5 kg  Body mass index is 22 35 kg/m²  Respiratory:  SpO2: SpO2: 93 %  O2 Flow Rate (L/min): 2 L/min    Intake and Outputs:    Intake/Output Summary (Last 24 hours) at 11/14/2019 0642  Last data filed at 11/14/2019 0400  Gross per 24 hour   Intake 1590 48 ml   Output 1050 ml   Net 540 48 ml     I/O last 24 hours: In: 4165 5 [P O :1440; I V :1765 5; IV Piggyback:960]  Out: 2075 [Urine:2075]    UOP: 0 7 cc/kg/hr overnight        Nutrition:        Diet Orders   (From admission, onward)             Start     Ordered    11/14/19 0001  Diet NPO; Sips with meds  Diet effective midnight     Question Answer Comment   Diet Type NPO    NPO Except: Sips with meds    RD to adjust diet per protocol?  Yes        11/13/19 0955    11/11/19 1714  Dietary nutrition supplements  Once     Question Answer Comment   Select Supplement: Ensure Compact-Vanilla    Frequency Breakfast, Lunch, Dinner        11/11/19 1713                  Labs:   Results from last 7 days   Lab Units 11/14/19 0540 11/13/19 0534 11/12/19  0443   WBC Thousand/uL 11 97* 13 22* 14 45*   HEMOGLOBIN g/dL 9 8* 10 8* 11 2*   HEMATOCRIT % 31 4* 33 6* 34 8   PLATELETS Thousands/uL 376 401* 455*   NEUTROS PCT % 69 72 78*   MONOS PCT % 7 7 4     Results from last 7 days   Lab Units 11/14/19  0540 11/13/19  0534 11/12/19 0443  11/10/19  2209 11/10/19  1706   SODIUM mmol/L 139 139 138   < >  --  132*   POTASSIUM mmol/L 4 2 3 4* 3 6   < >  --  4 0   CHLORIDE mmol/L 109* 107 108   < >  --  97*   CO2 mmol/L 22 22 22   < >  --  22   CO2, I-STAT mmol/L  --   --   --   --  22  --    BUN mg/dL 8 6 9   < >  --  15   CREATININE mg/dL 0 39* 0 34* 0 43*   < >  --  0 52*   CALCIUM mg/dL 7 5* 7 0* 7 0*   < >  --  8 8   ALK PHOS U/L  --   --   --   --   --  139*   ALT U/L  --   --   --   --   --  24   AST U/L  --   --   --   --   --  28   GLUCOSE, ISTAT mg/dl  --   --   --   --  154* --     < > = values in this interval not displayed  Results from last 7 days   Lab Units 11/12/19  0443 11/10/19  2245   MAGNESIUM mg/dL 1 9 1 6     Results from last 7 days   Lab Units 11/13/19  0534 11/12/19  0443 11/10/19  2245   PHOSPHORUS mg/dL 2 7 2 0* 3 9      Results from last 7 days   Lab Units 11/10/19  1706   INR  1 40*   PTT seconds 39*     Results from last 7 days   Lab Units 11/11/19  0312 11/10/19  2245 11/10/19  1706   LACTIC ACID mmol/L 1 8 2 1* 1 7         Results from last 7 days   Lab Units 11/11/19  1604 11/11/19  0320   PH ART  7 450 7  391   PCO2 ART mm Hg 28 2* 30 4*   PO2 ART mm Hg 69 8* 76 2   HCO3 ART mmol/L 19 2* 18 0*   BASE EXC ART mmol/L -3 7 -5 9   ABG SOURCE  Line, Arterial Line, Arterial     Results from last 7 days   Lab Units 11/10/19  1706   PROCALCITONIN ng/ml 2 91*     No results found for: Remus Beam               Micro:   Blood Culture:   Lab Results   Component Value Date    BLOODCX Staphylococcus coagulase negative (A) 11/10/2019    BLOODCX No Growth at 72 hrs  11/10/2019    BLOODCX No Growth After 5 Days  01/06/2019    BLOODCX No Growth After 5 Days  01/06/2019    BLOODCX No Growth After 5 Days  01/05/2019    BLOODCX No Growth After 5 Days  01/05/2019    BLOODCX No Growth After 5 Days  12/13/2018    BLOODCX No Growth After 5 Days   12/13/2018     Urine Culture:   Lab Results   Component Value Date    URINECX >100,000 cfu/ml Proteus mirabilis (A) 11/10/2019    URINECX 50,000-59,000 cfu/ml Alcaligenes faecalis (A) 11/10/2019    URINECX >100,000 cfu/ml Escherichia coli (A) 01/06/2019    URINECX >100,000 cfu/ml Providencia rettgeri (A) 01/06/2019    URINECX No Growth <1000 cfu/mL 12/15/2018     Sputum Culture: No components found for: SPUTUMCX  Wound Culure:   Lab Results   Component Value Date    WOUNDCULT 3+ Growth of Pseudomonas aeruginosa (A) 11/10/2019    WOUNDCULT 2+ Growth of Providencia stuartii (A) 11/10/2019    WOUNDCULT 2+ Growth of Diphtheroids 11/10/2019 WOUNDCULT Few Colonies of Pseudomonas aeruginosa (A) 01/06/2019    WOUNDCULT Few Colonies of Escherichia coli (A) 01/06/2019    WOUNDCULT Few Colonies of Pseudomonas aeruginosa (A) 01/06/2019    WOUNDCULT 4+ Growth of Pseudomonas aeruginosa (A) 01/06/2019    WOUNDCULT 3+ Growth of  01/06/2019     Results from last 7 days   Lab Units 11/10/19  1723 11/10/19  1707 11/10/19  1706 11/10/19  0528   BLOOD CULTURE   --  Staphylococcus coagulase negative* No Growth at 72 hrs   --    GRAM STAIN RESULT  No polys seen*  2+ Gram negative rods* Gram positive cocci in clusters*  --   --    URINE CULTURE   --   --   --  >100,000 cfu/ml Proteus mirabilis*  50,000-59,000 cfu/ml Alcaligenes faecalis*   WOUND CULTURE  3+ Growth of Pseudomonas aeruginosa*  2+ Growth of Providencia stuartii*  2+ Growth of Diphtheroids  --   --   --        Allergies:    Allergies   Allergen Reactions    Iv Contrast [Iodinated Diagnostic Agents]      Tingling face, itching    Cefepime Rash    Ciprofloxacin Rash and Swelling     Looked like suburn, itching  Bed sores  Swelling, itching    Latex Rash    Vancomycin Rash     Unknown        Medications:   Scheduled Meds:    Current Facility-Administered Medications:  acetaminophen 650 mg Oral Q6H PRN Noemi Luo MD    heparin (porcine) 5,000 Units Subcutaneous [facility] Noemi Luo MD    HYDROmorphone 0 5 mg Intravenous Q3H PRN Noemi Luo MD    midodrine 10 mg Oral TID AC Ever Carreno MD    multi-electrolyte 75 mL/hr Intravenous Continuous Noemi Luo MD Last Rate: 75 mL/hr (11/14/19 0024)   ondansetron 4 mg Intravenous Q6H PRN Noemi Luo MD    oxyCODONE 10 mg Oral Q4H PRN Noemi Luo MD    oxyCODONE 5 mg Oral Q4H PRN Noemi Luo MD    pantoprazole 40 mg Oral Early Morning Noemi Luo MD    phenylephine  mcg/min Intravenous Titrated Noemi Luo MD Last Rate: 65 mcg/min (11/14/19 0510)   piperacillin-tazobactam 4 5 g Intravenous Q6H Lorraine Hamilton DO Last Rate: 4 5 g (11/14/19 0355)   saccharomyces boulardii 250 mg Oral BID Norma Rogers MD    saliva substitute 5 spray Mouth/Throat PRN Ousmane Vinson PA-C    vancomycin 125 mg Oral Q12H [facility] Norma Rogers MD      Facility-Administered Medications Ordered in Other Encounters:  dexamethasone 4 mg Intravenous Once PRN Loreda Xiao, DO    diphenhydrAMINE 12 5 mg Intravenous Once Loreda Xiao, DO    fentaNYL 12 5 mcg Intravenous Q10 Min PRN Loreda Xiao, DO    fentaNYL 25 mcg Intravenous Q5 Min PRN Loreda Xiao, DO    HYDROmorphone 0 2 mg Intravenous Q10 Min PRN Rui Rg MD    lactated ringers 75 mL/hr Intravenous Continuous Loreda Xiao, DO Last Rate: 75 mL/hr (11/10/19 2105)   lactated ringers 50 mL/hr Intravenous Continuous Hu Ch CRNA    lactated ringers 75 mL/hr Intravenous Continuous Loreda Xiao, DO Last Rate: Stopped (03/20/19 1841)   lactated ringers 75 mL/hr Intravenous Continuous Hollis Hernandez MD Last Rate: Stopped (03/20/19 1842)   ondansetron 4 mg Intravenous Once PRN Rui Rg MD    promethazine 12 5 mg Intravenous Once PRN Rui Rg MD      Continuous Infusions:    multi-electrolyte 75 mL/hr Last Rate: 75 mL/hr (11/14/19 0024)   phenylephine  mcg/min Last Rate: 65 mcg/min (11/14/19 0510)     PRN Meds:    acetaminophen 650 mg Q6H PRN   HYDROmorphone 0 5 mg Q3H PRN   ondansetron 4 mg Q6H PRN   oxyCODONE 10 mg Q4H PRN   oxyCODONE 5 mg Q4H PRN   saliva substitute 5 spray PRN       Invasive lines and devices: Invasive Devices     Peripheral Intravenous Line            Peripheral IV 11/10/19 Right Antecubital 3 days    Peripheral IV 11/12/19 Right Forearm 1 day          Drain            Urethral Catheter Latex 16 Fr  3 days                Code Status: Level 1 - Full Code    Counseling / Coordination of Care  Total Critical Care time spent 30 minutes excluding procedures, teaching and family updates            SIGNATURE: Sergio Davalos MD  DATE: November 14, 2019  TIME: 6:42 AM

## 2019-11-14 NOTE — PROGRESS NOTES
Pastoral Care Progress Note    2019  Patient: Delroy Madsen : 1950  Admission Date & Time: 11/10/2019 1606  MRN: [id number] [facility]: [id number]                     Chaplaincy Interventions Utilized:   Empowerment: Encouraged self-care            Relationship Building: Listened empathically    Ritual: Provided prayer            Chaplaincy Outcomes Achieved:  Expressed gratitude          Spiritual Coping Strategies Utilized:   Spiritual practices       19 Danita Muñoz Patient not active with [employer]   Spiritual Beliefs/Perceptions   Concept of God Accepting   God's Role in Disease Natural   Relationship with God Close   Conflicts/Concerns   (Pt discouraged about something always going wrong)   Spiritual Strengths prayer   Support Systems Friends/neighbors   Psychosocial   Patient Behaviors/Mood Appropriate for situation; Cooperative   Stress Factors   Patient Stress Factors None identified   Coping Responses   Patient Coping Sadness   Patient Spiritual Assessment   Fear of Dying Process no   Fear of Death and Unknown no   Feelings of Discouragement yes   Feelings of Loneliness yes

## 2019-11-15 ENCOUNTER — APPOINTMENT (INPATIENT)
Dept: RADIOLOGY | Facility: HOSPITAL | Age: 69
DRG: 853 | End: 2019-11-15
Payer: COMMERCIAL

## 2019-11-15 LAB
ANION GAP SERPL CALCULATED.3IONS-SCNC: 10 MMOL/L (ref 4–13)
BACTERIA BLD CULT: NORMAL
BASOPHILS # BLD AUTO: 0.04 THOUSANDS/ΜL (ref 0–0.1)
BASOPHILS NFR BLD AUTO: 0 % (ref 0–1)
BUN SERPL-MCNC: 9 MG/DL (ref 5–25)
CA-I BLD-SCNC: 1.05 MMOL/L (ref 1.12–1.32)
CALCIUM SERPL-MCNC: 7.4 MG/DL (ref 8.3–10.1)
CHLORIDE SERPL-SCNC: 107 MMOL/L (ref 100–108)
CO2 SERPL-SCNC: 24 MMOL/L (ref 21–32)
CREAT SERPL-MCNC: 0.39 MG/DL (ref 0.6–1.3)
EOSINOPHIL # BLD AUTO: 0.22 THOUSAND/ΜL (ref 0–0.61)
EOSINOPHIL NFR BLD AUTO: 2 % (ref 0–6)
ERYTHROCYTE [DISTWIDTH] IN BLOOD BY AUTOMATED COUNT: 20.5 % (ref 11.6–15.1)
GFR SERPL CREATININE-BSD FRML MDRD: 108 ML/MIN/1.73SQ M
GLUCOSE SERPL-MCNC: 122 MG/DL (ref 65–140)
HCT VFR BLD AUTO: 29.9 % (ref 34.8–46.1)
HGB BLD-MCNC: 9.2 G/DL (ref 11.5–15.4)
IMM GRANULOCYTES # BLD AUTO: 0.13 THOUSAND/UL (ref 0–0.2)
IMM GRANULOCYTES NFR BLD AUTO: 1 % (ref 0–2)
LYMPHOCYTES # BLD AUTO: 2.15 THOUSANDS/ΜL (ref 0.6–4.47)
LYMPHOCYTES NFR BLD AUTO: 20 % (ref 14–44)
MCH RBC QN AUTO: 25.6 PG (ref 26.8–34.3)
MCHC RBC AUTO-ENTMCNC: 30.8 G/DL (ref 31.4–37.4)
MCV RBC AUTO: 83 FL (ref 82–98)
MONOCYTES # BLD AUTO: 0.69 THOUSAND/ΜL (ref 0.17–1.22)
MONOCYTES NFR BLD AUTO: 6 % (ref 4–12)
MRSA NOSE QL CULT: NORMAL
NEUTROPHILS # BLD AUTO: 7.62 THOUSANDS/ΜL (ref 1.85–7.62)
NEUTS SEG NFR BLD AUTO: 71 % (ref 43–75)
NRBC BLD AUTO-RTO: 0 /100 WBCS
PLATELET # BLD AUTO: 283 THOUSANDS/UL (ref 149–390)
PMV BLD AUTO: 9.4 FL (ref 8.9–12.7)
POTASSIUM SERPL-SCNC: 4 MMOL/L (ref 3.5–5.3)
RBC # BLD AUTO: 3.6 MILLION/UL (ref 3.81–5.12)
SODIUM SERPL-SCNC: 141 MMOL/L (ref 136–145)
WBC # BLD AUTO: 10.85 THOUSAND/UL (ref 4.31–10.16)

## 2019-11-15 PROCEDURE — 97167 OT EVAL HIGH COMPLEX 60 MIN: CPT

## 2019-11-15 PROCEDURE — G8988 SELF CARE GOAL STATUS: HCPCS

## 2019-11-15 PROCEDURE — 80048 BASIC METABOLIC PNL TOTAL CA: CPT | Performed by: SURGERY

## 2019-11-15 PROCEDURE — 99232 SBSQ HOSP IP/OBS MODERATE 35: CPT | Performed by: SURGERY

## 2019-11-15 PROCEDURE — 99233 SBSQ HOSP IP/OBS HIGH 50: CPT | Performed by: INTERNAL MEDICINE

## 2019-11-15 PROCEDURE — 99231 SBSQ HOSP IP/OBS SF/LOW 25: CPT | Performed by: SURGERY

## 2019-11-15 PROCEDURE — G8978 MOBILITY CURRENT STATUS: HCPCS

## 2019-11-15 PROCEDURE — G8979 MOBILITY GOAL STATUS: HCPCS

## 2019-11-15 PROCEDURE — 71045 X-RAY EXAM CHEST 1 VIEW: CPT

## 2019-11-15 PROCEDURE — 97163 PT EVAL HIGH COMPLEX 45 MIN: CPT

## 2019-11-15 PROCEDURE — 82330 ASSAY OF CALCIUM: CPT | Performed by: EMERGENCY MEDICINE

## 2019-11-15 PROCEDURE — G8987 SELF CARE CURRENT STATUS: HCPCS

## 2019-11-15 PROCEDURE — 85025 COMPLETE CBC W/AUTO DIFF WBC: CPT | Performed by: SURGERY

## 2019-11-15 PROCEDURE — NC001 PR NO CHARGE: Performed by: PHYSICIAN ASSISTANT

## 2019-11-15 RX ORDER — MIDODRINE HYDROCHLORIDE 5 MG/1
15 TABLET ORAL
Status: DISCONTINUED | OUTPATIENT
Start: 2019-11-15 | End: 2019-11-15

## 2019-11-15 RX ORDER — B-COMPLEX WITH VITAMIN C
1 TABLET ORAL
Status: DISCONTINUED | OUTPATIENT
Start: 2019-11-15 | End: 2019-11-20 | Stop reason: HOSPADM

## 2019-11-15 RX ORDER — HYDROMORPHONE HCL/PF 1 MG/ML
0.5 SYRINGE (ML) INJECTION EVERY 6 HOURS PRN
Status: DISCONTINUED | OUTPATIENT
Start: 2019-11-15 | End: 2019-11-20 | Stop reason: HOSPADM

## 2019-11-15 RX ORDER — MIDODRINE HYDROCHLORIDE 5 MG/1
15 TABLET ORAL EVERY 8 HOURS
Status: DISCONTINUED | OUTPATIENT
Start: 2019-11-15 | End: 2019-11-20 | Stop reason: HOSPADM

## 2019-11-15 RX ADMIN — MIDODRINE HYDROCHLORIDE 15 MG: 5 TABLET ORAL at 06:01

## 2019-11-15 RX ADMIN — SODIUM CHLORIDE, SODIUM GLUCONATE, SODIUM ACETATE, POTASSIUM CHLORIDE AND MAGNESIUM CHLORIDE 75 ML/HR: 526; 502; 368; 37; 30 INJECTION, SOLUTION INTRAVENOUS at 00:45

## 2019-11-15 RX ADMIN — Medication 125 MG: at 21:25

## 2019-11-15 RX ADMIN — Medication 250 MG: at 18:41

## 2019-11-15 RX ADMIN — HEPARIN SODIUM 5000 UNITS: 5000 INJECTION INTRAVENOUS; SUBCUTANEOUS at 06:01

## 2019-11-15 RX ADMIN — PIPERACILLIN AND TAZOBACTAM 4.5 G: 4; .5 INJECTION, POWDER, LYOPHILIZED, FOR SOLUTION INTRAVENOUS at 21:37

## 2019-11-15 RX ADMIN — Medication 1 TABLET: at 08:18

## 2019-11-15 RX ADMIN — PIPERACILLIN AND TAZOBACTAM 4.5 G: 4; .5 INJECTION, POWDER, LYOPHILIZED, FOR SOLUTION INTRAVENOUS at 10:32

## 2019-11-15 RX ADMIN — HEPARIN SODIUM 5000 UNITS: 5000 INJECTION INTRAVENOUS; SUBCUTANEOUS at 21:25

## 2019-11-15 RX ADMIN — PANTOPRAZOLE SODIUM 40 MG: 40 TABLET, DELAYED RELEASE ORAL at 06:01

## 2019-11-15 RX ADMIN — CALCIUM GLUCONATE 3 G: 98 INJECTION, SOLUTION INTRAVENOUS at 11:32

## 2019-11-15 RX ADMIN — MIDODRINE HYDROCHLORIDE 15 MG: 5 TABLET ORAL at 21:25

## 2019-11-15 RX ADMIN — PIPERACILLIN AND TAZOBACTAM 4.5 G: 4; .5 INJECTION, POWDER, LYOPHILIZED, FOR SOLUTION INTRAVENOUS at 15:45

## 2019-11-15 RX ADMIN — Medication 250 MG: at 10:34

## 2019-11-15 RX ADMIN — PIPERACILLIN AND TAZOBACTAM 4.5 G: 4; .5 INJECTION, POWDER, LYOPHILIZED, FOR SOLUTION INTRAVENOUS at 02:49

## 2019-11-15 RX ADMIN — Medication 125 MG: at 10:33

## 2019-11-15 RX ADMIN — PHENYLEPHRINE HYDROCHLORIDE 15 MCG/MIN: 10 INJECTION INTRAVENOUS at 17:45

## 2019-11-15 NOTE — PLAN OF CARE
Problem: OCCUPATIONAL THERAPY ADULT  Goal: Performs self-care activities at highest level of function for planned discharge setting  See evaluation for individualized goals  Description  Treatment Interventions: ADL retraining, Functional transfer training, Endurance training, Cognitive reorientation, Patient/family training, UE strengthening/ROM, Equipment evaluation/education, Compensatory technique education, Continued evaluation, Energy conservation, Activityengagement          See flowsheet documentation for full assessment, interventions and recommendations  Note:   Limitation: Decreased ADL status, Decreased endurance, Decreased self-care trans, Decreased high-level ADLs, Decreased sensation  Prognosis: Fair  Assessment: Pt is a 62 y/o female seen for OT eval s/p adm to [facility] w/ h/o paraplegia 2' a spinal cord injury in 1981 w/ chronic sacral/ischial wounds  Pt has had multiple flap surgeries throughout 2018 and 2019 and multiple debridements between February and March  Pt reports her wounds have been getting worse and have increased drainage over the past 2 weeks, and she reports she has been feeling weak the past 2 days  Pt dx'd w/ unstageable pressure injury of sacral region  Pt s/p EXCISIONAL DEBRIDEMENT on 11/10/19 and s/p DEBRIDEMENT DECUBITI [facility]) (Bilateral) on 11/12/19  Pt  has a past medical history of Anemia, Arthritis, Back injury, Chronic kidney disease, Depression, Osteopenia, Osteoporosis, Paralysis ([address] ), Paraplegia ([address] ), Poor circulation, Pressure injury of skin, Pressure sore of hip, Tibial plateau fracture, and Underweight  Pt is currently NWB in bilateral LE  Pt with active OT orders  Pt lives alone in 1st floor apartment w/ 0STE, and one-level inside  Pt was I w/ ADLS and IADLS, drove w/ use of hand controls, & was required use of manual WC for functional mobility/transfers   Pt able to self-propel and perform functional transfers into car w/ use of transfer board, otherwise uses bilateral UE to bump/scoot self during transfers  Pt is currently demonstrating the following occupational deficits: Min A x 1 for UB ADLs, Max A x 1 for LB ADLs, Mod A x 2 for supine to sit/sit to supine and Min A x 1 for rolling L/R during bed mobility  Not appropriate for functional transfers/mobility at this time  These deficits that are impacting pt's baseline areas of occupation are a result of the following impairments: endurance, activity tolerance, functional mobility, forward functional reach, balance, trunk control, decreased I w/ ADLS/IADLS and LE paralysis, and limited social support  The following Occupational Performance Areas to address include: eating, grooming, bathing/shower, toilet hygiene, dressing, health maintenance, functional mobility, community mobility, clothing management, cleaning, meal prep, household maintenance and job performance/volunteering  Pt scored overall 40/100 on the Barthel Index  Based on the aforementioned OT evaluation, functional performance deficits, and assessments, pt has been identified as a high complexity evaluation  Recommend STR upon D/C   Pt to continue to benefit from acute immediate OT services to address the following goals 1-2x/wk to  within the next 10-14 days:     OT Discharge Recommendation: Short Term Rehab  OT - OK to Discharge: Yes(once medically stable)    LYNSEY Hernandez

## 2019-11-15 NOTE — PLAN OF CARE
Problem: PHYSICAL THERAPY ADULT  Goal: Performs mobility at highest level of function for planned discharge setting  See evaluation for individualized goals  Description  Treatment/Interventions: Functional transfer training, LE strengthening/ROM, Therapeutic exercise, Endurance training, Bed mobility, Spoke to nursing, Spoke to case management, Spoke to advanced practitioner, OT          See flowsheet documentation for full assessment, interventions and recommendations  Note:   Prognosis: Fair  Problem List: Decreased strength, Decreased endurance, Impaired balance, Decreased mobility, Decreased coordination, Decreased skin integrity, Orthopedic restrictions, Pain, Impaired sensation  Assessment: Pt is a 71year old female presenting to [facility] ED on 11/10/19 with generalized weakness and exhaustion  Pt found to have several unstageable wounds on B/L hips, sacrum  Pt now s/p excisional debridement on 11/10/19 and debridement decubiti/washout on 11/12/19  Per ortho, pt NWB B/L LE  Pt with PMH significant for SCI and paraplegia, GERD, L femur fx, osteomyelitis of coccyx, osteopenia, anemia, CKD, depression, and C dif  Pt presents today for initial physical therapy evaluation supine and is agreeable to therapy session  Pt is pleasant and talkative throughout session, follows all commands without difficulty, and is motivated to get better  Pt with good understanding of safety and current medical status  Pt with significant B/L hip/sacral wounds, ok to mobilize per nursing and medical team  Pt rolled L with minAx1 and use of bed rails with B/L UE  Pt transferred supine to sit with modAx2  Pt sat EOB ~5 min with minAx1  While seated EOB pt reports dizziness, requesting to lay back down  Pt returned L sidelying with modAx1  While sidelying, pt sacral wound dressing noted to be loose, RN [employer] notified  RN [employer] present for dressing change  Pt rolled R and L for pad and foam placement for offloading   Pt remained in supine position at end of session with all needs within reach, bed alarm on, and RN Patricia Rojas present  PT to recommend LTACH due to complex wounds and decline in ability to independently perform transfers and functional mobility  PT to also recommend no OOB to chair activity at this time due to complex wounds, unless the following is available: tilt in space chair with pressure relieving cushion and ability to off-load every 15 min  PT to recommend [place] bed to maintain pt skin integrity, charge nurse notified of bed recommendation  PT to follow   Barriers to Discharge: Decreased caregiver support     Recommendation: (S) (LTACH for complex wounds and restoration of mobility)     PT - OK to Discharge: (S) Yes(when medically stable)    See flowsheet documentation for full assessment        Conchita Macdonald, SPT

## 2019-11-15 NOTE — PROGRESS NOTES
Progress Note - Critical Care   George Harrison 71 y o  female MRN: [id number]  Unit/Bed#: [facility] 09 Encounter: [id number]    Impression:  Principal Problem:    Pressure injury of sacral region, unstageable [facility])  Active Problems:    Paraplegia following spinal cord injury ([facility] [address] )    C  difficile diarrhea    Acute blood loss anemia    Sepsis ([facility] )      Plan:    Neuro:   --Hx of paraplegia following spinal cord injury in 1981  --Neuro checks qshift  --Stable, continue to monitor  --PRN acetaminophen, dilaudid 0 5 mg q6h PRN, oxycodone PO  Has not required PRN's  CV:   --Continue hemodynamic support with Phenylephrine, currently at 25  --PICC placed yesterday successfully    --Wean pressor support as tolerated  --Goal MAP >65  --Midodrine 15 mg TID  Lung:   --Currently on 2 L NC, saturating well  Slightly increased respiratory rate at times, especially while speaking  If this continues, may consider getting VBG and CXR  GI:   --Regular house diet with ensure supplements   --Discussed increasing diet as much as possible with pt  --Added on senna PO and dulcolax suppository daily yesterday    --Home protonix 40 daily   FEN:   --No maintenance IVF indicated  --Repleted for ionized calcium 1 01 yesterday  Today again low on BMP, will check ionized  Started on home calcium carbonate-vitamin D daily  --Today K is 4 0  --Replete electrolytes with goals: K >4 0, Mag >2 0, and Phos >3 0  :   --Hx of CKD   --Strict I/Os  --Hays in place, chronic  --Decreased urine output overnight, as low as 15 cc/hr but up to 40  Urine this morning is light yellow, will monitor closely  ID:   --Sepsis due to severe necrotic pressure ulcers of sacrum and buttocks, now resolving  --Bedside placement of wound vacs yesterday  S/p debridement and washout on the 10th, 11th, and 12th    --Monitor fever curve, trend WBC count  Today leukocytosis decreased to 10 85 from 11 9  Remains afebrile     --Infectious Disease consult placed for further Abx management recommendations  Narrowed to high dose IV zosyn, discontinued daptomycin, only one of blood cultures was positive for coag neg staph, suspect contaminant, no need for repeat cultures  --Discussed with ID team yesterday, currently tentative plan is for 2 weeks total of IV zosyn, today is day 5/14 total antibiotics  --Wound cultures: positive for pseudomonas aerurginosa and providencia stuartii, susceptibilities resulted yesterday   --Blood cultures: coag neg staph from 11/10  --Chronic ischial osteomyelitis: no opportunity for operative debridement per ortho, supportive care  --H/o c diff: on prophylactic oral vanco 125 mg q12h, home florastor capsule; monitor closely for diarrhea  Heme:   --Hx of iron-deficiency anemia  --Hgb of 5 0 intra-op from 9 1, today at 9 2 from 9 8 yesterday and 10 8 the day previously  Again, no signs or symptoms of bleeding on exam, do expect pt to head back towards baseline hgb but will continue to monitor closely  --Trend H/H  --Transfuse PRN for Hgb <7  Endo:   --No acute issues  MSK/Skin:  --Multiple decubitus ulcers, 3 sacral ulcers with bedside wound vac placement yesterday  Plan for possible OR tomorrow for debridement, no plan for OR today  --Acute Care Surgery following for wound management   --Orthopedics c/s placed for chronic sacral osteomyelitis, with previous infection of left femur ORIF  They have signed off, no surgical management necessary     --Routine skin care  --PT/OT  --Frequent turning, offloading  Lines:  --left brachial triple lumen PICC   --2 peripheral   VTE Prophylaxis:  · Pharmacologic Prophylaxis: heparin  · Mechanical Prophylaxis: SCDs    Disposition: Continue ICU care    Treatment Team/Consultants: Acute care surgery     Date of admission: 11/10/2019    Reason for Admission: Decubitus ulcers adjacent to proximal right femur and left ischial tuberosity with collections of gas present, required operative debridement on 11/10/19  Sepsis due to severe skin/soft tissue infection  HPI/24hr events:   Yesterday, received PICC and was bedside wound vac to 3 sacral wounds posteriorly  Has had some intermittent episodes where she feels like she "cant catch her breath", was feeling well overnight but then again this morning she started to feel it again  No cough, no CP, no lightheadedness/dizziness  She feels well otherwise  Was able to have a BM yesterday  ROS: 14 point ROS is negative and/or as stated in the HPI  Physical Exam:    Physical Exam   Constitutional: She is oriented to person, place, and time  She appears well-developed and well-nourished  No distress  HENT:   Head: Normocephalic and atraumatic  Eyes: Pupils are equal, round, and reactive to light  Conjunctivae are normal    Neck: Normal range of motion  Cardiovascular: Normal rate, regular rhythm and normal heart sounds  No murmur heard  Pulmonary/Chest: No respiratory distress  She has no wheezes  She has no rales  RR up to 25 when speaking   Abdominal: Soft  Bowel sounds are normal  She exhibits no distension  There is no tenderness  Musculoskeletal: Normal range of motion  SCDs in place  Neurological: She is alert and oriented to person, place, and time  A sensory deficit is present  No cranial nerve deficit  Flaccid paralysis in LEs  Skin: Skin is warm and dry  No rash noted  No pallor  Right anterior trochanter wound with dressing, dry  Posterior ischial wounds  Sacral wounds  Wound vac in place   Psychiatric: She has a normal mood and affect  Nursing note and vitals reviewed        Vitals:   Vitals:    11/15/19 0400 11/15/19 0500 11/15/19 0532 11/15/19 0600   BP: (!) 87/59 102/64  (!) 84/61   BP Location: Right arm      Pulse: 74 76  82   Resp: 19 19  (!) 27   Temp: 97 6 °F (36 4 °C)      TempSrc: Oral      SpO2: 97% 98%  98%   Weight:   49 9 kg (110 lb 0 2 oz)    Height:           Arterial Line:  Arterial Line BP: 96/60  Arterial Line MAP (mmHg): 74 mmHg    Temperature: Temp (24hrs), Av 7 °F (36 5 °C), Min:97 6 °F (36 4 °C), Max:98 °F (36 7 °C)  Current: Temperature: 97 6 °F (36 4 °C)    Weights: IBW: 45 5 kg  Body mass index is 21 48 kg/m²  Respiratory:  SpO2: SpO2: 98 %  O2 Flow Rate (L/min): 1 L/min    Intake and Outputs:    Intake/Output Summary (Last 24 hours) at 11/15/2019 0700  Last data filed at 11/15/2019 0601  Gross per 24 hour   Intake 2437 07 ml   Output 750 ml   Net 1687 07 ml     I/O last 24 hours: In: 3150 4 [I V :2650 4; IV Piggyback:500]  Out: 1130 [Urine:1130]    UOP: 15-44 cc/hr overnight per nursing report  No reported output overnight from wound vac  1 bowel movement       Nutrition:        Diet Orders   (From admission, onward)             Start     Ordered    19 1553  Dietary nutrition supplements  Once     Question Answer Comment   Select Supplement: Ensure Enlive-Strawberry    Frequency Breakfast, Lunch, Dinner        19 1552    19 1354  Diet Regular; Regular House  Diet effective now     Question Answer Comment   Diet Type Regular    Regular Regular House    RD to adjust diet per protocol?  Yes        19 1353                  Labs:   Results from last 7 days   Lab Units 11/15/19  0532 19  0540 19  0534   WBC Thousand/uL 10 85* 11 97* 13 22*   HEMOGLOBIN g/dL 9 2* 9 8* 10 8*   HEMATOCRIT % 29 9* 31 4* 33 6*   PLATELETS Thousands/uL 283 376 401*   NEUTROS PCT % 71 69 72   MONOS PCT % 6 7 7     Results from last 7 days   Lab Units 11/15/19  0532 19  0540 19  0534  11/10/19  2209 11/10/19  1706   SODIUM mmol/L 141 139 139   < >  --  132*   POTASSIUM mmol/L 4 0 4 2 3 4*   < >  --  4 0   CHLORIDE mmol/L 107 109* 107   < >  --  97*   CO2 mmol/L 24 22 22   < >  --  22   CO2, I-STAT mmol/L  --   --   --   --  22  --    BUN mg/dL 9 8 6   < >  --  15   CREATININE mg/dL 0 39* 0 39* 0 34*   < >  --  0 52*   CALCIUM mg/dL 7 4* 7 5* 7 0*   < >  --  8 8   ALK PHOS U/L --   --   --   --   --  139*   ALT U/L  --   --   --   --   --  24   AST U/L  --   --   --   --   --  28   GLUCOSE, ISTAT mg/dl  --   --   --   --  154*  --     < > = values in this interval not displayed  Micro:   Blood Culture:   Lab Results   Component Value Date    BLOODCX Staphylococcus coagulase negative (A) 11/10/2019    BLOODCX No Growth After 4 Days  11/10/2019    BLOODCX No Growth After 5 Days  01/06/2019    BLOODCX No Growth After 5 Days  01/06/2019    BLOODCX No Growth After 5 Days  01/05/2019    BLOODCX No Growth After 5 Days  01/05/2019    BLOODCX No Growth After 5 Days  12/13/2018    BLOODCX No Growth After 5 Days  12/13/2018     Urine Culture:   Lab Results   Component Value Date    URINECX >100,000 cfu/ml Proteus mirabilis (A) 11/10/2019    URINECX 50,000-59,000 cfu/ml Alcaligenes faecalis (A) 11/10/2019    URINECX >100,000 cfu/ml Pseudomonas aeruginosa (A) 11/10/2019    URINECX >100,000 cfu/ml Escherichia coli (A) 01/06/2019    URINECX >100,000 cfu/ml Providencia rettgeri (A) 01/06/2019    URINECX No Growth <1000 cfu/mL 12/15/2018     Sputum Culture: No components found for: SPUTUMCX  Wound Culure:   Lab Results   Component Value Date    WOUNDCULT 3+ Growth of Pseudomonas aeruginosa (A) 11/10/2019    WOUNDCULT 2+ Growth of Providencia stuartii (A) 11/10/2019    WOUNDCULT 2+ Growth of Diphtheroids 11/10/2019    WOUNDCULT Few Colonies of Pseudomonas aeruginosa (A) 01/06/2019    WOUNDCULT Few Colonies of Escherichia coli (A) 01/06/2019    WOUNDCULT Few Colonies of Pseudomonas aeruginosa (A) 01/06/2019    WOUNDCULT 4+ Growth of Pseudomonas aeruginosa (A) 01/06/2019    WOUNDCULT 3+ Growth of  01/06/2019     Results from last 7 days   Lab Units 11/10/19  1723 11/10/19  1707 11/10/19  1706 11/10/19  0528   BLOOD CULTURE   --  Staphylococcus coagulase negative* No Growth After 4 Days    --    GRAM STAIN RESULT  No polys seen*  2+ Gram negative rods* Gram positive cocci in clusters*  --   -- URINE CULTURE   --   --   --  >100,000 cfu/ml Proteus mirabilis*  50,000-59,000 cfu/ml Alcaligenes faecalis*  >100,000 cfu/ml Pseudomonas aeruginosa*   WOUND CULTURE  3+ Growth of Pseudomonas aeruginosa*  2+ Growth of Providencia stuartii*  2+ Growth of Diphtheroids  --   --   --        Allergies:    Allergies   Allergen Reactions    Iv Contrast [Iodinated Diagnostic Agents]      Tingling face, itching    Cefepime Rash    Ciprofloxacin Rash and Swelling     Looked like suburn, itching  Bed sores  Swelling, itching    Latex Rash    Vancomycin Rash     Unknown        Medications:   Scheduled Meds:    Current Facility-Administered Medications:  acetaminophen 650 mg Oral Q6H PRN Jose Luis Chrystal Howell    bisacodyl 10 mg Rectal Daily Jose Luis Chrystal Howell    calcium carbonate-vitamin D 1 tablet Oral Daily With Breakfast Rosi Galan MD    heparin (porcine) 5,000 Units Subcutaneous Q8H [address] Jose Luis Chrystal Howell    HYDROmorphone 0 5 mg Intravenous Q6H PRN Ronaldo Moyer MD    midodrine 15 mg Oral TID AC Monae Canales MD    ondansetron 4 mg Intravenous Q6H PRN Екатерина Neffs    oxyCODONE 10 mg Oral Q4H PRN Екатерина Neffs    oxyCODONE 5 mg Oral Q4H PRN Jose Luis Chrystal Howell    pantoprazole 40 mg Oral Early Morning Charline Howell    phenylephine  mcg/min Intravenous Titrated Екатерина Neffs Last Rate: 25 mcg/min (11/15/19 0302)   piperacillin-tazobactam 4 5 g Intravenous Q6H Charline Howell Last Rate: Stopped (11/15/19 0325)   saccharomyces boulardii 250 mg Oral BID Jose Luis Chrystal Howell    saliva substitute 5 spray Mouth/Throat PRN Jose Luis Chrystal Howell    senna 1 tablet Oral HS Charline Howell    vancomycin 125 mg Oral Q12H [address] Charline Howell      Facility-Administered Medications Ordered in Other Encounters:  dexamethasone 4 mg Intravenous Once PRN Ana Sprout, DO    diphenhydrAMINE 12 5 mg Intravenous Once Ana Sprout, DO    lactated ringers 75 mL/hr Intravenous Continuous Ana Sprout, DO Last Rate: 75 mL/hr (11/10/19 2105) lactated ringers 50 mL/hr Intravenous Continuous Nimco Aquino CRNA    lactated ringers 75 mL/hr Intravenous Continuous Arnold Marquez DO Last Rate: Stopped (03/20/19 1841)   lactated ringers 75 mL/hr Intravenous Continuous Chrystal Carter MD Last Rate: Stopped (03/20/19 1842)   ondansetron 4 mg Intravenous Once PRN Jarrod Cortez MD    promethazine 12 5 mg Intravenous Once PRN Jarrod Cortez MD      Continuous Infusions:    phenylephine  mcg/min Last Rate: 25 mcg/min (11/15/19 0302)     PRN Meds:    acetaminophen 650 mg Q6H PRN   HYDROmorphone 0 5 mg Q6H PRN   ondansetron 4 mg Q6H PRN   oxyCODONE 10 mg Q4H PRN   oxyCODONE 5 mg Q4H PRN   saliva substitute 5 spray PRN       Invasive lines and devices: Invasive Devices     Peripherally Inserted Central Catheter Line            PICC Line 11/14/19 Left Brachial less than 1 day          Peripheral Intravenous Line            Peripheral IV 11/12/19 Right Forearm 2 days          Drain            Urethral Catheter Latex 16 Fr  4 days                Code Status: Level 1 - Full Code    Counseling / Coordination of Care  Total Critical Care time spent 30 minutes excluding procedures, teaching and family updates            SIGNATURE: Edmond Conroy MD  DATE: November 15, 2019  TIME: 7:00 AM

## 2019-11-15 NOTE — PROGRESS NOTES
Progress Note - Infectious Disease   Delroy Madsen 71 y o  female MRN: [id number]  Unit/Bed#: MICU 09 Encounter: [id number]    Assessment:  Septic shock suspected secondary to L ischial ulceration and osteomyelitis  Pt has wounds to L inferior ischium (ischium exposed), two sacral wounds, and right femoral trochanter wound (greater trochanter exposed)  Meeting sepsis criteria with tachypnea, elevated WBC in the setting of skin infection, requiring tadeo-synephrine for hypotension  Pt received Zosyn and Linezolid on admission  · Currently on Zosyn given wound culture findings of pseudomonas and providencia and on oral vanc for C Diff prophylaxis, will continue these antibiotics  · Plan for at least 2 weeks of IV Zosyn  · Tissue cultures from OR on 11/10/19 with pan sensitive pseudomonas, as well as providencia stuartii  · Blood cultures were drawn 11/10  One blood culture with coagulase negative staph in culture, other culture NG at 48h  Likely contaminant, will not require further workup  · Urine culture collected 11/10 growing >100k Proteus mirabilis and algaligenes faecalis  Pt has chronic indwelling isabel catheter  Will discuss with attending     Hx of wound dehiscence of R hip flap  Earlier this year, pt received 6 weeks IV Ertapenem in 3/2019 secondary to wound dehiscence after R hip flap surgery in 1/2019 with re-closure of wound in 3/2019 with wound cultures at that time growing Providencia rettgeri and E coli      Hx of L femur ORIF infection  OR cultures at that time were positive for corynebacterium from wound and bone, completed 6 week course of IV daptomycin on 2/13/2019      Hx of recent C Diff infection  Recent C diff infection in 4/2019  Will continue C diff prophylaxis with Vancomycin 125 q12h  Pt reports prior reaction of red man syndrome after vancomycin, would not be a concern with oral vancomycin      Abnormal CXR  CXR performed this AM read as bibasilar consolidation  Pt has no new respiratory symptoms but required O2 overnight  Had episode of SOB this AM that has resolved  Clinically pt has no signs or symptoms of pneumonia  Possible fluid overload? Subjective/Objective   Chief Complaint: Pt seen and evaluated today, reports feeling overall better  Pt did not go to OR yesterday due to having eaten applesauce prior  Denies any worsening cough  Reported and episode of SOB this AM but denied any SOB overnight  Pt was noted to desaturate overnight and was started on nasal cannula  Reports 2/10 wound pain, denies abdominal pain, chest pain, fevers, chills, diarrhea, nausea, vomiting  Temp:  [97 6 °F (36 4 °C)-98 °F (36 7 °C)] 97 6 °F (36 4 °C)  HR:  [72-94] 90  Resp:  [19-51] 27  BP: ()/(50-76) 97/60  SpO2:  [86 %-98 %] 95 %  Temp (24hrs), Av 7 °F (36 5 °C), Min:97 6 °F (36 4 °C), Max:98 °F (36 7 °C)  Current: Temperature: 97 6 °F (36 4 °C)    Physical Exam   Constitutional: She is oriented to person, place, and time  She appears well-developed and well-nourished  HENT:   Head: Normocephalic and atraumatic  Nose: Nose normal    Mouth/Throat: Oropharynx is clear and moist    Eyes: Right eye exhibits no discharge  Left eye exhibits no discharge  Cardiovascular: Normal rate, regular rhythm and normal heart sounds  Exam reveals no gallop and no friction rub  No murmur heard  Pulmonary/Chest: Effort normal and breath sounds normal  No respiratory distress  She has no wheezes  She has no rales  Decreased breath sounds at L base   Abdominal: Soft  Bowel sounds are normal  She exhibits no distension  There is no tenderness  There is no guarding  Musculoskeletal: She exhibits deformity  She exhibits no edema  Neurological: She is alert and oriented to person, place, and time  Skin: Skin is warm and dry  Dressing over ischial/hip wounds was left intact  Psychiatric: She has a normal mood and affect   Her speech is normal and behavior is normal    Vitals reviewed  Invasive Devices     Peripherally Inserted Central Catheter Line            PICC Line 11/14/19 Left Brachial 1 day          Peripheral Intravenous Line            Peripheral IV 11/12/19 Right Forearm 2 days          Drain            Urethral Catheter Latex 16 Fr  4 days                Lab, Imaging and other studies: I have personally reviewed pertinent reports        John Lemus DO  Internal Medicine PGY-2  11/15/2019 12:38 PM

## 2019-11-15 NOTE — PROGRESS NOTES
Progress Note - Prachi Cano 1950, 71 y o  female MRN: [id number]    Unit/Bed#: MICU 09 Encounter: [id number]    Primary Care Provider: Kingston Castrejon   Date and time admitted to hospital: 11/10/2019  4:06 PM        * Pressure injury of sacral region, unstageable [facility])  Assessment & Plan  S/P excisional debridement 11/10, 11, and 12   Sepsis 2/2 to above  11/14 Bedside vac placement     - Regular diet   - IVF    - F/U wound cultures- GNR   - palliative care following - ongoing [address] discussion   - ortho signed off: not a candidate for osteo resection   - blood cx: 1/2 GPC in clusters   - ID for abx: Vanc/Zosyn   - Continue Jack as needed for BP support, wean as able   - PRN pain control   - primary per ICU              Subjective/Objective   Subjective: No acute events    Objective:   Blood pressure 102/64, pulse 76, temperature 97 6 °F (36 4 °C), temperature source Oral, resp  rate 19, height 5' (1 524 m), weight 49 9 kg (110 lb 0 2 oz), SpO2 98 %, not currently breastfeeding  ,Body mass index is 21 48 kg/m²  Intake/Output Summary (Last 24 hours) at 11/15/2019 0542  Last data filed at 11/15/2019 0359  Gross per 24 hour   Intake 2621 07 ml   Output 725 ml   Net 1896 07 ml       Invasive Devices     Peripherally Inserted Central Catheter Line            PICC Line 11/14/19 Left Brachial less than 1 day          Peripheral Intravenous Line            Peripheral IV 11/12/19 Right Forearm 2 days          Drain            Urethral Catheter Latex 16 Fr  4 days                Physical Exam:  GEN: NAD  HEENT: MMM  CV: RRR  Lung: Normal effort  Ab: Soft, NT/ND  Extrem: Vac in place, no output  Neuro:  A+Ox3    Lab, Imaging and other studies:  CBC:   No results found for: WBC, HGB, HCT, MCV, PLT, ADJUSTEDWBC, MCH, MCHC, RDW, MPV, NRBC, CMP:   No results found for: SODIUM, K, CL, CO2, ANIONGAP, BUN, CREATININE, GLUCOSE, CALCIUM, AST, ALT, ALKPHOS, PROT, BILITOT, EGFR  VTE Pharmacologic Prophylaxis: Heparin  VTE Mechanical Prophylaxis: sequential compression device

## 2019-11-15 NOTE — PROGRESS NOTES
Attempted to visit patient to provide support and re-visit advanced care planning  However, Viry Band was resting comfortably  She did wake briefly and ask to revisit discussion on Monday 5 wishes left at bedside  Palliative will follow peripherally over the weekend  Please page with acute symptom concerns or need for more urgent goals of care discussion

## 2019-11-15 NOTE — OCCUPATIONAL THERAPY NOTE
[address] Evaluation     Patient Name: Manuel Macdonald  Today's Date: 11/15/2019  Problem List  Principal Problem:    Pressure injury of sacral region, unstageable [facility])  Active Problems:    Paraplegia following spinal cord injury ([facility] [address] )    C  difficile diarrhea    Acute blood loss anemia    Sepsis ([facility] [address] )    Past Medical History  Past Medical History:   Diagnosis Date    Anemia     iron defiencey    Arthritis     osteo    Back injury     Chronic kidney disease     nephritis    Depression     Osteopenia     Osteoporosis     Paralysis ([facility] [address] )     paraplegic due to spinal cord injury    [facility]     Poor circulation     Pressure injury of skin     right hip, stage 3    Pressure sore of hip     right    Tibial plateau fracture     Underweight      Past Surgical History  Past Surgical History:   Procedure Laterality Date    CLOSURE DELAYED PRIMARY N/A 3/20/2019    Procedure: OPHELIA ANAL WOUND RECLOSURE;  Surgeon: Sid Avery MD;  Location: [address] [place] [address];  Service: Plastics    DECUBITUS ULCER EXCISION Bilateral 11/12/2019    Procedure: DEBRIDEMENT DECUBITI ([address] OUT); Surgeon: Vikas Sherman DO;  Location:  MAIN OR;  Service: General    FLAP LOCAL EXTREMITY Left 1/28/2019    Procedure: THIGH FLAP & STSG TO CLOSE HIP WOUND;  Surgeon: Sid Avery MD;  Location: [address] [place] [address] MAIN OR;  Service: Plastics    FLAP LOCAL TRUNK Right 12/17/2018    Procedure: DEBRIDE/FLAP CLOSURE HIP PRESSURE SORE [employer] GRAFT;  Surgeon: Sid Avery MD;  Location: [address] [place] [address] MAIN OR;  Service: Plastics    FLAP LOCAL TRUNK Left 2/27/2019    Procedure: BUTTOCK FLAP TO ISCHIAL SORE;  Surgeon: Sid Avery MD;  Location: [address] [place] [address] OR;  Service: Plastics    HIP DEBRIDEMENT Right 2017    right hip sore debridement    INCISION AND DRAINAGE OF WOUND Left 1/3/2019    Procedure: INCISION AND DRAINAGE (I&D) left distal femur  With deep wound cultures   Application of VAC DRAIN SPONGE;  Surgeon: Marlyn Griffin MD; Location:  MAIN OR;  Service: Orthopedics    IR PICC LINE  12/19/2018    IR PICC LINE  3/12/2019    SC DEBRIDEMENT, SKIN, SUB-Q TISSUE,MUSCLE,BONE,=<20 SQ CM Right 9/19/2018    Procedure: DEBRIDEMENT OF HIP PRESSURE SORE;  Surgeon: Lindsey Alejo MD;  Location: [address] [place] [address] MAIN OR;  Service: Plastics    SC OPEN RX FEMUR FX+INTRAMED FELIX Left 10/22/2018    Procedure: INSERTION NAIL IM LEFT FEMUR RETROGRADE;  Surgeon: Gill Chambers MD;  Location:  MAIN OR;  Service: Orthopedics    TOE AMPUTATION Left 2017    small toe left foot amputation    WISDOM TOOTH EXTRACTION      WOUND DEBRIDEMENT Left 12/17/2018    Procedure: DEBRIDEMENT HIP PRESSURE SORE;  Surgeon: Linsdey Alejo MD;  Location: [address] [place] [address] MAIN OR;  Service: Plastics    WOUND DEBRIDEMENT N/A 11/10/2019    Procedure: EXCISIONAL DEBRIDEMENT;  Surgeon: Nadir Sosa MD;  Location:  MAIN OR;  Service: General             11/15/19 1308   Note Type   Note type Eval/Treat   Restrictions/Precautions   Weight Bearing Precautions Per Order Yes   RLE Weight Bearing Per Order NWB   LLE Weight Bearing Per Order NWB   Other Precautions Multiple lines;Telemetry;O2;Fall Risk;Pain;WBS; Contact/isolation  (wound VAC)   Pain Assessment   Pain Assessment No/denies pain   Pain Score No Pain   Home Living   Type of Home Apartment   Home Layout One level;Performs ADLs on one level; Able to live on main level with bedroom/bathroom  (0STE, 1st floor apt, one-level inside apt)   Bathroom Shower/Tub Tub/shower unit   [employer] Raised   Bathroom Equipment Tub transfer bench;Hand-held shower;Grab bars around toilet   Bathroom Accessibility Accessible via wheelchair   [address] [facility],[address]; Wheelchair-manual   Additional Comments Lives in 1st floor apt w/ 0STE  Tub/shower w/ [facility] and shower bench  Pt has raised toilet w/ gb   Pt uses WC and has RW available   Prior Function   Level of [place] Independent with ADLs and functional mobility   Lives With Alone   Maxi Help From Friend(s); Neighbor   ADL Assistance Independent   IADLs Independent   Falls in the last 6 months 0   Vocational Retired  (recently retired )   Comments Lives alone and reports I w/ ADLs/IADLs  Pt does report going to friend's house to complete laundry but is able to do herself   Lifestyle   Autonomy PTA pt reports I w/ ADLs, IADLs, Mod I for functional mobility w/ use of manual WC, Mod I for functional transfers w/ use of AE, and drives w/ use of hand controls   Reciprocal Relationships lives alone but has a couple friends nearby   Service to Others recently retired    [employer] enjoys volunteering and going to the [facility] [employer] ([employer]) [address]  5  Supervision/Setup   Grooming Assistance 5  Supervision/Setup   [address] [place] [address] 4  [address] [place] [address] 2  [address] [facility] [facility] 4  [address] [facility] [facility] 2  [facility]  2  [address] Unable to assess   Bed Mobility   Rolling R 4  Minimal assistance   Additional items Assist x 1;Bedrails; Increased time required;Verbal cues;LE management   Rolling L 4  Minimal assistance   Additional items Assist x 1;Bedrails; Increased time required;Verbal cues;LE management   Supine to Sit 3  Moderate assistance   Additional items Assist x 2; Increased time required;Verbal cues;LE management; Bedrails   Sit to Supine 3  Moderate assistance   Additional items Assist x 2;Bedrails; Increased time required;Verbal cues;LE management   Additional Comments Pt requires VC for safety and proper technique  Able to roll w/ Min A x 1 and initiates use of bed rails to assist  Pt required Mod A x 2 to move supine to sit/sit to supine  Pt sat EOB for 5 min w/ Min A x 1  Following EOB sitting, pt requested to be placed back in bed due to feeling light headed   Pt placed supine in bed, BP taken reading 114/76  While supine, pt noted to have excess drainage from sacral wound area, RN notified, and dressing changed  Transfers   Sit to Stand Unable to assess  (due to NWB in bilateral legs & 2' h/o paraplegia)   Stand to Sit Unable to assess  (due to NWB in bilateral legs & 2' h/o paraplegia)   Functional Mobility   Additional Comments Unable to assess due to NWB in bilateral legs & 2' h/o paraplegia   Balance   Static Sitting Poor +   Dynamic Sitting Poor +   Static Standing Zero   Activity Tolerance   Activity Tolerance Treatment limited secondary to medical complications (Comment); Patient tolerated treatment well   Medical Staff Made Aware PT   Nurse Made Aware yes, RN Jessica Bryan   RUE Assessment   RUE Assessment WFL   LUE Assessment   LUE Assessment WFL   Hand Function   Gross Motor Coordination Functional   Fine Motor Coordination Functional   Sensation   Light Touch No apparent deficits   Cognition   Overall Cognitive Status WFL   Arousal/Participation Alert; Responsive; Cooperative   Attention Within functional limits   Orientation Level Oriented X4   Memory Within functional limits   Following Commands Follows all commands and directions without difficulty   Comments Pt is pleasant and cooperative  Able to recall home set up/biographical info w/o difficulty  Demonstrates G safety awareness and understands her limitation/deficits from previous injury   Assessment   Limitation Decreased ADL status; Decreased endurance;Decreased self-care trans;Decreased high-level ADLs; Decreased sensation   Prognosis Fair   Assessment Pt is a 62 y/o female seen for OT eval s/p adm to [facility] w/ h/o paraplegia 2' a spinal cord injury in 1981 w/ chronic sacral/ischial wounds  Pt has had multiple flap surgeries throughout 2018 and 2019 and multiple debridements between February and March   Pt reports her wounds have been getting worse and have increased drainage over the past 2 weeks, and she reports she has been feeling weak the past 2 days  Pt dx'd w/ unstageable pressure injury of sacral region  Pt s/p EXCISIONAL DEBRIDEMENT on 11/10/19 and s/p DEBRIDEMENT DECUBITI [facility]) (Bilateral) on 11/12/19  Pt  has a past medical history of Anemia, Arthritis, Back injury, Chronic kidney disease, Depression, Osteopenia, Osteoporosis, Paralysis ([facility] [address] ), Paraplegia ([facility] [address] ), Poor circulation, Pressure injury of skin, Pressure sore of hip, Tibial plateau fracture, and Underweight  Pt is currently NWB in bilateral LE  Pt with active OT orders  Pt lives alone in 1st floor apartment w/ 0STE, and one-level inside  Pt was I w/ ADLS and IADLS, drove w/ use of hand controls, & was required use of manual WC for functional mobility/transfers  Pt able to self-propel and perform functional transfers into car w/ use of transfer board, otherwise uses bilateral UE to bump/scoot self during transfers  Pt is currently demonstrating the following occupational deficits: Min A x 1 for UB ADLs, Max A x 1 for LB ADLs, Mod A x 2 for supine to sit/sit to supine and Min A x 1 for rolling L/R during bed mobility  Not appropriate for functional transfers/mobility at this time  These deficits that are impacting pt's baseline areas of occupation are a result of the following impairments: endurance, activity tolerance, functional mobility, forward functional reach, balance, trunk control, decreased I w/ ADLS/IADLS and LE paralysis, and limited social support  The following Occupational Performance Areas to address include: eating, grooming, bathing/shower, toilet hygiene, dressing, health maintenance, functional mobility, community mobility, clothing management, cleaning, meal prep, household maintenance and job performance/volunteering  Pt scored overall 40/100 on the Barthel Index  Based on the aforementioned OT evaluation, functional performance deficits, and assessments, pt has been identified as a high complexity evaluation  Recommend STR upon D/C   Pt to continue to benefit from acute immediate OT services to address the following goals 1-2x/wk to  within the next 10-14 days:   Goals   Patient Goals to be independent again   LTG Time Frame 10-14   Long Term Goal #1 see below listed goals   Plan   Treatment Interventions ADL retraining;Functional transfer training; Endurance training;Cognitive reorientation;Patient/family training;UE strengthening/ROM; Equipment evaluation/education; Compensatory technique education;Continued evaluation; Energy conservation; Activityengagement   Goal Expiration Date 19   OT Frequency 1-2x/wk   Recommendation   OT Discharge Recommendation Short Term Rehab   OT - OK to Discharge Yes  (once medically stable)   Barthel Index   Feeding 10   Bathing 0   Grooming Score 5   Dressing Score 5   Bladder Score 0   Bowels Score 10   Toilet Use Score 5   Transfers (Bed/Chair) Score 5   Mobility (Level Surface) Score 0   Stairs Score 0   Barthel Index Score 40   Modified Vickery Scale   Modified Cora Scale 5     GOALS  Pt will perform bed mobility with supervision and use of AE/DME as needed to increase functional independence for engagement in meaningful activities  Pt will complete all UB ADLs with supervision and use of AE/DME as needed to increase independence with ADL performance  Pt will complete all LB ADLs with Min A x 1 and use of AE/DME as needed to increase independence with ADL performance  Pt will complete toileting with Min A x 1 and use of AE/DME as needed to increase functional independence  Pt will sit EOB for 15 min to complete ADL task w/ supervision and use of AE/DME as needed to increased sitting tolerance for improved participation in functional seated activities  Pt will initiate use of pressure off-loading techniques 100% of the time to increase safety awareness for increased functional independence  Pt will be AOx4 100% of the time to improve overall engagement in therapeutic activities    Pt will tolerate 25-30 min skilled OT session with no rest breaks to improve overall activity tolerance for increased participation in functional activities      *OTR to see for functional transfers as appropriate    Sweta Martinez, LYNSEY

## 2019-11-15 NOTE — PHYSICAL THERAPY NOTE
Physical Therapy Evaluation     11/15/19 5646   Note Type   Note type Eval only   Pain Assessment   Pain Assessment No/denies pain   Pain Score No Pain   Home Living   Type of Home Apartment   Home Layout One level;Performs ADLs on one level; Able to live on main level with bedroom/bathroom  (0STE, first floor apt)   [address] [facility],[address]; Wheelchair-manual  (Pt uses manual WC at baseline due to h/o SCI)   Additional Comments Pt reports she lives in a 1st floor apt with 0STE  Pt reports she owns a RW and uses a manual WC at baseline due to h/o SCI  Prior Function   Level of [place] Modified independent with wheelchair; Independent with ADLs and functional mobility   Lives With Alone   Receives Help From Friend(s); [address] in the last 6 months 0   Vocational Retired   Comments Pt reports she lives alone in apt, PTA was independent with ADLs, went to a friend's house to do her laundry  Restrictions/Precautions   Weight Bearing Precautions Per Order Yes   RLE Weight Bearing Per Order NWB   LLE Weight Bearing Per Order NWB   Other Precautions Contact/isolation; Bed Alarm;Multiple lines;Telemetry;O2;Fall Risk;Pain;WBS  (wound VAC, decreased skin integrity B/L hips, sacrum)   General   Family/Caregiver Present No   Cognition   Overall Cognitive Status WFL   Arousal/Participation Cooperative   Attention Within functional limits   Orientation Level Oriented X4   Following Commands Follows all commands and directions without difficulty   Comments Pt is pleasant and talkative throughout session, follows all commands without difficulty, and is motivated to get better   Pt with good understanding of safety and current medical status   RLE Assessment   RLE Assessment   (Pt with no active movement of B/L LE due to h/o SCI)   LLE Assessment   LLE Assessment   (Pt with no active movement of B/L LE due to h/o SCI)   Coordination   Movements are Fluid and Coordinated 0 Coordination and Movement Description Pt requires increased time with bed mobility and transfers due to complex wounds on sacrum and B/L hips   Light Touch   RLE Light Touch Impaired   RLE Light Touch Comments Pt reports diminshed senesation in B/L LE due to h/o SCI   LLE Light Touch Impaired   LLE Light Touch Comments Pt reports diminshed senesation in B/L LE due to h/o SCI   Bed Mobility   Rolling R 4  Minimal assistance   Additional items Assist x 1;Bedrails; Increased time required;Verbal cues;LE management   Rolling L 4  Minimal assistance   Additional items Assist x 1;Bedrails; Increased time required;Verbal cues;LE management   Supine to Sit 3  Moderate assistance   Additional items Assist x 2; Increased time required;LE management;Verbal cues   Sit to Supine 3  Moderate assistance   Additional items Assist x 2; Increased time required; Bedrails;LE management;Verbal cues   Balance   Static Sitting Poor +   Dynamic Sitting Poor +   Static Standing Zero   Endurance Deficit   Endurance Deficit Yes   Endurance Deficit Description Pt reports fatigue at end of session, increased dizziness while seated EOB  Activity Tolerance   Activity Tolerance Patient tolerated treatment well;Treatment limited secondary to medical complications (Comment)  (Pt with significant complex sacral/hip wounds)   Medical Staff Made Aware OT, CM, critical care surgery   Nurse Made Aware Yes Andrés   Assessment   Prognosis Fair   Problem List Decreased strength;Decreased endurance; Impaired balance;Decreased mobility; Decreased coordination;Decreased skin integrity;Orthopedic restrictions;Pain; Impaired sensation   Assessment Pt is a 71year old female presenting to B ED on 11/10/19 with generalized weakness and exhaustion  Pt found to have several unstageable wounds on B/L hips, sacrum  Pt now s/p excisional debridement on 11/10/19 and debridement decubiti/washout on 11/12/19  Per ortho, pt NWB B/L LE   Pt with PMH significant for SCI and paraplegia, GERD, L femur fx, osteomyelitis of coccyx, osteopenia, anemia, CKD, depression, and C dif  Pt presents today for initial physical therapy evaluation supine and is agreeable to therapy session  Pt is pleasant and talkative throughout session, follows all commands without difficulty, and is motivated to get better  Pt with good understanding of safety and current medical status  Pt with significant B/L hip/sacral wounds, ok to mobilize per nursing and medical team  Pt rolled L with minAx1 and use of bed rails with B/L UE  Pt transferred supine to sit with modAx2  Pt sat EOB ~5 min with minAx1  While seated EOB pt reports dizziness, requesting to lay back down  Pt returned L sidelying with modAx1  While sidelying, pt sacral wound dressing noted to be loose, STEPAN Iqbal notified  STEPAN Iqbal present for dressing change  Pt rolled R and L for pad and foam placement for offloading  Pt remained in supine position at end of session with all needs within reach, bed alarm on, and STEPAN Iqbal present  PT to recommend LTACH due to complex wounds and decline in ability to independently perform transfers and functional mobility  PT to also recommend no OOB to chair activity at this time due to complex wounds, unless the following is available: tilt in space chair with pressure relieving cushion and ability to off-load every 15 min  PT to recommend [place] bed to maintain pt skin integrity, charge nurse notified of bed recommendation  PT to follow    Barriers to Discharge Decreased caregiver support   Goals   Patient Goals to get better   STG Expiration Date 11/29/19   Short Term Goal #1 In 10 sessions pt will: 1  Roll R and L in bed with supervision and use of bed rails if needed 2  Transfer supine to sit with minAx1 and use of bedrails if needed 3  Sit EOB ~10 min with supervision 4   Increase activity tolerance to >25 min    PT Treatment Day 0   Plan   Treatment/Interventions Functional transfer training;LE strengthening/ROM; Therapeutic exercise; Endurance training;Bed mobility;Spoke to nursing;Spoke to case management;Spoke to advanced practitioner;OT   PT Frequency 1-2x/wk   Recommendation   Recommendation   (LTACH for complex wounds and restoration of mobility)   PT - OK to Discharge Yes  (when medically stable)   Modified [place] Scale   Modified [place] Scale 4   Barthel Index   Feeding 10   Bathing 0   Grooming Score 5   Dressing Score 5   Bladder Score 0   Bowels Score 10   Toilet Use Score 5   Transfers (Bed/Chair) Score 5   Mobility (Level Surface) Score 0   Stairs Score 0   Barthel Index Score 40     Shalom, SPT

## 2019-11-15 NOTE — PLAN OF CARE
Problem: Potential for Falls  Goal: Patient will remain free of falls  Description  INTERVENTIONS:  - Assess patient frequently for physical needs  -  Identify cognitive and physical deficits and behaviors that affect risk of falls    -  [place] fall precautions as indicated by assessment   - Educate patient/family on patient safety including physical limitations  - Instruct patient to call for assistance with activity based on assessment  - Modify environment to reduce risk of injury  - Consider OT/PT consult to assist with strengthening/mobility  Outcome: Progressing     Problem: PAIN - ADULT  Goal: Verbalizes/displays adequate comfort level or baseline comfort level  Description  Interventions:  - Encourage patient to monitor pain and request assistance  - Assess pain using appropriate pain scale  - Administer analgesics based on type and severity of pain and evaluate response  - Implement non-pharmacological measures as appropriate and evaluate response  - Consider cultural and social influences on pain and pain management  - Notify physician/advanced practitioner if interventions unsuccessful or patient reports new pain  Outcome: Progressing     Problem: INFECTION - ADULT  Goal: Absence or prevention of progression during hospitalization  Description  INTERVENTIONS:  - Assess and monitor for signs and symptoms of infection  - Monitor lab/diagnostic results  - Monitor all insertion sites, i e  indwelling lines, tubes, and drains  - Monitor endotracheal if appropriate and nasal secretions for changes in amount and color  - [place] appropriate cooling/warming therapies per order  - Administer medications as ordered  - Instruct and encourage patient and family to use good hand hygiene technique  - Identify and instruct in appropriate isolation precautions for identified infection/condition  Outcome: Progressing  Goal: Absence of fever/infection during neutropenic period  Description  INTERVENTIONS:  - Monitor WBC    Outcome: Progressing     Problem: DISCHARGE PLANNING  Goal: Discharge to home or other facility with appropriate resources  Description  INTERVENTIONS:  - Identify barriers to discharge w/patient and caregiver  - Arrange for needed discharge resources and transportation as appropriate  - Identify discharge learning needs (meds, wound care, etc )  - Arrange for interpretive services to assist at discharge as needed  - Refer to Case Management Department for coordinating discharge planning if the patient needs post-hospital services based on physician/advanced practitioner order or complex needs related to functional status, cognitive ability, or social support system  Outcome: Progressing     Problem: Knowledge Deficit  Goal: Patient/family/caregiver demonstrates understanding of disease process, treatment plan, medications, and discharge instructions  Description  Complete learning assessment and assess knowledge base  Interventions:  - Provide teaching at level of understanding  - Provide teaching via preferred learning methods  Outcome: Progressing     Problem: Nutrition/Hydration-ADULT  Goal: Nutrient/Hydration intake appropriate for improving, restoring or maintaining nutritional needs  Description  Monitor and assess patient's nutrition/hydration status for malnutrition  Collaborate with interdisciplinary team and initiate plan and interventions as ordered  Monitor patient's weight and dietary intake as ordered or per policy  Utilize nutrition screening tool and intervene as necessary  Determine patient's food preferences and provide high-protein, high-caloric foods as appropriate       INTERVENTIONS:  - Monitor oral intake, urinary output, labs, and treatment plans  - Assess nutrition and hydration status and recommend course of action  - Evaluate amount of meals eaten  - Assist patient with eating if necessary   - Allow adequate time for meals  - Recommend/ encourage appropriate diets, oral nutritional supplements, and vitamin/mineral supplements  - Order, calculate, and assess calorie counts as needed  - Recommend, monitor, and adjust tube feedings and TPN/PPN based on assessed needs  - Assess need for intravenous fluids  - Provide specific nutrition/hydration education as appropriate  - Include patient/family/caregiver in decisions related to nutrition  Outcome: Progressing     Problem: SKIN/TISSUE INTEGRITY - ADULT  Goal: Incision(s), wounds(s) or drain site(s) healing without S/S of infection  Description  INTERVENTIONS  - Assess and document risk factors for skin impairment   - Assess and document dressing, incision, wound bed, drain sites and surrounding tissue  - Consider nutrition services referral as needed  - Oral mucous membranes remain intact  - Provide patient/ family education  Outcome: Progressing  Goal: Oral mucous membranes remain intact  Description  INTERVENTIONS  - Assess oral mucosa and hygiene practices  - Implement preventative oral hygiene regimen  - Implement oral medicated treatments as ordered  - Initiate Nutrition services referral as needed  Outcome: Progressing     Problem: CARDIOVASCULAR - ADULT  Goal: Maintains optimal cardiac output and hemodynamic stability  Description  INTERVENTIONS:  - Monitor I/O, vital signs and rhythm  - Monitor for S/S and trends of decreased cardiac output  - Administer and titrate ordered vasoactive medications to optimize hemodynamic stability  - Assess quality of pulses, skin color and temperature  - Assess for signs of decreased coronary artery perfusion  - Instruct patient to report change in severity of symptoms  Outcome: Progressing

## 2019-11-15 NOTE — PROGRESS NOTES
11/15/19 1300   Clinical Encounter Type   Visited With Patient   Routine Visit Follow-up   Taoist Encounters   Taoist Needs Prayer   Sacramental Encounters   Sacrament of Sick-Anointing Anointed

## 2019-11-16 LAB
ANION GAP SERPL CALCULATED.3IONS-SCNC: 7 MMOL/L (ref 4–13)
BASOPHILS # BLD AUTO: 0.05 THOUSANDS/ΜL (ref 0–0.1)
BASOPHILS NFR BLD AUTO: 1 % (ref 0–1)
BUN SERPL-MCNC: 8 MG/DL (ref 5–25)
CALCIUM SERPL-MCNC: 8.1 MG/DL (ref 8.3–10.1)
CHLORIDE SERPL-SCNC: 109 MMOL/L (ref 100–108)
CO2 SERPL-SCNC: 26 MMOL/L (ref 21–32)
CREAT SERPL-MCNC: 0.56 MG/DL (ref 0.6–1.3)
EOSINOPHIL # BLD AUTO: 0.15 THOUSAND/ΜL (ref 0–0.61)
EOSINOPHIL NFR BLD AUTO: 2 % (ref 0–6)
ERYTHROCYTE [DISTWIDTH] IN BLOOD BY AUTOMATED COUNT: 21.1 % (ref 11.6–15.1)
GFR SERPL CREATININE-BSD FRML MDRD: 95 ML/MIN/1.73SQ M
GLUCOSE SERPL-MCNC: 114 MG/DL (ref 65–140)
HCT VFR BLD AUTO: 30.2 % (ref 34.8–46.1)
HGB BLD-MCNC: 9.1 G/DL (ref 11.5–15.4)
IMM GRANULOCYTES # BLD AUTO: 0.09 THOUSAND/UL (ref 0–0.2)
IMM GRANULOCYTES NFR BLD AUTO: 1 % (ref 0–2)
LYMPHOCYTES # BLD AUTO: 1.26 THOUSANDS/ΜL (ref 0.6–4.47)
LYMPHOCYTES NFR BLD AUTO: 16 % (ref 14–44)
MAGNESIUM SERPL-MCNC: 2.1 MG/DL (ref 1.6–2.6)
MCH RBC QN AUTO: 25.3 PG (ref 26.8–34.3)
MCHC RBC AUTO-ENTMCNC: 30.1 G/DL (ref 31.4–37.4)
MCV RBC AUTO: 84 FL (ref 82–98)
MONOCYTES # BLD AUTO: 0.54 THOUSAND/ΜL (ref 0.17–1.22)
MONOCYTES NFR BLD AUTO: 7 % (ref 4–12)
NEUTROPHILS # BLD AUTO: 5.6 THOUSANDS/ΜL (ref 1.85–7.62)
NEUTS SEG NFR BLD AUTO: 73 % (ref 43–75)
NRBC BLD AUTO-RTO: 0 /100 WBCS
PHOSPHATE SERPL-MCNC: 4.4 MG/DL (ref 2.3–4.1)
PLATELET # BLD AUTO: 288 THOUSANDS/UL (ref 149–390)
PMV BLD AUTO: 10.2 FL (ref 8.9–12.7)
POTASSIUM SERPL-SCNC: 3.7 MMOL/L (ref 3.5–5.3)
RBC # BLD AUTO: 3.6 MILLION/UL (ref 3.81–5.12)
SODIUM SERPL-SCNC: 142 MMOL/L (ref 136–145)
WBC # BLD AUTO: 7.69 THOUSAND/UL (ref 4.31–10.16)

## 2019-11-16 PROCEDURE — 84100 ASSAY OF PHOSPHORUS: CPT | Performed by: PHYSICIAN ASSISTANT

## 2019-11-16 PROCEDURE — 2W0MX6Z CHANGE PRESSURE DRESSING ON LEFT LOWER EXTREMITY: ICD-10-PCS | Performed by: SURGERY

## 2019-11-16 PROCEDURE — 83735 ASSAY OF MAGNESIUM: CPT | Performed by: PHYSICIAN ASSISTANT

## 2019-11-16 PROCEDURE — 3E0336Z INTRODUCTION OF NUTRITIONAL SUBSTANCE INTO PERIPHERAL VEIN, PERCUTANEOUS APPROACH: ICD-10-PCS | Performed by: SURGERY

## 2019-11-16 PROCEDURE — 80048 BASIC METABOLIC PNL TOTAL CA: CPT | Performed by: PHYSICIAN ASSISTANT

## 2019-11-16 PROCEDURE — 2W0LX6Z CHANGE PRESSURE DRESSING ON RIGHT LOWER EXTREMITY: ICD-10-PCS | Performed by: SURGERY

## 2019-11-16 PROCEDURE — NC001 PR NO CHARGE: Performed by: SURGERY

## 2019-11-16 PROCEDURE — 99232 SBSQ HOSP IP/OBS MODERATE 35: CPT | Performed by: SURGERY

## 2019-11-16 PROCEDURE — 85025 COMPLETE CBC W/AUTO DIFF WBC: CPT | Performed by: PHYSICIAN ASSISTANT

## 2019-11-16 RX ORDER — MELATONIN
1000 DAILY
Status: DISCONTINUED | OUTPATIENT
Start: 2019-11-16 | End: 2019-11-20 | Stop reason: HOSPADM

## 2019-11-16 RX ORDER — POTASSIUM CHLORIDE 20MEQ/15ML
40 LIQUID (ML) ORAL ONCE
Status: COMPLETED | OUTPATIENT
Start: 2019-11-16 | End: 2019-11-16

## 2019-11-16 RX ORDER — DIPHENOXYLATE HYDROCHLORIDE AND ATROPINE SULFATE 2.5; .025 MG/1; MG/1
1 TABLET ORAL DAILY
Status: DISCONTINUED | OUTPATIENT
Start: 2019-11-16 | End: 2019-11-16

## 2019-11-16 RX ADMIN — POTASSIUM CHLORIDE 40 MEQ: 1.5 SOLUTION ORAL at 08:07

## 2019-11-16 RX ADMIN — Medication 250 MG: at 08:08

## 2019-11-16 RX ADMIN — MELATONIN 1000 UNITS: at 13:17

## 2019-11-16 RX ADMIN — HEPARIN SODIUM 5000 UNITS: 5000 INJECTION INTRAVENOUS; SUBCUTANEOUS at 13:18

## 2019-11-16 RX ADMIN — SENNOSIDES 8.6 MG: 8.6 TABLET, FILM COATED ORAL at 22:58

## 2019-11-16 RX ADMIN — HEPARIN SODIUM 5000 UNITS: 5000 INJECTION INTRAVENOUS; SUBCUTANEOUS at 22:57

## 2019-11-16 RX ADMIN — PANTOPRAZOLE SODIUM 40 MG: 40 TABLET, DELAYED RELEASE ORAL at 08:08

## 2019-11-16 RX ADMIN — Medication 1 TABLET: at 13:17

## 2019-11-16 RX ADMIN — PIPERACILLIN AND TAZOBACTAM 4.5 G: 4; .5 INJECTION, POWDER, LYOPHILIZED, FOR SOLUTION INTRAVENOUS at 03:45

## 2019-11-16 RX ADMIN — PIPERACILLIN AND TAZOBACTAM 4.5 G: 4; .5 INJECTION, POWDER, LYOPHILIZED, FOR SOLUTION INTRAVENOUS at 09:57

## 2019-11-16 RX ADMIN — Medication 125 MG: at 08:08

## 2019-11-16 RX ADMIN — Medication 1 TABLET: at 08:08

## 2019-11-16 RX ADMIN — PIPERACILLIN AND TAZOBACTAM 4.5 G: 4; .5 INJECTION, POWDER, LYOPHILIZED, FOR SOLUTION INTRAVENOUS at 20:48

## 2019-11-16 RX ADMIN — ALTEPLASE 2 MG: 2.2 INJECTION, POWDER, LYOPHILIZED, FOR SOLUTION INTRAVENOUS at 22:57

## 2019-11-16 RX ADMIN — MIDODRINE HYDROCHLORIDE 15 MG: 5 TABLET ORAL at 13:17

## 2019-11-16 RX ADMIN — MIDODRINE HYDROCHLORIDE 15 MG: 5 TABLET ORAL at 08:07

## 2019-11-16 RX ADMIN — Medication 250 MG: at 15:40

## 2019-11-16 RX ADMIN — HEPARIN SODIUM 5000 UNITS: 5000 INJECTION INTRAVENOUS; SUBCUTANEOUS at 06:22

## 2019-11-16 RX ADMIN — PIPERACILLIN AND TAZOBACTAM 4.5 G: 4; .5 INJECTION, POWDER, LYOPHILIZED, FOR SOLUTION INTRAVENOUS at 15:40

## 2019-11-16 RX ADMIN — VITAM B12 100 MCG: 100 TAB at 15:40

## 2019-11-16 RX ADMIN — MIDODRINE HYDROCHLORIDE 15 MG: 5 TABLET ORAL at 22:58

## 2019-11-16 NOTE — PROGRESS NOTES
Daily Progress Note - Critical Care   Angelica Pereira 71 y o  female MRN: [id number]  Unit/Bed#: MICU 09 Encounter: [id number]        ----------------------------------------------------------------------------------------  HPI/24hr events:  No significant 24 hour events  Occasional borderline low blood pressure not requiring reinstitution of pressors  ---------------------------------------------------------------------------------------  SUBJECTIVE  I am confused"  Patient states she is confused as to the plan  This was explained to her and she stated understanding  Denies pain or any other complaints  Review of Systems  Review of systems was reviewed and negative unless stated above in HPI/24-hour events   ---------------------------------------------------------------------------------------  Assessment and Plan:    Neuro:    Diagnosis:  Paraplegia status post spinal cord injury  o Plan:  Unchanged  Flaccid bilateral lower extremity paralysis  Patient denies pain and has not required p r n  Pain medication  Continue to monitor closely  Frequent neurologic exams, daily CAM ICU, delirium precautions  CV:    Diagnosis:  Transient hypotension  o Plan:  Mild hypotension overnight with mean arterial pressures high 50s to 60s  Has not required restarting Jack-Synephrine and has been off since last evening  Patient asymptomatic with episodes and has no tachycardia  Continue to monitor closely  Attempt to avoid pressors  Continue midodrine 15 mg t i d  PICC line intact  Pulm:   Diagnosis:  No acute events  o Plan:  Doing well on room air  Continue pulmonary toilet  Encourage deep breathing, coughing and incentive spirometry  GI:    Diagnosis:  No acute events  o Plan:  Abdominal exam unremarkable  Continue regular diet with Ensure supplements  Encouraged p o  Intake for healing  Continue bowel regimen  Continue home PPI  :    Diagnosis:  CKD    o Plan: No evidence of a KI  Good urine output  Hays catheter to remain in place due to sacral wounds  F/E/N:    Plan:  Mild hypokalemia, will replete  Remaining electrolytes unremarkable  No indication for maintenance fluids  Heme/Onc:    Diagnosis:  Chronic anemia  o Plan:  H&H, platelets stable  Continue subcutaneous heparin and SCDs for DVT prophylaxis  Endo:    Diagnosis:  No acute issues  o Plan:  Blood glucose normal   Continue to monitor  ID:    Diagnosis:  Sepsis secondary to necrotic pressure ulcers of sacrum and buttocks  o Plan:  Still with mild episodes of hypotension  Surgery following and continuing multiple debridements of multiple wounds  Id following, continue IV Zosyn x2 week  Chronic C diff colitis, continue p o  Vancomycin  Blood cultures show contaminant only  Chronic ischial osteomyelitis  Orthopedics consulted and patient not candidate for surgical intervention  Follow-up CBC this morning  MSK/Skin:    Diagnosis:  Multiple decubitus ulcers of the sacrum and buttocks  o Plan:  Surgery following for wound management  VAC in place with 350 mL output  Plan for bedside VAC change today  Continue frequent repositioning and offloading  Out of bed as tolerated  Disposition: Continue Critical Care   Code Status: Level 1 - Full Code  ---------------------------------------------------------------------------------------  ICU CORE MEASURES    Prophylaxis   VTE Pharmacologic Prophylaxis: Heparin  VTE Mechanical Prophylaxis: sequential compression device  Stress Ulcer Prophylaxis: Pantoprazole PO    ABCDE Protocol (if indicated)  Plan to perform spontaneous awakening trial today? Not applicable  Plan to perform spontaneous breathing trial today? Not applicable  Obvious barriers to extubation?  Not applicable  CAM-ICU: Negative    Invasive Devices Review  Invasive Devices     Peripherally Inserted Central Catheter Line            PICC Line 11/14/19 Left Brachial 1 day          Peripheral Intravenous Line            Peripheral IV 19 Right Forearm 3 days          Drain            Urethral Catheter Temperature probe less than 1 day              Can any invasive devices be discontinued today? No (provide explanation):  PICC line required for pressors, lack of access, long-term antibiotics  Hays catheter to remain secondary to severe sacral and buttock wounds  ---------------------------------------------------------------------------------------  OBJECTIVE    Physical Exam   Constitutional: She is oriented to person, place, and time  She appears cachectic  She is cooperative  Non-toxic appearance  She has a sickly appearance  She appears ill  No distress  Cardiovascular: Normal rate, regular rhythm, intact distal pulses and normal pulses  Pulmonary/Chest: Effort normal and breath sounds normal    Abdominal: Soft  Normal appearance  She exhibits no distension  There is no tenderness  There is no rigidity and no guarding  Neurological: She is alert and oriented to person, place, and time  GCS eye subscore is 4  GCS verbal subscore is 5  GCS motor subscore is 6  Flaccid paralysis bilateral lower extremities  Skin: Skin is warm and dry  Buttock and sacral wound dressings clean, dry and intact  VAC intact         Vitals   Vitals:    19 0235 19 0300 19 0400 19 0430   BP: (!) 83/54 (!) 84/58 (!) 84/53 (!) 85/57   Pulse: 78 86 84 86   Resp: (!) 35 (!) 26 (!) 30 (!) 24   Temp: (!) 96 4 °F (35 8 °C) (!) 96 4 °F (35 8 °C) (!) 97 2 °F (36 2 °C) (!) 97 2 °F (36 2 °C)   TempSrc:       SpO2: 97% 94% 94% 94%   Weight:       Height:         Temp (24hrs), Av 1 °F (36 2 °C), Min:96 1 °F (35 6 °C), Max:98 °F (36 7 °C)  Current: Temperature: (!) 97 2 °F (36 2 °C)  HR:  84  BP:  87/51  RR:  17  SpO2:  96%    Invasive/non-invasive ventilation settings   Respiratory    Lab Data (Last 4 hours)    None         O2/Vent Data (Last 4 hours) None                Height and Weights   Height: 5' (152 4 cm)  IBW: 45 5 kg  Body mass index is 21 48 kg/m²  Weight (last 2 days)     Date/Time   Weight    11/15/19 0532   49 9 (110 01)    11/14/19 0530   51 9 (114 42)                Intake and Output  I/O       11/14 0701 - 11/15 0700 11/15 0701 - 11/16 0700    P  O   400    I V  (mL/kg) 1937 1 (38 8) 152 9 (3 1)    IV Piggyback 500 625    Total Intake(mL/kg) 2437 1 (48 8) 1177 9 (23 6)    Urine (mL/kg/hr) 750 (0 6) 2364 (2)    Other  350    Stool 0 0    Total Output 750 2714    Net +1687 1 -1536 1          Unmeasured Stool Occurrence 1 x 4 x        UOP:  2 0 ml/kg/hr     Nutrition       Diet Orders   (From admission, onward)             Start     Ordered    11/14/19 1553  Dietary nutrition supplements  Once     Question Answer Comment   Select Supplement: Ensure Enlive-Strawberry    Frequency Breakfast, Lunch, Dinner        11/14/19 1552    11/14/19 1354  Diet Regular; Regular House  Diet effective now     Question Answer Comment   Diet Type Regular    Regular Regular House    RD to adjust diet per protocol?  Yes        11/14/19 1353                  Laboratory and Diagnostics:  Results from last 7 days   Lab Units 11/15/19  0532 11/14/19  0540 11/13/19  0534 11/12/19  0443 11/11/19  0515 11/11/19  0312 11/10/19  2245  11/10/19  1706   WBC Thousand/uL 10 85* 11 97* 13 22* 14 45* 19 96*  --  25 75*  --  21 20*   HEMOGLOBIN g/dL 9 2* 9 8* 10 8* 11 2* 11 2* 10 3* 5 5*  --  9 1*   I STAT HEMOGLOBIN   --   --   --   --   --   --   --    < >  --    HEMATOCRIT % 29 9* 31 4* 33 6* 34 8 35 0 31 8* 18 3*  --  30 6*   HEMATOCRIT, ISTAT   --   --   --   --   --   --   --    < >  --    PLATELETS Thousands/uL 283 376 401* 455* 480* 406* 507*  --  791*   NEUTROS PCT % 71 69 72 78*  --   --   --   --  87*   BANDS PCT %  --   --   --   --   --   --  7  --   --    MONOS PCT % 6 7 7 4  --   --   --   --  5   MONO PCT %  --   --   --   --   --   --  4  --   --     < > = values in this interval not displayed  Results from last 7 days   Lab Units 11/15/19  0532 11/14/19  0540 11/13/19  0534 11/12/19  0443 11/11/19  1409 11/11/19  0515 11/10/19  2245  11/10/19  1706   SODIUM mmol/L 141 139 139 138 136 136 134*  --  132*   POTASSIUM mmol/L 4 0 4 2 3 4* 3 6 3 9 3 6 3 6  --  4 0   CHLORIDE mmol/L 107 109* 107 108 106 107 104  --  97*   CO2 mmol/L 24 22 22 22 19* 17* 19*  --  22   CO2, I-STAT   --   --   --   --   --   --   --    < >  --    ANION GAP mmol/L 10 8 10 8 11 12 11  --  13   BUN mg/dL 9 8 6 9 7 8 10  --  15   CREATININE mg/dL 0 39* 0 39* 0 34* 0 43* 0 34* 0 30* 0 31*  --  0 52*   CALCIUM mg/dL 7 4* 7 5* 7 0* 7 0* 7 3* 7 4* 8 0*  --  8 8   GLUCOSE RANDOM mg/dL 122 113 108 123 120 130 146*  --  112   ALT U/L  --   --   --   --   --   --   --   --  24   AST U/L  --   --   --   --   --   --   --   --  28   ALK PHOS U/L  --   --   --   --   --   --   --   --  139*   ALBUMIN g/dL  --   --   --   --   --   --   --   --  1 7*   TOTAL BILIRUBIN mg/dL  --   --   --   --   --   --   --   --  0 60    < > = values in this interval not displayed       Results from last 7 days   Lab Units 11/13/19  0534 11/12/19  0443 11/10/19  2245   MAGNESIUM mg/dL  --  1 9 1 6   PHOSPHORUS mg/dL 2 7 2 0* 3 9      Results from last 7 days   Lab Units 11/10/19  1706   INR  1 40*   PTT seconds 39*          Results from last 7 days   Lab Units 11/11/19  0312 11/10/19  2245 11/10/19  1706   LACTIC ACID mmol/L 1 8 2 1* 1 7     ABG:  Results from last 7 days   Lab Units 11/11/19  1604   PH ART  7 450   PCO2 ART mm Hg 28 2*   PO2 ART mm Hg 69 8*   HCO3 ART mmol/L 19 2*   BASE EXC ART mmol/L -3 7   ABG SOURCE  Line, Arterial     VBG:  Results from last 7 days   Lab Units 11/11/19  1604  11/10/19  2245   PH BHAVANA   --   --  7 379   PCO2 BHAVANA mm Hg  --   --  30 9*   PO2 BHAVANA mm Hg  --   --  226 3*   HCO3 BHAVANA mmol/L  --   --  17 8*   BASE EXC BHAVANA mmol/L  --   --  -6 7   ABG SOURCE  Line, Arterial   < >  --     < > = values in this interval not displayed  Results from last 7 days   Lab Units 11/10/19  1706   PROCALCITONIN ng/ml 2 91*       Micro  Results from last 7 days   Lab Units 11/14/19  1115 11/10/19  1723 11/10/19  1707 11/10/19  1706 11/10/19  0528   BLOOD CULTURE   --   --  Staphylococcus coagulase negative* No Growth After 5 Days  --    GRAM STAIN RESULT   --  No polys seen*  2+ Gram negative rods* Gram positive cocci in clusters*  --   --    URINE CULTURE   --   --   --   --  >100,000 cfu/ml Proteus mirabilis*  50,000-59,000 cfu/ml Alcaligenes faecalis*  >100,000 cfu/ml Pseudomonas aeruginosa*   WOUND CULTURE   --  3+ Growth of Pseudomonas aeruginosa*  2+ Growth of Providencia stuartii*  2+ Growth of Diphtheroids  --   --   --    MRSA CULTURE ONLY  No Methicillin Resistant Staphlyococcus aureus (MRSA) isolated  --   --   --   --        EKG:  None today  Imaging:  None today  I have personally reviewed pertinent reports     and I have personally reviewed pertinent films in PACS    Active Medications  Scheduled Meds:  Current Facility-Administered Medications:  acetaminophen 650 mg Oral Q6H PRN Ryan Howell    bisacodyl 10 mg Rectal Daily Ryan Howell    calcium carbonate-vitamin D 1 tablet Oral Daily With Breakfast Mckay Luo MD    heparin (porcine) 5,000 Units Subcutaneous Q8H [address] Ryan Nguyễn Howell    HYDROmorphone 0 5 mg Intravenous Q6H PRN Austin Moyer MD    midodrine 15 mg Oral Q8H Shyann Umanzor PA-C    ondansetron 4 mg Intravenous Q6H PRN Rosalea Beath    oxyCODONE 10 mg Oral Q4H PRN Rosalea Beath    oxyCODONE 5 mg Oral Q4H PRN Ryan Howell    pantoprazole 40 mg Oral Early Morning Charline Howell    phenylephine  mcg/min Intravenous Titrated Rosalea Beath Last Rate: Stopped (11/15/19 1920)   piperacillin-tazobactam 4 5 g Intravenous Q6H Charline Howell Last Rate: Stopped (11/16/19 0415)   saccharomyces boulardii 250 mg Oral BID Ryan Howell    saliva substitute 5 spray Mouth/Throat PRN Salvador Durham    senna 1 tablet Oral HS Cy Jessica Louiseo    vancomycin 125 mg Oral Q12H [address] Charline Howell      Facility-Administered Medications Ordered in Other Encounters:  dexamethasone 4 mg Intravenous Once PRN Elveria Hillock, DO    diphenhydrAMINE 12 5 mg Intravenous Once Elveria Hillock, DO    lactated ringers 75 mL/hr Intravenous Continuous Elveria Hillock, DO Last Rate: 75 mL/hr (11/10/19 2105)   lactated ringers 50 mL/hr Intravenous Continuous Emilee Pinedo, ALEXANDRA    lactated ringers 75 mL/hr Intravenous Continuous Elveria Hillock, DO Last Rate: Stopped (03/20/19 1841)   lactated ringers 75 mL/hr Intravenous Continuous Dariusz Hannah MD Last Rate: Stopped (03/20/19 1842)   ondansetron 4 mg Intravenous Once PRN Hope Lucero MD    promethazine 12 5 mg Intravenous Once PRN Hope Lucero MD      Continuous Infusions:    phenylephine  mcg/min Last Rate: Stopped (11/15/19 1920)     PRN Meds:     acetaminophen 650 mg Q6H PRN   HYDROmorphone 0 5 mg Q6H PRN   ondansetron 4 mg Q6H PRN   oxyCODONE 10 mg Q4H PRN   oxyCODONE 5 mg Q4H PRN   saliva substitute 5 spray PRN       Allergies   Allergies   Allergen Reactions    Iv Contrast [Iodinated Diagnostic Agents]      Tingling face, itching    Cefepime Rash    Ciprofloxacin Rash and Swelling     Looked like suburn, itching  Bed sores  Swelling, itching    Latex Rash    Vancomycin Rash     Unknown        Advance Directive and Living Will:      Power of :    POLST:        Counseling / Coordination of Care  Total Critical Care time spent 39 minutes excluding procedures, teaching and family updates  Madhuri Agarwal PA-C        Portions of the record may have been created with voice recognition software  Occasional wrong word or "sound a like" substitutions may have occurred due to the inherent limitations of voice recognition software    Read the chart carefully and recognize, using context, where substitutions have occurred

## 2019-11-16 NOTE — NURSING NOTE
Report called to Gavin YING on p9  Patient to be transported via [place] bed by transport team with gabriel Chacon at bedside and updated on patient transfer

## 2019-11-16 NOTE — PROGRESS NOTES
Progress Note - Karla Lundy 1950, 71 y o  female MRN: [id number]    [facility] [id number]    Primary Care Provider: Marci Craig   Date and time admitted to hospital: 11/10/2019  4:06 PM        * Pressure injury of sacral region, unstageable [facility])  Assessment & Plan  S/P excisional debridement 11/10, 11, and 12  Sepsis 2/2 to above     - Regular diet   - bedside VAC change today   - wound cx: pseudomonas, providencia   - palliative care following - ongoing [address] discussion   - ortho signed off: not a candidate for osteo resection   - ID for abx: [email or web address]/zosyn   - Continue midrodrine   - PRN pain control   - if BP maintained without pressors consider transfer out of unit            Subjective/Objective   Chief Complaint:     Subjective: JOSE LUIS  Resting comfortably  +BMs  VAC in place  Off tadeo  Objective:     Blood pressure (!) 85/57, pulse 86, temperature (!) 97 2 °F (36 2 °C), resp  rate (!) 24, height 5' (1 524 m), weight 49 9 kg (110 lb 0 2 oz), SpO2 94 %, not currently breastfeeding  ,Body mass index is 21 48 kg/m²  Intake/Output Summary (Last 24 hours) at 11/16/2019 0616  Last data filed at 11/16/2019 0400  Gross per 24 hour   Intake 1047 93 ml   Output 1639 ml   Net -591 07 ml       Invasive Devices     Peripherally Inserted Central Catheter Line            PICC Line 11/14/19 Left Brachial 1 day          Peripheral Intravenous Line            Peripheral IV 11/12/19 Right Forearm 3 days          Drain            Urethral Catheter Temperature probe less than 1 day                Physical Exam: NAD  Atraumatic/normocephalic  Resting comfortably  Normal mood and affect  Normal respiratory effort  Soft, non tender, ND  Skin warm/dry  VAC to suction  Cap refil <2 sec      Lab, Imaging and other studies:I have personally reviewed pertinent lab results    , CBC: No results found for: WBC, HGB, HCT, MCV, PLT, ADJUSTEDWBC, MCH, MCHC, RDW, MPV, NRBC, CMP: No results found for: SODIUM, K, CL, CO2, ANIONGAP, BUN, CREATININE, GLUCOSE, CALCIUM, AST, ALT, ALKPHOS, PROT, BILITOT, EGFR  VTE Pharmacologic Prophylaxis: Heparin  VTE Mechanical Prophylaxis: sequential compression device

## 2019-11-16 NOTE — QUICK NOTE
V  A C  Change Note/Wound care General Surgery    Date: 11/16/19     Location of wound: buttocks    Dimensions of wound: 11 cm x 7 cm x 2 cm left inferior  Left superior [id number]  Right sacral [id number] 5cm    Description of wound: healthy wound beds, some granulation tissue    Sponges removed:  6 Black Sponges  0 White Sponges    Sponges placed:  3 Black Sponges  0 White Sponges    VAC settings:  125 mmHg  Continuous     Left sided wounds VAC'd and bridged to abdomen  Maxorb applied to right sacral wound  1 wet to dry Kerlix to right flank wounds which tunnel to connect to each other, covered in trauma ABD  Patient insensate and tolerated procedure well  It took two surgical residents and a medical student 30 minutes to complete VAC changes and wound care  Next VAC change 11/18      Betty De Jesus MD   PGY-3 General Surgery

## 2019-11-17 LAB
ANION GAP SERPL CALCULATED.3IONS-SCNC: 7 MMOL/L (ref 4–13)
BASOPHILS # BLD AUTO: 0.06 THOUSANDS/ΜL (ref 0–0.1)
BASOPHILS NFR BLD AUTO: 1 % (ref 0–1)
BUN SERPL-MCNC: 8 MG/DL (ref 5–25)
CALCIUM SERPL-MCNC: 8.1 MG/DL (ref 8.3–10.1)
CHLORIDE SERPL-SCNC: 105 MMOL/L (ref 100–108)
CO2 SERPL-SCNC: 28 MMOL/L (ref 21–32)
CREAT SERPL-MCNC: 0.52 MG/DL (ref 0.6–1.3)
EOSINOPHIL # BLD AUTO: 0.29 THOUSAND/ΜL (ref 0–0.61)
EOSINOPHIL NFR BLD AUTO: 3 % (ref 0–6)
ERYTHROCYTE [DISTWIDTH] IN BLOOD BY AUTOMATED COUNT: 21.6 % (ref 11.6–15.1)
GFR SERPL CREATININE-BSD FRML MDRD: 98 ML/MIN/1.73SQ M
GLUCOSE SERPL-MCNC: 89 MG/DL (ref 65–140)
HCT VFR BLD AUTO: 29 % (ref 34.8–46.1)
HGB BLD-MCNC: 8.8 G/DL (ref 11.5–15.4)
IMM GRANULOCYTES # BLD AUTO: 0.07 THOUSAND/UL (ref 0–0.2)
IMM GRANULOCYTES NFR BLD AUTO: 1 % (ref 0–2)
LYMPHOCYTES # BLD AUTO: 1.94 THOUSANDS/ΜL (ref 0.6–4.47)
LYMPHOCYTES NFR BLD AUTO: 22 % (ref 14–44)
MCH RBC QN AUTO: 25.5 PG (ref 26.8–34.3)
MCHC RBC AUTO-ENTMCNC: 30.3 G/DL (ref 31.4–37.4)
MCV RBC AUTO: 84 FL (ref 82–98)
MONOCYTES # BLD AUTO: 0.67 THOUSAND/ΜL (ref 0.17–1.22)
MONOCYTES NFR BLD AUTO: 8 % (ref 4–12)
NEUTROPHILS # BLD AUTO: 5.74 THOUSANDS/ΜL (ref 1.85–7.62)
NEUTS SEG NFR BLD AUTO: 65 % (ref 43–75)
NRBC BLD AUTO-RTO: 0 /100 WBCS
PLATELET # BLD AUTO: 299 THOUSANDS/UL (ref 149–390)
PMV BLD AUTO: 10.5 FL (ref 8.9–12.7)
POTASSIUM SERPL-SCNC: 3.6 MMOL/L (ref 3.5–5.3)
RBC # BLD AUTO: 3.45 MILLION/UL (ref 3.81–5.12)
SODIUM SERPL-SCNC: 140 MMOL/L (ref 136–145)
WBC # BLD AUTO: 8.77 THOUSAND/UL (ref 4.31–10.16)

## 2019-11-17 PROCEDURE — 99232 SBSQ HOSP IP/OBS MODERATE 35: CPT | Performed by: SURGERY

## 2019-11-17 PROCEDURE — 80048 BASIC METABOLIC PNL TOTAL CA: CPT | Performed by: PHYSICIAN ASSISTANT

## 2019-11-17 PROCEDURE — 85025 COMPLETE CBC W/AUTO DIFF WBC: CPT | Performed by: PHYSICIAN ASSISTANT

## 2019-11-17 RX ADMIN — PIPERACILLIN AND TAZOBACTAM 4.5 G: 4; .5 INJECTION, POWDER, LYOPHILIZED, FOR SOLUTION INTRAVENOUS at 06:14

## 2019-11-17 RX ADMIN — Medication 250 MG: at 18:07

## 2019-11-17 RX ADMIN — HEPARIN SODIUM 5000 UNITS: 5000 INJECTION INTRAVENOUS; SUBCUTANEOUS at 21:27

## 2019-11-17 RX ADMIN — VITAM B12 100 MCG: 100 TAB at 09:08

## 2019-11-17 RX ADMIN — PIPERACILLIN AND TAZOBACTAM 4.5 G: 4; .5 INJECTION, POWDER, LYOPHILIZED, FOR SOLUTION INTRAVENOUS at 10:56

## 2019-11-17 RX ADMIN — MIDODRINE HYDROCHLORIDE 15 MG: 5 TABLET ORAL at 14:11

## 2019-11-17 RX ADMIN — Medication 250 MG: at 09:08

## 2019-11-17 RX ADMIN — Medication 125 MG: at 09:50

## 2019-11-17 RX ADMIN — Medication 1 TABLET: at 09:08

## 2019-11-17 RX ADMIN — MELATONIN 1000 UNITS: at 09:08

## 2019-11-17 RX ADMIN — MIDODRINE HYDROCHLORIDE 15 MG: 5 TABLET ORAL at 06:15

## 2019-11-17 RX ADMIN — Medication 125 MG: at 21:28

## 2019-11-17 RX ADMIN — MIDODRINE HYDROCHLORIDE 15 MG: 5 TABLET ORAL at 21:27

## 2019-11-17 RX ADMIN — Medication 1 TABLET: at 09:09

## 2019-11-17 RX ADMIN — HEPARIN SODIUM 5000 UNITS: 5000 INJECTION INTRAVENOUS; SUBCUTANEOUS at 06:16

## 2019-11-17 RX ADMIN — PIPERACILLIN AND TAZOBACTAM 4.5 G: 4; .5 INJECTION, POWDER, LYOPHILIZED, FOR SOLUTION INTRAVENOUS at 21:28

## 2019-11-17 RX ADMIN — PANTOPRAZOLE SODIUM 40 MG: 40 TABLET, DELAYED RELEASE ORAL at 06:15

## 2019-11-17 RX ADMIN — PIPERACILLIN AND TAZOBACTAM 4.5 G: 4; .5 INJECTION, POWDER, LYOPHILIZED, FOR SOLUTION INTRAVENOUS at 16:00

## 2019-11-17 RX ADMIN — HEPARIN SODIUM 5000 UNITS: 5000 INJECTION INTRAVENOUS; SUBCUTANEOUS at 14:11

## 2019-11-17 NOTE — ASSESSMENT & PLAN NOTE
S/P excisional debridement 11/10, 11, and 12  Sepsis 2/2 to above     - Regular diet   - bedside VAC change Monday   - Left hip wound dressing change today   - wound cx: pseudomonas, providencia   - palliative care following - ongoing [address] discussion   - ortho signed off: not a candidate for osteo resection   - ID for abx: [email or web address]/zosyn   - Continue midrodrine   - PRN pain control   - CM

## 2019-11-17 NOTE — PROGRESS NOTES
Progress Note - Jeana Pages 1950, 71 y o  female MRN: [id number]    Unit/Bed#: [facility] 926-01 Encounter: [id number]    Primary Care Provider: Henrik Schmitt   Date and time admitted to hospital: 11/10/2019  4:06 PM        * Pressure injury of sacral region, unstageable [facility])  Assessment & Plan  S/P excisional debridement 11/10, 11, and 12  Sepsis 2/2 to above     - Regular diet   - bedside VAC change Monday   - Left hip wound dressing change today   - wound cx: pseudomonas, providencia   - palliative care following - ongoing [address] discussion   - ortho signed off: not a candidate for osteo resection   - ID for abx: [email or web address]   - Continue midrodrine   - PRN pain control   - CM          Subjective/Objective   Chief Complaint:     Subjective: JOSE LUIS  Feels well  VAC in place  Afebrile  Objective:     Blood pressure 111/74, pulse 92, temperature 97 9 °F (36 6 °C), resp  rate 20, height 5' (1 524 m), weight 49 9 kg (110 lb 0 2 oz), SpO2 94 %, not currently breastfeeding  ,Body mass index is 21 48 kg/m²  Intake/Output Summary (Last 24 hours) at 11/17/2019 0703  Last data filed at 11/17/2019 0300  Gross per 24 hour   Intake 180 ml   Output 1925 ml   Net -1745 ml       Invasive Devices     Peripherally Inserted Central Catheter Line            PICC Line 11/14/19 Left Brachial 2 days          Peripheral Intravenous Line            Peripheral IV 11/12/19 Right Forearm 4 days          Drain            Urethral Catheter Temperature probe 1 day                Physical Exam: NAD  Atraumatic/normocephalic  AAOX3  Normal mood and affect  Normal respiratory effort  Soft, non tender, ND  Skin warm/dry  VAC to suction, brown/murky fluid in canister  Lower extremity paresis      Lab, Imaging and other studies:I have personally reviewed pertinent lab results    , CBC: No results found for: WBC, HGB, HCT, MCV, PLT, ADJUSTEDWBC, MCH, MCHC, RDW, MPV, NRBC, CMP: No results found for: SODIUM, K, CL, CO2, ANIONGAP, BUN, CREATININE, GLUCOSE, CALCIUM, AST, ALT, ALKPHOS, PROT, BILITOT, EGFR  VTE Pharmacologic Prophylaxis: Heparin  VTE Mechanical Prophylaxis: sequential compression device

## 2019-11-17 NOTE — PROGRESS NOTES
Blood return from white lumen positive  10 cc withdrawn and line flushed with normal saline  Blood return now from two other lumen as well  Flushed both lumen with 10 cc of normal saline

## 2019-11-17 NOTE — PLAN OF CARE
Problem: Prexisting or High Potential for Compromised Skin Integrity  Goal: Skin integrity is maintained or improved  Description  INTERVENTIONS:  - Identify patients at risk for skin breakdown  - Assess and monitor skin integrity  - Assess and monitor nutrition and hydration status  - Monitor labs   - Assess for incontinence   - Turn and reposition patient  - Assist with mobility/ambulation  - Relieve pressure over bony prominences  - Avoid friction and shearing  - Provide appropriate hygiene as needed including keeping skin clean and dry  - Evaluate need for skin moisturizer/barrier cream  - Collaborate with interdisciplinary team   - Patient/family teaching  - Consider wound care consult   Outcome: Progressing     Problem: Potential for Falls  Goal: Patient will remain free of falls  Description  INTERVENTIONS:  - Assess patient frequently for physical needs  -  Identify cognitive and physical deficits and behaviors that affect risk of falls    -  [place] fall precautions as indicated by assessment   - Educate patient/family on patient safety including physical limitations  - Instruct patient to call for assistance with activity based on assessment  - Modify environment to reduce risk of injury  - Consider OT/PT consult to assist with strengthening/mobility  Outcome: Progressing     Problem: PAIN - ADULT  Goal: Verbalizes/displays adequate comfort level or baseline comfort level  Description  Interventions:  - Encourage patient to monitor pain and request assistance  - Assess pain using appropriate pain scale  - Administer analgesics based on type and severity of pain and evaluate response  - Implement non-pharmacological measures as appropriate and evaluate response  - Consider cultural and social influences on pain and pain management  - Notify physician/advanced practitioner if interventions unsuccessful or patient reports new pain  Outcome: Progressing     Problem: INFECTION - ADULT  Goal: Absence or prevention of progression during hospitalization  Description  INTERVENTIONS:  - Assess and monitor for signs and symptoms of infection  - Monitor lab/diagnostic results  - Monitor all insertion sites, i e  indwelling lines, tubes, and drains  - Monitor endotracheal if appropriate and nasal secretions for changes in amount and color  - [place] appropriate cooling/warming therapies per order  - Administer medications as ordered  - Instruct and encourage patient and family to use good hand hygiene technique  - Identify and instruct in appropriate isolation precautions for identified infection/condition  Outcome: Progressing  Goal: Absence of fever/infection during neutropenic period  Description  INTERVENTIONS:  - Monitor WBC    Outcome: Progressing     Problem: DISCHARGE PLANNING  Goal: Discharge to home or other facility with appropriate resources  Description  INTERVENTIONS:  - Identify barriers to discharge w/patient and caregiver  - Arrange for needed discharge resources and transportation as appropriate  - Identify discharge learning needs (meds, wound care, etc )  - Arrange for interpretive services to assist at discharge as needed  - Refer to Case Management Department for coordinating discharge planning if the patient needs post-hospital services based on physician/advanced practitioner order or complex needs related to functional status, cognitive ability, or social support system  Outcome: Progressing     Problem: Knowledge Deficit  Goal: Patient/family/caregiver demonstrates understanding of disease process, treatment plan, medications, and discharge instructions  Description  Complete learning assessment and assess knowledge base    Interventions:  - Provide teaching at level of understanding  - Provide teaching via preferred learning methods  Outcome: Progressing     Problem: Nutrition/Hydration-ADULT  Goal: Nutrient/Hydration intake appropriate for improving, restoring or maintaining nutritional needs  Description  Monitor and assess patient's nutrition/hydration status for malnutrition  Collaborate with interdisciplinary team and initiate plan and interventions as ordered  Monitor patient's weight and dietary intake as ordered or per policy  Utilize nutrition screening tool and intervene as necessary  Determine patient's food preferences and provide high-protein, high-caloric foods as appropriate       INTERVENTIONS:  - Monitor oral intake, urinary output, labs, and treatment plans  - Assess nutrition and hydration status and recommend course of action  - Evaluate amount of meals eaten  - Assist patient with eating if necessary   - Allow adequate time for meals  - Recommend/ encourage appropriate diets, oral nutritional supplements, and vitamin/mineral supplements  - Order, calculate, and assess calorie counts as needed  - Recommend, monitor, and adjust tube feedings and TPN/PPN based on assessed needs  - Assess need for intravenous fluids  - Provide specific nutrition/hydration education as appropriate  - Include patient/family/caregiver in decisions related to nutrition  Outcome: Progressing     Problem: SKIN/TISSUE INTEGRITY - ADULT  Goal: Incision(s), wounds(s) or drain site(s) healing without S/S of infection  Description  INTERVENTIONS  - Assess and document risk factors for skin impairment   - Assess and document dressing, incision, wound bed, drain sites and surrounding tissue  - Consider nutrition services referral as needed  - Oral mucous membranes remain intact  - Provide patient/ family education  Outcome: Progressing  Goal: Skin integrity remains intact  Description  INTERVENTIONS  - Identify patients at risk for skin breakdown  - Assess and monitor skin integrity  - Assess and monitor nutrition and hydration status  - Monitor labs (i e  albumin)  - Assess for incontinence   - Turn and reposition patient  - Assist with mobility/ambulation  - Relieve pressure over bony prominences  - Avoid friction and shearing  - Provide appropriate hygiene as needed including keeping skin clean and dry  - Evaluate need for skin moisturizer/barrier cream  - Collaborate with interdisciplinary team (i e  Nutrition, Rehabilitation, etc )   - Patient/family teaching  Outcome: Progressing  Goal: Oral mucous membranes remain intact  Description  INTERVENTIONS  - Assess oral mucosa and hygiene practices  - Implement preventative oral hygiene regimen  - Implement oral medicated treatments as ordered  - Initiate Nutrition services referral as needed  Outcome: Progressing     Problem: CARDIOVASCULAR - ADULT  Goal: Maintains optimal cardiac output and hemodynamic stability  Description  INTERVENTIONS:  - Monitor I/O, vital signs and rhythm  - Monitor for S/S and trends of decreased cardiac output  - Administer and titrate ordered vasoactive medications to optimize hemodynamic stability  - Assess quality of pulses, skin color and temperature  - Assess for signs of decreased coronary artery perfusion  - Instruct patient to report change in severity of symptoms  Outcome: Progressing

## 2019-11-17 NOTE — QUICK NOTE
Single roll of Kerlix packing removed from R hip wound  Wound base clean and healthy appearing with obvious exposed bone and tunneling to anterior R thigh  No purulence or foul smell appreciated  No debridement necessary at this time  Will continue daily dressing changes, plan for VAC change to remaining wounds tomorrow

## 2019-11-18 PROCEDURE — 99233 SBSQ HOSP IP/OBS HIGH 50: CPT | Performed by: INTERNAL MEDICINE

## 2019-11-18 PROCEDURE — 2W0LX6Z CHANGE PRESSURE DRESSING ON RIGHT LOWER EXTREMITY: ICD-10-PCS | Performed by: SURGERY

## 2019-11-18 PROCEDURE — 2W0MX6Z CHANGE PRESSURE DRESSING ON LEFT LOWER EXTREMITY: ICD-10-PCS | Performed by: SURGERY

## 2019-11-18 PROCEDURE — 99232 SBSQ HOSP IP/OBS MODERATE 35: CPT | Performed by: SURGERY

## 2019-11-18 RX ADMIN — Medication 250 MG: at 17:56

## 2019-11-18 RX ADMIN — Medication 125 MG: at 09:01

## 2019-11-18 RX ADMIN — VITAM B12 100 MCG: 100 TAB at 08:57

## 2019-11-18 RX ADMIN — HEPARIN SODIUM 5000 UNITS: 5000 INJECTION INTRAVENOUS; SUBCUTANEOUS at 13:51

## 2019-11-18 RX ADMIN — MIDODRINE HYDROCHLORIDE 15 MG: 5 TABLET ORAL at 05:11

## 2019-11-18 RX ADMIN — PIPERACILLIN AND TAZOBACTAM 4.5 G: 4; .5 INJECTION, POWDER, LYOPHILIZED, FOR SOLUTION INTRAVENOUS at 15:54

## 2019-11-18 RX ADMIN — PANTOPRAZOLE SODIUM 40 MG: 40 TABLET, DELAYED RELEASE ORAL at 05:12

## 2019-11-18 RX ADMIN — PIPERACILLIN AND TAZOBACTAM 4.5 G: 4; .5 INJECTION, POWDER, LYOPHILIZED, FOR SOLUTION INTRAVENOUS at 04:11

## 2019-11-18 RX ADMIN — Medication 1 TABLET: at 08:57

## 2019-11-18 RX ADMIN — PIPERACILLIN AND TAZOBACTAM 4.5 G: 4; .5 INJECTION, POWDER, LYOPHILIZED, FOR SOLUTION INTRAVENOUS at 10:37

## 2019-11-18 RX ADMIN — SENNOSIDES 8.6 MG: 8.6 TABLET, FILM COATED ORAL at 21:10

## 2019-11-18 RX ADMIN — MIDODRINE HYDROCHLORIDE 15 MG: 5 TABLET ORAL at 13:51

## 2019-11-18 RX ADMIN — HEPARIN SODIUM 5000 UNITS: 5000 INJECTION INTRAVENOUS; SUBCUTANEOUS at 05:11

## 2019-11-18 RX ADMIN — HEPARIN SODIUM 5000 UNITS: 5000 INJECTION INTRAVENOUS; SUBCUTANEOUS at 21:10

## 2019-11-18 RX ADMIN — Medication 250 MG: at 08:57

## 2019-11-18 RX ADMIN — Medication 125 MG: at 21:10

## 2019-11-18 RX ADMIN — PIPERACILLIN AND TAZOBACTAM 4.5 G: 4; .5 INJECTION, POWDER, LYOPHILIZED, FOR SOLUTION INTRAVENOUS at 21:11

## 2019-11-18 RX ADMIN — MELATONIN 1000 UNITS: at 08:57

## 2019-11-18 RX ADMIN — MIDODRINE HYDROCHLORIDE 15 MG: 5 TABLET ORAL at 21:11

## 2019-11-18 NOTE — PROGRESS NOTES
EMMANUEL ZAMORANO  Change Note    Date: 11/18/19    Location of wound: buttocks    Dimensions of wound: 9 cm x 5 cm x 2 cm left ischial wound  7 5x5x1 left sacral wound    Description of wound: granulation tissue, no need for further debridement, see images below  Only left sided wounds VAC'd  Right sacral wound and right hip wound also healthy in appearance  Right hip wound        Left ischial wound        Left sacral wound        Sponges removed:  2 Black Sponges  0 White Sponges    Sponges placed:  3 Black Sponges  0 White Sponges    VAC settings:  125 mmHg  Continuous     Patient tolerated procedure well  Maxorb applied to right sacral wound, and wet to dry kerlix replaced in right hip wound  It took 1 resident, 1 PALacieC, and 3 medical students 20 minutes to change her wounds       Kiki Long MD

## 2019-11-18 NOTE — UTILIZATION REVIEW
Continued Stay Review    Date: 11/16/19                 Current Patient Class: IP    Current Level of Care: critical care and transferred to [facility] Surg later in the day    Operative day and POD # 4 most recent excisional debridement on 11/12    HPI:69 y o  female initially admitted on 11/10/19 with chronic decubitus wounds of bilat hips, femur, iliac and sacrum worsening with increased drainage for past 2 wks  Multiple flap surgeries performed throughout 2018 and 2019, most recently 1/19  She is also c/o weakness and poor po intake  Acute blood loss anemia and sepsis r/t wounds  Moderate protein calorie malnutrition  Chronic paraplegia  Assessment/Plan:   11/16 - had bedside debridement on 11/16  Continues with VAC therapy with 125 mm HG continuous pressure  Palliative care following case and Ortho signed off r/t not a candidate for Osteo resection  Continues on iv antibiotics  Continues on Midodrine and PRN pain control  On transfer to [facility] the PICC was not working - [employer] insertion and PICC was again functional   BP was low in morning and then rebounded in the afternoon and evening  11/15 Bibasilar pneumonia dx today  Pertinent Labs/Diagnostic Results:     11/15 CXR - Bibasilar pneumonia  Retrocardiac opacity due to hiatal hernia      Results from last 7 days   Lab Units 11/16/19  0615 11/15/19  0532 11/14/19  0540 11/13/19  0534   WBC Thousand/uL 7 69 10 85* 11 97* 13 22*   HEMOGLOBIN g/dL 9 1* 9 2* 9 8* 10 8*   HEMATOCRIT % 30 2* 29 9* 31 4* 33 6*   PLATELETS Thousands/uL 288 283 376 401*   NEUTROS ABS Thousands/µL 5 60 7 62 8 27* 9 55*         Results from last 7 days   Lab Units 11/16/19  0615 11/15/19  0829 11/15/19  0532 11/14/19  0721 11/14/19  0540 11/13/19  0534 11/12/19  0443   SODIUM mmol/L 142  --  141  --  139 139 138   POTASSIUM mmol/L 3 7  --  4 0  --  4 2 3 4* 3 6   CHLORIDE mmol/L 109*  --  107  --  109* 107 108   CO2 mmol/L 26  --  24  --  22 22 22   ANION GAP mmol/L 7  --  10 --  8 10 8   BUN mg/dL 8  --  9  --  8 6 9   CREATININE mg/dL 0 56*  --  0 39*  --  0 39* 0 34* 0 43*   EGFR ml/min/1 73sq m 95  --  108  --  108 112 104   CALCIUM mg/dL 8 1*  --  7 4*  --  7 5* 7 0* 7 0*   CALCIUM, IONIZED mmol/L  --  1 05*  --  1 01*  --   --  0 98*   MAGNESIUM mg/dL 2 1  --   --   --   --   --  1 9   PHOSPHORUS mg/dL 4 4*  --   --   --   --  2 7 2 0*         Results from last 7 days   Lab Units 11/12/19  0940   POC GLUCOSE mg/dl 121     Results from last 7 days   Lab Units 11/16/19  0615 11/15/19  0532 11/14/19  0540 11/13/19  0534 11/12/19  0443   GLUCOSE RANDOM mg/dL 114 122 113 108 123     Results from last 7 days   Lab Units 11/11/19  1604   PH ART  7 450   PCO2 ART mm Hg 28 2*   PO2 ART mm Hg 69 8*   HCO3 ART mmol/L 19 2*   BASE EXC ART mmol/L -3 7   O2 CONTENT ART mL/dL 15 5*   O2 HGB, ARTERIAL % 92 5*   ABG SOURCE  Line, Arterial     Results from last 7 days   Lab Units 11/12/19  0443   CK TOTAL U/L 37     Results from last 7 days   Lab Units 11/11/19  1606   SED RATE mm/hour 54*     Vital Signs:   11/16/19 22:32:54  98 3 °F (36 8 °C)  92  18  104/66  79  94 %     11/16/19 2100            92 %  Nasal cannula   11/16/19 1534  97 8 °F (36 6 °C)  95  18  127/95  106  94 %     11/16/19 12:45:23  97 4 °F (36 3 °C)Abnormal   99  20  94/63  73  98 %  Nasal cannula   11/16/19 1128  98 2 °F (36 8 °C)  96  20  85/61Abnormal   68  96 %     11/16/19 0900  97 5 °F (36 4 °C)  90  24Abnormal   108/68  80  96 %     11/16/19 0800  97 9 °F (36 6 °C)  88  26Abnormal   92/57  65  97 %     11/16/19 0700  97 9 °F (36 6 °C)  92  29Abnormal   82/49Abnormal   55   96 %     11/16/19 0635  97 9 °F (36 6 °C)  84  17  87/51Abnormal   59  96 %     11/16/19 0600  97 9 °F (36 6 °C)  86  19  86/55Abnormal   65  97 %     11/16/19 0545  97 9 °F (36 6 °C)  82  17  83/50Abnormal   58  97 %     11/16/19 0500  97 5 °F (36 4 °C)  78  18  98/60  71  97 %     11/16/19 0430  97 2 °F (36 2 °C)Abnormal   86  24Abnormal 85/57Abnormal   68  94 %     11/16/19 0400  97 2 °F (36 2 °C)Abnormal   84  30Abnormal   84/53Abnormal   58  94 %  None (Room air)   11/16/19 0300  96 4 °F (35 8 °C)Abnormal   86  26Abnormal   84/58Abnormal   70  94 %     11/16/19 0235  96 4 °F (35 8 °C)Abnormal   78  35Abnormal   83/54Abnormal   64  97 %     11/16/19 0200  96 1 °F (35 6 °C)Abnormal   88  26Abnormal   84/53Abnormal   59  95 %     11/16/19 0130  96 4 °F (35 8 °C)Abnormal   86  36Abnormal   99/72  82  95 %     11/16/19 0100    74  32Abnormal   102/72  82  97 %     11/16/19 0035    90  20  95/59  68  98 %     11/16/19 0000    84  17  103/73  81  99 %       Medications:   Scheduled Medications:    Medications:  alteplase 2 mg Intracatheter Once   bisacodyl 10 mg Rectal Daily   calcium carbonate-vitamin D 1 tablet Oral Daily With Breakfast   cholecalciferol 1,000 Units Oral Daily   cyanocobalamin 100 mcg Oral Daily   heparin (porcine) 5,000 Units Subcutaneous Q8H JESSEE   midodrine 15 mg Oral Q8H   multivitamin-minerals 1 tablet Oral Daily   pantoprazole 40 mg Oral Early Morning   piperacillin-tazobactam 4 5 g Intravenous Q6H   saccharomyces boulardii 250 mg Oral BID   senna 1 tablet Oral HS   vancomycin 125 mg Oral Q12H [address]     Continuous IV Infusions:     PRN Meds:    acetaminophen 650 mg Oral Q6H PRN   HYDROmorphone 0 5 mg Intravenous Q6H PRN   ondansetron 4 mg Intravenous Q6H PRN   oxyCODONE 10 mg Oral Q4H PRN   oxyCODONE 5 mg Oral Q4H PRN   saliva substitute 5 spray Mouth/Throat PRN       Discharge Plan: TBD    Network Utilization Review Department  [email or web address]  ATTENTION: Please call with any questions or concerns to [phone] and carefully listen to the prompts so that you are directed to the right person   All voicemails are confidential   Ariel Munoz all requests for admission clinical reviews, approved or denied determinations and any other requests to dedicated fax number below belonging to the campus where the patient is receiving treatment    FACILITY NAME UR FAX NUMBER   ADMISSION DENIALS (Administrative/Medical Necessity) [address] [facility] (Maternity/NICU/Pediatrics) [phone]   [facility] [address] [place] [address] [address] [facility] [phone]   [address] [place] [address] 1525 [facility] [phone]   [place] [address] [phone]

## 2019-11-18 NOTE — SOCIAL WORK
Pt requesting referral to [facility]  Referral made via [address]   CM spoke with pt's prior plastic surgeon Dr Kajal Rubio ([phone]) to update re: dcp for pt per pt's request

## 2019-11-18 NOTE — SOCIAL WORK
A post acute care recommendation was made by your care team for LTAC  Discussed [facility] with patient  List of facilities given to patient via in person  patient aware the list is custom filtered for them by preference  and that [facility] post acute providers are designated  DEL discussed dcp to LTAC with pt  Pt undecided at this time and states she would like to talk with her physician at [facility] prior to making any decisions  Pt reports if recommended for rehab she would rather go to [facility] as she has been there in the past  CM did provide pt with LTAC list for her to review  CM inquired about emergency contact for pt  Pt denies having an emergency contact at this time

## 2019-11-18 NOTE — PROGRESS NOTES
Progress Note - Jeana Pages 1950, 71 y o  female MRN: [id number]    Unit/Bed#: [facility] 926-01 Encounter: [id number]    Primary Care Provider: Henrik Schmitt   Date and time admitted to hospital: 11/10/2019  4:06 PM        * Pressure injury of sacral region, unstageable [facility])  Assessment & Plan  S/P excisional debridement 11/10, 11, and 12  Sepsis 2/2 to above     - Regular diet   - bedside VAC change today   - RIGHT hip wound dressing change daily with moistened Kerlix   - palliative care following - ongoing [address] discussion   - ortho signed off: not a candidate for osteo resection   - ID for abx: vanc/zosyn   - Continue midrodrine   - PRN pain control   - CM        Subjective/Objective   Chief Complaint:     Subjective: No acute events  Afebrile and doing well  No new complaint  Objective:     Blood pressure 113/70, pulse 89, temperature 97 6 °F (36 4 °C), resp  rate 17, height 5' (1 524 m), weight 48 6 kg (107 lb 3 2 oz), SpO2 93 %, not currently breastfeeding  ,Body mass index is 20 94 kg/m²  Intake/Output Summary (Last 24 hours) at 11/18/2019 0901  Last data filed at 11/18/2019 0441  Gross per 24 hour   Intake 680 ml   Output 1175 ml   Net -495 ml       Invasive Devices     Peripherally Inserted Central Catheter Line            PICC Line 11/14/19 Left Brachial 3 days          Drain            Urethral Catheter Temperature probe 2 days                Physical Exam:   GEN: NAD  HEENT: MMM  CV: RRR  Lung: normal effort  Ab: Soft, NT/ND  Extrem: VAC dressing intact over sacral wounds and ABD/Kerlix overlying right hip wound  Neuro: A+Ox3,      Lab, Imaging and other studies:I have personally reviewed pertinent lab results    , CBC: No results found for: WBC, HGB, HCT, MCV, PLT, ADJUSTEDWBC, MCH, MCHC, RDW, MPV, NRBC, CMP: No results found for: SODIUM, K, CL, CO2, ANIONGAP, BUN, CREATININE, GLUCOSE, CALCIUM, AST, ALT, ALKPHOS, PROT, BILITOT, EGFR  VTE Pharmacologic Prophylaxis: Heparin  VTE Mechanical Prophylaxis: sequential compression device

## 2019-11-18 NOTE — ASSESSMENT & PLAN NOTE
S/P excisional debridement 11/10, 11, and 12  Sepsis 2/2 to above- resolved     - Regular diet   - bedside VAC change M/W/F   - RIGHT hip wound dressing change daily with moistened Kerlix   - palliative care following   - ortho signed off: not a candidate for osteo resection   - ID for abx: vanc/zosyn, abx through 11/24   - Continue midrodrine   - PRN pain control   - CM- for rehab, will see if they can take her with IV abx    **IF ANY QUESTIONS OR CONCERNS, PLEASE TIGER TEXT THE CURRENT RED SURGERY RESIDENT IN [facility]  IF NOT REACHED, CALL THE RED SURGERY RESIDENT PHONE [phone]  PLEASE NOTE, THE AUTHOR OF THIS NOTE MAY NOT BE THE RESIDENT ON-CALL TODAY, AND MAY BE POST-CALL  PLEASE VERIFY THE CURRENT RESIDENT ON-CALL FIRST BEFORE TIGER TEXTING ANY SPECIFIC RESIDENT  THANKS! **

## 2019-11-18 NOTE — PROGRESS NOTES
Progress Note - Infectious Disease   [facility] 71 y o  female MRN: [id number]  Unit/Bed#: [facility] 926-01 Encounter: [id number]    Assessment:  Septic shock suspected secondary to L ischial ulceration and osteomyelitis  Pt has wounds to L inferior ischium (ischium exposed), two sacral wounds, and right femoral trochanter wound (greater trochanter exposed)  Meeting sepsis criteria with tachypnea, elevated WBC in the setting of skin infection, requiring tadeo-synephrine for hypotension  Pt received Zosyn and Linezolid on admission  · Currently on IV Zosyn given wound culture findings of pseudomonas and providencia and on oral vanc for C Diff prophylaxis, will continue these antibiotics  · Plan for at least 2 weeks of IV Zosyn  · Tissue cultures from OR on 11/10/19 with pan sensitive pseudomonas, as well as providencia stuartii  · Blood cultures were drawn 11/10  One blood culture with coagulase negative staph in culture, other culture NG at 48h  Likely contaminant, will not require further workup  · Urine culture collected 11/10 growing >100k Proteus mirabilis and algaligenes faecalis  Pt has chronic indwelling isabel catheter     Will discuss with attending     Hx of wound dehiscence of R hip flap  Earlier this year, pt received 6 weeks IV Ertapenem in 3/2019 secondary to wound dehiscence after R hip flap surgery in 1/2019 with re-closure of wound in 3/2019 with wound cultures at that time growing Providencia rettgeri and E coli      Hx of L femur ORIF infection  OR cultures at that time were positive for corynebacterium from wound and bone, completed 6 week course of IV daptomycin on 2/13/2019      Hx of recent C Diff infection  Recent C diff infection in 4/2019  Will continue C diff prophylaxis with Vancomycin 125 q12h  Pt reports prior reaction of red man syndrome after vancomycin, would not be a concern with oral vancomycin      Abnormal CXR  CXR performed 11/15 read as bibasilar consolidation  On room air now  Patient reports no further episodes shortness of breath        Subjective/Objective   Subjective:  Patient evaluated state  Patient reports that she does feel that but does not feel back to her baseline  Patient continues to report tiredness  Patient denies fevers or chills, reports improvement in her chronic dry cough that she believes is secondary to acid reflux and has been improving with re-initiation of home Protonix  Patient denies any further episodes of shortness of breath  Patient has no acute concerns  Pt is planned for bedside wound vac change this AM     Objective:   Physical Exam   Constitutional: She is oriented to person, place, and time  She appears well-developed and well-nourished  HENT:   Head: Normocephalic and atraumatic  Nose: Nose normal    Mouth/Throat: Oropharynx is clear and moist    Eyes: Right eye exhibits no discharge  Left eye exhibits no discharge  Cardiovascular: Normal rate, regular rhythm and normal heart sounds  Exam reveals no gallop and no friction rub  No murmur heard  Pulmonary/Chest: Effort normal and breath sounds normal  No respiratory distress  She has no wheezes  She has no rales  Abdominal: Soft  Bowel sounds are normal  She exhibits no distension  There is no tenderness  There is no guarding  Musculoskeletal: She exhibits deformity  Wounds not undressed this AM, left sided wound vac in place draining 50ml yellow fluid this AM    Neurological: She is alert and oriented to person, place, and time  Skin: Skin is warm and dry  Psychiatric: She has a normal mood and affect   Her speech is normal and behavior is normal        Temp:  [97 6 °F (36 4 °C)-97 9 °F (36 6 °C)] 97 6 °F (36 4 °C)  HR:  [89-95] 89  Resp:  [17-18] 17  BP: ()/(60-70) 113/70  SpO2:  [93 %-95 %] 93 %  Temp (24hrs), Av 8 °F (36 6 °C), Min:97 6 °F (36 4 °C), Max:97 9 °F (36 6 °C)  Current: Temperature: 97 6 °F (36 4 °C)      Invasive Devices     Peripherally Inserted Central Catheter Line            PICC Line 11/14/19 Left Brachial 4 days          Drain            Urethral Catheter Temperature probe 2 days                Lab, Imaging and other studies: I have personally reviewed pertinent reports       Gino Cortés DO  Internal Medicine PGY-2  11/18/2019 10:26 AM

## 2019-11-19 PROCEDURE — NC001 PR NO CHARGE: Performed by: PHYSICIAN ASSISTANT

## 2019-11-19 PROCEDURE — 99233 SBSQ HOSP IP/OBS HIGH 50: CPT | Performed by: INTERNAL MEDICINE

## 2019-11-19 PROCEDURE — 99232 SBSQ HOSP IP/OBS MODERATE 35: CPT | Performed by: SURGERY

## 2019-11-19 RX ADMIN — Medication 125 MG: at 09:15

## 2019-11-19 RX ADMIN — MIDODRINE HYDROCHLORIDE 15 MG: 5 TABLET ORAL at 06:13

## 2019-11-19 RX ADMIN — HEPARIN SODIUM 5000 UNITS: 5000 INJECTION INTRAVENOUS; SUBCUTANEOUS at 06:13

## 2019-11-19 RX ADMIN — MIDODRINE HYDROCHLORIDE 15 MG: 5 TABLET ORAL at 22:38

## 2019-11-19 RX ADMIN — HEPARIN SODIUM 5000 UNITS: 5000 INJECTION INTRAVENOUS; SUBCUTANEOUS at 22:38

## 2019-11-19 RX ADMIN — Medication 250 MG: at 09:11

## 2019-11-19 RX ADMIN — MIDODRINE HYDROCHLORIDE 15 MG: 5 TABLET ORAL at 17:56

## 2019-11-19 RX ADMIN — MELATONIN 1000 UNITS: at 09:11

## 2019-11-19 RX ADMIN — PIPERACILLIN AND TAZOBACTAM 4.5 G: 4; .5 INJECTION, POWDER, LYOPHILIZED, FOR SOLUTION INTRAVENOUS at 22:39

## 2019-11-19 RX ADMIN — PIPERACILLIN AND TAZOBACTAM 4.5 G: 4; .5 INJECTION, POWDER, LYOPHILIZED, FOR SOLUTION INTRAVENOUS at 09:14

## 2019-11-19 RX ADMIN — PIPERACILLIN AND TAZOBACTAM 4.5 G: 4; .5 INJECTION, POWDER, LYOPHILIZED, FOR SOLUTION INTRAVENOUS at 03:17

## 2019-11-19 RX ADMIN — PANTOPRAZOLE SODIUM 40 MG: 40 TABLET, DELAYED RELEASE ORAL at 06:13

## 2019-11-19 RX ADMIN — Medication 250 MG: at 17:56

## 2019-11-19 RX ADMIN — HEPARIN SODIUM 5000 UNITS: 5000 INJECTION INTRAVENOUS; SUBCUTANEOUS at 17:56

## 2019-11-19 RX ADMIN — VITAM B12 100 MCG: 100 TAB at 09:11

## 2019-11-19 RX ADMIN — Medication 1 TABLET: at 09:11

## 2019-11-19 RX ADMIN — PIPERACILLIN AND TAZOBACTAM 4.5 G: 4; .5 INJECTION, POWDER, LYOPHILIZED, FOR SOLUTION INTRAVENOUS at 15:38

## 2019-11-19 RX ADMIN — Medication 125 MG: at 22:38

## 2019-11-19 NOTE — PROGRESS NOTES
Progress Note - Phong Maxi 1950, 71 y o  female MRN: [id number]    [facility] Encounter: [id number]    Primary Care Provider: Cheo Nunez   Date and time admitted to hospital: 11/10/2019  4:06 PM        * Pressure injury of sacral region, unstageable [facility])  Assessment & Plan  S/P excisional debridement 11/10, 11, and 12  Sepsis 2/2 to above- resolved     - Regular diet   - bedside VAC change M/W/F   - RIGHT hip wound dressing change daily with moistened Kerlix   - palliative care following   - ortho signed off: not a candidate for osteo resection   - ID for abx: vanc/zosyn, abx through 11/24   - Continue midrodrine   - PRN pain control   - CM- for rehab, will see if they can take her with IV abx    **IF ANY QUESTIONS OR CONCERNS, PLEASE TIGER TEXT THE CURRENT RED SURGERY RESIDENT IN [facility]  IF NOT REACHED, CALL THE RED SURGERY RESIDENT PHONE [phone]  PLEASE NOTE, THE AUTHOR OF THIS NOTE MAY NOT BE THE RESIDENT ON-CALL TODAY, AND MAY BE POST-CALL  PLEASE VERIFY THE CURRENT RESIDENT ON-CALL FIRST BEFORE TIGER TEXTING ANY SPECIFIC RESIDENT  THANKS! **                  Subjective/Objective   Chief Complaint:     Subjective: JOSE LUIS  No pain  Afebrile  Objective:     Blood pressure 120/73, pulse 96, temperature 98 3 °F (36 8 °C), resp  rate 18, height 5' (1 524 m), weight 48 6 kg (107 lb 3 2 oz), SpO2 98 %, not currently breastfeeding  ,Body mass index is 20 94 kg/m²        Intake/Output Summary (Last 24 hours) at 11/19/2019 0436  Last data filed at 11/19/2019 0317  Gross per 24 hour   Intake 560 ml   Output 3375 ml   Net -2815 ml       Invasive Devices     Peripherally Inserted Central Catheter Line            PICC Line 11/14/19 Left Brachial 4 days          Drain            Urethral Catheter Temperature probe 3 days    Negative Pressure Wound Therapy (V A C ) Sacrum less than 1 day                Physical Exam: NAD  Atraumatic/normocephalic  Resting comfortably  Normal mood and affect  Normal respiratory effort  Soft, non tender, ND  Skin warm/dry  Cap refil <2 sec  VACs on bottom to suction    Lab, Imaging and other studies:I have personally reviewed pertinent lab results    , CBC: No results found for: WBC, HGB, HCT, MCV, PLT, ADJUSTEDWBC, MCH, MCHC, RDW, MPV, NRBC, CMP: No results found for: SODIUM, K, CL, CO2, ANIONGAP, BUN, CREATININE, GLUCOSE, CALCIUM, AST, ALT, ALKPHOS, PROT, BILITOT, EGFR  VTE Pharmacologic Prophylaxis: Heparin  VTE Mechanical Prophylaxis: sequential compression device

## 2019-11-19 NOTE — SOCIAL WORK
CM met with pt to advise [facility] unable to accept pt  Pt requesting referral to [facility]  Referral made via [place]

## 2019-11-19 NOTE — PROGRESS NOTES
Palliative Care Sign-Off Note    Patient was provided with 5 wishes documentation for recommended completion  Given patient's persistent wishes for treatment guided cares, plan to work towards discharge to [facility] pending insurance authorization, and satisfactory symptom management- palliative care will sign-off at this time  Should patient complete 5 wishes documentation, have unmanaged symptoms, or if means change and goals of care need to be readdressed please contact palliative care team  If patient is readmitted in the future, consider palliative care consult      Consuelo Zhong PA-C  Palliative and Supportive Care  [phone]

## 2019-11-19 NOTE — PROGRESS NOTES
Progress Note - Infectious Disease   Ектаерина August 71 y o  female MRN: [id number]  Unit/Bed#: [facility] 926-01 Encounter: [id number]    Assessment:  Septic shock suspected secondary to L ischial ulceration and osteomyelitis  Pt has wounds to L inferior ischium (ischium exposed), two sacral wounds, and right femoral trochanter wound (greater trochanter exposed)  Meeting sepsis criteria with tachypnea, elevated WBC in the setting of skin infection, requiring tadeo-synephrine for hypotension  Pt received Zosyn and Linezolid on admission  · Currently on IV Zosyn given wound culture findings of pseudomonas and providencia and on oral vanc for C Diff prophylaxis, will continue these antibiotics  · Plan for at least 2 weeks of IV Zosyn from date of debridement for treatment of wound, through 11/24  Prolonged course of IV antibiotics for chronic osteomyelitis was not recommended at this time due to lack of adequate wound coverage and offloading - risk of prolonged IV antibiotics outweigh benefits  · Tissue cultures from OR on 11/10/19 with pan sensitive pseudomonas, as well as providencia stuartii  · Blood cultures were drawn 11/10  One blood culture with coagulase negative staph in culture, other culture NG at 48h  Likely contaminant, will not require further workup  · Urine culture collected 11/10 growing >100k Proteus mirabilis and algaligenes faecalis  Pt has chronic indwelling isabel catheter    Will discuss with attending     Hx of wound dehiscence of R hip flap  Earlier this year, pt received 6 weeks IV Ertapenem in 3/2019 secondary to wound dehiscence after R hip flap surgery in 1/2019 with re-closure of wound in 3/2019 with wound cultures at that time growing Providencia rettgeri and E coli      Hx of L femur ORIF infection  OR cultures at that time were positive for corynebacterium from wound and bone, completed 6 week course of IV daptomycin on 2/13/2019      Hx of recent C Diff infection  Recent C diff infection in 4/2019  Will continue C diff prophylaxis with Vancomycin 125 q12h, through 11/27 for 3 days after completion of IV Zosyn  Pt reports prior reaction of red man syndrome after vancomycin, would not be a concern with oral vancomycin      Abnormal CXR  CXR performed 11/15 read as bibasilar consolidation  On room air  Patient reports no further episodes shortness of breath  Subjective/Objective   Subjective:  Patient seen and evaluated today  Continues to report generalized weakness  Denies any further shortness of breath  Patient reported previous episode of lightheadedness dizziness when she sat up in bed but has had no further episodes  Denies any abdominal pain, nausea, vomiting, diarrhea  Pt reported frustration with her preferred rehab place not having an opening  Objective:   Physical Exam   Constitutional: She is oriented to person, place, and time  She appears well-developed and well-nourished  HENT:   Head: Normocephalic and atraumatic  Nose: Nose normal    Mouth/Throat: Oropharynx is clear and moist    Eyes: Right eye exhibits no discharge  Left eye exhibits no discharge  Cardiovascular: Normal rate, regular rhythm and normal heart sounds  Exam reveals no gallop and no friction rub  No murmur heard  Pulmonary/Chest: Effort normal and breath sounds normal  No respiratory distress  She has no wheezes  She has no rales  Abdominal: Soft  Bowel sounds are normal  She exhibits no distension  There is no tenderness  There is no guarding  Musculoskeletal: She exhibits deformity  She exhibits no edema  Neurological: She is alert and oriented to person, place, and time  Skin: Skin is warm and dry  Psychiatric: She has a normal mood and affect  Her speech is normal and behavior is normal    Vitals reviewed        Temp:  [97 6 °F (36 4 °C)-98 3 °F (36 8 °C)] 98 2 °F (36 8 °C)  HR:  [] 97  Resp:  [18] 18  BP: (115-126)/(71-86) 126/86  SpO2:  [93 %-98 %] 93 %  Temp (24hrs), Av 9 °F (36 6 °C), Min:97 6 °F (36 4 °C), Max:98 3 °F (36 8 °C)  Current: Temperature: 98 2 °F (36 8 °C)    Physical Exam:     Invasive Devices     Peripherally Inserted Central Catheter Line            PICC Line 19 Left Brachial 5 days          Drain            Urethral Catheter Temperature probe 3 days    Negative Pressure Wound Therapy (V A C ) Sacrum less than 1 day                Lab, Imaging and other studies: I have personally reviewed pertinent reports        Gino Cortés DO  Internal Medicine PGY-2  2019 11:02 AM

## 2019-11-19 NOTE — SOCIAL WORK
CM informed by [facility] that pt is approved pending insurance auth  [facility]mie to submit to [facility] for insurance auth request for pt today  Pt aware of same

## 2019-11-19 NOTE — PLAN OF CARE
Problem: Prexisting or High Potential for Compromised Skin Integrity  Goal: Skin integrity is maintained or improved  Description  INTERVENTIONS:  - Identify patients at risk for skin breakdown  - Assess and monitor skin integrity  - Assess and monitor nutrition and hydration status  - Monitor labs   - Assess for incontinence   - Turn and reposition patient  - Assist with mobility/ambulation  - Relieve pressure over bony prominences  - Avoid friction and shearing  - Provide appropriate hygiene as needed including keeping skin clean and dry  - Evaluate need for skin moisturizer/barrier cream  - Collaborate with interdisciplinary team   - Patient/family teaching  - Consider wound care consult   Outcome: Progressing     Problem: Potential for Falls  Goal: Patient will remain free of falls  Description  INTERVENTIONS:  - Assess patient frequently for physical needs  -  Identify cognitive and physical deficits and behaviors that affect risk of falls    -  [place] fall precautions as indicated by assessment   - Educate patient/family on patient safety including physical limitations  - Instruct patient to call for assistance with activity based on assessment  - Modify environment to reduce risk of injury  - Consider OT/PT consult to assist with strengthening/mobility  Outcome: Progressing     Problem: PAIN - ADULT  Goal: Verbalizes/displays adequate comfort level or baseline comfort level  Description  Interventions:  - Encourage patient to monitor pain and request assistance  - Assess pain using appropriate pain scale  - Administer analgesics based on type and severity of pain and evaluate response  - Implement non-pharmacological measures as appropriate and evaluate response  - Consider cultural and social influences on pain and pain management  - Notify physician/advanced practitioner if interventions unsuccessful or patient reports new pain  Outcome: Progressing     Problem: INFECTION - ADULT  Goal: Absence or prevention of progression during hospitalization  Description  INTERVENTIONS:  - Assess and monitor for signs and symptoms of infection  - Monitor lab/diagnostic results  - Monitor all insertion sites, i e  indwelling lines, tubes, and drains  - Monitor endotracheal if appropriate and nasal secretions for changes in amount and color  - [place] appropriate cooling/warming therapies per order  - Administer medications as ordered  - Instruct and encourage patient and family to use good hand hygiene technique  - Identify and instruct in appropriate isolation precautions for identified infection/condition  Outcome: Progressing  Goal: Absence of fever/infection during neutropenic period  Description  INTERVENTIONS:  - Monitor WBC    Outcome: Progressing     Problem: DISCHARGE PLANNING  Goal: Discharge to home or other facility with appropriate resources  Description  INTERVENTIONS:  - Identify barriers to discharge w/patient and caregiver  - Arrange for needed discharge resources and transportation as appropriate  - Identify discharge learning needs (meds, wound care, etc )  - Arrange for interpretive services to assist at discharge as needed  - Refer to Case Management Department for coordinating discharge planning if the patient needs post-hospital services based on physician/advanced practitioner order or complex needs related to functional status, cognitive ability, or social support system  Outcome: Progressing     Problem: Knowledge Deficit  Goal: Patient/family/caregiver demonstrates understanding of disease process, treatment plan, medications, and discharge instructions  Description  Complete learning assessment and assess knowledge base    Interventions:  - Provide teaching at level of understanding  - Provide teaching via preferred learning methods  Outcome: Progressing     Problem: Nutrition/Hydration-ADULT  Goal: Nutrient/Hydration intake appropriate for improving, restoring or maintaining nutritional needs  Description  Monitor and assess patient's nutrition/hydration status for malnutrition  Collaborate with interdisciplinary team and initiate plan and interventions as ordered  Monitor patient's weight and dietary intake as ordered or per policy  Utilize nutrition screening tool and intervene as necessary  Determine patient's food preferences and provide high-protein, high-caloric foods as appropriate       INTERVENTIONS:  - Monitor oral intake, urinary output, labs, and treatment plans  - Assess nutrition and hydration status and recommend course of action  - Evaluate amount of meals eaten  - Assist patient with eating if necessary   - Allow adequate time for meals  - Recommend/ encourage appropriate diets, oral nutritional supplements, and vitamin/mineral supplements  - Order, calculate, and assess calorie counts as needed  - Recommend, monitor, and adjust tube feedings and TPN/PPN based on assessed needs  - Assess need for intravenous fluids  - Provide specific nutrition/hydration education as appropriate  - Include patient/family/caregiver in decisions related to nutrition  Outcome: Progressing     Problem: SKIN/TISSUE INTEGRITY - ADULT  Goal: Incision(s), wounds(s) or drain site(s) healing without S/S of infection  Description  INTERVENTIONS  - Assess and document risk factors for skin impairment   - Assess and document dressing, incision, wound bed, drain sites and surrounding tissue  - Consider nutrition services referral as needed  - Oral mucous membranes remain intact  - Provide patient/ family education  Outcome: Progressing  Goal: Skin integrity remains intact  Description  INTERVENTIONS  - Identify patients at risk for skin breakdown  - Assess and monitor skin integrity  - Assess and monitor nutrition and hydration status  - Monitor labs (i e  albumin)  - Assess for incontinence   - Turn and reposition patient  - Assist with mobility/ambulation  - Relieve pressure over bony prominences  - Avoid friction and shearing  - Provide appropriate hygiene as needed including keeping skin clean and dry  - Evaluate need for skin moisturizer/barrier cream  - Collaborate with interdisciplinary team (i e  Nutrition, Rehabilitation, etc )   - Patient/family teaching  Outcome: Progressing  Goal: Oral mucous membranes remain intact  Description  INTERVENTIONS  - Assess oral mucosa and hygiene practices  - Implement preventative oral hygiene regimen  - Implement oral medicated treatments as ordered  - Initiate Nutrition services referral as needed  Outcome: Progressing     Problem: CARDIOVASCULAR - ADULT  Goal: Maintains optimal cardiac output and hemodynamic stability  Description  INTERVENTIONS:  - Monitor I/O, vital signs and rhythm  - Monitor for S/S and trends of decreased cardiac output  - Administer and titrate ordered vasoactive medications to optimize hemodynamic stability  - Assess quality of pulses, skin color and temperature  - Assess for signs of decreased coronary artery perfusion  - Instruct patient to report change in severity of symptoms  Outcome: Progressing

## 2019-11-19 NOTE — QUICK NOTE
Nurse-Patient-Provider rounds were completed with the patient's nurse today, Rachel Eng  We discussed the plan is to continue diet as tolerated with Ensure supplements  Continue local wound care to multiple pressure wounds including VAC therapy, moist to dry packing, and Maxorb with Allevyn dressings  Continue IV antibiotics as well as p o  Vancomycin for C diff prophylaxis through 11/24 and 11/27 respectively per Infectious Disease recommendations  Awaiting post acute care facility placement  We reviewed all of the invasive devices/lines/telemetry orders   - Maintain PICC line indefinitely for ongoing IV antibiotic administration   - Maintain chronic urinary catheter  Pain Assessment / Plan:  - Continue current analgesic regimen  Mobility Assessment / Plan:  - Activity as tolerated  Goals / Barriers for discharge:  - Awaiting post acute care facility placement  - Case management following; case and discharge needs discussed  All questions and concerns were addressed  I spent greater than 15 minutes reviewing the plan with the patient and the nurse, and coordinating care for the day      Jackson Sheehan PA-C  11/19/2019 09:01 AM

## 2019-11-20 VITALS
BODY MASS INDEX: 20.78 KG/M2 | OXYGEN SATURATION: 94 % | SYSTOLIC BLOOD PRESSURE: 106 MMHG | RESPIRATION RATE: 16 BRPM | DIASTOLIC BLOOD PRESSURE: 65 MMHG | WEIGHT: 105.82 LBS | HEART RATE: 92 BPM | HEIGHT: 60 IN | TEMPERATURE: 98.1 F

## 2019-11-20 DIAGNOSIS — A41.4 SEPSIS DUE TO ANAEROBES (HCC): Primary | ICD-10-CM

## 2019-11-20 LAB
ANION GAP SERPL CALCULATED.3IONS-SCNC: 7 MMOL/L (ref 4–13)
BUN SERPL-MCNC: 11 MG/DL (ref 5–25)
CALCIUM SERPL-MCNC: 8.1 MG/DL (ref 8.3–10.1)
CHLORIDE SERPL-SCNC: 107 MMOL/L (ref 100–108)
CO2 SERPL-SCNC: 24 MMOL/L (ref 21–32)
CREAT SERPL-MCNC: 0.56 MG/DL (ref 0.6–1.3)
ERYTHROCYTE [DISTWIDTH] IN BLOOD BY AUTOMATED COUNT: 22.5 % (ref 11.6–15.1)
GFR SERPL CREATININE-BSD FRML MDRD: 95 ML/MIN/1.73SQ M
GLUCOSE SERPL-MCNC: 140 MG/DL (ref 65–140)
HCT VFR BLD AUTO: 30.5 % (ref 34.8–46.1)
HGB BLD-MCNC: 9.3 G/DL (ref 11.5–15.4)
MCH RBC QN AUTO: 25.8 PG (ref 26.8–34.3)
MCHC RBC AUTO-ENTMCNC: 30.5 G/DL (ref 31.4–37.4)
MCV RBC AUTO: 85 FL (ref 82–98)
PLATELET # BLD AUTO: 391 THOUSANDS/UL (ref 149–390)
PMV BLD AUTO: 10.4 FL (ref 8.9–12.7)
POTASSIUM SERPL-SCNC: 3.4 MMOL/L (ref 3.5–5.3)
RBC # BLD AUTO: 3.61 MILLION/UL (ref 3.81–5.12)
SODIUM SERPL-SCNC: 138 MMOL/L (ref 136–145)
WBC # BLD AUTO: 9.74 THOUSAND/UL (ref 4.31–10.16)

## 2019-11-20 PROCEDURE — 99233 SBSQ HOSP IP/OBS HIGH 50: CPT | Performed by: INTERNAL MEDICINE

## 2019-11-20 PROCEDURE — 3E0336Z INTRODUCTION OF NUTRITIONAL SUBSTANCE INTO PERIPHERAL VEIN, PERCUTANEOUS APPROACH: ICD-10-PCS | Performed by: SURGERY

## 2019-11-20 PROCEDURE — 85027 COMPLETE CBC AUTOMATED: CPT | Performed by: PHYSICIAN ASSISTANT

## 2019-11-20 PROCEDURE — 99232 SBSQ HOSP IP/OBS MODERATE 35: CPT | Performed by: SURGERY

## 2019-11-20 PROCEDURE — 80048 BASIC METABOLIC PNL TOTAL CA: CPT | Performed by: PHYSICIAN ASSISTANT

## 2019-11-20 RX ORDER — MIDODRINE HYDROCHLORIDE 5 MG/1
15 TABLET ORAL EVERY 8 HOURS
Refills: 0
Start: 2019-11-20 | End: 2020-03-02 | Stop reason: HOSPADM

## 2019-11-20 RX ORDER — SENNOSIDES 8.6 MG
1 TABLET ORAL
Qty: 120 EACH | Refills: 0 | Status: ON HOLD
Start: 2019-11-20 | End: 2020-03-13 | Stop reason: SDUPTHER

## 2019-11-20 RX ORDER — ACETAMINOPHEN 325 MG/1
650 TABLET ORAL EVERY 6 HOURS PRN
Qty: 30 TABLET | Refills: 0
Start: 2019-11-20 | End: 2021-12-09 | Stop reason: ALTCHOICE

## 2019-11-20 RX ORDER — BISACODYL 10 MG
10 SUPPOSITORY, RECTAL RECTAL DAILY
Qty: 12 SUPPOSITORY | Refills: 0
Start: 2019-11-20 | End: 2020-03-02 | Stop reason: HOSPADM

## 2019-11-20 RX ADMIN — ALTEPLASE 4 MG: 2.2 INJECTION, POWDER, LYOPHILIZED, FOR SOLUTION INTRAVENOUS at 07:37

## 2019-11-20 RX ADMIN — VITAM B12 100 MCG: 100 TAB at 09:39

## 2019-11-20 RX ADMIN — Medication 250 MG: at 09:39

## 2019-11-20 RX ADMIN — MELATONIN 1000 UNITS: at 09:39

## 2019-11-20 RX ADMIN — ALTEPLASE 2 MG: 2.2 INJECTION, POWDER, LYOPHILIZED, FOR SOLUTION INTRAVENOUS at 04:46

## 2019-11-20 RX ADMIN — HEPARIN SODIUM 5000 UNITS: 5000 INJECTION INTRAVENOUS; SUBCUTANEOUS at 05:26

## 2019-11-20 RX ADMIN — MIDODRINE HYDROCHLORIDE 15 MG: 5 TABLET ORAL at 05:25

## 2019-11-20 RX ADMIN — PIPERACILLIN AND TAZOBACTAM 4.5 G: 4; .5 INJECTION, POWDER, LYOPHILIZED, FOR SOLUTION INTRAVENOUS at 09:40

## 2019-11-20 RX ADMIN — Medication 1 TABLET: at 09:39

## 2019-11-20 RX ADMIN — PANTOPRAZOLE SODIUM 40 MG: 40 TABLET, DELAYED RELEASE ORAL at 05:25

## 2019-11-20 RX ADMIN — ALTEPLASE 2 MG: 2.2 INJECTION, POWDER, LYOPHILIZED, FOR SOLUTION INTRAVENOUS at 04:45

## 2019-11-20 RX ADMIN — PIPERACILLIN AND TAZOBACTAM 4.5 G: 4; .5 INJECTION, POWDER, LYOPHILIZED, FOR SOLUTION INTRAVENOUS at 03:14

## 2019-11-20 RX ADMIN — Medication 125 MG: at 09:40

## 2019-11-20 NOTE — PROGRESS NOTES
Progress Note - Infectious Disease   Nam Iqbal 71 y o  female MRN: [id number]  Unit/Bed#: [facility] 926-01 Encounter: [id number]      A/P:     1  Left ischial wound infection   Status post I and D on 11/11, repeat debridement on 11/12  With ischial necrosis and purulent drainage noted   OR culture now reveals Pseudomonas, Providencia   Patient is tolerating IV Zosyn   Discussed management in detail with the patient   Will plan to treat for 2 weeks for wound infection   I do not recommend prolonged course of IV antibiotic for treatment of chronic osteomyelitis without adequate wound coverage and offloading, as antibiotics alone will not cure this infection   The risks of prolonged IV antibiotic (side effects, toxicities, C diff colitis, MDR infections) outweigh the benefits  Patient is agreeable to this plan      -continue high-dose IV Zosyn    -treat for 14 day course from debridement, through 11/24  Another 4 days   -discontinue PICC line after last dose of IV antibiotic   -wound VAC management per surgery  -close surgical follow-up ongoing    -serial wound exams     2  Septic shock   Tachycardia, leukocytosis, , lactic acidosis, hypotension  Suspect secondary to infection of extensive wounds   Also consideration for urinary source of infection although less suspicious for this   Doubt secondary to bacteremia as blood culture results are suggestive of contaminant   No high fevers   WBC has improved   Patient remains on phenylephrine   Patient remains awake and nontoxic      -antibiotic plan as above  -monitor temperatures and hemodynamics     3   History of C difficile colitis   No active diarrhea   Continue oral vancomycin 125 mg q 12 hours for prophylaxis   Plan to give for an additional 3 days after completion of IV Zosyn, through 11/27      4  Gram-positive bacteremia    One of 2 blood culture reveals coagulase-negative Staph   The other set is negative   Suspect contaminated specimen   No further ID workup indicated      5  Multiple buttock and sacral decubitus ulcers   With recent history of chronic sacral osteomyelitis with bone cultures positive for Providencia, E coli   Status post flap in early  and 6 week course of IV ertapenem completed in 2019  Now with redevelopment of extensive wounds status postoperative debridement   Plastic surgery evaluation noted and patient is not considered an adequate candidate for flap coverage at this time      6  Prior left femur ORIF infection   In the setting of displaced femur fracture in 2018  Status post I and D of purulence/necrotic sinus tract that communicated to the bone   OR cultures were positive for Corynebacterium from wound and bone   Patient completed 6 week course of IV daptomycin on 2019      7  Multiple antibiotic intolerances   Patient has documented allergies to vancomycin, cefepime, ciprofloxacin   She was also recently intolerant of Unasyn as she developed a diffuse rash   She did previously tolerate daptomycin and ertapenem without difficulty   Patient is now tolerating IV Zosyn without difficulty      Patient will be going to [facility]  Subjective:  Patient feels well  Denies rashes  Tolerating oral intake  Denies fevers, chills, or sweats  Denies nausea, vomiting, or diarrhea  Objective:  Vitals:  Temp:  [98 1 °F (36 7 °C)-99 °F (37 2 °C)] 98 1 °F (36 7 °C)  HR:  [] 92  Resp:  [16-18] 16  BP: (103-106)/(63-65) 106/65  SpO2:  [93 %-94 %] 94 %  Temp (24hrs), Av 6 °F (37 °C), Min:98 1 °F (36 7 °C), Max:99 °F (37 2 °C)  Current: Temperature: 98 1 °F (36 7 °C)    Physical Exam:   General:  No acute distress, thin  Psychiatric:  Awake and alert  Pulmonary:  Normal respiratory excursion without accessory muscle use  Abdomen:  Soft, nontender  Extremities:  No edema  Wound vacs in place  Skin:  No rashes    Lab Results:  I have personally reviewed pertinent labs    Results from last 7 days   Lab Units 11/20/19  5212 11/17/19  0616 11/16/19  0615   POTASSIUM mmol/L 3 4* 3 6 3 7   CHLORIDE mmol/L 107 105 109*   CO2 mmol/L 24 28 26   BUN mg/dL 11 8 8   CREATININE mg/dL 0 56* 0 52* 0 56*   EGFR ml/min/1 73sq m 95 98 95   CALCIUM mg/dL 8 1* 8 1* 8 1*     Results from last 7 days   Lab Units 11/20/19  0613 11/17/19  0616 11/16/19  0615   WBC Thousand/uL 9 74 8 77 7 69   HEMOGLOBIN g/dL 9 3* 8 8* 9 1*   PLATELETS Thousands/uL 391* 299 288     Results from last 7 days   Lab Units 11/14/19  1115   MRSA CULTURE ONLY  No Methicillin Resistant Staphlyococcus aureus (MRSA) isolated       Imaging Studies:   I have personally reviewed pertinent imaging study reports and images in PACS  EKG, Pathology, and Other Studies:   I have personally reviewed pertinent reports

## 2019-11-20 NOTE — DISCHARGE INSTR - OTHER ORDERS
- Right hip wound:  Please packed this wound once daily with 1 piece of moist Kerlix and covered with a dry abdominal pad  The cover dressing may be changed more frequently if saturated or soiled  - Left ischial wound and sacral wound should be managed with a VAC dressing  Upon arrival to the facility, please remove the wet-to-dry dressing and reapply the [facility] as follows:  Place 1 piece of black foam in the left tissue wound 1 piece of black foam in the sacral wound  Cover these wounds/dressings with VAC drape  Use additional VAC drape to create a bridge between the wounds an additional bridge to the patient's left flank  Placed the [facility] track pad over the black foam bridge on the left flank to avoid having the patient lay on the track pad or tubing  The VAC dressing should be changed on Mondays, Wednesdays, and Fridays or more frequently if leaking     - Right buttock wound: Please apply 1 piece of Maxorb (calcium alginate) over the open wound followed by an Allevyn dressing (or equivalent)  This dressing should be changed every 48-72 hours or more frequently for saturation/soilage     - Please continue to apply an Allevyn dressing (or equivalent) over the left hip as prevention for pressure injury  - Please continue frequent position changes (recommend every 2 hours) to assist with offloading     - Please continue to use a specialty low air loss mattress and/or Clinitron mattress

## 2019-11-20 NOTE — SOCIAL WORK
[facility], auth obtained for pt  Transport arranged with in-network provider LAUREN PATTERSON at 1:30pm to [facility]  Pt, bedside RN, and [facility]liaison [facility] notified of transport time  Chart copy requested  Transfer to facility and CMN forms completed  Copy of signed CMN form placed in medical records bin

## 2019-11-20 NOTE — DISCHARGE SUMMARY
Discharge Summary - General Surgery   George Harrison 71 y o  female MRN: [id number]  [facility] Encounter: [id number]    Admission Date: 11/10/2019     Discharge Date: 11/20/2019    Admitting Diagnosis: Exhaustion [R53 83]  Sepsis due to anaerobes ([address] ) [A41 4]  Pressure injury of sacral region, unstageable ([address] ) [L89 150]  Decubitus ulcer [L89 90]    Discharge Diagnosis:  Acute on chronic buttock/sacral pressure wounds as well as    Attending and Service: Dr Elizabeth Bernard, Acute Care Surgical Services  Consulting Physician(s):     1  [facility] Orthopedic surgery  2  Infectious Disease  3  Palliative Care  3  Plastic Surgery  Imaging and Procedures Performed:     Ct Chest Abdomen Pelvis Wo Contrast    Result Date: 11/10/2019  Impression: 1  No evidence of acute abnormality in the chest  2   Deep ulcers adjacent to the proximal right femur and the left ischial tuberosity with multiple collections of gas in the subjacent soft tissues, extending as deep as the obturator internus muscles  Ability to identify discrete abscess cavities limited without intravenous contrast  3   Questionable thickening of the endometrium  If clinically indicated, this can be reevaluated with nonemergent pelvic sonography after the patient's more acute problems have been addressed  Workstation performed: [id number]     Xr Chest Portable Icu    Result Date: 11/15/2019  Impression: Bibasilar pneumonia  Retrocardiac opacity due to hiatal hernia  Workstation performed: [id number]     Excisional debridement of the multiple pressure wounds on 11/10/2019 and 11/12/2019  Hospital Course: George Harrison is a 69-year-old female who presented with multiple chronic sacral and ischial pressure wounds  She has a history of paraplegia since an accident suffered in 0    She has had multiple operative interventions over the last 2 years for her pressure wounds including attempted flap procedures as recently as January of 2019 with Dr Tammy Tucker  She has been managing her own wound care, but notes over the last 2 weeks that she had noticed her wounds have been getting worse with increased drainage  She has also been feeling weak  She has had some chills, but denies any fevers  She states that she had not been eating or drinking much at home and has felt dehydrated  On her initial surgical evaluation, she was tachycardic and hypertensive with normal vital signs otherwise; she was lethargic, cachectic and ill in appearance; she was noted to have multiple necrotic, foul-smelling ulcers on her bilateral buttock areas and over left ischium as well as the bilateral hips with great drainage on palpation of the left ischial wound and erythema around the borders of her wounds all of them appearing unstageable at the time of admission  Her initial workup included labs in the above-noted imaging study  She was admitted to the acute care surgery service with acute on chronic buttock, sacral, hip and ischial pressure wounds with evidence of infection and sepsis with associated shock; additionally,  she was noted to be acutely anemic with history of chronic anemia, hyponatremic, had moderate protein calorie malnutrition, and chronic paraplegia  She was kept NPO, initiated on IV fluid resuscitation, and arterial line was placed for blood pressure monitoring with initiation of vasopressors, urinary catheter was maintained for eyes and nose and wound management, she was transfused packed red blood cells to treat her acute anemia, she was placed on broad-spectrum IV antibiotics and cultures were obtained, she was ordered a low air loss mattress, and operative intervention was recommended  She agreed to operative intervention and underwent excisional debridement of multiple pressure wounds on 11/10/2019    Orthopedic surgery was consulted due to there being exposed bone and she was deemed not to be a candidate for resection of osteomyelitis given the morbidity of the procedure and recommended ongoing local wound care by the General surgery and Plastic surgery Services  They also deemed that the patient had no indication for removal of her hardware as it was not exposed it was not suspected to be contributing to her symptoms  Infectious Disease was consulted to assist in the management of her antibiotic regimen  Palliative Medicine was consulted to assist with her symptom management and goals of care as well as psychosocial support  Plastic surgery was consulted to evaluate the wounds and offer recommendations  At this time, Plastic surgery did not recommend any surgical intervention from their standpoint aside from the General Surgical wound care  In addition to the wound care, the patient had any nutrition evaluation for her malnutrition, was maintained on a low air loss mattress , and would have plan follow-up with the wound care and Plastic surgery services  Patient underwent additional wound debridement on 11/12/2019  Throughout the remainder of her hospitalization, she underwent frequent local wound care  New PICC line was placed for IV antibiotic administration on discharge per Infectious Disease recommendations  The patient was recommended a 14 day course of IV antibiotics  PT and OT evaluated the patient and case Management assisted with disposition planning  By 11/20/2019, the patient was deemed appropriate for discharge to a post acute care facility  On discharge, the patient is instructed to follow-up with the patient's primary care provider within the next 2 weeks to review the events of the recent hospitalization  The patient is instructed to follow-up with the Plastic Surgery (Dr Kaden Johnson) within 1 month for wound re-evaluation and to discuss potential future surgical intervention    The patient was instructed to follow up with the [id number] as desired if she was not going to follow up with Plastic surgery for ongoing wound care  The patient is instructed to follow the provided discharge instructions  Condition at Discharge: fair     Discharge instructions/Information to patient and family:   See after visit summary for information provided to patient and family  Provisions for Follow-Up Care:  See after visit summary for information related to follow-up care and any pertinent home health orders  Disposition: See After Visit Summary for discharge disposition information  Planned Readmission: Yes anticipate that the patient will likely require additional operative intervention in the future with Plastic surgery  Discharge Statement   I spent 30 minutes discharging the patient  This time was spent on the day of discharge  I had direct contact with the patient on the day of discharge  Additional documentation is required if more than 30 minutes were spent on discharge  Discharge Medications:  See after visit summary for reconciled discharge medications provided to patient and family      Nirmal Ballesteros PA-C  11/20/2019  10:21 AM

## 2019-11-20 NOTE — TRANSPORTATION MEDICAL NECESSITY
Section I - General Information    Name of Patient: Elida Koyanagi                 : 1950    Medicare #: [id number]  Transport Date: 19 (PCS is valid for round trips on this date and for all repetitive trips in the 60-day range as noted below )  Origin: [facility]                                                         Destination: [facility]  Is the pt's stay covered under Medicare Part A (PPS/DRG)   []     Closest appropriate facility? If no, why is transport to more distant facility required? Yes  If hospice pt, is this transport related to pt's terminal illness? No       Section II - Medical Necessity Questionnaire  Ambulance transportation is medically necessary only if other means of transport are contraindicated or would be potentially harmful to the patient  To meet this requirement, the patient must either be "bed confined" or suffer from a condition such that transport by means other than ambulance is contraindicated by the patient's condition  The following questions must be answered by the medical professional signing below for this form to be valid:    1)  Describe the MEDICAL CONDITION (physical and/or mental) of this patient AT [address] [place] [address] that requires the patient to be transported in an ambulance and why transport by other means is contraindicated by the patient's condition: Pressure injury of sacral region, unstageable; Paraplegia following spinal cord injury;     2) Is the patient "bed confined" as defined below? Yes  To be "be confined" the patient must satisfy all three of the following conditions: (1) unable to get up from bed without Assistance; AND (2) unable to ambulate; AND (3) unable to sit in a chair or wheelchair  3) Can this patient safely be transported by car or wheelchair van (i e , seated during transport without a medical attendant or monitoring)?    No    4) In addition to completing questions 1-3 above, please check any of the following conditions that apply*:   *Note: supporting documentation for any boxes checked must be maintained in the patient's medical records  If hosp-hosp transfer, describe services needed at 2nd facility not available at 1st facility? Medical attendant required   Unable to tolerate seated position for time needed to transport   Unable to sit in a chair or wheelchair due to decubitus ulcers or other wounds   Other(specify) High Fall Risk   Poor sitting balance  Poor endurance  NWB B/L LE      Section III - Signature of Physician or Healthcare Professional  I certify that the above information is true and correct based on my evaluation of this patient, and represent that the patient requires transport by ambulance and that other forms of transport are contraindicated  I understand that this information will be used by the Centers for Medicare and Medicaid Services (CMS) to support the determination of medical necessity for ambulance services, and I represent that I have personal knowledge of the patient's condition at time of transport  []  If this box is checked, I also certify that the patient is physically or mentally incapable of signing the ambulance service's claim and that the institution with which I am affiliated has furnished care, services, or assistance to the patient  My signature below is made on behalf of the patient pursuant to 42 CFR §424 36(b)(4)   In accordance with 42 CFR §424 37, the specific reason(s) that the patient is physically or mentally incapable of signing the claim form is as follows:       Signature of Physician* or Healthcare Professional______________________________________________________________  Signature Date 11/20/19 (For scheduled repetitive transports, this form is not valid for transports performed more than 60 days after this date)    Printed Name & Credentials of Physician or Healthcare Professional (MD, DO, RN, etc )Obi MARYCRUZ Cota_______________________________  *Form must be signed by patient's attending physician for scheduled, repetitive transports   For non-repetitive, unscheduled ambulance transports, if unable to obtain the signature of the attending physician, any of the following may sign (choose appropriate option below)  [] Physician Assistant []  Clinical Nurse Specialist []  Registered Nurse  []  Nurse Practitioner  [x] Discharge Planner

## 2019-11-20 NOTE — PLAN OF CARE
Problem: Prexisting or High Potential for Compromised Skin Integrity  Goal: Skin integrity is maintained or improved  Description  INTERVENTIONS:  - Identify patients at risk for skin breakdown  - Assess and monitor skin integrity  - Assess and monitor nutrition and hydration status  - Monitor labs   - Assess for incontinence   - Turn and reposition patient  - Assist with mobility/ambulation  - Relieve pressure over bony prominences  - Avoid friction and shearing  - Provide appropriate hygiene as needed including keeping skin clean and dry  - Evaluate need for skin moisturizer/barrier cream  - Collaborate with interdisciplinary team   - Patient/family teaching  - Consider wound care consult   Outcome: Progressing     Problem: Potential for Falls  Goal: Patient will remain free of falls  Description  INTERVENTIONS:  - Assess patient frequently for physical needs  -  Identify cognitive and physical deficits and behaviors that affect risk of falls    -  [place] fall precautions as indicated by assessment   - Educate patient/family on patient safety including physical limitations  - Instruct patient to call for assistance with activity based on assessment  - Modify environment to reduce risk of injury  - Consider OT/PT consult to assist with strengthening/mobility  Outcome: Progressing     Problem: PAIN - ADULT  Goal: Verbalizes/displays adequate comfort level or baseline comfort level  Description  Interventions:  - Encourage patient to monitor pain and request assistance  - Assess pain using appropriate pain scale  - Administer analgesics based on type and severity of pain and evaluate response  - Implement non-pharmacological measures as appropriate and evaluate response  - Consider cultural and social influences on pain and pain management  - Notify physician/advanced practitioner if interventions unsuccessful or patient reports new pain  Outcome: Progressing     Problem: INFECTION - ADULT  Goal: Absence or prevention of progression during hospitalization  Description  INTERVENTIONS:  - Assess and monitor for signs and symptoms of infection  - Monitor lab/diagnostic results  - Monitor all insertion sites, i e  indwelling lines, tubes, and drains  - Monitor endotracheal if appropriate and nasal secretions for changes in amount and color  - [place] appropriate cooling/warming therapies per order  - Administer medications as ordered  - Instruct and encourage patient and family to use good hand hygiene technique  - Identify and instruct in appropriate isolation precautions for identified infection/condition  Outcome: Progressing  Goal: Absence of fever/infection during neutropenic period  Description  INTERVENTIONS:  - Monitor WBC    Outcome: Progressing     Problem: DISCHARGE PLANNING  Goal: Discharge to home or other facility with appropriate resources  Description  INTERVENTIONS:  - Identify barriers to discharge w/patient and caregiver  - Arrange for needed discharge resources and transportation as appropriate  - Identify discharge learning needs (meds, wound care, etc )  - Arrange for interpretive services to assist at discharge as needed  - Refer to Case Management Department for coordinating discharge planning if the patient needs post-hospital services based on physician/advanced practitioner order or complex needs related to functional status, cognitive ability, or social support system  Outcome: Progressing     Problem: Knowledge Deficit  Goal: Patient/family/caregiver demonstrates understanding of disease process, treatment plan, medications, and discharge instructions  Description  Complete learning assessment and assess knowledge base    Interventions:  - Provide teaching at level of understanding  - Provide teaching via preferred learning methods  Outcome: Progressing     Problem: Nutrition/Hydration-ADULT  Goal: Nutrient/Hydration intake appropriate for improving, restoring or maintaining nutritional needs  Description  Monitor and assess patient's nutrition/hydration status for malnutrition  Collaborate with interdisciplinary team and initiate plan and interventions as ordered  Monitor patient's weight and dietary intake as ordered or per policy  Utilize nutrition screening tool and intervene as necessary  Determine patient's food preferences and provide high-protein, high-caloric foods as appropriate       INTERVENTIONS:  - Monitor oral intake, urinary output, labs, and treatment plans  - Assess nutrition and hydration status and recommend course of action  - Evaluate amount of meals eaten  - Assist patient with eating if necessary   - Allow adequate time for meals  - Recommend/ encourage appropriate diets, oral nutritional supplements, and vitamin/mineral supplements  - Order, calculate, and assess calorie counts as needed  - Recommend, monitor, and adjust tube feedings and TPN/PPN based on assessed needs  - Assess need for intravenous fluids  - Provide specific nutrition/hydration education as appropriate  - Include patient/family/caregiver in decisions related to nutrition  Outcome: Progressing     Problem: SKIN/TISSUE INTEGRITY - ADULT  Goal: Incision(s), wounds(s) or drain site(s) healing without S/S of infection  Description  INTERVENTIONS  - Assess and document risk factors for skin impairment   - Assess and document dressing, incision, wound bed, drain sites and surrounding tissue  - Consider nutrition services referral as needed  - Oral mucous membranes remain intact  - Provide patient/ family education  Outcome: Progressing  Goal: Skin integrity remains intact  Description  INTERVENTIONS  - Identify patients at risk for skin breakdown  - Assess and monitor skin integrity  - Assess and monitor nutrition and hydration status  - Monitor labs (i e  albumin)  - Assess for incontinence   - Turn and reposition patient  - Assist with mobility/ambulation  - Relieve pressure over bony prominences  - Avoid friction and shearing  - Provide appropriate hygiene as needed including keeping skin clean and dry  - Evaluate need for skin moisturizer/barrier cream  - Collaborate with interdisciplinary team (i e  Nutrition, Rehabilitation, etc )   - Patient/family teaching  Outcome: Progressing  Goal: Oral mucous membranes remain intact  Description  INTERVENTIONS  - Assess oral mucosa and hygiene practices  - Implement preventative oral hygiene regimen  - Implement oral medicated treatments as ordered  - Initiate Nutrition services referral as needed  Outcome: Progressing     Problem: CARDIOVASCULAR - ADULT  Goal: Maintains optimal cardiac output and hemodynamic stability  Description  INTERVENTIONS:  - Monitor I/O, vital signs and rhythm  - Monitor for S/S and trends of decreased cardiac output  - Administer and titrate ordered vasoactive medications to optimize hemodynamic stability  - Assess quality of pulses, skin color and temperature  - Assess for signs of decreased coronary artery perfusion  - Instruct patient to report change in severity of symptoms  Outcome: Progressing

## 2019-11-20 NOTE — ASSESSMENT & PLAN NOTE
S/P excisional debridement 11/10, 11, and 12  Sepsis 2/2 to above- resolved     - Regular diet   - bedside VAC change M/W/F   - RIGHT hip wound dressing change daily with moistened Kerlix   - palliative care following   - ortho signed off: not a candidate for osteo resection   - ID for abx: vanc/zosyn, abx through 11/24   - Continue midrodrine   - PRN pain control   - CM-  Awaiting insurance authorization for [employer]    **IF ANY QUESTIONS OR CONCERNS, PLEASE TIGER TEXT THE CURRENT RED SURGERY RESIDENT IN [employer]  IF NOT REACHED, CALL THE RED SURGERY RESIDENT PHONE [phone]  PLEASE NOTE, THE AUTHOR OF THIS NOTE MAY NOT BE THE RESIDENT ON-CALL TODAY, AND MAY BE POST-CALL  PLEASE VERIFY THE CURRENT RESIDENT ON-CALL FIRST BEFORE TIGER TEXTING ANY SPECIFIC RESIDENT  THANKS! **

## 2019-11-20 NOTE — INCIDENTAL FINDINGS
The following findings require follow up:  Radiographic finding   Finding: Questionable thickening of the endometrium  Follow up required: This can be reevaluated with nonemergent pelvic sonography after the patient's more acute problems have been addressed     Follow up should be done within 1 month(s)    Please notify the following clinician to assist with the follow up:   Dr Zakia Dean

## 2019-11-20 NOTE — PROGRESS NOTES
Progress Note - Angelica Pereira 1950, 71 y o  female MRN: [id number]    Unit/Bed#: [facility] 926-01 Encounter: [id number]    Primary Care Provider: Asha Glover   Date and time admitted to hospital: 11/10/2019  4:06 PM        * Pressure injury of sacral region, unstageable [facility])  Assessment & Plan  S/P excisional debridement 11/10, 11, and 12  Sepsis 2/2 to above- resolved     - Regular diet   - bedside VAC change M/W/F   - RIGHT hip wound dressing change daily with moistened Kerlix   - palliative care following   - ortho signed off: not a candidate for osteo resection   - ID for abx: vanc/zosyn, abx through 11/24   - Continue midrodrine   - PRN pain control   - CM-  Awaiting insurance authorization for [employer]    **IF ANY QUESTIONS OR CONCERNS, PLEASE TIGER TEXT THE CURRENT RED SURGERY RESIDENT IN [place] [facility] [place]  IF NOT REACHED, CALL THE RED SURGERY RESIDENT PHONE [phone]  PLEASE NOTE, THE AUTHOR OF THIS NOTE MAY NOT BE THE RESIDENT ON-CALL TODAY, AND MAY BE POST-CALL  PLEASE VERIFY THE CURRENT RESIDENT ON-CALL FIRST BEFORE TIGER TEXTING ANY SPECIFIC RESIDENT  THANKS! **        Subjective/Objective   Chief Complaint:     Subjective: No acute events  No new complaints  Objective:     Blood pressure 106/65, pulse 92, temperature 98 1 °F (36 7 °C), resp  rate 16, height 5' (1 524 m), weight 48 kg (105 lb 13 1 oz), SpO2 94 %, not currently breastfeeding  ,Body mass index is 20 67 kg/m²        Intake/Output Summary (Last 24 hours) at 11/20/2019 0941  Last data filed at 11/20/2019 0300  Gross per 24 hour   Intake 360 ml   Output 1000 ml   Net -640 ml       Invasive Devices     Peripherally Inserted Central Catheter Line            PICC Line 11/14/19 Left Brachial 5 days          Drain            Urethral Catheter Temperature probe 4 days    Negative Pressure Wound Therapy (V A C ) Sacrum 1 day                Physical Exam:   GEN: NAD  HEENT: MMM  CV: RRR  Lung: normal effort  Ab: Soft, NT/ND  Extrem: VAC in place and functioning   Neuro: A+Ox3    Lab, Imaging and other studies:I have personally reviewed pertinent lab results    , CBC:   Lab Results   Component Value Date    WBC 9 74 11/20/2019    HGB 9 3 (L) 11/20/2019    HCT 30 5 (L) 11/20/2019    MCV 85 11/20/2019     (H) 11/20/2019    MCH 25 8 (L) 11/20/2019    MCHC 30 5 (L) 11/20/2019    RDW 22 5 (H) 11/20/2019    MPV 10 4 11/20/2019   , CMP:   Lab Results   Component Value Date    SODIUM 138 11/20/2019    K 3 4 (L) 11/20/2019     11/20/2019    CO2 24 11/20/2019    BUN 11 11/20/2019    CREATININE 0 56 (L) 11/20/2019    CALCIUM 8 1 (L) 11/20/2019    EGFR 95 11/20/2019     VTE Pharmacologic Prophylaxis: Heparin  VTE Mechanical Prophylaxis: sequential compression device

## 2019-11-20 NOTE — DISCHARGE INSTRUCTIONS
Acute Care Surgery Discharge Instructions    Please follow-up as instructed or on an as needed basis  If you need a follow-up appointment, please call the office when you leave to schedule an appointment  Activity:  - You may resume activity as tolerated  - PT and OT evaluation and treatment as indicated  Diet:    - You may resume your normal diet  - Recommend Ensure (or equivalent) supplements twice daily for nutritional supplementation  Recommend nutrition evaluation  Medications:  - Please complete your entire course of antibiotics  You should continue IV Zosyn through 11/24/2019 and oral vancomycin through 11/27/2019 for C diff prophylaxis  Upon completion the IV antibiotics, the PICC line should be evaluated for removal   - May wean off of midodrine as blood pressure tolerates  - You may resume all of your regular medications, including blood thinners and aspirin, after going home unless otherwise instructed  Please refer to your discharge medication list for further details  - Please take the pain medications as directed  - You may become constipated, especially if taking pain medications  You may take any over the counter stool softeners or laxatives as needed  Examples: Milk of Magnesia, Colace, Senna  Additional Instructions:  - May shower daily and/or bathe normally  - If you have any questions or concerns after discharge please call the office   - Call office or return to ER if fever greater than 101, chills, persistent nausea/vomiting, and/or worsening/uncontrollable pain

## 2019-11-20 NOTE — PROGRESS NOTES
Wound care provided  Right hip packed with kerlex, covered with ABDs  Right buttock allevyn and maxsorb replaced

## 2019-11-21 ENCOUNTER — DOCUMENTATION (OUTPATIENT)
Dept: OBGYN CLINIC | Facility: HOSPITAL | Age: 69
End: 2019-11-21

## 2019-11-21 NOTE — UTILIZATION REVIEW
Notification of Discharge  This is a Notification of Discharge from our facility [address]  Please be advised that this patient has been discharge from our facility  Below you will find the admission and discharge date and time including the patients disposition  PRESENTATION DATE: 11/10/2019  4:06 PM  OBS ADMISSION DATE:   IP ADMISSION DATE: 11/10/19 2019   DISCHARGE DATE: 11/20/2019  1:56 PM  DISPOSITION: LTAC Rehab LTAC Rehab   Admission Orders listed below:  Admission Orders (From admission, onward)     Ordered        11/10/19 2019  Inpatient Admission  Once                   Please contact the UR Department if additional information is required to close this patient's authorization/case  [facility] Utilization Review Department  Main: [phone] x carefully listen to the prompts  All voicemails are confidential   [email or web address]  Send all requests for admission clinical reviews, approved or denied determinations and any other requests to dedicated fax number below belonging to the campus where the patient is receiving treatment    List of dedicated fax numbers:  [address] DENIALS (Administrative/Medical Necessity) [phone]   [address] (Maternity/NICU/Pediatrics) [phone]   Arlyn Courser [phone]   Matt Sole [phone]   [address] [place] [address] [phone]   [address] [place] [address] [address] [place] [address] [phone]   [address] [phone]

## 2019-11-21 NOTE — PROGRESS NOTES
Patient sent wallet to security and was discharged without retrieving it  The wallet was retrieved from security and opened in presence of myself, Min Diaz, and   Content included $44 and loose change along with various cards  Wallet to be sent by  to patient at her facility

## 2019-12-05 NOTE — UTILIZATION REVIEW
Continued Stay Review    Date: 11/16/19                 Current Patient Class: IP    Current Level of Care: critical care and transferred to [facility] Surg later in the day    Operative day and POD # 4 most recent excisional debridement on 11/12    HPI:69 y o  female initially admitted on 11/10/19 with chronic decubitus wounds of bilat hips, femur, iliac and sacrum worsening with increased drainage for past 2 wks  Multiple flap surgeries performed throughout 2018 and 2019, most recently 1/19  She is also c/o weakness and poor po intake  Acute blood loss anemia and sepsis r/t wounds  Moderate protein calorie malnutrition  Chronic paraplegia  Assessment/Plan:   11/16 - had bedside debridement on 11/16  Continues with VAC therapy with 125 mm HG continuous pressure  Palliative care following case and Ortho signed off r/t not a candidate for Osteo resection  Continues on iv antibiotics  Continues on Midodrine and PRN pain control  On transfer to [facility] the PICC was not working - [employer] insertion and PICC was again functional   BP was low in morning and then rebounded in the afternoon and evening  11/15 Bibasilar pneumonia dx today  Pertinent Labs/Diagnostic Results:     11/15 CXR - Bibasilar pneumonia  Retrocardiac opacity due to hiatal hernia      Results from last 7 days   Lab Units 11/16/19  0615 11/15/19  0532 11/14/19  0540 11/13/19  0534   WBC Thousand/uL 7 69 10 85* 11 97* 13 22*   HEMOGLOBIN g/dL 9 1* 9 2* 9 8* 10 8*   HEMATOCRIT % 30 2* 29 9* 31 4* 33 6*   PLATELETS Thousands/uL 288 283 376 401*   NEUTROS ABS Thousands/µL 5 60 7 62 8 27* 9 55*         Results from last 7 days   Lab Units 11/16/19  0615 11/15/19  0829 11/15/19  0532 11/14/19  0721 11/14/19  0540 11/13/19  0534 11/12/19  0443   SODIUM mmol/L 142  --  141  --  139 139 138   POTASSIUM mmol/L 3 7  --  4 0  --  4 2 3 4* 3 6   CHLORIDE mmol/L 109*  --  107  --  109* 107 108   CO2 mmol/L 26  --  24  --  22 22 22   ANION GAP mmol/L 7  --  10 --  8 10 8   BUN mg/dL 8  --  9  --  8 6 9   CREATININE mg/dL 0 56*  --  0 39*  --  0 39* 0 34* 0 43*   EGFR ml/min/1 73sq m 95  --  108  --  108 112 104   CALCIUM mg/dL 8 1*  --  7 4*  --  7 5* 7 0* 7 0*   CALCIUM, IONIZED mmol/L  --  1 05*  --  1 01*  --   --  0 98*   MAGNESIUM mg/dL 2 1  --   --   --   --   --  1 9   PHOSPHORUS mg/dL 4 4*  --   --   --   --  2 7 2 0*             Results from last 7 days   Lab Units 11/16/19  0615 11/15/19  0532 11/14/19  0540 11/13/19  0534 11/12/19  0443   GLUCOSE RANDOM mg/dL 114 122 113 108 123                 Vital Signs:   11/16/19 22:32:54  98 3 °F (36 8 °C)  92  18  104/66  79  94 %     11/16/19 2100            92 %  Nasal cannula   11/16/19 1534  97 8 °F (36 6 °C)  95  18  127/95  106  94 %     11/16/19 12:45:23  97 4 °F (36 3 °C)Abnormal   99  20  94/63  73  98 %  Nasal cannula   11/16/19 1128  98 2 °F (36 8 °C)  96  20  85/61Abnormal   68  96 %     11/16/19 0900  97 5 °F (36 4 °C)  90  24Abnormal   108/68  80  96 %     11/16/19 0800  97 9 °F (36 6 °C)  88  26Abnormal   92/57  65  97 %     11/16/19 0700  97 9 °F (36 6 °C)  92  29Abnormal   82/49Abnormal   55   96 %     11/16/19 0635  97 9 °F (36 6 °C)  84  17  87/51Abnormal   59  96 %     11/16/19 0600  97 9 °F (36 6 °C)  86  19  86/55Abnormal   65  97 %     11/16/19 0545  97 9 °F (36 6 °C)  82  17  83/50Abnormal   58  97 %     11/16/19 0500  97 5 °F (36 4 °C)  78  18  98/60  71  97 %     11/16/19 0430  97 2 °F (36 2 °C)Abnormal   86  24Abnormal   85/57Abnormal   68  94 %     11/16/19 0400  97 2 °F (36 2 °C)Abnormal   84  30Abnormal   84/53Abnormal   58  94 %  None (Room air)   11/16/19 0300  96 4 °F (35 8 °C)Abnormal   86  26Abnormal   84/58Abnormal   70  94 %     11/16/19 0235  96 4 °F (35 8 °C)Abnormal   78  35Abnormal   83/54Abnormal   64  97 %     11/16/19 0200  96 1 °F (35 6 °C)Abnormal   88  26Abnormal   84/53Abnormal   59  95 %     11/16/19 0130  96 4 °F (35 8 °C)Abnormal   86  36Abnormal 99/72  82  95 %     11/16/19 0100    74  32Abnormal   102/72  82  97 %     11/16/19 0035    90  20  95/59  68  98 %     11/16/19 0000    84  17  103/73  81  99 %       Medications:   Scheduled Medications:    No current facility-administered medications for this encounter  Continuous IV Infusions:    No current facility-administered medications for this encounter  PRN Meds:    acetaminophen 650 mg Oral Q6H PRN   HYDROmorphone 0 5 mg Intravenous Q6H PRN   ondansetron 4 mg Intravenous Q6H PRN   oxyCODONE 10 mg Oral Q4H PRN   oxyCODONE 5 mg Oral Q4H PRN   saliva substitute 5 spray Mouth/Throat PRN       Discharge Plan: TBD    Network Utilization Review Department  [email or web address]  ATTENTION: Please call with any questions or concerns to [phone] and carefully listen to the prompts so that you are directed to the right person  All voicemails are confidential   Loli Silva all requests for admission clinical reviews, approved or denied determinations and any other requests to dedicated fax number below belonging to the campus where the patient is receiving treatment    FACILITY NAME UR FAX NUMBER   ADMISSION DENIALS (Administrative/Medical Necessity) [address] [facility] (Maternity/NICU/Pediatrics) [phone]   [facility] [address] [facility] [phone]   [address] [place] [address] 1525 [facility] [phone]   Day Caruso [address] [phone]

## 2020-01-17 ENCOUNTER — NURSING HOME VISIT (OUTPATIENT)
Dept: GERIATRICS | Facility: OTHER | Age: 70
End: 2020-01-17
Payer: COMMERCIAL

## 2020-01-17 VITALS
OXYGEN SATURATION: 95 % | DIASTOLIC BLOOD PRESSURE: 71 MMHG | SYSTOLIC BLOOD PRESSURE: 121 MMHG | TEMPERATURE: 97.5 F | HEART RATE: 78 BPM

## 2020-01-17 DIAGNOSIS — M81.8 OTHER OSTEOPOROSIS WITHOUT CURRENT PATHOLOGICAL FRACTURE: ICD-10-CM

## 2020-01-17 DIAGNOSIS — K21.9 GASTROESOPHAGEAL REFLUX DISEASE WITHOUT ESOPHAGITIS: ICD-10-CM

## 2020-01-17 DIAGNOSIS — K59.00 CONSTIPATION, UNSPECIFIED CONSTIPATION TYPE: ICD-10-CM

## 2020-01-17 DIAGNOSIS — D63.8 ANEMIA OF CHRONIC DISEASE: ICD-10-CM

## 2020-01-17 DIAGNOSIS — N31.9 NEUROGENIC BLADDER: ICD-10-CM

## 2020-01-17 DIAGNOSIS — L89.314 STAGE IV PRESSURE ULCER OF RIGHT BUTTOCK (HCC): Primary | ICD-10-CM

## 2020-01-17 DIAGNOSIS — R63.6 UNDERWEIGHT: Chronic | ICD-10-CM

## 2020-01-17 DIAGNOSIS — G82.20 PARAPLEGIA FOLLOWING SPINAL CORD INJURY (HCC): ICD-10-CM

## 2020-01-17 PROCEDURE — 99305 1ST NF CARE MODERATE MDM 35: CPT | Performed by: FAMILY MEDICINE

## 2020-01-17 NOTE — PROGRESS NOTES
[address] [place], [place], [address]  History and Physical  POS: 31    Records Reviewed include: Hospital records      Chief Complaint/ Reason for Admission:   Multiple pressure wounds, paraplegia    History of Present Illness:       Ms Donny Warren is a 72 yo female who was recently admitted to [facility] due to sepsis secondary to infected wound pressure ulcers, she was discharged to [facility] for rehab and currently will be admitted to [facility] for [facility]  Wound team will follow, will encourage frequent repositioning, continue PT/OT  Paraplegia- will continue supportive care, pain management as needed  Pt is underweight- RD will follow , protein supplements, monitor CMP  Anemia- currently asymptomatic, will monitor CBC  Ambulatory dysfunction, will continue PT/OT           Allergies    Allergies   Allergen Reactions    Iv Contrast [Iodinated Diagnostic Agents]      Tingling face, itching    Cefepime Rash    Ciprofloxacin Rash and Swelling     Looked like suburn, itching  Bed sores  Swelling, itching    Latex Rash    Vancomycin Rash     Unknown        Past Medical History  Past Medical History:   Diagnosis Date    Anemia     iron defiencey    Arthritis     osteo    Back injury     Chronic kidney disease     nephritis    Depression     MRSA (methicillin resistant staph aureus) culture positive 12/07/2018    BONE-TISSUE     Osteopenia     Osteoporosis     Paralysis ([address] )     paraplegic due to spinal cord injury    Paraplegia (HCC)     Poor circulation     Pressure injury of skin     right hip, stage 3    Pressure sore of hip     right    Tibial plateau fracture     Underweight         Past Surgical History:   Procedure Laterality Date    CLOSURE DELAYED PRIMARY N/A 3/20/2019    Procedure: OPHELIA ANAL WOUND RECLOSURE;  Surgeon: Fred Johnson MD;  Location: [facility] MAIN OR;  Service: Plastics    DECUBITUS ULCER EXCISION Bilateral 11/12/2019    Procedure: DEBRIDEMENT DECUBITI ([address] OUT); Surgeon: Angelic Stephen DO;  Location:  MAIN OR;  Service: General    FLAP LOCAL EXTREMITY Left 1/28/2019    Procedure: THIGH FLAP & STSG TO CLOSE HIP WOUND;  Surgeon: Sri Castillo MD;  Location: [facility] MAIN OR;  Service: Plastics    FLAP LOCAL TRUNK Right 12/17/2018    Procedure: DEBRIDE/FLAP CLOSURE HIP PRESSURE SORE [employer] GRAFT;  Surgeon: Sri Castillo MD;  Location: [facility] MAIN OR;  Service: Plastics    FLAP LOCAL TRUNK Left 2/27/2019    Procedure: BUTTOCK FLAP TO ISCHIAL SORE;  Surgeon: Sri Castillo MD;  Location: [facility] MAIN OR;  Service: Plastics    HIP DEBRIDEMENT Right 2017    right hip sore debridement    INCISION AND DRAINAGE OF WOUND Left 1/3/2019    Procedure: INCISION AND DRAINAGE (I&D) left distal femur  With deep wound cultures   Application of VAC DRAIN SPONGE;  Surgeon: Maddi Velazquez MD;  Location:  MAIN OR;  Service: Orthopedics    IR PICC LINE  12/19/2018    IR PICC LINE  3/12/2019    MS DEBRIDEMENT, SKIN, SUB-Q TISSUE,MUSCLE,BONE,=<20 SQ CM Right 9/19/2018    Procedure: DEBRIDEMENT OF HIP PRESSURE SORE;  Surgeon: Sri Castillo MD;  Location: [facility] MAIN OR;  Service: Plastics    MS OPEN RX FEMUR FX+INTRAMED FELIX Left 10/22/2018    Procedure: INSERTION NAIL IM LEFT FEMUR RETROGRADE;  Surgeon: Laureen Olmedo MD;  Location:  MAIN OR;  Service: Orthopedics    TOE AMPUTATION Left 2017    small toe left foot amputation    WISDOM TOOTH EXTRACTION      WOUND DEBRIDEMENT Left 12/17/2018    Procedure: Melina Chin;  Surgeon: Sri Castillo MD;  Location: [facility] MAIN OR;  Service: Plastics    WOUND DEBRIDEMENT N/A 11/10/2019    Procedure: EXCISIONAL DEBRIDEMENT;  Surgeon: Britni Williamson MD;  Location:  MAIN OR;  Service: General       Family History  Family History   Problem Relation Age of Onset    Depression Father        Social History  Social History     Tobacco Use   Smoking Status Never Smoker   Smokeless Tobacco Never Used      Social History     Substance and Sexual Activity   Alcohol Use Yes    Comment: occasional      Social History     Substance and Sexual Activity   Drug Use No        Lives: Home,  Social Support: friends  Fall in the past 12 months: no  Use of assistance Device: Walker    Physical Exam    Vital Signs    Vitals:    01/17/20 1729   BP: 121/71   Pulse: 78   Temp: 97 5 °F (36 4 °C)   SpO2: 95%           Constitutional: Frail appearing patient       Physical Exam   Constitutional: She is oriented to person, place, and time  No distress  HENT:   Head: Normocephalic and atraumatic  Eyes: Pupils are equal, round, and reactive to light  EOM are normal  Right eye exhibits no discharge  Left eye exhibits no discharge  Neck: Normal range of motion  Neck supple  Cardiovascular: Normal rate, regular rhythm and normal heart sounds  No murmur heard  Pulmonary/Chest: Effort normal and breath sounds normal  No respiratory distress  She has no wheezes  Abdominal: Soft  There is no tenderness  There is no rebound and no guarding  Genitourinary:   Genitourinary Comments: Folley in place   Musculoskeletal: She exhibits no edema  Neurological: She is alert and oriented to person, place, and time  She exhibits abnormal muscle tone (b/l LE)  Skin: Skin is warm and dry  She is not diaphoretic  Pressure ulcers right and left buttock, sacrum   Psychiatric: Her behavior is normal    Nursing note and vitals reviewed  Review of Systems:  Review of Systems   Constitutional: Positive for unexpected weight change  Negative for chills and fever  HENT: Negative for congestion and rhinorrhea  Respiratory: Positive for cough (dry, intermittent)  Negative for shortness of breath and wheezing  Cardiovascular: Negative for chest pain, palpitations and leg swelling  Gastrointestinal: Negative for abdominal pain and constipation  Endocrine: Positive for cold intolerance     Genitourinary: Negative for difficulty urinating, dysuria and hematuria  Musculoskeletal: Positive for gait problem  Skin: Positive for wound  Allergic/Immunologic: Negative for environmental allergies  Neurological: Positive for weakness (b/l LE)  Negative for dizziness and seizures  Hematological: Does not bruise/bleed easily  Psychiatric/Behavioral: Negative for behavioral problems and sleep disturbance  List of Current Medications:    Medication reviewed  All orders signed  Complete list is in the paper chart  Allergies    Allergies   Allergen Reactions    Iv Contrast [Iodinated Diagnostic Agents]      Tingling face, itching    Cefepime Rash    Ciprofloxacin Rash and Swelling     Looked like suburn, itching  Bed sores  Swelling, itching    Latex Rash    Vancomycin Rash     Unknown        Labs/Diagnostics (reviewed by this provider): I personally reviewed lab results and imaging studies  Full reports are in the paper chart  Assessment/Plan:    Stage IV pressure ulcer of right buttock (HCC)  Will continue local wound care, dressing change BID  Wound team will follow  Provide air mattress, encourage frequent repositioning  Paraplegia following spinal cord injury [facility])  Paraplegic since 1981, will continue supportive care and Pt/OT    Underweight  Patient refused weight on admission, reports subjective weight loss and a weight from previous facility of 101 lbs  RD will follow, will provide protein supplements, monitor CMP    Anemia of chronic disease  Currently asymptomatic, will monitor CBC    Constipation  Currently controlled, will continue to monitor BM  Neurogenic bladder  Will continue Folley cath and monitor  Other osteoporosis without current pathological fracture  Patient reported that she follows up with Rheumatology and taking Prolia every 6 months  Will need follow up as outpatient  GERD (gastroesophageal reflux disease)  Continue Protonix  Currently stable, no symptoms reported         Pain: no  Rehab Potential:Good  Patient Informed of Medical Condition: yes   Patient is Capable of Understanding Their Right: yes   Discharge Plan: home/LTCF  Vaccination:   Immunization History   Administered Date(s) Administered    INFLUENZA 12/16/2013    Tuberculin Skin Test-PPD Intradermal 01/15/2019, 02/07/2019     Advanced Directives: yes: No  Code status:Full Code  PCP: Alli Kay MD  Geriatric Medicine  1/20/20209:38 AM

## 2020-01-20 PROBLEM — N31.9 NEUROGENIC BLADDER: Status: ACTIVE | Noted: 2020-01-20

## 2020-01-20 PROBLEM — M81.8 OTHER OSTEOPOROSIS WITHOUT CURRENT PATHOLOGICAL FRACTURE: Status: ACTIVE | Noted: 2020-01-20

## 2020-01-20 NOTE — ASSESSMENT & PLAN NOTE
Patient refused weight on admission, reports subjective weight loss and a weight from previous facility of 101 lbs    RD will follow, will provide protein supplements, monitor CMP

## 2020-01-20 NOTE — ASSESSMENT & PLAN NOTE
Patient reported that she follows up with Rheumatology and taking Prolia every 6 months  Will need follow up as outpatient

## 2020-01-20 NOTE — ASSESSMENT & PLAN NOTE
Will continue local wound care, dressing change BID  Wound team will follow  Provide air mattress, encourage frequent repositioning

## 2020-01-22 ENCOUNTER — NURSING HOME VISIT (OUTPATIENT)
Dept: GERIATRICS | Facility: OTHER | Age: 70
End: 2020-01-22
Payer: COMMERCIAL

## 2020-01-22 DIAGNOSIS — L89.150 PRESSURE INJURY OF SACRAL REGION, UNSTAGEABLE (HCC): Primary | Chronic | ICD-10-CM

## 2020-01-22 PROCEDURE — 99308 SBSQ NF CARE LOW MDM 20: CPT | Performed by: NURSE PRACTITIONER

## 2020-01-22 NOTE — PROGRESS NOTES
[facility]  [address]  [phone]  [address] [place] [address],      NAME: Delroy Madsen  AGE: 71 y o  SEX: female    DATE OF ENCOUNTER: 1/22/2020    Assessment and Plan     Pressure ulcer, sacrum  Patient is being followed by the wound care team     Continue local wound care  Frequent turning to reduce pressure on the wound areas  Continue zinc sulfate 220 mg daily  Paraplegia following spinal cord injury ([address] )  Continue baclofen 5 mg 3 times a day  Monitor for signs and symptoms of uncontrolled pain  Neurogenic bladder  Continue [place] care  Chief Complaint     Follow-up Note     History of Present Illness     Patient seen examined at bedside  She is in stable condition and denies chest pain, shortness of breath, abdominal pain, nausea, vomiting, constipation, diarrhea, headache, dizziness, pain  Patient is on bed rest   She is receiving therapy which she states is going well  She is eating and sleeping okay  Labs and medications reviewed  She is followed by the wound care team for her stage IV pressure ulcer of the right buttock  Review of Systems     ROS as per noted in HPI  Objective     Vitals:  Blood pressure 119/56, pulse 90, respirations 20, temperature 98 0°  Pulse ox 96% room air  Physical Exam   Constitutional: She is oriented to person, place, and time  Cardiovascular: Normal rate and normal heart sounds  Exam reveals no gallop and no friction rub  Pulmonary/Chest: Effort normal and breath sounds normal  No respiratory distress  She has no rales  Abdominal: Soft  Bowel sounds are normal  There is no tenderness  Genitourinary:   Genitourinary Comments: Urinary catheter intact draining yellow urine  Musculoskeletal: She exhibits no edema  Neurological: She is alert and oriented to person, place, and time  Skin: Skin is warm and dry  Nursing note and vitals reviewed          Current Medications   Medications were reviewed and updated in facility chart       CONCEPCION Farris  1/22/2020 3:37 PM

## 2020-01-22 NOTE — ASSESSMENT & PLAN NOTE
Patient is being followed by the wound care team     Continue local wound care  Frequent turning to reduce pressure on the wound areas  Continue zinc sulfate 220 mg daily

## 2020-01-27 ENCOUNTER — NURSING HOME VISIT (OUTPATIENT)
Dept: GERIATRICS | Facility: OTHER | Age: 70
End: 2020-01-27
Payer: COMMERCIAL

## 2020-01-27 DIAGNOSIS — L89.314 STAGE IV PRESSURE ULCER OF RIGHT BUTTOCK (HCC): Primary | ICD-10-CM

## 2020-01-27 DIAGNOSIS — S73.004D CLOSED DISLOCATION OF RIGHT HIP, SUBSEQUENT ENCOUNTER: ICD-10-CM

## 2020-01-27 DIAGNOSIS — G82.20 PARAPLEGIA FOLLOWING SPINAL CORD INJURY (HCC): ICD-10-CM

## 2020-01-27 DIAGNOSIS — R79.89 ELEVATED LIVER FUNCTION TESTS: ICD-10-CM

## 2020-01-27 DIAGNOSIS — K59.00 CONSTIPATION, UNSPECIFIED CONSTIPATION TYPE: ICD-10-CM

## 2020-01-27 PROBLEM — S73.004A CLOSED DISLOCATION OF RIGHT HIP (HCC): Status: ACTIVE | Noted: 2020-01-27

## 2020-01-27 PROCEDURE — 99309 SBSQ NF CARE MODERATE MDM 30: CPT | Performed by: FAMILY MEDICINE

## 2020-01-27 NOTE — ASSESSMENT & PLAN NOTE
Will continue supportive care and PT as needed  Will decrease baclofen to 5 mg BID as she is c/o increase daytime sleepiness

## 2020-01-27 NOTE — PROGRESS NOTES
[facility]  [address]  [phone] [address] of Service: nursing home place of service: POS [facility] Skilled Care-Part A Coverage      NAME: Phong German  AGE: 71 y o  SEX: female [phone]    DATE OF ENCOUNTER: 1/27/2020    Assessment and Plan     Problem List Items Addressed This Visit        Nervous and Auditory    Paraplegia following spinal cord injury [facility])     Will continue supportive care and PT as needed  Will decrease baclofen to 5 mg BID as she is c/o increase daytime sleepiness  Musculoskeletal and Integument    Stage IV pressure ulcer of right buttock ([facility] ) - Primary     Will continue wound care, healing well currently, no signs of infection  Wound team will follow  Closed dislocation of right hip ([facility] )     Patient was told at previous facility that she had a R hip dislocation and she is not able to do even passive movements right hip  Will check X ray to confirm  Vs rule out dislocation  Will consult Ortho if  needed  Other    Constipation     Will give Senna daily and monitor   Encourage to increase po hydration         Elevated liver function tests     Elevated alk phos and bilirubin ( 1 3)  Will repeat CMP and follow up with results  Chief Complaint     Follow up paraplegia, pressure wounds    History of Present Illness     Phong German is a 71 y o  female who was seen today for follow up  She reports that she is well, denies pain, states that wounds are healing well  She is concerned though regarding the restriction for PT as she was told that she has R hip dislocation  No fever, chills, sleeps well, appetite is at baseline            The following portions of the patient's history were reviewed and updated as appropriate: allergies, current medications, past family history, past medical history, past social history, past surgical history and problem list     Review of Systems     Review of Systems   Constitutional: Positive for activity change  Negative for chills and fever  HENT: Negative for congestion and rhinorrhea  Respiratory: Negative for cough, shortness of breath and wheezing  Cardiovascular: Negative for chest pain, palpitations and leg swelling  Gastrointestinal: Positive for constipation  Negative for abdominal pain  Endocrine: Negative for cold intolerance  Genitourinary: Negative for difficulty urinating, dysuria and hematuria  Folley in place   Musculoskeletal: Positive for gait problem  Skin: Positive for wound  Allergic/Immunologic: Negative for environmental allergies  Neurological: Negative for dizziness and seizures  Hematological: Does not bruise/bleed easily  Psychiatric/Behavioral: Negative for behavioral problems and sleep disturbance         Active Problem List     Patient Active Problem List   Diagnosis    Paraplegia following spinal cord injury ([facility] )    Pressure ulcer, sacrum    Iron deficiency anemia    Osteopenia    Underweight    Stage IV pressure ulcer of right buttock ([facility])    Rash due to allergy    Closed fracture of distal end of left femur with routine healing    Closed fracture of lower end of left femur with routine healing    [facility]omyelitis of coccyx ([facility] )    Constipation    Anemia of chronic disease    Hyponatremia    GERD (gastroesophageal reflux disease)    Moderate protein-calorie malnutrition (HCC)    Hyperglycemia    BMI less than 19,adult    Hypermagnesemia    Complicated wound infection    Sepsis due to anaerobes ([facility])    C  difficile diarrhea    Pressure injury of sacral region, unstageable ([facility] )    Acute blood loss anemia    Sepsis ([facility])    Neurogenic bladder    Other osteoporosis without current pathological fracture       Objective     Vital Signs:     Blood pressure 136/78 Heart Rate: 74 Respiratory Rate 18   Temperature 97 9 Oxygen Saturation 95 % RA     Physical Exam   Constitutional: She is oriented to person, place, and time  No distress  HENT:   Head: Normocephalic and atraumatic  Eyes: Pupils are equal, round, and reactive to light  EOM are normal  Right eye exhibits no discharge  Left eye exhibits no discharge  Neck: Normal range of motion  Neck supple  Cardiovascular: Normal rate, regular rhythm and normal heart sounds  No murmur heard  Pulmonary/Chest: Effort normal and breath sounds normal  No respiratory distress  She has no wheezes  Abdominal: Soft  There is no tenderness  There is no rebound and no guarding  Genitourinary:   Genitourinary Comments: folley in place   Musculoskeletal: She exhibits no edema  Neurological: She is alert and oriented to person, place, and time  A sensory deficit is present  She exhibits abnormal muscle tone  Skin: Skin is warm and dry  She is not diaphoretic  Wounds b/l hips, buttocks   Psychiatric: Her behavior is normal    Nursing note and vitals reviewed  Pertinent Laboratory/Diagnostic Studies:  Laboratory and Imaging studies reviewed  Full report in the paper chart  Current Medications   Medications reviewed and updated in facility chart      Name: Monty Cristy  : 1950  MRN: [id number]  DOS: 2020      Alyssa Garcia MD  Geriatric Medicine  2020 4:50 PM

## 2020-01-27 NOTE — ASSESSMENT & PLAN NOTE
Patient was told at previous facility that she had a R hip dislocation and she is not able to do even passive movements right hip  Will check X ray to confirm  Vs rule out dislocation  Will consult Ortho if  needed

## 2020-01-28 ENCOUNTER — NURSING HOME VISIT (OUTPATIENT)
Dept: GERIATRICS | Facility: OTHER | Age: 70
End: 2020-01-28
Payer: COMMERCIAL

## 2020-01-28 DIAGNOSIS — L89.314 STAGE IV PRESSURE ULCER OF RIGHT BUTTOCK (HCC): ICD-10-CM

## 2020-01-28 DIAGNOSIS — S73.004D CLOSED DISLOCATION OF RIGHT HIP, SUBSEQUENT ENCOUNTER: Primary | ICD-10-CM

## 2020-01-28 PROCEDURE — 99308 SBSQ NF CARE LOW MDM 20: CPT | Performed by: FAMILY MEDICINE

## 2020-01-29 ENCOUNTER — APPOINTMENT (OUTPATIENT)
Dept: RADIOLOGY | Facility: MEDICAL CENTER | Age: 70
End: 2020-01-29
Payer: COMMERCIAL

## 2020-01-29 ENCOUNTER — TELEPHONE (OUTPATIENT)
Dept: OBGYN CLINIC | Facility: MEDICAL CENTER | Age: 70
End: 2020-01-29

## 2020-01-29 ENCOUNTER — OFFICE VISIT (OUTPATIENT)
Dept: OBGYN CLINIC | Facility: MEDICAL CENTER | Age: 70
End: 2020-01-29
Payer: COMMERCIAL

## 2020-01-29 DIAGNOSIS — M25.551 PAIN IN RIGHT HIP: ICD-10-CM

## 2020-01-29 DIAGNOSIS — M25.551 PAIN IN RIGHT HIP: Primary | ICD-10-CM

## 2020-01-29 PROCEDURE — 99214 OFFICE O/P EST MOD 30 MIN: CPT | Performed by: ORTHOPAEDIC SURGERY

## 2020-01-29 PROCEDURE — 73502 X-RAY EXAM HIP UNI 2-3 VIEWS: CPT

## 2020-01-29 NOTE — TELEPHONE ENCOUNTER
OT called in requesting plan of care for the patient  They would like plan of care to be faxed to fax#[phone]   Thank you       Call back#: [phone]

## 2020-01-29 NOTE — PROGRESS NOTES
Orthopaedic Surgery Note    CC: Right Hip Dislocation      HPI:  Leif Joiner is a 71 y  o female with a history of paraplegia following a spinal cord injury  She has a chronic ulceration overlying the right buttocks, lateral proximal thigh, for which she has previously been treated with skin grafting, she reports by Dr Raymondo Bernheim  She is currently being treated by wound care for this and reports that the wound is improving  She has been sent to this clinic for evaluation of right hip dislocation  She does not recall any specific injury or fall  She reports that she does not walk and is not able to move the lower extremities  She was previously able to sit in a wheelchair or chair and also is transported using a stretcher  She reports having no pain in the right leg and no pain when the right leg is moved        ALLERGIES:  Allergies   Allergen Reactions    Iv Contrast [Iodinated Diagnostic Agents]      Tingling face, itching    Cefepime Rash    Ciprofloxacin Rash and Swelling     Looked like suburn, itching  Bed sores  Swelling, itching    Latex Rash    Vancomycin Rash     Unknown        CURRENT MEDICATIONS:  Current Outpatient Medications   Medication Sig Dispense Refill    acetaminophen (TYLENOL) 325 mg tablet Take 2 tablets (650 mg total) by mouth every 6 (six) hours as needed for mild pain, headaches or fever 30 tablet 0    bisacodyl (DULCOLAX) 10 mg suppository Insert 1 suppository (10 mg total) into the rectum daily 12 suppository 0    Calcium Carb-Cholecalciferol 600-200 MG-UNIT TABS Take 1 tablet by mouth daily      cholecalciferol (VITAMIN D3) 1,000 units tablet Take 1,000 Units by mouth daily      cyanocobalamin 100 MCG tablet Take 100 mcg by mouth daily      midodrine (PROAMATINE) 5 mg tablet Take 3 tablets (15 mg total) by mouth every 8 (eight) hours  0    multivitamin (THERAGRAN) TABS Take 1 tablet by mouth daily      nystatin (MYCOSTATIN) powder Apply topically 2 (two) times a day      pantoprazole (PROTONIX) 40 mg tablet Take 40 mg by mouth daily      Probiotic Product (PROBIOTIC PEARLS ADVANTAGE PO) Take by mouth      senna (SENOKOT) 8 6 mg Take 1 tablet (8 6 mg total) by mouth daily at bedtime 120 each 0    saccharomyces boulardii (FLORASTOR) 250 mg capsule Take 1 capsule (250 mg total) by mouth 2 (two) times a day (Patient not taking: Reported on 6/13/2019) 60 capsule 0     No current facility-administered medications for this visit        Facility-Administered Medications Ordered in Other Visits   Medication Dose Route Frequency Provider Last Rate Last Dose    dexamethasone (DECADRON) injection 4 mg  4 mg Intravenous Once PRN Dayana President, DO        diphenhydrAMINE (BENADRYL) injection 12 5 mg  12 5 mg Intravenous Once Dayana President, DO        lactated ringers infusion  75 mL/hr Intravenous Continuous Dayana Posada DO 75 mL/hr at 11/10/19 2105      lactated ringers infusion  50 mL/hr Intravenous Continuous Jose Bays CRNA        lactated ringers infusion  75 mL/hr Intravenous Continuous Dayana PresidentDO   Stopped at 03/20/19 1841    lactated ringers infusion  75 mL/hr Intravenous Continuous Sergio Matthews MD   Stopped at 03/20/19 1842    ondansetron (ZOFRAN) injection 4 mg  4 mg Intravenous Once PRN Anna Zhou MD        promethazine (PHENERGAN) injection 12 5 mg  12 5 mg Intravenous Once PRN Anna Zhou MD           PAST MEDICAL HISTORY  Past Medical History:   Diagnosis Date    Anemia     iron defiencey    Arthritis     osteo    Back injury     Chronic kidney disease     nephritis    Depression     MRSA (methicillin resistant staph aureus) culture positive 12/07/2018    BONE-TISSUE     Osteopenia     Osteoporosis     Paralysis ([facility] )     paraplegic due to spinal cord injury    Paraplegia (HCC)     Poor circulation     Pressure injury of skin     right hip, stage 3    Pressure sore of hip     right    Tibial plateau fracture  Underweight        SURGICAL HISTORY  Past Surgical History:   Procedure Laterality Date    CLOSURE DELAYED PRIMARY N/A 3/20/2019    Procedure: OPHELIA ANAL WOUND RECLOSURE;  Surgeon: Jazlyn Pennington MD;  Location: [address] [place] [address];  Service: Plastics    DECUBITUS ULCER EXCISION Bilateral 11/12/2019    Procedure: DEBRIDEMENT DECUBITI [facility] OUT); Surgeon: Stephanie Lyles DO;  Location:  MAIN OR;  Service: General    FLAP LOCAL EXTREMITY Left 1/28/2019    Procedure: THIGH FLAP & STSG TO CLOSE HIP WOUND;  Surgeon: Jazlyn Pennington MD;  Location: [address] [place] [address] OR;  Service: Plastics    FLAP LOCAL TRUNK Right 12/17/2018    Procedure: DEBRIDE/FLAP CLOSURE HIP PRESSURE SORE [place] [employer] GRAFT;  Surgeon: Jazlyn Pennington MD;  Location: [address] [place] [address] OR;  Service: Plastics    FLAP LOCAL TRUNK Left 2/27/2019    Procedure: BUTTOCK FLAP TO ISCHIAL SORE;  Surgeon: Jazlyn Pennington MD;  Location: [address] [place] [address] OR;  Service: Plastics    HIP DEBRIDEMENT Right 2017    right hip sore debridement    INCISION AND DRAINAGE OF WOUND Left 1/3/2019    Procedure: INCISION AND DRAINAGE (I&D) left distal femur  With deep wound cultures   Application of VAC DRAIN SPONGE;  Surgeon: Johnnie Gaxiola MD;  Location:  MAIN OR;  Service: Orthopedics    IR PICC LINE  12/19/2018    IR PICC LINE  3/12/2019    CA DEBRIDEMENT, SKIN, SUB-Q TISSUE,MUSCLE,BONE,=<20 SQ CM Right 9/19/2018    Procedure: DEBRIDEMENT OF HIP PRESSURE SORE;  Surgeon: Jazlyn Pennington MD;  Location: [address] [place] [address] OR;  Service: Plastics    CA OPEN RX FEMUR FX+INTRAMED FELIX Left 10/22/2018    Procedure: INSERTION NAIL IM LEFT FEMUR RETROGRADE;  Surgeon: Rock Yessica MD;  Location:  MAIN OR;  Service: Orthopedics    TOE AMPUTATION Left 2017    small toe left foot amputation    WISDOM TOOTH EXTRACTION      WOUND DEBRIDEMENT Left 12/17/2018    Procedure: DEBRIDEMENT HIP PRESSURE SORE;  Surgeon: Jazlyn Pennington MD;  Location: [address] [place] [address] OR;  Service: Plastics    WOUND DEBRIDEMENT N/A 11/10/2019 Procedure: EXCISIONAL DEBRIDEMENT;  Surgeon: Alanna Disla MD;  [facility]: [facility];  Service: General       FAMILY HISTORY  Family History   Problem Relation Age of Onset    Depression Father        SOCIAL HISTORY  Social History     Socioeconomic History    Marital status: Single     Spouse name: Not on file    Number of children: Not on file    Years of education: Not on file    Highest education level: Not on file   Occupational History    Not on file   Social Needs    Financial resource strain: Not on file    Food insecurity:     Worry: Not on file     Inability: Not on file    Transportation needs:     Medical: Not on file     Non-medical: Not on file   Tobacco Use    Smoking status: Never Smoker    Smokeless tobacco: Never Used   Substance and Sexual Activity    Alcohol use: Yes     Comment: occasional    Drug use: No    Sexual activity: Not on file   Lifestyle    Physical activity:     Days per week: Not on file     Minutes per session: Not on file    Stress: Not on file   Relationships    Social connections:     Talks on phone: Not on file     Gets together: Not on file     Attends [employer] service: Not on file     Active member of club or organization: Not on file     Attends meetings of clubs or organizations: Not on file     Relationship status: Not on file    Intimate partner violence:     Fear of current or ex partner: Not on file     Emotionally abused: Not on file     Physically abused: Not on file     Forced sexual activity: Not on file   Other Topics Concern    Not on file   Social History Narrative    Lives at home alone  Pt denies getting assistance from outside persons or agencies including wound care  Review of Systems   Otherwise negative except per above and HPI  Physical Exam    Vitals  There were no vitals filed for this visit  BMI  There is no height or weight on file to calculate BMI  GENERAL: No acute distress  Alert and oriented   Appears stated age  Thin appearance  HEENT : Normocephalic, atraumatic  Extraocular movements intact  Mucous membranes moist  NECK: Supple, trachea midline  LUNGS: Adequate and symmetric respiratory effort  No intercostal retractions or accessory muscle use  HEART: Extremities warm and perfused  ABDOMEN: Nondistended  SKIN: Warm and dry, no rash  Right Hip Exam  Skin graft site overlying lateral aspect of proximal thigh, which appears intact  Adjacent to skin graft site there is an ulcer with granulation tissue that has packing in place  Limited motion of hip - patient does not express pain with motion  She is unable to move the lower extremity on her own  Imaging  A) Imaging modality available  Radiographs: yes  MRI scan: No  CT scan: yes    B) Imaging findings  Plain radiographs obtained in the office today demonstrate right hip dislocation with development of pseudoacetabulum superiorly on the iliac wing  This is consistent with a chronic dislocation  Review of CT scan from 11/10/19, demonstrates the right hip appeared located at that time  Assessment and Plan  Right Hip Dislocation    - at this point in time, given the lack of pain with hip motion and the now chronic nature of the dislocation I do not think any attempt to reduce the joint is indicated and reduction would likely require surgical intervention to shorten the femur     - if the patient developed pain or Dr Blanca Langston felt bony excision would aid in his performing a second skin graft, then these would be indications to consider a G[place] procedure to excise the proximal femur, however, this would require assistance from plastic surgery for wound closure given the chronic ulceration    - patient may followup on as needed basis based on above  This plan was discussed with Ms  Carlos Rodriges and she agreed with plan as described  ADDENDUM:  No hip precautions required  Michelle Mclean MD  Adult Reconstruction Surgery  Department [address]  10:33 AM

## 2020-01-29 NOTE — PROGRESS NOTES
[facility]  [address]  [phone] [address] of Service: nursing home place of service: POS 31 Skilled Care-Part A Coverage      NAME: Jeana Gilman  AGE: 71 y o  SEX: female [phone]    DATE OF ENCOUNTER: 1/28/2020    Assessment and Plan     Problem List Items Addressed This Visit        Musculoskeletal and Integument    Stage IV pressure ulcer of right buttock ([address] )     Will continue wound care, wound team will follow  Will add vitamin C and Zn supplements to promote healing         Closed dislocation of right hip ([facility] ) - Primary     Discussed X ray findings  Scheduled appointment with ortho, will follow up with recommendations  Patient denies pain  Chief Complaint     Follow up Right hip displacement    History of Present Illness     Jeana Gilman is a 71 y o  female who was seen today for follow up  Patient seen to discuss Xray results  Denies any pain or acute complaints at this time  Agreeable to go for Ortho appointment        The following portions of the patient's history were reviewed and updated as appropriate: allergies, current medications, past family history, past medical history, past social history, past surgical history and problem list     Review of Systems     Review of Systems   Constitutional: Positive for appetite change  Negative for chills and fever  Respiratory: Negative for cough and shortness of breath  Cardiovascular: Negative for chest pain, palpitations and leg swelling  Musculoskeletal: Positive for gait problem  Negative for arthralgias and joint swelling  Skin: Positive for wound  Neurological: Positive for weakness         Active Problem List     Patient Active Problem List   Diagnosis    Paraplegia following spinal cord injury ([facility] )    Pressure ulcer, sacrum    Iron deficiency anemia    Osteopenia    Underweight    Stage IV pressure ulcer of right buttock ([facility] )    Rash due to allergy    Closed fracture of distal end of left femur with routine healing    Closed fracture of lower end of left femur with routine healing    Chronic osteomyelitis of coccyx (HCC)    Constipation    Anemia of chronic disease    Hyponatremia    GERD (gastroesophageal reflux disease)    Moderate protein-calorie malnutrition (HCC)    Hyperglycemia    BMI less than 19,adult    Hypermagnesemia    Complicated wound infection    Sepsis due to anaerobes (HCC)    C  difficile diarrhea    Pressure injury of sacral region, unstageable (HCC)    Acute blood loss anemia    Sepsis (HCC)    Neurogenic bladder    Other osteoporosis without current pathological fracture    Closed dislocation of right hip (HCC)    Elevated liver function tests       Objective     Vital Signs:     Blood pressure 102/43 Heart Rate: 84 Respiratory Rate 16   Temperature 96 7 Oxygen Saturation 95 % RA     Physical Exam   Constitutional: She is oriented to person, place, and time  Cardiovascular: Normal rate, regular rhythm and normal heart sounds  No murmur heard  Pulmonary/Chest: Effort normal and breath sounds normal  No respiratory distress  She has no wheezes  Musculoskeletal: She exhibits deformity  She exhibits no edema or tenderness  Neurological: She is alert and oriented to person, place, and time  A sensory deficit is present  She exhibits abnormal muscle tone  Skin: Skin is warm  Wounds b/l buttocks and sacrum   Psychiatric: Her behavior is normal        Pertinent Laboratory/Diagnostic Studies:  Laboratory and Imaging studies reviewed  Full report in the paper chart  Current Medications   Medications reviewed and updated in facility chart      Name: Angelica Cuencaallen  : 1950  MRN: [id number]  DOS: 2020      Tyesha Granados MD  Geriatric Medicine  2020 8:50 PM

## 2020-01-30 ENCOUNTER — TELEPHONE (OUTPATIENT)
Dept: OBGYN CLINIC | Facility: HOSPITAL | Age: 70
End: 2020-01-30

## 2020-01-30 ENCOUNTER — NURSING HOME VISIT (OUTPATIENT)
Dept: GERIATRICS | Facility: OTHER | Age: 70
End: 2020-01-30
Payer: COMMERCIAL

## 2020-01-30 DIAGNOSIS — L89.150 PRESSURE INJURY OF SACRAL REGION, UNSTAGEABLE (HCC): Primary | ICD-10-CM

## 2020-01-30 DIAGNOSIS — G82.20 PARAPLEGIA FOLLOWING SPINAL CORD INJURY (HCC): ICD-10-CM

## 2020-01-30 DIAGNOSIS — K21.9 GASTROESOPHAGEAL REFLUX DISEASE WITHOUT ESOPHAGITIS: ICD-10-CM

## 2020-01-30 PROCEDURE — 99309 SBSQ NF CARE MODERATE MDM 30: CPT | Performed by: NURSE PRACTITIONER

## 2020-01-30 NOTE — PROGRESS NOTES
Progress Note    Location: [facility]  POS: 32 ([facility])    Assessment/Plan:    Pressure injury of sacral region, unstageable ([facility] )  Stable  Closely  Followed by facility wound care team and [address]  No leukocytosis   Thrombocytosis improving (1/28/2020)  Continue daily wound care recommendations  Nursing monitor for pain  Start Vit C 500mg daily with breakfast  Continue Zinc daily  Continue PRN Acetaminophen Q4 hours  CBC w/o diff and CMP : 2/3/2020  Paraplegia following spinal cord injury ([facility] )  Stable  Continue PT/OT/ST as scheduled  Continue 24/7 STR supportive care and management  COntinue Baclofen TID    GERD (gastroesophageal reflux disease)  Reported chronic dyspepsia with nausea w/o vomiting  Start Pantoprazole 20 DR daily in AM  Continue Zofran PRN      Chief complaint / Reason for visit: Follow-up visit ([facility]-STR)    History of Present Illness: Kingston Anaya is a 71 y o  Female patient currently admitted at [facility] (1/17/2020 to present) following discharge form [facility] where she had her initial STR  Patient was discharged from [facility] (11/10-20/2019) with Dx of Sepsis, Pressure Injury Sacral Region, Unstageable  Patient is still in bed for this visit - waiting for the facility wound care team - alert, cooperative, oriented x4 and not in distress  Patient reported  Increased episode of nausea, dyspepsia, intractable for the last few days  Per patient this is chronic yamilex at home but has been more frequent since admission to facility - otherwise no other acute medical concerns for this visit including new or worsening pain, chest pain, SOB, malaise, fever, chills, cough  colds symptoms,  related concerns and headaches  Per nursing other than the dyspepsia, patient have no acute medical concerns for this visit  VSS: T99 4F -P85 -R18  SpO2: 95% RA      Reviewed recent labs in medical record (1/28/2020) - showed anemia, no leukocytosis, renal functions WNL  Will repeat labs on 2/3/2020  Review of Systems:  Per history of present illness, all other systems reviewed and negative  HISTORY:  Medical Hx: Reviewed, unchanged  Family Hx: Reviewed, unchanged  Soc Hx: Reviewed,  Unchanged    ALLERGY: Reviewed, unchanged  Allergies   Allergen Reactions    Iv Contrast [Iodinated Diagnostic Agents]      Tingling face, itching    Cefepime Rash    Ciprofloxacin Rash and Swelling     Looked like suburn, itching  Bed sores  Swelling, itching    Latex Rash    Vancomycin Rash     Unknown        PHYSICAL EXAM:  Vital Signs: T99 4F -P85 -R18  SpO2: 95% RA    Physical Exam   Constitutional: She is oriented to person, place, and time  She appears well-developed  No distress  Frail stature, cooperative, alert  HENT:   Head: Normocephalic and atraumatic  Right Ear: External ear normal    Left Ear: External ear normal    Nose: Nose normal    Mouth/Throat: Oropharynx is clear and moist  No oropharyngeal exudate  Eyes: Pupils are equal, round, and reactive to light  Conjunctivae are normal  Right eye exhibits no discharge  Left eye exhibits no discharge  No scleral icterus  Neck: Neck supple  No JVD present  No tracheal deviation present  No thyromegaly present  Cardiovascular: Normal rate, regular rhythm and normal heart sounds  Exam reveals no gallop and no friction rub  No murmur heard  Pulmonary/Chest: Effort normal and breath sounds normal  No stridor  No respiratory distress  She has no wheezes  She has no rales  She exhibits no tenderness  Abdominal: Soft  Bowel sounds are normal  She exhibits no distension and no mass  There is no tenderness  There is no rebound and no guarding  Musculoskeletal: She exhibits no edema, tenderness or deformity  Paraplegia  Right and Left hip chronic wounds and sacral pressure ulcer  Lymphadenopathy:     She has no cervical adenopathy  Neurological: She is alert and oriented to person, place, and time     Skin: Skin is warm and dry  No rash noted  She is not diaphoretic  No erythema  No pallor  Chronic sacral pressure wound   Psychiatric: She has a normal mood and affect  Her behavior is normal  Thought content normal        Laboratory results / Imaging: Hard copies in medical chart:    * CBC w/o diff (1/28/2020) = WNL except:  Hbg: 10 0 <= 10 5 (1/20/2020)  Hct: 30 6 <= 31 8  (1/20/2020)   Plt: 600 (H) <= 621 (1/20/2020)    * BMP (1/28/2020) = WNL    Current Medications:   All medications reviewed and updated in [address] [place] [address] [id number]  1/30/2020

## 2020-01-30 NOTE — ASSESSMENT & PLAN NOTE
Will continue wound care, wound team will follow    Will add vitamin C and Zn supplements to promote healing

## 2020-01-30 NOTE — TELEPHONE ENCOUNTER
Nursing home is calling wondering if the patient has any hip precautions/restrictions at this time, they would need a note stating this       Fax- [phone]

## 2020-01-30 NOTE — ASSESSMENT & PLAN NOTE
Stable  Closely  Followed by facility wound care team and [address]  No leukocytosis   Thrombocytosis improving (1/28/2020)  Continue daily wound care recommendations  Nursing monitor for pain  Start Vit C 500mg daily with breakfast  Continue Zinc daily  Continue PRN Acetaminophen Q4 hours  CBC w/o diff and CMP : 2/3/2020

## 2020-01-30 NOTE — ASSESSMENT & PLAN NOTE
Stable    Continue PT/OT/ST as scheduled  Continue 24/7 STR supportive care and management  COntinue Baclofen TID

## 2020-01-30 NOTE — ASSESSMENT & PLAN NOTE
Discussed X ray findings  Scheduled appointment with ortho, will follow up with recommendations  Patient denies pain

## 2020-01-30 NOTE — ASSESSMENT & PLAN NOTE
Reported chronic dyspepsia with nausea w/o vomiting  Start Pantoprazole 20 DR daily in AM  Continue Zofran PRN

## 2020-02-03 ENCOUNTER — NURSING HOME VISIT (OUTPATIENT)
Dept: GERIATRICS | Facility: OTHER | Age: 70
End: 2020-02-03
Payer: COMMERCIAL

## 2020-02-03 DIAGNOSIS — K21.00 GASTROESOPHAGEAL REFLUX DISEASE WITH ESOPHAGITIS: ICD-10-CM

## 2020-02-03 DIAGNOSIS — L89.150 PRESSURE INJURY OF SACRAL REGION, UNSTAGEABLE (HCC): Primary | ICD-10-CM

## 2020-02-03 DIAGNOSIS — G82.20 PARAPLEGIA FOLLOWING SPINAL CORD INJURY (HCC): ICD-10-CM

## 2020-02-03 DIAGNOSIS — R53.81 PHYSICAL DECONDITIONING: ICD-10-CM

## 2020-02-03 PROCEDURE — 99309 SBSQ NF CARE MODERATE MDM 30: CPT | Performed by: NURSE PRACTITIONER

## 2020-02-03 NOTE — ASSESSMENT & PLAN NOTE
Stable    Denies pain on assessment  Continue weekly assessment by Wound care team  Continue recommended daily wound care  Continue Zinc and Vit C daily

## 2020-02-03 NOTE — PROGRESS NOTES
Progress Note    Location: [address] [place] [address],  POS: 31 (SNF)    Assessment/Plan:    Pressure injury of sacral region, unstageable ([facility] )  Stable  Denies pain on assessment  Continue weekly assessment by Wound care team  Continue recommended daily wound care  Continue Zinc and Vit C daily    Paraplegia following spinal cord injury ([facility] )  Continue 24/7 SNF supportive care and management  Currently bed bound since admission  On passive ROM with PT  Last seen by Orthopedic on 1/29/2020  No Hip restrictions  Continue Baclofen TID    GERD (gastroesophageal reflux disease)  Esophagitis worsened with being bed bound  Patient teaching on reducing dyspepsia provided on this visit  Per patient slightly improved with Pantoprazole  Continue Pantoprazole daily    Physical deconditioning  Continue 24/7 SNF supportive care and management  Continue PT/OT/ST as scheduled  Nursing to monitor pain report Q shift      Chief complaint / Reason for visit: Follow-up visit     History of Present Illness: Juanis Oglesby is a 71 y o  Female patient currently admitted at [facility] (1/17/2020 to present) following discharge form [facility] where she had her initial STR  Patient was discharged from [facility] [place] (11/10-20/2019) with Dx of Sepsis, Pressure Injury Sacral Region, Unstageable  Patient is seen and examined today to follow-up acute and chronic medical conditions as mentioned above and GERD, ambulatory dysfunctions and deconditioning  Patient bed bound since admission to facility, alert, cooperative, not in distress, oriented x4, verbal with clear coherent speech  Patient still reported dyspepsia on this visit - started on Pantoprazole on 1/30/2020  Discussed techniques to reduce acid reflux while in bed - keep HOB up x 30 minutes or more before and after meals, eat food slowly and preferable small frequent meals and when off restriction OOB for meals  VSS: T98 5F -P86 -R18  SpO2: 92% RA   Otherwise no other acute medical concerns for this visit  Per nursing, patient is stable and no acute medical concerns as well for this visit  Reviewed last Orthopedic epic note visit, no new orders or further management needed at this time unless with new onset pain - patient  Has no hip precaution required at this time  Review of Systems:  Per history of present illness, all other systems reviewed and negative  HISTORY:  Medical Hx: Reviewed, unchanged  Family Hx: Reviewed, unchanged  Soc Hx: Reviewed,  Unchanged    ALLERGY: Reviewed, unchanged   Allergies   Allergen Reactions    Iv Contrast [Iodinated Diagnostic Agents]      Tingling face, itching    Cefepime Rash    Ciprofloxacin Rash and Swelling     Looked like suburn, itching  Bed sores  Swelling, itching    Latex Rash    Vancomycin Rash     Unknown        PHYSICAL EXAM:  Vital Signs: T98 5F -P86 -R18  SpO2: 92% RA  Physical Exam   Constitutional: She is oriented to person, place, and time  She appears well-developed  No distress  Frail stature  Alert, cooperative  Not in distress  HENT:   Head: Normocephalic and atraumatic  Right Ear: External ear normal    Left Ear: External ear normal    Nose: Nose normal    Mouth/Throat: Oropharynx is clear and moist  No oropharyngeal exudate  Eyes: Pupils are equal, round, and reactive to light  Conjunctivae are normal  Right eye exhibits no discharge  Left eye exhibits no discharge  No scleral icterus  Neck: Neck supple  No JVD present  No tracheal deviation present  No thyromegaly present  Cardiovascular: Normal rate, regular rhythm and normal heart sounds  Exam reveals no gallop and no friction rub  No murmur heard  Pulmonary/Chest: Effort normal and breath sounds normal  No stridor  No respiratory distress  She has no wheezes  She exhibits no tenderness  Abdominal: Soft  Bowel sounds are normal  She exhibits no distension and no mass  There is no tenderness   There is no rebound and no guarding  Musculoskeletal: She exhibits no edema, tenderness or deformity  Paraplegia  Right and Left hip chronic wounds and sacral pressure ulcer  Lymphadenopathy:     She has no cervical adenopathy  Neurological: She is alert and oriented to person, place, and time  Skin: Skin is warm and dry  No rash noted  She is not diaphoretic  No erythema  No pallor  Psychiatric: She has a normal mood and affect  Her behavior is normal  Thought content normal        Laboratory results / Imaging: No new laboratory results to review after 1/28/2020 results  Current Medications:   All medications reviewed and updated in [address] [place] [address] [facility]  2/3/2020

## 2020-02-03 NOTE — ASSESSMENT & PLAN NOTE
Continue 24/7 SNF supportive care and management  Continue PT/OT/ST as scheduled    Nursing to monitor pain report Q shift

## 2020-02-03 NOTE — TELEPHONE ENCOUNTER
It should be in there, I made an addendum to the note  Please print off the clinic note and fax to [facility]      [email or web address]

## 2020-02-03 NOTE — ASSESSMENT & PLAN NOTE
Continue 24/7 SNF supportive care and management  Currently bed bound since admission  On passive ROM with PT  Last seen by Orthopedic on 1/29/2020  No Hip restrictions    Continue Baclofen TID

## 2020-02-03 NOTE — ASSESSMENT & PLAN NOTE
Esophagitis worsened with being bed bound  Patient teaching on reducing dyspepsia provided on this visit    Per patient slightly improved with Pantoprazole  Continue Pantoprazole daily

## 2020-02-05 ENCOUNTER — HOSPITAL ENCOUNTER (INPATIENT)
Facility: HOSPITAL | Age: 70
LOS: 26 days | Discharge: NURSING, REHAB OR LONG-TERM CARE | DRG: 871 | End: 2020-03-02
Attending: EMERGENCY MEDICINE | Admitting: INTERNAL MEDICINE
Payer: COMMERCIAL

## 2020-02-05 ENCOUNTER — NURSING HOME VISIT (OUTPATIENT)
Dept: GERIATRICS | Facility: OTHER | Age: 70
End: 2020-02-05
Payer: COMMERCIAL

## 2020-02-05 ENCOUNTER — TRANSCRIBE ORDERS (OUTPATIENT)
Dept: ADMINISTRATIVE | Facility: HOSPITAL | Age: 70
End: 2020-02-05

## 2020-02-05 ENCOUNTER — APPOINTMENT (EMERGENCY)
Dept: RADIOLOGY | Facility: HOSPITAL | Age: 70
DRG: 871 | End: 2020-02-05
Payer: COMMERCIAL

## 2020-02-05 DIAGNOSIS — L89.150 PRESSURE INJURY OF SACRAL REGION, UNSTAGEABLE (HCC): Primary | ICD-10-CM

## 2020-02-05 DIAGNOSIS — A41.9 SEPSIS (HCC): Primary | ICD-10-CM

## 2020-02-05 DIAGNOSIS — N31.9 NEUROGENIC BLADDER: ICD-10-CM

## 2020-02-05 DIAGNOSIS — I95.9 HYPOTENSION: ICD-10-CM

## 2020-02-05 DIAGNOSIS — N39.0 UTI (URINARY TRACT INFECTION): ICD-10-CM

## 2020-02-05 DIAGNOSIS — L89.154 PRESSURE INJURY OF SACRAL REGION, STAGE 4 (HCC): ICD-10-CM

## 2020-02-05 DIAGNOSIS — E44.0 MODERATE PROTEIN-CALORIE MALNUTRITION (HCC): ICD-10-CM

## 2020-02-05 DIAGNOSIS — R53.81 PHYSICAL DECONDITIONING: ICD-10-CM

## 2020-02-05 DIAGNOSIS — R41.89 COGNITIVE DECLINE: ICD-10-CM

## 2020-02-05 DIAGNOSIS — L89.314 STAGE IV PRESSURE ULCER OF RIGHT BUTTOCK (HCC): ICD-10-CM

## 2020-02-05 DIAGNOSIS — M86.60 OTHER CHRONIC OSTEOMYELITIS, UNSPECIFIED SITE (HCC): Primary | ICD-10-CM

## 2020-02-05 DIAGNOSIS — K59.00 CONSTIPATION, UNSPECIFIED CONSTIPATION TYPE: ICD-10-CM

## 2020-02-05 DIAGNOSIS — F32.A DEPRESSION: ICD-10-CM

## 2020-02-05 DIAGNOSIS — D63.8 ANEMIA OF CHRONIC DISEASE: ICD-10-CM

## 2020-02-05 DIAGNOSIS — G82.20 PARAPLEGIA FOLLOWING SPINAL CORD INJURY (HCC): ICD-10-CM

## 2020-02-05 DIAGNOSIS — M86.9: ICD-10-CM

## 2020-02-05 PROBLEM — Z86.19 HISTORY OF CLOSTRIDIUM DIFFICILE INFECTION: Status: ACTIVE | Noted: 2020-02-05

## 2020-02-05 PROBLEM — Z97.8 CHRONIC INDWELLING FOLEY CATHETER: Status: ACTIVE | Noted: 2020-02-05

## 2020-02-05 LAB
ALBUMIN SERPL BCP-MCNC: 1.8 G/DL (ref 3.5–5)
ALP SERPL-CCNC: 175 U/L (ref 46–116)
ALT SERPL W P-5'-P-CCNC: 25 U/L (ref 12–78)
ANION GAP SERPL CALCULATED.3IONS-SCNC: 9 MMOL/L (ref 4–13)
APTT PPP: 36 SECONDS (ref 23–37)
AST SERPL W P-5'-P-CCNC: 37 U/L (ref 5–45)
BACTERIA UR QL AUTO: ABNORMAL /HPF
BACTERIA UR QL AUTO: ABNORMAL /HPF
BASOPHILS # BLD AUTO: 0.07 THOUSANDS/ΜL (ref 0–0.1)
BASOPHILS NFR BLD AUTO: 0 % (ref 0–1)
BILIRUB SERPL-MCNC: 0.45 MG/DL (ref 0.2–1)
BILIRUB UR QL STRIP: NEGATIVE
BILIRUB UR QL STRIP: NEGATIVE
BUN SERPL-MCNC: 11 MG/DL (ref 5–25)
CALCIUM SERPL-MCNC: 8.6 MG/DL (ref 8.3–10.1)
CHLORIDE SERPL-SCNC: 96 MMOL/L (ref 100–108)
CLARITY UR: ABNORMAL
CLARITY UR: ABNORMAL
CO2 SERPL-SCNC: 24 MMOL/L (ref 21–32)
COARSE GRAN CASTS URNS QL MICRO: ABNORMAL /LPF
COLOR UR: YELLOW
COLOR UR: YELLOW
COLOR, POC: ABNORMAL
CREAT SERPL-MCNC: 0.47 MG/DL (ref 0.6–1.3)
EOSINOPHIL # BLD AUTO: 0.01 THOUSAND/ΜL (ref 0–0.61)
EOSINOPHIL NFR BLD AUTO: 0 % (ref 0–6)
ERYTHROCYTE [DISTWIDTH] IN BLOOD BY AUTOMATED COUNT: 15.9 % (ref 11.6–15.1)
FERRITIN SERPL-MCNC: 1425 NG/ML (ref 8–388)
GFR SERPL CREATININE-BSD FRML MDRD: 101 ML/MIN/1.73SQ M
GLUCOSE SERPL-MCNC: 154 MG/DL (ref 65–140)
GLUCOSE UR STRIP-MCNC: NEGATIVE MG/DL
GLUCOSE UR STRIP-MCNC: NEGATIVE MG/DL
HCT VFR BLD AUTO: 28.8 % (ref 34.8–46.1)
HGB BLD-MCNC: 9 G/DL (ref 11.5–15.4)
HGB UR QL STRIP.AUTO: ABNORMAL
HGB UR QL STRIP.AUTO: ABNORMAL
IMM GRANULOCYTES # BLD AUTO: 0.21 THOUSAND/UL (ref 0–0.2)
IMM GRANULOCYTES NFR BLD AUTO: 1 % (ref 0–2)
INR PPP: 1.42 (ref 0.84–1.19)
IRON SATN MFR SERPL: 16 %
IRON SERPL-MCNC: 21 UG/DL (ref 50–170)
KETONES UR STRIP-MCNC: ABNORMAL MG/DL
KETONES UR STRIP-MCNC: ABNORMAL MG/DL
LACTATE SERPL-SCNC: 1.9 MMOL/L (ref 0.5–2)
LEUKOCYTE ESTERASE UR QL STRIP: ABNORMAL
LEUKOCYTE ESTERASE UR QL STRIP: ABNORMAL
LYMPHOCYTES # BLD AUTO: 1.97 THOUSANDS/ΜL (ref 0.6–4.47)
LYMPHOCYTES NFR BLD AUTO: 10 % (ref 14–44)
MCH RBC QN AUTO: 26 PG (ref 26.8–34.3)
MCHC RBC AUTO-ENTMCNC: 31.3 G/DL (ref 31.4–37.4)
MCV RBC AUTO: 83 FL (ref 82–98)
MONOCYTES # BLD AUTO: 1.73 THOUSAND/ΜL (ref 0.17–1.22)
MONOCYTES NFR BLD AUTO: 8 % (ref 4–12)
MUCOUS THREADS UR QL AUTO: ABNORMAL
NEUTROPHILS # BLD AUTO: 16.83 THOUSANDS/ΜL (ref 1.85–7.62)
NEUTS SEG NFR BLD AUTO: 81 % (ref 43–75)
NITRITE UR QL STRIP: POSITIVE
NITRITE UR QL STRIP: POSITIVE
NON-SQ EPI CELLS URNS QL MICRO: ABNORMAL /HPF
NON-SQ EPI CELLS URNS QL MICRO: ABNORMAL /HPF
NRBC BLD AUTO-RTO: 0 /100 WBCS
PH UR STRIP.AUTO: 8.5 [PH]
PH UR STRIP.AUTO: 8.5 [PH] (ref 4.5–8)
PLATELET # BLD AUTO: 618 THOUSANDS/UL (ref 149–390)
PMV BLD AUTO: 10.2 FL (ref 8.9–12.7)
POTASSIUM SERPL-SCNC: 4.7 MMOL/L (ref 3.5–5.3)
PROCALCITONIN SERPL-MCNC: 1.25 NG/ML
PROT SERPL-MCNC: 7.5 G/DL (ref 6.4–8.2)
PROT UR STRIP-MCNC: ABNORMAL MG/DL
PROT UR STRIP-MCNC: ABNORMAL MG/DL
PROTHROMBIN TIME: 16.9 SECONDS (ref 11.6–14.5)
RBC # BLD AUTO: 3.46 MILLION/UL (ref 3.81–5.12)
RBC #/AREA URNS AUTO: ABNORMAL /HPF
RBC #/AREA URNS AUTO: ABNORMAL /HPF
SODIUM SERPL-SCNC: 129 MMOL/L (ref 136–145)
SP GR UR STRIP.AUTO: 1.01 (ref 1–1.03)
SP GR UR STRIP.AUTO: 1.01 (ref 1–1.03)
TIBC SERPL-MCNC: 133 UG/DL (ref 250–450)
TRI-PHOS CRY URNS QL MICRO: ABNORMAL /HPF
TRI-PHOS CRY URNS QL MICRO: ABNORMAL /HPF
UROBILINOGEN UR QL STRIP.AUTO: 1 E.U./DL
UROBILINOGEN UR QL STRIP.AUTO: 2 E.U./DL
WBC # BLD AUTO: 20.82 THOUSAND/UL (ref 4.31–10.16)
WBC #/AREA URNS AUTO: ABNORMAL /HPF
WBC #/AREA URNS AUTO: ABNORMAL /HPF

## 2020-02-05 PROCEDURE — 99285 EMERGENCY DEPT VISIT HI MDM: CPT

## 2020-02-05 PROCEDURE — 93005 ELECTROCARDIOGRAM TRACING: CPT

## 2020-02-05 PROCEDURE — 87086 URINE CULTURE/COLONY COUNT: CPT

## 2020-02-05 PROCEDURE — 99223 1ST HOSP IP/OBS HIGH 75: CPT | Performed by: INTERNAL MEDICINE

## 2020-02-05 PROCEDURE — 84145 PROCALCITONIN (PCT): CPT | Performed by: EMERGENCY MEDICINE

## 2020-02-05 PROCEDURE — 87077 CULTURE AEROBIC IDENTIFY: CPT

## 2020-02-05 PROCEDURE — 81001 URINALYSIS AUTO W/SCOPE: CPT

## 2020-02-05 PROCEDURE — 87147 CULTURE TYPE IMMUNOLOGIC: CPT | Performed by: EMERGENCY MEDICINE

## 2020-02-05 PROCEDURE — 36415 COLL VENOUS BLD VENIPUNCTURE: CPT

## 2020-02-05 PROCEDURE — 81001 URINALYSIS AUTO W/SCOPE: CPT | Performed by: EMERGENCY MEDICINE

## 2020-02-05 PROCEDURE — 87186 SC STD MICRODIL/AGAR DIL: CPT

## 2020-02-05 PROCEDURE — 71250 CT THORAX DX C-: CPT

## 2020-02-05 PROCEDURE — 96361 HYDRATE IV INFUSION ADD-ON: CPT

## 2020-02-05 PROCEDURE — 82728 ASSAY OF FERRITIN: CPT | Performed by: INTERNAL MEDICINE

## 2020-02-05 PROCEDURE — 99310 SBSQ NF CARE HIGH MDM 45: CPT | Performed by: NURSE PRACTITIONER

## 2020-02-05 PROCEDURE — 96365 THER/PROPH/DIAG IV INF INIT: CPT

## 2020-02-05 PROCEDURE — 87040 BLOOD CULTURE FOR BACTERIA: CPT | Performed by: EMERGENCY MEDICINE

## 2020-02-05 PROCEDURE — 83605 ASSAY OF LACTIC ACID: CPT | Performed by: EMERGENCY MEDICINE

## 2020-02-05 PROCEDURE — 99285 EMERGENCY DEPT VISIT HI MDM: CPT | Performed by: EMERGENCY MEDICINE

## 2020-02-05 PROCEDURE — 80053 COMPREHEN METABOLIC PANEL: CPT | Performed by: EMERGENCY MEDICINE

## 2020-02-05 PROCEDURE — 85610 PROTHROMBIN TIME: CPT | Performed by: EMERGENCY MEDICINE

## 2020-02-05 PROCEDURE — 96367 TX/PROPH/DG ADDL SEQ IV INF: CPT

## 2020-02-05 PROCEDURE — 85730 THROMBOPLASTIN TIME PARTIAL: CPT | Performed by: EMERGENCY MEDICINE

## 2020-02-05 PROCEDURE — 85025 COMPLETE CBC W/AUTO DIFF WBC: CPT | Performed by: EMERGENCY MEDICINE

## 2020-02-05 PROCEDURE — 83540 ASSAY OF IRON: CPT | Performed by: INTERNAL MEDICINE

## 2020-02-05 PROCEDURE — 74176 CT ABD & PELVIS W/O CONTRAST: CPT

## 2020-02-05 PROCEDURE — 83550 IRON BINDING TEST: CPT | Performed by: INTERNAL MEDICINE

## 2020-02-05 RX ORDER — ONDANSETRON 2 MG/ML
INJECTION INTRAMUSCULAR; INTRAVENOUS
Status: COMPLETED
Start: 2020-02-05 | End: 2020-02-05

## 2020-02-05 RX ORDER — BISACODYL 10 MG
10 SUPPOSITORY, RECTAL RECTAL DAILY
Status: DISCONTINUED | OUTPATIENT
Start: 2020-02-06 | End: 2020-02-09

## 2020-02-05 RX ORDER — MELATONIN
1000 DAILY
Status: DISCONTINUED | OUTPATIENT
Start: 2020-02-06 | End: 2020-03-02 | Stop reason: HOSPADM

## 2020-02-05 RX ORDER — SODIUM CHLORIDE 9 MG/ML
75 INJECTION, SOLUTION INTRAVENOUS CONTINUOUS
Status: DISPENSED | OUTPATIENT
Start: 2020-02-05 | End: 2020-02-06

## 2020-02-05 RX ORDER — POLYETHYLENE GLYCOL 3350 17 G/17G
17 POWDER, FOR SOLUTION ORAL DAILY
Status: DISCONTINUED | OUTPATIENT
Start: 2020-02-06 | End: 2020-02-13

## 2020-02-05 RX ORDER — ACETAMINOPHEN 325 MG/1
650 TABLET ORAL EVERY 6 HOURS PRN
Status: DISCONTINUED | OUTPATIENT
Start: 2020-02-05 | End: 2020-03-02 | Stop reason: HOSPADM

## 2020-02-05 RX ORDER — SENNOSIDES 8.6 MG
1 TABLET ORAL
Status: DISCONTINUED | OUTPATIENT
Start: 2020-02-05 | End: 2020-02-09

## 2020-02-05 RX ORDER — ONDANSETRON 2 MG/ML
4 INJECTION INTRAMUSCULAR; INTRAVENOUS EVERY 6 HOURS PRN
Status: DISCONTINUED | OUTPATIENT
Start: 2020-02-05 | End: 2020-03-02 | Stop reason: HOSPADM

## 2020-02-05 RX ORDER — LINEZOLID 2 MG/ML
600 INJECTION, SOLUTION INTRAVENOUS ONCE
Status: COMPLETED | OUTPATIENT
Start: 2020-02-05 | End: 2020-02-05

## 2020-02-05 RX ORDER — PANTOPRAZOLE SODIUM 40 MG/1
40 TABLET, DELAYED RELEASE ORAL DAILY
Status: DISCONTINUED | OUTPATIENT
Start: 2020-02-06 | End: 2020-02-07

## 2020-02-05 RX ORDER — NYSTATIN 100000 [USP'U]/G
POWDER TOPICAL 2 TIMES DAILY
Status: DISCONTINUED | OUTPATIENT
Start: 2020-02-06 | End: 2020-03-02 | Stop reason: HOSPADM

## 2020-02-05 RX ORDER — B-COMPLEX WITH VITAMIN C
1 TABLET ORAL 2 TIMES DAILY WITH MEALS
Status: DISCONTINUED | OUTPATIENT
Start: 2020-02-06 | End: 2020-03-02 | Stop reason: HOSPADM

## 2020-02-05 RX ORDER — SACCHAROMYCES BOULARDII 250 MG
250 CAPSULE ORAL 2 TIMES DAILY
Status: DISCONTINUED | OUTPATIENT
Start: 2020-02-05 | End: 2020-03-02 | Stop reason: HOSPADM

## 2020-02-05 RX ORDER — MIDODRINE HYDROCHLORIDE 5 MG/1
15 TABLET ORAL EVERY 8 HOURS
Status: DISCONTINUED | OUTPATIENT
Start: 2020-02-05 | End: 2020-02-27

## 2020-02-05 RX ADMIN — SODIUM CHLORIDE 1000 ML: 0.9 INJECTION, SOLUTION INTRAVENOUS at 17:01

## 2020-02-05 RX ADMIN — MIDODRINE HYDROCHLORIDE 15 MG: 5 TABLET ORAL at 23:06

## 2020-02-05 RX ADMIN — PIPERACILLIN SODIUM AND TAZOBACTAM SODIUM 3.38 G: 36; 4.5 INJECTION, POWDER, FOR SOLUTION INTRAVENOUS at 23:22

## 2020-02-05 RX ADMIN — PIPERACILLIN SODIUM,TAZOBACTAM SODIUM 3.38 G: 3; .375 INJECTION, POWDER, FOR SOLUTION INTRAVENOUS at 17:37

## 2020-02-05 RX ADMIN — ONDANSETRON 4 MG: 2 INJECTION INTRAMUSCULAR; INTRAVENOUS at 21:13

## 2020-02-05 RX ADMIN — Medication 125 MG: at 23:06

## 2020-02-05 RX ADMIN — Medication 250 MG: at 23:06

## 2020-02-05 RX ADMIN — LINEZOLID 600 MG: 2 INJECTION, SOLUTION INTRAVENOUS at 18:18

## 2020-02-05 RX ADMIN — SODIUM CHLORIDE 75 ML/HR: 0.9 INJECTION, SOLUTION INTRAVENOUS at 23:07

## 2020-02-05 NOTE — ASSESSMENT & PLAN NOTE
Worsening wound status: Multi-factorial  Possible Osteomyelitis: Low grade fever, hypotensive episode, fatigue, leukocytosis, thrombocytosis and High CRP  Prior Hx of Sepsis and Osteomyelitis on November 2019    Send to [facility] for evaluation and management

## 2020-02-05 NOTE — ASSESSMENT & PLAN NOTE
Patient recently managed for sepsis related to Pressure ulcers (November 2019)  Bed bound since January 17, 2020 until Feb  3, 2020  Fatigued presentation  Currently admitted to [facility] [place] for STR  Non ambulatory with paraplegia - SCI  Continue PT/OT/ST as scheduled

## 2020-02-05 NOTE — ASSESSMENT & PLAN NOTE
Insensate to pain  Prefers to lie on the right side only  Hays catheter in place  BLE contractures and Chronic multiple pressure ulcers/ wounds  Continue Baclofen TID

## 2020-02-05 NOTE — SEPSIS NOTE
Sepsis Note   Monty Muniz 71 y o  female MRN: [id number]  Unit/Bed#: ED 24 Encounter: [id number]      qSOFA     [address] Name 02/05/20 1800 02/05/20 1745 02/05/20 1715 02/05/20 1641 02/05/20 1615    Altered mental status GCS < 15              Respiratory Rate > / =22  0  0  0    0    Systolic BP < / =686  0  0  0  1  0    Q Sofa Score  0  0  0  1  0    Row Name 02/05/20 1533                Altered mental status GCS < 15          Respiratory Rate > / =16  0        Systolic BP < / =702  0        Q Sofa Score  0            Initial Sepsis Screening     Row Name 02/05/20 1821                Is the patient's history suggestive of a new or worsening infection? (!) Yes (Proceed)  -JS        Suspected source of infection  urinary tract infection;soft tissue  -JS        Are two or more of the following signs & symptoms of infection both present and new to the patient? (!) Yes (Proceed)  -JS        Indicate SIRS criteria  Hyperthemia > 38 3C (100 9F); Leukocytosis (WBC > 23165 IJL); Tachycardia > 90 bpm  -JS        If the answer is yes to both questions, suspicion of sepsis is present          If severe sepsis is present AND tissue hypoperfusion perists in the hour after fluid resuscitation or lactate > 4, the patient meets criteria for SEPTIC SHOCK          Are any of the following organ dysfunction criteria present within 6 hours of suspected infection and SIRS criteria that are NOT considered to be chronic conditions? No  -JS        Organ dysfunction          Date of presentation of severe sepsis          Time of presentation of severe sepsis          Tissue hypoperfusion persists in the hour after crystalloid fluid administration, evidenced, by either:          Was hypotension present within one hour of the conclusion of crystalloid fluid administration?           Date of presentation of septic shock          Time of presentation of septic shock            User Key  (r) = Recorded By, (t) = Taken By, (c) = Cosigned By    [address] Name Provider Garcia Sandoval MD Physician

## 2020-02-05 NOTE — ED PROVIDER NOTES
History  Chief Complaint   Patient presents with    Hypotension     Pt  sent from [facility] where staff reports they were unable to obtain a BP  Per EMS, patient was 110/60s  Per staff, patient is being treated for fevers/osteomyelitis/hypotension at nursing home    Fever - 9 weeks to 76 years     51-year-old female with a history of T8 paraplegia and chronic wounds to her right hip and sacrum for the last several months presenting from her nursing home for evaluation of hypotensive episode that occurred today  Staff at the nursing home noted that her chronic wounds did appear more erythematous with increased drainage today  Patient went to her physical therapy and actually had improvement, able to sit up in a chair for the 1st time in several months  Upon return was feeling nauseous and they checked her blood pressure found it to be low  On her physician evaluation her blood pressure had returned to normal but she was noted to be slightly febrile  On presentation to the emergency department patient reports generalized fatigue nausea and heartburn symptoms that she has been suffering from for several weeks to months  She notes sensation of chills  She notes she is on Protonix which has been helping and occasionally takes Zofran  Denies any vomiting  Denies any shortness of breath lightheadedness or weakness  She has not had any diarrhea recently  She has a chronic indwelling Hays and believes that was changed 1 or 2 days ago  On chart review she was admitted in November for evaluation of her right hip wound which underwent multiple episodes of debridement and she was on IV antibiotics for 2-3 weeks  Since that time she has not been on any antibiotics  She has been getting regular wound management  Prior to Admission Medications   Prescriptions Last Dose Informant Patient Reported? Taking?    Calcium Carb-Cholecalciferol 600-200 MG-UNIT TABS   Yes No   Sig: Take 1 tablet by mouth daily Probiotic Product (PROBIOTIC PEARLS ADVANTAGE PO)   Yes No   Sig: Take by mouth   acetaminophen (TYLENOL) 325 mg tablet   No No   Sig: Take 2 tablets (650 mg total) by mouth every 6 (six) hours as needed for mild pain, headaches or fever   bisacodyl (DULCOLAX) 10 mg suppository   No No   Sig: Insert 1 suppository (10 mg total) into the rectum daily   cholecalciferol (VITAMIN D3) 1,000 units tablet   Yes No   Sig: Take 1,000 Units by mouth daily   cyanocobalamin 100 MCG tablet   Yes No   Sig: Take 100 mcg by mouth daily   midodrine (PROAMATINE) 5 mg tablet   No No   Sig: Take 3 tablets (15 mg total) by mouth every 8 (eight) hours   multivitamin (THERAGRAN) TABS   Yes No   Sig: Take 1 tablet by mouth daily   nystatin (MYCOSTATIN) powder   Yes No   Sig: Apply topically 2 (two) times a day   pantoprazole (PROTONIX) 40 mg tablet  Other (Specify) Yes No   Sig: Take 40 mg by mouth daily   saccharomyces boulardii (FLORASTOR) 250 mg capsule   No No   Sig: Take 1 capsule (250 mg total) by mouth 2 (two) times a day   Patient not taking: Reported on 6/13/2019   senna (SENOKOT) 8 6 mg   No No   Sig: Take 1 tablet (8 6 mg total) by mouth daily at bedtime      Facility-Administered Medications: None       Past Medical History:   Diagnosis Date    Anemia     iron defiencey    Arthritis     osteo    Back injury     Chronic kidney disease     nephritis    Depression     MRSA (methicillin resistant staph aureus) culture positive 12/07/2018    BONE-TISSUE     Osteopenia     Osteoporosis     Paralysis ([address] )     paraplegic due to spinal cord injury    Paraplegia (HCC)     Poor circulation     Pressure injury of skin     right hip, stage 3    Pressure sore of hip     right    Tibial plateau fracture     Underweight        Past Surgical History:   Procedure Laterality Date    CLOSURE DELAYED PRIMARY N/A 3/20/2019    Procedure: OPHELIA ANAL WOUND RECLOSURE;  Surgeon: Quang Ramon MD;  Location:  MAIN OR;  Service: Plastics    DECUBITUS ULCER EXCISION Bilateral 11/12/2019    Procedure: DEBRIDEMENT DECUBITI [facility] OUT); Surgeon: Diamond Alcantara DO;  Location:  MAIN OR;  Service: General    FLAP LOCAL EXTREMITY Left 1/28/2019    Procedure: THIGH FLAP & STSG TO CLOSE HIP WOUND;  Surgeon: Lyle Davis MD;  Location: [address] [place] [address] MAIN OR;  Service: Plastics    FLAP LOCAL TRUNK Right 12/17/2018    Procedure: DEBRIDE/FLAP CLOSURE HIP PRESSURE SORE Earlie Sake GRAFT;  Surgeon: Lyle Davis MD;  Location: [address] [place] [address] MAIN OR;  Service: Plastics    FLAP LOCAL TRUNK Left 2/27/2019    Procedure: BUTTOCK FLAP TO ISCHIAL SORE;  Surgeon: Lyle Davis MD;  Location: [address] [place] [address] MAIN OR;  Service: Plastics    HIP DEBRIDEMENT Right 2017    right hip sore debridement    INCISION AND DRAINAGE OF WOUND Left 1/3/2019    Procedure: INCISION AND DRAINAGE (I&D) left distal femur  With deep wound cultures   Application of VAC DRAIN SPONGE;  Surgeon: Woody Cramer MD;  Location:  MAIN OR;  Service: Orthopedics    IR PICC LINE  12/19/2018    IR PICC LINE  3/12/2019    FL DEBRIDEMENT, SKIN, SUB-Q TISSUE,MUSCLE,BONE,=<20 SQ CM Right 9/19/2018    Procedure: DEBRIDEMENT OF HIP PRESSURE SORE;  Surgeon: Lyle Davis MD;  Location: [address] [place] [address] MAIN OR;  Service: [address] [place] [address] FX+INTRAMED FELIX Left 10/22/2018    Procedure: INSERTION NAIL IM LEFT FEMUR RETROGRADE;  Surgeon: Daniel Gayle MD;  Location:  MAIN OR;  Service: Orthopedics    TOE AMPUTATION Left 2017    small toe left foot amputation    WISDOM TOOTH EXTRACTION      WOUND DEBRIDEMENT Left 12/17/2018    Procedure: DEBRIDEMENT HIP PRESSURE SORE;  Surgeon: Lyle Davis MD;  Location: [address] [place] [address] MAIN OR;  Service: Plastics    WOUND DEBRIDEMENT N/A 11/10/2019    Procedure: EXCISIONAL DEBRIDEMENT;  Surgeon: Shanda Rush MD;  Location:  MAIN OR;  Service: General       Family History   Problem Relation Age of Onset    Depression Father      I have reviewed and agree with the history as documented  Social History     Tobacco Use    Smoking status: Never Smoker    Smokeless tobacco: Never Used   Substance Use Topics    Alcohol use: Yes     Comment: occasional    Drug use: No        Review of Systems   Constitutional: Positive for chills, fatigue and fever  HENT: Negative for congestion and sore throat  Eyes: Negative for visual disturbance  Respiratory: Negative for cough and shortness of breath  Cardiovascular: Negative for chest pain and palpitations  Gastrointestinal: Negative for abdominal pain, diarrhea, nausea and vomiting  Genitourinary: Negative for flank pain, hematuria and vaginal discharge  Musculoskeletal: Negative for myalgias  Skin: Positive for wound  Negative for rash  Neurological: Negative for syncope, facial asymmetry, light-headedness and headaches  Physical Exam  ED Triage Vitals [02/05/20 1533]   Temperature Pulse Respirations Blood Pressure SpO2   (!) 102 1 °F (38 9 °C) (!) 108 16 120/56 96 %      Temp Source Heart Rate Source Patient Position - Orthostatic VS BP Location FiO2 (%)   Rectal Monitor Lying Right arm --      Pain Score       No Pain             Orthostatic Vital Signs  Vitals:    02/05/20 1715 02/05/20 1745 02/05/20 1800 02/05/20 2230   BP: 105/54 118/58 105/53 112/56   Pulse: 98 102 100 96   Patient Position - Orthostatic VS: Lying Lying Lying        Physical Exam   Constitutional: She is oriented to person, place, and time  Frail-appearing woman with notable muscle degeneration bilateral lower extremities  No acute distress  HENT:   Head: Normocephalic and atraumatic  Mouth/Throat: Oropharynx is clear and moist    Eyes: Pupils are equal, round, and reactive to light  Conjunctivae and EOM are normal    Neck: Normal range of motion  Neck supple  Cardiovascular: Regular rhythm, normal heart sounds and intact distal pulses  Tachycardic   Pulmonary/Chest: Effort normal and breath sounds normal  She has no wheezes   She has no rales  Abdominal: Soft  Bowel sounds are normal  There is no tenderness  Genitourinary:   Genitourinary Comments: Chronic indwelling Hays noted  Musculoskeletal:   Paralysis of bilateral lower extremities  She holds her lower extremities flexed at the hips and knees turned onto her left side  Normal in bilateral upper extremities  Lymphadenopathy:     She has no cervical adenopathy  Neurological: She is alert and oriented to person, place, and time  No cranial nerve deficit  Occasional myoclonus and the bilateral lower extremities  Patient has paralysis bilateral lower extremities  Reports numbness up to her belly button  Skin: Capillary refill takes less than 2 seconds  Patient has a chronic wound to the right hip with exposed granulation tissue overlying the femur  There is also a sacral decubitus ulcer that is unstageable  The right hip wound has copious drainage  The ulcer has some purulent drainage but is notably erythematous and warm to touch  Nursing note and vitals reviewed        ED Medications  Medications   cyanocobalamin (VITAMIN B-12) tablet 100 mcg (has no administration in time range)   cholecalciferol (VITAMIN D3) tablet 1,000 Units (has no administration in time range)   nystatin (MYCOSTATIN) powder (has no administration in time range)   pantoprazole (PROTONIX) EC tablet 40 mg (has no administration in time range)   saccharomyces boulardii (FLORASTOR) capsule 250 mg (250 mg Oral Given 2/5/20 2306)   acetaminophen (TYLENOL) tablet 650 mg (has no administration in time range)   bisacodyl (DULCOLAX) rectal suppository 10 mg (has no administration in time range)   senna (SENOKOT) tablet 8 6 mg (8 6 mg Oral Not Given 2/5/20 2311)   midodrine (PROAMATINE) tablet 15 mg (15 mg Oral Given 2/5/20 2306)   calcium carbonate-vitamin D (OSCAL-D) 500 mg-200 units per tablet 1 tablet (has no administration in time range)   multivitamin-minerals (CENTRUM) tablet 1 tablet (has no administration in time range)   sodium chloride 0 9 % infusion (75 mL/hr Intravenous New Bag 2/5/20 2307)   polyethylene glycol (MIRALAX) packet 17 g (has no administration in time range)   ondansetron (ZOFRAN) injection 4 mg (4 mg Intravenous Given 2/5/20 2113)   enoxaparin (LOVENOX) subcutaneous injection 40 mg (has no administration in time range)   piperacillin-tazobactam (ZOSYN) 3 375 g in sodium chloride 0 9 % 100 mL IVPB (3 375 g Intravenous New Bag 2/5/20 2322)   vancomycin (VANCOCIN) oral solution 125 mg (125 mg Oral Given 2/5/20 2306)   sodium chloride 0 9 % bolus 1,000 mL (0 mL Intravenous Stopped 2/5/20 1802)   piperacillin-tazobactam (ZOSYN) 3 375 g in sodium chloride 0 9 % 100 mL IVPB (0 g Intravenous Stopped 2/5/20 1807)   linezolid (ZYVOX) IVPB (premix) 600 mg (0 mg Intravenous Stopped 2/5/20 1932)       Diagnostic Studies  Results Reviewed     Procedure Component Value Units Date/Time    Iron Saturation % [[id number]]  (Abnormal) Collected:  02/05/20 1538    Lab Status:  Final result Specimen:  Blood from Arm, Right Updated:  02/05/20 2257     Iron Saturation 16 %      TIBC 133 ug/dL      Iron 21 ug/dL     Ferritin [[id number]]  (Abnormal) Collected:  02/05/20 1538    Lab Status:  Final result Specimen:  Blood from Arm, Right Updated:  02/05/20 2257     Ferritin 1,425 ng/mL     Platelet count [[id number]]     Lab Status:  No result Specimen:  Blood     Lactic acid, plasma [[id number]]     Lab Status:  No result Specimen:  Blood     Urine Microscopic [[id number]]  (Abnormal) Collected:  02/05/20 1745    Lab Status:  Final result Specimen:  Urine, Clean Catch Updated:  02/05/20 1836     RBC, UA 30-50 /hpf      WBC, UA Innumerable /hpf      Epithelial Cells Moderate /hpf      Bacteria, UA Moderate /hpf      COARSE GRANULAR CASTS 0-3 /lpf      Triplep Phos Stefanie, UA Occasional /hpf      MUCUS THREADS Occasional    Urine culture [[id number]] Collected:  02/05/20 1745    Lab Status:   In process Specimen:  Urine, Clean Catch Updated:  02/05/20 1836    Blood culture #1 [[id number]] Collected:  02/05/20 1544    Lab Status:  Preliminary result Specimen:  Blood from Arm, Right Updated:  02/05/20 1801     Blood Culture Received in Microbiology Lab  Culture in Progress  Blood culture #2 [[id number]] Collected:  02/05/20 1538    Lab Status:  Preliminary result Specimen:  Blood from Arm, Right Updated:  02/05/20 1801     Blood Culture Received in Microbiology Lab  Culture in Progress      Comprehensive metabolic panel [[id number]]  (Abnormal) Collected:  02/05/20 1538    Lab Status:  Final result Specimen:  Blood from Arm, Right Updated:  02/05/20 1749     Sodium 129 mmol/L      Potassium 4 7 mmol/L      Chloride 96 mmol/L      CO2 24 mmol/L      ANION GAP 9 mmol/L      BUN 11 mg/dL      Creatinine 0 47 mg/dL      Glucose 154 mg/dL      Calcium 8 6 mg/dL      AST 37 U/L      ALT 25 U/L      Alkaline Phosphatase 175 U/L      Total Protein 7 5 g/dL      Albumin 1 8 g/dL      Total Bilirubin 0 45 mg/dL      eGFR 101 ml/min/1 73sq m     Narrative:       [facility] guidelines for Chronic Kidney Disease (CKD):     Stage 1 with normal or high GFR (GFR > 90 mL/min/1 73 square meters)    Stage 2 Mild CKD (GFR = 60-89 mL/min/1 73 square meters)    Stage 3A Moderate CKD (GFR = 45-59 mL/min/1 73 square meters)    Stage 3B Moderate CKD (GFR = 30-44 mL/min/1 73 square meters)    Stage 4 Severe CKD (GFR = 15-29 mL/min/1 73 square meters)    Stage 5 End Stage CKD (GFR <15 mL/min/1 73 square meters)  Note: GFR calculation is accurate only with a steady state creatinine    POCT urinalysis dipstick [[id number]]  (Abnormal) Resulted:  02/05/20 1744    Lab Status:  Final result Specimen:  Urine Updated:  02/05/20 1744     Color, UA see chart    Urine Macroscopic, POC [[id number]]  (Abnormal) Collected:  02/05/20 1745    Lab Status:  Final result Specimen:  Urine Updated:  02/05/20 1742     Color, UA Yellow     Clarity, UA Cloudy     pH, UA 8 5     Leukocytes, UA Moderate     Nitrite, UA Positive     Protein,  (2+) mg/dl      Glucose, UA Negative mg/dl      Ketones, UA 15 (1+) mg/dl      Urobilinogen, UA 2 0 E U /dl      Bilirubin, UA Negative     Blood, UA Large     Specific Alva, UA 1 015    Narrative:       CLINITEK RESULT    Urine Microscopic [[id number]]  (Abnormal) Collected:  02/05/20 1553    Lab Status:  Final result Specimen:  Urine, Indwelling Hays Catheter Updated:  02/05/20 1703     RBC, UA None Seen /hpf      WBC, UA 20-30 /hpf      Epithelial Cells Occasional /hpf      Bacteria, UA Moderate /hpf      Triplep Phos Stefanie, UA Occasional /hpf     Procalcitonin [[id number]]  (Abnormal) Collected:  02/05/20 1538    Lab Status:  Final result Specimen:  Blood from Arm, Right Updated:  02/05/20 1648     Procalcitonin 1 25 ng/ml     UA w Reflex to Microscopic w Reflex to Culture [[id number]]  (Abnormal) Collected:  02/05/20 1553    Lab Status:  Final result Specimen:  Urine, Indwelling Hays Catheter Updated:  02/05/20 1630     Color, UA Yellow     Clarity, UA Turbid     Specific Alva, UA 1 015     pH, UA 8 5     Leukocytes, UA Large     Nitrite, UA Positive     Protein, UA 30 (1+) mg/dl      Glucose, UA Negative mg/dl      Ketones, UA Trace mg/dl      Urobilinogen, UA 1 0 E U /dl      Bilirubin, UA Negative     Blood, UA Moderate    Lactic acid x2 [[id number]]  (Normal) Collected:  02/05/20 1538    Lab Status:  Final result Specimen:  Blood from Arm, Right Updated:  02/05/20 1619     LACTIC ACID 1 9 mmol/L     Narrative:       Result may be elevated if tourniquet was used during collection      APTT [[id number]]  (Normal) Collected:  02/05/20 1538    Lab Status:  Final result Specimen:  Blood from Arm, Right Updated:  02/05/20 1609     PTT 36 seconds     Protime-INR [[id number]]  (Abnormal) Collected:  02/05/20 1538    Lab Status:  Final result Specimen:  Blood from Arm, Right Updated:  02/05/20 1609     Protime 16 9 seconds INR 1 42    CBC and differential [[id number]]  (Abnormal) Collected:  02/05/20 1538    Lab Status:  Final result Specimen:  Blood from Arm, Right Updated:  02/05/20 0336     WBC 20 82 Thousand/uL      RBC 3 46 Million/uL      Hemoglobin 9 0 g/dL      Hematocrit 28 8 %      MCV 83 fL      MCH 26 0 pg      MCHC 31 3 g/dL      RDW 15 9 %      MPV 10 2 fL      Platelets 601 Thousands/uL      nRBC 0 /100 WBCs      Neutrophils Relative 81 %      Immat GRANS % 1 %      Lymphocytes Relative 10 %      Monocytes Relative 8 %      Eosinophils Relative 0 %      Basophils Relative 0 %      Neutrophils Absolute 16 83 Thousands/µL      Immature Grans Absolute 0 21 Thousand/uL      Lymphocytes Absolute 1 97 Thousands/µL      Monocytes Absolute 1 73 Thousand/µL      Eosinophils Absolute 0 01 Thousand/µL      Basophils Absolute 0 07 Thousands/µL                  CT chest abdomen pelvis wo contrast   Final Result by Josue Barroso DO (02/05 1855)      1  Superolateral dislocation of the right femur with a large sinus tract/defect contiguous with the skin surface with air and fluid extending posterior to the dislocated right femoral head and formation of an apparent pseudocavity lateral to the right    iliac bone which demonstrates coarse peripheral calcifications  Cortical disruption of the medial femoral neck with air in the medullary cavity as well as irregularity and some component of osteolysis of the posterior femoral head  These findings are    consistent with osteomyelitis  There is also probably bony osteomyelitis of the upper posterior lateral acetabulum and iliac bone along the roof  2  Periosteal reaction seen along the proximal femoral metaphysis which could reflect component of chronic osteomyelitis and/or reactive heterotopic ossification        3  Asymmetric swelling of the right iliopsoas muscle complex anterior to the right hip with some small locules of gas as well as edema and/or inflammatory fat stranding  The possibility of pyomyositis/regional infection cannot be excluded  No obvious    localized fluid collections without contrast       4  Deep decubitus ulcer extending to the left ischial bone surface  No obvious overt bony destruction compared to the prior study  5  No discernible infectious etiologies seen within the chest or abdomen  6  New 2 mm left upper lobe nodule series 2/12  7  Moderately large hiatal hernia  Limited study without oral or IV contrast       I personally discussed this study with Alem Ellis on 2/5/2020 at 6:48 PM                Workstation performed: [id number]               Procedures  Procedures      ED Course           Identification of Seniors at Risk      Most Recent Value   (ISAR) Identification of Seniors at Risk   Before the illness or injury that brought you to the Emergency, did you need someone to help you on a regular basis? 0 Filed at: 02/05/2020 1529   In the last 24 hours, have you needed more help than usual?  1 Filed at: 02/05/2020 1529   Have you been hospitalized for one or more nights during the past 6 months? 0 Filed at: 02/05/2020 1529   In general, do you see well? 1 Filed at: 02/05/2020 1529   In general, do you have serious problems with your memory? 0 Filed at: 02/05/2020 1529   Do you take more than three different medications every day?  0 Filed at: 02/05/2020 1529   ISAR Score  2 Filed at: 02/05/2020 1529              Initial Sepsis Screening     Row Name 02/05/20 1821                Is the patient's history suggestive of a new or worsening infection? (!) Yes (Proceed)  -JS        Suspected source of infection  urinary tract infection;soft tissue  -JS        Are two or more of the following signs & symptoms of infection both present and new to the patient? (!) Yes (Proceed)  -JS        Indicate SIRS criteria  Hyperthemia > 38 3C (100 9F); Leukocytosis (WBC > 41007 IJL); Tachycardia > 90 bpm  -JS        If the answer is yes to both questions, suspicion of sepsis is present          If severe sepsis is present AND tissue hypoperfusion perists in the hour after fluid resuscitation or lactate > 4, the patient meets criteria for SEPTIC SHOCK          Are any of the following organ dysfunction criteria present within 6 hours of suspected infection and SIRS criteria that are NOT considered to be chronic conditions? No  -JS        Organ dysfunction          Date of presentation of severe sepsis          Time of presentation of severe sepsis          Tissue hypoperfusion persists in the hour after crystalloid fluid administration, evidenced, by either:          Was hypotension present within one hour of the conclusion of crystalloid fluid administration?         Date of presentation of septic shock          Time of presentation of septic shock            User Key  (r) = Recorded By, (t) = Taken By, (c) = Cosigned By    [address] Name Provider Jennifer Mcconnell MD Physician                  MDM  Number of Diagnoses or Management Options  Diagnosis management comments: 66-year-old female presenting emergency department with fever and tachycardia and reported hypotensive episode at her nursing home  Labs concerning for acute bacterial infection  Will start on broad-spectrum antibiotics and complete the septic workup  Will do a CT scan of the abdomen pelvis as patient is insensate in this area and does have 2 chronic wound that may be acutely infected  Also will replace her Hays catheter and retest for possible urinary tract infection  Patient will need admission for further observation          Disposition  Final diagnoses:   Sepsis ([address] )   UTI (urinary tract infection)   Osteomyelitis of right femur ([address] )   Pressure injury of sacral region, stage 4 ([address] )     Time reflects when diagnosis was documented in both MDM as applicable and the Disposition within this note     Time User Action Codes Description Comment 2/5/2020  7:24 PM Holland Read Add [A41 9] Sepsis ([facility] [address] )     2/5/2020  7:25 PM Jacek Holland Valenciaens Add [N39 0] UTI (urinary tract infection)     2/5/2020  7:25 PM Holland Read [place] Add [M86 9] Osteomyelitis of right femur ([facility] [address] )     2/5/2020  7:25 PM Holland Read [place] Add [L89 154] Pressure injury of sacral region, stage 4 ([facility] [address] )     2/5/2020  7:40 PM Kit Kanner B Add [H52 119] Stage IV pressure ulcer of right buttock [facility])       ED Disposition     ED Disposition Condition Date/Time Comment    Admit Stable Wed Feb 5, 2020  7:24 PM Case was discussed with JUSTINA and the patient's admission status was agreed to be Admission Status: inpatient status to the service of Dr Juan Alberto Soriano   Follow-up Information    None         Patient's Medications   Discharge Prescriptions    No medications on file     No discharge procedures on file  ED Provider  Attending physically available and evaluated Andi Fontanez I managed the patient along with the ED Attending      Electronically Signed by         Zachery Redding MD  02/05/20 5598

## 2020-02-05 NOTE — ASSESSMENT & PLAN NOTE
Malnutrition Findings:   Progressive small increments of weight loss  Loss of  Muscle mass  Absent subcutaneous fat (outside of abdomen): face, BUE/BLE  Dry loose dry skin  Multiple Chronic non healing pressure ulcers   Severely low Albumin: 1 6 (2/4/2020)  Low Total Protein level: 5 8 (2/4/2020)  BMI Findings: 18 08 kgs/m2 (Underweight category)  Consistent poor meal completion per nursing report since admission to facility

## 2020-02-05 NOTE — PROGRESS NOTES
Progress Note    Location: [facility]  POS: 31 (SNF)    Assessment/Plan:    Pressure injury of sacral region, unstageable ([facility] [address] )  Worsening wound status: Multi-factorial  Possible Osteomyelitis: Low grade fever, hypotensive episode, fatigue, leukocytosis, thrombocytosis and High CRP  Prior Hx of Sepsis and Osteomyelitis on November 2019  Send to [facility] for evaluation and management    Paraplegia following spinal cord injury ([facility] [address] )  Insensate to pain  Prefers to lie on the right side only  Hays catheter in place  BLE contractures and Chronic multiple pressure ulcers/ wounds  Continue Baclofen TID    Moderate protein-calorie malnutrition (HCC)  Malnutrition Findings:   Progressive small increments of weight loss  Loss of  Muscle mass  Absent subcutaneous fat (outside of abdomen): face, BUE/BLE  Dry loose dry skin  Multiple Chronic non healing pressure ulcers   Severely low Albumin: 1 6 (2/4/2020)  Low Total Protein level: 5 8 (2/4/2020)  BMI Findings: 18 08 kgs/m2 (Underweight category)  Consistent poor meal completion per nursing report since admission to facility    Physical deconditioning  Patient recently managed for sepsis related to Pressure ulcers (November 2019)  Bed bound since January 17, 2020 until Feb  3, 2020  Fatigued presentation  Currently admitted to [facility] for STR  Non ambulatory with paraplegia - SCI  Continue PT/OT/ST as scheduled  Chief complaint / Reason for visit: Follow-up visit    History of Present Illness: Kingston Anaya is a 71 y o  Female patient currently admitted at [facility] (1/17/2020 to present) following discharge form [facility] where she had her initial STR prior to coming to [facility]   Patient was discharged from [facility] (11/10-20/2019) with Dx of Sepsis, Pressure Injury Sacral Region, Unstageable      Patient is seen and examined today to follow-up acute and chronic medical conditions as mentioned above and GERD, ambulatory dysfunctions and deconditioning  Patient awake, alert but with fatigued presentation, oriented x4, verbal with clear coherent speech  Still reported nausea but no vomiting, intermittent since admission - placed on Pantoprazole on 1/30/2020 visit  Reviewed meds today and noted high dose of Zinc Sulfate - ordered to reduce dose from 220mg to 100mg (potential GI distress source)  Per nursing, patient still with poor appetite, intermittently non compliant to wound care dressing changes and assessment  Otherwise patient denies pain, new or worsening, chest pain, SOB,  related concerns, fever, chills, headaches  Reported dizziness and pallor when transferred to from bed to wheel chair this morning during therapy session with associated hypotension: BP: 64/30 which later improved to 124/70 (manual check by this provider) - possible orthostatic hypotension  Per PT/OT, this is the second day she was OOB for therapy and was able to tolerate yesterday without any event  V/S: T99 2F -P94 -R20 BP: 159/74 => 64/30 => 124/70   SpO2: 96% RA  On examination benign except for the wound status: increased erythema, drainage described as serousanguinous with thick yellowish drainage  Per nursing, this morning, during wound dressing change, wound drainage was heavily soaked through the dressing and into the pad of the bed  (+) Right hip bone exposure with loss of muscles, deep cavitation within the Right hip area (pressure ulceration), increased erythema and drainage to Right anterior groin area wound - both wounds (+) scattered adhered yellow slough  Right gluteal wound resolving and coccyx pressure injury - with dressing in place (new onset)  Patient insensate to affected areas related to SCI  Per nursing, patient has worsened since admission to facility  Attempted to call ID office and was told unable to see patient at this time      Review of labs done today (CBC, CRP) showed severely elevated CRP: 289, Leukocytosis: 14 3 persistent since 2/4/2020  Clinical presentation highly suspect of sepsis/ Osteomyelitis  Will send to ER for further evaluation and management      Review of Systems:  Per history of present illness, all other systems reviewed and negative  HISTORY:  Medical Hx: Reviewed, unchanged  Family Hx: Reviewed, unchanged  Soc Hx: Reviewed,  Unchanged    ALLERGY: Reviewed, unchanged  Allergies   Allergen Reactions    Iv Contrast [Iodinated Diagnostic Agents]      Tingling face, itching    Cefepime Rash    Ciprofloxacin Rash and Swelling     Looked like suburn, itching  Bed sores  Swelling, itching    Latex Rash    Vancomycin Rash     Unknown        PHYSICAL EXAM:  Vital Signs: T99 2F -P94 -R20 BP: 159/74 => 64/30 => 124/70   SpO2: 96% RA  Weight: 92 6 lbs (2/5/2020)    Physical Exam   Constitutional: She is oriented to person, place, and time  She appears well-developed  No distress  Frail stature, alert, fatigued presentation but no change in cognition pr mentation   HENT:   Head: Normocephalic and atraumatic  Right Ear: External ear normal    Left Ear: External ear normal    Nose: Nose normal    Mouth/Throat: Oropharynx is clear and moist  No oropharyngeal exudate  Eyes: Pupils are equal, round, and reactive to light  Conjunctivae are normal  Right eye exhibits no discharge  Left eye exhibits no discharge  No scleral icterus  Neck: Neck supple  No JVD present  No tracheal deviation present  No thyromegaly present  Cardiovascular: Normal rate, regular rhythm and normal heart sounds  Exam reveals no gallop and no friction rub  No murmur heard  Pulmonary/Chest: Effort normal and breath sounds normal  No stridor  No respiratory distress  She has no wheezes  She has no rales  She exhibits no tenderness  Abdominal: Soft  Bowel sounds are normal  She exhibits no distension and no mass  There is no tenderness  There is no rebound and no guarding     Genitourinary:   Genitourinary Comments: Hays catheter - clear yellow urine in bag  Musculoskeletal: She exhibits deformity  She exhibits no edema or tenderness  Chronic contracture to Left LE; and moderate contracture to Right LE  Insensate due to SCI - paraplegia  Non ambulatory  Right hip stage 4 versus 5 pressure ulcer - bone exposed  Right anterior groin area pressure ulcer  Right gluteal wound - resolving  Coccyx area pressure injury - dressing in place  B/L heels intact  Lymphadenopathy:     She has no cervical adenopathy  Neurological: She is alert and oriented to person, place, and time  Skin: Skin is warm and dry  No rash noted  She is not diaphoretic  No erythema  No pallor  Psychiatric:   Depressive mood - easily cries and stated, I'm dying  I don't want to lose my leg"  Laboratory results / Imaging: Hard copies in medical chart:    * CBC (2020) = WNL except:  Hb 0 <= 9 1 (2020)  Hct: 27 5 (L)  WBC: 14 6 <= 14 3 (2020) <= 7 1 (2020)  Plt: 535 <= 460 (2020) <= 600 (2020)    * CRP: 289 (2020) (H)    * CMP (2020) = WNL except:  Crea: 0 37 (L)  NA: 131 (L)  Alk  Phosp: 139 (H)  Ca: 8 3 (L)  Cl: 99 (L)  Alb: 1 6 (L)  TPro: 5 8 (L)      Current Medications:   All medications reviewed and updated in [address] [place], [place] [id number]  2020

## 2020-02-06 ENCOUNTER — APPOINTMENT (INPATIENT)
Dept: RADIOLOGY | Facility: HOSPITAL | Age: 70
DRG: 871 | End: 2020-02-06
Payer: COMMERCIAL

## 2020-02-06 LAB
ALBUMIN SERPL BCP-MCNC: 1.4 G/DL (ref 3.5–5)
ALP SERPL-CCNC: 147 U/L (ref 46–116)
ALT SERPL W P-5'-P-CCNC: 21 U/L (ref 12–78)
ANION GAP SERPL CALCULATED.3IONS-SCNC: 9 MMOL/L (ref 4–13)
AST SERPL W P-5'-P-CCNC: 24 U/L (ref 5–45)
BASOPHILS # BLD AUTO: 0.08 THOUSANDS/ΜL (ref 0–0.1)
BASOPHILS NFR BLD AUTO: 0 % (ref 0–1)
BILIRUB SERPL-MCNC: 0.47 MG/DL (ref 0.2–1)
BUN SERPL-MCNC: 7 MG/DL (ref 5–25)
CALCIUM SERPL-MCNC: 8 MG/DL (ref 8.3–10.1)
CHLORIDE SERPL-SCNC: 100 MMOL/L (ref 100–108)
CO2 SERPL-SCNC: 22 MMOL/L (ref 21–32)
CREAT SERPL-MCNC: 0.38 MG/DL (ref 0.6–1.3)
EOSINOPHIL # BLD AUTO: 0.05 THOUSAND/ΜL (ref 0–0.61)
EOSINOPHIL NFR BLD AUTO: 0 % (ref 0–6)
ERYTHROCYTE [DISTWIDTH] IN BLOOD BY AUTOMATED COUNT: 15.9 % (ref 11.6–15.1)
GFR SERPL CREATININE-BSD FRML MDRD: 108 ML/MIN/1.73SQ M
GLUCOSE SERPL-MCNC: 104 MG/DL (ref 65–140)
HCT VFR BLD AUTO: 28.9 % (ref 34.8–46.1)
HGB BLD-MCNC: 9.1 G/DL (ref 11.5–15.4)
IMM GRANULOCYTES # BLD AUTO: 0.15 THOUSAND/UL (ref 0–0.2)
IMM GRANULOCYTES NFR BLD AUTO: 1 % (ref 0–2)
LYMPHOCYTES # BLD AUTO: 2.96 THOUSANDS/ΜL (ref 0.6–4.47)
LYMPHOCYTES NFR BLD AUTO: 14 % (ref 14–44)
MAGNESIUM SERPL-MCNC: 1.8 MG/DL (ref 1.6–2.6)
MCH RBC QN AUTO: 26 PG (ref 26.8–34.3)
MCHC RBC AUTO-ENTMCNC: 31.5 G/DL (ref 31.4–37.4)
MCV RBC AUTO: 83 FL (ref 82–98)
MONOCYTES # BLD AUTO: 1.78 THOUSAND/ΜL (ref 0.17–1.22)
MONOCYTES NFR BLD AUTO: 9 % (ref 4–12)
NEUTROPHILS # BLD AUTO: 15.9 THOUSANDS/ΜL (ref 1.85–7.62)
NEUTS SEG NFR BLD AUTO: 76 % (ref 43–75)
NRBC BLD AUTO-RTO: 0 /100 WBCS
PLATELET # BLD AUTO: 563 THOUSANDS/UL (ref 149–390)
PMV BLD AUTO: 9.9 FL (ref 8.9–12.7)
POTASSIUM SERPL-SCNC: 3.8 MMOL/L (ref 3.5–5.3)
PROT SERPL-MCNC: 6.3 G/DL (ref 6.4–8.2)
RBC # BLD AUTO: 3.5 MILLION/UL (ref 3.81–5.12)
SODIUM SERPL-SCNC: 131 MMOL/L (ref 136–145)
WBC # BLD AUTO: 20.92 THOUSAND/UL (ref 4.31–10.16)

## 2020-02-06 PROCEDURE — 99233 SBSQ HOSP IP/OBS HIGH 50: CPT | Performed by: INTERNAL MEDICINE

## 2020-02-06 PROCEDURE — 99222 1ST HOSP IP/OBS MODERATE 55: CPT | Performed by: ORTHOPAEDIC SURGERY

## 2020-02-06 PROCEDURE — 80053 COMPREHEN METABOLIC PANEL: CPT | Performed by: INTERNAL MEDICINE

## 2020-02-06 PROCEDURE — NS001 PR NO SIGNATURE OR ATTESTATION: Performed by: ORTHOPAEDIC SURGERY

## 2020-02-06 PROCEDURE — 99232 SBSQ HOSP IP/OBS MODERATE 35: CPT | Performed by: INTERNAL MEDICINE

## 2020-02-06 PROCEDURE — 36415 COLL VENOUS BLD VENIPUNCTURE: CPT | Performed by: INTERNAL MEDICINE

## 2020-02-06 PROCEDURE — 72170 X-RAY EXAM OF PELVIS: CPT

## 2020-02-06 PROCEDURE — 83735 ASSAY OF MAGNESIUM: CPT | Performed by: INTERNAL MEDICINE

## 2020-02-06 PROCEDURE — 85025 COMPLETE CBC W/AUTO DIFF WBC: CPT | Performed by: INTERNAL MEDICINE

## 2020-02-06 RX ADMIN — PANTOPRAZOLE SODIUM 40 MG: 40 TABLET, DELAYED RELEASE ORAL at 10:23

## 2020-02-06 RX ADMIN — Medication 250 MG: at 10:14

## 2020-02-06 RX ADMIN — SENNOSIDES 8.6 MG: 8.6 TABLET, FILM COATED ORAL at 21:24

## 2020-02-06 RX ADMIN — MIDODRINE HYDROCHLORIDE 15 MG: 5 TABLET ORAL at 04:53

## 2020-02-06 RX ADMIN — MIDODRINE HYDROCHLORIDE 15 MG: 5 TABLET ORAL at 12:31

## 2020-02-06 RX ADMIN — PIPERACILLIN SODIUM AND TAZOBACTAM SODIUM 3.38 G: 36; 4.5 INJECTION, POWDER, FOR SOLUTION INTRAVENOUS at 23:48

## 2020-02-06 RX ADMIN — Medication 1 TABLET: at 10:17

## 2020-02-06 RX ADMIN — Medication 250 MG: at 17:28

## 2020-02-06 RX ADMIN — VITAM B12 100 MCG: 100 TAB at 10:17

## 2020-02-06 RX ADMIN — Medication 1 TABLET: at 17:29

## 2020-02-06 RX ADMIN — PIPERACILLIN SODIUM AND TAZOBACTAM SODIUM 3.38 G: 36; 4.5 INJECTION, POWDER, FOR SOLUTION INTRAVENOUS at 05:29

## 2020-02-06 RX ADMIN — PIPERACILLIN SODIUM AND TAZOBACTAM SODIUM 3.38 G: 36; 4.5 INJECTION, POWDER, FOR SOLUTION INTRAVENOUS at 19:03

## 2020-02-06 RX ADMIN — ENOXAPARIN SODIUM 40 MG: 40 INJECTION SUBCUTANEOUS at 10:25

## 2020-02-06 RX ADMIN — DAPTOMYCIN 250 MG: 500 INJECTION, POWDER, LYOPHILIZED, FOR SOLUTION INTRAVENOUS at 17:29

## 2020-02-06 RX ADMIN — MELATONIN 1000 UNITS: at 10:17

## 2020-02-06 RX ADMIN — Medication 125 MG: at 21:24

## 2020-02-06 RX ADMIN — Medication 125 MG: at 12:32

## 2020-02-06 RX ADMIN — MIDODRINE HYDROCHLORIDE 15 MG: 5 TABLET ORAL at 21:24

## 2020-02-06 RX ADMIN — Medication 1 TABLET: at 10:15

## 2020-02-06 RX ADMIN — NYSTATIN: 100000 POWDER TOPICAL at 17:43

## 2020-02-06 NOTE — CONSULTS
Orthopedics   Kasey Inch 71 y o  female MRN: [id number]  Unit/Bed#: [facility] [address]      Chief Complaint:   R femur osteomyelitis    HPI:   71 y  o female that was admitted last night for possible sepsis  She was brought to the ED from [facility] due to hypotension, fevers, chills, increased redness from her wounds  She has a history of T8 paraplegia since 1981 and chronic wounds and osteomyelitis of her right hip and sacrum from several months ago  Patient was evaluated by our service on November 2019 for similar wound complication and infection  Per chart review, she was seen by Dr Umer Calvillo on 01/29/2020 for right hip dislocation, and nonoperative treatment was decided for it due to lack of pain and complexity of her current condition/wound  Review Of Systems:   · Skin:  See Media tab  Complex sacral decubitus ulcer  Complex right hip wound/ulcer with drainage  · Neuro: See HPI  · Musculoskeletal: See HPI  · 14 point review of systems negative except as stated above     Past Medical History:   Past Medical History:   Diagnosis Date    Anemia     iron defiencey    Arthritis     osteo    Back injury     Chronic kidney disease     nephritis    Depression     MRSA (methicillin resistant staph aureus) culture positive 12/07/2018    BONE-TISSUE     Osteopenia     Osteoporosis     Paralysis (HCC)     paraplegic due to spinal cord injury    Paraplegia (HCC)     Poor circulation     Pressure injury of skin     right hip, stage 3    Pressure sore of hip     right    Tibial plateau fracture     Underweight        Past Surgical History:   Past Surgical History:   Procedure Laterality Date    CLOSURE DELAYED PRIMARY N/A 3/20/2019    Procedure: OPHELIA ANAL WOUND RECLOSURE;  Surgeon: Hazel Long MD;  [facility];  Service: Plastics    DECUBITUS ULCER EXCISION Bilateral 11/12/2019    Procedure: DEBRIDEMENT DECUBITI ([address] OUT);   Surgeon: Stacia Elise DO;  Location:  MAIN OR; Service: General    FLAP LOCAL EXTREMITY Left 1/28/2019    Procedure: THIGH FLAP & STSG TO CLOSE HIP WOUND;  Surgeon: Philly Carr MD;  Location: [address] [place] [address] OR;  Service: Plastics    FLAP LOCAL TRUNK Right 12/17/2018    Procedure: DEBRIDE/FLAP CLOSURE HIP PRESSURE SORE Danielle Moose GRAFT;  Surgeon: Philly Carr MD;  Location: [address] [place] [address] OR;  Service: Plastics    FLAP LOCAL TRUNK Left 2/27/2019    Procedure: BUTTOCK FLAP TO ISCHIAL SORE;  Surgeon: Philly Carr MD;  Location: [address] [place] [address] OR;  Service: Plastics    HIP DEBRIDEMENT Right 2017    right hip sore debridement    INCISION AND DRAINAGE OF WOUND Left 1/3/2019    Procedure: INCISION AND DRAINAGE (I&D) left distal femur  With deep wound cultures   Application of VAC DRAIN SPONGE;  Surgeon: Herbert Andrade MD;  Location: [facility] OR;  Service: Orthopedics    IR PICC LINE  12/19/2018    IR PICC LINE  3/12/2019    MA DEBRIDEMENT, SKIN, SUB-Q TISSUE,MUSCLE,BONE,=<20 SQ CM Right 9/19/2018    Procedure: DEBRIDEMENT OF HIP PRESSURE SORE;  Surgeon: Philly Carr MD;  Location: [address] [place] [address] OR;  Service: Plastics    MA OPEN RX FEMUR FX+INTRAMED FELIX Left 10/22/2018    Procedure: INSERTION NAIL IM LEFT FEMUR RETROGRADE;  Surgeon: Ramiro Abdi MD;  Location:  MAIN OR;  Service: Orthopedics    TOE AMPUTATION Left 2017    small toe left foot amputation    WISDOM TOOTH EXTRACTION      WOUND DEBRIDEMENT Left 12/17/2018    Procedure: Cornelio Blancas;  Surgeon: Philly Carr MD;  Location: [address] [place] [address] OR;  Service: Plastics    WOUND DEBRIDEMENT N/A 11/10/2019    Procedure: EXCISIONAL DEBRIDEMENT;  Surgeon: Candace Valencia MD;  Location:  MAIN OR;  Service: General       Family History:  Family history reviewed and non-contributory  Family History   Problem Relation Age of Onset    Depression Father        Social History:  Social History     Socioeconomic History    Marital status: Single     Spouse name: None    Number of children: None    Years of education: None    Highest education level: None   Occupational History    None   Social Needs    Financial resource strain: None    Food insecurity:     Worry: None     Inability: None    Transportation needs:     Medical: None     Non-medical: None   Tobacco Use    Smoking status: Never Smoker    Smokeless tobacco: Never Used   Substance and Sexual Activity    Alcohol use: Yes     Comment: occasional    Drug use: No    Sexual activity: None   Lifestyle    Physical activity:     Days per week: None     Minutes per session: None    Stress: None   Relationships    Social connections:     Talks on phone: None     Gets together: None     Attends Zoroastrianism service: None     Active member of club or organization: None     Attends meetings of clubs or organizations: None     Relationship status: None    Intimate partner violence:     Fear of current or ex partner: None     Emotionally abused: None     Physically abused: None     Forced sexual activity: None   Other Topics Concern    None   Social History Narrative    Lives at home alone  Pt denies getting assistance from outside persons or agencies including wound care  Allergies:    Allergies   Allergen Reactions    Iv Contrast [Iodinated Diagnostic Agents]      Tingling face, itching    Cefepime Rash    Ciprofloxacin Rash and Swelling     Looked like suburn, itching  Bed sores  Swelling, itching    Latex Rash    Vancomycin Rash     Unknown            Labs:  0   Lab Value Date/Time    HCT 28 9 (L) 02/06/2020 0451    HCT 28 8 (L) 02/05/2020 1538    HCT 30 5 (L) 11/20/2019 0613    HCT 30 0 (L) 05/28/2018 0530    HCT 29 5 (L) 05/22/2018 0515    HCT 32 5 (L) 05/14/2018 0555    HGB 9 1 (L) 02/06/2020 0451    HGB 9 0 (L) 02/05/2020 1538    HGB 9 3 (L) 11/20/2019 0613    HGB 10 1 (L) 05/28/2018 0530    HGB 9 9 (L) 05/22/2018 0515    HGB 10 6 (L) 05/14/2018 0555    INR 1 42 (H) 02/05/2020 1538    WBC 20 92 (H) 02/06/2020 0451    WBC 20 82 (H) 02/05/2020 1538    WBC 9 74 11/20/2019 0613    WBC 6 4 05/28/2018 0530    WBC 8 0 05/22/2018 0515    WBC 6 0 05/14/2018 0555    ESR 54 (H) 11/11/2019 1606       Meds:    Current Facility-Administered Medications:     acetaminophen (TYLENOL) tablet 650 mg, 650 mg, Oral, Q6H PRN, Carolynn Romberg, MD    bisacodyl (DULCOLAX) rectal suppository 10 mg, 10 mg, Rectal, Daily, Carolynn Romberg, MD    calcium carbonate-vitamin D (OSCAL-D) 500 mg-200 units per tablet 1 tablet, 1 tablet, Oral, BID With Meals, Carolynn Romberg, MD, 1 tablet at 02/06/20 1017    cholecalciferol (VITAMIN D3) tablet 1,000 Units, 1,000 Units, Oral, Daily, Carolynn Romberg, MD, 1,000 Units at 02/06/20 1017    cyanocobalamin (VITAMIN B-12) tablet 100 mcg, 100 mcg, Oral, Daily, Carolynn Romberg, MD, 100 mcg at 02/06/20 1017    enoxaparin (LOVENOX) subcutaneous injection 40 mg, 40 mg, Subcutaneous, Daily, Carolynn Romberg, MD, 40 mg at 02/06/20 1025    midodrine (PROAMATINE) tablet 15 mg, 15 mg, Oral, Q8H, Carolynn Romberg, MD, 15 mg at 02/06/20 1231    multivitamin-minerals (CENTRUM) tablet 1 tablet, 1 tablet, Oral, Daily, Carolynn Romberg, MD, 1 tablet at 02/06/20 1015    nystatin (MYCOSTATIN) powder, , Topical, BID, Carolynn Romberg, MD    ondansetron [facility]) injection 4 mg, 4 mg, Intravenous, Q6H PRN, Carolynn Romberg, MD, 4 mg at 02/05/20 2113    pantoprazole (PROTONIX) EC tablet 40 mg, 40 mg, Oral, Daily, Carolynn Romberg, MD, 40 mg at 02/06/20 1023    piperacillin-tazobactam (ZOSYN) 3 375 g in sodium chloride 0 9 % 100 mL IVPB, 3 375 g, Intravenous, Q6H, Carolynn Romberg, MD, Last Rate: 200 mL/hr at 02/06/20 0529, 3 375 g at 02/06/20 0529    polyethylene glycol (MIRALAX) packet 17 g, 17 g, Oral, Daily, Carolynn Romberg, MD, Stopped at 02/06/20 1024    saccharomyces boulardii (FLORASTOR) capsule 250 mg, 250 mg, Oral, BID, Carolynn Romberg, MD, 250 mg at 02/06/20 1014    senna (SENOKOT) tablet 8 6 mg, 1 tablet, Oral, HS, Trell Beaulieu MD    sodium chloride 0 9 % infusion, 75 mL/hr, Intravenous, Continuous, Carlos To MD, Last Rate: 75 mL/hr at 02/05/20 2307, 75 mL/hr at 02/05/20 2307    vancomycin (VANCOCIN) oral solution 125 mg, 125 mg, Oral, Q12H [address], Trell Beaulieu MD, 125 mg at 02/06/20 1232    Facility-Administered Medications Ordered in Other Encounters:     dexamethasone (DECADRON) injection 4 mg, 4 mg, Intravenous, Once PRN, Yareli Lemme, DO    diphenhydrAMINE (BENADRYL) injection 12 5 mg, 12 5 mg, Intravenous, Once, Yareli Lemme, DO    lactated ringers infusion, 75 mL/hr, Intravenous, Continuous, Yareli Lemme, DO, Last Rate: 75 mL/hr at 11/10/19 2105    lactated ringers infusion, 50 mL/hr, Intravenous, Continuous, Saloni Amezquita, CRNA    lactated ringers infusion, 75 mL/hr, Intravenous, Continuous, Yareli Lemme, DO, Stopped at 03/20/19 6773    lactated ringers infusion, 75 mL/hr, Intravenous, Continuous, Andrew Solis MD, Stopped at 03/20/19 1842    ondansetron (ZOFRAN) injection 4 mg, 4 mg, Intravenous, Once PRN, Bartolo Salinas MD    promethazine (PHENERGAN) injection 12 5 mg, 12 5 mg, Intravenous, Once PRN, Bartolo Salinas MD    Blood Culture:   Lab Results   Component Value Date    BLOODCX Received in Microbiology Lab  Culture in Progress  02/05/2020       Wound Culture:   Lab Results   Component Value Date    WOUNDCULT 3+ Growth of Pseudomonas aeruginosa (A) 11/10/2019    WOUNDCULT 2+ Growth of Providencia stuartii (A) 11/10/2019    WOUNDCULT 2+ Growth of Diphtheroids 11/10/2019       Ins and Outs:  I/O last 24 hours:   In: 1000 [IV Piggyback:1000]  Out: 10 [Urine:10]          Physical Exam:   BP 99/55   Pulse 70   Temp 98 4 °F (36 9 °C)   Resp 18   Wt 41 7 kg (92 lb)   LMP  (LMP Unknown)   SpO2 96%   BMI 17 97 kg/m²   Gen: Alert and oriented to person, place, time  HEENT: EOMI, eyes clear, moist mucus membranes, hearing intact  Respiratory: Bilateral chest rise  No audible wheezing found  Cardiovascular: Palpable pulses  Abdomen: soft nontender/nondistended  Musculoskeletal: right buttock and right lower extremity  · Skin: large chronic/ complex pressure wounds over the sacrum and right greater trochanter; erythema present  · Diminished sensation L3-S1  · 0/5 motor strength to hip flexion/extension, knee flexion/extension, ankle dorsi/plantar flexion, ehl/fhl  · [facility]    Radiology:   I personally reviewed the films  CT scan abdomen and pelvis: right hip dislocation with sinus tract and osteolysis  As well as chronic bone changes suggestive of osteomyelitis      _*_*_*_*_*_*_*_*_*_*_*_*_*_*_*_*_*_*_*_*_*_*_*_*_*_*_*_*_*_*_*_*_*_*_*_*_*_*_*_*_*    Assessment:  71 y  o female with sacral decub ulcer and R greater troch ulcer admitted for sepsis  CT scan revealed dislocated R hip and osteomyelitis of the R femur  Surgical intervention not recommended due to moribidty of the procedure and no benefit on functional outcome for her  IV antibiotic and wound care highly encouraged  Plan:   · NWB BL LE  · Abx per primary team  · Local wound care  · Pain control as needed  · Body mass index is 17 97 kg/m²    · Dispo: Ortho signing off      Narayan Rojas MD

## 2020-02-06 NOTE — H&P
H&P- [employer] 1950, 71 y o  female MRN: [id number]    Unit/Bed#: ED 24 Encounter: [id number]    Primary Care Provider: Dru Crowley   Date and time admitted to hospital: 2/5/2020  3:25 PM        * Sepsis [facility])  Assessment & Plan  Patient with fever, leukocytosis, tachycardia  Possible sources include unstageable decubitus ulcer concerning for osteomyelitis, urinary tract infection with indwelling chronic Hays catheter  IV Zosyn  Follow-up on cultures  IV fluids  Will request Infectious Disease inputs  Monitor counts, temperature, clinically    History of Clostridium difficile infection  Assessment & Plan  P o   Vancomycin 125 mg q 12 hours prophylactic dose while on systemic antibiotics    Chronic indwelling Hays catheter  Assessment & Plan  History of paraplegia with neurogenic bladder  Chronic Hays catheter in place    Hypotension  Assessment & Plan  Chronic low blood pressures on midodrine  Will provide IV fluids  Monitor blood pressures closely    Neurogenic bladder  Assessment & Plan  History of neurogenic bladder with chronic indwelling Hays catheter    Hyponatremia  Assessment & Plan  Likely due to dehydration  IV fluids  Monitor sodium levels closely    Anemia of chronic disease  Assessment & Plan  Monitor hemoglobin  Check iron studies    Stage IV pressure ulcer of right buttock (HCC)  Assessment & Plan  Unstageable sacral decubitus ulcer with possible osteomyelitis  Wound Care, frequent repositioning  General surgery consult    Paraplegia following spinal cord injury ([facility] [address] )  Assessment & Plan  History of spinal cord injury with paraplegia  Ambulates in a wheelchair  Supportive care      VTE Prophylaxis: Enoxaparin (Lovenox)  / sequential compression device   Code Status:  Full code  POLST: There is no POLST form on file for this patient (pre-hospital)  Discussion with family:  Discussed with the patient    Anticipated Length of Stay:  Patient will be admitted on an Inpatient basis with an anticipated length of stay of  More than 2 midnights  Justification for Hospital Stay:  Sepsis requiring further management as outlined      Chief Complaint:       Transfer from SNF for fever, low blood pressure    History of Present Illness:    Naty Kirkpatrick is a 71 y o  female who presents with patient is a resident of SNF, she was transferred for low blood pressures and fever  Patient herself reports not feeling well for the last few days with poor appetite generalized weakness and easy fatigability  She has history of spinal cord injury with paraplegia, she ambulates in a wheelchair, she has chronic non stage able decubitus ulcers and follows with wound care  She also has a chronic indwelling Hays catheter  In the ED she was noted to have fever leukocytosis tachycardia and diagnosed with sepsis, patient is being admitted for further management    Patient reports poor appetite nausea  Denies vomiting abdominal pain  She has cough, nonproductive sputum  Denies shortness of breath palpitations chest pain diaphoresis  Review of Systems:    Review of Systems   All other systems reviewed and are negative        Past Medical and Surgical History:     Past Medical History:   Diagnosis Date    Anemia     iron defiencey    Arthritis     osteo    Back injury     Chronic kidney disease     nephritis    Depression     MRSA (methicillin resistant staph aureus) culture positive 12/07/2018    BONE-TISSUE     Osteopenia     Osteoporosis     Paralysis ([facility] [address] )     paraplegic due to spinal cord injury    Paraplegia (HCC)     Poor circulation     Pressure injury of skin     right hip, stage 3    Pressure sore of hip     right    Tibial plateau fracture     Underweight        Past Surgical History:   Procedure Laterality Date    CLOSURE DELAYED PRIMARY N/A 3/20/2019    Procedure: OPHELIA ANAL WOUND RECLOSURE;  Surgeon: Eddie Barron MD;  Location:  MAIN OR;  Service: [id number]    PLPHWCKMG ULCER EXCISION Bilateral 11/12/2019    Procedure: DEBRIDEMENT DECUBITI [facility] OUT); Surgeon: Aman Brady DO;  Location:  MAIN OR;  Service: General    FLAP LOCAL EXTREMITY Left 1/28/2019    Procedure: THIGH FLAP & STSG TO CLOSE HIP WOUND;  Surgeon: Abdullahi Beal MD;  Location: [facility] MAIN OR;  Service: Plastics    FLAP LOCAL TRUNK Right 12/17/2018    Procedure: DEBRIDE/FLAP CLOSURE HIP PRESSURE SORE Wilder Bowers GRAFT;  Surgeon: Abdullahi Beal MD;  Location: [facility] MAIN OR;  Service: Plastics    FLAP LOCAL TRUNK Left 2/27/2019    Procedure: BUTTOCK FLAP TO ISCHIAL SORE;  Surgeon: Abdullahi Beal MD;  Location: [facility] MAIN OR;  Service: Plastics    HIP DEBRIDEMENT Right 2017    right hip sore debridement    INCISION AND DRAINAGE OF WOUND Left 1/3/2019    Procedure: INCISION AND DRAINAGE (I&D) left distal femur  With deep wound cultures  Application of VAC DRAIN SPONGE;  Surgeon: Orlando Morrow MD;  Location:  MAIN OR;  Service: Orthopedics    IR PICC LINE  12/19/2018    IR PICC LINE  3/12/2019    IA DEBRIDEMENT, SKIN, SUB-Q TISSUE,MUSCLE,BONE,=<20 SQ CM Right 9/19/2018    Procedure: DEBRIDEMENT OF HIP PRESSURE SORE;  Surgeon: Abdlulahi Beal MD;  Location: [facility] MAIN OR;  Service: [address] [place] [address] FX+INTRAMED FELIX Left 10/22/2018    Procedure: INSERTION NAIL IM LEFT FEMUR RETROGRADE;  Surgeon: Kay Foley MD;  Location:  MAIN OR;  Service: Orthopedics    TOE AMPUTATION Left 2017    small toe left foot amputation    WISDOM TOOTH EXTRACTION      WOUND DEBRIDEMENT Left 12/17/2018    Procedure: DEBRIDEMENT HIP PRESSURE SORE;  Surgeon: Abdullahi Beal MD;  Location: [facility] MAIN OR;  Service: Plastics    WOUND DEBRIDEMENT N/A 11/10/2019    Procedure: EXCISIONAL DEBRIDEMENT;  Surgeon: Judy Cervantes MD;  Location:  MAIN OR;  Service: General       Meds/Allergies:    Prior to Admission medications    Medication Sig Start Date End Date Taking?  Authorizing Provider   acetaminophen (TYLENOL) 325 mg tablet Take 2 tablets (650 mg total) by mouth every 6 (six) hours as needed for mild pain, headaches or fever 11/20/19   Marcelo Coleman PA-C   bisacodyl (DULCOLAX) 10 mg suppository Insert 1 suppository (10 mg total) into the rectum daily 11/20/19   Marcelo Coleman PA-C   Calcium Carb-Cholecalciferol 600-200 MG-UNIT TABS Take 1 tablet by mouth daily    Historical Provider, MD   cholecalciferol (VITAMIN D3) 1,000 units tablet Take 1,000 Units by mouth daily    Historical Provider, MD   cyanocobalamin 100 MCG tablet Take 100 mcg by mouth daily    Historical Provider, MD   midodrine (PROAMATINE) 5 mg tablet Take 3 tablets (15 mg total) by mouth every 8 (eight) hours 11/20/19   Marcelo Coleman PA-C   multivitamin [facility]) TABS Take 1 tablet by mouth daily    Historical Provider, MD   nystatin (MYCOSTATIN) powder Apply topically 2 (two) times a day    Historical Provider, MD   pantoprazole (PROTONIX) 40 mg tablet Take 40 mg by mouth daily    Historical Provider, MD   Probiotic Product (PROBIOTIC PEARLS ADVANTAGE PO) Take by mouth    Historical Provider, MD   saccharomyces boulardii (FLORASTOR) 250 mg capsule Take 1 capsule (250 mg total) by mouth 2 (two) times a day  Patient not taking: Reported on 6/13/2019 4/19/19   Sudhir Figueredo DO   senna (SENOKOT) 8 6 mg Take 1 tablet (8 6 mg total) by mouth daily at bedtime 11/20/19   Marcelo Coleman PA-C     I have reviewed home medications with patient personally  Allergies:    Allergies   Allergen Reactions    Iv Contrast [Iodinated Diagnostic Agents]      Tingling face, itching    Cefepime Rash    Ciprofloxacin Rash and Swelling     Looked like suburn, itching  Bed sores  Swelling, itching    Latex Rash    Vancomycin Rash     Unknown        Social History:     Marital Status: Single   Occupation:   Patient Pre-hospital Living Situation: SNF  Patient Pre-hospital Level of Mobility:  Ambulatory dysfunction, in wheelchair  Patient Pre-hospital Diet Restrictions: no  Substance Use History:   Social History     Substance and Sexual Activity   Alcohol Use Yes    Comment: occasional     Social History     Tobacco Use   Smoking Status Never Smoker   Smokeless Tobacco Never Used     Social History     Substance and Sexual Activity   Drug Use No       Family History:    Family History   Problem Relation Age of Onset    Depression Father        Physical Exam:     Vitals:   Blood Pressure: 105/53 (02/05/20 1800)  Pulse: 100 (02/05/20 1800)  Temperature: (!) 102 1 °F (38 9 °C) (02/05/20 1533)  Temp Source: Rectal (02/05/20 1533)  Respirations: 20 (02/05/20 1800)  Weight - Scale: 41 7 kg (92 lb) (02/05/20 1533)  SpO2: 98 % (02/05/20 1800)    Physical Exam    Chronically sick-appearing  Features of protein calorie malnutrition the form of muscle wasting subcutaneous fat loss noted  Neck supple  Lungs diminished breath sounds bilaterally  Heart sounds S1-S2 noted, tachycardia noted  Abdomen soft nontender  Awake alert obeys simple commands  Paraplegia noted  Pulses noted  No rash  Unstageable sacral and right hip decubitus ulcers noted        Additional Data:     Lab Results: I have personally reviewed pertinent reports        Results from last 7 days   Lab Units 02/05/20  1538   WBC Thousand/uL 20 82*   HEMOGLOBIN g/dL 9 0*   HEMATOCRIT % 28 8*   PLATELETS Thousands/uL 618*   NEUTROS PCT % 81*   LYMPHS PCT % 10*   MONOS PCT % 8   EOS PCT % 0     Results from last 7 days   Lab Units 02/05/20  1538   SODIUM mmol/L 129*   POTASSIUM mmol/L 4 7   CHLORIDE mmol/L 96*   CO2 mmol/L 24   BUN mg/dL 11   CREATININE mg/dL 0 47*   ANION GAP mmol/L 9   CALCIUM mg/dL 8 6   ALBUMIN g/dL 1 8*   TOTAL BILIRUBIN mg/dL 0 45   ALK PHOS U/L 175*   ALT U/L 25   AST U/L 37   GLUCOSE RANDOM mg/dL 154*     Results from last 7 days   Lab Units 02/05/20  1538   INR  1 42*             Results from last 7 days   Lab Units 02/05/20  1538   LACTIC ACID mmol/L 1 9   PROCALCITONIN ng/ml 1 25*       Imaging: I have personally reviewed pertinent reports  CT chest abdomen pelvis wo contrast   Final Result by Joyce Curtis DO (02/05 1855)      1  Superolateral dislocation of the right femur with a large sinus tract/defect contiguous with the skin surface with air and fluid extending posterior to the dislocated right femoral head and formation of an apparent pseudocavity lateral to the right    iliac bone which demonstrates coarse peripheral calcifications  Cortical disruption of the medial femoral neck with air in the medullary cavity as well as irregularity and some component of osteolysis of the posterior femoral head  These findings are    consistent with osteomyelitis  There is also probably bony osteomyelitis of the upper posterior lateral acetabulum and iliac bone along the roof  2  Periosteal reaction seen along the proximal femoral metaphysis which could reflect component of chronic osteomyelitis and/or reactive heterotopic ossification  3  Asymmetric swelling of the right iliopsoas muscle complex anterior to the right hip with some small locules of gas as well as edema and/or inflammatory fat stranding  The possibility of pyomyositis/regional infection cannot be excluded  No obvious    localized fluid collections without contrast       4  Deep decubitus ulcer extending to the left ischial bone surface  No obvious overt bony destruction compared to the prior study  5  No discernible infectious etiologies seen within the chest or abdomen  6  New 2 mm left upper lobe nodule series 2/12  7  Moderately large hiatal hernia        Limited study without oral or IV contrast       I personally discussed this study with Lion Jorge on 2/5/2020 at 6:48 PM                Workstation performed: [id number]             EKG, Pathology, and Other Studies Reviewed on Admission:   · EKG:  Sinus rhythm tachycardia noted, occasional PVCs    Allscripts / Epic Records Reviewed: Yes     ** Please Note: This note has been constructed using a voice recognition system   **

## 2020-02-06 NOTE — CONSULTS
Consult Note - Wound   Ethyl Alea 71 y o  female MRN: [id number]  Unit/Bed#: [facility] 565-18 Encounter: [id number]      History and Present Illness:  71year old female presented to the hospital from [facility] with fever and hypotension  Patient's history significant for T8 paraplegia with chronic wounds, neurogenic bladder with isabel catheter  Patient has had several wound debridements and attempted flap surgery in the past 1-2 years  She currently follows with Dr Kimberly Patterson with plastics  Assessment Findings:   Patient seen for wound care consultation, agreeable to limited assessment  Patient reports she had already had right hip/thigh wounds packed and photographed twice today and would prefer not to have that again  Isabel catheter in place  Patient cachectic with significant bony protrusion of left trochanter (covered with preventative foam dressing)  Bilateral heels intact with preventative foam dressings intact  1   Rash with excoriation to bilateral flank areas  2   Mild candidiasis to bilateral groin folds  3   Present on admission hyperpigmented (purple) BLANCHABLE scar tissue to midline sacrum, left buttock, and right buttock--these are scars from old, healed wounds  They are blanchable at this time and appear very flat, shiny, and fragile  Will be at very high risk for these olga to open  Protected with Allevyn Life foam dressings at this time  4   Present on admission stage 3 pressure injury to left ischium--wound bed with pink granulation tissue and 10% scattered yellow slough  Yellow drainage present, no odor, induration, or fluctuance appreciated  5   Right hip and anterior thigh wounds--will defer to surgical team on care of these wounds  Wounds evaluated and dressed by surgery earlier today  See flowsheet and media for wound details  Wound Care Plan:   1-Defer to surgery for right hip and anterior thigh wounds  2-Left ischium--cleanse with soap and water  Place Maxorb Ag in wound bed and cover with Allevyn Life foam dressing  Austin with T   Change dressing every other day and PRN with soilage  3-P500 low air-loss mattress  4-Nystatin powder to groin folds  Skin care plans:  1-Apply Allevyn Life foam dressing to bilateral heels, left hip bony prominence, and intact purple scar tissue on sacrum/buttocks for prevention  Austin with P   Peel back at least daily for skin assessment and re-apply  Change dressing every 3 days and PRN  2-Elevate heels to offload pressure  3-Ehob cushion in chair when out of bed  4-Moisturize skin daily with skin nourishing cream   5-Turn/reposition q2h or when medically stable for pressure re-distribution on skin  Wound care team to follow  Plan of care reviewed with primary RN  Consider hydrocortisone cream to flank rash  Patient will need follow-up with plastic surgery on discharge  Wound 02/06/20 Pressure Injury Ischium Left (Active)   Wound Image   2/6/2020  3:19 PM   Wound Description Pink;Granulation tissue;Slough; Yellow 2/6/2020  3:19 PM   Staging Stage III 2/6/2020  3:19 PM   Yasemin-wound Assessment Clean;Dry; Intact 2/6/2020  3:19 PM   Wound Length (cm) 6 5 cm 2/6/2020  3:19 PM   Wound Width (cm) 3 cm 2/6/2020  3:19 PM   Wound Depth (cm) 0 7 2/6/2020  3:19 PM   Calculated Wound Area (cm^2) 19 5 cm^2 2/6/2020  3:19 PM   Calculated Wound Volume (cm^3) 13 65 cm^3 2/6/2020  3:19 PM   Drainage Amount Moderate 2/6/2020  3:19 PM   Drainage Description Yellow 2/6/2020  3:19 PM   Non-staged Wound Description Full thickness 2/6/2020  3:19 PM   Treatments Cleansed 2/6/2020  3:19 PM   Dressing Calcium Alginate; Foam, Silicon (eg  Allevyn, etc) 2/6/2020  3:19 PM   Dressing Changed New 2/6/2020  3:19 PM   Patient Tolerance Tolerated well 2/6/2020  3:19 PM   Dressing Status Clean;Dry; Intact 2/6/2020  3:19 PM       Marlene ALN, RN, [facility]

## 2020-02-06 NOTE — PLAN OF CARE
Problem: Potential for Falls  Goal: Patient will remain free of falls  Description  INTERVENTIONS:  - Assess patient frequently for physical needs  -  Identify cognitive and physical deficits and behaviors that affect risk of falls  -  [place] fall precautions as indicated by assessment   - Educate patient/family on patient safety including physical limitations  - Instruct patient to call for assistance with activity based on assessment  - Modify environment to reduce risk of injury  - Consider OT/PT consult to assist with strengthening/mobility  Outcome: Progressing     Problem: Prexisting or High Potential for Compromised Skin Integrity  Goal: Skin integrity is maintained or improved  Description  INTERVENTIONS:  - Identify patients at risk for skin breakdown  - Assess and monitor skin integrity  - Assess and monitor nutrition and hydration status  - Monitor labs   - Assess for incontinence   - Turn and reposition patient  - Assist with mobility/ambulation  - Relieve pressure over bony prominences  - Avoid friction and shearing  - Provide appropriate hygiene as needed including keeping skin clean and dry  - Evaluate need for skin moisturizer/barrier cream  - Collaborate with interdisciplinary team   - Patient/family teaching  - Consider wound care consult   Outcome: Progressing     Problem: Nutrition/Hydration-ADULT  Goal: Nutrient/Hydration intake appropriate for improving, restoring or maintaining nutritional needs  Description  Monitor and assess patient's nutrition/hydration status for malnutrition  Collaborate with interdisciplinary team and initiate plan and interventions as ordered  Monitor patient's weight and dietary intake as ordered or per policy  Utilize nutrition screening tool and intervene as necessary  Determine patient's food preferences and provide high-protein, high-caloric foods as appropriate       INTERVENTIONS:  - Monitor oral intake, urinary output, labs, and treatment plans  - Assess nutrition and hydration status and recommend course of action  - Evaluate amount of meals eaten  - Assist patient with eating if necessary   - Allow adequate time for meals  - Recommend/ encourage appropriate diets, oral nutritional supplements, and vitamin/mineral supplements  - Order, calculate, and assess calorie counts as needed  - Recommend, monitor, and adjust tube feedings and TPN/PPN based on assessed needs  - Assess need for intravenous fluids  - Provide specific nutrition/hydration education as appropriate  - Include patient/family/caregiver in decisions related to nutrition  Outcome: Progressing

## 2020-02-06 NOTE — ASSESSMENT & PLAN NOTE
Patient with fever, leukocytosis, tachycardia  Possible sources include unstageable decubitus ulcer concerning for osteomyelitis, urinary tract infection with indwelling chronic Hays catheter  IV Zosyn  Follow-up on cultures  IV fluids  Will request Infectious Disease inputs  Monitor counts, temperature, clinically

## 2020-02-06 NOTE — PROGRESS NOTES
Progress Note - Manuel Macdonald 1950, 71 y o  female MRN: [id number]    Unit/Bed#: ED 24 Encounter: [id number]    Primary Care Provider: Annabelle Shell   Date and time admitted to hospital: 2/5/2020  3:25 PM        History of Clostridium difficile infection  Assessment & Plan  P o  Vancomycin 125 mg q 12 hours prophylactic dose while on systemic antibiotics    Chronic indwelling Hays catheter  Assessment & Plan  History of paraplegia with neurogenic bladder  Chronic Hays catheter in place    Hypotension  Assessment & Plan  Chronic low blood pressures on midodrine  Will provide IV fluids  Monitor blood pressures closely    2/6-blood pressure is currently well controlled on current medications, CPT    Neurogenic bladder  Assessment & Plan  History of neurogenic bladder with chronic indwelling Hays catheter    2/6-will consider changing Hays catheter; sepsis improving    Hyponatremia  Assessment & Plan  Likely due to dehydration  IV fluids  Monitor sodium levels closely    Anemia of chronic disease  Assessment & Plan  Monitor hemoglobin  Check iron studies    Stage IV pressure ulcer of right buttock (HCC)  Assessment & Plan  Unstageable sacral decubitus ulcer with possible osteomyelitis  Wound Care, frequent repositioning  General surgery consult    Paraplegia following spinal cord injury [facility])  Assessment & Plan  History of spinal cord injury with paraplegia  Ambulates in a wheelchair  Supportive care    * Sepsis [facility])  Assessment & Plan  Patient with fever, leukocytosis, tachycardia  Possible sources include unstageable decubitus ulcer concerning for osteomyelitis, urinary tract infection with indwelling chronic Hays catheter  IV Zosyn  Follow-up on cultures  IV fluids  Will request Infectious Disease inputs  Monitor counts, temperature, clinically    2/6-tachycardia resolved, patient still with fever and leukocytosis    Social was started as per noted above; consider decubitus ulcer versus UTI versus right femur osteomyelitis that was demonstrated on CT   -awaiting ID recommendations; until then will keep antibiotic coverage the same  -orthopedic surgery consulted for right femur osteomyelitis and right iliopsoas pyomyositis disease  -UA shows moderate bacteria, nitrate positive, within innumerable wbc's  -will continue IV fluids for 12 hours      VTE Pharmacologic Prophylaxis:   Pharmacologic: Enoxaparin (Lovenox)  Mechanical VTE Prophylaxis in Place: No    Patient Centered Rounds: I have performed bedside rounds with nursing staff today  Discussions with Specialists or Other Care Team Provider:  Orthopedic surgery    Education and Discussions with Family / Patient: Care plan discussed with patient who voiced understanding and agrees with recommendations  Time Spent for Care: 30 minutes  More than 50% of total time spent on counseling and coordination of care as described above  Current Length of Stay: 1 day(s)    Current Patient Status: Inpatient   Certification Statement: The patient will continue to require additional inpatient hospital stay due to Treatment of right hip osteomyelitis    Discharge Plan: To be determined    Code Status: Level 1 - Full Code      Subjective:   Patient seen examined bedside, no acute distress or discomfort noted  Patient currently being treated with IV antibiotics and tachycardia has resolved, however white count still elevated  On exam, copious drainage from right hip sinus track and gas escaping with movement of affected limb  Orthopedics consulted and have been called  Id and Wound Care recommendations pending; continue antibiotics as planned  Objective:     Vitals:   Temp (24hrs), Av 1 °F (38 9 °C), Min:102 1 °F (38 9 °C), Max:102 1 °F (38 9 °C)    Temp:  [102 1 °F (38 9 °C)] 102 1 °F (38 9 °C)  HR:  [] 74  Resp:  [16-20] 20  BP: ()/(43-61) 116/58  SpO2:  [94 %-98 %] 94 %  Body mass index is 17 97 kg/m²       Input and Output Summary (last 24 hours): Intake/Output Summary (Last 24 hours) at 2/6/2020 1138  Last data filed at 2/5/2020 1802  Gross per 24 hour   Intake 1000 ml   Output 10 ml   Net 990 ml       Physical Exam:     Physical Exam   Constitutional: She is oriented to person, place, and time  She appears well-developed and well-nourished  No distress  HENT:   Head: Normocephalic and atraumatic  Nose: Nose normal    Mouth/Throat: Oropharynx is clear and moist  No oropharyngeal exudate  Eyes: Pupils are equal, round, and reactive to light  Conjunctivae and EOM are normal  Right eye exhibits no discharge  Left eye exhibits no discharge  No scleral icterus  Neck: Normal range of motion  No JVD present  No tracheal deviation present  Cardiovascular: Normal rate, regular rhythm and normal heart sounds  Exam reveals no gallop and no friction rub  No murmur heard  Pulmonary/Chest: Effort normal  No stridor  No respiratory distress  She has no wheezes  She has no rales  Abdominal: Soft  She exhibits no distension and no mass  There is no tenderness  There is no rebound and no guarding  Musculoskeletal: She exhibits tenderness and deformity  She exhibits no edema  Paraplegic with muscle loss on bilateral lower extremities   Neurological: She is alert and oriented to person, place, and time  Skin: She is not diaphoretic  Patient with multiple skin graft wounds on lower extremities; draining sinus track of anterior right hip  Stage IV decubitus ulcer  Psychiatric: Judgment normal    Flat affect   Nursing note and vitals reviewed          Additional Data:     Labs:    Results from last 7 days   Lab Units 02/06/20  0451   WBC Thousand/uL 20 92*   HEMOGLOBIN g/dL 9 1*   HEMATOCRIT % 28 9*   PLATELETS Thousands/uL 563*   NEUTROS PCT % 76*   LYMPHS PCT % 14   MONOS PCT % 9   EOS PCT % 0     Results from last 7 days   Lab Units 02/06/20  0451   SODIUM mmol/L 131*   POTASSIUM mmol/L 3 8   CHLORIDE mmol/L 100   CO2 mmol/L 22   BUN mg/dL 7   CREATININE mg/dL 0 38*   ANION GAP mmol/L 9   CALCIUM mg/dL 8 0*   ALBUMIN g/dL 1 4*   TOTAL BILIRUBIN mg/dL 0 47   ALK PHOS U/L 147*   ALT U/L 21   AST U/L 24   GLUCOSE RANDOM mg/dL 104     Results from last 7 days   Lab Units 02/05/20  1538   INR  1 42*             Results from last 7 days   Lab Units 02/05/20  1538   LACTIC ACID mmol/L 1 9   PROCALCITONIN ng/ml 1 25*           * I Have Reviewed All Lab Data Listed Above  * Additional Pertinent Lab Tests Reviewed: All Labs Within Last 24 Hours Reviewed    Imaging:    Imaging Reports Reviewed Today Include:  CT abdomen pelvis  Imaging Personally Reviewed by Myself Includes:  None    Recent Cultures (last 7 days):     Results from last 7 days   Lab Units 02/05/20  1544 02/05/20  1538   BLOOD CULTURE  Received in Microbiology Lab  Culture in Progress  Received in Microbiology Lab  Culture in Progress         Last 24 Hours Medication List:     Current Facility-Administered Medications:  acetaminophen 650 mg Oral Q6H PRN Daren Moralez MD    bisacodyl 10 mg Rectal Daily Daren Moralez MD    calcium carbonate-vitamin D 1 tablet Oral BID With Meals Daren Moralez MD    cholecalciferol 1,000 Units Oral Daily Daren Moralez MD    cyanocobalamin 100 mcg Oral Daily Daren Moralez MD    enoxaparin 40 mg Subcutaneous Daily Daren Moralez MD    midodrine 15 mg Oral Q8H Daren Moralez MD    multivitamin-minerals 1 tablet Oral Daily Daren Moralez MD    nystatin  Topical BID Daren Moralez MD    ondansetron 4 mg Intravenous Q6H PRN Daren Moralez MD    pantoprazole 40 mg Oral Daily Daren Moralez MD    piperacillin-tazobactam 3 375 g Intravenous Q6H Daren Moralez MD Last Rate: 3 375 g (02/06/20 0529)   polyethylene glycol 17 g Oral Daily Daren Moralez MD    saccharomyces boulardii 250 mg Oral BID Daren Moralez MD    senna 1 tablet Oral  Natalie Abdiaziz Verdin MD    sodium chloride 75 mL/hr Intravenous Continuous Juliann Salmon MD Last Rate: 75 mL/hr (02/05/20 2307)   vancomycin 125 mg Oral Q12H [address] Petey Delaney MD      Facility-Administered Medications Ordered in Other Encounters:  dexamethasone 4 mg Intravenous Once PRN Marky Andre, DO    diphenhydrAMINE 12 5 mg Intravenous Once Marky Andre, DO    lactated ringers 75 mL/hr Intravenous Continuous Marky Andre, DO Last Rate: 75 mL/hr (11/10/19 2105)   lactated ringers 50 mL/hr Intravenous Continuous Ras Meyers CRNA    lactated ringers 75 mL/hr Intravenous Continuous Marky Andre, DO Last Rate: Stopped (03/20/19 1841)   lactated ringers 75 mL/hr Intravenous Continuous Kenneth Llamas MD Last Rate: Stopped (03/20/19 1842)   ondansetron 4 mg Intravenous Once PRN Mark Fung MD    promethazine 12 5 mg Intravenous Once PRN Mark Fung MD         Today, Patient Was Seen By: Raynette Lombard, MD    ** Please Note: Dictation voice to text software may have been used in the creation of this document   **

## 2020-02-06 NOTE — UTILIZATION REVIEW
Initial Clinical Review    Admission: Date/Time/Statement: Admission Orders (From admission, onward)     Ordered        02/05/20 1925  Inpatient Admission  Once                   Orders Placed This Encounter   Procedures    Inpatient Admission     Standing Status:   Standing     Number of Occurrences:   1     Order Specific Question:   Admitting Physician     Answer:   Creed Ang     Order Specific Question:   Level of Care     Answer:   Med Surg [16]     Order Specific Question:   Estimated length of stay     Answer:   More than 2 Midnights     Order Specific Question:   Certification     Answer:   I certify that inpatient services are medically necessary for this patient for a duration of greater than two midnights  See H&P and MD Progress Notes for additional information about the patient's course of treatment  ED Arrival Information     Expected Arrival Acuity Means of Arrival Escorted By Service Admission Type    - 2/5/2020 15:25 Urgent Ambulance [facility] ([address]) Emergency Medicine Urgent    Arrival Complaint    -        Chief Complaint   Patient presents with    Hypotension     Pt  sent from [facility] where staff reports they were unable to obtain a BP  Per EMS, patient was 110/60s  Per staff, patient is being treated for fevers/osteomyelitis/hypotension at nursing home    Fever - 9 weeks to 74 years     Assessment/Plan:   Mrs Toya Pal is a 70 yo female who presents to the ED via EMS from [facility] with c/o low BP and fever  She has not been feeling well x several days with poor appetite, generalized weakness, nausea, nonproductive cough and fatigue  In ED had fever, leukocytosis, tachycardia and sepsis  PMH: spinal cord injury with paraplegia, is wheelchair bound, chronic non-stageable pressure wounds, chronic isabel  She is admitted to INPATIENT status with Sepsis - IV antibiotics, follow cultures, IV fluids, ID Consult    H/O C diff infection - IV Vanco while on antibiotics  Chronic indwelling isabel with neurogenic bladder - continue isabel  Hypotension - on midodrine, IV fluids  Anemia of chronic illness - follow H/H  Stage IV pressure ulcert - Surgery and wound care consult, freq repositioning  Paraplegia s/p injury  ED Triage Vitals [02/05/20 1533]   Temperature Pulse Respirations Blood Pressure SpO2   (!) 102 1 °F (38 9 °C) (!) 108 16 120/56 96 %      Temp Source Heart Rate Source Patient Position - Orthostatic VS BP Location FiO2 (%)   Rectal Monitor Lying Right arm --      Pain Score       No Pain        Wt Readings from Last 1 Encounters:   02/05/20 41 7 kg (92 lb)     Additional Vital Signs:   02/06/20 0600    62  20  127/61  94 %     02/06/20 0515    70  18  120/56  98 %     02/06/20 0315    68  18  94/51  98 %     02/06/20 0030    76  16  90/43Abnormal   98 %     02/05/20 2230    96  16  112/56  98 %     02/05/20 1800    100  20  105/53  98 %  None (Room air)   02/05/20 1745    102  20  118/58  98 %  None (Room air)   02/05/20 1715    98  20  105/54  96 %  None (Room air)   02/05/20 1641    114Abnormal     99/54  95 %     02/05/20 1615    108Abnormal   20  107/58  94 %  None (Room air)     Pertinent Labs/Diagnostic Test Results:     2/5 CT chest, abd, pelvis - 1  Superolateral dislocation of the right femur with a large sinus tract/defect contiguous with the skin surface with air and fluid extending posterior to the dislocated right femoral head and formation of an apparent pseudocavity lateral to the right iliac bone which demonstrates coarse peripheral calcifications  Cortical disruption of the medial femoral neck with air in the medullary cavity as well as irregularity and some component of osteolysis of the posterior femoral head  These findings are consistent with osteomyelitis  There is also probably bony osteomyelitis of the upper posterior lateral acetabulum and iliac bone along the roof              2  Periosteal reaction seen along the proximal femoral metaphysis which could reflect component of chronic osteomyelitis and/or reactive heterotopic ossification  3  Asymmetric swelling of the right iliopsoas muscle complex anterior to the right hip with some small locules of gas as well as edema and/or inflammatory fat stranding  The possibility of pyomyositis/regional infection cannot be excluded  No obvious localized fluid collections without contrast       4  Deep decubitus ulcer extending to the left ischial bone surface  No obvious overt bony destruction compared to the prior study  5  No discernible infectious etiologies seen within the chest or abdomen  6  New 2 mm left upper lobe nodule series 2/12         7  Moderately large hiatal hernia      2/5 MRI R hip - pending     2/5 MRI L spine - pending     Results from last 7 days   Lab Units 02/06/20  0451 02/05/20  1538   WBC Thousand/uL 20 92* 20 82*   HEMOGLOBIN g/dL 9 1* 9 0*   HEMATOCRIT % 28 9* 28 8*   PLATELETS Thousands/uL 563* 618*   NEUTROS ABS Thousands/µL 15 90* 16 83*     Results from last 7 days   Lab Units 02/06/20  0451 02/05/20  1538   SODIUM mmol/L 131* 129*   POTASSIUM mmol/L 3 8 4 7   CHLORIDE mmol/L 100 96*   CO2 mmol/L 22 24   ANION GAP mmol/L 9 9   BUN mg/dL 7 11   CREATININE mg/dL 0 38* 0 47*   EGFR ml/min/1 73sq m 108 101   CALCIUM mg/dL 8 0* 8 6   MAGNESIUM mg/dL 1 8  --      Results from last 7 days   Lab Units 02/06/20  0451 02/05/20  1538   AST U/L 24 37   ALT U/L 21 25   ALK PHOS U/L 147* 175*   TOTAL PROTEIN g/dL 6 3* 7 5   ALBUMIN g/dL 1 4* 1 8*   TOTAL BILIRUBIN mg/dL 0 47 0 45     Results from last 7 days   Lab Units 02/06/20  0451 02/05/20  1538   GLUCOSE RANDOM mg/dL 104 154*     Results from last 7 days   Lab Units 02/05/20  1538   PROTIME seconds 16 9*   INR  1 42*   PTT seconds 36     Results from last 7 days   Lab Units 02/05/20  1538   PROCALCITONIN ng/ml 1 25*     Results from last 7 days   Lab Units 02/05/20  1538   LACTIC ACID mmol/L 1 9     Results from last 7 days   Lab Units 02/05/20  1538   FERRITIN ng/mL 1,425*     Results from last 7 days   Lab Units 02/05/20  1745 02/05/20  1744 02/05/20  1553   CLARITY UA  Cloudy  --  Turbid   COLOR UA  Yellow see chart Yellow   SPEC GRAV UA  1 015  --  1 015   PH UA  8 5*  --  8 5*   GLUCOSE UA mg/dl Negative  --  Negative   KETONES UA mg/dl 15 (1+)*  --  Trace*   BLOOD UA  Large*  --  Moderate*   PROTEIN UA mg/dl 100 (2+)*  --  30 (1+)*   NITRITE UA  Positive*  --  Positive*   BILIRUBIN UA  Negative  --  Negative   UROBILINOGEN UA E U /dl 2 0*  --  1 0   LEUKOCYTES UA  Moderate*  --  Large*   WBC UA /hpf Innumerable*  --  20-30*   RBC UA /hpf 30-50*  --  None Seen   BACTERIA UA /hpf Moderate*  --  Moderate*   EPITHELIAL CELLS WET PREP /hpf Moderate*  --  Occasional   MUCUS THREADS  Occasional*  --   --        Results from last 7 days   Lab Units 02/05/20  1544 02/05/20  1538   BLOOD CULTURE  Received in Microbiology Lab  Culture in Progress  Received in Microbiology Lab  Culture in Progress       ED Treatment:   Medication Administration from 02/05/2020 1525 to 02/06/2020 0954    Date/Time Order Dose Route Action   02/05/2020 1701 sodium chloride 0 9 % bolus 1,000 mL 1,000 mL Intravenous New Bag   02/05/2020 1737 piperacillin-tazobactam (ZOSYN) 3 375 g in sodium chloride 0 9 % 100 mL IVPB 3 375 g Intravenous New Bag   02/05/2020 1818 linezolid (ZYVOX) IVPB (premix) 600 mg 600 mg Intravenous New Bag   02/05/2020 2306 saccharomyces boulardii (FLORASTOR) capsule 250 mg 250 mg Oral Given   02/06/2020 0453 midodrine (PROAMATINE) tablet 15 mg 15 mg Oral Given   02/05/2020 2306 midodrine (PROAMATINE) tablet 15 mg 15 mg Oral Given   02/05/2020 2307 sodium chloride 0 9 % infusion 75 mL/hr Intravenous New Bag   02/05/2020 2113 ondansetron (ZOFRAN) injection 4 mg 4 mg Intravenous Given   02/06/2020 0529 piperacillin-tazobactam (ZOSYN) 3 375 g in sodium chloride 0 9 % 100 mL IVPB 3 375 g Intravenous New Bag 02/05/2020 2322 piperacillin-tazobactam (ZOSYN) 3 375 g in sodium chloride 0 9 % 100 mL IVPB 3 375 g Intravenous New Bag   02/05/2020 2306 vancomycin (VANCOCIN) oral solution 125 mg 125 mg Oral Given        Past Medical History:   Diagnosis Date    Anemia     iron defiencey    Arthritis     osteo    Back injury     Chronic kidney disease     nephritis    Depression     MRSA (methicillin resistant staph aureus) culture positive 12/07/2018    BONE-TISSUE     Osteopenia     Osteoporosis     Paralysis (HCC)     paraplegic due to spinal cord injury    Paraplegia (HCC)     Poor circulation     Pressure injury of skin     right hip, stage 3    Pressure sore of hip     right    Tibial plateau fracture     Underweight      Present on Admission:   Pressure ulcer, sacrum   Stage IV pressure ulcer of right buttock (HCC)   Sepsis (HCC)   Neurogenic bladder   Hyponatremia   Anemia of chronic disease    Admitting Diagnosis: Hypotension [I95 9]     Age/Sex: 71 y o  female     Admission Orders:  Scheduled Medications:    Medications:  bisacodyl 10 mg Rectal Daily   calcium carbonate-vitamin D 1 tablet Oral BID With Meals   cholecalciferol 1,000 Units Oral Daily   cyanocobalamin 100 mcg Oral Daily   enoxaparin 40 mg Subcutaneous Daily   midodrine 15 mg Oral Q8H   multivitamin-minerals 1 tablet Oral Daily   nystatin  Topical BID   pantoprazole 40 mg Oral Daily   piperacillin-tazobactam 3 375 g Intravenous Q6H   polyethylene glycol 17 g Oral Daily   saccharomyces boulardii 250 mg Oral BID   senna 1 tablet Oral HS   vancomycin 125 mg Oral Q12H [facility]     Continuous IV Infusions:    sodium chloride 75 mL/hr Intravenous Continuous     PRN Meds:    acetaminophen 650 mg Oral Q6H PRN    ondansetron 4 mg Intravenous Q6H PRN x1 2/5     SCDs  OOB as romina   Urine culture  Regular diet   PT/OT EVAL/TX   IP CONSULT TO INFECTIOUS DISEASES  IP CONSULT TO ACUTE CARE SURGERY    Network Utilization Review Department  [email or web address]  ATTENTION: Please call with any questions or concerns to [phone] and carefully listen to the prompts so that you are directed to the right person  All voicemails are confidential   Kalli Cuellar all requests for admission clinical reviews, approved or denied determinations and any other requests to dedicated fax number below belonging to the campus where the patient is receiving treatment   List of dedicated fax numbers for the Facilities:  [address] DENIALS (Administrative/Medical Necessity) [phone]   [address] (Maternity/NICU/Pediatrics) [phone]   Shana Rangel [phone]   Henna Lynch [phone]   [facility] [phone]   Jose Luis Stephens [phone]   [address] [place] [address] [phone]   [facility]  [phone]   [address] [facility]  [address] [facility] [address] [facility] [phone]

## 2020-02-06 NOTE — ASSESSMENT & PLAN NOTE
Unstageable sacral decubitus ulcer with possible osteomyelitis  Wound Care, frequent repositioning  General surgery consult

## 2020-02-06 NOTE — ASSESSMENT & PLAN NOTE
History of neurogenic bladder with chronic indwelling Hays catheter    2/6-will consider changing Hays catheter; sepsis improving

## 2020-02-06 NOTE — ASSESSMENT & PLAN NOTE
Chronic low blood pressures on midodrine  Will provide IV fluids  Monitor blood pressures closely    2/6-blood pressure is currently well controlled on current medications, CPT

## 2020-02-06 NOTE — SOCIAL WORK
CM met with patient and explained CM role  Patient lives alone in a 1 floor apt 0 joe  No DME, drives and independent w/ ADLs  Patient has hx w/ [facility], [facility]  Patient denies HHC, [address] and drugs/etoh in patient admissions  No POA  PCP is Marcin Palafox  Pt uses [facility] pharmacy [place]  CM reviewed d/c planning process including the following: identifying help at home, patient preference for d/c planning needs, Discharge Lounge, [facility] Meds to Bed program, availability of treatment team to discuss questions or concerns patient and/or family may have regarding understanding medications and recognizing signs and symptoms once discharged  CM also encouraged patient to follow up with all recommended appointments after discharge  Patient advised of importance for patient and family to participate in managing patients medical well being

## 2020-02-06 NOTE — ASSESSMENT & PLAN NOTE
Patient with fever, leukocytosis, tachycardia  Possible sources include unstageable decubitus ulcer concerning for osteomyelitis, urinary tract infection with indwelling chronic Hays catheter  IV Zosyn  Follow-up on cultures  IV fluids  Will request Infectious Disease inputs  Monitor counts, temperature, clinically    2/6-tachycardia resolved, patient still with fever and leukocytosis    Social was started as per noted above; consider decubitus ulcer versus UTI versus right femur osteomyelitis that was demonstrated on CT   -awaiting ID recommendations; until then will keep antibiotic coverage the same  -orthopedic surgery consulted for right femur osteomyelitis and right iliopsoas pyomyositis disease  -UA shows moderate bacteria, nitrate positive, within innumerable wbc's  -will continue IV fluids for 12 hours

## 2020-02-06 NOTE — CONSULTS
Consultation - Infectious Disease   Claudine Ramires 71 y o  female MRN: [id number]  Unit/Bed#: [facility] 615-01 Encounter: [id number]      IMPRESSION & RECOMMENDATIONS:     40-year-old female patient with paraplegia secondary to spinal cord injury and multiple chronic decubitus ulcers, admitted for fever and leukocytosis, thought to be secondary to right hip wound infection with underlying myositis    1- sepsis, POA:  With fever of 102 1 and leukocytosis  No signs of pneumonia, no signs of intra-abdominal infection  CT chest and abdomen pelvis negative for infectious focus  Patient has Hays in place and is at risk for UTI and bacteremia  But infectious source might very well be 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  - antibiotics as below  - surgery follow-up  - close clinical monitoring, trend fever curve  - repeat CBC in a m   - follow-up blood culture results      2- Right hip open wound, with underlying osteomyelitis and dislocation of right femur:  Patient is known to have hip osteomyelitis and dislocation of the right femur  She was recently evaluated as outpatient by Ortho, but decision was to go with none surgical management  Currently she has fever and chills, and reports increased drainage from the right hip wound  No other infectious focus is identified, though patient has a chronic Hays catheter and is at risk for UTI and bacteremia  CT scan on admission shows signs of osteomyelitis, underlying sinus track, but also concern for myositis of the right iliopsoas muscle  Concern for pyomyositis  Previous cultures including sacral bone culture positive for E coli and Providencia, left ischial wound culture positive for Pseudomonas and Providencia, and left femur culture positive for corynebacterium reviewed  - orthopedic and General surgery evaluation to plan for possible debridement, versus girdle stone and soft tissue coverage  - continue Zosyn    Stop Zyvox, start daptomycin IV    - follow-up result of blood culture and adjust antibiotics accordingly      3- history of C diff colitis:  C diff infection documented 04/2019  Patient currently has no diarrhea  - continue vancomycin p o   prophylactic dose; will likely need to prolong vancomycin p o  For 72 hours after last dose of systemic antibiotic treatment    4- multiple antibiotic allergy:  Patient has a reported allergy to vancomycin described as rash  She is currently tolerating vancomycin p o  Patient also previously tolerated ertapenem, Zosyn and daptomycin  - continue Zosyn, and start daptomycin IV as mentioned above  - close clinical monitoring    5- macular rash over lower back: This was present before starting antibiotic therapy  Non itchy, nontender  - continue local skin care  - monitor clinically for any sign of expansion or worsening of the rash    Plan mentioned above discussed in details with patient    HISTORY OF PRESENT ILLNESS:  Reason for Consult:  Sepsis  HPI: Kingston Dunaway is a 71y o  year old female with paraplegia secondary to spinal cord injury at level of T8, neurogenic bladder with chronic Hays in place, history of C diff colitis, multiple and recurrent pressure ulcers, admitted 02/05/2020 for fever and chills  Patient reports feeling sick with fever and chills over the past 2 days prior to presentation  She reports increased drainage from right hip wound  She denies cough, shortness of breath, headache, diarrhea,  abdominal pain, nausea or vomiting  She has a chronic Hays which was exchanged 1 day prior to presentation without apparent issues  She was evaluated as outpatient by Ortho 1 week prior to presentation for right open hip dislocation and discussion was held regarding a girdle stone procedure and flap for soft tissue coverage, but a non operative management was decided  Currently patient reports increased drainage from her right hip wound     Review of her chart reveals that patient was admitted to the hospital 11/20/2019 with sepsis thought to be secondary to left hip/inferior ischial wound infection where she underwent I and D on 11/11 and 11/12/2019 and drainage of purulent fluid and ischial necrosis  At that time her culture was positive for Pseudomonas and providencia and she received 2 weeks of IV Zosyn for wound infection  Decision at that time was to avoid prolonged course of IV antibiotic as risk of antibiotic outweighs the benefit in context of lack of wound coverage and offloading  Patient also has history of chronic sacral osteomyelitis with subsequent flap, though this was complicated by superimposed infection with culture growing E coli and Providencia for which patient received 6 weeks of IV ertapenem  03/2019  Patient was also treated after a left femur open reduction internal fixation was complicated by infection 01/2018  At that time culture grew Corynebacterium and patient received 6 weeks course of daptomycin IV  Patient has history of C diff colitis 04/2019  She has multiple antibiotics allergies  She reports rash with vancomycin IV  On current admission, patient had a fever of 102 1, and leukocytosis of 20,000;  Due to suspicion for right hip wound infection and possible associated myositis, patient was started on Zyvox and Zosyn  She was also started on vancomycin p o  For C diff prophylaxis  Today she reports minimal improvement  She has been afebrile this morning, and she remains hemodynamically stable            REVIEW OF SYSTEMS:  A complete 12 point system-based review of systems is negative other than that noted in the HPI      PAST MEDICAL HISTORY:  Past Medical History:   Diagnosis Date    Anemia     iron defiencey    Arthritis     osteo    Back injury     Chronic kidney disease     nephritis    Depression     MRSA (methicillin resistant staph aureus) culture positive 12/07/2018    BONE-TISSUE     Osteopenia     Osteoporosis     Paralysis ([address] )     paraplegic due to spinal cord injury    Paraplegia (HCC)     Poor circulation     Pressure injury of skin     right hip, stage 3    Pressure sore of hip     right    Tibial plateau fracture     Underweight      Past Surgical History:   Procedure Laterality Date    CLOSURE DELAYED PRIMARY N/A 3/20/2019    Procedure: OPHELIA ANAL WOUND RECLOSURE;  Surgeon: Theodora Al MD;  Location: [address] [place] [address] OR;  Service: Plastics    DECUBITUS ULCER EXCISION Bilateral 11/12/2019    Procedure: DEBRIDEMENT DECUBITI ([address] OUT); Surgeon: Lamin Rodriguez DO;  Location:  MAIN OR;  Service: General    FLAP LOCAL EXTREMITY Left 1/28/2019    Procedure: THIGH FLAP & STSG TO CLOSE HIP WOUND;  Surgeon: Theodora Al MD;  Location: [address] [place] [address] MAIN OR;  Service: Plastics    FLAP LOCAL TRUNK Right 12/17/2018    Procedure: DEBRIDE/FLAP CLOSURE HIP PRESSURE SORE Ambika Bunting GRAFT;  Surgeon: Theodora Al MD;  Location: [address] [place] [address] MAIN OR;  Service: Plastics    FLAP LOCAL TRUNK Left 2/27/2019    Procedure: BUTTOCK FLAP TO ISCHIAL SORE;  Surgeon: Theodora Al MD;  Location: [address] [place] [address] MAIN OR;  Service: Plastics    HIP DEBRIDEMENT Right 2017    right hip sore debridement    INCISION AND DRAINAGE OF WOUND Left 1/3/2019    Procedure: INCISION AND DRAINAGE (I&D) left distal femur  With deep wound cultures   Application of VAC DRAIN SPONGE;  Surgeon: Naga Odom MD;  Location:  MAIN OR;  Service: Orthopedics    IR PICC LINE  12/19/2018    IR PICC LINE  3/12/2019    DE DEBRIDEMENT, SKIN, SUB-Q TISSUE,MUSCLE,BONE,=<20 SQ CM Right 9/19/2018    Procedure: DEBRIDEMENT OF HIP PRESSURE SORE;  Surgeon: Theodora Al MD;  Location: [address] [place] [address] MAIN OR;  Service: Plastics    DE OPEN RX FEMUR FX+INTRAMED FELIX Left 10/22/2018    Procedure: INSERTION NAIL IM LEFT FEMUR RETROGRADE;  Surgeon: Maritza Carballo MD;  Location:  MAIN OR;  Service: Orthopedics    TOE AMPUTATION Left 2017    small toe left foot amputation    WISDOM TOOTH EXTRACTION  WOUND DEBRIDEMENT Left 2018    Procedure: DEBRIDEMENT HIP PRESSURE SORE;  Surgeon: Judson Castillo MD;  Location: [address] [place] [address] MAIN OR;  Service: Plastics    WOUND DEBRIDEMENT N/A 11/10/2019    Procedure: EXCISIONAL DEBRIDEMENT;  Surgeon: Jonas Cheadle, MD;  Location:  MAIN OR;  Service: General       FAMILY HISTORY:  Non-contributory    SOCIAL HISTORY:  Social History   Social History     Substance and Sexual Activity   Alcohol Use Yes    Comment: occasional     Social History     Substance and Sexual Activity   Drug Use No     Social History     Tobacco Use   Smoking Status Never Smoker   Smokeless Tobacco Never Used       ALLERGIES:  Allergies   Allergen Reactions    Iv Contrast [Iodinated Diagnostic Agents]      Tingling face, itching    Cefepime Rash    Ciprofloxacin Rash and Swelling     Looked like suburn, itching  Bed sores  Swelling, itching    Latex Rash    Vancomycin Rash     Unknown        MEDICATIONS:  All current active medications have been reviewed        PHYSICAL EXAM:  Temp:  [98 4 °F (36 9 °C)-102 1 °F (38 9 °C)] 98 4 °F (36 9 °C)  HR:  [] 70  Resp:  [16-20] 18  BP: ()/(43-61) 99/55  SpO2:  [94 %-98 %] 96 %  Temp (24hrs), Av 3 °F (37 9 °C), Min:98 4 °F (36 9 °C), Max:102 1 °F (38 9 °C)  Current: Temperature: 98 4 °F (36 9 °C)    Intake/Output Summary (Last 24 hours) at 2020 1522  Last data filed at 2020 1802  Gross per 24 hour   Intake 1000 ml   Output 10 ml   Net 990 ml       General Appearance:  Appearing well, nontoxic, and in no distress   Head:  Normocephalic, without obvious abnormality, atraumatic   Eyes:  Conjunctiva pink and sclera anicteric, both eyes   Nose: Nares normal, mucosa normal, no drainage   Throat: Oropharynx moist without lesions   Neck: Supple, symmetrical, no adenopathy, no tenderness/mass/nodules   Back:   Symmetric, no curvature, ROM normal, no CVA tenderness   Lungs:   Clear to auscultation bilaterally, respirations unlabored Chest Wall:  No tenderness or deformity   Heart:  RRR; no murmur, rub or gallop   Abdomen:   Soft, non-tender, non-distended, positive bowel sounds  Hays catheter in place draining clear urine    Extremities: No cyanosis, clubbing or edema  Multiple decubitus ulcers  Pressure injury over right hip, appears deep, no surrounding cellulitis, sanguinous drainage is noted  Pressure injury over left ischial, no surrounding skin erythema, no noted purulence  Sacral wound, unstageable  Pressure injury over left buttock  Pressure injury of the right thigh, no surrounding erythema or fluctuance   Skin: No rashes or lesions  No draining wounds noted  Lymph nodes: Cervical, supraclavicular nodes normal   Neurologic: Alert and oriented times 3, extremity strength 5/5 and symmetric       LABS, IMAGING, & OTHER STUDIES:  Lab Results:  I have personally reviewed pertinent labs  Results from last 7 days   Lab Units 02/06/20  0451 02/05/20  1538   WBC Thousand/uL 20 92* 20 82*   HEMOGLOBIN g/dL 9 1* 9 0*   PLATELETS Thousands/uL 563* 618*     Results from last 7 days   Lab Units 02/06/20  0451 02/05/20  1538   SODIUM mmol/L 131* 129*   POTASSIUM mmol/L 3 8 4 7   CHLORIDE mmol/L 100 96*   CO2 mmol/L 22 24   BUN mg/dL 7 11   CREATININE mg/dL 0 38* 0 47*   EGFR ml/min/1 73sq m 108 101   CALCIUM mg/dL 8 0* 8 6   AST U/L 24 37   ALT U/L 21 25   ALK PHOS U/L 147* 175*     Results from last 7 days   Lab Units 02/05/20  1544 02/05/20  1538   BLOOD CULTURE  Received in Microbiology Lab  Culture in Progress  Received in Microbiology Lab  Culture in Progress  Results from last 7 days   Lab Units 02/05/20  1538   PROCALCITONIN ng/ml 1 25*       Imaging Studies:   I have personally reviewed pertinent imaging study reports and images in PACS    CT scan abdomen pelvis shows dislocation of right femur and large sinus tract contiguous with the skin surface with air and fluid extending posterior to the dislocated right femoral head finding consistent with osteomyelitis of the right femur and also posterior lateral acetabulum and iliac bone  CT scan also shows swelling of right iliopsoas muscle  A pyomyositis could not be excluded  Also CT scan shows deep decubitus ulcer extending to the left ischial bone surface  CT scan chest and abdomen negative for possible infectious focus    Other Studies:   I have personally reviewed pertinent reports    C diff toxin PCR positive April 2019  Chronic sacral osteomyelitis Bone culture positive for E coli and Providencia status post 6 weeks of IV ertapenem 03/2019  Left ischial wound status post I and D with tissue culture growing Pseudomonas and Providencia for which patient received 2 weeks of Zosyn ending 11/11/2019  Left femoral open reduction internal fixation with complicated infection culture growing corynebacterium treated with 6 weeks of daptomycin 01/2018

## 2020-02-06 NOTE — CONSULTS
Acute Care Surgery  Consultation  Chief Complaint  Bilateral ulcers    History of Present Illness   HPI:  Stella Shaw is a 71 y o  female with T8 paraplegia (present since 1980's) with recurrent pressure ulcers and multiple skin flaps who presents with recently diagnosed chronic right hip dislocation with communicating ulcer on the right hip and a left sacral ulcer  The patient was admitted in November for her ulcer which communicated to bone at that time, however, she recently was seen by an orthopedist on 1/29 where she was diagnosed with a right open hip dislocation and discussion about potential girdlestone procedure was had pending plastics plan for soft tissue coverage  Patient presented to [facility] yesterday due increasing white count and subjective fevers  She denies nausea, vomiting, cough, congestion, shortness of breath, belly pain or chest pain  Review of Systems   Remainder of review systems negative except as stated in HPI    Historical Information   Past Medical History:   Diagnosis Date    Anemia     iron defiencey    Arthritis     osteo    Back injury     Chronic kidney disease     nephritis    Depression     MRSA (methicillin resistant staph aureus) culture positive 12/07/2018    BONE-TISSUE     Osteopenia     Osteoporosis     Paralysis ([address] )     paraplegic due to spinal cord injury    Paraplegia (HCC)     Poor circulation     Pressure injury of skin     right hip, stage 3    Pressure sore of hip     right    Tibial plateau fracture     Underweight      Past Surgical History:   Procedure Laterality Date    CLOSURE DELAYED PRIMARY N/A 3/20/2019    Procedure: OPHELIA ANAL WOUND RECLOSURE;  Surgeon: Nadira Kurtz MD;  Location: [facility] MAIN OR;  Service: Plastics    DECUBITUS ULCER EXCISION Bilateral 11/12/2019    Procedure: DEBRIDEMENT DECUBITI ([address] OUT);   Surgeon: Brenda Madrigal DO;  Location:  MAIN OR;  Service: General    FLAP LOCAL EXTREMITY Left 1/28/2019    Procedure: THIGH FLAP & STSG TO CLOSE HIP WOUND;  Surgeon: Lindsey Alejo MD;  Location: [facility] MAIN OR;  Service: Plastics    FLAP LOCAL TRUNK Right 12/17/2018    Procedure: DEBRIDE/FLAP CLOSURE HIP PRESSURE SORE Jan Broderickre GRAFT;  Surgeon: Lindsey Alejo MD;  Location: [facility] MAIN OR;  Service: Plastics    FLAP LOCAL TRUNK Left 2/27/2019    Procedure: BUTTOCK FLAP TO ISCHIAL SORE;  Surgeon: Lindsey Alejo MD;  Location: [facility] MAIN OR;  Service: Plastics    HIP DEBRIDEMENT Right 2017    right hip sore debridement    INCISION AND DRAINAGE OF WOUND Left 1/3/2019    Procedure: INCISION AND DRAINAGE (I&D) left distal femur  With deep wound cultures   Application of VAC DRAIN SPONGE;  Surgeon: Trudy Greene MD;  Location:  MAIN OR;  Service: Orthopedics    IR PICC LINE  12/19/2018    IR PICC LINE  3/12/2019    CO DEBRIDEMENT, SKIN, SUB-Q TISSUE,MUSCLE,BONE,=<20 SQ CM Right 9/19/2018    Procedure: DEBRIDEMENT OF HIP PRESSURE SORE;  Surgeon: Lindsey Alejo MD;  Location: [facility] MAIN OR;  Service: Plastics    CO OPEN RX FEMUR FX+INTRAMED FELIX Left 10/22/2018    Procedure: INSERTION NAIL IM LEFT FEMUR RETROGRADE;  Surgeon: Gill Chambers MD;  Location:  MAIN OR;  Service: Orthopedics    TOE AMPUTATION Left 2017    small toe left foot amputation    WISDOM TOOTH EXTRACTION      WOUND DEBRIDEMENT Left 12/17/2018    Procedure: DEBRIDEMENT HIP PRESSURE SORE;  Surgeon: Lindsey Alejo MD;  Location: [facility] MAIN OR;  Service: Plastics    WOUND DEBRIDEMENT N/A 11/10/2019    Procedure: EXCISIONAL DEBRIDEMENT;  Surgeon: Nadir Sosa MD;  Location:  MAIN OR;  Service: General     Social History   Social History     Substance and Sexual Activity   Alcohol Use Yes    Comment: occasional     Social History     Substance and Sexual Activity   Drug Use No     Social History     Tobacco Use   Smoking Status Never Smoker   Smokeless Tobacco Never Used     Family History   Problem Relation Age of Onset    Depression Father        Meds/Allergies   all current active meds have been reviewed  Allergies   Allergen Reactions    Iv Contrast [Iodinated Diagnostic Agents]      Tingling face, itching    Cefepime Rash    Ciprofloxacin Rash and Swelling     Looked like suburn, itching  Bed sores  Swelling, itching    Latex Rash    Vancomycin Rash     Unknown        Objective   First Vitals:   Blood Pressure: 120/56 (02/05/20 1533)  Pulse: (!) 108 (02/05/20 1533)  Temperature: (!) 102 1 °F (38 9 °C) (02/05/20 1533)  Temp Source: Rectal (02/05/20 1533)  Respirations: 16 (02/05/20 1533)  Weight - Scale: 41 7 kg (92 lb) (02/05/20 1533)  SpO2: 96 % (02/05/20 1533)    Current Vitals:   Blood Pressure: 99/55 (02/06/20 1226)  Pulse: 70 (02/06/20 1226)  Temperature: 98 4 °F (36 9 °C) (02/06/20 1226)  Temp Source: Rectal (02/05/20 1533)  Respirations: 18 (02/06/20 1226)  Weight - Scale: 41 7 kg (92 lb) (02/05/20 1533)  SpO2: 96 % (02/06/20 1226)      Intake/Output Summary (Last 24 hours) at 2/6/2020 1514  Last data filed at 2/5/2020 1802  Gross per 24 hour   Intake 1000 ml   Output 10 ml   Net 990 ml       Invasive Devices     Peripherally Inserted Central Catheter Line            PICC Line 11/14/19 Left Brachial 84 days          Peripheral Intravenous Line            Peripheral IV 02/05/20 Left Forearm less than 1 day    Peripheral IV 02/05/20 Right Antecubital less than 1 day          Drain            Urethral Catheter Non-latex 18 Fr  less than 1 day                Physical Exam  Gen: no acute distress  HEENT: EOMI, MMM  CV: no palpable arrythmias  Pulm: breathing comfortably  Abdomen: Soft NT/ND  RLE: Deep ulcer with significant fibrinous material and serosanguinous fluid present over right hip extending below the femoral head and communicating a secondary medial ulcer under a healthy appearing skin flap    Patient has multiple well healed scars from prior soft tissue coverage procedures  LLE: stage 2 pressure ulcer overlying the ischium  Fibrinous material, but no purulence noted  Neuro: alert and awake    Lab Results: I have personally reviewed pertinent lab results  Imaging: I have personally reviewed pertinent reports  EKG, Pathology, and Other Studies: I have personally reviewed pertinent reports  Counseling / Coordination of Care  Total floor / unit time spent today 40 minutes  Greater than 50% of total time was spent with the patient and / or family counseling and / or coordination of care      Assessment and plan:   71 y o  female with T8 paraplegia (present since 1980's) with recurrent pressure ulcers and multiple skin flaps who presents with recently diagnosed chronic right hip dislocation with communicating ulcer on the right hip and a left sacral ulcer      - No plan for surgical intervention from a general surgery perspective  - DVT ppx  - Antibiotics per primary  - Daily wound care with frequent repositioning and padding of bony prominences  - Remainder of care per primary team  - Dispo: Red surgery will follow    Roselie Simmonds, MD  2/6/2020 3:14 PM

## 2020-02-07 LAB
ANION GAP SERPL CALCULATED.3IONS-SCNC: 6 MMOL/L (ref 4–13)
BASOPHILS # BLD AUTO: 0.07 THOUSANDS/ΜL (ref 0–0.1)
BASOPHILS NFR BLD AUTO: 1 % (ref 0–1)
BUN SERPL-MCNC: 10 MG/DL (ref 5–25)
CALCIUM SERPL-MCNC: 8 MG/DL (ref 8.3–10.1)
CHLORIDE SERPL-SCNC: 103 MMOL/L (ref 100–108)
CK SERPL-CCNC: 29 U/L (ref 26–192)
CO2 SERPL-SCNC: 24 MMOL/L (ref 21–32)
CREAT SERPL-MCNC: 0.41 MG/DL (ref 0.6–1.3)
EOSINOPHIL # BLD AUTO: 0.2 THOUSAND/ΜL (ref 0–0.61)
EOSINOPHIL NFR BLD AUTO: 2 % (ref 0–6)
ERYTHROCYTE [DISTWIDTH] IN BLOOD BY AUTOMATED COUNT: 15.8 % (ref 11.6–15.1)
GFR SERPL CREATININE-BSD FRML MDRD: 106 ML/MIN/1.73SQ M
GLUCOSE SERPL-MCNC: 108 MG/DL (ref 65–140)
HCT VFR BLD AUTO: 26.5 % (ref 34.8–46.1)
HGB BLD-MCNC: 8 G/DL (ref 11.5–15.4)
IMM GRANULOCYTES # BLD AUTO: 0.1 THOUSAND/UL (ref 0–0.2)
IMM GRANULOCYTES NFR BLD AUTO: 1 % (ref 0–2)
LYMPHOCYTES # BLD AUTO: 2.52 THOUSANDS/ΜL (ref 0.6–4.47)
LYMPHOCYTES NFR BLD AUTO: 20 % (ref 14–44)
MCH RBC QN AUTO: 25.6 PG (ref 26.8–34.3)
MCHC RBC AUTO-ENTMCNC: 30.2 G/DL (ref 31.4–37.4)
MCV RBC AUTO: 85 FL (ref 82–98)
MONOCYTES # BLD AUTO: 1.06 THOUSAND/ΜL (ref 0.17–1.22)
MONOCYTES NFR BLD AUTO: 8 % (ref 4–12)
NEUTROPHILS # BLD AUTO: 8.61 THOUSANDS/ΜL (ref 1.85–7.62)
NEUTS SEG NFR BLD AUTO: 68 % (ref 43–75)
NRBC BLD AUTO-RTO: 0 /100 WBCS
PLATELET # BLD AUTO: 616 THOUSANDS/UL (ref 149–390)
PMV BLD AUTO: 9.4 FL (ref 8.9–12.7)
POTASSIUM SERPL-SCNC: 3.4 MMOL/L (ref 3.5–5.3)
RBC # BLD AUTO: 3.13 MILLION/UL (ref 3.81–5.12)
SODIUM SERPL-SCNC: 133 MMOL/L (ref 136–145)
WBC # BLD AUTO: 12.56 THOUSAND/UL (ref 4.31–10.16)

## 2020-02-07 PROCEDURE — NS001 PR NO SIGNATURE OR ATTESTATION: Performed by: ORTHOPAEDIC SURGERY

## 2020-02-07 PROCEDURE — 82550 ASSAY OF CK (CPK): CPT | Performed by: INTERNAL MEDICINE

## 2020-02-07 PROCEDURE — 99232 SBSQ HOSP IP/OBS MODERATE 35: CPT | Performed by: INTERNAL MEDICINE

## 2020-02-07 PROCEDURE — 80048 BASIC METABOLIC PNL TOTAL CA: CPT | Performed by: INTERNAL MEDICINE

## 2020-02-07 PROCEDURE — 99233 SBSQ HOSP IP/OBS HIGH 50: CPT | Performed by: INTERNAL MEDICINE

## 2020-02-07 PROCEDURE — 85025 COMPLETE CBC W/AUTO DIFF WBC: CPT | Performed by: INTERNAL MEDICINE

## 2020-02-07 RX ORDER — MINERAL OIL AND PETROLATUM 150; 830 MG/G; MG/G
OINTMENT OPHTHALMIC
Status: DISCONTINUED | OUTPATIENT
Start: 2020-02-07 | End: 2020-03-02 | Stop reason: HOSPADM

## 2020-02-07 RX ORDER — PANTOPRAZOLE SODIUM 40 MG/1
40 TABLET, DELAYED RELEASE ORAL
Status: DISCONTINUED | OUTPATIENT
Start: 2020-02-07 | End: 2020-03-02 | Stop reason: HOSPADM

## 2020-02-07 RX ORDER — FAMOTIDINE 20 MG/1
20 TABLET, FILM COATED ORAL 2 TIMES DAILY PRN
Status: DISCONTINUED | OUTPATIENT
Start: 2020-02-07 | End: 2020-03-02 | Stop reason: HOSPADM

## 2020-02-07 RX ADMIN — Medication 1 TABLET: at 08:33

## 2020-02-07 RX ADMIN — MIDODRINE HYDROCHLORIDE 15 MG: 5 TABLET ORAL at 13:26

## 2020-02-07 RX ADMIN — PANTOPRAZOLE SODIUM 40 MG: 40 TABLET, DELAYED RELEASE ORAL at 08:33

## 2020-02-07 RX ADMIN — Medication 125 MG: at 21:36

## 2020-02-07 RX ADMIN — POLYETHYLENE GLYCOL 3350 17 G: 17 POWDER, FOR SOLUTION ORAL at 13:20

## 2020-02-07 RX ADMIN — NYSTATIN: 100000 POWDER TOPICAL at 08:37

## 2020-02-07 RX ADMIN — NYSTATIN: 100000 POWDER TOPICAL at 18:37

## 2020-02-07 RX ADMIN — DAPTOMYCIN 250 MG: 500 INJECTION, POWDER, LYOPHILIZED, FOR SOLUTION INTRAVENOUS at 18:35

## 2020-02-07 RX ADMIN — MELATONIN 1000 UNITS: at 08:33

## 2020-02-07 RX ADMIN — MIDODRINE HYDROCHLORIDE 15 MG: 5 TABLET ORAL at 06:15

## 2020-02-07 RX ADMIN — Medication 250 MG: at 08:33

## 2020-02-07 RX ADMIN — ENOXAPARIN SODIUM 40 MG: 40 INJECTION SUBCUTANEOUS at 08:33

## 2020-02-07 RX ADMIN — WHITE PETROLATUM 57.7 %-MINERAL OIL 31.9 % EYE OINTMENT: at 21:36

## 2020-02-07 RX ADMIN — PIPERACILLIN SODIUM AND TAZOBACTAM SODIUM 3.38 G: 36; 4.5 INJECTION, POWDER, FOR SOLUTION INTRAVENOUS at 06:15

## 2020-02-07 RX ADMIN — Medication 125 MG: at 08:38

## 2020-02-07 RX ADMIN — Medication 1 TABLET: at 18:36

## 2020-02-07 RX ADMIN — PIPERACILLIN SODIUM AND TAZOBACTAM SODIUM 3.38 G: 36; 4.5 INJECTION, POWDER, FOR SOLUTION INTRAVENOUS at 13:20

## 2020-02-07 RX ADMIN — PANTOPRAZOLE SODIUM 40 MG: 40 TABLET, DELAYED RELEASE ORAL at 18:36

## 2020-02-07 RX ADMIN — Medication 250 MG: at 18:36

## 2020-02-07 RX ADMIN — VITAM B12 100 MCG: 100 TAB at 08:34

## 2020-02-07 RX ADMIN — PIPERACILLIN SODIUM AND TAZOBACTAM SODIUM 3.38 G: 36; 4.5 INJECTION, POWDER, FOR SOLUTION INTRAVENOUS at 19:33

## 2020-02-07 RX ADMIN — MIDODRINE HYDROCHLORIDE 15 MG: 5 TABLET ORAL at 21:36

## 2020-02-07 NOTE — SOCIAL WORK
Patient reviewed with provider  Patient is not medically clear for discharge today  CM will continue to follow for final recommendations

## 2020-02-07 NOTE — ASSESSMENT & PLAN NOTE
Monitor hemoglobin  Check iron studies    2/7 - iron studies confirmed anemia of chronic disease with elevated ferritin and low TIBC  Monitor

## 2020-02-07 NOTE — ASSESSMENT & PLAN NOTE
History of neurogenic bladder with chronic indwelling Hays catheter    2/6-will consider changing Hays catheter; sepsis improving  2/7-Hays changed, follow on labs

## 2020-02-07 NOTE — ASSESSMENT & PLAN NOTE
P o  Vancomycin 125 mg q 12 hours prophylactic dose while on systemic antibiotics  Continue p o  Vanc for 72 hours once primary antibiotics have been stopped; patient with no diarrhea or GI complaints at this time

## 2020-02-07 NOTE — PROGRESS NOTES
Progress Note - Jose Manuel Jeffers 1950, 71 y o  female MRN: [id number]    Unit/Bed#: [facility] 824-64 Encounter: [id number]    Primary Care Provider: Saul Bullock   Date and time admitted to hospital: 2/5/2020  3:25 PM        History of Clostridium difficile infection  Assessment & Plan  P o  Vancomycin 125 mg q 12 hours prophylactic dose while on systemic antibiotics  Continue p o  Vanc for 72 hours once primary antibiotics have been stopped; patient with no diarrhea or GI complaints at this time  Chronic indwelling Hays catheter  Assessment & Plan  History of paraplegia with neurogenic bladder  Chronic Hays catheter in place    Hypotension  Assessment & Plan  Chronic low blood pressures on midodrine  Will provide IV fluids  Monitor blood pressures closely    2/6-blood pressure is currently well controlled on current medications, CPT    Neurogenic bladder  Assessment & Plan  History of neurogenic bladder with chronic indwelling Hays catheter    2/6-will consider changing Hays catheter; sepsis improving  2/7-Hays changed, follow on labs    Hyponatremia  Assessment & Plan  Likely due to dehydration  IV fluids  Monitor sodium levels closely    Anemia of chronic disease  Assessment & Plan  Monitor hemoglobin  Check iron studies    2/7 - iron studies confirmed anemia of chronic disease with elevated ferritin and low TIBC  Monitor  Stage IV pressure ulcer of right buttock (HCC)  Assessment & Plan  Unstageable sacral decubitus ulcer with possible osteomyelitis  Wound Care, frequent repositioning  General surgery consult - as per general surgery have elected not to do any interventions      Paraplegia following spinal cord injury [facility])  Assessment & Plan  History of spinal cord injury with paraplegia  Ambulates in a wheelchair  Supportive care    * Sepsis [facility])  Assessment & Plan  Patient with fever, leukocytosis, tachycardia  Possible sources include unstageable decubitus ulcer concerning for osteomyelitis, urinary tract infection with indwelling chronic Hays catheter  IV Zosyn  Follow-up on cultures  IV fluids  Will request Infectious Disease inputs  Monitor counts, temperature, clinically    2/6-tachycardia resolved, patient still with fever and leukocytosis  Social was started as per noted above; consider decubitus ulcer versus UTI versus right femur osteomyelitis that was demonstrated on CT   -awaiting ID recommendations; until then will keep antibiotic coverage the same  -orthopedic surgery consulted for right femur osteomyelitis and right iliopsoas pyomyositis disease  -UA shows moderate bacteria, nitrate positive, within innumerable wbc's  -will continue IV fluids for 12 hours    2/7-appreciate ID recommendations, continue Zosyn; starting daptomycin  P o  Vanc for C diff prophylaxis to be continued 72 hours after cessation of aforementioned antibiotics  -both orthopedic and general surgery teams have signed off no further recommendations; concern now is gaining source control   - will discuss with plastics and possibly IR      VTE Pharmacologic Prophylaxis:   Pharmacologic: Enoxaparin (Lovenox)  Mechanical VTE Prophylaxis in Place: Yes    Patient Centered Rounds: I have performed bedside rounds with nursing staff today  Discussions with Specialists or Other Care Team Provider:  General surgery    Education and Discussions with Family / Patient: Care plan discussed with patient who voiced understanding and agrees with recommendations  Time Spent for Care: 45 minutes  More than 50% of total time spent on counseling and coordination of care as described above  Current Length of Stay: 2 day(s)    Current Patient Status: Inpatient   Certification Statement: The patient will continue to require additional inpatient hospital stay due to Sepsis    Discharge Plan:   To be determined    Code Status: Level 1 - Full Code      Subjective:   Patient seen examined bedside, no acute distress or discomfort noted  Patient reports that generally she feels better than yesterday; white count has dropped from 20k ---> 12 5  Appreciate ID recs, patient now on Zosyn and daptomycin with p o  Vanco for C diff prophylaxis  Both orthopedics and General surgery declined to do any interventions; may discuss with Plastic surgery and IR as there is active infection on that right hip  Patient with protein gap, likely secondary to long-term infection  Have consult nutritional services and started supplementary nutritional feeds  Will consider working up protein gap moving forward; symptoms of GERD well controlled with current regimen  PT OT eval pending  Objective:     Vitals:   Temp (24hrs), Av 5 °F (36 9 °C), Min:98 2 °F (36 8 °C), Max:99 1 °F (37 3 °C)    Temp:  [98 2 °F (36 8 °C)-99 1 °F (37 3 °C)] 98 2 °F (36 8 °C)  HR:  [62-77] 62  Resp:  [15-18] 15  BP: ()/(45-55) 108/51  SpO2:  [95 %-96 %] 96 %  Body mass index is 17 97 kg/m²  Input and Output Summary (last 24 hours): Intake/Output Summary (Last 24 hours) at 2020 1150  Last data filed at 2020 0900  Gross per 24 hour   Intake 1195 ml   Output 595 ml   Net 600 ml       Physical Exam:     Physical Exam  Constitutional: She is oriented to person, place, and time  She appears well-developed and well-nourished  No distress  HENT:   Head: Normocephalic and atraumatic  Nose: Nose normal    Mouth/Throat: Oropharynx is clear and moist  No oropharyngeal exudate  Eyes: Pupils are equal, round, and reactive to light  Conjunctivae and EOM are normal  Right eye exhibits no discharge  Left eye exhibits no discharge  No scleral icterus  Neck: Normal range of motion  No JVD present  No tracheal deviation present  Cardiovascular: Normal rate, regular rhythm and normal heart sounds  Exam reveals no gallop and no friction rub  No murmur heard  Pulmonary/Chest: Effort normal  No stridor  No respiratory distress  She has no wheezes   She has no rales  Abdominal: Soft  She exhibits no distension and no mass  There is no tenderness  There is no rebound and no guarding  Musculoskeletal: She exhibits tenderness and deformity  She exhibits no edema  Paraplegic with muscle loss on bilateral lower extremities   Neurological: She is alert and oriented to person, place, and time  Skin: She is not diaphoretic  Patient with multiple skin graft wounds on lower extremities; draining sinus track of anterior right hip  Stage IV decubitus ulcer  Psychiatric: Judgment normal    Nursing note and vitals reviewed        Additional Data:     Labs:    Results from last 7 days   Lab Units 02/07/20  0857   WBC Thousand/uL 12 56*   HEMOGLOBIN g/dL 8 0*   HEMATOCRIT % 26 5*   PLATELETS Thousands/uL 616*   NEUTROS PCT % 68   LYMPHS PCT % 20   MONOS PCT % 8   EOS PCT % 2     Results from last 7 days   Lab Units 02/07/20  0446 02/06/20  0451   SODIUM mmol/L 133* 131*   POTASSIUM mmol/L 3 4* 3 8   CHLORIDE mmol/L 103 100   CO2 mmol/L 24 22   BUN mg/dL 10 7   CREATININE mg/dL 0 41* 0 38*   ANION GAP mmol/L 6 9   CALCIUM mg/dL 8 0* 8 0*   ALBUMIN g/dL  --  1 4*   TOTAL BILIRUBIN mg/dL  --  0 47   ALK PHOS U/L  --  147*   ALT U/L  --  21   AST U/L  --  24   GLUCOSE RANDOM mg/dL 108 104     Results from last 7 days   Lab Units 02/05/20  1538   INR  1 42*             Results from last 7 days   Lab Units 02/05/20  1538   LACTIC ACID mmol/L 1 9   PROCALCITONIN ng/ml 1 25*           * I Have Reviewed All Lab Data Listed Above  * Additional Pertinent Lab Tests Reviewed: All Labs Within Last 24 Hours Reviewed    Imaging:    Imaging Reports Reviewed Today Include:  None  Imaging Personally Reviewed by Myself Includes:  None    Recent Cultures (last 7 days):     Results from last 7 days   Lab Units 02/05/20  1745 02/05/20  1544 02/05/20  1538   BLOOD CULTURE   --  No Growth at 24 hrs  No Growth at 24 hrs     URINE CULTURE  >100,000 cfu/ml Proteus mirabilis*  --   --        Last 24 Hours Medication List:     Current Facility-Administered Medications:  acetaminophen 650 mg Oral Q6H PRN Carmela Stringer MD    bisacodyl 10 mg Rectal Daily Carmela Stringer MD    calcium carbonate-vitamin D 1 tablet Oral BID With Meals Carmela Stringer MD    cholecalciferol 1,000 Units Oral Daily Carmela Stringer MD    cyanocobalamin 100 mcg Oral Daily Carmela Stringer MD    DAPTOmycin 6 mg/kg Intravenous Q24H Gary Cantor MD Last Rate: Stopped (02/06/20 1759)   enoxaparin 40 mg Subcutaneous Daily Carmela Stringer MD    famotidine 20 mg Oral BID PRN Sonya Jones MD    midodrine 15 mg Oral Q8H Carmela Stringer MD    multivitamin-minerals 1 tablet Oral Daily Carmela Stringer MD    nystatin  Topical BID Carmela Stringer MD    ondansetron 4 mg Intravenous Q6H PRN Carmela Stringer MD    pantoprazole 40 mg Oral BID AC Sonya Jones MD    piperacillin-tazobactam 3 375 g Intravenous Q6H Carmela Stringer MD Last Rate: 3 375 g (02/07/20 0615)   polyethylene glycol 17 g Oral Daily Carmela Stringer MD    saccharomyces boulardii 250 mg Oral BID Carmela Stringer MD    senna 1 tablet Oral HS Carmela Stringer MD    vancomycin 125 mg Oral Q12H [address] Carmela Stringer MD      Facility-Administered Medications Ordered in Other Encounters:  dexamethasone 4 mg Intravenous Once PRN Moran Born, DO    diphenhydrAMINE 12 5 mg Intravenous Once Omran Born, DO    lactated ringers 75 mL/hr Intravenous Continuous Moran Born, DO Last Rate: 75 mL/hr (11/10/19 2105)   lactated ringers 50 mL/hr Intravenous Continuous Xin Muro, CRNA    lactated ringers 75 mL/hr Intravenous Continuous Moran Born, DO Last Rate: Stopped (03/20/19 1841)   lactated ringers 75 mL/hr Intravenous Continuous Silvia Bullard MD Last Rate: Stopped (03/20/19 1842)   ondansetron 4 mg Intravenous Once PRN Rima Morales MD    promethazine 12 5 mg Intravenous Once PRN Rima Eric Yousif MD         Today, Patient Was Seen By: Amado De Leon MD    ** Please Note: Dictation voice to text software may have been used in the creation of this document   **

## 2020-02-07 NOTE — ASSESSMENT & PLAN NOTE
Unstageable sacral decubitus ulcer with possible osteomyelitis  Wound Care, frequent repositioning  General surgery consult - as per general surgery have elected not to do any interventions

## 2020-02-07 NOTE — PROGRESS NOTES
No dressing change orders entered for wound on  right hip/thigh, red sx defers, ortho defers  Just changing with gauze and abd's when needed  Slim aware

## 2020-02-07 NOTE — PROGRESS NOTES
Progress Note - Orthopedics   Servando Pandya 71 y o  female MRN: [id number]  Unit/Bed#: [facility] 615-01      Subjective:    71 y  o female with chronic right proximal femur osteomyelitis   Minimal pain this AM      Labs:  0   Lab Value Date/Time    HCT 28 9 (L) 02/06/2020 0451    HCT 28 8 (L) 02/05/2020 1538    HCT 30 5 (L) 11/20/2019 0613    HCT 30 0 (L) 05/28/2018 0530    HCT 29 5 (L) 05/22/2018 0515    HCT 32 5 (L) 05/14/2018 0555    HGB 9 1 (L) 02/06/2020 0451    HGB 9 0 (L) 02/05/2020 1538    HGB 9 3 (L) 11/20/2019 0613    HGB 10 1 (L) 05/28/2018 0530    HGB 9 9 (L) 05/22/2018 0515    HGB 10 6 (L) 05/14/2018 0555    INR 1 42 (H) 02/05/2020 1538    WBC 20 92 (H) 02/06/2020 0451    WBC 20 82 (H) 02/05/2020 1538    WBC 9 74 11/20/2019 0613    WBC 6 4 05/28/2018 0530    WBC 8 0 05/22/2018 0515    WBC 6 0 05/14/2018 0555    ESR 54 (H) 11/11/2019 1606       Meds:    Current Facility-Administered Medications:     acetaminophen (TYLENOL) tablet 650 mg, 650 mg, Oral, Q6H PRN, Marlen Roa MD    bisacodyl (DULCOLAX) rectal suppository 10 mg, 10 mg, Rectal, Daily, Mareln Roa MD    calcium carbonate-vitamin D (OSCAL-D) 500 mg-200 units per tablet 1 tablet, 1 tablet, Oral, BID With Meals, Marlen Roa MD, 1 tablet at 02/06/20 1729    cholecalciferol (VITAMIN D3) tablet 1,000 Units, 1,000 Units, Oral, Daily, Marlen Roa MD, 1,000 Units at 02/06/20 1017    cyanocobalamin (VITAMIN B-12) tablet 100 mcg, 100 mcg, Oral, Daily, Marlen Roa MD, 100 mcg at 02/06/20 1017    DAPTOmycin (CUBICIN) 250 mg in sodium chloride 0 9 % 50 mL IVPB, 6 mg/kg, Intravenous, Q24H, Gary Cantor MD, Stopped at 02/06/20 1759    enoxaparin (LOVENOX) subcutaneous injection 40 mg, 40 mg, Subcutaneous, Daily, Marlen Roa MD, 40 mg at 02/06/20 1025    midodrine (PROAMATINE) tablet 15 mg, 15 mg, Oral, Q8H, Marlen Roa MD, 15 mg at 02/07/20 0615    multivitamin-minerals (CENTRUM) tablet 1 tablet, 1 tablet, Oral, Daily, Carmela Stringer MD, 1 tablet at 02/06/20 1015    nystatin (MYCOSTATIN) powder, , Topical, BID, Carmela Stringer MD    ondansetron [facility]) injection 4 mg, 4 mg, Intravenous, Q6H PRN, Carmela Stringer MD, 4 mg at 02/05/20 2113    pantoprazole (PROTONIX) EC tablet 40 mg, 40 mg, Oral, Daily, Carmela Stringer MD, 40 mg at 02/06/20 1023    piperacillin-tazobactam (ZOSYN) 3 375 g in sodium chloride 0 9 % 100 mL IVPB, 3 375 g, Intravenous, Q6H, Carmela Stringer MD, Last Rate: 200 mL/hr at 02/07/20 0615, 3 375 g at 02/07/20 0615    polyethylene glycol (MIRALAX) packet 17 g, 17 g, Oral, Daily, Carmela Stringer MD, Stopped at 02/06/20 1024    saccharomyces boulardii (FLORASTOR) capsule 250 mg, 250 mg, Oral, BID, Carmela Stringer MD, 250 mg at 02/06/20 1728    senna (SENOKOT) tablet 8 6 mg, 1 tablet, Oral, HS, Carmela Stringer MD, 8 6 mg at 02/06/20 2124    vancomycin (VANCOCIN) oral solution 125 mg, 125 mg, Oral, Q12H [address], Carmela Stringer MD, 125 mg at 02/06/20 2124    Facility-Administered Medications Ordered in Other Encounters:     dexamethasone (DECADRON) injection 4 mg, 4 mg, Intravenous, Once PRN, Dean Calhoun DO    diphenhydrAMINE (BENADRYL) injection 12 5 mg, 12 5 mg, Intravenous, Once, Dean Calhoun DO    lactated ringers infusion, 75 mL/hr, Intravenous, Continuous, Dean Calhoun DO, Last Rate: 75 mL/hr at 11/10/19 2105    lactated ringers infusion, 50 mL/hr, Intravenous, Continuous, Xin Muro, CRNA    lactated ringers infusion, 75 mL/hr, Intravenous, Continuous, Dean Calhoun DO, Stopped at 03/20/19 1841    lactated ringers infusion, 75 mL/hr, Intravenous, Continuous, Silvia Bullard MD, Stopped at 03/20/19 1842    ondansetron (ZOFRAN) injection 4 mg, 4 mg, Intravenous, Once PRN, Elías Malone MD    promethazine (PHENERGAN) injection 12 5 mg, 12 5 mg, Intravenous, Once PRN, Rima MD Andrew    Blood Culture:   Lab Results   Component Value Date    BLOODCX No Growth at 24 hrs  02/05/2020       Wound Culture:   Lab Results   Component Value Date    WOUNDCULT 3+ Growth of Pseudomonas aeruginosa (A) 11/10/2019    WOUNDCULT 2+ Growth of Providencia stuartii (A) 11/10/2019    WOUNDCULT 2+ Growth of Diphtheroids 11/10/2019       Ins and Outs:  I/O last 24 hours: In: 970 [P O :720; IV Piggyback:250]  Out: 475 [Urine:475]          Physical:  Vitals:    02/07/20 0001   BP: 108/52   Pulse: 71   Resp: 15   Temp: 98 4 °F (36 9 °C)   SpO2: 95%     Musculoskeletal: right Lower Extremity  · Skin with 2 large open lesions around the hip  More posterior lesion probes down to fascia  · No TTP  · Diminished/lack of sensation L1-S1  · No motor to lower extremities  · Foot wwp    Assessment:    71 y  o female with R proximal femur osteomyelitis  No plan for operative intervention        Plan:  · WBAT BL LE  · PT/OT  · Pain control  · Dispo: Ortho signing off    Flonnie Harada, MD

## 2020-02-07 NOTE — PROGRESS NOTES
Progress Note - Infectious Disease   Glena Goldmann 71 y o  female MRN: [id number]  Unit/Bed#: [facility] 615-01 Encounter: [id number]      Impression/Plan:    75-year-old female patient with paraplegia secondary to spinal cord injury and multiple chronic decubitus ulcers, admitted for fever and leukocytosis, thought to be secondary to right hip wound infection with underlying myositis     1- sepsis, POA:  With fever of 102 1 and leukocytosis on admission  No signs of pneumonia, no signs of intra-abdominal infection  CT chest and abdomen pelvis negative for infectious focus  Patient has Hays in place and is at risk for UTI and bacteremia  But infectious source is likely the  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  - antibiotics as below  - surgery follow-up  - close clinical monitoring, trend fever curve  - repeat CBC in a m   - follow-up blood culture results        2- Right hip open wound, with underlying osteomyelitis and dislocation of right femur:  Patient is known to have hip osteomyelitis and dislocation of the right femur  She was recently evaluated as outpatient by Ortho, but decision was to go with non surgical management  On admission she had fever and chills, and reports increased drainage from the right hip wound  No other infectious focus is identified, though patient has a chronic Hays catheter and is at risk for UTI and bacteremia  CT scan on admission shows signs of osteomyelitis, underlying sinus track, but also concern for myositis of the right iliopsoas muscle  Concern for pyomyositis  Previous cultures including sacral bone culture positive for E coli and Providencia, left ischial wound culture positive for Pseudomonas and Providencia, and left femur culture positive for corynebacterium reviewed    - orthopedic and General surgery follow-up  - consider Plastic surgery evaluation  - continue Zosyn and daptomycin  - follow-up result of blood culture and adjust antibiotics accordingly  - if blood culture remains negative, might consider holding daptomycin and continuing Zosyn IV  - if no plan for girdle stone procedure and flap to cover the wound, the role of long-term IV antibiotics is limited with risk of side effects from prolonged antibiotics outweighs the benefit  This has been explained at length to the patient           3- history of C diff colitis:  C diff infection documented 04/2019  Patient currently has no diarrhea  - continue vancomycin p o   prophylactic dose; will likely need to prolong vancomycin p o  For 72 hours after last dose of systemic antibiotic treatment     4- multiple antibiotic allergy:  Patient has a reported allergy to vancomycin described as rash  She is currently tolerating vancomycin p o  Patient also previously tolerated ertapenem, Zosyn and daptomycin  - continue Zosyn and daptomycin IV as mentioned above  - close clinical monitoring     5- macular rash over lower back: This was present before starting antibiotic therapy  Non itchy, nontender, stable in appearance  - continue local skin care  - monitor clinically for any sign of expansion or worsening of the rash    6- bacteriuria:  UA is positive for WBC, urine culture positive for Proteus   This likely represents asymptomatic bacteriuria  Hays has been exchanged  - will follow up result of blood culture    Continue antibiotics as above for possible right hip wound infection     Plan mentioned above discussed in details with patient  Case discussed with Ortho team and primary service    Antibiotics:  IV Antibiotics day 3  Zosyn day 3  Daptomycin day 2    Subjective:  Fever has resolved over the past 24 hours  Patient reports improvement in her energy level and her appetite  She reports tolerating antibiotics well with no apparent issues  She denies diarrhea    Objective:  Vitals:  Temp:  [98 2 °F (36 8 °C)-99 1 °F (37 3 °C)] 98 2 °F (36 8 °C)  HR:  [62-77] 62  Resp:  [15-20] 15  BP: ()/(45-58) 108/51  SpO2:  [94 %-96 %] 96 %  Temp (24hrs), Av 5 °F (36 9 °C), Min:98 2 °F (36 8 °C), Max:99 1 °F (37 3 °C)  Current: Temperature: 98 2 °F (36 8 °C)    Physical Exam:   General Appearance:  Alert, interactive, nontoxic, no acute distress  Throat: Oropharynx moist without lesions  Lungs:   Clear to auscultation bilaterally; no wheezes, rhonchi or rales; respirations unlabored   Heart:  RRR; no murmur, rub or gallop   Abdomen:   Soft, non-tender, non-distended, positive bowel sounds  Hays catheter in place draining clear urine    Extremities: No clubbing, cyanosis or edema   Skin: No new rashes or lesions  Multiple decubitus ulcers including sacral ulcer, left ischial pressure ulcer, extensive right hip wound  Dressings are still dry and clean without breakthrough bleeding or drainage       Labs, Imaging, & Other studies:   All pertinent labs and imaging studies were personally reviewed  Results from last 7 days   Lab Units 20  0857 20  0451 20  1538   WBC Thousand/uL 12 56* 20 92* 20 82*   HEMOGLOBIN g/dL 8 0* 9 1* 9 0*   PLATELETS Thousands/uL 616* 563* 618*     Results from last 7 days   Lab Units 20  0446 20  0451 20  1538   SODIUM mmol/L 133* 131* 129*   POTASSIUM mmol/L 3 4* 3 8 4 7   CHLORIDE mmol/L 103 100 96*   CO2 mmol/L 24 22 24   BUN mg/dL 10 7 11   CREATININE mg/dL 0 41* 0 38* 0 47*   EGFR ml/min/1 73sq m 106 108 101   CALCIUM mg/dL 8 0* 8 0* 8 6   AST U/L  --  24 37   ALT U/L  --  21 25   ALK PHOS U/L  --  147* 175*     Results from last 7 days   Lab Units 20  1745 20  1544 20  1538   BLOOD CULTURE   --  No Growth at 24 hrs  No Growth at 24 hrs     URINE CULTURE  >100,000 cfu/ml Proteus mirabilis*  --   --      Results from last 7 days   Lab Units 20  1538   PROCALCITONIN ng/ml 1 25*

## 2020-02-07 NOTE — ASSESSMENT & PLAN NOTE
Patient with fever, leukocytosis, tachycardia  Possible sources include unstageable decubitus ulcer concerning for osteomyelitis, urinary tract infection with indwelling chronic Hays catheter  IV Zosyn  Follow-up on cultures  IV fluids  Will request Infectious Disease inputs  Monitor counts, temperature, clinically    2/6-tachycardia resolved, patient still with fever and leukocytosis  Social was started as per noted above; consider decubitus ulcer versus UTI versus right femur osteomyelitis that was demonstrated on CT   -awaiting ID recommendations; until then will keep antibiotic coverage the same  -orthopedic surgery consulted for right femur osteomyelitis and right iliopsoas pyomyositis disease  -UA shows moderate bacteria, nitrate positive, within innumerable wbc's  -will continue IV fluids for 12 hours    2/7-appreciate ID recommendations, continue Zosyn; starting daptomycin  P o  Vanc for C diff prophylaxis to be continued 72 hours after cessation of aforementioned antibiotics    -both orthopedic and general surgery teams have signed off no further recommendations; concern now is gaining source control   - will discuss with plastics and possibly IR

## 2020-02-08 PROBLEM — R78.81 COAGULASE NEGATIVE STAPHYLOCOCCUS BACTEREMIA: Status: ACTIVE | Noted: 2020-02-08

## 2020-02-08 PROBLEM — S71.001A OPEN WOUND OF RIGHT HIP: Chronic | Status: ACTIVE | Noted: 2020-02-08

## 2020-02-08 PROBLEM — B95.7 COAGULASE NEGATIVE STAPHYLOCOCCUS BACTEREMIA: Status: ACTIVE | Noted: 2020-02-08

## 2020-02-08 LAB
ANION GAP SERPL CALCULATED.3IONS-SCNC: 8 MMOL/L (ref 4–13)
ATRIAL RATE: 105 BPM
BACTERIA UR CULT: ABNORMAL
BACTERIA UR CULT: ABNORMAL
BASOPHILS # BLD AUTO: 0.05 THOUSANDS/ΜL (ref 0–0.1)
BASOPHILS NFR BLD AUTO: 0 % (ref 0–1)
BUN SERPL-MCNC: 6 MG/DL (ref 5–25)
CALCIUM SERPL-MCNC: 7.8 MG/DL (ref 8.3–10.1)
CHLORIDE SERPL-SCNC: 105 MMOL/L (ref 100–108)
CO2 SERPL-SCNC: 24 MMOL/L (ref 21–32)
CREAT SERPL-MCNC: 0.31 MG/DL (ref 0.6–1.3)
EOSINOPHIL # BLD AUTO: 0.2 THOUSAND/ΜL (ref 0–0.61)
EOSINOPHIL NFR BLD AUTO: 2 % (ref 0–6)
ERYTHROCYTE [DISTWIDTH] IN BLOOD BY AUTOMATED COUNT: 15.9 % (ref 11.6–15.1)
GFR SERPL CREATININE-BSD FRML MDRD: 116 ML/MIN/1.73SQ M
GLUCOSE SERPL-MCNC: 121 MG/DL (ref 65–140)
HCT VFR BLD AUTO: 24.8 % (ref 34.8–46.1)
HGB BLD-MCNC: 7.8 G/DL (ref 11.5–15.4)
IMM GRANULOCYTES # BLD AUTO: 0.1 THOUSAND/UL (ref 0–0.2)
IMM GRANULOCYTES NFR BLD AUTO: 1 % (ref 0–2)
LYMPHOCYTES # BLD AUTO: 2.77 THOUSANDS/ΜL (ref 0.6–4.47)
LYMPHOCYTES NFR BLD AUTO: 23 % (ref 14–44)
MCH RBC QN AUTO: 26 PG (ref 26.8–34.3)
MCHC RBC AUTO-ENTMCNC: 31.5 G/DL (ref 31.4–37.4)
MCV RBC AUTO: 83 FL (ref 82–98)
MONOCYTES # BLD AUTO: 0.89 THOUSAND/ΜL (ref 0.17–1.22)
MONOCYTES NFR BLD AUTO: 8 % (ref 4–12)
NEUTROPHILS # BLD AUTO: 7.83 THOUSANDS/ΜL (ref 1.85–7.62)
NEUTS SEG NFR BLD AUTO: 66 % (ref 43–75)
NRBC BLD AUTO-RTO: 0 /100 WBCS
P AXIS: 41 DEGREES
PLATELET # BLD AUTO: 573 THOUSANDS/UL (ref 149–390)
PMV BLD AUTO: 9.6 FL (ref 8.9–12.7)
POTASSIUM SERPL-SCNC: 3.2 MMOL/L (ref 3.5–5.3)
PR INTERVAL: 132 MS
QRS AXIS: -15 DEGREES
QRSD INTERVAL: 80 MS
QT INTERVAL: 322 MS
QTC INTERVAL: 425 MS
RBC # BLD AUTO: 3 MILLION/UL (ref 3.81–5.12)
SODIUM SERPL-SCNC: 137 MMOL/L (ref 136–145)
T WAVE AXIS: 42 DEGREES
VENTRICULAR RATE: 105 BPM
WBC # BLD AUTO: 11.84 THOUSAND/UL (ref 4.31–10.16)

## 2020-02-08 PROCEDURE — 85025 COMPLETE CBC W/AUTO DIFF WBC: CPT | Performed by: INTERNAL MEDICINE

## 2020-02-08 PROCEDURE — 93010 ELECTROCARDIOGRAM REPORT: CPT | Performed by: INTERNAL MEDICINE

## 2020-02-08 PROCEDURE — 99232 SBSQ HOSP IP/OBS MODERATE 35: CPT | Performed by: INTERNAL MEDICINE

## 2020-02-08 PROCEDURE — 80048 BASIC METABOLIC PNL TOTAL CA: CPT | Performed by: INTERNAL MEDICINE

## 2020-02-08 RX ORDER — METHYLPREDNISOLONE 16 MG/1
32 TABLET ORAL
Status: COMPLETED | OUTPATIENT
Start: 2020-02-08 | End: 2020-02-08

## 2020-02-08 RX ORDER — DIPHENHYDRAMINE HCL 25 MG
50 TABLET ORAL
Status: DISCONTINUED | OUTPATIENT
Start: 2020-02-08 | End: 2020-03-02 | Stop reason: HOSPADM

## 2020-02-08 RX ORDER — POTASSIUM CHLORIDE 20 MEQ/1
40 TABLET, EXTENDED RELEASE ORAL ONCE
Status: COMPLETED | OUTPATIENT
Start: 2020-02-08 | End: 2020-02-08

## 2020-02-08 RX ADMIN — WHITE PETROLATUM 57.7 %-MINERAL OIL 31.9 % EYE OINTMENT 1 APPLICATION: at 21:36

## 2020-02-08 RX ADMIN — Medication 1 TABLET: at 08:42

## 2020-02-08 RX ADMIN — PIPERACILLIN SODIUM AND TAZOBACTAM SODIUM 3.38 G: 36; 4.5 INJECTION, POWDER, FOR SOLUTION INTRAVENOUS at 11:14

## 2020-02-08 RX ADMIN — PANTOPRAZOLE SODIUM 40 MG: 40 TABLET, DELAYED RELEASE ORAL at 17:26

## 2020-02-08 RX ADMIN — DIPHENHYDRAMINE HCL 50 MG: 25 TABLET, COATED ORAL at 23:15

## 2020-02-08 RX ADMIN — METHYLPREDNISOLONE 32 MG: 16 TABLET ORAL at 11:12

## 2020-02-08 RX ADMIN — PIPERACILLIN SODIUM AND TAZOBACTAM SODIUM 3.38 G: 36; 4.5 INJECTION, POWDER, FOR SOLUTION INTRAVENOUS at 17:26

## 2020-02-08 RX ADMIN — PIPERACILLIN SODIUM AND TAZOBACTAM SODIUM 3.38 G: 36; 4.5 INJECTION, POWDER, FOR SOLUTION INTRAVENOUS at 06:43

## 2020-02-08 RX ADMIN — NYSTATIN: 100000 POWDER TOPICAL at 11:19

## 2020-02-08 RX ADMIN — Medication 1 TABLET: at 17:25

## 2020-02-08 RX ADMIN — ENOXAPARIN SODIUM 40 MG: 40 INJECTION SUBCUTANEOUS at 08:42

## 2020-02-08 RX ADMIN — Medication 250 MG: at 08:42

## 2020-02-08 RX ADMIN — VITAM B12 100 MCG: 100 TAB at 08:42

## 2020-02-08 RX ADMIN — MIDODRINE HYDROCHLORIDE 15 MG: 5 TABLET ORAL at 05:08

## 2020-02-08 RX ADMIN — POTASSIUM CHLORIDE 40 MEQ: 1500 TABLET, EXTENDED RELEASE ORAL at 11:11

## 2020-02-08 RX ADMIN — MIDODRINE HYDROCHLORIDE 15 MG: 5 TABLET ORAL at 21:36

## 2020-02-08 RX ADMIN — Medication 125 MG: at 08:43

## 2020-02-08 RX ADMIN — MIDODRINE HYDROCHLORIDE 15 MG: 5 TABLET ORAL at 13:56

## 2020-02-08 RX ADMIN — NYSTATIN: 100000 POWDER TOPICAL at 17:26

## 2020-02-08 RX ADMIN — Medication 1 TABLET: at 08:43

## 2020-02-08 RX ADMIN — Medication 250 MG: at 17:26

## 2020-02-08 RX ADMIN — METHYLPREDNISOLONE 32 MG: 16 TABLET ORAL at 22:15

## 2020-02-08 RX ADMIN — Medication 125 MG: at 21:36

## 2020-02-08 RX ADMIN — PANTOPRAZOLE SODIUM 40 MG: 40 TABLET, DELAYED RELEASE ORAL at 05:08

## 2020-02-08 RX ADMIN — PIPERACILLIN SODIUM AND TAZOBACTAM SODIUM 3.38 G: 36; 4.5 INJECTION, POWDER, FOR SOLUTION INTRAVENOUS at 00:53

## 2020-02-08 RX ADMIN — MELATONIN 1000 UNITS: at 08:43

## 2020-02-08 NOTE — PROGRESS NOTES
Patient has latex allergy (noted as low reaction in chart), but I inserted a latex catheter  Informed patient of my error, no reaction at this time  Pt requested to keep latex catheter in place until a non latex one is available  Informed Jeffery HernándezIM of my error and of the patient's decision to keep latex catheter in at this time  Will insert non latex catheter as soon as it arrives from the storeroom and will continue to follow plan of care for patient

## 2020-02-08 NOTE — ASSESSMENT & PLAN NOTE
· Labs appear consistent with anemia of chronic disease, continue to monitor closely, did slightly decrease today  · Consider further workup if decreases further

## 2020-02-08 NOTE — ASSESSMENT & PLAN NOTE
· POA with fever and leukocytosis with infectious source likely being R hip wound with underlying iliopsoas myositis  · No signs of PNA or intraabdominal infection  · CT C/A/P negative for infectious focus  · Blood cultures 1/2 positive for coag-neg staph, other negative  · Urine culture + for proteus and e coli; likely asymptomatic bacteriuria per ID; isabel exchanged   · Continue IV abx per ID recommendations  · Continue to monitor closely  · ID recommending repeat CT scan today WITH contrast (will need to premedicate, d/w patient) to better evaluate area and see if patient would potentially be candidate for IR evaluation  · Surgery and orthopedics not currently offering any surgical management

## 2020-02-08 NOTE — ASSESSMENT & PLAN NOTE
· History of neurogenic bladder with chronic indwelling Hays catheter  · Urine culture noted, likely represents asymptomatic bacteriuria per infectious disease  · Hays was exchanged

## 2020-02-08 NOTE — PROGRESS NOTES
Progress Note - Infectious Disease   Pako Gonzalez 71 y o  female MRN: [id number]  Unit/Bed#: [facility] 615-01 Encounter: [id number]      Impression/Plan:    70-year-old female patient with paraplegia secondary to spinal cord injury and multiple chronic decubitus ulcers, admitted for fever and leukocytosis, thought to be secondary to right hip wound infection with underlying myositis     1- sepsis, POA:  With fever of 102 1 and leukocytosis on admission  No signs of pneumonia, no signs of intra-abdominal infection  CT chest and abdomen pelvis negative for infectious focus  Patient has Hays in place and is at risk for UTI and bacteremia   But infectious source is likely the  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  Her blood cultures positive for coag-negative staph in 1/2 sets  Her urine culture is positive for Proteus and E coli  - antibiotics as below  - surgery follow-up  - close clinical monitoring, trend fever curve  - repeat CBC in a m   - follow-up final result of blood culture        2- Right hip open wound, with underlying osteomyelitis and dislocation of right femur:  Patient is known to have hip osteomyelitis and dislocation of the right femur   She was recently evaluated as outpatient by Ortho, but decision was to go with non surgical management  On admission she had fever and chills, and reports increased drainage from the right hip wound  No other infectious focus is identified, though patient has a chronic Hays catheter and is at risk for UTI  Her urine cultures positive for Proteus and E coli  Her Hays has been exchanged  CT scan on admission shows signs of osteomyelitis, underlying sinus track, but also concern for myositis of the right iliopsoas muscle   Concern for pyomyositis    Previous cultures including sacral bone culture positive for E coli and Providencia, left ischial wound culture positive for Pseudomonas and Providencia, and left femur culture positive for corynebacterium reviewed  Case discussed with General surgery and Orthopedic  No plan for girdle stone or debridement at this time  - continue Zosyn IV  -stop daptomycin  - repeat CT scan to evaluate for signs of pyomyositis as initial CT scan done on admission shows small locules of gas and edema in right iliopsoas muscle, and was not able to exclude possibility of pyomyositis  - if no plan for girdle stone procedure and flap to cover the wound, the role of long-term IV antibiotics is limited with risk of side effects from prolonged antibiotics outweighs the benefit  This has been explained at length to the patient  Will likely continue IV antibiotic to finish 10 days course for treatment of skin and soft tissue infection          3- history of C diff colitis:  C diff infection documented 04/2019  Patient currently has no diarrhea  - continue vancomycin p o   prophylactic dose; will likely need to prolong vancomycin p o  For 72 hours after last dose of systemic antibiotic treatment     4- multiple antibiotic allergy:  Patient has a reported allergy to vancomycin described as rash   She is currently tolerating vancomycin p o  Patient also previously tolerated ertapenem, Zosyn and daptomycin  - continue Zosyn and daptomycin IV as mentioned above  - close clinical monitoring     5- macular rash over lower back: This was present before starting antibiotic therapy  Non itchy, nontender, stable in appearance  - continue local skin care  - monitor clinically for any sign of expansion or worsening of the rash     6- bacteriuria:  UA is positive for WBC, urine culture positive for Proteus   This likely represents asymptomatic bacteriuria  Hays has been exchanged  - will follow up result of blood culture  Continue antibiotics as above for possible right hip wound infection    7- coagulase negative Staph bacteremia:  Blood culture collected on admission is positive in 1/2 sets for coag-negative staph    This could be secondary to wound infection or represent pseudobacteremia and skin naun contamination  - continue antibiotics as above       Plan mentioned above discussed in details with patient  Case discussed with Ortho team and primary service     Antibiotics:  IV Antibiotics day 4  Zosyn day 4  Vancomycin p o  Day 4    Subjective:  Patient has no fever, chills, sweats; no nausea, vomiting, diarrhea; no cough, shortness of breath; no pain  No new symptoms  Objective:  Vitals:  Temp:  [97 3 °F (36 3 °C)-98 5 °F (36 9 °C)] 97 3 °F (36 3 °C)  HR:  [73-79] 79  Resp:  [18] 18  BP: ()/(45-55) 103/50  SpO2:  [95 %-96 %] 95 %  Temp (24hrs), Av 1 °F (36 7 °C), Min:97 3 °F (36 3 °C), Max:98 5 °F (36 9 °C)  Current: Temperature: (!) 97 3 °F (36 3 °C)    Physical Exam:   General Appearance:  Alert, interactive, nontoxic, no acute distress  Throat: Oropharynx moist without lesions  Lungs:   Clear to auscultation bilaterally; no wheezes, rhonchi or rales; respirations unlabored   Heart:  RRR; no murmur, rub or gallop   Abdomen:   Soft, non-tender, non-distended, positive bowel sounds  Extremities: No clubbing, cyanosis or edema   Skin: No new rashes or lesions  No draining wounds noted         Labs, Imaging, & Other studies:   All pertinent labs and imaging studies were personally reviewed  Results from last 7 days   Lab Units 20  0504 20  0857 20  0451   WBC Thousand/uL 11 84* 12 56* 20 92*   HEMOGLOBIN g/dL 7 8* 8 0* 9 1*   PLATELETS Thousands/uL 573* 616* 563*     Results from last 7 days   Lab Units 20  0504 20  0446 20  0451 20  1538   SODIUM mmol/L 137 133* 131* 129*   POTASSIUM mmol/L 3 2* 3 4* 3 8 4 7   CHLORIDE mmol/L 105 103 100 96*   CO2 mmol/L 24 24 22 24   BUN mg/dL 6 10 7 11   CREATININE mg/dL 0 31* 0 41* 0 38* 0 47*   EGFR ml/min/1 73sq m 116 106 108 101   CALCIUM mg/dL 7 8* 8 0* 8 0* 8 6   AST U/L  --   --  24 37   ALT U/L  --   --  21 25   ALK PHOS U/L  --   --  147* 175*     Results from last 7 days   Lab Units 02/05/20  1745 02/05/20  1544 02/05/20  1538   BLOOD CULTURE   --  Staphylococcus coagulase negative* No Growth at 48 hrs     GRAM STAIN RESULT   --  Gram positive cocci in clusters*  --    URINE CULTURE  >100,000 cfu/ml Proteus mirabilis*  50,000-59,000 cfu/ml Escherichia coli*  --   --      Results from last 7 days   Lab Units 02/05/20  1538   PROCALCITONIN ng/ml 1 25*

## 2020-02-08 NOTE — ASSESSMENT & PLAN NOTE
· With infection in April of 2019  · Currently without any complaints of diarrhea  · Continue vancomycin prophylactics, will likely need to continue for 72 hours after last dose of systemic antibiotics

## 2020-02-08 NOTE — ASSESSMENT & PLAN NOTE
· Known to have hip osteo and dislocation of the R femur  On admission had fever, chills and increased drainge from R hip wound  · Ortho following; no plans for any surgical intervention at this time  · General surgery following: continue localized wound care  No plans for any surgical intervention at this time  · ID following: continue IV abx for now  repeat CT scan to evaluate for signs of pyomyositis as initial CT scan done on admission shows small locules of gas and edema in right iliopsoas muscle, and was not able to exclude possibility of pyomyositis  · Per ID: if no plan for girdle stone procedure and flap to cover the wound, the role of long-term IV antibiotics is limited with risk of side effects from prolonged antibiotics outweighs the benefit   This has been explained at length to the patient  Will likely continue IV antibiotic to finish 10 days course for treatment of skin and soft tissue infection

## 2020-02-08 NOTE — ASSESSMENT & PLAN NOTE
· Unstageable sacral decubitus ulcer  · ID following  · Wound Care following, frequent repositioning

## 2020-02-08 NOTE — ASSESSMENT & PLAN NOTE
· History of spinal cord injury T8 with paraplegia  · Will need PT/OT  · Presented from [facility]; unclear of living arrangements prior to that

## 2020-02-08 NOTE — ASSESSMENT & PLAN NOTE
· Could be secondary to wound infection or contaminant  · Infectious Disease following  · Continue antibiotics as above  · Repeat blood cultures ordered

## 2020-02-09 ENCOUNTER — APPOINTMENT (INPATIENT)
Dept: RADIOLOGY | Facility: HOSPITAL | Age: 70
DRG: 871 | End: 2020-02-09
Payer: COMMERCIAL

## 2020-02-09 PROBLEM — R93.89 ABNORMAL CT OF THE CHEST: Status: ACTIVE | Noted: 2020-02-09

## 2020-02-09 PROBLEM — F32.A DEPRESSION: Status: ACTIVE | Noted: 2020-02-09

## 2020-02-09 PROBLEM — E43 SEVERE PROTEIN-CALORIE MALNUTRITION (HCC): Status: ACTIVE | Noted: 2020-02-09

## 2020-02-09 LAB
ANION GAP SERPL CALCULATED.3IONS-SCNC: 7 MMOL/L (ref 4–13)
BACTERIA BLD CULT: ABNORMAL
BASOPHILS # BLD AUTO: 0.02 THOUSANDS/ΜL (ref 0–0.1)
BASOPHILS NFR BLD AUTO: 0 % (ref 0–1)
BUN SERPL-MCNC: 5 MG/DL (ref 5–25)
CALCIUM SERPL-MCNC: 8.2 MG/DL (ref 8.3–10.1)
CHLORIDE SERPL-SCNC: 105 MMOL/L (ref 100–108)
CO2 SERPL-SCNC: 25 MMOL/L (ref 21–32)
CREAT SERPL-MCNC: 0.51 MG/DL (ref 0.6–1.3)
EOSINOPHIL # BLD AUTO: 0 THOUSAND/ΜL (ref 0–0.61)
EOSINOPHIL NFR BLD AUTO: 0 % (ref 0–6)
ERYTHROCYTE [DISTWIDTH] IN BLOOD BY AUTOMATED COUNT: 16 % (ref 11.6–15.1)
GFR SERPL CREATININE-BSD FRML MDRD: 98 ML/MIN/1.73SQ M
GLUCOSE SERPL-MCNC: 316 MG/DL (ref 65–140)
GRAM STN SPEC: ABNORMAL
HCT VFR BLD AUTO: 27 % (ref 34.8–46.1)
HGB BLD-MCNC: 8 G/DL (ref 11.5–15.4)
IMM GRANULOCYTES # BLD AUTO: 0.16 THOUSAND/UL (ref 0–0.2)
IMM GRANULOCYTES NFR BLD AUTO: 1 % (ref 0–2)
LYMPHOCYTES # BLD AUTO: 1.75 THOUSANDS/ΜL (ref 0.6–4.47)
LYMPHOCYTES NFR BLD AUTO: 16 % (ref 14–44)
MCH RBC QN AUTO: 25 PG (ref 26.8–34.3)
MCHC RBC AUTO-ENTMCNC: 29.6 G/DL (ref 31.4–37.4)
MCV RBC AUTO: 84 FL (ref 82–98)
MONOCYTES # BLD AUTO: 0.34 THOUSAND/ΜL (ref 0.17–1.22)
MONOCYTES NFR BLD AUTO: 3 % (ref 4–12)
NEUTROPHILS # BLD AUTO: 8.81 THOUSANDS/ΜL (ref 1.85–7.62)
NEUTS SEG NFR BLD AUTO: 80 % (ref 43–75)
NRBC BLD AUTO-RTO: 0 /100 WBCS
PLATELET # BLD AUTO: 624 THOUSANDS/UL (ref 149–390)
PMV BLD AUTO: 9.7 FL (ref 8.9–12.7)
POTASSIUM SERPL-SCNC: 3.5 MMOL/L (ref 3.5–5.3)
RBC # BLD AUTO: 3.2 MILLION/UL (ref 3.81–5.12)
SODIUM SERPL-SCNC: 137 MMOL/L (ref 136–145)
WBC # BLD AUTO: 11.08 THOUSAND/UL (ref 4.31–10.16)

## 2020-02-09 PROCEDURE — 99232 SBSQ HOSP IP/OBS MODERATE 35: CPT | Performed by: INTERNAL MEDICINE

## 2020-02-09 PROCEDURE — 80048 BASIC METABOLIC PNL TOTAL CA: CPT | Performed by: INTERNAL MEDICINE

## 2020-02-09 PROCEDURE — 74177 CT ABD & PELVIS W/CONTRAST: CPT

## 2020-02-09 PROCEDURE — 85025 COMPLETE CBC W/AUTO DIFF WBC: CPT | Performed by: INTERNAL MEDICINE

## 2020-02-09 PROCEDURE — 71260 CT THORAX DX C+: CPT

## 2020-02-09 PROCEDURE — 87040 BLOOD CULTURE FOR BACTERIA: CPT | Performed by: INTERNAL MEDICINE

## 2020-02-09 RX ORDER — BISACODYL 10 MG
10 SUPPOSITORY, RECTAL RECTAL DAILY PRN
Status: DISCONTINUED | OUTPATIENT
Start: 2020-02-09 | End: 2020-02-10

## 2020-02-09 RX ORDER — SENNOSIDES 8.6 MG
1 TABLET ORAL
Status: DISCONTINUED | OUTPATIENT
Start: 2020-02-09 | End: 2020-02-13

## 2020-02-09 RX ADMIN — PIPERACILLIN SODIUM AND TAZOBACTAM SODIUM 3.38 G: 36; 4.5 INJECTION, POWDER, FOR SOLUTION INTRAVENOUS at 12:11

## 2020-02-09 RX ADMIN — Medication 250 MG: at 17:40

## 2020-02-09 RX ADMIN — ENOXAPARIN SODIUM 40 MG: 40 INJECTION SUBCUTANEOUS at 08:24

## 2020-02-09 RX ADMIN — MIDODRINE HYDROCHLORIDE 15 MG: 5 TABLET ORAL at 21:36

## 2020-02-09 RX ADMIN — MIDODRINE HYDROCHLORIDE 15 MG: 5 TABLET ORAL at 12:11

## 2020-02-09 RX ADMIN — Medication 1 TABLET: at 08:23

## 2020-02-09 RX ADMIN — WHITE PETROLATUM 57.7 %-MINERAL OIL 31.9 % EYE OINTMENT: at 21:36

## 2020-02-09 RX ADMIN — PANTOPRAZOLE SODIUM 40 MG: 40 TABLET, DELAYED RELEASE ORAL at 17:40

## 2020-02-09 RX ADMIN — NYSTATIN: 100000 POWDER TOPICAL at 08:25

## 2020-02-09 RX ADMIN — PIPERACILLIN SODIUM AND TAZOBACTAM SODIUM 3.38 G: 36; 4.5 INJECTION, POWDER, FOR SOLUTION INTRAVENOUS at 06:48

## 2020-02-09 RX ADMIN — MELATONIN 1000 UNITS: at 08:24

## 2020-02-09 RX ADMIN — PIPERACILLIN SODIUM AND TAZOBACTAM SODIUM 3.38 G: 36; 4.5 INJECTION, POWDER, FOR SOLUTION INTRAVENOUS at 17:40

## 2020-02-09 RX ADMIN — DEXTRAN 70 AND HYPROMELLOSE 2910 1 DROP: 1; 3 SOLUTION/ DROPS OPHTHALMIC at 21:36

## 2020-02-09 RX ADMIN — PIPERACILLIN SODIUM AND TAZOBACTAM SODIUM 3.38 G: 36; 4.5 INJECTION, POWDER, FOR SOLUTION INTRAVENOUS at 01:01

## 2020-02-09 RX ADMIN — Medication 1 TABLET: at 08:24

## 2020-02-09 RX ADMIN — PANTOPRAZOLE SODIUM 40 MG: 40 TABLET, DELAYED RELEASE ORAL at 06:42

## 2020-02-09 RX ADMIN — IODIXANOL 90 ML: 320 INJECTION, SOLUTION INTRAVASCULAR at 00:17

## 2020-02-09 RX ADMIN — Medication 1 TABLET: at 17:40

## 2020-02-09 RX ADMIN — VITAM B12 100 MCG: 100 TAB at 08:27

## 2020-02-09 RX ADMIN — Medication 125 MG: at 21:36

## 2020-02-09 RX ADMIN — MIDODRINE HYDROCHLORIDE 15 MG: 5 TABLET ORAL at 05:00

## 2020-02-09 RX ADMIN — Medication 125 MG: at 08:26

## 2020-02-09 RX ADMIN — Medication 250 MG: at 08:23

## 2020-02-09 RX ADMIN — NYSTATIN: 100000 POWDER TOPICAL at 17:43

## 2020-02-09 NOTE — PHYSICAL THERAPY NOTE
Physical Therapy Cancellation Note      PT orders received and chart reviewed  Will hold PT eval at this time 2* to clarification on prior level of mobility and clearance for mobility due to known decubitus ulcers      Fracisco Adamson, PT

## 2020-02-09 NOTE — PROGRESS NOTES
Progress Note - Infectious Disease   Betty Young 71 y o  female MRN: [id number]  Unit/Bed#: [facility] 615-01 Encounter: [id number]      Impression/Plan:    58-year-old female patient with paraplegia secondary to spinal cord injury and multiple chronic decubitus ulcers, admitted for fever and leukocytosis, thought to be secondary to right hip wound infection with possible underlying myositis     1- sepsis, POA:  With fever of 102 1 and leukocytosis on admission  No signs of pneumonia, no signs of intra-abdominal infection  CT chest and abdomen pelvis negative for infectious focus  Patient has Hays in place and is at risk for UTI and bacteremia   But infectious source is likely the  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  Her blood cultures positive for coag-negative staph in 1/2 sets  Her urine culture is positive for Proteus and E coli  - antibiotics as below  - surgery follow-up  - close clinical monitoring, trend fever curve  - repeat CBC in a m   - follow-up final result of blood culture        2- Right hip open wound, with underlying osteomyelitis and dislocation of right femur:  Patient is known to have hip osteomyelitis and dislocation of the right femur   She was recently evaluated as outpatient by Ortho, but decision was to go with non surgical management  On admission she had fever and chills, and reports increased drainage from the right hip wound  No other infectious focus is identified, though patient has a chronic Hays catheter and is at risk for UTI  Her urine cultures positive for Proteus and E coli  Her Hays has been exchanged  CT scan on admission shows signs of osteomyelitis, underlying sinus track, but also concern for myositis of the right iliopsoas muscle   Concern for pyomyositis    Previous cultures including sacral bone culture positive for E coli and Providencia, left ischial wound culture positive for Pseudomonas and Providencia, and left femur culture positive for corynebacterium reviewed  Case discussed with General surgery and Orthopedic  No plan for girdle stone or debridement at this time  repeat CT scan abdomen pelvis with contrast done yesterday 02/09/2020 is negative for abscess, negative for myositis  Finding of chronic osteomyelitis of right proximal femur  - continue Zosyn IV   - if no plan for girdle stone procedure and flap to cover the wound, the role of long-term IV antibiotics is limited with risk of side effects from prolonged antibiotics outweighs the benefit   This has been explained at length to the patient  Will continue IV antibiotic to finish 7 days course for treatment of skin and soft tissue infection through 02/11/2020        3- history of C diff colitis:  C diff infection documented 04/2019  Patient currently has no diarrhea  - continue vancomycin p o   prophylactic dose; will likely need to prolong vancomycin p o  For 72 hours after last dose of systemic antibiotic treatment  Continue vancomycin p o  Through 02/14/2020     4- multiple antibiotic allergy:  Patient has a reported allergy to vancomycin described as rash   She is currently tolerating vancomycin p o  Patient also previously tolerated ertapenem, Zosyn and daptomycin  - continue Zosyn IV as mentioned above  - close clinical monitoring     5- macular rash over lower back: This was present before starting antibiotic therapy  Non itchy, nontender, stable in appearance  - continue local skin care  - monitor clinically for any sign of expansion or worsening of the rash     6- bacteriuria:  UA is positive for WBC, urine culture positive for Proteus   This likely represents asymptomatic bacteriuria   Hays has been exchanged  - will follow up result of blood culture   Continue antibiotics as above for possible right hip wound infection     7- coagulase negative Staph bacteremia:  Blood culture collected on admission is positive in 1/2 sets for coag-negative staph    This could be secondary to wound infection or represent pseudobacteremia and skin naun contamination   -  follow-up repeat blood culture sent 2020        Plan mentioned above discussed in details with patient  Case discussed with  primary service     Antibiotics:  IV Antibiotics day 5  Zosyn day 5  Vancomycin p o  Day 5      Subjective:  Patient has no fever, chills, sweats; no nausea, vomiting, diarrhea; no cough, shortness of breath; no pain  No new symptoms  She reports that her appetite is improving and her energy level is better  Objective:  Vitals:  Temp:  [96 8 °F (36 °C)-98 8 °F (37 1 °C)] 97 5 °F (36 4 °C)  HR:  [61-73] 61  Resp:  [18-20] 18  BP: (105-132)/(55-70) 126/63  SpO2:  [95 %-98 %] 96 %  Temp (24hrs), Av 8 °F (36 6 °C), Min:96 8 °F (36 °C), Max:98 8 °F (37 1 °C)  Current: Temperature: 97 5 °F (36 4 °C)    Physical Exam:   General Appearance:  Alert, interactive, nontoxic, no acute distress  Throat: Oropharynx moist without lesions  Lungs:   Clear to auscultation bilaterally; no wheezes, rhonchi or rales; respirations unlabored   Heart:  RRR; no murmur, rub or gallop   Abdomen:   Soft, non-tender, non-distended, positive bowel sounds  Extremities: No clubbing, cyanosis or edema  Multiple decubitus ulcers, including sacral decubitus ulcer, bilateral hip decubitus ulcers  The right hip decubitus ulcer is deep, but with no current signs of surrounding cellulitis, no purulence  Overlying dressing is dry and clean with no breakthrough bleeding or drainage   Skin: No new rashes or lesions          Labs, Imaging, & Other studies:   All pertinent labs and imaging studies were personally reviewed  Results from last 7 days   Lab Units 20  0537 20  0504 20  0857   WBC Thousand/uL 11 08* 11 84* 12 56*   HEMOGLOBIN g/dL 8 0* 7 8* 8 0*   PLATELETS Thousands/uL 624* 573* 616*     Results from last 7 days   Lab Units 20  0537 20  0504 20  0446 20  0451 02/05/20  1538   SODIUM mmol/L 137 137 133* 131* 129*   POTASSIUM mmol/L 3 5 3 2* 3 4* 3 8 4 7   CHLORIDE mmol/L 105 105 103 100 96*   CO2 mmol/L 25 24 24 22 24   BUN mg/dL 5 6 10 7 11   CREATININE mg/dL 0 51* 0 31* 0 41* 0 38* 0 47*   EGFR ml/min/1 73sq m 98 116 106 108 101   CALCIUM mg/dL 8 2* 7 8* 8 0* 8 0* 8 6   AST U/L  --   --   --  24 37   ALT U/L  --   --   --  21 25   ALK PHOS U/L  --   --   --  147* 175*     Results from last 7 days   Lab Units 02/05/20  1745 02/05/20  1544 02/05/20  1538   BLOOD CULTURE   --  Staphylococcus coagulase negative* No Growth at 72 hrs     GRAM STAIN RESULT   --  Gram positive cocci in clusters*  --    URINE CULTURE  >100,000 cfu/ml Proteus mirabilis*  50,000-59,000 cfu/ml Escherichia coli*  --   --      Results from last 7 days   Lab Units 02/05/20  1538   PROCALCITONIN ng/ml 1 25*

## 2020-02-09 NOTE — OCCUPATIONAL THERAPY NOTE
Occupational Therapy         Patient Name: Kasey Lewis  Today's Date: 2/9/2020     OT orders received and chart reviewed - pt admitted from [facility] for hypotension and worsening decubitus ulcers - will hold OT at this time pending clearance re: functional level of assist, mobility status PTA (bedrest or OOB to chair via sliding board or lift) and current restrictions for OOB activity related to decubitus ulcers        Claudeen Ares, [place]

## 2020-02-10 PROBLEM — R91.1 LUNG NODULE: Status: ACTIVE | Noted: 2020-02-10

## 2020-02-10 LAB
ANION GAP SERPL CALCULATED.3IONS-SCNC: 4 MMOL/L (ref 4–13)
BACTERIA BLD CULT: NORMAL
BUN SERPL-MCNC: 8 MG/DL (ref 5–25)
CALCIUM SERPL-MCNC: 8.1 MG/DL (ref 8.3–10.1)
CHLORIDE SERPL-SCNC: 107 MMOL/L (ref 100–108)
CO2 SERPL-SCNC: 28 MMOL/L (ref 21–32)
CREAT SERPL-MCNC: 0.38 MG/DL (ref 0.6–1.3)
ERYTHROCYTE [DISTWIDTH] IN BLOOD BY AUTOMATED COUNT: 16.1 % (ref 11.6–15.1)
GFR SERPL CREATININE-BSD FRML MDRD: 108 ML/MIN/1.73SQ M
GLUCOSE SERPL-MCNC: 88 MG/DL (ref 65–140)
HCT VFR BLD AUTO: 28.2 % (ref 34.8–46.1)
HGB BLD-MCNC: 8.3 G/DL (ref 11.5–15.4)
MCH RBC QN AUTO: 25.3 PG (ref 26.8–34.3)
MCHC RBC AUTO-ENTMCNC: 29.4 G/DL (ref 31.4–37.4)
MCV RBC AUTO: 86 FL (ref 82–98)
NT-PROBNP SERPL-MCNC: 2945 PG/ML
PLATELET # BLD AUTO: 669 THOUSANDS/UL (ref 149–390)
PMV BLD AUTO: 9.6 FL (ref 8.9–12.7)
POTASSIUM SERPL-SCNC: 3.3 MMOL/L (ref 3.5–5.3)
RBC # BLD AUTO: 3.28 MILLION/UL (ref 3.81–5.12)
SODIUM SERPL-SCNC: 139 MMOL/L (ref 136–145)
WBC # BLD AUTO: 10.86 THOUSAND/UL (ref 4.31–10.16)

## 2020-02-10 PROCEDURE — 83880 ASSAY OF NATRIURETIC PEPTIDE: CPT | Performed by: INTERNAL MEDICINE

## 2020-02-10 PROCEDURE — 99232 SBSQ HOSP IP/OBS MODERATE 35: CPT | Performed by: INTERNAL MEDICINE

## 2020-02-10 PROCEDURE — 85027 COMPLETE CBC AUTOMATED: CPT | Performed by: INTERNAL MEDICINE

## 2020-02-10 PROCEDURE — 80048 BASIC METABOLIC PNL TOTAL CA: CPT | Performed by: INTERNAL MEDICINE

## 2020-02-10 RX ORDER — POTASSIUM CHLORIDE 20 MEQ/1
40 TABLET, EXTENDED RELEASE ORAL ONCE
Status: COMPLETED | OUTPATIENT
Start: 2020-02-10 | End: 2020-02-10

## 2020-02-10 RX ORDER — BISACODYL 10 MG
10 SUPPOSITORY, RECTAL RECTAL DAILY PRN
Status: DISCONTINUED | OUTPATIENT
Start: 2020-02-10 | End: 2020-03-02 | Stop reason: HOSPADM

## 2020-02-10 RX ADMIN — Medication 250 MG: at 08:47

## 2020-02-10 RX ADMIN — WHITE PETROLATUM 57.7 %-MINERAL OIL 31.9 % EYE OINTMENT: at 21:20

## 2020-02-10 RX ADMIN — MIDODRINE HYDROCHLORIDE 15 MG: 5 TABLET ORAL at 21:19

## 2020-02-10 RX ADMIN — Medication 1 TABLET: at 08:47

## 2020-02-10 RX ADMIN — DEXTRAN 70 AND HYPROMELLOSE 2910 1 DROP: 1; 3 SOLUTION/ DROPS OPHTHALMIC at 21:18

## 2020-02-10 RX ADMIN — MIDODRINE HYDROCHLORIDE 15 MG: 5 TABLET ORAL at 05:49

## 2020-02-10 RX ADMIN — PANTOPRAZOLE SODIUM 40 MG: 40 TABLET, DELAYED RELEASE ORAL at 17:17

## 2020-02-10 RX ADMIN — NYSTATIN: 100000 POWDER TOPICAL at 17:17

## 2020-02-10 RX ADMIN — PIPERACILLIN SODIUM AND TAZOBACTAM SODIUM 3.38 G: 36; 4.5 INJECTION, POWDER, FOR SOLUTION INTRAVENOUS at 11:25

## 2020-02-10 RX ADMIN — NYSTATIN: 100000 POWDER TOPICAL at 08:55

## 2020-02-10 RX ADMIN — POTASSIUM CHLORIDE 40 MEQ: 1500 TABLET, EXTENDED RELEASE ORAL at 08:47

## 2020-02-10 RX ADMIN — PIPERACILLIN SODIUM AND TAZOBACTAM SODIUM 3.38 G: 36; 4.5 INJECTION, POWDER, FOR SOLUTION INTRAVENOUS at 05:49

## 2020-02-10 RX ADMIN — PIPERACILLIN SODIUM AND TAZOBACTAM SODIUM 3.38 G: 36; 4.5 INJECTION, POWDER, FOR SOLUTION INTRAVENOUS at 23:57

## 2020-02-10 RX ADMIN — MELATONIN 1000 UNITS: at 08:47

## 2020-02-10 RX ADMIN — Medication 125 MG: at 08:47

## 2020-02-10 RX ADMIN — PIPERACILLIN SODIUM AND TAZOBACTAM SODIUM 3.38 G: 36; 4.5 INJECTION, POWDER, FOR SOLUTION INTRAVENOUS at 00:35

## 2020-02-10 RX ADMIN — ENOXAPARIN SODIUM 40 MG: 40 INJECTION SUBCUTANEOUS at 08:47

## 2020-02-10 RX ADMIN — Medication 250 MG: at 17:17

## 2020-02-10 RX ADMIN — MIDODRINE HYDROCHLORIDE 15 MG: 5 TABLET ORAL at 13:36

## 2020-02-10 RX ADMIN — PIPERACILLIN SODIUM AND TAZOBACTAM SODIUM 3.38 G: 36; 4.5 INJECTION, POWDER, FOR SOLUTION INTRAVENOUS at 17:17

## 2020-02-10 RX ADMIN — Medication 125 MG: at 21:19

## 2020-02-10 RX ADMIN — PANTOPRAZOLE SODIUM 40 MG: 40 TABLET, DELAYED RELEASE ORAL at 06:07

## 2020-02-10 RX ADMIN — Medication 1 TABLET: at 17:17

## 2020-02-10 RX ADMIN — VITAM B12 100 MCG: 100 TAB at 08:48

## 2020-02-10 NOTE — ASSESSMENT & PLAN NOTE
Malnutrition Findings:   Malnutrition type: Chronic illness(Severe pro/sara malnutrition r/t disease/condition as evidenced by 12% unintentional wt loss since 11/10/19, consuming < 75% energy intake compared to est energy needs > 1 month, fat/muscle wasting of orbital/temple areas  Treated with diet/supplements)  Degree of Malnutrition: Other severe protein calorie malnutrition    BMI Findings:  BMI Classifications: Underweight < 18 5     Body mass index is 17 97 kg/m²  Patient does admit to depression  Pastoral care consulted  She may benefit from counseling at discharge  Would recommend supplementation as above

## 2020-02-10 NOTE — ASSESSMENT & PLAN NOTE
· POA with fever and leukocytosis with infectious source likely being R hip wound with underlying iliopsoas myositis  No signs of PNA or intraabdominal infection  Review of repeat CT scan shows known osteomyelitis of the right hip with dislocation  Pockets of gas have decreased since the initial consult  No evidence for abscesses  · CT C/A/P negative for infectious focus  Repeat CT shows no evidence for abscesses  · Blood cultures 1/2 positive for coag-neg staph, other negative  Continue to monitor blood cultures  · Urine culture + for proteus and e coli; likely asymptomatic bacteriuria per ID; isabel exchanged   · Continue IV abx per ID recommendations complete 10 days total   Preference for surgical intervention for definitive source control  · Continue to monitor closely  · ID recommended repeat CT scan  WITH contrast (will need to premedicate, d/w patient) to better evaluate area and see if patient would potentially be candidate for IR evaluation  No focus for interventional radiology intervention  Continue IV antibiotics  Attempt to collaborate surgical plan with ID recommendations in [facility] [place]   · Surgery and orthopedics not currently offering any surgical management

## 2020-02-10 NOTE — ASSESSMENT & PLAN NOTE
Patient with a known past medical history for paraplegia following spinal cord injury presents with increased drainage of R hip wound  · Known to history of hip osteo and dislocation of the R femur  Patient is nonweightbearing  On admission had fever, chills  · CT repeated 2/8 and compared to February 5, 2020 showing improvement in gas pockets  · Ortho following; no plans for any surgical intervention at this time  · General surgery following: continue localized wound care  No plans for any surgical intervention at this time  Id feels that if surgery is not performed that antibiotic therapy will not be sufficient to maintain patient's health  · ID following: continue IV abx for now  repeat CT scan to evaluate for signs of pyomyositis as initial CT scan done on admission shows small locules of gas and edema in right iliopsoas muscle, and was not able to exclude possibility of pyomyositis  CT scan shows improvement  · Per ID: "if no plan for girdle stone procedure and flap to cover the wound, the role of long-term IV antibiotics is limited with risk of side effects from prolonged antibiotics outweighs the benefit   This has been explained at length to the patient  Will likely continue IV antibiotic to finish 10 days course for treatment of skin and soft tissue infection  "  · Continue local wound care  Patient reports depression over her current status  She is agreeable to speak with the spiritual Care Team and would like outpatient referral   She states that her goal will be to get back to her home but at this time she is in the skilled nursing facility setting

## 2020-02-10 NOTE — ASSESSMENT & PLAN NOTE
· Patient with healed scars to the sacral area, reviewed Wound care's consultation  Present on admission left ischial ulcer stage III  Continue local care for excoriation and red scan as well as frequent turning of the patient  Also has wound to the right thigh over the hip which has osteomyelitis  No surgical intervention being recommended at this time however definitive treatment may not be achieved with IV antibiotics per ID  · ID following

## 2020-02-10 NOTE — ASSESSMENT & PLAN NOTE
Patient was noted to have fecal impaction on CT scan  Had a large bowel movement today consistent with constipation  No concern for C diff colitis  Continue with prophylactic vancomycin as noted below  Patient refusing suppository and do collapse  Will make these p r n  · With infection in April of 2019  · Currently without any complaints of diarrhea  · Continue vancomycin prophylactics, will likely need to continue for 72 hours after last dose of systemic antibiotics

## 2020-02-10 NOTE — SOCIAL WORK
CM met w/pt @ bedside to discuss dcp  Pt states she does not have POA  Pt reported that she lives alone in a 1st floor apartment [address]  Pt has limited supports at home  Pt reports she drives at baseline  Pt reports having wheelchair, shower chair, raised toilet seat, sliding board  Uses [facility]  Pt presently admitted from [facility]  Pt advised CM that she is undecided regarding whether she plans to return, but may return pending PT/OT eval  Pt acknowledged that she is not at her baseline with providing care for herself, transfers  [facility] referral sent to [facility] for resumption of care w/pt's consent  CM t/c [place] @ [facility] who advised pt receives assistance with bathing, dressing and care  Pt is wheelchair bound, requires assistance with transfers to and from chair and currently does not get out of bed  Pt has large pressure ulcers  Per [place], pt does not participate in rehab at [facility], however believes pt has some rehab days remaining  Pt reports no emergency contact  Pt will need assistance with transportation @ discharge  CM following for d/c     CM reviewed d/c planning process including the following: identifying help at home, patient preference for d/c planning needs, Discharge Lounge, [facility] Meds to Bed program, availability of treatment team to discuss questions or concerns patient and/or family may have regarding understanding medications and recognizing signs and symptoms once discharged  CM also encouraged patient to follow up with all recommended appointments after discharge  Patient advised of importance for patient and family to participate in managing patients medical well being

## 2020-02-10 NOTE — ASSESSMENT & PLAN NOTE
· POA with fever and leukocytosis with infectious source likely being R hip wound with underlying iliopsoas myositis  No signs of PNA or intra-abdominal infection  Review of repeat CT scan shows known osteomyelitis of the right hip with dislocation  Pockets of gas have decreased since the initial consult  No evidence for abscesses  · Repeat CT shows no evidence for abscesses  · Blood cultures 1/2 positive for coag-neg staph, other negative  Continue to monitor blood cultures  · Urine culture + for proteus and e coli; likely asymptomatic bacteriuria per ID; isabel exchanged   · Continue IV abx per ID recommendations complete 10 days total (thru 2/11)      · ID with preference for surgical intervention for definitive source control however, no focus for interventional radiology intervention and surgery and orthopedics not currently offering any surgical management   · Needs PT/OT and likely rehab

## 2020-02-10 NOTE — ASSESSMENT & PLAN NOTE
Patient does report depression over continued chronic hospital care and chronic illness  She is open to sputum care consultation  She may benefit from outpatient counseling  She states she would be agreeable to possible outpatient counseling for 1 or 2 sessions but not for ever  She raised the issue herself  Her affect is rather flat  She did however smile and interact later in the conversation as we talked about her feelings and not knowing what the future holds    · Spiritual care consult for now

## 2020-02-10 NOTE — PROGRESS NOTES
Progress Note - Infectious Disease   Frank Lenz 71 y o  female MRN: [id number]  Unit/Bed#: [facility] 615-01 Encounter: [id number]      Impression/Plan:    70-year-old female patient with paraplegia secondary to spinal cord injury and multiple chronic decubitus ulcers, admitted for fever and leukocytosis, thought to be secondary to right hip wound infection      1- sepsis, POA:  With fever of 102 1 and leukocytosis on admission  No signs of pneumonia, no signs of intra-abdominal infection  CT chest and abdomen pelvis negative for infectious focus  Patient has Hays in place and is at risk for UTI and   but infectious source is likely the  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  Her blood cultures positive for coag-negative staph in 1/2 sets  Her urine culture is positive for Proteus and E coli  She is currently on Zosyn IV which she is tolerating well, drainage through right hip wound has significantly improved and repeat CT scan ruled out any abscess or collection  - antibiotics as below  - surgery follow-up  - close clinical monitoring, trend fever curve  - repeat CBC in a m   - follow-up final result of blood culture sent yesterday 02/09/2020        2- Right hip open wound, with underlying osteomyelitis and dislocation of right femur:  Patient is known to have hip osteomyelitis and dislocation of the right femur   She was recently evaluated as outpatient by Ortho, but decision was to go with non surgical management  On admission she had fever and chills, and reports increased drainage from the right hip wound  No other infectious focus is identified, though patient has a chronic Hays catheter and is at risk for UTI   Her urine cultures positive for Proteus and E coli   Her Hays has been exchanged    CT scan on admission shows signs of osteomyelitis, underlying sinus track, but also concern for myositis of the right iliopsoas muscle   Concern for pyomyositis    Previous cultures including sacral bone culture positive for E coli and Providencia, left ischial wound culture positive for Pseudomonas and Providencia, and left femur culture positive for corynebacterium reviewed  Case discussed with General surgery and Orthopedic   No plan for girdle stone or debridement at this time   repeat CT scan abdomen pelvis with contrast done  02/09/2020 is negative for abscess, negative for myositis  Finding of chronic osteomyelitis of right proximal femur  - continue Zosyn IV for today  - if no plan for girdle stone procedure and flap to cover the wound, the role of long-term IV antibiotics is limited with risk of side effects from prolonged antibiotics outweighs the benefit   This has been explained at length to the patient   Will continue IV antibiotic to finish 7 days course for treatment of skin and soft tissue infection through 02/11/2020        3- history of C diff colitis:  C diff infection documented 04/2019  Patient currently has no diarrhea  - continue vancomycin p o   prophylactic dose; will likely need to prolong vancomycin p o  For 72 hours after last dose of systemic antibiotic treatment  Continue vancomycin p o  Through 02/14/2020     4- multiple antibiotic allergy:  Patient has a reported allergy to vancomycin described as rash   She is currently tolerating vancomycin p o  Patient also previously tolerated ertapenem, Zosyn and daptomycin  - continue Zosyn IV as mentioned above  - close clinical monitoring     5- macular rash over lower back:   This was present before starting antibiotic therapy  Non itchy, nontender, stable in appearance  - continue local skin care  - monitor clinically for any sign of expansion or worsening of the rash     6- bacteriuria:  UA is positive for WBC, urine culture positive for Proteus   This likely represents asymptomatic bacteriuria   Hays has been exchanged  - will follow up result of blood culture   Continue antibiotics as above for possible right hip wound infection     7- coagulase negative Staph bacteremia:  Blood culture collected on admission is positive in 1/2 sets for coag-negative staph  This could be secondary to wound infection or represent pseudobacteremia and skin naun contamination  -  follow-up repeat blood culture sent 2020        Plan mentioned above discussed in details with patient  Case discussed with  primary service     Antibiotics:  IV Antibiotics day 6  Zosyn day 6  Vancomycin p o  Day 6    Subjective:  Patient has no fever, chills, sweats; no nausea, vomiting, diarrhea; no cough, shortness of breath; no pain  No new symptoms  Patient reports improvement in appetite, she denies pain, reports improvement in her energy level  Objective:  Vitals:  Temp:  [97 7 °F (36 5 °C)-97 8 °F (36 6 °C)] 97 8 °F (36 6 °C)  HR:  [61-73] 67  Resp:  [16-18] 16  BP: (116-138)/(52-67) 138/64  SpO2:  [95 %-98 %] 98 %  Temp (24hrs), Av 8 °F (36 6 °C), Min:97 7 °F (36 5 °C), Max:97 8 °F (36 6 °C)  Current: Temperature: 97 8 °F (36 6 °C)    Physical Exam:   General Appearance:  Alert, interactive, nontoxic, no acute distress  Throat: Oropharynx moist without lesions  Lungs:   Clear to auscultation bilaterally; no wheezes, rhonchi or rales; respirations unlabored   Heart:  RRR; no murmur, rub or gallop   Abdomen:   Soft, non-tender, non-distended, positive bowel sounds  Extremities: No clubbing, cyanosis or edema  No surya wound erythema, dressings have no breakthrough bleeding or drainage   Skin: No new rashes or lesions           Labs, Imaging, & Other studies:   All pertinent labs and imaging studies were personally reviewed  Results from last 7 days   Lab Units 02/10/20  0624 20  0537 20  0504   WBC Thousand/uL 10 86* 11 08* 11 84*   HEMOGLOBIN g/dL 8 3* 8 0* 7 8*   PLATELETS Thousands/uL 669* 624* 573*     Results from last 7 days   Lab Units 02/10/20  0624 20  0537 20  0504  20  0451 20  1538   SODIUM mmol/L 139 137 137   < > 131* 129*   POTASSIUM mmol/L 3 3* 3 5 3 2*   < > 3 8 4 7   CHLORIDE mmol/L 107 105 105   < > 100 96*   CO2 mmol/L 28 25 24   < > 22 24   BUN mg/dL 8 5 6   < > 7 11   CREATININE mg/dL 0 38* 0 51* 0 31*   < > 0 38* 0 47*   EGFR ml/min/1 73sq m 108 98 116   < > 108 101   CALCIUM mg/dL 8 1* 8 2* 7 8*   < > 8 0* 8 6   AST U/L  --   --   --   --  24 37   ALT U/L  --   --   --   --  21 25   ALK PHOS U/L  --   --   --   --  147* 175*    < > = values in this interval not displayed  Results from last 7 days   Lab Units 02/09/20  0829 02/05/20  1745 02/05/20  1544 02/05/20  1538   BLOOD CULTURE  Received in Microbiology Lab  Culture in Progress  Received in Microbiology Lab  Culture in Progress  --  Staphylococcus coagulase negative* No Growth After 4 Days     GRAM STAIN RESULT   --   --  Gram positive cocci in clusters*  --    URINE CULTURE   --  >100,000 cfu/ml Proteus mirabilis*  50,000-59,000 cfu/ml Escherichia coli*  --   --      Results from last 7 days   Lab Units 02/05/20  1538   PROCALCITONIN ng/ml 1 25*

## 2020-02-10 NOTE — ASSESSMENT & PLAN NOTE
· History of neurogenic bladder with chronic indwelling Hays catheter, exchanged this admission due to finding of E coli and Proteus in the urine culture    · Urine culture noted, likely represents asymptomatic bacteriuria per infectious disease

## 2020-02-10 NOTE — ASSESSMENT & PLAN NOTE
· Patient with healed scars to the sacral area, reviewed Wound care's consultation  Present on admission left ischial ulcer stage III    · Continue local care for excoriation and red scan as well as frequent turning of the patient  Also has wound to the right thigh over the hip which has osteomyelitis  No surgical intervention being recommended at this time however definitive treatment may not be achieved with IV antibiotics per ID  · ID following

## 2020-02-10 NOTE — INCIDENTAL FINDINGS
The following findings require follow up:  Radiographic finding   Finding: Lung nodule, left upper lobe, 2mm   Follow up required: Repeat CT scan 6 months   Follow up should be done within 6 month(s)    Please notify the following clinician to assist with the follow up:   Dr Asim Silva

## 2020-02-10 NOTE — PROGRESS NOTES
Progress Note - Stella Shaw 1950, 71 y o  female MRN: [id number]    Unit/Bed#: [facility] 122-89 Encounter: [id number]    Primary Care Provider: Elliot Lee   Date and time admitted to hospital: 2/5/2020  3:25 PM        * Sepsis [facility])  Assessment & Plan  · POA with fever and leukocytosis with infectious source likely being R hip wound with underlying iliopsoas myositis  No signs of PNA or intraabdominal infection  Review of repeat CT scan shows known osteomyelitis of the right hip with dislocation  Pockets of gas have decreased since the initial consult  No evidence for abscesses  · CT C/A/P negative for infectious focus  Repeat CT shows no evidence for abscesses  · Blood cultures 1/2 positive for coag-neg staph, other negative  Continue to monitor blood cultures  · Urine culture + for proteus and e coli; likely asymptomatic bacteriuria per ID; isabel exchanged   · Continue IV abx per ID recommendations complete 10 days total   Preference for surgical intervention for definitive source control  · Continue to monitor closely  · ID recommended repeat CT scan  WITH contrast (will need to premedicate, d/w patient) to better evaluate area and see if patient would potentially be candidate for IR evaluation  No focus for interventional radiology intervention  Continue IV antibiotics  Attempt to collaborate surgical plan with ID recommendations in a joseph Mitchell Given · Surgery and orthopedics not currently offering any surgical management     Paraplegia following spinal cord injury [facility])  Assessment & Plan  · History of spinal cord injury T8 with paraplegia  · Presented from [facility], patient reports that she was living in her own home prior to that  Unclear how successfully that was going on  · Continue with repositioning and wound care  Decubitus ulcer of left ischium, stage III [facility])  Assessment & Plan  · Patient with healed scars to the sacral area, reviewed Wound care's consultation  Present on admission left ischial ulcer stage III  Continue local care for excoriation and red scan as well as frequent turning of the patient  Also has wound to the right thigh over the hip which has osteomyelitis  No surgical intervention being recommended at this time however definitive treatment may not be achieved with IV antibiotics per ID  · ID following  Abnormal CT of the chest  Assessment & Plan  CT scan of the chest reports possible heart failure as it relates to ground-glass appearance on the left lung  Also has left lower lobe consolidation that is concerning for atelectasis versus infection  Patient with no history of heart failure  Clinical exam not consistent with heart failure  ·  Will add BMP to a m  Labs and decide on further intervention  · Currently not on IV fluids  · Continue with current antibiotics per ID  · Check ProBNP- if elevated consider EcHO, hold diuretics for now  Follow exam  · Add incentive spirometry  Depression  Assessment & Plan  Patient does report depression over continued chronic hospital care and chronic illness  She is open to sputum care consultation  She may benefit from outpatient counseling  She states she would be agreeable to possible outpatient counseling for 1 or 2 sessions but not for ever  She raised the issue herself  Her affect is rather flat  She did however smile and interact later in the conversation as we talked about her feelings and not knowing what the future holds  · Spiritual care consult for now    Severe protein-calorie malnutrition [facility])  Assessment & Plan  Malnutrition Findings:   Malnutrition type: Chronic illness(Severe pro/sara malnutrition r/t disease/condition as evidenced by 12% unintentional wt loss since 11/10/19, consuming < 75% energy intake compared to est energy needs > 1 month, fat/muscle wasting of orbital/temple areas   Treated with diet/supplements)  Degree of Malnutrition: Other severe protein calorie malnutrition    BMI Findings:  BMI Classifications: Underweight < 18 5     Body mass index is 17 97 kg/m²  Patient does admit to depression  I have reached out to superior to care for her  She may benefit from counseling at discharge  Would recommend supplementation as above  Coagulase negative Staphylococcus bacteremia  Assessment & Plan  · Could be secondary to wound infection or contaminant  · Infectious Disease following  · Continue antibiotics as above  · Repeat blood cultures ordered, follow-up    Open wound of right hip  Assessment & Plan  Patient with a known past medical history for paraplegia following spinal cord injury presents with  · Known to history of hip osteo and dislocation of the R femur  Patient is nonweightbearing  On admission had fever, chills and increased drainge from R hip wound, CT repeated and compared to February 5, 2020 showing all of the above but mentions improvement in gas pockets  · Ortho following; no plans for any surgical intervention at this time  · General surgery following: continue localized wound care  No plans for any surgical intervention at this time  Id feels that if surgery is not performed that antibiotic therapy will not be sufficient to maintain patient's health  · ID following: continue IV abx for now  repeat CT scan to evaluate for signs of pyomyositis as initial CT scan done on admission shows small locules of gas and edema in right iliopsoas muscle, and was not able to exclude possibility of pyomyositis  CT scan shows improvement  Will ask team to collaborate with ID and surgical team regarding definitive therapy as at this time it was felt that chronic IV antibiotics will not solve this problem  · Per ID: "if no plan for girdle stone procedure and flap to cover the wound, the role of long-term IV antibiotics is limited with risk of side effects from prolonged antibiotics outweighs the benefit   This has been explained at length to the patient  Will likely continue IV antibiotic to finish 10 days course for treatment of skin and soft tissue infection  "  · Continue local wound care  Patient reports depression over her current status  She is agreeable to speak with the spiritual Care Team and would like outpatient referral   She states that her goal will be to get back to her home but at this time she is in the skilled nursing facility setting  History of Clostridium difficile infection  Assessment & Plan  Patient was noted to have fecal impaction on CT scan  Had a large bowel movement today consistent with constipation  No concern for C diff colitis  Continue with prophylactic vancomycin as noted below  Patient refusing suppository and do collapse  Will make these p r n  · With infection in April of 2019  · Currently without any complaints of diarrhea  · Continue vancomycin prophylactics, will likely need to continue for 72 hours after last dose of systemic antibiotics  Hypotension  Assessment & Plan  · Chronic low blood pressures on midodrine, overall stable  Neurogenic bladder  Assessment & Plan  · History of neurogenic bladder with chronic indwelling Hays catheter, exchanged this admission due to finding of E coli and Proteus in the urine culture  · Urine culture noted, likely represents asymptomatic bacteriuria per infectious disease      Anemia of chronic disease  Assessment & Plan  · Labs appear consistent with anemia of chronic disease, continue to monitor closely  Hemoglobin stable at 8 0 on 02/29/2020  Continue monitor serial laboratories    Lung nodule:  Left upper lobe 2 mm, new  Patient will need 6 months CT scan to review  Abnormality of the uterine lining:  Patient will need pelvic ultrasound  At this time she states that she does not really want to deal with that  Would recommend obtaining this in the next 2 weeks if not during this hospital stay        VTE Pharmacologic Prophylaxis:   Pharmacologic: Enoxaparin (Lovenox)  Mechanical: Mechanical VTE prophylaxis in place  Patient Centered Rounds: I have performed bedside rounds with nursing staff today  Discussions with Specialists or Other Care Team Provider:  Reviewed ID notes, reviewed radiology note  Education and Discussions with Family / Patient:  Updated patient, she states there is no family member to update  Time Spent for Care: 45 minutes  If More than 50% of total time spent on counseling and coordination of care as described above indicate yes or no and described the counseling and coordination:  No    Current Length of Stay: 4 day(s)  Current Patient Status: Inpatient   Certification Statement: The patient will continue to require additional inpatient hospital stay due to Continue antibiotics and monitoring    Discharge Plan:  Patient will likely need rehab again although she states her goal would be to get to her home  Code Status: Level 1 - Full Code    Subjective:   Patient with flattened affect  Does report that she is depressed over her current continued chronic medical illness  Updated patient on her scans  She does appear to understand the medical significance of these findings  Set with patient and offered support  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  Patient does have a friend who is also disabled and can relate to her medical illness  Patient agreeable and would like to meet with severe to care also open to outpatient therapy for depression on discharge  Objective:   Vitals:   Temp (24hrs), Av 5 °F (36 4 °C), Min:97 3 °F (36 3 °C), Max:97 7 °F (36 5 °C)    Temp:  [97 3 °F (36 3 °C)-97 7 °F (36 5 °C)] 97 7 °F (36 5 °C)  HR:  [61-73] 65  Resp:  [18-19] 18  BP: (105-126)/(52-65) 121/65  SpO2:  [95 %-97 %] 96 %  Body mass index is 17 97 kg/m²  Input and Output Summary (last 24 hours):        Intake/Output Summary (Last 24 hours) at 2020  Last data filed at 2020 1505  Gross per 24 hour Intake 860 ml   Output 1650 ml   Net -790 ml       Physical Exam:  Generally ill-appearing female thin with loss muscle and fat in the face and upper neck  Pupils equal round reactive to light extraocular muscles intact mucous membranes are moist neck is supple there is no JVD no lymph nodes no carotid bruits chest is decreased but clear to auscultation and appreciate any rhonchi rales or wheezes  Cardiovascular regular rate rhythm positive S1 and S2 heard appreciate an S3-S4 murmur or gallop  Abdomen is soft nontender nondistended with positive bowel sounds no hepatosplenomegaly no guarding or rebound  Neurologically patient awake alert oriented cranial nerves 2-12 appear to be intact, patient is paraplegic from a spinal cord injury therefore lower extremities are contracted, did not open newly dressed wounds  Reviewed chart history regarding current wounds    Physical Exam    Additional Data:   Labs:  Results from last 7 days   Lab Units 02/09/20  0537   WBC Thousand/uL 11 08*   HEMOGLOBIN g/dL 8 0*   HEMATOCRIT % 27 0*   PLATELETS Thousands/uL 624*   NEUTROS PCT % 80*   LYMPHS PCT % 16   MONOS PCT % 3*   EOS PCT % 0     Results from last 7 days   Lab Units 02/09/20  0537  02/06/20  0451   POTASSIUM mmol/L 3 5   < > 3 8   CHLORIDE mmol/L 105   < > 100   CO2 mmol/L 25   < > 22   BUN mg/dL 5   < > 7   CREATININE mg/dL 0 51*   < > 0 38*   CALCIUM mg/dL 8 2*   < > 8 0*   ALK PHOS U/L  --   --  147*   ALT U/L  --   --  21   AST U/L  --   --  24    < > = values in this interval not displayed  Results from last 7 days   Lab Units 02/05/20  1538   INR  1 42*       * I Have Reviewed All Lab Data Listed Above  * Additional Pertinent Lab Tests Reviewed: All Labs Within Last 24 Hours Reviewed    Imaging:    Imaging Reports Reviewed Today Include:  Reviewed CT scan of the abdomen and pelvis    Cultures:   Blood Culture:   Lab Results   Component Value Date    BLOODCX Received in Microbiology Lab  Culture in Progress  02/09/2020    BLOODCX Received in Microbiology Lab  Culture in Progress  02/09/2020    BLOODCX Staphylococcus coagulase negative (A) 02/05/2020    BLOODCX No Growth After 4 Days  02/05/2020    BLOODCX Staphylococcus coagulase negative (A) 11/10/2019    BLOODCX No Growth After 5 Days  11/10/2019    BLOODCX No Growth After 5 Days  01/06/2019    BLOODCX No Growth After 5 Days   01/06/2019     Urine Culture:   Lab Results   Component Value Date    URINECX >100,000 cfu/ml Proteus mirabilis (A) 02/05/2020    URINECX 50,000-59,000 cfu/ml Escherichia coli (A) 02/05/2020    URINECX >100,000 cfu/ml Proteus mirabilis (A) 11/10/2019    URINECX 50,000-59,000 cfu/ml Alcaligenes faecalis (A) 11/10/2019    URINECX >100,000 cfu/ml Pseudomonas aeruginosa (A) 11/10/2019    URINECX >100,000 cfu/ml Escherichia coli (A) 01/06/2019    URINECX >100,000 cfu/ml Providencia rettgeri (A) 01/06/2019     Sputum Culture: No components found for: SPUTUMCX  Wound Culture:   Lab Results   Component Value Date    WOUNDCULT 3+ Growth of Pseudomonas aeruginosa (A) 11/10/2019    WOUNDCULT 2+ Growth of Providencia stuartii (A) 11/10/2019    WOUNDCULT 2+ Growth of Diphtheroids 11/10/2019    WOUNDCULT Few Colonies of Pseudomonas aeruginosa (A) 01/06/2019    WOUNDCULT Few Colonies of Escherichia coli (A) 01/06/2019    WOUNDCULT Few Colonies of Pseudomonas aeruginosa (A) 01/06/2019    WOUNDCULT 4+ Growth of Pseudomonas aeruginosa (A) 01/06/2019    WOUNDCULT 3+ Growth of  01/06/2019       Last 24 Hours Medication List:     Current Facility-Administered Medications:  acetaminophen 650 mg Oral Q6H PRN Shikha Toth MD    artificial tear  Both Eyes HS Maurilio Cogan, MD    bisacodyl 10 mg Rectal Daily PRN Robert Dsouza MD    calcium carbonate-vitamin D 1 tablet Oral BID With Meals Shikha Toth MD    cholecalciferol 1,000 Units Oral Daily Shikha Toth MD    cyanocobalamin 100 mcg Oral Daily Shikah Toth MD    dextran 70-hypromellose 1 drop Both Eyes Q3H PRN Clifm Eye, ANDRE    diphenhydrAMINE 50 mg Oral 60 Min Pre-Op Sagrario Dumont PA-C    enoxaparin 40 mg Subcutaneous Daily Daisy Friedman MD    famotidine 20 mg Oral BID PRN Jade Wooten MD    midodrine 15 mg Oral Q8H Daisy Friedman MD    multivitamin-minerals 1 tablet Oral Daily Daisy Friedman MD    nystatin  Topical BID Daisy Friedman MD    ondansetron 4 mg Intravenous Q6H PRN Daisy Friedman MD    pantoprazole 40 mg Oral BID AC Jade Wooten MD    piperacillin-tazobactam 3 375 g Intravenous Q6H Daisy Friedman MD Last Rate: 3 375 g (02/09/20 1740)   polyethylene glycol 17 g Oral Daily Daisy Friedman MD    saccharomyces boulardii 250 mg Oral BID Daisy Friedman MD    senna 1 tablet Oral HS PRN Niecy Rajan MD    vancomycin 125 mg Oral Q12H [facility] Daisy Friedman MD      Facility-Administered Medications Ordered in Other Encounters:  dexamethasone 4 mg Intravenous Once PRN Dayana President, DO    diphenhydrAMINE 12 5 mg Intravenous Once Dayana President, DO    lactated ringers 75 mL/hr Intravenous Continuous Dayana President, DO Last Rate: 75 mL/hr (11/10/19 2105)   lactated ringers 50 mL/hr Intravenous Continuous Jose Weiner CRNA    lactated ringers 75 mL/hr Intravenous Continuous Dayana Posada,  Last Rate: Stopped (03/20/19 1841)   lactated ringers 75 mL/hr Intravenous Continuous Sergio Matthews MD Last Rate: Stopped (03/20/19 1842)   ondansetron 4 mg Intravenous Once PRN Anna Zhou MD    promethazine 12 5 mg Intravenous Once PRN Anna Zhou MD         Today, Patient Was Seen By: Niecy Rajan MD    ** Please Note: Dragon 360 Dictation voice to text software may have been used in the creation of this document   **

## 2020-02-10 NOTE — PLAN OF CARE
Problem: Potential for Falls  Goal: Patient will remain free of falls  Description  INTERVENTIONS:  - Assess patient frequently for physical needs  -  Identify cognitive and physical deficits and behaviors that affect risk of falls    -  [place] fall precautions as indicated by assessment   - Educate patient/family on patient safety including physical limitations  - Instruct patient to call for assistance with activity based on assessment  - Modify environment to reduce risk of injury  - Consider OT/PT consult to assist with strengthening/mobility  Outcome: Progressing

## 2020-02-10 NOTE — PROGRESS NOTES
Progress Note - Glena Goldmann 1950, 71 y o  female MRN: [id number]    Unit/Bed#: [facility] 345-74 Encounter: [id number]    Primary Care Provider: Tory Norris   Date and time admitted to hospital: 2/5/2020  3:25 PM    * Sepsis [facility])  Assessment & Plan  · POA with fever and leukocytosis with infectious source likely being R hip wound with underlying iliopsoas myositis  No signs of PNA or intra-abdominal infection  Review of repeat CT scan shows known osteomyelitis of the right hip with dislocation  Pockets of gas have decreased since the initial consult  No evidence for abscesses  · Repeat CT shows no evidence for abscesses  · Blood cultures 1/2 positive for coag-neg staph, other negative  Continue to monitor blood cultures  · Urine culture + for proteus and e coli; likely asymptomatic bacteriuria per ID; isabel exchanged   · Continue IV abx per ID recommendations complete 10 days total (thru 2/11)  · ID with preference for surgical intervention for definitive source control however, no focus for interventional radiology intervention and surgery and orthopedics not currently offering any surgical management   · Needs PT/OT and likely rehab    Open wound of right hip  Assessment & Plan  Patient with a known past medical history for paraplegia following spinal cord injury presents with increased drainage of R hip wound  · Known to history of hip osteo and dislocation of the R femur  Patient is nonweightbearing  On admission had fever, chills  · CT repeated 2/8 and compared to February 5, 2020 showing improvement in gas pockets  · Ortho following; no plans for any surgical intervention at this time  · General surgery following: continue localized wound care  No plans for any surgical intervention at this time  Id feels that if surgery is not performed that antibiotic therapy will not be sufficient to maintain patient's health  · ID following: continue IV abx for now   repeat CT scan to evaluate for signs of pyomyositis as initial CT scan done on admission shows small locules of gas and edema in right iliopsoas muscle, and was not able to exclude possibility of pyomyositis  CT scan shows improvement  · Per ID: "if no plan for girdle stone procedure and flap to cover the wound, the role of long-term IV antibiotics is limited with risk of side effects from prolonged antibiotics outweighs the benefit   This has been explained at length to the patient  Will likely continue IV antibiotic to finish 10 days course for treatment of skin and soft tissue infection  "  · Continue local wound care  Patient reports depression over her current status  She is agreeable to speak with the spiritual Care Team and would like outpatient referral   She states that her goal will be to get back to her home but at this time she is in the skilled nursing facility setting  Decubitus ulcer of left ischium, stage III [facility])  Assessment & Plan  · Patient with healed scars to the sacral area, reviewed Wound care's consultation  Present on admission left ischial ulcer stage III    · Continue local care for excoriation and red scan as well as frequent turning of the patient  Also has wound to the right thigh over the hip which has osteomyelitis  No surgical intervention being recommended at this time however definitive treatment may not be achieved with IV antibiotics per ID  · ID following  Paraplegia following spinal cord injury [facility])  Assessment & Plan  · History of spinal cord injury T8 with paraplegia  · Presented from [email or web address], patient reports that she was living in her own home prior to that  Unclear how successfully that was going--asked CM to follow up   · Continue with repositioning and wound care  Abnormal CT of the chest  Assessment & Plan  CT scan of the chest reports possible heart failure as it relates to ground-glass appearance on the left lung    Also has left lower lobe consolidation that is concerning for atelectasis versus infection  Patient with no history of heart failure  Clinical exam not consistent with heart failure  · BNP is elevated   · Currently not on IV fluids  · Continue with current antibiotics per ID  · Will check routine echo  · Defer diuretics for now as does not examine overloaded   · Add incentive spirometry  Coagulase negative Staphylococcus bacteremia  Assessment & Plan  · Could be secondary to wound infection or contaminant  · Infectious Disease following  · Continue antibiotics as above  · Repeat blood cultures ordered, follow-up    History of Clostridium difficile infection  Assessment & Plan  Patient was noted to have fecal impaction on CT scan  Had a large bowel movement today consistent with constipation  No concern for C diff colitis  Continue with prophylactic vancomycin as noted below  Patient refusing suppository and do collapse  Will make these p r n  · With infection in April of 2019  · Currently without any complaints of diarrhea  · Continue vancomycin prophylactics, will need to continue for 72 hours after last dose of systemic antibiotics thru 2/14    Hypotension  Assessment & Plan  · Chronic low blood pressures on midodrine, overall stable  Neurogenic bladder  Assessment & Plan  · History of neurogenic bladder with chronic indwelling [place] catheter, exchanged this admission due to finding of E coli and Proteus in the urine culture  · Urine culture noted, likely represents asymptomatic bacteriuria per infectious disease      Anemia of chronic disease  Assessment & Plan  · Labs appear consistent with anemia of chronic disease, continue to monitor closely    Hemoglobin stable     Fecal retention  Assessment & Plan  · Noted on CT scan yesterday  · Had large BM  · Monitor     Lung nodule  Assessment & Plan  · Noted, will require f/u     Depression  Assessment & Plan  Patient does report depression over continued chronic hospital care and chronic illness  Pastoral care to speak with patient  She may benefit from outpatient counseling  She states she would be agreeable to possible outpatient counseling for 1 or 2 sessions but not forever  She raised the issue herself  Her affect is rather flat  · Spiritual care consult for now    Severe protein-calorie malnutrition [facility])  Assessment & Plan  Malnutrition Findings:   Malnutrition type: Chronic illness(Severe pro/sara malnutrition r/t disease/condition as evidenced by 12% unintentional wt loss since 11/10/19, consuming < 75% energy intake compared to est energy needs > 1 month, fat/muscle wasting of orbital/temple areas  Treated with diet/supplements)  Degree of Malnutrition: Other severe protein calorie malnutrition    BMI Findings:  BMI Classifications: Underweight < 18 5     Body mass index is 17 97 kg/m²  Patient does admit to depression  Pastoral care consulted  She may benefit from counseling at discharge  Would recommend supplementation as above  VTE Pharmacologic Prophylaxis:   Pharmacologic: Enoxaparin (Lovenox)  Mechanical VTE Prophylaxis in Place: Yes    Patient Centered Rounds: I have performed bedside rounds with nursing staff today  Discussions with Specialists or Other Care Team Provider: appreciate specialists  D/w CM and ID    Education and Discussions with Family / Patient: patient  Does not have any contacts listed, does not want anyone updated  Time Spent for Care: 30 minutes  More than 50% of total time spent on counseling and coordination of care as described above  Current Length of Stay: 5 day(s)    Current Patient Status: Inpatient   Certification Statement: The patient will continue to require additional inpatient hospital stay due to as above, needs to finish IV abx and likely needs rehab     Discharge Plan: will finish IV abx on 2/11  Then needs PT/OT and likely rehab  Code Status: Level 1 - Full Code      Subjective:   Feels okay today  Affect remains flat  Denies CP, SOB, fevers, chills  No hip pain  She had large BM yesterady  Objective:     Vitals:   Temp (24hrs), Av 8 °F (36 6 °C), Min:97 7 °F (36 5 °C), Max:97 8 °F (36 6 °C)    Temp:  [97 7 °F (36 5 °C)-97 8 °F (36 6 °C)] 97 8 °F (36 6 °C)  HR:  [61-73] 67  Resp:  [16-18] 16  BP: (116-138)/(52-67) 138/64  SpO2:  [95 %-98 %] 98 %  Body mass index is 17 97 kg/m²  Input and Output Summary (last 24 hours): Intake/Output Summary (Last 24 hours) at 2/10/2020 1023  Last data filed at 2/10/2020 0900  Gross per 24 hour   Intake 840 ml   Output 1275 ml   Net -435 ml       Physical Exam:     Physical Exam   Constitutional: She is oriented to person, place, and time  No distress  Cachectic     Cardiovascular: Normal rate and regular rhythm  Pulmonary/Chest: Effort normal and breath sounds normal    Abdominal: Soft  Bowel sounds are normal  She exhibits no distension  Genitourinary:   Genitourinary Comments: Chronic isabel in place    Musculoskeletal:   No movement in b/l LE    Neurological: She is alert and oriented to person, place, and time  Skin:   R hip wound dressed    Psychiatric:   Very flat    Nursing note and vitals reviewed  Additional Data:     Labs:    Results from last 7 days   Lab Units 02/10/20  0624 20  0537   WBC Thousand/uL 10 86* 11 08*   HEMOGLOBIN g/dL 8 3* 8 0*   HEMATOCRIT % 28 2* 27 0*   PLATELETS Thousands/uL 669* 624*   NEUTROS PCT %  --  80*   LYMPHS PCT %  --  16   MONOS PCT %  --  3*   EOS PCT %  --  0     Results from last 7 days   Lab Units 02/10/20  0624  20  0451   POTASSIUM mmol/L 3 3*   < > 3 8   CHLORIDE mmol/L 107   < > 100   CO2 mmol/L 28   < > 22   BUN mg/dL 8   < > 7   CREATININE mg/dL 0 38*   < > 0 38*   CALCIUM mg/dL 8 1*   < > 8 0*   ALK PHOS U/L  --   --  147*   ALT U/L  --   --  21   AST U/L  --   --  24    < > = values in this interval not displayed       Results from last 7 days   Lab Units 20  1538   INR  1 42*       * I Have Reviewed All Lab Data Listed Above  * Additional Pertinent Lab Tests Reviewed: All Labs Within Last 24 Hours Reviewed    Imaging:    Imaging Reports Reviewed Today Include: all  Imaging Personally Reviewed by Myself Includes:  none    Recent Cultures (last 7 days):     Results from last 7 days   Lab Units 02/09/20  0829 02/05/20  1745 02/05/20  1544 02/05/20  1538   BLOOD CULTURE  Received in Microbiology Lab  Culture in Progress  Received in Microbiology Lab  Culture in Progress  --  Staphylococcus coagulase negative* No Growth After 4 Days     GRAM STAIN RESULT   --   --  Gram positive cocci in clusters*  --    URINE CULTURE   --  >100,000 cfu/ml Proteus mirabilis*  50,000-59,000 cfu/ml Escherichia coli*  --   --        Last 24 Hours Medication List:     Current Facility-Administered Medications:  acetaminophen 650 mg Oral Q6H PRN Collette Hire, MD    artificial tear  Both Eyes HS Noelle Lopez MD    bisacodyl 10 mg Rectal Daily PRN Inés Morales MD    calcium carbonate-vitamin D 1 tablet Oral BID With Meals Collette Hire, MD    cholecalciferol 1,000 Units Oral Daily Collette Hire, MD    cyanocobalamin 100 mcg Oral Daily Collette Hire, MD    dextran 70-hypromellose 1 drop Both Eyes Q3H PRN Matt Lagos PA-C    diphenhydrAMINE 50 mg Oral 60 Min Pre-Op Sagrario Dumont PA-C    enoxaparin 40 mg Subcutaneous Daily Collette Hire, MD    famotidine 20 mg Oral BID PRN Noelle Lopez MD    midodrine 15 mg Oral Q8H Collette Hire, MD    multivitamin-minerals 1 tablet Oral Daily Collette Hire, MD    nystatin  Topical BID Collette Hire, MD    ondansetron 4 mg Intravenous Q6H PRN Collette Hire, MD    pantoprazole 40 mg Oral BID AC Noelle Lopez MD    piperacillin-tazobactam 3 375 g Intravenous Q6H Collette Hire, MD Last Rate: 3 375 g (02/10/20 0549)   polyethylene glycol 17 g Oral Daily Collette Hire, MD    saccharomyces boulardii 250 mg Oral BID Angle Good MD    senna 1 tablet Oral HS PRN Tamika Stovall MD    vancomycin 125 mg Oral Q12H [facility] Angle Good MD      Facility-Administered Medications Ordered in Other Encounters:  dexamethasone 4 mg Intravenous Once PRN Nika Irons, DO    diphenhydrAMINE 12 5 mg Intravenous Once Nika Irons, DO    lactated ringers 75 mL/hr Intravenous Continuous Nika Irons, DO Last Rate: 75 mL/hr (11/10/19 2105)   lactated ringers 50 mL/hr Intravenous Continuous Jamila Weems CRNA    lactated ringers 75 mL/hr Intravenous Continuous Nika Irons, DO Last Rate: Stopped (03/20/19 1841)   lactated ringers 75 mL/hr Intravenous Continuous Myra Campo MD Last Rate: Stopped (03/20/19 1842)   ondansetron 4 mg Intravenous Once PRN Edna Ashley MD    promethazine 12 5 mg Intravenous Once PRN Edna Ashley MD         Today, Patient Was Seen By: Kajal Lopez PA-C    ** Please Note: Dictation voice to text software may have been used in the creation of this document   **

## 2020-02-10 NOTE — ASSESSMENT & PLAN NOTE
· History of spinal cord injury T8 with paraplegia  · Presented from [facility], patient reports that she was living in her own home prior to that  Unclear how successfully that was going on  · Continue with repositioning and wound care

## 2020-02-10 NOTE — ASSESSMENT & PLAN NOTE
· History of spinal cord injury T8 with paraplegia  · Presented from [facility], patient reports that she was living in her own home prior to that  Unclear how successfully that was going--asked CM to follow up   · Continue with repositioning and wound care

## 2020-02-10 NOTE — ASSESSMENT & PLAN NOTE
· Labs appear consistent with anemia of chronic disease, continue to monitor closely    Hemoglobin stable

## 2020-02-10 NOTE — ASSESSMENT & PLAN NOTE
CT scan of the chest reports possible heart failure as it relates to ground-glass appearance on the left lung  Also has left lower lobe consolidation that is concerning for atelectasis versus infection  Patient with no history of heart failure  Clinical exam not consistent with heart failure  · BNP is elevated   · Currently not on IV fluids  · Continue with current antibiotics per ID  · Will check routine echo  · Defer diuretics for now as does not examine overloaded   · Add incentive spirometry

## 2020-02-10 NOTE — ASSESSMENT & PLAN NOTE
Malnutrition Findings:   Malnutrition type: Chronic illness(Severe pro/sara malnutrition r/t disease/condition as evidenced by 12% unintentional wt loss since 11/10/19, consuming < 75% energy intake compared to est energy needs > 1 month, fat/muscle wasting of orbital/temple areas  Treated with diet/supplements)  Degree of Malnutrition: Other severe protein calorie malnutrition    BMI Findings:  BMI Classifications: Underweight < 18 5     Body mass index is 17 97 kg/m²  Patient does admit to depression  I have reached out to [facility] to care for her  She may benefit from counseling at discharge  Would recommend supplementation as above

## 2020-02-10 NOTE — ASSESSMENT & PLAN NOTE
· Labs appear consistent with anemia of chronic disease, continue to monitor closely  Hemoglobin stable at 8 0 on 02/29/2020    Continue monitor serial laboratories

## 2020-02-10 NOTE — ASSESSMENT & PLAN NOTE
Patient does report depression over continued chronic hospital care and chronic illness  Pastoral care to speak with patient  She may benefit from outpatient counseling  She states she would be agreeable to possible outpatient counseling for 1 or 2 sessions but not forever  She raised the issue herself  Her affect is rather flat    · Spiritual care consult for now

## 2020-02-10 NOTE — ASSESSMENT & PLAN NOTE
CT scan of the chest reports possible heart failure as it relates to ground-glass appearance on the left lung  Also has left lower lobe consolidation that is concerning for atelectasis versus infection  Patient with no history of heart failure  Clinical exam not consistent with heart failure  ·  Will add BMP to a m  Labs and decide on further intervention  · Currently not on IV fluids  · Continue with current antibiotics per ID  · Check ProBNP- if elevated consider EcHO, hold diuretics for now  Follow exam  · Add incentive spirometry

## 2020-02-10 NOTE — INCIDENTAL FINDINGS
The following findings require follow up:  Radiographic finding   Finding: Uterus low density centrally, measuring 7 mm in thickness     Follow up required: Pelvic ultrasound   Follow up should be done within 2  week(s)    Please notify the following clinician to assist with the follow up:   Dr Rj Webb

## 2020-02-10 NOTE — ASSESSMENT & PLAN NOTE
Patient with a known past medical history for paraplegia following spinal cord injury presents with  · Known to history of hip osteo and dislocation of the R femur  Patient is nonweightbearing  On admission had fever, chills and increased drainge from R hip wound, CT repeated and compared to February 5, 2020 showing all of the above but mentions improvement in gas pockets  · Ortho following; no plans for any surgical intervention at this time  · General surgery following: continue localized wound care  No plans for any surgical intervention at this time  Id feels that if surgery is not performed that antibiotic therapy will not be sufficient to maintain patient's health  · ID following: continue IV abx for now  repeat CT scan to evaluate for signs of pyomyositis as initial CT scan done on admission shows small locules of gas and edema in right iliopsoas muscle, and was not able to exclude possibility of pyomyositis  CT scan shows improvement  Will ask team to collaborate with ID and surgical team regarding definitive therapy as at this time it was felt that chronic IV antibiotics will not solve this problem  · Per ID: "if no plan for girdle stone procedure and flap to cover the wound, the role of long-term IV antibiotics is limited with risk of side effects from prolonged antibiotics outweighs the benefit   This has been explained at length to the patient  Will likely continue IV antibiotic to finish 10 days course for treatment of skin and soft tissue infection  "  · Continue local wound care  Patient reports depression over her current status  She is agreeable to speak with the spiritual Care Team and would like outpatient referral   She states that her goal will be to get back to her home but at this time she is in the skilled nursing facility setting

## 2020-02-10 NOTE — ASSESSMENT & PLAN NOTE
Patient was noted to have fecal impaction on CT scan  Had a large bowel movement today consistent with constipation  No concern for C diff colitis  Continue with prophylactic vancomycin as noted below  Patient refusing suppository and do collapse  Will make these p r n    · With infection in April of 2019  · Currently without any complaints of diarrhea  · Continue vancomycin prophylactics, will need to continue for 72 hours after last dose of systemic antibiotics thru 2/14

## 2020-02-10 NOTE — ASSESSMENT & PLAN NOTE
· Could be secondary to wound infection or contaminant  · Infectious Disease following  · Continue antibiotics as above  · Repeat blood cultures ordered, follow-up

## 2020-02-11 ENCOUNTER — APPOINTMENT (INPATIENT)
Dept: NON INVASIVE DIAGNOSTICS | Facility: HOSPITAL | Age: 70
DRG: 871 | End: 2020-02-11
Payer: COMMERCIAL

## 2020-02-11 LAB
ANION GAP SERPL CALCULATED.3IONS-SCNC: 7 MMOL/L (ref 4–13)
ANISOCYTOSIS BLD QL SMEAR: PRESENT
BASOPHILS # BLD MANUAL: 0.12 THOUSAND/UL (ref 0–0.1)
BASOPHILS NFR MAR MANUAL: 1 % (ref 0–1)
BUN SERPL-MCNC: 9 MG/DL (ref 5–25)
CALCIUM SERPL-MCNC: 8.5 MG/DL (ref 8.3–10.1)
CHLORIDE SERPL-SCNC: 104 MMOL/L (ref 100–108)
CO2 SERPL-SCNC: 27 MMOL/L (ref 21–32)
CREAT SERPL-MCNC: 0.53 MG/DL (ref 0.6–1.3)
EOSINOPHIL # BLD MANUAL: 0.48 THOUSAND/UL (ref 0–0.4)
EOSINOPHIL NFR BLD MANUAL: 4 % (ref 0–6)
ERYTHROCYTE [DISTWIDTH] IN BLOOD BY AUTOMATED COUNT: 16.6 % (ref 11.6–15.1)
GFR SERPL CREATININE-BSD FRML MDRD: 97 ML/MIN/1.73SQ M
GLUCOSE SERPL-MCNC: 99 MG/DL (ref 65–140)
HCT VFR BLD AUTO: 28.1 % (ref 34.8–46.1)
HGB BLD-MCNC: 8.5 G/DL (ref 11.5–15.4)
HYPERCHROMIA BLD QL SMEAR: PRESENT
LYMPHOCYTES # BLD AUTO: 19 % (ref 14–44)
LYMPHOCYTES # BLD AUTO: 2.26 THOUSAND/UL (ref 0.6–4.47)
MCH RBC QN AUTO: 25.4 PG (ref 26.8–34.3)
MCHC RBC AUTO-ENTMCNC: 30.2 G/DL (ref 31.4–37.4)
MCV RBC AUTO: 84 FL (ref 82–98)
MONOCYTES # BLD AUTO: 0.6 THOUSAND/UL (ref 0–1.22)
MONOCYTES NFR BLD: 5 % (ref 4–12)
MYELOCYTES NFR BLD MANUAL: 1 % (ref 0–1)
NEUTROPHILS # BLD MANUAL: 8.21 THOUSAND/UL (ref 1.85–7.62)
NEUTS SEG NFR BLD AUTO: 69 % (ref 43–75)
NRBC BLD AUTO-RTO: 0 /100 WBCS
PLATELET # BLD AUTO: 757 THOUSANDS/UL (ref 149–390)
PLATELET BLD QL SMEAR: ABNORMAL
PMV BLD AUTO: 9.4 FL (ref 8.9–12.7)
POLYCHROMASIA BLD QL SMEAR: PRESENT
POTASSIUM SERPL-SCNC: 4 MMOL/L (ref 3.5–5.3)
RBC # BLD AUTO: 3.34 MILLION/UL (ref 3.81–5.12)
RBC MORPH BLD: PRESENT
SODIUM SERPL-SCNC: 138 MMOL/L (ref 136–145)
VARIANT LYMPHS # BLD AUTO: 1 %
WBC # BLD AUTO: 11.9 THOUSAND/UL (ref 4.31–10.16)

## 2020-02-11 PROCEDURE — 99232 SBSQ HOSP IP/OBS MODERATE 35: CPT | Performed by: NURSE PRACTITIONER

## 2020-02-11 PROCEDURE — C8929 TTE W OR WO FOL WCON,DOPPLER: HCPCS

## 2020-02-11 PROCEDURE — 85007 BL SMEAR W/DIFF WBC COUNT: CPT | Performed by: INTERNAL MEDICINE

## 2020-02-11 PROCEDURE — 97530 THERAPEUTIC ACTIVITIES: CPT

## 2020-02-11 PROCEDURE — 97167 OT EVAL HIGH COMPLEX 60 MIN: CPT

## 2020-02-11 PROCEDURE — 85027 COMPLETE CBC AUTOMATED: CPT | Performed by: INTERNAL MEDICINE

## 2020-02-11 PROCEDURE — 97163 PT EVAL HIGH COMPLEX 45 MIN: CPT

## 2020-02-11 PROCEDURE — 99232 SBSQ HOSP IP/OBS MODERATE 35: CPT | Performed by: INTERNAL MEDICINE

## 2020-02-11 PROCEDURE — 80048 BASIC METABOLIC PNL TOTAL CA: CPT | Performed by: INTERNAL MEDICINE

## 2020-02-11 PROCEDURE — 93306 TTE W/DOPPLER COMPLETE: CPT | Performed by: INTERNAL MEDICINE

## 2020-02-11 PROCEDURE — 97535 SELF CARE MNGMENT TRAINING: CPT

## 2020-02-11 RX ORDER — LOPERAMIDE HYDROCHLORIDE 2 MG/1
2 CAPSULE ORAL 3 TIMES DAILY PRN
Status: DISCONTINUED | OUTPATIENT
Start: 2020-02-11 | End: 2020-03-02 | Stop reason: HOSPADM

## 2020-02-11 RX ADMIN — DEXTRAN 70 AND HYPROMELLOSE 2910 1 DROP: 1; 3 SOLUTION/ DROPS OPHTHALMIC at 09:09

## 2020-02-11 RX ADMIN — PANTOPRAZOLE SODIUM 40 MG: 40 TABLET, DELAYED RELEASE ORAL at 06:34

## 2020-02-11 RX ADMIN — VITAM B12 100 MCG: 100 TAB at 08:13

## 2020-02-11 RX ADMIN — WHITE PETROLATUM 57.7 %-MINERAL OIL 31.9 % EYE OINTMENT 1 APPLICATION: at 22:10

## 2020-02-11 RX ADMIN — NYSTATIN: 100000 POWDER TOPICAL at 08:14

## 2020-02-11 RX ADMIN — ENOXAPARIN SODIUM 40 MG: 40 INJECTION SUBCUTANEOUS at 08:12

## 2020-02-11 RX ADMIN — Medication 250 MG: at 08:12

## 2020-02-11 RX ADMIN — Medication 1 TABLET: at 08:12

## 2020-02-11 RX ADMIN — PIPERACILLIN SODIUM AND TAZOBACTAM SODIUM 3.38 G: 36; 4.5 INJECTION, POWDER, FOR SOLUTION INTRAVENOUS at 18:37

## 2020-02-11 RX ADMIN — Medication 125 MG: at 08:13

## 2020-02-11 RX ADMIN — Medication 125 MG: at 22:11

## 2020-02-11 RX ADMIN — PIPERACILLIN SODIUM AND TAZOBACTAM SODIUM 3.38 G: 36; 4.5 INJECTION, POWDER, FOR SOLUTION INTRAVENOUS at 12:12

## 2020-02-11 RX ADMIN — MIDODRINE HYDROCHLORIDE 15 MG: 5 TABLET ORAL at 05:15

## 2020-02-11 RX ADMIN — Medication 1 TABLET: at 17:03

## 2020-02-11 RX ADMIN — PIPERACILLIN SODIUM AND TAZOBACTAM SODIUM 3.38 G: 36; 4.5 INJECTION, POWDER, FOR SOLUTION INTRAVENOUS at 05:20

## 2020-02-11 RX ADMIN — MIDODRINE HYDROCHLORIDE 15 MG: 5 TABLET ORAL at 13:44

## 2020-02-11 RX ADMIN — PERFLUTREN 0.6 ML/MIN: 6.52 INJECTION, SUSPENSION INTRAVENOUS at 14:55

## 2020-02-11 RX ADMIN — MIDODRINE HYDROCHLORIDE 15 MG: 5 TABLET ORAL at 22:10

## 2020-02-11 RX ADMIN — PANTOPRAZOLE SODIUM 40 MG: 40 TABLET, DELAYED RELEASE ORAL at 17:03

## 2020-02-11 RX ADMIN — Medication 250 MG: at 17:03

## 2020-02-11 RX ADMIN — LOPERAMIDE HYDROCHLORIDE 2 MG: 2 CAPSULE ORAL at 22:11

## 2020-02-11 RX ADMIN — MELATONIN 1000 UNITS: at 08:12

## 2020-02-11 NOTE — ASSESSMENT & PLAN NOTE
· Labs appear consistent with anemia of chronic disease, continue to monitor closely    Hemoglobin stable   · 8 3 yesterday and 8 5 today  · Continue to monitor

## 2020-02-11 NOTE — OCCUPATIONAL THERAPY NOTE
Occupational Therapy Evaluation     Patient Name: Maxim Li  Today's Date: 2/11/2020  Problem List  Principal Problem:    Sepsis ([facility] )  Active Problems:    Paraplegia following spinal cord injury ([facility] )    Decubitus ulcer of left ischium, stage III ([facility] )    Fecal retention    Anemia of chronic disease    Neurogenic bladder    Hypotension    History of Clostridium difficile infection    Open wound of right hip    Coagulase negative Staphylococcus bacteremia    Severe protein-calorie malnutrition (HCC)    Depression    Abnormal CT of the chest    Lung nodule    Past Medical History  Past Medical History:   Diagnosis Date    Anemia     iron defiencey    Arthritis     osteo    Back injury     Chronic kidney disease     nephritis    Depression     MRSA (methicillin resistant staph aureus) culture positive 12/07/2018    BONE-TISSUE     Osteopenia     Osteoporosis     Paralysis ([facility] )     paraplegic due to spinal cord injury    Paraplegia (HCC)     Poor circulation     Pressure injury of skin     right hip, stage 3    Pressure sore of hip     right    Tibial plateau fracture     Underweight      Past Surgical History  Past Surgical History:   Procedure Laterality Date    CLOSURE DELAYED PRIMARY N/A 3/20/2019    Procedure: OPHELIA ANAL WOUND RECLOSURE;  Surgeon: Lexx Gordon MD;  Location: [address] [place] [address];  Service: Plastics    DECUBITUS ULCER EXCISION Bilateral 11/12/2019    Procedure: DEBRIDEMENT DECUBITI ([address] OUT);   Surgeon: Ben Zavala DO;  Location:  MAIN OR;  Service: General    FLAP LOCAL EXTREMITY Left 1/28/2019    Procedure: THIGH FLAP & STSG TO CLOSE HIP WOUND;  Surgeon: Lexx Gordon MD;  Location: [address] [place] [address] OR;  Service: Plastics    FLAP LOCAL TRUNK Right 12/17/2018    Procedure: DEBRIDE/FLAP CLOSURE HIP PRESSURE SORE Keeley Gregorio GRAFT;  Surgeon: Lexx Gordon MD;  Location: [address] [place] [address] OR;  Service: Plastics    FLAP LOCAL TRUNK Left 2/27/2019    Procedure: BUTTOCK FLAP TO ISCHIAL SORE;  Surgeon: Jan Calderon MD;  Location: [address] [place] [address] OR;  Service: Plastics    HIP DEBRIDEMENT Right 2017    right hip sore debridement    INCISION AND DRAINAGE OF WOUND Left 1/3/2019    Procedure: INCISION AND DRAINAGE (I&D) left distal femur  With deep wound cultures  Application of VAC DRAIN SPONGE;  Surgeon: Tan Corrales MD;  Location:  MAIN OR;  Service: Orthopedics    IR PICC LINE  12/19/2018    IR PICC LINE  3/12/2019    OK DEBRIDEMENT, SKIN, SUB-Q TISSUE,MUSCLE,BONE,=<20 SQ CM Right 9/19/2018    Procedure: DEBRIDEMENT OF HIP PRESSURE SORE;  Surgeon: Jan Calderon MD;  Location: [address] [place] [address] OR;  Service: [address] [place] [address] FX+INTRAMED FELIX Left 10/22/2018    Procedure: INSERTION NAIL IM LEFT FEMUR RETROGRADE;  Surgeon: Faiza Borden MD;  Location:  MAIN OR;  Service: Orthopedics    TOE AMPUTATION Left 2017    small toe left foot amputation    WISDOM TOOTH EXTRACTION      WOUND DEBRIDEMENT Left 12/17/2018    Procedure: DEBRIDEMENT HIP PRESSURE SORE;  Surgeon: Jan Calderon MD;  Location: [address] [place] [address];  Service: Plastics    WOUND DEBRIDEMENT N/A 11/10/2019    Procedure: EXCISIONAL DEBRIDEMENT;  Surgeon: Cleveland Zhong MD;  Location:  MAIN OR;  Service: General         02/11/20 1220   Note Type   Note type Eval/Treat  ([id number]  F/U [place]; [id number])   Restrictions/Precautions   Other Precautions Contact/isolation;Multiple lines; Fall Risk;Pain  (PARAPLEGIC)   Pain Assessment   Pain Assessment No/denies pain   Pain Score No Pain   Home Living   Type of Home Apartment   Home Layout One level  (0 AMY )   Bathroom Shower/Tub Tub/shower unit   Bathroom Toilet Standard   Bathroom Equipment Shower chair;Commode   Bathroom Accessibility Accessible via wheelchair   [facility]  ([facility] )   Additional Comments PT FROM THE ABOVE HOME SET-UP AT BASELINE HOWEVER REPORTS BE IN LTAC/SNF FOR REHAB SINCE NOVEMBER    Prior Function   Level of [place] Independent with ADLs and functional mobility   Lives With Alone   ADL Assistance Independent   IADLs Independent   Vocational On disability   Lifestyle   Autonomy PT REPORTS BEING I/MOD I WITH ADLS/IADLS/DRIVING    Reciprocal Relationships LIMITED SUPPORT    Service to Others ON DISABILITY    Intrinsic Gratification WISHING TO RETURN TO EILEEN LEVEL    ADL   Eating Assistance 7  Independent   Grooming Assistance 5  Supervision/Setup   UB Bathing Assistance 4  Minimal Assistance   LB Bathing Assistance 3  Moderate Assistance   UB Dressing Assistance 4  Minimal Assistance   LB Dressing Assistance 3  Moderate Assistance   Toileting Assistance  3  Moderate Assistance   Functional Assistance 3  Moderate Assistance   Bed Mobility   Supine to Sit 4  Minimal assistance   Additional items Assist x 1; Increased time required;Verbal cues;LE management   Sit to Supine Unable to assess  (PT LEFT LOUISE WITH SOFT CARE CUSHION + ALL NEEDS IN REACH )   Transfers   Sliding Board transfer 4  Minimal assistance   Additional items Assist x 2; Increased time required;Verbal cues   Balance   Static Sitting Fair +   Dynamic Sitting Poor +   Activity Tolerance   Activity Tolerance Patient tolerated treatment well   Nurse Made Aware APPROPRIATE TO SEE PER RNBRICE  RN AWARE OF PT'S PERFORMANCE, OOB TIME AND RECOMMENDATION FOR MAX OOB FOR ~1 HOUR    RUE Assessment   RUE Assessment WFL   LUE Assessment   LUE Assessment WFL   Hand Function   Gross Motor Coordination Functional   Fine Motor Coordination Functional   Sensation   Light Touch Severe deficits in the RLE; Severe deficits in the LLE   Cognition   Overall Cognitive Status [facility]   Arousal/Participation Alert; Cooperative   Attention Attends with cues to redirect   Orientation Level Oriented X4   Memory Within functional limits   Following Commands Follows one step commands without difficulty   Comments PT IS PLEASANT, COOEPRATIVE AND MOTIVATED TO PARTICIPATE  G INSIGHT INTO DEFICITS APPRECIATIVE OF THERPISTS    Assessment   Limitation Decreased ADL status; Decreased Safe judgement during ADL;Decreased endurance;Decreased self-care trans;Decreased high-level ADLs   Prognosis Good;Fair   Assessment 70 YO Female SEEN FOR INITIAL OCCUPATIONAL THERAPY EVALUATION FOLLOWING ADMISSION TO [facility] WITH LOW BP AND FEVER  DX INCLUDES SEPSIS  EXTENSIVE PROBLEMS LIST INCLUDES H/O SCI WITH PARAPLEGIA, NEUROGENIC BLADDER, CHRONIC INDWELLING AYALA CATHETER, STAGE IV PRESSURE ULCER OF R BUTTOCKS, ANEMIA, HYPONATREMIA, CKA, DEPRESSION, ARTHRITIS, AND OSTEOPOROSIS  PT ALSO WITH R HIP OPEN WOUND, WITH UNDERLYING OSTEOMYELITIS AND DISLOCATION OF R FEMUR- PER ORTHO, NO PLAN FOR GIRDLE STONE PROCEDURE OR FLAP TO COVER AT THIS TIME, NO RESTRICTIONS  PER JILLIAN WHITE FROM GENERAL SX, FREQUENT REPOSITION WITH NO THERAPY RESRTICTIONS AND MICHELLE BAILEY FROM WOUND CARE, "LIMIT OOB TO 1-2 HOURS AT A TIME, 2-3 TIMES/DAY, WITH AIR CUSHION"  PT IS FROM AN APT ALONE WHERE SHE REPORTS BEING INDEPENDENT/MOD I WITH ADLS/IADLS/DRIVING AT BASELINE HOWEVER PT REPORTS BEING ADMITTED FROM LTAC/SNF FOR [facility], STATING SHE HAS NOT BEEN HOME SINCE November  PT REPORTS REQUIRING ADDITIONAL ASSIST AT [facility] FOR ADLS/IADLS AND COMPLETING LIMITED OOB TRANSFERS WITH MARIA ISABEL PENG WITH NON-THERAPY STAFF  PT CURRENTLY REQUIRES OVERALL MOD A WITH ADLS AND MIN A X2 WITH BEASY BOARD TXF FROM BED->DROP ARM CHAIR  PT AND RN EDUCATION ON OOB FOR MAX 1 HOUR AT THIS TIME, AGREEABLE  PT IS LIMITED 2' PAIN, FATIGUE, IMPAIRED BALANCE, FALL RISK , OVERALL WEAKNESS/DECONDITIONING , BASELINE PARAPLEGIA, DIZZINESS WITH CHANGE OF POSITIONING, INCONTINENCE,  LIMITED FAMILY/FRIEND SUPPORT  and OVERALL LIMITED ACTIVITY TOLERANCE  PT EDUCATED ON DEEP BREATHING TECHNIQUES T/O ACTIVITY, SLOWING OF PACE, FREQUENT REPOSITIONING and CONTINUE PARTICIPATION IN SELF-CARE/MOBILITY WITH STAFF WHILE IN THE HOSPITAL    FROM AN OCCUPATIONAL THERAPY PERSPECTIVE, PT WOULD BENEFIT FROM ADDITIONAL OT SERVICES IN AN INPT REHAB SETTING UPON D/C  WILL CONT TO FOLLOW TO ADDRESS THE BELOW DESCRIBED GOALS  Goals   Patient Goals TO GET OOB    LTG Time Frame 10-14   Long Term Goal #1 SEE BELOW    Plan   Treatment Interventions ADL retraining;Functional transfer training;UE strengthening/ROM; Endurance training;Cognitive reorientation;Patient/family training; Compensatory technique education;Equipment evaluation/education; Energy conservation; Activityengagement   Goal Expiration Date 02/25/20   OT Frequency 3-5x/wk   Additional Treatment Session   Start Time 2286   End Time 1220   Treatment Assessment PT SEEN FOR F/U OT TX SESSION FOCUSING ON LB ADLS AND TOILETING  PT REQUIRED MOD A TO THREAD BLE THROUGH LEG HOLE OF HOSPITAL PANTS WHILE SEATED EOB  PT ABLE TO COMPLET PULL UP OVER B/L HIPS WHILE ROLLING L/R  PT NOTED TO BE INCONTINENT OF BOWEL WHILE ROLLING, REQUIRING MAX ASSIST FOR ANAL HYGIENE  CONT TO RECOMMEND INPT REHAB UPON D/C  WILL CONT TO FOLLOW  Additional Treatment Day 1   Recommendation   OT Discharge Recommendation Short Term Rehab   OT - OK to Discharge Yes   Modified [place] Scale   Modified [place] Scale 5       OCCUPATIONAL THERAPY GOALS TO BE MET WITHIN 10-14 DAYS:    -Pt will increase bed mobility to MOD I to participate in functional activities with G tolerance and balance    -Pt will improve functional beasy board/sit pivot txfs to MOD I on/off all surfaces w/ G balance/safety including toileting   -Pt will increase independence in all ADLS to MOD I with G balance sitting upright in chair/supine in bed   -Pt will improve activity tolerance to G for 30 min txment sessions w/ G carry over of learned energy conservation techniques   -Pt will improve independence in lt homemaking activities, at w/c level to MOD I without requiring cues for safety   -Pt will demonstrate G carryover of learned safety techniques and proper body mechanics in functional and leisure activities with use of DME   -Pt will improve/maintain B/L UE ROM/ strength via AROM/AAROM/PROM and strengthening exercises in all planes as tolerated in order to participate in functional activities  -Pt will complete additional cognitive assessment with 100% attention to task in order to assist with safe d/c plan     -Pt will identify 2-3 coping strategies that promote G mental health that can be completed within the hospital and carry over to d/c environment    -Pt will identify 2-3 leisure activities that promote G mental health that can be completed within the hospital and carry over to d/c environment    -Pt will tolerate sitting OOB 3x/day for 1-2 hours in order to increase participation in self-care/leisure tasks with G carry over of appropriate reposition/weight shift schedule      Documentation completed by ROXANNE Moulton, OTR/L

## 2020-02-11 NOTE — PHYSICAL THERAPY NOTE
Physical Therapy Evaluation     Patient's Name: Kasey Lewis    Admitting Diagnosis  UTI (urinary tract infection) [N39 0]  Hypotension [I95 9]  Stage IV pressure ulcer of right buttock ([address] ) [L89 314]  Sepsis ([address] ) [A41 9]  Osteomyelitis of right femur ([address] ) [M86 9]  Pressure injury of sacral region, stage 4 ([address] ) [L89 154]    Problem List  Patient Active Problem List   Diagnosis    Paraplegia following spinal cord injury ([address] )    Pressure ulcer, sacrum    Iron deficiency anemia    Osteopenia    Underweight    Decubitus ulcer of left ischium, stage III (HCC)    Rash due to allergy    Closed fracture of distal end of left femur with routine healing    Closed fracture of lower end of left femur with routine healing    Chronic osteomyelitis of coccyx ([address] )    Fecal retention    Anemia of chronic disease    GERD (gastroesophageal reflux disease)    Moderate protein-calorie malnutrition (HCC)    Hyperglycemia    BMI less than 19,adult    Hypermagnesemia    Complicated wound infection    Sepsis due to anaerobes ([address] )    C  difficile diarrhea    Pressure injury of sacral region, unstageable ([address] )    Acute blood loss anemia    Sepsis ([address] )    Neurogenic bladder    Other osteoporosis without current pathological fracture    Closed dislocation of right hip (HCC)    Elevated liver function tests    Physical deconditioning    Hypotension    History of Clostridium difficile infection    Open wound of right hip    Coagulase negative Staphylococcus bacteremia    Severe protein-calorie malnutrition (HCC)    Depression    Abnormal CT of the chest    Lung nodule       Past Medical History  Past Medical History:   Diagnosis Date    Anemia     iron defiencey    Arthritis     osteo    Back injury     Chronic kidney disease     nephritis    Depression     MRSA (methicillin resistant staph aureus) culture positive 12/07/2018    BONE-TISSUE     Osteopenia     Osteoporosis     Paralysis [facility])     paraplegic due to spinal cord injury    Paraplegia ([address] )     Poor circulation     Pressure injury of skin     right hip, stage 3    Pressure sore of hip     right    Tibial plateau fracture     Underweight        Past Surgical History  Past Surgical History:   Procedure Laterality Date    CLOSURE DELAYED PRIMARY N/A 3/20/2019    Procedure: OPHELIA ANAL WOUND RECLOSURE;  Surgeon: Fred Johnson MD;  Location: [facility] MAIN OR;  Service: Plastics    DECUBITUS ULCER EXCISION Bilateral 11/12/2019    Procedure: DEBRIDEMENT DECUBITI [facility] OUT); Surgeon: Dwight Flores DO;  Location:  MAIN OR;  Service: General    FLAP LOCAL EXTREMITY Left 1/28/2019    Procedure: THIGH FLAP & STSG TO CLOSE HIP WOUND;  Surgeon: Fred Johnson MD;  Location: [facility] MAIN OR;  Service: Plastics    FLAP LOCAL TRUNK Right 12/17/2018    Procedure: DEBRIDE/FLAP CLOSURE HIP PRESSURE SORE Kaylee Lasso GRAFT;  Surgeon: Fred Johnson MD;  Location: [facility] MAIN OR;  Service: Plastics    FLAP LOCAL TRUNK Left 2/27/2019    Procedure: BUTTOCK FLAP TO ISCHIAL SORE;  Surgeon: Fred Johnson MD;  Location: [facility] MAIN OR;  Service: Plastics    HIP DEBRIDEMENT Right 2017    right hip sore debridement    INCISION AND DRAINAGE OF WOUND Left 1/3/2019    Procedure: INCISION AND DRAINAGE (I&D) left distal femur  With deep wound cultures   Application of VAC DRAIN SPONGE;  Surgeon: Tonny Baker MD;  Location:  MAIN OR;  Service: Orthopedics    IR PICC LINE  12/19/2018    IR PICC LINE  3/12/2019    GA DEBRIDEMENT, SKIN, SUB-Q TISSUE,MUSCLE,BONE,=<20 SQ CM Right 9/19/2018    Procedure: DEBRIDEMENT OF HIP PRESSURE SORE;  Surgeon: Fred Johnson MD;  Location: [facility] MAIN OR;  Service: Plastics    GA OPEN RX FEMUR FX+INTRAMED FELIX Left 10/22/2018    Procedure: INSERTION NAIL IM LEFT FEMUR RETROGRADE;  Surgeon: Lilliam Caruso MD;  Location:  MAIN OR;  Service: Orthopedics    TOE AMPUTATION Left 2017    small toe left foot amputation    WISDOM TOOTH EXTRACTION      WOUND DEBRIDEMENT Left 12/17/2018    Procedure: DEBRIDEMENT HIP PRESSURE SORE;  Surgeon: Nadira Kurtz MD;  Location: [address] [place] [address] MAIN OR;  Service: Plastics    WOUND DEBRIDEMENT N/A 11/10/2019    Procedure: EXCISIONAL DEBRIDEMENT;  Surgeon: Pilar Kerr MD;  Location:  MAIN OR;  Service: General          02/11/20 1221   Note Type   Note type Eval/Treat   Pain Assessment   Pain Assessment No/denies pain   Pain Score No Pain   Home Living   Type of Home Apartment   Home Layout One level   Bathroom Shower/Tub Tub/shower unit   Bathroom Toilet Standard   Bathroom Equipment Shower chair;Commode   Home Equipment [address]  (slideboard)   Additional Comments Pt presents from rehab which she reports having been at since Nov and was at home before rehab   Prior Function   Level of [place] Independent with ADLs and functional mobility   Lives With Alone   ADL Assistance Independent   IADLs Independent   Vocational On disability   Comments Pt reports no social support   Restrictions/Precautions   Other Precautions Contact/isolation;Multiple lines; Fall Risk;Pain  (T8 paraplegia)   General   Family/Caregiver Present No   Cognition   Overall Cognitive Status WFL   Arousal/Participation Alert   Attention Attends with cues to redirect   Orientation Level Oriented X4   Memory Within functional limits   Following Commands Follows one step commands without difficulty   Comments Pt pleasant and agreeable to work w/ therapt   RLE Assessment   RLE Assessment   (PROM WFL)   LLE Assessment   LLE Assessment   (PROM WFL)   Light Touch   RLE Light Touch Impaired   LLE Light Touch Impaired   Bed Mobility   Supine to Sit 4  Minimal assistance   Additional items Assist x 1; Increased time required;Verbal cues;LE management   Sit to Supine Unable to assess   Additional Comments Pt lying supine upon PT arrival  Pt returned seated OOB w/ soft care cushion and all needs within reach   Transfers   Sliding Board transfer 4  Minimal assistance   Additional items Assist x 2; Increased time required;Verbal cues   Additional Comments Pt transferred to drop arm recliner chair w/ beasy board   Ambulation/Elevation   Gait pattern Not appropriate   Balance   Static Sitting Fair +   Dynamic Sitting Poor +   Endurance Deficit   Endurance Deficit Yes   Endurance Deficit Description pain, fatigue   Activity Tolerance   Activity Tolerance Patient limited by fatigue;Patient limited by pain   Medical Staff Made Aware OT Theresa   Nurse Made Aware RN cleared pt for therapy   Assessment   Prognosis Fair   Problem List Decreased strength;Decreased endurance; Impaired balance;Decreased mobility;Pain   Assessment Pt is 71 y o  female seen for PT evaluation s/p admit to [facility] on 2/5/2020 w/ Sepsis ([address] )  PT consulted to assess pt's functional mobility and d/c needs  Order placed for PT eval and tx, w/ up and OOB as tolerated order  Comorbidities affecting pt's physical performance at time of assessment include: paraplegia, L ischium decubitius ulcer, anemia, neurogenic bladder, open wound R hip, osteopenia, closed dislocation of R hip  Pt presents w/ R hip open wound w/ undelrying OM and dislocation of R femur- Per ortho, no plan for procedure or flap to cover at thuis time-no restrictions  Per Jordan Hodgkin from general sx, request reposition w/ no therapy restrictions and Naren Reeks from wound care "limit OOB to 1-2 hours at a time, 2-3 times/day w/ air cushion  PTA, pt was at rehab since Nov  Prior to rehab, pt was living alone at home in one level apt w/ 0 AMY and IND w/ all w/c mobility, transfers, and ADLs  Personal factors affecting pt at time of IE include: inability to ambulate household distances, inability to navigate community distances, limited home support, positive fall history, inability to perform IADLs and inability to perform ADLs   Please find objective findings from PT assessment regarding body systems outlined above with impairments and limitations including weakness, impaired balance, decreased endurance, impaired coordination, pain, decreased activity tolerance, decreased functional mobility tolerance and fall risk  Pt performed bed mobility at [address] [place] [address] and beasy board transfer from EOB to drop arm recliner at [facility]  The following objective measures performed on IE also reveal limitations: Modified Real: 5 (severe disability)  Pt's clinical presentation is currently unstable/unpredictable seen in pt's presentation of recent admission for sepsis requiring medical attention, recent decline in function as compared to baseline, fall risk, pain, multiple lines, increased reliance on assistance for functional mobility as compared to baseline  Pt to benefit from continued PT tx to address deficits as defined above and maximize level of functional independent mobility and consistency  From PT/mobility standpoint, recommendation at time of d/c would be STR pending progress in order to facilitate return to PLOF  Goals   Patient Goals To sit in chair   STG Expiration Date 02/25/20   Short Term Goal #1 1  Pt will demonstrate the ability to perform all aspects of bed mobility at Mod I in onder to maximize functional independence and decrease burden on caregivers  2 Pt will demonstrate the ability to perform slideboard transfer at Mod I in order to maximize functional independence and decrease burden on caregivers  3 Pt will demonstrate the ability to propel w/c at least 150ft at Mod I in order to maximize functional independence  4 Pt will demonstrate improved balance by one grade in order to decrease risk of falls  5 Pt will be able to tolerate sitting OOB for at least 30 min   PT Treatment Day 0   Plan   Treatment/Interventions Functional transfer training;LE strengthening/ROM; Endurance training;Patient/family training;Equipment eval/education; Bed mobility;Spoke to nursing;Spoke to case management  (w/c training)   PT Frequency   (3-6x/week)   Recommendation   Recommendation Post acute IP rehab   Equipment Recommended Wheelchair   PT - OK to Discharge Yes   Additional Comments when medically cleared to rehab   Modified [place] Scale   Modified [place] Scale 5     Frankey Pleas, PT, DPT      Physical Therapy Treatment Note  Time In: 12:05  Time Out: 12:21  Total Time: 16 min    S: Pt sitting EOB and agreeable to perform additional functional transfers    O: Pt performed rolling for LB dressing and noted to be incontinent of bowel  Pt performed rolling R and L at [address] A and required Total A for pericare  Pt performed additional rolling R and L to pull up pants    A: Additional follow up consecutive session performed to initiate functional mobility in order to maximize strength and to faciliate progression w/ functional mobility and maximize functional independence  Recommend rehab upon D/C pending progress and when medically cleared       P: Continue with 3-5x/week for functional mobility training, endurance/activity tolerance, w/c mobility, and pt education per POC to maximize functional independence    Frankey Pleas, PT, DPT

## 2020-02-11 NOTE — ASSESSMENT & PLAN NOTE
· Could be secondary to wound infection or contaminant  · Infectious Disease following  · Repeat blood cultures ordered 2/9    Awaiting final results

## 2020-02-11 NOTE — CONSULTS
has tried multiple times to visit with pt  Pt is either unavailable or sleeping  If pt wishes to speak with , another consult can be submitted or the on-call  can be contacted 24/7
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
## 2020-02-11 NOTE — ASSESSMENT & PLAN NOTE
· POA with fever and leukocytosis with infectious source likely being R hip wound with underlying iliopsoas myositis  No signs of PNA or intra-abdominal infection  Review of repeat CT scan shows known osteomyelitis of the right hip with dislocation  Pockets of gas have decreased since the initial consult  No evidence for abscesses  · Repeat CT shows no evidence for abscesses  · Blood cultures 1/2 positive for coag-neg staph, other negative    Continue to monitor blood cultures  · Urine culture + for proteus and e coli; likely asymptomatic bacteriuria per ID; isabel exchanged   · Continue IV abx per ID recommendations complete 10 days total   Last dose today 2/11   · ID with preference for surgical intervention for definitive source control however, no focus for interventional radiology intervention and surgery and orthopedics not currently offering any surgical management   · Needs PT/OT and likely rehab

## 2020-02-11 NOTE — PLAN OF CARE
Problem: Potential for Falls  Goal: Patient will remain free of falls  Description  INTERVENTIONS:  - Assess patient frequently for physical needs  -  Identify cognitive and physical deficits and behaviors that affect risk of falls    -  [place] fall precautions as indicated by assessment   - Educate patient/family on patient safety including physical limitations  - Instruct patient to call for assistance with activity based on assessment  - Modify environment to reduce risk of injury  - Consider OT/PT consult to assist with strengthening/mobility  Outcome: Progressing

## 2020-02-11 NOTE — SOCIAL WORK
Patient reviewed in care coordination rounds  Patient pending PT/OT evaluation  Patient coming from [facility] for rehab  Patient still has her home  Patient's goal is to return home  CM will continue to follow for recommendations

## 2020-02-11 NOTE — UTILIZATION REVIEW
Continued Stay Review    Date: 2/11                          Current Patient Class: Inpatient Current Level of Care: Med Surg    HPI:69 y o  female initially admitted on 2/5 presents with patient is a resident of [facility], she was transferred for low blood pressures and fever  Assessment/Plan: Sepsis, Coagulase negative Staphylococcus bacteremia, Decubitus ulcer of  Left ischium - stage III, Abnormal CT of the chest, Anemia  Blood cultures 1/2 positive for coag-neg staph, other negative  Continue to monitor blood cultures  Repeat blood cultures ordered 2/9  Awaiting final results  8 3 yesterday and 8 5 today  Continue to monitor  Repeat CBC in am  Today is last dose of Zosyn  Check Echo  Macular rash over lower back, continue to monitor for any signs of worsening  The patient will continue to require additional inpatient hospital stay due to further evaluation and monitoring of decubitus and antibiotics  Open wound of right hip - repeat CT scan shows improvement      Per ID: "if no plan for girdle stone procedure and flap to cover the wound, the role of long-term IV antibiotics is limited with risk of side effects from prolonged antibiotics outweighs the benefit    History of c diff - Continue vancomycin prophylactics, will need to continue for 72 hours after last dose of systemic antibiotics thru 2/14     Pertinent Labs/Diagnostic Results:   Results from last 7 days   Lab Units 02/11/20  0448 02/10/20  0624 02/09/20  0537 02/08/20  0504 02/07/20  0857   WBC Thousand/uL 11 90* 10 86* 11 08* 11 84* 12 56*   HEMOGLOBIN g/dL 8 5* 8 3* 8 0* 7 8* 8 0*   HEMATOCRIT % 28 1* 28 2* 27 0* 24 8* 26 5*   PLATELETS Thousands/uL 757* 669* 624* 573* 616*   NEUTROS ABS Thousands/µL  --   --  8 81* 7 83* 8 61*         Results from last 7 days   Lab Units 02/11/20  0448 02/10/20  0624 02/09/20  0537 02/08/20  0504 02/07/20  0446 02/06/20  0451   SODIUM mmol/L 138 139 137 137 133* 131*   POTASSIUM mmol/L 4 0 3 3* 3 5 3 2* 3 4* 3  8   CHLORIDE mmol/L 104 107 105 105 103 100   CO2 mmol/L 27 28 25 24 24 22   ANION GAP mmol/L 7 4 7 8 6 9   BUN mg/dL 9 8 5 6 10 7   CREATININE mg/dL 0 53* 0 38* 0 51* 0 31* 0 41* 0 38*   EGFR ml/min/1 73sq m 97 108 98 116 106 108   CALCIUM mg/dL 8 5 8 1* 8 2* 7 8* 8 0* 8 0*   MAGNESIUM mg/dL  --   --   --   --   --  1 8     Results from last 7 days   Lab Units 02/06/20  0451 02/05/20  1538   AST U/L 24 37   ALT U/L 21 25   ALK PHOS U/L 147* 175*   TOTAL PROTEIN g/dL 6 3* 7 5   ALBUMIN g/dL 1 4* 1 8*   TOTAL BILIRUBIN mg/dL 0 47 0 45         Results from last 7 days   Lab Units 02/11/20  0448 02/10/20  0624 02/09/20  0537 02/08/20  0504 02/07/20  0446 02/06/20  0451 02/05/20  1538   GLUCOSE RANDOM mg/dL 99 88 316* 121 108 104 154*       Results from last 7 days   Lab Units 02/07/20  0446   CK TOTAL U/L 29     Results from last 7 days   Lab Units 02/05/20  1538   PROTIME seconds 16 9*   INR  1 42*   PTT seconds 36         Results from last 7 days   Lab Units 02/05/20  1538   PROCALCITONIN ng/ml 1 25*     Results from last 7 days   Lab Units 02/05/20  1538   LACTIC ACID mmol/L 1 9             Results from last 7 days   Lab Units 02/10/20  0624   NT-PRO BNP pg/mL 2,945*     Results from last 7 days   Lab Units 02/05/20  1538   FERRITIN ng/mL 1,425*     Results from last 7 days   Lab Units 02/05/20  1745 02/05/20  1744 02/05/20  1553   CLARITY UA  Cloudy  --  Turbid   COLOR UA  Yellow see chart Yellow   SPEC GRAV UA  1 015  --  1 015   PH UA  8 5*  --  8 5*   GLUCOSE UA mg/dl Negative  --  Negative   KETONES UA mg/dl 15 (1+)*  --  Trace*   BLOOD UA  Large*  --  Moderate*   PROTEIN UA mg/dl 100 (2+)*  --  30 (1+)*   NITRITE UA  Positive*  --  Positive*   BILIRUBIN UA  Negative  --  Negative   UROBILINOGEN UA E U /dl 2 0*  --  1 0   LEUKOCYTES UA  Moderate*  --  Large*   WBC UA /hpf Innumerable*  --  20-30*   RBC UA /hpf 30-50*  --  None Seen   BACTERIA UA /hpf Moderate*  --  Moderate*   EPITHELIAL CELLS WET PREP /hpf Moderate*  --  Occasional   MUCUS THREADS  Occasional*  --   --      Results from last 7 days   Lab Units 02/09/20  0829 02/05/20  1745 02/05/20  1544 02/05/20  1538   BLOOD CULTURE  No Growth at 48 hrs  No Growth at 48 hrs  --  Staphylococcus coagulase negative* No Growth After 5 Days  GRAM STAIN RESULT   --   --  Gram positive cocci in clusters*  --    URINE CULTURE   --  >100,000 cfu/ml Proteus mirabilis*  50,000-59,000 cfu/ml Escherichia coli*  --   --      Vital Signs:   02/11/20 07:55:15    69  19  129/70  90  97 %       02/11/20 0005              None (Room air)       Medications:   Scheduled Medications:  Medications:  artificial tear  Both Eyes HS   calcium carbonate-vitamin D 1 tablet Oral BID With Meals   cholecalciferol 1,000 Units Oral Daily   cyanocobalamin 100 mcg Oral Daily   diphenhydrAMINE 50 mg Oral 60 Min Pre-Op   enoxaparin 40 mg Subcutaneous Daily   midodrine 15 mg Oral Q8H   multivitamin-minerals 1 tablet Oral Daily   nystatin  Topical BID   pantoprazole 40 mg Oral BID AC   piperacillin-tazobactam 3 375 g Intravenous Q6H   polyethylene glycol 17 g Oral Daily   saccharomyces boulardii 250 mg Oral BID   vancomycin 125 mg Oral Q12H [facility]     Continuous IV Infusions: None     PRN Meds:  acetaminophen 650 mg Oral Q6H PRN   bisacodyl 10 mg Rectal Daily PRN   dextran 70-hypromellose 1 drop Both Eyes Q3H PRN   famotidine 20 mg Oral BID PRN   ondansetron 4 mg Intravenous Q6H PRN   senna 1 tablet Oral HS PRN     Discharge Plan: TBD    Network Utilization Review Department  [email or web address]  ATTENTION: Please call with any questions or concerns to [phone] and carefully listen to the prompts so that you are directed to the right person   All voicemails are confidential   Francisco Los all requests for admission clinical reviews, approved or denied determinations and any other requests to dedicated fax number below belonging to the campus where the patient is receiving treatment   List of dedicated fax numbers for the Facilities:  [address] DENIALS (Administrative/Medical Necessity) [phone]   [address] (Maternity/NICU/Pediatrics) [phone]   Alee Rollins [phone]   Robert Cedric [phone]   [facility] Renee [phone]   [facility] [phone]   [address] [facility] [address] [facility] [phone]   [facility]  [phone]   [address] [facility] [address]  [address] [facility] [address] [facility] [phone]
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
## 2020-02-11 NOTE — ASSESSMENT & PLAN NOTE
CT scan of the chest reports possible heart failure as it relates to ground-glass appearance on the left lung  Also has left lower lobe consolidation that is concerning for atelectasis versus infection  Patient with no history of heart failure  Clinical exam not consistent with heart failure  · BNP is elevated   · Currently not on IV fluids  · Today is last dose of Zosyn   · Will check routine echo- pending  · Defer diuretics for now as does not examine overloaded   · incentive spirometry

## 2020-02-11 NOTE — ASSESSMENT & PLAN NOTE
Patient was noted to have fecal impaction on CT scan  Has reported large bowel movement yesterday  No concern for C diff colitis  Continue with prophylactic vancomycin as noted below  Patient refusing suppository and do collapse  Will make these p r n    · With infection in April of 2019  · Currently without any complaints of diarrhea  · Continue vancomycin prophylactics, will need to continue for 72 hours after last dose of systemic antibiotics thru 2/14

## 2020-02-11 NOTE — PROGRESS NOTES
Progress Note - Infectious Disease   Elida Koyanagi 71 y o  female MRN: [id number]  Unit/Bed#: [facility] 615-01 Encounter: [id number]      Impression/Plan:    49-year-old female patient with paraplegia secondary to spinal cord injury and multiple chronic decubitus ulcers, admitted for fever and leukocytosis, thought to be secondary to right hip wound infection      1- sepsis, POA:  With fever of 102 1 and leukocytosis on admission  No signs of pneumonia, no signs of intra-abdominal infection  CT chest and abdomen pelvis negative for infectious focus  Patient has Hays in place and is at risk for UTI and   but infectious source is likely the  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  Her blood cultures positive for coag-negative staph in 1/2 sets  Her urine culture is positive for Proteus and E coli  She is currently on Zosyn IV which she is tolerating well, drainage through right hip wound has significantly improved and repeat CT scan ruled out any abscess or collection, but showed again signs of chronic osteomyelitis  - antibiotics as below  - surgery follow-up  - close clinical monitoring, trend fever curve  - repeat CBC in a m   - follow-up final result of blood culture sent  02/09/2020        2- Right hip open wound, with underlying osteomyelitis and dislocation of right femur:  Patient is known to have hip osteomyelitis and dislocation of the right femur   She was recently evaluated as outpatient by Ortho, but decision was to go with non surgical management  On admission she had fever and chills, and reports increased drainage from the right hip wound    No other infectious focus is identified, though patient has a chronic Hays catheter and is at risk for UTI   Her urine cultures positive for Proteus and E coli   Her Hays has been exchanged    CT scan on admission shows signs of osteomyelitis, underlying sinus track, but also concern for myositis of the right iliopsoas muscle   Concern for pyomyositis  Previous cultures including sacral bone culture positive for E coli and Providencia, left ischial wound culture positive for Pseudomonas and Providencia, and left femur culture positive for corynebacterium reviewed  Case discussed with General surgery and Orthopedic   No plan for girdle stone or debridement at this time   repeat CT scan abdomen pelvis with contrast done  02/09/2020 is negative for abscess, negative for myositis   Finding of chronic osteomyelitis of right proximal femur  - continue Zosyn IV for today; stop antibiotics after today's dose of Zosyn given as patient would have finished 7 days course for treatment of skin and soft tissue infection    - As there is  no plan for girdle stone procedure and flap to cover the wound, the role of long-term IV antibiotics is limited with risk of side effects from prolonged antibiotics outweighs the benefit   This has been explained at length to the patient   Will continue IV antibiotic through today to finish 7 days course for treatment of skin and soft tissue infection through 02/11/2020, then stop IV antibiotics        3- history of C diff colitis:  C diff infection documented 04/2019  Patient currently has no diarrhea  - continue vancomycin p o   prophylactic dose; will likely need to prolong vancomycin p o  For 72 hours after last dose of systemic antibiotic treatment   Continue vancomycin p o  Through 02/14/2020     4- multiple antibiotic allergy:  Patient has a reported allergy to vancomycin described as rash   She is currently tolerating vancomycin p o  Patient also previously tolerated ertapenem, Zosyn and daptomycin  - Zosyn IV as mentioned above  - close clinical monitoring     5- macular rash over lower back:   This was present before starting antibiotic therapy  Non itchy, nontender, stable in appearance  - continue local skin care  - monitor clinically for any sign of expansion or worsening of the rash     6- bacteriuria:  UA is positive for WBC, urine culture positive for Proteus   This likely represents asymptomatic bacteriuria   Hays has been exchanged  - will follow up result of blood culture   Continue antibiotics as above for possible right hip wound infection     7- coagulase negative Staph bacteremia:  Blood culture collected on admission is positive in 1/2 sets for coag-negative staph  This could be secondary to wound infection or represent pseudobacteremia and skin naun contamination  Repeat blood culture 2020 is negative to date  -  follow-up repeat blood culture sent 2020        Plan mentioned above discussed in details with patient  Case discussed with  primary service     Antibiotics:  IV Antibiotics day 7  Zosyn day 7  Vancomycin p o  Day 7       Subjective:  Patient has no fever, chills, sweats; no nausea, vomiting, diarrhea; no cough, shortness of breath; no pain  No new symptoms  She reports good appetite, denies pain  Drainage from right hip wound has decreased  She had diarrhea yesterday after taking MiraLax and being constipated for many days  MiraLax today on hold, no bowel movements yet today    Objective:  Vitals:  Temp:  [98 3 °F (36 8 °C)] 98 3 °F (36 8 °C)  HR:  [69-78] 69  Resp:  [19-20] 19  BP: (126-136)/(67-70) 129/70  SpO2:  [96 %-98 %] 97 %  Temp (24hrs), Av 3 °F (36 8 °C), Min:98 3 °F (36 8 °C), Max:98 3 °F (36 8 °C)  Current: Temperature: 98 3 °F (36 8 °C)    Physical Exam:   General Appearance:  Alert, interactive, nontoxic, no acute distress  Throat: Oropharynx moist without lesions  Lungs:   Clear to auscultation bilaterally; no wheezes, rhonchi or rales; respirations unlabored   Heart:  RRR; no murmur, rub or gallop   Abdomen:   Soft, non-tender, non-distended, positive bowel sounds  Extremities: No clubbing, cyanosis or edema  Multiple decubitus ulcers, including bilateral hip decubitus ulcers and sacral decubitus    No erythema or signs of periwound cellulitis  Right hip wound dressing has some breakthrough drainage  Skin: No new rashes or lesions  Draining wounds as noted above       Labs, Imaging, & Other studies:   All pertinent labs and imaging studies were personally reviewed  Results from last 7 days   Lab Units 02/11/20  0448 02/10/20  0624 02/09/20  0537   WBC Thousand/uL 11 90* 10 86* 11 08*   HEMOGLOBIN g/dL 8 5* 8 3* 8 0*   PLATELETS Thousands/uL 757* 669* 624*     Results from last 7 days   Lab Units 02/11/20  0448 02/10/20  0624 02/09/20  0537  02/06/20  0451 02/05/20  1538   SODIUM mmol/L 138 139 137   < > 131* 129*   POTASSIUM mmol/L 4 0 3 3* 3 5   < > 3 8 4 7   CHLORIDE mmol/L 104 107 105   < > 100 96*   CO2 mmol/L 27 28 25   < > 22 24   BUN mg/dL 9 8 5   < > 7 11   CREATININE mg/dL 0 53* 0 38* 0 51*   < > 0 38* 0 47*   EGFR ml/min/1 73sq m 97 108 98   < > 108 101   CALCIUM mg/dL 8 5 8 1* 8 2*   < > 8 0* 8 6   AST U/L  --   --   --   --  24 37   ALT U/L  --   --   --   --  21 25   ALK PHOS U/L  --   --   --   --  147* 175*    < > = values in this interval not displayed  Results from last 7 days   Lab Units 02/09/20  0829 02/05/20  1745 02/05/20  1544 02/05/20  1538   BLOOD CULTURE  No Growth at 24 hrs  No Growth at 24 hrs   --  Staphylococcus coagulase negative* No Growth After 5 Days     GRAM STAIN RESULT   --   --  Gram positive cocci in clusters*  --    URINE CULTURE   --  >100,000 cfu/ml Proteus mirabilis*  50,000-59,000 cfu/ml Escherichia coli*  --   --      Results from last 7 days   Lab Units 02/05/20  1538   PROCALCITONIN ng/ml 1 25*

## 2020-02-11 NOTE — ASSESSMENT & PLAN NOTE
Patient with a known past medical history for paraplegia following spinal cord injury presents with increased drainage of R hip wound  · Known to history of hip osteo and dislocation of the R femur  Patient is nonweightbearing  On admission had fever, chills  · CT repeated 2/8 and compared to February 5, 2020 showing improvement in gas pockets  · Ortho following; no plans for any surgical intervention at this time  · General surgery following: continue localized wound care  No plans for any surgical intervention at this time  Id feels that if surgery is not performed that antibiotic therapy will not be sufficient to maintain patient's health  · ID following: IV Zosyn last dose today    repeat CT scan shows improvement  · Per ID: "if no plan for girdle stone procedure and flap to cover the wound, the role of long-term IV antibiotics is limited with risk of side effects from prolonged antibiotics outweighs the benefit   This has been explained at length to the patient  Will likely continue IV antibiotic to finish 10 days course for treatment of skin and soft tissue infection  " Course completed 2/11   · Continue local wound care  Patient reports depression over her current status  She is agreeable to speak with the spiritual Care Team and would like outpatient referral   She states that her goal will be to get back to her home but at this time she is in the skilled nursing facility setting

## 2020-02-12 LAB
ANION GAP SERPL CALCULATED.3IONS-SCNC: 6 MMOL/L (ref 4–13)
BUN SERPL-MCNC: 10 MG/DL (ref 5–25)
CALCIUM SERPL-MCNC: 8.6 MG/DL (ref 8.3–10.1)
CHLORIDE SERPL-SCNC: 104 MMOL/L (ref 100–108)
CO2 SERPL-SCNC: 26 MMOL/L (ref 21–32)
CREAT SERPL-MCNC: 0.38 MG/DL (ref 0.6–1.3)
ERYTHROCYTE [DISTWIDTH] IN BLOOD BY AUTOMATED COUNT: 17.2 % (ref 11.6–15.1)
GFR SERPL CREATININE-BSD FRML MDRD: 108 ML/MIN/1.73SQ M
GLUCOSE SERPL-MCNC: 110 MG/DL (ref 65–140)
HCT VFR BLD AUTO: 30.3 % (ref 34.8–46.1)
HGB BLD-MCNC: 9 G/DL (ref 11.5–15.4)
MCH RBC QN AUTO: 25.5 PG (ref 26.8–34.3)
MCHC RBC AUTO-ENTMCNC: 29.7 G/DL (ref 31.4–37.4)
MCV RBC AUTO: 86 FL (ref 82–98)
PLATELET # BLD AUTO: 759 THOUSANDS/UL (ref 149–390)
PMV BLD AUTO: 9.6 FL (ref 8.9–12.7)
POTASSIUM SERPL-SCNC: 3.7 MMOL/L (ref 3.5–5.3)
RBC # BLD AUTO: 3.53 MILLION/UL (ref 3.81–5.12)
SODIUM SERPL-SCNC: 136 MMOL/L (ref 136–145)
WBC # BLD AUTO: 10.25 THOUSAND/UL (ref 4.31–10.16)

## 2020-02-12 PROCEDURE — 99232 SBSQ HOSP IP/OBS MODERATE 35: CPT | Performed by: NURSE PRACTITIONER

## 2020-02-12 PROCEDURE — 99233 SBSQ HOSP IP/OBS HIGH 50: CPT | Performed by: INTERNAL MEDICINE

## 2020-02-12 PROCEDURE — 85027 COMPLETE CBC AUTOMATED: CPT | Performed by: NURSE PRACTITIONER

## 2020-02-12 PROCEDURE — 80048 BASIC METABOLIC PNL TOTAL CA: CPT | Performed by: NURSE PRACTITIONER

## 2020-02-12 RX ORDER — PANTOPRAZOLE SODIUM 40 MG/1
TABLET, DELAYED RELEASE ORAL
Status: COMPLETED
Start: 2020-02-12 | End: 2020-02-12

## 2020-02-12 RX ORDER — SACCHAROMYCES BOULARDII 250 MG
CAPSULE ORAL
Status: COMPLETED
Start: 2020-02-12 | End: 2020-02-12

## 2020-02-12 RX ORDER — POLYETHYLENE GLYCOL 3350 17 G/17G
POWDER, FOR SOLUTION ORAL
Status: DISPENSED
Start: 2020-02-12 | End: 2020-02-12

## 2020-02-12 RX ORDER — MIDODRINE HYDROCHLORIDE 5 MG/1
TABLET ORAL
Status: COMPLETED
Start: 2020-02-12 | End: 2020-02-12

## 2020-02-12 RX ORDER — MELATONIN
Status: COMPLETED
Start: 2020-02-12 | End: 2020-02-12

## 2020-02-12 RX ORDER — B-COMPLEX WITH VITAMIN C
TABLET ORAL
Status: COMPLETED
Start: 2020-02-12 | End: 2020-02-12

## 2020-02-12 RX ORDER — MULTIVITAMIN/IRON/FOLIC ACID 18MG-0.4MG
TABLET ORAL
Status: COMPLETED
Start: 2020-02-12 | End: 2020-02-12

## 2020-02-12 RX ADMIN — MELATONIN 1000 UNITS: at 08:43

## 2020-02-12 RX ADMIN — DEXTRAN 70 AND HYPROMELLOSE 2910 1 DROP: 1; 3 SOLUTION/ DROPS OPHTHALMIC at 22:28

## 2020-02-12 RX ADMIN — Medication 1 TABLET: at 08:43

## 2020-02-12 RX ADMIN — MIDODRINE HYDROCHLORIDE 15 MG: 5 TABLET ORAL at 22:27

## 2020-02-12 RX ADMIN — MIDODRINE HYDROCHLORIDE 15 MG: 5 TABLET ORAL at 06:20

## 2020-02-12 RX ADMIN — Medication 1 TABLET: at 08:42

## 2020-02-12 RX ADMIN — PANTOPRAZOLE SODIUM 40 MG: 40 TABLET, DELAYED RELEASE ORAL at 18:11

## 2020-02-12 RX ADMIN — Medication 250 MG: at 18:11

## 2020-02-12 RX ADMIN — PANTOPRAZOLE SODIUM 40 MG: 40 TABLET, DELAYED RELEASE ORAL at 06:20

## 2020-02-12 RX ADMIN — Medication 125 MG: at 22:27

## 2020-02-12 RX ADMIN — NYSTATIN: 100000 POWDER TOPICAL at 18:11

## 2020-02-12 RX ADMIN — Medication 250 MG: at 08:42

## 2020-02-12 RX ADMIN — VITAM B12 100 MCG: 100 TAB at 08:41

## 2020-02-12 RX ADMIN — Medication 1 TABLET: at 18:11

## 2020-02-12 RX ADMIN — Medication 125 MG: at 08:41

## 2020-02-12 RX ADMIN — ENOXAPARIN SODIUM 40 MG: 40 INJECTION SUBCUTANEOUS at 08:42

## 2020-02-12 RX ADMIN — NYSTATIN: 100000 POWDER TOPICAL at 08:44

## 2020-02-12 RX ADMIN — DEXTRAN 70 AND HYPROMELLOSE 2910 1 DROP: 1; 3 SOLUTION/ DROPS OPHTHALMIC at 22:27

## 2020-02-12 RX ADMIN — MIDODRINE HYDROCHLORIDE 15 MG: 5 TABLET ORAL at 13:16

## 2020-02-12 RX ADMIN — WHITE PETROLATUM 57.7 %-MINERAL OIL 31.9 % EYE OINTMENT: at 22:28

## 2020-02-12 NOTE — ASSESSMENT & PLAN NOTE
CT scan of the chest reports possible heart failure as it relates to ground-glass appearance on the left lung  Also has left lower lobe consolidation that is concerning for atelectasis versus infection  Patient with no history of heart failure  Clinical exam not consistent with heart failure  · BNP is elevated   · Currently not on IV fluids  · Routine echo- EF 55 %  · incentive spirometry

## 2020-02-12 NOTE — ASSESSMENT & PLAN NOTE
· Patient with healed scars to the sacral area, reviewed Wound care's consultation  Present on admission left ischial ulcer stage III    · Continue local care for excoriation and red scan as well as frequent turning of the patient  Also has wound to the right thigh over the hip which has osteomyelitis  No surgical intervention being recommended  · ID following and antibiotics finished 2/11   cleared for discharge

## 2020-02-12 NOTE — ASSESSMENT & PLAN NOTE
· Could be secondary to wound infection or contaminant  · Infectious Disease following and cleared today for discharge

## 2020-02-12 NOTE — ASSESSMENT & PLAN NOTE
Patient with a known past medical history for paraplegia following spinal cord injury presents with increased drainage of R hip wound  · Known to history of hip osteo and dislocation of the R femur  Patient is nonweightbearing  On admission had fever, chills  · CT repeated 2/8 and compared to February 5, 2020 showing improvement in gas pockets  · Ortho following; no plans for any surgical intervention at this time  · General surgery following: continue localized wound care  No plans for any surgical intervention  · ID following: IV Zosyn completed  repeat CT scan shows improvement  · Per ID: "if no plan for girdle stone procedure and flap to cover the wound, the role of long-term IV antibiotics is limited with risk of side effects from prolonged antibiotics outweighs the benefit   This has been explained at length to the patient  Will likely continue IV antibiotic to finish 10 days course for treatment of skin and soft tissue infection  " Course completed 2/11   · Continue local wound care  Patient reports depression over her current status  She is agreeable to speak with the spiritual Care Team and would like outpatient referral   She states that her goal will be to get back to her home but at this time she is in the skilled nursing facility setting

## 2020-02-12 NOTE — PROGRESS NOTES
Progress Note - Infectious Disease   Claudine Ramires 71 y o  female MRN: [id number]  Unit/Bed#: [facility] 615-01 Encounter: [id number]      Impression/Plan:    72-year-old female patient with paraplegia secondary to spinal cord injury and multiple chronic decubitus ulcers, admitted for fever and leukocytosis, thought to be secondary to right hip wound infection      1- sepsis, POA:  With fever of 102 1 and leukocytosis on admission  No signs of pneumonia, no signs of intra-abdominal infection  CT chest and abdomen pelvis negative for infectious focus  Patient has Hays in place and is at risk for UTI and   but infectious source is likely the  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  Her blood cultures positive for coag-negative staph in 1/2 sets  Her urine culture is positive for Proteus and E coli   She is currently on Zosyn IV which she is tolerating well, drainage through right hip wound has significantly improved and repeat CT scan ruled out any abscess or collection, but showed again signs of chronic osteomyelitis  - antibiotics as below  - surgery follow-up  - close clinical monitoring, trend fever curve  - repeat CBC in a m   - follow-up final result of blood culture sent  02/09/2020        2- Right hip open wound, with underlying osteomyelitis and dislocation of right femur:  Patient is known to have hip osteomyelitis and dislocation of the right femur   She was recently evaluated as outpatient by Ortho, but decision was to go with non surgical management  On admission she had fever and chills, and reports increased drainage from the right hip wound    No other infectious focus is identified, though patient has a chronic Hays catheter and is at risk for UTI   Her urine cultures positive for Proteus and E coli   Her Hays has been exchanged    CT scan on admission shows signs of osteomyelitis, underlying sinus track, but also concern for myositis of the right iliopsoas muscle   Concern for pyomyositis  Previous cultures including sacral bone culture positive for E coli and Providencia, left ischial wound culture positive for Pseudomonas and Providencia, and left femur culture positive for corynebacterium reviewed  Case discussed with General surgery and Orthopedic   No plan for girdle stone or debridement at this time   repeat CT scan abdomen pelvis with contrast done  02/09/2020 is negative for abscess, negative for myositis   Finding of chronic osteomyelitis of right proximal femur  - monitor clinically off antibiotics as patient already finished 7 days course for treatment of skin and soft tissue infection    - As there is  no plan for girdle stone procedure and flap to cover the wound, the role of long-term IV antibiotics is limited with risk of side effects from prolonged antibiotics outweighs the benefit   This has been explained at length to the patient           3- history of C diff colitis:  C diff infection documented 04/2019  Patient currently has no diarrhea  - continue vancomycin p o   prophylactic dose; will likely need to prolong vancomycin p o  For 72 hours after last dose of systemic antibiotic treatment   Continue vancomycin p o  Through 02/14/2020     4- multiple antibiotic allergy:  Patient has a reported allergy to vancomycin described as rash   She is currently tolerating vancomycin p o  Patient also previously tolerated ertapenem, Zosyn and daptomycin       5- macular rash over lower back:   This was present before starting antibiotic therapy  Non itchy, nontender, stable in appearance  - continue local skin care  - monitor clinically for any sign of expansion or worsening of the rash     6- bacteriuria:  UA is positive for WBC, urine culture positive for Proteus   This likely represents asymptomatic bacteriuria   Hays has been exchanged  - will follow up result of blood culture   Continue antibiotics as above for possible right hip wound infection     7- coagulase negative Staph bacteremia:  Blood culture collected on admission is positive in 1/2 sets for coag-negative staph  This could be secondary to wound infection or represent pseudobacteremia and skin naun contamination  Repeat blood culture 2020 is negative to date  -  follow-up final result of repeat blood culture sent 2020        Plan mentioned above discussed in details with patient  Case discussed with Javier Villanueva for discharge from ID standpoint     Antibiotics:  Finished Seven days course of IV Zosyn 2020  Vancomycin p o  Day 8 (day 1 after last dose of IV Zosyn)     Subjective:  Patient has no fever, chills, sweats; no nausea, vomiting, diarrhea; no cough, shortness of breath; no pain  No new symptoms  Objective:  Vitals:  Temp:  [98 3 °F (36 8 °C)] 98 3 °F (36 8 °C)  HR:  [66-75] 66  Resp:  [20] 20  BP: (125-140)/(68-74) 140/74  SpO2:  [95 %-97 %] 95 %  Temp (24hrs), Av 3 °F (36 8 °C), Min:98 3 °F (36 8 °C), Max:98 3 °F (36 8 °C)  Current: Temperature: 98 3 °F (36 8 °C)    Physical Exam:   General Appearance:  Alert, interactive, nontoxic, no acute distress  Throat: Oropharynx moist without lesions  Lungs:   Clear to auscultation bilaterally; no wheezes, rhonchi or rales; respirations unlabored   Heart:  RRR; no murmur, rub or gallop   Abdomen:   Soft, non-tender, non-distended, positive bowel sounds  Hays catheter in place, draining clear urine     Extremities: No clubbing, cyanosis or edema   Skin: No new rashes or lesions  Bilateral hip wounds  No periwound cellulitis noted  No purulence  Dressing over right hip wound has breakthrough drainage     Sacral decubitus ulcer present, pictures reviewed       Labs, Imaging, & Other studies:   All pertinent labs and imaging studies were personally reviewed  Results from last 7 days   Lab Units 20  0603 20  0448 02/10/20  0624   WBC Thousand/uL 10 25* 11 90* 10 86*   HEMOGLOBIN g/dL 9 0* 8 5* 8 3*   PLATELETS Thousands/uL 759* 757* 669*     Results from last 7 days   Lab Units 02/12/20  0603 02/11/20  0448 02/10/20  0624  02/06/20  0451 02/05/20  1538   SODIUM mmol/L 136 138 139   < > 131* 129*   POTASSIUM mmol/L 3 7 4 0 3 3*   < > 3 8 4 7   CHLORIDE mmol/L 104 104 107   < > 100 96*   CO2 mmol/L 26 27 28   < > 22 24   BUN mg/dL 10 9 8   < > 7 11   CREATININE mg/dL 0 38* 0 53* 0 38*   < > 0 38* 0 47*   EGFR ml/min/1 73sq m 108 97 108   < > 108 101   CALCIUM mg/dL 8 6 8 5 8 1*   < > 8 0* 8 6   AST U/L  --   --   --   --  24 37   ALT U/L  --   --   --   --  21 25   ALK PHOS U/L  --   --   --   --  147* 175*    < > = values in this interval not displayed  Results from last 7 days   Lab Units 02/09/20  0829 02/05/20  1745 02/05/20  1544 02/05/20  1538   BLOOD CULTURE  No Growth at 48 hrs  No Growth at 48 hrs  --  Staphylococcus coagulase negative* No Growth After 5 Days     GRAM STAIN RESULT   --   --  Gram positive cocci in clusters*  --    URINE CULTURE   --  >100,000 cfu/ml Proteus mirabilis*  50,000-59,000 cfu/ml Escherichia coli*  --   --      Results from last 7 days   Lab Units 02/05/20  1538   PROCALCITONIN ng/ml 1 25*

## 2020-02-12 NOTE — CONSULTS
Consultation - Neuropsychology/Psychology Department  Stella Shaw 71 y o  female MRN: [id number]  Unit/Bed#: [facility] 801-74 Encounter: [id number]        Reason for Consultation:  Stella Shaw is a 71y o  year old female who was referred for a Neuropsychological Exam to assess cognitive functioning and comment on capacity to make informed medical and self care decisions  History of Present Illness  History of spinal cord injury with paraplegia; believes she can return home    Physician Requesting Consult: Ebenezer Scruggs MD    PROBLEM LIST:  Patient Active Problem List   Diagnosis    Paraplegia following spinal cord injury ([address] )    Pressure ulcer, sacrum    Iron deficiency anemia    Osteopenia    Underweight    Decubitus ulcer of left ischium, stage III (HCC)    Rash due to allergy    Closed fracture of distal end of left femur with routine healing    Closed fracture of lower end of left femur with routine healing    Chronic osteomyelitis of coccyx ([address] )    Fecal retention    Anemia of chronic disease    GERD (gastroesophageal reflux disease)    Moderate protein-calorie malnutrition (HCC)    Hyperglycemia    BMI less than 19,adult    Hypermagnesemia    Complicated wound infection    Sepsis due to anaerobes ([address] )    C  difficile diarrhea    Pressure injury of sacral region, unstageable ([address] )    Acute blood loss anemia    Sepsis ([address] )    Neurogenic bladder    Other osteoporosis without current pathological fracture    Closed dislocation of right hip (HCC)    Elevated liver function tests    Physical deconditioning    Hypotension    History of Clostridium difficile infection    Open wound of right hip    Coagulase negative Staphylococcus bacteremia    Severe protein-calorie malnutrition (HCC)    Depression    Abnormal CT of the chest    Lung nodule         Historical Information   Past Medical History:   Diagnosis Date    Anemia     iron defiencey    Arthritis osteo    Back injury     Chronic kidney disease     nephritis    Depression     MRSA (methicillin resistant staph aureus) culture positive 12/07/2018    BONE-TISSUE     Osteopenia     Osteoporosis     Paralysis ([address] )     paraplegic due to spinal cord injury    Paraplegia (HCC)     Poor circulation     Pressure injury of skin     right hip, stage 3    Pressure sore of hip     right    Tibial plateau fracture     Underweight      Past Surgical History:   Procedure Laterality Date    CLOSURE DELAYED PRIMARY N/A 3/20/2019    Procedure: OPHELIA ANAL WOUND RECLOSURE;  Surgeon: Theodora Al MD;  Location: [address] [place] [address];  Service: Plastics    DECUBITUS ULCER EXCISION Bilateral 11/12/2019    Procedure: DEBRIDEMENT DECUBITI ([address] OUT); Surgeon: Lamin Rodriguez DO;  Location:  MAIN OR;  Service: General    FLAP LOCAL EXTREMITY Left 1/28/2019    Procedure: THIGH FLAP & STSG TO CLOSE HIP WOUND;  Surgeon: Theodora Al MD;  Location: [address] [place] [address] OR;  Service: Plastics    FLAP LOCAL TRUNK Right 12/17/2018    Procedure: DEBRIDE/FLAP CLOSURE HIP PRESSURE SORE Ambika Bunting GRAFT;  Surgeon: Theodora Al MD;  Location: [address] [place] [address] OR;  Service: Plastics    FLAP LOCAL TRUNK Left 2/27/2019    Procedure: BUTTOCK FLAP TO ISCHIAL SORE;  Surgeon: Theodora Al MD;  Location: [address] [place] [address] OR;  Service: Plastics    HIP DEBRIDEMENT Right 2017    right hip sore debridement    INCISION AND DRAINAGE OF WOUND Left 1/3/2019    Procedure: INCISION AND DRAINAGE (I&D) left distal femur  With deep wound cultures   Application of VAC DRAIN SPONGE;  Surgeon: Naga Odom MD;  Location:  MAIN OR;  Service: Orthopedics    IR PICC LINE  12/19/2018    IR PICC LINE  3/12/2019    CA DEBRIDEMENT, SKIN, SUB-Q TISSUE,MUSCLE,BONE,=<20 SQ CM Right 9/19/2018    Procedure: DEBRIDEMENT OF HIP PRESSURE SORE;  Surgeon: Theodora Al MD;  Location: [address] [place] [address];  Service: Plastics    CA OPEN RX FEMUR FX+INTRAMED FELIX Left 10/22/2018    Procedure: INSERTION NAIL IM LEFT FEMUR RETROGRADE;  Surgeon: Jennifer Zayas MD;  Location:  MAIN OR;  Service: Orthopedics    TOE AMPUTATION Left 2017    small toe left foot amputation    WISDOM TOOTH EXTRACTION      WOUND DEBRIDEMENT Left 12/17/2018    Procedure: DEBRIDEMENT HIP PRESSURE SORE;  Surgeon: Meche Bentley MD;  Location: [address] [place] [address] MAIN OR;  Service: Plastics    WOUND DEBRIDEMENT N/A 11/10/2019    Procedure: EXCISIONAL DEBRIDEMENT;  Surgeon: Irby Fleischer, MD;  Location: [facility] MAIN OR;  Service: General     Social History   Social History     Substance and Sexual Activity   Alcohol Use Yes    Comment: occasional     Social History     Substance and Sexual Activity   Drug Use No     Social History     Tobacco Use   Smoking Status Never Smoker   Smokeless Tobacco Never Used     Family History:   Family History   Problem Relation Age of Onset    Depression Father        Meds/Allergies   current meds:   Current Facility-Administered Medications   Medication Dose Route Frequency    acetaminophen (TYLENOL) tablet 650 mg  650 mg Oral Q6H PRN    artificial tear (LUBRIFRESH P M ) ophthalmic ointment   Both Eyes HS    bisacodyl (DULCOLAX) rectal suppository 10 mg  10 mg Rectal Daily PRN    calcium carbonate-vitamin D (OSCAL-D) 500 mg-200 units per tablet 1 tablet  1 tablet Oral BID With Meals    cholecalciferol (VITAMIN D3) tablet 1,000 Units  1,000 Units Oral Daily    cyanocobalamin (VITAMIN B-12) tablet 100 mcg  100 mcg Oral Daily    dextran 70-hypromellose (GENTEAL TEARS) 0 1-0 3 % ophthalmic solution 1 drop  1 drop Both Eyes Q3H PRN    diphenhydrAMINE (BENADRYL) tablet 50 mg  50 mg Oral 60 Min Pre-Op    enoxaparin (LOVENOX) subcutaneous injection 40 mg  40 mg Subcutaneous Daily    famotidine (PEPCID) tablet 20 mg  20 mg Oral BID PRN    loperamide (IMODIUM) capsule 2 mg  2 mg Oral TID PRN    midodrine (PROAMATINE) tablet 15 mg  15 mg Oral Q8H    multivitamin-minerals (CENTRUM) tablet 1 tablet  1 tablet Oral Daily    nystatin (MYCOSTATIN) powder   Topical BID    ondansetron (ZOFRAN) injection 4 mg  4 mg Intravenous Q6H PRN    pantoprazole (PROTONIX) EC tablet 40 mg  40 mg Oral BID AC    polyethylene glycol (MIRALAX) packet 17 g  17 g Oral Daily    saccharomyces boulardii (FLORASTOR) capsule 250 mg  250 mg Oral BID    senna (SENOKOT) tablet 8 6 mg  1 tablet Oral HS PRN    vancomycin (VANCOCIN) oral solution 125 mg  125 mg Oral Q12H [facility]     Facility-Administered Medications Ordered in Other Encounters   Medication Dose Route Frequency    dexamethasone (DECADRON) injection 4 mg  4 mg Intravenous Once PRN    diphenhydrAMINE (BENADRYL) injection 12 5 mg  12 5 mg Intravenous Once    lactated ringers infusion  75 mL/hr Intravenous Continuous    lactated ringers infusion  50 mL/hr Intravenous Continuous    lactated ringers infusion  75 mL/hr Intravenous Continuous    lactated ringers infusion  75 mL/hr Intravenous Continuous    ondansetron (ZOFRAN) injection 4 mg  4 mg Intravenous Once PRN    promethazine (PHENERGAN) injection 12 5 mg  12 5 mg Intravenous Once PRN       Allergies   Allergen Reactions    Iv Contrast [Iodinated Diagnostic Agents]      Tingling face, itching    Cefepime Rash    Ciprofloxacin Rash and Swelling     Looked like suburn, itching  Bed sores  Swelling, itching    Latex Rash    Vancomycin Rash     Unknown          Family and Social Support:   No data recorded    Behavioral Observations: Alert, oriented x 3, cooperative; affect appeared flat and she admitted to significant depression and anxiety; no overt evidence of psychotic process and patient denied auditory and visual hallucinations  Patient wishes to to d/c home  She appeared to greatly minimize her medical history and reported she is able to live independently and perform all her own cooking, cleaning, grocery shopping and driving an automobile  She then stated if I can't drive I will take [place]   She reported being unable to transfer to a wheelchair  She reportedly has no family to assist her  Cognitive Examination    General Cognitive Functioning MMSE = Average 28/30; General Fund of Information = Average    Attention/Concentration Auditory Selective Attention = Average; Auditory Vigilance = Average;    Frontal Systems/Executive Functioning Mental Flexibility/Cognitive Control = Average; Working Memory = Average Abstract Reasoning = Average;  Generative Ability = Average, Commonsense Reasoning and Judgement = Average    Language Functioning Confrontation naming = Average, Phonemic Fluency = Borderline; Semantic Retrieval = Average; Comprehension of Complex Ideational Material = Average; Praxis = Within Normal Limits; Repetition = Within Normal Limits; Basic Reading = Within Normal Limits; Following Commands = Within Normal Limits    Memory Functioning Narrative Recall - Short Delay = Average; Long Delay Narrative Recall = Average; Visual Recognition = Within Normal Limits    Visuo-Spatial Abilities Not Assessed    Functional Knowledge  Health & Safety Knowledge = Mildly Impaired;     Summary/Impression: Results of Neuropsychological Exam did not reveal significant cognitive dysfunction but she appears to be suffering from considerable depression and anxiety  She also appeared to significantly minimize her medical history and the significance of her current medical condition and physical limitations  She did not acknowledge the risk of harm of returning home and there appeared to be deficits in her ability to relate her physical limitations to her personal situation  On a measure assessing awareness of personal health status and ability to evaluate health problems, handle medical emergencies and take safety precautions, patient demonstrated mild deficits  At this time, patient does not appear to have capacity to make fully informed medical and self care decisions   Major Depressive Disorder, Moderate without psychotic features  Patient stated she is not interested in taking anti-depressant medications because they will not help her condition

## 2020-02-12 NOTE — ASSESSMENT & PLAN NOTE
· History of spinal cord injury T8 with paraplegia sp 20 years ago hang gliding accident   · Presented from [facility], patient reports that she was living in her own home prior to that  Unclear how successfully that was going--CM following and recommending neuropsych eval to determine if competent to live alone and care for self   · Continue with repositioning and wound care

## 2020-02-12 NOTE — PLAN OF CARE
Problem: Potential for Falls  Goal: Patient will remain free of falls  Description  INTERVENTIONS:  - Assess patient frequently for physical needs  -  Identify cognitive and physical deficits and behaviors that affect risk of falls    -  Catarina fall precautions as indicated by assessment   - Educate patient/family on patient safety including physical limitations  - Instruct patient to call for assistance with activity based on assessment  - Modify environment to reduce risk of injury  - Consider OT/PT consult to assist with strengthening/mobility  Outcome: Progressing

## 2020-02-12 NOTE — PROGRESS NOTES
[facility]   Progress Note - Екатерина August 1950, 71 y o  female MRN: [id number]    Unit/Bed#: [facility] 045-36 Encounter: [id number]    Primary Care Provider: Alexys Hernandez   Date and time admitted to hospital: 2/5/2020  3:25 PM        * Sepsis [facility])  Assessment & Plan  · POA with fever and leukocytosis with infectious source likely being R hip wound with underlying iliopsoas myositis  No signs of PNA or intra-abdominal infection  Review of repeat CT scan shows known osteomyelitis of the right hip with dislocation  Pockets of gas have decreased since the initial consult  No evidence for abscesses  · Repeat CT shows no evidence for abscesses  · Blood cultures 1/2 positive for coag-neg staph, other negative  Continue to monitor blood cultures  · Urine culture + for proteus and e coli; likely asymptomatic bacteriuria per ID; isabel exchanged   · Continue IV abx per ID recommendations complete 10 days total   Last dose 2/11   · ID with preference for surgical intervention for definitive source control however, no focus for interventional radiology intervention and surgery and orthopedics not currently offering any surgical management  Cleared today by ID   · PT/OT recommending returning to [facility]  Patient wants to go back home but has not been there in quite some time as she has been at [facility]  Goal of return home may be unrealistic due to paraplegia and infected chronic wounds  Neuro psych eval placed to determine cognitive function  Once completed patient can return to [facility] pending clear eval   Otherwise when speaking to case management patient may require more permanent placement       Coagulase negative Staphylococcus bacteremia  Assessment & Plan  · Could be secondary to wound infection or contaminant  · Infectious Disease following and cleared today for discharge       Decubitus ulcer of left ischium, stage III ([facility] [address] )  Assessment & Plan  · Patient with healed scars to the sacral area, reviewed Wound care's consultation  Present on admission left ischial ulcer stage III    · Continue local care for excoriation and red scan as well as frequent turning of the patient  Also has wound to the right thigh over the hip which has osteomyelitis  No surgical intervention being recommended  · ID following and antibiotics finished 2/11   cleared for discharge       Anemia of chronic disease  Assessment & Plan  · Labs appear consistent with anemia of chronic disease, continue to monitor closely  Hemoglobin stable   · 8 5 yesterday and 9 0 today  · Continue to monitor     Abnormal CT of the chest  Assessment & Plan  CT scan of the chest reports possible heart failure as it relates to ground-glass appearance on the left lung  Also has left lower lobe consolidation that is concerning for atelectasis versus infection  Patient with no history of heart failure  Clinical exam not consistent with heart failure  · BNP is elevated   · Currently not on IV fluids  · Routine echo- EF 55 %  · incentive spirometry  History of Clostridium difficile infection  Assessment & Plan  Patient was noted to have fecal impaction on CT scan  Has reported large bowel movement yesterday  No concern for C diff colitis  Continue with prophylactic vancomycin as noted below  Patient refusing suppository and do collapse  Will make these p r n  · With infection in April of 2019  · Currently without any complaints of diarrhea  · Continue vancomycin prophylactics, will need to continue for 72 hours after last dose of systemic antibiotics thru 2/14    Neurogenic bladder  Assessment & Plan  · History of neurogenic bladder with chronic indwelling [place] catheter, exchanged this admission due to finding of E coli and Proteus in the urine culture    · Urine culture noted, likely represents asymptomatic bacteriuria per infectious disease      Open wound of right hip  Assessment & Plan  Patient with a known past medical history for paraplegia following spinal cord injury presents with increased drainage of R hip wound  · Known to history of hip osteo and dislocation of the R femur  Patient is nonweightbearing  On admission had fever, chills  · CT repeated 2/8 and compared to February 5, 2020 showing improvement in gas pockets  · Ortho following; no plans for any surgical intervention at this time  · General surgery following: continue localized wound care  No plans for any surgical intervention  · ID following: IV Zosyn completed  repeat CT scan shows improvement  · Per ID: "if no plan for girdle stone procedure and flap to cover the wound, the role of long-term IV antibiotics is limited with risk of side effects from prolonged antibiotics outweighs the benefit   This has been explained at length to the patient  Will likely continue IV antibiotic to finish 10 days course for treatment of skin and soft tissue infection  " Course completed 2/11   · Continue local wound care  Patient reports depression over her current status  She is agreeable to speak with the spiritual Care Team and would like outpatient referral   She states that her goal will be to get back to her home but at this time she is in the skilled nursing facility setting  Paraplegia following spinal cord injury [facility])  Assessment & Plan  · History of spinal cord injury T8 with paraplegia sp 20 years ago hang gliding accident   · Presented from [facility], patient reports that she was living in her own home prior to that  Unclear how successfully that was going--CM following and recommending neuropsych eval to determine if competent to live alone and care for self   · Continue with repositioning and wound care  Hypotension  Assessment & Plan  · Chronic low blood pressures on midodrine    Resolved      Severe protein-calorie malnutrition ([address] )  Assessment & Plan  Malnutrition Findings:   Malnutrition type: Chronic illness(Severe pro/sara malnutrition r/t disease/condition as evidenced by 12% unintentional wt loss since 11/10/19, consuming < 75% energy intake compared to est energy needs > 1 month, fat/muscle wasting of orbital/temple areas  Treated with diet/supplements)  Degree of Malnutrition: Other severe protein calorie malnutrition    BMI Findings:  BMI Classifications: Underweight < 18 5     Body mass index is 17 97 kg/m²  Patient does admit to depression  Pastoral care consulted  She may benefit from counseling at discharge  Would recommend supplementation as above  Lung nodule  Assessment & Plan  · Noted incidental findijg, will require outpatient f/u     Depression  Assessment & Plan  Patient does report depression over continued chronic hospital care and chronic illness  Pastoral care to speak with patient  She may benefit from outpatient counseling  She states she would be agreeable to possible outpatient counseling for 1 or 2 sessions but not forever  She raised the issue herself  Her affect is rather flat  · Spiritual care consult for now    Fecal retention  Assessment & Plan  · Noted on CT scan  · Had large BM  Reports moving bowels every couple days   · Monitor       VTE Pharmacologic Prophylaxis:   Pharmacologic: Enoxaparin (Lovenox)  Mechanical VTE Prophylaxis in Place: Yes    Patient Centered Rounds: I have performed bedside rounds with nursing staff today  Discussions with Specialists or Other Care Team Provider: none    Education and Discussions with Family / Patient: patient    Time Spent for Care: 20 minutes  More than 50% of total time spent on counseling and coordination of care as described above      Current Length of Stay: 7 day(s)    Current Patient Status: Inpatient   Certification Statement: The patient will continue to require additional inpatient hospital stay due to further evaluation and monitoring and placement     Discharge Plan: pending neuropsych eval and placement     Code Status: Level 1 - Full Code      Subjective:   Patient offers no complaints at this time  Objective:     Vitals:   Temp (24hrs), Av 3 °F (36 8 °C), Min:98 3 °F (36 8 °C), Max:98 3 °F (36 8 °C)    Temp:  [98 3 °F (36 8 °C)] 98 3 °F (36 8 °C)  HR:  [66-75] 66  Resp:  [20] 20  BP: (125-140)/(68-74) 140/74  SpO2:  [95 %-97 %] 95 %  Body mass index is 17 97 kg/m²  Input and Output Summary (last 24 hours): Intake/Output Summary (Last 24 hours) at 2020 1551  Last data filed at 2020 1420  Gross per 24 hour   Intake 700 ml   Output 1375 ml   Net -675 ml       Physical Exam:     Physical Exam   HENT:   Head: Normocephalic  Neck: Normal range of motion  Cardiovascular: Normal rate and regular rhythm  No murmur heard  Pulmonary/Chest: Effort normal and breath sounds normal  No respiratory distress  Abdominal: Soft  Bowel sounds are normal  She exhibits no distension  Musculoskeletal: She exhibits no edema  BL LE paralysis    Neurological: She exhibits abnormal muscle tone  Skin: Skin is warm and dry  Psychiatric: Her behavior is normal  Judgment and thought content normal  Cognition and memory are normal    Flat         Additional Data:     Labs:    Results from last 7 days   Lab Units 20  0603 20  0448  20  0537   WBC Thousand/uL 10 25* 11 90*   < > 11 08*   HEMOGLOBIN g/dL 9 0* 8 5*   < > 8 0*   HEMATOCRIT % 30 3* 28 1*   < > 27 0*   PLATELETS Thousands/uL 759* 757*   < > 624*   NEUTROS PCT %  --   --   --  80*   LYMPHS PCT %  --   --   --  16   LYMPHO PCT %  --  19  --   --    MONOS PCT %  --   --   --  3*   MONO PCT %  --  5  --   --    EOS PCT %  --  4  --  0    < > = values in this interval not displayed       Results from last 7 days   Lab Units 20  0603  20  0451   SODIUM mmol/L 136   < > 131*   POTASSIUM mmol/L 3 7   < > 3 8   CHLORIDE mmol/L 104   < > 100   CO2 mmol/L 26   < > 22   BUN mg/dL 10   < > 7   CREATININE mg/dL 0 38*   < > 0 38*   ANION GAP mmol/L 6 < > 9   CALCIUM mg/dL 8 6   < > 8 0*   ALBUMIN g/dL  --   --  1 4*   TOTAL BILIRUBIN mg/dL  --   --  0 47   ALK PHOS U/L  --   --  147*   ALT U/L  --   --  21   AST U/L  --   --  24   GLUCOSE RANDOM mg/dL 110   < > 104    < > = values in this interval not displayed  * I Have Reviewed All Lab Data Listed Above  * Additional Pertinent Lab Tests Reviewed: [address] Admission Reviewed    Imaging:    Imaging Reports Reviewed Today Include: none  Imaging Personally Reviewed by Myself Includes:  none    Recent Cultures (last 7 days):     Results from last 7 days   Lab Units 02/09/20  0829 02/05/20  1745   BLOOD CULTURE  No Growth at 72 hrs    No Growth at 72 hrs   --    URINE CULTURE   --  >100,000 cfu/ml Proteus mirabilis*  50,000-59,000 cfu/ml Escherichia coli*       Last 24 Hours Medication List:     Current Facility-Administered Medications:  acetaminophen 650 mg Oral Q6H PRN Petey Delaney MD   artificial tear  Both Eyes HS Juliann Salmon MD   bisacodyl 10 mg Rectal Daily PRN Nenita Izquierdo MD   calcium carbonate-vitamin D 1 tablet Oral BID With Meals Petey Delaney MD   cholecalciferol 1,000 Units Oral Daily Petey Delaney MD   cyanocobalamin 100 mcg Oral Daily Petey Delaney MD   dextran 70-hypromellose 1 drop Both Eyes Q3H PRN Melissa Jones PA-C   diphenhydrAMINE 50 mg Oral 60 Min Pre-Op Sagrario Dumont PA-C   enoxaparin 40 mg Subcutaneous Daily Petey Delaney MD   famotidine 20 mg Oral BID PRN Juliann Salmon MD   loperamide 2 mg Oral TID PRN Cornelius Brooke PA-C   midodrine 15 mg Oral Q8H Petey Delaney MD   multivitamin-minerals 1 tablet Oral Daily Petey Delaney MD   nystatin  Topical BID Petey Delaney MD   ondansetron 4 mg Intravenous Q6H PRN Petey Delaney MD   pantoprazole 40 mg Oral BID AC Juliann Salmon MD   polyethylene glycol 17 g Oral Daily Petey Delaney MD   saccharomyces boulardii 250 mg Oral BID Seven Salcido MD   senna 1 tablet Oral HS PRN Carmen Murcia MD   vancomycin 125 mg Oral Q12H [facility] Seven Salcido MD     Facility-Administered Medications Ordered in Other Encounters:  dexamethasone 4 mg Intravenous Once PRN Rheta Blotter, DO    diphenhydrAMINE 12 5 mg Intravenous Once Rheta Blotter, DO    lactated ringers 75 mL/hr Intravenous Continuous Rheta Blotter, DO Last Rate: 75 mL/hr (11/10/19 2105)   lactated ringers 50 mL/hr Intravenous Continuous Selena Kelly CRNA    lactated ringers 75 mL/hr Intravenous Continuous Rheta Blotter, DO Last Rate: Stopped (03/20/19 1841)   lactated ringers 75 mL/hr Intravenous Continuous Jumana Guthrie MD Last Rate: Stopped (03/20/19 1842)   ondansetron 4 mg Intravenous Once PRN Deepak Weinberg MD    promethazine 12 5 mg Intravenous Once PRN Deepak Weinberg MD         Today, Patient Was Seen By: CONCEPCION Holloway    ** Please Note: Dictation voice to text software may have been used in the creation of this document   **

## 2020-02-12 NOTE — ASSESSMENT & PLAN NOTE
· POA with fever and leukocytosis with infectious source likely being R hip wound with underlying iliopsoas myositis  No signs of PNA or intra-abdominal infection  Review of repeat CT scan shows known osteomyelitis of the right hip with dislocation  Pockets of gas have decreased since the initial consult  No evidence for abscesses  · Repeat CT shows no evidence for abscesses  · Blood cultures 1/2 positive for coag-neg staph, other negative  Continue to monitor blood cultures  · Urine culture + for proteus and e coli; likely asymptomatic bacteriuria per ID; isabel exchanged   · Continue IV abx per ID recommendations complete 10 days total   Last dose 2/11   · ID with preference for surgical intervention for definitive source control however, no focus for interventional radiology intervention and surgery and orthopedics not currently offering any surgical management  Cleared today by ID   · PT/OT recommending returning to [facility]  Patient wants to go back home but has not been there in quite some time as she has been at [facility]  Goal of return home may be unrealistic due to paraplegia and infected chronic wounds  Neuro psych eval placed to determine cognitive function  Once completed patient can return to [facility] pending clear eval   Otherwise when speaking to case management patient may require more permanent placement

## 2020-02-12 NOTE — SOCIAL WORK
Patient reviewed during care coordination rounds  Patient ordered a neuropsych evaluation for competency  Patient from [facility], non ambulatory  Patient has decubitus ulcers, no friends or family in the area  Patient believes she can go back home and take care of herself  CM will continue to follow for final recommendations

## 2020-02-12 NOTE — ASSESSMENT & PLAN NOTE
· Labs appear consistent with anemia of chronic disease, continue to monitor closely    Hemoglobin stable   · 8 5 yesterday and 9 0 today  · Continue to monitor

## 2020-02-13 PROBLEM — R41.89 COGNITIVE DECLINE: Status: ACTIVE | Noted: 2020-02-13

## 2020-02-13 PROCEDURE — 99232 SBSQ HOSP IP/OBS MODERATE 35: CPT | Performed by: NURSE PRACTITIONER

## 2020-02-13 PROCEDURE — 99233 SBSQ HOSP IP/OBS HIGH 50: CPT | Performed by: INTERNAL MEDICINE

## 2020-02-13 PROCEDURE — 99222 1ST HOSP IP/OBS MODERATE 55: CPT | Performed by: INTERNAL MEDICINE

## 2020-02-13 RX ORDER — POLYETHYLENE GLYCOL 3350 17 G/17G
17 POWDER, FOR SOLUTION ORAL DAILY PRN
Status: DISCONTINUED | OUTPATIENT
Start: 2020-02-13 | End: 2020-03-02 | Stop reason: HOSPADM

## 2020-02-13 RX ORDER — SENNOSIDES 8.6 MG
1 TABLET ORAL
Status: DISCONTINUED | OUTPATIENT
Start: 2020-02-13 | End: 2020-03-02 | Stop reason: HOSPADM

## 2020-02-13 RX ADMIN — Medication 125 MG: at 21:27

## 2020-02-13 RX ADMIN — DEXTRAN 70 AND HYPROMELLOSE 2910 1 DROP: 1; 3 SOLUTION/ DROPS OPHTHALMIC at 21:27

## 2020-02-13 RX ADMIN — Medication 250 MG: at 08:42

## 2020-02-13 RX ADMIN — PANTOPRAZOLE SODIUM 40 MG: 40 TABLET, DELAYED RELEASE ORAL at 17:37

## 2020-02-13 RX ADMIN — MELATONIN 1000 UNITS: at 08:42

## 2020-02-13 RX ADMIN — NYSTATIN: 100000 POWDER TOPICAL at 17:38

## 2020-02-13 RX ADMIN — Medication 1 TABLET: at 08:42

## 2020-02-13 RX ADMIN — Medication 1 TABLET: at 17:37

## 2020-02-13 RX ADMIN — WHITE PETROLATUM 57.7 %-MINERAL OIL 31.9 % EYE OINTMENT: at 21:27

## 2020-02-13 RX ADMIN — NYSTATIN: 100000 POWDER TOPICAL at 08:43

## 2020-02-13 RX ADMIN — ENOXAPARIN SODIUM 40 MG: 40 INJECTION SUBCUTANEOUS at 08:42

## 2020-02-13 RX ADMIN — Medication 125 MG: at 08:47

## 2020-02-13 RX ADMIN — VITAM B12 100 MCG: 100 TAB at 08:43

## 2020-02-13 RX ADMIN — PANTOPRAZOLE SODIUM 40 MG: 40 TABLET, DELAYED RELEASE ORAL at 06:27

## 2020-02-13 RX ADMIN — MIDODRINE HYDROCHLORIDE 15 MG: 5 TABLET ORAL at 21:27

## 2020-02-13 RX ADMIN — Medication 250 MG: at 17:37

## 2020-02-13 RX ADMIN — MIDODRINE HYDROCHLORIDE 15 MG: 5 TABLET ORAL at 13:29

## 2020-02-13 RX ADMIN — MIDODRINE HYDROCHLORIDE 15 MG: 5 TABLET ORAL at 06:27

## 2020-02-13 NOTE — PROGRESS NOTES
Progress Note - Infectious Disease   Nam Iqbal 71 y o  female MRN: [id number]  Unit/Bed#: [facility] 615-01 Encounter: [id number]      Impression/Plan:    51-year-old female patient with paraplegia secondary to spinal cord injury and multiple chronic decubitus ulcers, admitted for fever and leukocytosis, thought to be secondary to right hip wound infection      1- sepsis, POA:  With fever of 102 1 and leukocytosis on admission  No signs of pneumonia, no signs of intra-abdominal infection  CT chest and abdomen pelvis negative for infectious focus  Patient has Hays in place and is at risk for UTI and   but infectious source thought to be likely the  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  Her blood cultures positive for coag-negative staph in 1/2 sets, but with repeat blood culture negative  Her urine culture is positive for Proteus and E coli   Patient received 7 days of IV Zosyn ending 02/11/2020  Repeat CT scan was negative for abscess or collection, but signs of chronic osteomyelitis  - monitor clinically off antibiotics  - close clinical monitoring, trend fever curve  - repeat CBC in a m   - follow-up final result of blood culture sent  02/09/2020        2- Right hip open wound, with underlying osteomyelitis and dislocation of right femur:  Patient is known to have hip osteomyelitis and dislocation of the right femur   She was recently evaluated as outpatient by Ortho, but decision was to go with non surgical management  On admission she had fever and chills, and reports increased drainage from the right hip wound  No other infectious focus was identified   CT scan on admission showed signs of osteomyelitis, underlying sinus track, but also concern for myositis of the right iliopsoas muscle     Previous cultures including sacral bone culture positive for E coli and Providencia, left ischial wound culture positive for Pseudomonas and Providencia, and left femur culture positive for corynebacterium reviewed  Case discussed with General surgery and Orthopedic   No plan for girdle stone or debridement at this time   repeat CT scan abdomen pelvis with contrast done  02/09/2020 negative for abscess, negative for myositis   Finding of chronic osteomyelitis of right proximal femur  - monitor clinically off antibiotics as patient already finished 7 days course for treatment of skin and soft tissue infection    - As there is  no plan for girdle stone procedure and flap to cover the wound, the role of long-term IV antibiotics is limited with risk of side effects from prolonged antibiotics outweighs the benefit   This has been explained at length with the patient           3- history of C diff colitis:  C diff infection documented 04/2019  Patient currently has no diarrhea  - continue vancomycin p o   prophylactic dose; will  need to prolong vancomycin p o  For 72 hours after last dose of systemic antibiotic treatment   Continue vancomycin p o  Through 02/14/2020     4- multiple antibiotic allergy:  Patient has a reported allergy to vancomycin described as rash   She is currently tolerating vancomycin p o  Patient also previously tolerated ertapenem, Zosyn and daptomycin        7- bacteriuria:  UA is positive for WBC, urine culture positive for Proteus   This likely represents asymptomatic bacteriuria   Hays has been exchanged  -likely represents asymptomatic bacteriuria  No indication for further antibiotic treatment at this time     7- coagulase negative Staph bacteremia:  Blood culture collected on admission is positive in 1/2 sets for coag-negative staph  This could be secondary to wound infection or represent pseudobacteremia and skin naun contamination    Repeat blood culture 02/09/2020 is negative to date  -  follow-up final result of repeat blood culture sent 02/09/2020  - no indication for IV antibiotic therapy at this time        Plan mentioned above discussed in details with patient  Case discussed with  primary service  Will sign off, call back ID service for questions or if change in clinical status    Antibiotics:  Status post 7 days of IV Zosyn ending 2020  Vancomycin p o  Day 9 (day 2 after last dose of IV Zosyn)    Subjective:  Patient has no fever, chills, sweats; no nausea, vomiting, diarrhea; no cough, shortness of breath; no pain  No new symptoms  Case discussed with nursing staff      Objective:  Vitals:  Temp:  [97 1 °F (36 2 °C)-97 7 °F (36 5 °C)] 97 7 °F (36 5 °C)  HR:  [68-98] 68  BP: (117-135)/(65-83) 135/83  SpO2:  [96 %-97 %] 96 %  Temp (24hrs), Av 4 °F (36 3 °C), Min:97 1 °F (36 2 °C), Max:97 7 °F (36 5 °C)  Current: Temperature: 97 7 °F (36 5 °C)    Physical Exam:   General Appearance:  Alert, interactive, nontoxic, no acute distress  Throat: Oropharynx moist without lesions  Lungs:   Clear to auscultation bilaterally; no wheezes, rhonchi or rales; respirations unlabored   Heart:  RRR; no murmur, rub or gallop   Abdomen:   Soft, non-tender, non-distended, positive bowel sounds  Hays catheter in place draining clear urine    Extremities: No clubbing, cyanosis or edema   Skin: No new rashes or lesions  No draining wounds noted  Right hip pressure injury, with no periwound erythema    No purulence  Sacral and left hip pressure injury, also no periwound erythema and no noted purulence       Labs, Imaging, & Other studies:   All pertinent labs and imaging studies were personally reviewed  Results from last 7 days   Lab Units 20  0603 20  0448 02/10/20  0624   WBC Thousand/uL 10 25* 11 90* 10 86*   HEMOGLOBIN g/dL 9 0* 8 5* 8 3*   PLATELETS Thousands/uL 759* 757* 669*     Results from last 7 days   Lab Units 20  0603 20  0448 02/10/20  0624   SODIUM mmol/L 136 138 139   POTASSIUM mmol/L 3 7 4 0 3 3*   CHLORIDE mmol/L 104 104 107   CO2 mmol/L 26 27 28   BUN mg/dL 10 9 8   CREATININE mg/dL 0 38* 0 53* 0 38*   EGFR ml/min/1 73sq m 108 97 108 CALCIUM mg/dL 8 6 8 5 8 1*     Results from last 7 days   Lab Units 02/09/20  0829   BLOOD CULTURE  No Growth After 4 Days  No Growth After 4 Days

## 2020-02-13 NOTE — ASSESSMENT & PLAN NOTE
· Could be secondary to wound infection or contaminant  · Infectious Disease following and cleared 2/12 for discharge

## 2020-02-13 NOTE — SOCIAL WORK
Patient deemed incompetent  Will await recommendations from Teja Nunez, [address] [facility] Specialist, whether or not patient has relatives to make medical decisions  If not patient will need guardianship for LTC placement

## 2020-02-13 NOTE — PLAN OF CARE
Problem: Potential for Falls  Goal: Patient will remain free of falls  Description  INTERVENTIONS:  - Assess patient frequently for physical needs  -  Identify cognitive and physical deficits and behaviors that affect risk of falls    -  [place] fall precautions as indicated by assessment   - Educate patient/family on patient safety including physical limitations  - Instruct patient to call for assistance with activity based on assessment  - Modify environment to reduce risk of injury  - Consider OT/PT consult to assist with strengthening/mobility  Outcome: Progressing

## 2020-02-13 NOTE — ASSESSMENT & PLAN NOTE
Patient does report depression over continued chronic hospital care and chronic illness  Pastoral care to speak with patient  She may benefit from outpatient counseling  She states she would be agreeable to possible outpatient counseling for 1 or 2 sessions but not forever  She raised the issue herself  Her affect is rather flat    · Spiritual care consult for now  · In review of neuropsychology note patient is severely depressed refusing antidepressant medication

## 2020-02-13 NOTE — ASSESSMENT & PLAN NOTE
Patient with a known past medical history for paraplegia following spinal cord injury presents with increased drainage of R hip wound  · Known to history of hip osteo and dislocation of the R femur  Patient is nonweightbearing  On admission had fever, chills  · CT repeated 2/8 and compared to February 5, 2020 showing improvement in gas pockets  · Ortho following; no plans for any surgical intervention at this time  · General surgery following: continue localized wound care  No plans for any surgical intervention  · ID following: IV Zosyn completed  repeat CT scan shows improvement  · Per ID: "if no plan for girdle stone procedure and flap to cover the wound, the role of long-term IV antibiotics is limited with risk of side effects from prolonged antibiotics outweighs the benefit   This has been explained at length to the patient  " ten-day Course completed 2/11  · Continue local wound care  Patient reports depression over her current status  She is agreeable to speak with the spiritual Care Team and would like outpatient referral   She states that her goal will be to get back to her home but at this time she is in the skilled nursing facility setting    · At this time patient will require option process for placement discussed with case management today 02/13/2020

## 2020-02-13 NOTE — CONSULTS
Consultation - Geriatric Medicine   Brodie Cosby 71 y o  female MRN: [id number]  Unit/Bed#: [facility] 615-01 Encounter: [id number]      Assessment/Plan     Major Depressive Disorder  -pt admits to depressed mood, declines medical management or counseling at this time  -continue treatment of underlying medical co-morbidities   -pt denies having family support but admits to friends in area who have brought food to her during stay, encourage continued engagement with friends to strengthen support network  -continue to discuss medical treatment of depressed mood in setting of mood likely being a major factor in her ability to make informed decisions and treatment of her mood is likely to be beneficial  -QTc 425 2/5/20  -consider addition of Mirtazepine HS for both mood and appetite    Lack of capacity to make informed medical decisions  -per Dr Kimmy Pichadro  2/12/20, MMSE 28/30, no significant cognitive dysfunction identified, profound depression and anxiety likely playing large role in decision making and patient refused treatment at this time  -CM assisting with establishing guardianship and placement    Sacral decubitus ulcer  -continue local wound care per general surgery and wound care team  -currently on air mattress and with offloading plan in place    Right hip open wound with osteomyelitis and dislocation of right femur  -Orthopedics and ID following, due to overall clinical status non-operative management at this time  -has completed 7 days of abx, no plan for surgical intervention so no plan for long term antibiotics at this time per ID  -continue local wound care  -continue to encourage nutrition to maximize wound healing as much as possible    Sepsis  -POA, likely due to right hip wound infection  -ID following, s/p 7 days of IV Zosyn ending 2/11/20  -chronic osteomyelitis noted on CT chest/abd/pelvis 2/9/20    Severe protein calorie malnutrition  -due to chronic illness with poor PO intake 12% unintentional weight loss since 11/10/19, and consuming <75% energy intake   -appreciate nutrition recommendations, continue to encourage nutritional intake and supplements between meals  -limiting patients ability to heal wounds and recover, recommending treatment of underlying depression and mood with agent such as Mirtazepine to improve mood and appetite which will help increase chances of wound healing and any functional improvement      Paraplegia following spinal cord injury  -currently wheelchair bound  -pt is severely deconditioned and debilitated and cannot tx self to/from wheelchair, unsafe to live alone at this time    Deconditioning/debility frailty  -frailty scale stage 7: severely frail   -continue to encourage nutritional intake and treatment of depression as noted above  -continue to encourage tx of underlying medical conditions     Delirium precautions  -Patient is high risk of delirium due to acute on chronic medical conditions including chronic osteomyelitis, sepsis, paraplegia, depression and acute pain as well as hospitalization   -Initiate delirium precautions  -maintain normal sleep/wake cycle  -minimize overnight interruptions, group overnight vitals/labs/nursing checks as possible  -dim lights, close blinds and turn off tv to minimize stimulation and encourage sleep environment in evenings  -ensure that pain is well controlled   -fecal retention noted on admission CT, encourage bowel regimen  -provide frequent reorientation and redirection  -encourage family and friends at the bedside to help help calm patient if anxious  -avoid medications which may precipitate or worsen delirium such as tramadol, benzodiazepine, anticholinergics, and benadryl  -encourage hydration and nutrition    Home medication review  Confirmed with medication list from [facility]  Baclofen 10 mg, take half tab 3 times daily  Nystop powder to groin twice daily  Pantoprazole 20 mg daily  Santyl ointment to right hip wound daily  Vitamin-C 500 mg daily at breakfast  Zinc gluconate 100 mg tablet daily    PRNs  Dulcolax 10 mg suppository  Fleet enema  Hydrocortisone 0 5% cream  Milk of Mag  Mylanta  Refresh eyedrops  Senna 8 6 mg tablet  Stephan's  Tylenol 650 mg q 4 hours as needed  Maintain Hays catheter to straight drainage    History of Present Illness   Physician Requesting Consult: Bindu Jean MD  Reason for Consult / Principal Problem: Depression  Hx and PE limited by: N/A  Additional history obtained from: Extensive chart review including [address] [facility]    HPI: Andi Fontanez is a 71y o  year old female with paraplegia from T8 spinal cord injury who is admitted from [facility] with depression,  complicated wound infections, and sepsis  She is being seen in consultation by Geriatrics for complicated medical situation and depression  She was seen and evaluated by Neuropsychiatry yesterday who advised that she does not at this time have the capacity to make fully informed medical decisions, since receiving this information she has been very withdrawn and is stating that she is hopeless  She is seen and examined at the bedside where she is lying resting  She denies pain at the time of my exam and states that she is upset about having been determined to not be able to make her own medical decisions and that she will need a guardian  She explains that she feels her memory and cognition are fine so it is difficult to understand why this is the case  She believes that once this is determined that it is a permanent determination and that it applies to everything, she is able to recall being told that if there is a major change in her condition that she can be re-evaluated but she does not believe this to be true and she feels that now she has no hope of recovery  She would like to defer discussion regarding treatment of her mood at this time   Immediately prior to hospitalization she was at [address] [facility] for [facility], she states that she is wheelchair dependent and does not use glasses, hearing aids or dentures  Inpatient consult to Gerontology  Consult performed by: Bettye Victoria DO  Consult ordered by: Corinne Roup, CRNP        Review of Systems   Constitutional: Positive for appetite change (poor appetite)  Negative for chills and fever  HENT: Negative for dental problem, hearing loss and trouble swallowing  Eyes: Negative for discharge and visual disturbance  Respiratory: Negative for shortness of breath and wheezing  Cardiovascular: Negative for chest pain and palpitations  Gastrointestinal: Negative for constipation, diarrhea, nausea and vomiting  Endocrine: Negative  Genitourinary: Negative for difficulty urinating (has chronc isabel)  Musculoskeletal: Negative  Skin: Positive for wound  Allergic/Immunologic: Negative  Neurological: Positive for weakness  Negative for dizziness, light-headedness, numbness and headaches  Psychiatric/Behavioral: Positive for decreased concentration and dysphoric mood  All other systems reviewed and are negative      Historical Information   Past Medical History:   Diagnosis Date    Anemia     iron defiencey    Arthritis     osteo    Back injury     Chronic kidney disease     nephritis    Depression     MRSA (methicillin resistant staph aureus) culture positive 12/07/2018    BONE-TISSUE     Osteopenia     Osteoporosis     Paralysis (HCC)     paraplegic due to spinal cord injury    Paraplegia (HCC)     Poor circulation     Pressure injury of skin     right hip, stage 3    Pressure sore of hip     right    Tibial plateau fracture     Underweight      Past Surgical History:   Procedure Laterality Date    CLOSURE DELAYED PRIMARY N/A 3/20/2019    Procedure: OPHELIA ANAL WOUND RECLOSURE;  Surgeon: Sri Castillo MD;  Location: [address] [place] [address];  Service: Plastics    DECUBITUS ULCER EXCISION Bilateral 11/12/2019    Procedure: DEBRIDEMENT DECUBITI ([address] OUT); Surgeon: Dwight Flores DO;  Location:  MAIN OR;  Service: General    FLAP LOCAL EXTREMITY Left 1/28/2019    Procedure: THIGH FLAP & STSG TO CLOSE HIP WOUND;  Surgeon: Fred Johnson MD;  Location: [address] [place] [address] MAIN OR;  Service: Plastics    FLAP LOCAL TRUNK Right 12/17/2018    Procedure: DEBRIDE/FLAP CLOSURE HIP PRESSURE SORE Kaylee Lasso GRAFT;  Surgeon: Fred Johnson MD;  Location: [address] [place] [address] MAIN OR;  Service: Plastics    FLAP LOCAL TRUNK Left 2/27/2019    Procedure: BUTTOCK FLAP TO ISCHIAL SORE;  Surgeon: Fred Johnson MD;  Location: [address] [place] [address] MAIN OR;  Service: Plastics    HIP DEBRIDEMENT Right 2017    right hip sore debridement    INCISION AND DRAINAGE OF WOUND Left 1/3/2019    Procedure: INCISION AND DRAINAGE (I&D) left distal femur  With deep wound cultures   Application of VAC DRAIN SPONGE;  Surgeon: Tonny Baker MD;  Location:  MAIN OR;  Service: Orthopedics    IR PICC LINE  12/19/2018    IR PICC LINE  3/12/2019    CA DEBRIDEMENT, SKIN, SUB-Q TISSUE,MUSCLE,BONE,=<20 SQ CM Right 9/19/2018    Procedure: DEBRIDEMENT OF HIP PRESSURE SORE;  Surgeon: Fred Johnson MD;  Location: [address] [place] [address] MAIN OR;  Service: Plastics    CA OPEN RX FEMUR FX+INTRAMED FELIX Left 10/22/2018    Procedure: INSERTION NAIL IM LEFT FEMUR RETROGRADE;  Surgeon: Lilliam Caruso MD;  Location:  MAIN OR;  Service: Orthopedics    TOE AMPUTATION Left 2017    small toe left foot amputation    WISDOM TOOTH EXTRACTION      WOUND DEBRIDEMENT Left 12/17/2018    Procedure: DEBRIDEMENT HIP PRESSURE SORE;  Surgeon: Fred Johnson MD;  Location: [address] [place] [address] OR;  Service: Plastics    WOUND DEBRIDEMENT N/A 11/10/2019    Procedure: EXCISIONAL DEBRIDEMENT;  Surgeon: Rahul Reeder MD;  Location:  MAIN OR;  Service: General     Social History   Social History     Substance and Sexual Activity   Alcohol Use Yes    Comment: occasional     Social History     Substance and Sexual Activity   Drug Use No     Social History     Tobacco Use   Smoking Status Never Smoker   Smokeless Tobacco Never Used     Family History:   Family History   Problem Relation Age of Onset    Depression Father      Meds/Allergies   all current active meds have been reviewed    Allergies   Allergen Reactions    Iv Contrast [Iodinated Diagnostic Agents]      Tingling face, itching    Cefepime Rash    Ciprofloxacin Rash and Swelling     Looked like suburn, itching  Bed sores  Swelling, itching    Latex Rash    Vancomycin Rash     Unknown      Objective     Intake/Output Summary (Last 24 hours) at 2/13/2020 1748  Last data filed at 2/13/2020 1439  Gross per 24 hour   Intake 638 ml   Output 570 ml   Net 68 ml     Invasive Devices     Peripheral Intravenous Line            Peripheral IV 02/09/20 Left Forearm 4 days          Drain            Urethral Catheter Non-latex 18 Fr  less than 1 day              Physical Exam   Constitutional: She is oriented to person, place, and time  She appears well-developed  No distress  Appears older than stated age, thin, frail, cachetic appearing, withdrawn and chronically ill   HENT:   Head: Normocephalic  Membranes dry   Eyes: Conjunctivae and EOM are normal  No scleral icterus  Neck: No JVD present  No tracheal deviation present  Cardiovascular: Normal rate and intact distal pulses  Pulmonary/Chest: Effort normal  She has no wheezes  Abdominal: Soft  Thin and schapoid, non-tender   Musculoskeletal: She exhibits no tenderness  Reduced overall muscle mass, BLE without significant muscle tone, further reduction of muscle mass   Neurological: She is alert and oriented to person, place, and time  She exhibits abnormal muscle tone  Skin: Skin is warm and dry  She is not diaphoretic  There is pallor  Psychiatric:   Affect is flat and withdrawn   Nursing note and vitals reviewed      Lab Results:   I have personally reviewed pertinent lab results including the following:  -sodium 136, potassium 3 7 chloride 1 4, CO2 26, BUN 10, creatinine 0 3, calcium 8 6,   -hemoglobin 9 0, hematocrit 30 3, WBC 10 25, platelets 555    I have personally reviewed the following imaging study reports in PACS:  -CT chest abdomen pelvis with contrast 02/09/2020 revealed increased intralobar septal thickening with scattered patchy ground-glass opacity bilaterally suggesting mild-to-moderate Congestive Heart Failure, patching consolidation lingula, new small bilateral pleural effusions, extensive colonic fecal retention  -XR pelvis 02/06/2020 revealed stable right superior and lateral dislocation of the right femoral head with pseudo cavity formation air present within cavity from a soft tissue ulceration    Therapies:   PT:  Recommending short-term rehab  OT:  Recommending short-term rehab    VTE Prophylaxis: Enoxaparin (Lovenox)    Code Status: Level 1 - Full Code  Advance Directive and Living Will:      Power of :    POLST:      Family and Social Support:  Patient reports she has no family but has friends which are good supports    Goals of Care: Patient declines to state any as she feels hopeless

## 2020-02-13 NOTE — WOUND OSTOMY CARE
Progress Note - Wound   Mikal Concepcion 71 y o  female MRN: [id number]  Unit/Bed#: [facility] 131-68 Encounter: [id number]        Assessment:   Patient seen for wound care follow-up  Requires assist x 1 to turn in bed for assessment  Incontinent of stool  Hays catheter in place  Patient cachectic with protruding left trochanter--preventative foam dressing in place for protection  Bilateral heels intact with preventative foam dressings intact  1   Rash with excoriation to bilateral flank areas--RESOLVED  2   Mild candidiasis to bilateral groin folds--improved  3   Present on admission hyperpigmented (purple) BLANCHABLE and intact scar tissue to midline sacrum, left buttock, and right buttock--these are scars from old, healed wounds  They are blanchable at this time and appear very flat, shiny, and fragile  Will be at very high risk for these olga to open  Protected with Allevyn Life foam dressings at this time  4   Present on admission stage 3 pressure injury to left ischium--No odor, induration, or fluctuance appreciated  5   Right hip and anterior thigh wounds--evaluated by surgery and orthopedics, no surgical intervention planned at this time  Right hip (trochanter) with palpable/visible bone and granulation buds  Wound bed shifts significantly with patient movement  Yasemin-wound within normal limits with some old scarring  Right anterior thigh wound pink with visible/palpable bone  Tunneling at 12 o'clock--did not probe entire depth of tunnel due to uncertainty of is terminal location  Yasemin-wound intact  Patient was following with plastic surgery for possible flap to these areas prior to admission  See flowsheet and media for wound details  Wound Care Plan:   1-Defer to surgery for right hip and anterior thigh wounds  2-Left ischium--cleanse with soap and water  Place Maxorb Ag in wound bed and cover with Allevyn Life foam dressing    Austin with T   Change dressing every other day and PRN with soilage  3-P500 low air-loss mattress  4-Nystatin powder to groin folds      Skin care plans:  1-Apply Allevyn Life foam dressing to bilateral heels, left hip bony prominence, and intact purple scar tissue on sacrum/buttocks for prevention  Austin with P   Peel back at least daily for skin assessment and re-apply  Change dressing every 3 days and PRN  2-Elevate heels to offload pressure  3-Ehob cushion in chair when out of bed  4-Moisturize skin daily with skin nourishing cream   5-Turn/reposition q2h or when medically stable for pressure re-distribution on skin       Wound care team to follow  Plan of care reviewed with primary RN  Patient will need VNA for wound care if returning home and follow-up with plastic surgery on discharge  Wound 02/06/20 Pressure Injury Hip Right (Active)   Wound Image   2/13/2020 11:17 AM   Wound Description Granulation tissue;Pink;Yellow 2/13/2020 11:17 AM   Staging Stage IV 2/13/2020 11:17 AM   Yasemin-wound Assessment Clean;Dry; Intact 2/13/2020 11:17 AM   Wound Length (cm) 5 5 cm 2/13/2020 11:17 AM   Wound Width (cm) 4 5 cm 2/13/2020 11:17 AM   Wound Depth (cm) 0 2 2/13/2020 11:17 AM   Calculated Wound Area (cm^2) 24 75 cm^2 2/13/2020 11:17 AM   Calculated Wound Volume (cm^3) 4 95 cm^3 2/13/2020 11:17 AM   Drainage Amount Large 2/13/2020 11:17 AM   Drainage Description Serosanguineous; Tan 2/13/2020 11:17 AM   Non-staged Wound Description Full thickness 2/13/2020 11:17 AM   Treatments Cleansed;Irrigation with NSS 2/13/2020 11:17 AM   Dressing ABD 2/13/2020 11:17 AM   Dressing Changed Changed 2/13/2020 11:17 AM   Patient Tolerance Tolerated well 2/13/2020 11:17 AM   Dressing Status Clean;Dry; Intact 2/13/2020 11:17 AM       Wound 02/06/20 Pressure Injury Thigh Anterior;Proximal;Right (Active)   Wound Image   2/13/2020 11:13 AM   Wound Description Pink 2/13/2020 11:13 AM   Staging Stage IV 2/13/2020 11:13 AM   Yasemin-wound Assessment Clean;Dry; Intact 2/13/2020 11:13 AM Wound Length (cm) 3 cm 2/13/2020 11:13 AM   Wound Width (cm) 1 cm 2/13/2020 11:13 AM   Wound Depth (cm) 2 6 2/13/2020 11:13 AM   Calculated Wound Area (cm^2) 3 cm^2 2/13/2020 11:13 AM   Calculated Wound Volume (cm^3) 7 8 cm^3 2/13/2020 11:13 AM   Drainage Amount Moderate 2/13/2020 11:13 AM   Drainage Description Serous 2/13/2020 11:13 AM   Non-staged Wound Description Full thickness 2/13/2020 11:13 AM   Treatments Cleansed;Irrigation with NSS 2/13/2020 11:13 AM   Dressing ABD 2/13/2020 11:13 AM   Dressing Changed Changed 2/13/2020 11:13 AM   Patient Tolerance Tolerated well 2/13/2020 11:13 AM   Dressing Status Clean; Intact;Dry 2/13/2020 11:13 AM       Wound 02/06/20 Pressure Injury Ischium Left (Active)   Wound Image   2/13/2020 11:33 AM   Wound Description Pink;Granulation tissue;Slough; Yellow 2/13/2020 11:33 AM   Yasemin-wound Assessment Intact; Pink 2/13/2020 11:33 AM   Wound Length (cm) 6 cm 2/13/2020 11:33 AM   Wound Width (cm) 3 cm 2/13/2020 11:33 AM   Wound Depth (cm) 0 4 2/13/2020 11:33 AM   Calculated Wound Area (cm^2) 18 cm^2 2/13/2020 11:33 AM   Calculated Wound Volume (cm^3) 7 2 cm^3 2/13/2020 11:33 AM   Change in Wound Size % 47 25 2/13/2020 11:33 AM   Drainage Amount Moderate 2/13/2020 11:33 AM   Drainage Description Serous 2/13/2020 11:33 AM   Non-staged Wound Description Full thickness 2/13/2020 11:33 AM   Treatments Cleansed;Irrigation with NSS 2/13/2020 11:33 AM   Dressing Calcium Alginate; Foam, Silicon (eg  Allevyn, etc) 2/13/2020 11:33 AM   Dressing Changed Changed 2/13/2020 11:33 AM   Patient Tolerance Tolerated well 2/13/2020 11:33 AM   Dressing Status Clean;Dry; Intact 2/13/2020 11:33 AM     Kitty Gaytan BSN, RN, Jackie Arrington

## 2020-02-13 NOTE — ASSESSMENT & PLAN NOTE
· POA with fever and leukocytosis with infectious source likely being R hip wound with underlying iliopsoas myositis  No signs of PNA or intra-abdominal infection  Review of repeat CT scan shows known osteomyelitis of the right hip with dislocation  Pockets of gas have decreased since the initial consult  No evidence for abscesses  · Repeat CT shows no evidence for abscesses  · Blood cultures 1/2 positive for coag-neg staph, other negative  Continue to monitor blood cultures  · Urine culture + for proteus and e coli; likely asymptomatic bacteriuria per ID; isabel exchanged   · Continue IV abx per ID recommendations complete 10 days total   Last dose 2/11   · ID with preference for surgical intervention for definitive source control however, no focus for interventional radiology intervention and surgery and orthopedics not currently offering any surgical management  Cleared 2/12 by ID   · PT/OT recommending returning to [facility], [facility]  · However patient with need for neuropsychology evaluation performed on 02/12/2020, determined that patient does not appear to have capacity to make fully in for medical consult care decisions  · Patient noted to have major depressive disorder, moderate without psychotic features patient stating 2 neuropsychology that she is not interested in taking antidepressant medications because they would not help her condition

## 2020-02-13 NOTE — ASSESSMENT & PLAN NOTE
Patient was noted to have fecal impaction on CT scan  Has reported large bowel movement yesterday  No concern for C diff colitis  Continue with prophylactic vancomycin as noted below  Patient refusing suppository and do collapse  Will make these p r n    · With infection in April of 2019  · Currently without any complaints of diarrhea  · Continue vancomycin prophylactics, will need to continue for 72 hours after last dose of systemic antibiotics thru 2/14, last dosing tomorrow

## 2020-02-13 NOTE — ASSESSMENT & PLAN NOTE
· History of spinal cord injury T8 with paraplegia sp 20 years ago hang gliding accident   · Presented from [facility], however patient had reported that she lived independently and was confabulating stories  Seen by neuropsychology the deemed incompetent  · Continue with repositioning and wound care

## 2020-02-13 NOTE — ASSESSMENT & PLAN NOTE
· Labs appear consistent with anemia of chronic disease, continue to monitor closely    Hemoglobin stable   · 9 0 yesterday  · Continue to monitor

## 2020-02-13 NOTE — ASSESSMENT & PLAN NOTE
Patient noted per neuropsychology to be incompetent on 02/12/2020  Discussed with case management, now plan for rehab

## 2020-02-13 NOTE — ASSESSMENT & PLAN NOTE
· Patient with healed scars to the sacral area, reviewed Wound care's consultation  Present on admission left ischial ulcer stage III    · Continue local care for excoriation and red scan as well as frequent turning of the patient  Also has wound to the right thigh over the hip which has osteomyelitis  No surgical intervention being recommended  · ID following and antibiotics finished 2/11   cleared for discharge however now may need to go through option process

## 2020-02-13 NOTE — PROGRESS NOTES
Progress Note - Екатерина August 1950, 71 y o  female MRN: [id number]    [facility] Encounter: [id number]    Primary Care Provider: Alexys Hernandez   Date and time admitted to hospital: 2/5/2020  3:25 PM        * Sepsis [facility])  Assessment & Plan  · POA with fever and leukocytosis with infectious source likely being R hip wound with underlying iliopsoas myositis  No signs of PNA or intra-abdominal infection  Review of repeat CT scan shows known osteomyelitis of the right hip with dislocation  Pockets of gas have decreased since the initial consult  No evidence for abscesses  · Repeat CT shows no evidence for abscesses  · Blood cultures 1/2 positive for coag-neg staph, other negative  Continue to monitor blood cultures  · Urine culture + for proteus and e coli; likely asymptomatic bacteriuria per ID; isabel exchanged   · Continue IV abx per ID recommendations complete 10 days total   Last dose 2/11   · ID with preference for surgical intervention for definitive source control however, no focus for interventional radiology intervention and surgery and orthopedics not currently offering any surgical management  Cleared 2/12 by ID   · PT/OT recommending returning to [facility], [facility]  · However patient with need for neuropsychology evaluation performed on 02/12/2020, determined that patient does not appear to have capacity to make fully in for medical consult care decisions  · Patient noted to have major depressive disorder, moderate without psychotic features patient stating 2 neuropsychology that she is not interested in taking antidepressant medications because they would not help her condition  Depression  Assessment & Plan  Patient does report depression over continued chronic hospital care and chronic illness  Pastoral care to speak with patient  She may benefit from outpatient counseling    She states she would be agreeable to possible outpatient counseling for 1 or 2 sessions but not forever  She raised the issue herself  Her affect is rather flat  · Spiritual care consult for now  · In review of neuropsychology note patient is severely depressed refusing antidepressant medication    Severe protein-calorie malnutrition ([address] )  Assessment & Plan  Malnutrition Findings:   Malnutrition type: Chronic illness(Severe pro/sara malnutrition r/t disease/condition as evidenced by 12% unintentional wt loss since 11/10/19, consuming < 75% energy intake compared to est energy needs > 1 month, fat/muscle wasting of orbital/temple areas  Treated with diet/supplements)  Degree of Malnutrition: Other severe protein calorie malnutrition    BMI Findings:  BMI Classifications: Underweight < 18 5     Body mass index is 17 97 kg/m²  Patient does admit to depression  Pastoral care consulted  She may benefit from counseling at discharge  Would recommend supplementation as above  Coagulase negative Staphylococcus bacteremia  Assessment & Plan  · Could be secondary to wound infection or contaminant  · Infectious Disease following and cleared 2/12 for discharge       Neurogenic bladder  Assessment & Plan  · History of neurogenic bladder with chronic indwelling Hays catheter, exchanged this admission due to finding of E coli and Proteus in the urine culture  · Urine culture noted, likely represents asymptomatic bacteriuria per infectious disease      Anemia of chronic disease  Assessment & Plan  · Labs appear consistent with anemia of chronic disease, continue to monitor closely  Hemoglobin stable   · 9 0 yesterday  · Continue to monitor     Lung nodule  Assessment & Plan  · Noted incidental finding, will require outpatient f/u     Hypotension  Assessment & Plan  · Chronic low blood pressures on midodrine    Resolved      Cognitive decline  Assessment & Plan  Patient noted per neuropsychology to be incompetent on 02/12/2020  Discussed with case management, now plan for rehab    Open wound of right hip  Assessment & Plan  Patient with a known past medical history for paraplegia following spinal cord injury presents with increased drainage of R hip wound  · Known to history of hip osteo and dislocation of the R femur  Patient is nonweightbearing  On admission had fever, chills  · CT repeated 2/8 and compared to February 5, 2020 showing improvement in gas pockets  · Ortho following; no plans for any surgical intervention at this time  · General surgery following: continue localized wound care  No plans for any surgical intervention  · ID following: IV Zosyn completed  repeat CT scan shows improvement  · Per ID: "if no plan for girdle stone procedure and flap to cover the wound, the role of long-term IV antibiotics is limited with risk of side effects from prolonged antibiotics outweighs the benefit   This has been explained at length to the patient  "  ten-day Course completed 2/11  · Continue local wound care  Patient reports depression over her current status  She is agreeable to speak with the spiritual Care Team and would like outpatient referral   She states that her goal will be to get back to her home but at this time she is in the skilled nursing facility setting  · At this time patient will require option process for placement discussed with case management today 02/13/2020    History of Clostridium difficile infection  Assessment & Plan  Patient was noted to have fecal impaction on CT scan  Has reported large bowel movement yesterday  No concern for C diff colitis  Continue with prophylactic vancomycin as noted below  Patient refusing suppository and do collapse  Will make these p r n  · With infection in April of 2019  · Currently without any complaints of diarrhea  · Continue vancomycin prophylactics, will need to continue for 72 hours after last dose of systemic antibiotics thru 2/14, last dosing tomorrow    Fecal retention  Assessment & Plan  · Noted on CT scan  · Had large BM  ·  Reports moving bowels every couple days   · Monitor     Decubitus ulcer of left ischium, stage III ([address] )  Assessment & Plan  · Patient with healed scars to the sacral area, reviewed Wound care's consultation  Present on admission left ischial ulcer stage III    · Continue local care for excoriation and red scan as well as frequent turning of the patient  Also has wound to the right thigh over the hip which has osteomyelitis  No surgical intervention being recommended  · ID following and antibiotics finished 2/11   cleared for discharge however now may need to go through option process      Paraplegia following spinal cord injury [facility])  Assessment & Plan  · History of spinal cord injury T8 with paraplegia sp 20 years ago hang gliding accident   · Presented from [facility], however patient had reported that she lived independently and was confabulating stories  Seen by neuropsychology the deemed incompetent  · Continue with repositioning and wound care  VTE Pharmacologic Prophylaxis:   Pharmacologic: Enoxaparin (Lovenox)  Mechanical VTE Prophylaxis in Place: Yes    Patient Centered Rounds: I have performed bedside rounds with nursing staff today  Discussions with Specialists or Other Care Team Provider:nursing     Education and Discussions with Family / Patient: patient     Time Spent for Care: 45 minutes  More than 50% of total time spent on counseling and coordination of care as described above  Current Length of Stay: 8 day(s)    Current Patient Status: Inpatient   Certification Statement: The patient will continue to require additional inpatient hospital stay due to pending placement cleared from all specialities for discharge     Discharge Plan:  Patient is medically stable for discharge pending placement however deemed incompetent therefore possible options process      Code Status: Level 1 - Full Code      Subjective:   Upon arrival to room patient is having her dressings changed for her wounds  Discussed with wound care nurses and patient  Patient is asking questions in regard to plastic surgery and when she would have a flap performed on her wounds discuss this with her that this is not in the plan at the moment  She would need to be monitored off of antibiotics to be sure that there is no further infection  Discussed patient is competent status with her and plan for rehabilitation at this time  She reports that she is having bowel movements frequently daily not diarrhea  And she also reports that she does not have any pain at this moment in time she states she is drinking Ensure shakes  Objective:     Vitals:   Temp (24hrs), Av 4 °F (36 3 °C), Min:97 1 °F (36 2 °C), Max:97 7 °F (36 5 °C)    Temp:  [97 1 °F (36 2 °C)-97 7 °F (36 5 °C)] 97 7 °F (36 5 °C)  HR:  [68-98] 68  BP: (117-135)/(65-83) 135/83  SpO2:  [96 %-97 %] 96 %  Body mass index is 17 97 kg/m²  Input and Output Summary (last 24 hours): Intake/Output Summary (Last 24 hours) at 2020 1121  Last data filed at 2020 1015  Gross per 24 hour   Intake 638 ml   Output 520 ml   Net 118 ml       Physical Exam:     Physical Exam   Constitutional: No distress  Cachectic/malnourished   HENT:   Mouth/Throat: No oropharyngeal exudate  Eyes: Conjunctivae are normal  Right eye exhibits no discharge  Left eye exhibits no discharge  No scleral icterus  Neck: No tracheal deviation present  No thyromegaly present  Cardiovascular: Normal rate  Exam reveals no gallop and no friction rub  No murmur heard  Pulmonary/Chest: No stridor  No respiratory distress  She has no wheezes  She has no rales  She exhibits no tenderness  Poor effort   Abdominal: Soft  She exhibits no distension and no mass  There is no tenderness  There is no rebound and no guarding  No hernia  Cachectic   Musculoskeletal: She exhibits no edema or tenderness     stage 3 pressure injury to left ischium   Lymphadenopathy:     She has no cervical adenopathy  Neurological: She is alert  Skin: No rash noted  She is not diaphoretic  No erythema  No pallor  Psychiatric:   Flat affect         Additional Data:     Labs:    Results from last 7 days   Lab Units 02/12/20  0603 02/11/20  0448  02/09/20  0537   WBC Thousand/uL 10 25* 11 90*   < > 11 08*   HEMOGLOBIN g/dL 9 0* 8 5*   < > 8 0*   HEMATOCRIT % 30 3* 28 1*   < > 27 0*   PLATELETS Thousands/uL 759* 757*   < > 624*   NEUTROS PCT %  --   --   --  80*   LYMPHS PCT %  --   --   --  16   LYMPHO PCT %  --  19  --   --    MONOS PCT %  --   --   --  3*   MONO PCT %  --  5  --   --    EOS PCT %  --  4  --  0    < > = values in this interval not displayed  Results from last 7 days   Lab Units 02/12/20  0603   SODIUM mmol/L 136   POTASSIUM mmol/L 3 7   CHLORIDE mmol/L 104   CO2 mmol/L 26   BUN mg/dL 10   CREATININE mg/dL 0 38*   ANION GAP mmol/L 6   CALCIUM mg/dL 8 6   GLUCOSE RANDOM mg/dL 110                           * I Have Reviewed All Lab Data Listed Above  * Additional Pertinent Lab Tests Reviewed: All Labs Within Last 24 Hours Reviewed    Imaging:    Imaging Reports Reviewed Today Include: reviewed   Recent Cultures (last 7 days):     Results from last 7 days   Lab Units 02/09/20  0829   BLOOD CULTURE  No Growth at 72 hrs  No Growth at 72 hrs         Last 24 Hours Medication List:     Current Facility-Administered Medications:  acetaminophen 650 mg Oral Q6H PRN Seven Salcido MD   artificial tear  Both Eyes HS Court Mclaughlin MD   bisacodyl 10 mg Rectal Daily PRN Paola Alarcon MD   calcium carbonate-vitamin D 1 tablet Oral BID With Meals Seven Salcido MD   cholecalciferol 1,000 Units Oral Daily Seven Salcido MD   cyanocobalamin 100 mcg Oral Daily Seven Salcido MD   dextran 70-hypromellose 1 drop Both Eyes Q3H PRN Brian Garcia PA-C   diphenhydrAMINE 50 mg Oral 60 Min Pre-Op Sagrario Dumont PA-C   enoxaparin 40 mg Subcutaneous Daily Seven Salcido MD   famotidine 20 mg Oral BID PRN Juliann Salmon MD   loperamide 2 mg Oral TID PRN Cornelius Brooke PA-C   midodrine 15 mg Oral Q8H Petey Delaney MD   multivitamin-minerals 1 tablet Oral Daily Petey Delaney MD   nystatin  Topical BID Petey Delaney MD   ondansetron 4 mg Intravenous Q6H PRN Petey Delaney MD   pantoprazole 40 mg Oral BID AC Juliann Salmon MD   polyethylene glycol 17 g Oral Daily Petey Delaney MD   saccharomyces boulardii 250 mg Oral BID Petey Delaney MD   senna 1 tablet Oral HS PRN Geri Lara MD   vancomycin 125 mg Oral Q12H [address] Petey Delaney MD     Facility-Administered Medications Ordered in Other Encounters:  dexamethasone 4 mg Intravenous Once PRN Marky Andre, DO    diphenhydrAMINE 12 5 mg Intravenous Once Amrky Ander, DO    lactated ringers 75 mL/hr Intravenous Continuous Marky Andre, DO Last Rate: 75 mL/hr (11/10/19 2105)   lactated ringers 50 mL/hr Intravenous Continuous Ras Meyers CRNA    lactated ringers 75 mL/hr Intravenous Continuous Marky Andre, DO Last Rate: Stopped (03/20/19 1841)   lactated ringers 75 mL/hr Intravenous Continuous Kenneth Llamas MD Last Rate: Stopped (03/20/19 1842)   ondansetron 4 mg Intravenous Once PRN Mark Fung MD    promethazine 12 5 mg Intravenous Once PRN Mark Fung MD         Today, Patient Was Seen By: CONCEPCION Kim    ** Please Note: Dictation voice to text software may have been used in the creation of this document   **

## 2020-02-13 NOTE — SOCIAL WORK
This writer met with patient to assist with family/friend resources  Patient states she is single, not   She does not have children  This writer conducted an Internet search for patient's relatives, came with a possible sister, Marietta Perez per patient her sister's name is not Sarah Chiquita  She refused to tell this writer the name of her sister  This writer discussed with patient the guardianship process and the recommendation for patient to be discharged to [address]  Discussed that referrals will be sent in radius search from her home zip  Updated  DEL Zuluaga who will update SLIM and send SNF referrals within 20 mile radius of patient's home zip

## 2020-02-14 LAB
ALBUMIN SERPL BCP-MCNC: 2 G/DL (ref 3.5–5)
ALP SERPL-CCNC: 171 U/L (ref 46–116)
ALT SERPL W P-5'-P-CCNC: 41 U/L (ref 12–78)
ANION GAP SERPL CALCULATED.3IONS-SCNC: 6 MMOL/L (ref 4–13)
AST SERPL W P-5'-P-CCNC: 22 U/L (ref 5–45)
BACTERIA BLD CULT: NORMAL
BACTERIA BLD CULT: NORMAL
BASOPHILS # BLD AUTO: 0.09 THOUSANDS/ΜL (ref 0–0.1)
BASOPHILS NFR BLD AUTO: 1 % (ref 0–1)
BILIRUB SERPL-MCNC: 0.22 MG/DL (ref 0.2–1)
BUN SERPL-MCNC: 11 MG/DL (ref 5–25)
CALCIUM SERPL-MCNC: 8.9 MG/DL (ref 8.3–10.1)
CHLORIDE SERPL-SCNC: 101 MMOL/L (ref 100–108)
CO2 SERPL-SCNC: 28 MMOL/L (ref 21–32)
CREAT SERPL-MCNC: 0.64 MG/DL (ref 0.6–1.3)
EOSINOPHIL # BLD AUTO: 0.31 THOUSAND/ΜL (ref 0–0.61)
EOSINOPHIL NFR BLD AUTO: 3 % (ref 0–6)
ERYTHROCYTE [DISTWIDTH] IN BLOOD BY AUTOMATED COUNT: 17.6 % (ref 11.6–15.1)
GFR SERPL CREATININE-BSD FRML MDRD: 91 ML/MIN/1.73SQ M
GLUCOSE SERPL-MCNC: 122 MG/DL (ref 65–140)
HCT VFR BLD AUTO: 32.4 % (ref 34.8–46.1)
HCT VFR BLD AUTO: 32.4 % (ref 34.8–46.1)
HGB BLD-MCNC: 9.8 G/DL (ref 11.5–15.4)
HGB BLD-MCNC: 9.9 G/DL (ref 11.5–15.4)
IMM GRANULOCYTES # BLD AUTO: 0.24 THOUSAND/UL (ref 0–0.2)
IMM GRANULOCYTES NFR BLD AUTO: 2 % (ref 0–2)
LYMPHOCYTES # BLD AUTO: 2.96 THOUSANDS/ΜL (ref 0.6–4.47)
LYMPHOCYTES NFR BLD AUTO: 25 % (ref 14–44)
MCH RBC QN AUTO: 25.5 PG (ref 26.8–34.3)
MCHC RBC AUTO-ENTMCNC: 30.2 G/DL (ref 31.4–37.4)
MCV RBC AUTO: 84 FL (ref 82–98)
MONOCYTES # BLD AUTO: 0.86 THOUSAND/ΜL (ref 0.17–1.22)
MONOCYTES NFR BLD AUTO: 7 % (ref 4–12)
NEUTROPHILS # BLD AUTO: 7.25 THOUSANDS/ΜL (ref 1.85–7.62)
NEUTS SEG NFR BLD AUTO: 62 % (ref 43–75)
NRBC BLD AUTO-RTO: 0 /100 WBCS
PLATELET # BLD AUTO: 760 THOUSANDS/UL (ref 149–390)
PMV BLD AUTO: 9.1 FL (ref 8.9–12.7)
POTASSIUM SERPL-SCNC: 4.2 MMOL/L (ref 3.5–5.3)
PROT SERPL-MCNC: 7.5 G/DL (ref 6.4–8.2)
RBC # BLD AUTO: 3.85 MILLION/UL (ref 3.81–5.12)
SODIUM SERPL-SCNC: 135 MMOL/L (ref 136–145)
WBC # BLD AUTO: 11.71 THOUSAND/UL (ref 4.31–10.16)

## 2020-02-14 PROCEDURE — 85018 HEMOGLOBIN: CPT | Performed by: NURSE PRACTITIONER

## 2020-02-14 PROCEDURE — 97530 THERAPEUTIC ACTIVITIES: CPT

## 2020-02-14 PROCEDURE — 80053 COMPREHEN METABOLIC PANEL: CPT | Performed by: NURSE PRACTITIONER

## 2020-02-14 PROCEDURE — 85025 COMPLETE CBC W/AUTO DIFF WBC: CPT | Performed by: NURSE PRACTITIONER

## 2020-02-14 PROCEDURE — 99232 SBSQ HOSP IP/OBS MODERATE 35: CPT | Performed by: NURSE PRACTITIONER

## 2020-02-14 PROCEDURE — 97535 SELF CARE MNGMENT TRAINING: CPT

## 2020-02-14 PROCEDURE — 85014 HEMATOCRIT: CPT | Performed by: NURSE PRACTITIONER

## 2020-02-14 RX ADMIN — Medication 1 TABLET: at 17:17

## 2020-02-14 RX ADMIN — MIDODRINE HYDROCHLORIDE 15 MG: 5 TABLET ORAL at 06:06

## 2020-02-14 RX ADMIN — Medication 1 TABLET: at 10:00

## 2020-02-14 RX ADMIN — PANTOPRAZOLE SODIUM 40 MG: 40 TABLET, DELAYED RELEASE ORAL at 17:17

## 2020-02-14 RX ADMIN — VITAM B12 100 MCG: 100 TAB at 10:01

## 2020-02-14 RX ADMIN — MIDODRINE HYDROCHLORIDE 15 MG: 5 TABLET ORAL at 12:11

## 2020-02-14 RX ADMIN — ENOXAPARIN SODIUM 40 MG: 40 INJECTION SUBCUTANEOUS at 10:00

## 2020-02-14 RX ADMIN — MIDODRINE HYDROCHLORIDE 15 MG: 5 TABLET ORAL at 21:39

## 2020-02-14 RX ADMIN — DEXTRAN 70 AND HYPROMELLOSE 2910 1 DROP: 1; 3 SOLUTION/ DROPS OPHTHALMIC at 10:00

## 2020-02-14 RX ADMIN — NYSTATIN: 100000 POWDER TOPICAL at 10:00

## 2020-02-14 RX ADMIN — Medication 125 MG: at 10:01

## 2020-02-14 RX ADMIN — MELATONIN 1000 UNITS: at 10:00

## 2020-02-14 RX ADMIN — PANTOPRAZOLE SODIUM 40 MG: 40 TABLET, DELAYED RELEASE ORAL at 06:06

## 2020-02-14 RX ADMIN — Medication 250 MG: at 18:48

## 2020-02-14 RX ADMIN — Medication 125 MG: at 21:40

## 2020-02-14 RX ADMIN — WHITE PETROLATUM 57.7 %-MINERAL OIL 31.9 % EYE OINTMENT: at 21:40

## 2020-02-14 RX ADMIN — Medication 250 MG: at 10:00

## 2020-02-14 NOTE — PLAN OF CARE
Problem: OCCUPATIONAL THERAPY ADULT  Goal: Performs self-care activities at highest level of function for planned discharge setting  See evaluation for individualized goals  Description  Treatment Interventions: ADL retraining, Functional transfer training, UE strengthening/ROM, Endurance training, Cognitive reorientation, Patient/family training, Compensatory technique education, Equipment evaluation/education, Energy conservation, Activityengagement          See flowsheet documentation for full assessment, interventions and recommendations  Outcome: Progressing  Note:   Limitation: Decreased ADL status, Decreased Safe judgement during ADL, Decreased endurance, Decreased self-care trans, Decreased high-level ADLs  Prognosis: Good, Fair  Assessment: Pt was seen this date for OT tx session focusing on self care tasks, and bed mobility  Atetmpting to [facility] OOB as pt is willing and motivated however limited by complication wiht bleeding through wound dressing  NSG notified and advised pt be returned to supine until further assessment from ortho  Prior too pt was able to comeptle LB dressing tasks with increased time and assist  Incontinent of bowel with total assist for hygiene  Pt resting in supine at end of sessio with all needs i nreach  Would benefit from continue dOT tx to improve overallf uciotnal abilties  Pt is very motivated with particiaption  COntineu to follow with current POc     OT Discharge Recommendation: Short Term Rehab  OT - OK to Discharge:  Yes

## 2020-02-14 NOTE — ASSESSMENT & PLAN NOTE
Patient with a known past medical history for paraplegia following spinal cord injury presents with increased drainage of R hip wound  · Known to history of hip osteo and dislocation of the R femur  Patient is nonweightbearing  On admission had fever, chills  · CT repeated 2/8 and compared to February 5, 2020 showing improvement in gas pockets  · Ortho following; no plans for any surgical intervention at this time  · General surgery following: continue localized wound care  No plans for any surgical intervention  · ID following: IV Zosyn completed  repeat CT scan shows improvement  · Per ID: "if no plan for girdle stone procedure and flap to cover the wound, the role of long-term IV antibiotics is limited with risk of side effects from prolonged antibiotics outweighs the benefit   This has been explained at length to the patient  " ten-day Course completed 2/11  · Continue local wound care  Patient reports depression over her current status  She is agreeable to speak with the spiritual Care Team and would like outpatient referral   She states that her goal will be to get back to her home but at this time she is in the skilled nursing facility setting  · At this time patient will require option process for placement discussed with case management today 02/13/2020  · Today called to bedside pt with large open wound no signs of infection, when PT working with pt to get oob pt began to bleed through dsd with large clot noted and on exam some pooling in wound  Called ortho to come and evaluate pt   Unclear if came no notation from ortho spoke with resident Anastacio Rico

## 2020-02-14 NOTE — ASSESSMENT & PLAN NOTE
Patient was noted to have fecal impaction on CT scan  Has reported large bowel movement yesterday  No concern for C diff colitis  Continue with prophylactic vancomycin as noted below  Patient refusing suppository and do collapse  Will make these p r n    · With infection in April of 2019  · Currently without any complaints of diarrhea, but frequent formed stools  · Continue vancomycin prophylactics, will need to continue for 72 hours after last dose of systemic antibiotics thru 2/14, last dosing today

## 2020-02-14 NOTE — PROGRESS NOTES
Progress Note - Phong Maxi 1950, 71 y o  female MRN: [id number]    Unit/Bed#: [facility] 262-82 Encounter: [id number]    Primary Care Provider: Cheo Nunez   Date and time admitted to hospital: 2/5/2020  3:25 PM        * Sepsis [facility])  Assessment & Plan  · POA with fever and leukocytosis with infectious source likely being R hip wound with underlying iliopsoas myositis  No signs of PNA or intra-abdominal infection  Review of repeat CT scan shows known osteomyelitis of the right hip with dislocation  Pockets of gas have decreased since the initial consult  No evidence for abscesses  · Repeat CT shows no evidence for abscesses  · Blood cultures 1/2 positive for coag-neg staph, other negative  Continue to monitor blood cultures  · Urine culture + for proteus and e coli; likely asymptomatic bacteriuria per ID; isabel exchanged   · Continue IV abx per ID recommendations complete 10 days total   Last dose 2/11   · ID with preference for surgical intervention for definitive source control however, no focus for interventional radiology intervention and surgery and orthopedics not currently offering any surgical management  Cleared 2/12 by ID   · PT/OT recommending returning to [facility]  · However patient with need for neuropsychology evaluation performed on 02/12/2020, determined that patient does not appear to have capacity to make fully in for medical consult care decisions  · Patient noted to have major depressive disorder, moderate without psychotic features patient stating to neuropsychology that she is not interested in taking antidepressant medications because they would not help her condition  Abnormal CT of the chest  Assessment & Plan  CT scan of the chest reports possible heart failure as it relates to ground-glass appearance on the left lung  Also has left lower lobe consolidation that is concerning for atelectasis versus infection  Patient with no history of heart failure  Clinical exam not consistent with heart failure  · BNP is elevated   · Currently not on IV fluids  · Routine echo- EF 55 %  · incentive spirometry  Depression  Assessment & Plan  Patient does report depression over continued chronic hospital care and chronic illness  Pastoral care to speak with patient  She may benefit from outpatient counseling  She states she would be agreeable to possible outpatient counseling for 1 or 2 sessions but not forever  She raised the issue herself  Her affect is rather flat  · Spiritual care consult for now  · In review of neuropsychology note patient is severely depressed refusing antidepressant medication    Severe protein-calorie malnutrition ([address] )  Assessment & Plan  Malnutrition Findings:   Malnutrition type: Chronic illness(Severe pro/sara malnutrition r/t disease/condition as evidenced by 12% unintentional wt loss since 11/10/19, consuming < 75% energy intake compared to est energy needs > 1 month, fat/muscle wasting of orbital/temple areas  Treated with diet/supplements)  Degree of Malnutrition: Other severe protein calorie malnutrition    BMI Findings:  BMI Classifications: Underweight < 18 5     Body mass index is 17 97 kg/m²  Patient does admit to depression  Pastoral care consulted  She may benefit from counseling at discharge  Would recommend supplementation as above  Coagulase negative Staphylococcus bacteremia  Assessment & Plan  · Could be secondary to wound infection or contaminant  · Infectious Disease following and cleared 2/12 for discharge       Neurogenic bladder  Assessment & Plan  · History of neurogenic bladder with chronic indwelling [place] catheter, exchanged this admission due to finding of E coli and Proteus in the urine culture    · Urine culture noted, likely represents asymptomatic bacteriuria per infectious disease      Anemia of chronic disease  Assessment & Plan  · Labs appear consistent with anemia of chronic disease, continue to monitor closely  Hemoglobin stable   · 9 0 yesterday  · Continue to monitor     Lung nodule  Assessment & Plan  · Noted incidental finding, will require outpatient f/u     Hypotension  Assessment & Plan  · Chronic low blood pressures on midodrine  Resolved      Cognitive decline  Assessment & Plan  Patient noted per neuropsychology to be incompetent on 02/12/2020  Discussed with case management, now plan for rehab    Open wound of right hip  Assessment & Plan  Patient with a known past medical history for paraplegia following spinal cord injury presents with increased drainage of R hip wound  · Known to history of hip osteo and dislocation of the R femur  Patient is nonweightbearing  On admission had fever, chills  · CT repeated 2/8 and compared to February 5, 2020 showing improvement in gas pockets  · Ortho following; no plans for any surgical intervention at this time  · General surgery following: continue localized wound care  No plans for any surgical intervention  · ID following: IV Zosyn completed  repeat CT scan shows improvement  · Per ID: "if no plan for girdle stone procedure and flap to cover the wound, the role of long-term IV antibiotics is limited with risk of side effects from prolonged antibiotics outweighs the benefit   This has been explained at length to the patient  " ten-day Course completed 2/11  · Continue local wound care  Patient reports depression over her current status  She is agreeable to speak with the spiritual Care Team and would like outpatient referral   She states that her goal will be to get back to her home but at this time she is in the skilled nursing facility setting    · At this time patient will require option process for placement discussed with case management today 02/13/2020  · Today called to bedside pt with large open wound no signs of infection, when PT working with pt to get oob pt began to bleed through dsd with large clot noted and on exam some pooling in wound  Called ortho to come and evaluate pt  Unclear if came no notation from ortho spoke with resident gonsalo    History of Clostridium difficile infection  Assessment & Plan  Patient was noted to have fecal impaction on CT scan  Has reported large bowel movement yesterday  No concern for C diff colitis  Continue with prophylactic vancomycin as noted below  Patient refusing suppository and do collapse  Will make these p r n  · With infection in April of 2019  · Currently without any complaints of diarrhea, but frequent formed stools  · Continue vancomycin prophylactics, will need to continue for 72 hours after last dose of systemic antibiotics thru 2/14, last dosing today    Fecal retention  Assessment & Plan  · Noted on CT scan  · Had large BM  ·  Reports moving bowels every couple days   · Monitor     Decubitus ulcer of left ischium, stage III ([address] )  Assessment & Plan  · Patient with healed scars to the sacral area, reviewed Wound care's consultation  Present on admission left ischial ulcer stage III    · Continue local care for excoriation and red scan as well as frequent turning of the patient  Also has wound to the right thigh over the hip which has osteomyelitis  No surgical intervention being recommended  · ID following and antibiotics finished 2/11   cleared for discharge however now may need to go through option process      Paraplegia following spinal cord injury [facility])  Assessment & Plan  · History of spinal cord injury T8 with paraplegia sp 20 years ago hang gliding accident   · Presented from [facility], however patient had reported that she lived independently and was confabulating stories  Seen by neuropsychology the deemed incompetent  · Continue with repositioning and wound care        VTE Pharmacologic Prophylaxis:   Pharmacologic: Enoxaparin (Lovenox)  Mechanical VTE Prophylaxis in Place: Yes    Patient Centered Rounds: I have performed bedside rounds with nursing staff today     Discussions with Specialists or Other Care Team Provider: nursing     Education and Discussions with Family / Patient: patient     Time Spent for Care: 45 minutes  More than 50% of total time spent on counseling and coordination of care as described above  Current Length of Stay: 9 day(s)    Current Patient Status: Inpatient   Certification Statement: The patient will continue to require additional inpatient hospital stay due to pending insurance auth     Discharge Plan: to rehab when auth approved     Code Status: Level 1 - Full Code      Subjective:   Pt is with no pain  Rather frustrated that she could not get up dt bleed  She is eager per pt  Understands plan of care  Wound covered for ortho to evaluate called and discussed with resident on bone phone  Objective:     Vitals:   Temp (24hrs), Av 2 °F (36 8 °C), Min:98 1 °F (36 7 °C), Max:98 3 °F (36 8 °C)    Temp:  [98 1 °F (36 7 °C)-98 3 °F (36 8 °C)] 98 3 °F (36 8 °C)  HR:  [68-88] 88  BP: (117-133)/(69-78) 117/69  SpO2:  [94 %-98 %] 97 %  Body mass index is 17 97 kg/m²  Input and Output Summary (last 24 hours): Intake/Output Summary (Last 24 hours) at 2020  Last data filed at 2020  Gross per 24 hour   Intake 300 ml   Output 750 ml   Net -450 ml       Physical Exam:     Physical Exam   Constitutional: No distress  HENT:   Head: Normocephalic and atraumatic  Eyes: Conjunctivae are normal  Right eye exhibits no discharge  Left eye exhibits no discharge  No scleral icterus  Neck: No tracheal deviation present  No thyromegaly present  Cardiovascular: Normal rate  Exam reveals no gallop and no friction rub  No murmur heard  Pulmonary/Chest: No stridor  No respiratory distress  She has no wheezes  She has no rales  She exhibits no tenderness  Abdominal: Soft  She exhibits no distension and no mass  There is no tenderness  There is no rebound and no guarding  No hernia     Musculoskeletal: She exhibits deformity (severely atrophied lower extremeties)  She exhibits no edema  Lymphadenopathy:     She has no cervical adenopathy  Neurological: She is alert  Skin: No rash noted  She is not diaphoretic  No erythema  No pallor  Psychiatric:   Flat depressed          Additional Data:     Labs:    Results from last 7 days   Lab Units 02/14/20  1551 02/14/20  0507   WBC Thousand/uL  --  11 71*   HEMOGLOBIN g/dL 9 9* 9 8*   HEMATOCRIT % 32 4* 32 4*   PLATELETS Thousands/uL  --  760*   NEUTROS PCT %  --  62   LYMPHS PCT %  --  25   MONOS PCT %  --  7   EOS PCT %  --  3     Results from last 7 days   Lab Units 02/14/20  0507   SODIUM mmol/L 135*   POTASSIUM mmol/L 4 2   CHLORIDE mmol/L 101   CO2 mmol/L 28   BUN mg/dL 11   CREATININE mg/dL 0 64   ANION GAP mmol/L 6   CALCIUM mg/dL 8 9   ALBUMIN g/dL 2 0*   TOTAL BILIRUBIN mg/dL 0 22   ALK PHOS U/L 171*   ALT U/L 41   AST U/L 22   GLUCOSE RANDOM mg/dL 122                           * I Have Reviewed All Lab Data Listed Above  * Additional Pertinent Lab Tests Reviewed: All Labs Within Last 24 Hours Reviewed    Imaging:    Imaging Reports Reviewed Today Include:reviewed     Recent Cultures (last 7 days):     Results from last 7 days   Lab Units 02/09/20  0829   BLOOD CULTURE  No Growth After 5 Days  No Growth After 5 Days         Last 24 Hours Medication List:     Current Facility-Administered Medications:  acetaminophen 650 mg Oral Q6H PRN Carolynn Romberg, MD   artificial tear  Both Eyes ALAINA Hamilton MD   bisacodyl 10 mg Rectal Daily PRN Elli Jin MD   calcium carbonate-vitamin D 1 tablet Oral BID With Meals Carolynn Romberg, MD   cholecalciferol 1,000 Units Oral Daily Carolynn Romberg, MD   cyanocobalamin 100 mcg Oral Daily Carolynn Romberg, MD   dextran 70-hypromellose 1 drop Both Eyes Q3H PRN Vanessa Heredia PA-C   diphenhydrAMINE 50 mg Oral 60 Min Pre-Op Sagrario Dumont PA-C   enoxaparin 40 mg Subcutaneous Daily Carolynn Romberg, MD famotidine 20 mg Oral BID PRN Nura Yousif MD   loperamide 2 mg Oral TID PRN Yelena Burgess PA-C   midodrine 15 mg Oral Q8H Angle Good MD   multivitamin-minerals 1 tablet Oral Daily Angle Good MD   nystatin  Topical BID Angle Good MD   ondansetron 4 mg Intravenous Q6H PRN Angle Good MD   pantoprazole 40 mg Oral BID AC Nura Yousif MD   polyethylene glycol 17 g Oral Daily PRN CONCEPCION Hidalgo   saccharomyces boulardii 250 mg Oral BID Angle Good MD   senna 1 tablet Oral HS PRN William Scott CRNA     Facility-Administered Medications Ordered in Other Encounters:  dexamethasone 4 mg Intravenous Once PRN Nika Irons, DO    diphenhydrAMINE 12 5 mg Intravenous Once Nika Irons, DO    lactated ringers 75 mL/hr Intravenous Continuous Nika Irons, DO Last Rate: 75 mL/hr (11/10/19 2105)   lactated ringers 50 mL/hr Intravenous Continuous Jamila Weems CRNA    lactated ringers 75 mL/hr Intravenous Continuous Nika Irons, DO Last Rate: Stopped (03/20/19 1841)   lactated ringers 75 mL/hr Intravenous Continuous Myra Campo MD Last Rate: Stopped (03/20/19 1842)   ondansetron 4 mg Intravenous Once PRN Edna Ashley MD    promethazine 12 5 mg Intravenous Once PRN Edna Ashley MD         Today, Patient Was Seen By: CONCEPCION Hidalgo    ** Please Note: Dictation voice to text software may have been used in the creation of this document   **

## 2020-02-14 NOTE — PLAN OF CARE
Problem: PHYSICAL THERAPY ADULT  Goal: Performs mobility at highest level of function for planned discharge setting  See evaluation for individualized goals  Description  Treatment/Interventions: Functional transfer training, LE strengthening/ROM, Endurance training, Patient/family training, Equipment eval/education, Bed mobility, Spoke to nursing, Spoke to case management(w/c training)  Equipment Recommended: Wheelchair       See flowsheet documentation for full assessment, interventions and recommendations  Outcome: Progressing  Note:   Prognosis: Fair  Problem List: Decreased strength, Decreased endurance, Impaired balance, Decreased mobility, Pain  Assessment: Pt able to participate in all bed mobility tasks & attempted to self manage LEs throughout session, but was limited by generalized weakness & open woulds  Pt able to roll to assist with donning & doffing briefs as well as for linen change  Attempted supine to sit transfer, but had to stop due to noted increased bleeding in bandages on R hip  RN notified, redressed the wound and also made wound management aware, who were then present as well  further OOB mobility deferred due to increased bleeding at this time  Pt remained in R sidelying position at conclusion of session with pillow between knees & posterior wedge for support  Pt with no complaints of pain throughout session, but also lacks sensation in LEs  discussed POC and goals for increased mobility as appropriate,as well as importance of managing wound  Will continue to follow and attempt mobility tasks as appropriate based on medical team recommendations  Recommendation: Post acute IP rehab     PT - OK to Discharge: Yes    See flowsheet documentation for full assessment

## 2020-02-14 NOTE — PHYSICAL THERAPY NOTE
Physical Therapy Progress Note     02/14/20 3784   Pain Assessment   Pain Assessment No/denies pain   Restrictions/Precautions   Other Precautions Contact/isolation; Fall Risk  (R hip dislocated, impaired sensation)   Subjective   Subjective Pt encountered supine in bed, requesting to work with therapy  Pleasant throughout session  Upset at lack of mobility due to wounds  Bed Mobility   Rolling R 3  Moderate assistance   Additional items Assist x 1   Rolling L 3  Moderate assistance   Additional items Assist x 1   Supine to Sit 4  Minimal assistance   Additional items Assist x 1  (unable to achieve upright sitting)   Sit to Supine 4  Minimal assistance   Additional items Assist x 1;LE management   Activity Tolerance   Activity Tolerance Treatment limited secondary to medical complications (Comment)   Nurse Yudy Sutherland RN present during session   Assessment   Prognosis Fair   Problem List Decreased strength;Decreased endurance; Impaired balance;Decreased mobility;Pain   Assessment Pt able to participate in all bed mobility tasks & attempted to self manage LEs throughout session, but was limited by generalized weakness & open woulds  Pt able to roll to assist with donning & doffing briefs as well as for linen change  Attempted supine to sit transfer, but had to stop due to noted increased bleeding in bandages on R hip  RN notified, redressed the wound and also made wound management aware, who were then present as well  further OOB mobility deferred due to increased bleeding at this time  Pt remained in R sidelying position at conclusion of session with pillow between knees & posterior wedge for support  Pt with no complaints of pain throughout session, but also lacks sensation in LEs  discussed POC and goals for increased mobility as appropriate,as well as importance of managing wound  Will continue to follow and attempt mobility tasks as appropriate based on medical team recommendations     Goals Patient Goals to get out of bed more frequently   STG Expiration Date 02/25/20   PT Treatment Day 1   Plan   Treatment/Interventions Functional transfer training;LE strengthening/ROM; Therapeutic exercise; Endurance training;Patient/family training;Equipment eval/education; Bed mobility   Progress Slow progress, medical status limitations   PT Frequency   (3-6x/week)   Recommendation   Recommendation Post acute IP rehab   Equipment Recommended Wheelchair     [place], [place]

## 2020-02-14 NOTE — OCCUPATIONAL THERAPY NOTE
Occupational Therapy Treatment Note:     02/14/20 1415   Restrictions/Precautions   Weight Bearing Precautions Per Order No   Other Precautions Contact/isolation; Fall Risk   Pain Assessment   Pain Score No Pain   ADL   Where Assessed Supine, bed   LB Dressing Assistance 3  Moderate Assistance   LB Dressing Deficit Thread RLE into underwear; Thread LLE into underwear;Pull up over hips   LB Dressing Comments PT is able t o thread LE with min A and requires mod to roll and pull up over hips    Toileting Assistance  1  Total Assistance   Toileting Deficit Perineal hygiene   Toileting Comments incontinent   Bed Mobility   Rolling R 3  Moderate assistance   Additional items Assist x 1   Rolling L 3  Moderate assistance   Additional items Assist x 1   Supine to Sit 4  Minimal assistance   Additional items Assist x 1;LE management   Sit to Supine 3  Moderate assistance   Additional items LE management   Additional Comments Pt did not achieve full upright sit tdue to complicatios with wound bleeding with moventment, NSG and MD notified, and advised pt return to supine until Ortho see pt   Cognition   Overall Cognitive Status [facility]   Arousal/Participation Alert; Cooperative   Attention Attends with cues to redirect   Orientation Level Oriented X4   Memory Within functional limits   Following Commands Follows one step commands without difficulty   Comments Very motivated and eager to get OOB  Activity Tolerance   Activity Tolerance Treatment limited secondary to medical complications (Comment)  (Bleeding from wound)   Medical Staff Made Aware NSG aware   Assessment   Assessment Pt was seen this date for OT tx session focusing on self care tasks, and bed mobility  Atetmpting to trafner OOB as pt is willing and motivated however limited by complication wiht bleeding through wound dressing  NSG notified and advised pt be returned to supine until further assessment from ortho   Prior too pt was able to comeptle LB dressing tasks with increased time and assist  Incontinent of bowel with total assist for hygiene  Pt resting in supine at end of sessio with all needs i nreach  Would benefit from continue dOT tx to improve overallf uciotnal abilties  Pt is very motivated with particiaption   COntineu to follow with current POc   Plan   Treatment Interventions ADL retraining   Goal Expiration Date 02/25/20   OT Treatment Day 1   OT Frequency 3-5x/wk   Recommendation   OT Discharge Recommendation Short Term [address] [place] [address]

## 2020-02-14 NOTE — ASSESSMENT & PLAN NOTE
· History of spinal cord injury T8 with paraplegia sp 20 years ago hang gliding accident   · Presented from [facility], however patient had reported that she lived independently and was confabulating stories  Seen by neuropsychology the deemed incompetent  · Continue with repositioning and wound care

## 2020-02-14 NOTE — ASSESSMENT & PLAN NOTE
· POA with fever and leukocytosis with infectious source likely being R hip wound with underlying iliopsoas myositis  No signs of PNA or intra-abdominal infection  Review of repeat CT scan shows known osteomyelitis of the right hip with dislocation  Pockets of gas have decreased since the initial consult  No evidence for abscesses  · Repeat CT shows no evidence for abscesses  · Blood cultures 1/2 positive for coag-neg staph, other negative  Continue to monitor blood cultures  · Urine culture + for proteus and e coli; likely asymptomatic bacteriuria per ID; isabel exchanged   · Continue IV abx per ID recommendations complete 10 days total   Last dose 2/11   · ID with preference for surgical intervention for definitive source control however, no focus for interventional radiology intervention and surgery and orthopedics not currently offering any surgical management  Cleared 2/12 by ID   · PT/OT recommending returning to [facility], [facility]  · However patient with need for neuropsychology evaluation performed on 02/12/2020, determined that patient does not appear to have capacity to make fully in for medical consult care decisions  · Patient noted to have major depressive disorder, moderate without psychotic features patient stating to neuropsychology that she is not interested in taking antidepressant medications because they would not help her condition

## 2020-02-14 NOTE — PROGRESS NOTES
Called by nursing pt in room with physical therapy attempting to mobilize patient  Patient had sudden bloody drainage on ABD pad  Once removed large clot per nursing  I was called to room to examine patient patient is lying on her left side wound is open to air when I got to the room  Patient's wound looks clean but definitely fresh bloody drainage different from yesterday when wound care were doing dressing change on patient  Examined with Q-tip not aggressive but definitely bloody drainage  Called Orthopedics bone phone [phone] at 2:05 p m  discussed with ortho resident  Reports he will be down to examined patient for any acute change    Will check H&H in 1 hour

## 2020-02-14 NOTE — PLAN OF CARE
Problem: Potential for Falls  Goal: Patient will remain free of falls  Description  INTERVENTIONS:  - Assess patient frequently for physical needs  -  Identify cognitive and physical deficits and behaviors that affect risk of falls  -  [place] fall precautions as indicated by assessment   - Educate patient/family on patient safety including physical limitations  - Instruct patient to call for assistance with activity based on assessment  - Modify environment to reduce risk of injury  - Consider OT/PT consult to assist with strengthening/mobility  Outcome: Progressing     Problem: Prexisting or High Potential for Compromised Skin Integrity  Goal: Skin integrity is maintained or improved  Description  INTERVENTIONS:  - Identify patients at risk for skin breakdown  - Assess and monitor skin integrity  - Assess and monitor nutrition and hydration status  - Monitor labs   - Assess for incontinence   - Turn and reposition patient  - Assist with mobility/ambulation  - Relieve pressure over bony prominences  - Avoid friction and shearing  - Provide appropriate hygiene as needed including keeping skin clean and dry  - Evaluate need for skin moisturizer/barrier cream  - Collaborate with interdisciplinary team   - Patient/family teaching  - Consider wound care consult   Outcome: Progressing     Problem: Nutrition/Hydration-ADULT  Goal: Nutrient/Hydration intake appropriate for improving, restoring or maintaining nutritional needs  Description  Monitor and assess patient's nutrition/hydration status for malnutrition  Collaborate with interdisciplinary team and initiate plan and interventions as ordered  Monitor patient's weight and dietary intake as ordered or per policy  Utilize nutrition screening tool and intervene as necessary  Determine patient's food preferences and provide high-protein, high-caloric foods as appropriate       INTERVENTIONS:  - Monitor oral intake, urinary output, labs, and treatment plans  - Assess nutrition and hydration status and recommend course of action  - Evaluate amount of meals eaten  - Assist patient with eating if necessary   - Allow adequate time for meals  - Recommend/ encourage appropriate diets, oral nutritional supplements, and vitamin/mineral supplements  - Order, calculate, and assess calorie counts as needed  - Recommend, monitor, and adjust tube feedings and TPN/PPN based on assessed needs  - Assess need for intravenous fluids  - Provide specific nutrition/hydration education as appropriate  - Include patient/family/caregiver in decisions related to nutrition  Outcome: Progressing     Problem: PAIN - ADULT  Goal: Verbalizes/displays adequate comfort level or baseline comfort level  Description  Interventions:  - Encourage patient to monitor pain and request assistance  - Assess pain using appropriate pain scale  - Administer analgesics based on type and severity of pain and evaluate response  - Implement non-pharmacological measures as appropriate and evaluate response  - Consider cultural and social influences on pain and pain management  - Notify physician/advanced practitioner if interventions unsuccessful or patient reports new pain  Outcome: Progressing     Problem: INFECTION - ADULT  Goal: Absence or prevention of progression during hospitalization  Description  INTERVENTIONS:  - Assess and monitor for signs and symptoms of infection  - Monitor lab/diagnostic results  - Monitor all insertion sites, i e  indwelling lines, tubes, and drains  - Monitor endotracheal if appropriate and nasal secretions for changes in amount and color  - [place] appropriate cooling/warming therapies per order  - Administer medications as ordered  - Instruct and encourage patient and family to use good hand hygiene technique  - Identify and instruct in appropriate isolation precautions for identified infection/condition  Outcome: Progressing  Goal: Absence of fever/infection during neutropenic period  Description  INTERVENTIONS:  - Monitor WBC    Outcome: Progressing     Problem: SAFETY ADULT  Goal: Maintain or return to baseline ADL function  Description  INTERVENTIONS:  -  Assess patient's ability to carry out ADLs; assess patient's baseline for ADL function and identify physical deficits which impact ability to perform ADLs (bathing, care of mouth/teeth, toileting, grooming, dressing, etc )  - Assess/evaluate cause of self-care deficits   - Assess range of motion  - Assess patient's mobility; develop plan if impaired  - Assess patient's need for assistive devices and provide as appropriate  - Encourage maximum independence but intervene and supervise when necessary  - Involve family in performance of ADLs  - Assess for home care needs following discharge   - Consider OT consult to assist with ADL evaluation and planning for discharge  - Provide patient education as appropriate  Outcome: Progressing  Goal: Maintain or return mobility status to optimal level  Description  INTERVENTIONS:  - Assess patient's baseline mobility status (ambulation, transfers, stairs, etc )    - Identify cognitive and physical deficits and behaviors that affect mobility  - Identify mobility aids required to assist with transfers and/or ambulation (gait belt, sit-to-stand, lift, walker, cane, etc )  - [place] fall precautions as indicated by assessment  - Record patient progress and toleration of activity level on Mobility SBAR; progress patient to next Phase/Stage  - Instruct patient to call for assistance with activity based on assessment  - Consider rehabilitation consult to assist with strengthening/weightbearing, etc   Outcome: Progressing     Problem: DISCHARGE PLANNING  Goal: Discharge to home or other facility with appropriate resources  Description  INTERVENTIONS:  - Identify barriers to discharge w/patient and caregiver  - Arrange for needed discharge resources and transportation as appropriate  - Identify discharge learning needs (meds, wound care, etc )  - Arrange for interpretive services to assist at discharge as needed  - Refer to Case Management Department for coordinating discharge planning if the patient needs post-hospital services based on physician/advanced practitioner order or complex needs related to functional status, cognitive ability, or social support system  Outcome: Progressing     Problem: Knowledge Deficit  Goal: Patient/family/caregiver demonstrates understanding of disease process, treatment plan, medications, and discharge instructions  Description  Complete learning assessment and assess knowledge base    Interventions:  - Provide teaching at level of understanding  - Provide teaching via preferred learning methods  Outcome: Progressing

## 2020-02-15 LAB
BASOPHILS # BLD AUTO: 0.11 THOUSANDS/ΜL (ref 0–0.1)
BASOPHILS NFR BLD AUTO: 1 % (ref 0–1)
EOSINOPHIL # BLD AUTO: 0.25 THOUSAND/ΜL (ref 0–0.61)
EOSINOPHIL NFR BLD AUTO: 2 % (ref 0–6)
ERYTHROCYTE [DISTWIDTH] IN BLOOD BY AUTOMATED COUNT: 17.4 % (ref 11.6–15.1)
HCT VFR BLD AUTO: 32.1 % (ref 34.8–46.1)
HGB BLD-MCNC: 9.8 G/DL (ref 11.5–15.4)
IMM GRANULOCYTES # BLD AUTO: 0.11 THOUSAND/UL (ref 0–0.2)
IMM GRANULOCYTES NFR BLD AUTO: 1 % (ref 0–2)
LYMPHOCYTES # BLD AUTO: 3.29 THOUSANDS/ΜL (ref 0.6–4.47)
LYMPHOCYTES NFR BLD AUTO: 28 % (ref 14–44)
MCH RBC QN AUTO: 25.7 PG (ref 26.8–34.3)
MCHC RBC AUTO-ENTMCNC: 30.5 G/DL (ref 31.4–37.4)
MCV RBC AUTO: 84 FL (ref 82–98)
MONOCYTES # BLD AUTO: 1.02 THOUSAND/ΜL (ref 0.17–1.22)
MONOCYTES NFR BLD AUTO: 9 % (ref 4–12)
NEUTROPHILS # BLD AUTO: 7.17 THOUSANDS/ΜL (ref 1.85–7.62)
NEUTS SEG NFR BLD AUTO: 59 % (ref 43–75)
NRBC BLD AUTO-RTO: 0 /100 WBCS
PLATELET # BLD AUTO: 710 THOUSANDS/UL (ref 149–390)
PMV BLD AUTO: 9.3 FL (ref 8.9–12.7)
RBC # BLD AUTO: 3.82 MILLION/UL (ref 3.81–5.12)
WBC # BLD AUTO: 11.95 THOUSAND/UL (ref 4.31–10.16)

## 2020-02-15 PROCEDURE — 85025 COMPLETE CBC W/AUTO DIFF WBC: CPT | Performed by: NURSE PRACTITIONER

## 2020-02-15 PROCEDURE — 99232 SBSQ HOSP IP/OBS MODERATE 35: CPT | Performed by: NURSE PRACTITIONER

## 2020-02-15 RX ADMIN — PANTOPRAZOLE SODIUM 40 MG: 40 TABLET, DELAYED RELEASE ORAL at 17:10

## 2020-02-15 RX ADMIN — MELATONIN 1000 UNITS: at 09:42

## 2020-02-15 RX ADMIN — Medication 250 MG: at 09:45

## 2020-02-15 RX ADMIN — MIDODRINE HYDROCHLORIDE 15 MG: 5 TABLET ORAL at 21:12

## 2020-02-15 RX ADMIN — NYSTATIN: 100000 POWDER TOPICAL at 10:47

## 2020-02-15 RX ADMIN — Medication 1 TABLET: at 09:42

## 2020-02-15 RX ADMIN — NYSTATIN: 100000 POWDER TOPICAL at 17:12

## 2020-02-15 RX ADMIN — MIDODRINE HYDROCHLORIDE 15 MG: 5 TABLET ORAL at 13:08

## 2020-02-15 RX ADMIN — Medication 1 TABLET: at 17:10

## 2020-02-15 RX ADMIN — ENOXAPARIN SODIUM 40 MG: 40 INJECTION SUBCUTANEOUS at 09:42

## 2020-02-15 RX ADMIN — VITAM B12 100 MCG: 100 TAB at 09:42

## 2020-02-15 RX ADMIN — MIDODRINE HYDROCHLORIDE 15 MG: 5 TABLET ORAL at 05:44

## 2020-02-15 RX ADMIN — Medication 250 MG: at 17:10

## 2020-02-15 RX ADMIN — WHITE PETROLATUM 57.7 %-MINERAL OIL 31.9 % EYE OINTMENT: at 21:12

## 2020-02-15 RX ADMIN — PANTOPRAZOLE SODIUM 40 MG: 40 TABLET, DELAYED RELEASE ORAL at 05:45

## 2020-02-15 NOTE — ASSESSMENT & PLAN NOTE
Patient was noted to have fecal impaction on CT scan  Has reported large bowel movement yesterday  No concern for C diff colitis  Continue with prophylactic vancomycin as noted below  Patient refusing suppository and do collapse  Will make these p r n    · With infection in April of 2019  · Currently without any complaints of diarrhea, but frequent formed stools  · Continue vancomycin prophylactics, will need to continue for 72 hours after last dose of systemic antibiotics thru 2/14, now completed

## 2020-02-15 NOTE — ASSESSMENT & PLAN NOTE
Malnutrition Findings:   Malnutrition type: Chronic illness(Severe pro/sara malnutrition r/t disease/condition as evidenced by 12% unintentional wt loss since 11/10/19, consuming < 75% energy intake compared to est energy needs > 1 month, fat/muscle wasting of orbital/temple areas  Treated with diet/supplements)  Degree of Malnutrition: Other severe protein calorie malnutrition    BMI Findings:  BMI Classifications: Underweight < 18 5     Body mass index is 17 97 kg/m²  Patient does admit to depression  Pastoral care consulted  She may benefit from counseling at discharge  Would recommend supplementation as above    Have ensure kulwinder pudding at lunch will add magic ice cream at dinner time

## 2020-02-15 NOTE — ASSESSMENT & PLAN NOTE
· POA with fever and leukocytosis with infectious source likely being R hip wound with underlying iliopsoas myositis  No signs of PNA or intra-abdominal infection  Review of repeat CT scan shows known osteomyelitis of the right hip with dislocation  Pockets of gas have decreased since the initial consult  No evidence for abscesses  · Repeat CT shows no evidence for abscesses  · Blood cultures 1/2 positive for coag-neg staph, other negative  Continue to monitor blood cultures  · Urine culture + for proteus and e coli; likely asymptomatic bacteriuria per ID; isabel exchanged   · Continue IV abx per ID recommendations complete 10 days total   Last dose 2/11   · ID with preference for surgical intervention for definitive source control however, no focus for interventional radiology intervention and surgery and orthopedics not currently offering any surgical management  Cleared 2/12 by ID   · PT/OT recommending returning to [facility], [facility]  · However patient with need for neuropsychology evaluation performed on 02/12/2020, determined that patient does not appear to have capacity to make fully in for medical consult care decisions  · Patient noted to have major depressive disorder, moderate without psychotic features patient stating to neuropsychology that she is not interested in taking antidepressant medications because they would not help her condition

## 2020-02-15 NOTE — ASSESSMENT & PLAN NOTE
· History of spinal cord injury T8 with paraplegia sp 20 years ago hang gliding accident   · Presented from [facility], however patient had reported that she lived independently and was confabulating stories  Seen by neuropsychology the deemed incompetent  · Continue with repositioning and wound care

## 2020-02-15 NOTE — PLAN OF CARE
Problem: Potential for Falls  Goal: Patient will remain free of falls  Description  INTERVENTIONS:  - Assess patient frequently for physical needs  -  Identify cognitive and physical deficits and behaviors that affect risk of falls  -  [place] fall precautions as indicated by assessment   - Educate patient/family on patient safety including physical limitations  - Instruct patient to call for assistance with activity based on assessment  - Modify environment to reduce risk of injury  - Consider OT/PT consult to assist with strengthening/mobility  2/15/2020 1337 by Patrice Terry RN  Outcome: Progressing  2/15/2020 1337 by Patrice Terry RN  Outcome: Progressing     Problem: Prexisting or High Potential for Compromised Skin Integrity  Goal: Skin integrity is maintained or improved  Description  INTERVENTIONS:  - Identify patients at risk for skin breakdown  - Assess and monitor skin integrity  - Assess and monitor nutrition and hydration status  - Monitor labs   - Assess for incontinence   - Turn and reposition patient  - Assist with mobility/ambulation  - Relieve pressure over bony prominences  - Avoid friction and shearing  - Provide appropriate hygiene as needed including keeping skin clean and dry  - Evaluate need for skin moisturizer/barrier cream  - Collaborate with interdisciplinary team   - Patient/family teaching  - Consider wound care consult   2/15/2020 1337 by Patrice Terry RN  Outcome: Progressing  2/15/2020 1337 by Patrice Terry RN  Outcome: Progressing     Problem: Nutrition/Hydration-ADULT  Goal: Nutrient/Hydration intake appropriate for improving, restoring or maintaining nutritional needs  Description  Monitor and assess patient's nutrition/hydration status for malnutrition  Collaborate with interdisciplinary team and initiate plan and interventions as ordered  Monitor patient's weight and dietary intake as ordered or per policy   Utilize nutrition screening tool and intervene as necessary  Determine patient's food preferences and provide high-protein, high-caloric foods as appropriate       INTERVENTIONS:  - Monitor oral intake, urinary output, labs, and treatment plans  - Assess nutrition and hydration status and recommend course of action  - Evaluate amount of meals eaten  - Assist patient with eating if necessary   - Allow adequate time for meals  - Recommend/ encourage appropriate diets, oral nutritional supplements, and vitamin/mineral supplements  - Order, calculate, and assess calorie counts as needed  - Recommend, monitor, and adjust tube feedings and TPN/PPN based on assessed needs  - Assess need for intravenous fluids  - Provide specific nutrition/hydration education as appropriate  - Include patient/family/caregiver in decisions related to nutrition  2/15/2020 1337 by Jagdish Gomes RN  Outcome: Progressing  2/15/2020 1337 by Jagdish Gomes RN  Outcome: Progressing     Problem: PAIN - ADULT  Goal: Verbalizes/displays adequate comfort level or baseline comfort level  Description  Interventions:  - Encourage patient to monitor pain and request assistance  - Assess pain using appropriate pain scale  - Administer analgesics based on type and severity of pain and evaluate response  - Implement non-pharmacological measures as appropriate and evaluate response  - Consider cultural and social influences on pain and pain management  - Notify physician/advanced practitioner if interventions unsuccessful or patient reports new pain  2/15/2020 1337 by Jagdish Gomes RN  Outcome: Progressing  2/15/2020 1337 by Jgadish Gomes RN  Outcome: Progressing     Problem: INFECTION - ADULT  Goal: Absence or prevention of progression during hospitalization  Description  INTERVENTIONS:  - Assess and monitor for signs and symptoms of infection  - Monitor lab/diagnostic results  - Monitor all insertion sites, i e  indwelling lines, tubes, and drains  - Monitor endotracheal if appropriate and nasal secretions for changes in amount and color  - [place] appropriate cooling/warming therapies per order  - Administer medications as ordered  - Instruct and encourage patient and family to use good hand hygiene technique  - Identify and instruct in appropriate isolation precautions for identified infection/condition  2/15/2020 1337 by Jerad Adame RN  Outcome: Progressing  2/15/2020 1337 by Jerad Adame RN  Outcome: Progressing  Goal: Absence of fever/infection during neutropenic period  Description  INTERVENTIONS:  - Monitor WBC    2/15/2020 1337 by Jerad Adame RN  Outcome: Progressing  2/15/2020 1337 by Jerad Adame RN  Outcome: Progressing     Problem: SAFETY ADULT  Goal: Maintain or return to baseline ADL function  Description  INTERVENTIONS:  -  Assess patient's ability to carry out ADLs; assess patient's baseline for ADL function and identify physical deficits which impact ability to perform ADLs (bathing, care of mouth/teeth, toileting, grooming, dressing, etc )  - Assess/evaluate cause of self-care deficits   - Assess range of motion  - Assess patient's mobility; develop plan if impaired  - Assess patient's need for assistive devices and provide as appropriate  - Encourage maximum independence but intervene and supervise when necessary  - Involve family in performance of ADLs  - Assess for home care needs following discharge   - Consider OT consult to assist with ADL evaluation and planning for discharge  - Provide patient education as appropriate  2/15/2020 1337 by Jerad Adame RN  Outcome: Progressing  2/15/2020 1337 by Jerad Adame RN  Outcome: Progressing  Goal: Maintain or return mobility status to optimal level  Description  INTERVENTIONS:  - Assess patient's baseline mobility status (ambulation, transfers, stairs, etc )    - Identify cognitive and physical deficits and behaviors that affect mobility  - Identify mobility aids required to assist with transfers and/or ambulation (gait belt, sit-to-stand, lift, walker, cane, etc )  - [place] fall precautions as indicated by assessment  - Record patient progress and toleration of activity level on Mobility SBAR; progress patient to next Phase/Stage  - Instruct patient to call for assistance with activity based on assessment  - Consider rehabilitation consult to assist with strengthening/weightbearing, etc   2/15/2020 1337 by Maurice Paul RN  Outcome: Progressing  2/15/2020 1337 by Maurice Paul RN  Outcome: Progressing     Problem: DISCHARGE PLANNING  Goal: Discharge to home or other facility with appropriate resources  Description  INTERVENTIONS:  - Identify barriers to discharge w/patient and caregiver  - Arrange for needed discharge resources and transportation as appropriate  - Identify discharge learning needs (meds, wound care, etc )  - Arrange for interpretive services to assist at discharge as needed  - Refer to Case Management Department for coordinating discharge planning if the patient needs post-hospital services based on physician/advanced practitioner order or complex needs related to functional status, cognitive ability, or social support system  2/15/2020 1337 by Maurice Paul RN  Outcome: Progressing  2/15/2020 1337 by Maurice Paul RN  Outcome: Progressing     Problem: Knowledge Deficit  Goal: Patient/family/caregiver demonstrates understanding of disease process, treatment plan, medications, and discharge instructions  Description  Complete learning assessment and assess knowledge base    Interventions:  - Provide teaching at level of understanding  - Provide teaching via preferred learning methods  2/15/2020 1337 by Maurice Paul RN  Outcome: Progressing  2/15/2020 1337 by Maurice Paul RN  Outcome: Progressing     Problem: HEMATOLOGIC - ADULT  Goal: Maintains hematologic stability  Description  INTERVENTIONS  - Assess for signs and symptoms of bleeding or hemorrhage  - Monitor labs  - Administer supportive blood products/factors as ordered and appropriate  2/15/2020 1337 by Patrice Terry RN  Outcome: Progressing  2/15/2020 1337 by Patrice Terry RN  Outcome: Progressing     Problem: MUSCULOSKELETAL - ADULT  Goal: Maintain or return mobility to safest level of function  Description  INTERVENTIONS:  - Assess patient's ability to carry out ADLs; assess patient's baseline for ADL function and identify physical deficits which impact ability to perform ADLs (bathing, care of mouth/teeth, toileting, grooming, dressing, etc )  - Assess/evaluate cause of self-care deficits   - Assess range of motion  - Assess patient's mobility  - Assess patient's need for assistive devices and provide as appropriate  - Encourage maximum independence but intervene and supervise when necessary  - Involve family in performance of ADLs  - Assess for home care needs following discharge   - Consider OT consult to assist with ADL evaluation and planning for discharge  - Provide patient education as appropriate  2/15/2020 1337 by Patrice Terry RN  Outcome: Progressing  2/15/2020 1337 by Patrice Terry RN  Outcome: Progressing  Goal: Maintain proper alignment of affected body part  Description  INTERVENTIONS:  - Support, maintain and protect limb and body alignment  - Provide patient/ family with appropriate education  2/15/2020 1337 by Patrice Terry RN  Outcome: Progressing  2/15/2020 1337 by Patrice Terry RN  Outcome: Progressing

## 2020-02-16 PROCEDURE — 99232 SBSQ HOSP IP/OBS MODERATE 35: CPT | Performed by: NURSE PRACTITIONER

## 2020-02-16 RX ADMIN — ENOXAPARIN SODIUM 40 MG: 40 INJECTION SUBCUTANEOUS at 10:23

## 2020-02-16 RX ADMIN — Medication 250 MG: at 10:24

## 2020-02-16 RX ADMIN — NYSTATIN: 100000 POWDER TOPICAL at 17:45

## 2020-02-16 RX ADMIN — Medication 1 TABLET: at 17:43

## 2020-02-16 RX ADMIN — WHITE PETROLATUM 57.7 %-MINERAL OIL 31.9 % EYE OINTMENT: at 21:39

## 2020-02-16 RX ADMIN — MIDODRINE HYDROCHLORIDE 15 MG: 5 TABLET ORAL at 13:05

## 2020-02-16 RX ADMIN — Medication 1 TABLET: at 10:23

## 2020-02-16 RX ADMIN — MIDODRINE HYDROCHLORIDE 15 MG: 5 TABLET ORAL at 21:39

## 2020-02-16 RX ADMIN — PANTOPRAZOLE SODIUM 40 MG: 40 TABLET, DELAYED RELEASE ORAL at 17:44

## 2020-02-16 RX ADMIN — MELATONIN 1000 UNITS: at 10:26

## 2020-02-16 RX ADMIN — VITAM B12 100 MCG: 100 TAB at 10:23

## 2020-02-16 RX ADMIN — MIDODRINE HYDROCHLORIDE 15 MG: 5 TABLET ORAL at 05:54

## 2020-02-16 RX ADMIN — Medication 1 TABLET: at 10:24

## 2020-02-16 RX ADMIN — DEXTRAN 70 AND HYPROMELLOSE 2910 1 DROP: 1; 3 SOLUTION/ DROPS OPHTHALMIC at 10:27

## 2020-02-16 RX ADMIN — NYSTATIN: 100000 POWDER TOPICAL at 10:27

## 2020-02-16 RX ADMIN — PANTOPRAZOLE SODIUM 40 MG: 40 TABLET, DELAYED RELEASE ORAL at 10:24

## 2020-02-16 RX ADMIN — Medication 250 MG: at 17:43

## 2020-02-16 NOTE — ASSESSMENT & PLAN NOTE
· Labs appear consistent with anemia of chronic disease, continue to monitor closely    Hemoglobin stable   · 9 0  Last result no need for repeat labs   · Continue to monitor

## 2020-02-16 NOTE — PLAN OF CARE
Problem: Potential for Falls  Goal: Patient will remain free of falls  Description  INTERVENTIONS:  - Assess patient frequently for physical needs  -  Identify cognitive and physical deficits and behaviors that affect risk of falls  -  [place] fall precautions as indicated by assessment   - Educate patient/family on patient safety including physical limitations  - Instruct patient to call for assistance with activity based on assessment  - Modify environment to reduce risk of injury  - Consider OT/PT consult to assist with strengthening/mobility  Outcome: Progressing     Problem: Prexisting or High Potential for Compromised Skin Integrity  Goal: Skin integrity is maintained or improved  Description  INTERVENTIONS:  - Identify patients at risk for skin breakdown  - Assess and monitor skin integrity  - Assess and monitor nutrition and hydration status  - Monitor labs   - Assess for incontinence   - Turn and reposition patient  - Assist with mobility/ambulation  - Relieve pressure over bony prominences  - Avoid friction and shearing  - Provide appropriate hygiene as needed including keeping skin clean and dry  - Evaluate need for skin moisturizer/barrier cream  - Collaborate with interdisciplinary team   - Patient/family teaching  - Consider wound care consult   Outcome: Progressing     Problem: Nutrition/Hydration-ADULT  Goal: Nutrient/Hydration intake appropriate for improving, restoring or maintaining nutritional needs  Description  Monitor and assess patient's nutrition/hydration status for malnutrition  Collaborate with interdisciplinary team and initiate plan and interventions as ordered  Monitor patient's weight and dietary intake as ordered or per policy  Utilize nutrition screening tool and intervene as necessary  Determine patient's food preferences and provide high-protein, high-caloric foods as appropriate       INTERVENTIONS:  - Monitor oral intake, urinary output, labs, and treatment plans  - Assess nutrition and hydration status and recommend course of action  - Evaluate amount of meals eaten  - Assist patient with eating if necessary   - Allow adequate time for meals  - Recommend/ encourage appropriate diets, oral nutritional supplements, and vitamin/mineral supplements  - Order, calculate, and assess calorie counts as needed  - Recommend, monitor, and adjust tube feedings and TPN/PPN based on assessed needs  - Assess need for intravenous fluids  - Provide specific nutrition/hydration education as appropriate  - Include patient/family/caregiver in decisions related to nutrition  Outcome: Progressing     Problem: PAIN - ADULT  Goal: Verbalizes/displays adequate comfort level or baseline comfort level  Description  Interventions:  - Encourage patient to monitor pain and request assistance  - Assess pain using appropriate pain scale  - Administer analgesics based on type and severity of pain and evaluate response  - Implement non-pharmacological measures as appropriate and evaluate response  - Consider cultural and social influences on pain and pain management  - Notify physician/advanced practitioner if interventions unsuccessful or patient reports new pain  Outcome: Progressing     Problem: INFECTION - ADULT  Goal: Absence or prevention of progression during hospitalization  Description  INTERVENTIONS:  - Assess and monitor for signs and symptoms of infection  - Monitor lab/diagnostic results  - Monitor all insertion sites, i e  indwelling lines, tubes, and drains  - Monitor endotracheal if appropriate and nasal secretions for changes in amount and color  - [place] appropriate cooling/warming therapies per order  - Administer medications as ordered  - Instruct and encourage patient and family to use good hand hygiene technique  - Identify and instruct in appropriate isolation precautions for identified infection/condition  Outcome: Progressing  Goal: Absence of fever/infection during neutropenic period  Description  INTERVENTIONS:  - Monitor WBC    Outcome: Progressing     Problem: SAFETY ADULT  Goal: Maintain or return to baseline ADL function  Description  INTERVENTIONS:  -  Assess patient's ability to carry out ADLs; assess patient's baseline for ADL function and identify physical deficits which impact ability to perform ADLs (bathing, care of mouth/teeth, toileting, grooming, dressing, etc )  - Assess/evaluate cause of self-care deficits   - Assess range of motion  - Assess patient's mobility; develop plan if impaired  - Assess patient's need for assistive devices and provide as appropriate  - Encourage maximum independence but intervene and supervise when necessary  - Involve family in performance of ADLs  - Assess for home care needs following discharge   - Consider OT consult to assist with ADL evaluation and planning for discharge  - Provide patient education as appropriate  Outcome: Progressing  Goal: Maintain or return mobility status to optimal level  Description  INTERVENTIONS:  - Assess patient's baseline mobility status (ambulation, transfers, stairs, etc )    - Identify cognitive and physical deficits and behaviors that affect mobility  - Identify mobility aids required to assist with transfers and/or ambulation (gait belt, sit-to-stand, lift, walker, cane, etc )  - [place] fall precautions as indicated by assessment  - Record patient progress and toleration of activity level on Mobility SBAR; progress patient to next Phase/Stage  - Instruct patient to call for assistance with activity based on assessment  - Consider rehabilitation consult to assist with strengthening/weightbearing, etc   Outcome: Progressing     Problem: DISCHARGE PLANNING  Goal: Discharge to home or other facility with appropriate resources  Description  INTERVENTIONS:  - Identify barriers to discharge w/patient and caregiver  - Arrange for needed discharge resources and transportation as appropriate  - Identify discharge learning needs (meds, wound care, etc )  - Arrange for interpretive services to assist at discharge as needed  - Refer to Case Management Department for coordinating discharge planning if the patient needs post-hospital services based on physician/advanced practitioner order or complex needs related to functional status, cognitive ability, or social support system  Outcome: Progressing     Problem: Knowledge Deficit  Goal: Patient/family/caregiver demonstrates understanding of disease process, treatment plan, medications, and discharge instructions  Description  Complete learning assessment and assess knowledge base    Interventions:  - Provide teaching at level of understanding  - Provide teaching via preferred learning methods  Outcome: Progressing     Problem: SKIN/TISSUE INTEGRITY - ADULT  Goal: Skin integrity remains intact  Description  INTERVENTIONS  - Identify patients at risk for skin breakdown  - Assess and monitor skin integrity  - Assess and monitor nutrition and hydration status  - Monitor labs (i e  albumin)  - Assess for incontinence   - Turn and reposition patient  - Assist with mobility/ambulation  - Relieve pressure over bony prominences  - Avoid friction and shearing  - Provide appropriate hygiene as needed including keeping skin clean and dry  - Evaluate need for skin moisturizer/barrier cream  - Collaborate with interdisciplinary team (i e  Nutrition, Rehabilitation, etc )   - Patient/family teaching  Outcome: Progressing  Goal: Incision(s), wounds(s) or drain site(s) healing without S/S of infection  Description  INTERVENTIONS  - Assess and document risk factors for skin impairment   - Assess and document dressing, incision, wound bed, drain sites and surrounding tissue  - Consider nutrition services referral as needed  - Oral mucous membranes remain intact  - Provide patient/ family education  Outcome: Progressing  Goal: Oral mucous membranes remain intact  Description  INTERVENTIONS  - Assess oral mucosa and hygiene practices  - Implement preventative oral hygiene regimen  - Implement oral medicated treatments as ordered  - Initiate Nutrition services referral as needed  Outcome: Progressing     Problem: HEMATOLOGIC - ADULT  Goal: Maintains hematologic stability  Description  INTERVENTIONS  - Assess for signs and symptoms of bleeding or hemorrhage  - Monitor labs  - Administer supportive blood products/factors as ordered and appropriate  Outcome: Progressing     Problem: MUSCULOSKELETAL - ADULT  Goal: Maintain or return mobility to safest level of function  Description  INTERVENTIONS:  - Assess patient's ability to carry out ADLs; assess patient's baseline for ADL function and identify physical deficits which impact ability to perform ADLs (bathing, care of mouth/teeth, toileting, grooming, dressing, etc )  - Assess/evaluate cause of self-care deficits   - Assess range of motion  - Assess patient's mobility  - Assess patient's need for assistive devices and provide as appropriate  - Encourage maximum independence but intervene and supervise when necessary  - Involve family in performance of ADLs  - Assess for home care needs following discharge   - Consider OT consult to assist with ADL evaluation and planning for discharge  - Provide patient education as appropriate  Outcome: Progressing  Goal: Maintain proper alignment of affected body part  Description  INTERVENTIONS:  - Support, maintain and protect limb and body alignment  - Provide patient/ family with appropriate education  Outcome: Progressing

## 2020-02-16 NOTE — ASSESSMENT & PLAN NOTE
· History of spinal cord injury T8 with paraplegia sp 20 years ago hang gliding accident   · Presented from [facility], however patient had reported that she lived independently and was confabulating stories  Seen by neuropsychology the deemed incompetent  · Continue with repositioning and wound care

## 2020-02-16 NOTE — ASSESSMENT & PLAN NOTE
· Noted on CT scan  · Had large BM     ·  Reports moving bowels a few times a day but they are normal not loose  · Monitor

## 2020-02-16 NOTE — ASSESSMENT & PLAN NOTE
· POA with fever and leukocytosis with infectious source likely being R hip wound with underlying iliopsoas myositis  No signs of PNA or intra-abdominal infection  Review of repeat CT scan shows known osteomyelitis of the right hip with dislocation  Pockets of gas have decreased since the initial consult  No evidence for abscesses  · Repeat CT shows no evidence for abscesses  · Blood cultures 1/2 positive for coag-neg staph, other negative  Continue to monitor blood cultures  · Urine culture + for proteus and e coli; likely asymptomatic bacteriuria per ID; isabel exchanged   · Continue IV abx per ID recommendations complete 10 days total   Last dose 2/11   · ID with preference for surgical intervention for definitive source control however, no focus for interventional radiology intervention and surgery and orthopedics not currently offering any surgical management  Cleared 2/12 by ID   · PT/OT recommending returning to [facility]  · However patient with need for neuropsychology evaluation performed on 02/12/2020, determined that patient does not appear to have capacity to make fully in for medical consult care decisions  · Patient noted to have major depressive disorder, moderate without psychotic features patient stating to neuropsychology that she is not interested in taking antidepressant medications because they would not help her condition

## 2020-02-16 NOTE — PROGRESS NOTES
Progress Note - Mikal Concepcion 1950, 71 y o  female MRN: [id number]    Unit/Bed#: [facility] 647-07 Encounter: [id number]    Primary Care Provider: Agnes Steele   Date and time admitted to hospital: 2/5/2020  3:25 PM        * Sepsis [facility])  Assessment & Plan  · POA with fever and leukocytosis with infectious source likely being R hip wound with underlying iliopsoas myositis  No signs of PNA or intra-abdominal infection  Review of repeat CT scan shows known osteomyelitis of the right hip with dislocation  Pockets of gas have decreased since the initial consult  No evidence for abscesses  · Repeat CT shows no evidence for abscesses  · Blood cultures 1/2 positive for coag-neg staph, other negative  Continue to monitor blood cultures  · Urine culture + for proteus and e coli; likely asymptomatic bacteriuria per ID; isabel exchanged   · Continue IV abx per ID recommendations complete 10 days total   Last dose 2/11   · ID with preference for surgical intervention for definitive source control however, no focus for interventional radiology intervention and surgery and orthopedics not currently offering any surgical management  Cleared 2/12 by ID   · PT/OT recommending returning to [facility]  · However patient with need for neuropsychology evaluation performed on 02/12/2020, determined that patient does not appear to have capacity to make fully in for medical consult care decisions  · Patient noted to have major depressive disorder, moderate without psychotic features patient stating to neuropsychology that she is not interested in taking antidepressant medications because they would not help her condition  Abnormal CT of the chest  Assessment & Plan  CT scan of the chest reports possible heart failure as it relates to ground-glass appearance on the left lung  Also has left lower lobe consolidation that is concerning for atelectasis versus infection  Patient with no history of heart failure  Clinical exam not consistent with heart failure  · BNP is elevated   · Currently not on IV fluids  · Routine echo- EF 55 %  · incentive spirometry  Depression  Assessment & Plan  Patient does report depression over continued chronic hospital care and chronic illness  Pastoral care to speak with patient  She may benefit from outpatient counseling  She states she would be agreeable to possible outpatient counseling for 1 or 2 sessions but not forever  She raised the issue herself  Her affect is rather flat  · Spiritual care consult for now  · In review of neuropsychology note patient is severely depressed refusing antidepressant medication    Severe protein-calorie malnutrition ([address] )  Assessment & Plan  Malnutrition Findings:   Malnutrition type: Chronic illness(Severe pro/sara malnutrition r/t disease/condition as evidenced by 12% unintentional wt loss since 11/10/19, consuming < 75% energy intake compared to est energy needs > 1 month, fat/muscle wasting of orbital/temple areas  Treated with diet/supplements)  Degree of Malnutrition: Other severe protein calorie malnutrition    BMI Findings:  BMI Classifications: Underweight < 18 5     Body mass index is 17 97 kg/m²  Patient does admit to depression  Pastoral care consulted  She may benefit from counseling at discharge  Would recommend supplementation as above  Have ensure kulwinder pudding at lunch will add magic ice cream at dinner time     Coagulase negative Staphylococcus bacteremia  Assessment & Plan  · Could be secondary to wound infection or contaminant  · Infectious Disease following and cleared 2/12 for discharge       Neurogenic bladder  Assessment & Plan  · History of neurogenic bladder with chronic indwelling [place] catheter, exchanged this admission due to finding of E coli and Proteus in the urine culture    · Urine culture noted, likely represents asymptomatic bacteriuria per infectious disease      Anemia of chronic disease  Assessment & Plan  · Labs appear consistent with anemia of chronic disease, continue to monitor closely  Hemoglobin stable   · 9 0  Last result no need for repeat labs   · Continue to monitor     Lung nodule  Assessment & Plan  · Noted incidental finding, will require outpatient f/u     Hypotension  Assessment & Plan  · Chronic low blood pressures on midodrine  Resolved      Cognitive decline  Assessment & Plan  Patient noted per neuropsychology to be incompetent on 02/12/2020  Discussed with case management, now plan for rehab    Open wound of right hip  Assessment & Plan  Patient with a known past medical history for paraplegia following spinal cord injury presents with increased drainage of R hip wound  · Known to history of hip osteo and dislocation of the R femur  Patient is nonweightbearing  On admission had fever, chills  · CT repeated 2/8 and compared to February 5, 2020 showing improvement in gas pockets  · Ortho following; no plans for any surgical intervention at this time  · General surgery following: continue localized wound care  No plans for any surgical intervention  · ID following: IV Zosyn completed  repeat CT scan shows improvement  · Per ID: "if no plan for girdle stone procedure and flap to cover the wound, the role of long-term IV antibiotics is limited with risk of side effects from prolonged antibiotics outweighs the benefit   This has been explained at length to the patient  " ten-day Course completed 2/11  · Continue local wound care  Patient reports depression over her current status  She is agreeable to speak with the spiritual Care Team and would like outpatient referral   She states that her goal will be to get back to her home but at this time she is in the skilled nursing facility setting    · At this time patient will require option process for placement discussed with case management today 02/13/2020  · Today called to bedside pt with large open wound no signs of infection, when PT working with pt to get oob pt began to bleed through dsd with large clot noted and on exam some pooling in wound  Called ortho to come and evaluate pt  Unclear if came no notation from ortho spoke with resident gonsalo    History of Clostridium difficile infection  Assessment & Plan  Patient was noted to have fecal impaction on CT scan  Has reported large bowel movement yesterday  No concern for C diff colitis  Continue with prophylactic vancomycin as noted below  Patient refusing suppository and do collapse  Will make these p r n  · With infection in April of 2019  · Currently without any complaints of diarrhea, but frequent formed stools  · Continue vancomycin prophylactics, will need to continue for 72 hours after last dose of systemic antibiotics thru 2/14, now completed     Fecal retention  Assessment & Plan  · Noted on CT scan  · Had large BM  ·  Reports moving bowels a few times a day but they are normal not loose  · Monitor     Decubitus ulcer of left ischium, stage III ([facility])  Assessment & Plan  · Patient with healed scars to the sacral area, reviewed Wound care's consultation  Present on admission left ischial ulcer stage III    · Continue local care for excoriation and red scan as well as frequent turning of the patient  Also has wound to the right thigh over the hip which has osteomyelitis  No surgical intervention being recommended  · ID following and antibiotics finished 2/11   cleared for discharge however now may need to go through option process      Paraplegia following spinal cord injury [facility])  Assessment & Plan  · History of spinal cord injury T8 with paraplegia sp 20 years ago hang gliding accident   · Presented from [facility], however patient had reported that she lived independently and was confabulating stories  Seen by neuropsychology the deemed incompetent  · Continue with repositioning and wound care        VTE Pharmacologic Prophylaxis: Pharmacologic: Enoxaparin (Lovenox)  Mechanical VTE Prophylaxis in Place: Yes    Patient Centered Rounds: I have performed bedside rounds with nursing staff today  Discussions with Specialists or Other Care Team Provider: nursing     Education and Discussions with Family / Patient: patient     Time Spent for Care: 45 minutes  More than 50% of total time spent on counseling and coordination of care as described above  Current Length of Stay: 11 day(s)    Current Patient Status: Inpatient   Certification Statement: The patient will continue to require additional inpatient hospital stay due to pending auth for facility     Discharge Plan: to rehab facility     Code Status: Level 1 - Full Code      Subjective:   Pt is appropriate in conversation  She has no sensation from waist down and hence has no pain  She does know to try to position appropriately however, very difficult due to multiple wounds and locations  On specialty bed  Pt is having difficulty eating she feels she eats her meals but cant eat all the nutritional supplements  She says she has family but she doesn't want them involved and she is working on getting a POA , who she will allocate  She states she would like a copy of physicians report for incompetency  She Is really wanting to get out of the hospital  bm today     Objective:     Vitals:   Temp (24hrs), Av 1 °F (36 2 °C), Min:96 4 °F (35 8 °C), Max:97 7 °F (36 5 °C)    Temp:  [96 4 °F (35 8 °C)-97 7 °F (36 5 °C)] 97 7 °F (36 5 °C)  HR:  [72-92] 92  Resp:  [18] 18  BP: (119-144)/(62-73) 122/73  SpO2:  [97 %] 97 %  Body mass index is 17 97 kg/m²  Input and Output Summary (last 24 hours): Intake/Output Summary (Last 24 hours) at 2020 1720  Last data filed at 2020 1300  Gross per 24 hour   Intake 538 ml   Output 525 ml   Net 13 ml       Physical Exam:     Physical Exam   Constitutional: No distress  HENT:   Head: Normocephalic and atraumatic     Mouth/Throat: No oropharyngeal exudate  Eyes: Right eye exhibits no discharge  Left eye exhibits no discharge  No scleral icterus  Neck: No JVD present  No thyromegaly present  Cardiovascular: Normal rate  Exam reveals no gallop and no friction rub  No murmur heard  Abdominal: She exhibits no distension and no mass  There is no tenderness  There is no rebound and no guarding  No hernia  Cachectic    Musculoskeletal: She exhibits deformity (bl lower extremities with bl severe atrophy)  She exhibits no edema or tenderness  Hip wounds with dsd intact    Lymphadenopathy:     She has no cervical adenopathy  Neurological: She is alert  Skin: No rash noted  She is not diaphoretic  No erythema  No pallor  Additional Data:     Labs:    Results from last 7 days   Lab Units 02/15/20  0505   WBC Thousand/uL 11 95*   HEMOGLOBIN g/dL 9 8*   HEMATOCRIT % 32 1*   PLATELETS Thousands/uL 710*   NEUTROS PCT % 59   LYMPHS PCT % 28   MONOS PCT % 9   EOS PCT % 2     Results from last 7 days   Lab Units 02/14/20  0507   SODIUM mmol/L 135*   POTASSIUM mmol/L 4 2   CHLORIDE mmol/L 101   CO2 mmol/L 28   BUN mg/dL 11   CREATININE mg/dL 0 64   ANION GAP mmol/L 6   CALCIUM mg/dL 8 9   ALBUMIN g/dL 2 0*   TOTAL BILIRUBIN mg/dL 0 22   ALK PHOS U/L 171*   ALT U/L 41   AST U/L 22   GLUCOSE RANDOM mg/dL 122                           * I Have Reviewed All Lab Data Listed Above  * Additional Pertinent Lab Tests Reviewed:  All Labs Within Last 24 Hours Reviewed    Imaging:    Imaging Reports Reviewed Today Include: reviewed     Recent Cultures (last 7 days):           Last 24 Hours Medication List:     Current Facility-Administered Medications:  acetaminophen 650 mg Oral Q6H PRN Simba Peña MD   artificial tear  Both Eyes HS Jadiel France MD   bisacodyl 10 mg Rectal Daily PRN Gio Paiz MD   calcium carbonate-vitamin D 1 tablet Oral BID With Meals Simba Peña MD   cholecalciferol 1,000 Units Oral Daily Natalie Cally Flor MD   cyanocobalamin 100 mcg Oral Daily Daren Moralez MD   dextran 70-hypromellose 1 drop Both Eyes Q3H PRN Roderick Fowler PA-C   diphenhydrAMINE 50 mg Oral 60 Min Pre-Op Sagrario Dumont PA-C   enoxaparin 40 mg Subcutaneous Daily Daren Moralez MD   famotidine 20 mg Oral BID PRN Sabi Ritter MD   loperamide 2 mg Oral TID PRN Jemal Acevedo PA-C   midodrine 15 mg Oral Q8H Daren Moralez MD   multivitamin-minerals 1 tablet Oral Daily Daren Moralez MD   nystatin  Topical BID Daren Moralez MD   ondansetron 4 mg Intravenous Q6H PRN Daren Moralez MD   pantoprazole 40 mg Oral BID AC Sabi Ritter MD   polyethylene glycol 17 g Oral Daily PRN CONCEPCION Wei   saccharomyces boulardii 250 mg Oral BID Daren Moralez MD   senna 1 tablet Oral HS PRN Chelsey Mora CRNA     Facility-Administered Medications Ordered in Other Encounters:  dexamethasone 4 mg Intravenous Once PRN Leesa Rias, DO    diphenhydrAMINE 12 5 mg Intravenous Once Elesa Rias, DO    lactated ringers 75 mL/hr Intravenous Continuous Leesa Rias, DO Last Rate: 75 mL/hr (11/10/19 2105)   lactated ringers 50 mL/hr Intravenous Continuous Vladimir Galarza CRNA    lactated ringers 75 mL/hr Intravenous Continuous Leesa Rias, DO Last Rate: Stopped (03/20/19 1841)   lactated ringers 75 mL/hr Intravenous Continuous Colletta Ricks, MD Last Rate: Stopped (03/20/19 1842)   ondansetron 4 mg Intravenous Once PRN Ion Keller MD    promethazine 12 5 mg Intravenous Once PRN Ion Keller MD         Today, Patient Was Seen By: CONCEPCION Wei    ** Please Note: Dictation voice to text software may have been used in the creation of this document   **

## 2020-02-17 PROCEDURE — 99232 SBSQ HOSP IP/OBS MODERATE 35: CPT | Performed by: NURSE PRACTITIONER

## 2020-02-17 RX ADMIN — Medication 250 MG: at 10:01

## 2020-02-17 RX ADMIN — MIDODRINE HYDROCHLORIDE 15 MG: 5 TABLET ORAL at 05:23

## 2020-02-17 RX ADMIN — Medication 1 TABLET: at 10:01

## 2020-02-17 RX ADMIN — ENOXAPARIN SODIUM 40 MG: 40 INJECTION SUBCUTANEOUS at 10:02

## 2020-02-17 RX ADMIN — PANTOPRAZOLE SODIUM 40 MG: 40 TABLET, DELAYED RELEASE ORAL at 10:02

## 2020-02-17 RX ADMIN — MELATONIN 1000 UNITS: at 10:02

## 2020-02-17 RX ADMIN — MIDODRINE HYDROCHLORIDE 15 MG: 5 TABLET ORAL at 13:57

## 2020-02-17 RX ADMIN — DEXTRAN 70 AND HYPROMELLOSE 2910 1 DROP: 1; 3 SOLUTION/ DROPS OPHTHALMIC at 10:07

## 2020-02-17 RX ADMIN — NYSTATIN: 100000 POWDER TOPICAL at 10:02

## 2020-02-17 RX ADMIN — VITAM B12 100 MCG: 100 TAB at 10:02

## 2020-02-17 RX ADMIN — Medication 1 TABLET: at 15:59

## 2020-02-17 RX ADMIN — WHITE PETROLATUM 57.7 %-MINERAL OIL 31.9 % EYE OINTMENT: at 21:12

## 2020-02-17 RX ADMIN — PANTOPRAZOLE SODIUM 40 MG: 40 TABLET, DELAYED RELEASE ORAL at 15:59

## 2020-02-17 RX ADMIN — Medication 250 MG: at 17:56

## 2020-02-17 RX ADMIN — MIDODRINE HYDROCHLORIDE 15 MG: 5 TABLET ORAL at 21:13

## 2020-02-17 NOTE — ASSESSMENT & PLAN NOTE
· History of spinal cord injury T8 with paraplegia sp 20 years ago hang gliding accident   · Presented from [facility], however patient had reported that she lived independently and was confabulating stories  Seen by neuropsychology the deemed incompetent  · Continue with repositioning and wound care

## 2020-02-17 NOTE — UTILIZATION REVIEW
Continued Stay Review    Date: 2/17/2020                        Current Patient Class:  Inpatient   Current Level of Care:  Med surg     HPI:69 y o  female initially admitted on 2/5/2020 -  Transferred from her SNF  for low blood pressures and fever  Found to have sepsis , stage 3 decubitus ulcer, anemia    Assessment/Plan: 2/16: Sepsis - with likely source R hip wound completed a 10 day course of Abx's - neuropsych eval pt does not have capacity to make medical decisions  Pt with hx of paraplegia following spinal cord injury T8  S/p hang gliding accident  Open wound of R hip - started to bleed, redressed -   Cdiff had a course of  vanco prophylactics now  Completed  Plan Guardianship -  for  SNF placement      Pertinent Labs/Diagnostic Results:   Results from last 7 days   Lab Units 02/15/20  0505 02/14/20  1551 02/14/20  0507 02/12/20  0603 02/11/20  0448   WBC Thousand/uL 11 95*  --  11 71* 10 25* 11 90*   HEMOGLOBIN g/dL 9 8* 9 9* 9 8* 9 0* 8 5*   HEMATOCRIT % 32 1* 32 4* 32 4* 30 3* 28 1*   PLATELETS Thousands/uL 710*  --  760* 759* 757*   NEUTROS ABS Thousands/µL 7 17  --  7 25  --   --      Results from last 7 days   Lab Units 02/14/20  0507 02/12/20  0603 02/11/20  0448   SODIUM mmol/L 135* 136 138   POTASSIUM mmol/L 4 2 3 7 4 0   CHLORIDE mmol/L 101 104 104   CO2 mmol/L 28 26 27   ANION GAP mmol/L 6 6 7   BUN mg/dL 11 10 9   CREATININE mg/dL 0 64 0 38* 0 53*   EGFR ml/min/1 73sq m 91 108 97   CALCIUM mg/dL 8 9 8 6 8 5     Results from last 7 days   Lab Units 02/14/20  0507   AST U/L 22   ALT U/L 41   ALK PHOS U/L 171*   TOTAL PROTEIN g/dL 7 5   ALBUMIN g/dL 2 0*   TOTAL BILIRUBIN mg/dL 0 22     Results from last 7 days   Lab Units 02/14/20  0507 02/12/20  0603 02/11/20  0448   GLUCOSE RANDOM mg/dL 122 110 99     Vital Signs:   Date/Time  Temp  Pulse  Resp  BP  MAP (mmHg)  SpO2  O2 Device  Patient Position - Orthostatic VS   02/17/20 07:20:12  97 °F (36 1 °C)Abnormal   72    109/59  76  95 %     02/16/20 22:24:50  96 5 °F (35 8 °C)Abnormal   86    108/58  75  97 %       02/16/20 2000              None (Room air)     02/16/20 15:08:57    92  18  122/73  89  97 %    Lying   02/16/20 07:50:59  97 7 °F (36 5 °C)  74  18  121/65  84  97 %  None (Room air)  Lying   02/15/20 22:28:13  96 4 °F (35 8 °C)Abnormal   79    119/62  81  97 %       02/15/20 22:25:11    72    144/71  95  97 %       02/15/20 1600            95 %  None (Room air)     02/15/20 14:03:33    87    110/65  80  89 %Abnormal        02/15/20 0942            97 %  None (Room air)           Medications:   Scheduled Medications:    Medications:  artificial tear  Both Eyes HS   calcium carbonate-vitamin D 1 tablet Oral BID With Meals   cholecalciferol 1,000 Units Oral Daily   cyanocobalamin 100 mcg Oral Daily   diphenhydrAMINE 50 mg Oral 60 Min Pre-Op   enoxaparin 40 mg Subcutaneous Daily   midodrine 15 mg Oral Q8H   multivitamin-minerals 1 tablet Oral Daily   nystatin  Topical BID   pantoprazole 40 mg Oral BID AC   saccharomyces boulardii 250 mg Oral BID     Continuous IV Infusions:     PRN Meds:    acetaminophen 650 mg Oral Q6H PRN    bisacodyl 10 mg Rectal Daily PRN    dextran 70-hypromellose 1 drop Both Eyes Q3H PRN    famotidine 20 mg Oral BID PRN    loperamide 2 mg Oral TID PRN    ondansetron 4 mg Intravenous Q6H PRN    polyethylene glycol 17 g Oral Daily PRN    senna 1 tablet Oral HS PRN        Discharge Plan:  TBD     Network Utilization Review Department  [email or web address]  ATTENTION: Please call with any questions or concerns to [phone] and carefully listen to the prompts so that you are directed to the right person  All voicemails are confidential   Lm Devlin all requests for admission clinical reviews, approved or denied determinations and any other requests to dedicated fax number below belonging to the campus where the patient is receiving treatment   List of dedicated fax numbers for the Facilities:  FACILITY NAME UR FAX NUMBER   ADMISSION DENIALS (Administrative/Medical Necessity) [phone]   [address]  (Maternity/NICU/Pediatrics) [phone]   Lubna Grissom [phone]   Andrew Blanchard [phone]   Delmer Trell [phone]   [facility] [phone]   Kaci Salazar [phone]   [facility]  [phone]   [address] [facility]  [address] [phone]

## 2020-02-17 NOTE — ASSESSMENT & PLAN NOTE
· POA with fever and leukocytosis with infectious source likely being R hip wound with underlying iliopsoas myositis  No signs of PNA or intra-abdominal infection  Review of repeat CT scan shows known osteomyelitis of the right hip with dislocation  Pockets of gas have decreased since the initial consult  No evidence for abscesses  · Repeat CT shows no evidence for abscesses  · Blood cultures 1/2 positive for coag-neg staph, other negative  Continue to monitor blood cultures  · Urine culture + for proteus and e coli; likely asymptomatic bacteriuria per ID; isabel exchanged   · Continue IV abx per ID recommendations complete 10 days total   Last dose 2/11   · ID with preference for surgical intervention for definitive source control however, no focus for interventional radiology intervention and surgery and orthopedics not currently offering any surgical management  Cleared 2/12 by ID   · PT/OT recommending returning to [facility]  · However patient with need for neuropsychology evaluation performed on 02/12/2020, determined that patient does not appear to have capacity to make fully in for medical consult care decisions  · Patient noted to have major depressive disorder, moderate without psychotic features patient stating to neuropsychology that she is not interested in taking antidepressant medications because they would not help her condition

## 2020-02-17 NOTE — PROGRESS NOTES
Progress Note - Pako Gonzalez 1950, 71 y o  female MRN: [id number]    [facility] Encounter: [id number]    Primary Care Provider: Sandip Pitts   Date and time admitted to hospital: 2/5/2020  3:25 PM        * Sepsis [facility])  Assessment & Plan  · POA with fever and leukocytosis with infectious source likely being R hip wound with underlying iliopsoas myositis  No signs of PNA or intra-abdominal infection  Review of repeat CT scan shows known osteomyelitis of the right hip with dislocation  Pockets of gas have decreased since the initial consult  No evidence for abscesses  · Repeat CT shows no evidence for abscesses  · Blood cultures 1/2 positive for coag-neg staph, other negative  Continue to monitor blood cultures  · Urine culture + for proteus and e coli; likely asymptomatic bacteriuria per ID; isabel exchanged   · Continue IV abx per ID recommendations complete 10 days total   Last dose 2/11   · ID with preference for surgical intervention for definitive source control however, no focus for interventional radiology intervention and surgery and orthopedics not currently offering any surgical management  Cleared 2/12 by ID   · PT/OT recommending returning to [facility]  · However patient with need for neuropsychology evaluation performed on 02/12/2020, determined that patient does not appear to have capacity to make fully in for medical consult care decisions  · Patient noted to have major depressive disorder, moderate without psychotic features patient stating to neuropsychology that she is not interested in taking antidepressant medications because they would not help her condition  Abnormal CT of the chest  Assessment & Plan  CT scan of the chest reports possible heart failure as it relates to ground-glass appearance on the left lung  Also has left lower lobe consolidation that is concerning for atelectasis versus infection  Patient with no history of heart failure  Clinical exam not consistent with heart failure  · BNP is elevated   · Currently not on IV fluids  · Routine echo- EF 55 %  · incentive spirometry  Depression  Assessment & Plan  Patient does report depression over continued chronic hospital care and chronic illness  Pastoral care to speak with patient  She may benefit from outpatient counseling  She states she would be agreeable to possible outpatient counseling for 1 or 2 sessions but not forever  She raised the issue herself  Her affect is rather flat  · Spiritual care consult for now  · In review of neuropsychology note patient is severely depressed refusing antidepressant medication    Severe protein-calorie malnutrition ([address] )  Assessment & Plan  Malnutrition Findings:   Malnutrition type: Chronic illness(Severe pro/sara malnutrition r/t disease/condition as evidenced by 12% unintentional wt loss since 11/10/19, consuming < 75% energy intake compared to est energy needs > 1 month, fat/muscle wasting of orbital/temple areas  Treated with diet/supplements)  Degree of Malnutrition: Other severe protein calorie malnutrition    BMI Findings:  BMI Classifications: Underweight < 18 5     Body mass index is 17 97 kg/m²  Patient does admit to depression  Pastoral care consulted  She may benefit from counseling at discharge  Would recommend supplementation as above  Have ensure kulwinder pudding at lunch will add magic ice cream at dinner time     Coagulase negative Staphylococcus bacteremia  Assessment & Plan  · Could be secondary to wound infection or contaminant  · Infectious Disease following and cleared 2/12 for discharge       Neurogenic bladder  Assessment & Plan  · History of neurogenic bladder with chronic indwelling [place] catheter, exchanged this admission due to finding of E coli and Proteus in the urine culture    · Urine culture noted, likely represents asymptomatic bacteriuria per infectious disease      Anemia of chronic disease  Assessment & Plan  · Labs appear consistent with anemia of chronic disease, continue to monitor closely  Hemoglobin stable   · 9 0  Last result no need for repeat labs   · Continue to monitor     Lung nodule  Assessment & Plan  · Noted incidental finding, will require outpatient f/u     Hypotension  Assessment & Plan  · Chronic low blood pressures on midodrine  Resolved      Cognitive decline  Assessment & Plan  Patient noted per neuropsychology to be incompetent on 02/12/2020  Discussed with case management, now plan for rehab    Open wound of right hip  Assessment & Plan  Patient with a known past medical history for paraplegia following spinal cord injury presents with increased drainage of R hip wound  · Known to history of hip osteo and dislocation of the R femur  Patient is nonweightbearing  On admission had fever, chills  · CT repeated 2/8 and compared to February 5, 2020 showing improvement in gas pockets  · Ortho following; no plans for any surgical intervention at this time  · General surgery following: continue localized wound care  No plans for any surgical intervention  · ID following: IV Zosyn completed  repeat CT scan shows improvement  · Per ID: "if no plan for girdle stone procedure and flap to cover the wound, the role of long-term IV antibiotics is limited with risk of side effects from prolonged antibiotics outweighs the benefit   This has been explained at length to the patient  " ten-day Course completed 2/11  · Continue local wound care  Patient reports depression over her current status  She is agreeable to speak with the spiritual Care Team and would like outpatient referral   She states that her goal will be to get back to her home but at this time she is in the skilled nursing facility setting    · At this time patient will require option process for placement discussed with case management today 02/13/2020  · Today called to bedside pt with large open wound no signs of infection, when PT working with pt to get oob pt began to bleed through dsd with large clot noted and on exam some pooling in wound  Called ortho to come and evaluate pt  Unclear if came no notation from ortho spoke with resident gonsalo    History of Clostridium difficile infection  Assessment & Plan  Patient was noted to have fecal impaction on CT scan  Has reported large bowel movement yesterday  No concern for C diff colitis  Continue with prophylactic vancomycin as noted below  Patient refusing suppository and do collapse  Will make these p r n  · With infection in April of 2019  · Currently without any complaints of diarrhea, but frequent formed stools  · Continue vancomycin prophylactics, will need to continue for 72 hours after last dose of systemic antibiotics thru 2/14, now completed     Fecal retention  Assessment & Plan  · Noted on CT scan  · Had large BM  ·  Reports moving bowels a few times a day but they are normal not loose  · Monitor     Decubitus ulcer of left ischium, stage III ([facility])  Assessment & Plan  · Patient with healed scars to the sacral area, reviewed Wound care's consultation  Present on admission left ischial ulcer stage III    · Continue local care for excoriation and red scan as well as frequent turning of the patient  Also has wound to the right thigh over the hip which has osteomyelitis  No surgical intervention being recommended  · ID following and antibiotics finished 2/11   cleared for discharge however now may need to go through option process      Paraplegia following spinal cord injury [facility])  Assessment & Plan  · History of spinal cord injury T8 with paraplegia sp 20 years ago hang gliding accident   · Presented from [facility], however patient had reported that she lived independently and was confabulating stories  Seen by neuropsychology the deemed incompetent  · Continue with repositioning and wound care        VTE Pharmacologic Prophylaxis: Pharmacologic: Enoxaparin (Lovenox)  Mechanical VTE Prophylaxis in Place: Yes    Patient Centered Rounds: I have performed bedside rounds with nursing staff today  Discussions with Specialists or Other Care Team Provider: nursing     Education and Discussions with Family / Patient: patient     Time Spent for Care: 45 minutes  More than 50% of total time spent on counseling and coordination of care as described above  Current Length of Stay: 12 day(s)    Current Patient Status: Inpatient   Certification Statement: The patient will continue to require additional inpatient hospital stay due to ongoing auth     Discharge Plan: to rehab when auth approved     Code Status: Level 1 - Full Code      Subjective:   Long discussion with the pt she is asking for poa tessie work for friend to be poa  pt has no pain no n/v/d   Very poor appetite "trying to eat"    Objective:     Vitals:   Temp (24hrs), Av 3 °F (36 3 °C), Min:96 5 °F (35 8 °C), Max:98 5 °F (36 9 °C)    Temp:  [96 5 °F (35 8 °C)-98 5 °F (36 9 °C)] 98 5 °F (36 9 °C)  HR:  [72-86] 77  Resp:  [18] 18  BP: (107-109)/(58-59) 107/59  SpO2:  [95 %-97 %] 97 %  Body mass index is 17 97 kg/m²  Input and Output Summary (last 24 hours): Intake/Output Summary (Last 24 hours) at 2020  Last data filed at 2020 1900  Gross per 24 hour   Intake 820 ml   Output 500 ml   Net 320 ml       Physical Exam:     Physical Exam   Constitutional: No distress  HENT:   Head: Atraumatic  Mouth/Throat: No oropharyngeal exudate  Eyes: Conjunctivae are normal  Right eye exhibits no discharge  Left eye exhibits no discharge  No scleral icterus  Neck: No tracheal deviation present  No thyromegaly present  Cardiovascular: Normal rate  Exam reveals no gallop and no friction rub  No murmur heard  Pulmonary/Chest: No stridor  No respiratory distress  She has no wheezes  She has no rales  She exhibits no tenderness     Abdominal: She exhibits no distension and no mass  There is no tenderness  There is no rebound and no guarding  No hernia  cachectic   Musculoskeletal: She exhibits deformity (right hip wound x2 dressing with some serious )  She exhibits no edema  Lymphadenopathy:     She has no cervical adenopathy  Neurological: She is alert  Skin: No rash noted  She is not diaphoretic  No erythema  No pallor  Psychiatric: She has a normal mood and affect  Additional Data:     Labs:    Results from last 7 days   Lab Units 02/15/20  0505   WBC Thousand/uL 11 95*   HEMOGLOBIN g/dL 9 8*   HEMATOCRIT % 32 1*   PLATELETS Thousands/uL 710*   NEUTROS PCT % 59   LYMPHS PCT % 28   MONOS PCT % 9   EOS PCT % 2     Results from last 7 days   Lab Units 02/14/20  0507   SODIUM mmol/L 135*   POTASSIUM mmol/L 4 2   CHLORIDE mmol/L 101   CO2 mmol/L 28   BUN mg/dL 11   CREATININE mg/dL 0 64   ANION GAP mmol/L 6   CALCIUM mg/dL 8 9   ALBUMIN g/dL 2 0*   TOTAL BILIRUBIN mg/dL 0 22   ALK PHOS U/L 171*   ALT U/L 41   AST U/L 22   GLUCOSE RANDOM mg/dL 122                           * I Have Reviewed All Lab Data Listed Above  * Additional Pertinent Lab Tests Reviewed:  All Labs Within Last 24 Hours Reviewed    Imaging:    Imaging Reports Reviewed Today Include:reviewed     Recent Cultures (last 7 days):           Last 24 Hours Medication List:     Current Facility-Administered Medications:  acetaminophen 650 mg Oral Q6H PRN Jacinta Poon MD   artificial tear  Both Eyes HS Sina Huitron MD   bisacodyl 10 mg Rectal Daily PRN Thomas Moreno MD   calcium carbonate-vitamin D 1 tablet Oral BID With Meals Jacinta Poon MD   cholecalciferol 1,000 Units Oral Daily Jacinta Poon MD   cyanocobalamin 100 mcg Oral Daily Jacinta Poon MD   dextran 70-hypromellose 1 drop Both Eyes Q3H PRN Yumiko Luo PA-C   diphenhydrAMINE 50 mg Oral 60 Min Pre-Op Sagrario Dumont PA-C   enoxaparin 40 mg Subcutaneous Daily Jacinta Poon MD   famotidine 20 mg Oral BID PRN Radha Devlin MD   loperamide 2 mg Oral TID PRN Obed Gregory PA-C   midodrine 15 mg Oral Q8H Dereje Bailey MD   multivitamin-minerals 1 tablet Oral Daily Dereje Bailey MD   nystatin  Topical BID Dereje Bailey MD   ondansetron 4 mg Intravenous Q6H PRN Dereje Bailey MD   pantoprazole 40 mg Oral BID AC Radha Devlin MD   polyethylene glycol 17 g Oral Daily PRN CONCEPCION Castillo   saccharomyces boulardii 250 mg Oral BID Dereje Bailey MD   senna 1 tablet Oral HS PRN Mehreen Shelley CRNA     Facility-Administered Medications Ordered in Other Encounters:  dexamethasone 4 mg Intravenous Once PRN Abraham Charla, DO    diphenhydrAMINE 12 5 mg Intravenous Once Abraham Charla, DO    lactated ringers 75 mL/hr Intravenous Continuous Abraham Charla, DO Last Rate: 75 mL/hr (11/10/19 2105)   lactated ringers 50 mL/hr Intravenous Continuous Arianne Ramirez CRNA    lactated ringers 75 mL/hr Intravenous Continuous Abraham Charla, DO Last Rate: Stopped (03/20/19 1841)   lactated ringers 75 mL/hr Intravenous Continuous Kim Hoover MD Last Rate: Stopped (03/20/19 1842)   ondansetron 4 mg Intravenous Once PRN Мария Alvarez MD    promethazine 12 5 mg Intravenous Once PRN Мария Alvarez MD         Today, Patient Was Seen By: CONCEPCION Castillo    ** Please Note: Dictation voice to text software may have been used in the creation of this document   **

## 2020-02-18 PROBLEM — R78.81 COAGULASE NEGATIVE STAPHYLOCOCCUS BACTEREMIA: Status: RESOLVED | Noted: 2020-02-08 | Resolved: 2020-02-18

## 2020-02-18 PROBLEM — Z86.19 HISTORY OF CLOSTRIDIUM DIFFICILE INFECTION: Status: RESOLVED | Noted: 2020-02-05 | Resolved: 2020-02-18

## 2020-02-18 PROBLEM — B95.7 COAGULASE NEGATIVE STAPHYLOCOCCUS BACTEREMIA: Status: RESOLVED | Noted: 2020-02-08 | Resolved: 2020-02-18

## 2020-02-18 PROBLEM — K59.00 FECAL RETENTION: Status: RESOLVED | Noted: 2018-12-15 | Resolved: 2020-02-18

## 2020-02-18 PROCEDURE — 97530 THERAPEUTIC ACTIVITIES: CPT

## 2020-02-18 PROCEDURE — 99233 SBSQ HOSP IP/OBS HIGH 50: CPT | Performed by: INTERNAL MEDICINE

## 2020-02-18 RX ADMIN — MIDODRINE HYDROCHLORIDE 15 MG: 5 TABLET ORAL at 13:24

## 2020-02-18 RX ADMIN — MIDODRINE HYDROCHLORIDE 15 MG: 5 TABLET ORAL at 21:31

## 2020-02-18 RX ADMIN — Medication 1 TABLET: at 17:31

## 2020-02-18 RX ADMIN — DEXTRAN 70 AND HYPROMELLOSE 2910 1 DROP: 1; 3 SOLUTION/ DROPS OPHTHALMIC at 09:34

## 2020-02-18 RX ADMIN — MELATONIN 1000 UNITS: at 09:34

## 2020-02-18 RX ADMIN — NYSTATIN: 100000 POWDER TOPICAL at 09:34

## 2020-02-18 RX ADMIN — MIDODRINE HYDROCHLORIDE 15 MG: 5 TABLET ORAL at 05:49

## 2020-02-18 RX ADMIN — VITAM B12 100 MCG: 100 TAB at 09:35

## 2020-02-18 RX ADMIN — Medication 1 TABLET: at 09:34

## 2020-02-18 RX ADMIN — PANTOPRAZOLE SODIUM 40 MG: 40 TABLET, DELAYED RELEASE ORAL at 17:31

## 2020-02-18 RX ADMIN — ENOXAPARIN SODIUM 40 MG: 40 INJECTION SUBCUTANEOUS at 09:34

## 2020-02-18 RX ADMIN — Medication 250 MG: at 09:34

## 2020-02-18 RX ADMIN — Medication 250 MG: at 17:31

## 2020-02-18 RX ADMIN — WHITE PETROLATUM 57.7 %-MINERAL OIL 31.9 % EYE OINTMENT: at 21:31

## 2020-02-18 RX ADMIN — PANTOPRAZOLE SODIUM 40 MG: 40 TABLET, DELAYED RELEASE ORAL at 06:17

## 2020-02-18 NOTE — ASSESSMENT & PLAN NOTE
· History of spinal cord injury T8 with paraplegia sp 20 years ago hang gliding accident   · Presented from [facility], however patient had reported that she lived independently and was confabulating stories  Seen by neuropsychology the deemed incompetent  · Continue with repositioning and wound care    · Out of bed as able

## 2020-02-18 NOTE — UTILIZATION REVIEW
Continued Stay Review    Date: 2/11                          Current Patient Class: Inpatient Current Level of Care: Med Surg    HPI:69 y o  female initially admitted on 2/5 presents with patient is a resident of [facility], she was transferred for low blood pressures and fever  Assessment/Plan: Sepsis, Coagulase negative Staphylococcus bacteremia, Decubitus ulcer of  Left ischium - stage III, Abnormal CT of the chest, Anemia  Blood cultures 1/2 positive for coag-neg staph, other negative  Continue to monitor blood cultures  Repeat blood cultures ordered 2/9  Awaiting final results  8 3 yesterday and 8 5 today  Continue to monitor  Repeat CBC in am  Today is last dose of Zosyn  Check Echo  Macular rash over lower back, continue to monitor for any signs of worsening  The patient will continue to require additional inpatient hospital stay due to further evaluation and monitoring of decubitus and antibiotics  Open wound of right hip - repeat CT scan shows improvement      Per ID: "if no plan for girdle stone procedure and flap to cover the wound, the role of long-term IV antibiotics is limited with risk of side effects from prolonged antibiotics outweighs the benefit    History of c diff - Continue vancomycin prophylactics, will need to continue for 72 hours after last dose of systemic antibiotics thru 2/14     Pertinent Labs/Diagnostic Results:   Results from last 7 days   Lab Units 02/15/20  0505 02/14/20  1551 02/14/20  0507 02/12/20  0603   WBC Thousand/uL 11 95*  --  11 71* 10 25*   HEMOGLOBIN g/dL 9 8* 9 9* 9 8* 9 0*   HEMATOCRIT % 32 1* 32 4* 32 4* 30 3*   PLATELETS Thousands/uL 710*  --  760* 759*   NEUTROS ABS Thousands/µL 7 17  --  7 25  --          Results from last 7 days   Lab Units 02/14/20  0507 02/12/20  0603   SODIUM mmol/L 135* 136   POTASSIUM mmol/L 4 2 3 7   CHLORIDE mmol/L 101 104   CO2 mmol/L 28 26   ANION GAP mmol/L 6 6   BUN mg/dL 11 10   CREATININE mg/dL 0 64 0 38*   EGFR ml/min/1 73sq m 91 108   CALCIUM mg/dL 8 9 8 6     Results from last 7 days   Lab Units 02/14/20  0507   AST U/L 22   ALT U/L 41   ALK PHOS U/L 171*   TOTAL PROTEIN g/dL 7 5   ALBUMIN g/dL 2 0*   TOTAL BILIRUBIN mg/dL 0 22         Results from last 7 days   Lab Units 02/14/20  0507 02/12/20  0603   GLUCOSE RANDOM mg/dL 122 110                                                   Vital Signs:   02/11/20 07:55:15    69  19  129/70  90  97 %       02/11/20 0005              None (Room air)       Medications:   Scheduled Medications:    Medications:  artificial tear  Both Eyes HS   calcium carbonate-vitamin D 1 tablet Oral BID With Meals   cholecalciferol 1,000 Units Oral Daily   cyanocobalamin 100 mcg Oral Daily   diphenhydrAMINE 50 mg Oral 60 Min Pre-Op   enoxaparin 40 mg Subcutaneous Daily   midodrine 15 mg Oral Q8H   multivitamin-minerals 1 tablet Oral Daily   nystatin  Topical BID   pantoprazole 40 mg Oral BID AC   saccharomyces boulardii 250 mg Oral BID     Continuous IV Infusions: None     PRN Meds:    acetaminophen 650 mg Oral Q6H PRN   bisacodyl 10 mg Rectal Daily PRN   dextran 70-hypromellose 1 drop Both Eyes Q3H PRN   famotidine 20 mg Oral BID PRN   loperamide 2 mg Oral TID PRN   ondansetron 4 mg Intravenous Q6H PRN   polyethylene glycol 17 g Oral Daily PRN   senna 1 tablet Oral HS PRN     Discharge Plan: TBD    Network Utilization Review Department  [email or web address]  ATTENTION: Please call with any questions or concerns to [phone] and carefully listen to the prompts so that you are directed to the right person  All voicemails are confidential   Jessica Darvin all requests for admission clinical reviews, approved or denied determinations and any other requests to dedicated fax number below belonging to the campus where the patient is receiving treatment   List of dedicated fax numbers for the Facilities:  FACILITY NAME KIARA Troncoso DENIALS (Administrative/Medical Necessity) [phone] [address] (Maternity/NICU/Pediatrics) [place] [phone]   Shyam Kerr [phone]   Destiney Devi [phone]   Luisa Jarrell [phone]   [address] [facility] [address] [facility] [address] [facility] [phone]     Continued Stay Review    Date: 2/17/2020                        Current Patient Class:  Inpatient   Current Level of Care:  Med surg     HPI:69 y o  female initially admitted on 2/5/2020 -  Transferred from her SNF  for low blood pressures and fever  Found to have sepsis , stage 3 decubitus ulcer, anemia    Assessment/Plan: 2/16: Sepsis - with likely source R hip wound completed a 10 day course of Abx's - neuropsych eval pt does not have capacity to make medical decisions  Pt with hx of paraplegia following spinal cord injury T8  S/p hang gliding accident  Open wound of R hip - started to bleed, redressed -   Cdiff had a course of  vanco prophylactics now  Completed  Plan Guardianship -  for  SNF placement      Pertinent Labs/Diagnostic Results:   Results from last 7 days   Lab Units 02/15/20  0505 02/14/20  1551 02/14/20  0507 02/12/20  0603   WBC Thousand/uL 11 95*  --  11 71* 10 25*   HEMOGLOBIN g/dL 9 8* 9 9* 9 8* 9 0*   HEMATOCRIT % 32 1* 32 4* 32 4* 30 3*   PLATELETS Thousands/uL 710*  --  760* 759*   NEUTROS ABS Thousands/µL 7 17  --  7 25  --      Results from last 7 days   Lab Units 02/14/20  0507 02/12/20  0603   SODIUM mmol/L 135* 136   POTASSIUM mmol/L 4 2 3 7   CHLORIDE mmol/L 101 104   CO2 mmol/L 28 26   ANION GAP mmol/L 6 6   BUN mg/dL 11 10   CREATININE mg/dL 0 64 0 38*   EGFR ml/min/1 73sq m 91 108   CALCIUM mg/dL 8 9 8 6     Results from last 7 days   Lab Units 02/14/20  0507   AST U/L 22   ALT U/L 41   ALK PHOS U/L 171*   TOTAL PROTEIN g/dL 7 5   ALBUMIN g/dL 2 0*   TOTAL BILIRUBIN mg/dL 0 22     Results from last 7 days   Lab Units 02/14/20  0507 02/12/20  0603   GLUCOSE RANDOM mg/dL 122 110     Vital Signs:   Date/Time  Temp  Pulse  Resp  BP  MAP (mmHg)  SpO2  O2 Device  Patient Position - Orthostatic VS   02/17/20 07:20:12  97 °F (36 1 °C)Abnormal   72    109/59  76  95 %       02/16/20 22:24:50  96 5 °F (35 8 °C)Abnormal   86    108/58  75  97 %       02/16/20 2000              None (Room air)     02/16/20 15:08:57    92  18  122/73  89  97 %    Lying   02/16/20 07:50:59  97 7 °F (36 5 °C)  74  18  121/65  84  97 %  None (Room air)  Lying   02/15/20 22:28:13  96 4 °F (35 8 °C)Abnormal   79    119/62  81  97 %       02/15/20 22:25:11    72    144/71  95  97 %       02/15/20 1600            95 %  None (Room air)     02/15/20 14:03:33    87    110/65  80  89 %Abnormal        02/15/20 0942            97 %  None (Room air)           Medications:   Scheduled Medications:    Medications:  artificial tear  Both Eyes HS   calcium carbonate-vitamin D 1 tablet Oral BID With Meals   cholecalciferol 1,000 Units Oral Daily   cyanocobalamin 100 mcg Oral Daily   diphenhydrAMINE 50 mg Oral 60 Min Pre-Op   enoxaparin 40 mg Subcutaneous Daily   midodrine 15 mg Oral Q8H   multivitamin-minerals 1 tablet Oral Daily   nystatin  Topical BID   pantoprazole 40 mg Oral BID AC   saccharomyces boulardii 250 mg Oral BID     Continuous IV Infusions:     PRN Meds:    acetaminophen 650 mg Oral Q6H PRN    bisacodyl 10 mg Rectal Daily PRN    dextran 70-hypromellose 1 drop Both Eyes Q3H PRN    famotidine 20 mg Oral BID PRN    loperamide 2 mg Oral TID PRN    ondansetron 4 mg Intravenous Q6H PRN    polyethylene glycol 17 g Oral Daily PRN    senna 1 tablet Oral HS PRN        Discharge Plan:  TBD     Network Utilization Review Department  [email or web address]  ATTENTION: Please call with any questions or concerns to [phone] and carefully listen to the prompts so that you are directed to the right person  All voicemails are confidential   Adrienne Raygoza all requests for admission clinical reviews, approved or denied determinations and any other requests to dedicated fax number below belonging to the campus where the patient is receiving treatment   List of dedicated fax numbers for the Facilities:  [address] [place] [address] DENIALS (Administrative/Medical Necessity) [phone]   [address] (Maternity/NICU/Pediatrics) [phone]   Tiffanie Glover [phone]   Yoana Law [phone]   Perry Stringer [phone]   Jojo Marcano [phone]   [address] [place] [address] [phone]   [facility]  [phone]   [address] [facility] [address] [facility]  [address] [facility] [address] [facility] [phone]

## 2020-02-18 NOTE — SOCIAL WORK
Patient is requesting her friend be appointed POA  CM contacted [facility] to discuss, JUSTINA re consulting neuro psych for re-evaluation  CM will continue to follow for recommendations

## 2020-02-18 NOTE — ASSESSMENT & PLAN NOTE
Patient noted per neuropsychology to be incompetent on 02/12/2020  Discussed with case management, now plan for rehab  Upon my examination, patient appears to have capacity for medical decision making, neuropsych consult for re-evaluation of capacity

## 2020-02-18 NOTE — PHYSICAL THERAPY NOTE
Physical Therapy Progress Note     02/18/20 1415   Pain Assessment   Pain Assessment No/denies pain   Restrictions/Precautions   Other Precautions Contact/isolation;WBS;Fall Risk   Subjective   Subjective Pt encountered supine in bed, pleasant and agreeable to treatment  David Spear to sit in chair to talk with friend &   No new complaints given  Bed Mobility   Rolling R 4  Minimal assistance   Additional items Assist x 1; Increased time required   Rolling L 4  Minimal assistance   Additional items Assist x 1; Increased time required   Supine to Sit 4  Minimal assistance   Additional items Assist x 1;LE management   Transfers   Sliding Board transfer 3  Moderate assistance   Additional items Assist x 2  (Beazy board, + standby assist)   Balance   Static Sitting Fair +   Dynamic Sitting Poor +   Endurance Deficit   Endurance Deficit Yes   Endurance Deficit Description fatigue, weakness   Activity Tolerance   Activity Tolerance Patient tolerated treatment well;Patient limited by fatigue   Nurse Yudy Wheeler RN   Assessment   Prognosis Fair   Problem List Decreased strength;Decreased endurance; Impaired balance;Decreased mobility;Pain   Assessment Pt demonstrated improved mobility this sesion compared to previous, and was able to transfer out to chair as noted above with Nikia Arndt  Pt demonstrated difficulty initially with placement of board and maintaining balance on unsteady bed surface  Required Ax1-2 to maintain seated balance over board, but once she reach stable chair surface, pt able to perform backwards scoot iwth posterior clearance with staff assist   Pt also performed rolling L & R for wound check & surya-hygiene before initating transfers  Dependent for cleanup due to presence of bandages, wounds, and impaired sensation    Pt remained in recliner post session with all needs in reach & instructed to return to bed in 1 hr with staff assist as per instructions from physicians noted on whiteboard in room & initial PT eval    Goals   Patient Goals to be able to transfer OOB better   STG Expiration Date 02/25/20   PT Treatment Day 2   Plan   Treatment/Interventions LE strengthening/ROM; Functional transfer training; Therapeutic exercise; Endurance training;Patient/family training;Equipment eval/education; Bed mobility   Progress Slow progress, medical status limitations   PT Frequency   (3-6x/week)   Recommendation   Recommendation Post acute IP rehab   Equipment Recommended Wheelchair     [place], [place]

## 2020-02-18 NOTE — OCCUPATIONAL THERAPY NOTE
Occupational Therapy Treatment Note:       02/18/20 1545   Bed Mobility   Supine to Sit Unable to assess   Sit to Supine 3  Moderate assistance   Transfers   Sliding Board transfer 2  Maximal assistance   Additional items Assist x 1;Assist x 2  (beasy board )   Cognition   Comments mod vc's for new technique and reassurance   Activity Tolerance   Activity Tolerance Patient limited by fatigue   Assessment   Assessment pt participated in pm ot session and was seen focusing on b ue therex using light resistance theraband  (pt was provided with handout for hep as pt had poor recall of exercises)  pt performed 1 x 5 reps, pt was also seen for return tobed from recliner using beasyboard transfer and bed mobility  pt requires increaesd time and discussion inorder to understand transfer and demonstrates need for safety cues with beasyboard  pt requires asst to lift and place board as it is heavy and bulky  p  pt tolerated sitting oob in recliner for 1 hr this pm  pt is motivated for ot session   Plan   Treatment Interventions ADL retraining;Functional transfer training;UE strengthening/ROM; Endurance training;Patient/family training;Equipment evaluation/education; Activityengagement   Goal Expiration Date 02/25/20   OT Treatment Day 2   OT Frequency 3-5x/wk   Recommendation   OT Discharge Recommendation Short Term Rehab   OT - OK to Discharge Yes   NICHOLAS Domínguez

## 2020-02-18 NOTE — PLAN OF CARE
Problem: Potential for Falls  Goal: Patient will remain free of falls  Description  INTERVENTIONS:  - Assess patient frequently for physical needs  -  Identify cognitive and physical deficits and behaviors that affect risk of falls  -  [place] fall precautions as indicated by assessment   - Educate patient/family on patient safety including physical limitations  - Instruct patient to call for assistance with activity based on assessment  - Modify environment to reduce risk of injury  - Consider OT/PT consult to assist with strengthening/mobility  Outcome: Progressing     Problem: Prexisting or High Potential for Compromised Skin Integrity  Goal: Skin integrity is maintained or improved  Description  INTERVENTIONS:  - Identify patients at risk for skin breakdown  - Assess and monitor skin integrity  - Assess and monitor nutrition and hydration status  - Monitor labs   - Assess for incontinence   - Turn and reposition patient  - Assist with mobility/ambulation  - Relieve pressure over bony prominences  - Avoid friction and shearing  - Provide appropriate hygiene as needed including keeping skin clean and dry  - Evaluate need for skin moisturizer/barrier cream  - Collaborate with interdisciplinary team   - Patient/family teaching  - Consider wound care consult   Outcome: Progressing     Problem: Nutrition/Hydration-ADULT  Goal: Nutrient/Hydration intake appropriate for improving, restoring or maintaining nutritional needs  Description  Monitor and assess patient's nutrition/hydration status for malnutrition  Collaborate with interdisciplinary team and initiate plan and interventions as ordered  Monitor patient's weight and dietary intake as ordered or per policy  Utilize nutrition screening tool and intervene as necessary  Determine patient's food preferences and provide high-protein, high-caloric foods as appropriate       INTERVENTIONS:  - Monitor oral intake, urinary output, labs, and treatment plans  - Assess nutrition and hydration status and recommend course of action  - Evaluate amount of meals eaten  - Assist patient with eating if necessary   - Allow adequate time for meals  - Recommend/ encourage appropriate diets, oral nutritional supplements, and vitamin/mineral supplements  - Order, calculate, and assess calorie counts as needed  - Recommend, monitor, and adjust tube feedings and TPN/PPN based on assessed needs  - Assess need for intravenous fluids  - Provide specific nutrition/hydration education as appropriate  - Include patient/family/caregiver in decisions related to nutrition  Outcome: Progressing     Problem: PAIN - ADULT  Goal: Verbalizes/displays adequate comfort level or baseline comfort level  Description  Interventions:  - Encourage patient to monitor pain and request assistance  - Assess pain using appropriate pain scale  - Administer analgesics based on type and severity of pain and evaluate response  - Implement non-pharmacological measures as appropriate and evaluate response  - Consider cultural and social influences on pain and pain management  - Notify physician/advanced practitioner if interventions unsuccessful or patient reports new pain  Outcome: Progressing     Problem: INFECTION - ADULT  Goal: Absence or prevention of progression during hospitalization  Description  INTERVENTIONS:  - Assess and monitor for signs and symptoms of infection  - Monitor lab/diagnostic results  - Monitor all insertion sites, i e  indwelling lines, tubes, and drains  - Monitor endotracheal if appropriate and nasal secretions for changes in amount and color  - [place] appropriate cooling/warming therapies per order  - Administer medications as ordered  - Instruct and encourage patient and family to use good hand hygiene technique  - Identify and instruct in appropriate isolation precautions for identified infection/condition  Outcome: Progressing  Goal: Absence of fever/infection during neutropenic period  Description  INTERVENTIONS:  - Monitor WBC    Outcome: Progressing     Problem: SAFETY ADULT  Goal: Maintain or return to baseline ADL function  Description  INTERVENTIONS:  -  Assess patient's ability to carry out ADLs; assess patient's baseline for ADL function and identify physical deficits which impact ability to perform ADLs (bathing, care of mouth/teeth, toileting, grooming, dressing, etc )  - Assess/evaluate cause of self-care deficits   - Assess range of motion  - Assess patient's mobility; develop plan if impaired  - Assess patient's need for assistive devices and provide as appropriate  - Encourage maximum independence but intervene and supervise when necessary  - Involve family in performance of ADLs  - Assess for home care needs following discharge   - Consider OT consult to assist with ADL evaluation and planning for discharge  - Provide patient education as appropriate  Outcome: Progressing  Goal: Maintain or return mobility status to optimal level  Description  INTERVENTIONS:  - Assess patient's baseline mobility status (ambulation, transfers, stairs, etc )    - Identify cognitive and physical deficits and behaviors that affect mobility  - Identify mobility aids required to assist with transfers and/or ambulation (gait belt, sit-to-stand, lift, walker, cane, etc )  - [place] fall precautions as indicated by assessment  - Record patient progress and toleration of activity level on Mobility SBAR; progress patient to next Phase/Stage  - Instruct patient to call for assistance with activity based on assessment  - Consider rehabilitation consult to assist with strengthening/weightbearing, etc   Outcome: Progressing     Problem: DISCHARGE PLANNING  Goal: Discharge to home or other facility with appropriate resources  Description  INTERVENTIONS:  - Identify barriers to discharge w/patient and caregiver  - Arrange for needed discharge resources and transportation as appropriate  - Identify discharge learning needs (meds, wound care, etc )  - Arrange for interpretive services to assist at discharge as needed  - Refer to Case Management Department for coordinating discharge planning if the patient needs post-hospital services based on physician/advanced practitioner order or complex needs related to functional status, cognitive ability, or social support system  Outcome: Progressing     Problem: Knowledge Deficit  Goal: Patient/family/caregiver demonstrates understanding of disease process, treatment plan, medications, and discharge instructions  Description  Complete learning assessment and assess knowledge base    Interventions:  - Provide teaching at level of understanding  - Provide teaching via preferred learning methods  Outcome: Progressing     Problem: SKIN/TISSUE INTEGRITY - ADULT  Goal: Skin integrity remains intact  Description  INTERVENTIONS  - Identify patients at risk for skin breakdown  - Assess and monitor skin integrity  - Assess and monitor nutrition and hydration status  - Monitor labs (i e  albumin)  - Assess for incontinence   - Turn and reposition patient  - Assist with mobility/ambulation  - Relieve pressure over bony prominences  - Avoid friction and shearing  - Provide appropriate hygiene as needed including keeping skin clean and dry  - Evaluate need for skin moisturizer/barrier cream  - Collaborate with interdisciplinary team (i e  Nutrition, Rehabilitation, etc )   - Patient/family teaching  Outcome: Progressing  Goal: Incision(s), wounds(s) or drain site(s) healing without S/S of infection  Description  INTERVENTIONS  - Assess and document risk factors for skin impairment   - Assess and document dressing, incision, wound bed, drain sites and surrounding tissue  - Consider nutrition services referral as needed  - Oral mucous membranes remain intact  - Provide patient/ family education  Outcome: Progressing  Goal: Oral mucous membranes remain intact  Description  INTERVENTIONS  - Assess oral mucosa and hygiene practices  - Implement preventative oral hygiene regimen  - Implement oral medicated treatments as ordered  - Initiate Nutrition services referral as needed  Outcome: Progressing     Problem: HEMATOLOGIC - ADULT  Goal: Maintains hematologic stability  Description  INTERVENTIONS  - Assess for signs and symptoms of bleeding or hemorrhage  - Monitor labs  - Administer supportive blood products/factors as ordered and appropriate  Outcome: Progressing     Problem: MUSCULOSKELETAL - ADULT  Goal: Maintain or return mobility to safest level of function  Description  INTERVENTIONS:  - Assess patient's ability to carry out ADLs; assess patient's baseline for ADL function and identify physical deficits which impact ability to perform ADLs (bathing, care of mouth/teeth, toileting, grooming, dressing, etc )  - Assess/evaluate cause of self-care deficits   - Assess range of motion  - Assess patient's mobility  - Assess patient's need for assistive devices and provide as appropriate  - Encourage maximum independence but intervene and supervise when necessary  - Involve family in performance of ADLs  - Assess for home care needs following discharge   - Consider OT consult to assist with ADL evaluation and planning for discharge  - Provide patient education as appropriate  Outcome: Progressing  Goal: Maintain proper alignment of affected body part  Description  INTERVENTIONS:  - Support, maintain and protect limb and body alignment  - Provide patient/ family with appropriate education  Outcome: Progressing

## 2020-02-18 NOTE — PLAN OF CARE
Problem: OCCUPATIONAL THERAPY ADULT  Goal: Performs self-care activities at highest level of function for planned discharge setting  See evaluation for individualized goals  Description  Treatment Interventions: ADL retraining, Functional transfer training, UE strengthening/ROM, Endurance training, Cognitive reorientation, Patient/family training, Compensatory technique education, Equipment evaluation/education, Energy conservation, Activityengagement          See flowsheet documentation for full assessment, interventions and recommendations  Outcome: Progressing  Note:   Limitation: Decreased ADL status, Decreased Safe judgement during ADL, Decreased endurance, Decreased self-care trans, Decreased high-level ADLs  Prognosis: Good, Fair  Assessment: pt participated in pm ot session and was seen focusing on b ue therex using light resistance theraband  (pt was provided with handout for hep as pt had poor recall of exercises)  pt performed 1 x 5 reps, pt was also seen for return tobed from recliner using beasyboard transfer and bed mobility  pt requires increaesd time and discussion inorder to understand transfer and demonstrates need for safety cues with beasyboard  pt requires asst to lift and place board as it is heavy and bulky  p  pt tolerated sitting oob in recliner for 1 hr this pm  pt is motivated for ot session     OT Discharge Recommendation: Short Term Rehab  OT - OK to Discharge:  Yes  NICHOLAS Domínguez

## 2020-02-18 NOTE — PLAN OF CARE
Problem: PHYSICAL THERAPY ADULT  Goal: Performs mobility at highest level of function for planned discharge setting  See evaluation for individualized goals  Description  Treatment/Interventions: Functional transfer training, LE strengthening/ROM, Endurance training, Patient/family training, Equipment eval/education, Bed mobility, Spoke to nursing, Spoke to case management(w/c training)  Equipment Recommended: Wheelchair       See flowsheet documentation for full assessment, interventions and recommendations  Outcome: Progressing  Note:   Prognosis: Fair  Problem List: Decreased strength, Decreased endurance, Impaired balance, Decreased mobility, Pain  Assessment: Pt demonstrated improved mobility this sesion compared to previous, and was able to transfer out to chair as noted above with Constlul Marinos  Pt demonstrated difficulty initially with placement of board and maintaining balance on unsteady bed surface  Required Ax1-2 to maintain seated balance over board, but once she reach stable chair surface, pt able to perform backwards scoot iwth posterior clearance with staff assist   Pt also performed rolling L & R for wound check & surya-hygiene before initating transfers  Dependent for cleanup due to presence of bandages, wounds, and impaired sensation  Pt remained in recliner post session with all needs in reach & instructed to return to bed in 1 hr with staff assist as per instructions from physicians noted on whiteboard in room & initial PT eval         Recommendation: Post acute IP rehab     PT - OK to Discharge: Yes    See flowsheet documentation for full assessment
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
## 2020-02-18 NOTE — ASSESSMENT & PLAN NOTE
· POA with fever and leukocytosis with infectious source likely being R hip wound with underlying iliopsoas myositis  No signs of PNA or intra-abdominal infection  Review of repeat CT scan shows known osteomyelitis of the right hip with dislocation  Pockets of gas have decreased since the initial consult  No evidence for abscesses  · Repeat CT shows no evidence for abscesses  · Blood cultures 1/2 positive for coag-neg staph, other negative  Continue to monitor blood cultures  · Urine culture + for proteus and e coli; likely asymptomatic bacteriuria per ID; isabel exchanged   · Continue IV abx per ID recommendations complete 10 days total   Last dose 2/11   · ID with preference for surgical intervention for definitive source control however, no focus for interventional radiology intervention and surgery and orthopedics not currently offering any surgical management  Cleared 2/12 by ID   · Sepsis has resolved  · PT/OT recommending returning to [facility]  · However patient with need for neuropsychology evaluation performed on 02/12/2020, determined that patient does not appear to have capacity to make fully in for medical consult care decisions  · Patient noted to have major depressive disorder, moderate without psychotic features patient stating to neuropsychology that she is not interested in taking antidepressant medications because they would not help her condition

## 2020-02-19 PROBLEM — I95.9 HYPOTENSION: Status: RESOLVED | Noted: 2020-02-05 | Resolved: 2020-02-19

## 2020-02-19 PROCEDURE — 97535 SELF CARE MNGMENT TRAINING: CPT

## 2020-02-19 PROCEDURE — 97530 THERAPEUTIC ACTIVITIES: CPT

## 2020-02-19 PROCEDURE — 99233 SBSQ HOSP IP/OBS HIGH 50: CPT | Performed by: INTERNAL MEDICINE

## 2020-02-19 RX ADMIN — ENOXAPARIN SODIUM 40 MG: 40 INJECTION SUBCUTANEOUS at 08:49

## 2020-02-19 RX ADMIN — MELATONIN 1000 UNITS: at 08:49

## 2020-02-19 RX ADMIN — DEXTRAN 70 AND HYPROMELLOSE 2910 1 DROP: 1; 3 SOLUTION/ DROPS OPHTHALMIC at 08:49

## 2020-02-19 RX ADMIN — Medication 1 TABLET: at 17:26

## 2020-02-19 RX ADMIN — WHITE PETROLATUM 57.7 %-MINERAL OIL 31.9 % EYE OINTMENT: at 21:37

## 2020-02-19 RX ADMIN — Medication 250 MG: at 08:54

## 2020-02-19 RX ADMIN — Medication 250 MG: at 17:26

## 2020-02-19 RX ADMIN — MIDODRINE HYDROCHLORIDE 15 MG: 5 TABLET ORAL at 13:00

## 2020-02-19 RX ADMIN — Medication 1 TABLET: at 08:49

## 2020-02-19 RX ADMIN — PANTOPRAZOLE SODIUM 40 MG: 40 TABLET, DELAYED RELEASE ORAL at 17:26

## 2020-02-19 RX ADMIN — MIDODRINE HYDROCHLORIDE 15 MG: 5 TABLET ORAL at 06:05

## 2020-02-19 RX ADMIN — PANTOPRAZOLE SODIUM 40 MG: 40 TABLET, DELAYED RELEASE ORAL at 06:05

## 2020-02-19 RX ADMIN — VITAM B12 100 MCG: 100 TAB at 08:50

## 2020-02-19 RX ADMIN — NYSTATIN: 100000 POWDER TOPICAL at 08:53

## 2020-02-19 RX ADMIN — MIDODRINE HYDROCHLORIDE 15 MG: 5 TABLET ORAL at 21:36

## 2020-02-19 NOTE — PHYSICAL THERAPY NOTE
Physical Therapy Progress Note     02/19/20 1225   Pain Assessment   Pain Assessment No/denies pain   Restrictions/Precautions   Other Precautions Contact/isolation; Chair Alarm; Bed Alarm; Fall Risk   Subjective   Subjective Pt encountered supine in bed, pleasant and agreeable to treatment  No new complaints given, happier that we trialed use of slide board this session, which is what she is more used to compared to [employer]  Bed Mobility   Supine to Sit 5  Supervision   Additional items Assist x 1;HOB elevated; Increased time required   Transfers   Sliding Board transfer 3  Moderate assistance   Additional items Assist x 2   Balance   Static Sitting Fair +   Dynamic Sitting Poor +   Endurance Deficit   Endurance Deficit Yes   Endurance Deficit Description fatigue, weakness   Activity Tolerance   Activity Tolerance Patient tolerated treatment well;Patient limited by fatigue   Nurse Yudy Queen, STEPAN   Assessment   Prognosis Fair   Problem List Decreased strength;Decreased endurance; Impaired balance;Decreased mobility;Pain   Assessment pt demonstrated improved mobility this session, transferring OOB with use of slide baord as opposed to [employer]  Pt able to assist with L leaning while EOB to place board, and was able to perform lateral scoots with improved balance and weight shifting this session  Did require assist for board positioning & hip clearance while scooting to prevent shearing on board  Pt also performed rolling L&R for perihygiene, bandage & pad change with RN assist   Pt remained in recliner with all needs in reach, chair alarm active, & RN notified  Will continue to follow to maximize pt independence with self mobility, activity tolerance, and strength at this time  Plan to trial wc mobility as appropriate in future sessions to ensure safe mobility for household distances if tolerated     Goals   Patient Goals to stay active & get better   STG Expiration Date 02/25/20   PT Treatment Day 3 Plan   Treatment/Interventions Functional transfer training;LE strengthening/ROM; Therapeutic exercise; Endurance training;Patient/family training;Equipment eval/education; Bed mobility   Progress Progressing toward goals   PT Frequency   (3-6x/week)   Recommendation   Recommendation Post acute IP rehab   Equipment Recommended Wheelchair     [place], [place]

## 2020-02-19 NOTE — PLAN OF CARE
Problem: Potential for Falls  Goal: Patient will remain free of falls  Description  INTERVENTIONS:  - Assess patient frequently for physical needs  -  Identify cognitive and physical deficits and behaviors that affect risk of falls  -  [place] fall precautions as indicated by assessment   - Educate patient/family on patient safety including physical limitations  - Instruct patient to call for assistance with activity based on assessment  - Modify environment to reduce risk of injury  - Consider OT/PT consult to assist with strengthening/mobility  Outcome: Progressing     Problem: Prexisting or High Potential for Compromised Skin Integrity  Goal: Skin integrity is maintained or improved  Description  INTERVENTIONS:  - Identify patients at risk for skin breakdown  - Assess and monitor skin integrity  - Assess and monitor nutrition and hydration status  - Monitor labs   - Assess for incontinence   - Turn and reposition patient  - Assist with mobility/ambulation  - Relieve pressure over bony prominences  - Avoid friction and shearing  - Provide appropriate hygiene as needed including keeping skin clean and dry  - Evaluate need for skin moisturizer/barrier cream  - Collaborate with interdisciplinary team   - Patient/family teaching  - Consider wound care consult   Outcome: Progressing     Problem: Nutrition/Hydration-ADULT  Goal: Nutrient/Hydration intake appropriate for improving, restoring or maintaining nutritional needs  Description  Monitor and assess patient's nutrition/hydration status for malnutrition  Collaborate with interdisciplinary team and initiate plan and interventions as ordered  Monitor patient's weight and dietary intake as ordered or per policy  Utilize nutrition screening tool and intervene as necessary  Determine patient's food preferences and provide high-protein, high-caloric foods as appropriate       INTERVENTIONS:  - Monitor oral intake, urinary output, labs, and treatment plans  - Assess nutrition and hydration status and recommend course of action  - Evaluate amount of meals eaten  - Assist patient with eating if necessary   - Allow adequate time for meals  - Recommend/ encourage appropriate diets, oral nutritional supplements, and vitamin/mineral supplements  - Order, calculate, and assess calorie counts as needed  - Recommend, monitor, and adjust tube feedings and TPN/PPN based on assessed needs  - Assess need for intravenous fluids  - Provide specific nutrition/hydration education as appropriate  - Include patient/family/caregiver in decisions related to nutrition  Outcome: Progressing     Problem: PAIN - ADULT  Goal: Verbalizes/displays adequate comfort level or baseline comfort level  Description  Interventions:  - Encourage patient to monitor pain and request assistance  - Assess pain using appropriate pain scale  - Administer analgesics based on type and severity of pain and evaluate response  - Implement non-pharmacological measures as appropriate and evaluate response  - Consider cultural and social influences on pain and pain management  - Notify physician/advanced practitioner if interventions unsuccessful or patient reports new pain  Outcome: Progressing     Problem: INFECTION - ADULT  Goal: Absence or prevention of progression during hospitalization  Description  INTERVENTIONS:  - Assess and monitor for signs and symptoms of infection  - Monitor lab/diagnostic results  - Monitor all insertion sites, i e  indwelling lines, tubes, and drains  - Monitor endotracheal if appropriate and nasal secretions for changes in amount and color  - [place] appropriate cooling/warming therapies per order  - Administer medications as ordered  - Instruct and encourage patient and family to use good hand hygiene technique  - Identify and instruct in appropriate isolation precautions for identified infection/condition  Outcome: Progressing  Goal: Absence of fever/infection during neutropenic period  Description  INTERVENTIONS:  - Monitor WBC    Outcome: Progressing     Problem: SAFETY ADULT  Goal: Maintain or return to baseline ADL function  Description  INTERVENTIONS:  -  Assess patient's ability to carry out ADLs; assess patient's baseline for ADL function and identify physical deficits which impact ability to perform ADLs (bathing, care of mouth/teeth, toileting, grooming, dressing, etc )  - Assess/evaluate cause of self-care deficits   - Assess range of motion  - Assess patient's mobility; develop plan if impaired  - Assess patient's need for assistive devices and provide as appropriate  - Encourage maximum independence but intervene and supervise when necessary  - Involve family in performance of ADLs  - Assess for home care needs following discharge   - Consider OT consult to assist with ADL evaluation and planning for discharge  - Provide patient education as appropriate  Outcome: Progressing  Goal: Maintain or return mobility status to optimal level  Description  INTERVENTIONS:  - Assess patient's baseline mobility status (ambulation, transfers, stairs, etc )    - Identify cognitive and physical deficits and behaviors that affect mobility  - Identify mobility aids required to assist with transfers and/or ambulation (gait belt, sit-to-stand, lift, walker, cane, etc )  - [place] fall precautions as indicated by assessment  - Record patient progress and toleration of activity level on Mobility SBAR; progress patient to next Phase/Stage  - Instruct patient to call for assistance with activity based on assessment  - Consider rehabilitation consult to assist with strengthening/weightbearing, etc   Outcome: Progressing     Problem: DISCHARGE PLANNING  Goal: Discharge to home or other facility with appropriate resources  Description  INTERVENTIONS:  - Identify barriers to discharge w/patient and caregiver  - Arrange for needed discharge resources and transportation as appropriate  - Identify discharge learning needs (meds, wound care, etc )  - Arrange for interpretive services to assist at discharge as needed  - Refer to Case Management Department for coordinating discharge planning if the patient needs post-hospital services based on physician/advanced practitioner order or complex needs related to functional status, cognitive ability, or social support system  Outcome: Progressing     Problem: Knowledge Deficit  Goal: Patient/family/caregiver demonstrates understanding of disease process, treatment plan, medications, and discharge instructions  Description  Complete learning assessment and assess knowledge base    Interventions:  - Provide teaching at level of understanding  - Provide teaching via preferred learning methods  Outcome: Progressing     Problem: SKIN/TISSUE INTEGRITY - ADULT  Goal: Skin integrity remains intact  Description  INTERVENTIONS  - Identify patients at risk for skin breakdown  - Assess and monitor skin integrity  - Assess and monitor nutrition and hydration status  - Monitor labs (i e  albumin)  - Assess for incontinence   - Turn and reposition patient  - Assist with mobility/ambulation  - Relieve pressure over bony prominences  - Avoid friction and shearing  - Provide appropriate hygiene as needed including keeping skin clean and dry  - Evaluate need for skin moisturizer/barrier cream  - Collaborate with interdisciplinary team (i e  Nutrition, Rehabilitation, etc )   - Patient/family teaching  Outcome: Progressing  Goal: Incision(s), wounds(s) or drain site(s) healing without S/S of infection  Description  INTERVENTIONS  - Assess and document risk factors for skin impairment   - Assess and document dressing, incision, wound bed, drain sites and surrounding tissue  - Consider nutrition services referral as needed  - Oral mucous membranes remain intact  - Provide patient/ family education  Outcome: Progressing  Goal: Oral mucous membranes remain intact  Description  INTERVENTIONS  - Assess oral mucosa and hygiene practices  - Implement preventative oral hygiene regimen  - Implement oral medicated treatments as ordered  - Initiate Nutrition services referral as needed  Outcome: Progressing     Problem: HEMATOLOGIC - ADULT  Goal: Maintains hematologic stability  Description  INTERVENTIONS  - Assess for signs and symptoms of bleeding or hemorrhage  - Monitor labs  - Administer supportive blood products/factors as ordered and appropriate  Outcome: Progressing     Problem: MUSCULOSKELETAL - ADULT  Goal: Maintain or return mobility to safest level of function  Description  INTERVENTIONS:  - Assess patient's ability to carry out ADLs; assess patient's baseline for ADL function and identify physical deficits which impact ability to perform ADLs (bathing, care of mouth/teeth, toileting, grooming, dressing, etc )  - Assess/evaluate cause of self-care deficits   - Assess range of motion  - Assess patient's mobility  - Assess patient's need for assistive devices and provide as appropriate  - Encourage maximum independence but intervene and supervise when necessary  - Involve family in performance of ADLs  - Assess for home care needs following discharge   - Consider OT consult to assist with ADL evaluation and planning for discharge  - Provide patient education as appropriate  Outcome: Progressing  Goal: Maintain proper alignment of affected body part  Description  INTERVENTIONS:  - Support, maintain and protect limb and body alignment  - Provide patient/ family with appropriate education  Outcome: Progressing

## 2020-02-19 NOTE — SOCIAL WORK
Patient has consult pending with neuro psych for re-evaluation or mental capacity  CM will continue to follow for final determination for continued discharge planning

## 2020-02-19 NOTE — PLAN OF CARE
Problem: PHYSICAL THERAPY ADULT  Goal: Performs mobility at highest level of function for planned discharge setting  See evaluation for individualized goals  Description  Treatment/Interventions: Functional transfer training, LE strengthening/ROM, Endurance training, Patient/family training, Equipment eval/education, Bed mobility, Spoke to nursing, Spoke to case management(w/c training)  Equipment Recommended: Wheelchair       See flowsheet documentation for full assessment, interventions and recommendations  Outcome: Progressing  Note:   Prognosis: Fair  Problem List: Decreased strength, Decreased endurance, Impaired balance, Decreased mobility, Pain  Assessment: pt demonstrated improved mobility this session, transferring OOB with use of slide baord as opposed to [employer]  Pt able to assist with L leaning while EOB to place board, and was able to perform lateral scoots with improved balance and weight shifting this session  Did require assist for board positioning & hip clearance while scooting to prevent shearing on board  Pt also performed rolling L&R for perihygiene, bandage & pad change with RN assist   Pt remained in recliner with all needs in reach, chair alarm active, & RN notified  Will continue to follow to maximize pt independence with self mobility, activity tolerance, and strength at this time  Plan to trial wc mobility as appropriate in future sessions to ensure safe mobility for household distances if tolerated  Recommendation: Post acute IP rehab     PT - OK to Discharge: Yes    See flowsheet documentation for full assessment

## 2020-02-19 NOTE — PLAN OF CARE
Problem: OCCUPATIONAL THERAPY ADULT  Goal: Performs self-care activities at highest level of function for planned discharge setting  See evaluation for individualized goals  Description  Treatment Interventions: ADL retraining, Functional transfer training, UE strengthening/ROM, Endurance training, Cognitive reorientation, Patient/family training, Compensatory technique education, Equipment evaluation/education, Energy conservation, Activityengagement          See flowsheet documentation for full assessment, interventions and recommendations  Outcome: Progressing  Note:   Limitation: Decreased ADL status, Decreased Safe judgement during ADL, Decreased endurance, Decreased self-care trans, Decreased high-level ADLs  Prognosis: Good, Fair  Assessment: pt was seen for pm ot session focusing on transfers and ub therapeutic exercise  required mod a x1 for slide board transfer  reinforced learned theraband technique        OT Discharge Recommendation: Short Term Rehab  OT - OK to Discharge: Yes  Alley Joseph

## 2020-02-19 NOTE — OCCUPATIONAL THERAPY NOTE
Occupational Therapy Treatment Note:     02/19/20 1405   Restrictions/Precautions   Weight Bearing Precautions Per Order No   Other Precautions Contact/isolation; Chair Alarm; Bed Alarm; Fall Risk   Pain Assessment   Pain Assessment No/denies pain   Bed Mobility   Rolling R 4  Minimal assistance   Additional items Assist x 1; Increased time required; Bedrails   Rolling L 4  Minimal assistance   Additional items Assist x 1;Bedrails; Increased time required   Sit to Supine 3  Moderate assistance   Additional items LE management   Transfers   Sliding Board transfer 3  Moderate assistance   Additional items Assist x 1   Additional Comments pt transferred from drop arm recliner to bed c aryan board   Therapeutic Excerise-Strength   UE Strength Yes   Right Upper Extremity- Strength   R Shoulder Flexion; Horizontal ABduction   R Elbow Elbow flexion;Elbow extension   Equipment Theraband  (light resistance)   R Weight/Reps/Sets 1 set x 10 reps   Left Upper Extremity-Strength   L Shoulder Flexion; Horizontal ABduction   L Elbow Elbow flexion;Elbow extension   Equipment Theraband  (light resistance)   L Weights/Reps/Sets 1 set x 10 reps   Cognition   Overall Cognitive Status WFL   Arousal/Participation Alert; Cooperative   Attention Within functional limits   Orientation Level Oriented X4   Memory Within functional limits   Following Commands Follows one step commands without difficulty   Comments mod vc's to reinforce technique   Activity Tolerance   Activity Tolerance Patient limited by fatigue   Assessment   Assessment pt was seen for pm ot session focusing on transfers and ub therapeutic exercise  required mod a x1 for slide board transfer  reinforced learned theraband technique  Plan   Treatment Interventions ADL retraining;Functional transfer training;UE strengthening/ROM; Endurance training;Patient/family training;Equipment evaluation/education; Compensatory technique education; Energy conservation; Activityengagement   Goal Expiration Date 02/25/20   OT Treatment Day 3   OT Frequency 3-5x/wk   Recommendation   OT Discharge Recommendation Short Term Rehab   OT - OK to Discharge Yes   Mynor Gage

## 2020-02-20 PROCEDURE — 97530 THERAPEUTIC ACTIVITIES: CPT

## 2020-02-20 PROCEDURE — 99232 SBSQ HOSP IP/OBS MODERATE 35: CPT | Performed by: INTERNAL MEDICINE

## 2020-02-20 RX ADMIN — MELATONIN 1000 UNITS: at 08:20

## 2020-02-20 RX ADMIN — ENOXAPARIN SODIUM 40 MG: 40 INJECTION SUBCUTANEOUS at 08:20

## 2020-02-20 RX ADMIN — Medication 1 TABLET: at 17:15

## 2020-02-20 RX ADMIN — Medication 250 MG: at 17:15

## 2020-02-20 RX ADMIN — DEXTRAN 70 AND HYPROMELLOSE 2910 1 DROP: 1; 3 SOLUTION/ DROPS OPHTHALMIC at 08:26

## 2020-02-20 RX ADMIN — MIDODRINE HYDROCHLORIDE 15 MG: 5 TABLET ORAL at 05:59

## 2020-02-20 RX ADMIN — VITAM B12 100 MCG: 100 TAB at 08:21

## 2020-02-20 RX ADMIN — MIDODRINE HYDROCHLORIDE 15 MG: 5 TABLET ORAL at 14:08

## 2020-02-20 RX ADMIN — NYSTATIN: 100000 POWDER TOPICAL at 08:23

## 2020-02-20 RX ADMIN — PANTOPRAZOLE SODIUM 40 MG: 40 TABLET, DELAYED RELEASE ORAL at 17:15

## 2020-02-20 RX ADMIN — Medication 250 MG: at 08:22

## 2020-02-20 RX ADMIN — MIDODRINE HYDROCHLORIDE 15 MG: 5 TABLET ORAL at 21:35

## 2020-02-20 RX ADMIN — WHITE PETROLATUM 57.7 %-MINERAL OIL 31.9 % EYE OINTMENT 1 APPLICATION: at 21:35

## 2020-02-20 RX ADMIN — PANTOPRAZOLE SODIUM 40 MG: 40 TABLET, DELAYED RELEASE ORAL at 06:00

## 2020-02-20 RX ADMIN — Medication 1 TABLET: at 08:20

## 2020-02-20 NOTE — PROGRESS NOTES
Progress Note - Karla Lundy 1950, 71 y o  female MRN: [id number]    [facility] Encounter: [id number]    Primary Care Provider: Marci Craig   Date and time admitted to hospital: 2/5/2020  3:25 PM        Cognitive decline  Assessment & Plan  Patient noted per neuropsychology to be incompetent on 02/12/2020  Discussed with case management, now plan for rehab  Upon my examination, patient appears to be AO x4, it seems she had requested that her friend be made POA and prior [facility] team had re-consulted neuropsych for capacity re-evaluation, await recs and dispo    Lung nodule  Assessment & Plan  · Noted incidental finding, will require outpatient f/u     Abnormal CT of the chest  Assessment & Plan  CT scan of the chest reports possible heart failure as it relates to ground-glass appearance on the left lung  Also has left lower lobe consolidation that is concerning for atelectasis versus infection  Patient with no history of heart failure  Clinical exam not consistent with heart failure  · BNP is elevated   · Currently not on IV fluids  · Routine echo- EF 55 %  · incentive spirometry  Depression  Assessment & Plan  Patient does report depression over continued chronic hospital care and chronic illness  Pastoral care to speak with patient  She may benefit from outpatient counseling  She states she would be agreeable to possible outpatient counseling for 1 or 2 sessions but not forever  She raised the issue herself  Her affect is rather flat    · Spiritual care consult for now  · In review of neuropsychology note patient is severely depressed refusing antidepressant medication    Severe protein-calorie malnutrition ([address] )  Assessment & Plan  Malnutrition Findings:   Malnutrition type: Chronic illness(Severe pro/sara malnutrition r/t disease/condition as evidenced by 12% unintentional wt loss since 11/10/19, consuming < 75% energy intake compared to est energy needs > 1 month, fat/muscle wasting of orbital/temple areas  Treated with diet/supplements)  Degree of Malnutrition: Other severe protein calorie malnutrition    BMI Findings:  BMI Classifications: Underweight < 18 5     Body mass index is 17 97 kg/m²  Patient does admit to depression  Pastoral care consulted  She may benefit from counseling at discharge  Would recommend supplementation as above  Have ensure kulwinder pudding at lunch will add magic ice cream at dinner time     Open wound of right hip  Assessment & Plan  Patient with a known past medical history for paraplegia following spinal cord injury presents with increased drainage of R hip wound  · Known to history of hip osteo and dislocation of the R femur  Patient is nonweightbearing  On admission had fever, chills  · CT repeated 2/8 and compared to February 5, 2020 showing improvement in gas pockets  · Ortho following; no plans for any surgical intervention at this time  · General surgery following: continue localized wound care  No plans for any surgical intervention  · ID following: IV Zosyn completed  repeat CT scan shows improvement  · Per ID: "if no plan for girdle stone procedure and flap to cover the wound, the role of long-term IV antibiotics is limited with risk of side effects from prolonged antibiotics outweighs the benefit   This has been explained at length to the patient  " ten-day Course completed 2/11  · Continue local wound care  Patient reports depression over her current status  She is agreeable to speak with the spiritual Care Team and would like outpatient referral   She states that her goal will be to get back to her home but at this time she is in the skilled nursing facility setting    · Concern about patient's capacity, although she is currently AO X 4 per my exam today  · She was seen by neuropsych 2/12 and deemed incompetent, also refused antidepressants at that time  · Reevaluation by neuropsych pending    Neurogenic bladder  Assessment & Plan  · History of neurogenic bladder with chronic indwelling [place] catheter, exchanged this admission due to finding of E coli and Proteus in the urine culture  · Urine culture noted, likely represents asymptomatic bacteriuria per infectious disease      Anemia of chronic disease  Assessment & Plan  · Labs appear consistent with anemia of chronic disease, continue to monitor closely  Hemoglobin stable   · 9 0  Last result no need for repeat labs   · Continue to monitor     Decubitus ulcer of left ischium, stage III [facility])  Assessment & Plan  · Patient with healed scars to the sacral area, reviewed Wound care's consultation  Present on admission left ischial ulcer stage III    · Continue local care for excoriation and red scan as well as frequent turning of the patient  Also has wound to the right thigh over the hip which has osteomyelitis  No surgical intervention being recommended  · ID following and antibiotics finished 2/11    cleared for discharge however now deemed incompetent and is undergoing further eval to determine placement      Paraplegia following spinal cord injury [facility])  Assessment & Plan  · History of spinal cord injury T8 with paraplegia sp 20 years ago hang gliding accident   · Presented from [facility], however patient had reported that she lived independently and was confabulating stories  · Seen by neuropsychology 2/12/20 and deemed incompetent, also refused antidepressants at that time  · Continue with repositioning and wound care  · Out of bed as able    * Sepsis [facility])  Assessment & Plan  · POA with fever and leukocytosis with infectious source likely being R hip wound with underlying iliopsoas myositis  No signs of PNA or intra-abdominal infection  Review of repeat CT scan shows known osteomyelitis of the right hip with dislocation  Pockets of gas have decreased since the initial consult  No evidence for abscesses    · Repeat CT shows no evidence for abscesses  · Blood cultures 1/2 positive for coag-neg staph, other negative  Continue to monitor blood cultures  · Urine culture + for proteus and e coli; likely asymptomatic bacteriuria per ID; isabel exchanged   · Continue IV abx per ID recommendations complete 10 days total   Last dose 2/11   · ID with preference for surgical intervention for definitive source control however, no focus for interventional radiology intervention and surgery and orthopedics not currently offering any surgical management  Cleared 2/12 by ID   · Sepsis has resolved  · PT/OT recommending returning to [facility]  · However patient with need for neuropsychology evaluation performed on 02/12/2020, determined that patient does not appear to have capacity to make fully in for medical consult care decisions  · Patient noted to have major depressive disorder, moderate without psychotic features patient stating to neuropsychology that she is not interested in taking antidepressant medications because they would not help her condition  Patient requesting her friend be made her POA, as she does not want to made a target  Neurosurgery reconsulted  2/19 for capacity evaluation            VTE Pharmacologic Prophylaxis:   Pharmacologic: Enoxaparin (Lovenox)  Mechanical VTE Prophylaxis in Place: Yes    Patient Centered Rounds: I have performed bedside rounds with nursing staff today  Discussions with Specialists or Other Care Team Provider: CM    Education and Discussions with Family / Patient: Patient, no family available    Time Spent for Care: 20 minutes  More than 50% of total time spent on counseling and coordination of care as described above      Current Length of Stay: 15 day(s)    Current Patient Status: Inpatient   Certification Statement: The patient will continue to require additional inpatient hospital stay due to Pending placement    Discharge Plan: Pending placment    Code Status: Level 1 - Full Code      Subjective:   No overnight events  Patient with no complaints this am    Objective:     Vitals:   Temp (24hrs), Av °F (36 7 °C), Min:97 5 °F (36 4 °C), Max:99 °F (37 2 °C)    Temp:  [97 5 °F (36 4 °C)-99 °F (37 2 °C)] 97 6 °F (36 4 °C)  HR:  [64-83] 64  BP: (118-142)/(62-67) 142/67  SpO2:  [96 %-98 %] 97 %  Body mass index is 17 97 kg/m²  Input and Output Summary (last 24 hours): Intake/Output Summary (Last 24 hours) at 2020 1244  Last data filed at 2020 0900  Gross per 24 hour   Intake 720 ml   Output 750 ml   Net -30 ml       Physical Exam:     Physical Exam   Constitutional: She is oriented to person, place, and time  She appears well-developed and well-nourished  No distress  Neck: Neck supple  Cardiovascular: Normal rate, regular rhythm and normal heart sounds  Abdominal: Soft  Bowel sounds are normal  She exhibits no distension  There is no tenderness  Musculoskeletal: Normal range of motion  She exhibits no edema, tenderness or deformity  Neurological: She is alert and oriented to person, place, and time  No cranial nerve deficit  Skin: Skin is warm  Capillary refill takes less than 2 seconds  She is not diaphoretic  Psychiatric: Her affect is blunt  She exhibits a depressed mood  Vitals reviewed  Additional Data:     Labs:    Results from last 7 days   Lab Units 02/15/20  0505   WBC Thousand/uL 11 95*   HEMOGLOBIN g/dL 9 8*   HEMATOCRIT % 32 1*   PLATELETS Thousands/uL 710*   NEUTROS PCT % 59   LYMPHS PCT % 28   MONOS PCT % 9   EOS PCT % 2     Results from last 7 days   Lab Units 20  0507   SODIUM mmol/L 135*   POTASSIUM mmol/L 4 2   CHLORIDE mmol/L 101   CO2 mmol/L 28   BUN mg/dL 11   CREATININE mg/dL 0 64   ANION GAP mmol/L 6   CALCIUM mg/dL 8 9   ALBUMIN g/dL 2 0*   TOTAL BILIRUBIN mg/dL 0 22   ALK PHOS U/L 171*   ALT U/L 41   AST U/L 22   GLUCOSE RANDOM mg/dL 122                           * I Have Reviewed All Lab Data Listed Above    * Additional Pertinent Lab Tests Reviewed:  All Labs Within Last 24 Hours Reviewed    Imaging:    Imaging Reports Reviewed Today Include: None  Imaging Personally Reviewed by Myself Includes:  None    Recent Cultures (last 7 days):           Last 24 Hours Medication List:     Current Facility-Administered Medications:  acetaminophen 650 mg Oral Q6H PRN Marlen Roa MD   artificial tear  Both Eyes HS Livier Calhoun MD   bisacodyl 10 mg Rectal Daily PRN Brittany Ni MD   calcium carbonate-vitamin D 1 tablet Oral BID With Meals Marlen Roa MD   cholecalciferol 1,000 Units Oral Daily Marlen Roa MD   cyanocobalamin 100 mcg Oral Daily Marlen Roa MD   dextran 70-hypromellose 1 drop Both Eyes Q3H PRN Franca Rivers PA-C   diphenhydrAMINE 50 mg Oral 60 Min Pre-Op Sagrario Dumont PA-C   enoxaparin 40 mg Subcutaneous Daily Marlen Roa MD   famotidine 20 mg Oral BID PRN Livier Calhoun MD   loperamide 2 mg Oral TID PRN Payton Gan PA-C   midodrine 15 mg Oral Q8H Marlen Roa MD   multivitamin-minerals 1 tablet Oral Daily Marlen Roa MD   nystatin  Topical BID Marlen Roa MD   ondansetron 4 mg Intravenous Q6H PRN Marlen Roa MD   pantoprazole 40 mg Oral BID AC Livier Calhoun MD   polyethylene glycol 17 g Oral Daily PRN CONCEPCION [place]arrbuias   saccharomyces boulardii 250 mg Oral BID Marlen Roa MD   senna 1 tablet Oral HS PRN Kulwant Gonzalez CRNA     Facility-Administered Medications Ordered in Other Encounters:  dexamethasone 4 mg Intravenous Once PRN Cobden Bodo, DO    diphenhydrAMINE 12 5 mg Intravenous Once Cobden Bodo, DO    lactated ringers 75 mL/hr Intravenous Continuous Cobden Bodo, DO Last Rate: 75 mL/hr (11/10/19 2105)   lactated ringers 50 mL/hr Intravenous Continuous Nimco Aquino CRNA    lactated ringers 75 mL/hr Intravenous Continuous Cobden Bodo, DO Last Rate: Stopped (03/20/19 1841)   lactated ringers 75 mL/hr Intravenous Continuous Elena Griffin MD Last Rate: Stopped (03/20/19 1842)   ondansetron 4 mg Intravenous Once PRN Rima Morales MD    promethazine 12 5 mg Intravenous Once PRN Martin Brennan MD         Today, Patient Was Seen By: Tom Waters MD    ** Please Note: Dictation voice to text software may have been used in the creation of this document   **

## 2020-02-20 NOTE — ASSESSMENT & PLAN NOTE
Patient with a known past medical history for paraplegia following spinal cord injury presents with increased drainage of R hip wound  · Known to history of hip osteo and dislocation of the R femur  Patient is nonweightbearing  On admission had fever, chills  · CT repeated 2/8 and compared to February 5, 2020 showing improvement in gas pockets  · Ortho following; no plans for any surgical intervention at this time  · General surgery following: continue localized wound care  No plans for any surgical intervention  · ID following: IV Zosyn completed  repeat CT scan shows improvement  · Per ID: "if no plan for girdle stone procedure and flap to cover the wound, the role of long-term IV antibiotics is limited with risk of side effects from prolonged antibiotics outweighs the benefit   This has been explained at length to the patient  " ten-day Course completed 2/11  · Continue local wound care  Patient reports depression over her current status  She is agreeable to speak with the spiritual Care Team and would like outpatient referral   She states that her goal will be to get back to her home but at this time she is in the skilled nursing facility setting    · Concern about patient's capacity, although she is currently AO X 4 per my exam today  · She was seen by neuropsych 2/12 and deemed incompetent, also refused antidepressants at that time  · Reevaluation by neuropsych pending

## 2020-02-20 NOTE — ASSESSMENT & PLAN NOTE
· History of spinal cord injury T8 with paraplegia sp 20 years ago hang gliding accident   · Presented from [facility], however patient had reported that she lived independently and was confabulating stories  · Seen by neuropsychology 2/12/20 and deemed incompetent, also refused antidepressants at that time  · Continue with repositioning and wound care    · Out of bed as able

## 2020-02-20 NOTE — ASSESSMENT & PLAN NOTE
· POA with fever and leukocytosis with infectious source likely being R hip wound with underlying iliopsoas myositis  No signs of PNA or intra-abdominal infection  Review of repeat CT scan shows known osteomyelitis of the right hip with dislocation  Pockets of gas have decreased since the initial consult  No evidence for abscesses  · Repeat CT shows no evidence for abscesses  · Blood cultures 1/2 positive for coag-neg staph, other negative  Continue to monitor blood cultures  · Urine culture + for proteus and e coli; likely asymptomatic bacteriuria per ID; isabel exchanged   · Continue IV abx per ID recommendations complete 10 days total   Last dose 2/11   · ID with preference for surgical intervention for definitive source control however, no focus for interventional radiology intervention and surgery and orthopedics not currently offering any surgical management  Cleared 2/12 by ID   · Sepsis has resolved  · PT/OT recommending returning to [facility], [facility]  · However patient with need for neuropsychology evaluation performed on 02/12/2020, determined that patient does not appear to have capacity to make fully in for medical consult care decisions  · Patient noted to have major depressive disorder, moderate without psychotic features patient stating to neuropsychology that she is not interested in taking antidepressant medications because they would not help her condition      Patient requesting her friend be made her POA, as she does not want to made a target  Neurosurgery reconsulted  2/19 for capacity evaluation

## 2020-02-20 NOTE — ASSESSMENT & PLAN NOTE
· POA with fever and leukocytosis with infectious source likely being R hip wound with underlying iliopsoas myositis  No signs of PNA or intra-abdominal infection  Review of repeat CT scan shows known osteomyelitis of the right hip with dislocation  Pockets of gas have decreased since the initial consult  No evidence for abscesses  · Repeat CT shows no evidence for abscesses  · Blood cultures 1/2 positive for coag-neg staph, other negative  Continue to monitor blood cultures  · Urine culture + for proteus and e coli; likely asymptomatic bacteriuria per ID; isabel exchanged   · Continue IV abx per ID recommendations complete 10 days total   Last dose 2/11   · ID with preference for surgical intervention for definitive source control however, no focus for interventional radiology intervention and surgery and orthopedics not currently offering any surgical management  Cleared 2/12 by ID   · Sepsis has resolved  · PT/OT recommending returning to [facility], [facility]  · However patient with need for neuropsychology evaluation performed on 02/12/2020, determined that patient does not appear to have capacity to make fully in for medical consult care decisions  · Patient noted to have major depressive disorder, moderate without psychotic features patient stating to neuropsychology that she is not interested in taking antidepressant medications because they would not help her condition

## 2020-02-20 NOTE — ASSESSMENT & PLAN NOTE
· Patient with healed scars to the sacral area, reviewed Wound care's consultation  Present on admission left ischial ulcer stage III    · Continue local care for excoriation and red scan as well as frequent turning of the patient  Also has wound to the right thigh over the hip which has osteomyelitis  No surgical intervention being recommended  · ID following and antibiotics finished 2/11    cleared for discharge however now deemed incompetent and is undergoing further eval to determine placement

## 2020-02-20 NOTE — ASSESSMENT & PLAN NOTE
Patient noted per neuropsychology to be incompetent on 02/12/2020  Discussed with case management, now plan for rehab  Upon my examination, patient appears to be AO x4, it seems she had requested that her friend be made POA and prior [facility] team had re-consulted neuropsych for capacity re-evaluation, await recs and dispo

## 2020-02-20 NOTE — PLAN OF CARE
Problem: Potential for Falls  Goal: Patient will remain free of falls  Description  INTERVENTIONS:  - Assess patient frequently for physical needs  -  Identify cognitive and physical deficits and behaviors that affect risk of falls    -  [place] fall precautions as indicated by assessment   - Educate patient/family on patient safety including physical limitations  - Instruct patient to call for assistance with activity based on assessment  - Modify environment to reduce risk of injury  - Consider OT/PT consult to assist with strengthening/mobility  Outcome: Progressing

## 2020-02-20 NOTE — PHYSICAL THERAPY NOTE
Physical Therapy Treatment Note     02/20/20 1220   Pain Assessment   Pain Assessment No/denies pain   Restrictions/Precautions   Weight Bearing Precautions Per Order No   Other Precautions Contact/isolation;WBS;Fall Risk;Telemetry; Chair Alarm  (Impaired skin, Paraplegic)   General   Chart Reviewed Yes   Family/Caregiver Present No   Cognition   Overall Cognitive Status WFL   Subjective   Subjective Pt  in bed supine upon entry  Agreeable to PT   Bed Mobility   Rolling R 5  Supervision   Additional items Assist x 1;Bedrails; Increased time required   Rolling L 5  Supervision   Additional items Assist x 1;Bedrails; Increased time required   Supine to Sit 5  Supervision   Additional items Assist x 1;Bedrails; Increased time required  (HOB flat)   Transfers   Sliding Board transfer 3  Moderate assistance   Additional items Assist x 1; Increased time required;Armrests   Endurance Deficit   Endurance Deficit Yes   Endurance Deficit Description Weakness   Activity Tolerance   Activity Tolerance Patient tolerated treatment well   Nurse Made Aware Yes   Assessment   Prognosis Fair   Problem List Decreased strength;Decreased range of motion; Impaired balance;Decreased mobility; Decreased skin integrity   Assessment Pt  needed assist in perihygeine  BM performed with use of bed rails and HOB flat  Transfer performed with S  Pt  needed assist in placing SB under the R buttock as SB is heavy  Mod A for transferring from EOB to drop arm recliner chair  Pt  reported she would like to be transferred to a w/c as she does at home  Next session pt  could be transfrred to w/c as she would like to perform w/c mobility  Will continue to follow during the stay to improve her functional mobility   Barriers to Discharge None   Goals   Patient Goals None reported   STG Expiration Date 02/25/20   PT Treatment Day 4   Plan   Treatment/Interventions Functional transfer training;Bed mobility;Spoke to nursing; Patient/family training   Progress Progressing toward goals   PT Frequency   (3-5x/week)   Recommendation   Recommendation Post acute IP rehab   Equipment Recommended Wheelchair   PT - OK to Discharge Yes         Naima Angelo, PTA

## 2020-02-20 NOTE — ASSESSMENT & PLAN NOTE
· History of spinal cord injury T8 with paraplegia sp 20 years ago hang gliding accident   · Presented from [facility], however patient had reported that she lived independently and was confabulating stories  Seen by neuropsychology the deemed incompetent  · Continue with repositioning and wound care    · Out of bed as able
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
## 2020-02-20 NOTE — ASSESSMENT & PLAN NOTE
· History of neurogenic bladder with chronic indwelling [place] catheter, exchanged this admission due to finding of E coli and Proteus in the urine culture    · Urine culture noted, likely represents asymptomatic bacteriuria per infectious disease

## 2020-02-20 NOTE — PLAN OF CARE
Problem: PHYSICAL THERAPY ADULT  Goal: Performs mobility at highest level of function for planned discharge setting  See evaluation for individualized goals  Description  Treatment/Interventions: Functional transfer training, LE strengthening/ROM, Endurance training, Patient/family training, Equipment eval/education, Bed mobility, Spoke to nursing, Spoke to case management(w/c training)  Equipment Recommended: Wheelchair       See flowsheet documentation for full assessment, interventions and recommendations  Outcome: Progressing  Note:   Prognosis: Fair  Problem List: Decreased strength, Decreased range of motion, Impaired balance, Decreased mobility, Decreased skin integrity  Assessment: Pt  needed assist in perihygeine  BM performed with use of bed rails and HOB flat  Transfer performed with S  Pt  needed assist in placing SB under the R buttock as SB is heavy  Mod A for transferring from EOB to drop arm recliner chair  Pt  reported she would like to be transferred to a w/c as she does at home  Next session pt  could be transfrred to w/c as she would like to perform w/c mobility  Will continue to follow during the stay to improve her functional mobility  Barriers to Discharge: None     Recommendation: Post acute IP rehab     PT - OK to Discharge: Yes    See flowsheet documentation for full assessment

## 2020-02-21 PROBLEM — R93.89 ABNORMAL CT SCAN: Status: ACTIVE | Noted: 2020-02-21

## 2020-02-21 LAB
ANION GAP SERPL CALCULATED.3IONS-SCNC: 5 MMOL/L (ref 4–13)
BUN SERPL-MCNC: 19 MG/DL (ref 5–25)
CALCIUM SERPL-MCNC: 9 MG/DL (ref 8.3–10.1)
CHLORIDE SERPL-SCNC: 103 MMOL/L (ref 100–108)
CO2 SERPL-SCNC: 28 MMOL/L (ref 21–32)
CREAT SERPL-MCNC: 0.46 MG/DL (ref 0.6–1.3)
ERYTHROCYTE [DISTWIDTH] IN BLOOD BY AUTOMATED COUNT: 17.7 % (ref 11.6–15.1)
GFR SERPL CREATININE-BSD FRML MDRD: 102 ML/MIN/1.73SQ M
GLUCOSE SERPL-MCNC: 103 MG/DL (ref 65–140)
HCT VFR BLD AUTO: 30.1 % (ref 34.8–46.1)
HGB BLD-MCNC: 9.1 G/DL (ref 11.5–15.4)
MCH RBC QN AUTO: 25.3 PG (ref 26.8–34.3)
MCHC RBC AUTO-ENTMCNC: 30.2 G/DL (ref 31.4–37.4)
MCV RBC AUTO: 84 FL (ref 82–98)
PLATELET # BLD AUTO: 546 THOUSANDS/UL (ref 149–390)
PMV BLD AUTO: 9.8 FL (ref 8.9–12.7)
POTASSIUM SERPL-SCNC: 4.2 MMOL/L (ref 3.5–5.3)
RBC # BLD AUTO: 3.6 MILLION/UL (ref 3.81–5.12)
SODIUM SERPL-SCNC: 136 MMOL/L (ref 136–145)
WBC # BLD AUTO: 9.44 THOUSAND/UL (ref 4.31–10.16)

## 2020-02-21 PROCEDURE — 99232 SBSQ HOSP IP/OBS MODERATE 35: CPT | Performed by: NURSE PRACTITIONER

## 2020-02-21 PROCEDURE — 99222 1ST HOSP IP/OBS MODERATE 55: CPT | Performed by: PSYCHIATRY & NEUROLOGY

## 2020-02-21 PROCEDURE — 80048 BASIC METABOLIC PNL TOTAL CA: CPT | Performed by: INTERNAL MEDICINE

## 2020-02-21 PROCEDURE — 97535 SELF CARE MNGMENT TRAINING: CPT

## 2020-02-21 PROCEDURE — 85027 COMPLETE CBC AUTOMATED: CPT | Performed by: INTERNAL MEDICINE

## 2020-02-21 RX ADMIN — Medication 250 MG: at 09:34

## 2020-02-21 RX ADMIN — VITAM B12 100 MCG: 100 TAB at 09:35

## 2020-02-21 RX ADMIN — PANTOPRAZOLE SODIUM 40 MG: 40 TABLET, DELAYED RELEASE ORAL at 17:38

## 2020-02-21 RX ADMIN — ENOXAPARIN SODIUM 40 MG: 40 INJECTION SUBCUTANEOUS at 09:34

## 2020-02-21 RX ADMIN — MIDODRINE HYDROCHLORIDE 15 MG: 5 TABLET ORAL at 05:32

## 2020-02-21 RX ADMIN — Medication 1 TABLET: at 17:38

## 2020-02-21 RX ADMIN — NYSTATIN: 100000 POWDER TOPICAL at 09:36

## 2020-02-21 RX ADMIN — WHITE PETROLATUM 57.7 %-MINERAL OIL 31.9 % EYE OINTMENT: at 21:50

## 2020-02-21 RX ADMIN — MIDODRINE HYDROCHLORIDE 15 MG: 5 TABLET ORAL at 21:48

## 2020-02-21 RX ADMIN — Medication 250 MG: at 17:38

## 2020-02-21 RX ADMIN — Medication 1 TABLET: at 09:34

## 2020-02-21 RX ADMIN — PANTOPRAZOLE SODIUM 40 MG: 40 TABLET, DELAYED RELEASE ORAL at 05:32

## 2020-02-21 RX ADMIN — MELATONIN 1000 UNITS: at 09:34

## 2020-02-21 RX ADMIN — MIDODRINE HYDROCHLORIDE 15 MG: 5 TABLET ORAL at 13:30

## 2020-02-21 NOTE — ASSESSMENT & PLAN NOTE
· Low-density centrally in the uterus measuring up to 7 mm in thickness   Follow-up pelvic ultrasound is recommended to assess for abnormal endometrial thickening or endometrial lesion

## 2020-02-21 NOTE — ASSESSMENT & PLAN NOTE
· Patient noted per neuropsychology to be incompetent on 02/12/2020  · Discussed with case management, now plan for rehab  · Upon my examination, patient appears to be AO x3, it seems she had requested that her friend be made POA and prior [facility] team had re-consulted neuropsych for capacity re-evaluation, await recs and dispo

## 2020-02-21 NOTE — PROGRESS NOTES
Progress Note - Robert Bee 1950, 71 y o  female MRN: [id number]    Unit/Bed#: [facility] 127-19 Encounter: [id number]    Primary Care Provider: Marsha Love   Date and time admitted to hospital: 2/5/2020  3:25 PM        * Sepsis [facility])  Assessment & Plan  · POA with fever and leukocytosis with infectious source likely being R hip wound with underlying iliopsoas myositis  No signs of PNA or intra-abdominal infection  Review of repeat CT scan shows known osteomyelitis of the right hip with dislocation  Pockets of gas have decreased since the initial consult  No evidence for abscesses  · Repeat CT shows no evidence for abscesses  · Blood cultures 1/2 positive for coag-neg staph, other negative  Repeat cultures negative for 5 days   · Urine culture + for proteus and e coli; likely asymptomatic bacteriuria per ID; isabel exchanged   · Continue IV abx per ID recommendations complete 10 days total   Last dose 2/11   · ID with preference for surgical intervention for definitive source control however, no focus for interventional radiology intervention and surgery and orthopedics not currently offering any surgical management  Cleared 2/12 by ID   · Sepsis has resolved  · PT/OT recommending returning to [facility], [facility]  · However patient with need for neuropsychology evaluation performed on 02/12/2020, determined that patient does not appear to have capacity to make medical decisions  · Patient noted to have major depressive disorder, moderate without psychotic features patient stating to neuropsychology that she is not interested in taking antidepressant medications because they would not help her condition  Patient requesting her friend be made her POA, as she does not want to made a target    Neuropsych reconsulted  2/19 for capacity evaluation          Open wound of right hip  Assessment & Plan  · Patient with a known past medical history for paraplegia following spinal cord injury presents with increased drainage of R hip wound  · Known to history of hip osteo and dislocation of the R femur  Patient is nonweightbearing  On admission had fever, chills  · CT repeated 2/8 and compared to February 5, 2020 showing improvement in gas pockets  · Ortho following; no plans for any surgical intervention at this time  · General surgery following: continue localized wound care  No plans for any surgical intervention  · ID following: IV Zosyn completed  repeat CT scan shows improvement  · Per ID: "if no plan for girdle stone procedure and flap to cover the wound, the role of long-term IV antibiotics is limited with risk of side effects from prolonged antibiotics outweighs the benefit   This has been explained at length to the patient  " ten-day Course completed 2/11  · Continue local wound care  · Situational depression- recommend outpatient referral to counseling     · Concern about patient's capacity, although she is currently AO X 3 per my exam today  · She was seen by neuropsych 2/12 and deemed incompetent, also refused antidepressants at that time  · Reevaluation by neuropsych pending    Abnormal CT scan  Assessment & Plan  · Low-density centrally in the uterus measuring up to 7 mm in thickness   Follow-up pelvic ultrasound is recommended to assess for abnormal endometrial thickening or endometrial lesion    Cognitive decline  Assessment & Plan  · Patient noted per neuropsychology to be incompetent on 02/12/2020  · Discussed with case management, now plan for rehab  · Upon my examination, patient appears to be AO x3, it seems she had requested that her friend be made POA and prior [facility] team had re-consulted neuropsych for capacity re-evaluation, await recs and dispo    Lung nodule  Assessment & Plan  · Noted incidental finding, will require outpatient f/u     Abnormal CT of the chest  Assessment & Plan  CT scan of the chest reports possible heart failure as it relates to ground-glass appearance on the left lung  Also has left lower lobe consolidation that is concerning for atelectasis versus infection  Patient with no history of heart failure  Clinical exam not consistent with heart failure  · BNP is elevated   · Currently not on IV fluids  · Routine echo- EF 55 % G1DD  · incentive spirometry  Depression  Assessment & Plan  · Patient does report depression over continued chronic hospital care and chronic illness  · Pastoral care requested  · She may benefit from outpatient counseling  · In review of neuropsychology note patient is severely depressed refusing antidepressant medication    Severe protein-calorie malnutrition ([address] )  Assessment & Plan  Malnutrition Findings:   Malnutrition type: Chronic illness(Severe pro/sara malnutrition r/t disease/condition as evidenced by 12% unintentional wt loss since 11/10/19, consuming < 75% energy intake compared to est energy needs > 1 month, fat/muscle wasting of orbital/temple areas  Treated with diet/supplements)  Degree of Malnutrition: Other severe protein calorie malnutrition    BMI Findings:  BMI Classifications: Underweight < 18 5     Body mass index is 17 97 kg/m²  Patient does admit to depression  Pastoral care consulted  She may benefit from counseling at discharge  Would recommend supplementation as above  Have ensure kulwinder pudding at lunch will magic ice cream at dinner time     Neurogenic bladder  Assessment & Plan  · History of neurogenic bladder with chronic indwelling [place] catheter, exchanged this admission due to finding of E coli and Proteus in the urine culture  · Urine culture noted, likely represents asymptomatic bacteriuria per infectious disease      Anemia of chronic disease  Assessment & Plan  · Labs appear consistent with anemia of chronic disease, continue to monitor closely    Hemoglobin stable   · 9 1    · Continue to monitor   Lab Results   Component Value Date    HGB 9 1 (L) 02/21/2020    HGB 9 8 (L) 02/15/2020 HGB 9 9 (L) 02/14/2020    HGB 9 8 (L) 02/14/2020    HGB 9 0 (L) 02/12/2020         Decubitus ulcer of left ischium, stage III ([address] )  Assessment & Plan  · Patient with healed scars to the sacral area, reviewed Wound care's consultation  Present on admission left ischial ulcer stage III    · Continue local care for excoriation and red scan as well as frequent turning of the patient  Also has wound to the right thigh over the hip which has osteomyelitis  No surgical intervention being recommended  · ID following and antibiotics finished 2/11    cleared for discharge however now deemed incompetent and is undergoing further eval to determine placement      Paraplegia following spinal cord injury [facility])  Assessment & Plan  · History of spinal cord injury T8 with paraplegia sp 20 years ago hand gliding accident   · Present[facility] from [facility]  · Seen by neuropsychology 2/12/20 and deemed incompetent, also refused antidepressants at that time  · Continue with repositioning and wound care  · Out of bed as able      VTE Pharmacologic Prophylaxis:   Pharmacologic: Enoxaparin (Lovenox)  Mechanical VTE Prophylaxis in Place: Yes    Patient Centered Rounds: I have performed bedside rounds with nursing staff today  Discussions with Specialists or Other Care Team Provider: d/w RN     Education and Discussions with Family / Patient: d/w patient  Declined call to [facility] or [facility]     Time Spent for Care: 30 minutes  More than 50% of total time spent on counseling and coordination of care as described above  Current Length of Stay: 16 day(s)    Current Patient Status: Inpatient   Certification Statement: The patient will continue to require additional inpatient hospital stay due to pending repeat neuropsych eval     Discharge Plan: pending repeat neuropsych eval     Code Status: Level 1 - Full Code      Subjective:   Pt denies any complaints  Lying in bed comfortably  No events per RN   Pt reports she wants to talk to the CM and also wants a repeat neuropsych eval  Reports she has never been incompetent during her stay in the hospital      Objective:     Vitals:   Temp (24hrs), Av °F (36 7 °C), Min:97 8 °F (36 6 °C), Max:98 3 °F (36 8 °C)    Temp:  [97 8 °F (36 6 °C)-98 3 °F (36 8 °C)] 97 9 °F (36 6 °C)  HR:  [74-80] 74  Resp:  [18] 18  BP: (119-121)/(65-69) 121/65  SpO2:  [97 %] 97 %  Body mass index is 17 97 kg/m²  Input and Output Summary (last 24 hours): Intake/Output Summary (Last 24 hours) at 2020 1148  Last data filed at 2020 8313  Gross per 24 hour   Intake 400 ml   Output 1275 ml   Net -875 ml       Physical Exam:     Physical Exam   Constitutional: She is oriented to person, place, and time  She appears cachectic  No distress  Cardiovascular: Normal rate, regular rhythm, normal heart sounds and intact distal pulses  No murmur heard  Pulmonary/Chest: Effort normal and breath sounds normal  No respiratory distress  She has no wheezes  She has no rales  Abdominal: Soft  Bowel sounds are normal  She exhibits no distension  There is no tenderness  There is no rebound  Musculoskeletal: She exhibits no edema or tenderness  Neurological: She is alert and oriented to person, place, and time  Paraplegic    Skin: Skin is warm and dry  She is not diaphoretic  Right hip open wound with minimal drainage noted  Psychiatric: She has a normal mood and affect           Additional Data:     Labs:    Results from last 7 days   Lab Units 20  0459 02/15/20  0505   WBC Thousand/uL 9 44 11 95*   HEMOGLOBIN g/dL 9 1* 9 8*   HEMATOCRIT % 30 1* 32 1*   PLATELETS Thousands/uL 546* 710*   NEUTROS PCT %  --  59   LYMPHS PCT %  --  28   MONOS PCT %  --  9   EOS PCT %  --  2     Results from last 7 days   Lab Units 20  0459   SODIUM mmol/L 136   POTASSIUM mmol/L 4 2   CHLORIDE mmol/L 103   CO2 mmol/L 28   BUN mg/dL 19   CREATININE mg/dL 0 46*   ANION GAP mmol/L 5   CALCIUM mg/dL 9 0   GLUCOSE RANDOM mg/dL 103                           * I Have Reviewed All Lab Data Listed Above  * Additional Pertinent Lab Tests Reviewed:  All Labs Within Last 24 Hours Reviewed    Imaging:    Imaging Reports Reviewed Today Include: CT chest abdomen pelvis wo contrast, pelvis xray,     Recent Cultures (last 7 days):           Last 24 Hours Medication List:     Current Facility-Administered Medications:  acetaminophen 650 mg Oral Q6H PRN Drew Toledo MD   artificial tear  Both Eyes HS Kait Rojo MD   bisacodyl 10 mg Rectal Daily PRN Sabino Rodas MD   calcium carbonate-vitamin D 1 tablet Oral BID With Meals Drew Toledo MD   cholecalciferol 1,000 Units Oral Daily Drew Toledo MD   cyanocobalamin 100 mcg Oral Daily Derw Toledo MD   dextran 70-hypromellose 1 drop Both Eyes Q3H PRN Gladys Mazariegos PA-C   diphenhydrAMINE 50 mg Oral 60 Min Pre-Op Sagrario Dumont PA-C   enoxaparin 40 mg Subcutaneous Daily Drew Toledo MD   famotidine 20 mg Oral BID PRN Kait Rojo MD   loperamide 2 mg Oral TID PRN Marty Parra PA-C   midodrine 15 mg Oral Q8H Drew Toledo MD   multivitamin-minerals 1 tablet Oral Daily Drew Toledo MD   nystatin  Topical BID Drew Toledo MD   ondansetron 4 mg Intravenous Q6H PRN Drew Toledo MD   pantoprazole 40 mg Oral BID AC Kait Rojo MD   polyethylene glycol 17 g Oral Daily PRN CONCEPCION Hernandez   saccharomyces boulardii 250 mg Oral BID Drew Toledo MD   senna 1 tablet Oral HS PRN Min Kennedy CRNA     Facility-Administered Medications Ordered in Other Encounters:  dexamethasone 4 mg Intravenous Once PRN Sandra Adair, DO    diphenhydrAMINE 12 5 mg Intravenous Once Sandra Mana, DO    lactated ringers 75 mL/hr Intravenous Continuous Sandra Adair DO Last Rate: 75 mL/hr (11/10/19 2105)   lactated ringers 50 mL/hr Intravenous Continuous Juliette Dean CRNA    lactated ringers 75 mL/hr Intravenous Continuous Sandra Adair DO Last Rate: Stopped (03/20/19 1841)   lactated ringers 75 mL/hr Intravenous Continuous Rome Arthur MD Last Rate: Stopped (03/20/19 1842)   ondansetron 4 mg Intravenous Once PRN Barney Mckeon MD    promethazine 12 5 mg Intravenous Once PRN Barney Mckeon MD         Today, Patient Was Seen By: CONCEPCION Ohara    ** Please Note: Dictation voice to text software may have been used in the creation of this document   **

## 2020-02-21 NOTE — ASSESSMENT & PLAN NOTE
· Labs appear consistent with anemia of chronic disease, continue to monitor closely    Hemoglobin stable   · 9 1    · Continue to monitor   Lab Results   Component Value Date    HGB 9 1 (L) 02/21/2020    HGB 9 8 (L) 02/15/2020    HGB 9 9 (L) 02/14/2020    HGB 9 8 (L) 02/14/2020    HGB 9 0 (L) 02/12/2020

## 2020-02-21 NOTE — CONSULTS
Consultation - [address] [place] [address] 71 y o  female MRN: [id number]  Unit/Bed#: [facility] 962-16 Encounter: [id number]      Chief Complaint:  I want to go home    History of Present Illness   Physician Requesting Consult: Heath Portillo MD  Reason for Consult / Principal Problem:  Competency evaluation, 2nd opinion diagnosis cognitive decline    Karla Lundy is a 71 y o  female with paraplegia following spinal cord injury, stage IV pressure ulcer of the right buttock, anemia chronic disease, neurogenic bladder, hypotension , history of Clostridium difficile infection presents with sepsis on 02/05/2020  Patient was in the SNF and was transferred to the hospital with low blood pressure and fever, she was having poor appetite generalized weakness and fatigue  She does have a history of spinal cord injury with paraplegia , she ambulate with a wheelchair she does have a chronic  Decubitus ulcer  She states that she has been out of her house since the end of October and she was feeling sick and she called 911 and was admitted to the hospital, from the hospital she was transferred to [facility] when she was there for a month, she was admitted to the hospital and from the [facility] here she was transferred to SNF  Patient states that she had live on her own for many years, she states that she has a friend who come to her with cleaning, she states that she was going to and wound care center she also had been able to take care of her wound because she used a mirow  to see the wounds and she care for that  She states that she feels depressed secondary to being in the hospital for so long and not being able to be home and be able to do what they she want to do  She states that she drives, she was going to the physicians in her own, she cooks her own meals  She states that she has a friend who has been making the payment for her apartment  She feels that she has support system outside    She states that she was not taking any medications other than vitamins  She states that she is able to move from the bed to the chair and she agree to work with PT and OT and asked them specifically what she needs to do to get better  She states that she understood her illness she had been paraplegic for many years after the accident   Patient denies any issue with sleep or appetite  Patient denies any suicidal thoughts plans or intent patient denies any psychotic symptoms  Psychiatric Review Of Systems:  sleep: no  appetite changes: no  weight changes: no  energy/anergy: no  interest/pleasure/anhedonia: no  somatic symptoms: no  anxiety/panic: no  jose: no  guilty/hopeless: no  self injurious behavior/risky behavior: no    Historical Information   Past Psychiatric History:   None  Currently in treatment with none  Past Suicide attempts:  None  Past Violent behavior:  None  Past Psychiatric medication trial:  None    Substance Abuse History:  Occasional alcohol, she denies any drugs     I have assessed this patient for substance use within the past 12 months     History of IP/OP rehabilitation program:  None  Smoking history:  Never smoked  Family Psychiatric History:   She states her father had depression    Social History  Education: Bachelor  degree in accounting  Learning Disabilities: None  Marital history:   Living arrangement, social support: She live alone  Occupational History: on permanent disability  Functioning Relationships: good support system    Other Pertinent History: No legal or  history    Traumatic History:   Abuse: None  Other Traumatic Events: She had an accident and became paraplegic    Past Medical History:   Diagnosis Date    Anemia     iron defiencey    Arthritis     osteo    Back injury     Chronic kidney disease     nephritis    Depression     MRSA (methicillin resistant staph aureus) culture positive 12/07/2018    BONE-TISSUE     Osteopenia     Osteoporosis  Paralysis ([address] )     paraplegic due to spinal cord injury    Paraplegia (HCC)     Poor circulation     Pressure injury of skin     right hip, stage 3    Pressure sore of hip     right    Tibial plateau fracture     Underweight        Medical Review Of Systems:  Review of Systems - Negative except appeared cachetic ,, paraplegic, right hip open wound with minimal drained noted, sad,  all other systems reviewed were negative    Meds/Allergies   current meds:   Current Facility-Administered Medications   Medication Dose Route Frequency    acetaminophen (TYLENOL) tablet 650 mg  650 mg Oral Q6H PRN    artificial tear (LUBRIFRESH P M ) ophthalmic ointment   Both Eyes HS    bisacodyl (DULCOLAX) rectal suppository 10 mg  10 mg Rectal Daily PRN    calcium carbonate-vitamin D (OSCAL-D) 500 mg-200 units per tablet 1 tablet  1 tablet Oral BID With Meals    cholecalciferol (VITAMIN D3) tablet 1,000 Units  1,000 Units Oral Daily    cyanocobalamin (VITAMIN B-12) tablet 100 mcg  100 mcg Oral Daily    dextran 70-hypromellose (GENTEAL TEARS) 0 1-0 3 % ophthalmic solution 1 drop  1 drop Both Eyes Q3H PRN    diphenhydrAMINE (BENADRYL) tablet 50 mg  50 mg Oral 60 Min Pre-Op    enoxaparin (LOVENOX) subcutaneous injection 40 mg  40 mg Subcutaneous Daily    famotidine (PEPCID) tablet 20 mg  20 mg Oral BID PRN    loperamide (IMODIUM) capsule 2 mg  2 mg Oral TID PRN    midodrine (PROAMATINE) tablet 15 mg  15 mg Oral Q8H    multivitamin-minerals (CENTRUM) tablet 1 tablet  1 tablet Oral Daily    nystatin (MYCOSTATIN) powder   Topical BID    ondansetron (ZOFRAN) injection 4 mg  4 mg Intravenous Q6H PRN    pantoprazole (PROTONIX) EC tablet 40 mg  40 mg Oral BID AC    polyethylene glycol (MIRALAX) packet 17 g  17 g Oral Daily PRN    saccharomyces boulardii (FLORASTOR) capsule 250 mg  250 mg Oral BID    senna (SENOKOT) tablet 8 6 mg  1 tablet Oral HS PRN     Facility-Administered Medications Ordered in Other Encounters Medication Dose Route Frequency    dexamethasone (DECADRON) injection 4 mg  4 mg Intravenous Once PRN    diphenhydrAMINE (BENADRYL) injection 12 5 mg  12 5 mg Intravenous Once    lactated ringers infusion  75 mL/hr Intravenous Continuous    lactated ringers infusion  50 mL/hr Intravenous Continuous    lactated ringers infusion  75 mL/hr Intravenous Continuous    lactated ringers infusion  75 mL/hr Intravenous Continuous    ondansetron (ZOFRAN) injection 4 mg  4 mg Intravenous Once PRN    promethazine (PHENERGAN) injection 12 5 mg  12 5 mg Intravenous Once PRN     Allergies   Allergen Reactions    Iv Contrast [Iodinated Diagnostic Agents]      Tingling face, itching    Cefepime Rash    Ciprofloxacin Rash and Swelling     Looked like suburn, itching  Bed sores  Swelling, itching    Latex Rash    Vancomycin Rash     Unknown        Objective   Vital signs in last 24 hours:  Temp:  [97 8 °F (36 6 °C)-98 3 °F (36 8 °C)] 98 °F (36 7 °C)  HR:  [74-99] 99  Resp:  [17-18] 17  BP: (119-126)/(65-81) 126/81      Intake/Output Summary (Last 24 hours) at 2/21/2020 1455  Last data filed at 2/21/2020 1417  Gross per 24 hour   Intake 400 ml   Output 1075 ml   Net -675 ml       Mental Status Evaluation:  Appearance:  age appropriate and disheveled   Behavior:  Cooperative   Speech:  normal pitch and normal volume   Mood:  sad   Affect:  mood-congruent   Language: naming objects and repeating phrases   Thought Process:  goal directed   Associations: intact associations   Thought Content:  normal   Perceptual Disturbances: None   Risk Potential: She denies any suicidal homicidal ideation plan or intent   Sensorium:  person, place, time/date, situation, day of week and month of year   Memory:  recent and remote memory grossly intact   Cognition:  grossly intact   Consciousness:  alert and awake    Attention: attention span and concentration were age appropriate   Intellect: within normal limits   Fund of Knowledge: awareness of current events: Good, past history: Good and vocabulary: Good   Insight:  fair   Judgment: fair   Muscle Strength and Tone: Within normal limits   Gait/Station: Patient is paraplegic   Motor Activity: no abnormal movements     Lab Results:    I have personally reviewed all pertinent laboratory/tests results  Labs in last 72 hours:   Recent Labs     02/21/20  0459   WBC 9 44   RBC 3 60*   HGB 9 1*   HCT 30 1*   *   RDW 17 7*   SODIUM 136   K 4 2      CO2 28   BUN 19   CREATININE 0 46*   GLUC 103   CALCIUM 9 0       Code Status: )Level 1 - Full Code    Assessment/Plan     Assessment:  Loly Galarza is a 71 y o  female with a history of paraplegia, open wound of right hip, severe protein calorie malnutrition, neurogenic bladder, anemia chronic disease, decubitus of the left ischium stage III who was admitted to the hospital with sepsis  Patient was evaluated on 02/12/2020 for capacity at the moment she was found no having decision-making capacity  Today patient was evaluated again for decision-making capacity patient states that she had been out of the house since the end of October after she was very sick and was admitted to the hospital from there she went to [facility], came back to the hospital and went back to SNF where she also was sick again and transferred to the hospital with sepsis  She states that she was living by herself prior to this admission, she was taking care of herself and when she got sick she called the ambulance herself  She states that she was driving, she was cooking her home meds, she was having somebody who come to help with cleaning  She has friends who have been helping with make payments to her apartment  She states that if PT and OT feels that she needs to go to SNF she will go but she wants that they explained what is the reason that she needs to go and what is the expectation for her stay in SNF    She does understand her medical conditions, she does understand that this is a condition that will not get better, she does understand that she need to have wound care  She states that she feels depressed secondary to being in the hospital for so long and she wanted to be home and go back to her life  Patient denies any suicidal thoughts plans or intent, patient denies any psychotic symptoms  At this moment patient does have decision-making capacity to make decisions about her medical treatment and her discharge planning      Diagnosis:  Adjustment disorder with depressed mood  Plan:   Continue medical management  Patient is not interested in any antidepressant, she states that her depression is situational and she feels that if she go home and she had her friends around she will feel better  PT and OT will re-evaluate the patient  Discussed with primary team and the  about that the patient have decision-making capacity at this moment  No other intervention at this moment  I will sign off  Risks, benefits and possible side effects of Medications:   No medication given      Nadir Rodriguez MD

## 2020-02-21 NOTE — WOUND OSTOMY CARE
Progress Note - Wound   Nam Iqbal 71 y o  female MRN: [id number]  Unit/Bed#: [facility] 954-81 Encounter: [id number]        Assessment:   Patient is seen for wound care follow up  Patient examined in bed  Patient is incontinent of bowel and has an urinary catheter  Patient is on a low air loss mattress and should continue to stay on one  Patient's case discussed with Dr Cristina Regalado of surgery to reevaluate patient  Findings  1  POA hyperpigmented (purple) blanchable area with scar tissue on sacrum now open with partial thickness wound likely from moisture and friction  This is not a pressure injury        2  POA stage 3 on left ischium now partially obscured by  Yellow slough an appearing as unstageable at this time        3  POA stage 4 pressure injury on right hip          4  POA stage 4 pressure injury on right anterior thigh/hip      See flowsheets for details      Skin care plans:  1-Apply Allevyn Life foam dressing to bilateral heels, left hip bony prominence for prevention   Austin with P   Peel back at least daily for skin assessment and re-apply   Change dressing every 3 days and PRN  2-Elevate heels to offload pressure  3-Ehob cushion in chair when out of bed  4-Moisturize skin daily with skin nourishing cream   5-Turn/reposition q2h or when medically stable for pressure re-distribution on skin  6-Rinse right hip, right anterior thigh/hip, left ischium pressure injuries, and midline sacral partial thickness wound with saline  Cover all wounds with silver maxorb and cover with Allevyn foam  Change right leg dressings day and left leg and sacrum dressings every other day and as needed  Call or Tiger text with any questions    Wound Care will continue to follow    Wound 02/06/20 Pressure Injury Hip Right (Active)   Wound Image   2/13/2020 11:17 AM   Wound Description Drainage;Edema;Fragile;Slough 2/21/2020  9:23 AM   Staging Stage IV 2/16/2020 10:23 AM   Yasemin-wound Assessment Intact 2/21/2020 9: 23 AM   Wound Length (cm) 6 2 cm 2/21/2020  9:38 AM   Wound Width (cm) 6 5 cm 2/21/2020  9:38 AM   Wound Depth (cm) 6 5 2/21/2020  9:38 AM   Calculated Wound Area (cm^2) 40 3 cm^2 2/21/2020  9:38 AM   Calculated Wound Volume (cm^3) 261 95 cm^3 2/21/2020  9:38 AM   Change in Wound Size % -5191 92 2/21/2020  9:38 AM   Drainage Amount Large 2/21/2020  9:38 AM   Drainage Description Green;Serosanguineous 2/21/2020  9:38 AM   Non-staged Wound Description Full thickness 2/13/2020 11:17 AM   Treatments Irrigation with NSS 2/21/2020  9:38 AM   Dressing Foam, Silicon (eg  Allevyn, etc); Dry dressing 2/21/2020 10:16 AM   Dressing Changed Changed 2/21/2020 10:16 AM   Patient Tolerance Tolerated well 2/16/2020 10:23 AM   Dressing Status Clean;Dry; Intact 2/21/2020  9:23 AM       Wound 02/06/20 Pressure Injury Thigh Anterior;Proximal;Right (Active)   Wound Image   2/21/2020  9:38 AM   Wound Description Drainage;Edema;Fragile;Slough 2/21/2020  9:23 AM   Staging Stage IV 2/21/2020  9:38 AM   Yasemin-wound Assessment Intact 2/21/2020  9:23 AM   Wound Length (cm) 2 cm 2/21/2020  9:38 AM   Wound Width (cm) 3 5 cm 2/21/2020  9:38 AM   Wound Depth (cm) 0 5 2/21/2020  9:38 AM   Calculated Wound Area (cm^2) 7 cm^2 2/21/2020  9:38 AM   Calculated Wound Volume (cm^3) 3 5 cm^3 2/21/2020  9:38 AM   Change in Wound Size % 55 13 2/21/2020  9:38 AM   Drainage Amount Moderate 2/21/2020  9:38 AM   Drainage Description Green;Serosanguineous 2/21/2020  9:38 AM   Non-staged Wound Description Full thickness 2/13/2020 11:13 AM   Treatments Irrigation with NSS 2/21/2020  9:38 AM   Dressing Calcium Alginate with Silver; Foam, Silicon (eg  Allevyn, etc) 2/21/2020  9:38 AM   Dressing Changed Changed 2/21/2020  9:38 AM   Patient Tolerance Tolerated well 2/15/2020  9:42 AM   Dressing Status Clean;Dry; Intact 2/21/2020  9:23 AM       Wound 02/06/20 Pressure Injury Ischium Left (Active)   Wound Image   2/21/2020  9:42 AM   Wound Description Slough; Yellow; Beefy red 2/21/2020  9:42 AM   Staging Unstagable 2/21/2020  9:42 AM   Yasemin-wound Assessment Fragile;Erythema 2/21/2020  9:42 AM   Wound Length (cm) 6 cm 2/21/2020  9:42 AM   Wound Width (cm) 3 5 cm 2/21/2020  9:42 AM   Wound Depth (cm) 0 4 2/21/2020  9:42 AM   Calculated Wound Area (cm^2) 21 cm^2 2/21/2020  9:42 AM   Calculated Wound Volume (cm^3) 8 4 cm^3 2/21/2020  9:42 AM   Change in Wound Size % 38 46 2/21/2020  9:42 AM   Drainage Amount Moderate 2/21/2020  9:42 AM   Drainage Description Serosanguineous 2/21/2020  9:42 AM   Non-staged Wound Description Full thickness 2/13/2020 11:33 AM   Treatments Irrigation with NSS 2/21/2020  9:42 AM   Dressing Calcium Alginate with Silver;ABD 2/21/2020  9:42 AM   Dressing Changed Changed 2/21/2020  9:42 AM   Patient Tolerance Tolerated well 2/19/2020 12:00 PM   Dressing Status Clean;Dry; Intact 2/21/2020  9:23 AM       Wound 02/21/20 Sacrum Mid (Active)   Wound Image   2/21/2020  9:46 AM   Wound Description Clean;Drainage;Fragile;Pink 2/21/2020  9:46 AM   Yasemin-wound Assessment Clean;Dry;Fragile; Hyperpigmented; Purple 2/21/2020  9:46 AM   Wound Length (cm) 0 5 cm 2/21/2020  9:46 AM   Wound Width (cm) 0 5 cm 2/21/2020  9:46 AM   Calculated Wound Area (cm^2) 0 25 cm^2 2/21/2020  9:46 AM   Drainage Amount Scant 2/21/2020  9:46 AM   Drainage Description Serosanguineous 2/21/2020  9:46 AM   Non-staged Wound Description Partial thickness 2/21/2020  9:46 AM   Treatments Irrigation with NSS 2/21/2020  9:46 AM   Dressing Calcium Alginate with Silver; Foam, Silicon (eg   Allevyn, etc) 2/21/2020  9:46 AM

## 2020-02-21 NOTE — OCCUPATIONAL THERAPY NOTE
Occupational Therapy Treatment Note:       02/21/20 0295   Restrictions/Precautions   Other Precautions Contact/isolation   ADL   Where Assessed Supine, bed   LB Dressing Assistance 4  Minimal Assistance   LB Dressing Deficit   (pt donned socks underwear and pants)   Toileting Assistance  1  Total Assistance   Toileting Deficit   (indwelling catheter)   Toileting Comments   (incontinent of bowel whenever rolling  ta to clean bowel)   Bed Mobility   Supine to Sit 5  Supervision   Transfers   Sliding Board transfer 4  Minimal assistance  (to from bed and w/c)   Cognition   Overall Cognitive Status WFL   Assessment   Assessment pt participated in m ot session and was seen focusing on slide board trnasfers from bed to w/c, w/c mobility in halls and lb dressing including leaning r / l for clothing management  pt was able tothread catheter into pants, transfer self to w/c including board placement for transfer with  minimal asst and increased time  pt is motivated to retrun home without needing in pt rehab  pt continues to be recommended for in pt rehab however will focus on incrasing pts independence with goal areas  pt reports owning appropraite and workable dme in home enciornment  pt is recommended toclarify with dr about amt of time recommended to be upright on buttocks and oob  pt currently is 1 hr sitting per day  pt required minimal cues for w/c management  pt is unsure if she could get w/c to hospital inorder to practice in therapy  nsg is aware pt is in chair  and needs to return to bed near 5 pm today as well as slide board status and pts desire to propell around hagan x 1 more this day    Plan   Treatment Interventions ADL retraining;Functional transfer training; Endurance training;Patient/family training;Equipment evaluation/education; Activityengagement   Goal Expiration Date 02/25/20   OT Treatment Day 4   OT Frequency 3-5x/wk   Recommendation   OT Discharge Recommendation Short Term Rehab   OT - OK to Discharge Yes   April A Storm, PETERSON

## 2020-02-21 NOTE — PLAN OF CARE
Problem: Potential for Falls  Goal: Patient will remain free of falls  Description  INTERVENTIONS:  - Assess patient frequently for physical needs  -  Identify cognitive and physical deficits and behaviors that affect risk of falls    -  [place] fall precautions as indicated by assessment   - Educate patient/family on patient safety including physical limitations  - Instruct patient to call for assistance with activity based on assessment  - Modify environment to reduce risk of injury  - Consider OT/PT consult to assist with strengthening/mobility  Outcome: Progressing     Problem: Prexisting or High Potential for Compromised Skin Integrity  Goal: Skin integrity is maintained or improved  Description  INTERVENTIONS:  - Identify patients at risk for skin breakdown  - Assess and monitor skin integrity  - Assess and monitor nutrition and hydration status  - Monitor labs   - Assess for incontinence   - Turn and reposition patient  - Assist with mobility/ambulation  - Relieve pressure over bony prominences  - Avoid friction and shearing  - Provide appropriate hygiene as needed including keeping skin clean and dry  - Evaluate need for skin moisturizer/barrier cream  - Collaborate with interdisciplinary team   - Patient/family teaching  - Consider wound care consult   Outcome: Progressing     Problem: PAIN - ADULT  Goal: Verbalizes/displays adequate comfort level or baseline comfort level  Description  Interventions:  - Encourage patient to monitor pain and request assistance  - Assess pain using appropriate pain scale  - Administer analgesics based on type and severity of pain and evaluate response  - Implement non-pharmacological measures as appropriate and evaluate response  - Consider cultural and social influences on pain and pain management  - Notify physician/advanced practitioner if interventions unsuccessful or patient reports new pain  Outcome: Progressing     Problem: INFECTION - ADULT  Goal: Absence or prevention of progression during hospitalization  Description  INTERVENTIONS:  - Assess and monitor for signs and symptoms of infection  - Monitor lab/diagnostic results  - Monitor all insertion sites, i e  indwelling lines, tubes, and drains  - Monitor endotracheal if appropriate and nasal secretions for changes in amount and color  - [place] appropriate cooling/warming therapies per order  - Administer medications as ordered  - Instruct and encourage patient and family to use good hand hygiene technique  - Identify and instruct in appropriate isolation precautions for identified infection/condition  Outcome: Progressing  Goal: Absence of fever/infection during neutropenic period  Description  INTERVENTIONS:  - Monitor WBC    Outcome: Progressing     Problem: SAFETY ADULT  Goal: Maintain or return to baseline ADL function  Description  INTERVENTIONS:  -  Assess patient's ability to carry out ADLs; assess patient's baseline for ADL function and identify physical deficits which impact ability to perform ADLs (bathing, care of mouth/teeth, toileting, grooming, dressing, etc )  - Assess/evaluate cause of self-care deficits   - Assess range of motion  - Assess patient's mobility; develop plan if impaired  - Assess patient's need for assistive devices and provide as appropriate  - Encourage maximum independence but intervene and supervise when necessary  - Involve family in performance of ADLs  - Assess for home care needs following discharge   - Consider OT consult to assist with ADL evaluation and planning for discharge  - Provide patient education as appropriate  Outcome: Progressing  Goal: Maintain or return mobility status to optimal level  Description  INTERVENTIONS:  - Assess patient's baseline mobility status (ambulation, transfers, stairs, etc )    - Identify cognitive and physical deficits and behaviors that affect mobility  - Identify mobility aids required to assist with transfers and/or ambulation (gait belt, sit-to-stand, lift, walker, cane, etc )  - [place] fall precautions as indicated by assessment  - Record patient progress and toleration of activity level on Mobility SBAR; progress patient to next Phase/Stage  - Instruct patient to call for assistance with activity based on assessment  - Consider rehabilitation consult to assist with strengthening/weightbearing, etc   Outcome: Progressing     Problem: DISCHARGE PLANNING  Goal: Discharge to home or other facility with appropriate resources  Description  INTERVENTIONS:  - Identify barriers to discharge w/patient and caregiver  - Arrange for needed discharge resources and transportation as appropriate  - Identify discharge learning needs (meds, wound care, etc )  - Arrange for interpretive services to assist at discharge as needed  - Refer to Case Management Department for coordinating discharge planning if the patient needs post-hospital services based on physician/advanced practitioner order or complex needs related to functional status, cognitive ability, or social support system  Outcome: Progressing     Problem: Knowledge Deficit  Goal: Patient/family/caregiver demonstrates understanding of disease process, treatment plan, medications, and discharge instructions  Description  Complete learning assessment and assess knowledge base  Interventions:  - Provide teaching at level of understanding  - Provide teaching via preferred learning methods  Outcome: Progressing     Problem: OCCUPATIONAL THERAPY ADULT  Goal: Performs self-care activities at highest level of function for planned discharge setting  See evaluation for individualized goals    Description  Treatment Interventions: ADL retraining, Functional transfer training, UE strengthening/ROM, Endurance training, Cognitive reorientation, Patient/family training, Compensatory technique education, Equipment evaluation/education, Energy conservation, Activityengagement          See flowsheet documentation for full assessment, interventions and recommendations  2/21/2020 1639 by NCIHOLAS Domínguez  Outcome: Progressing  Note:   Limitation: Decreased ADL status, Decreased Safe judgement during ADL, Decreased endurance, Decreased self-care trans, Decreased high-level ADLs  Prognosis: Good, Fair  Assessment: pt participated in m ot session and was seen focusing on slide board trnasfers from bed to w/c, w/c mobility in halls and lb dressing including leaning r / l for clothing management  pt was able tothread catheter into pants, transfer self to w/c including board placement for transfer with  minimal asst and increased time  pt is motivated to retrun home without needing in pt rehab  pt continues to be recommended for in pt rehab however will focus on incrasing pts independence with goal areas  pt reports owning appropraite and workable dme in home enciornment  pt is recommended toclarify with dr about amt of time recommended to be upright on buttocks and oob  pt currently is 1 hr sitting per day  pt required minimal cues for w/c management  pt is unsure if she could get w/c to hospital inorder to practice in therapy  nsg is aware pt is in chair  and needs to return to bed near 5 pm today as well as slide board status and pts desire to propell around hagan x 1 more this day      OT Discharge Recommendation: Short Term Rehab  OT - OK to Discharge: Yes    2/21/2020 1639 by NICHOLAS Domínguez  Outcome: Progressing  Note:   Limitation: Decreased ADL status, Decreased Safe judgement during ADL, Decreased endurance, Decreased self-care trans, Decreased high-level ADLs  Prognosis: Good, Fair  Assessment: pt participated in m ot session and was seen focusing on slide board trnasfers from bed to w/c, w/c mobility in halls and lb dressing including leaning r / l for clothing management  pt was able tothread catheter into pants, transfer self to w/c including board placement for transfer with  minimal asst and increased time  pt is motivated to retrun home without needing in pt rehab  pt continues to be recommended for in pt rehab however will focus on incrasing pts independence with goal areas  pt reports owning appropraite and workable dme in home enciornment  pt is recommended toclarify with dr about amt of time recommended to be upright on buttocks and oob  pt currently is 1 hr sitting per day  pt required minimal cues for w/c management  pt is unsure if she could get w/c to hospital inorder to practice in therapy   nsg is aware pt is in chair  and needs to return to bed near 5 pm today as well as slide board status and pts desire to propell around hagan x 1 more this day      OT Discharge Recommendation: Short Term Rehab  OT - OK to Discharge: Yes       Problem: SKIN/TISSUE INTEGRITY - ADULT  Goal: Skin integrity remains intact  Description  INTERVENTIONS  - Identify patients at risk for skin breakdown  - Assess and monitor skin integrity  - Assess and monitor nutrition and hydration status  - Monitor labs (i e  albumin)  - Assess for incontinence   - Turn and reposition patient  - Assist with mobility/ambulation  - Relieve pressure over bony prominences  - Avoid friction and shearing  - Provide appropriate hygiene as needed including keeping skin clean and dry  - Evaluate need for skin moisturizer/barrier cream  - Collaborate with interdisciplinary team (i e  Nutrition, Rehabilitation, etc )   - Patient/family teaching  Outcome: Progressing  Goal: Incision(s), wounds(s) or drain site(s) healing without S/S of infection  Description  INTERVENTIONS  - Assess and document risk factors for skin impairment   - Assess and document dressing, incision, wound bed, drain sites and surrounding tissue  - Consider nutrition services referral as needed  - Oral mucous membranes remain intact  - Provide patient/ family education  Outcome: Progressing  Goal: Oral mucous membranes remain intact  Description  INTERVENTIONS  - Assess oral mucosa and hygiene practices  - Implement preventative oral hygiene regimen  - Implement oral medicated treatments as ordered  - Initiate Nutrition services referral as needed  Outcome: Progressing     Problem: HEMATOLOGIC - ADULT  Goal: Maintains hematologic stability  Description  INTERVENTIONS  - Assess for signs and symptoms of bleeding or hemorrhage  - Monitor labs  - Administer supportive blood products/factors as ordered and appropriate  Outcome: Progressing     Problem: MUSCULOSKELETAL - ADULT  Goal: Maintain or return mobility to safest level of function  Description  INTERVENTIONS:  - Assess patient's ability to carry out ADLs; assess patient's baseline for ADL function and identify physical deficits which impact ability to perform ADLs (bathing, care of mouth/teeth, toileting, grooming, dressing, etc )  - Assess/evaluate cause of self-care deficits   - Assess range of motion  - Assess patient's mobility  - Assess patient's need for assistive devices and provide as appropriate  - Encourage maximum independence but intervene and supervise when necessary  - Involve family in performance of ADLs  - Assess for home care needs following discharge   - Consider OT consult to assist with ADL evaluation and planning for discharge  - Provide patient education as appropriate  Outcome: Progressing  Goal: Maintain proper alignment of affected body part  Description  INTERVENTIONS:  - Support, maintain and protect limb and body alignment  - Provide patient/ family with appropriate education  Outcome: Progressing

## 2020-02-21 NOTE — ASSESSMENT & PLAN NOTE
CT scan of the chest reports possible heart failure as it relates to ground-glass appearance on the left lung  Also has left lower lobe consolidation that is concerning for atelectasis versus infection  Patient with no history of heart failure  Clinical exam not consistent with heart failure  · BNP is elevated   · Currently not on IV fluids  · Routine echo- EF 55 % G1DD  · incentive spirometry

## 2020-02-21 NOTE — ED ATTENDING ATTESTATION
2/5/2020  IBonnie MD, saw and evaluated the patient  I have discussed the patient with the resident/non-physician practitioner and agree with the resident's/non-physician practitioner's findings, Plan of Care, and MDM as documented in the resident's/non-physician practitioner's note, except where noted  All available labs and Radiology studies were reviewed  I was present for key portions of any procedure(s) performed by the resident/non-physician practitioner and I was immediately available to provide assistance  At this point I agree with the current assessment done in the Emergency Department  I have conducted an independent evaluation of this patient a history and physical is as follows:    ED Course     Patient sent in for evaluation after staff at her facility was unable to obtain a blood pressure  Patient had just completed physical therapy and was reporting feeling nauseous after returning  Staff recheck blood pressure was normal but at that time, patient was noted to have a fever  Currently, patient reports feeling generalized fatigue, nausea, and heartburn but she states that the heartburn has been present for several weeks  Patient is a T8 paraplegic and has chronic wounds  Staff also believes that her chronic wounds appear more erythematous and with more drainage  No additional complaints  Exam: AAOx3, NAD, RRR, CTA, S/NT/ND, no sensation below umbilicus, muscle wasting of lower extremities, chronic wounds of right hip and sacrum with significant drainage and erythema  A/P:  Fever, chronic wounds  Will check labs, urine, and will CT pelvis to evaluate depth and extent of wounds  Will likely need antibiotics and admission      Critical Care Time  Procedures

## 2020-02-21 NOTE — DISCHARGE INSTR - OTHER ORDERS
Skin care plans:  1-Apply Allevyn Life foam dressing to bilateral heels, left hip bony prominence for prevention   [place] with P   Peel back at least daily for skin assessment and re-apply   Change dressing every 3 days and PRN  2-Elevate heels to offload pressure  3-Ehob cushion in chair when out of bed  4-Moisturize skin daily with skin nourishing cream   5-Turn/reposition q2h or when medically stable for pressure re-distribution on skin  6-Rinse right hip, right anterior thigh/hip, left ischium pressure injuries, and midline sacral partial thickness wound with saline  Cover all wounds with silver maxorb and cover with Allevyn foam  Change right leg dressings day and left leg and sacrum dressings every other day and as needed

## 2020-02-21 NOTE — ASSESSMENT & PLAN NOTE
· Patient with a known past medical history for paraplegia following spinal cord injury presents with increased drainage of R hip wound  · Known to history of hip osteo and dislocation of the R femur  Patient is nonweightbearing  On admission had fever, chills  · CT repeated 2/8 and compared to February 5, 2020 showing improvement in gas pockets  · Ortho following; no plans for any surgical intervention at this time  · General surgery following: continue localized wound care  No plans for any surgical intervention  · ID following: IV Zosyn completed  repeat CT scan shows improvement  · Per ID: "if no plan for girdle stone procedure and flap to cover the wound, the role of long-term IV antibiotics is limited with risk of side effects from prolonged antibiotics outweighs the benefit   This has been explained at length to the patient  " ten-day Course completed 2/11  · Continue local wound care  · Situational depression- recommend outpatient referral to counseling     · Concern about patient's capacity, although she is currently AO X 3 per my exam today  · She was seen by neuropsych 2/12 and deemed incompetent, also refused antidepressants at that time  · Reevaluation by neuropsych pending

## 2020-02-21 NOTE — SOCIAL WORK
Patient now deemed competent and needs a PT evaluation for return to home  Patient provided with home health aid info and [facility] for wound care agency list  OT advises patient still may need rehab  PT pending

## 2020-02-21 NOTE — SOCIAL WORK
Spoke with TASHA Carcamo to discuss delays in obtaining the capacity evaluation  Patient has requested a second option from another provider  Psych will be consulted to complete the capacity evaluation  Guardianship process is on hold at this time until capacity re-evaluation is completed  Statement Selected

## 2020-02-21 NOTE — ASSESSMENT & PLAN NOTE
Malnutrition Findings:   Malnutrition type: Chronic illness(Severe pro/sara malnutrition r/t disease/condition as evidenced by 12% unintentional wt loss since 11/10/19, consuming < 75% energy intake compared to est energy needs > 1 month, fat/muscle wasting of orbital/temple areas  Treated with diet/supplements)  Degree of Malnutrition: Other severe protein calorie malnutrition    BMI Findings:  BMI Classifications: Underweight < 18 5     Body mass index is 17 97 kg/m²  Patient does admit to depression  Pastoral care consulted  She may benefit from counseling at discharge  Would recommend supplementation as above    Have ensure kulwinder pudding at lunch will magic ice cream at dinner time

## 2020-02-21 NOTE — ASSESSMENT & PLAN NOTE
· POA with fever and leukocytosis with infectious source likely being R hip wound with underlying iliopsoas myositis  No signs of PNA or intra-abdominal infection  Review of repeat CT scan shows known osteomyelitis of the right hip with dislocation  Pockets of gas have decreased since the initial consult  No evidence for abscesses  · Repeat CT shows no evidence for abscesses  · Blood cultures 1/2 positive for coag-neg staph, other negative  Repeat cultures negative for 5 days   · Urine culture + for proteus and e coli; likely asymptomatic bacteriuria per ID; isabel exchanged   · Continue IV abx per ID recommendations complete 10 days total   Last dose 2/11   · ID with preference for surgical intervention for definitive source control however, no focus for interventional radiology intervention and surgery and orthopedics not currently offering any surgical management  Cleared 2/12 by ID   · Sepsis has resolved  · PT/OT recommending returning to [facility]  · However patient with need for neuropsychology evaluation performed on 02/12/2020, determined that patient does not appear to have capacity to make medical decisions  · Patient noted to have major depressive disorder, moderate without psychotic features patient stating to neuropsychology that she is not interested in taking antidepressant medications because they would not help her condition  Patient requesting her friend be made her POA, as she does not want to made a target    Neuropsych reconsulted  2/19 for capacity evaluation

## 2020-02-21 NOTE — ASSESSMENT & PLAN NOTE
· Patient does report depression over continued chronic hospital care and chronic illness  · Pastoral care requested  · She may benefit from outpatient counseling      · In review of neuropsychology note patient is severely depressed refusing antidepressant medication

## 2020-02-22 PROCEDURE — 99232 SBSQ HOSP IP/OBS MODERATE 35: CPT | Performed by: NURSE PRACTITIONER

## 2020-02-22 PROCEDURE — 97530 THERAPEUTIC ACTIVITIES: CPT

## 2020-02-22 PROCEDURE — 97535 SELF CARE MNGMENT TRAINING: CPT

## 2020-02-22 RX ADMIN — MIDODRINE HYDROCHLORIDE 15 MG: 5 TABLET ORAL at 21:59

## 2020-02-22 RX ADMIN — DEXTRAN 70 AND HYPROMELLOSE 2910 1 DROP: 1; 3 SOLUTION/ DROPS OPHTHALMIC at 09:54

## 2020-02-22 RX ADMIN — Medication 1 TABLET: at 09:54

## 2020-02-22 RX ADMIN — MELATONIN 1000 UNITS: at 09:55

## 2020-02-22 RX ADMIN — Medication 1 TABLET: at 17:32

## 2020-02-22 RX ADMIN — Medication 1 TABLET: at 09:55

## 2020-02-22 RX ADMIN — PANTOPRAZOLE SODIUM 40 MG: 40 TABLET, DELAYED RELEASE ORAL at 17:32

## 2020-02-22 RX ADMIN — ENOXAPARIN SODIUM 40 MG: 40 INJECTION SUBCUTANEOUS at 09:54

## 2020-02-22 RX ADMIN — NYSTATIN: 100000 POWDER TOPICAL at 17:32

## 2020-02-22 RX ADMIN — Medication 250 MG: at 09:55

## 2020-02-22 RX ADMIN — VITAM B12 100 MCG: 100 TAB at 09:54

## 2020-02-22 RX ADMIN — Medication 250 MG: at 17:32

## 2020-02-22 RX ADMIN — PANTOPRAZOLE SODIUM 40 MG: 40 TABLET, DELAYED RELEASE ORAL at 06:26

## 2020-02-22 RX ADMIN — NYSTATIN: 100000 POWDER TOPICAL at 09:55

## 2020-02-22 RX ADMIN — MIDODRINE HYDROCHLORIDE 15 MG: 5 TABLET ORAL at 13:20

## 2020-02-22 RX ADMIN — WHITE PETROLATUM 57.7 %-MINERAL OIL 31.9 % EYE OINTMENT: at 21:59

## 2020-02-22 RX ADMIN — MIDODRINE HYDROCHLORIDE 15 MG: 5 TABLET ORAL at 06:26

## 2020-02-22 NOTE — ASSESSMENT & PLAN NOTE
· POA with fever and leukocytosis with infectious source likely being R hip wound with underlying iliopsoas myositis  No signs of PNA or intra-abdominal infection  Review of repeat CT scan shows known osteomyelitis of the right hip with dislocation  Pockets of gas have decreased since the initial consult  No evidence for abscesses  · Repeat CT shows no evidence for abscesses  · Blood cultures 1/2 positive for coag-neg staph, other negative  Repeat cultures negative for 5 days   · Urine culture + for proteus and e coli; likely asymptomatic bacteriuria per ID; isabel exchanged   · Continue IV abx per ID recommendations complete 10 days total   Last dose 2/11   · ID with preference for surgical intervention for definitive source control however, no focus for interventional radiology intervention and surgery and orthopedics not currently offering any surgical management  Cleared 2/12 by ID   · Sepsis has resolved  · PT[facility]  · Pending repeat PT eval   · OT still recommending STR   · Pt was re-evaluated by psych yesterday and deemed competent   · Patient requesting her friend be made her POA

## 2020-02-22 NOTE — ASSESSMENT & PLAN NOTE
· History of spinal cord injury T8 with paraplegia sp 20 years ago hand gliding accident   · Presented from [facility]  · Seen by neuropsychology 2/12/20 and deemed incompetent, also refused antidepressants at that time- re-evaluated by psych on 2/21 and deemed competent   · Continue with repositioning and wound care    · Out of bed as able

## 2020-02-22 NOTE — PLAN OF CARE
Problem: PHYSICAL THERAPY ADULT  Goal: Performs mobility at highest level of function for planned discharge setting  See evaluation for individualized goals  Description  Treatment/Interventions: Functional transfer training, LE strengthening/ROM, Endurance training, Patient/family training, Equipment eval/education, Bed mobility, Spoke to nursing, Spoke to case management(w/c training)  Equipment Recommended: Wheelchair       See flowsheet documentation for full assessment, interventions and recommendations  Outcome: Progressing  Note:   Prognosis: Fair  Problem List: Decreased strength, Decreased range of motion, Impaired balance, Decreased mobility, Decreased skin integrity  Assessment: Pt continues to perform functional transfers with decreased assist this session  Did require extensive time to perform all tasks without assist, simulating home set up  Pt did demonstrate 2 posterior LOB while transferring OOB, requiring assist to recover, prevent posterior head strike on bed or slide OOB to floor  Pt required assist to remove board post transfer  Pt able to perform wc mobility & demonstrate knowledge of safety features throughout session without instructions  Assisted pt back to bed afterwards with use of slide board again  Pt demonstrated slight improvement in ability to clear seated surface, but will benefit from further practice & UE strengthening to do so safely & prevent further skin breakdown  Pt demonstrated rolling L & R throughout session to don clothing, change linens, and clean posterior after noted BM  Pt instructed in importance of maintaining perihygeine, especially with wound location to prevent infection  Pt verbalized understanding and will benefit from future sessions to ensure carryover of allinstrucitons given  Discussed reality of pt being able to manage independence at home after performing mobility tasks & current recommendations for rehab    Pt in agreement that she needs to get stronger & improve mobility & self care to ensure safe return home at this time  Will continue to benefit from therapy services, working towards goals established in [address]  Barriers to Discharge: Decreased caregiver support     Recommendation: Post acute IP rehab     PT - OK to Discharge: Yes    See flowsheet documentation for full assessment

## 2020-02-22 NOTE — PROGRESS NOTES
Progress Note - Edson Render 1950, 71 y o  female MRN: [id number]    Unit/Bed#: [facility] 627-46 Encounter: [id number]    Primary Care Provider: Zeus Monsivais   Date and time admitted to hospital: 2/5/2020  3:25 PM        * Sepsis [facility])  Assessment & Plan  · POA with fever and leukocytosis with infectious source likely being R hip wound with underlying iliopsoas myositis  No signs of PNA or intra-abdominal infection  Review of repeat CT scan shows known osteomyelitis of the right hip with dislocation  Pockets of gas have decreased since the initial consult  No evidence for abscesses  · Repeat CT shows no evidence for abscesses  · Blood cultures 1/2 positive for coag-neg staph, other negative  Repeat cultures negative for 5 days   · Urine culture + for proteus and e coli; likely asymptomatic bacteriuria per ID; isabel exchanged   · Continue IV abx per ID recommendations complete 10 days total   Last dose 2/11   · ID with preference for surgical intervention for definitive source control however, no focus for interventional radiology intervention and surgery and orthopedics not currently offering any surgical management  Cleared 2/12 by ID   · Sepsis has resolved  · PT/OT recommending returning to [facility]  · Pending repeat PT eval   · OT still recommending STR   · Pt was re-evaluated by psych yesterday and deemed competent   · Patient requesting her friend be made her POA  Open wound of right hip  Assessment & Plan  · Patient with a known past medical history for paraplegia following spinal cord injury presents with increased drainage of R hip wound  · Known to history of hip osteo and dislocation of the R femur  Patient is nonweightbearing  On admission had fever, chills  · CT repeated 2/8 and compared to February 5, 2020 showing improvement in gas pockets  · Ortho input appreciated; no plans for any surgical intervention at this time     · General surgery following: continue localized wound care  No plans for any surgical intervention  · ID following: IV Zosyn completed  repeat CT scan shows improvement  · Per ID: if no plan for girdle stone procedure and flap to cover the wound, the role of long-term IV antibiotics is limited with risk of side effects from prolonged antibiotics outweighs the benefit   This has been explained to the patient  ten-day Course completed 2/11  · Continue local wound care  · Situational depression- recommend outpatient referral to counseling  · Pt was re-evaluated for competency yesterday by psych and deemed competent     Abnormal CT scan  Assessment & Plan  · Low-density centrally in the uterus measuring up to 7 mm in thickness   Follow-up pelvic ultrasound is recommended to assess for abnormal endometrial thickening or endometrial lesion    Cognitive decline  Assessment & Plan  · Patient noted per neuropsychology to be incompetent on 02/12/2020, re-evaluated by psych on 2/21 and deemed competent   · Pt requesting repeat PT eval to determine placement     Lung nodule  Assessment & Plan  · Noted incidental finding, will require outpatient f/u     Abnormal CT of the chest  Assessment & Plan  CT scan of the chest reports possible heart failure as it relates to ground-glass appearance on the left lung  Also has left lower lobe consolidation that is concerning for atelectasis versus infection  Patient with no history of heart failure  Clinical exam not consistent with heart failure  · BNP is elevated   · Currently not on IV fluids  · Routine echo- EF 55 % G1DD  · incentive spirometry  Depression  Assessment & Plan  · Patient has reported depression over continued chronic hospital care and chronic illness  · Pastoral care requested  · She may benefit from outpatient counseling      · In review of neuropsychology note patient is severely depressed refusing antidepressant medication    Severe protein-calorie malnutrition ([facility] [address] )  Assessment & Plan  Malnutrition Findings:   Malnutrition type: Chronic illness(Severe pro/sara malnutrition r/t disease/condition as evidenced by 12% unintentional wt loss since 11/10/19, consuming < 75% energy intake compared to est energy needs > 1 month, fat/muscle wasting of orbital/temple areas  Treated with diet/supplements)  Degree of Malnutrition: Other severe protein calorie malnutrition    BMI Findings:  BMI Classifications: Underweight < 18 5     Body mass index is 17 97 kg/m²  Patient does admit to depression  Pastoral care consulted  She may benefit from counseling at discharge  Would recommend supplementation as above  Have ensure kulwinder pudding at lunch will magic ice cream at dinner time     Neurogenic bladder  Assessment & Plan  · History of neurogenic bladder with chronic indwelling Hays catheter, exchanged this admission due to finding of E coli and Proteus in the urine culture  · Urine culture noted, likely represents asymptomatic bacteriuria per infectious disease      Anemia of chronic disease  Assessment & Plan  · Labs appear consistent with anemia of chronic disease, continue to monitor closely  Hemoglobin stable   · 9 1    · Continue to monitor   Lab Results   Component Value Date    HGB 9 1 (L) 02/21/2020    HGB 9 8 (L) 02/15/2020    HGB 9 9 (L) 02/14/2020    HGB 9 8 (L) 02/14/2020    HGB 9 0 (L) 02/12/2020         Decubitus ulcer of left ischium, stage III ([address] )  Assessment & Plan  · Patient with healed scars to the sacral area, reviewed Wound care's consultation  Present on admission left ischial ulcer stage III    · Continue local care for excoriation as well as frequent turning of the patient  Also has wound to the right thigh over the hip which has osteomyelitis  No surgical intervention being recommended  · ID following and antibiotics finished 2/11  Rhett Kinsey Rhett Guise Rhett Guise pending placement- pt requesting repeat PT eval     Paraplegia following spinal cord injury [facility])  Assessment & Plan  · History of spinal cord injury T8 with paraplegia sp 20 years ago hand gliding accident   · Presented from [facility]  · Seen by neuropsychology 20 and deemed incompetent, also refused antidepressants at that time- re-evaluated by psych on  and deemed competent   · Continue with repositioning and wound care  · Out of bed as able      VTE Pharmacologic Prophylaxis:   Pharmacologic: Enoxaparin (Lovenox)  Mechanical VTE Prophylaxis in Place: Yes    Patient Centered Rounds: I have performed bedside rounds with nursing staff today  Discussions with Specialists or Other Care Team Provider: d/w RN, PT/OT, cm     Education and Discussions with Family / Patient: d/w patient    Time Spent for Care: 30 minutes  More than 50% of total time spent on counseling and coordination of care as described above  Current Length of Stay: 17 day(s)    Current Patient Status: Inpatient   Certification Statement: The patient will continue to require additional inpatient hospital stay due to pending placement    Discharge Plan: pending placement     Code Status: Level 1 - Full Code      Subjective:   Pt lying in bed  Denies any complaints at this time  Requesting PT re-evaluate her and give her a plan     Objective:     Vitals:   Temp (24hrs), Av 1 °F (36 7 °C), Min:98 1 °F (36 7 °C), Max:98 1 °F (36 7 °C)    Temp:  [98 1 °F (36 7 °C)] 98 1 °F (36 7 °C)  HR:  [73-89] 89  Resp:  [15-16] 16  BP: (113-134)/(60-73) 113/60  SpO2:  [96 %-98 %] 96 %  Body mass index is 17 97 kg/m²  Input and Output Summary (last 24 hours): Intake/Output Summary (Last 24 hours) at 2020 1604  Last data filed at 2020 1316  Gross per 24 hour   Intake 460 ml   Output 750 ml   Net -290 ml       Physical Exam:     Physical Exam   Constitutional: She is oriented to person, place, and time  She appears cachectic  No distress  Neck: Neck supple  Cardiovascular: Normal rate, regular rhythm, normal heart sounds and intact distal pulses  No murmur heard  Pulmonary/Chest: Effort normal and breath sounds normal  No respiratory distress  She has no wheezes  She has no rales  Abdominal: Soft  Bowel sounds are normal  She exhibits no distension  There is no tenderness  There is no rebound  Musculoskeletal: She exhibits no edema  Neurological: She is alert and oriented to person, place, and time  No cranial nerve deficit  Skin: Skin is warm and dry  She is not diaphoretic  POA stage 3 on left ischium obscured by yellow slough  POA stage 4 pressure injury on right hip  POA stage 4 pressure injury on right anterior thigh/hip   Psychiatric:   flat       Additional Data:     Labs:    Results from last 7 days   Lab Units 02/21/20  0459   WBC Thousand/uL 9 44   HEMOGLOBIN g/dL 9 1*   HEMATOCRIT % 30 1*   PLATELETS Thousands/uL 546*     Results from last 7 days   Lab Units 02/21/20  0459   SODIUM mmol/L 136   POTASSIUM mmol/L 4 2   CHLORIDE mmol/L 103   CO2 mmol/L 28   BUN mg/dL 19   CREATININE mg/dL 0 46*   ANION GAP mmol/L 5   CALCIUM mg/dL 9 0   GLUCOSE RANDOM mg/dL 103                           * I Have Reviewed All Lab Data Listed Above  * Additional Pertinent Lab Tests Reviewed:  All Labs Within Last 24 Hours Reviewed        Recent Cultures (last 7 days):           Last 24 Hours Medication List:     Current Facility-Administered Medications:  acetaminophen 650 mg Oral Q6H PRN Drew Toledo MD   artificial tear  Both Eyes HS Kait Rojo MD   bisacodyl 10 mg Rectal Daily PRN Sabino Rodas MD   calcium carbonate-vitamin D 1 tablet Oral BID With Meals Drew Toledo MD   cholecalciferol 1,000 Units Oral Daily Drew Toledo MD   cyanocobalamin 100 mcg Oral Daily Drew Toledo MD   dextran 70-hypromellose 1 drop Both Eyes Q3H PRN Gladys Mazariegos PA-C   diphenhydrAMINE 50 mg Oral 60 Min Pre-Op Sargario Dumont PA-C   enoxaparin 40 mg Subcutaneous Daily Drew Toledo MD   famotidine 20 mg Oral BID PRN Balaji Yi MD   loperamide 2 mg Oral TID PRN Teodoro Graham PA-C   midodrine 15 mg Oral Q8H Alec Call MD   multivitamin-minerals 1 tablet Oral Daily Alec Call MD   nystatin  Topical BID Alec Call MD   ondansetron 4 mg Intravenous Q6H PRN Alec Call MD   pantoprazole 40 mg Oral BID AC Baalji Yi MD   polyethylene glycol 17 g Oral Daily PRN CONCEPCION Coreas   saccharomyces boulardii 250 mg Oral BID Alec Call MD   senna 1 tablet Oral HS PRN Roscoe Hodges CRNA     Facility-Administered Medications Ordered in Other Encounters:  dexamethasone 4 mg Intravenous Once PRN Altamease Sridevi, DO    diphenhydrAMINE 12 5 mg Intravenous Once Altamease Sridevi, DO    lactated ringers 75 mL/hr Intravenous Continuous Altamease Sridevi, DO Last Rate: 75 mL/hr (11/10/19 2105)   lactated ringers 50 mL/hr Intravenous Continuous Judithann Merlin, CRNA    lactated ringers 75 mL/hr Intravenous Continuous Altamease Sridevi, DO Last Rate: Stopped (03/20/19 1841)   lactated ringers 75 mL/hr Intravenous Continuous Jennifer Urbina MD Last Rate: Stopped (03/20/19 1842)   ondansetron 4 mg Intravenous Once PRN Rosita Bustillo MD    promethazine 12 5 mg Intravenous Once PRN Rosita Bustillo MD         Today, Patient Was Seen By: CONCEPCION Degroot    ** Please Note: Dictation voice to text software may have been used in the creation of this document   **

## 2020-02-22 NOTE — ASSESSMENT & PLAN NOTE
· Patient noted per neuropsychology to be incompetent on 02/12/2020, re-evaluated by psych on 2/21 and deemed competent   · Pt requesting repeat PT eval to determine placement

## 2020-02-22 NOTE — ASSESSMENT & PLAN NOTE
· Patient has reported depression over continued chronic hospital care and chronic illness  · Pastoral care requested  · She may benefit from outpatient counseling      · In review of neuropsychology note patient is severely depressed refusing antidepressant medication

## 2020-02-22 NOTE — PHYSICAL THERAPY NOTE
Physical Therapy Progress Note     02/22/20 1445   Pain Assessment   Pain Assessment No/denies pain   Restrictions/Precautions   Other Precautions Contact/isolation; Fall Risk;Multiple lines  (Hays catheter, wounds)   Subjective   Subjective pt encountered supine in bed, pleasant and agreeable to treatment  Motivated to participate with goals to go home  Pt agreeable to inpatient rehab at this time after discussion regarding mobiilty & indepenedence  Bed Mobility   Rolling R 5  Supervision   Additional items Assist x 1   Rolling L 5  Supervision   Additional items Assist x 1   Supine to Sit 5  Supervision   Additional items Assist x 1;Bedrails; Increased time required   Transfers   Sliding Board transfer 4  Minimal assistance   Additional items Assist x 1  (to remove board post transfer)   Wheelchair Activities   Propulsion Yes   Propulsion Type 1 Manual   Level 1 Level tile   Method 1 Right upper extremity; Left upper extremity   Level of Assistance 1 Independent   Description/ Details 1 560' in hallway & room without incident   Balance   Static Sitting Fair +   Dynamic Sitting Poor +   Endurance Deficit   Endurance Deficit Yes   Endurance Deficit Description weakness, impaired balance   Activity Tolerance   Activity Tolerance Patient tolerated treatment well;Patient limited by fatigue   Nurse Made Aware STEPAN Salazar   Assessment   Prognosis Fair   Problem List Decreased strength;Decreased range of motion; Impaired balance;Decreased mobility; Decreased skin integrity   Assessment Pt continues to perform functional transfers with decreased assist this session  Did require extensive time to perform all tasks without assist, simulating home set up  Pt did demonstrate 2 posterior LOB while transferring OOB, requiring assist to recover, prevent posterior head strike on bed or slide OOB to floor  Pt required assist to remove board post transfer    Pt able to perform wc mobility & demonstrate knowledge of safety features throughout session without instructions  Assisted pt back to bed afterwards with use of slide board again  Pt demonstrated slight improvement in ability to clear seated surface, but will benefit from further practice & UE strengthening to do so safely & prevent further skin breakdown  Pt demonstrated rolling L & R throughout session to don clothing, change linens, and clean posterior after noted BM  Pt instructed in importance of maintaining perihygeine, especially with wound location to prevent infection  Pt verbalized understanding and will benefit from future sessions to ensure carryover of allinstrucitons given  Discussed reality of pt being able to manage independence at home after performing mobility tasks & current recommendations for rehab  Pt in agreement that she needs to get stronger & improve mobility & self care to ensure safe return home at this time  Will continue to benefit from therapy services, working towards goals established in [address]  Barriers to Discharge Decreased caregiver support   Goals   Patient Goals to get healthier & be able to go home   STG Expiration Date 02/25/20   PT Treatment Day 5   Plan   Treatment/Interventions Functional transfer training; Therapeutic exercise; Endurance training;Patient/family training;Equipment eval/education; Bed mobility; Compensatory technique education   Progress Progressing toward goals   PT Frequency   (3-5x/week)     Neena Ramesh, PTA

## 2020-02-22 NOTE — ASSESSMENT & PLAN NOTE
· Patient with healed scars to the sacral area, reviewed Wound care's consultation  Present on admission left ischial ulcer stage III    · Continue local care for excoriation as well as frequent turning of the patient  Also has wound to the right thigh over the hip which has osteomyelitis  No surgical intervention being recommended  · ID following and antibiotics finished 2/11  Sadi Colvin pending placement- pt requesting repeat PT eval

## 2020-02-22 NOTE — PLAN OF CARE
Problem: OCCUPATIONAL THERAPY ADULT  Goal: Performs self-care activities at highest level of function for planned discharge setting  See evaluation for individualized goals  Description  Treatment Interventions: ADL retraining, Functional transfer training, UE strengthening/ROM, Endurance training, Cognitive reorientation, Patient/family training, Compensatory technique education, Equipment evaluation/education, Energy conservation, Activityengagement          See flowsheet documentation for full assessment, interventions and recommendations  Outcome: Progressing  Note:   Limitation: Decreased ADL status, Decreased Safe judgement during ADL, Decreased endurance, Decreased self-care trans, Decreased high-level ADLs  Prognosis: Good, Fair  Assessment: pt was seen for ot session to administer acls and to focus on dressing, bed mobility, and transfers  pt scored 4 8/6 0 on acls which indicates pt can live alone with daily checks to monitor safety and check problem solving  pt should not drive  pt required overall s for ub dressing, min a for lb dressing  required ta for toileting  overall min a for bed mobility and transfers  noted improvement in bed mobility to eob and slide board transfer this session       OT Discharge Recommendation: Short Term Rehab  OT - OK to Discharge: Yes  Becki Hart

## 2020-02-22 NOTE — OCCUPATIONAL THERAPY NOTE
Occupational Therapy Treatment Note:     02/22/20 8405   Restrictions/Precautions   Weight Bearing Precautions Per Order No   Other Precautions Contact/isolation;Multiple lines; Fall Risk   Pain Assessment   Pain Assessment No/denies pain   ADL   Where Assessed Supine, bed   Grooming Assistance 5  Supervision/Setup   Grooming Comments able to brush hair, cues for thoroughness   UB Dressing Assistance 5  Supervision/Setup   UB Dressing Deficit Increased time to complete   UB Dressing Comments able to doff hospital gown and don short sleeved pullover   LB Dressing Assistance 4  Minimal Assistance   LB Dressing Deficit Pull up over hips   LB Dressing Comments able to swap out isabel catheter bag for leg bag  able to doff/don socks and thread legs through underwear/pants   Toileting Assistance  1  Total Assistance   Toileting Deficit   (indwelling catheter)   Toileting Comments   (incontinent of bowel during dressing  ta to clean buttocks)   Bed Mobility   Rolling R 5  Supervision   Additional items Bedrails; Increased time required   Rolling L 5  Supervision   Additional items Bedrails; Increased time required   Supine to Sit 4  Minimal assistance   Additional items Assist x 1;Bedrails; Increased time required   Additional Comments noted improvement in moving to eob  pt began session supine in bed, ended in w/c   Transfers   Sliding Board transfer 4  Minimal assistance   Additional items Assist x 1; Increased time required;Verbal cues   Additional Comments noted improvement in pt placing slide board under leg, completing transfer, and repositioning self in w/c  pt continues to require assistance to remove slide board  Functional Mobility   Additional Comments in preparation for participating in iadl tasks, pt independently self-propelled w/c through halls  pt required assistance to manage w/c armrests and footrests  Cognition   Arousal/Participation Alert; Cooperative   Attention Within functional limits   Memory Within functional limits   Following Commands Follows one step commands with increased time or repetition   Cognition Assessment Tools ACLS   Score 4 8   Activity Tolerance   Activity Tolerance Patient tolerated treatment well;Patient limited by fatigue   Assessment   Assessment pt was seen for ot session to administer acls and to focus on dressing, bed mobility, and transfers  pt scored 4 8/6 0 on acls which indicates pt can live alone with daily checks to monitor safety and check problem solving  pt should not drive  pt required overall s for ub dressing, min a for lb dressing  required ta for toileting  overall min a for bed mobility and transfers  noted improvement in bed mobility to eob and slide board transfer this session  Plan   Treatment Interventions ADL retraining;Functional transfer training;UE strengthening/ROM; Endurance training;Cognitive reorientation;Patient/family training;Equipment evaluation/education; Compensatory technique education;Continued evaluation; Energy conservation; Activityengagement   Goal Expiration Date 02/25/20   OT Treatment Day 5   OT Frequency 3-5x/wk   Recommendation   OT Discharge Recommendation Short Term Rehab   OT - OK to Discharge Yes     4 8    Administered Veto Odor Cognitive Level Screen (ACLS)  The patient scored 4 8/6 0 indicating that they may live alone with daily assistance to monitor personal safety and check problem solving effectiveness  Behavior:  Asks for verification  Knows how 2 concurrent schedules fit together  Fits daily routine into schedule of others  Can avoid obvious hazards  More flexible and willing to adapt to new ideas  Insight into disability is fair  Processing is delayed  May be able to get to a regularly scheduled appointment without need for assistance  May frequently stop a task to examine objects  Seeks verification from others  Grooming:  Checks results of grooming, hair styling and make up application    Learns new grooming procedures slowly  May rigidly follow prescribed routines  Dressing: Considers all striking features of garments including color, pattern, condition and fit  May not attend to cleanliness or consider social standards  May don clothing slowly and check results in mirror  Bathing:  May coordinate bathing schedule with others  Attends to all features of bathing environment  May check results and vary rate  Reports low supplies to caregivers  Walking/Exercising:  Learns new routes over several days  Scans environment, reads street signs and attempts to use this information but may still get lost   Understands explanations of safety hazards  Learns an exercise program and does it without variation  Eating:  Eats and attends to social conversation though may not be able to talk and eat at the same time  Manages all utensils  Follows a meal time schedule  May be reminded to check food temperatures  Toileting:  Independently performs usual toileting routine  May not anticipate use of bathroom before trips  May learn bathroom etiquette like conserving paper  Medications: Follows new prescriptions slowly  Considers variables like time of day and amount but with difficulty  Follows verbal explanations  May follow a set schedule set up by others for renewing prescriptions  Utilize a daily pill box (set up by someone else)  Use of adaptive equipment:  Recognizes loss of strength, sensation and balance  May learn a series of new actions slowly  Housekeeping:  Initiates and completes a routine of housekeeping activities  Learns new procedures slowly  Notes all visible effects of cleaning and checking results  Corrects visible errors one at a time  May not anticipate supplies but reports low supplies to caregiver  May not identify potential hazards related to electric, gas, toxins, poisons and improper storage/disposal of items  Food preparation:  Does not plan for long term food needs    May learn to follow a plan for purchasing food items  May follow a weekly schedule for shopping  Memorizes a new sequence of actions to prepare dishes  Follows a written recipe in a rigid manner  Check stove after use  Spending Money:  Slowly follows budget or learns money management procedures  May need assistance to adjust to change in income or usual banking procedures  Shopping: Learns directions to new shopping areas  May learn to use a shopping list or use money saving procedures like coupons  May not read labels or consider whether it is practical or affordable  Laundry:  Completes laundry routine in a fixed manner  Memorizes procedures like using a new [employer]  May try to read new labels but needs assistance in interpreting them  May overload machine  Traveling:  Learns new routes or travel procedures slowly  May learn to use a wheelchair but has difficulty maneuvering or estimating space requirements  Does not anticipate hazards  May follow safety precautions pointed out by others  Telephone:  Learns to use a new telephone answering machine or pay phone slowly  Does not vary actions  May memorize correct times to call others     Driving:  Should NOT operate a motor vehicle       Coco Perez

## 2020-02-22 NOTE — ASSESSMENT & PLAN NOTE
· Patient with a known past medical history for paraplegia following spinal cord injury presents with increased drainage of R hip wound  · Known to history of hip osteo and dislocation of the R femur  Patient is nonweightbearing  On admission had fever, chills  · CT repeated 2/8 and compared to February 5, 2020 showing improvement in gas pockets  · Ortho input appreciated; no plans for any surgical intervention at this time  · General surgery following: continue localized wound care  No plans for any surgical intervention  · ID following: IV Zosyn completed  repeat CT scan shows improvement  · Per ID: if no plan for girdle stone procedure and flap to cover the wound, the role of long-term IV antibiotics is limited with risk of side effects from prolonged antibiotics outweighs the benefit   This has been explained to the patient  ten-day Course completed 2/11  · Continue local wound care  · Situational depression- recommend outpatient referral to counseling     · Pt was re-evaluated for competency yesterday by psych and deemed competent

## 2020-02-23 PROCEDURE — 99232 SBSQ HOSP IP/OBS MODERATE 35: CPT | Performed by: NURSE PRACTITIONER

## 2020-02-23 RX ADMIN — PANTOPRAZOLE SODIUM 40 MG: 40 TABLET, DELAYED RELEASE ORAL at 04:57

## 2020-02-23 RX ADMIN — Medication 1 TABLET: at 17:26

## 2020-02-23 RX ADMIN — VITAM B12 100 MCG: 100 TAB at 09:31

## 2020-02-23 RX ADMIN — Medication 250 MG: at 17:26

## 2020-02-23 RX ADMIN — PANTOPRAZOLE SODIUM 40 MG: 40 TABLET, DELAYED RELEASE ORAL at 17:26

## 2020-02-23 RX ADMIN — MIDODRINE HYDROCHLORIDE 15 MG: 5 TABLET ORAL at 22:08

## 2020-02-23 RX ADMIN — DEXTRAN 70 AND HYPROMELLOSE 2910 1 DROP: 1; 3 SOLUTION/ DROPS OPHTHALMIC at 09:32

## 2020-02-23 RX ADMIN — MELATONIN 1000 UNITS: at 09:30

## 2020-02-23 RX ADMIN — Medication 250 MG: at 09:30

## 2020-02-23 RX ADMIN — NYSTATIN: 100000 POWDER TOPICAL at 09:31

## 2020-02-23 RX ADMIN — ENOXAPARIN SODIUM 40 MG: 40 INJECTION SUBCUTANEOUS at 09:30

## 2020-02-23 RX ADMIN — Medication 1 TABLET: at 09:30

## 2020-02-23 RX ADMIN — MIDODRINE HYDROCHLORIDE 15 MG: 5 TABLET ORAL at 12:43

## 2020-02-23 RX ADMIN — MIDODRINE HYDROCHLORIDE 15 MG: 5 TABLET ORAL at 04:57

## 2020-02-23 RX ADMIN — WHITE PETROLATUM 57.7 %-MINERAL OIL 31.9 % EYE OINTMENT: at 22:07

## 2020-02-23 RX ADMIN — NYSTATIN: 100000 POWDER TOPICAL at 17:26

## 2020-02-23 NOTE — ASSESSMENT & PLAN NOTE
· POA with fever and leukocytosis with infectious source likely being R hip wound with underlying iliopsoas myositis  No signs of PNA or intra-abdominal infection  Review of repeat CT scan shows known osteomyelitis of the right hip with dislocation  Pockets of gas have decreased since the initial consult  No evidence for abscesses  · Repeat CT shows no evidence for abscesses  · Blood cultures 1/2 positive for coag-neg staph, other negative  Repeat cultures negative for 5 days   · Urine culture + for proteus and e coli; likely asymptomatic bacteriuria per ID; isabel exchanged   · IV abx per ID recommendations completed 10 days total   Last dose 2/11   · ID with preference for surgical intervention for definitive source control however, no focus for interventional radiology intervention and surgery and orthopedics not currently offering any surgical management  Cleared 2/12 by ID   · Sepsis has resolved  · PT/OT recommending returning to [facility]  · repeat PT eval appreciated- recommending STR  · OT still recommending STR   · Pt was re-evaluated by psych 2 days ago and deemed competent- agreeable to STR    · Patient requesting her friend be made her POA

## 2020-02-23 NOTE — ASSESSMENT & PLAN NOTE
· History of spinal cord injury T8 with paraplegia sp 20 years ago hand gliding accident   · Presented from [facility]  · Seen by neuropsychology 2/12/20 and deemed incompetent, also refused antidepressants at that time- re-evaluated by psych on 2/21 and deemed competent   · Continue with repositioning and wound care    · Out of bed as able

## 2020-02-23 NOTE — ASSESSMENT & PLAN NOTE
· Labs appear consistent with anemia of chronic disease    Hemoglobin stable   · 9 1    · Continue to monitor   Lab Results   Component Value Date    HGB 9 1 (L) 02/21/2020    HGB 9 8 (L) 02/15/2020    HGB 9 9 (L) 02/14/2020    HGB 9 8 (L) 02/14/2020    HGB 9 0 (L) 02/12/2020

## 2020-02-23 NOTE — ASSESSMENT & PLAN NOTE
· Patient has reported depression over continued chronic hospital care and chronic illness      · She denies SI, reports she stays alive for her dog who is 15years old   · She would benefit from outpatient counseling, but patient is currently refusing   · Pt is refusing antidepressant medication

## 2020-02-23 NOTE — ASSESSMENT & PLAN NOTE
· Patient noted per neuropsychology to be incompetent on 02/12/2020, re-evaluated by psych on 2/21 and deemed competent   · Pt agreeable to a few weeks of STR with goal to be discharged home

## 2020-02-23 NOTE — SOCIAL WORK
PT/OT recommending rehab  Patient requesting to return to [facility]  CM provided list of additional rehab facilities  CM updated referral in [facility] to [facility]  CM left voicemail for admissions  CM will continue to follow  Patient states she will need transportation arranged when discharged  CM spoke with nursing supervisor Alyssia Bassett at [facility] to inquire about patient returning  Admissions not available over the weekend  Alyssia Bassett advised she will have to look into it and let CM know  [id number] supervisor called back, patient unable to return today  CM will f/u tomorrow

## 2020-02-23 NOTE — PROGRESS NOTES
Progress Note - Bridger Olvera 1950, 71 y o  female MRN: [id number]    Unit/Bed#: [facility] 509-07 Encounter: [id number]    Primary Care Provider: Maria C Hutton   Date and time admitted to hospital: 2/5/2020  3:25 PM        * Sepsis [facility])  Assessment & Plan  · POA with fever and leukocytosis with infectious source likely being R hip wound with underlying iliopsoas myositis  No signs of PNA or intra-abdominal infection  Review of repeat CT scan shows known osteomyelitis of the right hip with dislocation  Pockets of gas have decreased since the initial consult  No evidence for abscesses  · Repeat CT shows no evidence for abscesses  · Blood cultures 1/2 positive for coag-neg staph, other negative  Repeat cultures negative for 5 days   · Urine culture + for proteus and e coli; likely asymptomatic bacteriuria per ID; isabel exchanged   · IV abx per ID recommendations completed 10 days total   Last dose 2/11   · ID with preference for surgical intervention for definitive source control however, no focus for interventional radiology intervention and surgery and orthopedics not currently offering any surgical management  Cleared 2/12 by ID   · Sepsis has resolved  · PT/OT recommending returning to [facility]  · repeat PT eval appreciated- recommending STR  · OT still recommending STR   · Pt was re-evaluated by psych 2 days ago and deemed competent- agreeable to STR    · Patient requesting her friend be made her POA  Open wound of right hip  Assessment & Plan  · Patient with a known past medical history for paraplegia following spinal cord injury presents with increased drainage of R hip wound  · Known to history of hip osteo and dislocation of the R femur  Patient is nonweightbearing  On admission had fever, chills  · CT repeated 2/8 and compared to February 5, 2020 showing improvement in gas pockets  · Ortho input appreciated; no plans for any surgical intervention at this time  · General surgery: continue localized wound care  No plans for any surgical intervention  · ID following: IV Zosyn completed  repeat CT scan shows improvement  · Per ID: if no plan for girdle stone procedure and flap to cover the wound, the role of long-term IV antibiotics is limited with risk of side effects from prolonged antibiotics outweighs the benefit   This has been explained to the patient  ten-day Course completed 2/11  · Continue local wound care  · Situational depression- recommend outpatient referral to counseling  · Pt was re-evaluated for competency 2 days ago by psych and deemed competent     Abnormal CT scan  Assessment & Plan  · Low-density centrally in the uterus measuring up to 7 mm in thickness   Follow-up pelvic ultrasound is recommended to assess for abnormal endometrial thickening or endometrial lesion    Cognitive decline  Assessment & Plan  · Patient noted per neuropsychology to be incompetent on 02/12/2020, re-evaluated by psych on 2/21 and deemed competent   · Pt agreeable to a few weeks of STR with goal to be discharged home     Lung nodule  Assessment & Plan  · Noted incidental finding, will require outpatient f/u     Abnormal CT of the chest  Assessment & Plan  CT scan of the chest reports possible heart failure as it relates to ground-glass appearance on the left lung  Also has left lower lobe consolidation that is concerning for atelectasis versus infection  Patient with no history of heart failure  Clinical exam not consistent with heart failure  · BNP is elevated   · Currently not on IV fluids  · Routine echo- EF 55 % G1DD  · incentive spirometry  Depression  Assessment & Plan  · Patient has reported depression over continued chronic hospital care and chronic illness      · She denies SI, reports she stays alive for her dog who is 15years old   · She would benefit from outpatient counseling, but patient is currently refusing   · Pt is refusing antidepressant medication     Severe protein-calorie malnutrition ([facility] )  Assessment & Plan  Malnutrition Findings:   Malnutrition type: Chronic illness(Severe pro/sara malnutrition r/t disease/condition as evidenced by 12% unintentional wt loss since 11/10/19, consuming < 75% energy intake compared to est energy needs > 1 month, fat/muscle wasting of orbital/temple areas  Treated with diet/supplements)  Degree of Malnutrition: Other severe protein calorie malnutrition    BMI Findings:  BMI Classifications: Underweight < 18 5     Body mass index is 17 97 kg/m²  Patient does admit to depression  Pastoral care consulted  She may benefit from counseling at discharge  Would recommend supplementation as above  Have ensure kulwinder pudding at lunch will magic ice cream at dinner time     Neurogenic bladder  Assessment & Plan  · History of neurogenic bladder with chronic indwelling [place] catheter, exchanged this admission due to finding of E coli and Proteus in the urine culture  · Urine culture noted, likely represents asymptomatic bacteriuria per infectious disease      Anemia of chronic disease  Assessment & Plan  · Labs appear consistent with anemia of chronic disease  Hemoglobin stable   · 9 1    · Continue to monitor   Lab Results   Component Value Date    HGB 9 1 (L) 02/21/2020    HGB 9 8 (L) 02/15/2020    HGB 9 9 (L) 02/14/2020    HGB 9 8 (L) 02/14/2020    HGB 9 0 (L) 02/12/2020         Decubitus ulcer of left ischium, stage III ([facility] )  Assessment & Plan  · Patient with healed scars to the sacral area, reviewed Wound care's consultation  Present on admission left ischial ulcer stage III    · Continue local care and frequent turning of the patient  Also has wound to the right thigh over the hip which has osteomyelitis    No surgical intervention being recommended  · ID following and antibiotics finished 2/11  · pending placement- pt now agreeable to STR for a few weeks with goal to be discharged home     Paraplegia following spinal cord injury [facility])  Assessment & Plan  · History of spinal cord injury T8 with paraplegia sp 20 years ago hand gliding accident   · Presented from [facility]  · Seen by neuropsychology 20 and deemed incompetent, also refused antidepressants at that time- re-evaluated by psych on  and deemed competent   · Continue with repositioning and wound care  · Out of bed as able      VTE Pharmacologic Prophylaxis:   Pharmacologic: Enoxaparin (Lovenox)  Mechanical VTE Prophylaxis in Place: Yes    Patient Centered Rounds: I have performed bedside rounds with nursing staff today  Discussions with Specialists or Other Care Team Provider: d/w Rn     Education and Discussions with Family / Patient: d/w patient  Time Spent for Care: 30 minutes  More than 50% of total time spent on counseling and coordination of care as described above  Current Length of Stay: 18 day(s)    Current Patient Status: Inpatient   Certification Statement: The patient will continue to require additional inpatient hospital stay due to pending placement    Discharge Plan: pending placement- likely return back to [address] [place] [address] Status: Level 1 - Full Code      Subjective:   Pt lying in bed  Reports she feels tired  Denies any complaints  She denies SI  Reports she is staying alive so that she can eventually return home to her 15year old poodle, she misses her greatly  Objective:     Vitals:   Temp (24hrs), Av 6 °F (36 4 °C), Min:97 5 °F (36 4 °C), Max:97 7 °F (36 5 °C)    Temp:  [97 5 °F (36 4 °C)-97 7 °F (36 5 °C)] 97 7 °F (36 5 °C)  HR:  [64-89] 64  Resp:  [16-18] 16  BP: (112-128)/(60-61) 128/61  SpO2:  [95 %-98 %] 98 %  Body mass index is 17 97 kg/m²  Input and Output Summary (last 24 hours):        Intake/Output Summary (Last 24 hours) at 2020 0953  Last data filed at 2020 0900  Gross per 24 hour   Intake 598 ml   Output 1014 ml   Net -416 ml       Physical Exam:     Physical Exam Constitutional: She is oriented to person, place, and time  She appears cachectic  No distress  Cardiovascular: Normal rate, regular rhythm, normal heart sounds and intact distal pulses  No murmur heard  Pulmonary/Chest: Effort normal and breath sounds normal  No respiratory distress  She has no wheezes  She has no rales  Abdominal: Soft  Bowel sounds are normal  She exhibits no distension  There is no tenderness  There is no rebound  Musculoskeletal: She exhibits no edema or tenderness  Paraplegic    Neurological: She is alert and oriented to person, place, and time  No cranial nerve deficit  Skin: Skin is warm and dry  She is not diaphoretic  Pt wounds covered by dressings    Psychiatric: She expresses no suicidal ideation  flat       Additional Data:     Labs:    Results from last 7 days   Lab Units 02/21/20  0459   WBC Thousand/uL 9 44   HEMOGLOBIN g/dL 9 1*   HEMATOCRIT % 30 1*   PLATELETS Thousands/uL 546*     Results from last 7 days   Lab Units 02/21/20  0459   SODIUM mmol/L 136   POTASSIUM mmol/L 4 2   CHLORIDE mmol/L 103   CO2 mmol/L 28   BUN mg/dL 19   CREATININE mg/dL 0 46*   ANION GAP mmol/L 5   CALCIUM mg/dL 9 0   GLUCOSE RANDOM mg/dL 103                           * I Have Reviewed All Lab Data Listed Above    * Additional Pertinent Lab Tests Reviewed: No New Labs Available For Today        Recent Cultures (last 7 days):           Last 24 Hours Medication List:     Current Facility-Administered Medications:  acetaminophen 650 mg Oral Q6H PRN Evelyne Alegre MD   artificial tear  Both Eyes HS Tammi Guerrero MD   bisacodyl 10 mg Rectal Daily PRN Leandro Rodriguez MD   calcium carbonate-vitamin D 1 tablet Oral BID With Meals Evelyne Alegre MD   cholecalciferol 1,000 Units Oral Daily Evelyne Alegre MD   cyanocobalamin 100 mcg Oral Daily Evelyne Alegre MD   dextran 70-hypromellose 1 drop Both Eyes Q3H PRN Xu Guadalupe PA-C   diphenhydrAMINE 50 mg Oral 60 Min Pre-Op Roderick Fowler PA-C   enoxaparin 40 mg Subcutaneous Daily Daren Moralez MD   famotidine 20 mg Oral BID PRN Sabi Ritter MD   loperamide 2 mg Oral TID PRN Jemal Acevedo PA-C   midodrine 15 mg Oral Q8H Daren Moralez MD   multivitamin-minerals 1 tablet Oral Daily Daren Moralez MD   nystatin  Topical BID Daren Moralez MD   ondansetron 4 mg Intravenous Q6H PRN Daren Moralez MD   pantoprazole 40 mg Oral BID AC Sabi Ritter MD   polyethylene glycol 17 g Oral Daily PRN CONCEPCION Wei   saccharomyces boulardii 250 mg Oral BID Daren Moralez MD   senna 1 tablet Oral HS PRN Chelsey Mora CRNA     Facility-Administered Medications Ordered in Other Encounters:  dexamethasone 4 mg Intravenous Once PRN Leesa Rias, DO    diphenhydrAMINE 12 5 mg Intravenous Once Leesa Rias, DO    lactated ringers 75 mL/hr Intravenous Continuous Leesa Rias, DO Last Rate: 75 mL/hr (11/10/19 2105)   lactated ringers 50 mL/hr Intravenous Continuous Vladimir Galarza CRNA    lactated ringers 75 mL/hr Intravenous Continuous Leesa Rias, DO Last Rate: Stopped (03/20/19 1841)   lactated ringers 75 mL/hr Intravenous Continuous Colletta Ricks, MD Last Rate: Stopped (03/20/19 1842)   ondansetron 4 mg Intravenous Once PRN Ion Keller MD    promethazine 12 5 mg Intravenous Once PRN Ion Keller MD         Today, Patient Was Seen By: CONCEPCION Mccauley    ** Please Note: Dictation voice to text software may have been used in the creation of this document   **

## 2020-02-23 NOTE — ASSESSMENT & PLAN NOTE
· Patient with a known past medical history for paraplegia following spinal cord injury presents with increased drainage of R hip wound  · Known to history of hip osteo and dislocation of the R femur  Patient is nonweightbearing  On admission had fever, chills  · CT repeated 2/8 and compared to February 5, 2020 showing improvement in gas pockets  · Ortho input appreciated; no plans for any surgical intervention at this time  · General surgery: continue localized wound care  No plans for any surgical intervention  · ID following: IV Zosyn completed  repeat CT scan shows improvement  · Per ID: if no plan for girdle stone procedure and flap to cover the wound, the role of long-term IV antibiotics is limited with risk of side effects from prolonged antibiotics outweighs the benefit   This has been explained to the patient  ten-day Course completed 2/11  · Continue local wound care  · Situational depression- recommend outpatient referral to counseling     · Pt was re-evaluated for competency 2 days ago by psych and deemed competent

## 2020-02-23 NOTE — ASSESSMENT & PLAN NOTE
· Patient with healed scars to the sacral area, reviewed Wound care's consultation  Present on admission left ischial ulcer stage III    · Continue local care and frequent turning of the patient  Also has wound to the right thigh over the hip which has osteomyelitis    No surgical intervention being recommended  · ID following and antibiotics finished 2/11  · pending placement- pt now agreeable to STR for a few weeks with goal to be discharged home

## 2020-02-24 PROCEDURE — 97530 THERAPEUTIC ACTIVITIES: CPT

## 2020-02-24 PROCEDURE — 99232 SBSQ HOSP IP/OBS MODERATE 35: CPT | Performed by: NURSE PRACTITIONER

## 2020-02-24 RX ADMIN — DEXTRAN 70 AND HYPROMELLOSE 2910 1 DROP: 1; 3 SOLUTION/ DROPS OPHTHALMIC at 13:35

## 2020-02-24 RX ADMIN — MELATONIN 1000 UNITS: at 08:26

## 2020-02-24 RX ADMIN — WHITE PETROLATUM 57.7 %-MINERAL OIL 31.9 % EYE OINTMENT: at 22:02

## 2020-02-24 RX ADMIN — NYSTATIN: 100000 POWDER TOPICAL at 08:26

## 2020-02-24 RX ADMIN — Medication 1 TABLET: at 08:26

## 2020-02-24 RX ADMIN — MIDODRINE HYDROCHLORIDE 15 MG: 5 TABLET ORAL at 13:35

## 2020-02-24 RX ADMIN — Medication 250 MG: at 08:26

## 2020-02-24 RX ADMIN — NYSTATIN 1 APPLICATION: 100000 POWDER TOPICAL at 18:40

## 2020-02-24 RX ADMIN — PANTOPRAZOLE SODIUM 40 MG: 40 TABLET, DELAYED RELEASE ORAL at 05:24

## 2020-02-24 RX ADMIN — MIDODRINE HYDROCHLORIDE 15 MG: 5 TABLET ORAL at 05:24

## 2020-02-24 RX ADMIN — Medication 250 MG: at 18:40

## 2020-02-24 RX ADMIN — VITAM B12 100 MCG: 100 TAB at 08:26

## 2020-02-24 RX ADMIN — MIDODRINE HYDROCHLORIDE 15 MG: 5 TABLET ORAL at 22:04

## 2020-02-24 RX ADMIN — Medication 1 TABLET: at 18:40

## 2020-02-24 RX ADMIN — ENOXAPARIN SODIUM 40 MG: 40 INJECTION SUBCUTANEOUS at 08:26

## 2020-02-24 RX ADMIN — PANTOPRAZOLE SODIUM 40 MG: 40 TABLET, DELAYED RELEASE ORAL at 18:40

## 2020-02-24 NOTE — ASSESSMENT & PLAN NOTE
· Local care  Wound care following     · Strict turning and repositioning  · Would benefit from specialty bed at [facility]

## 2020-02-24 NOTE — PLAN OF CARE
Problem: Potential for Falls  Goal: Patient will remain free of falls  Description  INTERVENTIONS:  - Assess patient frequently for physical needs  -  Identify cognitive and physical deficits and behaviors that affect risk of falls    -  Union fall precautions as indicated by assessment   - Educate patient/family on patient safety including physical limitations  - Instruct patient to call for assistance with activity based on assessment  - Modify environment to reduce risk of injury  - Consider OT/PT consult to assist with strengthening/mobility  Outcome: Progressing

## 2020-02-24 NOTE — PROGRESS NOTES
[facility]    Progress Note - Manuel Macdonald 1950, 71 y o  female MRN: [id number]    [facility] Encounter: [id number]    Primary Care Provider: Annabelle Shlel   Date and time admitted to hospital: 2/5/2020  3:25 PM        * Sepsis [facility])  Assessment & Plan  · POA with fever and leukocytosis with infectious source likely being R hip wound with underlying iliopsoas myositis  No signs of PNA or intra-abdominal infection  Review of repeat CT scan shows known osteomyelitis of the right hip with dislocation  Pockets of gas have decreased since the initial consult  No evidence for abscesses or collection  · Blood cultures 1/2 positive for coag-neg staph (likely contaminant) other negative  Repeat cultures negative for 5 days   · Urine culture + for proteus and e coli; likely asymptomatic bacteriuria per ID; isabel was exchanged   · Id has been following, patient completed 7 days total of intravenous Zosyn last dose being 2/11  Id with preference for surgical intervention for definitive source control however no focus for intervention as per IR and orthopedic surgery  · PT/OT recommending rehab  Case management following  Plan to go back to [facility] pending authorization  · Note, patient was initially deemed to not have capacity however re-evaluation done and patient does have capacity for medical decision making  Open wound of right hip  Assessment & Plan  · Patient with a known past medical history for paraplegia following spinal cord injury presents with increased drainage of R hip wound  · Known to history of hip osteo and dislocation of the R femur  Patient is nonweightbearing  · Status post intravenous antibiotics x7 days  · Ortho and general surgery recommending continue local care  No plans for surgical intervention    · Wound care instructions:   · Rinse right hip, right anterior thigh/hip, left ischium pressure injuries, and midline sacral partial thickness wound with saline  Cover all wounds with silver maxorb and cover with Allevyn foam  Change right leg dressings day and left leg and sacrum dressings every other day and as needed  Abnormal CT scan  Assessment & Plan  · Low-density centrally in the uterus measuring up to 7 mm in thickness   Follow-up pelvic ultrasound is recommended to assess for abnormal endometrial thickening or endometrial lesion  · Outpatient follow up    Cognitive decline  Assessment & Plan  · Patient noted per neuropsychology to be incompetent on 02/12/2020, re-evaluated by psych on 2/21 and deemed competent   · Pt agreeable to a few weeks of STR with goal to be discharged home     Lung nodule  Assessment & Plan  · Noted incidental finding, will require outpatient f/u     Depression  Assessment & Plan  · Patient has reported depression over continued chronic hospital care and chronic illness  · Seen by Psychiatry, refused antidepressants  Neurogenic bladder  Assessment & Plan  · History of neurogenic bladder with chronic indwelling Hays catheter, exchanged this admission due to finding of E coli and Proteus in the urine culture  · Urine culture noted, likely represents asymptomatic bacteriuria per infectious disease      Severe protein-calorie malnutrition ([address] )  Assessment & Plan  Malnutrition Findings:   Malnutrition type: Chronic illness(Severe pro/sara malnutrition r/t disease/condition as evidenced by 12% unintentional wt loss since 11/10/19, consuming < 75% energy intake compared to est energy needs > 1 month, fat/muscle wasting of orbital/temple areas  Treated with diet/supplements)  Degree of Malnutrition: Other severe protein calorie malnutrition    BMI Findings:  BMI Classifications: Underweight < 18 5     Body mass index is 17 97 kg/m²  Patient does admit to depression  Pastoral care consulted  She may benefit from counseling at discharge  Would recommend supplementation as above    Have ensure kulwinder pudding at lunch will magic ice cream at dinner time     Anemia of chronic disease  Assessment & Plan  · Labs appear consistent with anemia of chronic disease  Hemoglobin had been stable on prior labs  No need to repeat at this time  Lab Results   Component Value Date    HGB 9 1 (L) 02/21/2020    HGB 9 8 (L) 02/15/2020    HGB 9 9 (L) 02/14/2020    HGB 9 8 (L) 02/14/2020    HGB 9 0 (L) 02/12/2020         Decubitus ulcer of left ischium, stage III ([address] )  Assessment & Plan  · Local care  Wound care following  · Strict turning and repositioning  · Would benefit from specialty bed at [facility]    Paraplegia following spinal cord injury [facility])  Assessment & Plan  · History of spinal cord injury T8 with paraplegia sp 20 years ago hand gliding accident   · Presented from [facility]  · Supportive care    Abnormal CT of the chest  Assessment & Plan  · CT scan of the chest reports possible heart failure as it relates to ground-glass appearance on the left lung  Also has left lower lobe consolidation that is concerning for atelectasis versus infection  Patient with no history of heart failure  Clinical exam not consistent with heart failure  · Consider additional chest imaging in 4-6 weeks for follow-up  Defer to PCP  Pharmacologic: Enoxaparin (Lovenox)  Mechanical VTE Prophylaxis in Place: Yes    Patient Centered Rounds: I have performed bedside rounds with nursing staff today  Discussions with Specialists or Other Care Team Provider: nursing, case management     Education and Discussions with Family / Patient: patient     Time Spent for Care: 30 minutes  More than 50% of total time spent on counseling and coordination of care as described above      Current Length of Stay: 19 day(s)    Current Patient Status: Inpatient   Certification Statement: The patient will continue to require additional inpatient hospital stay due to Patient is medically stable for discharge, awaiting discharge plan back to rehab    Discharge Plan / Estimated Discharge Date:  Patient is medically stable for discharge, awaiting discharge plan back to rehab  Code Status: Level 1 - Full Code      Subjective:   OPatient offers no complaints  Sleeping well  Eating and drinking okay  No pain  Understands she is medically stable for discharge and that we are awaiting discharge plan to rehab    Objective:     Vitals:   Temp (24hrs), Av 3 °F (36 8 °C), Min:97 5 °F (36 4 °C), Max:98 8 °F (37 1 °C)    Temp:  [97 5 °F (36 4 °C)-98 8 °F (37 1 °C)] 98 5 °F (36 9 °C)  HR:  [68-85] 68  Resp:  [17-18] 18  BP: (101-119)/(59-71) 110/59  SpO2:  [96 %-97 %] 96 %  Body mass index is 17 97 kg/m²  Input and Output Summary (last 24 hours): Intake/Output Summary (Last 24 hours) at 2020 1046  Last data filed at 2020 0900  Gross per 24 hour   Intake 1200 ml   Output 1000 ml   Net 200 ml       Physical Exam:     Physical Exam   Constitutional: She is oriented to person, place, and time  No distress  HENT:   Head: Normocephalic  Eyes: Conjunctivae are normal    Neck: Normal range of motion  Cardiovascular: Normal rate  Pulmonary/Chest: Breath sounds normal    Abdominal: Bowel sounds are normal    Genitourinary:   Genitourinary Comments: Hays catheter draining clear yellow urine   Musculoskeletal:   Baseline paraplegia   Neurological: She is alert and oriented to person, place, and time  Skin: Skin is warm and dry  Right hip with dressing clean dry and intact   Psychiatric: She has a normal mood and affect  Nursing note and vitals reviewed        Additional Data:     Labs:    Results from last 7 days   Lab Units 20  0459   WBC Thousand/uL 9 44   HEMOGLOBIN g/dL 9 1*   HEMATOCRIT % 30 1*   PLATELETS Thousands/uL 546*     Results from last 7 days   Lab Units 20  0459   POTASSIUM mmol/L 4 2   CHLORIDE mmol/L 103   CO2 mmol/L 28   BUN mg/dL 19   CREATININE mg/dL 0 46*   CALCIUM mg/dL 9 0             Recent Cultures (last 7 days):           Last 24 Hours Medication List:     Current Facility-Administered Medications:  acetaminophen 650 mg Oral Q6H PRN Kajal Colvin MD   artificial tear  Both Eyes HS Lucius Sims MD   bisacodyl 10 mg Rectal Daily PRN Blake Henriquez MD   calcium carbonate-vitamin D 1 tablet Oral BID With Meals Kajal Colvin MD   cholecalciferol 1,000 Units Oral Daily Kajal Colvin MD   cyanocobalamin 100 mcg Oral Daily Kajal Colvin MD   dextran 70-hypromellose 1 drop Both Eyes Q3H PRN Estella Mauricio PA-C   diphenhydrAMINE 50 mg Oral 60 Min Pre-Op Sagrario Dumont PA-C   enoxaparin 40 mg Subcutaneous Daily Kajal Colvin MD   famotidine 20 mg Oral BID PRN Lucius Sims MD   loperamide 2 mg Oral TID PRN Quinten Fernandez PA-C   midodrine 15 mg Oral Q8H Kajal Colvin MD   multivitamin-minerals 1 tablet Oral Daily Kajal Colvin MD   nystatin  Topical BID Kajal Colvin MD   ondansetron 4 mg Intravenous Q6H PRN Kajal Colvin MD   pantoprazole 40 mg Oral BID AC Lucius Sims MD   polyethylene glycol 17 g Oral Daily PRN CONCEPCION Obrien   saccharomyces boulardii 250 mg Oral BID Kajal Colvin MD   senna 1 tablet Oral HS PRN Jose Maria Muñiz CRNA     Facility-Administered Medications Ordered in Other Encounters:  dexamethasone 4 mg Intravenous Once PRN Layla Villatoro,     diphenhydrAMINE 12 5 mg Intravenous Once Layla Villatoro DO    lactated ringers 75 mL/hr Intravenous Continuous Layla Villatoro,  Last Rate: 75 mL/hr (11/10/19 2105)   lactated ringers 50 mL/hr Intravenous Continuous Mayuri Lane CRNA    lactated ringers 75 mL/hr Intravenous Continuous Layla Villatoro,  Last Rate: Stopped (03/20/19 1841)   lactated ringers 75 mL/hr Intravenous Continuous Arelis Shelley MD Last Rate: Stopped (03/20/19 1842)   ondansetron 4 mg Intravenous Once PRN Rima Morales MD    promethazine 12 5 mg Intravenous Once PRN Rima Clay Weiss MD         Today, Patient Was Seen By: CONCEPCION Cardenas    ** Please Note: Dragon 360 Dictation voice to text software may have been used in the creation of this document   **

## 2020-02-24 NOTE — ASSESSMENT & PLAN NOTE
· Patient has reported depression over continued chronic hospital care and chronic illness  · Seen by Psychiatry, refused antidepressants

## 2020-02-24 NOTE — ASSESSMENT & PLAN NOTE
· Patient with a known past medical history for paraplegia following spinal cord injury presents with increased drainage of R hip wound  · Known to history of hip osteo and dislocation of the R femur  Patient is nonweightbearing  · Status post intravenous antibiotics x7 days  · Ortho and general surgery recommending continue local care  No plans for surgical intervention  · Wound care instructions:   · Rinse right hip, right anterior thigh/hip, left ischium pressure injuries, and midline sacral partial thickness wound with saline  Cover all wounds with silver maxorb and cover with Allevyn foam  Change right leg dressings day and left leg and sacrum dressings every other day and as needed

## 2020-02-24 NOTE — PLAN OF CARE
Problem: PHYSICAL THERAPY ADULT  Goal: Performs mobility at highest level of function for planned discharge setting  See evaluation for individualized goals  Description  Treatment/Interventions: Functional transfer training, LE strengthening/ROM, Endurance training, Patient/family training, Equipment eval/education, Bed mobility, Spoke to nursing, Spoke to case management(w/c training)  Equipment Recommended: Wheelchair       See flowsheet documentation for full assessment, interventions and recommendations  Note:   Prognosis: Fair  Problem List: Decreased strength, Decreased endurance, Impaired balance, Decreased mobility  Assessment: Pt participated in therapy session focusing on functional mobility tasks  Pt requires frequent verbal and tactile cues when performing lateral transfer as pt displays significant posterior lean which required mod A to safely complete transfer  Skilled acute PT interventions are indicated to address listed functional deficits focusign on strength, endurance and functional mobility  Barriers to Discharge: Decreased caregiver support     Recommendation: Post acute IP rehab     PT - OK to Discharge: Yes    See flowsheet documentation for full assessment

## 2020-02-24 NOTE — PHYSICAL THERAPY NOTE
PHYSICAL THERAPY NOTE          Patient Name: Pako Gonzalez  SZRKJ'O Date: 2/24/2020 02/24/20 1120   Pain Assessment   Pain Assessment No/denies pain   Pain Score No Pain   Hospital Pain Intervention(s) Repositioned   Response to Interventions tolerated   Restrictions/Precautions   Weight Bearing Precautions Per Order No   Other Precautions Contact/isolation;Multiple lines; Fall Risk;Pain  (paraplegic)   General   Chart Reviewed Yes   Family/Caregiver Present No   Cognition   Overall Cognitive Status WFL   Arousal/Participation Alert   Attention Within functional limits   Orientation Level Oriented X4   Following Commands Follows one step commands with increased time or repetition   Subjective   Subjective "I would like to get a smaller board and smaller wheelchair"   Bed Mobility   Rolling R 5  Supervision   Additional items Increased time required   Rolling L 5  Supervision   Additional items Increased time required   Supine to Sit 5  Supervision   Additional items Increased time required   Transfers   Sliding Board transfer 3  Moderate assistance   Additional items Assist x 1; Increased time required;Verbal cues   Additional Comments Pt displays significant posterior lean with transfer and required verbal and tactile cues to safely complete transfer   Ambulation/Elevation   Gait pattern Not appropriate   Wheelchair Activities   Propulsion Yes   Propulsion Type 1 Manual   Level 1 Level tile   Method 1 Right upper extremity; Left upper extremity   Level of Assistance 1 Independent   Description/ Details 1 300'   Balance   Static Sitting Fair   Dynamic Sitting Poor +   Endurance Deficit   Endurance Deficit Yes   Endurance Deficit Description fatigue, weakness   Activity Tolerance   Activity Tolerance Patient tolerated treatment well   Nurse Made Aware RN cleared patient for PT session   Assessment   Prognosis Fair   Problem List Decreased strength;Decreased endurance; Impaired balance;Decreased mobility   Assessment Pt participated in therapy session focusing on functional mobility tasks  Pt requires frequent verbal and tactile cues when performing lateral transfer as pt displays significant posterior lean which required mod A to safely complete transfer  Skilled acute PT interventions are indicated to address listed functional deficits focusign on strength, endurance and functional mobility  Goals   Patient Goals get a smaller chair and a smaller board   STG Expiration Date 02/25/20   Short Term Goal #1 1  Pt will demonstrate the ability to perform all aspects of bed mobility at Mod I in onder to maximize functional independence and decrease burden on caregivers  2 Pt will demonstrate the ability to perform slideboard transfer at Mod I in order to maximize functional independence and decrease burden on caregivers  3 Pt will demonstrate the ability to propel w/c at least 150ft at Mod I in order to maximize functional independence  4 Pt will demonstrate improved balance by one grade in order to decrease risk of falls  5 Pt will be able to tolerate sitting OOB for at least 30 min   PT Treatment Day 6   Plan   Treatment/Interventions Functional transfer training;LE strengthening/ROM; Therapeutic exercise; Endurance training;Patient/family training;Equipment eval/education; Bed mobility;Spoke to nursing;Spoke to case management   Progress Progressing toward goals   PT Frequency Other (Comment)  (3-6x/wk)   Recommendation   Recommendation Post acute IP rehab   Equipment Recommended Wheelchair   PT - OK to Discharge Yes   Additional Comments to rehab when medically stable       Bong Bower PT, DPT

## 2020-02-24 NOTE — UTILIZATION REVIEW
Continued Stay Review    Date: 2/22/20                        Current Patient Class: Inpatient Current Level of Care: Med Surg    HPI:69 y o  female with history of paraplegia s/p spinal cord injury, neurogenic bladder with indwelling Hays, and chronic non-stageable pressure wounds, initially admitted on 2/5/2020 as a transfer from her SNF for low blood pressure and fever  Found to have sepsis, Stage IV decubitus ulcer  Completed course of antibiotics  General Surgery, Orthopedics, and Infectious Disease on consult  Evaluated by Neuropsychology who determined patient does not have the capacity to make medical decisions  Patient requires guardianship for LTC placement  2/18/20 Case Management noted patient is requesting to have a friend be appointed POA  Internal Medicine noted patient AO x4, and requested NeuroPsych re-revaluation  PT/OT continues to work with patient and recommend post acute Inpatient rehab  Behavorial Health re-evaluated the patient on 2/21 and determined the patient does have decision-making capacity to make decisions about her medical treatment and discharge planning  Patient requested repeat PT evaluation to determine placement  On 2/22,  PT/OT continued to recommend Short Term Rehab at [facility]  Nursing continues with daily wound care         Pertinent Labs/Diagnostic Results:   Results from last 7 days   Lab Units 02/21/20  0459   WBC Thousand/uL 9 44   HEMOGLOBIN g/dL 9 1*   HEMATOCRIT % 30 1*   PLATELETS Thousands/uL 546*         Results from last 7 days   Lab Units 02/21/20  0459   SODIUM mmol/L 136   POTASSIUM mmol/L 4 2   CHLORIDE mmol/L 103   CO2 mmol/L 28   ANION GAP mmol/L 5   BUN mg/dL 19   CREATININE mg/dL 0 46*   EGFR ml/min/1 73sq m 102   CALCIUM mg/dL 9 0             Results from last 7 days   Lab Units 02/21/20  0459   GLUCOSE RANDOM mg/dL 103     Vital Signs:     Date and Time Temp Pulse SpO2 Resp BP   02/22/20 2237 98 1 °F (36 7 °C) -- -- -- --   02/22/20 2237 -- 83 95 % 18 112/61   02/22/20 1546 97 5 °F (36 4 °C) -- -- -- --   02/22/20 1546 -- 89 96 % -- 113/60   02/22/20 0728 -- 73 98 % 16 127/69   02/22/20 0038 98 1 °F (36 7 °C) -- -- -- --   02/22/20 0038 -- 79 96 % 15 134/73   02/21/20 1417 98 °F (36 7 °C) -- -- -- --   02/21/20 1417 -- 99 98 % 17 126/81   02/21/20 0923 -- -- -- -- --   02/21/20 0705 97 9 °F (36 6 °C) 74 97 % 18 121/65   02/20/20 2240 98 3 °F (36 8 °C) -- -- 18 --   02/20/20 2240 -- 77 97 % -- 119/69   02/20/20 1540 97 8 °F (36 6 °C) -- -- -- --   02/20/20 1540 -- 80 97 % -- 121/69   02/20/20 0707 97 6 °F (36 4 °C) -- -- -- --   02/20/20 0707 -- 64 97 % -- 142/67   02/19/20 2211 99 °F (37 2 °C) -- -- -- --   02/19/20 2211 -- 83 96 % -- 118/62   02/19/20 1411 97 5 °F (36 4 °C) -- -- -- --   02/19/20 1411 -- 76 98 % -- 120/63   02/19/20 0737 98 °F (36 7 °C) -- -- 17 --   02/19/20 0737 -- 66 96 % -- 115/70   02/19/20 0013 98 3 °F (36 8 °C) -- -- -- --   02/19/20 0013 -- 92 97 % -- 129/72   02/18/20 1553 -- 80 97 % -- 124/74   02/18/20 0650 97 8 °F (36 6 °C) -- -- 18 --       Medications:   Scheduled Medications:    Medications:  artificial tear  Both Eyes HS   calcium carbonate-vitamin D 1 tablet Oral BID With Meals   cholecalciferol 1,000 Units Oral Daily   cyanocobalamin 100 mcg Oral Daily   diphenhydrAMINE 50 mg Oral 60 Min Pre-Op   enoxaparin 40 mg Subcutaneous Daily   midodrine 15 mg Oral Q8H   multivitamin-minerals 1 tablet Oral Daily   nystatin  Topical BID   pantoprazole 40 mg Oral BID AC   saccharomyces boulardii 250 mg Oral BID     Continuous IV Infusions: None       PRN Meds:    acetaminophen 650 mg Oral Q6H PRN   bisacodyl 10 mg Rectal Daily PRN   dextran 70-hypromellose 1 drop Both Eyes Q3H PRN   famotidine 20 mg Oral BID PRN   loperamide 2 mg Oral TID PRN   ondansetron 4 mg Intravenous Q6H PRN   polyethylene glycol 17 g Oral Daily PRN   senna 1 tablet Oral HS PRN       Discharge Plan: Rehab    Network Utilization Review Department  [email or web address]  ATTENTION: Please call with any questions or concerns to [phone] and carefully listen to the prompts so that you are directed to the right person  All voicemails are confidential   Mavis Muss all requests for admission clinical reviews, approved or denied determinations and any other requests to dedicated fax number below belonging to the campus where the patient is receiving treatment   List of dedicated fax numbers for the Facilities:  [address] DENIALS (Administrative/Medical Necessity) [phone]   [address] (Maternity/NICU/Pediatrics) [phone]   Kohugh Aquino [phone]   [facility] [phone]   Cynthia Victor [phone]   Kimberly Tellez [phone]   [address] [place] [address] [phone]   [facility]  [phone]   [address] [facility] [address] [facility]  [address] [facility] [address] [facility] [phone]

## 2020-02-24 NOTE — SOCIAL WORK
CM met with Pt to discuss the need for additional SNF choices as [facility] unable to accept her back  Pt requested SNF referrals be made to [facility] and [facility]  CM made the requested referrals and will continue to follow

## 2020-02-24 NOTE — ASSESSMENT & PLAN NOTE
· CT scan of the chest reports possible heart failure as it relates to ground-glass appearance on the left lung  Also has left lower lobe consolidation that is concerning for atelectasis versus infection  Patient with no history of heart failure  Clinical exam not consistent with heart failure  · Consider additional chest imaging in 4-6 weeks for follow-up  Defer to PCP

## 2020-02-24 NOTE — ASSESSMENT & PLAN NOTE
· Labs appear consistent with anemia of chronic disease  Hemoglobin had been stable on prior labs  No need to repeat at this time      Lab Results   Component Value Date    HGB 9 1 (L) 02/21/2020    HGB 9 8 (L) 02/15/2020    HGB 9 9 (L) 02/14/2020    HGB 9 8 (L) 02/14/2020    HGB 9 0 (L) 02/12/2020

## 2020-02-24 NOTE — ASSESSMENT & PLAN NOTE
· History of spinal cord injury T8 with paraplegia sp 20 years ago hand gliding accident   · Presented from [facility]  · Supportive care

## 2020-02-24 NOTE — ASSESSMENT & PLAN NOTE
· POA with fever and leukocytosis with infectious source likely being R hip wound with underlying iliopsoas myositis  No signs of PNA or intra-abdominal infection  Review of repeat CT scan shows known osteomyelitis of the right hip with dislocation  Pockets of gas have decreased since the initial consult  No evidence for abscesses or collection  · Blood cultures 1/2 positive for coag-neg staph (likely contaminant) other negative  Repeat cultures negative for 5 days   · Urine culture + for proteus and e coli; likely asymptomatic bacteriuria per ID; isabel was exchanged   · Id has been following, patient completed 7 days total of intravenous Zosyn last dose being 2/11  Id with preference for surgical intervention for definitive source control however no focus for intervention as per IR and orthopedic surgery  · PT/OT recommending rehab  Case management following  Plan to go back to [facility] pending authorization  · Note, patient was initially deemed to not have capacity however re-evaluation done and patient does have capacity for medical decision making

## 2020-02-24 NOTE — ASSESSMENT & PLAN NOTE
· Low-density centrally in the uterus measuring up to 7 mm in thickness   Follow-up pelvic ultrasound is recommended to assess for abnormal endometrial thickening or endometrial lesion  · Outpatient follow up

## 2020-02-25 PROCEDURE — 99232 SBSQ HOSP IP/OBS MODERATE 35: CPT | Performed by: NURSE PRACTITIONER

## 2020-02-25 PROCEDURE — 97535 SELF CARE MNGMENT TRAINING: CPT

## 2020-02-25 PROCEDURE — 97542 WHEELCHAIR MNGMENT TRAINING: CPT

## 2020-02-25 PROCEDURE — 97530 THERAPEUTIC ACTIVITIES: CPT

## 2020-02-25 RX ADMIN — Medication 250 MG: at 17:45

## 2020-02-25 RX ADMIN — MIDODRINE HYDROCHLORIDE 15 MG: 5 TABLET ORAL at 05:29

## 2020-02-25 RX ADMIN — NYSTATIN: 100000 POWDER TOPICAL at 17:46

## 2020-02-25 RX ADMIN — Medication 250 MG: at 08:13

## 2020-02-25 RX ADMIN — Medication 1 TABLET: at 08:13

## 2020-02-25 RX ADMIN — MELATONIN 1000 UNITS: at 08:14

## 2020-02-25 RX ADMIN — MIDODRINE HYDROCHLORIDE 15 MG: 5 TABLET ORAL at 13:04

## 2020-02-25 RX ADMIN — PANTOPRAZOLE SODIUM 40 MG: 40 TABLET, DELAYED RELEASE ORAL at 17:45

## 2020-02-25 RX ADMIN — ENOXAPARIN SODIUM 40 MG: 40 INJECTION SUBCUTANEOUS at 08:13

## 2020-02-25 RX ADMIN — MIDODRINE HYDROCHLORIDE 15 MG: 5 TABLET ORAL at 21:53

## 2020-02-25 RX ADMIN — WHITE PETROLATUM 57.7 %-MINERAL OIL 31.9 % EYE OINTMENT: at 21:53

## 2020-02-25 RX ADMIN — VITAM B12 100 MCG: 100 TAB at 08:13

## 2020-02-25 RX ADMIN — Medication 1 TABLET: at 17:45

## 2020-02-25 RX ADMIN — PANTOPRAZOLE SODIUM 40 MG: 40 TABLET, DELAYED RELEASE ORAL at 08:13

## 2020-02-25 RX ADMIN — NYSTATIN: 100000 POWDER TOPICAL at 08:13

## 2020-02-25 NOTE — ASSESSMENT & PLAN NOTE
· Local care  Wound care following  · Strict turning and repositioning  · Currently on specialty mattress, would recommend specialty mattress at nursing facility  Case management made aware

## 2020-02-25 NOTE — ASSESSMENT & PLAN NOTE
Malnutrition Findings:   Malnutrition type: Chronic illness(Severe pro/sara malnutrition r/t disease/condition as evidenced by 12% unintentional wt loss since 11/10/19, consuming < 75% energy intake compared to est energy needs > 1 month, fat/muscle wasting of orbital/temple areas  Treated with diet/supplements)  Degree of Malnutrition: Other severe protein calorie malnutrition    BMI Findings:  BMI Classifications: Underweight < 18 5     Body mass index is 17 97 kg/m²       Ensure supplements

## 2020-02-25 NOTE — ASSESSMENT & PLAN NOTE
· History of spinal cord injury T8 with paraplegia 20 years ago hand gliding accident   · Presented from [facility]  · Supportive care

## 2020-02-25 NOTE — PROGRESS NOTES
[facility]    Progress Note - Loly Galarza 1950, 71 y o  female MRN: [id number]    Unit/Bed#: [facility] 715-48 Encounter: [id number]    Primary Care Provider: Nixon Orantes   Date and time admitted to hospital: 2/5/2020  3:25 PM        * [facility])  Assessment & Plan  · POA with fever and leukocytosis with infectious source likely being R hip wound with underlying iliopsoas myositis  No signs of PNA or intra-abdominal infection  Review of repeat CT scan shows known osteomyelitis of the right hip with dislocation  Pockets of gas have decreased since the initial consult  No evidence for abscesses or collection  · Blood cultures 1/2 positive for coag-neg staph (likely contaminant) other negative  Repeat cultures negative for 5 days   · Urine culture + for proteus and e coli; likely asymptomatic bacteriuria per ID; isabel was exchanged   · Id has been following, patient completed 7 days total of intravenous Zosyn last dose being 2/11  Id with preference for surgical intervention for definitive source control however no focus for intervention as per IR and orthopedic surgery  · PT/OT recommending rehab  Case management following  Plan was to go back to [facility] however they will not take her back  CM working on alternative plan and she will require auth  · Note, patient was initially deemed to not have capacity however re-evaluation done and patient DOES have capacity for medical decision making  Open wound of right hip  Assessment & Plan  · Patient with a known past medical history for paraplegia following spinal cord injury presents with increased drainage of R hip wound  · Known to history of hip osteo and dislocation of the R femur  Patient is nonweightbearing  · Status post intravenous antibiotics x7 days  · Ortho and general surgery recommending continue local care  No plans for surgical intervention    · Wound care instructions:   · Rinse right hip, right anterior thigh/hip, left ischium pressure injuries, and midline sacral partial thickness wound with saline  Cover all wounds with silver maxorb and cover with Allevyn foam  Change right leg dressings day and left leg and sacrum dressings every other day and as needed  Abnormal CT scan  Assessment & Plan  · Low-density centrally in the uterus measuring up to 7 mm in thickness   Follow-up pelvic ultrasound is recommended to assess for abnormal endometrial thickening or endometrial lesion  · Outpatient follow up    Cognitive decline  Assessment & Plan  · Patient noted per neuropsychology to be incompetent on 02/12/2020, re-evaluated by psych on 2/21 and deemed competent   · Pt agreeable to a few weeks of STR with goal to be discharged home     Lung nodule  Assessment & Plan  · Noted incidental finding, will require outpatient f/u     Depression  Assessment & Plan  · Patient has reported depression over continued chronic hospital care and chronic illness  · Seen by Psychiatry, refused antidepressants  Neurogenic bladder  Assessment & Plan  · History of neurogenic bladder with chronic indwelling Hays catheter, exchanged this admission due to finding of E coli and Proteus in the urine culture  · Urine culture noted, likely represents asymptomatic bacteriuria per infectious disease      Severe protein-calorie malnutrition ([address] )  Assessment & Plan  Malnutrition Findings:   Malnutrition type: Chronic illness(Severe pro/sara malnutrition r/t disease/condition as evidenced by 12% unintentional wt loss since 11/10/19, consuming < 75% energy intake compared to est energy needs > 1 month, fat/muscle wasting of orbital/temple areas  Treated with diet/supplements)  Degree of Malnutrition: Other severe protein calorie malnutrition    BMI Findings:  BMI Classifications: Underweight < 18 5     Body mass index is 17 97 kg/m²       Ensure supplements    Anemia of chronic disease  Assessment & Plan  · Labs appear consistent with anemia of chronic disease  Hemoglobin had been stable on prior labs  No need to repeat at this time  Lab Results   Component Value Date    HGB 9 1 (L) 02/21/2020    HGB 9 8 (L) 02/15/2020    HGB 9 9 (L) 02/14/2020    HGB 9 8 (L) 02/14/2020    HGB 9 0 (L) 02/12/2020         Decubitus ulcer of left ischium, stage III ([facility] [address] )  Assessment & Plan  · Local care  Wound care following  · Strict turning and repositioning  · Currently on specialty mattress, would recommend specialty mattress at nursing facility  Case management made aware  Paraplegia following spinal cord injury ([facility] [address] )  Assessment & Plan  · History of spinal cord injury T8 with paraplegia 20 years ago hand gliding accident   · Presented from [facility]  · Supportive care    Abnormal CT of the chest  Assessment & Plan  · CT scan of the chest reports possible heart failure as it relates to ground-glass appearance on the left lung  Also has left lower lobe consolidation that is concerning for atelectasis versus infection  Patient with no history of heart failure  Clinical exam not consistent with heart failure  · Consider additional chest imaging in 4-6 weeks for follow-up  Defer to PCP  Pharmacologic: Enoxaparin (Lovenox)  Mechanical VTE Prophylaxis in Place: Yes    Patient Centered Rounds: I have performed bedside rounds with nursing staff today  Discussions with Specialists or Other Care Team Provider: nursing, case management     Education and Discussions with Family / Patient: patient     Time Spent for Care: 30 minutes  More than 50% of total time spent on counseling and coordination of care as described above  Current Length of Stay: 20 day(s)    Current Patient Status: Inpatient   Certification Statement: The patient will continue to require additional inpatient hospital stay due to safe discharge planning to rehab    Discharge Plan / Estimated Discharge Date:  Await safe discharge plan to rehab    Case management following  Code Status: Level 1 - Full Code      Subjective:   Patient offers no acute complaints  Patient was seen out of bed working with physical therapy, wheeling around in her wheelchair  Objective:     Vitals:   Temp (24hrs), Av 7 °F (36 5 °C), Min:96 8 °F (36 °C), Max:98 4 °F (36 9 °C)    Temp:  [96 8 °F (36 °C)-98 4 °F (36 9 °C)] 98 4 °F (36 9 °C)  HR:  [72-87] 72  Resp:  [16-18] 16  BP: (107-117)/(58-70) 107/60  SpO2:  [95 %-97 %] 95 %  Body mass index is 17 97 kg/m²  Input and Output Summary (last 24 hours): Intake/Output Summary (Last 24 hours) at 2020 1032  Last data filed at 2020 7156  Gross per 24 hour   Intake 442 ml   Output 800 ml   Net -358 ml       Physical Exam:     Physical Exam   Constitutional: She is oriented to person, place, and time  No distress  Appears weak and frail   HENT:   Head: Normocephalic  Eyes: Conjunctivae are normal    Neck: Normal range of motion  Cardiovascular: Normal rate  Pulmonary/Chest: Breath sounds normal    Abdominal: Bowel sounds are normal    Genitourinary:   Genitourinary Comments: Hays catheter draining clear yellow urine   Musculoskeletal: She exhibits no edema  Baseline paraplegia   Neurological: She is alert and oriented to person, place, and time  Skin: Skin is warm  Multiple chronic wounds (not assessed today) patient in wheelchair   Psychiatric: She has a normal mood and affect  Nursing note and vitals reviewed        Additional Data:     Labs:    Results from last 7 days   Lab Units 20  0459   WBC Thousand/uL 9 44   HEMOGLOBIN g/dL 9 1*   HEMATOCRIT % 30 1*   PLATELETS Thousands/uL 546*     Results from last 7 days   Lab Units 20  0459   POTASSIUM mmol/L 4 2   CHLORIDE mmol/L 103   CO2 mmol/L 28   BUN mg/dL 19   CREATININE mg/dL 0 46*   CALCIUM mg/dL 9 0             Recent Cultures (last 7 days):           Last 24 Hours Medication List:     Current Facility-Administered Medications:  acetaminophen 650 mg Oral Q6H PRN Lexis Gutierres MD   artificial tear  Both Eyes HS Osiris Allen MD   bisacodyl 10 mg Rectal Daily PRN Tita Greco MD   calcium carbonate-vitamin D 1 tablet Oral BID With Meals Lexis Gutierres MD   cholecalciferol 1,000 Units Oral Daily Lexis Gutierres MD   cyanocobalamin 100 mcg Oral Daily Lexis Gutierres MD   dextran 70-hypromellose 1 drop Both Eyes Q3H PRN Jeanmarie Bennett PA-C   diphenhydrAMINE 50 mg Oral 60 Min Pre-Op Sagrario Dumont PA-C   enoxaparin 40 mg Subcutaneous Daily Lexis Gutierres MD   famotidine 20 mg Oral BID PRN Osiris Allen MD   loperamide 2 mg Oral TID PRN Annabelle Waldron PA-C   midodrine 15 mg Oral Q8H Lexis Gutierres MD   multivitamin-minerals 1 tablet Oral Daily Lexis Gutierres MD   nystatin  Topical BID Lexis Gutierres MD   ondansetron 4 mg Intravenous Q6H PRN Lexis Gutierres, MD   pantoprazole 40 mg Oral BID AC Osiris Allen MD   polyethylene glycol 17 g Oral Daily PRN CONCEPCION Mott   saccharomyces boulardii 250 mg Oral BID Lexis Gutierres MD   senna 1 tablet Oral HS PRN Shakira Venegas CRNA     Facility-Administered Medications Ordered in Other Encounters:  dexamethasone 4 mg Intravenous Once PRN Barb Silversmith, DO    diphenhydrAMINE 12 5 mg Intravenous Once Barb Silversmith, DO    lactated ringers 75 mL/hr Intravenous Continuous Barb Silversmith, DO Last Rate: 75 mL/hr (11/10/19 2105)   lactated ringers 50 mL/hr Intravenous Continuous Butch Montoya CRNA    lactated ringers 75 mL/hr Intravenous Continuous Barb Silversmith, DO Last Rate: Stopped (03/20/19 1841)   lactated ringers 75 mL/hr Intravenous Continuous Jacky Murguia MD Last Rate: Stopped (03/20/19 1842)   ondansetron 4 mg Intravenous Once PRN Rylan Celestin MD    promethazine 12 5 mg Intravenous Once PRN Rylan Celestin MD         Today, Patient Was Seen By: CONCEPCION Campbell    ** Please Note: [employer] Dictation voice to text software may have been used in the creation of this document   **

## 2020-02-25 NOTE — OCCUPATIONAL THERAPY NOTE
Occupational Therapy Treatment Note:       02/25/20 1444   Restrictions/Precautions   Other Precautions Contact/isolation;Multiple lines; Fall Risk;Pain  (paraplegic)   Pain Assessment   Pain Assessment No/denies pain  ("more like weakness in arms")   Pain Score No Pain   ADL   Where Assessed Supine, bed   Grooming Assistance 5  Supervision/Setup   Grooming Deficit Setup;Brushing hair   UB Dressing Assistance 5  Supervision/Setup   UB Dressing Deficit Setup; Increased time to complete   UB Dressing Comments   (hospital gown from supine/bed/HOB raised)   LB Dressing Assistance 4  Minimal Assistance   LB Dressing Deficit Setup;Verbal cueing; Increased time to complete; Don/doff R sock; Don/doff L sock   Toileting Assistance  1  Total Assistance   Toileting Deficit   (catheter)   Bed Mobility   Rolling L 4  Minimal assistance   Additional items Bedrails; Increased time required;LE management  (weight shift from right side to left)   Right Upper Extremity- Strength   R Shoulder Flexion; Horizontal ABduction   R Elbow Elbow flexion;Elbow extension   Equipment Theraband  (light resistance)   R Weight/Reps/Sets 1 set x 10 reps   Left Upper Extremity-Strength   L Shoulder Flexion; Horizontal ABduction   L Elbow Elbow flexion;Elbow extension   Equipment Theraband  (light resistance)   L Weights/Reps/Sets 1 set x 10 reps   Cognition   Overall Cognitive Status WFL   Arousal/Participation Alert; Cooperative   Attention Within functional limits   Orientation Level Oriented X4   Memory Within functional limits   Following Commands Follows multistep commands without difficulty   Activity Tolerance   Activity Tolerance Patient tolerated treatment well   Medical Staff Made Aware patient cleared for OT by RN   Assessment   Assessment Patient presents supine in bed agreeable to engage in OT  Patient had performed a portion of her bathing earlier today   Patient requires min vc's for adaptive techniques with dressing and rolling techniques for 2 hour weight shift  Patient able to assist by grabbing left side bedrail rolling to that side with assist to facilitate and position BLE's  Patient reports performing her theraband exercises on her own, in addition, to completing with therapy  Continue to recommend Short Term Rehab when medically cleared for discharge  Plan   Treatment Interventions ADL retraining; Endurance training; Compensatory technique education   Goal Expiration Date 02/25/20   OT Treatment Day 6   OT Frequency 3-5x/wk   Recommendation   OT Discharge Recommendation Short Term Rehab   OT - OK to Discharge   (when medically cleared)   Cathy Paredes

## 2020-02-25 NOTE — PLAN OF CARE
Problem: PHYSICAL THERAPY ADULT  Goal: Performs mobility at highest level of function for planned discharge setting  See evaluation for individualized goals  Description  Treatment/Interventions: Functional transfer training, LE strengthening/ROM, Endurance training, Patient/family training, Equipment eval/education, Bed mobility, Spoke to nursing, Spoke to case management(w/c training)  Equipment Recommended: Wheelchair       See flowsheet documentation for full assessment, interventions and recommendations  Outcome: Progressing  Note:   Prognosis: Fair  Problem List: Decreased strength, Decreased endurance, Impaired balance, Decreased mobility  Assessment: Pt presents with decreased mobility, strength, balance, and activity tolerance  Pt demonstrated ability to perform bed mobility at supervision  Pt sat EOB ~10 minutes at supervision while performing bathing and dynamic tasks w/ UEs  Pt performed slideboard transfer at Mod A into w/c  Pt requires Mod A to initiate positioning onto slideboard and to achieve appropriate positioning into w/c  Pt able to propel w/c 100', 150', and 50' w/ rest breaks as needed  Pt reports fatigue in UEs while propelling w/c  Pt educated on importance of repositioning in w/c and returning to bed within an hour per wound care  Pt continues to benefit from skilled therapy to maximize functional independence  Recommendation at this time is rehab  Pt would benefit from functional transfers, balance, UE strength, and w/c mobility  Barriers to Discharge: Decreased caregiver support     Recommendation: Post acute IP rehab     PT - OK to Discharge: Yes    See flowsheet documentation for full assessment

## 2020-02-25 NOTE — PLAN OF CARE
Problem: OCCUPATIONAL THERAPY ADULT  Goal: Performs self-care activities at highest level of function for planned discharge setting  See evaluation for individualized goals  Description  Treatment Interventions: ADL retraining, Functional transfer training, UE strengthening/ROM, Endurance training, Cognitive reorientation, Patient/family training, Compensatory technique education, Equipment evaluation/education, Energy conservation, Activityengagement          See flowsheet documentation for full assessment, interventions and recommendations  Outcome: Progressing  Note:   Limitation: Decreased ADL status, Decreased Safe judgement during ADL, Decreased endurance, Decreased self-care trans, Decreased high-level ADLs  Prognosis: Good, Fair  Assessment: Patient presents supine in bed agreeable to engage in OT  Patient had performed a portion of her bathing earlier today  Patient requires min vc's for adaptive techniques with dressing and rolling techniques for 2 hour weight shift  Patient able to assist by grabbing left side bedrail rolling to that side with assist to facilitate and position BLE's  Patient reports performing her theraband exercises on her own, in addition, to completing with therapy  Continue to recommend Short Term Rehab when medically cleared for discharge        OT Discharge Recommendation: Short Term Rehab  OT - OK to Discharge: (when medically cleared)  Laura High

## 2020-02-25 NOTE — ASSESSMENT & PLAN NOTE
· POA with fever and leukocytosis with infectious source likely being R hip wound with underlying iliopsoas myositis  No signs of PNA or intra-abdominal infection  Review of repeat CT scan shows known osteomyelitis of the right hip with dislocation  Pockets of gas have decreased since the initial consult  No evidence for abscesses or collection  · Blood cultures 1/2 positive for coag-neg staph (likely contaminant) other negative  Repeat cultures negative for 5 days   · Urine culture + for proteus and e coli; likely asymptomatic bacteriuria per ID; isabel was exchanged   · Id has been following, patient completed 7 days total of intravenous Zosyn last dose being 2/11  Id with preference for surgical intervention for definitive source control however no focus for intervention as per IR and orthopedic surgery  · PT/OT recommending rehab  Case management following  Plan was to go back to [facility] however they will not take her back  CM working on alternative plan and she will require auth  · Note, patient was initially deemed to not have capacity however re-evaluation done and patient DOES have capacity for medical decision making

## 2020-02-25 NOTE — SOCIAL WORK
Patient reviewed during care coordination rounds  Patient pending placement for rehab  Patient will require prior authorization  Patient chose [facility], DEL contacted [facility] 2/24 for update on referral  No one returned call  CM contacted [facility] again today  CM spoke with Roberto Gilbert requested 20 minutes to check into the referral, advised he would call back  CM will obtain additional choices for patient, patient states she will look into additional choices  Patient requesting referral be placed to [facility]  Referral made in [facility] and pending  Rehab list provided tailored to patient's insurance  Patient chose [facility]

## 2020-02-25 NOTE — PHYSICAL THERAPY NOTE
PHYSICAL THERAPY NOTE    Patient Name: Monty Muniz  XTMMU'[id number] Date: 2/25/2020 02/25/20 1041   Pain Assessment   Pain Assessment No/denies pain   Pain Score No Pain   Restrictions/Precautions   Weight Bearing Precautions Per Order No   Other Precautions Contact/isolation;Multiple lines; Fall Risk  (paraplegic)   General   Chart Reviewed Yes   Response to Previous Treatment Patient with no complaints from previous session  Family/Caregiver Present No   Cognition   Overall Cognitive Status WFL   Arousal/Participation Alert   Attention Within functional limits   Orientation Level Oriented X4   Memory Within functional limits   Following Commands Follows multistep commands without difficulty   Subjective   Subjective Pt lying supine and agreeable to work w/ therapy   Bed Mobility   Supine to Sit 5  Supervision   Additional items Increased time required   Sit to Supine Unable to assess   Additional Comments Pt lying supine upon PT arrival  Pt returned seated OOB in w/c at end of session w/ all needs within reach   Transfers   Sliding Board transfer 3  Moderate assistance   Additional items Assist x 1; Increased time required;Verbal cues   Additional Comments Pt requires Mod A to initiate positioning onto slideboard and to achieve appropriate positioning into w/c   Wheelchair Activities   Propulsion Yes   Propulsion Type 1 Manual   Level 1 Level tile   Method 1 Right upper extremity; Left upper extremity   Level of Assistance 1 Independent   Description/ Details 1 100', 150', 50'   Balance   Static Sitting Fair -   Dynamic Sitting Poor +   Endurance Deficit   Endurance Deficit Yes   Endurance Deficit Description fatigue, weakness   Activity Tolerance   Activity Tolerance Patient tolerated treatment well   Nurse Made Aware RN cleared pt for therapy   Exercises   Balance training  Pt sat EOB ~10 minutes while performing hygiene at supervision   Assessment   Prognosis Fair   Problem List Decreased strength;Decreased endurance; Impaired balance;Decreased mobility   Assessment Pt presents with decreased mobility, strength, balance, and activity tolerance  Pt demonstrated ability to perform bed mobility at supervision  Pt sat EOB ~10 minutes at supervision while performing bathing and dynamic tasks w/ UEs  Pt performed slideboard transfer at Mod A into w/c  Pt requires Mod A to initiate positioning onto slideboard and to achieve appropriate positioning into w/c  Pt able to propel w/c 100', 150', and 50' w/ rest breaks as needed  Pt reports fatigue in UEs while propelling w/c  Pt educated on importance of repositioning in w/c and returning to bed within an hour per wound care  Pt continues to benefit from skilled therapy to maximize functional independence  Recommendation at this time is rehab  Pt would benefit from functional transfers, balance, UE strength, and w/c mobility   Goals   Patient Goals to propel w/c in hallway   STG Expiration Date 03/10/20   PT Treatment Day 7   Plan   Treatment/Interventions Functional transfer training; Therapeutic exercise; Endurance training;Patient/family training;Equipment eval/education; Bed mobility;Spoke to nursing  (w/c training)   Progress Progressing toward goals   PT Frequency   (3-6x/week)   Recommendation   Recommendation Post acute IP rehab   Equipment Recommended Wheelchair   PT - OK to Discharge Yes   Additional Comments when medically stable to rehab     Timothy Amaya, PT, DPT

## 2020-02-26 PROCEDURE — 99232 SBSQ HOSP IP/OBS MODERATE 35: CPT | Performed by: NURSE PRACTITIONER

## 2020-02-26 RX ADMIN — MIDODRINE HYDROCHLORIDE 15 MG: 5 TABLET ORAL at 05:46

## 2020-02-26 RX ADMIN — VITAM B12 100 MCG: 100 TAB at 08:29

## 2020-02-26 RX ADMIN — Medication 1 TABLET: at 18:03

## 2020-02-26 RX ADMIN — Medication 1 TABLET: at 08:29

## 2020-02-26 RX ADMIN — DEXTRAN 70 AND HYPROMELLOSE 2910 1 DROP: 1; 3 SOLUTION/ DROPS OPHTHALMIC at 13:24

## 2020-02-26 RX ADMIN — Medication 250 MG: at 08:29

## 2020-02-26 RX ADMIN — Medication 250 MG: at 18:03

## 2020-02-26 RX ADMIN — ENOXAPARIN SODIUM 40 MG: 40 INJECTION SUBCUTANEOUS at 08:29

## 2020-02-26 RX ADMIN — PANTOPRAZOLE SODIUM 40 MG: 40 TABLET, DELAYED RELEASE ORAL at 18:03

## 2020-02-26 RX ADMIN — MIDODRINE HYDROCHLORIDE 15 MG: 5 TABLET ORAL at 13:24

## 2020-02-26 RX ADMIN — MIDODRINE HYDROCHLORIDE 15 MG: 5 TABLET ORAL at 21:19

## 2020-02-26 RX ADMIN — WHITE PETROLATUM 57.7 %-MINERAL OIL 31.9 % EYE OINTMENT: at 21:19

## 2020-02-26 RX ADMIN — PANTOPRAZOLE SODIUM 40 MG: 40 TABLET, DELAYED RELEASE ORAL at 05:46

## 2020-02-26 RX ADMIN — MELATONIN 1000 UNITS: at 08:29

## 2020-02-26 NOTE — PROGRESS NOTES
[facility] Internal Medicine    Progress Note - Loly Galarza 1950, 71 y o  female MRN: [id number]    Unit/Bed#: [facility] 182-43 Encounter: [id number]    Primary Care Provider: Nixon Orantes   Date and time admitted to hospital: 2/5/2020  3:25 PM      * Sepsis [facility])  Assessment & Plan  · POA with fever and leukocytosis with infectious source likely being R hip wound with underlying iliopsoas myositis  No signs of PNA or intra-abdominal infection  Review of repeat CT scan shows known osteomyelitis of the right hip with dislocation  Pockets of gas have decreased since the initial consult  No evidence for abscesses or collection  · Blood cultures 1/2 positive for coag-neg staph (likely contaminant) other negative  Repeat cultures negative for 5 days   · Urine culture + for proteus and e coli; likely asymptomatic bacteriuria per ID; isabel was exchanged   · ID had been following, patient completed 7 days total of intravenous Zosyn last dose being 2/11  ID with preference for surgical intervention for definitive source control however no focus for intervention as per IR and orthopedic surgery  · PT/OT recommending rehab  Case management following  Plan was to go back to [facility] however they will not take her back  CM working on alternative plan and she will require auth  · Note, patient was initially deemed to not have capacity however re-evaluation done and patient DOES have capacity for medical decision making  Open wound of right hip  Assessment & Plan  · Patient with a known past medical history for paraplegia following spinal cord injury presents with increased drainage of R hip wound  · Known to history of hip osteo and dislocation of the R femur  Patient is nonweightbearing  · Status post intravenous antibiotics x7 days  · Ortho and general surgery recommending continue local care  No plans for surgical intervention    · Wound care instructions:   · Rinse right hip, right anterior thigh/hip, left ischium pressure injuries, and midline sacral partial thickness wound with saline  Cover all wounds with silver maxorb and cover with Allevyn foam  Change right leg dressings day and left leg and sacrum dressings every other day and as needed  Abnormal CT scan  Assessment & Plan  · Low-density centrally in the uterus measuring up to 7 mm in thickness   Follow-up pelvic ultrasound is recommended to assess for abnormal endometrial thickening or endometrial lesion  · Outpatient follow up    Cognitive decline  Assessment & Plan  · Patient noted per neuropsychology to be incompetent on 02/12/2020, re-evaluated by psych on 2/21 and deemed competent   · Pt agreeable to a few weeks of STR with goal to be discharged home     Lung nodule  Assessment & Plan  · Noted incidental finding, will require outpatient f/u     Depression  Assessment & Plan  · Patient has reported depression over continued chronic hospital care and chronic illness  · Seen by Psychiatry, refused antidepressants  Neurogenic bladder  Assessment & Plan  · History of neurogenic bladder with chronic indwelling Hays catheter, exchanged this admission due to finding of E coli and Proteus in the urine culture  · Urine culture noted, likely represents asymptomatic bacteriuria per infectious disease      Severe protein-calorie malnutrition ([address] )  Assessment & Plan  Malnutrition Findings:   Malnutrition type: Chronic illness(Severe pro/sara malnutrition r/t disease/condition as evidenced by 12% unintentional wt loss since 11/10/19, consuming < 75% energy intake compared to est energy needs > 1 month, fat/muscle wasting of orbital/temple areas  Treated with diet/supplements)  Degree of Malnutrition: Other severe protein calorie malnutrition    BMI Findings:  BMI Classifications: Underweight < 18 5     Body mass index is 17 97 kg/m²       Ensure supplements    Anemia of chronic disease  Assessment & Plan  · Labs appear consistent with anemia of chronic disease  Hemoglobin had been stable on prior labs  No need to repeat at this time  Lab Results   Component Value Date    HGB 9 1 (L) 02/21/2020    HGB 9 8 (L) 02/15/2020    HGB 9 9 (L) 02/14/2020    HGB 9 8 (L) 02/14/2020    HGB 9 0 (L) 02/12/2020         Decubitus ulcer of left ischium, stage III ([facility] [address] )  Assessment & Plan  · Local care  Wound care following  · Strict turning and repositioning  · Currently on specialty mattress, would recommend specialty mattress at nursing facility  Case management made aware  Paraplegia following spinal cord injury ([facility] [address] )  Assessment & Plan  · History of spinal cord injury T8 with paraplegia 20 years ago hand gliding accident   · Presented from [facility]  · Supportive care    Abnormal CT of the chest  Assessment & Plan  · CT scan of the chest reports possible heart failure as it relates to ground-glass appearance on the left lung  Also has left lower lobe consolidation that is concerning for atelectasis versus infection  Patient with no history of heart failure  Clinical exam not consistent with heart failure  · Consider additional chest imaging in 4-6 weeks for follow-up  Defer to PCP  Pharmacologic: Enoxaparin (Lovenox)  Mechanical VTE Prophylaxis in Place: Yes    Patient Centered Rounds: I have performed bedside rounds with nursing staff today  Discussions with Specialists or Other Care Team Provider: nursing, case management, PT/OT    Education and Discussions with Family / Patient: patient     Time Spent for Care: 20 minutes  More than 50% of total time spent on counseling and coordination of care as described above      Current Length of Stay: 21 day(s)    Current Patient Status: Inpatient   Certification Statement: The patient will continue to require additional inpatient hospital stay due to Awaiting case management plan for safe discharge    Discharge Plan / Estimated Discharge Date:  Awaiting safe discharge plan      Code Status: Level 1 - Full Code      Subjective:   Patient offers no acute complaints  Objective:     Vitals:   Temp (24hrs), Av 7 °F (36 5 °C), Min:97 7 °F (36 5 °C), Max:97 7 °F (36 5 °C)    Temp:  [97 7 °F (36 5 °C)] 97 7 °F (36 5 °C)  Resp:  [16-17] 16  BP: (111-128)/(61-78) 111/64  Body mass index is 17 97 kg/m²  Input and Output Summary (last 24 hours): Intake/Output Summary (Last 24 hours) at 2020 0951  Last data filed at 2020 0601  Gross per 24 hour   Intake 540 ml   Output 800 ml   Net -260 ml       Physical Exam:     Physical Exam   Constitutional: She is oriented to person, place, and time  No distress  Appears weak and frail   HENT:   Head: Normocephalic  Eyes: Conjunctivae are normal    Neck: Normal range of motion  Cardiovascular: Normal rate  Pulmonary/Chest: Breath sounds normal    Abdominal: Bowel sounds are normal    Genitourinary:   Genitourinary Comments: Hays catheter draining clear yellow urine   Musculoskeletal:   Baseline paraplegia   Neurological: She is alert and oriented to person, place, and time  Skin: Skin is warm  Wound dressings clean dry and intact, just changed by nursing  Psychiatric: She has a normal mood and affect  Nursing note and vitals reviewed        Additional Data:     Labs:    Results from last 7 days   Lab Units 20  0459   WBC Thousand/uL 9 44   HEMOGLOBIN g/dL 9 1*   HEMATOCRIT % 30 1*   PLATELETS Thousands/uL 546*     Results from last 7 days   Lab Units 20  0459   POTASSIUM mmol/L 4 2   CHLORIDE mmol/L 103   CO2 mmol/L 28   BUN mg/dL 19   CREATININE mg/dL 0 46*   CALCIUM mg/dL 9 0             Recent Cultures (last 7 days):           Last 24 Hours Medication List:     Current Facility-Administered Medications:  acetaminophen 650 mg Oral Q6H PRN Marlencarl Mcleod MD   artificial tear  Both Eyes HS Audie Saenz MD   bisacodyl 10 mg Rectal Daily PRN Radha Morales MD   calcium carbonate-vitamin D 1 tablet Oral BID With Meals Carolynn Romberg, MD   cholecalciferol 1,000 Units Oral Daily Carolynn Romberg, MD   cyanocobalamin 100 mcg Oral Daily Carolynn Romberg, MD   dextran 70-hypromellose 1 drop Both Eyes Q3H PRN Vanessa Heredia PA-C   diphenhydrAMINE 50 mg Oral 60 Min Pre-Op Sagrario Dumont PA-C   enoxaparin 40 mg Subcutaneous Daily Carolynn Romberg, MD   famotidine 20 mg Oral BID PRN Morales Hamilton MD   loperamide 2 mg Oral TID PRN Mariaelena Mccann PA-C   midodrine 15 mg Oral Q8H Carolynn Romberg, MD   multivitamin-minerals 1 tablet Oral Daily Carolynn Romberg, MD   nystatin  Topical BID Carolynn Romberg, MD   ondansetron 4 mg Intravenous Q6H PRN Carolynn Romberg, MD   pantoprazole 40 mg Oral BID AC Morales Hamilton MD   polyethylene glycol 17 g Oral Daily PRN CONCEPCION [place]   saccharomyces boulardii 250 mg Oral BID Carolynn Romberg, MD   senna 1 tablet Oral HS PRN Richmond Hawkins CRNA     Facility-Administered Medications Ordered in Other Encounters:  dexamethasone 4 mg Intravenous Once PRN Faizan Yazidism, DO    diphenhydrAMINE 12 5 mg Intravenous Once Faizan Yazidism, DO    lactated ringers 75 mL/hr Intravenous Continuous Faizan Yazidism, DO Last Rate: 75 mL/hr (11/10/19 2105)   lactated ringers 50 mL/hr Intravenous Continuous Eevlyne Edwards CRNA    lactated ringers 75 mL/hr Intravenous Continuous Faizan Yazidism, DO Last Rate: Stopped (03/20/19 1841)   lactated ringers 75 mL/hr Intravenous Continuous Lala Gerber MD Last Rate: Stopped (03/20/19 1842)   ondansetron 4 mg Intravenous Once PRN Yusuf Lipscomb MD    promethazine 12 5 mg Intravenous Once PRN Yusuf Lipscomb MD         Today, Patient Was Seen By: CONCEPCION [place]    ** Please Note: Dragon 360 Dictation voice to text software may have been used in the creation of this document   **

## 2020-02-26 NOTE — ASSESSMENT & PLAN NOTE
· POA with fever and leukocytosis with infectious source likely being R hip wound with underlying iliopsoas myositis  No signs of PNA or intra-abdominal infection  Review of repeat CT scan shows known osteomyelitis of the right hip with dislocation  Pockets of gas have decreased since the initial consult  No evidence for abscesses or collection  · Blood cultures 1/2 positive for coag-neg staph (likely contaminant) other negative  Repeat cultures negative for 5 days   · Urine culture + for proteus and e coli; likely asymptomatic bacteriuria per ID; isabel was exchanged   · ID had been following, patient completed 7 days total of intravenous Zosyn last dose being 2/11  ID with preference for surgical intervention for definitive source control however no focus for intervention as per IR and orthopedic surgery  · PT/OT recommending rehab  Case management following  Plan was to go back to [address] [facility] however they will not take her back  CM working on alternative plan and she will require auth  · Note, patient was initially deemed to not have capacity however re-evaluation done and patient DOES have capacity for medical decision making

## 2020-02-26 NOTE — PLAN OF CARE
Problem: Potential for Falls  Goal: Patient will remain free of falls  Description  INTERVENTIONS:  - Assess patient frequently for physical needs  -  Identify cognitive and physical deficits and behaviors that affect risk of falls    -  [place] fall precautions as indicated by assessment   - Educate patient/family on patient safety including physical limitations  - Instruct patient to call for assistance with activity based on assessment  - Modify environment to reduce risk of injury  - Consider OT/PT consult to assist with strengthening/mobility  Outcome: Progressing

## 2020-02-26 NOTE — ASSESSMENT & PLAN NOTE
· History of spinal cord injury T8 with paraplegia 20 years ago hand gliding accident   · Presented from [facility]  · Supportive care

## 2020-02-27 PROCEDURE — 99232 SBSQ HOSP IP/OBS MODERATE 35: CPT | Performed by: NURSE PRACTITIONER

## 2020-02-27 PROCEDURE — 97542 WHEELCHAIR MNGMENT TRAINING: CPT

## 2020-02-27 PROCEDURE — 97530 THERAPEUTIC ACTIVITIES: CPT

## 2020-02-27 RX ORDER — MIDODRINE HYDROCHLORIDE 5 MG/1
10 TABLET ORAL
Status: DISCONTINUED | OUTPATIENT
Start: 2020-02-27 | End: 2020-03-02 | Stop reason: HOSPADM

## 2020-02-27 RX ADMIN — MELATONIN 1000 UNITS: at 08:39

## 2020-02-27 RX ADMIN — Medication 1 TABLET: at 17:29

## 2020-02-27 RX ADMIN — NYSTATIN: 100000 POWDER TOPICAL at 17:30

## 2020-02-27 RX ADMIN — PANTOPRAZOLE SODIUM 40 MG: 40 TABLET, DELAYED RELEASE ORAL at 06:20

## 2020-02-27 RX ADMIN — MIDODRINE HYDROCHLORIDE 15 MG: 5 TABLET ORAL at 13:07

## 2020-02-27 RX ADMIN — NYSTATIN: 100000 POWDER TOPICAL at 08:39

## 2020-02-27 RX ADMIN — Medication 1 TABLET: at 08:39

## 2020-02-27 RX ADMIN — MIDODRINE HYDROCHLORIDE 10 MG: 5 TABLET ORAL at 17:29

## 2020-02-27 RX ADMIN — Medication 250 MG: at 17:29

## 2020-02-27 RX ADMIN — WHITE PETROLATUM 57.7 %-MINERAL OIL 31.9 % EYE OINTMENT: at 21:54

## 2020-02-27 RX ADMIN — PANTOPRAZOLE SODIUM 40 MG: 40 TABLET, DELAYED RELEASE ORAL at 17:29

## 2020-02-27 RX ADMIN — MIDODRINE HYDROCHLORIDE 15 MG: 5 TABLET ORAL at 06:20

## 2020-02-27 RX ADMIN — VITAM B12 100 MCG: 100 TAB at 08:39

## 2020-02-27 RX ADMIN — DEXTRAN 70 AND HYPROMELLOSE 2910 1 DROP: 1; 3 SOLUTION/ DROPS OPHTHALMIC at 13:07

## 2020-02-27 RX ADMIN — Medication 250 MG: at 08:39

## 2020-02-27 RX ADMIN — ENOXAPARIN SODIUM 40 MG: 40 INJECTION SUBCUTANEOUS at 08:39

## 2020-02-27 NOTE — ASSESSMENT & PLAN NOTE
· POA with fever and leukocytosis with infectious source likely being R hip wound with underlying iliopsoas myositis  No signs of PNA or intra-abdominal infection  Review of repeat CT scan shows known osteomyelitis of the right hip with dislocation  Pockets of gas have decreased since the initial consult  No evidence for abscesses or collection  · Blood cultures 1/2 positive for coag-neg staph (likely contaminant) other negative  Repeat cultures negative for 5 days   · Urine culture + for proteus and e coli; likely asymptomatic bacteriuria per ID; isabel was exchanged   · ID had been following, patient completed 7 days total of intravenous Zosyn last dose being 2/11  ID with preference for surgical intervention for definitive source control however no focus for intervention as per IR and orthopedic surgery  · PT/OT recommending rehab  Case management following  Plan was to go back to [facility] however they will not take her back  CM working on alternative plan and she will require auth  · Note, patient was initially deemed to not have capacity however re-evaluation done and patient DOES have capacity for medical decision making

## 2020-02-27 NOTE — PLAN OF CARE
Problem: PHYSICAL THERAPY ADULT  Goal: Performs mobility at highest level of function for planned discharge setting  See evaluation for individualized goals  Description  Treatment/Interventions: Functional transfer training, LE strengthening/ROM, Endurance training, Patient/family training, Equipment eval/education, Bed mobility, Spoke to nursing, Spoke to case management(w/c training)  Equipment Recommended: Wheelchair       See flowsheet documentation for full assessment, interventions and recommendations  Outcome: Progressing  Note:   Prognosis: Fair  Problem List: Decreased strength, Decreased endurance, Impaired balance, Decreased mobility  Assessment: Pt presents with decreased mobility, strength, balance, and activity tolerance  Pt demonstrated ability to perform bed mobility at supervision  Pt performed slideboard transfers at Mod A into w/c  Pt propelled w/c ' and 100' w/ 1 rest break  Pt reports RUE fatigue throughout w/c propulsion  Pt demonstrates improvement in slideboard transfer this session being able to position self into w/c w/ less assistance this session  Pt continues to benefit from skilled therapy to maximize functional independence  Recommendation at this time is rehab  Pt would benefit from functional transfers, w/c mobility, strength, balance, and activity tolerance  Barriers to Discharge: Decreased caregiver support     Recommendation: Post acute IP rehab     PT - OK to Discharge: Yes    See flowsheet documentation for full assessment

## 2020-02-27 NOTE — SOCIAL WORK
IP rehab referral follow up:      1117 hrs:   ECIN response pending from [facility] and [facility] sent message to both facilities requesting an acceptance update and advising them that the Neuro Psych re-eval determined that the patient has capacity  1242 hrs:   CM received call from Sylvia Sandoval at [facility]; he reported that, d/t pt's longterm care needs and no supporting family or friends to contact or get assistance from, they are unable to accept the patient for IP rehab to [facility]        1423 hrs:   CM met with the patient, reviewed ECIN responses and asked patient to review rehab list for additional choices  Pt asked for time to look over list  MSW DEL [place]le[place] advised

## 2020-02-27 NOTE — ASSESSMENT & PLAN NOTE
· History of spinal cord injury T8 with paraplegia 20 years ago hand gliding accident   · Presented from [facility]  · Supportive care

## 2020-02-27 NOTE — PROGRESS NOTES
Margot Soriano met with patient provided a blessing and anointed patient       02/27/20 1400   Clinical Encounter Type   Visited With Patient   [facility] Encounters   [facility] Needs Prayer   Sacramental Encounters   Sacrament of Sick-Anointing Anointed

## 2020-02-27 NOTE — PHYSICAL THERAPY NOTE
PHYSICAL THERAPY NOTE    Patient Name: Angelica PINK Date: 2/27/2020 02/27/20 1130   Pain Assessment   Pain Assessment No/denies pain   Pain Score No Pain   Restrictions/Precautions   Weight Bearing Precautions Per Order No   Other Precautions Contact/isolation;Multiple lines; Fall Risk  (paraplegic)   General   Chart Reviewed Yes   Response to Previous Treatment Patient with no complaints from previous session  Family/Caregiver Present No   Cognition   Overall Cognitive Status WFL   Arousal/Participation Alert; Cooperative   Attention Within functional limits   Orientation Level Oriented X4   Memory Within functional limits   Following Commands Follows all commands and directions without difficulty   Subjective   Subjective Pt lying supine and agreeable to work w/ therapy   Bed Mobility   Supine to Sit 5  Supervision   Additional items Increased time required   Sit to Supine Unable to assess   Additional Comments Pt lying supine upon PT arrival  Pt returned seated in w/c at end of session w/ all needs within reach   Transfers   Sliding Board transfer 3  Moderate assistance   Additional items Assist x 1; Increased time required;Verbal cues   Wheelchair Activities   Propulsion Yes   Propulsion Type 1 Manual   Level 1 Level tile   Method 1 Right upper extremity; Left upper extremity   Level of Assistance 1 Independent   Description/ Details 1 200', 100'   Balance   Static Sitting Fair -   Dynamic Sitting Poor +   Endurance Deficit   Endurance Deficit Yes   Endurance Deficit Description UE weakness   Activity Tolerance   Activity Tolerance Patient tolerated treatment well   Nurse Made Aware RN cleared pt for therapy   Assessment   Prognosis Fair   Problem List Decreased strength;Decreased endurance; Impaired balance;Decreased mobility   Assessment Pt presents with decreased mobility, strength, balance, and activity tolerance  Pt demonstrated ability to perform bed mobility at supervision  Pt performed slideboard transfers at Mod A into w/c  Pt propelled w/c ' and 100' w/ 1 rest break  Pt reports RUE fatigue throughout w/c propulsion  Pt demonstrates improvement in slideboard transfer this session being able to position self into w/c w/ less assistance this session  Pt continues to benefit from skilled therapy to maximize functional independence  Recommendation at this time is rehab  Pt would benefit from functional transfers, w/c mobility, strength, balance, and activity tolerance   Goals   Patient Goals to go to rehab   STG Expiration Date 03/10/20   PT Treatment Day 8   Plan   Treatment/Interventions Functional transfer training; Therapeutic exercise; Endurance training;Patient/family training;Equipment eval/education; Bed mobility;Spoke to nursing  (w/c training)   Progress Progressing toward goals   PT Frequency   (3-6x/week)   Recommendation   Recommendation Post acute IP rehab   Equipment Recommended Wheelchair   PT - OK to Discharge Yes   Additional Comments when medically cleared to rehab     Pamella Michaels, PT, DPT

## 2020-02-27 NOTE — PLAN OF CARE
Problem: Potential for Falls  Goal: Patient will remain free of falls  Description  INTERVENTIONS:  - Assess patient frequently for physical needs  -  Identify cognitive and physical deficits and behaviors that affect risk of falls  -  [place] fall precautions as indicated by assessment   - Educate patient/family on patient safety including physical limitations  - Instruct patient to call for assistance with activity based on assessment  - Modify environment to reduce risk of injury  - Consider OT/PT consult to assist with strengthening/mobility  Outcome: Progressing     Problem: Prexisting or High Potential for Compromised Skin Integrity  Goal: Skin integrity is maintained or improved  Description  INTERVENTIONS:  - Identify patients at risk for skin breakdown  - Assess and monitor skin integrity  - Assess and monitor nutrition and hydration status  - Monitor labs   - Assess for incontinence   - Turn and reposition patient  - Assist with mobility/ambulation  - Relieve pressure over bony prominences  - Avoid friction and shearing  - Provide appropriate hygiene as needed including keeping skin clean and dry  - Evaluate need for skin moisturizer/barrier cream  - Collaborate with interdisciplinary team   - Patient/family teaching  - Consider wound care consult   Outcome: Progressing     Problem: Nutrition/Hydration-ADULT  Goal: Nutrient/Hydration intake appropriate for improving, restoring or maintaining nutritional needs  Description  Monitor and assess patient's nutrition/hydration status for malnutrition  Collaborate with interdisciplinary team and initiate plan and interventions as ordered  Monitor patient's weight and dietary intake as ordered or per policy  Utilize nutrition screening tool and intervene as necessary  Determine patient's food preferences and provide high-protein, high-caloric foods as appropriate       INTERVENTIONS:  - Monitor oral intake, urinary output, labs, and treatment plans  - Assess nutrition and hydration status and recommend course of action  - Evaluate amount of meals eaten  - Assist patient with eating if necessary   - Allow adequate time for meals  - Recommend/ encourage appropriate diets, oral nutritional supplements, and vitamin/mineral supplements  - Order, calculate, and assess calorie counts as needed  - Recommend, monitor, and adjust tube feedings and TPN/PPN based on assessed needs  - Assess need for intravenous fluids  - Provide specific nutrition/hydration education as appropriate  - Include patient/family/caregiver in decisions related to nutrition  Outcome: Progressing     Problem: PAIN - ADULT  Goal: Verbalizes/displays adequate comfort level or baseline comfort level  Description  Interventions:  - Encourage patient to monitor pain and request assistance  - Assess pain using appropriate pain scale  - Administer analgesics based on type and severity of pain and evaluate response  - Implement non-pharmacological measures as appropriate and evaluate response  - Consider cultural and social influences on pain and pain management  - Notify physician/advanced practitioner if interventions unsuccessful or patient reports new pain  Outcome: Progressing     Problem: INFECTION - ADULT  Goal: Absence or prevention of progression during hospitalization  Description  INTERVENTIONS:  - Assess and monitor for signs and symptoms of infection  - Monitor lab/diagnostic results  - Monitor all insertion sites, i e  indwelling lines, tubes, and drains  - Monitor endotracheal if appropriate and nasal secretions for changes in amount and color  - [place] appropriate cooling/warming therapies per order  - Administer medications as ordered  - Instruct and encourage patient and family to use good hand hygiene technique  - Identify and instruct in appropriate isolation precautions for identified infection/condition  Outcome: Progressing  Goal: Absence of fever/infection during neutropenic period  Description  INTERVENTIONS:  - Monitor WBC    Outcome: Progressing     Problem: SAFETY ADULT  Goal: Maintain or return to baseline ADL function  Description  INTERVENTIONS:  -  Assess patient's ability to carry out ADLs; assess patient's baseline for ADL function and identify physical deficits which impact ability to perform ADLs (bathing, care of mouth/teeth, toileting, grooming, dressing, etc )  - Assess/evaluate cause of self-care deficits   - Assess range of motion  - Assess patient's mobility; develop plan if impaired  - Assess patient's need for assistive devices and provide as appropriate  - Encourage maximum independence but intervene and supervise when necessary  - Involve family in performance of ADLs  - Assess for home care needs following discharge   - Consider OT consult to assist with ADL evaluation and planning for discharge  - Provide patient education as appropriate  Outcome: Progressing  Goal: Maintain or return mobility status to optimal level  Description  INTERVENTIONS:  - Assess patient's baseline mobility status (ambulation, transfers, stairs, etc )    - Identify cognitive and physical deficits and behaviors that affect mobility  - Identify mobility aids required to assist with transfers and/or ambulation (gait belt, sit-to-stand, lift, walker, cane, etc )  - [place] fall precautions as indicated by assessment  - Record patient progress and toleration of activity level on Mobility SBAR; progress patient to next Phase/Stage  - Instruct patient to call for assistance with activity based on assessment  - Consider rehabilitation consult to assist with strengthening/weightbearing, etc   Outcome: Progressing     Problem: DISCHARGE PLANNING  Goal: Discharge to home or other facility with appropriate resources  Description  INTERVENTIONS:  - Identify barriers to discharge w/patient and caregiver  - Arrange for needed discharge resources and transportation as appropriate  - Identify discharge learning needs (meds, wound care, etc )  - Arrange for interpretive services to assist at discharge as needed  - Refer to Case Management Department for coordinating discharge planning if the patient needs post-hospital services based on physician/advanced practitioner order or complex needs related to functional status, cognitive ability, or social support system  Outcome: Progressing     Problem: SKIN/TISSUE INTEGRITY - ADULT  Goal: Skin integrity remains intact  Description  INTERVENTIONS  - Identify patients at risk for skin breakdown  - Assess and monitor skin integrity  - Assess and monitor nutrition and hydration status  - Monitor labs (i e  albumin)  - Assess for incontinence   - Turn and reposition patient  - Assist with mobility/ambulation  - Relieve pressure over bony prominences  - Avoid friction and shearing  - Provide appropriate hygiene as needed including keeping skin clean and dry  - Evaluate need for skin moisturizer/barrier cream  - Collaborate with interdisciplinary team (i e  Nutrition, Rehabilitation, etc )   - Patient/family teaching  Outcome: Progressing  Goal: Incision(s), wounds(s) or drain site(s) healing without S/S of infection  Description  INTERVENTIONS  - Assess and document risk factors for skin impairment   - Assess and document dressing, incision, wound bed, drain sites and surrounding tissue  - Consider nutrition services referral as needed  - Oral mucous membranes remain intact  - Provide patient/ family education  Outcome: Progressing  Goal: Oral mucous membranes remain intact  Description  INTERVENTIONS  - Assess oral mucosa and hygiene practices  - Implement preventative oral hygiene regimen  - Implement oral medicated treatments as ordered  - Initiate Nutrition services referral as needed  Outcome: Progressing     Problem: HEMATOLOGIC - ADULT  Goal: Maintains hematologic stability  Description  INTERVENTIONS  - Assess for signs and symptoms of bleeding or hemorrhage  - Monitor labs  - Administer supportive blood products/factors as ordered and appropriate  Outcome: Progressing     Problem: MUSCULOSKELETAL - ADULT  Goal: Maintain or return mobility to safest level of function  Description  INTERVENTIONS:  - Assess patient's ability to carry out ADLs; assess patient's baseline for ADL function and identify physical deficits which impact ability to perform ADLs (bathing, care of mouth/teeth, toileting, grooming, dressing, etc )  - Assess/evaluate cause of self-care deficits   - Assess range of motion  - Assess patient's mobility  - Assess patient's need for assistive devices and provide as appropriate  - Encourage maximum independence but intervene and supervise when necessary  - Involve family in performance of ADLs  - Assess for home care needs following discharge   - Consider OT consult to assist with ADL evaluation and planning for discharge  - Provide patient education as appropriate  Outcome: Progressing  Goal: Maintain proper alignment of affected body part  Description  INTERVENTIONS:  - Support, maintain and protect limb and body alignment  - Provide patient/ family with appropriate education  Outcome: Progressing

## 2020-02-27 NOTE — PROGRESS NOTES
Progress Note - Troy Hemphill 1950, 71 y o  female MRN: [id number]    Unit/Bed#: [facility] 411-44 Encounter: [id number]    Primary Care Provider: Champ Salcedo   Date and time admitted to hospital: 2/5/2020  3:25 PM        * Sepsis [facility])  Assessment & Plan  · POA with fever and leukocytosis with infectious source likely being R hip wound with underlying iliopsoas myositis  No signs of PNA or intra-abdominal infection  Review of repeat CT scan shows known osteomyelitis of the right hip with dislocation  Pockets of gas have decreased since the initial consult  No evidence for abscesses or collection  · Blood cultures 1/2 positive for coag-neg staph (likely contaminant) other negative  Repeat cultures negative for 5 days   · Urine culture + for proteus and e coli; likely asymptomatic bacteriuria per ID; isabel was exchanged   · ID had been following, patient completed 7 days total of intravenous Zosyn last dose being 2/11  ID with preference for surgical intervention for definitive source control however no focus for intervention as per IR and orthopedic surgery  · PT/OT recommending rehab  Case management following  Plan was to go back to [facility] however they will not take her back  CM working on alternative plan and she will require auth  · Note, patient was initially deemed to not have capacity however re-evaluation done and patient DOES have capacity for medical decision making  Abnormal CT of the chest  Assessment & Plan  · CT scan of the chest reports possible heart failure as it relates to ground-glass appearance on the left lung  Also has left lower lobe consolidation that is concerning for atelectasis versus infection  Patient with no history of heart failure  Clinical exam not consistent with heart failure  · Consider additional chest imaging in 4-6 weeks for follow-up  Defer to PCP      Depression  Assessment & Plan  · Patient has reported depression over continued chronic hospital care and chronic illness  · Seen by Psychiatry, refused antidepressants  Severe protein-calorie malnutrition ([address] )  Assessment & Plan  Malnutrition Findings:   Malnutrition type: Chronic illness(Severe pro/sara malnutrition r/t disease/condition as evidenced by 12% unintentional wt loss since 11/10/19, consuming < 75% energy intake compared to est energy needs > 1 month, fat/muscle wasting of orbital/temple areas  Treated with diet/supplements)  Degree of Malnutrition: Other severe protein calorie malnutrition    BMI Findings:  BMI Classifications: Underweight < 18 5     Body mass index is 17 97 kg/m²  Ensure supplements    Neurogenic bladder  Assessment & Plan  · History of neurogenic bladder with chronic indwelling [place] catheter, exchanged this admission due to finding of E coli and Proteus in the urine culture  · Urine culture noted, likely represents asymptomatic bacteriuria per infectious disease      Anemia of chronic disease  Assessment & Plan  · Labs appear consistent with anemia of chronic disease  Hemoglobin had been stable on prior labs  No need to repeat at this time  Lab Results   Component Value Date    HGB 9 1 (L) 02/21/2020    HGB 9 8 (L) 02/15/2020    HGB 9 9 (L) 02/14/2020    HGB 9 8 (L) 02/14/2020    HGB 9 0 (L) 02/12/2020         Coagulase negative Staphylococcus bacteremiaresolved as of 2/18/2020  Assessment & Plan  · Could be secondary to wound infection or contaminant  · Infectious Disease following and cleared 2/12 for discharge       Lung nodule  Assessment & Plan  · Noted incidental finding, will require outpatient f/u     Hypotensionresolved as of 2/19/2020  Assessment & Plan  · Chronic low blood pressures on midodrine    Resolved      Abnormal CT scan  Assessment & Plan  · Low-density centrally in the uterus measuring up to 7 mm in thickness   Follow-up pelvic ultrasound is recommended to assess for abnormal endometrial thickening or endometrial lesion  · Outpatient follow up    Cognitive decline  Assessment & Plan  · Patient noted per neuropsychology to be incompetent on 02/12/2020, re-evaluated by psych on 2/21 and deemed competent   · Pt agreeable to a few weeks of STR with goal to be discharged home     Open wound of right hip  Assessment & Plan  · Patient with a known past medical history for paraplegia following spinal cord injury presents with increased drainage of R hip wound  · Known to history of hip osteo and dislocation of the R femur  Patient is nonweightbearing  · Status post intravenous antibiotics x7 days  · Ortho and general surgery recommending continue local care  No plans for surgical intervention  · Wound care instructions:   · Rinse right hip, right anterior thigh/hip, left ischium pressure injuries, and midline sacral partial thickness wound with saline  Cover all wounds with silver maxorb and cover with Allevyn foam  Change right leg dressings day and left leg and sacrum dressings every other day and as needed  Decubitus ulcer of left ischium, stage III ([address] )  Assessment & Plan  · Local care  Wound care following  · Strict turning and repositioning  · Currently on specialty mattress, would recommend specialty mattress at nursing facility  Case management made aware  Paraplegia following spinal cord injury [facility])  Assessment & Plan  · History of spinal cord injury T8 with paraplegia 20 years ago hand gliding accident   · Presented from [facility]  · Supportive care    History of Clostridium difficile infectionresolved as of 2/18/2020  Assessment & Plan  Patient was noted to have fecal impaction on CT scan  Has reported large bowel movement yesterday  No concern for C diff colitis  Continue with prophylactic vancomycin as noted below  Patient refusing suppository and do collapse  Will make these p r n    · With infection in April of 2019  · Currently without any complaints of diarrhea, but frequent formed stools  · Continue vancomycin prophylactics, will need to continue for 72 hours after last dose of systemic antibiotics thru 2/14, now completed     Fecal retentionresolved as of 2/18/2020  Assessment & Plan  · Noted on CT scan  · Had large BM  · Reports moving bowels a few times a day but they are normal not loose  · Monitor         VTE Pharmacologic Prophylaxis:   Pharmacologic: Enoxaparin (Lovenox)  Mechanical VTE Prophylaxis in Place: Yes    Patient Centered Rounds: I have performed bedside rounds with nursing staff today  Discussions with Specialists or Other Care Team Provider: nursing     Education and Discussions with Family / Patient: patient     Time Spent for Care: 45 minutes  More than 50% of total time spent on counseling and coordination of care as described above  Current Length of Stay: 22 day(s)    Current Patient Status: Inpatient   Certification Statement: The patient will continue to require additional inpatient hospital stay due to due to pending bed placement    Discharge Plan: pending bed availability     Code Status: Level 1 - Full Code      Subjective:   Discussion with the pt in regard to meds as she is requesting we wean the midodrine  Will reduce for now see results  Pt with no pain ever  She has had some small bms fair appetite    Objective:     Vitals:   No data recorded  HR:  [66] 66  Resp:  [17-18] 17  BP: (119-127)/(72-81) 119/72  SpO2:  [97 %] 97 %  Body mass index is 17 97 kg/m²  Input and Output Summary (last 24 hours): Intake/Output Summary (Last 24 hours) at 2/27/2020 1657  Last data filed at 2/27/2020 1415  Gross per 24 hour   Intake 600 ml   Output 800 ml   Net -200 ml       Physical Exam:     Physical Exam   Constitutional: She is oriented to person, place, and time  No distress  HENT:   Head: Normocephalic and atraumatic  Mouth/Throat: No oropharyngeal exudate  Eyes: Right eye exhibits no discharge  Left eye exhibits no discharge   No scleral icterus  Neck: Normal range of motion  No JVD present  No tracheal deviation present  No thyromegaly present  Cardiovascular: Normal rate  Exam reveals no gallop and no friction rub  No murmur heard  Pulmonary/Chest: Effort normal  No stridor  No respiratory distress  She has no wheezes  She has no rales  She exhibits no tenderness  Abdominal: She exhibits no distension and no mass  There is no tenderness  There is no rebound and no guarding  No hernia  cachectic   Musculoskeletal: She exhibits deformity (severe lower extremity atrophy)  She exhibits no edema  Lymphadenopathy:     She has no cervical adenopathy  Neurological: She is oriented to person, place, and time  Skin: No rash noted  She is not diaphoretic  No erythema  No pallor  Right hip wounds with dsd intact    Psychiatric: She has a normal mood and affect  Additional Data:     Labs:    Results from last 7 days   Lab Units 02/21/20  0459   WBC Thousand/uL 9 44   HEMOGLOBIN g/dL 9 1*   HEMATOCRIT % 30 1*   PLATELETS Thousands/uL 546*     Results from last 7 days   Lab Units 02/21/20  0459   SODIUM mmol/L 136   POTASSIUM mmol/L 4 2   CHLORIDE mmol/L 103   CO2 mmol/L 28   BUN mg/dL 19   CREATININE mg/dL 0 46*   ANION GAP mmol/L 5   CALCIUM mg/dL 9 0   GLUCOSE RANDOM mg/dL 103                           * I Have Reviewed All Lab Data Listed Above  * Additional Pertinent Lab Tests Reviewed:  All Labs Within Last 24 Hours Reviewed    Imaging:    Imaging Reports Reviewed Today Include: reviewed   Recent Cultures (last 7 days):           Last 24 Hours Medication List:     Current Facility-Administered Medications:  acetaminophen 650 mg Oral Q6H PRN Andrew Arroyo MD   artificial tear  Both Eyes  Domenic Ferguson MD   bisacodyl 10 mg Rectal Daily PRN Joey Paul MD   calcium carbonate-vitamin D 1 tablet Oral BID With Meals Andrew Arroyo MD   cholecalciferol 1,000 Units Oral Daily Andrew Arroyo MD cyanocobalamin 100 mcg Oral Daily Arabella Workman MD   dextran 70-hypromellose 1 drop Both Eyes Q3H PRN Koby Lambert PA-C   diphenhydrAMINE 50 mg Oral 60 Min Pre-Op Sagrario Dumont PA-C   enoxaparin 40 mg Subcutaneous Daily Arabella Workman MD   famotidine 20 mg Oral BID PRN Claudia Duffy MD   loperamide 2 mg Oral TID PRN Juanito Boyce PA-C   midodrine 15 mg Oral Q8H Arabella Workman MD   multivitamin-minerals 1 tablet Oral Daily Arabella Workman MD   nystatin  Topical BID Arabella Workman MD   ondansetron 4 mg Intravenous Q6H PRN Arabella Workman MD   pantoprazole 40 mg Oral BID AC Claudia Duffy MD   polyethylene glycol 17 g Oral Daily PRN CONCEPCION Oquendo   saccharomyces boulardii 250 mg Oral BID Arabella Workman MD   senna 1 tablet Oral HS PRN Amara Pope CRNA     Facility-Administered Medications Ordered in Other Encounters:  dexamethasone 4 mg Intravenous Once PRN Иван Garre, DO    diphenhydrAMINE 12 5 mg Intravenous Once Иван Garre, DO    lactated ringers 75 mL/hr Intravenous Continuous Иван Garre, DO Last Rate: 75 mL/hr (11/10/19 2105)   lactated ringers 50 mL/hr Intravenous Continuous Osman Arnold CRNA    lactated ringers 75 mL/hr Intravenous Continuous Иван Garre, DO Last Rate: Stopped (03/20/19 1841)   lactated ringers 75 mL/hr Intravenous Continuous Maliha Thomas MD Last Rate: Stopped (03/20/19 1842)   ondansetron 4 mg Intravenous Once PRN Ander Drew MD    promethazine 12 5 mg Intravenous Once PRN Ander Drew MD         Today, Patient Was Seen By: CONCEPCION Oquendo    ** Please Note: Dictation voice to text software may have been used in the creation of this document   **

## 2020-02-28 PROCEDURE — 97542 WHEELCHAIR MNGMENT TRAINING: CPT

## 2020-02-28 PROCEDURE — 97530 THERAPEUTIC ACTIVITIES: CPT

## 2020-02-28 PROCEDURE — 99232 SBSQ HOSP IP/OBS MODERATE 35: CPT | Performed by: NURSE PRACTITIONER

## 2020-02-28 RX ADMIN — Medication 250 MG: at 16:54

## 2020-02-28 RX ADMIN — Medication 250 MG: at 09:18

## 2020-02-28 RX ADMIN — VITAM B12 100 MCG: 100 TAB at 09:19

## 2020-02-28 RX ADMIN — WHITE PETROLATUM 57.7 %-MINERAL OIL 31.9 % EYE OINTMENT 1 APPLICATION: at 22:20

## 2020-02-28 RX ADMIN — Medication 1 TABLET: at 16:54

## 2020-02-28 RX ADMIN — Medication 1 TABLET: at 09:18

## 2020-02-28 RX ADMIN — PANTOPRAZOLE SODIUM 40 MG: 40 TABLET, DELAYED RELEASE ORAL at 06:44

## 2020-02-28 RX ADMIN — ENOXAPARIN SODIUM 40 MG: 40 INJECTION SUBCUTANEOUS at 09:18

## 2020-02-28 RX ADMIN — MIDODRINE HYDROCHLORIDE 10 MG: 5 TABLET ORAL at 11:27

## 2020-02-28 RX ADMIN — MIDODRINE HYDROCHLORIDE 10 MG: 5 TABLET ORAL at 06:43

## 2020-02-28 RX ADMIN — PANTOPRAZOLE SODIUM 40 MG: 40 TABLET, DELAYED RELEASE ORAL at 16:55

## 2020-02-28 RX ADMIN — MIDODRINE HYDROCHLORIDE 10 MG: 5 TABLET ORAL at 16:55

## 2020-02-28 RX ADMIN — NYSTATIN: 100000 POWDER TOPICAL at 09:19

## 2020-02-28 RX ADMIN — MELATONIN 1000 UNITS: at 09:18

## 2020-02-28 RX ADMIN — NYSTATIN: 100000 POWDER TOPICAL at 16:56

## 2020-02-28 NOTE — ASSESSMENT & PLAN NOTE
· Patient noted per neuropsychology to be incompetent on 02/12/2020, re-evaluated by psych on 2/21 and deemed competent   · Pt agreeable to a few weeks of STR  Patient may need more of a long term placement however her goal is to return home after STR

## 2020-02-28 NOTE — WOUND OSTOMY CARE
Progress Note - Wound   Monty Muniz 71 y o  female MRN: [id number]  Unit/Bed#: [facility] 874-58 Encounter: [id number]        Assessment:   Patient seen this morning for continued skin and wound care  She is awake, alert in bed position to her L side, she remains on alternating air mattress with ongoing incontinence care and turns  Patient agreeable for re-assessment of chronic present on admission wounds to R hip, L ischium and sacrum  Findings  1-L ischial wound remains remains stable with yellow fibrinous slough scattered through wound bed, non infected in appearance no mal odor noted  2-Mid sacrum with blanchable hyperpigmented scar tissue with superimposed PTW  3-L hip with large communicating wound  Wound bed with fibrinous slough and undermining to entire wound edge, deepest undermining noted at 11 o'clock of 7 5 cm  Plan:   1-Continue with current wound care plan of packing R hip wound with 1 inch plain packing ribbon, then alginate and foamd   2-Continue with Allevyn foam to sacral PTW  3-Continue with alginate and Allevyn foam to L ischial unstageable wound  4-Continue offloading pressure with turns, elevation of heels and use of chair cushion  5-Continue moisturizing skin daily  Vitals: Blood pressure 125/70, pulse 66, temperature 97 7 °F (36 5 °C), temperature source Oral, resp  rate 16, height 5' (1 524 m), weight 41 7 kg (92 lb), SpO2 97 %, not currently breastfeeding  ,Body mass index is 17 97 kg/m²  @Jeffery     Wound 02/06/20 Pressure Injury Hip Right (Active)   Wound Image    2/28/2020 10:24 AM   Wound Description Slough; Yellow 2/28/2020 10:24 AM   Staging Stage IV 2/28/2020 10:24 AM   Yasemin-wound Assessment Intact 2/28/2020 10:24 AM   Wound Length (cm) 5 cm 2/28/2020 10:24 AM   Wound Width (cm) 5 cm 2/28/2020 10:24 AM   Wound Depth (cm) 4 2/28/2020 10:24 AM   Calculated Wound Area (cm^2) 25 cm^2 2/28/2020 10:24 AM   Calculated Wound Volume (cm^3) 100 cm^3 2/28/2020 10:24 AM Change in Wound Size % -1920 2 2/28/2020 10:24 AM   Undermining 7 5 2/28/2020 10:24 AM   Undermining is depth extending from circumferential 2/28/2020 10:24 AM   Drainage Amount Moderate 2/28/2020 10:24 AM   Drainage Description Purulent 2/28/2020 10:24 AM   Non-staged Wound Description Full thickness 2/27/2020  9:00 AM   Treatments Cleansed;Site care 2/28/2020 10:24 AM   Dressing Other (Comment) 2/28/2020 10:24 AM   Packing- # inserted 1 2/28/2020 10:24 AM   Dressing Changed New 2/28/2020 10:24 AM   Patient Tolerance Tolerated well 2/27/2020 10:08 PM   Dressing Status Clean;Dry; Intact 2/27/2020 10:08 PM       Wound 02/06/20 Pressure Injury Thigh Anterior;Proximal;Right (Active)   Wound Image   2/28/2020 10:26 AM   Wound Description Slough; Yellow 2/28/2020 10:26 AM   Staging Stage IV 2/28/2020 10:26 AM   Yasemin-wound Assessment Intact 2/28/2020 10:26 AM   Wound Length (cm) 3 8 cm 2/28/2020 10:26 AM   Wound Width (cm) 2 cm 2/28/2020 10:26 AM   Wound Depth (cm) 1 5 2/28/2020 10:26 AM   Calculated Wound Area (cm^2) 7 6 cm^2 2/28/2020 10:26 AM   Calculated Wound Volume (cm^3) 11 4 cm^3 2/28/2020 10:26 AM   Change in Wound Size % -46 15 2/28/2020 10:26 AM   Drainage Amount Moderate 2/28/2020 10:26 AM   Drainage Description Purulent 2/28/2020 10:26 AM   Non-staged Wound Description Full thickness 2/13/2020 11:13 AM   Treatments Cleansed 2/28/2020 10:26 AM   Dressing Calcium Alginate; Foam, Silicon (eg  Allevyn, etc); Plain packing strip 2/28/2020 10:26 AM   Dressing Changed Changed 2/27/2020 10:08 PM   Patient Tolerance Tolerated well 2/28/2020 10:26 AM   Dressing Status Clean;Dry; Intact 2/27/2020 10:08 PM       Wound 02/06/20 Pressure Injury Ischium Left (Active)   Wound Image   2/28/2020 10:24 AM   Wound Description Slough; Yellow 2/28/2020 10:24 AM   Staging Unstagable 2/28/2020 10:24 AM   Yasemin-wound Assessment Intact 2/28/2020 10:24 AM   Wound Length (cm) 6 cm 2/28/2020 10:24 AM   Wound Width (cm) 3 cm 2/28/2020 10:24 AM Wound Depth (cm) 0 4 2/28/2020 10:24 AM   Calculated Wound Area (cm^2) 18 cm^2 2/28/2020 10:24 AM   Calculated Wound Volume (cm^3) 7 2 cm^3 2/28/2020 10:24 AM   Change in Wound Size % 47 25 2/28/2020 10:24 AM   Drainage Amount Moderate 2/28/2020 10:26 AM   Drainage Description Purulent 2/28/2020 10:26 AM   Non-staged Wound Description Full thickness 2/13/2020 11:33 AM   Treatments Cleansed;Site care 2/28/2020 10:26 AM   Dressing Calcium Alginate; Foam, Silicon (eg  Allevyn, etc) 2/28/2020 10:26 AM   Dressing Changed Changed 2/27/2020 10:08 PM   Patient Tolerance Tolerated well 2/28/2020 10:26 AM   Dressing Status Clean;Dry; Intact 2/27/2020 10:08 PM       Wound 02/21/20 Sacrum Mid (Active)   Wound Image   2/28/2020 10:50 AM   Wound Description Beefy red 2/28/2020 10:50 AM   Yasemin-wound Assessment Hyperpigmented;Pink;Purple 2/26/2020  6:00 AM   Wound Length (cm) 3 cm 2/28/2020 10:50 AM   Wound Width (cm) 2 cm 2/28/2020 10:50 AM   Wound Depth (cm) 0 1 2/28/2020 10:50 AM   Calculated Wound Area (cm^2) 6 cm^2 2/28/2020 10:50 AM   Calculated Wound Volume (cm^3) 0 6 cm^3 2/28/2020 10:50 AM   Drainage Amount Scant 2/26/2020  6:00 AM   Drainage Description Serosanguineous 2/26/2020  6:00 AM   Non-staged Wound Description Partial thickness 2/21/2020  9:46 AM   Treatments Irrigation with NSS 2/23/2020  5:30 PM   Dressing Foam, Silicon (eg  Allevyn, etc) 2/26/2020  6:00 AM   Dressing Changed Changed 2/27/2020 10:08 PM   Patient Tolerance Tolerated well 2/27/2020 10:08 PM   Dressing Status Clean;Dry; Intact 2/27/2020 10:08 PM         Our wound care recommendations are in place as nursing orders, please call ext [phone] or [phone] with questions or concerns  Wound care will continue following        Vinayak Solomon, RN, BSN, [facility]

## 2020-02-28 NOTE — ASSESSMENT & PLAN NOTE
· POA with fever and leukocytosis with infectious source likely being R hip wound with underlying iliopsoas myositis  No signs of PNA or intra-abdominal infection  Review of repeat CT scan shows known osteomyelitis of the right hip with dislocation  Pockets of gas have decreased since the initial consult  No evidence for abscesses or collection  · Blood cultures 1/2 positive for coag-neg staph (likely contaminant) other negative  Repeat cultures negative for 5 days   · Urine culture + for proteus and e coli; likely asymptomatic bacteriuria per ID; isabel was exchanged   · ID had been following, patient completed 7 days total of intravenous Zosyn last dose being 2/11  ID with preference for surgical intervention for definitive source control however no focus for intervention as per IR and orthopedic surgery  · PT/OT recommending rehab  Case management following  Plan was to go back to [facility] however they will not take her back  CM working on alternative plan and she will require auth  · Note, patient was initially deemed to not have capacity however re-evaluation done and patient DOES have capacity for medical decision making

## 2020-02-28 NOTE — PHYSICAL THERAPY NOTE
PHYSICAL THERAPY NOTE    Patient Name: Marlon Mariano  ARCXC'F Date: 2/28/2020 02/28/20 1400   Pain Assessment   Pain Assessment 0-10   Pain Score No Pain   Restrictions/Precautions   Weight Bearing Precautions Per Order No   Other Precautions Contact/isolation;Multiple lines; Fall Risk  (paraplegic)   General   Chart Reviewed Yes   Response to Previous Treatment Patient with no complaints from previous session  Family/Caregiver Present No   Cognition   Overall Cognitive Status WFL   Arousal/Participation Alert; Cooperative   Attention Within functional limits   Orientation Level Oriented X4   Memory Within functional limits   Following Commands Follows all commands and directions without difficulty   Subjective   Subjective Pt lying supine and agreeable to work w/ therapy   Bed Mobility   Rolling R 5  Supervision   Additional items Increased time required   Supine to Sit 5  Supervision   Additional items Increased time required   Sit to Supine Unable to assess   Additional Comments Pt lying supine upon PT arrival and found to be incontinent of bowel  Pt performed rolling R at supervision and required Total A for pericare  Pt left seated OOB in w/c w/ all needs within reach    Transfers   Sliding Board transfer 4  Minimal assistance   Additional items Assist x 1; Increased time required;Verbal cues   Additional Comments Pt demonstrated improvement in SB transfers this session requiring Min A for board placement only  Pt able to slide self across board at close supervision   Wheelchair Activities   Propulsion Yes   Propulsion Type 1 Manual   Level 1 Level tile   Method 1 Right upper extremity; Left upper extremity   Level of Assistance 1 Independent   Description/ Details 1 500'   Balance   Static Sitting Fair   Dynamic Sitting Fair -   Endurance Deficit   Endurance Deficit Yes   Endurance Deficit Description UE weakness Activity Tolerance   Activity Tolerance Patient tolerated treatment well   Nurse Made Aware RN cleared pt for therapy   Exercises   Balance training  PT performed gastroc strech (B) X3 for 30 seconds each  PT also performed eversion strech on LLE as pt's foot is positioned into a PF/INV position   Assessment   Prognosis Fair   Problem List Decreased strength;Decreased endurance; Impaired balance;Decreased mobility   Assessment Pt presents with decreased mobility, strength, balance, and activity tolerance  Pt demonstrated ability to perform bed mobility at supervision  Pt performed rolling R at supervision and required total A for surya care 2' incontinent bowel episode  Pt performed slideboard transfer into w/c at min A for board placement only  Pt able to slide self along slideboard at close supervision  Pt propelled w/c 500' w/out rest breaks this day and demonstrates increased endurance and UE strength w/ w/c propulsion  Pt continues to benefit from skilled therapy to maximize functional independence  Recommendation at this time is rehab  Pt would benefit from continued ambulation, functional transfers, strength, balance, and activity tolerance   Goals   Patient Goals to go to rehab   STG Expiration Date 03/10/20   PT Treatment Day 9   Plan   Treatment/Interventions Functional transfer training;LE strengthening/ROM; Endurance training;Patient/family training;Equipment eval/education; Bed mobility;Spoke to nursing  (w/c training)   Progress Progressing toward goals   PT Frequency   (3-6x/week)   Recommendation   Recommendation Post acute IP rehab   Equipment Recommended Wheelchair   PT - OK to Discharge Yes   Additional Comments when medically cleared to rehab     Allie Briones, PT, DPT

## 2020-02-28 NOTE — PROGRESS NOTES
[facility]    Progress Note - Betty Young 1950, 71 y o  female MRN: [id number]    Unit/Bed#: [facility] 512-42 Encounter: [id number]    Primary Care Provider: Naveen Maravilla   Date and time admitted to hospital: 2/5/2020  3:25 PM    * Sepsis [facility])  Assessment & Plan  · POA with fever and leukocytosis with infectious source likely being R hip wound with underlying iliopsoas myositis  No signs of PNA or intra-abdominal infection  Review of repeat CT scan shows known osteomyelitis of the right hip with dislocation  Pockets of gas have decreased since the initial consult  No evidence for abscesses or collection  · Blood cultures 1/2 positive for coag-neg staph (likely contaminant) other negative  Repeat cultures negative for 5 days   · Urine culture + for proteus and e coli; likely asymptomatic bacteriuria per ID; isabel was exchanged   · ID had been following, patient completed 7 days total of intravenous Zosyn last dose being 2/11  ID with preference for surgical intervention for definitive source control however no focus for intervention as per IR and orthopedic surgery  · PT/OT recommending rehab  Case management following  Plan was to go back to [facility] however they will not take her back  CM working on alternative plan and she will require auth  · Note, patient was initially deemed to not have capacity however re-evaluation done and patient DOES have capacity for medical decision making  Open wound of right hip  Assessment & Plan  · Patient with a known past medical history for paraplegia following spinal cord injury presents with increased drainage of R hip wound  · Known to history of hip osteo and dislocation of the R femur  Patient is nonweightbearing  · Status post intravenous antibiotics x7 days  · Ortho and general surgery recommending continue local care  No plans for surgical intervention    · Wound care instructions:   · Rinse right hip, right anterior thigh/hip, left ischium pressure injuries, and midline sacral partial thickness wound with saline  Cover all wounds with silver maxorb and cover with Allevyn foam  Change right leg dressings day and left leg and sacrum dressings every other day and as needed  Abnormal CT scan  Assessment & Plan  · Low-density centrally in the uterus measuring up to 7 mm in thickness   Follow-up pelvic ultrasound is recommended to assess for abnormal endometrial thickening or endometrial lesion  · Outpatient follow up    Cognitive decline  Assessment & Plan  · Patient noted per neuropsychology to be incompetent on 02/12/2020, re-evaluated by psych on 2/21 and deemed competent   · Pt agreeable to a few weeks of STR  Patient may need more of a long term placement however her goal is to return home after STR  Lung nodule  Assessment & Plan  · Noted incidental finding, will require outpatient f/u     Neurogenic bladder  Assessment & Plan  · History of neurogenic bladder with chronic indwelling Hays catheter, exchanged this admission due to finding of E coli and Proteus in the urine culture  · Urine culture noted, likely represents asymptomatic bacteriuria per infectious disease      Severe protein-calorie malnutrition ([address] )  Assessment & Plan  Malnutrition Findings:   Malnutrition type: Chronic illness(Severe pro/sara malnutrition r/t disease/condition as evidenced by 12% unintentional wt loss since 11/10/19, consuming < 75% energy intake compared to est energy needs > 1 month, fat/muscle wasting of orbital/temple areas  Treated with diet/supplements)  Degree of Malnutrition: Other severe protein calorie malnutrition    BMI Findings:  BMI Classifications: Underweight < 18 5     Body mass index is 17 97 kg/m²  Ensure supplements    Anemia of chronic disease  Assessment & Plan  · Labs appear consistent with anemia of chronic disease  Hemoglobin had been stable on prior labs  No need to repeat at this time      Lab Results   Component Value Date    HGB 9 1 (L) 02/21/2020    HGB 9 8 (L) 02/15/2020    HGB 9 9 (L) 02/14/2020    HGB 9 8 (L) 02/14/2020    HGB 9 0 (L) 02/12/2020         Decubitus ulcer of left ischium, stage III ([facility] [address] )  Assessment & Plan  · Local care  Wound care following  · Strict turning and repositioning  · Currently on specialty mattress, would recommend specialty mattress at nursing facility  Case management made aware  Paraplegia following spinal cord injury ([address] )  Assessment & Plan  · History of spinal cord injury T8 with paraplegia 20 years ago hand gliding accident   · Presented from [facility]  · Supportive care    Abnormal CT of the chest  Assessment & Plan  · CT scan of the chest reports possible heart failure as it relates to ground-glass appearance on the left lung  Also has left lower lobe consolidation that is concerning for atelectasis versus infection  Patient with no history of heart failure  Clinical exam not consistent with heart failure  · Consider additional chest imaging in 4-6 weeks for follow-up  Defer to PCP  Pharmacologic: Enoxaparin (Lovenox)  Mechanical VTE Prophylaxis in Place: Yes    Patient Centered Rounds: I have performed bedside rounds with nursing staff today  Inocente Chris     Discussions with Specialists or Other Care Team Provider: nursing, case management     Education and Discussions with Family / Patient: patient     Time Spent for Care: 30 minutes  More than 50% of total time spent on counseling and coordination of care as described above  Current Length of Stay: 23 day(s)    Current Patient Status: Inpatient   Certification Statement: The patient will continue to require additional inpatient hospital stay due to Awaiting safe discharge plan    Discharge Plan / Estimated Discharge Date:  Awaiting safe discharge plan      Code Status: Level 1 - Full Code      Subjective:   Patient offers no acute complaints      Objective:     Vitals:   No data recorded  Resp:  [16-18] 16  BP: (117-125)/(70-72) 125/70  Body mass index is 17 97 kg/m²  Input and Output Summary (last 24 hours): Intake/Output Summary (Last 24 hours) at 2/28/2020 1437  Last data filed at 2/28/2020 1300  Gross per 24 hour   Intake 680 ml   Output 550 ml   Net 130 ml       Physical Exam:     Physical Exam   Constitutional: She is oriented to person, place, and time  No distress  Appears weak and frail   HENT:   Head: Normocephalic  Eyes: Conjunctivae are normal    Neck: Normal range of motion  Cardiovascular: Normal rate  Pulmonary/Chest: Breath sounds normal    Abdominal: Bowel sounds are normal    Genitourinary:   Genitourinary Comments: Hays catheter with clear yellow urine noted in drainage bag   Musculoskeletal:   Baseline paraplegia   Neurological: She is alert and oriented to person, place, and time  Skin: Skin is warm  Sacral wound    Psychiatric: She has a normal mood and affect  Nursing note and vitals reviewed  Additional Data:     Labs:               Invalid input(s): LABALBU          Recent Cultures (last 7 days):           Last 24 Hours Medication List:     Current Facility-Administered Medications:  acetaminophen 650 mg Oral Q6H PRN Trell Beaulieu MD   artificial tear  Both Eyes HS Carlos To MD   bisacodyl 10 mg Rectal Daily PRN Sherif Mejía MD   calcium carbonate-vitamin D 1 tablet Oral BID With Meals Trell Beaulieu MD   cholecalciferol 1,000 Units Oral Daily Trell Beaulieu MD   cyanocobalamin 100 mcg Oral Daily Trell Alert, MD   dextran 70-hypromellose 1 drop Both Eyes Q3H PRN Rosanne Nino PA-C   diphenhydrAMINE 50 mg Oral 60 Min Pre-Op Sagrario Dumont PA-C   enoxaparin 40 mg Subcutaneous Daily Trell Beaulieu MD   famotidine 20 mg Oral BID PRN Carlos To MD   loperamide 2 mg Oral TID PRN Soo Perez PA-C   midodrine 10 mg Oral TID CONCEPCION Mcgowan   multivitamin-minerals 1 tablet Oral Daily Daisy Friedman MD   nystatin  Topical BID Daisy Friedman MD   ondansetron 4 mg Intravenous Q6H PRN Daisy Friedman MD   pantoprazole 40 mg Oral BID AC Jade Wooten MD   polyethylene glycol 17 g Oral Daily PRN CONCEPCION Oquendo   saccharomyces boulardii 250 mg Oral BID Daisy Friedman MD   senna 1 tablet Oral HS PRN Kailash Coronel CRNA     Facility-Administered Medications Ordered in Other Encounters:  dexamethasone 4 mg Intravenous Once PRN Dayana President, DO    diphenhydrAMINE 12 5 mg Intravenous Once Dayana President, DO    lactated ringers 75 mL/hr Intravenous Continuous Dayana President, DO Last Rate: 75 mL/hr (11/10/19 2105)   lactated ringers 50 mL/hr Intravenous Continuous Jose Weiner CRNA    lactated ringers 75 mL/hr Intravenous Continuous Dayana President, DO Last Rate: Stopped (03/20/19 1841)   lactated ringers 75 mL/hr Intravenous Continuous Sergio Matthews MD Last Rate: Stopped (03/20/19 1842)   ondansetron 4 mg Intravenous Once PRN Anna Zhou MD    promethazine 12 5 mg Intravenous Once PRN Anna Zhou MD         Today, Patient Was Seen By: CONCEPCION Colbert    ** Please Note: Dragon 360 Dictation voice to text software may have been used in the creation of this document   **

## 2020-02-28 NOTE — PLAN OF CARE
Problem: PHYSICAL THERAPY ADULT  Goal: Performs mobility at highest level of function for planned discharge setting  See evaluation for individualized goals  Description  Treatment/Interventions: Functional transfer training, LE strengthening/ROM, Endurance training, Patient/family training, Equipment eval/education, Bed mobility, Spoke to nursing, Spoke to case management(w/c training)  Equipment Recommended: Wheelchair       See flowsheet documentation for full assessment, interventions and recommendations  Outcome: Progressing  Note:   Prognosis: Fair  Problem List: Decreased strength, Decreased endurance, Impaired balance, Decreased mobility  Assessment: Pt presents with decreased mobility, strength, balance, and activity tolerance  Pt demonstrated ability to perform bed mobility at supervision  Pt performed rolling R at supervision and required total A for surya care 2' incontinent bowel episode  Pt performed slideboard transfer into w/c at min A for board placement only  Pt able to slide self along slideboard at close supervision  Pt propelled w/c 500' w/out rest breaks this day and demonstrates increased endurance and UE strength w/ w/c propulsion  Pt continues to benefit from skilled therapy to maximize functional independence  Recommendation at this time is rehab  Pt would benefit from continued ambulation, functional transfers, strength, balance, and activity tolerance  Barriers to Discharge: Decreased caregiver support     Recommendation: Post acute IP rehab     PT - OK to Discharge: Yes    See flowsheet documentation for full assessment

## 2020-02-28 NOTE — ASSESSMENT & PLAN NOTE
· Labs appear consistent with anemia of chronic disease  Hemoglobin had been stable on prior labs  No need to repeat at this time      Lab Results   Component Value Date    HGB 9 1 (L) 02/21/2020    HGB 9 8 (L) 02/15/2020    HGB 9 9 (L) 02/14/2020    HGB 9 8 (L) 02/14/2020    HGB 9 0 (L) 02/12/2020  HAIM WARNER   is a   58  female who complains of lower abdominal pain going on for several months. She recalls acute RLQ pain that led to [facility] visit on 04/09/19 with CT scan, which found acute appendicitis with appendectomy done that day. She is here for ongoing lower abdominal pain that she states was present prior to appendectomy, but has been worse since her surgery. She reports diffused, lower abdominal pain that is intermittent, lasting up to 15 minutes, described as "sharp/stabbing" pains: No alleviating or aggravating factors. She has taken Percocet /Morphine for the pain with no improvement. She also mentions constipation that has been more frequent over the past few weeks that she attributes to narcotic pain medications. She has daily BMs, BSS type 3 to 4 as long as she uses stool softeners and at times milk of magnesium. Her last colonoscopy in 10/2013 removed TA polyp recall 2 yrs. Denies n/v, epigastria pain, melena, rectal bleeding, blood in stool, weight loss. No other complaints. BR

## 2020-02-28 NOTE — PROGRESS NOTES
02/28/20 1400   Clinical Encounter Type   Routine Visit Follow-up   Sacramental Encounters   Sacrament of Sick-Anointing Anointed

## 2020-02-28 NOTE — ASSESSMENT & PLAN NOTE
· History of spinal cord injury T8 with paraplegia 20 years ago hand gliding accident   · Presented from [facility]  · Supportive care

## 2020-02-28 NOTE — UTILIZATION REVIEW
Continued Stay Review    Date: 2/27                        Current Patient Class: Inpatient Current Level of Care: Med Surg    HPI:69 y o  female initially admitted on 2/5 presents with patient is a resident of SNF, she was transferred for low blood pressures and fever    Assessment/Plan:   Cognitive decline but able to make decision  Initially deem incompetent but after re-eval now competent per Neuro-psych  Awaiting bed placement  Facility declining pt due longt erm care needs without any supporting family or friends  Additional referrals requested from pt  Pertinent Labs/Diagnostic Results: No new    Vital Signs:   02/27/20 23:55:20      18  117/72  87       02/27/20 14:13:02      17  119/72  88       02/27/20 07:36:52    66  18  127/81  96  97 %       Medications:   Scheduled Medications:  Medications:  artificial tear  Both Eyes HS   calcium carbonate-vitamin D 1 tablet Oral BID With Meals   cholecalciferol 1,000 Units Oral Daily   cyanocobalamin 100 mcg Oral Daily   diphenhydrAMINE 50 mg Oral 60 Min Pre-Op   enoxaparin 40 mg Subcutaneous Daily   midodrine 10 mg Oral TID AC   multivitamin-minerals 1 tablet Oral Daily   nystatin  Topical BID   pantoprazole 40 mg Oral BID AC   saccharomyces boulardii 250 mg Oral BID     Continuous IV Infusions:     PRN Meds:  acetaminophen 650 mg Oral Q6H PRN   bisacodyl 10 mg Rectal Daily PRN   dextran 70-hypromellose 1 drop Both Eyes Q3H PRN   famotidine 20 mg Oral BID PRN   loperamide 2 mg Oral TID PRN   ondansetron 4 mg Intravenous Q6H PRN   polyethylene glycol 17 g Oral Daily PRN   senna 1 tablet Oral HS PRN     Discharge Plan: Referrals ot [facility] and [facility] Utilization Review Department  [email or web address]  ATTENTION: Please call with any questions or concerns to [phone] and carefully listen to the prompts so that you are directed to the right person   All voicemails are confidential   Arron Carr all requests for admission clinical reviews, approved or denied determinations and any other requests to dedicated fax number below belonging to the campus where the patient is receiving treatment   List of dedicated fax numbers for the Facilities:  [address] [place] [address] DENIALS (Administrative/Medical Necessity) [phone]   [address]  (Maternity/NICU/Pediatrics) [phone]   [place] [facility] [phone]   [facility] [phone]   [address] [place] [address] [phone]   [address] [facility]  [phone]   [address] [place] [address] [phone]   [facility]  [phone]   [address] [facility] [address]  [address] [facility] [address] [facility] [phone]

## 2020-02-29 PROCEDURE — 99232 SBSQ HOSP IP/OBS MODERATE 35: CPT | Performed by: NURSE PRACTITIONER

## 2020-02-29 RX ADMIN — MIDODRINE HYDROCHLORIDE 10 MG: 5 TABLET ORAL at 16:43

## 2020-02-29 RX ADMIN — Medication 1 TABLET: at 16:43

## 2020-02-29 RX ADMIN — MIDODRINE HYDROCHLORIDE 10 MG: 5 TABLET ORAL at 05:46

## 2020-02-29 RX ADMIN — MELATONIN 1000 UNITS: at 09:06

## 2020-02-29 RX ADMIN — Medication 250 MG: at 17:00

## 2020-02-29 RX ADMIN — NYSTATIN: 100000 POWDER TOPICAL at 09:06

## 2020-02-29 RX ADMIN — Medication 1 TABLET: at 09:06

## 2020-02-29 RX ADMIN — PANTOPRAZOLE SODIUM 40 MG: 40 TABLET, DELAYED RELEASE ORAL at 16:46

## 2020-02-29 RX ADMIN — NYSTATIN: 100000 POWDER TOPICAL at 17:00

## 2020-02-29 RX ADMIN — Medication 250 MG: at 09:06

## 2020-02-29 RX ADMIN — PANTOPRAZOLE SODIUM 40 MG: 40 TABLET, DELAYED RELEASE ORAL at 05:46

## 2020-02-29 RX ADMIN — ENOXAPARIN SODIUM 40 MG: 40 INJECTION SUBCUTANEOUS at 09:06

## 2020-02-29 RX ADMIN — MIDODRINE HYDROCHLORIDE 10 MG: 5 TABLET ORAL at 11:40

## 2020-02-29 RX ADMIN — DEXTRAN 70 AND HYPROMELLOSE 2910 1 DROP: 1; 3 SOLUTION/ DROPS OPHTHALMIC at 11:40

## 2020-02-29 RX ADMIN — VITAM B12 100 MCG: 100 TAB at 09:06

## 2020-02-29 NOTE — ASSESSMENT & PLAN NOTE
· Chronic low blood pressures on midodrine    Wean down monitor for hypotension   · Pt was weaned to 10mg tid and has had borderline sbp would not wean lower until sbp steadily around 120

## 2020-02-29 NOTE — PLAN OF CARE
Problem: Potential for Falls  Goal: Patient will remain free of falls  Description  INTERVENTIONS:  - Assess patient frequently for physical needs  -  Identify cognitive and physical deficits and behaviors that affect risk of falls  -  [place] fall precautions as indicated by assessment   - Educate patient/family on patient safety including physical limitations  - Instruct patient to call for assistance with activity based on assessment  - Modify environment to reduce risk of injury  - Consider OT/PT consult to assist with strengthening/mobility  Outcome: Progressing     Problem: Prexisting or High Potential for Compromised Skin Integrity  Goal: Skin integrity is maintained or improved  Description  INTERVENTIONS:  - Identify patients at risk for skin breakdown  - Assess and monitor skin integrity  - Assess and monitor nutrition and hydration status  - Monitor labs   - Assess for incontinence   - Turn and reposition patient  - Assist with mobility/ambulation  - Relieve pressure over bony prominences  - Avoid friction and shearing  - Provide appropriate hygiene as needed including keeping skin clean and dry  - Evaluate need for skin moisturizer/barrier cream  - Collaborate with interdisciplinary team   - Patient/family teaching  - Consider wound care consult   Outcome: Progressing     Problem: Nutrition/Hydration-ADULT  Goal: Nutrient/Hydration intake appropriate for improving, restoring or maintaining nutritional needs  Description  Monitor and assess patient's nutrition/hydration status for malnutrition  Collaborate with interdisciplinary team and initiate plan and interventions as ordered  Monitor patient's weight and dietary intake as ordered or per policy  Utilize nutrition screening tool and intervene as necessary  Determine patient's food preferences and provide high-protein, high-caloric foods as appropriate       INTERVENTIONS:  - Monitor oral intake, urinary output, labs, and treatment plans  - Assess nutrition and hydration status and recommend course of action  - Evaluate amount of meals eaten  - Assist patient with eating if necessary   - Allow adequate time for meals  - Recommend/ encourage appropriate diets, oral nutritional supplements, and vitamin/mineral supplements  - Order, calculate, and assess calorie counts as needed  - Recommend, monitor, and adjust tube feedings and TPN/PPN based on assessed needs  - Assess need for intravenous fluids  - Provide specific nutrition/hydration education as appropriate  - Include patient/family/caregiver in decisions related to nutrition  Outcome: Progressing     Problem: PAIN - ADULT  Goal: Verbalizes/displays adequate comfort level or baseline comfort level  Description  Interventions:  - Encourage patient to monitor pain and request assistance  - Assess pain using appropriate pain scale  - Administer analgesics based on type and severity of pain and evaluate response  - Implement non-pharmacological measures as appropriate and evaluate response  - Consider cultural and social influences on pain and pain management  - Notify physician/advanced practitioner if interventions unsuccessful or patient reports new pain  Outcome: Progressing     Problem: INFECTION - ADULT  Goal: Absence or prevention of progression during hospitalization  Description  INTERVENTIONS:  - Assess and monitor for signs and symptoms of infection  - Monitor lab/diagnostic results  - Monitor all insertion sites, i e  indwelling lines, tubes, and drains  - Monitor endotracheal if appropriate and nasal secretions for changes in amount and color  - [place] appropriate cooling/warming therapies per order  - Administer medications as ordered  - Instruct and encourage patient and family to use good hand hygiene technique  - Identify and instruct in appropriate isolation precautions for identified infection/condition  Outcome: Progressing  Goal: Absence of fever/infection during neutropenic period  Description  INTERVENTIONS:  - Monitor WBC    Outcome: Progressing     Problem: PAIN - ADULT  Goal: Verbalizes/displays adequate comfort level or baseline comfort level  Description  Interventions:  - Encourage patient to monitor pain and request assistance  - Assess pain using appropriate pain scale  - Administer analgesics based on type and severity of pain and evaluate response  - Implement non-pharmacological measures as appropriate and evaluate response  - Consider cultural and social influences on pain and pain management  - Notify physician/advanced practitioner if interventions unsuccessful or patient reports new pain  Outcome: Progressing     Problem: INFECTION - ADULT  Goal: Absence or prevention of progression during hospitalization  Description  INTERVENTIONS:  - Assess and monitor for signs and symptoms of infection  - Monitor lab/diagnostic results  - Monitor all insertion sites, i e  indwelling lines, tubes, and drains  - Monitor endotracheal if appropriate and nasal secretions for changes in amount and color  - [place] appropriate cooling/warming therapies per order  - Administer medications as ordered  - Instruct and encourage patient and family to use good hand hygiene technique  - Identify and instruct in appropriate isolation precautions for identified infection/condition  Outcome: Progressing  Goal: Absence of fever/infection during neutropenic period  Description  INTERVENTIONS:  - Monitor WBC    Outcome: Progressing     Problem: SAFETY ADULT  Goal: Maintain or return to baseline ADL function  Description  INTERVENTIONS:  -  Assess patient's ability to carry out ADLs; assess patient's baseline for ADL function and identify physical deficits which impact ability to perform ADLs (bathing, care of mouth/teeth, toileting, grooming, dressing, etc )  - Assess/evaluate cause of self-care deficits   - Assess range of motion  - Assess patient's mobility; develop plan if impaired  - Assess patient's need for assistive devices and provide as appropriate  - Encourage maximum independence but intervene and supervise when necessary  - Involve family in performance of ADLs  - Assess for home care needs following discharge   - Consider OT consult to assist with ADL evaluation and planning for discharge  - Provide patient education as appropriate  Outcome: Progressing  Goal: Maintain or return mobility status to optimal level  Description  INTERVENTIONS:  - Assess patient's baseline mobility status (ambulation, transfers, stairs, etc )    - Identify cognitive and physical deficits and behaviors that affect mobility  - Identify mobility aids required to assist with transfers and/or ambulation (gait belt, sit-to-stand, lift, walker, cane, etc )  - [place] fall precautions as indicated by assessment  - Record patient progress and toleration of activity level on Mobility SBAR; progress patient to next Phase/Stage  - Instruct patient to call for assistance with activity based on assessment  - Consider rehabilitation consult to assist with strengthening/weightbearing, etc   Outcome: Progressing     Problem: DISCHARGE PLANNING  Goal: Discharge to home or other facility with appropriate resources  Description  INTERVENTIONS:  - Identify barriers to discharge w/patient and caregiver  - Arrange for needed discharge resources and transportation as appropriate  - Identify discharge learning needs (meds, wound care, etc )  - Arrange for interpretive services to assist at discharge as needed  - Refer to Case Management Department for coordinating discharge planning if the patient needs post-hospital services based on physician/advanced practitioner order or complex needs related to functional status, cognitive ability, or social support system  Outcome: Progressing     Problem: SAFETY ADULT  Goal: Maintain or return to baseline ADL function  Description  INTERVENTIONS:  -  Assess patient's ability to carry out ADLs; assess patient's baseline for ADL function and identify physical deficits which impact ability to perform ADLs (bathing, care of mouth/teeth, toileting, grooming, dressing, etc )  - Assess/evaluate cause of self-care deficits   - Assess range of motion  - Assess patient's mobility; develop plan if impaired  - Assess patient's need for assistive devices and provide as appropriate  - Encourage maximum independence but intervene and supervise when necessary  - Involve family in performance of ADLs  - Assess for home care needs following discharge   - Consider OT consult to assist with ADL evaluation and planning for discharge  - Provide patient education as appropriate  Outcome: Progressing  Goal: Maintain or return mobility status to optimal level  Description  INTERVENTIONS:  - Assess patient's baseline mobility status (ambulation, transfers, stairs, etc )    - Identify cognitive and physical deficits and behaviors that affect mobility  - Identify mobility aids required to assist with transfers and/or ambulation (gait belt, sit-to-stand, lift, walker, cane, etc )  - [place] fall precautions as indicated by assessment  - Record patient progress and toleration of activity level on Mobility SBAR; progress patient to next Phase/Stage  - Instruct patient to call for assistance with activity based on assessment  - Consider rehabilitation consult to assist with strengthening/weightbearing, etc   Outcome: Progressing     Problem: DISCHARGE PLANNING  Goal: Discharge to home or other facility with appropriate resources  Description  INTERVENTIONS:  - Identify barriers to discharge w/patient and caregiver  - Arrange for needed discharge resources and transportation as appropriate  - Identify discharge learning needs (meds, wound care, etc )  - Arrange for interpretive services to assist at discharge as needed  - Refer to Case Management Department for coordinating discharge planning if the patient needs post-hospital services based on physician/advanced practitioner order or complex needs related to functional status, cognitive ability, or social support system  Outcome: Progressing     Problem: Knowledge Deficit  Goal: Patient/family/caregiver demonstrates understanding of disease process, treatment plan, medications, and discharge instructions  Description  Complete learning assessment and assess knowledge base    Interventions:  - Provide teaching at level of understanding  - Provide teaching via preferred learning methods  Outcome: Progressing     Problem: SKIN/TISSUE INTEGRITY - ADULT  Goal: Skin integrity remains intact  Description  INTERVENTIONS  - Identify patients at risk for skin breakdown  - Assess and monitor skin integrity  - Assess and monitor nutrition and hydration status  - Monitor labs (i e  albumin)  - Assess for incontinence   - Turn and reposition patient  - Assist with mobility/ambulation  - Relieve pressure over bony prominences  - Avoid friction and shearing  - Provide appropriate hygiene as needed including keeping skin clean and dry  - Evaluate need for skin moisturizer/barrier cream  - Collaborate with interdisciplinary team (i e  Nutrition, Rehabilitation, etc )   - Patient/family teaching  Outcome: Progressing  Goal: Incision(s), wounds(s) or drain site(s) healing without S/S of infection  Description  INTERVENTIONS  - Assess and document risk factors for skin impairment   - Assess and document dressing, incision, wound bed, drain sites and surrounding tissue  - Consider nutrition services referral as needed  - Oral mucous membranes remain intact  - Provide patient/ family education  Outcome: Progressing  Goal: Oral mucous membranes remain intact  Description  INTERVENTIONS  - Assess oral mucosa and hygiene practices  - Implement preventative oral hygiene regimen  - Implement oral medicated treatments as ordered  - Initiate Nutrition services referral as needed  Outcome: Progressing     Problem: SKIN/TISSUE INTEGRITY - ADULT  Goal: Skin integrity remains intact  Description  INTERVENTIONS  - Identify patients at risk for skin breakdown  - Assess and monitor skin integrity  - Assess and monitor nutrition and hydration status  - Monitor labs (i e  albumin)  - Assess for incontinence   - Turn and reposition patient  - Assist with mobility/ambulation  - Relieve pressure over bony prominences  - Avoid friction and shearing  - Provide appropriate hygiene as needed including keeping skin clean and dry  - Evaluate need for skin moisturizer/barrier cream  - Collaborate with interdisciplinary team (i e  Nutrition, Rehabilitation, etc )   - Patient/family teaching  Outcome: Progressing  Goal: Incision(s), wounds(s) or drain site(s) healing without S/S of infection  Description  INTERVENTIONS  - Assess and document risk factors for skin impairment   - Assess and document dressing, incision, wound bed, drain sites and surrounding tissue  - Consider nutrition services referral as needed  - Oral mucous membranes remain intact  - Provide patient/ family education  Outcome: Progressing  Goal: Oral mucous membranes remain intact  Description  INTERVENTIONS  - Assess oral mucosa and hygiene practices  - Implement preventative oral hygiene regimen  - Implement oral medicated treatments as ordered  - Initiate Nutrition services referral as needed  Outcome: Progressing     Problem: HEMATOLOGIC - ADULT  Goal: Maintains hematologic stability  Description  INTERVENTIONS  - Assess for signs and symptoms of bleeding or hemorrhage  - Monitor labs  - Administer supportive blood products/factors as ordered and appropriate  Outcome: Progressing     Problem: MUSCULOSKELETAL - ADULT  Goal: Maintain or return mobility to safest level of function  Description  INTERVENTIONS:  - Assess patient's ability to carry out ADLs; assess patient's baseline for ADL function and identify physical deficits which impact ability to perform ADLs (bathing, care of mouth/teeth, toileting, grooming, dressing, etc )  - Assess/evaluate cause of self-care deficits   - Assess range of motion  - Assess patient's mobility  - Assess patient's need for assistive devices and provide as appropriate  - Encourage maximum independence but intervene and supervise when necessary  - Involve family in performance of ADLs  - Assess for home care needs following discharge   - Consider OT consult to assist with ADL evaluation and planning for discharge  - Provide patient education as appropriate  Outcome: Progressing  Goal: Maintain proper alignment of affected body part  Description  INTERVENTIONS:  - Support, maintain and protect limb and body alignment  - Provide patient/ family with appropriate education  Outcome: Progressing

## 2020-02-29 NOTE — PROGRESS NOTES
Progress Note - [place] En[place] 1950, 71 y o  female MRN: [id number]    Unit/Bed#: [facility] 914-61 Encounter: [id number]    Primary Care Provider: Madi Bettencourt   Date and time admitted to hospital: 2/5/2020  3:25 PM        * Sepsis [facility])  Assessment & Plan  · POA with fever and leukocytosis with infectious source likely being R hip wound with underlying iliopsoas myositis  No signs of PNA or intra-abdominal infection  Review of repeat CT scan shows known osteomyelitis of the right hip with dislocation  Pockets of gas have decreased since the initial consult  No evidence for abscesses or collection  · Blood cultures 1/2 positive for coag-neg staph (likely contaminant) other negative  Repeat cultures negative for 5 days   · Urine culture + for proteus and e coli; likely asymptomatic bacteriuria per ID; isabel was exchanged   · ID had been following, patient completed 7 days total of intravenous Zosyn last dose being 2/11  ID with preference for surgical intervention for definitive source control however no focus for intervention as per IR and orthopedic surgery  · PT/OT recommending rehab  Case management following  Plan was to go back to [facility] however they will not take her back  CM working on alternative plan and she will require auth  · Note, patient was initially deemed to not have capacity however re-evaluation done and patient DOES have capacity for medical decision making  Abnormal CT of the chest  Assessment & Plan  · CT scan of the chest reports possible heart failure as it relates to ground-glass appearance on the left lung  Also has left lower lobe consolidation that is concerning for atelectasis versus infection  Patient with no history of heart failure  Clinical exam not consistent with heart failure  · Consider additional chest imaging in 4-6 weeks for follow-up  Defer to PCP      Depression  Assessment & Plan  · Patient has reported depression over continued chronic hospital care and chronic illness  · Seen by Psychiatry, refused antidepressants  Severe protein-calorie malnutrition ([address] )  Assessment & Plan  Malnutrition Findings:   Malnutrition type: Chronic illness(Severe pro/sara malnutrition r/t disease/condition as evidenced by 12% unintentional wt loss since 11/10/19, consuming < 75% energy intake compared to est energy needs > 1 month, fat/muscle wasting of orbital/temple areas  Treated with diet/supplements)  Degree of Malnutrition: Other severe protein calorie malnutrition    BMI Findings:  BMI Classifications: Underweight < 18 5     Body mass index is 17 97 kg/m²  Ensure supplements    Neurogenic bladder  Assessment & Plan  · History of neurogenic bladder with chronic indwelling [place] catheter, exchanged this admission due to finding of E coli and Proteus in the urine culture  · Urine culture noted, likely represents asymptomatic bacteriuria per infectious disease      Anemia of chronic disease  Assessment & Plan  · Labs appear consistent with anemia of chronic disease  Hemoglobin had been stable on prior labs  No need to repeat at this time  Lab Results   Component Value Date    HGB 9 1 (L) 02/21/2020    HGB 9 8 (L) 02/15/2020    HGB 9 9 (L) 02/14/2020    HGB 9 8 (L) 02/14/2020    HGB 9 0 (L) 02/12/2020         Coagulase negative Staphylococcus bacteremiaresolved as of 2/18/2020  Assessment & Plan  · Could be secondary to wound infection or contaminant  · Infectious Disease following and cleared 2/12 for discharge       Lung nodule  Assessment & Plan  · Noted incidental finding, will require outpatient f/u     Hypotensionresolved as of 2/19/2020  Assessment & Plan  · Chronic low blood pressures on midodrine    Wean down monitor for hypotension   · Pt was weaned to 10mg tid and has had borderline sbp would not wean lower until sbp steadily around 120      Abnormal CT scan  Assessment & Plan  · Low-density centrally in the uterus measuring up to 7 mm in thickness   Follow-up pelvic ultrasound is recommended to assess for abnormal endometrial thickening or endometrial lesion  · Outpatient follow up    Cognitive decline  Assessment & Plan  · Patient noted per neuropsychology to be incompetent on 02/12/2020, re-evaluated by psych on 2/21 and deemed competent   · Pt agreeable to a few weeks of STR  Patient may need more of a long term placement however her goal is to return home after STR  Open wound of right hip  Assessment & Plan  · Patient with a known past medical history for paraplegia following spinal cord injury presents with increased drainage of R hip wound  · Known to history of hip osteo and dislocation of the R femur  Patient is nonweightbearing  · Status post intravenous antibiotics x7 days  · Ortho and general surgery recommending continue local care  No plans for surgical intervention  · Wound care instructions:   · Rinse right hip, right anterior thigh/hip, left ischium pressure injuries, and midline sacral partial thickness wound with saline  Cover all wounds with silver maxorb and cover with Allevyn foam  Change right leg dressings day and left leg and sacrum dressings every other day and as needed  Decubitus ulcer of left ischium, stage III ([address] )  Assessment & Plan  · Local care  Wound care following  · Strict turning and repositioning  · Currently on specialty mattress, would recommend specialty mattress at nursing facility  Case management made aware  Paraplegia following spinal cord injury [facility])  Assessment & Plan  · History of spinal cord injury T8 with paraplegia 20 years ago hand gliding accident   · Presented from [facility]  · Supportive care    History of Clostridium difficile infectionresolved as of 2/18/2020  Assessment & Plan  Patient was noted to have fecal impaction on CT scan  Has reported large bowel movement yesterday  No concern for C diff colitis    Continue with prophylactic vancomycin as noted below  Patient refusing suppository and do collapse  Will make these p r n  · With infection in 2019  · Currently without any complaints of diarrhea, but frequent formed stools  · s/p vancomycin prophylactics, continued through     Fecal retentionresolved as of 2020  Assessment & Plan  · Noted on CT scan  · Had large BM  · Reports moving bowels a few times a day but they are normal not loose  · Monitor       VTE Pharmacologic Prophylaxis:   Pharmacologic: Enoxaparin (Lovenox)  Mechanical VTE Prophylaxis in Place: Yes    Patient Centered Rounds: I have performed bedside rounds with nursing staff today  Discussions with Specialists or Other Care Team Provider: nursing     Education and Discussions with Family / Patient: patient     Time Spent for Care: 30 minutes  More than 50% of total time spent on counseling and coordination of care as described above  Current Length of Stay: 24 day(s)    Current Patient Status: Inpatient   Certification Statement: The patient will continue to require additional inpatient hospital stay due to pending insurance auth and placement     Discharge Plan: to rehab when auth and bed available     Code Status: Level 1 - Full Code      Subjective:   Discussed midodrine wean  No n/v/d does have bm she feels like she is eating ok  Pt with right hip wound dressing saturated discussed with nursing to change  Objective:     Vitals:   Temp (24hrs), Av 3 °F (36 8 °C), Min:98 3 °F (36 8 °C), Max:98 3 °F (36 8 °C)    Temp:  [98 3 °F (36 8 °C)] 98 3 °F (36 8 °C)  HR:  [76] 76  Resp:  [17-19] 19  BP: (108-120)/(63-66) 120/66  SpO2:  [96 %] 96 %  Body mass index is 17 97 kg/m²  Input and Output Summary (last 24 hours):        Intake/Output Summary (Last 24 hours) at 2020  Last data filed at 2020 1900  Gross per 24 hour   Intake 354 ml   Output 625 ml   Net -271 ml       Physical Exam:     Physical Exam   Constitutional: She is oriented to person, place, and time  No distress  cachectic   HENT:   Head: Normocephalic and atraumatic  Eyes: Right eye exhibits no discharge  Left eye exhibits no discharge  No scleral icterus  Neck: No tracheal deviation present  No thyromegaly present  Cardiovascular: Normal heart sounds  Exam reveals no gallop and no friction rub  No murmur heard  Abdominal: Soft  She exhibits no distension and no mass  There is no tenderness  There is no rebound and no guarding  No hernia  Musculoskeletal: She exhibits deformity (severely atrophied lower extremeties)  Lymphadenopathy:     She has no cervical adenopathy  Neurological: She is oriented to person, place, and time  Skin: No rash noted  She is not diaphoretic  No erythema  No pallor  Psychiatric: She has a normal mood and affect  Additional Data:     Labs:                                  * I Have Reviewed All Lab Data Listed Above  * Additional Pertinent Lab Tests Reviewed:  All Labs Within Last 24 Hours Reviewed    Imaging:    Imaging Reports Reviewed Today Include:reviewed     Recent Cultures (last 7 days):           Last 24 Hours Medication List:     Current Facility-Administered Medications:  acetaminophen 650 mg Oral Q6H PRN Natali Rodriguez MD   artificial tear  Both Eyes HS Aroldo Ridley MD   bisacodyl 10 mg Rectal Daily PRN Ana Cortez MD   calcium carbonate-vitamin D 1 tablet Oral BID With Meals Natali Rodriguez MD   cholecalciferol 1,000 Units Oral Daily Natali Rodriguez MD   cyanocobalamin 100 mcg Oral Daily Natali Rodriguez MD   dextran 70-hypromellose 1 drop Both Eyes Q3H PRN Hyacinth Galdamez PA-C   diphenhydrAMINE 50 mg Oral 60 Min Pre-Op Sagrario Dumont PA-C   enoxaparin 40 mg Subcutaneous Daily Natali Rodriguez MD   famotidine 20 mg Oral BID PRN Aroldo Ridley MD   loperamide 2 mg Oral TID PRN Lauren Olivares PA-C   midodrine 10 mg Oral TID AC CONCEPCION Smith multivitamin-minerals 1 tablet Oral Daily Sergio Moss MD   nystatin  Topical BID Sergio Moss MD   ondansetron 4 mg Intravenous Q6H PRN Sergio Moss MD   pantoprazole 40 mg Oral BID AC Lala Peres MD   polyethylene glycol 17 g Oral Daily PRN Corinne Roup, CRNP   saccharomyces boulardii 250 mg Oral BID Sergio Moss MD   senna 1 tablet Oral HS PRN Lady Michael CRNA     Facility-Administered Medications Ordered in Other Encounters:  dexamethasone 4 mg Intravenous Once PRN Donnis Hoof, DO    diphenhydrAMINE 12 5 mg Intravenous Once Donnis Hoof, DO    lactated ringers 75 mL/hr Intravenous Continuous Donnis Hoof, DO Last Rate: 75 mL/hr (11/10/19 2105)   lactated ringers 50 mL/hr Intravenous Continuous Brittany Atkinson CRNA    lactated ringers 75 mL/hr Intravenous Continuous Donnis Hoof, DO Last Rate: Stopped (03/20/19 1841)   lactated ringers 75 mL/hr Intravenous Continuous Abby Browning MD Last Rate: Stopped (03/20/19 1842)   ondansetron 4 mg Intravenous Once PRN Cheryle Smolder, MD    promethazine 12 5 mg Intravenous Once PRN Cheryle Smolder, MD         Today, Patient Was Seen By: Corinne Roup, CRNP    ** Please Note: Dictation voice to text software may have been used in the creation of this document   **

## 2020-02-29 NOTE — ASSESSMENT & PLAN NOTE
· POA with fever and leukocytosis with infectious source likely being R hip wound with underlying iliopsoas myositis  No signs of PNA or intra-abdominal infection  Review of repeat CT scan shows known osteomyelitis of the right hip with dislocation  Pockets of gas have decreased since the initial consult  No evidence for abscesses or collection  · Blood cultures 1/2 positive for coag-neg staph (likely contaminant) other negative  Repeat cultures negative for 5 days   · Urine culture + for proteus and e coli; likely asymptomatic bacteriuria per ID; isabel was exchanged   · ID had been following, patient completed 7 days total of intravenous Zosyn last dose being 2/11  ID with preference for surgical intervention for definitive source control however no focus for intervention as per IR and orthopedic surgery  · PT/OT recommending rehab  Case management following  Plan was to go back to [facility] however they will not take her back  CM working on alternative plan and she will require auth  · Note, patient was initially deemed to not have capacity however re-evaluation done and patient DOES have capacity for medical decision making

## 2020-02-29 NOTE — ASSESSMENT & PLAN NOTE
Patient was noted to have fecal impaction on CT scan  Has reported large bowel movement yesterday  No concern for C diff colitis  Continue with prophylactic vancomycin as noted below  Patient refusing suppository and do collapse  Will make these p r n    · With infection in April of 2019  · Currently without any complaints of diarrhea, but frequent formed stools  · s/p vancomycin prophylactics, continued through 2/17

## 2020-03-01 LAB
ALBUMIN SERPL BCP-MCNC: 2.3 G/DL (ref 3.5–5)
ALP SERPL-CCNC: 160 U/L (ref 46–116)
ALT SERPL W P-5'-P-CCNC: 17 U/L (ref 12–78)
ANION GAP SERPL CALCULATED.3IONS-SCNC: 6 MMOL/L (ref 4–13)
AST SERPL W P-5'-P-CCNC: 10 U/L (ref 5–45)
BASOPHILS # BLD AUTO: 0.06 THOUSANDS/ΜL (ref 0–0.1)
BASOPHILS NFR BLD AUTO: 1 % (ref 0–1)
BILIRUB SERPL-MCNC: 0.28 MG/DL (ref 0.2–1)
BUN SERPL-MCNC: 18 MG/DL (ref 5–25)
CALCIUM SERPL-MCNC: 8.8 MG/DL (ref 8.3–10.1)
CHLORIDE SERPL-SCNC: 101 MMOL/L (ref 100–108)
CO2 SERPL-SCNC: 28 MMOL/L (ref 21–32)
CREAT SERPL-MCNC: 0.48 MG/DL (ref 0.6–1.3)
EOSINOPHIL # BLD AUTO: 0.11 THOUSAND/ΜL (ref 0–0.61)
EOSINOPHIL NFR BLD AUTO: 1 % (ref 0–6)
ERYTHROCYTE [DISTWIDTH] IN BLOOD BY AUTOMATED COUNT: 18.1 % (ref 11.6–15.1)
GFR SERPL CREATININE-BSD FRML MDRD: 100 ML/MIN/1.73SQ M
GLUCOSE SERPL-MCNC: 121 MG/DL (ref 65–140)
HCT VFR BLD AUTO: 30.1 % (ref 34.8–46.1)
HGB BLD-MCNC: 9.2 G/DL (ref 11.5–15.4)
IMM GRANULOCYTES # BLD AUTO: 0.03 THOUSAND/UL (ref 0–0.2)
IMM GRANULOCYTES NFR BLD AUTO: 0 % (ref 0–2)
LYMPHOCYTES # BLD AUTO: 2.57 THOUSANDS/ΜL (ref 0.6–4.47)
LYMPHOCYTES NFR BLD AUTO: 31 % (ref 14–44)
MCH RBC QN AUTO: 25.3 PG (ref 26.8–34.3)
MCHC RBC AUTO-ENTMCNC: 30.6 G/DL (ref 31.4–37.4)
MCV RBC AUTO: 83 FL (ref 82–98)
MONOCYTES # BLD AUTO: 1.02 THOUSAND/ΜL (ref 0.17–1.22)
MONOCYTES NFR BLD AUTO: 12 % (ref 4–12)
NEUTROPHILS # BLD AUTO: 4.55 THOUSANDS/ΜL (ref 1.85–7.62)
NEUTS SEG NFR BLD AUTO: 55 % (ref 43–75)
NRBC BLD AUTO-RTO: 0 /100 WBCS
PLATELET # BLD AUTO: 558 THOUSANDS/UL (ref 149–390)
PMV BLD AUTO: 9.7 FL (ref 8.9–12.7)
POTASSIUM SERPL-SCNC: 3.6 MMOL/L (ref 3.5–5.3)
PROT SERPL-MCNC: 7.5 G/DL (ref 6.4–8.2)
RBC # BLD AUTO: 3.64 MILLION/UL (ref 3.81–5.12)
SODIUM SERPL-SCNC: 135 MMOL/L (ref 136–145)
WBC # BLD AUTO: 8.34 THOUSAND/UL (ref 4.31–10.16)

## 2020-03-01 PROCEDURE — 80053 COMPREHEN METABOLIC PANEL: CPT | Performed by: NURSE PRACTITIONER

## 2020-03-01 PROCEDURE — 97140 MANUAL THERAPY 1/> REGIONS: CPT

## 2020-03-01 PROCEDURE — 97535 SELF CARE MNGMENT TRAINING: CPT

## 2020-03-01 PROCEDURE — 97168 OT RE-EVAL EST PLAN CARE: CPT

## 2020-03-01 PROCEDURE — 97542 WHEELCHAIR MNGMENT TRAINING: CPT

## 2020-03-01 PROCEDURE — 99232 SBSQ HOSP IP/OBS MODERATE 35: CPT | Performed by: NURSE PRACTITIONER

## 2020-03-01 PROCEDURE — 85025 COMPLETE CBC W/AUTO DIFF WBC: CPT | Performed by: NURSE PRACTITIONER

## 2020-03-01 PROCEDURE — 97530 THERAPEUTIC ACTIVITIES: CPT

## 2020-03-01 RX ADMIN — Medication 250 MG: at 08:21

## 2020-03-01 RX ADMIN — Medication 1 TABLET: at 16:59

## 2020-03-01 RX ADMIN — Medication 250 MG: at 17:00

## 2020-03-01 RX ADMIN — ENOXAPARIN SODIUM 40 MG: 40 INJECTION SUBCUTANEOUS at 08:21

## 2020-03-01 RX ADMIN — WHITE PETROLATUM 57.7 %-MINERAL OIL 31.9 % EYE OINTMENT: at 21:41

## 2020-03-01 RX ADMIN — PANTOPRAZOLE SODIUM 40 MG: 40 TABLET, DELAYED RELEASE ORAL at 06:08

## 2020-03-01 RX ADMIN — Medication 1 TABLET: at 08:21

## 2020-03-01 RX ADMIN — NYSTATIN: 100000 POWDER TOPICAL at 08:22

## 2020-03-01 RX ADMIN — MIDODRINE HYDROCHLORIDE 10 MG: 5 TABLET ORAL at 12:19

## 2020-03-01 RX ADMIN — MIDODRINE HYDROCHLORIDE 10 MG: 5 TABLET ORAL at 06:08

## 2020-03-01 RX ADMIN — VITAM B12 100 MCG: 100 TAB at 08:21

## 2020-03-01 RX ADMIN — PANTOPRAZOLE SODIUM 40 MG: 40 TABLET, DELAYED RELEASE ORAL at 16:59

## 2020-03-01 RX ADMIN — MIDODRINE HYDROCHLORIDE 10 MG: 5 TABLET ORAL at 16:59

## 2020-03-01 RX ADMIN — MELATONIN 1000 UNITS: at 08:21

## 2020-03-01 NOTE — PHYSICAL THERAPY NOTE
PHYSICAL THERAPY Treatment NOTE    Patient Name: Brodie Cosby  Today's Date: 3/1/2020        03/01/20 1026   Pain Assessment   Pain Assessment No/denies pain   Pain Score No Pain   Restrictions/Precautions   Weight Bearing Precautions Per Order No   Other Precautions Contact/isolation;Multiple lines;Telemetry   General   Chart Reviewed Yes   Additional Pertinent History Pt  is a 70 yo F who presents with sepsis   Family/Caregiver Present No   Cognition   Overall Cognitive Status WFL   Arousal/Participation Cooperative   Attention Within functional limits   Orientation Level Oriented X4   Memory Within functional limits   Following Commands Follows all commands and directions without difficulty   Comments Pt  was identified with full name and birthdate   Subjective   Subjective Co-treatment required due to limited activity tolerance and strict OOB x1 hr orders  Bed Mobility   Supine to Sit 5  Supervision   Additional items Increased time required   Transfers   Sliding Board transfer 4  Minimal assistance   Additional items Assist x 1; Increased time required; Impulsive   Wheelchair Activities   Propulsion Yes   Propulsion Type 1 Manual   Level 1 Level tile   Method 1 Right upper extremity; Left upper extremity   Level of Assistance 1 Distant supervision   Description/ Details 1 350'x1   Balance   Static Sitting Fair   Dynamic Sitting Fair -   Endurance Deficit   Endurance Deficit Yes   Endurance Deficit Description postural and and WC propulsion degradation noted with fatigue   Activity Tolerance   Activity Tolerance Patient limited by fatigue   Medical Staff Made Aware [place] with Vanessa Ballesteros OT   Nurse Made Aware Spoke to RN   Exercises   Balance training  BLE stretches in DF and eversion 2x30 seconds each, Hamstring stretch 2x30 seconds each   Assessment   Prognosis Fair   Problem List Decreased strength;Decreased range of motion;Decreased endurance; Impaired balance;Decreased mobility; Decreased coordination; Impaired tone; Impaired sensation; Impaired judgement   Assessment Pt  is a 72 yo F who presents with sepsis  Pt  Agreeable to PT session, Pt  Identified with full name and birthdate  Pt  Demonstrated increased functional independence and increased activity tolerance as evidenced above  Pt  Required decreased assistance with bed mobility and was able to continue to tolerate WC mobility at community distances  Pt  Is progressing towards goals, as expected and will benefit from continued skilled therapy to increase functional independence and aid patient in return to PLOF with decreased burden of care  D/C recommendation is rehab when medically appropriate  Recommend continued trials with SB transfers next session to increase independence with mobility OOB  Goals   Patient Goals to get to rehab   STG Expiration Date 03/10/20   PT Treatment Day 10   Plan   Treatment/Interventions Functional transfer training;LE strengthening/ROM; Therapeutic exercise; Endurance training;Cognitive reorientation;Patient/family training;Equipment eval/education; Bed mobility;Spoke to nursing;OT;Compensatory technique education   Progress Progressing toward goals   PT Frequency   (3-6x/wk)   Recommendation   Recommendation Post acute IP rehab   Equipment Recommended Wheelchair   PT - OK to Discharge Yes   Additional Comments When medically appropriate     Iman Lockhart, PT, DPT 3/1/2020

## 2020-03-01 NOTE — OCCUPATIONAL THERAPY NOTE
Occupational Therapy Re-Evaluation     Patient Name: Kingston Dunaway  Today's Date: 3/1/2020  Problem List  Principal Problem:    Sepsis [facility])  Active Problems:    Paraplegia following spinal cord injury ([facility] )    Decubitus ulcer of left ischium, stage III ([facility] )    Anemia of chronic disease    Neurogenic bladder    Open wound of right hip    Severe protein-calorie malnutrition ([facility] )    Depression    Abnormal CT of the chest    Lung nodule    Cognitive decline    Abnormal CT scan    Past Medical History  Past Medical History:   Diagnosis Date    Anemia     iron defiencey    Arthritis     osteo    Back injury     Chronic kidney disease     nephritis    Depression     MRSA (methicillin resistant staph aureus) culture positive 12/07/2018    BONE-TISSUE     Osteopenia     Osteoporosis     [facility]     paraplegic due to spinal cord injury    Paraplegia (HCC)     Poor circulation     Pressure injury of skin     right hip, stage 3    Pressure sore of hip     right    Tibial plateau fracture     Underweight      Past Surgical History  Past Surgical History:   Procedure Laterality Date    CLOSURE DELAYED PRIMARY N/A 3/20/2019    Procedure: OPHELIA ANAL WOUND RECLOSURE;  Surgeon: Rafa Haney MD;  Location: [address] [place] [address] OR;  Service: Plastics    DECUBITUS ULCER EXCISION Bilateral 11/12/2019    Procedure: DEBRIDEMENT DECUBITI ([facility]);   Surgeon: Ike Burr DO;  Location:  MAIN OR;  Service: General    FLAP LOCAL EXTREMITY Left 1/28/2019    Procedure: THIGH FLAP & STSG TO CLOSE HIP WOUND;  Surgeon: Rafa Haney MD;  Location: [address] [place] [address] MAIN OR;  Service: Plastics    FLAP LOCAL TRUNK Right 12/17/2018    Procedure: DEBRIDE/FLAP CLOSURE HIP PRESSURE SORE Eugene Remak GRAFT;  Surgeon: Rafa Haney MD;  Location: [address] [place] [address] MAIN OR;  Service: Plastics    FLAP LOCAL TRUNK Left 2/27/2019    Procedure: BUTTOCK FLAP TO ISCHIAL SORE;  Surgeon: Rafa Haney MD;  Location: [address] [place] [address] OR;  Service: May Cummins HIP DEBRIDEMENT Right 2017    right hip sore debridement    INCISION AND DRAINAGE OF WOUND Left 1/3/2019    Procedure: INCISION AND DRAINAGE (I&D) left distal femur  With deep wound cultures   Application of VAC DRAIN SPONGE;  Surgeon: Gill Schuster MD;  Location: [facility] OR;  Service: Orthopedics    IR PICC LINE  12/19/2018    IR PICC LINE  3/12/2019    LA DEBRIDEMENT, SKIN, SUB-Q TISSUE,MUSCLE,BONE,=<20 SQ CM Right 9/19/2018    Procedure: DEBRIDEMENT OF HIP PRESSURE SORE;  Surgeon: Anastasia Pearson MD;  Location: [address] [place] [address];  Service: [address] [place] [address] FX+INTRAMED FELIX Left 10/22/2018    Procedure: INSERTION NAIL IM LEFT FEMUR RETROGRADE;  Surgeon: Aakash Pino MD;  Location: [facility] OR;  Service: Orthopedics    TOE AMPUTATION Left 2017    small toe left foot amputation    WISDOM TOOTH EXTRACTION      WOUND DEBRIDEMENT Left 12/17/2018    Procedure: DEBRIDEMENT HIP PRESSURE SORE;  Surgeon: Anastasia Pearson MD;  Location: [address] [place] [address];  Service: Plastics    WOUND DEBRIDEMENT N/A 11/10/2019    Procedure: EXCISIONAL DEBRIDEMENT;  Surgeon: Emiliano Galdamez MD;  Location:  MAIN OR;  Service: General         03/01/20 1016   Note Type   Note type Re-eval   Restrictions/Precautions   Weight Bearing Precautions Per Order No   Other Precautions Contact/isolation;Telemetry;Multiple lines   Pain Assessment   Pain Assessment No/denies pain   Pain Score No Pain   Home Living   Type of [address] [facility] [address] One level   Bathroom Shower/Tub Tub/shower unit   Bathroom Toilet Standard   [address]  (slide board)   Additional Comments admit from rehab   Prior Function   Level of [place] Independent with ADLs and functional mobility   Lives With Alone   ADL Assistance Independent   IADLs Independent   Vocational On disability   Lifestyle   Autonomy PT REPORTS BEING I/MOD I WITH ADLS/IADLS/DRIVING    Reciprocal Relationships LIMITED SUPPORT    Service to Others ON DISABILITY    Intrinsic Gratification WISHING TO RETURN TO EILEEN LEVEL    Psychosocial   Psychosocial (WDL) X   Patient Behaviors/[place]ood [place] affect   Subjective   Subjective "I need the w/c here so I can clear it"   ADL   Where Assessed Edge of bed   Eating Assistance 7  Independent   Grooming Assistance 5  Supervision/Setup   UB Bathing Assistance 4  Minimal Assistance   LB Bathing Assistance 3  Moderate Assistance   [address] 4  Minimal [address] 3  Moderate [address]  3  Moderate Assistance   Toileting Deficit Perineal hygiene   Bed Mobility   Supine to Sit 5  Supervision   Additional items Increased time required   Transfers   Sliding Board transfer 4  Minimal assistance   Additional items Assist x 1; Increased time required; Impulsive   Functional Mobility   Additional Comments w/c mobility w/ supervision   Balance   Static Sitting Fair   Dynamic Sitting Fair -   Activity Tolerance   Activity Tolerance Patient tolerated treatment well   Medical Staff Made Aware PT Shyam Cody   Nurse Made Aware okay to see per STEPAN Parker- reiterated time pt out into w/c   RUE Assessment   RUE Assessment WFL   LUE Assessment   LUE Assessment WFL   Hand Function   Gross Motor Coordination Functional   Fine Motor Coordination Functional   Cognition   Overall Cognitive Status WFL   Arousal/Participation Cooperative   Attention Within functional limits   Orientation Level Oriented X4   Memory Within functional limits   Following Commands Follows all commands and directions without difficulty   Assessment   Limitation Decreased ADL status; Decreased Safe judgement during ADL;Decreased endurance;Decreased self-care trans;Decreased high-level ADLs   Prognosis Good   Assessment Pt seen for re-eval 2* expiration of goals  Pt admit from rehab, see intial eval for PLOF  Currently pt is a Mod A for LB ADls and MIn A for UB ADls and slide board transfer   Pt limited 2* decreased safety awareness, decreased ADLs status, limited home support, and decreased activity tolerance  Continue to recommend STR  OT will continue to follow to see as able  Goals   Patient Goals go rehab   LTG Time Frame 10-14   Plan   Treatment Interventions ADL retraining;Functional transfer training; Endurance training;Patient/family training;Equipment evaluation/education; Compensatory technique education;Continued evaluation; Energy conservation; Activityengagement   Goal Expiration Date 03/15/20   OT Treatment Day 7   OT Frequency 3-5x/wk   Additional Treatment Session   Start Time 1006   End Time 1016   Treatment Assessment Pt seen for additional tx session focusing on grooming  pt required supervision/setup for grooming  pt reported she wished to stay in w/c- re-educated on weight shifting & calling for assist; RN notified of time out in w/c  Recommendation   OT Discharge Recommendation Short Term Rehab   Modified [place] Scale   Modified [place] Scale 5     GOALS    1) Pt will increase activity tolerance to G for 30 min txment sessions    2) Pt will complete UB/LB dressing/self care w/ mod I using adaptive device and DME as needed    3) Pt will complete bathing w/ Mod I w/ use of AE and DME as needed    4) Pt will complete toileting w/ mod I w/ G hygiene/thoroughness using DME as needed    5) Pt will improve functional transfers to Mod I on/off all surfaces using DME as needed w/ G balance/safety     6) Pt will improve functional mobility during ADL/IADL/leisure tasks to Mod I using DME as needed w/ G balance/safety     7) Pt will participate in simulated IADL management task to increase independence to  w/ G safety and endurance    8) Pt will demonstrate G carryover of pt/caregiver education and training as appropriate      9) Pt will demonstrate 100% carryover of energy conservation techniques t/o functional I/ADL/leisure tasks w/o cues s/p skilled education    10) Pt will independently identify and utilize 2-3 coping strategies to increase positive affect and promote overall well-being  11) Pt will engage in ongoing cognitive assessment w/ G participation to assist w/ safe d/c planning/recommendations    12)Pt will improve/maintain B/L UE ROM/ strength via AROM/AAROM/PROM and strengthening exercises in all planes as tolerated in order to participate in functional activities        Rosa Nolan MS, OTR/L

## 2020-03-01 NOTE — PLAN OF CARE
Problem: OCCUPATIONAL THERAPY ADULT  Goal: Performs self-care activities at highest level of function for planned discharge setting  See evaluation for individualized goals  Description  Treatment Interventions: ADL retraining, Functional transfer training, UE strengthening/ROM, Endurance training, Cognitive reorientation, Patient/family training, Compensatory technique education, Equipment evaluation/education, Energy conservation, Activityengagement          See flowsheet documentation for full assessment, interventions and recommendations  Outcome: Progressing  Note:   Limitation: Decreased ADL status, Decreased Safe judgement during ADL, Decreased endurance, Decreased self-care trans, Decreased high-level ADLs  Prognosis: Good  Assessment: Pt seen for re-eval 2* expiration of goals  Pt admit from rehab, see intial eval for PLOF  Currently pt is a Mod A for LB ADls and MIn A for UB ADls and slide board transfer  Pt limited 2* decreased safety awareness, decreased ADLs status, limited home support, and decreased activity tolerance  Continue to recommend STR  OT will continue to follow to see as able       OT Discharge Recommendation: Short Term Rehab  OT - OK to Discharge: (when medically cleared)

## 2020-03-01 NOTE — PLAN OF CARE
Problem: Potential for Falls  Goal: Patient will remain free of falls  Description  INTERVENTIONS:  - Assess patient frequently for physical needs  -  Identify cognitive and physical deficits and behaviors that affect risk of falls  -  [place] fall precautions as indicated by assessment   - Educate patient/family on patient safety including physical limitations  - Instruct patient to call for assistance with activity based on assessment  - Modify environment to reduce risk of injury  - Consider OT/PT consult to assist with strengthening/mobility  Outcome: Progressing     Problem: Prexisting or High Potential for Compromised Skin Integrity  Goal: Skin integrity is maintained or improved  Description  INTERVENTIONS:  - Identify patients at risk for skin breakdown  - Assess and monitor skin integrity  - Assess and monitor nutrition and hydration status  - Monitor labs   - Assess for incontinence   - Turn and reposition patient  - Assist with mobility/ambulation  - Relieve pressure over bony prominences  - Avoid friction and shearing  - Provide appropriate hygiene as needed including keeping skin clean and dry  - Evaluate need for skin moisturizer/barrier cream  - Collaborate with interdisciplinary team   - Patient/family teaching  - Consider wound care consult   Outcome: Progressing     Problem: Nutrition/Hydration-ADULT  Goal: Nutrient/Hydration intake appropriate for improving, restoring or maintaining nutritional needs  Description  Monitor and assess patient's nutrition/hydration status for malnutrition  Collaborate with interdisciplinary team and initiate plan and interventions as ordered  Monitor patient's weight and dietary intake as ordered or per policy  Utilize nutrition screening tool and intervene as necessary  Determine patient's food preferences and provide high-protein, high-caloric foods as appropriate       INTERVENTIONS:  - Monitor oral intake, urinary output, labs, and treatment plans  - Assess nutrition and hydration status and recommend course of action  - Evaluate amount of meals eaten  - Assist patient with eating if necessary   - Allow adequate time for meals  - Recommend/ encourage appropriate diets, oral nutritional supplements, and vitamin/mineral supplements  - Order, calculate, and assess calorie counts as needed  - Recommend, monitor, and adjust tube feedings and TPN/PPN based on assessed needs  - Assess need for intravenous fluids  - Provide specific nutrition/hydration education as appropriate  - Include patient/family/caregiver in decisions related to nutrition  Outcome: Progressing     Problem: PAIN - ADULT  Goal: Verbalizes/displays adequate comfort level or baseline comfort level  Description  Interventions:  - Encourage patient to monitor pain and request assistance  - Assess pain using appropriate pain scale  - Administer analgesics based on type and severity of pain and evaluate response  - Implement non-pharmacological measures as appropriate and evaluate response  - Consider cultural and social influences on pain and pain management  - Notify physician/advanced practitioner if interventions unsuccessful or patient reports new pain  Outcome: Progressing     Problem: INFECTION - ADULT  Goal: Absence or prevention of progression during hospitalization  Description  INTERVENTIONS:  - Assess and monitor for signs and symptoms of infection  - Monitor lab/diagnostic results  - Monitor all insertion sites, i e  indwelling lines, tubes, and drains  - Monitor endotracheal if appropriate and nasal secretions for changes in amount and color  - [place] appropriate cooling/warming therapies per order  - Administer medications as ordered  - Instruct and encourage patient and family to use good hand hygiene technique  - Identify and instruct in appropriate isolation precautions for identified infection/condition  Outcome: Progressing  Goal: Absence of fever/infection during neutropenic period  Description  INTERVENTIONS:  - Monitor WBC    Outcome: Progressing     Problem: INFECTION - ADULT  Goal: Absence or prevention of progression during hospitalization  Description  INTERVENTIONS:  - Assess and monitor for signs and symptoms of infection  - Monitor lab/diagnostic results  - Monitor all insertion sites, i e  indwelling lines, tubes, and drains  - Monitor endotracheal if appropriate and nasal secretions for changes in amount and color  - [place] appropriate cooling/warming therapies per order  - Administer medications as ordered  - Instruct and encourage patient and family to use good hand hygiene technique  - Identify and instruct in appropriate isolation precautions for identified infection/condition  Outcome: Progressing  Goal: Absence of fever/infection during neutropenic period  Description  INTERVENTIONS:  - Monitor WBC    Outcome: Progressing     Problem: SAFETY ADULT  Goal: Maintain or return to baseline ADL function  Description  INTERVENTIONS:  -  Assess patient's ability to carry out ADLs; assess patient's baseline for ADL function and identify physical deficits which impact ability to perform ADLs (bathing, care of mouth/teeth, toileting, grooming, dressing, etc )  - Assess/evaluate cause of self-care deficits   - Assess range of motion  - Assess patient's mobility; develop plan if impaired  - Assess patient's need for assistive devices and provide as appropriate  - Encourage maximum independence but intervene and supervise when necessary  - Involve family in performance of ADLs  - Assess for home care needs following discharge   - Consider OT consult to assist with ADL evaluation and planning for discharge  - Provide patient education as appropriate  Outcome: Progressing  Goal: Maintain or return mobility status to optimal level  Description  INTERVENTIONS:  - Assess patient's baseline mobility status (ambulation, transfers, stairs, etc )    - Identify cognitive and physical deficits and behaviors that affect mobility  - Identify mobility aids required to assist with transfers and/or ambulation (gait belt, sit-to-stand, lift, walker, cane, etc )  - [place] fall precautions as indicated by assessment  - Record patient progress and toleration of activity level on Mobility SBAR; progress patient to next Phase/Stage  - Instruct patient to call for assistance with activity based on assessment  - Consider rehabilitation consult to assist with strengthening/weightbearing, etc   Outcome: Progressing     Problem: DISCHARGE PLANNING  Goal: Discharge to home or other facility with appropriate resources  Description  INTERVENTIONS:  - Identify barriers to discharge w/patient and caregiver  - Arrange for needed discharge resources and transportation as appropriate  - Identify discharge learning needs (meds, wound care, etc )  - Arrange for interpretive services to assist at discharge as needed  - Refer to Case Management Department for coordinating discharge planning if the patient needs post-hospital services based on physician/advanced practitioner order or complex needs related to functional status, cognitive ability, or social support system  Outcome: Progressing     Problem: DISCHARGE PLANNING  Goal: Discharge to home or other facility with appropriate resources  Description  INTERVENTIONS:  - Identify barriers to discharge w/patient and caregiver  - Arrange for needed discharge resources and transportation as appropriate  - Identify discharge learning needs (meds, wound care, etc )  - Arrange for interpretive services to assist at discharge as needed  - Refer to Case Management Department for coordinating discharge planning if the patient needs post-hospital services based on physician/advanced practitioner order or complex needs related to functional status, cognitive ability, or social support system  Outcome: Progressing     Problem: Knowledge Deficit  Goal: Patient/family/caregiver demonstrates understanding of disease process, treatment plan, medications, and discharge instructions  Description  Complete learning assessment and assess knowledge base    Interventions:  - Provide teaching at level of understanding  - Provide teaching via preferred learning methods  Outcome: Progressing     Problem: SKIN/TISSUE INTEGRITY - ADULT  Goal: Skin integrity remains intact  Description  INTERVENTIONS  - Identify patients at risk for skin breakdown  - Assess and monitor skin integrity  - Assess and monitor nutrition and hydration status  - Monitor labs (i e  albumin)  - Assess for incontinence   - Turn and reposition patient  - Assist with mobility/ambulation  - Relieve pressure over bony prominences  - Avoid friction and shearing  - Provide appropriate hygiene as needed including keeping skin clean and dry  - Evaluate need for skin moisturizer/barrier cream  - Collaborate with interdisciplinary team (i e  Nutrition, Rehabilitation, etc )   - Patient/family teaching  Outcome: Progressing  Goal: Incision(s), wounds(s) or drain site(s) healing without S/S of infection  Description  INTERVENTIONS  - Assess and document risk factors for skin impairment   - Assess and document dressing, incision, wound bed, drain sites and surrounding tissue  - Consider nutrition services referral as needed  - Oral mucous membranes remain intact  - Provide patient/ family education  Outcome: Progressing  Goal: Oral mucous membranes remain intact  Description  INTERVENTIONS  - Assess oral mucosa and hygiene practices  - Implement preventative oral hygiene regimen  - Implement oral medicated treatments as ordered  - Initiate Nutrition services referral as needed  Outcome: Progressing     Problem: HEMATOLOGIC - ADULT  Goal: Maintains hematologic stability  Description  INTERVENTIONS  - Assess for signs and symptoms of bleeding or hemorrhage  - Monitor labs  - Administer supportive blood products/factors as ordered and appropriate  Outcome: Progressing

## 2020-03-02 VITALS
WEIGHT: 92 LBS | RESPIRATION RATE: 18 BRPM | BODY MASS INDEX: 18.06 KG/M2 | OXYGEN SATURATION: 95 % | DIASTOLIC BLOOD PRESSURE: 69 MMHG | HEART RATE: 75 BPM | TEMPERATURE: 96.8 F | SYSTOLIC BLOOD PRESSURE: 117 MMHG | HEIGHT: 60 IN

## 2020-03-02 PROBLEM — A41.9 SEPSIS (HCC): Status: RESOLVED | Noted: 2019-11-13 | Resolved: 2020-03-02

## 2020-03-02 PROCEDURE — 97530 THERAPEUTIC ACTIVITIES: CPT

## 2020-03-02 PROCEDURE — 97542 WHEELCHAIR MNGMENT TRAINING: CPT

## 2020-03-02 PROCEDURE — 99239 HOSP IP/OBS DSCHRG MGMT >30: CPT | Performed by: NURSE PRACTITIONER

## 2020-03-02 RX ORDER — MINERAL OIL AND PETROLATUM 150; 830 MG/G; MG/G
OINTMENT OPHTHALMIC
Refills: 0 | Status: ON HOLD
Start: 2020-03-02 | End: 2021-06-13 | Stop reason: ALTCHOICE

## 2020-03-02 RX ORDER — BISACODYL 10 MG
10 SUPPOSITORY, RECTAL RECTAL DAILY PRN
Qty: 12 SUPPOSITORY | Refills: 0 | Status: SHIPPED | OUTPATIENT
Start: 2020-03-02 | End: 2021-12-09 | Stop reason: ALTCHOICE

## 2020-03-02 RX ORDER — LOPERAMIDE HYDROCHLORIDE 2 MG/1
2 CAPSULE ORAL 3 TIMES DAILY PRN
Qty: 30 CAPSULE | Refills: 0 | Status: SHIPPED | OUTPATIENT
Start: 2020-03-02 | End: 2020-03-13 | Stop reason: HOSPADM

## 2020-03-02 RX ORDER — FAMOTIDINE 20 MG/1
20 TABLET, FILM COATED ORAL 2 TIMES DAILY PRN
Refills: 0
Start: 2020-03-02 | End: 2020-04-29 | Stop reason: HOSPADM

## 2020-03-02 RX ORDER — MIDODRINE HYDROCHLORIDE 10 MG/1
10 TABLET ORAL
Refills: 0 | Status: ON HOLD
Start: 2020-03-02 | End: 2020-03-13 | Stop reason: SDUPTHER

## 2020-03-02 RX ADMIN — Medication 1 TABLET: at 08:37

## 2020-03-02 RX ADMIN — MELATONIN 1000 UNITS: at 08:37

## 2020-03-02 RX ADMIN — MIDODRINE HYDROCHLORIDE 10 MG: 5 TABLET ORAL at 05:20

## 2020-03-02 RX ADMIN — Medication 250 MG: at 08:37

## 2020-03-02 RX ADMIN — Medication 250 MG: at 18:21

## 2020-03-02 RX ADMIN — MIDODRINE HYDROCHLORIDE 10 MG: 5 TABLET ORAL at 11:22

## 2020-03-02 RX ADMIN — PANTOPRAZOLE SODIUM 40 MG: 40 TABLET, DELAYED RELEASE ORAL at 18:21

## 2020-03-02 RX ADMIN — ENOXAPARIN SODIUM 40 MG: 40 INJECTION SUBCUTANEOUS at 08:37

## 2020-03-02 RX ADMIN — DEXTRAN 70 AND HYPROMELLOSE 2910 1 DROP: 1; 3 SOLUTION/ DROPS OPHTHALMIC at 08:41

## 2020-03-02 RX ADMIN — NYSTATIN: 100000 POWDER TOPICAL at 08:39

## 2020-03-02 RX ADMIN — MIDODRINE HYDROCHLORIDE 10 MG: 5 TABLET ORAL at 18:21

## 2020-03-02 RX ADMIN — PANTOPRAZOLE SODIUM 40 MG: 40 TABLET, DELAYED RELEASE ORAL at 05:20

## 2020-03-02 RX ADMIN — Medication 1 TABLET: at 18:21

## 2020-03-02 NOTE — ASSESSMENT & PLAN NOTE
· Labs appear consistent with anemia of chronic disease  Hemoglobin had been stable on prior labs  No need to repeat at this time      Lab Results   Component Value Date    HGB 9 2 (L) 03/01/2020    HGB 9 1 (L) 02/21/2020    HGB 9 8 (L) 02/15/2020    HGB 9 9 (L) 02/14/2020    HGB 9 8 (L) 02/14/2020

## 2020-03-02 NOTE — PLAN OF CARE
Problem: Potential for Falls  Goal: Patient will remain free of falls  Description  INTERVENTIONS:  - Assess patient frequently for physical needs  -  Identify cognitive and physical deficits and behaviors that affect risk of falls  -  [place] fall precautions as indicated by assessment   - Educate patient/family on patient safety including physical limitations  - Instruct patient to call for assistance with activity based on assessment  - Modify environment to reduce risk of injury  - Consider OT/PT consult to assist with strengthening/mobility  Outcome: Progressing     Problem: Prexisting or High Potential for Compromised Skin Integrity  Goal: Skin integrity is maintained or improved  Description  INTERVENTIONS:  - Identify patients at risk for skin breakdown  - Assess and monitor skin integrity  - Assess and monitor nutrition and hydration status  - Monitor labs   - Assess for incontinence   - Turn and reposition patient  - Assist with mobility/ambulation  - Relieve pressure over bony prominences  - Avoid friction and shearing  - Provide appropriate hygiene as needed including keeping skin clean and dry  - Evaluate need for skin moisturizer/barrier cream  - Collaborate with interdisciplinary team   - Patient/family teaching  - Consider wound care consult   Outcome: Progressing     Problem: Nutrition/Hydration-ADULT  Goal: Nutrient/Hydration intake appropriate for improving, restoring or maintaining nutritional needs  Description  Monitor and assess patient's nutrition/hydration status for malnutrition  Collaborate with interdisciplinary team and initiate plan and interventions as ordered  Monitor patient's weight and dietary intake as ordered or per policy  Utilize nutrition screening tool and intervene as necessary  Determine patient's food preferences and provide high-protein, high-caloric foods as appropriate       INTERVENTIONS:  - Monitor oral intake, urinary output, labs, and treatment plans  - Assess nutrition and hydration status and recommend course of action  - Evaluate amount of meals eaten  - Assist patient with eating if necessary   - Allow adequate time for meals  - Recommend/ encourage appropriate diets, oral nutritional supplements, and vitamin/mineral supplements  - Order, calculate, and assess calorie counts as needed  - Recommend, monitor, and adjust tube feedings and TPN/PPN based on assessed needs  - Assess need for intravenous fluids  - Provide specific nutrition/hydration education as appropriate  - Include patient/family/caregiver in decisions related to nutrition  Outcome: Progressing     Problem: PAIN - ADULT  Goal: Verbalizes/displays adequate comfort level or baseline comfort level  Description  Interventions:  - Encourage patient to monitor pain and request assistance  - Assess pain using appropriate pain scale  - Administer analgesics based on type and severity of pain and evaluate response  - Implement non-pharmacological measures as appropriate and evaluate response  - Consider cultural and social influences on pain and pain management  - Notify physician/advanced practitioner if interventions unsuccessful or patient reports new pain  Outcome: Progressing     Problem: INFECTION - ADULT  Goal: Absence or prevention of progression during hospitalization  Description  INTERVENTIONS:  - Assess and monitor for signs and symptoms of infection  - Monitor lab/diagnostic results  - Monitor all insertion sites, i e  indwelling lines, tubes, and drains  - Monitor endotracheal if appropriate and nasal secretions for changes in amount and color  - [place] appropriate cooling/warming therapies per order  - Administer medications as ordered  - Instruct and encourage patient and family to use good hand hygiene technique  - Identify and instruct in appropriate isolation precautions for identified infection/condition  Outcome: Progressing  Goal: Absence of fever/infection during neutropenic period  Description  INTERVENTIONS:  - Monitor WBC    Outcome: Progressing     Problem: SAFETY ADULT  Goal: Maintain or return to baseline ADL function  Description  INTERVENTIONS:  -  Assess patient's ability to carry out ADLs; assess patient's baseline for ADL function and identify physical deficits which impact ability to perform ADLs (bathing, care of mouth/teeth, toileting, grooming, dressing, etc )  - Assess/evaluate cause of self-care deficits   - Assess range of motion  - Assess patient's mobility; develop plan if impaired  - Assess patient's need for assistive devices and provide as appropriate  - Encourage maximum independence but intervene and supervise when necessary  - Involve family in performance of ADLs  - Assess for home care needs following discharge   - Consider OT consult to assist with ADL evaluation and planning for discharge  - Provide patient education as appropriate  Outcome: Progressing  Goal: Maintain or return mobility status to optimal level  Description  INTERVENTIONS:  - Assess patient's baseline mobility status (ambulation, transfers, stairs, etc )    - Identify cognitive and physical deficits and behaviors that affect mobility  - Identify mobility aids required to assist with transfers and/or ambulation (gait belt, sit-to-stand, lift, walker, cane, etc )  - [place] fall precautions as indicated by assessment  - Record patient progress and toleration of activity level on Mobility SBAR; progress patient to next Phase/Stage  - Instruct patient to call for assistance with activity based on assessment  - Consider rehabilitation consult to assist with strengthening/weightbearing, etc   Outcome: Progressing     Problem: DISCHARGE PLANNING  Goal: Discharge to home or other facility with appropriate resources  Description  INTERVENTIONS:  - Identify barriers to discharge w/patient and caregiver  - Arrange for needed discharge resources and transportation as appropriate  - Identify discharge learning needs (meds, wound care, etc )  - Arrange for interpretive services to assist at discharge as needed  - Refer to Case Management Department for coordinating discharge planning if the patient needs post-hospital services based on physician/advanced practitioner order or complex needs related to functional status, cognitive ability, or social support system  Outcome: Progressing     Problem: Knowledge Deficit  Goal: Patient/family/caregiver demonstrates understanding of disease process, treatment plan, medications, and discharge instructions  Description  Complete learning assessment and assess knowledge base    Interventions:  - Provide teaching at level of understanding  - Provide teaching via preferred learning methods  Outcome: Progressing     Problem: SKIN/TISSUE INTEGRITY - ADULT  Goal: Skin integrity remains intact  Description  INTERVENTIONS  - Identify patients at risk for skin breakdown  - Assess and monitor skin integrity  - Assess and monitor nutrition and hydration status  - Monitor labs (i e  albumin)  - Assess for incontinence   - Turn and reposition patient  - Assist with mobility/ambulation  - Relieve pressure over bony prominences  - Avoid friction and shearing  - Provide appropriate hygiene as needed including keeping skin clean and dry  - Evaluate need for skin moisturizer/barrier cream  - Collaborate with interdisciplinary team (i e  Nutrition, Rehabilitation, etc )   - Patient/family teaching  Outcome: Progressing  Goal: Incision(s), wounds(s) or drain site(s) healing without S/S of infection  Description  INTERVENTIONS  - Assess and document risk factors for skin impairment   - Assess and document dressing, incision, wound bed, drain sites and surrounding tissue  - Consider nutrition services referral as needed  - Oral mucous membranes remain intact  - Provide patient/ family education  Outcome: Progressing  Goal: Oral mucous membranes remain intact  Description  INTERVENTIONS  - Assess oral mucosa and hygiene practices  - Implement preventative oral hygiene regimen  - Implement oral medicated treatments as ordered  - Initiate Nutrition services referral as needed  Outcome: Progressing     Problem: HEMATOLOGIC - ADULT  Goal: Maintains hematologic stability  Description  INTERVENTIONS  - Assess for signs and symptoms of bleeding or hemorrhage  - Monitor labs  - Administer supportive blood products/factors as ordered and appropriate  Outcome: Progressing     Problem: MUSCULOSKELETAL - ADULT  Goal: Maintain or return mobility to safest level of function  Description  INTERVENTIONS:  - Assess patient's ability to carry out ADLs; assess patient's baseline for ADL function and identify physical deficits which impact ability to perform ADLs (bathing, care of mouth/teeth, toileting, grooming, dressing, etc )  - Assess/evaluate cause of self-care deficits   - Assess range of motion  - Assess patient's mobility  - Assess patient's need for assistive devices and provide as appropriate  - Encourage maximum independence but intervene and supervise when necessary  - Involve family in performance of ADLs  - Assess for home care needs following discharge   - Consider OT consult to assist with ADL evaluation and planning for discharge  - Provide patient education as appropriate  Outcome: Progressing  Goal: Maintain proper alignment of affected body part  Description  INTERVENTIONS:  - Support, maintain and protect limb and body alignment  - Provide patient/ family with appropriate education  Outcome: Progressing

## 2020-03-02 NOTE — PROGRESS NOTES
Progress Note - Kasey Lewis 1950, 71 y o  female MRN: [id number]    Unit/Bed#: [facility] 341-37 Encounter: [id number]    Primary Care Provider: Jeniffer Stratton   Date and time admitted to hospital: 2/5/2020  3:25 PM        * Sepsis [facility])  Assessment & Plan  · POA with fever and leukocytosis with infectious source likely being R hip wound with underlying iliopsoas myositis  No signs of PNA or intra-abdominal infection  Review of repeat CT scan shows known osteomyelitis of the right hip with dislocation  Pockets of gas have decreased since the initial consult  No evidence for abscesses or collection  · Blood cultures 1/2 positive for coag-neg staph (likely contaminant) other negative  Repeat cultures negative for 5 days   · Urine culture + for proteus and e coli; likely asymptomatic bacteriuria per ID; isabel was exchanged   · ID had been following, patient completed 7 days total of intravenous Zosyn last dose being 2/11  ID with preference for surgical intervention for definitive source control however no focus for intervention as per IR and orthopedic surgery  · PT/OT recommending rehab  Case management following  Plan was to go back to [facility] however they will not take her back  CM working on alternative plan and she will require auth  · Note, patient was initially deemed to not have capacity however re-evaluation done and patient DOES have capacity for medical decision making  Abnormal CT of the chest  Assessment & Plan  · CT scan of the chest reports possible heart failure as it relates to ground-glass appearance on the left lung  Also has left lower lobe consolidation that is concerning for atelectasis versus infection  Patient with no history of heart failure  Clinical exam not consistent with heart failure  · Consider additional chest imaging in 4-6 weeks for follow-up  Defer to PCP      Depression  Assessment & Plan  · Patient has reported depression over continued chronic hospital care and chronic illness  · Seen by Psychiatry, refused antidepressants  Severe protein-calorie malnutrition ([address] )  Assessment & Plan  Malnutrition Findings:   Malnutrition type: Chronic illness(Severe pro/sara malnutrition r/t disease/condition as evidenced by 12% unintentional wt loss since 11/10/19, consuming < 75% energy intake compared to est energy needs > 1 month, fat/muscle wasting of orbital/temple areas  Treated with diet/supplements)  Degree of Malnutrition: Other severe protein calorie malnutrition    BMI Findings:  BMI Classifications: Underweight < 18 5     Body mass index is 17 97 kg/m²  Ensure supplements  Pt reports she cant eat a lot and they are really filling she can eat or drink one a day    Neurogenic bladder  Assessment & Plan  · History of neurogenic bladder with chronic indwelling [place] catheter, exchanged this admission due to finding of E coli and Proteus in the urine culture  · Urine culture noted, likely represents asymptomatic bacteriuria per infectious disease      Anemia of chronic disease  Assessment & Plan  · Labs appear consistent with anemia of chronic disease  Hemoglobin had been stable on prior labs  No need to repeat at this time  Lab Results   Component Value Date    HGB 9 2 (L) 03/01/2020    HGB 9 1 (L) 02/21/2020    HGB 9 8 (L) 02/15/2020    HGB 9 9 (L) 02/14/2020    HGB 9 8 (L) 02/14/2020         Coagulase negative Staphylococcus bacteremiaresolved as of 2/18/2020  Assessment & Plan  · Could be secondary to wound infection or contaminant  · Infectious Disease following and cleared 2/12 for discharge       Lung nodule  Assessment & Plan  · Noted incidental finding, will require outpatient f/u     Hypotensionresolved as of 2/19/2020  Assessment & Plan  · Chronic low blood pressures on midodrine    Wean down monitor for hypotension   · Pt was weaned to 10mg tid and has had borderline sbp would not wean lower until sbp steadily around 120      Abnormal CT scan  Assessment & Plan  · Low-density centrally in the uterus measuring up to 7 mm in thickness   Follow-up pelvic ultrasound is recommended to assess for abnormal endometrial thickening or endometrial lesion  · Outpatient follow up    Cognitive decline  Assessment & Plan  · Patient noted per neuropsychology to be incompetent on 02/12/2020, re-evaluated by psych on 2/21 and deemed competent   · Pt agreeable to a few weeks of STR  Patient may need more of a long term placement however her goal is to return home after STR  Open wound of right hip  Assessment & Plan  · Patient with a known past medical history for paraplegia following spinal cord injury presents with increased drainage of R hip wound  · Known to history of hip osteo and dislocation of the R femur  Patient is nonweightbearing  · Status post intravenous antibiotics x7 days  · Ortho and general surgery recommending continue local care  No plans for surgical intervention  · Wound care instructions:   · Rinse right hip, right anterior thigh/hip, left ischium pressure injuries, and midline sacral partial thickness wound with saline  Cover all wounds with silver maxorb and cover with Allevyn foam  Change right leg dressings day and left leg and sacrum dressings every other day and as needed  Decubitus ulcer of left ischium, stage III ([address] )  Assessment & Plan  · Local care  Wound care following  · Strict turning and repositioning  · Currently on specialty mattress, would recommend specialty mattress at nursing facility  Case management made aware  Paraplegia following spinal cord injury [facility])  Assessment & Plan  · History of spinal cord injury T8 with paraplegia 20 years ago hand gliding accident   · Presented from [facility]  · Supportive care    History of Clostridium difficile infectionresolved as of 2/18/2020  Assessment & Plan  Patient was noted to have fecal impaction on CT scan    Has reported large bowel movement yesterday  No concern for C diff colitis  Continue with prophylactic vancomycin as noted below  Patient refusing suppository and do collapse  Will make these p r n  · With infection in 2019  · Currently without any complaints of diarrhea, but frequent formed stools  · s/p vancomycin prophylactics, continued through     Fecal retentionresolved as of 2020  Assessment & Plan  · Noted on CT scan  · Had large BM  · Reports moving bowels a few times a day but they are normal not loose  · Monitor       VTE Pharmacologic Prophylaxis:   Pharmacologic: Enoxaparin (Lovenox)  Mechanical VTE Prophylaxis in Place: Yes    Patient Centered Rounds: I have performed bedside rounds with nursing staff today  Discussions with Specialists or Other Care Team Provider: nursing     Education and Discussions with Family / Patient: patient     Time Spent for Care: 45 minutes  More than 50% of total time spent on counseling and coordination of care as described above  Current Length of Stay: 25 day(s)    Current Patient Status: Inpatient   Certification Statement: The patient will continue to require additional inpatient hospital stay due to ongoing insurance auth and bed availability     Discharge Plan: rehab when bed available med stable     Code Status: Level 1 - Full Code      Subjective:   Pt is doing well today  She was sitting in the wheelchair and pushing herself around best iv seen pt   She is determined  Today she had visitor / pt seemed happy no pain some bms today not diarrhea     Objective:     Vitals:   Temp (24hrs), Av °F (37 2 °C), Min:98 3 °F (36 8 °C), Max:99 6 °F (37 6 °C)    Temp:  [98 3 °F (36 8 °C)-99 6 °F (37 6 °C)] 99 6 °F (37 6 °C)  HR:  [71] 71  Resp:  [18-20] 20  BP: (114)/(67) 114/67  SpO2:  [96 %] 96 %  Body mass index is 17 97 kg/m²  Input and Output Summary (last 24 hours):        Intake/Output Summary (Last 24 hours) at 3/1/2020 3261  Last data filed at 3/1/2020 2100  Gross per 24 hour   Intake 1140 ml   Output 750 ml   Net 390 ml       Physical Exam:     Physical Exam   Constitutional: She is oriented to person, place, and time  No distress  HENT:   Head: Normocephalic and atraumatic  Mouth/Throat: No oropharyngeal exudate  Eyes: Conjunctivae are normal  Right eye exhibits no discharge  Left eye exhibits no discharge  Neck: No tracheal deviation present  No thyromegaly present  Cardiovascular: Exam reveals no gallop and no friction rub  No murmur heard  Pulmonary/Chest: No stridor  No respiratory distress  She has no wheezes  She has no rales  She exhibits no tenderness  Abdominal: Soft  She exhibits no distension and no mass  There is no tenderness  There is no rebound and no guarding  No hernia  cachectic    Musculoskeletal: She exhibits deformity (severe bl le atrophy)  She exhibits no edema  Lymphadenopathy:     She has no cervical adenopathy  Neurological: She is oriented to person, place, and time  Skin: No rash noted  She is not diaphoretic  No erythema  No pallor  Left hip dressing right two x dressing intact dry    Psychiatric: She has a normal mood and affect  Additional Data:     Labs:    Results from last 7 days   Lab Units 03/01/20  0510   WBC Thousand/uL 8 34   HEMOGLOBIN g/dL 9 2*   HEMATOCRIT % 30 1*   PLATELETS Thousands/uL 558*   NEUTROS PCT % 55   LYMPHS PCT % 31   MONOS PCT % 12   EOS PCT % 1     Results from last 7 days   Lab Units 03/01/20  0511   SODIUM mmol/L 135*   POTASSIUM mmol/L 3 6   CHLORIDE mmol/L 101   CO2 mmol/L 28   BUN mg/dL 18   CREATININE mg/dL 0 48*   ANION GAP mmol/L 6   CALCIUM mg/dL 8 8   ALBUMIN g/dL 2 3*   TOTAL BILIRUBIN mg/dL 0 28   ALK PHOS U/L 160*   ALT U/L 17   AST U/L 10   GLUCOSE RANDOM mg/dL 121                           * I Have Reviewed All Lab Data Listed Above  * Additional Pertinent Lab Tests Reviewed:  All Labs Within Last 24 Hours Reviewed    Imaging:    Imaging Reports Reviewed Today Include: reviewed     Recent Cultures (last 7 days):           Last 24 Hours Medication List:     Current Facility-Administered Medications:  acetaminophen 650 mg Oral Q6H PRN Angle Good MD   artificial tear  Both Eyes HS Nura Yousif MD   bisacodyl 10 mg Rectal Daily PRN Henry Salmon MD   calcium carbonate-vitamin D 1 tablet Oral BID With Meals Angle Good MD   cholecalciferol 1,000 Units Oral Daily Angle Good MD   cyanocobalamin 100 mcg Oral Daily Angle Good MD   dextran 70-hypromellose 1 drop Both Eyes Q3H PRN Kajal Lopez PA-C   diphenhydrAMINE 50 mg Oral 60 Min Pre-Op Sagrario Dumont PA-C   enoxaparin 40 mg Subcutaneous Daily Angle Good MD   famotidine 20 mg Oral BID PRN Nura Yousif MD   loperamide 2 mg Oral TID PRN Yelena Burgess PA-C   midodrine 10 mg Oral TID AC CONCEPCION Hidalgo   multivitamin-minerals 1 tablet Oral Daily Angle Good MD   nystatin  Topical BID Angle Good MD   ondansetron 4 mg Intravenous Q6H PRN Angle Good MD   pantoprazole 40 mg Oral BID AC Nura Yousif MD   polyethylene glycol 17 g Oral Daily PRN CONCEPCION Hidalgo   saccharomyces boulardii 250 mg Oral BID Angle Good MD   senna 1 tablet Oral HS PRN William Scott CRNA     Facility-Administered Medications Ordered in Other Encounters:  dexamethasone 4 mg Intravenous Once PRN Nika Irons, DO    diphenhydrAMINE 12 5 mg Intravenous Once Nika Irons, DO    lactated ringers 75 mL/hr Intravenous Continuous Nika Irons, DO Last Rate: 75 mL/hr (11/10/19 2105)   lactated ringers 50 mL/hr Intravenous Continuous Jamila Weems CRNA    lactated ringers 75 mL/hr Intravenous Continuous Nika Irons, DO Last Rate: Stopped (03/20/19 1841)   lactated ringers 75 mL/hr Intravenous Continuous Myra Campo MD Last Rate: Stopped (03/20/19 1842)   ondansetron 4 mg Intravenous Once PRN Edna Ashley MD promethazine 12 5 mg Intravenous Once PRN Arvind Martins MD         Today, Patient Was Seen By: CONCEPCION Sena    ** Please Note: Dictation voice to text software may have been used in the creation of this document   **

## 2020-03-02 NOTE — SOCIAL WORK
Auth obtained  Pt will d/c to [facility] today @3066 via [address]  Pt made aware and in agreement  Pt's nurse notified

## 2020-03-02 NOTE — DISCHARGE INSTRUCTIONS
1-L ischial wound remains remains stable with yellow fibrinous slough scattered through wound bed, non infected in appearance normal odor noted  2-Mid sacrum with blanchable hyperpigmented scar tissue with superimposed PTW  3-L hip with large communicating wound  Wound bed with fibrinous slough and undermining to entire wound edge, deepest undermining noted at 11 o'clock of 7 5 cm      Plan:   1-Continue with current wound care plan of packing R hip wound with 1 inch plain packing ribbon, then algi   Wound care to right and left heel cleanse with soap and water  Apply Allevyn Life foam dressing to bilateral heels, left hip bony prominence, and intact purple scar tissue on sacrum/buttocks  for prevention  [place] with P   Peel back at least daily for skin assessment and re-apply  Change dressing every 3 days and PRN  Wound on left ischial flush with saline place Maxorb alginate and then Aleve and a life foam dressing every other day  Wound care to midline sacrum flushed with saline place Maxalt alginate foam every other day  Right hip thigh wound change daily flushed with saline apply Maxorb alginate Nu gauze packing and Aleve and foam, daily

## 2020-03-02 NOTE — PHYSICAL THERAPY NOTE
PHYSICAL THERAPY NOTE    Patient Name: Elida Koyanagi  [id number] Date: 3/2/2020     03/02/20 1100   Pain Assessment   Pain Assessment No/denies pain   Pain Score No Pain   Restrictions/Precautions   Weight Bearing Precautions Per Order No   Other Precautions Contact/isolation;Multiple lines; Fall Risk  (paraplegic)   General   Chart Reviewed Yes   Response to Previous Treatment Patient with no complaints from previous session  Family/Caregiver Present No   Cognition   Overall Cognitive Status WFL   Arousal/Participation Cooperative   Attention Within functional limits   Orientation Level Oriented X4   Memory Within functional limits   Following Commands Follows all commands and directions without difficulty   Subjective   Subjective Pt lying supine and agreeable to work w/ therapy   Bed Mobility   Supine to Sit 5  Supervision   Additional items Increased time required   Sit to Supine Unable to assess   Additional Comments Pt lying supine upon PT arrival  Pt returned seated in w/c w/ all needs within reach   Transfers   Sliding Board transfer 4  Minimal assistance   Additional items Assist x 1; Increased time required;Verbal cues   Wheelchair Activities   Propulsion Yes   Propulsion Type 1 Manual   Level 1 Level tile   Method 1 Right upper extremity; Left upper extremity   Level of Assistance 1 Independent   Description/ Details 1 200' 5' 100'   Balance   Static Sitting Fair   Dynamic Sitting Fair -   Endurance Deficit   Endurance Deficit Yes   Endurance Deficit Description UE fatigue   Activity Tolerance   Activity Tolerance Patient limited by fatigue   Nurse Made Aware RN cleared pt for therapy   Exercises   Balance training  BLE DF and eversion stretches 3x30 seconds   Assessment   Prognosis Fair   Problem List Decreased strength;Decreased range of motion;Decreased endurance; Impaired balance;Decreased mobility; Decreased skin integrity   Assessment Pt presents with decreased mobility, strength, balance, and activity tolerance  Pt demonstrated ability to perform bed mobility at supervision and slideboard transfers at [address]  Pt propelled w/c 300', 200', 100' w/ rest breaks in between 2' UE fatigue  PT performed BLE DF and eversion stretches 3X30 seconds to help facilitate appropriate positioning  Pt reports she has LE brace at home but does not want to wear it in the hospital  Pt continues to benefit from skilled therapy to maximize functional independence  Recommendation at this time is rehab  Pt would benefit from functional transfers, UE strength, balance, w/c mobility, activity tolerance, and further progression gaining independence w/ SB transfers   Goals   Patient Goals to go to rehab   STG Expiration Date 03/10/20   PT Treatment Day 11   Plan   Treatment/Interventions Functional transfer training;LE strengthening/ROM; Endurance training;Patient/family training;Equipment eval/education; Bed mobility;Spoke to nursing  (w/c training)   Progress Progressing toward goals   PT Frequency   (3-6x/wk)   Recommendation   Recommendation Post acute IP rehab   Equipment Recommended Wheelchair   PT - OK to Discharge Yes   Additional Comments when medically cleared to rehab     Ina Chandler, PT, DPT

## 2020-03-02 NOTE — ASSESSMENT & PLAN NOTE
· History of spinal cord injury T8 with paraplegia 20 years ago hand gliding accident   · Presented from [facility]  · Supportive care

## 2020-03-02 NOTE — DISCHARGE SUMMARY
Discharge Summary - [facility]    Patient Information: Monty Muniz 71 y o  female MRN: [id number]  Unit/Bed#: [facility] 540-91 Encounter: [id number]    Discharging Physician / Practitioner: CONCEPCION Leonard  PCP: Tessie Alcantara  Admission Date: 2/5/2020  Discharge Date: 03/02/20    Disposition:     Other: Richie miller     Reason for Admission:  Transfer from skilled nursing facility for fever, low blood pressure    Discharge Diagnoses:     Principal Problem (Resolved):    Sepsis ([facility] [address] )  Active Problems:    Anemia of chronic disease    Neurogenic bladder    Severe protein-calorie malnutrition ([facility] [address] )    Depression    Abnormal CT of the chest    Lung nodule    Paraplegia following spinal cord [facility] )    Decubitus ulcer of left ischium, stage III (HCC)    Open wound of right hip    Cognitive decline    Abnormal CT scan  Resolved Problems:    Coagulase negative Staphylococcus bacteremia    Hypotension    Fecal retention    History of Clostridium difficile infection      Consultations During Hospital Stay:  · Dr Calixto Herr orthopedics  · Dr Jenniffer Mullen orthopedics  · Dalila branham wound care   · Dr Marvin Huitron infectious disease  · Dr Lobo Rodas neuropsych   · Dr Abdoul Alvarado  · Daisy Carney behavioral  Health        Procedures Performed:     · Wbc 8 34  · Sodium 135  Ct chest abd and pelvis 2/5/20:   Imaging:      CT chest abdomen pelvis wo contrast   Final Result by Asim Bowman DO (02/05 0034)       1  Superolateral dislocation of the right femur with a large sinus tract/defect contiguous with the skin surface with air and fluid extending posterior to the dislocated right femoral head and formation of an apparent pseudocavity lateral to the right    iliac bone which demonstrates coarse peripheral calcifications  Cortical disruption of the medial femoral neck with air in the medullary cavity as well as irregularity and some component of osteolysis of the posterior femoral head  These findings are    consistent with osteomyelitis  There is also probably bony osteomyelitis of the upper posterior lateral acetabulum and iliac bone along the roof        2  Periosteal reaction seen along the proximal femoral metaphysis which could reflect component of chronic osteomyelitis and/or reactive heterotopic ossification        3  Asymmetric swelling of the right iliopsoas muscle complex anterior to the right hip with some small locules of gas as well as edema and/or inflammatory fat stranding  The possibility of pyomyositis/regional infection cannot be excluded  No obvious    localized fluid collections without contrast        4  Deep decubitus ulcer extending to the left ischial bone surface  No obvious overt bony destruction compared to the prior study        5  No discernible infectious etiologies seen within the chest or abdomen        6  New 2 mm left upper lobe nodule series 2/12        7  Moderately large hiatal hernia        Limited study without oral or IV contrast          ·       Significant Findings / Test Results:     · See above     Incidental Findings:   · None     Test Results Pending at Discharge (will require follow up): · None      Outpatient Tests Requested:  · Follow up with pcp in one week or facility md   · Follow up with orthopedics to monitor wound healing   ·     Complications:  None     Hospital Course:     Sona Hemphill is a 71 y o  female patient who originally presented to the hospital on 2/5/2020 due to fever and low blood pressure  Patient presents to [facility] is status post fever and low blood pressure sent from skilled nursing facility  Patient reported that she was not feeling well for the last few days with poor appetite generalized weakness and easily fatigable  Patient has history of spinal cord injury with paraplegia she ambulates in a wheelchair she has chronic non stage double decubitus ulcer thigh and follows with Wound Care    Patient also is noted to have chronic indwelling Hays catheter secondary to neurogenic bladder  She was noted in the ER to have fever with leukocytosis, tachycardia and diagnosed with sepsis admitted for further management  On admission patient reported poor appetite and nausea  Denied vomiting and abdominal pain  She had a cough nonproductive sputum  Denied shortness of breath palpitations chest pain and diaphoresis  Patient underwent chest CT abdomen and pelvis without contrast noting a superolateral dislocation of the right femur  Periosteal reaction along the proximal femur   Patient was seen by orthopedic surgery recommendations per Orthopedics recommended follow-up with patient's outpatient orthopedic physician Dr Jerome Min  He had recently seen the patient on January 29, 2020 did not recommend any intervention at that time , and per our orthopedic team they did feel this was a feasible option  Patient follow-up with outpatient orthopedics and if need for surgical intervention this can be discussed with her orthopedist   Patient was seen by infectious disease secondary to sepsis present on admission demonstrated with fever of 102 1 and leukocytosis  Patient had no signs of pneumonia no intra-abdominal infection CT of chest abdomen and pelvis were negative for infectious etiology  Patient did have a Hays in place noted to be high risk for UTI bacteremia  However patient did have extensive wounds particularly right hip wound with underlying iliopsoas or myositis  Patient was started on Zosyn and Zyvox when ID saw the patient's Zosyn was continued Zyvox stopped and daptomycin IV was initiated  Patient does have prior history of C diff colitis in April of 2019 but patient demonstrates no signs of diarrhea  She does have occasional loose/soft bowel movements patient was started on prophylactic vancomycin discontinued 72 hours after antibiotics were discontinued    Patient completed 7 day IV Zosyn last dose on 02/11/2020  Preference for intervention for definitive source control recommended per ID however no focus of intervention per IR orthopedic surgery  Patient was seen but physical therapy who began to work with patient over her stay patient is slowly becoming stronger able to wheel herself around the unit in wheelchair  Patient was initially deemed incompetent by neuropsychology and then later re-evaluated as mental status improved and she was later deemed competent  Would recommend specialty wound care bed here we utilized   (P500 low air loss mattress  Wound care to right and left heel cleanse with soap and water  Apply Allevyn Life foam dressing to bilateral heels, left hip bony prominence, and intact purple scar tissue on sacrum/buttocks  for prevention  [place] with P   Peel back at least daily for skin assessment and re-apply  Change dressing every 3 days and PRN  Wound on left ischial flushed with saline place Maxorb alginate and then Aleve and a life foam dressing every other day  Wound care to midline sacrum flushed with saline place Maxalt alginate foam every other day  Right hip thigh wound change daily flushed with saline apply Maxorb alginate Nu gauze packing and Aleven and foam, daily  Recommendations are to follow with her primary care physician within the next week or with facility physician during that time  Labs to be monitored, dressings to be changed  Pt should follow up with pcp , orthopedics and urology long term   Condition at Discharge: fair     Discharge Day Visit / Exam:     Subjective:  Patient is doing well she is participating with physical therapy she states she is trying to eat some more protein on a daily basis  She is eager to participate  She denies any chest pain shortness of breath no fevers no chills patient does have soft multiple bowel movements on a daily basis  She does not have pain  Loss of sensation below waist   Hays catheter with yellow urine  Vitals: Blood Pressure: 117/69 (03/02/20 1519)  Pulse: 75 (03/02/20 0733)  Temperature: (!) 96 8 °F (36 °C) (03/02/20 0733)  Temp Source: Oral (03/02/20 0733)  Respirations: 18 (03/02/20 1519)  Height: 5' (152 4 cm) (02/19/20 1700)  Weight - Scale: 41 7 kg (92 lb) (02/05/20 1533)  SpO2: 95 % (03/02/20 0033)  Exam:   Physical Exam   Constitutional: She is oriented to person, place, and time  No distress  Cachectic    HENT:   Head: Normocephalic and atraumatic  Mouth/Throat: No oropharyngeal exudate  Eyes: Conjunctivae are normal  Right eye exhibits no discharge  Left eye exhibits no discharge  Neck: No tracheal deviation present  No thyromegaly present  Cardiovascular: Normal rate  Exam reveals no gallop and no friction rub  No murmur heard  Pulmonary/Chest: No stridor  No respiratory distress  She has no wheezes  She has no rales  She exhibits no tenderness  Poor effort   Abdominal: She exhibits no distension and no mass  There is no tenderness  There is no rebound and no guarding  No hernia  Pt cachectic    Musculoskeletal: She exhibits no edema or deformity  Severe    Lymphadenopathy:     She has no cervical adenopathy  Neurological: She is oriented to person, place, and time  Skin: No rash noted  She is not diaphoretic  No erythema  No pallor  Left hip pressure ulcer healin with dsd   Right hip dsd intact and second wound on hip with dsd some scant drainage ss    Psychiatric: She has a normal mood and affect  Discussion with Family: none at the bedside     Discharge instructions/Information to patient and family:   See after visit summary for information provided to patient and family  Provisions for Follow-Up Care:  See after visit summary for information related to follow-up care and any pertinent home health orders  Planned Readmission: high risk for readmission      Discharge Statement:  I spent 60 minutes discharging the patient   This time was spent on the day of discharge  I had direct contact with the patient on the day of discharge  Greater than 50% of the total time was spent examining patient, answering all patient questions, arranging and discussing plan of care with patient as well as directly providing post-discharge instructions  Additional time then spent on discharge activities  Discharge Medications:  See after visit summary for reconciled discharge medications provided to patient and family        ** Please Note: This note has been constructed using a voice recognition system **

## 2020-03-02 NOTE — ASSESSMENT & PLAN NOTE
Malnutrition Findings:   Malnutrition type: Chronic illness(Severe pro/sara malnutrition r/t disease/condition as evidenced by 12% unintentional wt loss since 11/10/19, consuming < 75% energy intake compared to est energy needs > 1 month, fat/muscle wasting of orbital/temple areas  Treated with diet/supplements)  Degree of Malnutrition: Other severe protein calorie malnutrition    BMI Findings:  BMI Classifications: Underweight < 18 5     Body mass index is 17 97 kg/m²       Ensure supplements  Pt reports she cant eat a lot and they are really filling she can eat or drink one a day

## 2020-03-02 NOTE — PLAN OF CARE
Problem: PHYSICAL THERAPY ADULT  Goal: Performs mobility at highest level of function for planned discharge setting  See evaluation for individualized goals  Description  Treatment/Interventions: Functional transfer training, LE strengthening/ROM, Endurance training, Patient/family training, Equipment eval/education, Bed mobility, Spoke to nursing, Spoke to case management(w/c training)  Equipment Recommended: Wheelchair       See flowsheet documentation for full assessment, interventions and recommendations  Outcome: Progressing  Note:   Prognosis: Fair  Problem List: Decreased strength, Decreased range of motion, Decreased endurance, Impaired balance, Decreased mobility, Decreased skin integrity  Assessment: Pt presents with decreased mobility, strength, balance, and activity tolerance  Pt demonstrated ability to perform bed mobility at supervision and slideboard transfers at [address] [place] [address]  Pt propelled w/c 300', 200', 100' w/ rest breaks in between 2' UE fatigue  PT performed BLE DF and eversion stretches 3X30 seconds to help facilitate appropriate positioning  Pt reports she has LE brace at home but does not want to wear it in the hospital  Pt continues to benefit from skilled therapy to maximize functional independence  Recommendation at this time is rehab  Pt would benefit from functional transfers, UE strength, balance, w/c mobility, activity tolerance, and further progression gaining independence w/ SB transfers  Barriers to Discharge: Decreased caregiver support     Recommendation: Post acute IP rehab     PT - OK to Discharge: Yes    See flowsheet documentation for full assessment

## 2020-03-02 NOTE — PLAN OF CARE
Problem: Potential for Falls  Goal: Patient will remain free of falls  Description  INTERVENTIONS:  - Assess patient frequently for physical needs  -  Identify cognitive and physical deficits and behaviors that affect risk of falls  -  [place] fall precautions as indicated by assessment   - Educate patient/family on patient safety including physical limitations  - Instruct patient to call for assistance with activity based on assessment  - Modify environment to reduce risk of injury  - Consider OT/PT consult to assist with strengthening/mobility  Outcome: Adequate for Discharge     Problem: Prexisting or High Potential for Compromised Skin Integrity  Goal: Skin integrity is maintained or improved  Description  INTERVENTIONS:  - Identify patients at risk for skin breakdown  - Assess and monitor skin integrity  - Assess and monitor nutrition and hydration status  - Monitor labs   - Assess for incontinence   - Turn and reposition patient  - Assist with mobility/ambulation  - Relieve pressure over bony prominences  - Avoid friction and shearing  - Provide appropriate hygiene as needed including keeping skin clean and dry  - Evaluate need for skin moisturizer/barrier cream  - Collaborate with interdisciplinary team   - Patient/family teaching  - Consider wound care consult   Outcome: Adequate for Discharge     Problem: Nutrition/Hydration-ADULT  Goal: Nutrient/Hydration intake appropriate for improving, restoring or maintaining nutritional needs  Description  Monitor and assess patient's nutrition/hydration status for malnutrition  Collaborate with interdisciplinary team and initiate plan and interventions as ordered  Monitor patient's weight and dietary intake as ordered or per policy  Utilize nutrition screening tool and intervene as necessary  Determine patient's food preferences and provide high-protein, high-caloric foods as appropriate       INTERVENTIONS:  - Monitor oral intake, urinary output, labs, and treatment plans  - Assess nutrition and hydration status and recommend course of action  - Evaluate amount of meals eaten  - Assist patient with eating if necessary   - Allow adequate time for meals  - Recommend/ encourage appropriate diets, oral nutritional supplements, and vitamin/mineral supplements  - Order, calculate, and assess calorie counts as needed  - Recommend, monitor, and adjust tube feedings and TPN/PPN based on assessed needs  - Assess need for intravenous fluids  - Provide specific nutrition/hydration education as appropriate  - Include patient/family/caregiver in decisions related to nutrition  Outcome: Adequate for Discharge     Problem: PAIN - ADULT  Goal: Verbalizes/displays adequate comfort level or baseline comfort level  Description  Interventions:  - Encourage patient to monitor pain and request assistance  - Assess pain using appropriate pain scale  - Administer analgesics based on type and severity of pain and evaluate response  - Implement non-pharmacological measures as appropriate and evaluate response  - Consider cultural and social influences on pain and pain management  - Notify physician/advanced practitioner if interventions unsuccessful or patient reports new pain  Outcome: Adequate for Discharge     Problem: INFECTION - ADULT  Goal: Absence or prevention of progression during hospitalization  Description  INTERVENTIONS:  - Assess and monitor for signs and symptoms of infection  - Monitor lab/diagnostic results  - Monitor all insertion sites, i e  indwelling lines, tubes, and drains  - Monitor endotracheal if appropriate and nasal secretions for changes in amount and color  - [place] appropriate cooling/warming therapies per order  - Administer medications as ordered  - Instruct and encourage patient and family to use good hand hygiene technique  - Identify and instruct in appropriate isolation precautions for identified infection/condition  Outcome: Adequate for Discharge  Goal: Absence of fever/infection during neutropenic period  Description  INTERVENTIONS:  - Monitor WBC    Outcome: Adequate for Discharge     Problem: SAFETY ADULT  Goal: Maintain or return to baseline ADL function  Description  INTERVENTIONS:  -  Assess patient's ability to carry out ADLs; assess patient's baseline for ADL function and identify physical deficits which impact ability to perform ADLs (bathing, care of mouth/teeth, toileting, grooming, dressing, etc )  - Assess/evaluate cause of self-care deficits   - Assess range of motion  - Assess patient's mobility; develop plan if impaired  - Assess patient's need for assistive devices and provide as appropriate  - Encourage maximum independence but intervene and supervise when necessary  - Involve family in performance of ADLs  - Assess for home care needs following discharge   - Consider OT consult to assist with ADL evaluation and planning for discharge  - Provide patient education as appropriate  Outcome: Adequate for Discharge  Goal: Maintain or return mobility status to optimal level  Description  INTERVENTIONS:  - Assess patient's baseline mobility status (ambulation, transfers, stairs, etc )    - Identify cognitive and physical deficits and behaviors that affect mobility  - Identify mobility aids required to assist with transfers and/or ambulation (gait belt, sit-to-stand, lift, walker, cane, etc )  - [place] fall precautions as indicated by assessment  - Record patient progress and toleration of activity level on Mobility SBAR; progress patient to next Phase/Stage  - Instruct patient to call for assistance with activity based on assessment  - Consider rehabilitation consult to assist with strengthening/weightbearing, etc   Outcome: Adequate for Discharge     Problem: DISCHARGE PLANNING  Goal: Discharge to home or other facility with appropriate resources  Description  INTERVENTIONS:  - Identify barriers to discharge w/patient and caregiver  - Arrange for needed discharge resources and transportation as appropriate  - Identify discharge learning needs (meds, wound care, etc )  - Arrange for interpretive services to assist at discharge as needed  - Refer to Case Management Department for coordinating discharge planning if the patient needs post-hospital services based on physician/advanced practitioner order or complex needs related to functional status, cognitive ability, or social support system  Outcome: Adequate for Discharge     Problem: Knowledge Deficit  Goal: Patient/family/caregiver demonstrates understanding of disease process, treatment plan, medications, and discharge instructions  Description  Complete learning assessment and assess knowledge base    Interventions:  - Provide teaching at level of understanding  - Provide teaching via preferred learning methods  Outcome: Adequate for Discharge     Problem: SKIN/TISSUE INTEGRITY - ADULT  Goal: Skin integrity remains intact  Description  INTERVENTIONS  - Identify patients at risk for skin breakdown  - Assess and monitor skin integrity  - Assess and monitor nutrition and hydration status  - Monitor labs (i e  albumin)  - Assess for incontinence   - Turn and reposition patient  - Assist with mobility/ambulation  - Relieve pressure over bony prominences  - Avoid friction and shearing  - Provide appropriate hygiene as needed including keeping skin clean and dry  - Evaluate need for skin moisturizer/barrier cream  - Collaborate with interdisciplinary team (i e  Nutrition, Rehabilitation, etc )   - Patient/family teaching  Outcome: Adequate for Discharge  Goal: Incision(s), wounds(s) or drain site(s) healing without S/S of infection  Description  INTERVENTIONS  - Assess and document risk factors for skin impairment   - Assess and document dressing, incision, wound bed, drain sites and surrounding tissue  - Consider nutrition services referral as needed  - Oral mucous membranes remain intact  - Provide patient/ family education  Outcome: Adequate for Discharge  Goal: Oral mucous membranes remain intact  Description  INTERVENTIONS  - Assess oral mucosa and hygiene practices  - Implement preventative oral hygiene regimen  - Implement oral medicated treatments as ordered  - Initiate Nutrition services referral as needed  Outcome: Adequate for Discharge     Problem: HEMATOLOGIC - ADULT  Goal: Maintains hematologic stability  Description  INTERVENTIONS  - Assess for signs and symptoms of bleeding or hemorrhage  - Monitor labs  - Administer supportive blood products/factors as ordered and appropriate  Outcome: Adequate for Discharge     Problem: MUSCULOSKELETAL - ADULT  Goal: Maintain or return mobility to safest level of function  Description  INTERVENTIONS:  - Assess patient's ability to carry out ADLs; assess patient's baseline for ADL function and identify physical deficits which impact ability to perform ADLs (bathing, care of mouth/teeth, toileting, grooming, dressing, etc )  - Assess/evaluate cause of self-care deficits   - Assess range of motion  - Assess patient's mobility  - Assess patient's need for assistive devices and provide as appropriate  - Encourage maximum independence but intervene and supervise when necessary  - Involve family in performance of ADLs  - Assess for home care needs following discharge   - Consider OT consult to assist with ADL evaluation and planning for discharge  - Provide patient education as appropriate  Outcome: Adequate for Discharge  Goal: Maintain proper alignment of affected body part  Description  INTERVENTIONS:  - Support, maintain and protect limb and body alignment  - Provide patient/ family with appropriate education  Outcome: Adequate for Discharge

## 2020-03-03 NOTE — UTILIZATION REVIEW
Continued Stay Review    Date:  2/28                     Current Patient Class: Inpatient  Current Level of Care: Med Surg    HPI:69 y o  female initially admitted on 2/5 presents with patient is a resident of SNF, she was transferred for low blood pressures and fever    Assessment/Plan: Sepsis, Open wound to right hip  Patient was initially deemed to not have capacity however re-evaluation done and patient DOES have capacity for medical decision making  Wound care every other day and as needed  Await safe d/c plan - Referral for rehab sent     2/28 Servando Lemus willing to accept patient for rehab  Mina s[facility]  Will start insurance auth - awaiting NPI# which was obtained later today but insurance now closed  Unable to obtain Insurance auth over there weekend - 2/29 and 3/1   Pt d/c 3/2 Monday    Pertinent Labs/Diagnostic Results:   Results from last 7 days   Lab Units 03/01/20  0510   WBC Thousand/uL 8 34   HEMOGLOBIN g/dL 9 2*   HEMATOCRIT % 30 1*   PLATELETS Thousands/uL 558*   NEUTROS ABS Thousands/µL 4 55         Results from last 7 days   Lab Units 03/01/20  0511   SODIUM mmol/L 135*   POTASSIUM mmol/L 3 6   CHLORIDE mmol/L 101   CO2 mmol/L 28   ANION GAP mmol/L 6   BUN mg/dL 18   CREATININE mg/dL 0 48*   EGFR ml/min/1 73sq m 100   CALCIUM mg/dL 8 8     Results from last 7 days   Lab Units 03/01/20  0511   AST U/L 10   ALT U/L 17   ALK PHOS U/L 160*   TOTAL PROTEIN g/dL 7 5   ALBUMIN g/dL 2 3*   TOTAL BILIRUBIN mg/dL 0 28         Results from last 7 days   Lab Units 03/01/20  0511   GLUCOSE RANDOM mg/dL 121       Vital Signs:     Medications:   Current Facility-Administered Medications:  acetaminophen 650 mg Oral Q6H PRN   artificial tear   Both Eyes HS   bisacodyl 10 mg Rectal Daily PRN   calcium carbonate-vitamin D 1 tablet Oral BID With Meals   cholecalciferol 1,000 Units Oral Daily   cyanocobalamin 100 mcg Oral Daily   dextran 70-hypromellose 1 drop Both Eyes Q3H PRN diphenhydrAMINE 50 mg Oral 60 Min Pre-Op   enoxaparin 40 mg Subcutaneous Daily   famotidine 20 mg Oral BID PRN   loperamide 2 mg Oral TID PRN   midodrine 10 mg Oral TID AC   multivitamin-minerals 1 tablet Oral Daily   nystatin   Topical BID   ondansetron 4 mg Intravenous Q6H PRN   pantoprazole 40 mg Oral BID AC   polyethylene glycol 17 g Oral Daily PRN   saccharomyces boulardii 250 mg Oral BID   senna 1 tablet Oral HS PRN      Discharge Plan: 2/28 Joaquina Man and [facility] willing to accept patient for rehab  Mina choosing [facility]  Will start insurance auth - waiting NPI#  Unable to obtain Insurance auth over there weekend - 2/29 and 3/1  Pt d/c 3/2 Monday    Network Utilization Review Department  [email or web address]  ATTENTION: Please call with any questions or concerns to [phone] and carefully listen to the prompts so that you are directed to the right person  All voicemails are confidential   Mavis Muss all requests for admission clinical reviews, approved or denied determinations and any other requests to dedicated fax number below belonging to the campus where the patient is receiving treatment   List of dedicated fax numbers for the Facilities:    [address] [place] [address] DENIALS (Administrative/Medical Necessity) [phone]   [address] (Maternity/NICU/Pediatrics) [phone]   Ko Aquino [phone]   [facility] [phone]   Cynthia Victor [phone]   [facility]  [phone]   1205 [facility] 1525 [facility] [phone]   1101 [facility] [phone]   2202 [facility]  2401 [facility] 1000 [address] [facility] [phone]

## 2020-03-03 NOTE — UTILIZATION REVIEW
Notification of Discharge  This is a Notification of Discharge from our facility [address]  Please be advised that this patient has been discharge from our facility  Below you will find the admission and discharge date and time including the patients disposition  PRESENTATION DATE: 2/5/2020  3:25 PM  OBS ADMISSION DATE:   IP ADMISSION DATE: 2/5/20 1925   DISCHARGE DATE: 3/2/2020  8:15 PM  DISPOSITION: Non [facility] SNF/TCU/SNU Non [facility] SNF/TCU/SNU   Admission Orders listed below:  Admission Orders (From admission, onward)     Ordered        02/05/20 1925  Inpatient Admission  Once                   Please contact the UR Department if additional information is required to close this patient's authorization/case  [address] Utilization Review Department  Main: [phone] x carefully listen to the prompts  All voicemails are confidential   [email or web address]  Send all requests for admission clinical reviews, approved or denied determinations and any other requests to dedicated fax number below belonging to the campus where the patient is receiving treatment   List of dedicated fax numbers:  [address] DENIALS (Administrative/Medical Necessity) [phone]   [address] (Maternity/NICU/Pediatrics) [phone] 5400 [facility] [phone]   [facility] [phone]   Ruma Hernandez [phone]   Jerri Link [facility] 1525 [facility] [phone]   [facility]  [phone]   2205 [facility]  2401 [facility] 1000 W [facility] [phone]

## 2020-03-05 NOTE — ASSESSMENT & PLAN NOTE
· Noted on CT scan  · Had large BM     · Reports moving bowels a few times a day but they are normal not loose  · Monitor Quality 130: Documentation Of Current Medications In The Medical Record: Current Medications Documented Quality 131: Pain Assessment And Follow-Up: Pain assessment using a standardized tool is documented as negative, no follow-up plan required Quality 110: Preventive Care And Screening: Influenza Immunization: Influenza Immunization previously received during influenza season Detail Level: Detailed

## 2020-03-10 ENCOUNTER — HOSPITAL ENCOUNTER (INPATIENT)
Facility: HOSPITAL | Age: 70
LOS: 3 days | Discharge: HOME HEALTH | DRG: 698 | End: 2020-03-13
Attending: EMERGENCY MEDICINE | Admitting: HOSPITALIST
Payer: COMMERCIAL

## 2020-03-10 ENCOUNTER — APPOINTMENT (INPATIENT)
Dept: CT IMAGING | Facility: HOSPITAL | Age: 70
DRG: 698 | End: 2020-03-10
Payer: COMMERCIAL

## 2020-03-10 ENCOUNTER — APPOINTMENT (EMERGENCY)
Dept: RADIOLOGY | Facility: HOSPITAL | Age: 70
DRG: 698 | End: 2020-03-10
Payer: COMMERCIAL

## 2020-03-10 DIAGNOSIS — R65.20 SEVERE SEPSIS (HCC): Primary | ICD-10-CM

## 2020-03-10 DIAGNOSIS — T83.511A URINARY TRACT INFECTION ASSOCIATED WITH INDWELLING URETHRAL CATHETER, INITIAL ENCOUNTER (HCC): ICD-10-CM

## 2020-03-10 DIAGNOSIS — K59.03 THERAPEUTIC OPIOID-INDUCED CONSTIPATION (OIC): ICD-10-CM

## 2020-03-10 DIAGNOSIS — E87.1 HYPONATREMIA: ICD-10-CM

## 2020-03-10 DIAGNOSIS — R53.81 PHYSICAL DECONDITIONING: ICD-10-CM

## 2020-03-10 DIAGNOSIS — E43 SEVERE PROTEIN-CALORIE MALNUTRITION (HCC): ICD-10-CM

## 2020-03-10 DIAGNOSIS — L89.323: ICD-10-CM

## 2020-03-10 DIAGNOSIS — S71.009A: ICD-10-CM

## 2020-03-10 DIAGNOSIS — R73.9 HYPERGLYCEMIA: ICD-10-CM

## 2020-03-10 DIAGNOSIS — N39.0 URINARY TRACT INFECTION ASSOCIATED WITH INDWELLING URETHRAL CATHETER, INITIAL ENCOUNTER (HCC): ICD-10-CM

## 2020-03-10 DIAGNOSIS — I95.9 HYPOTENSION: ICD-10-CM

## 2020-03-10 DIAGNOSIS — S31.819D OPEN WOUND OF RIGHT BUTTOCK WITH COMPLICATION, SUBSEQUENT ENCOUNTER: ICD-10-CM

## 2020-03-10 DIAGNOSIS — A04.72 C. DIFFICILE DIARRHEA: ICD-10-CM

## 2020-03-10 DIAGNOSIS — L89.150 PRESSURE INJURY OF SACRAL REGION, UNSTAGEABLE (HCC): ICD-10-CM

## 2020-03-10 DIAGNOSIS — T40.2X5A THERAPEUTIC OPIOID-INDUCED CONSTIPATION (OIC): ICD-10-CM

## 2020-03-10 DIAGNOSIS — G82.20 PARAPLEGIA FOLLOWING SPINAL CORD INJURY (HCC): ICD-10-CM

## 2020-03-10 DIAGNOSIS — A41.9 SEVERE SEPSIS (HCC): Primary | ICD-10-CM

## 2020-03-10 PROBLEM — S83.194A: Status: ACTIVE | Noted: 2020-03-10

## 2020-03-10 LAB
ALBUMIN SERPL BCP-MCNC: 2.1 G/DL (ref 3.5–5)
ALP SERPL-CCNC: 132 U/L (ref 46–116)
ALT SERPL W P-5'-P-CCNC: 19 U/L (ref 12–78)
ANION GAP SERPL CALCULATED.3IONS-SCNC: 12 MMOL/L (ref 4–13)
APTT PPP: 42 SECONDS (ref 23–37)
AST SERPL W P-5'-P-CCNC: 25 U/L (ref 5–45)
ATRIAL RATE: 118 BPM
BACTERIA UR QL AUTO: ABNORMAL /HPF
BASOPHILS # BLD AUTO: 0.05 THOUSANDS/ΜL (ref 0–0.1)
BASOPHILS NFR BLD AUTO: 0 % (ref 0–1)
BILIRUB SERPL-MCNC: 0.4 MG/DL (ref 0.2–1)
BILIRUB UR QL STRIP: NEGATIVE
BUN SERPL-MCNC: 12 MG/DL (ref 5–25)
CALCIUM SERPL-MCNC: 8.3 MG/DL (ref 8.3–10.1)
CHLORIDE SERPL-SCNC: 93 MMOL/L (ref 100–108)
CLARITY UR: ABNORMAL
CO2 SERPL-SCNC: 22 MMOL/L (ref 21–32)
COLOR UR: YELLOW
CREAT SERPL-MCNC: 0.72 MG/DL (ref 0.6–1.3)
EOSINOPHIL # BLD AUTO: 0 THOUSAND/ΜL (ref 0–0.61)
EOSINOPHIL NFR BLD AUTO: 0 % (ref 0–6)
ERYTHROCYTE [DISTWIDTH] IN BLOOD BY AUTOMATED COUNT: 17.1 % (ref 11.6–15.1)
GFR SERPL CREATININE-BSD FRML MDRD: 86 ML/MIN/1.73SQ M
GLUCOSE SERPL-MCNC: 218 MG/DL (ref 65–140)
GLUCOSE UR STRIP-MCNC: NEGATIVE MG/DL
HCT VFR BLD AUTO: 30.5 % (ref 34.8–46.1)
HGB BLD-MCNC: 9.5 G/DL (ref 11.5–15.4)
HGB UR QL STRIP.AUTO: ABNORMAL
IMM GRANULOCYTES # BLD AUTO: 0.09 THOUSAND/UL (ref 0–0.2)
IMM GRANULOCYTES NFR BLD AUTO: 1 % (ref 0–2)
INR PPP: 1.19 (ref 0.84–1.19)
KETONES UR STRIP-MCNC: NEGATIVE MG/DL
LACTATE SERPL-SCNC: 0.5 MMOL/L (ref 0.5–2)
LACTATE SERPL-SCNC: 2.3 MMOL/L (ref 0.5–2)
LEUKOCYTE ESTERASE UR QL STRIP: ABNORMAL
LYMPHOCYTES # BLD AUTO: 1.59 THOUSANDS/ΜL (ref 0.6–4.47)
LYMPHOCYTES NFR BLD AUTO: 10 % (ref 14–44)
MCH RBC QN AUTO: 25.5 PG (ref 26.8–34.3)
MCHC RBC AUTO-ENTMCNC: 31.1 G/DL (ref 31.4–37.4)
MCV RBC AUTO: 82 FL (ref 82–98)
MONOCYTES # BLD AUTO: 1.41 THOUSAND/ΜL (ref 0.17–1.22)
MONOCYTES NFR BLD AUTO: 9 % (ref 4–12)
NEUTROPHILS # BLD AUTO: 13.07 THOUSANDS/ΜL (ref 1.85–7.62)
NEUTS SEG NFR BLD AUTO: 80 % (ref 43–75)
NITRITE UR QL STRIP: POSITIVE
NON-SQ EPI CELLS URNS QL MICRO: ABNORMAL /HPF
NRBC BLD AUTO-RTO: 0 /100 WBCS
P AXIS: 39 DEGREES
PH UR STRIP.AUTO: 8.5 [PH]
PLATELET # BLD AUTO: 537 THOUSANDS/UL (ref 149–390)
PMV BLD AUTO: 9.8 FL (ref 8.9–12.7)
POTASSIUM SERPL-SCNC: 3.7 MMOL/L (ref 3.5–5.3)
PR INTERVAL: 136 MS
PROCALCITONIN SERPL-MCNC: 0.11 NG/ML
PROT SERPL-MCNC: 7.5 G/DL (ref 6.4–8.2)
PROT UR STRIP-MCNC: ABNORMAL MG/DL
PROTHROMBIN TIME: 15.3 SECONDS (ref 11.6–14.5)
QRS AXIS: -11 DEGREES
QRSD INTERVAL: 78 MS
QT INTERVAL: 308 MS
QTC INTERVAL: 431 MS
RBC # BLD AUTO: 3.73 MILLION/UL (ref 3.81–5.12)
RBC #/AREA URNS AUTO: ABNORMAL /HPF
SODIUM SERPL-SCNC: 127 MMOL/L (ref 136–145)
SP GR UR STRIP.AUTO: 1.01 (ref 1–1.03)
T WAVE AXIS: 23 DEGREES
TRI-PHOS CRY URNS QL MICRO: ABNORMAL /HPF
TROPONIN I SERPL-MCNC: <0.02 NG/ML
UROBILINOGEN UR QL STRIP.AUTO: 1 E.U./DL
VENTRICULAR RATE: 118 BPM
WBC # BLD AUTO: 16.21 THOUSAND/UL (ref 4.31–10.16)
WBC #/AREA URNS AUTO: ABNORMAL /HPF

## 2020-03-10 PROCEDURE — 36415 COLL VENOUS BLD VENIPUNCTURE: CPT | Performed by: EMERGENCY MEDICINE

## 2020-03-10 PROCEDURE — 87040 BLOOD CULTURE FOR BACTERIA: CPT | Performed by: EMERGENCY MEDICINE

## 2020-03-10 PROCEDURE — 85730 THROMBOPLASTIN TIME PARTIAL: CPT | Performed by: EMERGENCY MEDICINE

## 2020-03-10 PROCEDURE — 96375 TX/PRO/DX INJ NEW DRUG ADDON: CPT

## 2020-03-10 PROCEDURE — 85025 COMPLETE CBC W/AUTO DIFF WBC: CPT | Performed by: EMERGENCY MEDICINE

## 2020-03-10 PROCEDURE — 71046 X-RAY EXAM CHEST 2 VIEWS: CPT

## 2020-03-10 PROCEDURE — 80053 COMPREHEN METABOLIC PANEL: CPT | Performed by: EMERGENCY MEDICINE

## 2020-03-10 PROCEDURE — 85610 PROTHROMBIN TIME: CPT | Performed by: EMERGENCY MEDICINE

## 2020-03-10 PROCEDURE — 93010 ELECTROCARDIOGRAM REPORT: CPT | Performed by: INTERNAL MEDICINE

## 2020-03-10 PROCEDURE — 99285 EMERGENCY DEPT VISIT HI MDM: CPT | Performed by: EMERGENCY MEDICINE

## 2020-03-10 PROCEDURE — 99285 EMERGENCY DEPT VISIT HI MDM: CPT

## 2020-03-10 PROCEDURE — 84484 ASSAY OF TROPONIN QUANT: CPT | Performed by: EMERGENCY MEDICINE

## 2020-03-10 PROCEDURE — 99223 1ST HOSP IP/OBS HIGH 75: CPT | Performed by: HOSPITALIST

## 2020-03-10 PROCEDURE — 83605 ASSAY OF LACTIC ACID: CPT | Performed by: EMERGENCY MEDICINE

## 2020-03-10 PROCEDURE — 93005 ELECTROCARDIOGRAM TRACING: CPT

## 2020-03-10 PROCEDURE — 96361 HYDRATE IV INFUSION ADD-ON: CPT

## 2020-03-10 PROCEDURE — 81001 URINALYSIS AUTO W/SCOPE: CPT | Performed by: EMERGENCY MEDICINE

## 2020-03-10 PROCEDURE — 84145 PROCALCITONIN (PCT): CPT | Performed by: EMERGENCY MEDICINE

## 2020-03-10 PROCEDURE — 96365 THER/PROPH/DIAG IV INF INIT: CPT

## 2020-03-10 PROCEDURE — 74177 CT ABD & PELVIS W/CONTRAST: CPT

## 2020-03-10 RX ORDER — DIPHENHYDRAMINE HYDROCHLORIDE 50 MG/ML
50 INJECTION INTRAMUSCULAR; INTRAVENOUS ONCE
Status: COMPLETED | OUTPATIENT
Start: 2020-03-10 | End: 2020-03-10

## 2020-03-10 RX ORDER — SODIUM CHLORIDE 9 MG/ML
100 INJECTION, SOLUTION INTRAVENOUS CONTINUOUS
Status: DISCONTINUED | OUTPATIENT
Start: 2020-03-10 | End: 2020-03-12

## 2020-03-10 RX ORDER — MINERAL OIL AND PETROLATUM 150; 830 MG/G; MG/G
OINTMENT OPHTHALMIC
Status: DISCONTINUED | OUTPATIENT
Start: 2020-03-10 | End: 2020-03-13 | Stop reason: HOSPADM

## 2020-03-10 RX ORDER — MIDODRINE HYDROCHLORIDE 5 MG/1
10 TABLET ORAL
Status: DISCONTINUED | OUTPATIENT
Start: 2020-03-10 | End: 2020-03-12

## 2020-03-10 RX ORDER — ACETAMINOPHEN 325 MG/1
650 TABLET ORAL EVERY 6 HOURS PRN
Status: DISCONTINUED | OUTPATIENT
Start: 2020-03-10 | End: 2020-03-13 | Stop reason: HOSPADM

## 2020-03-10 RX ORDER — SACCHAROMYCES BOULARDII 250 MG
250 CAPSULE ORAL 2 TIMES DAILY
Status: DISCONTINUED | OUTPATIENT
Start: 2020-03-10 | End: 2020-03-13 | Stop reason: HOSPADM

## 2020-03-10 RX ORDER — METHYLPREDNISOLONE SODIUM SUCCINATE 125 MG/2ML
125 INJECTION, POWDER, LYOPHILIZED, FOR SOLUTION INTRAMUSCULAR; INTRAVENOUS DAILY
Status: DISCONTINUED | OUTPATIENT
Start: 2020-03-10 | End: 2020-03-12

## 2020-03-10 RX ORDER — SENNOSIDES 8.6 MG
1 TABLET ORAL
Status: DISCONTINUED | OUTPATIENT
Start: 2020-03-10 | End: 2020-03-13 | Stop reason: HOSPADM

## 2020-03-10 RX ORDER — SODIUM CHLORIDE 9 MG/ML
3 INJECTION INTRAVENOUS AS NEEDED
Status: DISCONTINUED | OUTPATIENT
Start: 2020-03-10 | End: 2020-03-13 | Stop reason: HOSPADM

## 2020-03-10 RX ORDER — NYSTATIN 100000 [USP'U]/G
POWDER TOPICAL 2 TIMES DAILY
Status: DISCONTINUED | OUTPATIENT
Start: 2020-03-10 | End: 2020-03-13 | Stop reason: HOSPADM

## 2020-03-10 RX ORDER — ACETAMINOPHEN 325 MG/1
650 TABLET ORAL EVERY 6 HOURS PRN
Status: DISCONTINUED | OUTPATIENT
Start: 2020-03-10 | End: 2020-03-10 | Stop reason: SDUPTHER

## 2020-03-10 RX ORDER — BISACODYL 10 MG
10 SUPPOSITORY, RECTAL RECTAL DAILY PRN
Status: DISCONTINUED | OUTPATIENT
Start: 2020-03-10 | End: 2020-03-13 | Stop reason: HOSPADM

## 2020-03-10 RX ORDER — PANTOPRAZOLE SODIUM 40 MG/1
40 TABLET, DELAYED RELEASE ORAL DAILY
Status: DISCONTINUED | OUTPATIENT
Start: 2020-03-11 | End: 2020-03-13 | Stop reason: HOSPADM

## 2020-03-10 RX ADMIN — SODIUM CHLORIDE 1000 ML: 0.9 INJECTION, SOLUTION INTRAVENOUS at 21:04

## 2020-03-10 RX ADMIN — DIPHENHYDRAMINE HYDROCHLORIDE 50 MG: 50 INJECTION INTRAMUSCULAR; INTRAVENOUS at 18:32

## 2020-03-10 RX ADMIN — VANCOMYCIN HYDROCHLORIDE 125 MG: 500 INJECTION, POWDER, LYOPHILIZED, FOR SOLUTION INTRAVENOUS at 21:44

## 2020-03-10 RX ADMIN — DIPHENHYDRAMINE HYDROCHLORIDE 50 MG: 50 INJECTION, SOLUTION INTRAMUSCULAR; INTRAVENOUS at 16:01

## 2020-03-10 RX ADMIN — SODIUM CHLORIDE 500 ML: 0.9 INJECTION, SOLUTION INTRAVENOUS at 14:27

## 2020-03-10 RX ADMIN — IOHEXOL 100 ML: 350 INJECTION, SOLUTION INTRAVENOUS at 18:01

## 2020-03-10 RX ADMIN — Medication 250 MG: at 17:24

## 2020-03-10 RX ADMIN — SODIUM CHLORIDE 1000 ML: 0.9 INJECTION, SOLUTION INTRAVENOUS at 18:16

## 2020-03-10 RX ADMIN — PIPERACILLIN SODIUM AND TAZOBACTAM SODIUM 3.38 G: 36; 4.5 INJECTION, POWDER, FOR SOLUTION INTRAVENOUS at 14:49

## 2020-03-10 RX ADMIN — ENOXAPARIN SODIUM 40 MG: 40 INJECTION SUBCUTANEOUS at 17:24

## 2020-03-10 RX ADMIN — NYSTATIN: 100000 POWDER TOPICAL at 17:24

## 2020-03-10 RX ADMIN — PIPERACILLIN AND TAZOBACTAM 4.5 G: 4; .5 INJECTION, POWDER, LYOPHILIZED, FOR SOLUTION INTRAVENOUS at 21:45

## 2020-03-10 RX ADMIN — FAMOTIDINE 20 MG: 10 INJECTION INTRAVENOUS at 18:18

## 2020-03-10 RX ADMIN — SODIUM CHLORIDE 1000 ML: 0.9 INJECTION, SOLUTION INTRAVENOUS at 13:24

## 2020-03-10 RX ADMIN — SODIUM CHLORIDE 100 ML/HR: 0.9 INJECTION, SOLUTION INTRAVENOUS at 17:19

## 2020-03-10 RX ADMIN — MIDODRINE HYDROCHLORIDE 10 MG: 5 TABLET ORAL at 17:24

## 2020-03-10 RX ADMIN — METHYLPREDNISOLONE SODIUM SUCCINATE 125 MG: 125 INJECTION, POWDER, FOR SOLUTION INTRAMUSCULAR; INTRAVENOUS at 18:15

## 2020-03-10 RX ADMIN — HYDROCORTISONE SODIUM SUCCINATE 50 MG: 100 INJECTION, POWDER, FOR SOLUTION INTRAMUSCULAR; INTRAVENOUS at 13:12

## 2020-03-10 NOTE — SEPSIS NOTE
Sepsis Note   Manuel Macdonald 71 y o  female MRN: [id number]  Unit/Bed#: ED 16 Encounter: [id number]      qSOFA     Row Name 03/10/20 1331 03/10/20 1134             Altered mental status GCS < 15           Respiratory Rate > / =22  1  0       Systolic BP < / =397  1  0       Q Sofa Score  2  0           Initial Sepsis Screening     Row Name 03/10/20 1346 03/10/20 1331             Is the patient's history suggestive of a new or worsening infection?   (!) Yes (Proceed)  -LK       Suspected source of infection    wound infection  -LK       Are two or more of the following signs & symptoms of infection both present and new to the patient?   (!) Yes (Proceed)  -LK       Indicate SIRS criteria    Tachycardia > 90 bpm;Leukocytosis (WBC > 62948 IJL)  -LK       If the answer is yes to both questions, suspicion of sepsis is present  [de-identified]         If severe sepsis is present AND tissue hypoperfusion perists in the hour after fluid resuscitation or lactate > 4, the patient meets criteria for SEPTIC SHOCK           Are any of the following organ dysfunction criteria present within 6 hours of suspected infection and SIRS criteria that are NOT considered to be chronic conditions?   (!) Yes  -LK       Organ dysfunction  Lactate > 2 0 mmol/L  -LK         Date of presentation of severe sepsis  03/10/20  -LK         Time of presentation of severe sepsis  1346  -LK         Tissue hypoperfusion persists in the hour after crystalloid fluid administration, evidenced, by either:           Was hypotension present within one hour of the conclusion of crystalloid fluid administration?   Angela Boudreaux       Date of presentation of septic shock           Time of presentation of septic shock             User Key  (r) = Recorded By, (t) = Taken By, (c) = Cosigned By    [address] Name Provider Type    CHAR Collins DO Physician

## 2020-03-10 NOTE — H&P
H&P- Loly Galarza 1950, 71 y o  female MRN: [id number]    Unit/Bed#: LEEANNE Encounter: [id number]    Primary Care Provider: Nixon Orantes   Date and time admitted to hospital: 3/10/2020 11:52 AM             [facility] - History and Physical:       Loly Galarza 71 y o  female MRN: [id number]  Unit/Bed#: LEEANNE Encounter: [id number]  Admitting Physician: Rosy Barros MD  PCP: Nixon Orantes  Date of Admission:  03/10/20        Assessment and Plan:     * Sepsis [facility])  Assessment & Plan  More likely from UTI  -ED physician showed pictures of the wounds to general surgery and general surgery does not feel that the patient needs urgent debridement  Surgery also does not feel that the sepsis is from her wounds  -will start the patient on high-dose IV Zosyn  Discussed with infectious disease  -will officially consult Infectious Disease  -will also start probiotics and prophylactic p o  Vancomycin since the patient has had history of C diff colitis in the past  -Tylenol p r n   For fevers    Closed lateral dislocation of distal end of right femur  Assessment & Plan  Noticed on imaging on prior admission  -the patient was seen by orthopedic surgery and they did not feel that the patient was a candidate for surgery    Lung nodule  Assessment & Plan  -outpatient follow-up    Open wound of right hip  Assessment & Plan  -consult to orthopedic surgery placed    Physical deconditioning  Assessment & Plan  -will also consult palliative care to address POLST and goals of care    GERD (gastroesophageal reflux disease)  Assessment & Plan  -continue Protonix    Decubitus ulcer of left ischium, stage III (HCC)  Assessment & Plan  -consult to Ortho in general surgery placed  -will order wound care evaluation    Pressure ulcer, sacrum  Assessment & Plan  -consults to General surgery and Orthopedic surgery placed  -will also consult wound care    Paraplegia following spinal cord injury [facility])  Assessment & Plan  -will put the patient on fall precautions            VTE Prophylaxis: Enoxaparin (Lovenox)  / sequential compression device   Code Status: full      Anticipated Length of Stay:  Patient will be admitted on an Inpatient basis with an anticipated length of stay of  2 midnights  Justification for Hospital Stay: Sepsis    Total Time for Visit, including Counseling / Coordination of Care: 30 minutes  Greater than 50% of this total time spent on direct patient counseling and coordination of care  Chief Complaint:   Worsening wounds    History of Present Illness:    Maxim Li is a 71 y o  female who was discharged on 3/2 where during that admission she was treated for sepsis  She was also seen by General surgery as well as Orthopedic surgery to address her chronic wounds  She was also seen by orthopedic surgery for a superolateral dislocation of the right femoral head which was new  However Orthopedic surgery did not feel that the patient was a candidate for surgery  The patient returns to the emergency room stating that she had notice worsening appearance of her chronic wounds  It is unclear how the patient made that assessment and whether not the story is reliable  In the ED it was noticed that the patient was was tachycardic with a heart rate of 127 and sepsis protocol was initiated  She was also noted to have a leukocytosis of 16 and a lactic acid of 2 3  The patient denied subjective fevers/chills/dysuria/nausea/vomiting/diarrhea  Review of Systems:    Review of Systems   Constitutional: Negative  HENT: Negative  Eyes: Negative  Respiratory: Negative  Gastrointestinal: Negative  Genitourinary: Negative  Musculoskeletal: Negative  Skin: Positive for wound  Neurological: Negative  Psychiatric/Behavioral: Negative          Past Medical and Surgical History:     Past Medical History:   Diagnosis Date    Anemia     iron defiencey    Arthritis     osteo    Back injury     Chronic kidney disease     nephritis    Depression     MRSA (methicillin resistant staph aureus) culture positive 12/07/2018    BONE-TISSUE     Osteopenia     Osteoporosis     Paralysis ([address] )     paraplegic due to spinal cord injury    Paraplegia (HCC)     Poor circulation     Pressure injury of skin     right hip, stage 3    Pressure sore of hip     right    Tibial plateau fracture     Underweight        Past Surgical History:   Procedure Laterality Date    CLOSURE DELAYED PRIMARY N/A 3/20/2019    Procedure: OPHELIA ANAL WOUND RECLOSURE;  Surgeon: Choco Man MD;  Location: [facility] MAIN OR;  Service: Plastics    DECUBITUS ULCER EXCISION Bilateral 11/12/2019    Procedure: DEBRIDEMENT DECUBITI ([address] OUT); Surgeon: Eneida Thibodeaux DO;  Location:  MAIN OR;  Service: General    FLAP LOCAL EXTREMITY Left 1/28/2019    Procedure: THIGH FLAP & STSG TO CLOSE HIP WOUND;  Surgeon: Choco Man MD;  Location: [facility] MAIN OR;  Service: Plastics    FLAP LOCAL TRUNK Right 12/17/2018    Procedure: DEBRIDE/FLAP CLOSURE HIP PRESSURE SORE Kai Blakes GRAFT;  Surgeon: Choco Man MD;  Location: [facility] MAIN OR;  Service: Plastics    FLAP LOCAL TRUNK Left 2/27/2019    Procedure: BUTTOCK FLAP TO ISCHIAL SORE;  Surgeon: Choco Man MD;  Location: [facility] MAIN OR;  Service: Plastics    HIP DEBRIDEMENT Right 2017    right hip sore debridement    INCISION AND DRAINAGE OF WOUND Left 1/3/2019    Procedure: INCISION AND DRAINAGE (I&D) left distal femur  With deep wound cultures   Application of VAC DRAIN SPONGE;  Surgeon: Serina Edmondson MD;  Location:  MAIN OR;  Service: Orthopedics    IR PICC LINE  12/19/2018    IR PICC LINE  3/12/2019    AR DEBRIDEMENT, SKIN, SUB-Q TISSUE,MUSCLE,BONE,=<20 SQ CM Right 9/19/2018    Procedure: DEBRIDEMENT OF HIP PRESSURE SORE;  Surgeon: Choco Man MD;  Location: [facility] MAIN OR;  Service: Plastics    AR OPEN RX FEMUR FX+INTRAMED FELIX Left 10/22/2018 Procedure: INSERTION NAIL IM LEFT FEMUR RETROGRADE;  Surgeon: Mony Steel MD;  [facility];  Service: Orthopedics    TOE AMPUTATION Left 2017    small toe left foot amputation    WISDOM TOOTH EXTRACTION      WOUND DEBRIDEMENT Left 12/17/2018    Procedure: DEBRIDEMENT HIP PRESSURE SORE;  Surgeon: Yordan Gaitan MD;  [facility] MA[facility];  Service: Plastics    WOUND DEBRIDEMENT N/A 11/10/2019    Procedure: EXCISIONAL DEBRIDEMENT;  Surgeon: Ahmet Lopez MD;  [facility] MA[facility];  Service: General       Meds/Allergies:    Prior to Admission medications    Medication Sig Start Date End Date Taking?  Authorizing Provider   acetaminophen (TYLENOL) 325 mg tablet Take 2 tablets (650 mg total) by mouth every 6 (six) hours as needed for mild pain, headaches or fever 11/20/19   Pratibha Ramires PA-C   artificial tear (LUBRIFRESH P M ) 83-15 % ophthalmic ointment Administer to both eyes daily at bedtime 3/2/20   Jami Duane, CRNP   bisacodyl (DULCOLAX) 10 mg suppository Insert 1 suppository (10 mg total) into the rectum daily as needed (2nd line for constipation) 3/2/20   Jami Duane, CRNP   Calcium Carb-Cholecalciferol 600-200 MG-UNIT TABS Take 1 tablet by mouth daily    Historical Provider, MD   cholecalciferol (VITAMIN D3) 1,000 units tablet Take 1,000 Units by mouth daily    Historical Provider, MD   cyanocobalamin 100 MCG tablet Take 100 mcg by mouth daily    Historical Provider, MD   dextran 70-hypromellose (GENTEAL TEARS) 0 1-0 3 % ophthalmic solution Administer 1 drop to both eyes every 3 (three) hours as needed (dry eye) 3/2/20   Jami Duane, CRNP   famotidine (PEPCID) 20 mg tablet Take 1 tablet (20 mg total) by mouth 2 (two) times a day as needed for heartburn 3/2/20   Jami Duane, CRNP   loperamide (IMODIUM) 2 mg capsule Take 1 capsule (2 mg total) by mouth 3 (three) times a day as needed for diarrhea for up to 2 doses 3/2/20   Jami Duane, CRNP   midodrine (PROAMATINE) 10 MG tablet Take 1 tablet (10 mg total) by mouth 3 (three) times a day before meals 3/2/20   CONCEPCION Kim   multivitamin [facility]) TABS Take 1 tablet by mouth daily    Historical Provider, MD   nystatin (MYCOSTATIN) powder Apply topically 2 (two) times a day    Historical Provider, MD   pantoprazole (PROTONIX) 40 mg tablet Take 40 mg by mouth daily    Historical Provider, MD   saccharomyces boulardii (FLORASTOR) 250 mg capsule Take 1 capsule (250 mg total) by mouth 2 (two) times a day  Patient taking differently: Take 250 mg by mouth daily  4/19/19   Raegan Amin DO   senna (SENOKOT) 8 6 mg Take 1 tablet (8 6 mg total) by mouth daily at bedtime 11/20/19   Carlos Bain PA-C     I have reviewed home medications with patient personally  Allergies: Allergies   Allergen Reactions    Iv Contrast [Iodinated Diagnostic Agents]      Tingling face, itching    Cefepime Rash    Ciprofloxacin Rash and Swelling     Looked like suburn, itching  Bed sores  Swelling, itching    Latex Rash    Vancomycin Rash     Unknown        Social History:     Marital Status: Single     Social History     Substance and Sexual Activity   Alcohol Use Yes    Comment: occasional     Social History     Tobacco Use   Smoking Status Never Smoker   Smokeless Tobacco Never Used     Social History     Substance and Sexual Activity   Drug Use No       Family History:    non-contributory    Physical Exam:     Vitals:   Blood Pressure: 105/54 (03/10/20 1635)  Pulse: 100 (03/10/20 1635)  Temperature: 97 6 °F (36 4 °C) (03/10/20 1635)  Temp Source: Temporal (03/10/20 1635)  Respirations: 18 (03/10/20 1635)  SpO2: 95 % (03/10/20 1635)    Physical Exam   Constitutional: She is oriented to person, place, and time  She appears well-developed and well-nourished  HENT:   Head: Normocephalic and atraumatic  Eyes: Pupils are equal, round, and reactive to light  EOM are normal    Neck: Normal range of motion  Neck supple     Cardiovascular: Normal rate and regular rhythm  Pulmonary/Chest: Effort normal and breath sounds normal    Abdominal: Soft  Bowel sounds are normal    Musculoskeletal: She exhibits deformity  Neurological: She is alert and oriented to person, place, and time  Skin:   Sacral ulcer, hip ulcer and buttock areas  All appear to be unstageable         Additional Data:     Lab Results: I have personally reviewed pertinent reports  Results from last 7 days   Lab Units 03/10/20  1314   WBC Thousand/uL 16 21*   HEMOGLOBIN g/dL 9 5*   HEMATOCRIT % 30 5*   PLATELETS Thousands/uL 537*   NEUTROS PCT % 80*   LYMPHS PCT % 10*   MONOS PCT % 9   EOS PCT % 0     Results from last 7 days   Lab Units 03/10/20  1313   SODIUM mmol/L 127*   POTASSIUM mmol/L 3 7   CHLORIDE mmol/L 93*   CO2 mmol/L 22   BUN mg/dL 12   CREATININE mg/dL 0 72   ANION GAP mmol/L 12   CALCIUM mg/dL 8 3   ALBUMIN g/dL 2 1*   TOTAL BILIRUBIN mg/dL 0 40   ALK PHOS U/L 132*   ALT U/L 19   AST U/L 25   GLUCOSE RANDOM mg/dL 218*     Results from last 7 days   Lab Units 03/10/20  1313   INR  1 19             Results from last 7 days   Lab Units 03/10/20  1523 03/10/20  1314   LACTIC ACID mmol/L 0 5 2 3*       Imaging: I have personally reviewed pertinent reports  XR chest 2 views   Final Result by Cordelia Head MD (03/10 1446)      No acute cardiopulmonary disease  Workstation performed: [id number]         CT abdomen pelvis with contrast    (Results Pending)           Allscripts / Epic Records Reviewed: Yes     ** Please Note: This note has been constructed using a voice recognition system   **

## 2020-03-10 NOTE — ED PROVIDER NOTES
History  Chief Complaint   Patient presents with    Wound Check     pt reports pressure ulcer on right hip that started in october 2019; pt states that was seen in [facility] for wound infections     Patient is a 71year old female with a past medical history significant for T8 paraplegia following a spinal cord injury in 1981, multiple chronic wounds, osteomyelitis of the right hip and sacrum, right hip dislocation in 1/29/2020 that was deemed non-operable by Dr Kacy Chavarria, orthopedic hip specialist and [facility]'s ortho, severe protein calorie malnutrition, neurogenic bladder s/p chronic indwelling isabel who presents with enlarging wounds and feeling "unwell"  Patient reports that she just feels more tired than usual, she looked at her wounds, which are chronic, but today believes that they are significantly bigger and worse  Denies change in drainage or color  Denies fevers, chills  Prior to Admission Medications   Prescriptions Last Dose Informant Patient Reported? Taking?    Calcium Carb-Cholecalciferol 600-200 MG-UNIT TABS   Yes No   Sig: Take 1 tablet by mouth daily   acetaminophen (TYLENOL) 325 mg tablet   No No   Sig: Take 2 tablets (650 mg total) by mouth every 6 (six) hours as needed for mild pain, headaches or fever   artificial tear (LUBRIFRESH P M ) 83-15 % ophthalmic ointment   No No   Sig: Administer to both eyes daily at bedtime   bisacodyl (DULCOLAX) 10 mg suppository   No No   Sig: Insert 1 suppository (10 mg total) into the rectum daily as needed (2nd line for constipation)   cholecalciferol (VITAMIN D3) 1,000 units tablet   Yes No   Sig: Take 1,000 Units by mouth daily   cyanocobalamin 100 MCG tablet   Yes No   Sig: Take 100 mcg by mouth daily   dextran 70-hypromellose (GENTEAL TEARS) 0 1-0 3 % ophthalmic solution   No No   Sig: Administer 1 drop to both eyes every 3 (three) hours as needed (dry eye)   famotidine (PEPCID) 20 mg tablet   No No   Sig: Take 1 tablet (20 mg total) by mouth 2 (two) times a day as needed for heartburn   loperamide (IMODIUM) 2 mg capsule   No No   Sig: Take 1 capsule (2 mg total) by mouth 3 (three) times a day as needed for diarrhea for up to 2 doses   midodrine (PROAMATINE) 10 MG tablet   No No   Sig: Take 1 tablet (10 mg total) by mouth 3 (three) times a day before meals   multivitamin (THERAGRAN) TABS   Yes No   Sig: Take 1 tablet by mouth daily   nystatin (MYCOSTATIN) powder   Yes No   Sig: Apply topically 2 (two) times a day   pantoprazole (PROTONIX) 40 mg tablet  Other (Specify) Yes No   Sig: Take 40 mg by mouth daily   saccharomyces boulardii (FLORASTOR) 250 mg capsule   No No   Sig: Take 1 capsule (250 mg total) by mouth 2 (two) times a day   Patient taking differently: Take 250 mg by mouth daily    senna (SENOKOT) 8 6 mg   No No   Sig: Take 1 tablet (8 6 mg total) by mouth daily at bedtime      Facility-Administered Medications: None       Past Medical History:   Diagnosis Date    Anemia     iron defiencey    Arthritis     osteo    Back injury     Chronic kidney disease     nephritis    Depression     MRSA (methicillin resistant staph aureus) culture positive 12/07/2018    BONE-TISSUE     Osteopenia     Osteoporosis     Paralysis ([facility] [address] )     paraplegic due to spinal cord injury    Paraplegia (HCC)     Poor circulation     Pressure injury of skin     right hip, stage 3    Pressure sore of hip     right    Tibial plateau fracture     Underweight        Past Surgical History:   Procedure Laterality Date    CLOSURE DELAYED PRIMARY N/A 3/20/2019    Procedure: OPHELIA ANAL WOUND RECLOSURE;  Surgeon: Quang Ramon MD;  Location: [address] [place] [address] MAIN OR;  Service: Plastics    DECUBITUS ULCER EXCISION Bilateral 11/12/2019    Procedure: DEBRIDEMENT DECUBITI ([address] OUT);   Surgeon: Dimitry Leonard DO;  Location:  MAIN OR;  Service: General    FLAP LOCAL EXTREMITY Left 1/28/2019    Procedure: THIGH FLAP & STSG TO CLOSE HIP WOUND;  Surgeon: Quang Ramon MD;  Location: [address] [place] [address] MAIN OR;  Service: Plastics    FLAP LOCAL TRUNK Right 12/17/2018    Procedure: DEBRIDE/FLAP CLOSURE HIP PRESSURE SORE Canda Monika GRAFT;  Surgeon: Ambika Meehan MD;  Location: [address] [place] [address] MAIN OR;  Service: Plastics    FLAP LOCAL TRUNK Left 2/27/2019    Procedure: BUTTOCK FLAP TO ISCHIAL SORE;  Surgeon: Ambika Meehan MD;  Location: [address] [place] [address] OR;  Service: Plastics    HIP DEBRIDEMENT Right 2017    right hip sore debridement    INCISION AND DRAINAGE OF WOUND Left 1/3/2019    Procedure: INCISION AND DRAINAGE (I&D) left distal femur  With deep wound cultures  Application of VAC DRAIN SPONGE;  Surgeon: Yesy Lester MD;  Location:  MAIN OR;  Service: Orthopedics    IR PICC LINE  12/19/2018    IR PICC LINE  3/12/2019    CO DEBRIDEMENT, SKIN, SUB-Q TISSUE,MUSCLE,BONE,=<20 SQ CM Right 9/19/2018    Procedure: DEBRIDEMENT OF HIP PRESSURE SORE;  Surgeon: Ambika Meehan MD;  Location: [address] [place] [address] OR;  Service: [address] [place] [address] FX+INTRAMED FELIX Left 10/22/2018    Procedure: INSERTION NAIL IM LEFT FEMUR RETROGRADE;  Surgeon: Evita Reyes MD;  Location:  MAIN OR;  Service: Orthopedics    TOE AMPUTATION Left 2017    small toe left foot amputation    WISDOM TOOTH EXTRACTION      WOUND DEBRIDEMENT Left 12/17/2018    Procedure: DEBRIDEMENT HIP PRESSURE SORE;  Surgeon: Ambika Meehan MD;  Location: [address] [place] [address] OR;  Service: Plastics    WOUND DEBRIDEMENT N/A 11/10/2019    Procedure: EXCISIONAL DEBRIDEMENT;  Surgeon: Caesar Salcido MD;  Location:  MAIN OR;  Service: General       Family History   Problem Relation Age of Onset    Depression Father      I have reviewed and agree with the history as documented  E-Cigarette/Vaping     E-Cigarette/Vaping Substances     Social History     Tobacco Use    Smoking status: Never Smoker    Smokeless tobacco: Never Used   Substance Use Topics    Alcohol use: Yes     Comment: occasional    Drug use: No       Review of Systems   Constitutional: Positive for fatigue  Negative for chills and fever  HENT: Negative for congestion and rhinorrhea  Eyes: Negative for photophobia and visual disturbance  Respiratory: Negative for chest tightness and shortness of breath  Cardiovascular: Negative for chest pain and palpitations  Gastrointestinal: Negative for abdominal pain, constipation, diarrhea, nausea and vomiting  Genitourinary: Negative for decreased urine volume and hematuria  Musculoskeletal: Negative for neck pain and neck stiffness  Skin: Positive for pallor and wound  Neurological: Positive for weakness (generalized)  Negative for dizziness, light-headedness and headaches  Physical Exam  Physical Exam   Constitutional: She is oriented to person, place, and time  She appears well-developed  No distress  Thin, frail, chronically ill and sickly appearing   HENT:   Head: Normocephalic and atraumatic  Right Ear: External ear normal    Left Ear: External ear normal    Nose: Nose normal    Mouth/Throat: Oropharynx is clear and moist    Eyes: Pupils are equal, round, and reactive to light  Conjunctivae and EOM are normal    Neck: Normal range of motion  Neck supple  Cardiovascular: Normal rate, regular rhythm, normal heart sounds and intact distal pulses  Exam reveals no gallop and no friction rub  No murmur heard  Pulmonary/Chest: Effort normal and breath sounds normal  No respiratory distress  She has no wheezes  She has no rales  She exhibits no tenderness  Abdominal: Soft  Bowel sounds are normal  She exhibits no distension  There is no tenderness  There is no rebound and no guarding  Genitourinary:   Genitourinary Comments: Multiple sacral and buttocks ulcerations and wounds  Please see media for wound photos   Musculoskeletal: Normal range of motion  She exhibits no edema  Legs:  Neurological: She is alert and oriented to person, place, and time     Able to move bilateral upper extremities, but unable to move bilateral lower extremities  No sensation T8 downwards  Legs are held contracted and angled to the left side  Skin: Skin is warm and dry  She is not diaphoretic  There is pallor  Psychiatric:   Flat affect   Nursing note and vitals reviewed        Vital Signs  ED Triage Vitals   Temperature Pulse Respirations Blood Pressure SpO2   03/10/20 1134 03/10/20 1134 03/10/20 1134 03/10/20 1134 03/10/20 1134   98 6 °F (37 °C) (!) 127 18 126/63 95 %      Temp Source Heart Rate Source Patient Position - Orthostatic VS BP Location FiO2 (%)   03/10/20 1134 03/10/20 1134 03/10/20 1331 03/10/20 1331 --   Temporal Monitor Lying Right arm       Pain Score       03/10/20 1331       No Pain           Vitals:    03/10/20 1331 03/10/20 1554 03/10/20 1635 03/10/20 1818   BP: 99/58 106/57 105/54 111/64   Pulse: (!) 121 104 100 88   Patient Position - Orthostatic VS: Lying Lying Lying Lying         Visual Acuity      ED Medications  Medications   sodium chloride (PF) 0 9 % injection 3 mL (has no administration in time range)   vancomycin (VANCOCIN) oral solution 125 mg (has no administration in time range)   saccharomyces boulardii (FLORASTOR) capsule 250 mg (250 mg Oral Given 3/10/20 1724)   acetaminophen (TYLENOL) tablet 650 mg (has no administration in time range)   artificial tear (LUBRIFRESH P M ) ophthalmic ointment (has no administration in time range)   bisacodyl (DULCOLAX) rectal suppository 10 mg (has no administration in time range)   cyanocobalamin (VITAMIN B-12) tablet 100 mcg (has no administration in time range)   dextran 70-hypromellose (GENTEAL TEARS) 0 1-0 3 % ophthalmic solution 1 drop (has no administration in time range)   midodrine (PROAMATINE) tablet 10 mg (10 mg Oral Given 3/10/20 1724)   nystatin (MYCOSTATIN) powder ( Topical Given 3/10/20 1724)   pantoprazole (PROTONIX) EC tablet 40 mg (has no administration in time range)   senna (SENOKOT) tablet 8 6 mg (has no administration in time range)   enoxaparin (LOVENOX) subcutaneous injection 40 mg (40 mg Subcutaneous Given 3/10/20 1724)   piperacillin-tazobactam (ZOSYN) 4 5 g in sodium chloride 0 9 % 100 mL IVPB (has no administration in time range)   sodium chloride 0 9 % infusion (100 mL/hr Intravenous New Bag 3/10/20 1719)   sodium chloride 0 9 % bolus 1,000 mL (has no administration in time range)   methylPREDNISolone sodium succinate (Solu-MEDROL) injection 125 mg (125 mg Intravenous Given 3/10/20 1815)   sodium chloride 0 9 % bolus 1,000 mL (1,000 mL Intravenous New Bag 3/10/20 1816)   sodium chloride 0 9 % bolus 1,000 mL (0 mL Intravenous Stopped 3/10/20 1558)   hydrocortisone sodium succinate (PF) (Solu-CORTEF) injection 50 mg (50 mg Intravenous Given 3/10/20 1312)   diphenhydrAMINE (BENADRYL) injection 50 mg (50 mg Intravenous Given 3/10/20 1601)   piperacillin-tazobactam (ZOSYN) 3 375 g in sodium chloride 0 9 % 100 mL IVPB (0 g Intravenous Stopped 3/10/20 1537)   sodium chloride 0 9 % bolus 500 mL (0 mL Intravenous Stopped 3/10/20 1537)   iohexol (OMNIPAQUE) 350 MG/ML injection (SINGLE-DOSE) 100 mL (100 mL Intravenous Given 3/10/20 1801)   diphenhydrAMINE (BENADRYL) 50 mg in sodium chloride 0 9 % 50 mL IVPB (50 mg Intravenous New Bag 3/10/20 1832)   famotidine (PEPCID) injection 20 mg (20 mg Intravenous Given 3/10/20 1818)       Diagnostic Studies  Results Reviewed     Procedure Component Value Units Date/Time    Blood culture #1 [[id number]] Collected:  03/10/20 1317    Lab Status:  Preliminary result Specimen:  Blood from Arm, Right Updated:  03/10/20 1901     Blood Culture Received in Microbiology Lab  Culture in Progress  Blood culture #2 [[id number]] Collected:  03/10/20 1313    Lab Status:  Preliminary result Specimen:  Blood from Arm, Left Updated:  03/10/20 1901     Blood Culture Received in Microbiology Lab  Culture in Progress      Lactic acid x2 [[id number]]  (Normal) Collected:  03/10/20 1523    Lab Status:  Final result Specimen:  Blood from Arm, Right Updated:  03/10/20 1614     LACTIC ACID 0 5 mmol/L     Narrative:       Result may be elevated if tourniquet was used during collection      Urine Microscopic [[id number]]  (Abnormal) Collected:  03/10/20 1437    Lab Status:  Final result Specimen:  Urine, Indwelling Hays Catheter Updated:  03/10/20 1507     RBC, UA 0-1 /hpf      WBC, UA 2-4 /hpf      Epithelial Cells Occasional /hpf      Bacteria, UA Innumerable /hpf      Triplep Phos Stefanie, UA Moderate /hpf     Platelet count [[id number]]     Lab Status:  No result Specimen:  Blood     UA w Reflex to Microscopic w Reflex to Culture [[id number]]  (Abnormal) Collected:  03/10/20 1437    Lab Status:  Final result Specimen:  Urine, Indwelling [place] Catheter Updated:  03/10/20 1450     Color, UA Yellow     Clarity, UA Turbid     Specific [place], UA 1 010     pH, UA 8 5     Leukocytes, UA Moderate     Nitrite, UA Positive     Protein, UA 30 (1+) mg/dl      Glucose, UA Negative mg/dl      Ketones, UA Negative mg/dl      Urobilinogen, UA 1 0 E U /dl      Bilirubin, UA Negative     Blood, UA Small    Comprehensive metabolic panel [[id number]]  (Abnormal) Collected:  03/10/20 1313    Lab Status:  Final result Specimen:  Blood from Arm, Left Updated:  03/10/20 1353     Sodium 127 mmol/L      Potassium 3 7 mmol/L      Chloride 93 mmol/L      CO2 22 mmol/L      ANION GAP 12 mmol/L      BUN 12 mg/dL      Creatinine 0 72 mg/dL      Glucose 218 mg/dL      Calcium 8 3 mg/dL      AST 25 U/L      ALT 19 U/L      Alkaline Phosphatase 132 U/L      Total Protein 7 5 g/dL      Albumin 2 1 g/dL      Total Bilirubin 0 40 mg/dL      eGFR 86 ml/min/1 73sq m     Narrative:       National Kidney Disease Foundation guidelines for Chronic Kidney Disease (CKD):     Stage 1 with normal or high GFR (GFR > 90 mL/min/1 73 square meters)    Stage 2 Mild CKD (GFR = 60-89 mL/min/1 73 square meters)    Stage 3A Moderate CKD (GFR = 45-59 mL/min/1 73 square meters)    Stage 3B Moderate CKD (GFR = 30-44 mL/min/1 73 square meters)    Stage 4 Severe CKD (GFR = 15-29 mL/min/1 73 square meters)    Stage 5 End Stage CKD (GFR <15 mL/min/1 73 square meters)  Note: GFR calculation is accurate only with a steady state creatinine    Lactic acid x2 [[id number]]  (Abnormal) Collected:  03/10/20 1314    Lab Status:  Final result Specimen:  Blood from Arm, Left Updated:  03/10/20 1344     LACTIC ACID 2 3 mmol/L     Narrative:       Result may be elevated if tourniquet was used during collection  Troponin I [[id number]]  (Normal) Collected:  03/10/20 1313    Lab Status:  Final result Specimen:  Blood from Arm, Left Updated:  03/10/20 1341     Troponin I <0 02 ng/mL     Protime-INR [[id number]]  (Abnormal) Collected:  03/10/20 1313    Lab Status:  Final result Specimen:  Blood from Arm, Left Updated:  03/10/20 1337     Protime 15 3 seconds      INR 1 19    APTT [[id number]]  (Abnormal) Collected:  03/10/20 1313    Lab Status:  Final result Specimen:  Blood from Arm, Left Updated:  03/10/20 1337     PTT 42 seconds     CBC and differential [[id number]]  (Abnormal) Collected:  03/10/20 1314    Lab Status:  Final result Specimen:  Blood from Arm, Left Updated:  03/10/20 1324     WBC 16 21 Thousand/uL      RBC 3 73 Million/uL      Hemoglobin 9 5 g/dL      Hematocrit 30 5 %      MCV 82 fL      MCH 25 5 pg      MCHC 31 1 g/dL      RDW 17 1 %      MPV 9 8 fL      Platelets 138 Thousands/uL      nRBC 0 /100 WBCs      Neutrophils Relative 80 %      Immat GRANS % 1 %      Lymphocytes Relative 10 %      Monocytes Relative 9 %      Eosinophils Relative 0 %      Basophils Relative 0 %      Neutrophils Absolute 13 07 Thousands/µL      Immature Grans Absolute 0 09 Thousand/uL      Lymphocytes Absolute 1 59 Thousands/µL      Monocytes Absolute 1 41 Thousand/µL      Eosinophils Absolute 0 00 Thousand/µL      Basophils Absolute 0 05 Thousands/µL     Procalcitonin [[id number]] Collected:  03/10/20 1313    Lab Status:   In process Specimen:  Blood from Arm, Left Updated:  03/10/20 1321                 CT abdomen pelvis with contrast   Final Result by Cori Delong MD (03/10 1841)         1  Interval lateral rotation of dislocated right femoral head  Overlying decubitus ulcer now extends to the deep soft tissues superior to the acetabulum, within the previous location of the femoral head  Increased surrounding cellulitis without    evidence of gas or fluid collection to suggest an abscess  2   Stable left ischial decubitus ulcer  Small left hip joint effusion  3   Large amount of stool distending the rectum may indicate impaction  No evidence of bowel obstruction, colitis or diverticulitis  Workstation performed: [id number]         XR chest 2 views   Final Result by Manny Noyola MD (03/10 1446)      No acute cardiopulmonary disease  Workstation performed: [id number]                    Procedures  Procedures         ED Course  ED Course as of Mar 10 1909   Tue Mar 10, 2020   1330 WBC(!): 16 21   1330 Pulse(!): 127   1344 LACTIC ACID(!!): 2 3   1346 Stable     Hemoglobin(!): 9 5   1457 Nitrite, UA(!): Positive   1618 Spoke with Dr Irene Frankel, surgery who looked at photos in media and we discussed clinical case  Dr Irene Frankel does not believe that patient needs urgent debridement of her wounds and that potentially the UTI is more likely the cause of current sepsis  Will be happy to see patient in consultation once admitted  I relayed my conversation with Dr Irene Frankel to the [facility] Dr Amparo Loja  Initial Sepsis Screening     Row Name 03/10/20 1346 03/10/20 1331             Is the patient's history suggestive of a new or worsening infection?   (!) Yes (Proceed)  -LK       Suspected source of infection    wound infection  -LK       Are two or more of the following signs & symptoms of infection both present and new to the patient?     (!) Yes (Proceed)  -LK       Indicate SIRS criteria    Tachycardia > 90 bpm;Leukocytosis (WBC > 55732 IJL)  -LK       If the answer is yes to both questions, suspicion of sepsis is present  [de-identified]         If severe sepsis is present AND tissue hypoperfusion perists in the hour after fluid resuscitation or lactate > 4, the patient meets criteria for SEPTIC SHOCK           Are any of the following organ dysfunction criteria present within 6 hours of suspected infection and SIRS criteria that are NOT considered to be chronic conditions?   (!) Yes  -LK       Organ dysfunction  Lactate > 2 0 mmol/L  -LK         Date of presentation of severe sepsis  03/10/20  -LK         Time of presentation of severe sepsis  1346  -LK         Tissue hypoperfusion persists in the hour after crystalloid fluid administration, evidenced, by either:           Was hypotension present within one hour of the conclusion of crystalloid fluid administration? Kaila Evangelista       Date of presentation of septic shock           Time of presentation of septic shock             User Key  (r) = Recorded By, (t) = Taken By, (c) = Cosigned By    [address] Name Provider Type    CHAR Garcia,  Physician                  MDM  Number of Diagnoses or Management Options  Hyponatremia:   Multiple open wounds of hip:   Open wound of right buttock with complication, subsequent encounter:   Severe sepsis [facility]):   Urinary tract infection associated with indwelling urethral catheter, initial encounter [facility]):   Diagnosis management comments: Assessment and plan:  Chronically ill 70-year-old female paraplegic with chronic indwelling Hays and history of osteomyelitis and chronic wounds  Presents tachycardic with generalized weakness that has worsened  Infectious workup  Will get a CT scan of the abdomen and pelvis scanning down through the midthigh in order to look for internal abscess formation  Infectious workup with chest x-ray, urinalysis to assess for other sources of infection    Lab work to assess for leukocytosis, anemia, electrolyte abnormalities, kidney liver function  Start IV fluids  Patient meets severe sepsis criteria with leukocytosis, tachycardia and lactate above 2  Resuscitated with 30 cc/kg of normal saline, antibiotics started to cover for both urinary tract infection and wound infections  Discussed with surgery team and slim for admission  Patient is pending a CT scan of the abdomen pelvis as she is allergic to IV contrast in needed for our premedication  Slim to follow-up on CT imaging as patient is admitted in no longer in the emergency department  Disposition  Final diagnoses:   Severe sepsis ([facility] )   Hyponatremia   Multiple open wounds of hip   Open wound of right buttock with complication, subsequent encounter   Urinary tract infection associated with indwelling urethral catheter, initial encounter [facility])     Time reflects when diagnosis was documented in both MDM as applicable and the Disposition within this note     Time User Action Codes Description Comment    3/10/2020  2:11 PM Shanice Kingdom Add [A41 9,  R65 20] Severe sepsis ([facility] )     3/10/2020  2:11 PM Shanice Kingdom Add [E87 1] Hyponatremia     3/10/2020  2:12 PM Shanice Kingdom Add [S71 009A] Multiple open wounds of hip     3/10/2020  2:12 PM Shanice Kingdom Add [S31 819D] Open wound of right buttock with complication, subsequent encounter     3/10/2020  2:48 PM Ladonna Pruitt Add [G82 20] Paraplegia following spinal cord injury ([facility] )     3/10/2020  3:11 PM Waylan Bio [T83 511A,  N39 0] Urinary tract infection associated with indwelling urethral catheter, initial encounter [facility])       ED Disposition     ED Disposition Condition Date/Time Comment    Admit Stable Tue Mar 10, 2020  4:55 PM Case was discussed with Dr Elvira Stuart and the patient's admission status was agreed to be Admission Status: inpatient status to the service of Dr Elvira Stuart           Follow-up Information    None         Current Discharge Medication List      CONTINUE these medications which have NOT CHANGED    Details   acetaminophen (TYLENOL) 325 mg tablet Take 2 tablets (650 mg total) by mouth every 6 (six) hours as needed for mild pain, headaches or fever  Qty: 30 tablet, Refills: 0    Associated Diagnoses: Decubitus ulcer      artificial tear (LUBRIFRESH P M ) 83-15 % ophthalmic ointment Administer to both eyes daily at bedtime  Refills: 0    Associated Diagnoses: Anemia of chronic disease      bisacodyl (DULCOLAX) 10 mg suppository Insert 1 suppository (10 mg total) into the rectum daily as needed (2nd line for constipation)  Qty: 12 suppository, Refills: 0    Associated Diagnoses: Constipation, unspecified constipation type      Calcium Carb-Cholecalciferol 600-200 MG-UNIT TABS Take 1 tablet by mouth daily      cholecalciferol (VITAMIN D3) 1,000 units tablet Take 1,000 Units by mouth daily      cyanocobalamin 100 MCG tablet Take 100 mcg by mouth daily      dextran 70-hypromellose (GENTEAL TEARS) 0 1-0 3 % ophthalmic solution Administer 1 drop to both eyes every 3 (three) hours as needed (dry eye)  Refills: 0    Associated Diagnoses: Anemia of chronic disease      famotidine (PEPCID) 20 mg tablet Take 1 tablet (20 mg total) by mouth 2 (two) times a day as needed for heartburn  Refills: 0    Associated Diagnoses: Anemia of chronic disease      loperamide (IMODIUM) 2 mg capsule Take 1 capsule (2 mg total) by mouth 3 (three) times a day as needed for diarrhea for up to 2 doses  Qty: 30 capsule, Refills: 0    Associated Diagnoses: Anemia of chronic disease      midodrine (PROAMATINE) 10 MG tablet Take 1 tablet (10 mg total) by mouth 3 (three) times a day before meals  Refills: 0    Associated Diagnoses: Hypotension      multivitamin (THERAGRAN) TABS Take 1 tablet by mouth daily      nystatin (MYCOSTATIN) powder Apply topically 2 (two) times a day      pantoprazole (PROTONIX) 40 mg tablet Take 40 mg by mouth daily      saccharomyces boulardii (FLORASTOR) 250 mg capsule Take 1 capsule (250 mg total) by mouth 2 (two) times a day  Qty: 60 capsule, Refills: 0    Associated Diagnoses: C  difficile diarrhea      senna (SENOKOT) 8 6 mg Take 1 tablet (8 6 mg total) by mouth daily at bedtime  Qty: 120 each, Refills: 0    Associated Diagnoses: Paraplegia following spinal cord injury ([facility] [address] )           No discharge procedures on file      PDMP Review       Value Time User    PDMP Reviewed  Yes 11/20/2019  9:32 AM Loreto Lunsford PA-C          ED Provider  Electronically Signed by           Anny Smith DO  03/10/20 1627

## 2020-03-10 NOTE — ED NOTES
Per provider, urine sample to be collected prior to starting IV abx  Sterile standard drainage bag placed        Aleksandr Lambert RN  03/10/20 7081

## 2020-03-10 NOTE — ED NOTES
Report called to nurse on E5; told Rn that dose of benadryl was given at 1600 and that pt can go to CAT scan at 1700       Leonor Bence, RN  03/10/20 7328

## 2020-03-10 NOTE — PROGRESS NOTES
Patient return from ct scan itching red rash all over torso Dr Valdes Oh notified  No respiratory distress

## 2020-03-10 NOTE — ASSESSMENT & PLAN NOTE
Noticed on imaging on prior admission  -the patient was seen by orthopedic surgery and they did not feel that the patient was a candidate for surgery

## 2020-03-10 NOTE — ASSESSMENT & PLAN NOTE
More likely from UTI  -ED physician showed pictures of the wounds to general surgery and general surgery does not feel that the patient needs urgent debridement  Surgery also does not feel that the sepsis is from her wounds  -will start the patient on high-dose IV Zosyn  Discussed with infectious disease  -will officially consult Infectious Disease  -will also start probiotics and prophylactic p o  Vancomycin since the patient has had history of C diff colitis in the past  -Tylenol p r n   For fevers

## 2020-03-10 NOTE — SEPSIS NOTE
Sepsis Note   Elida Koyanagi 71 y o  female MRN: [id number]  Unit/Bed#: ED 16 Encounter: [id number]      qSOFA     Row Name 03/10/20 1134                Altered mental status GCS < 15          Respiratory Rate > / =57  0        Systolic BP < / =708  0        Q Sofa Score  0            Initial Sepsis Screening     Row Name 03/10/20 1331                Is the patient's history suggestive of a new or worsening infection? (!) Yes (Proceed)  -LK        Suspected source of infection  wound infection  -LK        Are two or more of the following signs & symptoms of infection both present and new to the patient? (!) Yes (Proceed)  -LK        Indicate SIRS criteria  Tachycardia > 90 bpm;Leukocytosis (WBC > 00055 IJL)  -LK        If the answer is yes to both questions, suspicion of sepsis is present          If severe sepsis is present AND tissue hypoperfusion perists in the hour after fluid resuscitation or lactate > 4, the patient meets criteria for SEPTIC SHOCK          Are any of the following organ dysfunction criteria present within 6 hours of suspected infection and SIRS criteria that are NOT considered to be chronic conditions? (!) Yes  -LK        Organ dysfunction          Date of presentation of severe sepsis          Time of presentation of severe sepsis          Tissue hypoperfusion persists in the hour after crystalloid fluid administration, evidenced, by either:          Was hypotension present within one hour of the conclusion of crystalloid fluid administration?           Date of presentation of septic shock          Time of presentation of septic shock            User Key  (r) = Recorded By, (t) = Taken By, (c) = Cosigned By    [address] Name Provider Type    CHAR Weinstein DO Physician

## 2020-03-11 PROBLEM — E87.1 HYPONATREMIA: Status: ACTIVE | Noted: 2020-03-11

## 2020-03-11 PROBLEM — T83.511A URINARY TRACT INFECTION ASSOCIATED WITH INDWELLING URETHRAL CATHETER (HCC): Status: ACTIVE | Noted: 2020-03-11

## 2020-03-11 PROBLEM — Z71.89 GOALS OF CARE, COUNSELING/DISCUSSION: Status: ACTIVE | Noted: 2020-03-11

## 2020-03-11 PROBLEM — E44.1 MILD PROTEIN-CALORIE MALNUTRITION (HCC): Status: ACTIVE | Noted: 2020-03-11

## 2020-03-11 PROBLEM — N39.0 URINARY TRACT INFECTION ASSOCIATED WITH INDWELLING URETHRAL CATHETER (HCC): Status: ACTIVE | Noted: 2020-03-11

## 2020-03-11 LAB
ALBUMIN SERPL BCP-MCNC: 1.7 G/DL (ref 3.5–5)
ALP SERPL-CCNC: 225 U/L (ref 46–116)
ALT SERPL W P-5'-P-CCNC: 18 U/L (ref 12–78)
ANION GAP SERPL CALCULATED.3IONS-SCNC: 11 MMOL/L (ref 4–13)
AST SERPL W P-5'-P-CCNC: 9 U/L (ref 5–45)
BILIRUB SERPL-MCNC: 0.21 MG/DL (ref 0.2–1)
BUN SERPL-MCNC: 9 MG/DL (ref 5–25)
CALCIUM SERPL-MCNC: 8 MG/DL (ref 8.3–10.1)
CHLORIDE SERPL-SCNC: 103 MMOL/L (ref 100–108)
CO2 SERPL-SCNC: 22 MMOL/L (ref 21–32)
CREAT SERPL-MCNC: 0.6 MG/DL (ref 0.6–1.3)
ERYTHROCYTE [DISTWIDTH] IN BLOOD BY AUTOMATED COUNT: 17.5 % (ref 11.6–15.1)
GFR SERPL CREATININE-BSD FRML MDRD: 93 ML/MIN/1.73SQ M
GLUCOSE SERPL-MCNC: 235 MG/DL (ref 65–140)
HCT VFR BLD AUTO: 29.4 % (ref 34.8–46.1)
HGB BLD-MCNC: 9.1 G/DL (ref 11.5–15.4)
MCH RBC QN AUTO: 25.9 PG (ref 26.8–34.3)
MCHC RBC AUTO-ENTMCNC: 31 G/DL (ref 31.4–37.4)
MCV RBC AUTO: 84 FL (ref 82–98)
PLATELET # BLD AUTO: 456 THOUSANDS/UL (ref 149–390)
PMV BLD AUTO: 9.8 FL (ref 8.9–12.7)
POTASSIUM SERPL-SCNC: 3.3 MMOL/L (ref 3.5–5.3)
PROT SERPL-MCNC: 6.5 G/DL (ref 6.4–8.2)
RBC # BLD AUTO: 3.52 MILLION/UL (ref 3.81–5.12)
SODIUM SERPL-SCNC: 136 MMOL/L (ref 136–145)
WBC # BLD AUTO: 17.75 THOUSAND/UL (ref 4.31–10.16)

## 2020-03-11 PROCEDURE — 99222 1ST HOSP IP/OBS MODERATE 55: CPT | Performed by: SURGERY

## 2020-03-11 PROCEDURE — 99223 1ST HOSP IP/OBS HIGH 75: CPT | Performed by: INTERNAL MEDICINE

## 2020-03-11 PROCEDURE — 85027 COMPLETE CBC AUTOMATED: CPT | Performed by: HOSPITALIST

## 2020-03-11 PROCEDURE — 99232 SBSQ HOSP IP/OBS MODERATE 35: CPT | Performed by: PHYSICIAN ASSISTANT

## 2020-03-11 PROCEDURE — 87086 URINE CULTURE/COLONY COUNT: CPT | Performed by: PHYSICIAN ASSISTANT

## 2020-03-11 PROCEDURE — 80053 COMPREHEN METABOLIC PANEL: CPT | Performed by: HOSPITALIST

## 2020-03-11 PROCEDURE — 99222 1ST HOSP IP/OBS MODERATE 55: CPT | Performed by: ORTHOPAEDIC SURGERY

## 2020-03-11 RX ORDER — METRONIDAZOLE 500 MG/1
500 TABLET ORAL EVERY 8 HOURS SCHEDULED
Status: DISCONTINUED | OUTPATIENT
Start: 2020-03-11 | End: 2020-03-11

## 2020-03-11 RX ADMIN — MIDODRINE HYDROCHLORIDE 10 MG: 5 TABLET ORAL at 15:58

## 2020-03-11 RX ADMIN — WHITE PETROLATUM 57.7 %-MINERAL OIL 31.9 % EYE OINTMENT: at 22:29

## 2020-03-11 RX ADMIN — PANTOPRAZOLE SODIUM 40 MG: 40 TABLET, DELAYED RELEASE ORAL at 06:04

## 2020-03-11 RX ADMIN — MIDODRINE HYDROCHLORIDE 10 MG: 5 TABLET ORAL at 06:04

## 2020-03-11 RX ADMIN — PIPERACILLIN AND TAZOBACTAM 4.5 G: 4; .5 INJECTION, POWDER, LYOPHILIZED, FOR SOLUTION INTRAVENOUS at 03:04

## 2020-03-11 RX ADMIN — PIPERACILLIN AND TAZOBACTAM 4.5 G: 4; .5 INJECTION, POWDER, LYOPHILIZED, FOR SOLUTION INTRAVENOUS at 08:38

## 2020-03-11 RX ADMIN — VANCOMYCIN HYDROCHLORIDE 125 MG: 500 INJECTION, POWDER, LYOPHILIZED, FOR SOLUTION INTRAVENOUS at 22:22

## 2020-03-11 RX ADMIN — NYSTATIN: 100000 POWDER TOPICAL at 08:33

## 2020-03-11 RX ADMIN — MIDODRINE HYDROCHLORIDE 10 MG: 5 TABLET ORAL at 11:11

## 2020-03-11 RX ADMIN — VANCOMYCIN HYDROCHLORIDE 125 MG: 500 INJECTION, POWDER, LYOPHILIZED, FOR SOLUTION INTRAVENOUS at 08:33

## 2020-03-11 RX ADMIN — Medication 250 MG: at 08:32

## 2020-03-11 RX ADMIN — Medication 100 MCG: at 08:32

## 2020-03-11 RX ADMIN — ENOXAPARIN SODIUM 40 MG: 40 INJECTION SUBCUTANEOUS at 08:33

## 2020-03-11 RX ADMIN — NYSTATIN: 100000 POWDER TOPICAL at 17:41

## 2020-03-11 RX ADMIN — Medication 250 MG: at 17:40

## 2020-03-11 RX ADMIN — METHYLPREDNISOLONE SODIUM SUCCINATE 125 MG: 125 INJECTION, POWDER, FOR SOLUTION INTRAMUSCULAR; INTRAVENOUS at 08:32

## 2020-03-11 NOTE — ASSESSMENT & PLAN NOTE
· Patient with history of C diff  · Received dose of Zosyn  Antibiotics now discontinued  Monitor for diarrhea  · She is on p o   Vancomycin prophylactically

## 2020-03-11 NOTE — ASSESSMENT & PLAN NOTE
Noticed on imaging on prior admission  Appreciate orthopedic consult  Can be followed outpatient    No emergent surgical procedure

## 2020-03-11 NOTE — PROGRESS NOTES
Progress Note - Ethyl Alea 1950, 71 y o  female MRN: [id number]  Unit/Bed#: E5 -01 Encounter: [id number]    Primary Care Provider: Kylee Yun   Date and time admitted to hospital: 3/10/2020 11:52 AM    Decubitus ulcer of left ischium, stage III ([address] )  Assessment & Plan  · History of paraplegia, chronic wounds  · Consults to Ortho and general surgery appreciated  · Wound care appreciated  · Patient with multiple nonhealing pressure point wounds across body - 1) right anterior hip, 2) right lateral hip, 3) left ischial, 4) left lateral ischial, 5) midline sacral, and 6) right buttock  · Will order low air loss mattress  Provided script to case management  · Low suspicion that wounds caused sepsis  Monitoring off of antibiotics for now  · PT/OT    * Sepsis [facility])  Assessment & Plan  · Initial suspicion for sepsis, POA, given tachycardia and leukocytosis  Source was suspected to be UTI vs skin ulcers  · Appreciate infectious disease  · Low suspicion that sirs criteria is associated with skin  CT did not show abscess  · Patient with chronic UTI, VRE, with chronic Hays  No new symptoms  · Received Zosyn initially  ID recommends discontinuing IV antibiotics and monitoring  · Afebrile  Tachycardia has resolved  Patient is on steroids which may contribute to leukocytosis  · Wound care and surgery have assessed patient, recommendations appreciated  · Will continue p o  Prophylactic vancomycin as patient has history of C diff  Goals of care, counseling/discussion  Assessment & Plan  Appreciate palliative consultation  Could consider mirtazapine to help with sleep and depression  Patient is considering this  Patient desires to be full code  Desires full medical interventions  Open wound of right hip  Assessment & Plan  Appreciate orthopedic surgery    Patient is S/P flap grafting with Dr Pricilla Angulo ulcer, sacrum  Assessment & Plan  -consults to General surgery and Orthopedic surgery placed  -will also consult wound care    Mild protein-calorie malnutrition ([facility] [address] )  Assessment & Plan  Malnutrition Findings:   Malnutrition type: Chronic illness  Degree of Malnutrition: Malnutrition of mild degree    BMI Findings:  BMI Classifications: Underweight < 18 5     There is no height or weight on file to calculate BMI  Patient is frail and wasted  Multiple, nonhealing wounds  Nutrition consult appreciated  Add supplements  Calorie count  Urinary tract infection associated with indwelling urethral catheter ([facility] [address] )  Assessment & Plan  · UTI associated with Hays catheter present on admission  Chronic Hays present on admission  · Known history of VRE  · Received dose of Zosyn however ID recommending monitoring off of antibiotics for now  Hyponatremia  Assessment & Plan  · Hyponatremia likely due to sepsis and poor p o  Intake/value nutrition, evidence by sodium level 127, treated with normal saline 350 mL boluses, continue normal saline 100 mL/hr and lab monitoring  · Monitoring BMP  · Appreciate nutrition consult    Closed lateral dislocation of distal end of right femur  Assessment & Plan  Noticed on imaging on prior admission  Appreciate orthopedic consult  Can be followed outpatient  No emergent surgical procedure    Lung nodule  Assessment & Plan  -outpatient follow-up    Physical deconditioning  Assessment & Plan  Address POLST and goals of care  Appreciate palliative  Consult PT/OT    C  difficile diarrhea  Assessment & Plan  · Patient with history of C diff  · Received dose of Zosyn  Antibiotics now discontinued  Monitor for diarrhea  · She is on p o   Vancomycin prophylactically    GERD (gastroesophageal reflux disease)  Assessment & Plan  -continue Protonix      VTE Pharmacologic Prophylaxis:   Pharmacologic: Enoxaparin (Lovenox)  Mechanical VTE Prophylaxis in Place: Yes    Patient Centered Rounds: I have performed bedside rounds with nursing staff today     Discussions with Specialists or Other Care Team Provider:  Discussed with wound care, nursing, case management, Internal Medicine    Education and Discussions with Family / Patient:  Discussed with patient    Time Spent for Care: 45 minutes  More than 50% of total time spent on counseling and coordination of care as described above  Current Length of Stay: 1 day(s)    Current Patient Status: Inpatient   Certification Statement: The patient will continue to require additional inpatient hospital stay due to Monitoring wounds, monitoring off of antibiotics, ongoing nutrition recommendations will need PT/OT    Discharge Plan:  Pending    Code Status: Level 1 - Full Code      Subjective:   Patient without shortness of breath, abdominal pain or difficulty breathing  Had 1 episode of loose stool today  No fevers or chills  Needs more time to think about mirtazapine    Objective:     Vitals:   Temp (24hrs), Av 5 °F (36 4 °C), Min:96 4 °F (35 8 °C), Max:98 9 °F (37 2 °C)    Temp:  [96 4 °F (35 8 °C)-98 9 °F (37 2 °C)] 97 6 °F (36 4 °C)  HR:  [65-88] 65  Resp:  [18-20] 18  BP: ()/(49-86) 137/61  SpO2:  [96 %-97 %] 96 %  There is no height or weight on file to calculate BMI  Input and Output Summary (last 24 hours): Intake/Output Summary (Last 24 hours) at 3/11/2020 1656  Last data filed at 3/11/2020 6829  Gross per 24 hour   Intake 1168 33 ml   Output 825 ml   Net 343 33 ml       Physical Exam:     Physical Exam   Constitutional: She is oriented to person, place, and time  No distress  Patient is frail appearing   HENT:   Head: Normocephalic and atraumatic  Eyes: Right eye exhibits no discharge  Left eye exhibits no discharge  No scleral icterus  Cardiovascular: Normal rate and regular rhythm  Exam reveals no gallop and no friction rub  No murmur heard  Pulmonary/Chest: Effort normal and breath sounds normal  No respiratory distress  She has no wheezes  She has no rales  Abdominal: Soft  Bowel sounds are normal  She exhibits no distension  There is no tenderness  Neurological: She is alert and oriented to person, place, and time  Skin: Skin is warm and dry  Multiple skin wounds and deep ulcer   Nursing note and vitals reviewed  Additional Data:     Labs:    Results from last 7 days   Lab Units 03/11/20  0612 03/10/20  1314   WBC Thousand/uL 17 75* 16 21*   HEMOGLOBIN g/dL 9 1* 9 5*   HEMATOCRIT % 29 4* 30 5*   PLATELETS Thousands/uL 456* 537*   NEUTROS PCT %  --  80*   LYMPHS PCT %  --  10*   MONOS PCT %  --  9   EOS PCT %  --  0     Results from last 7 days   Lab Units 03/11/20  0605   SODIUM mmol/L 136   POTASSIUM mmol/L 3 3*   CHLORIDE mmol/L 103   CO2 mmol/L 22   BUN mg/dL 9   CREATININE mg/dL 0 60   ANION GAP mmol/L 11   CALCIUM mg/dL 8 0*   ALBUMIN g/dL 1 7*   TOTAL BILIRUBIN mg/dL 0 21   ALK PHOS U/L 225*   ALT U/L 18   AST U/L 9   GLUCOSE RANDOM mg/dL 235*     Results from last 7 days   Lab Units 03/10/20  1313   INR  1 19             Results from last 7 days   Lab Units 03/10/20  1523 03/10/20  1314 03/10/20  1313   LACTIC ACID mmol/L 0 5 2 3*  --    PROCALCITONIN ng/ml  --   --  0 11           * I Have Reviewed All Lab Data Listed Above  * Additional Pertinent Lab Tests Reviewed: All Labs Within Last 24 Hours Reviewed      Recent Cultures (last 7 days):     Results from last 7 days   Lab Units 03/10/20  1317 03/10/20  1313   BLOOD CULTURE  Received in Microbiology Lab  Culture in Progress  Received in Microbiology Lab  Culture in Progress         Last 24 Hours Medication List:     Current Facility-Administered Medications:  acetaminophen 650 mg Oral Q6H PRN Odilia St MD    artificial tear  Both Eyes HS Odilia St MD    bisacodyl 10 mg Rectal Daily PRN Odilia St MD    cyanocobalamin 100 mcg Oral Daily Odilia St MD    dextran 70-hypromellose 1 drop Both Eyes Q3H PRN Odilia St MD    enoxaparin 40 mg Subcutaneous Daily Sarah Back MD Samantha    methylPREDNISolone sodium succinate 125 mg Intravenous Daily Nilam Doshi MD    midodrine 10 mg Oral TID AC Nilam Doshi MD    nystatin  Topical BID Nilam Doshi MD    pantoprazole 40 mg Oral Daily Nilam Doshi MD    saccharomyces boulardii 250 mg Oral BID Nilam Doshi MD    senna 1 tablet Oral HS Nilam Doshi MD    sodium chloride (PF) 3 mL Intravenous PRN [place] Staff, DO    sodium chloride 100 mL/hr Intravenous Continuous Nilam Doshi MD Last Rate: Stopped (03/10/20 1900)   vancomycin 125 mg Oral Q12H [address] Nilam Doshi MD      Facility-Administered Medications Ordered in Other Encounters:  dexamethasone 4 mg Intravenous Once PRN Elveria Hillock, DO    lactated ringers 75 mL/hr Intravenous Continuous Elveria Hillock, DO Last Rate: 75 mL/hr (11/10/19 2105)   lactated ringers 50 mL/hr Intravenous Continuous Emilee Pinedo CRNA    lactated ringers 75 mL/hr Intravenous Continuous Elveria Hillock, DO Last Rate: Stopped (03/20/19 1841)   lactated ringers 75 mL/hr Intravenous Continuous Dariusz Hannah MD Last Rate: Stopped (03/20/19 1842)   ondansetron 4 mg Intravenous Once PRN Hope Lucero MD    promethazine 12 5 mg Intravenous Once PRN Hope Lucero MD         Today, Patient Was Seen By: Gabe Silva PA-C    ** Please Note: Dictation voice to text software may have been used in the creation of this document   **

## 2020-03-11 NOTE — CONSULTS
Paraplegic pt with pressure injury at R hip, R anterior proximal thigh and L ischium that are non healing  Pt not presenting with physical signs of muscle/ fat loss, but has poor appetite for the past 4 months  Pt was receiving different supplements including ensure enlive, ensure puddings, and magic cup at nursing home  Pt experiecing mild nausea pta occasionally  Mild malnutrition present due to 20% weight loss x 9 months from 2/2019 to 11/2019 but pt's weight has been stable since 11/2019 to present, and BMI of 17 9  Calorie count posted with end date of 3/13/20 including dinner  Will reassess need for additional supplements after calorie count complete  Adding magic cup berry at D as per pt request, to help meet po needs

## 2020-03-11 NOTE — CONSULTS
Consultation - Orthopedics   Tristan Hebert 71 y o  female MRN: [id number]  Unit/Bed#: E5 -01 Encounter: [id number]      Assessment/Plan     Assessment:  44-year-old female with chronic open wounds and dislocation of right femur    Plan:  · Right hip wounds do not appear to be infected at this time  They are without erythema or purulent drainage  · Discussed with patient that she does not need emergent surgical intervention for her right hip at this time  Explained that patient should follow-up with Dr Angie Lombardi as an outpatient if she would like to discuss possible surgical intervention which would likely require planning with both Orthopedic surgery and Plastic surgery  · Will continue to monitor right hip wounds  Continue dressings per wound care  · Pain control p r n  Per primary team   · PT/OT  · Ortho will follow to monitor right hip wounds  History of Present Illness   Physician Requesting Consult: aSul Lerner MD  Reason for Consult / Principal Problem:  Right hip wound  HPI: Tristan Hebert is a 71y o  year old female who presents with open wounds over the right hip and sacrum and chronic dislocation of the right femur  As of today patient states that she came to the hospital because she noticed that her wounds looked larger than she had previously thought  She was concerned that they were going to become infected  She is not in any pain currently  She has a history of spinal cord injury and has decreased sensation which she states contributes to her chronic wound problems because she doesn't notice pain  Patient has previously been evaluated by Dr Angie Lombardi on 1/29/20 and orthopedics at [facility] on 2/6/20  Patient notes that surgical options had been discussed with her in the past      Consults    ROS: see HPI, all other systems negative      Historical Information   Past Medical History:   Diagnosis Date    Anemia     iron defiencey    Arthritis     osteo    Back injury     Chronic kidney disease     nephritis    Depression     MRSA (methicillin resistant staph aureus) culture positive 12/07/2018    BONE-TISSUE     Osteopenia     Osteoporosis     Paralysis ([facility] [address] )     paraplegic due to spinal cord injury    Paraplegia (HCC)     Poor circulation     Pressure injury of skin     right hip, stage 3    Pressure sore of hip     right    Tibial plateau fracture     Underweight      Past Surgical History:   Procedure Laterality Date    CLOSURE DELAYED PRIMARY N/A 3/20/2019    Procedure: OPHELIA ANAL WOUND RECLOSURE;  Surgeon: Bipin Lujan MD;  Location: [address] [place] [address];  Service: Plastics    DECUBITUS ULCER EXCISION Bilateral 11/12/2019    Procedure: DEBRIDEMENT DECUBITI ([address] OUT); Surgeon: Chester Santoyo DO;  Location:  MAIN OR;  Service: General    FLAP LOCAL EXTREMITY Left 1/28/2019    Procedure: THIGH FLAP & STSG TO CLOSE HIP WOUND;  Surgeon: Bipin Lujan MD;  Location: [address] [place] [address];  Service: Plastics    FLAP LOCAL TRUNK Right 12/17/2018    Procedure: DEBRIDE/FLAP CLOSURE HIP PRESSURE SORE Cathy Muzzy GRAFT;  Surgeon: Bipin Lujan MD;  Location: [address] [place] [address] OR;  Service: Plastics    FLAP LOCAL TRUNK Left 2/27/2019    Procedure: BUTTOCK FLAP TO ISCHIAL SORE;  Surgeon: Bipin Lujan MD;  Location: [address] [place] [address];  Service: Plastics    HIP DEBRIDEMENT Right 2017    right hip sore debridement    INCISION AND DRAINAGE OF WOUND Left 1/3/2019    Procedure: INCISION AND DRAINAGE (I&D) left distal femur  With deep wound cultures   Application of VAC DRAIN SPONGE;  Surgeon: Miguelangel Corey MD;  [facility];  Service: Orthopedics    IR PICC LINE  12/19/2018    IR PICC LINE  3/12/2019    IA DEBRIDEMENT, SKIN, SUB-Q TISSUE,MUSCLE,BONE,=<20 SQ CM Right 9/19/2018    Procedure: DEBRIDEMENT OF HIP PRESSURE SORE;  Surgeon: Bipin Lujan MD;  Location: [address] [place] [address];  Service: Plastics    IA OPEN RX FEMUR FX+INTRAMED FELIX Left 10/22/2018    Procedure: INSERTION NAIL IM LEFT FEMUR RETROGRADE;  Surgeon: Maru Castañeda MD;  [facility];  Service: Orthopedics    TOE AMPUTATION Left 2017    small toe left foot amputation    WISDOM TOOTH EXTRACTION      WOUND DEBRIDEMENT Left 12/17/2018    Procedure: DEBRIDEMENT HIP PRESSURE SORE;  Surgeon: Eddie Barron MD;  [facility] MA[facility];  Service: Plastics    WOUND DEBRIDEMENT N/A 11/10/2019    Procedure: EXCISIONAL DEBRIDEMENT;  Surgeon: Maria Del Carmen Sanders MD;  Location: [facility] OR;  Service: General     Social History   Social History     Substance and Sexual Activity   Alcohol Use Yes    Comment: occasional     Social History     Substance and Sexual Activity   Drug Use No     Social History     Tobacco Use   Smoking Status Never Smoker   Smokeless Tobacco Never Used     Family History:   Family History   Problem Relation Age of Onset    Depression Father        Meds/Allergies   Medications Prior to Admission   Medication    acetaminophen (TYLENOL) 325 mg tablet    artificial tear (LUBRIFRESH P M ) 83-15 % ophthalmic ointment    bisacodyl (DULCOLAX) 10 mg suppository    Calcium Carb-Cholecalciferol 600-200 MG-UNIT TABS    cholecalciferol (VITAMIN D3) 1,000 units tablet    cyanocobalamin 100 MCG tablet    dextran 70-hypromellose (GENTEAL TEARS) 0 1-0 3 % ophthalmic solution    famotidine (PEPCID) 20 mg tablet    loperamide (IMODIUM) 2 mg capsule    midodrine (PROAMATINE) 10 MG tablet    multivitamin (THERAGRAN) TABS    nystatin (MYCOSTATIN) powder    pantoprazole (PROTONIX) 40 mg tablet    saccharomyces boulardii (FLORASTOR) 250 mg capsule    senna (SENOKOT) 8 6 mg     Allergies   Allergen Reactions    Iv Contrast [Iodinated Diagnostic Agents]      Tingling face, itching    Cefepime Rash    Ciprofloxacin Rash and Swelling     Looked like suburn, itching  Bed sores  Swelling, itching    Latex Rash    Vancomycin Rash     Unknown        Objective   Vitals: Blood pressure 125/58, pulse 74, temperature (!) 97 °F (36 1 °C), temperature source Temporal, resp  rate 20, SpO2 97 %, not currently breastfeeding  ,There is no height or weight on file to calculate BMI  Intake/Output Summary (Last 24 hours) at 3/11/2020 1345  Last data filed at 3/11/2020 8972  Gross per 24 hour   Intake 1168 33 ml   Output 825 ml   Net 343 33 ml     I/O last 24 hours: In: 1168 3 [I V :168 3; IV Piggyback:1000]  Out: 825 [Urine:825]    Invasive Devices     Peripheral Intravenous Line            Peripheral IV 03/10/20 Left Antecubital 1 day          Drain            Urethral Catheter 22 days    Urethral Catheter less than 1 day                PE:  Gen:  Awake and alert  HEENT:  Hearing intact  Heart:  Regular rate  Lungs: no audible wheezing  GI: no abdominal distension    Ortho Exam   Right hip:  Inspection- 2 distinct wounds over the right hip  Both are without erythema or drainage at this time  Able to see fascia overlying right hip joint  Neurovascular- unable to dorsiflex or plantar flex bilateral feet and they toes due to spinal cord injury  Sensation significantly diminished L4 through S1  Palpable dorsalis pedis pulses bilaterally  Lab Results:   CBC:   Lab Results   Component Value Date    WBC 17 75 (H) 03/11/2020    HGB 9 1 (L) 03/11/2020    HCT 29 4 (L) 03/11/2020    MCV 84 03/11/2020     (H) 03/11/2020    MCH 25 9 (L) 03/11/2020    MCHC 31 0 (L) 03/11/2020    RDW 17 5 (H) 03/11/2020    MPV 9 8 03/11/2020     CMP:   Lab Results   Component Value Date    SODIUM 136 03/11/2020     03/11/2020    CO2 22 03/11/2020    BUN 9 03/11/2020    CREATININE 0 60 03/11/2020    CALCIUM 8 0 (L) 03/11/2020    AST 9 03/11/2020    ALT 18 03/11/2020    ALKPHOS 225 (H) 03/11/2020    EGFR 93 03/11/2020     Imaging Studies: I have personally reviewed pertinent films in PACS    CT abdomen and pelvis with IV contrast- worsening of right hip dislocation compared to CT on 02/09/2020    Evidence of air that extends into the deep soft tissues and right hip joint      Code Status: Level 1 - Full Code

## 2020-03-11 NOTE — ASSESSMENT & PLAN NOTE
Malnutrition Findings:   Malnutrition type: Chronic illness  Degree of Malnutrition: Malnutrition of mild degree    BMI Findings:  BMI Classifications: Underweight < 18 5     There is no height or weight on file to calculate BMI  Patient is frail and wasted  Multiple, nonhealing wounds  Nutrition consult appreciated  Add supplements  Calorie count  Glaucoma Joint effusion of shoulder region, right

## 2020-03-11 NOTE — DISCHARGE INSTR - OTHER ORDERS
Wound Care Plan:   1-Right hip and anterior thigh wounds--cleanse with normal saline, pat dry  Apply small amount of Hydrogel to right lateral hip and right anterior thigh wound beds  Cover wounds with 1 long piece of non-adherent oil emulsion dressing (adaptic) and ABD  Change dressing daily  DO NOT PACK ANYTHING INTO WOUND  2-Sacrum and right buttock--cleanse with soap and water, pat dry  Place Maxorb Ag to midline sacral wound  Cover sacral wound and right buttock hyperpigmentation with large sacral Allevyn Life foam dressing  Change dressing every other day and as needed with soilage  3-Left ischial--cleanse with normal saline  Fluff Maxorb Ag into wound bed and cover with Allevyn Life foam dressing  Change dressing every other day and as needed with soilage  Skin care plans:  1-Apply Hydraguard lotion to bilateral heels twice daily for skin protection  2-Elevate heels off of bed/chair (float off of pillows) to offload pressure  3-Offloading air cushion in chair when out of bed  4-Moisturize skin daily with lotion  5-Turn/reposition every 2 hours while in bed and weight shift frequently while in chair for pressure re-distribution on skin  Limit OOB to chair to 1-2 hours twice daily to optimize wound offloading  6-Low air-loss mattress  Follow-up at the wound center--[phone]

## 2020-03-11 NOTE — ASSESSMENT & PLAN NOTE
· History of paraplegia, chronic wounds  · Consults to Ortho and general surgery appreciated  · Wound care appreciated  · Patient with multiple nonhealing pressure point wounds across body - 1) right anterior hip, 2) right lateral hip, 3) left ischial, 4) left lateral ischial, 5) midline sacral, and 6) right buttock  · Will order low air loss mattress  Provided script to case management  · Low suspicion that wounds caused sepsis  Monitoring off of antibiotics for now    · PT/OT

## 2020-03-11 NOTE — CONSULTS
Consultation - Infectious Disease   Mikal Concepicon 71 y o  female MRN: [id number]  Unit/Bed#: E5 -01 Encounter: [id number]      IMPRESSION & RECOMMENDATIONS:   1  SIRS syndrome, POA  Patient presented on admission with tachycardia which abruptly resolved along with elevated white count  Continued elevation likely due to steroids after contrast study  Suspect that it may represent ongoing inflammation  She otherwise has no specific complaints and her wound exam is benign  Low suspicion at this time for active infection  CT without abscess but there is inflammation present at the right hip which is likely due to ongoing rotation irritation of the tissues  She otherwise is afebrile and hemodynamically stable  Will stop further antibiotics  Continue to trend fever curve/vitals  Repeat CBC/chemistry tomorrow  Follow up pending culture data  Additional supportive care as per primary    2  Right hip open wound with underlying osteomyelitis and dislocation of right femur, POA  Patient has known dislocation of her right femur which has led to progression of the large open hip wound  The site itself does not appear acutely infected despite inflammation seen on CT scan  Suspect ongoing irritation of the site  Patient with limited surgical options  Monitoring off antibiotics as above  Orthopedic evaluation pending  Recommend General surgery/wound care evaluation  Continue to trend fever curve/WBC    3  Multiple nonhealing pressure wounds, POA  Patient has multiple other wounds on her sacrum  These wounds again appears smaller than previously seen in February and do not appear acutely infected  Continue to monitor off antibiotic  Recommend formal wound care consult    4  History of C diff  Patient with known history of prior C diff  She is not having any significant diarrhea at this time but has recently received antibiotic    Continue oral vancomycin prophylaxis for now  Monitor stool output  Supportive care as per primary    5  Vancomycin allergy  Patient with known drug allergies to vancomycin, cefepime, ciprofloxacin  She has tolerated penicillin as well as Ancef and Keflex in the past     6  Malnourished  Patient noted with BMI of 17  Unfortunately this relays poor wound healing potential   Would recommend formal wound care consult and nutrition consult  7  Paraplegia with neurogenic bladder and chronic catheter  Patient's catheter bag with clear urine  UA noted to be abnormal which is likely due to chronic catheterization  Patient does not relate any fevers and cannot feel urinary symptoms otherwise  Otherwise hemodynamically stable  Monitoring off antibiotics as above  Above plan discussed in detail with the patient at bedside  Will update primary service attending of the above plan via Tiger text  HISTORY OF PRESENT ILLNESS:  Reason for Consult:  Sepsis    HPI: Elida Koyanagi is a 71y o  year old female with paraplegia secondary to spinal cord injury at the level of T8, neurogenic bladder, chronic Hays catheter, history of C diff, multiple and recurrent pressure ulcers  Patient is well known to our service and has a history of multi-drug resistant organisms as well  She was last evaluated by our service in February  Patient on that admission was evaluated by both General surgery and Orthopedics for a right hip wound with underlying osteomyelitis and myositis present  Repeat CT scan showed improvement  Scans itself are consistent with chronic osteomyelitis  At that time no surgical interventions were offered and so patient was given short course of antibiotic as there is no larger benefit for prolonged therapy without surgical intervention  Patient's most recent admission was on 03/02 where she was treated for sepsis at that time    She was evaluated by General surgery and Orthopedics to address her chronic wounds and patient was found to also have a dislocation of the right femoral head but she was not felt to be a surgical candidate  She presented to the emergency department this time for worsening in appearance her chronic  Patient was found to be tachycardic with leukocytosis and lactate elevation in the ER  Since evaluation patient has been seen by palliative care  Patient at this time remains afebrile  White blood cell count remains elevated at 17 7  She remains on Zosyn with oral vancomycin prophylaxis  Blood cultures are pending  Urine cultures are pending  Chest x-ray negative  CT scan of the abdomen noted Will his rotation of her dislocated femoral head, extension of her decubitus ulcer, increased cellulitis but no abscess  Large amount of stool noted in the rectum  Patient's other vitals are stable  UA largely unremarkable  Labs otherwise unremarkable  Most recent urine culture reviewed  Most recent wound cultures were from November  Previous clinical images reviewed of the patient's wounds  We are consulted at this time for further assistance in management  On evaluation, patient reports that she primarily presented to the hospital as she had now only recently seeing her right hip wound  She was concerned by the size and was worried that there was something progressing  She otherwise denied having any fevers, recent fatigue, myalgias, chest pain or shortness of breath  She has a chronic catheter in place with frequent debris but she does not have any symptoms for UTI to report given her underlying paraplegia  She has centrally reports being in an out of hospital as well as healthcare facility/rehab and has been receiving wound care at these places  She reports having some splotchy and changes to her skin after receiving a contrast study yesterday  She has no other new complaints at this time  REVIEW OF SYSTEMS:  A complete 12 point system-based review of systems is negative other than that noted in the HPI      PAST MEDICAL HISTORY:  Past Medical History:   Diagnosis Date    Anemia     iron defiencey    Arthritis     osteo    Back injury     Chronic kidney disease     nephritis    Depression     MRSA (methicillin resistant staph aureus) culture positive 12/07/2018    BONE-TISSUE     Osteopenia     Osteoporosis     Paralysis ([address] )     paraplegic due to spinal cord injury    Paraplegia (HCC)     Poor circulation     Pressure injury of skin     right hip, stage 3    Pressure sore of hip     right    Tibial plateau fracture     Underweight      Past Surgical History:   Procedure Laterality Date    CLOSURE DELAYED PRIMARY N/A 3/20/2019    Procedure: OPHELIA ANAL WOUND RECLOSURE;  Surgeon: Hazel Long MD;  Location: [address] [place] [address];  Service: Plastics    DECUBITUS ULCER EXCISION Bilateral 11/12/2019    Procedure: DEBRIDEMENT DECUBITI ([address] OUT); Surgeon: Stacia Elise DO;  Location: [facility];  Service: General    FLAP LOCAL EXTREMITY Left 1/28/2019    Procedure: THIGH FLAP & STSG TO CLOSE HIP WOUND;  Surgeon: Hazel Long MD;  Location: [address] [place] [address] OR;  Service: Plastics    FLAP LOCAL TRUNK Right 12/17/2018    Procedure: DEBRIDE/FLAP CLOSURE HIP PRESSURE SORE Garlan Mulch GRAFT;  Surgeon: Hazel Long MD;  Location: [address] [place] [address] OR;  Service: Plastics    FLAP LOCAL TRUNK Left 2/27/2019    Procedure: BUTTOCK FLAP TO ISCHIAL SORE;  Surgeon: Hazel Long MD;  Location: [address] [place] [address] OR;  Service: Plastics    HIP DEBRIDEMENT Right 2017    right hip sore debridement    INCISION AND DRAINAGE OF WOUND Left 1/3/2019    Procedure: INCISION AND DRAINAGE (I&D) left distal femur  With deep wound cultures   Application of VAC DRAIN SPONGE;  Surgeon: Jessica Turcios MD;  Location: [facility] OR;  Service: Orthopedics    IR PICC LINE  12/19/2018    IR PICC LINE  3/12/2019    AK DEBRIDEMENT, SKIN, SUB-Q TISSUE,MUSCLE,BONE,=<20 SQ CM Right 9/19/2018    Procedure: DEBRIDEMENT OF HIP PRESSURE SORE;  Surgeon: Hazel Long MD;  Location: [address] [place] [address] MAIN OR;  Service: Plastics    [place] OPEN RX FEMUR FX+INTRAMED FELIX Left 10/22/2018    Procedure: INSERTION NAIL IM LEFT FEMUR RETROGRADE;  Surgeon: Saloni Duenas MD;  Location: [facility];  Service: Orthopedics    TOE AMPUTATION Left 2017    small toe left foot amputation    WISDOM TOOTH EXTRACTION      WOUND DEBRIDEMENT Left 2018    Procedure: DEBRIDEMENT HIP PRESSURE SORE;  Surgeon: Lexx Gordon MD;  Location: [address] [place] [address] MAIN OR;  Service: Plastics    WOUND DEBRIDEMENT N/A 11/10/2019    Procedure: EXCISIONAL DEBRIDEMENT;  Surgeon: Merleen Hodgkin, MD;  Location: [facility];  Service: General       FAMILY HISTORY:  Non-contributory    SOCIAL HISTORY:  Social History   Social History     Substance and Sexual Activity   Alcohol Use Yes    Comment: occasional     Social History     Substance and Sexual Activity   Drug Use No     Social History     Tobacco Use   Smoking Status Never Smoker   Smokeless Tobacco Never Used       ALLERGIES:  Allergies   Allergen Reactions    Iv Contrast [Iodinated Diagnostic Agents]      Tingling face, itching    Cefepime Rash    Ciprofloxacin Rash and Swelling     Looked like suburn, itching  Bed sores  Swelling, itching    Latex Rash    Vancomycin Rash     Unknown        MEDICATIONS:  All current active medications have been reviewed      PHYSICAL EXAM:  Temp:  [96 4 °F (35 8 °C)-98 9 °F (37 2 °C)] 97 °F (36 1 °C)  HR:  [] 74  Resp:  [18-22] 20  BP: ()/(49-86) 125/58  SpO2:  [94 %-97 %] 97 %  Temp (24hrs), Av 5 °F (36 4 °C), Min:96 4 °F (35 8 °C), Max:98 9 °F (37 2 °C)  Current: Temperature: (!) 97 °F (36 1 °C)    Intake/Output Summary (Last 24 hours) at 3/11/2020 1215  Last data filed at 3/11/2020 8464  Gross per 24 hour   Intake 1168 33 ml   Output 825 ml   Net 343 33 ml       General Appearance:  Chronically ill-appearing and malnourished, nontoxic, and in no distress   Head:  Normocephalic, without obvious abnormality, atraumatic   Eyes:  Conjunctiva pink and sclera anicteric, both eyes   Nose: Nares normal, mucosa normal, no drainage   Throat: Oropharynx moist without lesions   Neck: Supple, symmetrical, no adenopathy, no tenderness/mass/nodules   Back:   Symmetric, no CVA tenderness appreciated, no spinal or paraspinal muscle pain  Lungs:   Clear to auscultation bilaterally, respirations unlabored on room   Chest Wall:  No tenderness or deformity   Heart:  RRR; no murmur, rub or gallop appreciated   Abdomen:   Soft, non-tender, non-distended, positive bowel sounds, Hays catheter in place draining clear yellow urine   Extremities: No cyanosis, clubbing or edema, significant muscle atrophy throughout  Skin: Patient's wounds evaluated and the sacral lesions have some necrotic debris but there is no significant periwound erythema or foul smell  The wounds on her right hip reviewed and again there is no foul smell, significant periwound erythema or drainage present on exam    Lymph nodes: Cervical, supraclavicular nodes normal   Neurologic: Alert and oriented x3, depressed affect on exam, she has full range of motion of her upper extremities  , she has no range of motion in her lower extremities bilaterally at baseline  LABS, IMAGING, & OTHER STUDIES:  Lab Results:  I have personally reviewed pertinent labs  Results from last 7 days   Lab Units 03/11/20  0612 03/10/20  1314   WBC Thousand/uL 17 75* 16 21*   HEMOGLOBIN g/dL 9 1* 9 5*   PLATELETS Thousands/uL 456* 537*     Results from last 7 days   Lab Units 03/11/20  0605 03/10/20  1313   POTASSIUM mmol/L 3 3* 3 7   CHLORIDE mmol/L 103 93*   CO2 mmol/L 22 22   BUN mg/dL 9 12   CREATININE mg/dL 0 60 0 72   EGFR ml/min/1 73sq m 93 86   CALCIUM mg/dL 8 0* 8 3   AST U/L 9 25   ALT U/L 18 19   ALK PHOS U/L 225* 132*     Results from last 7 days   Lab Units 03/10/20  1317 03/10/20  1313   BLOOD CULTURE  Received in Microbiology Lab  Culture in Progress  Received in Microbiology Lab  Culture in Progress  Imaging Studies:   I have personally reviewed pertinent imaging study reports and images in PACS  Other Studies:   I have personally reviewed pertinent reports

## 2020-03-11 NOTE — PLAN OF CARE
Problem: Potential for Falls  Goal: Patient will remain free of falls  Description  INTERVENTIONS:  - Assess patient frequently for physical needs  -  Identify cognitive and physical deficits and behaviors that affect risk of falls  -  [place] fall precautions as indicated by assessment   - Educate patient/family on patient safety including physical limitations  - Instruct patient to call for assistance with activity based on assessment  - Modify environment to reduce risk of injury  - Consider OT/PT consult to assist with strengthening/mobility  Outcome: Progressing     Problem: Prexisting or High Potential for Compromised Skin Integrity  Goal: Skin integrity is maintained or improved  Description  INTERVENTIONS:  - Identify patients at risk for skin breakdown  - Assess and monitor skin integrity  - Assess and monitor nutrition and hydration status  - Monitor labs   - Assess for incontinence   - Turn and reposition patient  - Assist with mobility/ambulation  - Relieve pressure over bony prominences  - Avoid friction and shearing  - Provide appropriate hygiene as needed including keeping skin clean and dry  - Evaluate need for skin moisturizer/barrier cream  - Collaborate with interdisciplinary team   - Patient/family teaching  - Consider wound care consult   Outcome: Progressing     Problem: Nutrition/Hydration-ADULT  Goal: Nutrient/Hydration intake appropriate for improving, restoring or maintaining nutritional needs  Description  Monitor and assess patient's nutrition/hydration status for malnutrition  Collaborate with interdisciplinary team and initiate plan and interventions as ordered  Monitor patient's weight and dietary intake as ordered or per policy  Utilize nutrition screening tool and intervene as necessary  Determine patient's food preferences and provide high-protein, high-caloric foods as appropriate       INTERVENTIONS:  - Monitor oral intake, urinary output, labs, and treatment plans  - Assess nutrition and hydration status and recommend course of action  - Evaluate amount of meals eaten  - Assist patient with eating if necessary   - Allow adequate time for meals  - Recommend/ encourage appropriate diets, oral nutritional supplements, and vitamin/mineral supplements  - Order, calculate, and assess calorie counts as needed  - Recommend, monitor, and adjust tube feedings and TPN/PPN based on assessed needs  - Assess need for intravenous fluids  - Provide specific nutrition/hydration education as appropriate  - Include patient/family/caregiver in decisions related to nutrition  Outcome: Progressing

## 2020-03-11 NOTE — CONSULTS
Consultation - [address] 71 y o  female MRN: [id number]  Unit/Bed#: E5 -01 Encounter: [id number]      Physician Requesting Consult: Mellissa Carbajal MD  Reason for Consult / Principal Problem: goals of care and address POLST      Assessment/Plan:  1  Sepsis  2  Chronic non healing wounds R hip, sacrum, ischium  3  Closed dislocation of distal end of R femur non-operable  4  Paraplegia s/p SCI  5  Debility/Deconditioning  6  Protein calorie malnutrition  7  Frailty  8  H/o Cdiff  9  Goals of care:  Long discussion with patient regarding current condition, previous hospitalizations, rehabs, code status, ability to return home independently  Patient reports that she was only home to her apartment for approximately 3 days prior to presenting to ER  She has not been home since her 11/2019 hospital admission as she went to rehab/facility then back to hospital on 2/5/2020 then to rehab again  She said that she was shocked to see the wounds appearance and were not consistent with what she was told during dressing changes at the facilities  She admits to depressed mood but feels this is because of her medical condition and situation  She is reluctant to try an antidepressant even after a lengthy discussion regarding that it may help with appetite and sleep  She does not want me to order at this time  She also is defensive regarding her nutritional status saying that we "are blaming her for her condition and saying she isn't eating"  She reports that she does complete meals and that her appetite is fine unless she talks or thinks about her condition, then she doesn't feel like eating  We discussed code status and limits of care  Patient wants to remain a full resuscitation and is willing to undergo feeding tube placement for nutritional support when needed  She does not want to be prolonged on machines if she in a vegetative state    She states that she thinks her POA is aware of her wishes  She is willing to undergo any and all necessary surgical procedures to help with her wounds  We discussed the possibility of this not being offered and that these non-healing wounds will continue to fester and present problems in the future  She became defensive and felt that I was questioning her nutritional status  I discussed the concerns  We briefly discussed a POLST form  She wants to continue to be hospitalized in the future if needed  I encouraged patient to begin to think about transitioning to a ltc facility in the future if there comes a time where she is unable to care for basic ADLs, and wounds  She feels that spiritual care eval will provide extra support to her as she is dealing with this illness       -continue all and any aggressive medical measures to prolong life; continue current medical management  -continue level 1 code  -patient wants to return to hospital if needed in future  -PT/OT eval, patient's ability to transfer, safely care for herself, perform ADLs  -nutrition eval, calorie count  -t/c starting mirtazapine 7 5mg nightly for depressed mood, appetite - patient wants to think about this a bit more and will discuss with primary team  -consult spiritual care, support  -patient to begin to think about transition to ltc facility given extent of wounds, overall condition, depending on above evals and safety to remain independently at home  -notified primary  -will follow peripherally    Code Status: Level 1 - Full Code  Advance Directive and Living Will: Yes  , Living will, listed that patient wants life sustaining treatment unless in irreversible coma/terminal condition, wants feeding tube placement  Power of [facility] -[phone]  POLST:  No  Lives in 1st floor apartment independently  Not , no children,  parents, no close family  Friends and neighbors that help out  Dog/poodle/Lorraine, has had a few years now    Has food delivered from [address]  Able to dress self, prepare meals, clean self  Incontinent of bowel, indwelling Isabel  Wheelchair bound, able to transfer  Walks dog for ozzy  Spiritual, but does not identify with a particular Latter day  History of Present Illness   HPI: Manuel Macdonald is a 71y o  year old female who presents to the ER with worsening of chronic wounds, fatigue, tachycardia, leukocytosis and elevated lactic acid  PMH significant for T8 paraplegia following SCI 1981, multiple chronic wounds, osteomyelitis of the R hip and sacrum, R hip dislocation 1/2020 deemed non-operable, severe protein calorie malnutrition, neurogenic bladder w/ chronic indwelling isabel  Surgery evaluated wound photos and does not believe that patient needs debridement at this time  Patient with 2 hospital admissions in last 6 months for sepsis and wounds  Last hospital admission was from 2/5/2020 to 3/2/2020 with discharge to [facility]   Patient was in [facility] from 1/17/2020-2/5/2020 following discharge from [facility] following her [facility] hospital admission 11/10-20/2020  Per nursing home note from 2/5/2020, patient bed bound since 1/17/2020 with multiple chronic non healing pressure ulcers and consistent poor meal completion during time at facility  Review of Systems   Constitutional: Positive for activity change, appetite change and fatigue  Gastrointestinal: Positive for constipation  Often incontinent of bowel   Genitourinary:        Incontinent of bladder, indwelling Isabel   Skin: Positive for pallor and wound (large wounds, shocking and worse than thought to patient when returned home from prolonged hospitalization-rehab stent)  Neurological: Positive for weakness  Psychiatric/Behavioral: Positive for dysphoric mood  All other systems reviewed and are negative        Historical Information   Past Medical History:   Diagnosis Date    Anemia iron defiencey    Arthritis     osteo    Back injury     Chronic kidney disease     nephritis    Depression     MRSA (methicillin resistant staph aureus) culture positive 12/07/2018    BONE-TISSUE     Osteopenia     Osteoporosis     Paralysis ([facility] [address] )     paraplegic due to spinal cord injury    Paraplegia (HCC)     Poor circulation     Pressure injury of skin     right hip, stage 3    Pressure sore of hip     right    Tibial plateau fracture     Underweight      Patient Active Problem List   Diagnosis    Paraplegia following spinal cord injury ([facility] )    Pressure ulcer, sacrum    Iron deficiency anemia    Osteopenia    Underweight    Decubitus ulcer of left ischium, stage III (HCC)    Rash due to allergy    Closed fracture of distal end of left femur with routine healing    Closed fracture of lower end of left femur with routine healing    Chronic osteomyelitis of coccyx (HCC)    Anemia of chronic disease    GERD (gastroesophageal reflux disease)    Moderate protein-calorie malnutrition (HCC)    Hyperglycemia    BMI less than 19,adult    Hypermagnesemia    Complicated wound infection    Sepsis due to anaerobes (HCC)    C  difficile diarrhea    Pressure injury of sacral region, unstageable ([facility] [facility] )    Acute blood loss anemia    Sepsis (HCC)    Neurogenic bladder    Other osteoporosis without current pathological fracture    Closed dislocation of right hip (HCC)    Elevated liver function tests    Physical deconditioning    Open wound of right hip    Severe protein-calorie malnutrition (HCC)    Depression    Abnormal CT of the chest    Lung nodule    Cognitive decline    Abnormal CT scan    Closed lateral dislocation of distal end of right femur     Past Surgical History:   Procedure Laterality Date    CLOSURE DELAYED PRIMARY N/A 3/20/2019    Procedure: OPHELIA ANAL WOUND RECLOSURE;  Surgeon: Jason Villalba MD;  Location:  MAIN OR;  Service: Plastics    DECUBITUS ULCER EXCISION Bilateral 11/12/2019    Procedure: DEBRIDEMENT DECUBITI [facility] OUT); Surgeon: Kavitha Maier DO;  Location:  MAIN OR;  Service: General    FLAP LOCAL EXTREMITY Left 1/28/2019    Procedure: THIGH FLAP & STSG TO CLOSE HIP WOUND;  Surgeon: Esmer Tian MD;  Location: [facility] MAIN OR;  Service: Plastics    FLAP LOCAL TRUNK Right 12/17/2018    Procedure: DEBRIDE/FLAP CLOSURE HIP PRESSURE SORE [place] GRAFT;  Surgeon: Esmer Tian MD;  Location: [facility] MAIN OR;  Service: Plastics    FLAP LOCAL TRUNK Left 2/27/2019    Procedure: BUTTOCK FLAP TO ISCHIAL SORE;  Surgeon: Esmer Tian MD;  Location: [facility] MAIN OR;  Service: Plastics    HIP DEBRIDEMENT Right 2017    right hip sore debridement    INCISION AND DRAINAGE OF WOUND Left 1/3/2019    Procedure: INCISION AND DRAINAGE (I&D) left distal femur  With deep wound cultures   Application of VAC DRAIN SPONGE;  Surgeon: Everlina Lefort, MD;  Location:  MAIN OR;  Service: Orthopedics    IR PICC LINE  12/19/2018    IR PICC LINE  3/12/2019    MO DEBRIDEMENT, SKIN, SUB-Q TISSUE,MUSCLE,BONE,=<20 SQ CM Right 9/19/2018    Procedure: DEBRIDEMENT OF HIP PRESSURE SORE;  Surgeon: Esmer Tian MD;  Location: [facility] MAIN OR;  Service: [address] [place] [address] FX+INTRAMED FELIX Left 10/22/2018    Procedure: INSERTION NAIL IM LEFT FEMUR RETROGRADE;  Surgeon: Jeancarlos Scruggs MD;  Location:  MAIN OR;  Service: Orthopedics    TOE AMPUTATION Left 2017    small toe left foot amputation    WISDOM TOOTH EXTRACTION      WOUND DEBRIDEMENT Left 12/17/2018    Procedure: DEBRIDEMENT HIP PRESSURE SORE;  Surgeon: Esmer Tian MD;  Location: [facility] MAIN OR;  Service: Plastics    WOUND DEBRIDEMENT N/A 11/10/2019    Procedure: EXCISIONAL DEBRIDEMENT;  Surgeon: Ana Mcintosh MD;  Location:  MAIN OR;  Service: General     Social History     Socioeconomic History    Marital status: Single     Spouse name: None    Number of children: None    Years of education: None    Highest education level: None   Occupational History    None   Social Needs    Financial resource strain: None    Food insecurity:     Worry: None     Inability: None    Transportation needs:     Medical: None     Non-medical: None   Tobacco Use    Smoking status: Never Smoker    Smokeless tobacco: Never Used   Substance and Sexual Activity    Alcohol use: Yes     Comment: occasional    Drug use: No    Sexual activity: None   Lifestyle    Physical activity:     Days per week: None     Minutes per session: None    Stress: None   Relationships    Social connections:     Talks on phone: None     Gets together: None     Attends Cheondoism service: None     Active member of club or organization: None     Attends meetings of clubs or organizations: None     Relationship status: None    Intimate partner violence:     Fear of current or ex partner: None     Emotionally abused: None     Physically abused: None     Forced sexual activity: None   Other Topics Concern    None   Social History Narrative    Lives at home alone  Pt denies getting assistance from outside persons or agencies including wound care        Family History   Problem Relation Age of Onset    Depression Father        Meds/Allergies   all current active meds have been reviewed and current meds:   Current Facility-Administered Medications   Medication Dose Route Frequency    acetaminophen (TYLENOL) tablet 650 mg  650 mg Oral Q6H PRN    artificial tear (LUBRIFRESH P M ) ophthalmic ointment   Both Eyes HS    bisacodyl (DULCOLAX) rectal suppository 10 mg  10 mg Rectal Daily PRN    cyanocobalamin (VITAMIN B-12) tablet 100 mcg  100 mcg Oral Daily    dextran 70-hypromellose (GENTEAL TEARS) 0 1-0 3 % ophthalmic solution 1 drop  1 drop Both Eyes Q3H PRN    enoxaparin (LOVENOX) subcutaneous injection 40 mg  40 mg Subcutaneous Daily    methylPREDNISolone sodium succinate (Solu-MEDROL) injection 125 mg  125 mg Intravenous Daily    midodrine (PROAMATINE) tablet 10 mg  10 mg Oral TID AC    nystatin (MYCOSTATIN) powder   Topical BID    pantoprazole (PROTONIX) EC tablet 40 mg  40 mg Oral Daily    piperacillin-tazobactam (ZOSYN) 4 5 g in sodium chloride 0 9 % 100 mL IVPB  4 5 g Intravenous Q6H    saccharomyces boulardii (FLORASTOR) capsule 250 mg  250 mg Oral BID    senna (SENOKOT) tablet 8 6 mg  1 tablet Oral HS    sodium chloride (PF) 0 9 % injection 3 mL  3 mL Intravenous PRN    sodium chloride 0 9 % infusion  100 mL/hr Intravenous Continuous    vancomycin (VANCOCIN) oral solution 125 mg  125 mg Oral Q12H [facility]     Facility-Administered Medications Ordered in Other Encounters   Medication Dose Route Frequency    dexamethasone (DECADRON) injection 4 mg  4 mg Intravenous Once PRN    lactated ringers infusion  75 mL/hr Intravenous Continuous    lactated ringers infusion  50 mL/hr Intravenous Continuous    lactated ringers infusion  75 mL/hr Intravenous Continuous    lactated ringers infusion  75 mL/hr Intravenous Continuous    ondansetron (ZOFRAN) injection 4 mg  4 mg Intravenous Once PRN    promethazine (PHENERGAN) injection 12 5 mg  12 5 mg Intravenous Once PRN       Allergies   Allergen Reactions    Iv Contrast [Iodinated Diagnostic Agents]      Tingling face, itching    Cefepime Rash    Ciprofloxacin Rash and Swelling     Looked like suburn, itching  Bed sores  Swelling, itching    Latex Rash    Vancomycin Rash     Unknown        Objective   BP 98/86   Pulse 68   Temp (!) 96 4 °F (35 8 °C) (Temporal)   Resp 18   LMP  (LMP Unknown)   SpO2 96%   Physical Exam   Constitutional: She is oriented to person, place, and time  Frail, chronically ill appearing   HENT:   Head: Normocephalic and atraumatic  Mouth/Throat: Oropharynx is clear and moist    Eyes: Conjunctivae and EOM are normal  No scleral icterus  Neck: Normal range of motion  Neck supple  Cardiovascular: Normal rate  Pulmonary/Chest: Effort normal  No stridor   No respiratory distress  Abdominal: She exhibits no distension  Musculoskeletal:   Unable to walk, wheelchair bound   Neurological: She is alert and oriented to person, place, and time  Skin: There is pallor  Media images of ulcerations reviewed  Wounds with fibrinous, necrotic material, some surrounding erythema on sacrum/ischium/buttock wounds  Psychiatric: Judgment and thought content normal    Depressed mood, flat affect   Nursing note and vitals reviewed  Lab Results:   I have personally reviewed pertinent labs  , CBC:   Lab Results   Component Value Date    WBC 17 75 (H) 03/11/2020    HGB 9 1 (L) 03/11/2020    HCT 29 4 (L) 03/11/2020    MCV 84 03/11/2020     (H) 03/11/2020    MCH 25 9 (L) 03/11/2020    MCHC 31 0 (L) 03/11/2020    RDW 17 5 (H) 03/11/2020    MPV 9 8 03/11/2020    NRBC 0 03/10/2020   , CMP:   Lab Results   Component Value Date    SODIUM 127 (L) 03/10/2020    K 3 7 03/10/2020    CL 93 (L) 03/10/2020    CO2 22 03/10/2020    BUN 12 03/10/2020    CREATININE 0 72 03/10/2020    CALCIUM 8 3 03/10/2020    AST 25 03/10/2020    ALT 19 03/10/2020    ALKPHOS 132 (H) 03/10/2020    EGFR 86 03/10/2020   Albumin: 2 1  Imaging Studies: I have personally reviewed pertinent reports  I have personally viewed the imaging studies, CXR and CT a/p   CT a/p read as: Interval lateral rotation of dislocated right femoral head, overlying decubitus ulcer now extends to the deep soft tissues superior to the acetabulum, within the previous location of the femoral head  Increased surrounding cellulitis without evidence of gas or fluid collection to suggest an abscess, stable left ischial decubitus ulcer, small left hip joint effusion, large amount of stool distending the rectum may indicate impaction, no evidence of bowel obstruction, colitis or diverticulitis      EKG, Pathology, and Other Studies: I have personally reviewed pertinent reports          Counseling / Coordination of Care  Total floor / unit time spent today 55 minutes  Greater than 50% of total time was spent with the patient and / or family counseling and / or coordination of care  A description of the counseling / coordination of care: current condition, ability to remain at home independently, goals of care, code status, emotional support      Livia Christianson DO

## 2020-03-11 NOTE — MALNUTRITION/BMI
This medical record reflects one or more clinical indicators suggestive of malnutrition and/or morbid obesity  Malnutrition Findings:   Malnutrition type: Chronic illness  Degree of Malnutrition: Malnutrition of mild degree  Malnutrition Characteristics: Inadequate energy, Weight loss(20% weight loss x 9 months, poor po for 4 months, BMI 17 9)    BMI Findings:  BMI Classifications: Underweight < 18 5     There is no height or weight on file to calculate BMI  See Nutrition note dated 3/11/19 for additional details  Completed nutrition assessment is viewable in the nutrition documentation

## 2020-03-11 NOTE — ASSESSMENT & PLAN NOTE
· Hyponatremia likely due to sepsis and poor p o  Intake/value nutrition, evidence by sodium level 127, treated with normal saline 350 mL boluses, continue normal saline 100 mL/hr and lab monitoring    · Monitoring BMP  · Appreciate nutrition consult

## 2020-03-11 NOTE — ASSESSMENT & PLAN NOTE
Malnutrition Findings:   Malnutrition type: Chronic illness  Degree of Malnutrition: Malnutrition of mild degree    BMI Findings:  BMI Classifications: Underweight < 18 5     There is no height or weight on file to calculate BMI  Patient with poor p o  Intake  She is frail and wasted on exam   Nutrition consult appreciated  Monitor caloric intake  Nutrition supplements ordered

## 2020-03-11 NOTE — CONSULTS
Consult Note - Wound   Collie Yoanna 71 y o  female MRN: [id number]  Unit/Bed#: E5 -01 Encounter: [id number]      History and Present Illness:  71year old female presented to the hospital from home with concern that her wounds were getting larger  Patient's history significant for T8 paraplegia with chronic wounds secondary to spinal cord injury, neurogenic bladder with isabel catheter  Patient has had several wound debridements and attempted flap surgery in the past 1-2 years with Dr Darrell Williamson  Patient known to this wound care nurse from previous admission  Assessment Findings:   Patient seen for wound care consultation along with surgical PA  Patient agreeable to assessment, requires assist x 2 to turn in bed  Isabel catheter in place, incontinent of stool  Patient generally pale and cachectic with significant bony prominence to left trochanter  Bilateral heels are dry and intact  Scar tissue from old donor sites to bilateral thighs  Scar tissue to sacrum  1   Present on admission stage 3 pressure injury to left ischium--wound debrided by surgical PA  Garrett slough present  2   Present on admission wound to left lateral ischium (traumatic vs unknown etiology)--irregularly shaped brown, dry area with surrounding erythema, possibly from tape removal, uncertain etiology at this time  3   Present on admission evolving deep tissue injury to midline sacrum (suspect stage 3 or 4)--wound bed clean with purple, evolving edges  Would expect wound measurements to increase in the near future as wound continues to evolve  4   Present on admission deep tissue injury to right buttock--two maroon, non-blanchable areas within blanchable hyperpigmented scar tissue  Epidermis intact at this time  5   Present on admission wound to right lateral hip and right anterior thigh--wounds likely with pressure component externally and internally (hip dislocation)    Right lateral hip wound bed shifts and creates sucking sound with any movement of right leg  Wound edges intact, but unattached  Wound tracks to significant depth at 12 o'clock  Right anterior thigh wound is pink and partial thickness  This wound no longer has any undermining or tunneling  Yasemin-wound skin is intact with well healed graft donor site  Wounds do not appear to be source of infection at this time--no induration, odor, fluctuance or purulent drainage noted  See flowsheet and media for wound details  Wound Care Plan:   1-Right hip and anterior thigh wounds--cleanse with normal saline, pat dry  Apply small amount of Hydrogel to right lateral hip and right anterior thigh wound beds  Cover wounds with 1 long piece of non-adherent oil emulsion dressing (adaptic) and ABD  Change dressing daily  DO NOT PACK ANYTHING INTO WOUND  2-Sacrum and right buttock--cleanse with soap and water, pat dry  Place Maxorb Ag to midline sacral wound  Cover sacral wound and right buttock hyperpigmentation with large sacral Allevyn Life foam dressing  Austin with T   Change dressing every other day and PRN with soilage  3-Left ischial--cleanse with normal saline  Fluff Maxorb Ag into wound bed and cover with Allevyn Life foam dressing  Austin with T   Change dressing every other day and PRN with soilage  Skin care plans:  1-Apply Allevyn Life foam dressing to bilateral heels and left trochanter bony prominence for prevention  Austin with P   Peel back at least daily for skin assessment and re-apply  Change dressing every 3 days and PRN  2-Elevate heels to offload pressure  3-Offloading air cushion in chair when out of bed  4-Moisturize skin daily with skin nourishing cream   5-Turn/reposition q2h or when medically stable for pressure re-distribution on skin--use positioning wedges  6-P500 low air-loss mattress  Wound care team to follow  Plan of care reviewed with primary RN      Discussed patient getting low air-loss mattress at home with case management and [facility]--to fill out prescription  Patient will require VNA on discharge and follow-up at the wound center if returning home  Wound 02/06/20 Pressure Injury Hip Right;Lateral (Active)   Wound Image   3/11/2020  2:58 PM   Wound Description Beefy red;Light purple 3/11/2020  2:58 PM   Staging Stage IV 3/11/2020  2:58 PM   Yasemin-wound Assessment Other (Comment) 3/11/2020  2:58 PM   Wound Length (cm) 5 cm 3/11/2020  2:58 PM   Wound Width (cm) 4 7 cm 3/11/2020  2:58 PM   Wound Depth (cm) 6 5 3/11/2020  2:58 PM   Calculated Wound Area (cm^2) 23 5 cm^2 3/11/2020  2:58 PM   Calculated Wound Volume (cm^3) 152 75 cm^3 3/11/2020  2:58 PM   Change in Wound Size % -2985 86 3/11/2020  2:58 PM   Drainage Amount Small 3/11/2020  2:58 PM   Drainage Description Serous; Serosanguineous 3/11/2020  2:58 PM   Non-staged Wound Description Full thickness 3/11/2020  2:58 PM   Treatments Cleansed 3/11/2020  2:58 PM   Dressing Hydrogel;Non adherent;ABD 3/11/2020  2:58 PM   Wound packed? No 3/11/2020  2:58 PM   Dressing Changed Changed 3/11/2020  2:58 PM   Patient Tolerance Tolerated well 3/11/2020  2:58 PM   Dressing Status Clean;Dry; Intact 3/11/2020  2:58 PM       Wound 02/06/20 Pressure Injury Thigh Anterior;Proximal;Right (Active)   Wound Image   3/11/2020  3:00 PM   Wound Description Pink 3/11/2020  3:00 PM   Yasemin-wound Assessment Clean; Intact;Dry 3/11/2020  3:00 PM   Wound Length (cm) 3 2 cm 3/11/2020  3:00 PM   Wound Width (cm) 1 4 cm 3/11/2020  3:00 PM   Wound Depth (cm) 0 4 3/11/2020  3:00 PM   Calculated Wound Area (cm^2) 4 48 cm^2 3/11/2020  3:00 PM   Calculated Wound Volume (cm^3) 1 79 cm^3 3/11/2020  3:00 PM   Change in Wound Size % 77 05 3/11/2020  3:00 PM   Drainage Amount Scant 3/11/2020  3:00 PM   Drainage Description Serous 3/11/2020  3:00 PM   Non-staged Wound Description Partial thickness 3/11/2020  3:00 PM   Treatments Cleansed 3/11/2020  3:00 PM   Dressing Calcium Alginate;ABD 3/11/2020 3:00 PM   Dressing Changed Changed 3/11/2020  3:00 PM   Patient Tolerance Tolerated well 3/11/2020  3:00 PM   Dressing Status Clean; Intact;Dry 3/11/2020  3:00 PM       Wound 02/06/20 Pressure Injury Ischium Left (Active)   Wound Image   3/11/2020  3:16 PM   Wound Description Beefy red;Light purple;Brown;Tan;Slough 3/11/2020  3:16 PM   Staging Stage III 3/11/2020  3:16 PM   Yasemin-wound Assessment Erythema;Fragile 3/11/2020  3:16 PM   Wound Length (cm) 6 5 cm 3/11/2020  3:16 PM   Wound Width (cm) 4 cm 3/11/2020  3:16 PM   Wound Depth (cm) 1 5 3/11/2020  3:16 PM   Calculated Wound Area (cm^2) 26 cm^2 3/11/2020  3:16 PM   Calculated Wound Volume (cm^3) 39 cm^3 3/11/2020  3:16 PM   Change in Wound Size % -185 71 3/11/2020  3:16 PM   Drainage Amount Small 3/11/2020  3:16 PM   Drainage Description Serosanguineous 3/11/2020  3:16 PM   Non-staged Wound Description Full thickness 3/11/2020  3:16 PM   Treatments Cleansed;Irrigation with NSS 3/11/2020  3:16 PM   Dressing Calcium Alginate; Foam, Silicon (eg  Allevyn, etc) 3/11/2020  3:16 PM   Dressing Changed Changed 3/11/2020  3:16 PM   Patient Tolerance Tolerated well 3/11/2020  3:16 PM   Dressing Status Clean;Dry; Intact 3/11/2020  3:16 PM       Wound 02/21/20 Pressure Injury Sacrum Mid (Active)   Wound Image   3/11/2020  3:01 PM   Wound Description Light purple; Beefy red 3/11/2020  3:01 PM   Staging Deep Tissue Injury 3/11/2020  3:01 PM   Yasemin-wound Assessment Erythema;Fragile; Purple 3/11/2020  3:01 PM   Wound Length (cm) 3 2 cm 3/11/2020  3:01 PM   Wound Width (cm) 3 cm 3/11/2020  3:01 PM   Wound Depth (cm) 0 1 3/11/2020  3:01 PM   Calculated Wound Area (cm^2) 9 6 cm^2 3/11/2020  3:01 PM   Calculated Wound Volume (cm^3) 0 96 cm^3 3/11/2020  3:01 PM   Change in Wound Size % -60 3/11/2020  3:01 PM   Drainage Amount Small 3/11/2020  3:01 PM   Drainage Description Serosanguineous 3/11/2020  3:01 PM   Treatments Cleansed 3/11/2020  3:01 PM   Dressing Calcium Alginate; Foam, Silicon (eg  Allevyn, etc) 3/11/2020  3:01 PM   Dressing Changed Changed 3/11/2020  3:01 PM   Patient Tolerance Tolerated well 3/11/2020  3:01 PM   Dressing Status Clean;Dry; Intact 3/11/2020  3:01 PM       Wound 03/11/20 Pressure Injury Buttocks Right (Active)   Wound Image   3/11/2020  3:02 PM   Wound Description Light purple;Non-blanchable erythema 3/11/2020  3:02 PM   Staging Deep Tissue Injury 3/11/2020  3:02 PM   Yasemin-wound Assessment Hyperpigmented 3/11/2020  3:02 PM   Wound Length (cm) 2 cm 3/11/2020  3:02 PM   Wound Width (cm) 0 5 cm 3/11/2020  3:02 PM   Wound Depth (cm) 0 3/11/2020  3:02 PM   Calculated Wound Area (cm^2) 1 cm^2 3/11/2020  3:02 PM   Calculated Wound Volume (cm^3) 0 cm^3 3/11/2020  3:02 PM   Drainage Amount None 3/11/2020  3:02 PM   Treatments Cleansed 3/11/2020  3:02 PM   Dressing Foam, Silicon (eg  Allevyn, etc) 3/11/2020  3:02 PM   Dressing Changed New 3/11/2020  3:02 PM   Patient Tolerance Tolerated well 3/11/2020  3:02 PM   Dressing Status Clean;Dry; Intact 3/11/2020  3:02 PM       Wound 03/11/20 Traumatic Ischium Left;Lateral (Active)   Wound Image   3/11/2020  3:18 PM   Wound Description Brown;Dry 3/11/2020  3:18 PM   Yasmein-wound Assessment Erythema 3/11/2020  3:18 PM   Wound Length (cm) 1 cm 3/11/2020  3:18 PM   Wound Width (cm) 0 5 cm 3/11/2020  3:18 PM   Wound Depth (cm) 0 3/11/2020  3:18 PM   Calculated Wound Area (cm^2) 0 5 cm^2 3/11/2020  3:18 PM   Calculated Wound Volume (cm^3) 0 cm^3 3/11/2020  3:18 PM   Drainage Amount None 3/11/2020  3:18 PM   Treatments Cleansed 3/11/2020  3:18 PM   Dressing Foam, Silicon (eg  Allevyn, etc) 3/11/2020  3:18 PM   Dressing Changed New 3/11/2020  3:18 PM   Patient Tolerance Tolerated well 3/11/2020  3:18 PM   Dressing Status Clean;Dry; Intact 3/11/2020  3:18 PM     Cliff ALN, RN, Christine Olea

## 2020-03-11 NOTE — CONSULTS
Consultation - General Surgery  Sona Hemphill 71 y o  female MRN: [id number]  Unit/Bed#: E5 -01 Encounter: [id number]    Physician Requesting Consult: Francois Ram MD  Reason for Consult: buttock/hip/ischial wounds      Assessment/Plan:  72 yo female with paraplegia s/p SCI, h/o Clostridium difficile, chronic non-healing wounds of R hip, buttock, and ischium now p/w sepsis source uncertain; surgery is consulted for wound care recommendations    CT findings on admission significant for interval lateral rotation of dislocated right femoral head, stable left ischial decubitus ulcer with small left hip joint effusion, possible rectal stool impaction  UA very dirty, though patient is chronically catheterized, cultures pending    Patient seen in tandem with Latoya Herrmann RN from wound care    Afebrile, normotensive this morning after a 1L bolus yesterday evening d/t hypotension  Tachycardia resolved today  Leukocytosis 17 75 (16 21)    On exam, a total of 6 active wounds were assessed:    1  Right anterior hip wound  - 3 2 x 1 4 cm in size  - s/p flap grafting by Dr Niles Bee, plastic surgery about 1 year ago  - since last admission, appears to be healing, no identifiable track from this to the lateral hip wound  - no sign of infection, no drainage  - Maxorb alginate placed on wound bed and covered with ABD    2  Right lateral hip wound  - 5 0 length x 4 7 width x 6 5 depth cm  - wound tracks deeply, however no evidence of infection such as drainage or malodor  - visibly mobile deep tissue with joint manipulation  - avoid packing d/t mobile joint and risk of losing any packed material  - hydrogel placed on wound bed for moisture and lubrication  - nonadherent dressing placed loosely on top, not into the wound  - covered with ABD    3  Left lateral ischial wound  - 1 0 x 0 5 cm  - very small, superficial wound, no sign of infection  - covered with pink padded dressing only    4   Left ischial pressure wound  - 6 5 x 4 0 x 1 5 cm in size  - wound bed with some devitalized tissue not easily amenable to debridement  - likewise, no signs of infection  - some devitalized tissue on wound perimeter, this was sharply debrided with scissors at bedside  - Maxorb placed in wound bed, and covered with pink padded dressing    5  Midline sacral wound  - 3 2 x 3 0 x 0 1 cm in size  - chronic pressure injury, very superficial, no signs of infection  - surya-wound area mostly blanchable  - covered with pink padded dressing    6  Right buttock wound  - 2 0 x 0 5 cm  - no open wound area at this time  - mostly blanchable skin, two small areas of non-blanchable skin  - covered with pink padded dressing    Wound off-loaded as best as possible and to patient's comfort    Wounds are chronic and will require continuous care as outpatient  Appreciate ortho and ID input  Nursing wound care as depicted above, dressing changes daily or otherwise PRN with soilage  ID recommendation to d/c IV antibiotics  Continue prophylactic po vanc in light of h/o C  diff, per ID  Follow up urine cultures      HPI:    George Harrison is a 71 y o  female with medical comorbidities including paraplegia s/p STI , history of Clostridium difficile , chronic nonhealing wounds of right hip , buttock , and ischium known to Orthopedics due to right femoral head dislocation and known to plastic surgery with history of surgical wound debridement and flaps presents with sepsis source unknown, r/o wounds as source  Patient feels well today, denies any fevers, chills, chest pain, shortness of breath, or pain at any of her wound sites  She denies any urinary symptoms  She has neurogenic bladder 2/2 paraplegia      Review of Systems:  History obtained from the patient  General ROS: negative for - chills or fever  ENT ROS: negative for - hearing change or visual changes  Respiratory ROS: no cough, shortness of breath, or wheezing  Cardiovascular ROS: no chest pain or dyspnea on exertion  Gastrointestinal ROS: no abdominal pain, change in bowel habits, or black or bloody stools  Genito-Urinary ROS: no dysuria, trouble voiding, or hematuria  Musculoskeletal ROS: negative for - joint pain or muscle pain   All other ROS negative    Historical Information   Past Medical History:   Diagnosis Date    Anemia     iron defiencey    Arthritis     osteo    Back injury     Chronic kidney disease     nephritis    Depression     MRSA (methicillin resistant staph aureus) culture positive 12/07/2018    BONE-TISSUE     Osteopenia     Osteoporosis     Paralysis ([address] )     paraplegic due to spinal cord injury    Paraplegia (HCC)     Poor circulation     Pressure injury of skin     right hip, stage 3    Pressure sore of hip     right    Tibial plateau fracture     Underweight      Past Surgical History:   Procedure Laterality Date    CLOSURE DELAYED PRIMARY N/A 3/20/2019    Procedure: OPHELIA ANAL WOUND RECLOSURE;  Surgeon: Eddie Barron MD;  Location: [address] [place] [address] OR;  Service: Plastics    DECUBITUS ULCER EXCISION Bilateral 11/12/2019    Procedure: DEBRIDEMENT DECUBITI ([address] OUT); Surgeon: Shaun Chaves DO;  Location:  MAIN OR;  Service: General    FLAP LOCAL EXTREMITY Left 1/28/2019    Procedure: THIGH FLAP & STSG TO CLOSE HIP WOUND;  Surgeon: Eddie Barron MD;  Location: [address] [place] [address] MAIN OR;  Service: Plastics    FLAP LOCAL TRUNK Right 12/17/2018    Procedure: DEBRIDE/FLAP CLOSURE HIP PRESSURE SORE Eletha Gash GRAFT;  Surgeon: Eddie Barron MD;  Location: [address] [place] [address] MAIN OR;  Service: Plastics    FLAP LOCAL TRUNK Left 2/27/2019    Procedure: BUTTOCK FLAP TO ISCHIAL SORE;  Surgeon: Eddie Barron MD;  Location: [address] [place] [address] OR;  Service: Plastics    HIP DEBRIDEMENT Right 2017    right hip sore debridement    INCISION AND DRAINAGE OF WOUND Left 1/3/2019    Procedure: INCISION AND DRAINAGE (I&D) left distal femur  With deep wound cultures   Application of VAC DRAIN SPONGE;  Surgeon: Raymond Lopez Linda Mansfield MD;  Location:  MAIN OR;  Service: Orthopedics    IR PICC LINE  12/19/2018    IR PICC LINE  3/12/2019    MO DEBRIDEMENT, SKIN, SUB-Q TISSUE,MUSCLE,BONE,=<20 SQ CM Right 9/19/2018    Procedure: DEBRIDEMENT OF HIP PRESSURE SORE;  Surgeon: Nisha Huerta MD;  Location: [address] [place] [address] MAIN OR;  Service: Plastics    MO OPEN RX FEMUR FX+INTRAMED FELIX Left 10/22/2018    Procedure: INSERTION NAIL IM LEFT FEMUR RETROGRADE;  Surgeon: Mariano Samuel MD;  Location:  MAIN OR;  Service: Orthopedics    TOE AMPUTATION Left 2017    small toe left foot amputation    WISDOM TOOTH EXTRACTION      WOUND DEBRIDEMENT Left 12/17/2018    Procedure: DEBRIDEMENT HIP PRESSURE SORE;  Surgeon: Nisha Huerta MD;  Location: [address] [place] [address] MAIN OR;  Service: Plastics    WOUND DEBRIDEMENT N/A 11/10/2019    Procedure: EXCISIONAL DEBRIDEMENT;  Surgeon: Jeanine Underwood MD;  Location:  MAIN OR;  Service: General     Social History   Social History     Substance and Sexual Activity   Alcohol Use Yes    Comment: occasional     Social History     Substance and Sexual Activity   Drug Use No     Social History     Tobacco Use   Smoking Status Never Smoker   Smokeless Tobacco Never Used     Family History   Problem Relation Age of Onset    Depression Father        Medications:  Current Facility-Administered Medications   Medication Dose Route Frequency    acetaminophen (TYLENOL) tablet 650 mg  650 mg Oral Q6H PRN    artificial tear (LUBRIFRESH P M ) ophthalmic ointment   Both Eyes HS    bisacodyl (DULCOLAX) rectal suppository 10 mg  10 mg Rectal Daily PRN    cyanocobalamin (VITAMIN B-12) tablet 100 mcg  100 mcg Oral Daily    dextran 70-hypromellose (GENTEAL TEARS) 0 1-0 3 % ophthalmic solution 1 drop  1 drop Both Eyes Q3H PRN    enoxaparin (LOVENOX) subcutaneous injection 40 mg  40 mg Subcutaneous Daily    methylPREDNISolone sodium succinate (Solu-MEDROL) injection 125 mg  125 mg Intravenous Daily    midodrine (PROAMATINE) tablet 10 mg  10 mg Oral TID AC    nystatin (MYCOSTATIN) powder   Topical BID    pantoprazole (PROTONIX) EC tablet 40 mg  40 mg Oral Daily    piperacillin-tazobactam (ZOSYN) 4 5 g in sodium chloride 0 9 % 100 mL IVPB  4 5 g Intravenous Q6H    saccharomyces boulardii (FLORASTOR) capsule 250 mg  250 mg Oral BID    senna (SENOKOT) tablet 8 6 mg  1 tablet Oral HS    sodium chloride (PF) 0 9 % injection 3 mL  3 mL Intravenous PRN    sodium chloride 0 9 % infusion  100 mL/hr Intravenous Continuous    vancomycin (VANCOCIN) oral solution 125 mg  125 mg Oral Q12H [facility]     Facility-Administered Medications Ordered in Other Encounters   Medication Dose Route Frequency    dexamethasone (DECADRON) injection 4 mg  4 mg Intravenous Once PRN    lactated ringers infusion  75 mL/hr Intravenous Continuous    lactated ringers infusion  50 mL/hr Intravenous Continuous    lactated ringers infusion  75 mL/hr Intravenous Continuous    lactated ringers infusion  75 mL/hr Intravenous Continuous    ondansetron (ZOFRAN) injection 4 mg  4 mg Intravenous Once PRN    promethazine (PHENERGAN) injection 12 5 mg  12 5 mg Intravenous Once PRN       Allergies   Allergen Reactions    Iv Contrast [Iodinated Diagnostic Agents]      Tingling face, itching    Cefepime Rash    Ciprofloxacin Rash and Swelling     Looked like suburn, itching  Bed sores  Swelling, itching    Latex Rash    Vancomycin Rash     Unknown        Physical Examination:  Vitals:   Vitals:    03/10/20 1818 03/10/20 2300 03/11/20 0115 03/11/20 0826   BP: 111/64 (!) 89/49 98/86 125/58   BP Location: Right arm Right arm  Right arm   Pulse: 88 68  74   Resp: 18 18  20   Temp: 98 9 °F (37 2 °C) (!) 96 4 °F (35 8 °C)  (!) 97 °F (36 1 °C)   TempSrc: Temporal Temporal  Temporal   SpO2: 96% 96%  97%     Temp  Min: 96 4 °F (35 8 °C)  Max: 98 9 °F (37 2 °C)         /58 (BP Location: Right arm)   Pulse 74   Temp (!) 97 °F (36 1 °C) (Temporal)   Resp 20   LMP  (LMP Unknown)   SpO2 97% General appearance: alert and oriented, in no acute distress  Head: Normocephalic, without obvious abnormality, atraumatic  Eyes: sclerae anicteric  Neck: supple, symmetrical, trachea midline  Lungs: clear to auscultation bilaterally  Heart: regular rate and rhythm, S1, S2 normal, no murmur, click, rub or gallop  Abdomen: soft, non-tender; bowel sounds normal; no masses,  no organomegaly  Extremities: extremities normal, warm and well-perfused; no cyanosis, clubbing, or edema and LEs exhibit expected deconditioning  Skin: wounds as above    Diagnostic Data:  Lab:   I have personally reviewed pertinent lab results  , CBC:   Lab Results   Component Value Date    WBC 17 75 (H) 03/11/2020    HGB 9 1 (L) 03/11/2020    HCT 29 4 (L) 03/11/2020    MCV 84 03/11/2020     (H) 03/11/2020    MCH 25 9 (L) 03/11/2020    MCHC 31 0 (L) 03/11/2020    RDW 17 5 (H) 03/11/2020    MPV 9 8 03/11/2020   , CMP:   Lab Results   Component Value Date    SODIUM 136 03/11/2020    K 3 3 (L) 03/11/2020     03/11/2020    CO2 22 03/11/2020    BUN 9 03/11/2020    CREATININE 0 60 03/11/2020    CALCIUM 8 0 (L) 03/11/2020    AST 9 03/11/2020    ALT 18 03/11/2020    ALKPHOS 225 (H) 03/11/2020    EGFR 93 03/11/2020     Results from last 7 days   Lab Units 03/11/20  0612   WBC Thousand/uL 17 75*   HEMOGLOBIN g/dL 9 1*   HEMATOCRIT % 29 4*   PLATELETS Thousands/uL 456*     Results from last 7 days   Lab Units 03/11/20  0605   POTASSIUM mmol/L 3 3*   CHLORIDE mmol/L 103   CO2 mmol/L 22   BUN mg/dL 9   CREATININE mg/dL 0 60   CALCIUM mg/dL 8 0*   ALK PHOS U/L 225*   ALT U/L 18   AST U/L 9       Imaging: I have personally reviewed the pertinent imaging studies on the PACS system  Procedure: Xr Chest 2 Views    Result Date: 3/10/2020  Narrative: CHEST INDICATION:   infectious workup   COMPARISON:  11/15/2019 EXAM PERFORMED/VIEWS:  XR CHEST PA & LATERAL Images: 3 FINDINGS: Moderate mid thoracic dextroscoliosis Cardiomediastinal silhouette appears unremarkable  Persistent hiatal hernia The lungs are clear  No pneumothorax or pleural effusion  Old right-sided rib fracture deformity     Impression: No acute cardiopulmonary disease  Workstation performed: [id number]     Procedure: Ct Abdomen Pelvis With Contrast    Result Date: 3/10/2020  Narrative: CT ABDOMEN AND PELVIS WITH IV CONTRAST INDICATION:   Enlarging right hip wound and malaise  T8 paraplegia and chronic right hip and sacral ulcers and osteomyelitis  COMPARISON:  2/9/2020  TECHNIQUE:  CT examination of the abdomen and pelvis was performed  Axial, sagittal, and coronal 2D reformatted images were created from the source data and submitted for interpretation  Radiation dose length product (DLP) for this visit:  310 mGy-cm   This examination, like all CT scans performed in the [facility], was performed utilizing techniques to minimize radiation dose exposure, including the use of iterative reconstruction and automated exposure control  IV Contrast:  100 mL of iohexol (OMNIPAQUE) Enteric Contrast:  Enteric contrast was not administered  FINDINGS: ABDOMEN LOWER CHEST:  Clear lung bases  LIVER/BILIARY TREE:  Low-attenuation 3 cm lesion in the left lobe of the liver and subcentimeter lesions in the right left lobes, stable and likely represent cysts  GALLBLADDER:  No calcified gallstones  No pericholecystic inflammatory change  SPLEEN:  Unremarkable  PANCREAS:  Unremarkable  ADRENAL GLANDS:  Unremarkable  KIDNEYS/URETERS:  No pyelonephritis or obstructive uropathy  Stable subcentimeter lesions in the right renal cortex, likely to represent cysts  STOMACH AND BOWEL: Stable large hiatal hernia  Large amount of stool distending the rectum  No evidence of bowel obstruction, colitis or diverticulitis  APPENDIX:  No findings to suggest appendicitis  ABDOMINOPELVIC CAVITY:  No ascites or free intraperitoneal air  No lymphadenopathy  VESSELS:  Unremarkable for patient's age   PELVIS REPRODUCTIVE ORGANS:  No pelvic mass or fluid  URINARY BLADDER:  Collapsed bladder containing Hays catheter balloon  ABDOMINAL WALL/INGUINAL REGIONS:  Decubitus ulcer previously demonstrated overlying the greater trochanter now extends into the deep soft tissues superior to the acetabulum, in the previous location of the femoral head  Diffuse soft tissue inflammation within the acetabulum and surrounding the proximal femur are mildly increased  Previously demonstrated gas and fluid collections in the right acetabular region are no longer visualized  No new gas or fluid collection  Chronic left ischial decubitus ulcer  Mild left hip joint effusion OSSEOUS STRUCTURES:  Interval lateral rotation of the dislocated femoral head  Grossly stable periosteal reaction and myositis ossificans surrounding the hip  Impression: 1  Interval lateral rotation of dislocated right femoral head  Overlying decubitus ulcer now extends to the deep soft tissues superior to the acetabulum, within the previous location of the femoral head  Increased surrounding cellulitis without evidence of gas or fluid collection to suggest an abscess  2   Stable left ischial decubitus ulcer  Small left hip joint effusion  3   Large amount of stool distending the rectum may indicate impaction  No evidence of bowel obstruction, colitis or diverticulitis  Workstation performed: [id number]       Active medications: The patients active medications were reviewed and modified as appropriate  VTE Prophylaxis: Enoxaparin (Lovenox)      Code Status: Level 1 - Full Code      Counseling / Coordination of Care  Total floor / unit time spent today 60 minutes  Greater than 50% of total time was spent with the patient and / or family counseling and / or coordination of care         Quinn Fried PA-C  3/11/2020

## 2020-03-11 NOTE — ASSESSMENT & PLAN NOTE
· UTI associated with Hays catheter present on admission  Chronic Hays present on admission  · Known history of VRE  · Received dose of Zosyn however ID recommending monitoring off of antibiotics for now

## 2020-03-11 NOTE — ASSESSMENT & PLAN NOTE
· Initial suspicion for sepsis, POA, given tachycardia and leukocytosis  Source was suspected to be UTI vs skin ulcers  · Appreciate infectious disease  · Low suspicion that sirs criteria is associated with skin  CT did not show abscess  · Patient with chronic UTI, VRE, with chronic Hays  No new symptoms  · Received Zosyn initially  ID recommends discontinuing IV antibiotics and monitoring  · Afebrile  Tachycardia has resolved  Patient is on steroids which may contribute to leukocytosis  · Wound care and surgery have assessed patient, recommendations appreciated  · Will continue p o  Prophylactic vancomycin as patient has history of C diff

## 2020-03-11 NOTE — ASSESSMENT & PLAN NOTE
Appreciate palliative consultation  Could consider mirtazapine to help with sleep and depression  Patient is considering this  Patient desires to be full code  Desires full medical interventions

## 2020-03-12 PROBLEM — R65.10 SIRS (SYSTEMIC INFLAMMATORY RESPONSE SYNDROME) (HCC): Status: ACTIVE | Noted: 2019-11-13

## 2020-03-12 LAB
ANION GAP SERPL CALCULATED.3IONS-SCNC: 10 MMOL/L (ref 4–13)
BACTERIA UR CULT: NORMAL
BASOPHILS # BLD AUTO: 0.01 THOUSANDS/ΜL (ref 0–0.1)
BASOPHILS NFR BLD AUTO: 0 % (ref 0–1)
BUN SERPL-MCNC: 13 MG/DL (ref 5–25)
CALCIUM SERPL-MCNC: 8 MG/DL (ref 8.3–10.1)
CHLORIDE SERPL-SCNC: 105 MMOL/L (ref 100–108)
CO2 SERPL-SCNC: 22 MMOL/L (ref 21–32)
CREAT SERPL-MCNC: 0.62 MG/DL (ref 0.6–1.3)
EOSINOPHIL # BLD AUTO: 0 THOUSAND/ΜL (ref 0–0.61)
EOSINOPHIL NFR BLD AUTO: 0 % (ref 0–6)
ERYTHROCYTE [DISTWIDTH] IN BLOOD BY AUTOMATED COUNT: 17.6 % (ref 11.6–15.1)
GFR SERPL CREATININE-BSD FRML MDRD: 92 ML/MIN/1.73SQ M
GLUCOSE SERPL-MCNC: 148 MG/DL (ref 65–140)
HCT VFR BLD AUTO: 26.1 % (ref 34.8–46.1)
HGB BLD-MCNC: 7.7 G/DL (ref 11.5–15.4)
IMM GRANULOCYTES # BLD AUTO: 0.09 THOUSAND/UL (ref 0–0.2)
IMM GRANULOCYTES NFR BLD AUTO: 1 % (ref 0–2)
LYMPHOCYTES # BLD AUTO: 1.84 THOUSANDS/ΜL (ref 0.6–4.47)
LYMPHOCYTES NFR BLD AUTO: 13 % (ref 14–44)
MCH RBC QN AUTO: 24.9 PG (ref 26.8–34.3)
MCHC RBC AUTO-ENTMCNC: 29.5 G/DL (ref 31.4–37.4)
MCV RBC AUTO: 85 FL (ref 82–98)
MONOCYTES # BLD AUTO: 0.91 THOUSAND/ΜL (ref 0.17–1.22)
MONOCYTES NFR BLD AUTO: 6 % (ref 4–12)
NEUTROPHILS # BLD AUTO: 11.62 THOUSANDS/ΜL (ref 1.85–7.62)
NEUTS SEG NFR BLD AUTO: 80 % (ref 43–75)
NRBC BLD AUTO-RTO: 0 /100 WBCS
PLATELET # BLD AUTO: 411 THOUSANDS/UL (ref 149–390)
PMV BLD AUTO: 9.7 FL (ref 8.9–12.7)
POTASSIUM SERPL-SCNC: 2.8 MMOL/L (ref 3.5–5.3)
RBC # BLD AUTO: 3.09 MILLION/UL (ref 3.81–5.12)
SODIUM SERPL-SCNC: 137 MMOL/L (ref 136–145)
WBC # BLD AUTO: 14.47 THOUSAND/UL (ref 4.31–10.16)

## 2020-03-12 PROCEDURE — 99232 SBSQ HOSP IP/OBS MODERATE 35: CPT | Performed by: PHYSICIAN ASSISTANT

## 2020-03-12 PROCEDURE — 99232 SBSQ HOSP IP/OBS MODERATE 35: CPT | Performed by: NURSE PRACTITIONER

## 2020-03-12 PROCEDURE — 80048 BASIC METABOLIC PNL TOTAL CA: CPT | Performed by: INTERNAL MEDICINE

## 2020-03-12 PROCEDURE — 97167 OT EVAL HIGH COMPLEX 60 MIN: CPT

## 2020-03-12 PROCEDURE — 85025 COMPLETE CBC W/AUTO DIFF WBC: CPT | Performed by: INTERNAL MEDICINE

## 2020-03-12 PROCEDURE — 97163 PT EVAL HIGH COMPLEX 45 MIN: CPT

## 2020-03-12 RX ORDER — MIDODRINE HYDROCHLORIDE 5 MG/1
10 TABLET ORAL
Status: DISCONTINUED | OUTPATIENT
Start: 2020-03-12 | End: 2020-03-13 | Stop reason: HOSPADM

## 2020-03-12 RX ORDER — MAGNESIUM SULFATE 1 G/100ML
1 INJECTION INTRAVENOUS ONCE
Status: COMPLETED | OUTPATIENT
Start: 2020-03-12 | End: 2020-03-12

## 2020-03-12 RX ORDER — POTASSIUM CHLORIDE 20 MEQ/1
40 TABLET, EXTENDED RELEASE ORAL ONCE
Status: COMPLETED | OUTPATIENT
Start: 2020-03-12 | End: 2020-03-12

## 2020-03-12 RX ORDER — POTASSIUM CHLORIDE 14.9 MG/ML
20 INJECTION INTRAVENOUS
Status: COMPLETED | OUTPATIENT
Start: 2020-03-12 | End: 2020-03-12

## 2020-03-12 RX ADMIN — MAGNESIUM SULFATE HEPTAHYDRATE 1 G: 1 INJECTION, SOLUTION INTRAVENOUS at 12:10

## 2020-03-12 RX ADMIN — VANCOMYCIN HYDROCHLORIDE 125 MG: 500 INJECTION, POWDER, LYOPHILIZED, FOR SOLUTION INTRAVENOUS at 09:39

## 2020-03-12 RX ADMIN — DEXTRAN 70 AND HYPROMELLOSE 2910 1 DROP: 1; 3 SOLUTION/ DROPS OPHTHALMIC at 03:39

## 2020-03-12 RX ADMIN — MIDODRINE HYDROCHLORIDE 10 MG: 5 TABLET ORAL at 06:02

## 2020-03-12 RX ADMIN — DEXTRAN 70 AND HYPROMELLOSE 2910 1 DROP: 1; 3 SOLUTION/ DROPS OPHTHALMIC at 22:07

## 2020-03-12 RX ADMIN — MIDODRINE HYDROCHLORIDE 10 MG: 5 TABLET ORAL at 16:58

## 2020-03-12 RX ADMIN — POTASSIUM CHLORIDE 40 MEQ: 1500 TABLET, EXTENDED RELEASE ORAL at 09:36

## 2020-03-12 RX ADMIN — PANTOPRAZOLE SODIUM 40 MG: 40 TABLET, DELAYED RELEASE ORAL at 06:02

## 2020-03-12 RX ADMIN — POTASSIUM CHLORIDE 20 MEQ: 14.9 INJECTION, SOLUTION INTRAVENOUS at 13:54

## 2020-03-12 RX ADMIN — Medication 250 MG: at 09:36

## 2020-03-12 RX ADMIN — VANCOMYCIN HYDROCHLORIDE 125 MG: 500 INJECTION, POWDER, LYOPHILIZED, FOR SOLUTION INTRAVENOUS at 22:08

## 2020-03-12 RX ADMIN — Medication 250 MG: at 17:02

## 2020-03-12 RX ADMIN — Medication 100 MCG: at 09:34

## 2020-03-12 RX ADMIN — POTASSIUM CHLORIDE 20 MEQ: 14.9 INJECTION, SOLUTION INTRAVENOUS at 09:37

## 2020-03-12 RX ADMIN — NYSTATIN: 100000 POWDER TOPICAL at 17:03

## 2020-03-12 RX ADMIN — SODIUM CHLORIDE 100 ML/HR: 0.9 INJECTION, SOLUTION INTRAVENOUS at 03:41

## 2020-03-12 RX ADMIN — NYSTATIN: 100000 POWDER TOPICAL at 09:39

## 2020-03-12 RX ADMIN — METHYLPREDNISOLONE SODIUM SUCCINATE 125 MG: 125 INJECTION, POWDER, FOR SOLUTION INTRAMUSCULAR; INTRAVENOUS at 09:34

## 2020-03-12 RX ADMIN — ENOXAPARIN SODIUM 40 MG: 40 INJECTION SUBCUTANEOUS at 09:36

## 2020-03-12 NOTE — PROGRESS NOTES
03/12/20 1300   Clinical Encounter Type   Visited With Patient; Health care provider   Routine Visit Introduction   Referral From Physician   Consult Patient care   Patient Spiritual Encounters   Spiritual Encounter Notes Pt is spiritual but not [employer]al; prays on own  Pt enjoys readingh books, but can't get to [address] regularly  We spoke about books on-line or via tablet, etc   Her hope comes from "being heard" and empowered to pursue her interests and goals

## 2020-03-12 NOTE — ASSESSMENT & PLAN NOTE
· POA  · Appreciate input from Ortho, general surgery and wound care   · Left lateral ischial wound 1 0 x 0 5 cm that is superficial without signs of infection  · Cover with pink padded dressing  · Left ischial pressure wound 6 5 x 4 0 x 1 5 cm in size, wound bed tissue not easily debrided but otherwise no sign of infection  · Maxorb placed in wound bed and covered with pink pad dressing  · Case management working on getting patient a specialty mattress/hospital bed at home  · Low suspicion that wounds caused sepsis  Monitoring off of antibiotics for now    · Will need VNA at discharge

## 2020-03-12 NOTE — PROGRESS NOTES
Progress Note - Mikal Weikert 1950, 71 y o  female MRN: [id number]  Unit/Bed#: E5 -01 Encounter: [id number] DOS: 3/12/2020  Primary Care Provider: Agnes Steele   Date and time admitted to hospital: 3/10/2020 11:52 AM    * SIRS (systemic inflammatory response syndrome) ([facility])  Assessment & Plan  · SIRS syndrome POA, e/b tachycardia and leukocytosis  Low suspicion at this time for acute infection  · Initially felt to have sepsis possibly due to UTI versus chronic wounds/ulcers  · Appreciate infectious disease recommendations   · CT scan without abscess  but there was evidence of inflammation present at the right hip which is likely due to ongoing irritation of the tissues  · Patient with chronic UTI, VRE, with chronic [place]  No new symptoms  · Received Zosyn initially  ID recommends discontinuing IV antibiotics and monitoring  · Wound care and surgery have assessed patient, recommendations appreciated  · Will continue p o  Prophylactic vancomycin as patient has history of C diff    · D/c IV steroids, unclear why these were started     Open wound of right hip  Assessment & Plan  · POA  · Appreciate input from surgery and wound care  · Patient has right anterior hip wound 3 2 x 1 4 cm that is status post grafting by Dr Kaden Johnson with plastic surgery 1 year ago that appears to be healing with no identifiable tract from this to the lateral hip wound  · Maxorb alginate placed on wound bed covered with ABD  · Right lateral hip wound 5 0 x 4 7 width by 6 5 cm depth wound tracks deeply however no evidence of infection, visibly mobile and tissue with joint manipulation  · Hydrogel placed on wound bed for moisture lubrication with nonadherent dressing loosely on top not into the wound covered with ABD  · Will need VNA for wound care at time of discharge  · CM working on obtaining specialty mattress     Decubitus ulcer of left ischium, stage III ([facility])  Assessment & Plan  · POA  · Appreciate input from Ortho, general surgery and wound care   · Left lateral ischial wound 1 0 x 0 5 cm that is superficial without signs of infection  · Cover with pink padded dressing  · Left ischial pressure wound 6 5 x 4 0 x 1 5 cm in size, wound bed tissue not easily debrided but otherwise no sign of infection  · Maxorb placed in wound bed and covered with pink pad dressing  · Case management working on getting patient a specialty mattress/hospital bed at home  · Low suspicion that wounds caused sepsis  Monitoring off of antibiotics for now  · Will need VNA at discharge    Pressure ulcer, sacrum  Assessment & Plan  · POA  · Appreciate input from General surgery and wound care  · Patient has midline sacral wound 3 2 x 3 0 x 0 1 cm in size likely representing a chronic pressure injury that is very superficial without signs of infection  · Covered with pink padded dressing  · Right buttock wound 2 0 x 0 5 cm without evidence of infection  · Covered with pink padded dressing  · Outpatient VNA for wound care  · Offloading as best as possible for patient comfort    Urinary tract infection associated with indwelling urethral catheter ([address] )  Assessment & Plan  · History of neurogenic bladder 2/2 SCI  · UTI associated with chronic isabel catheter present on admission  · Known history of VRE  · Received dose of Zosyn however ID recommending monitoring off of antibiotics for now  Mild protein-calorie malnutrition ([address] )  Assessment & Plan  Malnutrition Findings:   Malnutrition type: Chronic illness  Degree of Malnutrition: Malnutrition of mild degree  BMI Findings:  BMI Classifications: Underweight < 18 5  There is no height or weight on file to calculate BMI  · Patient is frail and wasted  Multiple, nonhealing wounds  · Had long conversation with patient regarding importance of protein for wound healing   · Nutrition consult appreciated     · [place]man count, protein supplements   · Recently started on midodrine and pt would like me to dec dose     Goals of care, counseling/discussion  Assessment & Plan  · Appreciate palliative consultation  · Could consider mirtazapine to help with sleep and depression  Patient is still considering this  · Patient desires to be full code with full medical interventions  Hyponatremia  Assessment & Plan  · Hyponatremia likely due to sepsis and poor p o  Intake/value nutrition, treated with normal saline 350 mL boluses, continue normal saline 100 mL/hr and lab monitoring  · Sodium now 137, will d/c fluids and monitor closely   · Monitoring BMP  · Appreciate nutrition consult  · Encourage PO intake with patient     Closed lateral dislocation of distal end of right femur  Assessment & Plan  Noticed on imaging on prior admission  Appreciate orthopedic consult  Can be followed outpatient  No emergent surgical procedure    C  difficile diarrhea  Assessment & Plan  · Patient with history of C diff  · Received dose of Zosyn  Antibiotics now discontinued  · She is on p o  Vancomycin prophylactically, continue 48 hrs post last dose of abx     Hypokalemia  Assessment & Plan  · Replete potassium/mag   · Encourage PO intake     Paraplegia following spinal cord injury [facility])  Assessment & Plan  · Supportive care   · Wheelchair bound   · Appreciate PT OT input - patient refusing d/c to rehab   · Appreciate palliative care recs regarding [address]     VTE Pharmacologic Prophylaxis:   Pharmacologic: Enoxaparin (Lovenox)  Mechanical VTE Prophylaxis in Place: Yes    Patient Centered Rounds: I have performed bedside rounds with nursing staff today  Discussions with Specialists or Other Care Team Provider: nursing     Education and Discussions with Family / Patient: patient    Time Spent for Care: 30 minutes  More than 50% of total time spent on counseling and coordination of care as described above      Current Length of Stay: 2 day(s)    Current Patient Status: Inpatient   Certification Statement: The patient will continue to require additional inpatient hospital stay due to pending close monitoring off of abx, safe d/c planning pending air mattress     Discharge Plan: needs air mattress  Close monitoring off of abx today  Likely in 24 hours if VNA set up  Code Status: Level 1 - Full Code      Subjective:   Pt seen and examined at bedside  Feels fine  Upset that people keep saying she isnt eating  Objective:     Vitals:   Temp (24hrs), Av 3 °F (36 3 °C), Min:97 1 °F (36 2 °C), Max:97 6 °F (36 4 °C)    Temp:  [97 1 °F (36 2 °C)-97 6 °F (36 4 °C)] 97 1 °F (36 2 °C)  HR:  [65-70] 65  Resp:  [18] 18  BP: (121-166)/(59-75) 166/75  SpO2:  [94 %-96 %] 94 %  There is no height or weight on file to calculate BMI  Input and Output Summary (last 24 hours): Intake/Output Summary (Last 24 hours) at 3/12/2020 1138  Last data filed at 3/12/2020 0900  Gross per 24 hour   Intake    Output 1850 ml   Net -1850 ml       Physical Exam:     Physical Exam   Constitutional: She is oriented to person, place, and time  She appears well-developed and well-nourished  No distress  Cachexia    HENT:   Head: Normocephalic and atraumatic  Eyes: EOM are normal  No scleral icterus  Neck: Normal range of motion  Neck supple  Cardiovascular: Normal rate, regular rhythm and normal heart sounds  No murmur heard  Pulmonary/Chest: Effort normal and breath sounds normal    Abdominal: Soft  Bowel sounds are normal    Musculoskeletal: She exhibits no edema  Paraplegic    Neurological: She is alert and oriented to person, place, and time  Skin: Skin is warm and dry  Psychiatric: She has a normal mood and affect     Flat      Additional Data:     Labs:    Results from last 7 days   Lab Units 20  0602   WBC Thousand/uL 14 47*   HEMOGLOBIN g/dL 7 7*   HEMATOCRIT % 26 1*   PLATELETS Thousands/uL 411*   NEUTROS PCT % 80*   LYMPHS PCT % 13*   MONOS PCT % 6   EOS PCT % 0     Results from last 7 days   Lab Units 20  0602 20  5697 SODIUM mmol/L 137 136   POTASSIUM mmol/L 2 8* 3 3*   CHLORIDE mmol/L 105 103   CO2 mmol/L 22 22   BUN mg/dL 13 9   CREATININE mg/dL 0 62 0 60   ANION GAP mmol/L 10 11   CALCIUM mg/dL 8 0* 8 0*   ALBUMIN g/dL  --  1 7*   TOTAL BILIRUBIN mg/dL  --  0 21   ALK PHOS U/L  --  225*   ALT U/L  --  18   AST U/L  --  9   GLUCOSE RANDOM mg/dL 148* 235*     Results from last 7 days   Lab Units 03/10/20  1313   INR  1 19             Results from last 7 days   Lab Units 03/10/20  1523 03/10/20  1314 03/10/20  1313   LACTIC ACID mmol/L 0 5 2 3*  --    PROCALCITONIN ng/ml  --   --  0 11           * I Have Reviewed All Lab Data Listed Above  * Additional Pertinent Lab Tests Reviewed: [address] Admission Reviewed    Imaging:    Imaging Reports Reviewed Today Include: all  Imaging Personally Reviewed by Myself Includes:  none    Recent Cultures (last 7 days):     Results from last 7 days   Lab Units 03/11/20  1112 03/10/20  1317 03/10/20  1313   BLOOD CULTURE   --  No Growth at 24 hrs  No Growth at 24 hrs     URINE CULTURE  No Growth <1000 cfu/mL  --   --        Last 24 Hours Medication List:     Current Facility-Administered Medications:  acetaminophen 650 mg Oral Q6H PRN Akbar Magaña MD    artificial tear  Both Eyes HS Akbar Magaña MD    bisacodyl 10 mg Rectal Daily PRN Akbar Magaña MD    cyanocobalamin 100 mcg Oral Daily Akbar Magaña MD    dextran 70-hypromellose 1 drop Both Eyes Q3H PRN Akbar Magaña MD    enoxaparin 40 mg Subcutaneous Daily Akbar Magaña MD    magnesium sulfate 1 g Intravenous Once Terrance Iyer PA-C    midodrine 10 mg Oral BID AC Terrance Iyer PA-C    nystatin  Topical BID Akbar Magaña MD    pantoprazole 40 mg Oral Daily Akbar Magaña MD    potassium chloride 20 mEq Intravenous Q2H Terrance Iyer PA-C Last Rate: 20 mEq (03/12/20 0937)   saccharomyces boulardii 250 mg Oral BID Akbar Magaña MD    senna 1 tablet Oral HS Akbar Magaña MD    sodium chloride (PF) 3 mL Intravenous PRN Michi Garcia,     vancomycin 125 mg Oral Q12H [facility] Yumiko Richard MD      Facility-Administered Medications Ordered in Other Encounters:  dexamethasone 4 mg Intravenous Once PRN Sandra Mana, DO    lactated ringers 75 mL/hr Intravenous Continuous Sandra Ling, DO Last Rate: 75 mL/hr (11/10/19 2105)   lactated ringers 50 mL/hr Intravenous Continuous Juliette Dean CRNA    lactated ringers 75 mL/hr Intravenous Continuous Sandra Mana, DO Last Rate: Stopped (03/20/19 1841)   lactated ringers 75 mL/hr Intravenous Continuous Rome Arthur MD Last Rate: Stopped (03/20/19 1842)   ondansetron 4 mg Intravenous Once PRN Barney Mckeon MD    promethazine 12 5 mg Intravenous Once PRN Barney Mckeon MD         Today, Patient Was Seen By: Willy Lyn PA-C    ** Please Note: Dictation voice to text software may have been used in the creation of this document   **

## 2020-03-12 NOTE — OCCUPATIONAL THERAPY NOTE
Occupational Therapy Evaluation     Patient Name: Nam Iqbal  Today's Date: 3/12/2020  Problem List  Principal Problem:    SIRS (systemic inflammatory response syndrome) ([facility] )  Active Problems:    Paraplegia following spinal cord injury ([facility] )    Pressure ulcer, sacrum    Hypokalemia    Decubitus ulcer of left ischium, stage III (H[facility])    GERD (gastroesophageal reflux disease)    C  difficile diarrhea    Open wound of right hip    Lung nodule    Closed lateral dislocation of distal end of right femur    Hyponatremia    Urinary tract infection associated with indwelling urethral catheter (H[facility])    Goals of care, counseling/discussion    Mild protein-calorie malnutrition (H[facility])    Past Medical History  Past Medical History:   Diagnosis Date    Anemia     iron defiencey    Arthritis     osteo    Back injury     Chronic kidney disease     nephritis    Depression     MRSA (methicillin resistant staph aureus) culture positive 12/07/2018    BONE-TISSUE     Osteopenia     Osteoporosis     Paralysis (HCC)     paraplegic due to spinal cord injury    Paraplegia (HCC)     Poor circulation     Pressure injury of skin     right hip, stage 3    Pressure sore of hip     right    Tibial plateau fracture     Underweight      Past Surgical History  Past Surgical History:   Procedure Laterality Date    CLOSURE DELAYED PRIMARY N/A 3/20/2019    Procedure: OPHELIA ANAL WOUND RECLOSURE;  Surgeon: Quang Ramon MD;  Location: [address] [place] [address] OR;  Service: Plastics    DECUBITUS ULCER EXCISION Bilateral 11/12/2019    Procedure: DEBRIDEMENT DECUBITI ([address] OUT);   Surgeon: Dimitry Leonard DO;  Location:  MAIN OR;  Service: General    FLAP LOCAL EXTREMITY Left 1/28/2019    Procedure: THIGH FLAP & STSG TO CLOSE HIP WOUND;  Surgeon: Quang Ramon MD;  Location: [address] [place] [address] OR;  Service: Plastics    FLAP LOCAL TRUNK Right 12/17/2018    Procedure: DEBRIDE/FLAP CLOSURE HIP PRESSURE SORE [place] GRAFT;  Surgeon: Quang Ramon MD;  Location: [facility] MAIN OR;  Service: Plastics    FLAP LOCAL TRUNK Left 2/27/2019    Procedure: BUTTOCK FLAP TO ISCHIAL SORE;  Surgeon: Yolande Collet, MD;  Location: [facility] MAIN OR;  Service: Plastics    HIP DEBRIDEMENT Right 2017    right hip sore debridement    INCISION AND DRAINAGE OF WOUND Left 1/3/2019    Procedure: INCISION AND DRAINAGE (I&D) left distal femur  With deep wound cultures  Application of VAC DRAIN SPONGE;  Surgeon: Steffany Chilel MD;  Location:  MAIN OR;  Service: Orthopedics    IR PICC LINE  12/19/2018    IR PICC LINE  3/12/2019    FL DEBRIDEMENT, SKIN, SUB-Q TISSUE,MUSCLE,BONE,=<20 SQ CM Right 9/19/2018    Procedure: DEBRIDEMENT OF HIP PRESSURE SORE;  Surgeon: Yolande Collet, MD;  Location: [facility] MAIN OR;  Service: [address] [place] [address] FX+INTRAMED FELIX Left 10/22/2018    Procedure: INSERTION NAIL IM LEFT FEMUR RETROGRADE;  Surgeon: Duane Henri, MD;  Location:  MAIN OR;  Service: Orthopedics    TOE AMPUTATION Left 2017    small toe left foot amputation    WISDOM TOOTH EXTRACTION      WOUND DEBRIDEMENT Left 12/17/2018    Procedure: DEBRIDEMENT HIP PRESSURE SORE;  Surgeon: Yolande Collet, MD;  Location: [facility] MAIN OR;  Service: Plastics    WOUND DEBRIDEMENT N/A 11/10/2019    Procedure: EXCISIONAL DEBRIDEMENT;  Surgeon: Amado Cerda MD;  Location:  MAIN OR;  Service: General             03/12/20 1433   Note Type   Note type Eval only   Restrictions/Precautions   Weight Bearing Precautions Per Order No   Other Precautions Contact/isolation;Cognitive; Bed Alarm; Fall Risk;Multiple lines  (paraplegia )   Pain Assessment   Pain Assessment Tool Pain Assessment not indicated - pt denies pain   Pain Score No Pain   Home Living   Type of Home Apartment   Home Layout One level;Stairs to enter with rails  (1 small AMY)   Bathroom Shower/Tub Tub/shower unit   Bathroom Toilet Raised   Bathroom Equipment Tub transfer bench;Grab bars around toilet; Toilet raiser   Bathroom Accessibility Accessible via wheelchair   [address] (Comment)  (slide board; bed rail)   Additional Comments (+) lives alone   Prior Function   Level of [place] Independent with ADLs and functional mobility   Lives With Alone   ADL Assistance Independent   IADLs Independent   Falls in the last 6 months 0   Vocational On disability   Comments At baseline, pt reports I w/ ADLs/IADLs and transfers (slide board for w/c<>car; SPT/lateral scoots for w/c<>bed), Mod I for w/c mobility, and reports 0 falls PTA  Lifestyle   Autonomy At baseline, pt reports I w/ ADLs/IADLs and transfers (slide board for w/c<>car; SPT/lateral scoots for w/c<>bed), Mod I for w/c mobility, and reports 0 falls PTA  Service to Others on disability    Subjective   Subjective "I just feel like I keep doing the same thing over and over again"   ADL   Where Assessed Wheelchair   Eating Assistance 5  Supervision/Setup   Grooming Assistance 5  Supervision/Setup   UB Bathing Assistance 5  Supervision/Setup   LB Bathing Assistance 4  [address] 5  Supervision/Setup    [facility] 3  Moderate [address]  3  Moderate Assistance   Functional Assistance 4  Minimal Assistance   Bed Mobility   Supine to Sit 5  Supervision   Additional items Bedrails; Increased time required;Verbal cues   Sit to Supine Unable to assess   Additional Comments Pt seated in w/c at end of  session  Call bell and phone within reach  All needs met and no further questions for OT at this time  Transfers   Sliding Board transfer 4  Minimal assistance   Additional items Assist x 2; Increased time required;Verbal cues   Additional Comments assist w/ slide board and LE management; cues for safety and hand placement   Functional Mobility   Functional Mobility 6  Modified independent   Additional Comments Mod I w/ w/c mobility    Additional items   (wheelchair)   Balance   Static Sitting Fair   Dynamic Sitting Fair -   Activity Tolerance   Activity Tolerance Patient limited by fatigue   Medical Staff Made Aware Laney PT   Nurse Made Aware Erica RN   RUE Assessment   RUE Assessment [facility]   RU Strength   RUE Overall Strength Within Functional Limits - able to perform ADL tasks with strength  (4+/5)   LUE Assessment   LUE Assessment WFL   LUE Strength   LUE Overall Strength Within Functional Limits - able to perform ADL tasks with strength  (4+/5)   Hand Function   Gross Motor Coordination Functional   Fine Motor Coordination Functional   Sensation   Light Touch Severe deficits in the RLE; Severe deficits in the LLE   Proprioception   Proprioception No apparent deficits   Vision-Basic Assessment   Current Vision Does not wear glasses   Vision - Complex Assessment   Ocular Range of Motion WFL   Acuity Able to read employee name badge without difficulty   Perception   Inattention/Neglect Appears intact   Cognition   Overall Cognitive Status Impaired   Arousal/Participation Alert; Cooperative   Attention Within functional limits   Orientation Level Oriented X4   Memory Decreased recall of precautions   Following Commands Follows one step commands with increased time or repetition   Comments pt w/ poor insight into deficits and decreased safety awareness   Assessment   Limitation Decreased ADL status; Decreased UE strength;Decreased Safe judgement during ADL;Decreased cognition;Decreased endurance;Decreased self-care trans;Decreased high-level ADLs; Decreased sensation   Prognosis Fair   Assessment Pt is 71 y o female adm to [address] [place] [address] on 3/10/20 presenting w/ chronic wounds and feeling "unwell"  Pt dx'd w/ Sepsis likely from a UTI  XR chest (-) acute cardiopulmonary disease  Pt w/ recent adm at [facility] from 2/5/20-3/2/20 dx'd w/ Sepsis and non-operative R hip dislocation  Pt dc'd to STR and reports being d/c'd home for 3 days prior to current adm   Comorbidities affecting pt's functional performance include Anemia, Arthritis, Back Injury, CKD, Depression, Osteopenia, Osteoporosis, and Paraplegia  Active OT orders  Pt lives alone in a one level apt w/ 1 small AMY  At baseline, pt reports I w/ ADLs/IADLs and transfers (slide board for w/c<>car; SPT/lateral scoots for w/c<>bed), Mod I for w/c mobility, and reports 0 falls PTA  Upon evaluation, pt currently requires Supervision for UB ADLS, Mod A for LB ADLS, Mod A for toileting, Supervision for bed mobility, Min Ax2 for transfers and Mod I for w/c mobility 2* decreased strength, decreased endurance, impaired cognition, paraplegia and decreased activity tolerance  These impairments, as well as pt's limited support and decreased ADL/IADL status limit pt's ability to engage in all areas of occupation  Pt scored 45/100 on the Barthel Index  Based on this OT evaluation and the pt's functional performance deficits, pt has been identified as a High complexity evaluation  Pt would benefit from continued OT services during hospital stay to address deficits and improve level of functional independence in these Occupational Performance areas: grooming, bathing/shower, dressing, toileting, medication management, laundry, cleaning, meal prep, bed mobility, and w/c mobility/transfers  OT recommends STR vs  Home OT; will continue monitoring progress  Pt will be on OT caseload 2-3x/wk to address the following goals to  in 10-14 days:   Goals   Patient Goals to go home   LTG Time Frame 10-14   Long Term Goal Please refer to LTGs listed below:   Plan   Treatment Interventions ADL retraining;Functional transfer training;UE strengthening/ROM; Endurance training;Patient/family training;Equipment evaluation/education; Compensatory technique education; Activityengagement; Energy conservation   Goal Expiration Date 20   OT Treatment Day 0   OT Frequency 2-3x/wk   Recommendation   OT Discharge Recommendation Short Term Rehab  (vs  Home OT; will continue to monitor progress)   OT - OK to Discharge Yes  (when medically cleared to rehab)   Barthel Index   Feeding 10   Bathing 0   Grooming Score 5   Dressing Score 5   Bladder Score 0   Bowels Score 10   Toilet Use Score 5   Transfers (Bed/Chair) Score 5   Mobility (Level Surface) Score 5   Stairs Score 0   Barthel Index Score 45   Modified [place] Scale   Modified [place] Scale 4       Goals:   1  Pt will improve activity tolerance to G for 30 min treatment session to increase activity engagement  2  Pt will increase bed mobility to Mod I and transfer EOB to participate in functional activity and decrease caregiver assistance required  3  Pt will complete light grooming task w/ Mod I w/ G sequencing  4  Pt will increase independence in UB ADLs w/ use of DME to Mod I w/ G balance/safety demonstrated to increase functional activity engagement  5  Pt will increase independence in LB ADLs w/ use of DME to Mod I w/ G balance/safety demonstrated to increase functional activity engagement  6  Pt will complete toileting w/ Mod I w/ G hygiene/thoroughness w/ use of DME as needed  7  Pt will improve functional transfers on/off all surfaces using least restrictive device to Mod I to complete functional activities  8  Pt will demonstrate G carryover of learned safety techniques and proper body mechanics in functional activities with use of least restrictive device  9  Pt will participate in simulated IADL task w/ Mod I to increase endurance and safety during functional activities  10  Pt will increase BUE strength by 1 MM grade via AROM exercises to increase independence in ADLS and functional mobility/transfers  11  Pt will demonstrate 100% attention to task during additional cognitive assessments w/ G participation to assist in d/c planning and recommendations      Kimberley Álvarez, OTS

## 2020-03-12 NOTE — ASSESSMENT & PLAN NOTE
· History of neurogenic bladder 2/2 SCI  · UTI associated with chronic isabel catheter present on admission  · Known history of VRE  · Received dose of Zosyn however ID recommending monitoring off of antibiotics for now

## 2020-03-12 NOTE — PLAN OF CARE
Problem: OCCUPATIONAL THERAPY ADULT  Goal: Performs self-care activities at highest level of function for planned discharge setting  See evaluation for individualized goals  Description  Treatment Interventions: ADL retraining, Functional transfer training, UE strengthening/ROM, Endurance training, Patient/family training, Equipment evaluation/education, Compensatory technique education, Activityengagement, Energy conservation          See flowsheet documentation for full assessment, interventions and recommendations  Note:   Limitation: Decreased ADL status, Decreased UE strength, Decreased Safe judgement during ADL, Decreased cognition, Decreased endurance, Decreased self-care trans, Decreased high-level ADLs, Decreased sensation  Prognosis: Fair  Assessment: Pt is 71 y o female adm to [address] [place] [address] on 3/10/20 presenting w/ chronic wounds and feeling "unwell"  Pt dx'd w/ Sepsis likely from a UTI  XR chest (-) acute cardiopulmonary disease  Pt w/ recent adm at [facility] from 2/5/20-3/2/20 dx'd w/ Sepsis and non-operative R hip dislocation  Pt dc'd to [facility] and reports being d/c'd home for 3 days prior to current adm  Comorbidities affecting pt's functional performance include Anemia, Arthritis, Back Injury, CKD, Depression, Osteopenia, Osteoporosis, and Paraplegia  Active OT orders  Pt lives alone in a one level apt w/ 1 small AMY  At baseline, pt reports I w/ ADLs/IADLs and transfers (slide board for w/c<>car; SPT/lateral scoots for w/c<>bed), Mod I for w/c mobility, and reports 0 falls PTA  Upon evaluation, pt currently requires Supervision for UB ADLS, Mod A for LB ADLS, Mod A for toileting, Supervision for bed mobility, Min Ax2 for transfers and Mod I for w/c mobility 2* decreased strength, decreased endurance, impaired cognition, paraplegia and decreased activity tolerance   These impairments, as well as pt's limited support and decreased ADL/IADL status limit pt's ability to engage in all areas of occupation  Pt scored 45/100 on the Barthel Index  Based on this OT evaluation and the pt's functional performance deficits, pt has been identified as a High complexity evaluation  Pt would benefit from continued OT services during hospital stay to address deficits and improve level of functional independence in these Occupational Performance areas: grooming, bathing/shower, dressing, toileting, medication management, laundry, cleaning, meal prep, bed mobility, and w/c mobility/transfers  OT recommends STR vs  Home OT; will continue monitoring progress   Pt will be on OT caseload 2-3x/wk to address the following goals to  in 10-14 days:     OT Discharge Recommendation: Short Term Rehab(vs  Home OT; will continue to monitor progress)  OT - OK to Discharge: Yes(when medically cleared to rehab)

## 2020-03-12 NOTE — ASSESSMENT & PLAN NOTE
· POA  · Appreciate input from surgery and wound care  · Patient has right anterior hip wound 3 2 x 1 4 cm that is status post grafting by Dr Davey Haywood with plastic surgery 1 year ago that appears to be healing with no identifiable tract from this to the lateral hip wound  · Maxorb alginate placed on wound bed covered with ABD  · Right lateral hip wound 5 0 x 4 7 width by 6 5 cm depth wound tracks deeply however no evidence of infection, visibly mobile and tissue with joint manipulation  · Hydrogel placed on wound bed for moisture lubrication with nonadherent dressing loosely on top not into the wound covered with ABD  · Will need VNA for wound care at time of discharge  · CM working on obtaining specialty mattress

## 2020-03-12 NOTE — PROGRESS NOTES
03/12/20 2211 [address] Affiliation None   Spiritual Beliefs/Perceptions   Concept of God Accepting   Support Systems Friends/neighbors   Coping Responses   Patient Coping Accepting; Fearful;Open/discussion   Patient Spiritual Assessment   Feelings of Discouragement Worried about chronic nature of health issues, lack of control over living circumstances   Plan of Care   Comments Visited w/pt due to consult order   facilitated storytelling;explored spiritual/emotional resources & needs; explored support systems in her community  Pt does not have a leon community but does have neighbors/friends who check in with her  Pt expressed her ability to provide for many/most of her physical and household needs, but desires someone who would help her prioritize how to get her life needs in order when she returns  What agencies/services might be able to help her - in the ways she wants to be helped   listened empathically, explored sources of hope and meaning that would energize her  Pt was able to idenitfy meaningful connections  Palomo  desires someone who will listen/hear/help identify needs from her perspective      Assessment Completed by: Unit visit

## 2020-03-12 NOTE — ASSESSMENT & PLAN NOTE
· Hyponatremia likely due to sepsis and poor p o  Intake/value nutrition, treated with normal saline 350 mL boluses, continue normal saline 100 mL/hr and lab monitoring    · Sodium now 137, will d/c fluids and monitor closely   · Monitoring BMP  · Appreciate nutrition consult  · Encourage PO intake with patient

## 2020-03-12 NOTE — ASSESSMENT & PLAN NOTE
· POA  · Appreciate input from General surgery and wound care  · Patient has midline sacral wound 3 2 x 3 0 x 0 1 cm in size likely representing a chronic pressure injury that is very superficial without signs of infection  · Covered with pink padded dressing  · Right buttock wound 2 0 x 0 5 cm without evidence of infection  · Covered with pink padded dressing  · Outpatient VNA for wound care  · Offloading as best as possible for patient comfort

## 2020-03-12 NOTE — ASSESSMENT & PLAN NOTE
· Appreciate palliative consultation  · Could consider mirtazapine to help with sleep and depression  Patient is still considering this  · Patient desires to be full code with full medical interventions

## 2020-03-12 NOTE — ASSESSMENT & PLAN NOTE
Malnutrition Findings:   Malnutrition type: Chronic illness  Degree of Malnutrition: Malnutrition of mild degree  BMI Findings:  BMI Classifications: Underweight < 18 5  There is no height or weight on file to calculate BMI  · Patient is frail and wasted  Multiple, nonhealing wounds  · Had long conversation with patient regarding importance of protein for wound healing   · Nutrition consult appreciated     · Suleman count, protein supplements   · Recently started on midodrine and pt would like me to dec dose

## 2020-03-12 NOTE — ASSESSMENT & PLAN NOTE
· Patient with history of C diff  · Received dose of Zosyn  Antibiotics now discontinued  · She is on p o   Vancomycin prophylactically, continue 48 hrs post last dose of abx

## 2020-03-12 NOTE — PHYSICAL THERAPY NOTE
PHYSICAL THERAPY EVALUATION          Patient Name: Delroy Madsen  Today's Date: 3/12/2020   PT EVALUATION    71 y o     [id number]    Hyponatremia [E87 1]  Wound check, abscess [Z51 89]  Paraplegia following spinal cord injury ([facility] ) [G82 20]  [facility] ) [A41 9, R65 20]  Open wound of right buttock with complication, subsequent encounter [S31 819D]  Urinary tract infection associated with indwelling urethral catheter, initial encounter ([place] [address] ) [V25 795U, N39 0]  Multiple open wounds of hip [S71 009A]    Past Medical History:   Diagnosis Date    Anemia     iron defiencey    Arthritis     osteo    Back injury     Chronic kidney disease     nephritis    Depression     MRSA (methicillin resistant staph aureus) culture positive 12/07/2018    BONE-TISSUE     Osteopenia     Osteoporosis     Paralysis ([facility] )     paraplegic due to spinal cord injury    [facility]leg[facility]     Poor circulation     Pressure injury of skin     right hip, stage 3    Pressure sore of hip     right    Tibial plateau fracture     Underweight      Past Surgical History:   Procedure Laterality Date    CLOSURE DELAYED PRIMARY N/A 3/20/2019    Procedure: POHELIA ANAL WOUND RECLOSURE;  Surgeon: Philly Carr MD;  Location: [facility] MAIN OR;  Service: Plastics    DECUBITUS ULCER EXCISION Bilateral 11/12/2019    Procedure: DEBRIDEMENT DECUBITI ([address] OUT);   Surgeon: Sharri Farris DO;  Location:  MAIN OR;  Service: General    FLAP LOCAL EXTREMITY Left 1/28/2019    Procedure: THIGH FLAP & STSG TO CLOSE HIP WOUND;  Surgeon: Philly Carr MD;  Location: [facility] MAIN OR;  Service: Plastics    FLAP LOCAL TRUNK Right 12/17/2018    Procedure: DEBRIDE/FLAP CLOSURE HIP PRESSURE SORE Danielle Moose GRAFT;  Surgeon: Philly Carr MD;  Location: [facility] MAIN OR;  Service: Plastics    FLAP LOCAL TRUNK Left 2/27/2019    Procedure: BUTTOCK FLAP TO ISCHIAL SORE;  Surgeon: Philly Carr MD;  Location: [facility] MAIN OR;  Service: Plastics    HIP DEBRIDEMENT Right 2017    right hip sore debridement    INCISION AND DRAINAGE OF WOUND Left 1/3/2019    Procedure: INCISION AND DRAINAGE (I&D) left distal femur  With deep wound cultures  Application of VAC DRAIN SPONGE;  Surgeon: Quinton Tillman MD;  Location:  MAIN OR;  Service: Orthopedics    IR PICC LINE  12/19/2018    IR PICC LINE  3/12/2019    AZ DEBRIDEMENT, SKIN, SUB-Q TISSUE,MUSCLE,BONE,=<20 SQ CM Right 9/19/2018    Procedure: DEBRIDEMENT OF HIP PRESSURE SORE;  Surgeon: Ana Vargas MD;  Location: [address] [place] [address] MAIN OR;  Service: [address] [place] [address] FX+INTRAMED FELIX Left 10/22/2018    Procedure: INSERTION NAIL IM LEFT FEMUR RETROGRADE;  Surgeon: Liyah Portillo MD;  Location:  MAIN OR;  Service: Orthopedics    TOE AMPUTATION Left 2017    small toe left foot amputation    WISDOM TOOTH EXTRACTION      WOUND DEBRIDEMENT Left 12/17/2018    Procedure: DEBRIDEMENT HIP PRESSURE SORE;  Surgeon: Ana Vargas MD;  Location: [address] [place] [address] OR;  Service: Plastics    WOUND DEBRIDEMENT N/A 11/10/2019    Procedure: EXCISIONAL DEBRIDEMENT;  Surgeon: Danisha Sahu MD;  Location:  MAIN OR;  Service: General        03/12/20 6928   Note Type   Note type Eval only   Pain Assessment   Pain Assessment Tool Pain Assessment not indicated - pt denies pain   Pain Score No Pain   Home Living   Type of Home Apartment   Home Layout One level;Stairs to enter without rails  (1 small AMY)   Bathroom Shower/Tub Tub/shower unit   Bathroom Toilet Raised   Bathroom Equipment Tub transfer bench; Toilet raiser;Grab bars around toilet   Bathroom Accessibility Accessible via wheelchair   [address] (Comment)  (slide board, bed rail)   Prior Function   Level of [place] Independent with ADLs and functional mobility   Lives With [address] [place] [address] in the last 6 months 0   Vocational On disability   Comments pt reports use of slide board to transf from wc<>car only  does squat piv/lateral scoot to transf from surface to surface (wc>bed, toilet, chair)    Restrictions/Precautions   Weight Bearing Precautions Per Order No   Other Precautions Contact/isolation;Cognitive; Bed Alarm;Multiple lines; Fall Risk   General   Additional Pertinent History admitted 3/10/20 for sepsis  hx of T8 paraplegia  pt intitially admitted to hospital in [place], dc/ to [facility]  readmitted to [facility] from 2/5-3/2/20 for sepsis, was d/c to [address] [place] [address] for STR  per pt she was d/c from STR 3 days ago  Family/Caregiver Present No   Cognition   Overall Cognitive Status WFL   Arousal/Participation Cooperative   Orientation Level Oriented X4   Memory Decreased recall of precautions   Following Commands Follows one step commands with increased time or repetition   Comments pt w flat affect  decreased safety awareness and insight into deficits  cues to encourage participation and educate on benefits of mobility/PT during hospital stay   RLE Assessment   RLE Assessment X  (paraplegic)   LLE Assessment   LLE Assessment X  (paraplegic)   Coordination   Movements are Fluid and Coordinated 0   Coordination and Movement Description guarded, unstable   Sensation X   Light Touch   RLE Light Touch Absent   RLE Light Touch Comments at least L2-S2   LLE Light Touch Absent   LLE Light Touch Comments at least L2-S2   Bed Mobility   Supine to Sit 5  Supervision   Additional items Bedrails; Increased time required   Additional Comments pt with difficulty repositioning at EOB, increased time required  increased reliance on bed rails  exit bed from L side w L upper bed rail to simulate home environment   Transfers   Sliding Board transfer 4  Minimal assistance   Additional items Assist x 2; Increased time required;Verbal cues   Additional Comments A to position wc and lock brakes for set up, A to position and remove slide board, cues for safety including hand placement, A for BLE management and catheter mangement, additional A for safety   Wheelchair Activities   Wheelchair Cushion Standard   Pressure Relief Type Lateral lean   Wheelchair Parts Management Yes   Left Brakes Level of Assistance Directs care (Comment)  (pt able to indep manage brakes when cued)   Right Brakes Level of Assistance Directs care (Comment)  (pt able to indep manage brakes when cued)   Propulsion Yes   Propulsion Type 1 Manual   Level 1 Level tile   Method 1 Right upper extremity; Left upper extremity   Level of Assistance 1 Directs care (Comment)   Description/ Details 1 pt able to indep propel wc >30' w BUE when directed  no endurance deficits noted   Balance   Static Sitting Fair   Dynamic Sitting Fair -   Endurance Deficit   Endurance Deficit Yes   Endurance Deficit Description decline from baseline function secondary to weakness, fatigue   Activity Tolerance   Activity Tolerance Patient limited by fatigue   Medical Staff Made Aware Jodi/Felicitas OT   Nurse Made Aware Belén Son 8141 RN; cleared for PT, updated end of session   Assessment   Prognosis Fair   Problem List Decreased strength;Decreased range of motion;Decreased endurance; Impaired balance;Decreased mobility; Decreased coordination; Impaired judgement;Decreased safety awareness; Impaired sensation;Decreased skin integrity  (T8 paraplegia)   Assessment Naty Kirkpatrick is a 71 y o  female admitted to [facility] on 3/10/2020 for SIRS (systemic inflammatory response syndrome) ([facility] )  Pt  has a past medical history of Anemia, Arthritis, Back injury, Chronic kidney disease, Depression, MRSA (12/07/2018), Osteopenia, Osteoporosis, Paralysis ([facility] ), T8 Paraplegia ([facility] ), Pressure injury of skin, closed lateral dislocation of distal end of right femur  PT was consulted and pt was seen on 3/12/2020 for mobility assessment and d/c planning  Pt presents contact precautions, high fall risk, PIV, catheter, monitoring abn labs, skin care for multiple pressure injuries   Prior to her first hospital admission in Nov, pt was indep, use of wc at baseline, slide board for wc<>car transf, lateral scoot/transf for bed<>chair transf  Pt was recently admitted to [facility] from 2/5-3/2/20, was d/c to [facility]  Pt report she was d/c from STR 3 days ago  Pt is currently functioning at a supervision assistance x1 level for bed mobility, minimum assistance x2 level for slide board transfers, independent level for wc mobility  Pt demonstrated increased difficulty performing mobility tasks 2* to fatigue, weakness  Ax2 for transf set up, wc brakes, slide board placement and removal, LE management, catheter management  Pt also demonstrated poor insight into deficits and decreased safety awareness; reinforced safety precautions, fall risk and need for supervised mobility multiple times throughout IE, pt verbalize understanding  Nsg updated  Pt will benefit from continued skilled IP PT to address the above mentioned impairments  in order to maximize recovery and increase functional independence when completing mobility and ADLs  At this time PT recommendations for d/c are STR v HHPT pending progress  Barriers to Discharge Decreased caregiver support   Barriers to Discharge Comments lives alone   Goals   Patient Goals to go home   STG Expiration Date 03/26/20   Short Term Goal #1 1)  Pt will perform bed mobility with Rafi demonstrating appropriate technique 100% of the time in order to improve function  2)  Perform all transfers with Rafi demonstrating safe and appropriate technique 100% of the time in order to improve ability to negotiate safely in home environment  3) Perform wc mobility 200'x1 mod I in order to demonstrate ability to negotiate in home and community environment  4)  Improve overall static/dynamic balance 1/2 grade in order to optimize ability to perform functional tasks and reduce fall risk  5) Increase activity tolerance to 45 minutes in order to improve endurance to functional tasks  6)  PT for ongoing patient and family/caregiver education, DME needs and d/c planning in order to promote highest level of function in least restrictive environment  Plan   Treatment/Interventions Functional transfer training; Therapeutic exercise; Endurance training;Patient/family training;Equipment eval/education; Bed mobility; Compensatory technique education;Continued evaluation;Spoke to nursing;OT  (LE ROM/stretching if applicable)   PT Frequency 2-3x/wk   Recommendation   Recommendation Short-term skilled PT; Home PT  (STR vs HHPT (if pt refuse STR))   PT - OK to Discharge Yes   Additional Comments to STR when medically stable   Modified [place]a Scale   Modified [place] Scale 4   Barthel Index   Feeding 10   Bathing 0   Grooming Score 5   Dressing Score 5   Bladder Score 0   Bowels Score 10   Toilet Use Score 5   Transfers (Bed/Chair) Score 5   Mobility (Level Surface) Score 5   Stairs Score 0   Barthel Index Score 45   History: co - morbidities, age, coping styles, social background, past experience, fall risk, use of assistive device, assist for adl's, cognition, multiple lines  Exam: impairments in systems including musculoskeletal (strength-paraplegic), neuromuscular (balance,locomotion, transfers, motor function and coordination), joint integrity (R distal femur dislocation), integumentary (skin integrity, presence of multiple pressure injuries), cognition  Clinical: unstable/unpredictable  Complexity:high Janel Dubose, PT

## 2020-03-12 NOTE — ASSESSMENT & PLAN NOTE
· Supportive care   · Wheelchair bound   · Appreciate PT OT input - patient refusing d/c to rehab   · Appreciate palliative care recs regarding [address]

## 2020-03-12 NOTE — ASSESSMENT & PLAN NOTE
· SIRS syndrome POA, e/b tachycardia and leukocytosis  Low suspicion at this time for acute infection  · Initially felt to have sepsis possibly due to UTI versus chronic wounds/ulcers  · Appreciate infectious disease recommendations   · CT scan without abscess  but there was evidence of inflammation present at the right hip which is likely due to ongoing irritation of the tissues  · Patient with chronic UTI, VRE, with chronic [place]  No new symptoms  · Received Zosyn initially  ID recommends discontinuing IV antibiotics and monitoring  · Wound care and surgery have assessed patient, recommendations appreciated  · Will continue p o  Prophylactic vancomycin as patient has history of C diff    · D/c IV steroids, unclear why these were started

## 2020-03-12 NOTE — PLAN OF CARE
Problem: PHYSICAL THERAPY ADULT  Goal: Performs mobility at highest level of function for planned discharge setting  See evaluation for individualized goals  Description  Treatment/Interventions: Functional transfer training, Therapeutic exercise, Endurance training, Patient/family training, Equipment eval/education, Bed mobility, Compensatory technique education, Continued evaluation, Spoke to nursing, OT(LE ROM/stretching if applicable)          See flowsheet documentation for full assessment, interventions and recommendations  Note:   Prognosis: Fair  Problem List: Decreased strength, Decreased range of motion, Decreased endurance, Impaired balance, Decreased mobility, Decreased coordination, Impaired judgement, Decreased safety awareness, Impaired sensation, Decreased skin integrity(T8 paraplegia)  Assessment: Glena Goldmann is a 71 y o  female admitted to [facility] on 3/10/2020 for SIRS (systemic inflammatory response syndrome) ([facility] )  Pt  has a past medical history of Anemia, Arthritis, Back injury, Chronic kidney disease, Depression, MRSA (12/07/2018), Osteopenia, Osteoporosis, Paralysis ([facility] ), T8 Paraplegia ([facility] ), Pressure injury of skin, closed lateral dislocation of distal end of right femur  PT was consulted and pt was seen on 3/12/2020 for mobility assessment and d/c planning  Pt presents contact precautions, high fall risk, PIV, catheter, monitoring abn labs, skin care for multiple pressure injuries  Prior to her first hospital admission in Nov, pt was indep, use of wc at baseline, slide board for wc<>car transf, lateral scoot/transf for bed<>chair transf  Pt was recently admitted to hospitals from 2/5-3/2/20, was d/[facility] to [facility]  Pt report she was d/c from [facility] 3 days ago  Pt is currently functioning at a supervision assistance x1 level for bed mobility, minimum assistance x2 level for slide board transfers, independent level for wc mobility   Pt demonstrated increased difficulty performing mobility tasks 2* to fatigue, weakness  Ax2 for transf set up, wc brakes, slide board placement and removal, LE management, catheter management  Pt also demonstrated poor insight into deficits and decreased safety awareness; reinforced safety precautions, fall risk and need for supervised mobility multiple times throughout IE, pt verbalize understanding  Nsg updated  Pt will benefit from continued skilled IP PT to address the above mentioned impairments  in order to maximize recovery and increase functional independence when completing mobility and ADLs  At this time PT recommendations for d/c are STR v HHPT pending progress  Barriers to Discharge: Decreased caregiver support  Barriers to Discharge Comments: lives alone  Recommendation: Short-term skilled PT, Home PT(STR vs HHPT (if pt refuse STR))     PT - OK to Discharge: Yes    See flowsheet documentation for full assessment

## 2020-03-12 NOTE — PROGRESS NOTES
Progress Note - Infectious Disease   Karla Lundy 71 y o  female MRN: [id number]  Unit/Bed#: E5 -01 Encounter: [id number]    Impression/Plan:  1  SIRS  POA  Tachycardia and leukocytosis  Suspect patient has ongoing inflammation related to the chronicity of her wounds  Also consider continued elevation of WBC count is likely due to steroids that were given after her contrast study  She otherwise has no specific complaints and her wound exam is benign  Low suspicion at this time for active infection  CT without abscess  She otherwise is afebrile and hemodynamically stable  No indication for ongoing antibiotic treatment at this time   -monitor patient off antibiotics  -monitor CBC and BMP  -follow-up blood cultures  -monitor vitals  -supportive care     2  Right hip open wound with underlying osteomyelitis and dislocation of right femur  POA  Patient has known dislocation of her right femur which has led to progression of the large open hip wound  The site itself does not appear acutely infected despite inflammation seen on CT scan  Suspect ongoing irritation of the site  Patient with limited surgical options  She is following with orthopedics, wound management, and general surgery  -monitor patient off antibiotics  -serial wound exams  -continue follow-up with general surgery  -continue follow-up with Orthopedic surgery  -continue follow-up with wound management     3  Multiple nonhealing pressure wounds  POA  Patient has multiple other wounds on her sacrum  These wounds again appears smaller than previously seen in February and do not appear acutely infected  She is following closely with wound management   -serial wound exams  -continue close follow-up with wound management     4  History of C diff  She has recently received antibiotic is currently on PO vancomycin prophylaxis  She should continue this through 03/14/2020 which is 72 hours after completion of recent antibiotic    I recommended against PRN Imodium use at this time   -continue oral vancomycin prophylaxis through 2020 which is 72 hours after completion of recent antibiotic course-  -monitor GI symptoms  -monitor stool output     5  Vancomycin allergy  Patient with known drug allergies to vancomycin, cefepime, ciprofloxacin  She has tolerated penicillin as well as Ancef and Keflex in the past      6  Malnourished  Patient noted with BMI of 17  Unfortunately this relays poor wound healing potential      7  Paraplegia with neurogenic bladder and chronic catheter  Patient's catheter bag with clear urine  UA noted to be abnormal which is likely due to chronic catheterization  Patient does not relate any fevers and cannot feel urinary symptoms otherwise  Otherwise hemodynamically stable  Monitoring off antibiotics as above  Above plan was discussed in detail with patient at the bedside  Antibiotics:  No current antibiotic use    Subjective:  Patient reports she has no changes to report today  She denies fever, chills, sweats, shakes; no nausea, vomiting, or abdominal pain; no concerns with her Hays catheter; no cough, shortness of breath, or chest pain  Patient reports no change in her stool output but is requesting to have Imodium ordered for PRN use  She tells me she takes this at home at times because she does not like to have stool contaminating her wound  No new symptoms  Objective:  Vitals:  Temp:  [97 1 °F (36 2 °C)-97 6 °F (36 4 °C)] 97 1 °F (36 2 °C)  HR:  [65-70] 65  Resp:  [18] 18  BP: (121-166)/(59-75) 166/75  SpO2:  [94 %-96 %] 94 %  Temp (24hrs), Av 3 °F (36 3 °C), Min:97 1 °F (36 2 °C), Max:97 6 °F (36 4 °C)  Current: Temperature: (!) 97 1 °F (36 2 °C)    Physical Exam:   General Appearance:  Alert, interactive with flat affect, nontoxic, no acute distress  Patient appears chronically ill and malnourished  Throat: Oropharynx moist without lesions      Lungs:   Clear to auscultation bilaterally; no wheezes, rhonchi or rales; respirations unlabored; patient is on room air   Heart:  RRR; no murmur, rub or gallop   Abdomen:   Soft, non-tender, non-distended, positive bowel sounds  Genitourinary: Hays catheter intact, urine output is yellow without sediment   Extremities: No clubbing or cyanosis, no edema; significant peripheral muscle atrophy     Labs, Imaging, & Other studies:   All pertinent labs and imaging studies were personally reviewed  Results from last 7 days   Lab Units 03/12/20  0602 03/11/20  0612 03/10/20  1314   WBC Thousand/uL 14 47* 17 75* 16 21*   HEMOGLOBIN g/dL 7 7* 9 1* 9 5*   PLATELETS Thousands/uL 411* 456* 537*     Results from last 7 days   Lab Units 03/12/20  0602 03/11/20  0605 03/10/20  1313   POTASSIUM mmol/L 2 8* 3 3* 3 7   CHLORIDE mmol/L 105 103 93*   CO2 mmol/L 22 22 22   BUN mg/dL 13 9 12   CREATININE mg/dL 0 62 0 60 0 72   EGFR ml/min/1 73sq m 92 93 86   CALCIUM mg/dL 8 0* 8 0* 8 3   AST U/L  --  9 25   ALT U/L  --  18 19   ALK PHOS U/L  --  225* 132*     Results from last 7 days   Lab Units 03/11/20  1112 03/10/20  1317 03/10/20  1313   BLOOD CULTURE   --  No Growth at 24 hrs  No Growth at 24 hrs     URINE CULTURE  No Growth <1000 cfu/mL  --   --      Results from last 7 days   Lab Units 03/10/20  1313   PROCALCITONIN ng/ml 0 11

## 2020-03-12 NOTE — SOCIAL WORK
CM met with the patient today to do a general SW assessment and discuss discharge needs: The patient lives alone in a single story apartment  She is independent with adls and uses a WC for ambulatory needs  She is able to complete her own transfers  She d/c AMA from her last SNF and therefore was not set up with any [place] [address] services  She has been hospitalized and in SNFs since November of 2019, she has been at [address] and [facility]  She has her groceries delivered to her via [employer]  Her PCP is Sr Eneida Huber  She uses the [facility] [address] in [place] for rx needs  She had been driving herself prior to November, however, her car is not working  She is utilizing the [facility] for transport  The patient has a POA, Michelle Rodarte [phone], this is her friend whom she has known since 9yo  The patient reports her goal is to return home  CM discussed recommendations as of right now (prior to PT and OT consult):   · VNA for PT and SN needs  · The patient is NOT agreeable to SN needs for wound care because she does not want to cater to the nurses schedule  She is agreable to PT to help her work on transfers from her [facility] to her car  · Hospital bed with low air loss mattress  · The patient currently uses a full sized, regular bed NOT a hospital bed  She reports that utilizing a hospital bed would be difficult when she is not feeling well as her regular bed is the same height as her WC and easy for transfers  The patient would like to go to the [address] for [facility] needs, she is not agreeable to utilizing VNA services for wound are needs  She reports she will use the [facility] to get to these appointments as this is how she got to the hospital to be admitted  She is okay with me talking to DME about obtaining the mattress with using her regular bed vs  Obtaining a hospital bed   Cm sent a referral to DME to ask if a mattress can be used with a regular bed, and if not, are hospital beds able to be adjusted in height  Will need PT to measure the height of her wheelchair  At this time she is not agreeable to returning to SNF  Her supports are limited

## 2020-03-13 VITALS
TEMPERATURE: 98.4 F | SYSTOLIC BLOOD PRESSURE: 124 MMHG | HEART RATE: 87 BPM | DIASTOLIC BLOOD PRESSURE: 65 MMHG | RESPIRATION RATE: 16 BRPM | OXYGEN SATURATION: 95 %

## 2020-03-13 PROBLEM — E87.6 HYPOKALEMIA: Status: RESOLVED | Noted: 2018-09-19 | Resolved: 2020-03-13

## 2020-03-13 PROBLEM — E87.1 HYPONATREMIA: Status: RESOLVED | Noted: 2020-03-11 | Resolved: 2020-03-13

## 2020-03-13 LAB
ANION GAP SERPL CALCULATED.3IONS-SCNC: 10 MMOL/L (ref 4–13)
ANISOCYTOSIS BLD QL SMEAR: PRESENT
BASOPHILS # BLD MANUAL: 0 THOUSAND/UL (ref 0–0.1)
BASOPHILS NFR MAR MANUAL: 0 % (ref 0–1)
BUN SERPL-MCNC: 13 MG/DL (ref 5–25)
CALCIUM SERPL-MCNC: 8.4 MG/DL (ref 8.3–10.1)
CHLORIDE SERPL-SCNC: 103 MMOL/L (ref 100–108)
CO2 SERPL-SCNC: 26 MMOL/L (ref 21–32)
CREAT SERPL-MCNC: 0.66 MG/DL (ref 0.6–1.3)
EOSINOPHIL # BLD MANUAL: 0.1 THOUSAND/UL (ref 0–0.4)
EOSINOPHIL NFR BLD MANUAL: 1 % (ref 0–6)
ERYTHROCYTE [DISTWIDTH] IN BLOOD BY AUTOMATED COUNT: 18 % (ref 11.6–15.1)
GFR SERPL CREATININE-BSD FRML MDRD: 90 ML/MIN/1.73SQ M
GLUCOSE SERPL-MCNC: 126 MG/DL (ref 65–140)
HCT VFR BLD AUTO: 30.3 % (ref 34.8–46.1)
HGB BLD-MCNC: 9.2 G/DL (ref 11.5–15.4)
LYMPHOCYTES # BLD AUTO: 2.08 THOUSAND/UL (ref 0.6–4.47)
LYMPHOCYTES # BLD AUTO: 21 % (ref 14–44)
MCH RBC QN AUTO: 25.2 PG (ref 26.8–34.3)
MCHC RBC AUTO-ENTMCNC: 30.4 G/DL (ref 31.4–37.4)
MCV RBC AUTO: 83 FL (ref 82–98)
MONOCYTES # BLD AUTO: 0.6 THOUSAND/UL (ref 0–1.22)
MONOCYTES NFR BLD: 6 % (ref 4–12)
NEUTROPHILS # BLD MANUAL: 7.14 THOUSAND/UL (ref 1.85–7.62)
NEUTS BAND NFR BLD MANUAL: 1 % (ref 0–8)
NEUTS SEG NFR BLD AUTO: 71 % (ref 43–75)
NRBC BLD AUTO-RTO: 0 /100 WBCS
PLATELET # BLD AUTO: 480 THOUSANDS/UL (ref 149–390)
PLATELET BLD QL SMEAR: ABNORMAL
PMV BLD AUTO: 9.6 FL (ref 8.9–12.7)
POTASSIUM SERPL-SCNC: 3.9 MMOL/L (ref 3.5–5.3)
RBC # BLD AUTO: 3.65 MILLION/UL (ref 3.81–5.12)
SODIUM SERPL-SCNC: 139 MMOL/L (ref 136–145)
TOTAL CELLS COUNTED SPEC: 100
WBC # BLD AUTO: 9.92 THOUSAND/UL (ref 4.31–10.16)

## 2020-03-13 PROCEDURE — 85007 BL SMEAR W/DIFF WBC COUNT: CPT | Performed by: PHYSICIAN ASSISTANT

## 2020-03-13 PROCEDURE — 99233 SBSQ HOSP IP/OBS HIGH 50: CPT | Performed by: NURSE PRACTITIONER

## 2020-03-13 PROCEDURE — 80048 BASIC METABOLIC PNL TOTAL CA: CPT | Performed by: PHYSICIAN ASSISTANT

## 2020-03-13 PROCEDURE — 99239 HOSP IP/OBS DSCHRG MGMT >30: CPT | Performed by: PHYSICIAN ASSISTANT

## 2020-03-13 PROCEDURE — 85027 COMPLETE CBC AUTOMATED: CPT | Performed by: PHYSICIAN ASSISTANT

## 2020-03-13 RX ORDER — MIDODRINE HYDROCHLORIDE 10 MG/1
10 TABLET ORAL 2 TIMES DAILY
Refills: 0 | Status: ON HOLD
Start: 2020-03-13 | End: 2021-06-13 | Stop reason: ALTCHOICE

## 2020-03-13 RX ORDER — SENNOSIDES 8.6 MG
1 TABLET ORAL
Qty: 120 EACH | Refills: 0 | Status: SHIPPED | OUTPATIENT
Start: 2020-03-13 | End: 2020-04-29 | Stop reason: HOSPADM

## 2020-03-13 RX ORDER — LOPERAMIDE HYDROCHLORIDE 2 MG/1
2 CAPSULE ORAL ONCE
Status: COMPLETED | OUTPATIENT
Start: 2020-03-13 | End: 2020-03-13

## 2020-03-13 RX ORDER — VANCOMYCIN HYDROCHLORIDE 125 MG/1
125 CAPSULE ORAL 2 TIMES DAILY
Qty: 3 CAPSULE | Refills: 0 | Status: SHIPPED | OUTPATIENT
Start: 2020-03-13 | End: 2020-03-15

## 2020-03-13 RX ADMIN — VANCOMYCIN HYDROCHLORIDE 125 MG: 500 INJECTION, POWDER, LYOPHILIZED, FOR SOLUTION INTRAVENOUS at 10:06

## 2020-03-13 RX ADMIN — ENOXAPARIN SODIUM 40 MG: 40 INJECTION SUBCUTANEOUS at 09:55

## 2020-03-13 RX ADMIN — PANTOPRAZOLE SODIUM 40 MG: 40 TABLET, DELAYED RELEASE ORAL at 09:55

## 2020-03-13 RX ADMIN — Medication 100 MCG: at 09:55

## 2020-03-13 RX ADMIN — Medication 250 MG: at 09:55

## 2020-03-13 RX ADMIN — MIDODRINE HYDROCHLORIDE 10 MG: 5 TABLET ORAL at 16:26

## 2020-03-13 RX ADMIN — NYSTATIN: 100000 POWDER TOPICAL at 09:57

## 2020-03-13 RX ADMIN — LOPERAMIDE HYDROCHLORIDE 2 MG: 2 CAPSULE ORAL at 16:57

## 2020-03-13 RX ADMIN — MIDODRINE HYDROCHLORIDE 10 MG: 5 TABLET ORAL at 09:55

## 2020-03-13 NOTE — ASSESSMENT & PLAN NOTE
· POA  · Appreciate input from general surgery and wound care   · Left lateral ischial wound 1 0 x 0 5 cm that is superficial without signs of infection  · Cover with pink padded dressing  · Left ischial pressure wound 6 5 x 4 0 x 1 5 cm in size, wound bed tissue not easily debrided but otherwise no sign of infection  · Maxorb placed in wound bed and covered with pink pad dressing  · Case management working on getting patient a specialty mattress/hospital bed at home  · Low suspicion that wounds caused sepsis  Monitoring off of antibiotics for now    · Will need wound care follow up outpatient

## 2020-03-13 NOTE — PROGRESS NOTES
Progress Note - Infectious Disease   Ladean Inch 71 y o  female MRN: [id number]  Unit/Bed#: E5 -01 Encounter: [id number]    Impression/Plan:  1  SIRS  POA   Tachycardia and leukocytosis  Suspect patient has ongoing inflammation related to the chronicity of her wounds  Also consider continued elevation of WBC count was likely due to steroids that were given after her contrast study  Steroids were discontinued and WBC count is trending down  She otherwise has no specific complaints and her wound exam is benign   Low suspicion at this time for active infection   CT without abscess  Blood cultures are negative after 48 hours  She otherwise is afebrile and hemodynamically stable  No indication for ongoing antibiotic treatment at this time   -monitor patient off antibiotics  -monitor CBC and BMP  -follow-up blood cultures  -monitor vitals  -supportive care     2  Right hip open wound with underlying osteomyelitis and dislocation of right femur  POA  Patient has known dislocation of her right femur which has led to progression of the large open hip wound   The site itself does not appear acutely infected despite inflammation seen on CT scan  Suspect ongoing irritation of the site   Patient with limited surgical options  She is following with orthopedics, wound management, and general surgery  She is on a specialty mattress    -monitor patient off antibiotics  -serial wound exams  -continue follow-up with general surgery  -continue follow-up with Orthopedic surgery  -continue follow-up with wound management     3  Multiple nonhealing pressure wounds  POA   Patient has multiple other wounds on her sacrum  These wounds again appears smaller than previously seen in February and do not appear acutely infected  She is following closely with wound management   She is on a specialty mattress   -serial wound exams  -continue close follow-up with wound management     4  History of C diff  She has recently received antibiotic is currently on PO vancomycin prophylaxis  She should continue this through 03/14/2020 which is 72 hours after completion of recent antibiotic  Patient reports that she is constantly aware of the risk for stool contamination of her wounds and states she takes Imodium frequently at home  She then has issues with constipation  I recommended against PRN Imodium use at this time, especially after recent IV Zosyn use, and told patient that she may need to consider surgical options for stool management in the future if frequent diarrhea remains an ongoing problem  -continue oral vancomycin prophylaxis through 03/14/2020 which is 72 hours after completion of recent antibiotic course-  -monitor GI symptoms  -monitor stool output     5  Vancomycin allergy   Patient with known drug allergies to vancomycin, cefepime, ciprofloxacin  She has tolerated penicillin as well as Ancef and Keflex in the past      6  Malnourished   Patient noted with BMI of 17  Unfortunately this relays poor wound healing potential      7  Paraplegia with neurogenic bladder and chronic catheter   Patient's catheter bag with clear urine  Hawa Marco noted to be abnormal which is likely due to chronic catheterization   Patient does not relate any fevers and cannot feel urinary symptoms otherwise   Otherwise hemodynamically stable   Monitoring off antibiotics as above  Patient is stable for discharge from ID standpoint  Above plan was discussed in detail with patient at the bedside  Above plan was discussed in detail with Rashi HORN PA-C  Antibiotics:  No current antibiotic use    Subjective:  Patient has many questions today regarding her wounds and how she is going to continue to live with multiple open sores    She is very clear and not wanting wound care to come to her home and reports she can easily go to the [facility] utilizing Lantus like she has been in the past   She wants to know what kind of surgery she may be able to have as she is concerned that she will not ever make progress  She has no new symptoms or complaints to report today  She denies fever, chills, sweats, shakes; no nausea or vomiting; she reports intermittent loose stools and continues to request Imodium for management as this is what she takes at home; no cough, shortness of breath, or chest pain  No concerns with her Hays catheter  No new symptoms  Objective:  Vitals:  Temp:  [97 °F (36 1 °C)-97 6 °F (36 4 °C)] 97 °F (36 1 °C)  HR:  [56-90] 56  Resp:  [18] 18  BP: (135-153)/(62-71) 135/62  SpO2:  [96 %-97 %] 96 %  Temp (24hrs), Av 3 °F (36 3 °C), Min:97 °F (36 1 °C), Max:97 6 °F (36 4 °C)  Current: Temperature: (!) 97 °F (36 1 °C)    Physical Exam:   General Appearance:  Alert, interactive, in no acute distress  She appears chronically ill and cachectic  Throat: Oropharynx moist without lesions  Lungs:   Clear to auscultation bilaterally; no wheezes, rhonchi or rales; respirations unlabored; patient is on room air   Heart:  RRR; no murmur, rub or gallop   Abdomen:   Soft, non-tender, non-distended, positive bowel sounds  Genitourinary:  Hays catheter intact, urine output is clear yellow without sediment   Extremities: No clubbing or cyanosis, no edema; patient has significant peripheral muscle atrophy     Labs, Imaging, & Other studies:   All pertinent labs and imaging studies were personally reviewed  Results from last 7 days   Lab Units 20  0602 20  0612 03/10/20  1314   WBC Thousand/uL 14 47* 17 75* 16 21*   HEMOGLOBIN g/dL 7 7* 9 1* 9 5*   PLATELETS Thousands/uL 411* 456* 537*     Results from last 7 days   Lab Units 20  0630  20  0605 03/10/20  1313   POTASSIUM mmol/L 3 9   < > 3 3* 3 7   CHLORIDE mmol/L 103   < > 103 93*   CO2 mmol/L 26   < > 22 22   BUN mg/dL 13   < > 9 12   CREATININE mg/dL 0 66   < > 0 60 0 72   EGFR ml/min/1 73sq m 90   < > 93 86   CALCIUM mg/dL 8 4   < > 8 0* 8 3   AST U/L  --   --  9 25   ALT U/L  --   --  18 19   ALK PHOS U/L  --   --  225* 132*    < > = values in this interval not displayed  Results from last 7 days   Lab Units 03/11/20  1112 03/10/20  1317 03/10/20  1313   BLOOD CULTURE   --  No Growth at 48 hrs  No Growth at 48 hrs     URINE CULTURE  No Growth <1000 cfu/mL  --   --      Results from last 7 days   Lab Units 03/10/20  1313   PROCALCITONIN ng/ml 0 11

## 2020-03-13 NOTE — ASSESSMENT & PLAN NOTE
· Patient with history of C diff  · Received dose of Zosyn  Antibiotics now discontinued  · She is on p o   Vancomycin prophylactically, continue for 3 more doses   · Advised against use of imodium - states she uses this at home and has to disimpact herself often

## 2020-03-13 NOTE — ASSESSMENT & PLAN NOTE
· Supportive care   · Wheelchair bound   · Appreciate PT OT input - patient refusing d/c to rehab   · Referral placed for [facility] at home for PT   · Appreciate palliative care recs regarding Lauren Taveras

## 2020-03-13 NOTE — ASSESSMENT & PLAN NOTE
· Noticed on imaging on prior admission  · Appreciate orthopedic consult  · Can be followed outpatient  No emergent surgical procedure    · Pt requesting ortho see her today

## 2020-03-13 NOTE — ASSESSMENT & PLAN NOTE
· Noticed on imaging on prior admission  · Appreciate orthopedic consult  · Can be followed outpatient  No emergent surgical procedure    · Appreciate ortho recs, outpatient f/u Dr Everardo Ruth

## 2020-03-13 NOTE — ASSESSMENT & PLAN NOTE
· Appreciate palliative consultation  · Could consider mirtazapine to help with sleep and depression  Patient is still considering this  · Patient desires to be full code with full medical interventions    · Today states, "how am I supposed to live with these wounds?"

## 2020-03-13 NOTE — ASSESSMENT & PLAN NOTE
· POA  · Appreciate input from surgery and wound care  · Patient has right anterior hip wound 3 2 x 1 4 cm that is status post grafting by Dr Manuel Paiz with plastic surgery 1 year ago that appears to be healing with no identifiable tract from this to the lateral hip wound  · Maxorb alginate placed on wound bed covered with ABD  · Right lateral hip wound 5 0 x 4 7 width by 6 5 cm depth wound tracks deeply however no evidence of infection, visibly mobile and tissue with joint manipulation  · Hydrogel placed on wound bed for moisture lubrication with nonadherent dressing loosely on top not into the wound covered with ABD  · Will need VNA for wound care at time of discharge but patient refusing- agrees to outpatient f/u at [facility] wound care center (will use Pedro Mccloud)   · CM working on obtaining specialty mattress (pt does not want hospital bed and confirming this mattress can be used on personal bed frame/box)  · Patient requesting ortho today to answer her questions regarding wound

## 2020-03-13 NOTE — TREATMENT PLAN
As per my partner's consult note on 3/11/20, pt wishes for full cares without limits, and she is not appropriate for opioid therapy  While we do appreciate that this lady may be making poor decisions re: her health and home cares, no party has suggested she is incompetent  If there is a concern for her competence, please consider a Psychiatry or Neuropsychology evaluation for competence, and then we would be happy to revisit goals with her agent, if appropriate  Otherwise, we will sign off  She does not need to follow in our clinic at this time  Aníbal Sanchez MD  Palliative and Supportive Care  Clinic/Answering Service: [phone]  You can find me on [employer]!

## 2020-03-13 NOTE — ASSESSMENT & PLAN NOTE
· Patient with history of C diff  · Received dose of Zosyn  Antibiotics now discontinued  · She is on p o   Vancomycin prophylactically, continue another 48 hrs   · Advised against use of imodium - states she uses this at home and has to disimpact herself often

## 2020-03-13 NOTE — ASSESSMENT & PLAN NOTE
· POA  · Appreciate input from General surgery and wound care  · Patient has midline sacral wound 3 2 x 3 0 x 0 1 cm in size likely representing a chronic pressure injury that is very superficial without signs of infection  · Covered with pink padded dressing  · Right buttock wound 2 0 x 0 5 cm without evidence of infection  · Covered with pink padded dressing  · Outpatient wound care - refusing VNA   · Offloading as best as possible for patient comfort

## 2020-03-13 NOTE — ASSESSMENT & PLAN NOTE
· POA  · Appreciate input from general surgery and wound care   · Left lateral ischial wound 1 0 x 0 5 cm that is superficial without signs of infection  · Cover with pink padded dressing  · Left ischial pressure wound 6 5 x 4 0 x 1 5 cm in size, wound bed tissue not easily debrided but otherwise no sign of infection  · Maxorb placed in wound bed and covered with pink pad dressing

## 2020-03-13 NOTE — ASSESSMENT & PLAN NOTE
· SIRS syndrome POA, e/b tachycardia and leukocytosis  Low suspicion at this time for acute infection  · Initially felt to have sepsis possibly due to UTI versus chronic wounds/ulcers  · Appreciate infectious disease recommendations   · CT scan without abscess  but there was evidence of inflammation present at the right hip which is likely due to ongoing irritation of the tissues  · Patient with chronic UTI, VRE, with chronic Hays  No new symptoms  · Received Zosyn initially  ID recommends monitoring off antibiotics  · Wound care and surgery have assessed patient, recommendations appreciated  · Will continue p o  Prophylactic vancomycin as patient has history of C diff     · D/c IV steroids 3/12, unclear why these were started

## 2020-03-13 NOTE — SOCIAL WORK
CM continued to discuss hospital bed with patient; air mattress cannot be provided without hospital bed; CM offered to order hospital bed for patient however she does not want this because she insists that hospital bed will be too high for her to be able to transfer  Per Mita SOLORZANO, hospital bed can be lowered up and down; pt qualifies for a twin sized hospital bed  Pt does not want this; she wants to "shop around" to see what type of air mattress she can get on her own  CM advised that she should have a hospital bed with air mattress due to her wounds  She again states she wants to look into getting one privately  She also asked if she can get a gel overlay to put on her normal mattress, which is possible, but if obtaining through [place] through insurance, this would also be a twin sized and pt did not want to put a twin sized get overlay on her double sized traditional mattress  CM offered to have the out patient CM from her PCP's office follow up with her about this after d/c  Pt wants to follow up with the [facility] [place] [facility] wound care center after d/c   CM called set up appointment; Wednesday March 25 at 9 AM   This is the soonest available  Pt reports she will dress her own wounds in the meantime before this appointment and in between wound care appointments  CM again offered her VNA but she does not want this  She is in agreement with home PT  CM arranging through [facility]  Pt is medically cleared for d/c  Pt needs a ride home; CM arranged [facility] at 6 PM tonight  CM attempted to arrange out patient social work for her but pt does not have a PCP through [employer] so she does not qualify  CM spoke with pt's PCP office office representative, Leann; she will follow up with patient after d/c re hospital bed and wound care

## 2020-03-13 NOTE — ASSESSMENT & PLAN NOTE
· POA  · Appreciate input from surgery and wound care  · Patient has right anterior hip wound 3 2 x 1 4 cm that is status post grafting by Dr Freida Viramontes with plastic surgery 1 year ago that appears to be healing with no identifiable tract from this to the lateral hip wound  · Maxorb alginate placed on wound bed covered with ABD  · Right lateral hip wound 5 0 x 4 7 width by 6 5 cm depth wound tracks deeply however no evidence of infection, visibly mobile and tissue with joint manipulation  · Hydrogel placed on wound bed for moisture lubrication with nonadherent dressing loosely on top not into the wound covered with ABD  · Will need VNA for wound care at time of discharge but patient refusing- agrees to outpatient f/u at [facility] wound care center (will use Rojas Higgins) and CM set this up   · CM working on obtaining specialty mattress however patient refusing, will look on her own   · Outpatient ortho f/u

## 2020-03-13 NOTE — DISCHARGE SUMMARY
Discharge- Betty Young 1950, 71 y o  female MRN: [id number]  Unit/Bed#: E5 -01 Encounter: [id number] DOS: 3/13/2020  Primary Care Provider: Naveen Maravilla   Date and time admitted to hospital: 3/10/2020 11:52 AM    * SIRS (systemic inflammatory response syndrome) (HCC)  Assessment & Plan  · SIRS syndrome POA, e/b tachycardia and leukocytosis  Low suspicion at this time for acute infection  · Initially felt to have sepsis possibly due to UTI versus chronic wounds/ulcers  · Appreciate infectious disease recommendations   · CT scan without abscess  but there was evidence of inflammation present at the right hip which is likely due to ongoing irritation of the tissues  · Patient with chronic UTI, VRE, with chronic Hays  No new symptoms  · Received Zosyn initially  ID recommends monitoring off antibiotics  · Wound care and surgery have assessed patient, recommendations appreciated  · Will continue p o  Prophylactic vancomycin as patient has history of C diff     · D/c IV steroids 3/12, unclear why these were started     Open wound of right hip  Assessment & Plan  · POA  · Appreciate input from surgery and wound care  · Patient has right anterior hip wound 3 2 x 1 4 cm that is status post grafting by Dr Kimberley Marshall with plastic surgery 1 year ago that appears to be healing with no identifiable tract from this to the lateral hip wound  · Maxorb alginate placed on wound bed covered with ABD  · Right lateral hip wound 5 0 x 4 7 width by 6 5 cm depth wound tracks deeply however no evidence of infection, visibly mobile and tissue with joint manipulation  · Hydrogel placed on wound bed for moisture lubrication with nonadherent dressing loosely on top not into the wound covered with ABD  · Will need VNA for wound care at time of discharge but patient refusing- agrees to outpatient f/u at [facility] wound care center (will use Jonathan Allison) and CM set this up   · CM working on obtaining specialty mattress however patient refusing, will look on her own   · Outpatient ortho f/u    Decubitus ulcer of left ischium, stage III (HCC)  Assessment & Plan  · POA  · Appreciate input from general surgery and wound care   · Left lateral ischial wound 1 0 x 0 5 cm that is superficial without signs of infection  · Cover with pink padded dressing  · Left ischial pressure wound 6 5 x 4 0 x 1 5 cm in size, wound bed tissue not easily debrided but otherwise no sign of infection  · Maxorb placed in wound bed and covered with pink pad dressing    Pressure ulcer, sacrum  Assessment & Plan  · POA  · Appreciate input from General surgery and wound care  · Patient has midline sacral wound 3 2 x 3 0 x 0 1 cm in size likely representing a chronic pressure injury that is very superficial without signs of infection  · Covered with pink padded dressing  · Right buttock wound 2 0 x 0 5 cm without evidence of infection  · Covered with pink padded dressing  · Outpatient wound care center follow up   · Offloading as best as possible for patient comfort    Urinary tract infection associated with indwelling urethral catheter ([address] )  Assessment & Plan  · History of neurogenic bladder 2/2 SCI  · UTI associated with chronic isabel catheter present on admission  · Known history of VRE  · Received dose of Zosyn however ID recommending monitoring off of antibiotics for now  · Urine culture negative     Mild protein-calorie malnutrition (HCC)  Assessment & Plan  Malnutrition Findings:   Malnutrition type: Chronic illness  Degree of Malnutrition: Malnutrition of mild degree  BMI Findings:  BMI Classifications: Underweight < 18 5  There is no height or weight on file to calculate BMI  · Patient is frail and wasted  Multiple, nonhealing wounds  · Had long conversation with patient regarding importance of protein for wound healing   · Nutrition consult appreciated     · Suleman count, protein supplements   · Recently started on midodrine and pt would like me to dec dose     Goals of care, counseling/discussion  Assessment & Plan  · Appreciate palliative consultation  · Could consider mirtazapine to help with sleep and depression  Patient is still considering this  · Patient desires to be full code with full medical interventions  · Today states, "how am I supposed to live with these wounds?"    Closed lateral dislocation of distal end of right femur  Assessment & Plan  · Noticed on imaging on prior admission  · Appreciate orthopedic consult  · Can be followed outpatient  No emergent surgical procedure  · Appreciate ortho recs, outpatient f/u Dr Kashif Caballero  difficile diarrhea  Assessment & Plan  · Patient with history of C diff  · Received dose of Zosyn  Antibiotics now discontinued  · She is on p o   Vancomycin prophylactically, continue for 3 more doses   · Advised against use of imodium - states she uses this at home and has to disimpact herself often    Paraplegia following spinal cord injury [facility])  Assessment & Plan  · Supportive care   · Wheelchair bound   · Appreciate PT OT input - patient refusing d/c to rehab   · Referral placed for [facility] at home for PT   · Appreciate palliative care recs regarding [address]       Discharging Physician / Practitioner: Gareth Hemphill PA-C  PCP: Elliot Lee  Admission Date:   Admission Orders (From admission, onward)     Ordered        03/10/20 1506  Inpatient Admission  Once                   Discharge Date: 03/13/20    Resolved Problems  Date Reviewed: 3/13/2020          Resolved    Hypokalemia 3/13/2020     Resolved by  Gareth Hemphill PA-C    Hyponatremia 3/13/2020     Resolved by  Gareth Hemphill, [facility] Stay:  · Surgery   · Ortho   · Wound care   · Palliative care   · PT OT CM   · RD    Procedures Performed:   · None     Significant Findings / Test Results:   · SIRS POA   · Urine cx negative for growth  · Blood cultures negative to date   · Malnutrition of mild degree 2/2 chronic illness   · CXR- no acute cardiopulm disease   · CT A/P- 1  Interval lateral rotation of dislocated right femoral head  Overlying decubitus ulcer now extends to the deep soft tissues superior to the acetabulum, within the previous location of the femoral head  Increased surrounding cellulitis without evidence of gas or fluid collection to suggest an abscess  2   Stable left ischial decubitus ulcer  Small left hip joint effusion  3   Large amount of stool distending the rectum may indicate impaction  No evidence of bowel obstruction, colitis or diverticulitis  Incidental Findings:   · None     Test Results Pending at Discharge (will require follow up): · None      Outpatient Tests Requested:  · Follow up with outpatient wound care   · Follow up with PCP, ortho     Complications:  None     Reason for Admission: wounds     Hospital Course:     Naty Kirkpatrick is a 71 y o  female patient with hx significant for SCI with multiple hip and sacral wounds who originally presented to the hospital on 3/10/2020 due to abnormal right hip wound  Patient was rece ntly hospitalized at [facility] and d/c to rehab where she signed herself out AMA  When she got home she saw her the severity of her right hip wound and was scared and came to the hospital  She met SIRS criteria on admission and was admitted for possible sepsis owrk up  Surgery, ortho and wound care were consulted regarding these wounds  She was started on IV abx for presumed cellulitis and possible UTI  ID d/c IV abx as these were likely to cause more harm given hx of c diff  She was moniored off and remained afbeirle  She was on ppx vanco  Urine and bl cx were negative  Surgery felt wounds were clean, dressing changes were recommended and specialty mattress ordered  She refused VNA and specialty mattress at time of discharge  She will be set up with outpatient wound care at [email or web address]  She is stable for d/c home with close outpatient follow up  She refused STR  She expressed understanding and agreed w plan for d/c  States she has friends at home that can help her  Please see above list of diagnoses and related plan for additional information  Condition at Discharge: stable     Discharge Day Visit / Exam:     * Please refer to separate progress note for these details *    Discussion with Family: pt declined     Discharge instructions/Information to patient and family:   See after visit summary for information provided to patient and family  Provisions for Follow-Up Care:  See after visit summary for information related to follow-up care and any pertinent home health orders  Disposition:     Home with VNA Services (Reminder: Complete face to face encounter)    For Discharges to [facility] SNF:   · Not Applicable to this Patient - Not Applicable to this Patient    Planned Readmission: none but patient high risk for readmission given large wounds and difficulty caring for herself at home      Discharge Statement:  I spent 45 minutes discharging the patient  This time was spent on the day of discharge  I had direct contact with the patient on the day of discharge  Greater than 50% of the total time was spent examining patient, answering all patient questions, arranging and discussing plan of care with patient as well as directly providing post-discharge instructions  Additional time then spent on discharge activities  Discharge Medications:  See after visit summary for reconciled discharge medications provided to patient and family        ** Please Note: This note has been constructed using a voice recognition system **

## 2020-03-13 NOTE — ASSESSMENT & PLAN NOTE
· History of neurogenic bladder 2/2 SCI  · UTI associated with chronic isabel catheter present on admission  · Known history of VRE  · Received dose of Zosyn however ID recommending monitoring off of antibiotics for now    · Urine culture negative

## 2020-03-13 NOTE — ASSESSMENT & PLAN NOTE
· POA  · Appreciate input from General surgery and wound care  · Patient has midline sacral wound 3 2 x 3 0 x 0 1 cm in size likely representing a chronic pressure injury that is very superficial without signs of infection  · Covered with pink padded dressing  · Right buttock wound 2 0 x 0 5 cm without evidence of infection  · Covered with pink padded dressing  · Outpatient wound care center follow up   · Offloading as best as possible for patient comfort

## 2020-03-13 NOTE — ASSESSMENT & PLAN NOTE
· Hyponatremia likely due to sepsis and poor p o  Intake/value nutrition, treated with normal saline 350 mL boluses, continue normal saline 100 mL/hr and lab monitoring    · Sodium now 139, fluids d/c 3/12  · Appreciate nutrition consult  · Encourage PO intake with patient

## 2020-03-13 NOTE — PROGRESS NOTES
Progress Note - Rosaura Chinchilla 1950, 71 y o  female MRN: [id number]  Unit/Bed#: E5 -01 Encounter: [id number] DOS: 3/13/2020  Primary Care Provider: Flora Cleveland   Date and time admitted to hospital: 3/10/2020 11:52 AM    * SIRS (systemic inflammatory response syndrome) (HCC)  Assessment & Plan  · SIRS syndrome POA, e/b tachycardia and leukocytosis  Low suspicion at this time for acute infection  · Initially felt to have sepsis possibly due to UTI versus chronic wounds/ulcers  · Appreciate infectious disease recommendations   · CT scan without abscess  but there was evidence of inflammation present at the right hip which is likely due to ongoing irritation of the tissues  · Patient with chronic UTI, VRE, with chronic Hays  No new symptoms  · Received Zosyn initially  ID recommends monitoring off antibiotics  · Wound care and surgery have assessed patient, recommendations appreciated  · Will continue p o  Prophylactic vancomycin as patient has history of C diff     · D/c IV steroids 3/12, unclear why these were started     Open wound of right hip  Assessment & Plan  · POA  · Appreciate input from surgery and wound care  · Patient has right anterior hip wound 3 2 x 1 4 cm that is status post grafting by Dr Sourav Ball with plastic surgery 1 year ago that appears to be healing with no identifiable tract from this to the lateral hip wound  · Maxorb alginate placed on wound bed covered with ABD  · Right lateral hip wound 5 0 x 4 7 width by 6 5 cm depth wound tracks deeply however no evidence of infection, visibly mobile and tissue with joint manipulation  · Hydrogel placed on wound bed for moisture lubrication with nonadherent dressing loosely on top not into the wound covered with ABD  · Will need VNA for wound care at time of discharge but patient refusing- agrees to outpatient f/u at [facility] wound care center (will use Alveta Miles)   · CM working on obtaining specialty mattress (pt does not want hospital bed and confirming this mattress can be used on personal bed frame/box)  · Patient requesting ortho today to answer her questions regarding wound    Decubitus ulcer of left ischium, stage III (HCC)  Assessment & Plan  · POA  · Appreciate input from general surgery and wound care   · Left lateral ischial wound 1 0 x 0 5 cm that is superficial without signs of infection  · Cover with pink padded dressing  · Left ischial pressure wound 6 5 x 4 0 x 1 5 cm in size, wound bed tissue not easily debrided but otherwise no sign of infection  · Maxorb placed in wound bed and covered with pink pad dressing  · Case management working on getting patient a specialty mattress/hospital bed at home  · Low suspicion that wounds caused sepsis  Monitoring off of antibiotics for now  · Will need wound care follow up outpatient     Pressure ulcer, sacrum  Assessment & Plan  · POA  · Appreciate input from General surgery and wound care  · Patient has midline sacral wound 3 2 x 3 0 x 0 1 cm in size likely representing a chronic pressure injury that is very superficial without signs of infection  · Covered with pink padded dressing  · Right buttock wound 2 0 x 0 5 cm without evidence of infection  · Covered with pink padded dressing  · Outpatient wound care - refusing VNA   · Offloading as best as possible for patient comfort    Urinary tract infection associated with indwelling urethral catheter ([address] )  Assessment & Plan  · History of neurogenic bladder 2/2 SCI  · UTI associated with chronic isabel catheter present on admission  · Known history of VRE  · Received dose of Zosyn however ID recommending monitoring off of antibiotics for now    · Urine culture negative     Mild protein-calorie malnutrition (HCC)  Assessment & Plan  Malnutrition Findings:   Malnutrition type: Chronic illness  Degree of Malnutrition: Malnutrition of mild degree  BMI Findings:  BMI Classifications: Underweight < 18 5  There is no height or weight on file to calculate BMI  · Patient is frail and wasted  Multiple, nonhealing wounds  · Had long conversation with patient regarding importance of protein for wound healing   · Nutrition consult appreciated  · Suleman count, protein supplements   · Recently started on midodrine and pt would like me to dec dose     Goals of care, counseling/discussion  Assessment & Plan  · Appreciate palliative consultation  · Could consider mirtazapine to help with sleep and depression  Patient is still considering this  · Patient desires to be full code with full medical interventions  · Today states, "how am I supposed to live with these wounds?"    Closed lateral dislocation of distal end of right femur  Assessment & Plan  · Noticed on imaging on prior admission  · Appreciate orthopedic consult  · Can be followed outpatient  No emergent surgical procedure  · Pt requesting ortho see her today     C  difficile diarrhea  Assessment & Plan  · Patient with history of C diff  · Received dose of Zosyn  Antibiotics now discontinued  · She is on p o  Vancomycin prophylactically, continue another 48 hrs   · Advised against use of imodium - states she uses this at home and has to disimpact herself often    Paraplegia following spinal cord injury [facility])  Assessment & Plan  · Supportive care   · Wheelchair bound   · Appreciate PT OT input - patient refusing d/c to rehab   · Appreciate palliative care recs regarding [address]     Hyponatremiaresolved as of 3/13/2020  Assessment & Plan  · Hyponatremia likely due to sepsis and poor p o  Intake/value nutrition, treated with normal saline 350 mL boluses, continue normal saline 100 mL/hr and lab monitoring    · Sodium now 139, fluids d/c 3/12  · Appreciate nutrition consult  · Encourage PO intake with patient     VTE Pharmacologic Prophylaxis:   Pharmacologic: Enoxaparin (Lovenox)  Mechanical VTE Prophylaxis in Place: Yes    Patient Centered Rounds: I have performed bedside rounds with nursing staff today  Discussions with Specialists or Other Care Team Provider: nursing, cm, ID, ortho      Education and Discussions with Family / Patient: patient     Time Spent for Care: 30 minutes  More than 50% of total time spent on counseling and coordination of care as described above  Current Length of Stay: 3 day(s)    Current Patient Status: Inpatient   Certification Statement: The patient will continue to require additional inpatient hospital stay due to pending ongoing safe d/c planning regarding mattress and VNA, diarrhea, pending monitoring of anemia, and ongoing ortho recs     Discharge Plan: pending clinical course, possibly, later today     Code Status: Level 1 - Full Code    Subjective:   Pt seen and examined at bedside  Demanding to speak with ortho because "there has to be something they can do with this wound I cant live this way " she is not satisfied with my answers to her questions, which include the above  States "I feel fine" and doesn't want anyone in her house for dressing/wound care  OK with PT though ? Objective:     Vitals:   Temp (24hrs), Av 3 °F (36 3 °C), Min:97 °F (36 1 °C), Max:97 6 °F (36 4 °C)    Temp:  [97 °F (36 1 °C)-97 6 °F (36 4 °C)] 97 °F (36 1 °C)  HR:  [56-90] 83  Resp:  [16-18] 16  BP: (111-153)/(53-71) 111/53  SpO2:  [96 %-97 %] 96 %  There is no height or weight on file to calculate BMI  Input and Output Summary (last 24 hours): Intake/Output Summary (Last 24 hours) at 3/13/2020 1119  Last data filed at 3/13/2020 4305  Gross per 24 hour   Intake    Output 3050 ml   Net -3050 ml     Physical Exam:     Physical Exam   Constitutional: She is oriented to person, place, and time  She appears well-developed  No distress  Cachexia    HENT:   Head: Atraumatic  Eyes: EOM are normal  No scleral icterus  Neck: Normal range of motion  Neck supple  Cardiovascular: Normal rate, regular rhythm and normal heart sounds  No murmur heard    Pulmonary/Chest: Effort normal and breath sounds normal    Abdominal: Soft  Bowel sounds are normal    Genitourinary:   Genitourinary Comments: Chronic isabel patent    Musculoskeletal: Normal range of motion  She exhibits no edema  Neurological: She is alert and oriented to person, place, and time  Skin:   Multiple sacral and hip wounds not visualized today  Images reviewed in Media  Psychiatric: She has a normal mood and affect  Additional Data:     Labs:    Results from last 7 days   Lab Units 03/12/20  0602   WBC Thousand/uL 14 47*   HEMOGLOBIN g/dL 7 7*   HEMATOCRIT % 26 1*   PLATELETS Thousands/uL 411*   NEUTROS PCT % 80*   LYMPHS PCT % 13*   MONOS PCT % 6   EOS PCT % 0     Results from last 7 days   Lab Units 03/13/20  0630  03/11/20  0605   SODIUM mmol/L 139   < > 136   POTASSIUM mmol/L 3 9   < > 3 3*   CHLORIDE mmol/L 103   < > 103   CO2 mmol/L 26   < > 22   BUN mg/dL 13   < > 9   CREATININE mg/dL 0 66   < > 0 60   ANION GAP mmol/L 10   < > 11   CALCIUM mg/dL 8 4   < > 8 0*   ALBUMIN g/dL  --   --  1 7*   TOTAL BILIRUBIN mg/dL  --   --  0 21   ALK PHOS U/L  --   --  225*   ALT U/L  --   --  18   AST U/L  --   --  9   GLUCOSE RANDOM mg/dL 126   < > 235*    < > = values in this interval not displayed  Results from last 7 days   Lab Units 03/10/20  1313   INR  1 19             Results from last 7 days   Lab Units 03/10/20  1523 03/10/20  1314 03/10/20  1313   LACTIC ACID mmol/L 0 5 2 3*  --    PROCALCITONIN ng/ml  --   --  0 11     * I Have Reviewed All Lab Data Listed Above  * Additional Pertinent Lab Tests Reviewed: [address] Admission Reviewed    Imaging:    Imaging Reports Reviewed Today Include: all  Imaging Personally Reviewed by Myself Includes:  none    Recent Cultures (last 7 days):     Results from last 7 days   Lab Units 03/11/20  1112 03/10/20  1317 03/10/20  1313   BLOOD CULTURE   --  No Growth at 48 hrs  No Growth at 48 hrs     URINE CULTURE  No Growth <1000 cfu/mL  --   -- Last 24 Hours Medication List:     Current Facility-Administered Medications:  acetaminophen 650 mg Oral Q6H PRN Nohemy Irene MD   artificial tear  Both Eyes HS Nohemy Irene MD   bisacodyl 10 mg Rectal Daily PRN Noheym Irene MD   cyanocobalamin 100 mcg Oral Daily Nohemy Irene MD   dextran 70-hypromellose 1 drop Both Eyes Q3H PRN Nohemy Irene MD   enoxaparin 40 mg Subcutaneous Daily Nohemy Irene MD   midodrine 10 mg Oral BID AC Terrance Iyer PA-C   nystatin  Topical BID Nohemy Irene MD   pantoprazole 40 mg Oral Daily Nohemy Irene MD   saccharomyces boulardii 250 mg Oral BID Nohemy Irene MD   senna 1 tablet Oral HS Nohemy Irene MD   sodium chloride (PF) 3 mL Intravenous PRN Geetha Jesus,    vancomycin 125 mg Oral Q12H [facility]     Facility-Administered Medications Ordered in Other Encounters:  dexamethasone 4 mg Intravenous Once PRN Barb Silversmith, DO    lactated ringers 75 mL/hr Intravenous Continuous Barb Silversmith, DO Last Rate: 75 mL/hr (11/10/19 2105)   lactated ringers 50 mL/hr Intravenous Continuous Butch Montoya CRNA    lactated ringers 75 mL/hr Intravenous Continuous Barb Silversmith, DO Last Rate: Stopped (03/20/19 1841)   lactated ringers 75 mL/hr Intravenous Continuous Jacky Murguia MD Last Rate: Stopped (03/20/19 1842)   ondansetron 4 mg Intravenous Once PRN Rylan Celestin MD    promethazine 12 5 mg Intravenous Once PRN Rylan Celestin MD         Today, Patient Was Seen By: Ashwin Melendez PA-C    ** Please Note: Dictation voice to text software may have been used in the creation of this document   **

## 2020-03-13 NOTE — PLAN OF CARE
Problem: Potential for Falls  Goal: Patient will remain free of falls  Description  INTERVENTIONS:  - Assess patient frequently for physical needs  -  Identify cognitive and physical deficits and behaviors that affect risk of falls    -  [place] fall precautions as indicated by assessment   - Educate patient/family on patient safety including physical limitations  - Instruct patient to call for assistance with activity based on assessment  - Modify environment to reduce risk of injury  - Consider OT/PT consult to assist with strengthening/mobility  Outcome: Progressing

## 2020-03-15 LAB
BACTERIA BLD CULT: NORMAL
BACTERIA BLD CULT: NORMAL

## 2020-03-20 ENCOUNTER — TELEPHONE (OUTPATIENT)
Dept: FAMILY MEDICINE CLINIC | Facility: CLINIC | Age: 70
End: 2020-03-20

## 2020-03-27 ENCOUNTER — APPOINTMENT (OUTPATIENT)
Dept: WOUND CARE | Facility: HOSPITAL | Age: 70
End: 2020-03-27
Payer: COMMERCIAL

## 2020-03-27 PROCEDURE — 99214 OFFICE O/P EST MOD 30 MIN: CPT

## 2020-03-28 ENCOUNTER — APPOINTMENT (EMERGENCY)
Dept: CT IMAGING | Facility: HOSPITAL | Age: 70
DRG: 698 | End: 2020-03-28
Payer: COMMERCIAL

## 2020-03-28 ENCOUNTER — HOSPITAL ENCOUNTER (INPATIENT)
Facility: HOSPITAL | Age: 70
LOS: 4 days | Discharge: HOME | DRG: 698 | End: 2020-04-01
Attending: EMERGENCY MEDICINE | Admitting: INTERNAL MEDICINE
Payer: COMMERCIAL

## 2020-03-28 DIAGNOSIS — E87.1 HYPONATREMIA: ICD-10-CM

## 2020-03-28 DIAGNOSIS — Z97.8 CHRONIC INDWELLING FOLEY CATHETER: ICD-10-CM

## 2020-03-28 DIAGNOSIS — L03.90 CELLULITIS: Primary | ICD-10-CM

## 2020-03-28 DIAGNOSIS — A41.9 SEPSIS WITHOUT ACUTE ORGAN DYSFUNCTION, DUE TO UNSPECIFIED ORGANISM (HCC): ICD-10-CM

## 2020-03-28 DIAGNOSIS — S71.001A OPEN WOUND OF RIGHT HIP, INITIAL ENCOUNTER: ICD-10-CM

## 2020-03-28 DIAGNOSIS — G82.20 PARAPLEGIA (HCC): ICD-10-CM

## 2020-03-28 DIAGNOSIS — L89.154 SACRAL DECUBITUS ULCER, STAGE IV (HCC): ICD-10-CM

## 2020-03-28 PROBLEM — K59.04 CHRONIC IDIOPATHIC CONSTIPATION: Status: ACTIVE | Noted: 2020-03-28

## 2020-03-28 LAB
ALBUMIN SERPL BCP-MCNC: 1.6 G/DL (ref 3.5–5)
ALP SERPL-CCNC: 135 U/L (ref 46–116)
ALT SERPL W P-5'-P-CCNC: 45 U/L (ref 12–78)
AMORPH URATE CRY URNS QL MICRO: ABNORMAL /HPF
ANION GAP SERPL CALCULATED.3IONS-SCNC: 6 MMOL/L (ref 4–13)
APTT PPP: 38 SECONDS (ref 23–37)
AST SERPL W P-5'-P-CCNC: 63 U/L (ref 5–45)
ATRIAL RATE: 123 BPM
BACTERIA UR QL AUTO: ABNORMAL /HPF
BASOPHILS # BLD AUTO: 0.04 THOUSANDS/ΜL (ref 0–0.1)
BASOPHILS NFR BLD AUTO: 0 % (ref 0–1)
BILIRUB SERPL-MCNC: 0.3 MG/DL (ref 0.2–1)
BILIRUB UR QL STRIP: NEGATIVE
BUN SERPL-MCNC: 9 MG/DL (ref 5–25)
CALCIUM SERPL-MCNC: 7.9 MG/DL (ref 8.3–10.1)
CHLORIDE SERPL-SCNC: 95 MMOL/L (ref 100–108)
CLARITY UR: ABNORMAL
CO2 SERPL-SCNC: 27 MMOL/L (ref 21–32)
COLOR UR: YELLOW
CREAT SERPL-MCNC: 0.57 MG/DL (ref 0.6–1.3)
EOSINOPHIL # BLD AUTO: 0.02 THOUSAND/ΜL (ref 0–0.61)
EOSINOPHIL NFR BLD AUTO: 0 % (ref 0–6)
ERYTHROCYTE [DISTWIDTH] IN BLOOD BY AUTOMATED COUNT: 17.9 % (ref 11.6–15.1)
GFR SERPL CREATININE-BSD FRML MDRD: 95 ML/MIN/1.73SQ M
GLUCOSE SERPL-MCNC: 131 MG/DL (ref 65–140)
GLUCOSE UR STRIP-MCNC: NEGATIVE MG/DL
HCT VFR BLD AUTO: 29.4 % (ref 34.8–46.1)
HGB BLD-MCNC: 9.2 G/DL (ref 11.5–15.4)
HGB UR QL STRIP.AUTO: ABNORMAL
IMM GRANULOCYTES # BLD AUTO: 0.06 THOUSAND/UL (ref 0–0.2)
IMM GRANULOCYTES NFR BLD AUTO: 1 % (ref 0–2)
INR PPP: 1.39 (ref 0.84–1.19)
KETONES UR STRIP-MCNC: NEGATIVE MG/DL
LACTATE SERPL-SCNC: 1.5 MMOL/L (ref 0.5–2)
LEUKOCYTE ESTERASE UR QL STRIP: ABNORMAL
LYMPHOCYTES # BLD AUTO: 1.54 THOUSANDS/ΜL (ref 0.6–4.47)
LYMPHOCYTES NFR BLD AUTO: 14 % (ref 14–44)
MCH RBC QN AUTO: 24.9 PG (ref 26.8–34.3)
MCHC RBC AUTO-ENTMCNC: 31.3 G/DL (ref 31.4–37.4)
MCV RBC AUTO: 80 FL (ref 82–98)
MONOCYTES # BLD AUTO: 1.14 THOUSAND/ΜL (ref 0.17–1.22)
MONOCYTES NFR BLD AUTO: 11 % (ref 4–12)
NEUTROPHILS # BLD AUTO: 7.93 THOUSANDS/ΜL (ref 1.85–7.62)
NEUTS SEG NFR BLD AUTO: 74 % (ref 43–75)
NITRITE UR QL STRIP: POSITIVE
NON-SQ EPI CELLS URNS QL MICRO: ABNORMAL /HPF
NRBC BLD AUTO-RTO: 0 /100 WBCS
P AXIS: 47 DEGREES
PH UR STRIP.AUTO: 7 [PH]
PLATELET # BLD AUTO: 698 THOUSANDS/UL (ref 149–390)
PMV BLD AUTO: 9.6 FL (ref 8.9–12.7)
POTASSIUM SERPL-SCNC: 4 MMOL/L (ref 3.5–5.3)
PR INTERVAL: 116 MS
PROCALCITONIN SERPL-MCNC: 0.19 NG/ML
PROT SERPL-MCNC: 6.4 G/DL (ref 6.4–8.2)
PROT UR STRIP-MCNC: NEGATIVE MG/DL
PROTHROMBIN TIME: 16.8 SECONDS (ref 11.6–14.5)
QRS AXIS: 18 DEGREES
QRSD INTERVAL: 76 MS
QT INTERVAL: 306 MS
QTC INTERVAL: 438 MS
RBC # BLD AUTO: 3.69 MILLION/UL (ref 3.81–5.12)
RBC #/AREA URNS AUTO: ABNORMAL /HPF
SODIUM SERPL-SCNC: 128 MMOL/L (ref 136–145)
SP GR UR STRIP.AUTO: 1.01 (ref 1–1.03)
T WAVE AXIS: 46 DEGREES
URATE SERPL-MCNC: 3.3 MG/DL (ref 2–6.8)
UROBILINOGEN UR QL STRIP.AUTO: 0.2 E.U./DL
VENTRICULAR RATE: 123 BPM
WBC # BLD AUTO: 10.73 THOUSAND/UL (ref 4.31–10.16)
WBC #/AREA URNS AUTO: ABNORMAL /HPF

## 2020-03-28 PROCEDURE — 83930 ASSAY OF BLOOD OSMOLALITY: CPT | Performed by: INTERNAL MEDICINE

## 2020-03-28 PROCEDURE — 81001 URINALYSIS AUTO W/SCOPE: CPT | Performed by: PHYSICIAN ASSISTANT

## 2020-03-28 PROCEDURE — 99222 1ST HOSP IP/OBS MODERATE 55: CPT | Performed by: INTERNAL MEDICINE

## 2020-03-28 PROCEDURE — 93005 ELECTROCARDIOGRAM TRACING: CPT

## 2020-03-28 PROCEDURE — 96365 THER/PROPH/DIAG IV INF INIT: CPT

## 2020-03-28 PROCEDURE — 99285 EMERGENCY DEPT VISIT HI MDM: CPT | Performed by: PHYSICIAN ASSISTANT

## 2020-03-28 PROCEDURE — 36415 COLL VENOUS BLD VENIPUNCTURE: CPT | Performed by: PHYSICIAN ASSISTANT

## 2020-03-28 PROCEDURE — 87040 BLOOD CULTURE FOR BACTERIA: CPT | Performed by: PHYSICIAN ASSISTANT

## 2020-03-28 PROCEDURE — 83605 ASSAY OF LACTIC ACID: CPT | Performed by: PHYSICIAN ASSISTANT

## 2020-03-28 PROCEDURE — 85025 COMPLETE CBC W/AUTO DIFF WBC: CPT | Performed by: PHYSICIAN ASSISTANT

## 2020-03-28 PROCEDURE — 93010 ELECTROCARDIOGRAM REPORT: CPT | Performed by: INTERNAL MEDICINE

## 2020-03-28 PROCEDURE — 85610 PROTHROMBIN TIME: CPT | Performed by: PHYSICIAN ASSISTANT

## 2020-03-28 PROCEDURE — 85730 THROMBOPLASTIN TIME PARTIAL: CPT | Performed by: PHYSICIAN ASSISTANT

## 2020-03-28 PROCEDURE — 84550 ASSAY OF BLOOD/URIC ACID: CPT | Performed by: INTERNAL MEDICINE

## 2020-03-28 PROCEDURE — 80053 COMPREHEN METABOLIC PANEL: CPT | Performed by: PHYSICIAN ASSISTANT

## 2020-03-28 PROCEDURE — 84145 PROCALCITONIN (PCT): CPT | Performed by: PHYSICIAN ASSISTANT

## 2020-03-28 PROCEDURE — 74176 CT ABD & PELVIS W/O CONTRAST: CPT

## 2020-03-28 PROCEDURE — 99285 EMERGENCY DEPT VISIT HI MDM: CPT

## 2020-03-28 RX ORDER — SACCHAROMYCES BOULARDII 250 MG
250 CAPSULE ORAL DAILY
Status: DISCONTINUED | OUTPATIENT
Start: 2020-03-29 | End: 2020-04-01 | Stop reason: HOSPADM

## 2020-03-28 RX ORDER — ACETAMINOPHEN 325 MG/1
650 TABLET ORAL EVERY 6 HOURS PRN
Status: DISCONTINUED | OUTPATIENT
Start: 2020-03-28 | End: 2020-04-01 | Stop reason: HOSPADM

## 2020-03-28 RX ORDER — SODIUM CHLORIDE 9 MG/ML
50 INJECTION, SOLUTION INTRAVENOUS CONTINUOUS
Status: DISCONTINUED | OUTPATIENT
Start: 2020-03-28 | End: 2020-03-29

## 2020-03-28 RX ORDER — NYSTATIN 100000 [USP'U]/G
POWDER TOPICAL 2 TIMES DAILY
Status: DISCONTINUED | OUTPATIENT
Start: 2020-03-28 | End: 2020-04-01 | Stop reason: HOSPADM

## 2020-03-28 RX ORDER — SENNOSIDES 8.6 MG
1 TABLET ORAL
Status: DISCONTINUED | OUTPATIENT
Start: 2020-03-28 | End: 2020-04-01 | Stop reason: HOSPADM

## 2020-03-28 RX ORDER — FAMOTIDINE 20 MG/1
20 TABLET, FILM COATED ORAL 2 TIMES DAILY PRN
Status: DISCONTINUED | OUTPATIENT
Start: 2020-03-28 | End: 2020-04-01 | Stop reason: HOSPADM

## 2020-03-28 RX ORDER — PANTOPRAZOLE SODIUM 40 MG/1
40 TABLET, DELAYED RELEASE ORAL
Status: DISCONTINUED | OUTPATIENT
Start: 2020-03-29 | End: 2020-04-01 | Stop reason: HOSPADM

## 2020-03-28 RX ORDER — MIDODRINE HYDROCHLORIDE 5 MG/1
10 TABLET ORAL 2 TIMES DAILY
Status: DISCONTINUED | OUTPATIENT
Start: 2020-03-28 | End: 2020-04-01 | Stop reason: HOSPADM

## 2020-03-28 RX ORDER — BISACODYL 10 MG
10 SUPPOSITORY, RECTAL RECTAL DAILY PRN
Status: DISCONTINUED | OUTPATIENT
Start: 2020-03-28 | End: 2020-04-01 | Stop reason: HOSPADM

## 2020-03-28 RX ORDER — MINERAL OIL AND PETROLATUM 150; 830 MG/G; MG/G
OINTMENT OPHTHALMIC
Status: DISCONTINUED | OUTPATIENT
Start: 2020-03-28 | End: 2020-04-01 | Stop reason: HOSPADM

## 2020-03-28 RX ADMIN — MIDODRINE HYDROCHLORIDE 10 MG: 5 TABLET ORAL at 19:45

## 2020-03-28 RX ADMIN — NYSTATIN: 100000 POWDER TOPICAL at 21:40

## 2020-03-28 RX ADMIN — VANCOMYCIN HYDROCHLORIDE 125 MG: 500 INJECTION, POWDER, LYOPHILIZED, FOR SOLUTION INTRAVENOUS at 21:34

## 2020-03-28 RX ADMIN — STANDARDIZED SENNA CONCENTRATE 8.6 MG: 8.6 TABLET ORAL at 21:34

## 2020-03-28 RX ADMIN — SODIUM CHLORIDE 50 ML/HR: 0.9 INJECTION, SOLUTION INTRAVENOUS at 19:45

## 2020-03-28 RX ADMIN — PIPERACILLIN AND TAZOBACTAM 3.38 G: 3; .375 INJECTION, POWDER, LYOPHILIZED, FOR SOLUTION INTRAVENOUS at 16:40

## 2020-03-28 RX ADMIN — PIPERACILLIN AND TAZOBACTAM 3.38 G: 3; .375 INJECTION, POWDER, LYOPHILIZED, FOR SOLUTION INTRAVENOUS at 21:32

## 2020-03-28 NOTE — ASSESSMENT & PLAN NOTE
Sepsis, POA, as evidenced by fevers prior to presentation, leucocytosis wbc 10 73,   Source - Chronic pustular hip wounds and CAUTI (chronic Hays)   UA - Positive nitrite, moderate blood, innumerable bacteria, small leucocytes  Recent treatment for sepsis at [facility]   Prior Urine culture (3/11/20)  No growth    Start IV Zosyn for now, pending ID eval  Patient with history of Cdiff, would cover with prophylactic PO vancomycin and probiotics  Consult infectious disease  Trnd CBC and temp curve daily  Collect wound cultures and follow results  Follow blood culture and urine cultures  Sepsis protocol  Tylenol p r n   For fevers

## 2020-03-28 NOTE — ASSESSMENT & PLAN NOTE
History of spinal cord injury with resultant neurogenic bladder and chronic catheter placement  History of VRE  UA - with positive nitrite, small leucocytes and innumerable bacteria  Start on IV Zosyn   Follow blood and urine culture results

## 2020-03-28 NOTE — ASSESSMENT & PLAN NOTE
Malnutrition Findings:           BMI Findings: Body mass index is 18 94 kg/m²       As evidenced by temporal muscle wasting, prominent clavicle, ribs in the setting of poor nutrition, multiple nonhealing chronic wounds, stage 3 decubitus ulcer and BMI 18 94  Nutrition consulted

## 2020-03-28 NOTE — ASSESSMENT & PLAN NOTE
Hyponatremia, POA, Na 128  Patient looks hypovoelmic  Hydrate with IVFs- sepsis protocol  Trend BMP  Check UA osm, serum osm, Uric acid, Ur cr,

## 2020-03-28 NOTE — PLAN OF CARE
Problem: Potential for Falls  Goal: Patient will remain free of falls  Description  INTERVENTIONS:  - Assess patient frequently for physical needs  -  Identify cognitive and physical deficits and behaviors that affect risk of falls    -  [place] fall precautions as indicated by assessment   - Educate patient/family on patient safety including physical limitations  - Instruct patient to call for assistance with activity based on assessment  - Modify environment to reduce risk of injury  - Consider OT/PT consult to assist with strengthening/mobility  Outcome: Progressing     Problem: PAIN - ADULT  Goal: Verbalizes/displays adequate comfort level or baseline comfort level  Description  Interventions:  - Encourage patient to monitor pain and request assistance  - Assess pain using appropriate pain scale  - Administer analgesics based on type and severity of pain and evaluate response  - Implement non-pharmacological measures as appropriate and evaluate response  - Consider cultural and social influences on pain and pain management  - Notify physician/advanced practitioner if interventions unsuccessful or patient reports new pain  Outcome: Progressing     Problem: INFECTION - ADULT  Goal: Absence or prevention of progression during hospitalization  Description  INTERVENTIONS:  - Assess and monitor for signs and symptoms of infection  - Monitor lab/diagnostic results  - Monitor all insertion sites, i e  indwelling lines, tubes, and drains  - Monitor endotracheal if appropriate and nasal secretions for changes in amount and color  - [place] appropriate cooling/warming therapies per order  - Administer medications as ordered  - Instruct and encourage patient and family to use good hand hygiene technique  - Identify and instruct in appropriate isolation precautions for identified infection/condition  Outcome: Progressing     Problem: SAFETY ADULT  Goal: Maintain or return to baseline ADL function  Description  INTERVENTIONS:  -  Assess patient's ability to carry out ADLs; assess patient's baseline for ADL function and identify physical deficits which impact ability to perform ADLs (bathing, care of mouth/teeth, toileting, grooming, dressing, etc )  - Assess/evaluate cause of self-care deficits   - Assess range of motion  - Assess patient's mobility; develop plan if impaired  - Assess patient's need for assistive devices and provide as appropriate  - Encourage maximum independence but intervene and supervise when necessary  - Involve family in performance of ADLs  - Assess for home care needs following discharge   - Consider OT consult to assist with ADL evaluation and planning for discharge  - Provide patient education as appropriate  Outcome: Progressing  Goal: Maintain or return mobility status to optimal level  Description  INTERVENTIONS:  - Assess patient's baseline mobility status (ambulation, transfers, stairs, etc )    - Identify cognitive and physical deficits and behaviors that affect mobility  - Identify mobility aids required to assist with transfers and/or ambulation (gait belt, sit-to-stand, lift, walker, cane, etc )  - [place] fall precautions as indicated by assessment  - Record patient progress and toleration of activity level on Mobility SBAR; progress patient to next Phase/Stage  - Instruct patient to call for assistance with activity based on assessment  - Consider rehabilitation consult to assist with strengthening/weightbearing, etc   Outcome: Progressing     Problem: DISCHARGE PLANNING  Goal: Discharge to home or other facility with appropriate resources  Description  INTERVENTIONS:  - Identify barriers to discharge w/patient and caregiver  - Arrange for needed discharge resources and transportation as appropriate  - Identify discharge learning needs (meds, wound care, etc )  - Arrange for interpretive services to assist at discharge as needed  - Refer to Case Management Department for coordinating discharge planning if the patient needs post-hospital services based on physician/advanced practitioner order or complex needs related to functional status, cognitive ability, or social support system  Outcome: Progressing     Problem: Knowledge Deficit  Goal: Patient/family/caregiver demonstrates understanding of disease process, treatment plan, medications, and discharge instructions  Description  Complete learning assessment and assess knowledge base    Interventions:  - Provide teaching at level of understanding  - Provide teaching via preferred learning methods  Outcome: Progressing     Problem: GENITOURINARY - ADULT  Goal: Maintains or returns to baseline urinary function  Description  INTERVENTIONS:  - Assess urinary function  - Encourage oral fluids to ensure adequate hydration if ordered  - Administer IV fluids as ordered to ensure adequate hydration  - Administer ordered medications as needed  - Offer frequent toileting  - Follow urinary retention protocol if ordered  Outcome: Progressing  Goal: Urinary catheter remains patent  Description  INTERVENTIONS:  - Assess patency of urinary catheter  - If patient has a chronic isabel, consider changing catheter if non-functioning  - Follow guidelines for intermittent irrigation of non-functioning urinary catheter  Outcome: Progressing     Problem: METABOLIC, FLUID AND ELECTROLYTES - ADULT  Goal: Electrolytes maintained within normal limits  Description  INTERVENTIONS:  - Monitor labs and assess patient for signs and symptoms of electrolyte imbalances  - Administer electrolyte replacement as ordered  - Monitor response to electrolyte replacements, including repeat lab results as appropriate  - Instruct patient on fluid and nutrition as appropriate  Outcome: Progressing     Problem: SKIN/TISSUE INTEGRITY - ADULT  Goal: Incision(s), wounds(s) or drain site(s) healing without S/S of infection  Description  INTERVENTIONS  - Assess and document risk factors for skin impairment   - Assess and document dressing, incision, wound bed, drain sites and surrounding tissue  - Consider nutrition services referral as needed  - Oral mucous membranes remain intact  - Provide patient/ family education  Outcome: Progressing

## 2020-03-28 NOTE — ED PROVIDER NOTES
History  Chief Complaint   Patient presents with    Fever - 9 weeks to 74 years     Pt from home, is wheelchair bound and sees wound care, was noted to have fever yesterday when wound care saw her and told her she was "going septic"  49-year-old female with history of paraplegia secondary to T8 spinal cord injury, MRSA, VRE CKD, and chronic pelvic and sacral ulcers presents emergency department for evaluation of sepsis  Patient states she was evaluated by her wound care team yesterday at which time there concern for involving infection of her chronic pelvic wounds  She apparently was febrile yesterday with a temp of 100° F, fever resolved today  She denies any respiratory symptoms this time including cough, shortness of breath, chest pain  She has no pain secondary to her paraplegia to the chronic ulcers  Denies any change in chronic malodorous discharge  Was admitted to [facility] due to presumed sepsis from chronic wounds approximately 6 weeks ago, with recent admission to [facility]  for urinary tract infection and cellulitis at the beginning of this month  On exam patient is thin and frail, conscious and alert and in no immediate distress  She is tachycardic but afebrile otherwise normal vital signs  Cardiopulmonary exam is benign  She has numerous large decubiti to the sacrum, left and right gluteal regions, as well as the right hip greater trochanteric region  Wounds are generally malodorous with copious discharge  There is also an indwelling H[place] catheter with cloudy, malodorous urine    A/P:  Sepsis, likely urinary and or skin source  Will start sepsis labs, obtain CT of the abdomen and pelvis to evaluate for wound abscess, start IV Zosyn  Prior to Admission Medications   Prescriptions Last Dose Informant Patient Reported? Taking?    Calcium Carb-Cholecalciferol 600-200 MG-UNIT TABS   Yes No   Sig: Take 1 tablet by mouth daily   acetaminophen (TYLENOL) 325 mg tablet   No No   Sig: Take 2 tablets (650 mg total) by mouth every 6 (six) hours as needed for mild pain, headaches or fever   artificial tear (LUBRIFRESH P M ) 83-15 % ophthalmic ointment   No No   Sig: Administer to both eyes daily at bedtime   bisacodyl (DULCOLAX) 10 mg suppository   No No   Sig: Insert 1 suppository (10 mg total) into the rectum daily as needed (2nd line for constipation)   cholecalciferol (VITAMIN D3) 1,000 units tablet   Yes No   Sig: Take 1,000 Units by mouth daily   cyanocobalamin 100 MCG tablet   Yes No   Sig: Take 100 mcg by mouth daily   dextran 70-hypromellose (GENTEAL TEARS) 0 1-0 3 % ophthalmic solution   No No   Sig: Administer 1 drop to both eyes every 3 (three) hours as needed (dry eye)   famotidine (PEPCID) 20 mg tablet   No No   Sig: Take 1 tablet (20 mg total) by mouth 2 (two) times a day as needed for heartburn   midodrine (PROAMATINE) 10 MG tablet   No No   Sig: Take 1 tablet (10 mg total) by mouth 2 (two) times a day   multivitamin (THERAGRAN) TABS   Yes No   Sig: Take 1 tablet by mouth daily   nystatin (MYCOSTATIN) powder   Yes No   Sig: Apply topically 2 (two) times a day   pantoprazole (PROTONIX) 40 mg tablet  Other (Specify) Yes No   Sig: Take 40 mg by mouth daily   saccharomyces boulardii (FLORASTOR) 250 mg capsule   No No   Sig: Take 1 capsule (250 mg total) by mouth 2 (two) times a day   Patient taking differently: Take 250 mg by mouth daily    senna (SENOKOT) 8 6 mg   No No   Sig: Take 1 tablet (8 6 mg total) by mouth daily at bedtime      Facility-Administered Medications: None       Past Medical History:   Diagnosis Date    Anemia     iron defiencey    Arthritis     osteo    Back injury     Chronic kidney disease     nephritis    Depression     MRSA (methicillin resistant staph aureus) culture positive 12/07/2018    BONE-TISSUE     Osteopenia     Osteoporosis     Paralysis ([address] )     paraplegic due to spinal cord injury    Paraplegia (HCC)     Poor circulation     Pressure injury of skin     right hip, stage 3    Pressure sore of hip     right    Tibial plateau fracture     Underweight        Past Surgical History:   Procedure Laterality Date    CLOSURE DELAYED PRIMARY N/A 3/20/2019    Procedure: OPHELIA ANAL WOUND RECLOSURE;  Surgeon: Umer Palafox MD;  Location: [address] [place] [address];  Service: Plastics    DECUBITUS ULCER EXCISION Bilateral 11/12/2019    Procedure: DEBRIDEMENT DECUBITI [facility]); Surgeon: Garry Petit DO;  Location:  MAIN OR;  Service: General    FLAP LOCAL EXTREMITY Left 1/28/2019    Procedure: THIGH FLAP & STSG TO CLOSE HIP WOUND;  Surgeon: Umer Palafox MD;  Location: [address] [place] [address] OR;  Service: Plastics    FLAP LOCAL TRUNK Right 12/17/2018    Procedure: DEBRIDE/FLAP CLOSURE HIP PRESSURE SORE [facility]FT;  Surgeon: Umer Palafox MD;  Location: [address] [place] [address] OR;  Service: Plastics    FLAP LOCAL TRUNK Left 2/27/2019    Procedure: BUTTOCK FLAP TO ISCHIAL SORE;  Surgeon: Umer Palafox MD;  Location: [address] [place] [address] OR;  Service: Plastics    HIP DEBRIDEMENT Right 2017    right hip sore debridement    INCISION AND DRAINAGE OF WOUND Left 1/3/2019    Procedure: INCISION AND DRAINAGE (I&D) left distal femur  With deep wound cultures   Application of VAC DRAIN SPONGE;  Surgeon: Marcelino Ventura MD;  Location:  MAIN OR;  Service: Orthopedics    IR PICC LINE  12/19/2018    IR PICC LINE  3/12/2019    FL DEBRIDEMENT, SKIN, SUB-Q TISSUE,MUSCLE,BONE,=<20 SQ CM Right 9/19/2018    Procedure: DEBRIDEMENT OF HIP PRESSURE SORE;  Surgeon: Umer Palafox MD;  Location: [address] [place] [address] OR;  Service: Plastics    FL OPEN RX FEMUR FX+INTRAMED FELIX Left 10/22/2018    Procedure: INSERTION NAIL IM LEFT FEMUR RETROGRADE;  Surgeon: Emily Krishna MD;  Location:  MAIN OR;  Service: Orthopedics    TOE AMPUTATION Left 2017    small toe left foot amputation    WISDOM TOOTH EXTRACTION      WOUND DEBRIDEMENT Left 12/17/2018    Procedure: DEBRIDEMENT HIP PRESSURE SORE; Surgeon: Darrel Youssef MD;  Location: [facility] MAIN OR;  Service: Plastics    WOUND DEBRIDEMENT N/A 11/10/2019    Procedure: EXCISIONAL DEBRIDEMENT;  Surgeon: Megan Wilson MD;  Location:  MAIN OR;  Service: General       Family History   Problem Relation Age of Onset    Depression Father      I have reviewed and agree with the history as documented  E-Cigarette/Vaping     E-Cigarette/Vaping Substances     Social History     Tobacco Use    Smoking status: Never Smoker    Smokeless tobacco: Never Used   Substance Use Topics    Alcohol use: Yes     Comment: occasional    Drug use: No       Review of Systems   Constitutional: Positive for fever  Negative for chills and diaphoresis  Eyes: Negative for visual disturbance  Respiratory: Negative for cough and shortness of breath  Cardiovascular: Negative for chest pain and palpitations  Gastrointestinal: Negative for abdominal pain, diarrhea, nausea and vomiting  Genitourinary: Negative for dysuria, flank pain and frequency  Musculoskeletal: Negative for arthralgias and myalgias  Skin: Positive for wound  Negative for color change and rash  Allergic/Immunologic: Negative for immunocompromised state  Neurological: Negative for dizziness and light-headedness  Hematological: Does not bruise/bleed easily  Psychiatric/Behavioral: Negative for confusion  The patient is not nervous/anxious  Physical Exam  Physical Exam   Constitutional: She is oriented to person, place, and time  No distress  Thin, frail, cachectic appearing   HENT:   Head: Normocephalic and atraumatic  Mouth/Throat: Oropharynx is clear and moist    Eyes: Pupils are equal, round, and reactive to light  No scleral icterus  Neck: No JVD present  Cardiovascular: Regular rhythm  Tachycardia present  Exam reveals no gallop and no friction rub  No murmur heard  Pulmonary/Chest: No respiratory distress  She has no wheezes  She has no rales  Abdominal: Soft  Bowel sounds are normal  She exhibits no distension and no mass  There is no tenderness  There is no rebound and no guarding  Musculoskeletal: She exhibits no edema  Neurological: She is alert and oriented to person, place, and time  Skin: Skin is warm and dry  Capillary refill takes less than 2 seconds  She is not diaphoretic  No pallor  See images  Large sacral/gluteal/R hip decubitus ulcers   Psychiatric: She has a normal mood and affect  Her behavior is normal    Vitals reviewed  Vital Signs  ED Triage Vitals [03/28/20 1532]   Temperature Pulse Respirations Blood Pressure SpO2   (!) 97 4 °F (36 3 °C) (!) 124 20 114/62 98 %      Temp Source Heart Rate Source Patient Position - Orthostatic VS BP Location FiO2 (%)   Temporal Monitor Lying Right arm --      Pain Score       No Pain           Vitals:    03/28/20 1532   BP: 114/62   Pulse: (!) 124   Patient Position - Orthostatic VS: Lying         Visual Acuity      ED Medications  Medications   piperacillin-tazobactam (ZOSYN) 3 375 g in sodium chloride 0 9 % 100 mL IVPB (0 g Intravenous Hold 3/28/20 1641)       Diagnostic Studies  Results Reviewed     Procedure Component Value Units Date/Time    Urine Microscopic [[id number]]  (Abnormal) Collected:  03/28/20 1600    Lab Status:  Final result Specimen:  Urine, Indwelling [place] Catheter Updated:  03/28/20 1700     RBC, UA None Seen /hpf      WBC, UA 1-2 /hpf      Epithelial Cells Occasional /hpf      Bacteria, UA Innumerable /hpf      AMORPH URATES Innumerable /hpf     Lactic acid x2 [[id number]]  (Normal) Collected:  03/28/20 1600    Lab Status:  Final result Specimen:  Blood from Arm, Right Updated:  03/28/20 1631     LACTIC ACID 1 5 mmol/L     Narrative:       Result may be elevated if tourniquet was used during collection      UA w Reflex to Microscopic w Reflex to Culture [[id number]]  (Abnormal) Collected:  03/28/20 1600    Lab Status:  Final result Specimen:  Urine, Indwelling [place] Catheter Updated:  03/28/20 1629     Color, UA Yellow     Clarity, UA Turbid     Specific [place], UA 1 015     pH, UA 7 0     Leukocytes, UA Small     Nitrite, UA Positive     Protein, UA Negative mg/dl      Glucose, UA Negative mg/dl      Ketones, UA Negative mg/dl      Urobilinogen, UA 0 2 E U /dl      Bilirubin, UA Negative     Blood, UA Moderate    Comprehensive metabolic panel [[id number]]  (Abnormal) Collected:  03/28/20 1600    Lab Status:  Final result Specimen:  Blood from Arm, Right Updated:  03/28/20 1628     Sodium 128 mmol/L      Potassium 4 0 mmol/L      Chloride 95 mmol/L      CO2 27 mmol/L      ANION GAP 6 mmol/L      BUN 9 mg/dL      Creatinine 0 57 mg/dL      Glucose 131 mg/dL      Calcium 7 9 mg/dL      AST 63 U/L      ALT 45 U/L      Alkaline Phosphatase 135 U/L      Total Protein 6 4 g/dL      Albumin 1 6 g/dL      Total Bilirubin 0 30 mg/dL      eGFR 95 ml/min/1 73sq m     Narrative:       [place] guidelines for Chronic Kidney Disease (CKD):     Stage 1 with normal or high GFR (GFR > 90 mL/min/1 73 square meters)    Stage 2 Mild CKD (GFR = 60-89 mL/min/1 73 square meters)    Stage 3A Moderate CKD (GFR = 45-59 mL/min/1 73 square meters)    Stage 3B Moderate CKD (GFR = 30-44 mL/min/1 73 square meters)    Stage 4 Severe CKD (GFR = 15-29 mL/min/1 73 square meters)    Stage 5 End Stage CKD (GFR <15 mL/min/1 73 square meters)  Note: GFR calculation is accurate only with a steady state creatinine    Protime-INR [[id number]]  (Abnormal) Collected:  03/28/20 1600    Lab Status:  Final result Specimen:  Blood from Arm, Right Updated:  03/28/20 1625     Protime 16 8 seconds      INR 1 39    APTT [[id number]]  (Abnormal) Collected:  03/28/20 1600    Lab Status:  Final result Specimen:  Blood from Arm, Right Updated:  03/28/20 1625     PTT 38 seconds     CBC and differential [[id number]]  (Abnormal) Collected:  03/28/20 1600    Lab Status:  Final result Specimen:  Blood from Arm, Right Updated:  03/28/20 1610     WBC 10 73 Thousand/uL      RBC 3 69 Million/uL      Hemoglobin 9 2 g/dL      Hematocrit 29 4 %      MCV 80 fL      MCH 24 9 pg      MCHC 31 3 g/dL      RDW 17 9 %      MPV 9 6 fL      Platelets 408 Thousands/uL      nRBC 0 /100 WBCs      Neutrophils Relative 74 %      Immat GRANS % 1 %      Lymphocytes Relative 14 %      Monocytes Relative 11 %      Eosinophils Relative 0 %      Basophils Relative 0 %      Neutrophils Absolute 7 93 Thousands/µL      Immature Grans Absolute 0 06 Thousand/uL      Lymphocytes Absolute 1 54 Thousands/µL      Monocytes Absolute 1 14 Thousand/µL      Eosinophils Absolute 0 02 Thousand/µL      Basophils Absolute 0 04 Thousands/µL     Blood culture #1 [[id number]] Collected:  03/28/20 1559    Lab Status: In process Specimen:  Blood from Arm, Left Updated:  03/28/20 1609    Blood culture #2 [[id number]] Collected:  03/28/20 1600    Lab Status: In process Specimen:  Blood from Arm, Right Updated:  03/28/20 1609    Procalcitonin [[id number]] Collected:  03/28/20 1600    Lab Status: In process Specimen:  Blood from Arm, Right Updated:  03/28/20 1607                 CT abdomen pelvis wo contrast   Final Result by Mere David MD (03/28 1654)         1  Chronic right hip, left ischial and sacral decubitus ulcers with grossly stable surrounding cellulitis  Small focus of gas within the sacral decubitus ulcer  2   Findings suggestive of rectal impaction and constipation  No evidence of bowel obstruction, colitis, diverticulitis or appendicitis  3   Stable large hiatal hernia                 Workstation performed: [id number]                    Procedures  Procedures         ED Course                                 MDM  Number of Diagnoses or Management Options  Cellulitis: new and requires workup  Chronic indwelling [place] catheter:   Hyponatremia: new and requires workup  Open wound of right hip, initial encounter:   Paraplegia [facility]):   Sacral decubitus ulcer, stage IV [facility]):   Diagnosis management comments: Patient likely has chronic VRE in the urine, although urine is grossly cloudy  Chronic sacral wounds appear acutely infected with [place] erythema  She is tachycardic and reportedly febrile at home is afebrile without leukocytosis here  She has no respiratory symptoms  Will admit to medicine for IV antibiotics, wound care consultation and further management       Amount and/or Complexity of Data Reviewed  Clinical lab tests: ordered and reviewed  Tests in the radiology section of CPT®: ordered and reviewed  Tests in the medicine section of CPT®: ordered and reviewed  Review and summarize past medical records: yes  Discuss the patient with other providers: yes  Independent visualization of images, tracings, or specimens: yes          Disposition  Final diagnoses:   Cellulitis   Sacral decubitus ulcer, stage IV ([address] )   Open wound of right hip, initial encounter   Paraplegia ([facility] [address] )   Chronic indwelling H[place] catheter   Hyponatremia     Time reflects when diagnosis was documented in both MDM as applicable and the Disposition within this note     Time User Action Codes Description Comment    3/28/2020  5:18 PM Figueroa Boga Add [L03 90] Cellulitis     3/28/2020  5:18 PM Figueroa Boga Add [L89 154] Sacral decubitus ulcer, stage IV ([address] )     3/28/2020  5:18 PM Figueroa Boga Add [S71 001A] Open wound of right hip, initial encounter     3/28/2020  5:18 PM Figueroa Boga Add [G82 20] Paraplegia ([address] )     3/28/2020  5:18 PM Figueroa Boga Add [Z96 0] Chronic indwelling Hays catheter     3/28/2020  5:19 PM Figueroa Boga Add [E87 1] Hyponatremia       ED Disposition     ED Disposition Condition Date/Time Comment    Admit Stable Sat Mar 28, 2020  5:18 PM Case was discussed with Dr Marcelina Brown and the patient's admission status was agreed to be Admission Status: inpatient status to the service of Dr Marcelina Brown           Follow-up Information    None         Patient's Medications Discharge Prescriptions    No medications on file     No discharge procedures on file      PDMP Review       Value Time User    PDMP Reviewed  Yes 3/11/2020  6:37 AM Toan Rosenbaum DO          ED Provider  Electronically Signed by           Madison Shook PA-C  03/28/20 0833

## 2020-03-28 NOTE — ASSESSMENT & PLAN NOTE
CT scan abdomen/pelvis finding of constipation and fecal impaction  Patient on p r n  Senna  Patient at this time does not want scheduled senna but would like to have control over when it is given and wants only p r n  Senna ordered p r n

## 2020-03-28 NOTE — H&P
H&P- Kelvin Hess 1950, 71 y o  female MRN: [id number]    Unit/Bed#: -01 Encounter: [id number]    Primary Care Provider: Glo Shaw   Date and time admitted to hospital: 3/28/2020  3:30 PM        * Sepsis without acute organ dysfunction [facility])  Assessment & Plan  Sepsis, POA, as evidenced by fevers prior to presentation, leucocytosis wbc 10 73,   Source - Chronic pustular hip wounds and CAUTI (chronic [place])   UA - Positive nitrite, moderate blood, innumerable bacteria, small leucocytes  Recent treatment for sepsis at [facility]   Prior Urine culture (3/11/20)  No growth    Start IV Zosyn for now, pending ID eval  Patient with history of Cdiff, would cover with prophylactic PO vancomycin and probiotics  Consult infectious disease  Trnd CBC and temp curve daily  Collect wound cultures and follow results  Follow blood culture and urine cultures  Sepsis protocol  Tylenol p r n  For fevers    Pressure ulcer, sacrum  Assessment & Plan  Chronic sacral pressure ulcer wounds  Wound care consulted    Open wound of right hip  Assessment & Plan  Open right hip wound, chronic, appears necrotic with reddened margins  Patient well known to wound care, would consult wound care to assess and measure accurately  ED pictures taken today noted and present in the chart media  Dress wound - Apply hydrogel for now and cover with ABD pending official wound care recommendations              Hyponatremia  Assessment & Plan  Hyponatremia, POA, Na 128  Patient looks hypovoelmic  Hydrate with IVFs- sepsis protocol  Trend BMP  Check UA osm, serum osm, Uric acid, Ur cr,     Chronic idiopathic constipation  Assessment & Plan  CT scan abdomen/pelvis finding of constipation and fecal impaction  Patient on p r n  Senna  Patient at this time does not want scheduled senna but would like to have control over when it is given and wants only p r n  Senna ordered p r n      Urinary tract infection associated with indwelling urethral catheter [facility])  Assessment & Plan  History of spinal cord injury with resultant neurogenic bladder and chronic catheter placement  History of VRE  UA - with positive nitrite, small leucocytes and innumerable bacteria  Start on IV Zosyn   Follow blood and urine culture results      C  difficile diarrhea  Assessment & Plan  History of C-diff infection  Place on prophylactic PO vancomycin    Thrombocytosis (HCC)  Assessment & Plan  Chronic, Plt 698  Trend CBC, hydrate    Moderate protein-calorie malnutrition (HCC)  Assessment & Plan  Malnutrition Findings:           BMI Findings: Body mass index is 18 94 kg/m²  As evidenced by temporal muscle wasting, prominent clavicle, ribs in the setting of poor nutrition, multiple nonhealing chronic wounds, stage 3 decubitus ulcer and BMI 18 94  Nutrition consulted    Decubitus ulcer of left ischium, stage III (HCC)  Assessment & Plan  Stage 3 of left ischium     Iron deficiency anemia  Assessment & Plan  Chronic iron deficiency anemia, POA, hb 10 73  Stable  Keep Hb > 7, transfuse prn    Paraplegia following spinal cord injury [facility])  Assessment & Plan  History of spinal cord injury with resultant paraplegia  Now wheel chair bound  Was advised to go to rehab during her last hospitalization but she refused  For PT/OT eval        VTE Prophylaxis: Enoxaparin (Lovenox)  / sequential compression device   Code Status: Level 1  POLST: There is no POLST form on file for this patient (pre-hospital)  Discussion with family: Patient lives alone and does not want anyone notified    Anticipated Length of Stay:  Patient will be admitted on an Inpatient basis with an anticipated length of stay of  Greater than 2 midnights  Justification for Hospital Stay: sepsis, wounds, UTI    Total Time for Visit, including Counseling / Coordination of Care: 1 hour  Greater than 50% of this total time spent on direct patient counseling and coordination of care      Chief Complaint: Fever    History of Present Illness:    Lamin Lee is a 71 y o  female with past medical history of T8 spinal cord injury now paraplegic with neurogenic bladder, wheelchair-bound , multiple chronic pelvic and sacral ulcers, history of MRSA, history of VRE who presents for evaluation of fevers  Patient reports that she was seen by the wound care team yesterday at [facility] and she was found to be febrile, T 100, thus she was advised to report in the ED  Patient does not have any new complaints, denies pain from the ulcers, denies cough, shortness of breath, nausea or vomiting  Patient was recently hospitalized at [facility] for management of sepsis secondary to urinary tract infection, urine cultures no growth  Her wounds at the time were felt to be improving although she was advised to go to rehab which she declined  Patient lives alone and was discharged home where she has been dressing her wounds by herself and also changing her [place] catheter  In the ED, she was tachycardic but afebrile, pictures of her wound taken in the ED showed purulent wounds, cultures were not collected  Labs were notable for WBC 10 73, sodium 128, CT abdomen pelvis confirmed chronic right hip, left ischial and sacral decubitus ulcers with stable surrounding cellulitis as well as constipation    Review of Systems:    Review of Systems   Constitutional: Positive for fever  HENT: Negative for congestion, drooling, ear discharge, ear pain and hearing loss  Respiratory: Negative for cough, choking, chest tightness, shortness of breath and wheezing  Cardiovascular: Negative for chest pain, palpitations and leg swelling  Gastrointestinal: Positive for constipation  Negative for abdominal pain, anal bleeding, blood in stool, nausea and vomiting  Endocrine: Negative for polydipsia, polyphagia and polyuria  Genitourinary: Negative for dysuria and flank pain     Musculoskeletal: Negative for back pain and myalgias  Skin: Positive for wound  Negative for color change  Neurological: Negative for dizziness, seizures, syncope, speech difficulty, light-headedness, numbness and headaches  Hematological: Negative for adenopathy  Does not bruise/bleed easily  Psychiatric/Behavioral: Negative for agitation, behavioral problems and confusion  All other systems reviewed and are negative  Past Medical and Surgical History:     Past Medical History:   Diagnosis Date    Anemia     iron defiencey    Arthritis     osteo    Back injury     Chronic kidney disease     nephritis    Depression     MRSA (methicillin resistant staph aureus) culture positive 12/07/2018    BONE-TISSUE     Osteopenia     Osteoporosis     Paralysis ([address] )     paraplegic due to spinal cord injury    Paraplegia (HCC)     Poor circulation     Pressure injury of skin     right hip, stage 3    Pressure sore of hip     right    Tibial plateau fracture     Underweight        Past Surgical History:   Procedure Laterality Date    CLOSURE DELAYED PRIMARY N/A 3/20/2019    Procedure: OPHELIA ANAL WOUND RECLOSURE;  Surgeon: Aniyah Flood MD;  Location: [address] [place] [address] OR;  Service: Plastics    DECUBITUS ULCER EXCISION Bilateral 11/12/2019    Procedure: DEBRIDEMENT DECUBITI ([address] OUT);   Surgeon: Roopa Molina DO;  Location:  MAIN OR;  Service: General    FLAP LOCAL EXTREMITY Left 1/28/2019    Procedure: THIGH FLAP & STSG TO CLOSE HIP WOUND;  Surgeon: Aniyah Flood MD;  Location: [address] [place] [address] OR;  Service: Plastics    FLAP LOCAL TRUNK Right 12/17/2018    Procedure: DEBRIDE/FLAP CLOSURE HIP PRESSURE SORE Loreda Morales GRAFT;  Surgeon: Aniyah Flood MD;  Location: [address] [place] [address] OR;  Service: Plastics    FLAP LOCAL TRUNK Left 2/27/2019    Procedure: BUTTOCK FLAP TO ISCHIAL SORE;  Surgeon: Aniyah Flood MD;  Location: [address] [place] [address] OR;  Service: Plastics    HIP DEBRIDEMENT Right 2017    right hip sore debridement    INCISION AND DRAINAGE OF WOUND Left 1/3/2019    Procedure: INCISION AND DRAINAGE (I&D) left distal femur  With deep wound cultures  Application of VAC DRAIN SPONGE;  Surgeon: Pj Lenz MD;  Location:  MAIN OR;  Service: Orthopedics    IR PICC LINE  12/19/2018    IR PICC LINE  3/12/2019    AK DEBRIDEMENT, SKIN, SUB-Q TISSUE,MUSCLE,BONE,=<20 SQ CM Right 9/19/2018    Procedure: DEBRIDEMENT OF HIP PRESSURE SORE;  Surgeon: Dakota Kinney MD;  Location: [address] [place] [address] OR;  Service: [facility] FX+INTRAMED FELIX Left 10/22/2018    Procedure: INSERTION NAIL IM LEFT FEMUR RETROGRADE;  Surgeon: Linsey Cohen MD;  Location:  MAIN OR;  Service: Orthopedics    TOE AMPUTATION Left 2017    small toe left foot amputation    WISDOM TOOTH EXTRACTION      WOUND DEBRIDEMENT Left 12/17/2018    Procedure: DEBRIDEMENT HIP PRESSURE SORE;  Surgeon: Dakota Kinney MD;  Location: [address] [place] [address] OR;  Service: Plastics    WOUND DEBRIDEMENT N/A 11/10/2019    Procedure: EXCISIONAL DEBRIDEMENT;  Surgeon: Eddie Bernardo MD;  Location:  MAIN OR;  Service: General       Meds/Allergies:    Prior to Admission medications    Medication Sig Start Date End Date Taking?  Authorizing Provider   acetaminophen (TYLENOL) 325 mg tablet Take 2 tablets (650 mg total) by mouth every 6 (six) hours as needed for mild pain, headaches or fever 11/20/19   Yumiko Camilo PA-C   artificial tear (LUBRIFRESH P M ) 83-15 % ophthalmic ointment Administer to both eyes daily at bedtime 3/2/20   CONCEPCION Shipman   bisacodyl (DULCOLAX) 10 mg suppository Insert 1 suppository (10 mg total) into the rectum daily as needed (2nd line for constipation) 3/2/20   CONCEPCION Shipman   Calcium Carb-Cholecalciferol 600-200 MG-UNIT TABS Take 1 tablet by mouth daily    Historical Provider, MD   cholecalciferol (VITAMIN D3) 1,000 units tablet Take 1,000 Units by mouth daily    Historical Provider, MD   cyanocobalamin 100 MCG tablet Take 100 mcg by mouth daily    Historical Provider, MD dextran 70-hypromellose (GENTEAL TEARS) 0 1-0 3 % ophthalmic solution Administer 1 drop to both eyes every 3 (three) hours as needed (dry eye) 3/2/20   CONCEPCION Quan   famotidine (PEPCID) 20 mg tablet Take 1 tablet (20 mg total) by mouth 2 (two) times a day as needed for heartburn 3/2/20   CONCEPCION Quan   midodrine (PROAMATINE) 10 MG tablet Take 1 tablet (10 mg total) by mouth 2 (two) times a day 3/13/20   Alfred Iyer PA-C   multivitamin (THERAGRAN) TABS Take 1 tablet by mouth daily    Historical Provider, MD   nystatin (MYCOSTATIN) powder Apply topically 2 (two) times a day    Historical Provider, MD   pantoprazole (PROTONIX) 40 mg tablet Take 40 mg by mouth daily    Historical Provider, MD   saccharomyces boulardii (FLORASTOR) 250 mg capsule Take 1 capsule (250 mg total) by mouth 2 (two) times a day  Patient taking differently: Take 250 mg by mouth daily  4/19/19   Monalisa Gallardo DO   senna (SENOKOT) 8 6 mg Take 1 tablet (8 6 mg total) by mouth daily at bedtime 3/13/20   Aury Moe MD     I have reviewed home medications using allscripts  Allergies:    Allergies   Allergen Reactions    Iv Contrast [Iodinated Diagnostic Agents]      Tingling face, itching    Cefepime Rash    Ciprofloxacin Rash and Swelling     Looked like suburn, itching  Bed sores  Swelling, itching    Latex Rash    Vancomycin Rash     Unknown        Social History:     Marital Status: Single   Occupation:  Retired  Patient Pre-hospital Living Situation:  Lives alone  Patient Pre-hospital Level of Mobility:  Wheelchair-bound  Patient Pre-hospital Diet Restrictions:  None  Substance Use History:   Social History     Substance and Sexual Activity   Alcohol Use Yes    Comment: occasional     Social History     Tobacco Use   Smoking Status Never Smoker   Smokeless Tobacco Never Used     Social History     Substance and Sexual Activity   Drug Use No       Family History:    Family History   Problem Relation Age of Onset    Depression Father        Physical Exam:     Vitals:   Blood Pressure: 112/62 (03/28/20 1804)  Pulse: (!) 121 (03/28/20 1804)  Temperature: 99 °F (37 2 °C) (03/28/20 1804)  Temp Source: Oral (03/28/20 1804)  Respirations: 22 (03/28/20 1804)  Height: 5' (152 4 cm) (03/28/20 1700)  Weight - Scale: 44 kg (97 lb) (03/28/20 1700)  SpO2: 98 % (03/28/20 1804)    Physical Exam   Constitutional: She is oriented to person, place, and time  No distress  Frail cachectic lady, with prominent clavicle, cheek bones   HENT:   Head: Normocephalic and atraumatic  Eyes: Conjunctivae are normal  Right eye exhibits no discharge  Left eye exhibits no discharge  No scleral icterus  Neck: Normal range of motion  Neck supple  Cardiovascular: Regular rhythm and normal heart sounds  Tachycardia present  Exam reveals no gallop and no friction rub  No murmur heard  Pulmonary/Chest: Effort normal and breath sounds normal  No stridor  No respiratory distress  She has no wheezes  She has no rales  She exhibits no tenderness  Abdominal: Soft  Bowel sounds are normal  She exhibits no distension and no mass  There is no tenderness  There is rebound  There is no guarding  Musculoskeletal: Normal range of motion  She exhibits no edema, tenderness or deformity  Neurological: She is alert and oriented to person, place, and time  No cranial nerve deficit  Skin: Skin is warm and dry  Capillary refill takes less than 2 seconds  No rash noted  She is not diaphoretic  No erythema  There is pallor  Psychiatric: Her speech is normal and behavior is normal  Judgment and thought content normal  Cognition and memory are normal  She exhibits a depressed mood  Nursing note and vitals reviewed  Additional Data:     Lab Results: I have personally reviewed pertinent reports        Results from last 7 days   Lab Units 03/28/20  1600   WBC Thousand/uL 10 73*   HEMOGLOBIN g/dL 9 2*   HEMATOCRIT % 29 4*   PLATELETS Thousands/uL 698* NEUTROS PCT % 74   LYMPHS PCT % 14   MONOS PCT % 11   EOS PCT % 0     Results from last 7 days   Lab Units 03/28/20  1600   SODIUM mmol/L 128*   POTASSIUM mmol/L 4 0   CHLORIDE mmol/L 95*   CO2 mmol/L 27   BUN mg/dL 9   CREATININE mg/dL 0 57*   ANION GAP mmol/L 6   CALCIUM mg/dL 7 9*   ALBUMIN g/dL 1 6*   TOTAL BILIRUBIN mg/dL 0 30   ALK PHOS U/L 135*   ALT U/L 45   AST U/L 63*   GLUCOSE RANDOM mg/dL 131     Results from last 7 days   Lab Units 03/28/20  1600   INR  1 39*             Results from last 7 days   Lab Units 03/28/20  1600   LACTIC ACID mmol/L 1 5       Imaging: I have personally reviewed pertinent reports  CT abdomen pelvis wo contrast   Final Result by Augustin Tijerina MD (03/28 4404)         1  Chronic right hip, left ischial and sacral decubitus ulcers with grossly stable surrounding cellulitis  Small focus of gas within the sacral decubitus ulcer  2   Findings suggestive of rectal impaction and constipation  No evidence of bowel obstruction, colitis, diverticulitis or appendicitis  3   Stable large hiatal hernia  Workstation performed: [id number]             EKG, Pathology, and Other Studies Reviewed on Admission:   · EKG:  Sinus tachycardia    Allscripts / Epic Records Reviewed: Yes     ** Please Note: This note has been constructed using a voice recognition system   **

## 2020-03-28 NOTE — ASSESSMENT & PLAN NOTE
Open right hip wound, chronic, appears necrotic with reddened margins  Patient well known to wound care, would consult wound care to assess and measure accurately  ED pictures taken today noted and present in the chart media  Dress wound - Apply hydrogel for now and cover with ABD pending official wound care recommendations

## 2020-03-28 NOTE — ASSESSMENT & PLAN NOTE
History of spinal cord injury with resultant paraplegia  Now wheel chair bound  Was advised to go to rehab during her last hospitalization but she refused  For PT/OT eval

## 2020-03-29 LAB
ALBUMIN SERPL BCP-MCNC: 1.1 G/DL (ref 3.5–5)
ALP SERPL-CCNC: 107 U/L (ref 46–116)
ALT SERPL W P-5'-P-CCNC: 39 U/L (ref 12–78)
ANION GAP SERPL CALCULATED.3IONS-SCNC: 8 MMOL/L (ref 4–13)
AST SERPL W P-5'-P-CCNC: 50 U/L (ref 5–45)
BILIRUB SERPL-MCNC: 0.2 MG/DL (ref 0.2–1)
BUN SERPL-MCNC: 11 MG/DL (ref 5–25)
CALCIUM SERPL-MCNC: 7.1 MG/DL (ref 8.3–10.1)
CHLORIDE SERPL-SCNC: 101 MMOL/L (ref 100–108)
CO2 SERPL-SCNC: 25 MMOL/L (ref 21–32)
CREAT SERPL-MCNC: 0.6 MG/DL (ref 0.6–1.3)
ERYTHROCYTE [DISTWIDTH] IN BLOOD BY AUTOMATED COUNT: 17.9 % (ref 11.6–15.1)
GFR SERPL CREATININE-BSD FRML MDRD: 93 ML/MIN/1.73SQ M
GLUCOSE SERPL-MCNC: 124 MG/DL (ref 65–140)
HCT VFR BLD AUTO: 24.1 % (ref 34.8–46.1)
HGB BLD-MCNC: 7.5 G/DL (ref 11.5–15.4)
MCH RBC QN AUTO: 24.6 PG (ref 26.8–34.3)
MCHC RBC AUTO-ENTMCNC: 31.1 G/DL (ref 31.4–37.4)
MCV RBC AUTO: 79 FL (ref 82–98)
OSMOLALITY UR/SERPL-RTO: 281 MMOL/KG (ref 282–298)
OSMOLALITY UR: 450 MMOL/KG
PLATELET # BLD AUTO: 599 THOUSANDS/UL (ref 149–390)
PMV BLD AUTO: 9.6 FL (ref 8.9–12.7)
POTASSIUM SERPL-SCNC: 3 MMOL/L (ref 3.5–5.3)
PROCALCITONIN SERPL-MCNC: 0.18 NG/ML
PROT SERPL-MCNC: 5.1 G/DL (ref 6.4–8.2)
RBC # BLD AUTO: 3.05 MILLION/UL (ref 3.81–5.12)
SODIUM 24H UR-SCNC: 13 MOL/L
SODIUM SERPL-SCNC: 134 MMOL/L (ref 136–145)
WBC # BLD AUTO: 7.72 THOUSAND/UL (ref 4.31–10.16)

## 2020-03-29 PROCEDURE — 84300 ASSAY OF URINE SODIUM: CPT | Performed by: INTERNAL MEDICINE

## 2020-03-29 PROCEDURE — 80053 COMPREHEN METABOLIC PANEL: CPT | Performed by: INTERNAL MEDICINE

## 2020-03-29 PROCEDURE — 84145 PROCALCITONIN (PCT): CPT | Performed by: INTERNAL MEDICINE

## 2020-03-29 PROCEDURE — 83935 ASSAY OF URINE OSMOLALITY: CPT | Performed by: INTERNAL MEDICINE

## 2020-03-29 PROCEDURE — 85027 COMPLETE CBC AUTOMATED: CPT | Performed by: INTERNAL MEDICINE

## 2020-03-29 PROCEDURE — 99232 SBSQ HOSP IP/OBS MODERATE 35: CPT | Performed by: INTERNAL MEDICINE

## 2020-03-29 RX ORDER — B-COMPLEX WITH VITAMIN C
1 TABLET ORAL
Status: DISCONTINUED | OUTPATIENT
Start: 2020-03-30 | End: 2020-04-01 | Stop reason: HOSPADM

## 2020-03-29 RX ORDER — POTASSIUM CHLORIDE 29.8 MG/ML
40 INJECTION INTRAVENOUS ONCE
Status: DISCONTINUED | OUTPATIENT
Start: 2020-03-29 | End: 2020-03-29

## 2020-03-29 RX ORDER — MINERAL OIL 100 G/100G
1 OIL RECTAL ONCE
Status: COMPLETED | OUTPATIENT
Start: 2020-03-29 | End: 2020-03-29

## 2020-03-29 RX ADMIN — ENOXAPARIN SODIUM 40 MG: 40 INJECTION SUBCUTANEOUS at 08:08

## 2020-03-29 RX ADMIN — FAMOTIDINE 20 MG: 20 TABLET ORAL at 17:03

## 2020-03-29 RX ADMIN — Medication 250 MG: at 08:07

## 2020-03-29 RX ADMIN — MIDODRINE HYDROCHLORIDE 10 MG: 5 TABLET ORAL at 08:07

## 2020-03-29 RX ADMIN — PIPERACILLIN AND TAZOBACTAM 3.38 G: 3; .375 INJECTION, POWDER, LYOPHILIZED, FOR SOLUTION INTRAVENOUS at 21:35

## 2020-03-29 RX ADMIN — PIPERACILLIN AND TAZOBACTAM 3.38 G: 3; .375 INJECTION, POWDER, LYOPHILIZED, FOR SOLUTION INTRAVENOUS at 16:08

## 2020-03-29 RX ADMIN — VITAM B12 100 MCG: 100 TAB at 08:10

## 2020-03-29 RX ADMIN — PANTOPRAZOLE SODIUM 40 MG: 40 TABLET, DELAYED RELEASE ORAL at 05:03

## 2020-03-29 RX ADMIN — POTASSIUM & SODIUM PHOSPHATES POWDER PACK 280-160-250 MG 2 PACKET: 280-160-250 PACK at 12:12

## 2020-03-29 RX ADMIN — NYSTATIN: 100000 POWDER TOPICAL at 17:03

## 2020-03-29 RX ADMIN — VANCOMYCIN HYDROCHLORIDE 125 MG: 500 INJECTION, POWDER, LYOPHILIZED, FOR SOLUTION INTRAVENOUS at 23:44

## 2020-03-29 RX ADMIN — POTASSIUM & SODIUM PHOSPHATES POWDER PACK 280-160-250 MG 2 PACKET: 280-160-250 PACK at 17:03

## 2020-03-29 RX ADMIN — VANCOMYCIN HYDROCHLORIDE 125 MG: 500 INJECTION, POWDER, LYOPHILIZED, FOR SOLUTION INTRAVENOUS at 08:15

## 2020-03-29 RX ADMIN — FAMOTIDINE 20 MG: 20 TABLET ORAL at 08:07

## 2020-03-29 RX ADMIN — MIDODRINE HYDROCHLORIDE 10 MG: 5 TABLET ORAL at 17:03

## 2020-03-29 RX ADMIN — PIPERACILLIN AND TAZOBACTAM 3.38 G: 3; .375 INJECTION, POWDER, LYOPHILIZED, FOR SOLUTION INTRAVENOUS at 03:40

## 2020-03-29 RX ADMIN — MINERAL OIL 1 ENEMA: 100 ENEMA RECTAL at 21:35

## 2020-03-29 RX ADMIN — NYSTATIN: 100000 POWDER TOPICAL at 08:16

## 2020-03-29 RX ADMIN — PIPERACILLIN AND TAZOBACTAM 3.38 G: 3; .375 INJECTION, POWDER, LYOPHILIZED, FOR SOLUTION INTRAVENOUS at 10:44

## 2020-03-29 NOTE — ASSESSMENT & PLAN NOTE
CT scan abdomen/pelvis finding of constipation and fecal impaction  Patient at this time does not want scheduled senna but would like to have control over when it is given and wants only p r n  Senna ordered p r n    Patient requested for fleet enema this am, order placed

## 2020-03-29 NOTE — ASSESSMENT & PLAN NOTE
Chronic iron deficiency anemia, POA, hb 10 73 higher than baseline secondary to dehdyration  Hb today 7 5, suggestive of hemodilution and consistent with prior Hb readings  DC IVFs  Keep Hb > 7, transfuse prn

## 2020-03-29 NOTE — PROGRESS NOTES
Progress Note - Kelvin Hess 1950, 71 y o  female MRN: [id number]    Unit/Bed#: -01 Encounter: [id number]    Primary Care Provider: Glo Shaw   Date and time admitted to hospital: 3/28/2020  3:30 PM        * Sepsis without acute organ dysfunction [facility])  Assessment & Plan  Sepsis, POA, as evidenced by fevers prior to presentation, leucocytosis wbc 10 73,   Source - Chronic pustular hip wounds and CAUTI (chronic [place])   UA - Positive nitrite, moderate blood, innumerable bacteria, small leucocytes  Recent treatment for sepsis at [facility]   Prior Urine culture (3/11/20)  No growth    Start IV Zosyn for now, pending ID eval, defer antibiotic management to ID  Patient with history of Cdiff, would cover with prophylactic PO vancomycin and probiotics  Consult infectious disease  Trnd CBC and temp curve daily  Wound cultures not collected in the ED  Follow blood culture and urine cultures  Sepsis protocol  Tylenol p r n  For fevers    Pressure ulcer, sacrum  Assessment & Plan  Chronic sacral pressure ulcer wounds  Wound care consulted    Open wound of right hip  Assessment & Plan  Open right hip wound, chronic, appears necrotic with reddened margins  Patient well known to wound care, would consult wound care to assess and measure accurately  ED pictures taken today noted and present in the chart media  Dress wound - Apply hydrogel for now and cover with ABD pending official wound care recommendations              Hyponatremia  Assessment & Plan  Hyponatremia, POA, Na 128  Patient looks hypovolemic  S/p IVFS, Na 134 this am  Trend Bmp daily      Chronic idiopathic constipation  Assessment & Plan  CT scan abdomen/pelvis finding of constipation and fecal impaction  Patient at this time does not want scheduled senna but would like to have control over when it is given and wants only p r n  Senna ordered p r n    Patient requested for fleet enema this am, order placed    Urinary tract infection associated with indwelling urethral catheter ([address] )  Assessment & Plan  History of spinal cord injury with resultant neurogenic bladder and chronic catheter placement  History of VRE  UA - with positive nitrite, small leucocytes and innumerable bacteria  Start on IV Zosyn   Follow blood and urine culture results      C  difficile diarrhea  Assessment & Plan  History of C-diff infection  Place on prophylactic PO vancomycin    Thrombocytosis (HCC)  Assessment & Plan  Chronic, Plt 698  Trend CBC, hydrate    Moderate protein-calorie malnutrition (HCC)  Assessment & Plan  Malnutrition Findings:           BMI Findings: Body mass index is 18 94 kg/m²  As evidenced by temporal muscle wasting, prominent clavicle, ribs in the setting of poor nutrition, multiple nonhealing chronic wounds, stage 3 decubitus ulcer and BMI 18 94  Nutrition consulted    Iron deficiency anemia  Assessment & Plan  Chronic iron deficiency anemia, POA, hb 10 73 higher than baseline secondary to dehdyration  Hb today 7 5, suggestive of hemodilution and consistent with prior Hb readings  DC IVFs  Keep Hb > 7, transfuse prn    Paraplegia following spinal cord injury [facility])  Assessment & Plan  History of spinal cord injury with resultant paraplegia  Now wheel chair bound  Was advised to go to rehab during her last hospitalization but she refused  For PT/OT eval        VTE Pharmacologic Prophylaxis:   Pharmacologic: Enoxaparin (Lovenox)  Mechanical VTE Prophylaxis in Place: No    Patient Centered Rounds: I have performed bedside rounds with nursing staff today  Discussions with Specialists or Other Care Team Provider:     Education and Discussions with Family / Patient: patient    Time Spent for Care: 30 minutes  More than 50% of total time spent on counseling and coordination of care as described above      Current Length of Stay: 1 day(s)    Current Patient Status: Inpatient   Certification Statement: The patient will continue to require additional inpatient hospital stay due to On IV zosyn    Discharge Plan: pending clinical improvement    Code Status: Level 1 - Full Code      Subjective:   No overnight events  No complaints this am    Objective:     Vitals:   Temp (24hrs), Av °F (36 7 °C), Min:97 4 °F (36 3 °C), Max:99 °F (37 2 °C)    Temp:  [97 4 °F (36 3 °C)-99 °F (37 2 °C)] 97 5 °F (36 4 °C)  HR:  [] 95  Resp:  [18-28] 19  BP: ()/(52-62) 97/53  SpO2:  [97 %-99 %] 99 %  Body mass index is 18 94 kg/m²  Input and Output Summary (last 24 hours): Intake/Output Summary (Last 24 hours) at 3/29/2020 1324  Last data filed at 3/29/2020 0426  Gross per 24 hour   Intake    Output 230 ml   Net -230 ml       Physical Exam:     Physical Exam   Constitutional: She is oriented to person, place, and time  Cardiovascular: Normal rate, regular rhythm and normal heart sounds  Exam reveals no friction rub  No murmur heard  Pulmonary/Chest: Effort normal and breath sounds normal  No stridor  No respiratory distress  She has no wheezes  Abdominal: Soft  Bowel sounds are normal  She exhibits no distension  There is no tenderness  There is no guarding  Musculoskeletal: She exhibits deformity  She exhibits no edema or tenderness  paraplegic   Neurological: She is alert and oriented to person, place, and time  No cranial nerve deficit  Coordination normal    Skin: Skin is warm  Capillary refill takes less than 2 seconds  She is not diaphoretic  No erythema  Psychiatric: She exhibits a depressed mood  Nursing note and vitals reviewed      Additional Data:     Labs:    Results from last 7 days   Lab Units 205 20  1600   WBC Thousand/uL 7 72 10 73*   HEMOGLOBIN g/dL 7 5* 9 2*   HEMATOCRIT % 24 1* 29 4*   PLATELETS Thousands/uL 599* 698*   NEUTROS PCT %  --  74   LYMPHS PCT %  --  14   MONOS PCT %  --  11   EOS PCT %  --  0     Results from last 7 days   Lab Units 20  0415   SODIUM mmol/L 134*   POTASSIUM mmol/L 3 0*   CHLORIDE mmol/L 101   CO2 mmol/L 25   BUN mg/dL 11   CREATININE mg/dL 0 60   ANION GAP mmol/L 8   CALCIUM mg/dL 7 1*   ALBUMIN g/dL 1 1*   TOTAL BILIRUBIN mg/dL 0 20   ALK PHOS U/L 107   ALT U/L 39   AST U/L 50*   GLUCOSE RANDOM mg/dL 124     Results from last 7 days   Lab Units 03/28/20  1600   INR  1 39*             Results from last 7 days   Lab Units 03/29/20  0415 03/28/20  1600   LACTIC ACID mmol/L  --  1 5   PROCALCITONIN ng/ml 0 18 0 19           * I Have Reviewed All Lab Data Listed Above  * Additional Pertinent Lab Tests Reviewed: All Labs Within Last 24 Hours Reviewed    Imaging:    Imaging Reports Reviewed Today Include: None  Imaging Personally Reviewed by Myself Includes:  None    Recent Cultures (last 7 days):     Results from last 7 days   Lab Units 03/28/20  1600 03/28/20  1559   BLOOD CULTURE  Received in Microbiology Lab  Culture in Progress  Received in Microbiology Lab  Culture in Progress         Last 24 Hours Medication List:     Current Facility-Administered Medications:  acetaminophen 650 mg Oral Q6H PRN Daljit Joseph MD    artificial tear  Both Eyes HS Daljit Joseph MD    bisacodyl 10 mg Rectal Daily PRN Daljit Joseph MD    calcium carbonate-vitamin D 1 tablet Oral Daily MD David Eduardo ON 3/30/2020] calcium carbonate-vitamin D 1 tablet Oral Daily With Breakfast Daljit Joseph MD    cyanocobalamin 100 mcg Oral Daily Daljit Joseph MD    dextran 70-hypromellose 1 drop Both Eyes Q3H PRN Daljit Joseph MD    enoxaparin 40 mg Subcutaneous Daily Daljit Joseph MD    famotidine 20 mg Oral BID PRN Daljit Joseph MD    midodrine 10 mg Oral BID Daljit Joseph MD    mineral oil 1 enema Rectal Once Daljit Joseph MD    nystatin  Topical BID Dalijt Joseph MD    pantoprazole 40 mg Oral Early Morning Daljit Joseph MD    piperacillin-tazobactam 3 375 g Intravenous Q6H Daljit Joseph MD Last Rate: 3 375 g (03/29/20 1044)   potassium-sodium phosphates 2 packet Oral BID With Meals Keaton Carey MD    saccharomyces boulardii 250 mg Oral Daily Keaton Carey MD    senna 1 tablet Oral HS PRN Keaton Carey MD    vancomycin 125 mg Oral Q12H [address] Keaton Carey MD      Facility-Administered Medications Ordered in Other Encounters:  dexamethasone 4 mg Intravenous Once PRN Halina Ouch, DO    lactated ringers 75 mL/hr Intravenous Continuous Halina Ouch, DO Last Rate: 75 mL/hr (11/10/19 2105)   lactated ringers 50 mL/hr Intravenous Continuous Stefanene ALEXANDRA Loera    lactated ringers 75 mL/hr Intravenous Continuous Halina Ouch, DO Last Rate: Stopped (03/20/19 1841)   lactated ringers 75 mL/hr Intravenous Continuous Oleg Price MD Last Rate: Stopped (03/20/19 1842)   ondansetron 4 mg Intravenous Once PRN Angelina Fabian MD    promethazine 12 5 mg Intravenous Once PRN Angelina Fabian MD         Today, Patient Was Seen By: Keaton Carey MD    ** Please Note: Dictation voice to text software may have been used in the creation of this document   **

## 2020-03-29 NOTE — ASSESSMENT & PLAN NOTE
Sepsis, POA, as evidenced by fevers prior to presentation, leucocytosis wbc 10 73,   Source - Chronic pustular hip wounds and CAUTI (chronic [place])   UA - Positive nitrite, moderate blood, innumerable bacteria, small leucocytes  Recent treatment for sepsis at [facility]   Prior Urine culture (3/11/20)  No growth    Start IV Zosyn for now, pending ID eval, defer antibiotic management to ID  Patient with history of Cdiff, would cover with prophylactic PO vancomycin and probiotics  Consult infectious disease  Trnd CBC and temp curve daily  Wound cultures not collected in the ED  Follow blood culture and urine cultures  Sepsis protocol  Tylenol p r n   For fevers

## 2020-03-29 NOTE — PLAN OF CARE
Problem: Potential for Falls  Goal: Patient will remain free of falls  Description  INTERVENTIONS:  - Assess patient frequently for physical needs  -  Identify cognitive and physical deficits and behaviors that affect risk of falls    -  [place] fall precautions as indicated by assessment   - Educate patient/family on patient safety including physical limitations  - Instruct patient to call for assistance with activity based on assessment  - Modify environment to reduce risk of injury  - Consider OT/PT consult to assist with strengthening/mobility  Outcome: Progressing     Problem: PAIN - ADULT  Goal: Verbalizes/displays adequate comfort level or baseline comfort level  Description  Interventions:  - Encourage patient to monitor pain and request assistance  - Assess pain using appropriate pain scale  - Administer analgesics based on type and severity of pain and evaluate response  - Implement non-pharmacological measures as appropriate and evaluate response  - Consider cultural and social influences on pain and pain management  - Notify physician/advanced practitioner if interventions unsuccessful or patient reports new pain  Outcome: Progressing     Problem: INFECTION - ADULT  Goal: Absence or prevention of progression during hospitalization  Description  INTERVENTIONS:  - Assess and monitor for signs and symptoms of infection  - Monitor lab/diagnostic results  - Monitor all insertion sites, i e  indwelling lines, tubes, and drains  - Monitor endotracheal if appropriate and nasal secretions for changes in amount and color  - [place] appropriate cooling/warming therapies per order  - Administer medications as ordered  - Instruct and encourage patient and family to use good hand hygiene technique  - Identify and instruct in appropriate isolation precautions for identified infection/condition  Outcome: Progressing     Problem: SAFETY ADULT  Goal: Maintain or return to baseline ADL function  Description  INTERVENTIONS:  -  Assess patient's ability to carry out ADLs; assess patient's baseline for ADL function and identify physical deficits which impact ability to perform ADLs (bathing, care of mouth/teeth, toileting, grooming, dressing, etc )  - Assess/evaluate cause of self-care deficits   - Assess range of motion  - Assess patient's mobility; develop plan if impaired  - Assess patient's need for assistive devices and provide as appropriate  - Encourage maximum independence but intervene and supervise when necessary  - Involve family in performance of ADLs  - Assess for home care needs following discharge   - Consider OT consult to assist with ADL evaluation and planning for discharge  - Provide patient education as appropriate  Outcome: Progressing     Problem: SAFETY ADULT  Goal: Maintain or return mobility status to optimal level  Description  INTERVENTIONS:  - Assess patient's baseline mobility status (ambulation, transfers, stairs, etc )    - Identify cognitive and physical deficits and behaviors that affect mobility  - Identify mobility aids required to assist with transfers and/or ambulation (gait belt, sit-to-stand, lift, walker, cane, etc )  - [place] fall precautions as indicated by assessment  - Record patient progress and toleration of activity level on Mobility SBAR; progress patient to next Phase/Stage  - Instruct patient to call for assistance with activity based on assessment  - Consider rehabilitation consult to assist with strengthening/weightbearing, etc   Outcome: Progressing     Problem: METABOLIC, FLUID AND ELECTROLYTES - ADULT  Goal: Electrolytes maintained within normal limits  Description  INTERVENTIONS:  - Monitor labs and assess patient for signs and symptoms of electrolyte imbalances  - Administer electrolyte replacement as ordered  - Monitor response to electrolyte replacements, including repeat lab results as appropriate  - Instruct patient on fluid and nutrition as appropriate  Outcome: Progressing     Problem: SKIN/TISSUE INTEGRITY - ADULT  Goal: Incision(s), wounds(s) or drain site(s) healing without S/S of infection  Description  INTERVENTIONS  - Assess and document risk factors for skin impairment   - Assess and document dressing, incision, wound bed, drain sites and surrounding tissue  - Consider nutrition services referral as needed  - Oral mucous membranes remain intact  - Provide patient/ family education  Outcome: Progressing     Problem: Prexisting or High Potential for Compromised Skin Integrity  Goal: Skin integrity is maintained or improved  Description  INTERVENTIONS:  - Identify patients at risk for skin breakdown  - Assess and monitor skin integrity  - Assess and monitor nutrition and hydration status  - Monitor labs   - Assess for incontinence   - Turn and reposition patient  - Assist with mobility/ambulation  - Relieve pressure over bony prominences  - Avoid friction and shearing  - Provide appropriate hygiene as needed including keeping skin clean and dry  - Evaluate need for skin moisturizer/barrier cream  - Collaborate with interdisciplinary team   - Patient/family teaching  - Consider wound care consult   Outcome: Progressing     Problem: Nutrition/Hydration-ADULT  Goal: Nutrient/Hydration intake appropriate for improving, restoring or maintaining nutritional needs  Description  Monitor and assess patient's nutrition/hydration status for malnutrition  Collaborate with interdisciplinary team and initiate plan and interventions as ordered  Monitor patient's weight and dietary intake as ordered or per policy  Utilize nutrition screening tool and intervene as necessary  Determine patient's food preferences and provide high-protein, high-caloric foods as appropriate       INTERVENTIONS:  - Monitor oral intake, urinary output, labs, and treatment plans  - Assess nutrition and hydration status and recommend course of action  - Evaluate amount of meals eaten  - Assist patient with eating if necessary   - Allow adequate time for meals  - Recommend/ encourage appropriate diets, oral nutritional supplements, and vitamin/mineral supplements  - Order, calculate, and assess calorie counts as needed  - Recommend, monitor, and adjust tube feedings and TPN/PPN based on assessed needs  - Assess need for intravenous fluids  - Provide specific nutrition/hydration education as appropriate  - Include patient/family/caregiver in decisions related to nutrition  Outcome: Progressing

## 2020-03-30 PROBLEM — S71.002A OPEN WOUND OF LEFT HIP: Status: ACTIVE | Noted: 2020-02-08

## 2020-03-30 LAB
ANION GAP SERPL CALCULATED.3IONS-SCNC: 7 MMOL/L (ref 4–13)
BUN SERPL-MCNC: 12 MG/DL (ref 5–25)
CALCIUM SERPL-MCNC: 7.3 MG/DL (ref 8.3–10.1)
CHLORIDE SERPL-SCNC: 103 MMOL/L (ref 100–108)
CO2 SERPL-SCNC: 24 MMOL/L (ref 21–32)
CREAT SERPL-MCNC: 0.51 MG/DL (ref 0.6–1.3)
ERYTHROCYTE [DISTWIDTH] IN BLOOD BY AUTOMATED COUNT: 18.4 % (ref 11.6–15.1)
GFR SERPL CREATININE-BSD FRML MDRD: 98 ML/MIN/1.73SQ M
GLUCOSE SERPL-MCNC: 130 MG/DL (ref 65–140)
HCT VFR BLD AUTO: 23.7 % (ref 34.8–46.1)
HGB BLD-MCNC: 7.2 G/DL (ref 11.5–15.4)
MCH RBC QN AUTO: 24.8 PG (ref 26.8–34.3)
MCHC RBC AUTO-ENTMCNC: 30.4 G/DL (ref 31.4–37.4)
MCV RBC AUTO: 82 FL (ref 82–98)
PLATELET # BLD AUTO: 616 THOUSANDS/UL (ref 149–390)
PMV BLD AUTO: 9.4 FL (ref 8.9–12.7)
POTASSIUM SERPL-SCNC: 4.6 MMOL/L (ref 3.5–5.3)
RBC # BLD AUTO: 2.9 MILLION/UL (ref 3.81–5.12)
SODIUM SERPL-SCNC: 134 MMOL/L (ref 136–145)
WBC # BLD AUTO: 7.99 THOUSAND/UL (ref 4.31–10.16)

## 2020-03-30 PROCEDURE — 85027 COMPLETE CBC AUTOMATED: CPT | Performed by: INTERNAL MEDICINE

## 2020-03-30 PROCEDURE — 80048 BASIC METABOLIC PNL TOTAL CA: CPT | Performed by: INTERNAL MEDICINE

## 2020-03-30 PROCEDURE — 99232 SBSQ HOSP IP/OBS MODERATE 35: CPT | Performed by: HOSPITALIST

## 2020-03-30 RX ADMIN — STANDARDIZED SENNA CONCENTRATE 8.6 MG: 8.6 TABLET ORAL at 22:48

## 2020-03-30 RX ADMIN — POTASSIUM & SODIUM PHOSPHATES POWDER PACK 280-160-250 MG 2 PACKET: 280-160-250 PACK at 17:24

## 2020-03-30 RX ADMIN — MIDODRINE HYDROCHLORIDE 10 MG: 5 TABLET ORAL at 09:30

## 2020-03-30 RX ADMIN — PIPERACILLIN AND TAZOBACTAM 3.38 G: 3; .375 INJECTION, POWDER, LYOPHILIZED, FOR SOLUTION INTRAVENOUS at 18:03

## 2020-03-30 RX ADMIN — POTASSIUM & SODIUM PHOSPHATES POWDER PACK 280-160-250 MG 2 PACKET: 280-160-250 PACK at 09:35

## 2020-03-30 RX ADMIN — PANTOPRAZOLE SODIUM 40 MG: 40 TABLET, DELAYED RELEASE ORAL at 04:15

## 2020-03-30 RX ADMIN — MIDODRINE HYDROCHLORIDE 10 MG: 5 TABLET ORAL at 17:24

## 2020-03-30 RX ADMIN — NYSTATIN 1 APPLICATION: 100000 POWDER TOPICAL at 09:54

## 2020-03-30 RX ADMIN — PIPERACILLIN AND TAZOBACTAM 3.38 G: 3; .375 INJECTION, POWDER, LYOPHILIZED, FOR SOLUTION INTRAVENOUS at 04:05

## 2020-03-30 RX ADMIN — VANCOMYCIN HYDROCHLORIDE 125 MG: 500 INJECTION, POWDER, LYOPHILIZED, FOR SOLUTION INTRAVENOUS at 23:28

## 2020-03-30 RX ADMIN — VANCOMYCIN HYDROCHLORIDE 125 MG: 500 INJECTION, POWDER, LYOPHILIZED, FOR SOLUTION INTRAVENOUS at 09:45

## 2020-03-30 RX ADMIN — VITAM B12 100 MCG: 100 TAB at 09:45

## 2020-03-30 RX ADMIN — FAMOTIDINE 20 MG: 20 TABLET ORAL at 09:30

## 2020-03-30 RX ADMIN — PIPERACILLIN AND TAZOBACTAM 3.38 G: 3; .375 INJECTION, POWDER, LYOPHILIZED, FOR SOLUTION INTRAVENOUS at 23:24

## 2020-03-30 RX ADMIN — ENOXAPARIN SODIUM 40 MG: 40 INJECTION SUBCUTANEOUS at 09:47

## 2020-03-30 RX ADMIN — Medication 250 MG: at 09:30

## 2020-03-30 RX ADMIN — OYSTER SHELL CALCIUM WITH VITAMIN D 1 TABLET: 500; 200 TABLET, FILM COATED ORAL at 09:34

## 2020-03-30 NOTE — DISCHARGE INSTR - OTHER ORDERS
Wound Care Plan:   1-Right hip and anterior thigh wounds--cleanse with normal saline, pat dry  Apply small amount of Hydrogel to right lateral hip and right anterior thigh wound beds  Cover with ABD and Optilock (if available)  Change dressing daily  DO NOT PACK ANYTHING INTO WOUND  2-Sacrum and left buttock--cleanse with normal saline  Apply 3M Cavilon no sting to surya-wound skin  Pack wound with 2 inch sulma moistened with Silvasorb gel  Cover with 4x4 and ABD  Secure with minimal tape  3-Left lateral ischial--cleanse  Apply Santyl to black/yellow wound bed in nickel thick layer  Cover with non-adherent oil emulsion dressing (adaptic) and ABD  Secure with minimal tape  Change dressing daily  Skin care plans:  1-Apply Hydraguard lotion to bilateral heels twice daily for skin protection  2-Elevate heels off of bed with pillows or foam wedges to offload pressure  3-Moisturize skin daily with lotion  4-Turn/reposition every 2 hours while in bed and weight shift frequently while in chair for pressure re-distribution on skin  Follow-up at the wound center--[phone]

## 2020-03-30 NOTE — SOCIAL WORK
LOS: 2 days  Pt is not a documented bundle  Pt is a 30 day readmission  Pt reports that she came back into the hospital because she was at wound care center and thought that she may be getting an infection in her wounds  Met with Pt  Pt presents AA&Ox3  Discussed role of , discharge planning, identifying help at home and discharge preference  Pt reports she lives alone in 1st floor apartment, ramp to enter  Pt reports she is independent with adls and uses wheelchair to ambulate  Pt reports she has raised toilet seat, sliding board, shower bench, walker and commode  Pt reports she uses [facility] pharmacy in [address] [place] [address] , has prescription plan and is able to afford medications  Pt reports her friend(Marie) is POA and she has living will  Pt reports she had [address] [facility] and [address] in past for SNF  Pt denies hx of mental health and drug and alcohol treatment  Pt reports she wants to go home upon discharge  Pt reports she does not want to go to SNF and does not want home PT or VNA upon discharge  Pt reports she follows up at wound care center at [address]  Pt reports she will need BLS transport home  CM to follow

## 2020-03-30 NOTE — ASSESSMENT & PLAN NOTE
-History of spinal cord injury with resultant neurogenic bladder and chronic catheter placement  -History of VRE  -Procal NEG x 2  -U/A with 1-2 WBC  -BCx NGTD  -currently on IV Zosyn  -ID to see  -I'm inclined to stop abx
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
## 2020-03-30 NOTE — CONSULTS
Consult Note - Wound   Tory Room 71 y o  female MRN: [id number]  Unit/Bed#: -Fer Encounter: [id number]      History and Present Illness:  71year old female presented to the hospital after being advised to come to ER by wound center for fever  Patient is known to this wound/ostomy nurse  Her history is significant for T8 paraplegia secondary to spinal cord injury, neurogenic bladder with chronic isabel catheter, and chronic pressure injuries  Patient has had several wound debridements and attempted flap surguery in the past 1-2 years  Patient lives at home alone and performs her own wound care  Assessment Findings:   Patient seen for wound care consultation  Agreeable to assessment  Able to turn with minimal assist x 1 in bed  Isabel catheter in place  Patient is typically incontinent of stool, but states she is constipated  Patient is cachectic and pale  Bilateral heels are dry and intact  Well healed scar tissue from old donor sites to bilateral thighs  Slow to sandra scar tissue to right sacrum  Patient has petechial rash to flank area  1   Present on admission unstageable pressure injury to left lateral ischium--wound bed with black eschar and yellow slough  Patient agreeable to enzymatic debridement with Santyl at this time  No surya-wound induration or fluctuance appreciated  2   Present on admission stage 4 pressure injury to left buttock  3   Present on admission stage 4 pressure injury to midline sacrum/coccyx--Large proximal area communicates via undermining at 6 o'clock with a distal open area  Wound beds with some yellow slough, bone palpable  No surya-wound induration  4   Present on admission wound to right lateral hip and anterior thigh--wound likely with pressure component externally and internally secondary to hip dislocation  Right lateral hip wound bed mobile, shifts with sucking sound with leg movement  Wound edges intact, but unattached    Undermining circumferential (not measured due to open joint cavity)  Large amount of drainage from this wound  Right anterior thigh wound with pink wound bed  No undermining or tunneling at this time  Surya-wound skin intact with well healed donor site distal to wound  Wounds do not appear to be acutely infected at this time--no induration, odor, fluctuance, or purulent drainage noted  See flowsheet for wound details  Wound image rover unavailable during assessment  Subjective:  Patient asks, "these wounds are never going to heal, are they?"  Had lengthy conversation with patient about the possibility of colostomy for stool diversion to assist in wound healing as well as allowing visiting nurses to her home  Patient continues to refuse colostomy, VNA, and/or rehab for wound care  States she is capable of performing her own wound dressings using mirrors  I do not know how this would be possible as two of her wounds require packing  Strongly encouraged the patient to consider VNA at a minimum with follow-up in the wound center  Wound Care Plan:   1-Right hip and anterior thigh wounds--cleanse with normal saline, pat dry  Apply small amount of Hydrogel to right lateral hip and right anterior thigh wound beds  Cover with ABD  Change dressing daily  DO NOT PACK ANYTHING INTO WOUND  2-Sacrum and left buttock--cleanse with normal saline  Apply 3M Cavilon no sting to suyra-wound skin  Pack wound with 2 inch sulma moistened with Silvasorb gel  Cover with 4x4 and ABD  Secure with minimal tape  3-Left lateral ischial--cleanse  Apply Santyl to black/yellow wound bed in nickel thick layer  Cover with non-adherent oil emulsion dressing (adaptic) and ABD  Secure with minimal tape  Change dressing daily  Skin care plans:  1-Apply Hydraguard lotion to bilateral heels BID and PRN for skin protection  2-Elevate heels off of bed with pillows or foam wedges to offload pressure    3-P500 low air-loss mattress  4-Moisturize skin daily with skin nourishing cream   5-Turn/reposition q2h or when medically stable for pressure re-distribution on skin--use positioning wedges       Wound care team to follow  Patient assessed along with primary RN  Wound 03/28/20 Thigh Anterior;Proximal;Right (Active)   Wound Image   3/28/2020  5:53 PM   Wound Description Pink 3/30/2020  5:00 PM   Yasemin-wound Assessment Clean;Dry; Intact 3/30/2020  5:00 PM   Wound Length (cm) 3 cm 3/30/2020  5:00 PM   Wound Width (cm) 3 cm 3/30/2020  5:00 PM   Wound Depth (cm) 0 2 3/30/2020  5:00 PM   Calculated Wound Area (cm^2) 9 cm^2 3/30/2020  5:00 PM   Calculated Wound Volume (cm^3) 1 8 cm^3 3/30/2020  5:00 PM   Tunneling 0 cm 3/30/2020  5:00 PM   Undermining 0 3/30/2020  5:00 PM   Drainage Amount Small 3/30/2020  5:00 PM   Drainage Description Serous 3/30/2020  5:00 PM   Non-staged Wound Description Full thickness 3/30/2020  5:00 PM   Treatments Cleansed 3/30/2020  5:00 PM   Dressing Dry dressing;Calcium Alginate 3/30/2020  5:00 PM   Wound packed? No 3/28/2020  6:04 PM   Dressing Changed Changed 3/30/2020  5:00 PM   Patient Tolerance Tolerated well 3/30/2020  5:00 PM   Dressing Status Clean;Dry; Intact 3/30/2020  5:00 PM       Wound 03/28/20 Pressure Injury Thigh Proximal;Right;Lateral (Active)   Wound Image   3/28/2020  5:55 PM   Wound Description Pink 3/30/2020  5:00 PM   Staging Stage IV 3/30/2020  5:00 PM   Yasemin-wound Assessment Intact 3/30/2020  5:00 PM   Wound Length (cm) 7 5 cm 3/30/2020  5:00 PM   Wound Width (cm) 4 8 cm 3/30/2020  5:00 PM   Calculated Wound Area (cm^2) 36 cm^2 3/30/2020  5:00 PM   Drainage Amount Large 3/30/2020  5:00 PM   Drainage Description Yellow;Serous 3/30/2020  5:00 PM   Non-staged Wound Description Full thickness 3/30/2020  5:00 PM   Treatments Cleansed;Irrigation with NSS 3/30/2020  5:00 PM   Dressing Hydrogel;ABD 3/30/2020  5:00 PM   Wound packed?  No 3/30/2020  5:00 PM   Dressing Changed Changed 3/30/2020  5:00 PM   Patient Tolerance Tolerated well 3/30/2020  5:00 PM   Dressing Status Clean; Intact;Dry 3/30/2020  5:00 PM       Wound 03/28/20 Pressure Injury Buttocks Left (Active)   Wound Image   3/28/2020  5:59 PM   Wound Description Pink;Pale 3/30/2020  3:58 PM   Staging Stage IV 3/30/2020  3:58 PM   Yasemin-wound Assessment Erythema;Fragile 3/30/2020  3:58 PM   Wound Length (cm) 11 cm 3/30/2020  3:58 PM   Wound Width (cm) 5 cm 3/30/2020  3:58 PM   Wound Depth (cm) 3 2 3/30/2020  3:58 PM   Calculated Wound Area (cm^2) 55 cm^2 3/30/2020  3:58 PM   Calculated Wound Volume (cm^3) 176 cm^3 3/30/2020  3:58 PM   Drainage Amount Small 3/30/2020  3:58 PM   Drainage Description Serosanguineous 3/30/2020  3:58 PM   Non-staged Wound Description Full thickness 3/30/2020  3:58 PM   Treatments Cleansed 3/30/2020  3:58 PM   Dressing Silvasorb gel;Packings;ABD 3/30/2020  3:58 PM   Wound packed? Yes 3/30/2020  3:58 PM   Packing- # inserted 1 3/30/2020  3:58 PM   Dressing Changed Changed 3/30/2020  3:58 PM   Patient Tolerance Tolerated well 3/30/2020  3:58 PM   Dressing Status Clean;Dry; Intact 3/30/2020  3:58 PM       Wound 03/28/20 Pressure Injury Ischium Left;Lateral (Active)   Wound Image   3/28/2020  6:00 PM   Wound Description Black; Yellow; Beefy red 3/30/2020  3:58 PM   Staging Unstagable 3/30/2020  3:58 PM   Yasemin-wound Assessment Intact 3/30/2020  3:58 PM   Wound Length (cm) 9 cm 3/30/2020  3:58 PM   Wound Width (cm) 4 5 cm 3/30/2020  3:58 PM   Wound Depth (cm) 0 1 3/30/2020  3:58 PM   Calculated Wound Area (cm^2) 40 5 cm^2 3/30/2020  3:58 PM   Calculated Wound Volume (cm^3) 4 05 cm^3 3/30/2020  3:58 PM   Drainage Amount Scant 3/30/2020  3:58 PM   Drainage Description Bloody 3/30/2020  3:58 PM   Non-staged Wound Description Not applicable 6/99/0129  2:46 PM   Treatments Cleansed 3/30/2020  3:58 PM   Dressing Calcium Alginate with Silver; Foam, Silicon (eg   Allevyn, etc) 3/30/2020  3:58 PM   Dressing Changed Changed 3/30/2020  3:58 PM Patient Tolerance Tolerated well 3/30/2020  3:58 PM   Dressing Status Clean;Dry; Intact 3/30/2020  3:58 PM       Wound 03/28/20 Pressure Injury Coccyx Mid (Active)   Wound Image   3/28/2020  6:00 PM   Wound Description Yellow;Pink;Slough 3/30/2020  3:58 PM   Staging Stage IV 3/30/2020  3:58 PM   Yasemin-wound Assessment Pink 3/30/2020  3:58 PM   Wound Length (cm) 10 5 cm 3/30/2020  3:58 PM   Wound Width (cm) 8 cm 3/30/2020  3:58 PM   Wound Depth (cm) 2 3/30/2020  3:58 PM   Calculated Wound Area (cm^2) 84 cm^2 3/30/2020  3:58 PM   Calculated Wound Volume (cm^3) 168 cm^3 3/30/2020  3:58 PM   Undermining 3 3/30/2020  3:58 PM   Undermining is depth extending from 1-7 o'clock 3/30/2020  3:58 PM   Drainage Amount Small 3/30/2020  3:58 PM   Drainage Description Serosanguineous 3/30/2020  3:58 PM   Non-staged Wound Description Full thickness 3/30/2020  3:58 PM   Treatments Cleansed 3/30/2020  3:58 PM   Dressing Silvasorb gel;Packings;ABD 3/30/2020  3:58 PM   Wound packed? Yes 3/30/2020  3:58 PM   Packing- # inserted 1 3/30/2020  3:58 PM   Dressing Changed Changed 3/30/2020  3:58 PM   Patient Tolerance Tolerated well 3/30/2020  3:58 PM   Dressing Status Clean;Dry; Intact 3/30/2020  3:58 PM     Lorraine Barcenas BSN, RN, [facility]
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
## 2020-03-30 NOTE — PLAN OF CARE
Problem: Potential for Falls  Goal: Patient will remain free of falls  Description  INTERVENTIONS:  - Assess patient frequently for physical needs  -  Identify cognitive and physical deficits and behaviors that affect risk of falls    -  [place] fall precautions as indicated by assessment   - Educate patient/family on patient safety including physical limitations  - Instruct patient to call for assistance with activity based on assessment  - Modify environment to reduce risk of injury  - Consider OT/PT consult to assist with strengthening/mobility  Outcome: Progressing     Problem: PAIN - ADULT  Goal: Verbalizes/displays adequate comfort level or baseline comfort level  Description  Interventions:  - Encourage patient to monitor pain and request assistance  - Assess pain using appropriate pain scale  - Administer analgesics based on type and severity of pain and evaluate response  - Implement non-pharmacological measures as appropriate and evaluate response  - Consider cultural and social influences on pain and pain management  - Notify physician/advanced practitioner if interventions unsuccessful or patient reports new pain  Outcome: Progressing     Problem: INFECTION - ADULT  Goal: Absence or prevention of progression during hospitalization  Description  INTERVENTIONS:  - Assess and monitor for signs and symptoms of infection  - Monitor lab/diagnostic results  - Monitor all insertion sites, i e  indwelling lines, tubes, and drains  - Monitor endotracheal if appropriate and nasal secretions for changes in amount and color  - [place] appropriate cooling/warming therapies per order  - Administer medications as ordered  - Instruct and encourage patient and family to use good hand hygiene technique  - Identify and instruct in appropriate isolation precautions for identified infection/condition  Outcome: Progressing     Problem: SAFETY ADULT  Goal: Maintain or return to baseline ADL function  Description  INTERVENTIONS:  -  Assess patient's ability to carry out ADLs; assess patient's baseline for ADL function and identify physical deficits which impact ability to perform ADLs (bathing, care of mouth/teeth, toileting, grooming, dressing, etc )  - Assess/evaluate cause of self-care deficits   - Assess range of motion  - Assess patient's mobility; develop plan if impaired  - Assess patient's need for assistive devices and provide as appropriate  - Encourage maximum independence but intervene and supervise when necessary  - Involve family in performance of ADLs  - Assess for home care needs following discharge   - Consider OT consult to assist with ADL evaluation and planning for discharge  - Provide patient education as appropriate  Outcome: Progressing  Goal: Maintain or return mobility status to optimal level  Description  INTERVENTIONS:  - Assess patient's baseline mobility status (ambulation, transfers, stairs, etc )    - Identify cognitive and physical deficits and behaviors that affect mobility  - Identify mobility aids required to assist with transfers and/or ambulation (gait belt, sit-to-stand, lift, walker, cane, etc )  - [place] fall precautions as indicated by assessment  - Record patient progress and toleration of activity level on Mobility SBAR; progress patient to next Phase/Stage  - Instruct patient to call for assistance with activity based on assessment  - Consider rehabilitation consult to assist with strengthening/weightbearing, etc   Outcome: Progressing     Problem: DISCHARGE PLANNING  Goal: Discharge to home or other facility with appropriate resources  Description  INTERVENTIONS:  - Identify barriers to discharge w/patient and caregiver  - Arrange for needed discharge resources and transportation as appropriate  - Identify discharge learning needs (meds, wound care, etc )  - Arrange for interpretive services to assist at discharge as needed  - Refer to Case Management Department for coordinating discharge planning if the patient needs post-hospital services based on physician/advanced practitioner order or complex needs related to functional status, cognitive ability, or social support system  Outcome: Progressing     Problem: Knowledge Deficit  Goal: Patient/family/caregiver demonstrates understanding of disease process, treatment plan, medications, and discharge instructions  Description  Complete learning assessment and assess knowledge base    Interventions:  - Provide teaching at level of understanding  - Provide teaching via preferred learning methods  Outcome: Progressing     Problem: GENITOURINARY - ADULT  Goal: Maintains or returns to baseline urinary function  Description  INTERVENTIONS:  - Assess urinary function  - Encourage oral fluids to ensure adequate hydration if ordered  - Administer IV fluids as ordered to ensure adequate hydration  - Administer ordered medications as needed  - Offer frequent toileting  - Follow urinary retention protocol if ordered  Outcome: Progressing  Goal: Urinary catheter remains patent  Description  INTERVENTIONS:  - Assess patency of urinary catheter  - If patient has a chronic isabel, consider changing catheter if non-functioning  - Follow guidelines for intermittent irrigation of non-functioning urinary catheter  Outcome: Progressing     Problem: METABOLIC, FLUID AND ELECTROLYTES - ADULT  Goal: Electrolytes maintained within normal limits  Description  INTERVENTIONS:  - Monitor labs and assess patient for signs and symptoms of electrolyte imbalances  - Administer electrolyte replacement as ordered  - Monitor response to electrolyte replacements, including repeat lab results as appropriate  - Instruct patient on fluid and nutrition as appropriate  Outcome: Progressing     Problem: SKIN/TISSUE INTEGRITY - ADULT  Goal: Incision(s), wounds(s) or drain site(s) healing without S/S of infection  Description  INTERVENTIONS  - Assess and document risk factors for skin impairment   - Assess and document dressing, incision, wound bed, drain sites and surrounding tissue  - Consider nutrition services referral as needed  - Oral mucous membranes remain intact  - Provide patient/ family education  Outcome: Progressing     Problem: Prexisting or High Potential for Compromised Skin Integrity  Goal: Skin integrity is maintained or improved  Description  INTERVENTIONS:  - Identify patients at risk for skin breakdown  - Assess and monitor skin integrity  - Assess and monitor nutrition and hydration status  - Monitor labs   - Assess for incontinence   - Turn and reposition patient  - Assist with mobility/ambulation  - Relieve pressure over bony prominences  - Avoid friction and shearing  - Provide appropriate hygiene as needed including keeping skin clean and dry  - Evaluate need for skin moisturizer/barrier cream  - Collaborate with interdisciplinary team   - Patient/family teaching  - Consider wound care consult   Outcome: Progressing     Problem: Nutrition/Hydration-ADULT  Goal: Nutrient/Hydration intake appropriate for improving, restoring or maintaining nutritional needs  Description  Monitor and assess patient's nutrition/hydration status for malnutrition  Collaborate with interdisciplinary team and initiate plan and interventions as ordered  Monitor patient's weight and dietary intake as ordered or per policy  Utilize nutrition screening tool and intervene as necessary  Determine patient's food preferences and provide high-protein, high-caloric foods as appropriate       INTERVENTIONS:  - Monitor oral intake, urinary output, labs, and treatment plans  - Assess nutrition and hydration status and recommend course of action  - Evaluate amount of meals eaten  - Assist patient with eating if necessary   - Allow adequate time for meals  - Recommend/ encourage appropriate diets, oral nutritional supplements, and vitamin/mineral supplements  - Order, calculate, and assess calorie counts as needed  - Recommend, monitor, and adjust tube feedings and TPN/PPN based on assessed needs  - Assess need for intravenous fluids  - Provide specific nutrition/hydration education as appropriate  - Include patient/family/caregiver in decisions related to nutrition  Outcome: Progressing

## 2020-03-30 NOTE — ASSESSMENT & PLAN NOTE
-Chronic iron deficiency anemia, POA, hb 10 73 higher than baseline secondary to dehdyration  -Hb today 7 2, suggestive of hemodilution and consistent with prior Hb readings  -Keep Hb > 7, transfuse prn

## 2020-03-30 NOTE — ASSESSMENT & PLAN NOTE
-Sepsis, POA, as evidenced by fevers prior to presentation, leucocytosis wbc 10 73,   -Source - Chronic pustular hip wounds and CAUTI (chronic [place])   -see above, with NEG procal I'm inclined to stop abx (currently on Zosyn)  Will stop Zosyn for now (with h/o C diff) and cont PO Vanco for now   -I have reviewed pics of wounds in media section  There is no purulence discharge seen in the pictures  There is some chronic surrounding cellulitis    Unfortunately these wounds will probably never heal   -wound care

## 2020-03-30 NOTE — CONSULTS
Consultation - Infectious Disease   Nathanial Buerger 71 y o  female MRN: [id number]  Unit/Bed#: -01 Encounter: [id number]      Assessment/Plan   1  Fever/Multiple sacral decubs: Pt presented with fever with concern that her sacral decubs were infected  On admission, she was afebrile and WBC count was nml  I examined her sacral decubs with  nurse who knows this Pt and her multiple decubs are unchanged - no surrounding redness or evidence of new infxn at this time  No evidence of UTI  Bld cx's are neg so far  Procalcitonin levels are not elevated    A  Cont Zosyn and oral Vanco overnight  B  Awaiting final bld cx results  C  If bld cx's remain neg, can D/C Pt tomorrow off  abx  D  Cont local wound care as per [facility] team    History of Present Illness   Physician Requesting Consult: Clive Damon MD  Reason for Consult / Principal Problem: Fever and multiple sacral decubs    HPI: Nathanial Buerger is a 71y o  year old female with H/O multiple sacral decubs, C diff diarrhea,  chronic isabel catheter, paraplegia due to T8 spinal cord injury, arthritis, and depression who was admitted on 3/28/20 for evaluation of fever and possible infxn of her sacral decubs  Pt was noted to have fever PTA x 1 day  She was instructed to go to the hospital by her [facility] team to exclude decub ulcer infxn  On admission, Pt did not have fever or elevated WBC count  Pt did not notice any change in her multiple sacral decubs  UA did not show any pyuria  Bld cx's were drawn and Pt was placed on Zosyn  Oral Vanco was added for C diff prophylaxis  She denied cough, SOB, CP, N/V/D, or abd pain  Urine is clear    Inpatient consult to Infectious Diseases  Consult performed by: David Edmondson MD  Consult ordered by: David Edmondson MD          ROS: 12 systems reviewed, remainder is neg      Historical Information   Past Medical History:   Diagnosis Date    Anemia     iron defiencey    Arthritis     osteo    Back injury     Chronic kidney disease     nephritis    Depression     MRSA (methicillin resistant staph aureus) culture positive 12/07/2018    BONE-TISSUE     Osteopenia     Osteoporosis     Paralysis ([address] )     paraplegic due to spinal cord injury    Paraplegia (HCC)     Poor circulation     Pressure injury of skin     right hip, stage 3    Pressure sore of hip     right    Tibial plateau fracture     Underweight      Past Surgical History:   Procedure Laterality Date    CLOSURE DELAYED PRIMARY N/A 3/20/2019    Procedure: OPHELIA ANAL WOUND RECLOSURE;  Surgeon: Umer Palafox MD;  Location: [address] [place] [address];  Service: Plastics    DECUBITUS ULCER EXCISION Bilateral 11/12/2019    Procedure: DEBRIDEMENT DECUBITI ([address] OUT); Surgeon: Garry Petit DO;  Location:  MAIN OR;  Service: General    FLAP LOCAL EXTREMITY Left 1/28/2019    Procedure: THIGH FLAP & STSG TO CLOSE HIP WOUND;  Surgeon: Umer Palafox MD;  Location: [address] [place] [address];  Service: Plastics    FLAP LOCAL TRUNK Right 12/17/2018    Procedure: DEBRIDE/FLAP CLOSURE HIP PRESSURE SORE [facility] GRAFT;  Surgeon: Umer Palafox MD;  Location: [address] [place] [address];  Service: Plastics    FLAP LOCAL TRUNK Left 2/27/2019    Procedure: BUTTOCK FLAP TO ISCHIAL SORE;  Surgeon: Umer Palafox MD;  Location: [address] [place] [address];  Service: Plastics    HIP DEBRIDEMENT Right 2017    right hip sore debridement    INCISION AND DRAINAGE OF WOUND Left 1/3/2019    Procedure: INCISION AND DRAINAGE (I&D) left distal femur  With deep wound cultures   Application of VAC DRAIN SPONGE;  Surgeon: Marcelino Ventura MD;  Location:  MAIN OR;  Service: Orthopedics    IR PICC LINE  12/19/2018    IR PICC LINE  3/12/2019    [place] DEBRIDEMENT, SKIN, SUB-Q TISSUE,MUSCLE,BONE,=<20 SQ CM Right 9/19/2018    Procedure: DEBRIDEMENT OF HIP PRESSURE SORE;  Surgeon: Umer Palafox MD;  Location: [address] [place] [address];  Service: Plastics    [place] OPEN RX FEMUR FX+INTRAMED FELIX Left 10/22/2018    Procedure: INSERTION NAIL IM LEFT FEMUR RETROGRADE; 10 mg, 10 mg, Oral, BID, Dariel Rosales MD, 10 mg at 03/30/20 0930    nystatin (MYCOSTATIN) powder, , Topical, BID, Dariel Rosales MD, 1 application at 87/35/97 0954    pantoprazole (PROTONIX) EC tablet 40 mg, 40 mg, Oral, Early Morning, Dariel Rosales MD, 40 mg at 03/30/20 0415    potassium-sodium phosphates (PHOS-NAK) packet 2 packet, 2 packet, Oral, BID With Meals, Dariel Rosales MD, 2 packet at 03/30/20 0935    saccharomyces boulardii (FLORASTOR) capsule 250 mg, 250 mg, Oral, Daily, Dariel Rosales MD, 250 mg at 03/30/20 0930    senna (SENOKOT) tablet 8 6 mg, 1 tablet, Oral, HS PRN, Dariel Rosales MD, 8 6 mg at 03/28/20 2134    vancomycin (VANCOCIN) oral solution 125 mg, 125 mg, Oral, Q12H [address], Dariel Roslaes MD, 125 mg at 03/30/20 0945    Facility-Administered Medications Ordered in Other Encounters:     dexamethasone (DECADRON) injection 4 mg, 4 mg, Intravenous, Once PRN, Richmond Aristeo, DO    lactated ringers infusion, 75 mL/hr, Intravenous, Continuous, Richmond Aristeo, DO, Last Rate: 75 mL/hr at 11/10/19 2105    lactated ringers infusion, 50 mL/hr, Intravenous, Continuous, Man Amezcua CRNA    lactated ringers infusion, 75 mL/hr, Intravenous, Continuous, Richmond Aristeo DO, Stopped at 03/20/19 8533    lactated ringers infusion, 75 mL/hr, Intravenous, Continuous, Vickie Souza MD, Stopped at 03/20/19 1842    ondansetron (ZOFRAN) injection 4 mg, 4 mg, Intravenous, Once PRN, Arley Greene MD    promethazine (PHENERGAN) injection 12 5 mg, 12 5 mg, Intravenous, Once PRN, Arley Greene MD    Allergies   Allergen Reactions    Iv Contrast [Iodinated Diagnostic Agents]      Tingling face, itching    Cefepime Rash    Ciprofloxacin Rash and Swelling     Looked like suburn, itching  Bed sores  Swelling, itching    Latex Rash    Vancomycin Rash     Unknown          Intake/Output Summary (Last 24 hours) at 3/30/2020 1530  Last data filed at 3/30/2020 1622  Gross per 24 hour   Intake 680 ml   Output 676 ml   Net 4 ml       PE:  WD, WN, WF in NAD  VSS, Tmax: 98 1  HEENT:  No scleral icterus, pharynx clear  NECK: Supple  CARDIAC:  RRR, nml S1, S2  LUNGS:  Clear  ABDOMEN:  +BS, soft, nontender  EXTREMITIES: Contracted LE's  SKIN: +Multiple large decubs involving her sacrum and hips - no surrounding redness - relatively clean  NEURO: Grossly nonfocal    Invasive Devices:   Peripheral IV 03/28/20 Right Forearm (Active)   Site Assessment Clean;Dry; Intact 3/30/2020 12:00 AM   Dressing Type Transparent 3/30/2020 12:00 AM   Line Status Flushed;Saline locked 3/30/2020 12:00 AM   Dressing Status Clean;Dry; Intact 3/30/2020 12:00 AM       Urethral Catheter Straight-tip;Latex 16 Fr   (Active)   Reasons to continue Urinary Catheter  Chronic urinary catheter 3/30/2020 12:01 AM   Collection Container Standard drainage bag 3/30/2020 12:01 AM   Securement Method Securing device (Describe) 3/30/2020 12:01 AM   Output (mL) 375 mL 3/30/2020  9:50 AM           Lab Results:   Admission on 03/28/2020   Component Date Value    WBC 03/28/2020 10 73*    RBC 03/28/2020 3 69*    Hemoglobin 03/28/2020 9 2*    Hematocrit 03/28/2020 29 4*    MCV 03/28/2020 80*    MCH 03/28/2020 24 9*    MCHC 03/28/2020 31 3*    RDW 03/28/2020 17 9*    MPV 03/28/2020 9 6     Platelets 91/05/0143 698*    nRBC 03/28/2020 0     Neutrophils Relative 03/28/2020 74     Immat GRANS % 03/28/2020 1     Lymphocytes Relative 03/28/2020 14     Monocytes Relative 03/28/2020 11     Eosinophils Relative 03/28/2020 0     Basophils Relative 03/28/2020 0     Neutrophils Absolute 03/28/2020 7 93*    Immature Grans Absolute 03/28/2020 0 06     Lymphocytes Absolute 03/28/2020 1 54     Monocytes Absolute 03/28/2020 1 14     Eosinophils Absolute 03/28/2020 0 02     Basophils Absolute 03/28/2020 0 04     Sodium 03/28/2020 128*    Potassium 03/28/2020 4 0     Chloride 03/28/2020 95*    CO2 03/28/2020 27     ANION GAP 03/28/2020 6     BUN 03/28/2020 9     Creatinine 03/28/2020 0 57*    Glucose 03/28/2020 131     Calcium 03/28/2020 7 9*    AST 03/28/2020 63*    ALT 03/28/2020 45     Alkaline Phosphatase 03/28/2020 135*    Total Protein 03/28/2020 6 4     Albumin 03/28/2020 1 6*    Total Bilirubin 03/28/2020 0 30     eGFR 03/28/2020 95     LACTIC ACID 03/28/2020 1 5     Procalcitonin 03/28/2020 0 19     Protime 03/28/2020 16 8*    INR 03/28/2020 1 39*    PTT 03/28/2020 38*    Blood Culture 03/28/2020 No Growth at 24 hrs   Blood Culture 03/28/2020 No Growth at 24 hrs       Color, UA 03/28/2020 Yellow     Clarity, UA 03/28/2020 Turbid     Specific Gravity, UA 03/28/2020 1 015     pH, UA 03/28/2020 7 0     Leukocytes, UA 03/28/2020 Small*    Nitrite, UA 03/28/2020 Positive*    Protein, UA 03/28/2020 Negative     Glucose, UA 03/28/2020 Negative     Ketones, UA 03/28/2020 Negative     Urobilinogen, UA 03/28/2020 0 2     Bilirubin, UA 03/28/2020 Negative     Blood, UA 03/28/2020 Moderate*    Ventricular Rate 03/28/2020 123     Atrial Rate 03/28/2020 123     HI Interval 03/28/2020 116     QRSD Interval 03/28/2020 76     QT Interval 03/28/2020 306     QTC Interval 03/28/2020 438     P Axis 03/28/2020 47     QRS Axis 03/28/2020 18     T Wave Axis 03/28/2020 46     RBC, UA 03/28/2020 None Seen     WBC, UA 03/28/2020 1-2*    Epithelial Cells 03/28/2020 Occasional     Bacteria, UA 03/28/2020 Innumerable*    AMORPH URATES 03/28/2020 Innumerable     Osmolality Serum 03/28/2020 281*    Osmolality, Ur 03/29/2020 450     Sodium, Ur 03/29/2020 13     Uric Acid 03/28/2020 3 3     WBC 03/29/2020 7 72     RBC 03/29/2020 3 05*    Hemoglobin 03/29/2020 7 5*    Hematocrit 03/29/2020 24 1*    MCV 03/29/2020 79*    MCH 03/29/2020 24 6*    MCHC 03/29/2020 31 1*    RDW 03/29/2020 17 9*    Platelets 55/03/1195 599*    MPV 03/29/2020 9 6     Sodium 03/29/2020 134*    Potassium 03/29/2020 3 0*    Chloride 03/29/2020 101     CO2 03/29/2020 25     ANION GAP 03/29/2020 8     BUN 03/29/2020 11     Creatinine 03/29/2020 0 60     Glucose 03/29/2020 124     Calcium 03/29/2020 7 1*    AST 03/29/2020 50*    ALT 03/29/2020 39     Alkaline Phosphatase 03/29/2020 107     Total Protein 03/29/2020 5 1*    Albumin 03/29/2020 1 1*    Total Bilirubin 03/29/2020 0 20     eGFR 03/29/2020 93     Procalcitonin 03/29/2020 0 18     Sodium 03/30/2020 134*    Potassium 03/30/2020 4 6     Chloride 03/30/2020 103     CO2 03/30/2020 24     ANION GAP 03/30/2020 7     BUN 03/30/2020 12     Creatinine 03/30/2020 0 51*    Glucose 03/30/2020 130     Calcium 03/30/2020 7 3*    eGFR 03/30/2020 98     WBC 03/30/2020 7 99     RBC 03/30/2020 2 90*    Hemoglobin 03/30/2020 7 2*    Hematocrit 03/30/2020 23 7*    MCV 03/30/2020 82     MCH 03/30/2020 24 8*    MCHC 03/30/2020 30 4*    RDW 03/30/2020 18 4*    Platelets 74/90/1282 616*    MPV 03/30/2020 9 4      Imaging Studies: I have personally reviewed pertinent reports  EKG, Pathology, and Other Studies: I have personally reviewed pertinent reports  Culture  Lab Results   Component Value Date    BLOODCX No Growth at 24 hrs  03/28/2020    BLOODCX No Growth at 24 hrs  03/28/2020    BLOODCX No Growth After 5 Days  03/10/2020    BLOODCX No Growth After 5 Days  03/10/2020    BLOODCX No Growth After 5 Days  02/09/2020    BLOODCX No Growth After 5 Days  02/09/2020    BLOODCX Staphylococcus coagulase negative (A) 02/05/2020    BLOODCX No Growth After 5 Days  02/05/2020    BLOODCX Staphylococcus coagulase negative (A) 11/10/2019    BLOODCX No Growth After 5 Days  11/10/2019    BLOODCX No Growth After 5 Days  01/06/2019    BLOODCX No Growth After 5 Days  01/06/2019    BLOODCX No Growth After 5 Days  01/05/2019    BLOODCX No Growth After 5 Days  01/05/2019    BLOODCX No Growth After 5 Days  12/13/2018    BLOODCX No Growth After 5 Days   12/13/2018     Lab Results   Component Value Date    WOUNDCULT 3+ Growth of Pseudomonas aeruginosa (A) 11/10/2019    WOUNDCULT 2+ Growth of Providencia stuartii (A) 11/10/2019    WOUNDCULT 2+ Growth of Diphtheroids 11/10/2019    WOUNDCULT Few Colonies of Pseudomonas aeruginosa (A) 01/06/2019    WOUNDCULT Few Colonies of Escherichia coli (A) 01/06/2019    WOUNDCULT Few Colonies of Pseudomonas aeruginosa (A) 01/06/2019    WOUNDCULT 4+ Growth of Pseudomonas aeruginosa (A) 01/06/2019    WOUNDCULT 3+ Growth of  01/06/2019    WOUNDCULT 1+ Growth of Corynebacterium striatum (A) 01/03/2019    WOUNDCULT Few Colonies of Diphtheroids 12/27/2018    WOUNDCULT No growth 12/27/2018    WOUNDCULT 1+ Growth of  12/22/2018    WOUNDCULT 2+ Growth of Corynebacterium striatum (A) 12/13/2018    WOUNDCULT (A) 12/13/2018     Growth in Broth culture only Staphylococcus coagulase negative    WOUNDCULT 2+ Growth of Morganella morganii (A) 09/14/2018    WOUNDCULT 1+ Growth of  09/14/2018     Lab Results   Component Value Date    URINECX No Growth <1000 cfu/mL 03/11/2020    URINECX >100,000 cfu/ml Proteus mirabilis (A) 02/05/2020    URINECX 50,000-59,000 cfu/ml Escherichia coli (A) 02/05/2020    URINECX >100,000 cfu/ml Proteus mirabilis (A) 11/10/2019    URINECX 50,000-59,000 cfu/ml Alcaligenes faecalis (A) 11/10/2019    URINECX >100,000 cfu/ml Pseudomonas aeruginosa (A) 11/10/2019    URINECX >100,000 cfu/ml Escherichia coli (A) 01/06/2019    URINECX >100,000 cfu/ml Providencia rettgeri (A) 01/06/2019    URINECX No Growth <1000 cfu/mL 12/15/2018     No results found for: SPUTUMCULTUR    Principal Problem:    Sepsis without acute organ dysfunction ([facility] [address] )  Active Problems:    Paraplegia following spinal cord injury ([facility] [address] )    Pressure ulcer, sacrum    Iron deficiency anemia    Moderate protein-calorie malnutrition (HCC)    Thrombocytosis (HCC)    C  difficile diarrhea    Open wound of left hip    Hyponatremia    Urinary tract infection associated with indwelling urethral catheter (HCC)    Chronic idiopathic constipation

## 2020-03-30 NOTE — MALNUTRITION/BMI
This medical record reflects one or more clinical indicators suggestive of malnutrition and/or morbid obesity  Malnutrition Findings:   Malnutrition type: Chronic illness  Degree of Malnutrition: Other severe protein calorie malnutrition  Malnutrition Characteristics: Fat loss, Muscle loss(Pt presents with severre protein calorie malnutrition as evidenced by prominent clavicle, temporal hollowing and 16% wt loss in last 11 months  )    Treat with diet and supplement 1x daily  BMI Findings: Body mass index is 18 94 kg/m²  See Nutrition note dated 03/30/2020 for additional details  Completed nutrition assessment is viewable in the nutrition documentation

## 2020-03-30 NOTE — NUTRITION
03/30/20 1708   Assessment   Timepoint Initial  (RN admission screen: pressure ulcer and muscle wasting, MD consult: malnutrition)   Labs   List Completed Labs   (3/30/20 Creat 0 51, Na+ 134 meds: oscal, VitB12, protonix, florastor, pepcid  )   Feeding Route   PO Independent   Adequacy of Intake   Nutrition Modality PO  (Regular)   Intake Meals %  (dinner: salmon, corn, brocoli, apple juice, carrot cake)   Estimated Calorie Intake %   Estimated Protein Intake  %   Estimated Fluid Intake %   Estimated calorie intake compared to estimated need appetite good currently  Nutrition Prognosis   Nutrition Concerns   (skin care plan: pressure injury R thigh, L buttock, L ischium, mid coccyx)   Comorbid Concerns   (per chart review: chronic idiopathic constipation, UTI, indwelling catheter, Cdiff, open wound L hip, PU sacrum)   Nutrition Precautions   (PMH: paraplegia s/p hang gliding accident, h/o VRE, h/o spinal cord injury w/ neurogenic bladder)   Nutrition Considerations   (diet ed: encouraged protein intake)   PES Statement   Problem Intake   Related to Wound(s)   As evidenced by: Wound healing   Patient Nutrition Goals   Goal increase Kcal/Pro   Goal Status initiated   Timeframe to complete goal by next f/u   Recommendations/Interventions   Summary Pt only agreebable to 1 supplement daily, Magic Cup with dinner  Pt refusing Ensure Enlive, Ensure Pudding and Valentin syupplements currently  Malnutrition/BMI Present Yes   Malnutrition type Chronic illness   Degree of Malnutrition Other severe protein calorie malnutrition   Malnutrition Characteristics Fat loss;Muscle loss  (Pt presents with severre protein calorie malnutrition as evidenced by prominent clavicle, temporal hollowing and 16% wt loss in last 11 months  )   Interventions Diet: continued as ordered; Supplement initiate   Nutrition Recommendations Continue diet as ordered  (Per RD protocol will order Magic CUP 1x daily with dinner  ) Nutrition Complexity Risk   Nutrition complexity level Moderate risk   Nutrition review: 04/02/20  (po intake, supplement intake)   Follow up date 04/06/20 03/30/20 4221   Assessment   Timepoint Initial  (RN admission screen: pressure ulcer and muscle wasting, MD consult: malnutrition)   Labs   List Completed Labs   (3/30/20 Creat 0 51, Na+ 134 meds: oscal, VitB12, protonix, florastor, pepcid  )   Feeding Route   PO Independent   Adequacy of Intake   Nutrition Modality PO  (Regular)   Intake Meals %  (dinner: salmon, corn, brocoli, apple juice, carrot cake)   Estimated Calorie Intake %   Estimated Protein Intake  %   Estimated Fluid Intake %   Estimated calorie intake compared to estimated need appetite good currently  Nutrition Prognosis   Nutrition Concerns   (skin care plan: pressure injury R thigh, L buttock, L ischium, mid coccyx)   Comorbid Concerns   (per chart review: chronic idiopathic constipation, UTI, indwelling catheter, Cdiff, open wound L hip, PU sacrum)   Nutrition Precautions   (PMH: paraplegia s/p hang gliding accident, h/o VRE, h/o spinal cord injury w/ neurogenic bladder)   Nutrition Considerations   (diet ed: encouraged protein intake)   PES Statement   Problem Intake   Related to Wound(s)   As evidenced by: Wound healing   Patient Nutrition Goals   Goal increase Kcal/Pro   Goal Status initiated   Timeframe to complete goal by next f/u   Recommendations/Interventions   Summary Pt only agreebable to 1 supplement daily, Magic Cup with dinner  Pt refusing Ensure Enlive, Ensure Pudding and Valentin syupplements currently      Malnutrition/BMI Present Yes   Malnutrition type Chronic illness   Degree of Malnutrition Other severe protein calorie malnutrition   Malnutrition Characteristics Fat loss;Muscle loss  (Pt presents with severre protein calorie malnutrition as evidenced by prominent clavicle, temporal hollowing and 16% wt loss in last 11 months  )   Interventions Diet: continued as ordered; Supplement initiate   Nutrition Recommendations Continue diet as ordered  (Per RD protocol will order Magic CUP 1x daily with dinner  )   Nutrition Complexity Risk   Nutrition complexity level Moderate risk   Nutrition review: 04/02/20  (po intake, supplement intake)   Follow up date 04/06/20

## 2020-03-31 PROCEDURE — 99232 SBSQ HOSP IP/OBS MODERATE 35: CPT | Performed by: INTERNAL MEDICINE

## 2020-03-31 RX ADMIN — MIDODRINE HYDROCHLORIDE 10 MG: 5 TABLET ORAL at 17:27

## 2020-03-31 RX ADMIN — ENOXAPARIN SODIUM 40 MG: 40 INJECTION SUBCUTANEOUS at 08:39

## 2020-03-31 RX ADMIN — PIPERACILLIN AND TAZOBACTAM 3.38 G: 3; .375 INJECTION, POWDER, LYOPHILIZED, FOR SOLUTION INTRAVENOUS at 04:37

## 2020-03-31 RX ADMIN — COLLAGENASE SANTYL: 250 OINTMENT TOPICAL at 08:48

## 2020-03-31 RX ADMIN — PANTOPRAZOLE SODIUM 40 MG: 40 TABLET, DELAYED RELEASE ORAL at 05:30

## 2020-03-31 RX ADMIN — VITAM B12 100 MCG: 100 TAB at 08:39

## 2020-03-31 RX ADMIN — POTASSIUM & SODIUM PHOSPHATES POWDER PACK 280-160-250 MG 2 PACKET: 280-160-250 PACK at 17:27

## 2020-03-31 RX ADMIN — VANCOMYCIN HYDROCHLORIDE 125 MG: 500 INJECTION, POWDER, LYOPHILIZED, FOR SOLUTION INTRAVENOUS at 08:48

## 2020-03-31 RX ADMIN — NYSTATIN: 100000 POWDER TOPICAL at 08:43

## 2020-03-31 RX ADMIN — POTASSIUM & SODIUM PHOSPHATES POWDER PACK 280-160-250 MG 2 PACKET: 280-160-250 PACK at 08:40

## 2020-03-31 RX ADMIN — MIDODRINE HYDROCHLORIDE 10 MG: 5 TABLET ORAL at 08:39

## 2020-03-31 RX ADMIN — OYSTER SHELL CALCIUM WITH VITAMIN D 1 TABLET: 500; 200 TABLET, FILM COATED ORAL at 08:39

## 2020-03-31 RX ADMIN — VANCOMYCIN HYDROCHLORIDE 125 MG: 500 INJECTION, POWDER, LYOPHILIZED, FOR SOLUTION INTRAVENOUS at 21:12

## 2020-03-31 RX ADMIN — Medication 250 MG: at 08:39

## 2020-03-31 RX ADMIN — FAMOTIDINE 20 MG: 20 TABLET ORAL at 08:39

## 2020-03-31 NOTE — PROGRESS NOTES
Amos Jin  71 y o   female  1950  mrn [id number]    Assessment/Plan:  1  Fever/Multiple sacral decubs: No fever since Pt was admitted and WBC count has been nml  Her multiple stage 4 decubs are stable  Currently without evidence of infxn  All wounds were examined yesterday with  nurse  Bld cx's are neg  Pt notes development of small wound in her right groin,    Pt presented with low grade fever and out-Pt WC team was concerned that her sacral decubs were infected  On admission, she was afebrile and WBC count was nml  No evidence of UTI  Procalcitonin levels are not elevated     A  Agree with stopping abx since no evidence of infxn  B  Cont local wound care as per  team - please contact [facility] nurse if anything needs to be done for small right groin wound  C  OK to D/C Pt today off abx  D  Will sign off case, please re-consult if any new probs arise       Subjective: Feels OK  Pt notes development of small wound in right groin    Objective:  Tmax: 98 5  Lungs: Clear anteriorly  Abd: +BS, soft, nontender  Ext: +Small superficial wound in right groin area    Labs:  CBC w/diff  Recent Labs     03/28/20  1600  03/30/20  0409   WBC 10 73*   < > 7 99   HGB 9 2*   < > 7 2*   HCT 29 4*   < > 23 7*   *   < > 616*   NEUTOPHILPCT 74  --   --    LYMPHOPCT 14  --   --    MONOPCT 11  --   --    EOSPCT 0  --   --     < > = values in this interval not displayed  BMP  Recent Labs     03/30/20  0409   K 4 6      CO2 24   BUN 12   CREATININE 0 51*   CALCIUM 7 3*     CMP  Recent Labs     03/29/20  0415 03/30/20  0409   K 3 0* 4 6    103   CO2 25 24   BUN 11 12   CREATININE 0 60 0 51*   CALCIUM 7 1* 7 3*   ALKPHOS 107  --    ALT 39  --    AST 50*  --         labrc    Cultures:  Lab Results   Component Value Date    BLOODCX No Growth at 48 hrs  03/28/2020    BLOODCX No Growth at 48 hrs  03/28/2020    BLOODCX No Growth After 5 Days  03/10/2020    BLOODCX No Growth After 5 Days   03/10/2020    BLOODCX No Growth After 5 Days  02/09/2020    BLOODCX No Growth After 5 Days  02/09/2020    BLOODCX Staphylococcus coagulase negative (A) 02/05/2020    BLOODCX No Growth After 5 Days  02/05/2020    BLOODCX Staphylococcus coagulase negative (A) 11/10/2019    BLOODCX No Growth After 5 Days  11/10/2019    BLOODCX No Growth After 5 Days  01/06/2019    BLOODCX No Growth After 5 Days  01/06/2019    BLOODCX No Growth After 5 Days  01/05/2019    BLOODCX No Growth After 5 Days  01/05/2019    BLOODCX No Growth After 5 Days  12/13/2018    BLOODCX No Growth After 5 Days   12/13/2018     Lab Results   Component Value Date    WOUNDCULT 3+ Growth of Pseudomonas aeruginosa (A) 11/10/2019    WOUNDCULT 2+ Growth of Providencia stuartii (A) 11/10/2019    WOUNDCULT 2+ Growth of Diphtheroids 11/10/2019    WOUNDCULT Few Colonies of Pseudomonas aeruginosa (A) 01/06/2019    WOUNDCULT Few Colonies of Escherichia coli (A) 01/06/2019    WOUNDCULT Few Colonies of Pseudomonas aeruginosa (A) 01/06/2019    WOUNDCULT 4+ Growth of Pseudomonas aeruginosa (A) 01/06/2019    WOUNDCULT 3+ Growth of  01/06/2019    WOUNDCULT 1+ Growth of Corynebacterium striatum (A) 01/03/2019    WOUNDCULT Few Colonies of Diphtheroids 12/27/2018    WOUNDCULT No growth 12/27/2018    WOUNDCULT 1+ Growth of  12/22/2018    WOUNDCULT 2+ Growth of Corynebacterium striatum (A) 12/13/2018    WOUNDCULT (A) 12/13/2018     Growth in Broth culture only Staphylococcus coagulase negative    WOUNDCULT 2+ Growth of Morganella morganii (A) 09/14/2018    WOUNDCULT 1+ Growth of  09/14/2018     Lab Results   Component Value Date    URINECX No Growth <1000 cfu/mL 03/11/2020    URINECX >100,000 cfu/ml Proteus mirabilis (A) 02/05/2020    URINECX 50,000-59,000 cfu/ml Escherichia coli (A) 02/05/2020    URINECX >100,000 cfu/ml Proteus mirabilis (A) 11/10/2019    URINECX 50,000-59,000 cfu/ml Alcaligenes faecalis (A) 11/10/2019    URINECX >100,000 cfu/ml Pseudomonas aeruginosa (A) 11/10/2019    URINECX >100,000 cfu/ml Escherichia coli (A) 01/06/2019    URINECX >100,000 cfu/ml Providencia rettgeri (A) 01/06/2019    URINECX No Growth <1000 cfu/mL 12/15/2018     No results found for: SPUTUMCULTUR    MED:  Zosyn: #4  Oral Vanco: #4      Current Facility-Administered Medications:     acetaminophen (TYLENOL) tablet 650 mg, 650 mg, Oral, Q6H PRN, Javid Braga MD    artificial tear (LUBRIFRESH P M ) ophthalmic ointment, , Both Eyes, HS, Javid Braga MD    bisacodyl (DULCOLAX) rectal suppository 10 mg, 10 mg, Rectal, Daily PRN, Javid Braga MD    calcium carbonate-vitamin D (OSCAL-D) 500 mg-200 units per tablet 1 tablet, 1 tablet, Oral, Daily With Breakfast, Javid Braga MD, 1 tablet at 03/31/20 3287    collagenase (SANTYL) ointment, , Topical, Daily, Alex Gao MD    cyanocobalamin (VITAMIN B-12) tablet 100 mcg, 100 mcg, Oral, Daily, Javid Braga MD, 100 mcg at 03/31/20 0839    dextran 70-hypromellose (GENTEAL TEARS) 0 1-0 3 % ophthalmic solution 1 drop, 1 drop, Both Eyes, Q3H PRN, Javid Braga MD    enoxaparin (LOVENOX) subcutaneous injection 40 mg, 40 mg, Subcutaneous, Daily, Javid Braga MD, 40 mg at 03/31/20 0839    famotidine (PEPCID) tablet 20 mg, 20 mg, Oral, BID PRN, Javid Braga MD, 20 mg at 03/31/20 0839    midodrine (PROAMATINE) tablet 10 mg, 10 mg, Oral, BID, Javid Braga MD, 10 mg at 03/31/20 8602    nystatin (MYCOSTATIN) powder, , Topical, BID, Javid Braga MD    pantoprazole (PROTONIX) EC tablet 40 mg, 40 mg, Oral, Early Morning, Javid Braga MD, 40 mg at 03/31/20 0530    potassium-sodium phosphates (PHOS-NAK) packet 2 packet, 2 packet, Oral, BID With Meals, Javid Braga MD, 2 packet at 03/31/20 0840    saccharomyces boulardii (FLORASTOR) capsule 250 mg, 250 mg, Oral, Daily, Javid Braga MD, 250 mg at 03/31/20 0839    senna (SENOKOT) tablet 8 6 mg, 1 tablet, Oral, HS PRN, Jamie Yahir E Ángela Johnson MD, 8 6 mg at 03/30/20 2248    vancomycin [facility]) oral solution 125 mg, 125 mg, Oral, Q12H [address], Rosalinda Marquez MD, 125 mg at 03/31/20 0848    Facility-Administered Medications Ordered in Other Encounters:     dexamethasone (DECADRON) injection 4 mg, 4 mg, Intravenous, Once PRN, Daphney Tellez DO    lactated ringers infusion, 75 mL/hr, Intravenous, Continuous, Daphney Tellez DO, Last Rate: 75 mL/hr at 11/10/19 2105    lactated ringers infusion, 50 mL/hr, Intravenous, Continuous, [facility]    lactated ringers infusion, 75 mL/hr, Intravenous, Continuous, Daphney Tellez DO, Stopped at 03/20/19 0520    lactated ringers infusion, 75 mL/hr, Intravenous, Continuous, José Miguel Ackerman MD, Stopped at 03/20/19 1842    ondansetron (ZOFRAN) injection 4 mg, 4 mg, Intravenous, Once PRN, Lalo Jones MD    promethazine (PHENERGAN) injection 12 5 mg, 12 5 mg, Intravenous, Once PRN, Lalo Jones MD    Principal Problem:    Sepsis without acute organ dysfunction ([address] )  Active Problems:    Paraplegia following spinal cord injury ([address] )    Pressure ulcer, sacrum    Iron deficiency anemia    Moderate protein-calorie malnutrition (HCC)    Thrombocytosis ([address] )    C  difficile diarrhea    Open wound of left hip    Hyponatremia    Urinary tract infection associated with indwelling urethral catheter ([address] )    Chronic idiopathic constipation      Tawanna Farah MD

## 2020-03-31 NOTE — PLAN OF CARE
Problem: Potential for Falls  Goal: Patient will remain free of falls  Description  INTERVENTIONS:  - Assess patient frequently for physical needs  -  Identify cognitive and physical deficits and behaviors that affect risk of falls    -  [place] fall precautions as indicated by assessment   - Educate patient/family on patient safety including physical limitations  - Instruct patient to call for assistance with activity based on assessment  - Modify environment to reduce risk of injury  - Consider OT/PT consult to assist with strengthening/mobility  Outcome: Progressing     Problem: PAIN - ADULT  Goal: Verbalizes/displays adequate comfort level or baseline comfort level  Description  Interventions:  - Encourage patient to monitor pain and request assistance  - Assess pain using appropriate pain scale  - Administer analgesics based on type and severity of pain and evaluate response  - Implement non-pharmacological measures as appropriate and evaluate response  - Consider cultural and social influences on pain and pain management  - Notify physician/advanced practitioner if interventions unsuccessful or patient reports new pain  Outcome: Progressing     Problem: INFECTION - ADULT  Goal: Absence or prevention of progression during hospitalization  Description  INTERVENTIONS:  - Assess and monitor for signs and symptoms of infection  - Monitor lab/diagnostic results  - Monitor all insertion sites, i e  indwelling lines, tubes, and drains  - Monitor endotracheal if appropriate and nasal secretions for changes in amount and color  - [place] appropriate cooling/warming therapies per order  - Administer medications as ordered  - Instruct and encourage patient and family to use good hand hygiene technique  - Identify and instruct in appropriate isolation precautions for identified infection/condition  Outcome: Progressing     Problem: SAFETY ADULT  Goal: Maintain or return to baseline ADL function  Description  INTERVENTIONS:  -  Assess patient's ability to carry out ADLs; assess patient's baseline for ADL function and identify physical deficits which impact ability to perform ADLs (bathing, care of mouth/teeth, toileting, grooming, dressing, etc )  - Assess/evaluate cause of self-care deficits   - Assess range of motion  - Assess patient's mobility; develop plan if impaired  - Assess patient's need for assistive devices and provide as appropriate  - Encourage maximum independence but intervene and supervise when necessary  - Involve family in performance of ADLs  - Assess for home care needs following discharge   - Consider OT consult to assist with ADL evaluation and planning for discharge  - Provide patient education as appropriate  Outcome: Progressing  Goal: Maintain or return mobility status to optimal level  Description  INTERVENTIONS:  - Assess patient's baseline mobility status (ambulation, transfers, stairs, etc )    - Identify cognitive and physical deficits and behaviors that affect mobility  - Identify mobility aids required to assist with transfers and/or ambulation (gait belt, sit-to-stand, lift, walker, cane, etc )  - [place] fall precautions as indicated by assessment  - Record patient progress and toleration of activity level on Mobility SBAR; progress patient to next Phase/Stage  - Instruct patient to call for assistance with activity based on assessment  - Consider rehabilitation consult to assist with strengthening/weightbearing, etc   Outcome: Progressing     Problem: DISCHARGE PLANNING  Goal: Discharge to home or other facility with appropriate resources  Description  INTERVENTIONS:  - Identify barriers to discharge w/patient and caregiver  - Arrange for needed discharge resources and transportation as appropriate  - Identify discharge learning needs (meds, wound care, etc )  - Arrange for interpretive services to assist at discharge as needed  - Refer to Case Management Department for coordinating discharge planning if the patient needs post-hospital services based on physician/advanced practitioner order or complex needs related to functional status, cognitive ability, or social support system  Outcome: Progressing     Problem: Knowledge Deficit  Goal: Patient/family/caregiver demonstrates understanding of disease process, treatment plan, medications, and discharge instructions  Description  Complete learning assessment and assess knowledge base    Interventions:  - Provide teaching at level of understanding  - Provide teaching via preferred learning methods  Outcome: Progressing     Problem: GENITOURINARY - ADULT  Goal: Maintains or returns to baseline urinary function  Description  INTERVENTIONS:  - Assess urinary function  - Encourage oral fluids to ensure adequate hydration if ordered  - Administer IV fluids as ordered to ensure adequate hydration  - Administer ordered medications as needed  - Offer frequent toileting  - Follow urinary retention protocol if ordered  Outcome: Progressing  Goal: Urinary catheter remains patent  Description  INTERVENTIONS:  - Assess patency of urinary catheter  - If patient has a chronic isabel, consider changing catheter if non-functioning  - Follow guidelines for intermittent irrigation of non-functioning urinary catheter  Outcome: Progressing     Problem: METABOLIC, FLUID AND ELECTROLYTES - ADULT  Goal: Electrolytes maintained within normal limits  Description  INTERVENTIONS:  - Monitor labs and assess patient for signs and symptoms of electrolyte imbalances  - Administer electrolyte replacement as ordered  - Monitor response to electrolyte replacements, including repeat lab results as appropriate  - Instruct patient on fluid and nutrition as appropriate  Outcome: Progressing     Problem: SKIN/TISSUE INTEGRITY - ADULT  Goal: Incision(s), wounds(s) or drain site(s) healing without S/S of infection  Description  INTERVENTIONS  - Assess and document risk factors for skin impairment   - Assess and document dressing, incision, wound bed, drain sites and surrounding tissue  - Consider nutrition services referral as needed  - Oral mucous membranes remain intact  - Provide patient/ family education  Outcome: Progressing     Problem: Prexisting or High Potential for Compromised Skin Integrity  Goal: Skin integrity is maintained or improved  Description  INTERVENTIONS:  - Identify patients at risk for skin breakdown  - Assess and monitor skin integrity  - Assess and monitor nutrition and hydration status  - Monitor labs   - Assess for incontinence   - Turn and reposition patient  - Assist with mobility/ambulation  - Relieve pressure over bony prominences  - Avoid friction and shearing  - Provide appropriate hygiene as needed including keeping skin clean and dry  - Evaluate need for skin moisturizer/barrier cream  - Collaborate with interdisciplinary team   - Patient/family teaching  - Consider wound care consult   Outcome: Progressing     Problem: Nutrition/Hydration-ADULT  Goal: Nutrient/Hydration intake appropriate for improving, restoring or maintaining nutritional needs  Description  Monitor and assess patient's nutrition/hydration status for malnutrition  Collaborate with interdisciplinary team and initiate plan and interventions as ordered  Monitor patient's weight and dietary intake as ordered or per policy  Utilize nutrition screening tool and intervene as necessary  Determine patient's food preferences and provide high-protein, high-caloric foods as appropriate       INTERVENTIONS:  - Monitor oral intake, urinary output, labs, and treatment plans  - Assess nutrition and hydration status and recommend course of action  - Evaluate amount of meals eaten  - Assist patient with eating if necessary   - Allow adequate time for meals  - Recommend/ encourage appropriate diets, oral nutritional supplements, and vitamin/mineral supplements  - Order, calculate, and assess calorie counts as needed  - Recommend, monitor, and adjust tube feedings and TPN/PPN based on assessed needs  - Assess need for intravenous fluids  - Provide specific nutrition/hydration education as appropriate  - Include patient/family/caregiver in decisions related to nutrition  Outcome: Progressing

## 2020-04-01 VITALS
DIASTOLIC BLOOD PRESSURE: 51 MMHG | WEIGHT: 97 LBS | HEART RATE: 97 BPM | BODY MASS INDEX: 19.04 KG/M2 | OXYGEN SATURATION: 98 % | SYSTOLIC BLOOD PRESSURE: 94 MMHG | HEIGHT: 60 IN | TEMPERATURE: 97.4 F | RESPIRATION RATE: 20 BRPM

## 2020-04-01 PROCEDURE — 99239 HOSP IP/OBS DSCHRG MGMT >30: CPT | Performed by: INTERNAL MEDICINE

## 2020-04-01 RX ADMIN — COLLAGENASE SANTYL: 250 OINTMENT TOPICAL at 08:12

## 2020-04-01 RX ADMIN — Medication 250 MG: at 11:09

## 2020-04-01 RX ADMIN — MIDODRINE HYDROCHLORIDE 10 MG: 5 TABLET ORAL at 11:09

## 2020-04-01 RX ADMIN — ENOXAPARIN SODIUM 40 MG: 40 INJECTION SUBCUTANEOUS at 11:06

## 2020-04-01 RX ADMIN — NYSTATIN: 100000 POWDER TOPICAL at 11:10

## 2020-04-01 RX ADMIN — PANTOPRAZOLE SODIUM 40 MG: 40 TABLET, DELAYED RELEASE ORAL at 05:11

## 2020-04-01 RX ADMIN — POTASSIUM & SODIUM PHOSPHATES POWDER PACK 280-160-250 MG 2 PACKET: 280-160-250 PACK at 11:07

## 2020-04-01 RX ADMIN — VANCOMYCIN HYDROCHLORIDE 125 MG: 500 INJECTION, POWDER, LYOPHILIZED, FOR SOLUTION INTRAVENOUS at 11:27

## 2020-04-01 RX ADMIN — OYSTER SHELL CALCIUM WITH VITAMIN D 1 TABLET: 500; 200 TABLET, FILM COATED ORAL at 11:09

## 2020-04-01 RX ADMIN — VITAM B12 100 MCG: 100 TAB at 11:09

## 2020-04-01 NOTE — TRANSPORTATION MEDICAL NECESSITY
Section I - General Information    Name of Patient: Jose Raul Rosado                 : 1950    Medicare #: [id number]  Transport Date: 20 (PCS is valid for round trips on this date and for all repetitive trips in the 60-day range as noted below )  Origin: [address] [facility]: [facility] [address], [facility]  Is the pt's stay covered under Medicare Part A (PPS/DRG)   []     Closest appropriate facility? If no, why is transport to more distant facility required? No  If no, explain: Pt returning home  If hospice pt, is this transport related to pt's terminal illness? NA       Section II - Medical Necessity Questionnaire  Ambulance transportation is medically necessary only if other means of transport are contraindicated or would be potentially harmful to the patient  To meet this requirement, the patient must either be "bed confined" or suffer from a condition such that transport by means other than ambulance is contraindicated by the patient's condition  The following questions must be answered by the medical professional signing below for this form to be valid:    1)  Describe the MEDICAL CONDITION (physical and/or mental) of this patient AT [address] [place] [address] that requires the patient to be transported in an ambulance and why transport by other means is contraindicated by the patient's condition:  Sacral wounds, left buttock, right hip wound    2) Is the patient "bed confined" as defined below? No  To be "be confined" the patient must satisfy all three of the following conditions: (1) unable to get up from bed without Assistance; AND (2) unable to ambulate; AND (3) unable to sit in a chair or wheelchair  3) Can this patient safely be transported by car or wheelchair van (i e , seated during transport without a medical attendant or monitoring)?   No    4) In addition to completing questions 1-3 above, please check any of the following conditions that apply*:   *Note: supporting documentation for any boxes checked must be maintained in the patient's medical records  If hosp-hosp transfer, describe services needed at 2nd facility not available at 1st facility? Special handling/isolation/infection control precautions required   Other(specify) fall risk      Section III - Signature of Physician or Healthcare Professional  I certify that the above information is true and correct based on my evaluation of this patient, and represent that the patient requires transport by ambulance and that other forms of transport are contraindicated  I understand that this information will be used by the Centers for Medicare and Medicaid Services (CMS) to support the determination of medical necessity for ambulance services, and I represent that I have personal knowledge of the patient's condition at time of transport  []  If this box is checked, I also certify that the patient is physically or mentally incapable of signing the ambulance service's claim and that the institution with which I am affiliated has furnished care, services, or assistance to the patient  My signature below is made on behalf of the patient pursuant to 42 CFR §424 36(b)(4)  In accordance with 42 CFR §424 37, the specific reason(s) that the patient is physically or mentally incapable of signing the claim form is as follows:       Signature of Physician* or Healthcare Professional______________________________________________________________  Signature Date 04/01/20 (For scheduled repetitive transports, this form is not valid for transports performed more than 60 days after this date)    Printed Name & Credentials of Physician or Healthcare Professional (MD, DO, RN, etc )________________________________  *Form must be signed by patient's attending physician for scheduled, repetitive transports   For non-repetitive, unscheduled ambulance transports, if unable to obtain the signature of the attending physician, any of the following may sign (choose appropriate option below)  [] Physician Assistant []  Clinical Nurse Specialist []  Registered Nurse  []  Nurse Practitioner  [x] Discharge Planner
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
## 2020-04-01 NOTE — SOCIAL WORK
Continue to follow  CM notified Pt will need S ambulance transport home  Call placed to [facility], S ambulance arranged to [place], [address] in [facility], ramp to enter  Pickup is 5:30pm/6:00pm  Pt informed of transport time

## 2020-04-01 NOTE — ASSESSMENT & PLAN NOTE
-Sepsis, POA, as evidenced by fevers prior to presentation, leucocytosis wbc 10 73,   -Source - Chronic pustular hip wounds and CAUTI (chronic [place])   -see above, with NEG procal I'm inclined to stop abx (currently on Zosyn)  Will stop Zosyn for now (with h/o C diff) and cont PO Vanco for now   -I have reviewed pics of wounds in media section  There is no purulence discharge seen in the pictures  Abx stopped

## 2020-04-01 NOTE — DISCHARGE INSTRUCTIONS
Acute Wound Care   AMBULATORY CARE:   An acute wound  is an injury that causes a break in the skin  An acute wound can happen suddenly, last a short time, and may heal on its own  Common signs and symptoms of an acute wound:   · A cut, tear, or gash in your skin    · Bleeding, swelling, pain, or trouble moving the affected area    · Dirt or foreign objects inside the wound     · Milky, yellow, green, or brown pus in the wound     · Red, tender, or warm area around the pus    · Fever  Seek care immediately if:   · You have pus or a foul odor coming from the wound  · You have sudden trouble breathing or chest pain  · Blood soaks through your bandage  Contact your healthcare provider if:   · You have muscle, joint, or body aches, sweating, or a fever  · You have more swelling, redness, or bleeding in your wound  · Your skin is itchy, swollen, or you have a rash  · You have questions or concerns about your condition or care  Treatment for an acute wound  may include any of the following:  · Cleansing  is done with soap and water to wash away germs and decrease the risk of infection  Sterile water further cleans the wound  The cleaning is done under high pressure with a catheter tip and large syringe  A solution that kills germs may also be used  · Debridement  is done to clean and remove objects, dirt, or dead tissues from the open wound  Healthcare providers may also drain the wound to clean out pus  · Closure of the wound  is done with stitches, staples, skin adhesive, or other treatments  This may be done if the wound is wide or deep  Stitches may be needed if the wound is in an area that moves a lot, such as the hands, feet, and joints  Stitches may help to keep the wound from getting infected  They may also decrease the amount of scarring you have  Some wounds may heal better without stitches    Wound care:   · If your wound was closed with thin strips of medical tape, keep them clean and dry  The strips of medical tape will fall off on their own  Do not pull them off  · Keep the bandage clean and dry  Do not remove the bandage over your wound unless your healthcare provider says it is okay  · Wash your hands before and after you take care of your wound to prevent infection  · Clean the wound as directed  If you cannot reach the wound, have someone help you  · If you have packing, make sure all the gauze used to pack the wound is taken out and replaced as directed  Keep track of how many gauze dressings are placed inside the wound  Follow up with your healthcare provider as directed:  Write down your questions so you remember to ask them during your visits  © 2016 [employer] is for End User's use only and may not be sold, redistributed or otherwise used for commercial purposes  All illustrations and images included in CareNotes® are the copyrighted property of [employer]  or [employer]  The above information is an  only  It is not intended as medical advice for individual conditions or treatments  Talk to your doctor, nurse or pharmacist before following any medical regimen to see if it is safe and effective for you

## 2020-04-01 NOTE — ASSESSMENT & PLAN NOTE
-History of spinal cord injury with resultant neurogenic bladder and chronic catheter placement  -History of VRE  -Procal NEG x 2  -U/A with 1-2 WBC  -BCx NGTD

## 2020-04-01 NOTE — DISCHARGE INSTR - AVS FIRST PAGE
Dear Good Roberts,     It was our pleasure to care for you here at [facility]  It is our hope that we were always able to exceed the expected standards for your care during your stay  You were hospitalized due to fever with concern for infected decubitus ulcer  You were cared for on the 3rd floor by Kayla Comer,  with the [facility] who covers for your primary care physician (PCP), Aimee Cortés, while you were hospitalized  If you have any questions or concerns related to this hospitalization, you may contact us at [phone]  For follow up as well as any medication refills, we recommend that you follow up with your primary care physician  A registered nurse will reach out to you by phone within a few days after your discharge to answer any additional questions that you may have after going home  However, at this time we provide for you here, the most important instructions / recommendations at discharge:     · Notable Medication Adjustments -   · None  · Testing Required after Discharge -   · None  · Important follow up information -   · PCP - One week  Last Pneumovax was 2015 and current  · Other Instructions -   Wound Care Plan:   1-Right hip and anterior thigh wounds--cleanse with normal saline, pat dry  Apply small amount of Hydrogel to right lateral hip and right anterior thigh wound beds  Cover with ABD and Optilock (if available)  Change dressing daily  DO NOT PACK ANYTHING INTO WOUND  2-Sacrum and left buttock--cleanse with normal saline  Apply 3M Cavilon no sting to surya-wound skin  Pack wound with 2 inch sulma moistened with Silvasorb gel  Cover with 4x4 and ABD  Secure with minimal tape  3-Left lateral ischial--cleanse  Apply Santyl to black/yellow wound bed in nickel thick layer  Cover with non-adherent oil emulsion dressing (adaptic) and ABD  Secure with minimal tape  Change dressing daily      Skin care plans:  1-Apply Hydraguard lotion to bilateral heels twice daily for skin protection  2-Elevate heels off of bed with pillows or foam wedges to offload pressure  3-Moisturize skin daily with lotion  4-Turn/reposition every 2 hours while in bed and weight shift frequently while in chair for pressure re-distribution on skin  · Please review this entire after visit summary as additional general instructions including medication list, appointments, activity, diet, any pertinent wound care, and other additional recommendations from your care team that may be provided for you        Sincerely,     Viv Katz DO and Vale Cruz RN

## 2020-04-01 NOTE — ASSESSMENT & PLAN NOTE
-Chronic iron deficiency anemia, POA, hb 10 73 higher than baseline secondary to dehdyration  -Hb today 7 2, suggestive of hemodilution and consistent with prior Hb readings  -Keep Hb > 7, transfuse prn    No evidence of any acute blood loss

## 2020-04-01 NOTE — ASSESSMENT & PLAN NOTE
Malnutrition Findings:   Malnutrition type: Chronic illness  Degree of Malnutrition: Other severe protein calorie malnutrition    BMI Findings: Body mass index is 18 94 kg/m²       As evidenced by temporal muscle wasting, prominent clavicle, ribs in the setting of poor nutrition, multiple nonhealing chronic wounds, stage 3 decubitus ulcer and BMI 18 94  Nutrition consulted

## 2020-04-01 NOTE — PLAN OF CARE
Problem: Potential for Falls  Goal: Patient will remain free of falls  Description  INTERVENTIONS:  - Assess patient frequently for physical needs  -  Identify cognitive and physical deficits and behaviors that affect risk of falls    -  [place] fall precautions as indicated by assessment   - Educate patient/family on patient safety including physical limitations  - Instruct patient to call for assistance with activity based on assessment  - Modify environment to reduce risk of injury  - Consider OT/PT consult to assist with strengthening/mobility  Outcome: Progressing     Problem: PAIN - ADULT  Goal: Verbalizes/displays adequate comfort level or baseline comfort level  Description  Interventions:  - Encourage patient to monitor pain and request assistance  - Assess pain using appropriate pain scale  - Administer analgesics based on type and severity of pain and evaluate response  - Implement non-pharmacological measures as appropriate and evaluate response  - Consider cultural and social influences on pain and pain management  - Notify physician/advanced practitioner if interventions unsuccessful or patient reports new pain  Outcome: Progressing     Problem: INFECTION - ADULT  Goal: Absence or prevention of progression during hospitalization  Description  INTERVENTIONS:  - Assess and monitor for signs and symptoms of infection  - Monitor lab/diagnostic results  - Monitor all insertion sites, i e  indwelling lines, tubes, and drains  - Monitor endotracheal if appropriate and nasal secretions for changes in amount and color  - [place] appropriate cooling/warming therapies per order  - Administer medications as ordered  - Instruct and encourage patient and family to use good hand hygiene technique  - Identify and instruct in appropriate isolation precautions for identified infection/condition  Outcome: Progressing     Problem: SAFETY ADULT  Goal: Maintain or return to baseline ADL function  Description  INTERVENTIONS:  -  Assess patient's ability to carry out ADLs; assess patient's baseline for ADL function and identify physical deficits which impact ability to perform ADLs (bathing, care of mouth/teeth, toileting, grooming, dressing, etc )  - Assess/evaluate cause of self-care deficits   - Assess range of motion  - Assess patient's mobility; develop plan if impaired  - Assess patient's need for assistive devices and provide as appropriate  - Encourage maximum independence but intervene and supervise when necessary  - Involve family in performance of ADLs  - Assess for home care needs following discharge   - Consider OT consult to assist with ADL evaluation and planning for discharge  - Provide patient education as appropriate  Outcome: Progressing  Goal: Maintain or return mobility status to optimal level  Description  INTERVENTIONS:  - Assess patient's baseline mobility status (ambulation, transfers, stairs, etc )    - Identify cognitive and physical deficits and behaviors that affect mobility  - Identify mobility aids required to assist with transfers and/or ambulation (gait belt, sit-to-stand, lift, walker, cane, etc )  - [place] fall precautions as indicated by assessment  - Record patient progress and toleration of activity level on Mobility SBAR; progress patient to next Phase/Stage  - Instruct patient to call for assistance with activity based on assessment  - Consider rehabilitation consult to assist with strengthening/weightbearing, etc   Outcome: Progressing     Problem: DISCHARGE PLANNING  Goal: Discharge to home or other facility with appropriate resources  Description  INTERVENTIONS:  - Identify barriers to discharge w/patient and caregiver  - Arrange for needed discharge resources and transportation as appropriate  - Identify discharge learning needs (meds, wound care, etc )  - Arrange for interpretive services to assist at discharge as needed  - Refer to Case Management Department for coordinating discharge planning if the patient needs post-hospital services based on physician/advanced practitioner order or complex needs related to functional status, cognitive ability, or social support system  Outcome: Progressing     Problem: Knowledge Deficit  Goal: Patient/family/caregiver demonstrates understanding of disease process, treatment plan, medications, and discharge instructions  Description  Complete learning assessment and assess knowledge base    Interventions:  - Provide teaching at level of understanding  - Provide teaching via preferred learning methods  Outcome: Progressing     Problem: GENITOURINARY - ADULT  Goal: Maintains or returns to baseline urinary function  Description  INTERVENTIONS:  - Assess urinary function  - Encourage oral fluids to ensure adequate hydration if ordered  - Administer IV fluids as ordered to ensure adequate hydration  - Administer ordered medications as needed  - Offer frequent toileting  - Follow urinary retention protocol if ordered  Outcome: Progressing  Goal: Urinary catheter remains patent  Description  INTERVENTIONS:  - Assess patency of urinary catheter  - If patient has a chronic isabel, consider changing catheter if non-functioning  - Follow guidelines for intermittent irrigation of non-functioning urinary catheter  Outcome: Progressing     Problem: METABOLIC, FLUID AND ELECTROLYTES - ADULT  Goal: Electrolytes maintained within normal limits  Description  INTERVENTIONS:  - Monitor labs and assess patient for signs and symptoms of electrolyte imbalances  - Administer electrolyte replacement as ordered  - Monitor response to electrolyte replacements, including repeat lab results as appropriate  - Instruct patient on fluid and nutrition as appropriate  Outcome: Progressing     Problem: SKIN/TISSUE INTEGRITY - ADULT  Goal: Incision(s), wounds(s) or drain site(s) healing without S/S of infection  Description  INTERVENTIONS  - Assess and document risk factors for skin impairment   - Assess and document dressing, incision, wound bed, drain sites and surrounding tissue  - Consider nutrition services referral as needed  - Oral mucous membranes remain intact  - Provide patient/ family education  Outcome: Progressing     Problem: Prexisting or High Potential for Compromised Skin Integrity  Goal: Skin integrity is maintained or improved  Description  INTERVENTIONS:  - Identify patients at risk for skin breakdown  - Assess and monitor skin integrity  - Assess and monitor nutrition and hydration status  - Monitor labs   - Assess for incontinence   - Turn and reposition patient  - Assist with mobility/ambulation  - Relieve pressure over bony prominences  - Avoid friction and shearing  - Provide appropriate hygiene as needed including keeping skin clean and dry  - Evaluate need for skin moisturizer/barrier cream  - Collaborate with interdisciplinary team   - Patient/family teaching  - Consider wound care consult   Outcome: Progressing     Problem: Nutrition/Hydration-ADULT  Goal: Nutrient/Hydration intake appropriate for improving, restoring or maintaining nutritional needs  Description  Monitor and assess patient's nutrition/hydration status for malnutrition  Collaborate with interdisciplinary team and initiate plan and interventions as ordered  Monitor patient's weight and dietary intake as ordered or per policy  Utilize nutrition screening tool and intervene as necessary  Determine patient's food preferences and provide high-protein, high-caloric foods as appropriate       INTERVENTIONS:  - Monitor oral intake, urinary output, labs, and treatment plans  - Assess nutrition and hydration status and recommend course of action  - Evaluate amount of meals eaten  - Assist patient with eating if necessary   - Allow adequate time for meals  - Recommend/ encourage appropriate diets, oral nutritional supplements, and vitamin/mineral supplements  - Order, calculate, and assess calorie counts as needed  - Recommend, monitor, and adjust tube feedings and TPN/PPN based on assessed needs  - Assess need for intravenous fluids  - Provide specific nutrition/hydration education as appropriate  - Include patient/family/caregiver in decisions related to nutrition  Outcome: Progressing

## 2020-04-01 NOTE — PLAN OF CARE
Problem: Potential for Falls  Goal: Patient will remain free of falls  Description  INTERVENTIONS:  - Assess patient frequently for physical needs  -  Identify cognitive and physical deficits and behaviors that affect risk of falls    -  [place] fall precautions as indicated by assessment   - Educate patient/family on patient safety including physical limitations  - Instruct patient to call for assistance with activity based on assessment  - Modify environment to reduce risk of injury  - Consider OT/PT consult to assist with strengthening/mobility  4/1/2020 0909 by Hamlet John RN  Outcome: Progressing  4/1/2020 0908 by Hamlet John RN  Outcome: Progressing     Problem: PAIN - ADULT  Goal: Verbalizes/displays adequate comfort level or baseline comfort level  Description  Interventions:  - Encourage patient to monitor pain and request assistance  - Assess pain using appropriate pain scale  - Administer analgesics based on type and severity of pain and evaluate response  - Implement non-pharmacological measures as appropriate and evaluate response  - Consider cultural and social influences on pain and pain management  - Notify physician/advanced practitioner if interventions unsuccessful or patient reports new pain  4/1/2020 0909 by Hamlet John RN  Outcome: Progressing  4/1/2020 0908 by Hamlet John RN  Outcome: Progressing     Problem: INFECTION - ADULT  Goal: Absence or prevention of progression during hospitalization  Description  INTERVENTIONS:  - Assess and monitor for signs and symptoms of infection  - Monitor lab/diagnostic results  - Monitor all insertion sites, i e  indwelling lines, tubes, and drains  - Monitor endotracheal if appropriate and nasal secretions for changes in amount and color  - [place] appropriate cooling/warming therapies per order  - Administer medications as ordered  - Instruct and encourage patient and family to use good hand hygiene technique  - Identify and instruct in appropriate isolation precautions for identified infection/condition  4/1/2020 0909 by Shyam Hartley RN  Outcome: Progressing  4/1/2020 0908 by Shyam Hartley RN  Outcome: Progressing     Problem: SAFETY ADULT  Goal: Maintain or return to baseline ADL function  Description  INTERVENTIONS:  -  Assess patient's ability to carry out ADLs; assess patient's baseline for ADL function and identify physical deficits which impact ability to perform ADLs (bathing, care of mouth/teeth, toileting, grooming, dressing, etc )  - Assess/evaluate cause of self-care deficits   - Assess range of motion  - Assess patient's mobility; develop plan if impaired  - Assess patient's need for assistive devices and provide as appropriate  - Encourage maximum independence but intervene and supervise when necessary  - Involve family in performance of ADLs  - Assess for home care needs following discharge   - Consider OT consult to assist with ADL evaluation and planning for discharge  - Provide patient education as appropriate  4/1/2020 0909 by Shyam Hartley RN  Outcome: Progressing  4/1/2020 0908 by Shyam Hartley RN  Outcome: Progressing  Goal: Maintain or return mobility status to optimal level  Description  INTERVENTIONS:  - Assess patient's baseline mobility status (ambulation, transfers, stairs, etc )    - Identify cognitive and physical deficits and behaviors that affect mobility  - Identify mobility aids required to assist with transfers and/or ambulation (gait belt, sit-to-stand, lift, walker, cane, etc )  - [place] fall precautions as indicated by assessment  - Record patient progress and toleration of activity level on Mobility SBAR; progress patient to next Phase/Stage  - Instruct patient to call for assistance with activity based on assessment  - Consider rehabilitation consult to assist with strengthening/weightbearing, etc   4/1/2020 0909 by Shyam Hartley RN  Outcome: Progressing  4/1/2020 0908 by Shyam Hartley RN  Outcome: Progressing     Problem: DISCHARGE PLANNING  Goal: Discharge to home or other facility with appropriate resources  Description  INTERVENTIONS:  - Identify barriers to discharge w/patient and caregiver  - Arrange for needed discharge resources and transportation as appropriate  - Identify discharge learning needs (meds, wound care, etc )  - Arrange for interpretive services to assist at discharge as needed  - Refer to Case Management Department for coordinating discharge planning if the patient needs post-hospital services based on physician/advanced practitioner order or complex needs related to functional status, cognitive ability, or social support system  4/1/2020 0909 by Donna Weems RN  Outcome: Progressing  4/1/2020 0908 by Donna Weems RN  Outcome: Progressing     Problem: Knowledge Deficit  Goal: Patient/family/caregiver demonstrates understanding of disease process, treatment plan, medications, and discharge instructions  Description  Complete learning assessment and assess knowledge base    Interventions:  - Provide teaching at level of understanding  - Provide teaching via preferred learning methods  4/1/2020 0909 by Donna Weems RN  Outcome: Progressing  4/1/2020 0908 by Donna Weems RN  Outcome: Progressing     Problem: GENITOURINARY - ADULT  Goal: Maintains or returns to baseline urinary function  Description  INTERVENTIONS:  - Assess urinary function  - Encourage oral fluids to ensure adequate hydration if ordered  - Administer IV fluids as ordered to ensure adequate hydration  - Administer ordered medications as needed  - Offer frequent toileting  - Follow urinary retention protocol if ordered  4/1/2020 0909 by Donna Weems RN  Outcome: Progressing  4/1/2020 0908 by Donna Weems RN  Outcome: Progressing  Goal: Urinary catheter remains patent  Description  INTERVENTIONS:  - Assess patency of urinary catheter  - If patient has a chronic isabel, consider changing catheter if non-functioning  - Follow guidelines for intermittent irrigation of non-functioning urinary catheter  4/1/2020 0909 by Shyam Hartley RN  Outcome: Progressing  4/1/2020 0908 by Shyam Hartley RN  Outcome: Progressing     Problem: METABOLIC, FLUID AND ELECTROLYTES - ADULT  Goal: Electrolytes maintained within normal limits  Description  INTERVENTIONS:  - Monitor labs and assess patient for signs and symptoms of electrolyte imbalances  - Administer electrolyte replacement as ordered  - Monitor response to electrolyte replacements, including repeat lab results as appropriate  - Instruct patient on fluid and nutrition as appropriate  4/1/2020 0909 by Shyam Hartley RN  Outcome: Progressing  4/1/2020 0908 by Shyam Hartlye RN  Outcome: Progressing     Problem: SKIN/TISSUE INTEGRITY - ADULT  Goal: Incision(s), wounds(s) or drain site(s) healing without S/S of infection  Description  INTERVENTIONS  - Assess and document risk factors for skin impairment   - Assess and document dressing, incision, wound bed, drain sites and surrounding tissue  - Consider nutrition services referral as needed  - Oral mucous membranes remain intact  - Provide patient/ family education  4/1/2020 0909 by Shyam Hartley RN  Outcome: Progressing  4/1/2020 0908 by Shyam Hartley RN  Outcome: Progressing     Problem: Prexisting or High Potential for Compromised Skin Integrity  Goal: Skin integrity is maintained or improved  Description  INTERVENTIONS:  - Identify patients at risk for skin breakdown  - Assess and monitor skin integrity  - Assess and monitor nutrition and hydration status  - Monitor labs   - Assess for incontinence   - Turn and reposition patient  - Assist with mobility/ambulation  - Relieve pressure over bony prominences  - Avoid friction and shearing  - Provide appropriate hygiene as needed including keeping skin clean and dry  - Evaluate need for skin moisturizer/barrier cream  - Collaborate with interdisciplinary team   - Patient/family teaching  - Consider wound care consult   4/1/2020 0909 by Lynn Butt RN  Outcome: Progressing  4/1/2020 0908 by Lynn Butt RN  Outcome: Progressing     Problem: Nutrition/Hydration-ADULT  Goal: Nutrient/Hydration intake appropriate for improving, restoring or maintaining nutritional needs  Description  Monitor and assess patient's nutrition/hydration status for malnutrition  Collaborate with interdisciplinary team and initiate plan and interventions as ordered  Monitor patient's weight and dietary intake as ordered or per policy  Utilize nutrition screening tool and intervene as necessary  Determine patient's food preferences and provide high-protein, high-caloric foods as appropriate       INTERVENTIONS:  - Monitor oral intake, urinary output, labs, and treatment plans  - Assess nutrition and hydration status and recommend course of action  - Evaluate amount of meals eaten  - Assist patient with eating if necessary   - Allow adequate time for meals  - Recommend/ encourage appropriate diets, oral nutritional supplements, and vitamin/mineral supplements  - Order, calculate, and assess calorie counts as needed  - Recommend, monitor, and adjust tube feedings and TPN/PPN based on assessed needs  - Assess need for intravenous fluids  - Provide specific nutrition/hydration education as appropriate  - Include patient/family/caregiver in decisions related to nutrition  4/1/2020 0909 by Lynn Butt RN  Outcome: Progressing  4/1/2020 0908 by Lynn Butt RN  Outcome: Progressing

## 2020-04-02 LAB
BACTERIA BLD CULT: NORMAL
BACTERIA BLD CULT: NORMAL
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
## 2020-04-02 NOTE — ASSESSMENT & PLAN NOTE
-History of C-diff infection  -currently on prophylactic PO vancomycin - off antibiotics and can be successfully discontinue

## 2020-04-06 ENCOUNTER — PATIENT OUTREACH (OUTPATIENT)
Dept: CASE MANAGEMENT | Facility: OTHER | Age: 70
End: 2020-04-06

## 2020-04-09 ENCOUNTER — PATIENT OUTREACH (OUTPATIENT)
Dept: CASE MANAGEMENT | Facility: HOSPITAL | Age: 70
End: 2020-04-09

## 2020-04-17 ENCOUNTER — HOSPITAL ENCOUNTER (INPATIENT)
Facility: HOSPITAL | Age: 70
LOS: 12 days | Discharge: NURSING, REHAB OR LONG-TERM CARE | DRG: 871 | End: 2020-04-29
Attending: EMERGENCY MEDICINE | Admitting: HOSPITALIST
Payer: COMMERCIAL

## 2020-04-17 ENCOUNTER — APPOINTMENT (EMERGENCY)
Dept: RADIOLOGY | Facility: HOSPITAL | Age: 70
DRG: 871 | End: 2020-04-17
Payer: COMMERCIAL

## 2020-04-17 DIAGNOSIS — L89.323: ICD-10-CM

## 2020-04-17 DIAGNOSIS — L89.90 INFECTED DECUBITUS ULCER: Primary | ICD-10-CM

## 2020-04-17 DIAGNOSIS — R53.1 GENERALIZED WEAKNESS: ICD-10-CM

## 2020-04-17 DIAGNOSIS — E87.1 HYPONATREMIA: ICD-10-CM

## 2020-04-17 DIAGNOSIS — L89.150 PRESSURE INJURY OF SACRAL REGION, UNSTAGEABLE (HCC): Chronic | ICD-10-CM

## 2020-04-17 DIAGNOSIS — G82.20 PARAPLEGIA (HCC): ICD-10-CM

## 2020-04-17 DIAGNOSIS — E87.2 METABOLIC ACIDOSIS: ICD-10-CM

## 2020-04-17 DIAGNOSIS — L08.9 INFECTED DECUBITUS ULCER: Primary | ICD-10-CM

## 2020-04-17 DIAGNOSIS — G82.20 PARAPLEGIA FOLLOWING SPINAL CORD INJURY (HCC): ICD-10-CM

## 2020-04-17 DIAGNOSIS — R65.10 SIRS (SYSTEMIC INFLAMMATORY RESPONSE SYNDROME) (HCC): ICD-10-CM

## 2020-04-17 DIAGNOSIS — L89.324 DECUBITUS ULCER OF LEFT ISCHIUM, STAGE IV (HCC): ICD-10-CM

## 2020-04-17 DIAGNOSIS — A04.72 C. DIFFICILE DIARRHEA: ICD-10-CM

## 2020-04-17 PROBLEM — D72.825 BANDEMIA: Status: ACTIVE | Noted: 2020-04-17

## 2020-04-17 PROBLEM — E87.20 METABOLIC ACIDOSIS: Status: ACTIVE | Noted: 2020-04-17

## 2020-04-17 LAB
ALBUMIN SERPL BCP-MCNC: 1.2 G/DL (ref 3.5–5)
ALP SERPL-CCNC: 133 U/L (ref 46–116)
ALT SERPL W P-5'-P-CCNC: 32 U/L (ref 12–78)
ANION GAP SERPL CALCULATED.3IONS-SCNC: 16 MMOL/L (ref 4–13)
ANISOCYTOSIS BLD QL SMEAR: PRESENT
APTT PPP: 31 SECONDS (ref 23–37)
AST SERPL W P-5'-P-CCNC: 44 U/L (ref 5–45)
ATRIAL RATE: 120 BPM
BACTERIA UR QL AUTO: ABNORMAL /HPF
BASOPHILS # BLD MANUAL: 0 THOUSAND/UL (ref 0–0.1)
BASOPHILS NFR MAR MANUAL: 0 % (ref 0–1)
BILIRUB SERPL-MCNC: 0.6 MG/DL (ref 0.2–1)
BILIRUB UR QL STRIP: NEGATIVE
BUN SERPL-MCNC: 18 MG/DL (ref 5–25)
CALCIUM SERPL-MCNC: 7.4 MG/DL (ref 8.3–10.1)
CHLORIDE SERPL-SCNC: 93 MMOL/L (ref 100–108)
CLARITY UR: ABNORMAL
CO2 SERPL-SCNC: 19 MMOL/L (ref 21–32)
COLOR UR: YELLOW
CREAT SERPL-MCNC: 0.68 MG/DL (ref 0.6–1.3)
DOHLE BOD BLD QL SMEAR: PRESENT
EOSINOPHIL # BLD MANUAL: 0 THOUSAND/UL (ref 0–0.4)
EOSINOPHIL NFR BLD MANUAL: 0 % (ref 0–6)
ERYTHROCYTE [DISTWIDTH] IN BLOOD BY AUTOMATED COUNT: 19.4 % (ref 11.6–15.1)
GFR SERPL CREATININE-BSD FRML MDRD: 90 ML/MIN/1.73SQ M
GLUCOSE SERPL-MCNC: 103 MG/DL (ref 65–140)
GLUCOSE UR STRIP-MCNC: NEGATIVE MG/DL
HCT VFR BLD AUTO: 29.7 % (ref 34.8–46.1)
HGB BLD-MCNC: 9.3 G/DL (ref 11.5–15.4)
HGB UR QL STRIP.AUTO: ABNORMAL
HYPERCHROMIA BLD QL SMEAR: PRESENT
INR PPP: 1.32 (ref 0.84–1.19)
KETONES UR STRIP-MCNC: ABNORMAL MG/DL
LACTATE SERPL-SCNC: 1.5 MMOL/L (ref 0.5–2)
LEUKOCYTE ESTERASE UR QL STRIP: ABNORMAL
LYMPHOCYTES # BLD AUTO: 14 % (ref 14–44)
LYMPHOCYTES # BLD AUTO: 3.08 THOUSAND/UL (ref 0.6–4.47)
MCH RBC QN AUTO: 23.8 PG (ref 26.8–34.3)
MCHC RBC AUTO-ENTMCNC: 31.3 G/DL (ref 31.4–37.4)
MCV RBC AUTO: 76 FL (ref 82–98)
METAMYELOCYTES NFR BLD MANUAL: 1 % (ref 0–1)
MICROCYTES BLD QL AUTO: PRESENT
MONOCYTES # BLD AUTO: 1.32 THOUSAND/UL (ref 0–1.22)
MONOCYTES NFR BLD: 6 % (ref 4–12)
NEUTROPHILS # BLD MANUAL: 15.82 THOUSAND/UL (ref 1.85–7.62)
NEUTS BAND NFR BLD MANUAL: 2 % (ref 0–8)
NEUTS SEG NFR BLD AUTO: 70 % (ref 43–75)
NITRITE UR QL STRIP: POSITIVE
NON-SQ EPI CELLS URNS QL MICRO: ABNORMAL /HPF
NRBC BLD AUTO-RTO: 0 /100 WBCS
P AXIS: 74 DEGREES
PH UR STRIP.AUTO: 7 [PH]
PLATELET # BLD AUTO: 459 THOUSANDS/UL (ref 149–390)
PLATELET BLD QL SMEAR: ABNORMAL
PMV BLD AUTO: 10 FL (ref 8.9–12.7)
POTASSIUM SERPL-SCNC: 4.9 MMOL/L (ref 3.5–5.3)
PR INTERVAL: 120 MS
PROCALCITONIN SERPL-MCNC: 1.89 NG/ML
PROT SERPL-MCNC: 6.2 G/DL (ref 6.4–8.2)
PROT UR STRIP-MCNC: ABNORMAL MG/DL
PROTHROMBIN TIME: 16.1 SECONDS (ref 11.6–14.5)
QRS AXIS: 85 DEGREES
QRSD INTERVAL: 72 MS
QT INTERVAL: 306 MS
QTC INTERVAL: 432 MS
RBC # BLD AUTO: 3.91 MILLION/UL (ref 3.81–5.12)
RBC #/AREA URNS AUTO: ABNORMAL /HPF
RBC MORPH BLD: PRESENT
SODIUM SERPL-SCNC: 128 MMOL/L (ref 136–145)
SP GR UR STRIP.AUTO: 1.01 (ref 1–1.03)
T WAVE AXIS: 62 DEGREES
TOTAL CELLS COUNTED SPEC: 100
TOXIC GRANULES BLD QL SMEAR: PRESENT
UROBILINOGEN UR QL STRIP.AUTO: 0.2 E.U./DL
VARIANT LYMPHS # BLD AUTO: 7 %
VENTRICULAR RATE: 120 BPM
WBC # BLD AUTO: 21.97 THOUSAND/UL (ref 4.31–10.16)
WBC #/AREA URNS AUTO: ABNORMAL /HPF

## 2020-04-17 PROCEDURE — 85610 PROTHROMBIN TIME: CPT | Performed by: EMERGENCY MEDICINE

## 2020-04-17 PROCEDURE — 36415 COLL VENOUS BLD VENIPUNCTURE: CPT | Performed by: EMERGENCY MEDICINE

## 2020-04-17 PROCEDURE — 02HV33Z INSERTION OF INFUSION DEVICE INTO SUPERIOR VENA CAVA, PERCUTANEOUS APPROACH: ICD-10-PCS | Performed by: EMERGENCY MEDICINE

## 2020-04-17 PROCEDURE — 99223 1ST HOSP IP/OBS HIGH 75: CPT | Performed by: HOSPITALIST

## 2020-04-17 PROCEDURE — 83605 ASSAY OF LACTIC ACID: CPT | Performed by: EMERGENCY MEDICINE

## 2020-04-17 PROCEDURE — 80053 COMPREHEN METABOLIC PANEL: CPT | Performed by: EMERGENCY MEDICINE

## 2020-04-17 PROCEDURE — 85027 COMPLETE CBC AUTOMATED: CPT | Performed by: EMERGENCY MEDICINE

## 2020-04-17 PROCEDURE — 99285 EMERGENCY DEPT VISIT HI MDM: CPT

## 2020-04-17 PROCEDURE — 87040 BLOOD CULTURE FOR BACTERIA: CPT | Performed by: EMERGENCY MEDICINE

## 2020-04-17 PROCEDURE — 87185 SC STD ENZYME DETCJ PER NZM: CPT | Performed by: EMERGENCY MEDICINE

## 2020-04-17 PROCEDURE — 87077 CULTURE AEROBIC IDENTIFY: CPT | Performed by: HOSPITALIST

## 2020-04-17 PROCEDURE — 87205 SMEAR GRAM STAIN: CPT | Performed by: HOSPITALIST

## 2020-04-17 PROCEDURE — 87181 SC STD AGAR DILUTION PER AGT: CPT | Performed by: EMERGENCY MEDICINE

## 2020-04-17 PROCEDURE — 87086 URINE CULTURE/COLONY COUNT: CPT | Performed by: EMERGENCY MEDICINE

## 2020-04-17 PROCEDURE — 83935 ASSAY OF URINE OSMOLALITY: CPT | Performed by: HOSPITALIST

## 2020-04-17 PROCEDURE — 81001 URINALYSIS AUTO W/SCOPE: CPT | Performed by: EMERGENCY MEDICINE

## 2020-04-17 PROCEDURE — 87077 CULTURE AEROBIC IDENTIFY: CPT | Performed by: EMERGENCY MEDICINE

## 2020-04-17 PROCEDURE — 87147 CULTURE TYPE IMMUNOLOGIC: CPT | Performed by: EMERGENCY MEDICINE

## 2020-04-17 PROCEDURE — 82570 ASSAY OF URINE CREATININE: CPT | Performed by: HOSPITALIST

## 2020-04-17 PROCEDURE — 87147 CULTURE TYPE IMMUNOLOGIC: CPT | Performed by: HOSPITALIST

## 2020-04-17 PROCEDURE — 85730 THROMBOPLASTIN TIME PARTIAL: CPT | Performed by: EMERGENCY MEDICINE

## 2020-04-17 PROCEDURE — 84300 ASSAY OF URINE SODIUM: CPT | Performed by: HOSPITALIST

## 2020-04-17 PROCEDURE — 71045 X-RAY EXAM CHEST 1 VIEW: CPT

## 2020-04-17 PROCEDURE — 85007 BL SMEAR W/DIFF WBC COUNT: CPT | Performed by: EMERGENCY MEDICINE

## 2020-04-17 PROCEDURE — 36556 INSERT NON-TUNNEL CV CATH: CPT | Performed by: EMERGENCY MEDICINE

## 2020-04-17 PROCEDURE — 99285 EMERGENCY DEPT VISIT HI MDM: CPT | Performed by: EMERGENCY MEDICINE

## 2020-04-17 PROCEDURE — 93005 ELECTROCARDIOGRAM TRACING: CPT

## 2020-04-17 PROCEDURE — 87070 CULTURE OTHR SPECIMN AEROBIC: CPT | Performed by: HOSPITALIST

## 2020-04-17 PROCEDURE — 87076 CULTURE ANAEROBE IDENT EACH: CPT | Performed by: EMERGENCY MEDICINE

## 2020-04-17 PROCEDURE — 87186 SC STD MICRODIL/AGAR DIL: CPT | Performed by: HOSPITALIST

## 2020-04-17 PROCEDURE — 93010 ELECTROCARDIOGRAM REPORT: CPT | Performed by: INTERNAL MEDICINE

## 2020-04-17 PROCEDURE — 84145 PROCALCITONIN (PCT): CPT | Performed by: EMERGENCY MEDICINE

## 2020-04-17 PROCEDURE — 96360 HYDRATION IV INFUSION INIT: CPT

## 2020-04-17 PROCEDURE — 83930 ASSAY OF BLOOD OSMOLALITY: CPT | Performed by: HOSPITALIST

## 2020-04-17 RX ORDER — SODIUM CHLORIDE 9 MG/ML
100 INJECTION, SOLUTION INTRAVENOUS CONTINUOUS
Status: DISCONTINUED | OUTPATIENT
Start: 2020-04-17 | End: 2020-04-19

## 2020-04-17 RX ORDER — SACCHAROMYCES BOULARDII 250 MG
250 CAPSULE ORAL 2 TIMES DAILY
Status: DISCONTINUED | OUTPATIENT
Start: 2020-04-17 | End: 2020-04-29 | Stop reason: HOSPADM

## 2020-04-17 RX ORDER — FAMOTIDINE 20 MG/1
20 TABLET, FILM COATED ORAL 2 TIMES DAILY
Status: DISCONTINUED | OUTPATIENT
Start: 2020-04-17 | End: 2020-04-29 | Stop reason: HOSPADM

## 2020-04-17 RX ORDER — MELATONIN
1000 DAILY
Status: DISCONTINUED | OUTPATIENT
Start: 2020-04-18 | End: 2020-04-29 | Stop reason: HOSPADM

## 2020-04-17 RX ORDER — BISACODYL 10 MG
10 SUPPOSITORY, RECTAL RECTAL DAILY PRN
Status: DISCONTINUED | OUTPATIENT
Start: 2020-04-17 | End: 2020-04-29 | Stop reason: HOSPADM

## 2020-04-17 RX ORDER — ACETAMINOPHEN 325 MG/1
650 TABLET ORAL EVERY 6 HOURS PRN
Status: DISCONTINUED | OUTPATIENT
Start: 2020-04-17 | End: 2020-04-29 | Stop reason: HOSPADM

## 2020-04-17 RX ORDER — MINERAL OIL AND PETROLATUM 150; 830 MG/G; MG/G
OINTMENT OPHTHALMIC
Status: DISCONTINUED | OUTPATIENT
Start: 2020-04-17 | End: 2020-04-29 | Stop reason: HOSPADM

## 2020-04-17 RX ORDER — MIDODRINE HYDROCHLORIDE 5 MG/1
10 TABLET ORAL 2 TIMES DAILY
Status: DISCONTINUED | OUTPATIENT
Start: 2020-04-17 | End: 2020-04-29 | Stop reason: HOSPADM

## 2020-04-17 RX ORDER — B-COMPLEX WITH VITAMIN C
1 TABLET ORAL
Status: DISCONTINUED | OUTPATIENT
Start: 2020-04-18 | End: 2020-04-29 | Stop reason: HOSPADM

## 2020-04-17 RX ADMIN — SODIUM CHLORIDE 100 ML/HR: 0.9 INJECTION, SOLUTION INTRAVENOUS at 19:55

## 2020-04-17 RX ADMIN — SODIUM CHLORIDE 1000 ML: 0.9 INJECTION, SOLUTION INTRAVENOUS at 15:10

## 2020-04-17 RX ADMIN — FAMOTIDINE 20 MG: 20 TABLET ORAL at 18:53

## 2020-04-17 RX ADMIN — PIPERACILLIN AND TAZOBACTAM 3.38 G: 3; .375 INJECTION, POWDER, LYOPHILIZED, FOR SOLUTION INTRAVENOUS; PARENTERAL at 16:12

## 2020-04-17 RX ADMIN — SODIUM CHLORIDE 1000 ML: 0.9 INJECTION, SOLUTION INTRAVENOUS at 16:00

## 2020-04-17 RX ADMIN — MIDODRINE HYDROCHLORIDE 10 MG: 5 TABLET ORAL at 18:53

## 2020-04-17 RX ADMIN — Medication 250 MG: at 18:53

## 2020-04-17 RX ADMIN — PIPERACILLIN AND TAZOBACTAM 3.38 G: 3; .375 INJECTION, POWDER, LYOPHILIZED, FOR SOLUTION INTRAVENOUS; PARENTERAL at 23:08

## 2020-04-18 LAB
ALBUMIN SERPL BCP-MCNC: 0.9 G/DL (ref 3.5–5)
ALP SERPL-CCNC: 115 U/L (ref 46–116)
ALT SERPL W P-5'-P-CCNC: 30 U/L (ref 12–78)
ANION GAP SERPL CALCULATED.3IONS-SCNC: 9 MMOL/L (ref 4–13)
ANISOCYTOSIS BLD QL SMEAR: PRESENT
AST SERPL W P-5'-P-CCNC: 50 U/L (ref 5–45)
BASOPHILS # BLD MANUAL: 0 THOUSAND/UL (ref 0–0.1)
BASOPHILS NFR MAR MANUAL: 0 % (ref 0–1)
BILIRUB SERPL-MCNC: 0.4 MG/DL (ref 0.2–1)
BUN SERPL-MCNC: 11 MG/DL (ref 5–25)
CALCIUM SERPL-MCNC: 6.5 MG/DL (ref 8.3–10.1)
CHLORIDE SERPL-SCNC: 99 MMOL/L (ref 100–108)
CO2 SERPL-SCNC: 23 MMOL/L (ref 21–32)
CREAT SERPL-MCNC: 0.51 MG/DL (ref 0.6–1.3)
CREAT UR-MCNC: 57.7 MG/DL
EOSINOPHIL # BLD MANUAL: 0 THOUSAND/UL (ref 0–0.4)
EOSINOPHIL NFR BLD MANUAL: 0 % (ref 0–6)
ERYTHROCYTE [DISTWIDTH] IN BLOOD BY AUTOMATED COUNT: 19.3 % (ref 11.6–15.1)
GFR SERPL CREATININE-BSD FRML MDRD: 98 ML/MIN/1.73SQ M
GLUCOSE SERPL-MCNC: 104 MG/DL (ref 65–140)
HCT VFR BLD AUTO: 21 % (ref 34.8–46.1)
HGB BLD-MCNC: 6.6 G/DL (ref 11.5–15.4)
HYPERCHROMIA BLD QL SMEAR: PRESENT
LYMPHOCYTES # BLD AUTO: 15 % (ref 14–44)
LYMPHOCYTES # BLD AUTO: 2.15 THOUSAND/UL (ref 0.6–4.47)
MAGNESIUM SERPL-MCNC: 1.5 MG/DL (ref 1.6–2.6)
MCH RBC QN AUTO: 23.7 PG (ref 26.8–34.3)
MCHC RBC AUTO-ENTMCNC: 31.4 G/DL (ref 31.4–37.4)
MCV RBC AUTO: 76 FL (ref 82–98)
MICROCYTES BLD QL AUTO: PRESENT
MONOCYTES # BLD AUTO: 0.72 THOUSAND/UL (ref 0–1.22)
MONOCYTES NFR BLD: 5 % (ref 4–12)
NEUTROPHILS # BLD MANUAL: 11.49 THOUSAND/UL (ref 1.85–7.62)
NEUTS BAND NFR BLD MANUAL: 10 % (ref 0–8)
NEUTS SEG NFR BLD AUTO: 70 % (ref 43–75)
NRBC BLD AUTO-RTO: 0 /100 WBCS
OSMOLALITY UR/SERPL-RTO: 278 MMOL/KG (ref 282–298)
OSMOLALITY UR: 640 MMOL/KG
PHOSPHATE SERPL-MCNC: 3.6 MG/DL (ref 2.3–4.1)
PLATELET # BLD AUTO: 353 THOUSANDS/UL (ref 149–390)
PLATELET BLD QL SMEAR: ADEQUATE
PMV BLD AUTO: 10.2 FL (ref 8.9–12.7)
POLYCHROMASIA BLD QL SMEAR: PRESENT
POTASSIUM SERPL-SCNC: 3.3 MMOL/L (ref 3.5–5.3)
PROCALCITONIN SERPL-MCNC: 1.65 NG/ML
PROT SERPL-MCNC: 4.6 G/DL (ref 6.4–8.2)
RBC # BLD AUTO: 2.78 MILLION/UL (ref 3.81–5.12)
RBC MORPH BLD: PRESENT
SODIUM 24H UR-SCNC: 15 MOL/L
SODIUM SERPL-SCNC: 131 MMOL/L (ref 136–145)
TOTAL CELLS COUNTED SPEC: 100
TOXIC GRANULES BLD QL SMEAR: PRESENT
WBC # BLD AUTO: 14.36 THOUSAND/UL (ref 4.31–10.16)

## 2020-04-18 PROCEDURE — 84145 PROCALCITONIN (PCT): CPT | Performed by: EMERGENCY MEDICINE

## 2020-04-18 PROCEDURE — 83735 ASSAY OF MAGNESIUM: CPT | Performed by: NURSE PRACTITIONER

## 2020-04-18 PROCEDURE — 85007 BL SMEAR W/DIFF WBC COUNT: CPT | Performed by: NURSE PRACTITIONER

## 2020-04-18 PROCEDURE — 99222 1ST HOSP IP/OBS MODERATE 55: CPT | Performed by: INTERNAL MEDICINE

## 2020-04-18 PROCEDURE — 84100 ASSAY OF PHOSPHORUS: CPT | Performed by: NURSE PRACTITIONER

## 2020-04-18 PROCEDURE — 99232 SBSQ HOSP IP/OBS MODERATE 35: CPT | Performed by: HOSPITALIST

## 2020-04-18 PROCEDURE — 80053 COMPREHEN METABOLIC PANEL: CPT | Performed by: NURSE PRACTITIONER

## 2020-04-18 PROCEDURE — 85027 COMPLETE CBC AUTOMATED: CPT | Performed by: NURSE PRACTITIONER

## 2020-04-18 RX ORDER — POTASSIUM CHLORIDE 20 MEQ/1
40 TABLET, EXTENDED RELEASE ORAL EVERY 4 HOURS
Status: COMPLETED | OUTPATIENT
Start: 2020-04-18 | End: 2020-04-18

## 2020-04-18 RX ADMIN — PIPERACILLIN AND TAZOBACTAM 3.38 G: 3; .375 INJECTION, POWDER, LYOPHILIZED, FOR SOLUTION INTRAVENOUS; PARENTERAL at 11:07

## 2020-04-18 RX ADMIN — MIDODRINE HYDROCHLORIDE 10 MG: 5 TABLET ORAL at 09:29

## 2020-04-18 RX ADMIN — FAMOTIDINE 20 MG: 20 TABLET ORAL at 09:30

## 2020-04-18 RX ADMIN — PIPERACILLIN AND TAZOBACTAM 3.38 G: 3; .375 INJECTION, POWDER, LYOPHILIZED, FOR SOLUTION INTRAVENOUS; PARENTERAL at 06:00

## 2020-04-18 RX ADMIN — MIDODRINE HYDROCHLORIDE 10 MG: 5 TABLET ORAL at 17:05

## 2020-04-18 RX ADMIN — OYSTER SHELL CALCIUM WITH VITAMIN D 1 TABLET: 500; 200 TABLET, FILM COATED ORAL at 09:29

## 2020-04-18 RX ADMIN — POTASSIUM CHLORIDE 40 MEQ: 1500 TABLET, EXTENDED RELEASE ORAL at 21:03

## 2020-04-18 RX ADMIN — POTASSIUM CHLORIDE 40 MEQ: 1500 TABLET, EXTENDED RELEASE ORAL at 15:21

## 2020-04-18 RX ADMIN — Medication 250 MG: at 09:30

## 2020-04-18 RX ADMIN — ENOXAPARIN SODIUM 40 MG: 100 INJECTION SUBCUTANEOUS at 09:30

## 2020-04-18 RX ADMIN — SODIUM CHLORIDE 100 ML/HR: 0.9 INJECTION, SOLUTION INTRAVENOUS at 06:01

## 2020-04-18 RX ADMIN — Medication 250 MG: at 17:05

## 2020-04-18 RX ADMIN — SODIUM CHLORIDE 100 ML/HR: 0.9 INJECTION, SOLUTION INTRAVENOUS at 17:07

## 2020-04-18 RX ADMIN — PIPERACILLIN AND TAZOBACTAM 3.38 G: 3; .375 INJECTION, POWDER, LYOPHILIZED, FOR SOLUTION INTRAVENOUS; PARENTERAL at 17:05

## 2020-04-18 RX ADMIN — MELATONIN 1000 UNITS: at 09:30

## 2020-04-18 RX ADMIN — PIPERACILLIN AND TAZOBACTAM 3.38 G: 3; .375 INJECTION, POWDER, LYOPHILIZED, FOR SOLUTION INTRAVENOUS; PARENTERAL at 21:03

## 2020-04-18 RX ADMIN — FAMOTIDINE 20 MG: 20 TABLET ORAL at 17:05

## 2020-04-19 PROBLEM — A41.9 SEPSIS (HCC): Status: ACTIVE | Noted: 2020-04-19

## 2020-04-19 PROBLEM — D72.829 LEUKOCYTOSIS: Status: ACTIVE | Noted: 2020-04-19

## 2020-04-19 LAB
ABO GROUP BLD: NORMAL
ALBUMIN SERPL BCP-MCNC: 0.8 G/DL (ref 3.5–5)
ALP SERPL-CCNC: 123 U/L (ref 46–116)
ALT SERPL W P-5'-P-CCNC: 38 U/L (ref 12–78)
ANION GAP SERPL CALCULATED.3IONS-SCNC: 9 MMOL/L (ref 4–13)
AST SERPL W P-5'-P-CCNC: 68 U/L (ref 5–45)
BACTERIA UR CULT: NORMAL
BILIRUB SERPL-MCNC: 0.2 MG/DL (ref 0.2–1)
BLD GP AB SCN SERPL QL: NEGATIVE
BUN SERPL-MCNC: 10 MG/DL (ref 5–25)
CALCIUM SERPL-MCNC: 7 MG/DL (ref 8.3–10.1)
CHLORIDE SERPL-SCNC: 102 MMOL/L (ref 100–108)
CO2 SERPL-SCNC: 22 MMOL/L (ref 21–32)
CREAT SERPL-MCNC: 0.61 MG/DL (ref 0.6–1.3)
ERYTHROCYTE [DISTWIDTH] IN BLOOD BY AUTOMATED COUNT: 19.4 % (ref 11.6–15.1)
GFR SERPL CREATININE-BSD FRML MDRD: 93 ML/MIN/1.73SQ M
GLUCOSE SERPL-MCNC: 159 MG/DL (ref 65–140)
HCT VFR BLD AUTO: 21.7 % (ref 34.8–46.1)
HGB BLD-MCNC: 6.6 G/DL (ref 11.5–15.4)
MCH RBC QN AUTO: 23.3 PG (ref 26.8–34.3)
MCHC RBC AUTO-ENTMCNC: 30.4 G/DL (ref 31.4–37.4)
MCV RBC AUTO: 77 FL (ref 82–98)
NRBC BLD AUTO-RTO: 0 /100 WBCS
PLATELET # BLD AUTO: 370 THOUSANDS/UL (ref 149–390)
PMV BLD AUTO: 10.4 FL (ref 8.9–12.7)
POTASSIUM SERPL-SCNC: 4 MMOL/L (ref 3.5–5.3)
PROT SERPL-MCNC: 4.9 G/DL (ref 6.4–8.2)
RBC # BLD AUTO: 2.83 MILLION/UL (ref 3.81–5.12)
RH BLD: POSITIVE
SODIUM SERPL-SCNC: 133 MMOL/L (ref 136–145)
SPECIMEN EXPIRATION DATE: NORMAL
WBC # BLD AUTO: 13.78 THOUSAND/UL (ref 4.31–10.16)

## 2020-04-19 PROCEDURE — 86900 BLOOD TYPING SEROLOGIC ABO: CPT | Performed by: PHYSICIAN ASSISTANT

## 2020-04-19 PROCEDURE — 86901 BLOOD TYPING SEROLOGIC RH(D): CPT | Performed by: PHYSICIAN ASSISTANT

## 2020-04-19 PROCEDURE — 30243N1 TRANSFUSION OF NONAUTOLOGOUS RED BLOOD CELLS INTO CENTRAL VEIN, PERCUTANEOUS APPROACH: ICD-10-PCS | Performed by: HOSPITALIST

## 2020-04-19 PROCEDURE — 86850 RBC ANTIBODY SCREEN: CPT | Performed by: PHYSICIAN ASSISTANT

## 2020-04-19 PROCEDURE — 99232 SBSQ HOSP IP/OBS MODERATE 35: CPT | Performed by: HOSPITALIST

## 2020-04-19 PROCEDURE — 80053 COMPREHEN METABOLIC PANEL: CPT | Performed by: PHYSICIAN ASSISTANT

## 2020-04-19 PROCEDURE — 99232 SBSQ HOSP IP/OBS MODERATE 35: CPT | Performed by: INTERNAL MEDICINE

## 2020-04-19 PROCEDURE — P9016 RBC LEUKOCYTES REDUCED: HCPCS

## 2020-04-19 PROCEDURE — 85027 COMPLETE CBC AUTOMATED: CPT | Performed by: PHYSICIAN ASSISTANT

## 2020-04-19 PROCEDURE — 86920 COMPATIBILITY TEST SPIN: CPT

## 2020-04-19 RX ADMIN — MIDODRINE HYDROCHLORIDE 10 MG: 5 TABLET ORAL at 09:49

## 2020-04-19 RX ADMIN — MIDODRINE HYDROCHLORIDE 10 MG: 5 TABLET ORAL at 17:33

## 2020-04-19 RX ADMIN — PIPERACILLIN AND TAZOBACTAM 3.38 G: 3; .375 INJECTION, POWDER, LYOPHILIZED, FOR SOLUTION INTRAVENOUS; PARENTERAL at 22:40

## 2020-04-19 RX ADMIN — PIPERACILLIN AND TAZOBACTAM 3.38 G: 3; .375 INJECTION, POWDER, LYOPHILIZED, FOR SOLUTION INTRAVENOUS; PARENTERAL at 03:56

## 2020-04-19 RX ADMIN — FAMOTIDINE 20 MG: 20 TABLET ORAL at 17:33

## 2020-04-19 RX ADMIN — Medication 250 MG: at 17:33

## 2020-04-19 RX ADMIN — PIPERACILLIN AND TAZOBACTAM 3.38 G: 3; .375 INJECTION, POWDER, LYOPHILIZED, FOR SOLUTION INTRAVENOUS; PARENTERAL at 10:00

## 2020-04-19 RX ADMIN — FAMOTIDINE 20 MG: 20 TABLET ORAL at 09:49

## 2020-04-19 RX ADMIN — OYSTER SHELL CALCIUM WITH VITAMIN D 1 TABLET: 500; 200 TABLET, FILM COATED ORAL at 09:49

## 2020-04-19 RX ADMIN — Medication 250 MG: at 09:49

## 2020-04-19 RX ADMIN — WHITE PETROLATUM 57.7 %-MINERAL OIL 31.9 % EYE OINTMENT: at 22:40

## 2020-04-19 RX ADMIN — PIPERACILLIN AND TAZOBACTAM 3.38 G: 3; .375 INJECTION, POWDER, LYOPHILIZED, FOR SOLUTION INTRAVENOUS; PARENTERAL at 17:00

## 2020-04-19 RX ADMIN — MELATONIN 1000 UNITS: at 09:49

## 2020-04-19 RX ADMIN — SODIUM CHLORIDE 100 ML/HR: 0.9 INJECTION, SOLUTION INTRAVENOUS at 03:57

## 2020-04-19 RX ADMIN — ENOXAPARIN SODIUM 40 MG: 100 INJECTION SUBCUTANEOUS at 09:49

## 2020-04-20 PROBLEM — E87.20 METABOLIC ACIDOSIS: Status: RESOLVED | Noted: 2020-04-17 | Resolved: 2020-04-20

## 2020-04-20 PROBLEM — E87.2 METABOLIC ACIDOSIS: Status: RESOLVED | Noted: 2020-04-17 | Resolved: 2020-04-20

## 2020-04-20 LAB
ABO GROUP BLD BPU: NORMAL
ABO GROUP BLD BPU: NORMAL
ANION GAP SERPL CALCULATED.3IONS-SCNC: 7 MMOL/L (ref 4–13)
BPU ID: NORMAL
BPU ID: NORMAL
BUN SERPL-MCNC: 8 MG/DL (ref 5–25)
CALCIUM SERPL-MCNC: 7.2 MG/DL (ref 8.3–10.1)
CHLORIDE SERPL-SCNC: 101 MMOL/L (ref 100–108)
CO2 SERPL-SCNC: 26 MMOL/L (ref 21–32)
CREAT SERPL-MCNC: 0.49 MG/DL (ref 0.6–1.3)
CROSSMATCH: NORMAL
CROSSMATCH: NORMAL
ERYTHROCYTE [DISTWIDTH] IN BLOOD BY AUTOMATED COUNT: 18.1 % (ref 11.6–15.1)
GFR SERPL CREATININE-BSD FRML MDRD: 100 ML/MIN/1.73SQ M
GLUCOSE SERPL-MCNC: 100 MG/DL (ref 65–140)
HCT VFR BLD AUTO: 29.8 % (ref 34.8–46.1)
HGB BLD-MCNC: 9.9 G/DL (ref 11.5–15.4)
MCH RBC QN AUTO: 26.2 PG (ref 26.8–34.3)
MCHC RBC AUTO-ENTMCNC: 33.2 G/DL (ref 31.4–37.4)
MCV RBC AUTO: 79 FL (ref 82–98)
PLATELET # BLD AUTO: 315 THOUSANDS/UL (ref 149–390)
PMV BLD AUTO: 9.6 FL (ref 8.9–12.7)
POTASSIUM SERPL-SCNC: 3.2 MMOL/L (ref 3.5–5.3)
RBC # BLD AUTO: 3.78 MILLION/UL (ref 3.81–5.12)
SODIUM SERPL-SCNC: 134 MMOL/L (ref 136–145)
UNIT DISPENSE STATUS: NORMAL
UNIT DISPENSE STATUS: NORMAL
UNIT PRODUCT CODE: NORMAL
UNIT PRODUCT CODE: NORMAL
UNIT RH: NORMAL
UNIT RH: NORMAL
WBC # BLD AUTO: 12.12 THOUSAND/UL (ref 4.31–10.16)

## 2020-04-20 PROCEDURE — 99233 SBSQ HOSP IP/OBS HIGH 50: CPT | Performed by: PHYSICIAN ASSISTANT

## 2020-04-20 PROCEDURE — 80048 BASIC METABOLIC PNL TOTAL CA: CPT | Performed by: PHYSICIAN ASSISTANT

## 2020-04-20 PROCEDURE — 85027 COMPLETE CBC AUTOMATED: CPT | Performed by: PHYSICIAN ASSISTANT

## 2020-04-20 PROCEDURE — 99232 SBSQ HOSP IP/OBS MODERATE 35: CPT | Performed by: INTERNAL MEDICINE

## 2020-04-20 RX ORDER — SODIUM HYPOCHLORITE 2.5 MG/ML
1 SOLUTION TOPICAL DAILY
Status: DISCONTINUED | OUTPATIENT
Start: 2020-04-20 | End: 2020-04-29 | Stop reason: HOSPADM

## 2020-04-20 RX ORDER — MAGNESIUM SULFATE 1 G/100ML
1 INJECTION INTRAVENOUS ONCE
Status: COMPLETED | OUTPATIENT
Start: 2020-04-20 | End: 2020-04-20

## 2020-04-20 RX ORDER — POTASSIUM CHLORIDE 20 MEQ/1
40 TABLET, EXTENDED RELEASE ORAL ONCE
Status: COMPLETED | OUTPATIENT
Start: 2020-04-20 | End: 2020-04-20

## 2020-04-20 RX ORDER — SENNOSIDES 8.6 MG
1 TABLET ORAL
Status: DISCONTINUED | OUTPATIENT
Start: 2020-04-20 | End: 2020-04-22

## 2020-04-20 RX ADMIN — FAMOTIDINE 20 MG: 20 TABLET ORAL at 18:30

## 2020-04-20 RX ADMIN — VANCOMYCIN HYDROCHLORIDE 125 MG: 500 INJECTION, POWDER, LYOPHILIZED, FOR SOLUTION INTRAVENOUS at 21:55

## 2020-04-20 RX ADMIN — DAPTOMYCIN 300 MG: 500 INJECTION, POWDER, LYOPHILIZED, FOR SOLUTION INTRAVENOUS at 12:52

## 2020-04-20 RX ADMIN — VANCOMYCIN HYDROCHLORIDE 125 MG: 500 INJECTION, POWDER, LYOPHILIZED, FOR SOLUTION INTRAVENOUS at 11:57

## 2020-04-20 RX ADMIN — OYSTER SHELL CALCIUM WITH VITAMIN D 1 TABLET: 500; 200 TABLET, FILM COATED ORAL at 09:24

## 2020-04-20 RX ADMIN — MAGNESIUM SULFATE HEPTAHYDRATE 1 G: 1 INJECTION, SOLUTION INTRAVENOUS at 12:11

## 2020-04-20 RX ADMIN — MIDODRINE HYDROCHLORIDE 10 MG: 5 TABLET ORAL at 09:24

## 2020-04-20 RX ADMIN — SODIUM CHLORIDE 3 G: 900 INJECTION INTRAVENOUS at 12:24

## 2020-04-20 RX ADMIN — PIPERACILLIN AND TAZOBACTAM 3.38 G: 3; .375 INJECTION, POWDER, LYOPHILIZED, FOR SOLUTION INTRAVENOUS; PARENTERAL at 12:40

## 2020-04-20 RX ADMIN — SODIUM CHLORIDE 3 G: 900 INJECTION INTRAVENOUS at 23:46

## 2020-04-20 RX ADMIN — Medication 250 MG: at 18:30

## 2020-04-20 RX ADMIN — MIDODRINE HYDROCHLORIDE 10 MG: 5 TABLET ORAL at 18:30

## 2020-04-20 RX ADMIN — SODIUM CHLORIDE 3 G: 900 INJECTION INTRAVENOUS at 18:00

## 2020-04-20 RX ADMIN — MELATONIN 1000 UNITS: at 09:24

## 2020-04-20 RX ADMIN — Medication 250 MG: at 09:24

## 2020-04-20 RX ADMIN — WHITE PETROLATUM 57.7 %-MINERAL OIL 31.9 % EYE OINTMENT: at 21:55

## 2020-04-20 RX ADMIN — FAMOTIDINE 20 MG: 20 TABLET ORAL at 09:23

## 2020-04-20 RX ADMIN — ENOXAPARIN SODIUM 40 MG: 100 INJECTION SUBCUTANEOUS at 09:20

## 2020-04-20 RX ADMIN — POTASSIUM CHLORIDE 40 MEQ: 1500 TABLET, EXTENDED RELEASE ORAL at 09:21

## 2020-04-20 RX ADMIN — PIPERACILLIN AND TAZOBACTAM 3.38 G: 3; .375 INJECTION, POWDER, LYOPHILIZED, FOR SOLUTION INTRAVENOUS; PARENTERAL at 05:26

## 2020-04-21 PROBLEM — E87.6 HYPOKALEMIA: Status: RESOLVED | Noted: 2018-09-19 | Resolved: 2020-04-21

## 2020-04-21 LAB
ANION GAP SERPL CALCULATED.3IONS-SCNC: 5 MMOL/L (ref 4–13)
ANISOCYTOSIS BLD QL SMEAR: PRESENT
BASOPHILS # BLD MANUAL: 0 THOUSAND/UL (ref 0–0.1)
BASOPHILS NFR MAR MANUAL: 0 % (ref 0–1)
BUN SERPL-MCNC: 7 MG/DL (ref 5–25)
CALCIUM SERPL-MCNC: 7.4 MG/DL (ref 8.3–10.1)
CHLORIDE SERPL-SCNC: 99 MMOL/L (ref 100–108)
CO2 SERPL-SCNC: 28 MMOL/L (ref 21–32)
CREAT SERPL-MCNC: 0.32 MG/DL (ref 0.6–1.3)
EOSINOPHIL # BLD MANUAL: 0.34 THOUSAND/UL (ref 0–0.4)
EOSINOPHIL NFR BLD MANUAL: 3 % (ref 0–6)
ERYTHROCYTE [DISTWIDTH] IN BLOOD BY AUTOMATED COUNT: 19.2 % (ref 11.6–15.1)
GFR SERPL CREATININE-BSD FRML MDRD: 115 ML/MIN/1.73SQ M
GLUCOSE SERPL-MCNC: 92 MG/DL (ref 65–140)
HCT VFR BLD AUTO: 29.2 % (ref 34.8–46.1)
HGB BLD-MCNC: 9.6 G/DL (ref 11.5–15.4)
HYPERCHROMIA BLD QL SMEAR: PRESENT
LYMPHOCYTES # BLD AUTO: 1.61 THOUSAND/UL (ref 0.6–4.47)
LYMPHOCYTES # BLD AUTO: 14 % (ref 14–44)
MAGNESIUM SERPL-MCNC: 1.9 MG/DL (ref 1.6–2.6)
MCH RBC QN AUTO: 26.4 PG (ref 26.8–34.3)
MCHC RBC AUTO-ENTMCNC: 32.9 G/DL (ref 31.4–37.4)
MCV RBC AUTO: 80 FL (ref 82–98)
METAMYELOCYTES NFR BLD MANUAL: 2 % (ref 0–1)
MONOCYTES # BLD AUTO: 0.92 THOUSAND/UL (ref 0–1.22)
MONOCYTES NFR BLD: 8 % (ref 4–12)
MYELOCYTES NFR BLD MANUAL: 1 % (ref 0–1)
NEUTROPHILS # BLD MANUAL: 8.14 THOUSAND/UL (ref 1.85–7.62)
NEUTS BAND NFR BLD MANUAL: 3 % (ref 0–8)
NEUTS SEG NFR BLD AUTO: 68 % (ref 43–75)
NRBC BLD AUTO-RTO: 0 /100 WBCS
PLATELET # BLD AUTO: 342 THOUSANDS/UL (ref 149–390)
PLATELET BLD QL SMEAR: ADEQUATE
PMV BLD AUTO: 9.6 FL (ref 8.9–12.7)
POLYCHROMASIA BLD QL SMEAR: PRESENT
POTASSIUM SERPL-SCNC: 3.8 MMOL/L (ref 3.5–5.3)
RBC # BLD AUTO: 3.63 MILLION/UL (ref 3.81–5.12)
RBC MORPH BLD: PRESENT
SODIUM SERPL-SCNC: 132 MMOL/L (ref 136–145)
TOTAL CELLS COUNTED SPEC: 100
VARIANT LYMPHS # BLD AUTO: 1 %
WBC # BLD AUTO: 11.47 THOUSAND/UL (ref 4.31–10.16)

## 2020-04-21 PROCEDURE — 85007 BL SMEAR W/DIFF WBC COUNT: CPT | Performed by: PHYSICIAN ASSISTANT

## 2020-04-21 PROCEDURE — 83735 ASSAY OF MAGNESIUM: CPT | Performed by: NURSE PRACTITIONER

## 2020-04-21 PROCEDURE — 80048 BASIC METABOLIC PNL TOTAL CA: CPT | Performed by: PHYSICIAN ASSISTANT

## 2020-04-21 PROCEDURE — 99232 SBSQ HOSP IP/OBS MODERATE 35: CPT | Performed by: INTERNAL MEDICINE

## 2020-04-21 PROCEDURE — 85027 COMPLETE CBC AUTOMATED: CPT | Performed by: PHYSICIAN ASSISTANT

## 2020-04-21 PROCEDURE — 99222 1ST HOSP IP/OBS MODERATE 55: CPT | Performed by: PHYSICIAN ASSISTANT

## 2020-04-21 PROCEDURE — 87040 BLOOD CULTURE FOR BACTERIA: CPT | Performed by: INTERNAL MEDICINE

## 2020-04-21 RX ORDER — SODIUM CHLORIDE 1000 MG
1 TABLET, SOLUBLE MISCELLANEOUS ONCE
Status: COMPLETED | OUTPATIENT
Start: 2020-04-21 | End: 2020-04-21

## 2020-04-21 RX ADMIN — MIDODRINE HYDROCHLORIDE 10 MG: 5 TABLET ORAL at 17:39

## 2020-04-21 RX ADMIN — MELATONIN 1000 UNITS: at 11:04

## 2020-04-21 RX ADMIN — HYOSCYAMINE SULFATE 1 APPLICATION: 16 SOLUTION at 17:35

## 2020-04-21 RX ADMIN — SODIUM CHLORIDE TAB 1 GM 1 G: 1 TAB at 10:46

## 2020-04-21 RX ADMIN — OYSTER SHELL CALCIUM WITH VITAMIN D 1 TABLET: 500; 200 TABLET, FILM COATED ORAL at 10:34

## 2020-04-21 RX ADMIN — VANCOMYCIN HYDROCHLORIDE 125 MG: 500 INJECTION, POWDER, LYOPHILIZED, FOR SOLUTION INTRAVENOUS at 21:37

## 2020-04-21 RX ADMIN — ENOXAPARIN SODIUM 40 MG: 100 INJECTION SUBCUTANEOUS at 10:45

## 2020-04-21 RX ADMIN — MIDODRINE HYDROCHLORIDE 10 MG: 5 TABLET ORAL at 10:43

## 2020-04-21 RX ADMIN — FAMOTIDINE 20 MG: 20 TABLET ORAL at 10:44

## 2020-04-21 RX ADMIN — DAPTOMYCIN 300 MG: 500 INJECTION, POWDER, LYOPHILIZED, FOR SOLUTION INTRAVENOUS at 12:05

## 2020-04-21 RX ADMIN — SODIUM CHLORIDE 3 G: 900 INJECTION INTRAVENOUS at 17:39

## 2020-04-21 RX ADMIN — SODIUM CHLORIDE 500 ML: 0.9 INJECTION, SOLUTION INTRAVENOUS at 16:16

## 2020-04-21 RX ADMIN — VANCOMYCIN HYDROCHLORIDE 125 MG: 500 INJECTION, POWDER, LYOPHILIZED, FOR SOLUTION INTRAVENOUS at 10:42

## 2020-04-21 RX ADMIN — Medication 250 MG: at 17:39

## 2020-04-21 RX ADMIN — SODIUM CHLORIDE 3 G: 900 INJECTION INTRAVENOUS at 23:33

## 2020-04-21 RX ADMIN — Medication 250 MG: at 11:04

## 2020-04-21 RX ADMIN — SODIUM CHLORIDE 3 G: 900 INJECTION INTRAVENOUS at 07:01

## 2020-04-21 RX ADMIN — SODIUM CHLORIDE 3 G: 900 INJECTION INTRAVENOUS at 10:26

## 2020-04-21 RX ADMIN — FAMOTIDINE 20 MG: 20 TABLET ORAL at 17:39

## 2020-04-22 PROBLEM — E88.09 HYPOALBUMINEMIA: Status: ACTIVE | Noted: 2020-04-22

## 2020-04-22 PROBLEM — R78.81 BACTEREMIA: Status: ACTIVE | Noted: 2020-04-17

## 2020-04-22 LAB
ANION GAP SERPL CALCULATED.3IONS-SCNC: 4 MMOL/L (ref 4–13)
BASOPHILS # BLD AUTO: 0.05 THOUSANDS/ΜL (ref 0–0.1)
BASOPHILS NFR BLD AUTO: 1 % (ref 0–1)
BUN SERPL-MCNC: 8 MG/DL (ref 5–25)
CALCIUM SERPL-MCNC: 7.3 MG/DL (ref 8.3–10.1)
CHLORIDE SERPL-SCNC: 100 MMOL/L (ref 100–108)
CO2 SERPL-SCNC: 30 MMOL/L (ref 21–32)
CREAT SERPL-MCNC: 0.48 MG/DL (ref 0.6–1.3)
EOSINOPHIL # BLD AUTO: 0.21 THOUSAND/ΜL (ref 0–0.61)
EOSINOPHIL NFR BLD AUTO: 2 % (ref 0–6)
ERYTHROCYTE [DISTWIDTH] IN BLOOD BY AUTOMATED COUNT: 21.1 % (ref 11.6–15.1)
GFR SERPL CREATININE-BSD FRML MDRD: 100 ML/MIN/1.73SQ M
GLUCOSE SERPL-MCNC: 126 MG/DL (ref 65–140)
HCT VFR BLD AUTO: 26.3 % (ref 34.8–46.1)
HGB BLD-MCNC: 8.8 G/DL (ref 11.5–15.4)
IMM GRANULOCYTES # BLD AUTO: 0.17 THOUSAND/UL (ref 0–0.2)
IMM GRANULOCYTES NFR BLD AUTO: 2 % (ref 0–2)
LYMPHOCYTES # BLD AUTO: 1.91 THOUSANDS/ΜL (ref 0.6–4.47)
LYMPHOCYTES NFR BLD AUTO: 21 % (ref 14–44)
MCH RBC QN AUTO: 27.2 PG (ref 26.8–34.3)
MCHC RBC AUTO-ENTMCNC: 33.5 G/DL (ref 31.4–37.4)
MCV RBC AUTO: 81 FL (ref 82–98)
MONOCYTES # BLD AUTO: 0.96 THOUSAND/ΜL (ref 0.17–1.22)
MONOCYTES NFR BLD AUTO: 10 % (ref 4–12)
NEUTROPHILS # BLD AUTO: 5.9 THOUSANDS/ΜL (ref 1.85–7.62)
NEUTS SEG NFR BLD AUTO: 64 % (ref 43–75)
NRBC BLD AUTO-RTO: 0 /100 WBCS
PLATELET # BLD AUTO: 477 THOUSANDS/UL (ref 149–390)
PMV BLD AUTO: 10.6 FL (ref 8.9–12.7)
POTASSIUM SERPL-SCNC: 3.4 MMOL/L (ref 3.5–5.3)
RBC # BLD AUTO: 3.23 MILLION/UL (ref 3.81–5.12)
SODIUM SERPL-SCNC: 134 MMOL/L (ref 136–145)
WBC # BLD AUTO: 9.2 THOUSAND/UL (ref 4.31–10.16)

## 2020-04-22 PROCEDURE — 97163 PT EVAL HIGH COMPLEX 45 MIN: CPT

## 2020-04-22 PROCEDURE — 85025 COMPLETE CBC W/AUTO DIFF WBC: CPT | Performed by: INTERNAL MEDICINE

## 2020-04-22 PROCEDURE — 80048 BASIC METABOLIC PNL TOTAL CA: CPT | Performed by: INTERNAL MEDICINE

## 2020-04-22 PROCEDURE — 99232 SBSQ HOSP IP/OBS MODERATE 35: CPT | Performed by: INTERNAL MEDICINE

## 2020-04-22 PROCEDURE — 97167 OT EVAL HIGH COMPLEX 60 MIN: CPT

## 2020-04-22 RX ORDER — POTASSIUM CHLORIDE 20 MEQ/1
40 TABLET, EXTENDED RELEASE ORAL ONCE
Status: COMPLETED | OUTPATIENT
Start: 2020-04-22 | End: 2020-04-22

## 2020-04-22 RX ORDER — LOPERAMIDE HYDROCHLORIDE 2 MG/1
2 CAPSULE ORAL 4 TIMES DAILY PRN
Status: DISCONTINUED | OUTPATIENT
Start: 2020-04-22 | End: 2020-04-22

## 2020-04-22 RX ADMIN — POTASSIUM CHLORIDE 40 MEQ: 1500 TABLET, EXTENDED RELEASE ORAL at 16:52

## 2020-04-22 RX ADMIN — Medication 250 MG: at 08:27

## 2020-04-22 RX ADMIN — MELATONIN 1000 UNITS: at 08:26

## 2020-04-22 RX ADMIN — LOPERAMIDE HYDROCHLORIDE 2 MG: 2 CAPSULE ORAL at 10:01

## 2020-04-22 RX ADMIN — MIDODRINE HYDROCHLORIDE 10 MG: 5 TABLET ORAL at 08:26

## 2020-04-22 RX ADMIN — FAMOTIDINE 20 MG: 20 TABLET ORAL at 08:26

## 2020-04-22 RX ADMIN — OYSTER SHELL CALCIUM WITH VITAMIN D 1 TABLET: 500; 200 TABLET, FILM COATED ORAL at 08:26

## 2020-04-22 RX ADMIN — VANCOMYCIN HYDROCHLORIDE 125 MG: 500 INJECTION, POWDER, LYOPHILIZED, FOR SOLUTION INTRAVENOUS at 08:25

## 2020-04-22 RX ADMIN — ENOXAPARIN SODIUM 40 MG: 100 INJECTION SUBCUTANEOUS at 08:23

## 2020-04-22 RX ADMIN — HYOSCYAMINE SULFATE 1 APPLICATION: 16 SOLUTION at 10:48

## 2020-04-22 RX ADMIN — SODIUM CHLORIDE 500 ML: 0.9 INJECTION, SOLUTION INTRAVENOUS at 11:39

## 2020-04-22 RX ADMIN — MIDODRINE HYDROCHLORIDE 10 MG: 5 TABLET ORAL at 17:00

## 2020-04-22 RX ADMIN — Medication 250 MG: at 17:00

## 2020-04-22 RX ADMIN — SODIUM CHLORIDE 3 G: 900 INJECTION INTRAVENOUS at 11:42

## 2020-04-22 RX ADMIN — SODIUM CHLORIDE 3 G: 900 INJECTION INTRAVENOUS at 05:51

## 2020-04-22 RX ADMIN — VANCOMYCIN HYDROCHLORIDE 125 MG: 500 INJECTION, POWDER, LYOPHILIZED, FOR SOLUTION INTRAVENOUS at 21:50

## 2020-04-22 RX ADMIN — FAMOTIDINE 20 MG: 20 TABLET ORAL at 17:00

## 2020-04-22 RX ADMIN — SODIUM CHLORIDE 3 G: 900 INJECTION INTRAVENOUS at 16:46

## 2020-04-23 PROBLEM — E87.6 HYPOKALEMIA: Status: RESOLVED | Noted: 2018-09-19 | Resolved: 2020-04-23

## 2020-04-23 LAB
ALBUMIN SERPL BCP-MCNC: 0.8 G/DL (ref 3.5–5)
ALP SERPL-CCNC: 164 U/L (ref 46–116)
ALT SERPL W P-5'-P-CCNC: 42 U/L (ref 12–78)
ANION GAP SERPL CALCULATED.3IONS-SCNC: 5 MMOL/L (ref 4–13)
AST SERPL W P-5'-P-CCNC: 40 U/L (ref 5–45)
BASOPHILS # BLD AUTO: 0.05 THOUSANDS/ΜL (ref 0–0.1)
BASOPHILS NFR BLD AUTO: 1 % (ref 0–1)
BILIRUB SERPL-MCNC: 0.2 MG/DL (ref 0.2–1)
BUN SERPL-MCNC: 9 MG/DL (ref 5–25)
CALCIUM SERPL-MCNC: 7.2 MG/DL (ref 8.3–10.1)
CHLORIDE SERPL-SCNC: 100 MMOL/L (ref 100–108)
CHLORIDE UR-SCNC: 178 MMOL/L
CO2 SERPL-SCNC: 27 MMOL/L (ref 21–32)
CREAT SERPL-MCNC: 0.53 MG/DL (ref 0.6–1.3)
EOSINOPHIL # BLD AUTO: 0.21 THOUSAND/ΜL (ref 0–0.61)
EOSINOPHIL NFR BLD AUTO: 2 % (ref 0–6)
ERYTHROCYTE [DISTWIDTH] IN BLOOD BY AUTOMATED COUNT: 21.2 % (ref 11.6–15.1)
GFR SERPL CREATININE-BSD FRML MDRD: 97 ML/MIN/1.73SQ M
GLUCOSE SERPL-MCNC: 89 MG/DL (ref 65–140)
HCT VFR BLD AUTO: 27.6 % (ref 34.8–46.1)
HGB BLD-MCNC: 8.8 G/DL (ref 11.5–15.4)
IMM GRANULOCYTES # BLD AUTO: 0.11 THOUSAND/UL (ref 0–0.2)
IMM GRANULOCYTES NFR BLD AUTO: 1 % (ref 0–2)
LYMPHOCYTES # BLD AUTO: 2.07 THOUSANDS/ΜL (ref 0.6–4.47)
LYMPHOCYTES NFR BLD AUTO: 23 % (ref 14–44)
MAGNESIUM SERPL-MCNC: 1.9 MG/DL (ref 1.6–2.6)
MCH RBC QN AUTO: 26.6 PG (ref 26.8–34.3)
MCHC RBC AUTO-ENTMCNC: 31.9 G/DL (ref 31.4–37.4)
MCV RBC AUTO: 83 FL (ref 82–98)
MONOCYTES # BLD AUTO: 0.96 THOUSAND/ΜL (ref 0.17–1.22)
MONOCYTES NFR BLD AUTO: 11 % (ref 4–12)
NEUTROPHILS # BLD AUTO: 5.7 THOUSANDS/ΜL (ref 1.85–7.62)
NEUTS SEG NFR BLD AUTO: 62 % (ref 43–75)
NRBC BLD AUTO-RTO: 0 /100 WBCS
OSMOLALITY UR: 555 MMOL/KG
PLATELET # BLD AUTO: 416 THOUSANDS/UL (ref 149–390)
PMV BLD AUTO: 9.7 FL (ref 8.9–12.7)
POTASSIUM SERPL-SCNC: 3.9 MMOL/L (ref 3.5–5.3)
PROT SERPL-MCNC: 5.1 G/DL (ref 6.4–8.2)
RBC # BLD AUTO: 3.31 MILLION/UL (ref 3.81–5.12)
SODIUM 24H UR-SCNC: 175 MOL/L
SODIUM SERPL-SCNC: 132 MMOL/L (ref 136–145)
WBC # BLD AUTO: 9.1 THOUSAND/UL (ref 4.31–10.16)

## 2020-04-23 PROCEDURE — 82436 ASSAY OF URINE CHLORIDE: CPT | Performed by: INTERNAL MEDICINE

## 2020-04-23 PROCEDURE — 80053 COMPREHEN METABOLIC PANEL: CPT | Performed by: INTERNAL MEDICINE

## 2020-04-23 PROCEDURE — 83735 ASSAY OF MAGNESIUM: CPT | Performed by: INTERNAL MEDICINE

## 2020-04-23 PROCEDURE — 85025 COMPLETE CBC W/AUTO DIFF WBC: CPT | Performed by: INTERNAL MEDICINE

## 2020-04-23 PROCEDURE — 84300 ASSAY OF URINE SODIUM: CPT | Performed by: INTERNAL MEDICINE

## 2020-04-23 PROCEDURE — 99232 SBSQ HOSP IP/OBS MODERATE 35: CPT | Performed by: INTERNAL MEDICINE

## 2020-04-23 PROCEDURE — 83935 ASSAY OF URINE OSMOLALITY: CPT | Performed by: INTERNAL MEDICINE

## 2020-04-23 RX ORDER — SODIUM CHLORIDE 1000 MG
1 TABLET, SOLUBLE MISCELLANEOUS 2 TIMES DAILY WITH MEALS
Status: DISCONTINUED | OUTPATIENT
Start: 2020-04-23 | End: 2020-04-25

## 2020-04-23 RX ADMIN — SODIUM CHLORIDE TAB 1 GM 1 G: 1 TAB at 15:38

## 2020-04-23 RX ADMIN — VANCOMYCIN HYDROCHLORIDE 125 MG: 500 INJECTION, POWDER, LYOPHILIZED, FOR SOLUTION INTRAVENOUS at 21:59

## 2020-04-23 RX ADMIN — VANCOMYCIN HYDROCHLORIDE 125 MG: 500 INJECTION, POWDER, LYOPHILIZED, FOR SOLUTION INTRAVENOUS at 08:55

## 2020-04-23 RX ADMIN — MIDODRINE HYDROCHLORIDE 10 MG: 5 TABLET ORAL at 08:56

## 2020-04-23 RX ADMIN — FAMOTIDINE 20 MG: 20 TABLET ORAL at 08:45

## 2020-04-23 RX ADMIN — OYSTER SHELL CALCIUM WITH VITAMIN D 1 TABLET: 500; 200 TABLET, FILM COATED ORAL at 08:56

## 2020-04-23 RX ADMIN — FAMOTIDINE 20 MG: 20 TABLET ORAL at 17:20

## 2020-04-23 RX ADMIN — ENOXAPARIN SODIUM 40 MG: 100 INJECTION SUBCUTANEOUS at 08:57

## 2020-04-23 RX ADMIN — Medication 250 MG: at 17:20

## 2020-04-23 RX ADMIN — SODIUM CHLORIDE 3 G: 900 INJECTION INTRAVENOUS at 16:34

## 2020-04-23 RX ADMIN — MIDODRINE HYDROCHLORIDE 10 MG: 5 TABLET ORAL at 17:20

## 2020-04-23 RX ADMIN — SODIUM CHLORIDE 3 G: 900 INJECTION INTRAVENOUS at 06:05

## 2020-04-23 RX ADMIN — Medication 250 MG: at 08:44

## 2020-04-23 RX ADMIN — HYOSCYAMINE SULFATE 1 APPLICATION: 16 SOLUTION at 16:23

## 2020-04-23 RX ADMIN — MELATONIN 1000 UNITS: at 08:45

## 2020-04-23 RX ADMIN — SODIUM CHLORIDE 3 G: 900 INJECTION INTRAVENOUS at 00:26

## 2020-04-23 RX ADMIN — SODIUM CHLORIDE 3 G: 900 INJECTION INTRAVENOUS at 11:30

## 2020-04-24 LAB
ANION GAP SERPL CALCULATED.3IONS-SCNC: 4 MMOL/L (ref 4–13)
BACTERIA BLD CULT: ABNORMAL
BASOPHILS # BLD AUTO: 0.05 THOUSANDS/ΜL (ref 0–0.1)
BASOPHILS NFR BLD AUTO: 1 % (ref 0–1)
BUN SERPL-MCNC: 9 MG/DL (ref 5–25)
CALCIUM SERPL-MCNC: 7.2 MG/DL (ref 8.3–10.1)
CHLORIDE SERPL-SCNC: 100 MMOL/L (ref 100–108)
CO2 SERPL-SCNC: 29 MMOL/L (ref 21–32)
CREAT SERPL-MCNC: 0.42 MG/DL (ref 0.6–1.3)
EOSINOPHIL # BLD AUTO: 0.18 THOUSAND/ΜL (ref 0–0.61)
EOSINOPHIL NFR BLD AUTO: 2 % (ref 0–6)
ERYTHROCYTE [DISTWIDTH] IN BLOOD BY AUTOMATED COUNT: 21.2 % (ref 11.6–15.1)
GFR SERPL CREATININE-BSD FRML MDRD: 105 ML/MIN/1.73SQ M
GLUCOSE SERPL-MCNC: 92 MG/DL (ref 65–140)
GRAM STN SPEC: ABNORMAL
HCT VFR BLD AUTO: 26.5 % (ref 34.8–46.1)
HGB BLD-MCNC: 8.3 G/DL (ref 11.5–15.4)
IMM GRANULOCYTES # BLD AUTO: 0.12 THOUSAND/UL (ref 0–0.2)
IMM GRANULOCYTES NFR BLD AUTO: 2 % (ref 0–2)
LYMPHOCYTES # BLD AUTO: 2.27 THOUSANDS/ΜL (ref 0.6–4.47)
LYMPHOCYTES NFR BLD AUTO: 28 % (ref 14–44)
MAGNESIUM SERPL-MCNC: 2 MG/DL (ref 1.6–2.6)
MCH RBC QN AUTO: 26.2 PG (ref 26.8–34.3)
MCHC RBC AUTO-ENTMCNC: 31.3 G/DL (ref 31.4–37.4)
MCV RBC AUTO: 84 FL (ref 82–98)
MONOCYTES # BLD AUTO: 1.03 THOUSAND/ΜL (ref 0.17–1.22)
MONOCYTES NFR BLD AUTO: 13 % (ref 4–12)
NEUTROPHILS # BLD AUTO: 4.51 THOUSANDS/ΜL (ref 1.85–7.62)
NEUTS SEG NFR BLD AUTO: 54 % (ref 43–75)
NRBC BLD AUTO-RTO: 0 /100 WBCS
PLATELET # BLD AUTO: 445 THOUSANDS/UL (ref 149–390)
PMV BLD AUTO: 9.4 FL (ref 8.9–12.7)
POTASSIUM SERPL-SCNC: 3.5 MMOL/L (ref 3.5–5.3)
RBC # BLD AUTO: 3.17 MILLION/UL (ref 3.81–5.12)
SARS-COV-2 RNA RESP QL NAA+PROBE: NEGATIVE
SODIUM SERPL-SCNC: 133 MMOL/L (ref 136–145)
WBC # BLD AUTO: 8.16 THOUSAND/UL (ref 4.31–10.16)

## 2020-04-24 PROCEDURE — 99232 SBSQ HOSP IP/OBS MODERATE 35: CPT | Performed by: INTERNAL MEDICINE

## 2020-04-24 PROCEDURE — 87635 SARS-COV-2 COVID-19 AMP PRB: CPT | Performed by: INTERNAL MEDICINE

## 2020-04-24 PROCEDURE — 80048 BASIC METABOLIC PNL TOTAL CA: CPT | Performed by: INTERNAL MEDICINE

## 2020-04-24 PROCEDURE — 83735 ASSAY OF MAGNESIUM: CPT | Performed by: INTERNAL MEDICINE

## 2020-04-24 PROCEDURE — 85025 COMPLETE CBC W/AUTO DIFF WBC: CPT | Performed by: INTERNAL MEDICINE

## 2020-04-24 RX ORDER — LOPERAMIDE HYDROCHLORIDE 2 MG/1
2 CAPSULE ORAL 3 TIMES DAILY PRN
Status: DISCONTINUED | OUTPATIENT
Start: 2020-04-24 | End: 2020-04-29 | Stop reason: HOSPADM

## 2020-04-24 RX ORDER — ONDANSETRON 2 MG/ML
4 INJECTION INTRAMUSCULAR; INTRAVENOUS EVERY 6 HOURS PRN
Status: DISCONTINUED | OUTPATIENT
Start: 2020-04-24 | End: 2020-04-27

## 2020-04-24 RX ADMIN — SODIUM CHLORIDE 3 G: 900 INJECTION INTRAVENOUS at 05:39

## 2020-04-24 RX ADMIN — Medication 250 MG: at 09:21

## 2020-04-24 RX ADMIN — ENOXAPARIN SODIUM 40 MG: 100 INJECTION SUBCUTANEOUS at 09:20

## 2020-04-24 RX ADMIN — OYSTER SHELL CALCIUM WITH VITAMIN D 1 TABLET: 500; 200 TABLET, FILM COATED ORAL at 09:22

## 2020-04-24 RX ADMIN — VANCOMYCIN HYDROCHLORIDE 125 MG: 500 INJECTION, POWDER, LYOPHILIZED, FOR SOLUTION INTRAVENOUS at 21:01

## 2020-04-24 RX ADMIN — SODIUM CHLORIDE TAB 1 GM 1 G: 1 TAB at 09:21

## 2020-04-24 RX ADMIN — SODIUM CHLORIDE TAB 1 GM 1 G: 1 TAB at 17:34

## 2020-04-24 RX ADMIN — VANCOMYCIN HYDROCHLORIDE 125 MG: 500 INJECTION, POWDER, LYOPHILIZED, FOR SOLUTION INTRAVENOUS at 09:22

## 2020-04-24 RX ADMIN — Medication 250 MG: at 17:34

## 2020-04-24 RX ADMIN — SODIUM CHLORIDE 3 G: 900 INJECTION INTRAVENOUS at 00:09

## 2020-04-24 RX ADMIN — FAMOTIDINE 20 MG: 20 TABLET ORAL at 09:19

## 2020-04-24 RX ADMIN — MELATONIN 1000 UNITS: at 09:20

## 2020-04-24 RX ADMIN — MIDODRINE HYDROCHLORIDE 10 MG: 5 TABLET ORAL at 17:34

## 2020-04-24 RX ADMIN — SODIUM CHLORIDE 3 G: 900 INJECTION INTRAVENOUS at 12:04

## 2020-04-24 RX ADMIN — MIDODRINE HYDROCHLORIDE 10 MG: 5 TABLET ORAL at 09:20

## 2020-04-24 RX ADMIN — HYOSCYAMINE SULFATE 1 APPLICATION: 16 SOLUTION at 09:24

## 2020-04-24 RX ADMIN — ONDANSETRON 4 MG: 2 INJECTION INTRAMUSCULAR; INTRAVENOUS at 14:36

## 2020-04-24 RX ADMIN — SODIUM CHLORIDE 3 G: 900 INJECTION INTRAVENOUS at 17:36

## 2020-04-24 RX ADMIN — FAMOTIDINE 20 MG: 20 TABLET ORAL at 17:34

## 2020-04-25 PROCEDURE — 99232 SBSQ HOSP IP/OBS MODERATE 35: CPT | Performed by: INTERNAL MEDICINE

## 2020-04-25 RX ORDER — SODIUM CHLORIDE 1000 MG
2 TABLET, SOLUBLE MISCELLANEOUS 2 TIMES DAILY WITH MEALS
Status: DISCONTINUED | OUTPATIENT
Start: 2020-04-25 | End: 2020-04-29 | Stop reason: HOSPADM

## 2020-04-25 RX ADMIN — SODIUM CHLORIDE 3 G: 900 INJECTION INTRAVENOUS at 12:57

## 2020-04-25 RX ADMIN — MELATONIN 1000 UNITS: at 08:19

## 2020-04-25 RX ADMIN — SODIUM CHLORIDE 3 G: 900 INJECTION INTRAVENOUS at 06:29

## 2020-04-25 RX ADMIN — Medication 250 MG: at 17:05

## 2020-04-25 RX ADMIN — SODIUM CHLORIDE TAB 1 GM 1 G: 1 TAB at 08:19

## 2020-04-25 RX ADMIN — Medication 250 MG: at 08:19

## 2020-04-25 RX ADMIN — SODIUM CHLORIDE TAB 1 GM 2 G: 1 TAB at 17:06

## 2020-04-25 RX ADMIN — HYOSCYAMINE SULFATE 1 APPLICATION: 16 SOLUTION at 12:57

## 2020-04-25 RX ADMIN — VANCOMYCIN HYDROCHLORIDE 125 MG: 500 INJECTION, POWDER, LYOPHILIZED, FOR SOLUTION INTRAVENOUS at 21:30

## 2020-04-25 RX ADMIN — FAMOTIDINE 20 MG: 20 TABLET ORAL at 08:19

## 2020-04-25 RX ADMIN — SODIUM CHLORIDE 3 G: 900 INJECTION INTRAVENOUS at 00:05

## 2020-04-25 RX ADMIN — MIDODRINE HYDROCHLORIDE 10 MG: 5 TABLET ORAL at 08:19

## 2020-04-25 RX ADMIN — ENOXAPARIN SODIUM 40 MG: 100 INJECTION SUBCUTANEOUS at 08:19

## 2020-04-25 RX ADMIN — SODIUM CHLORIDE 3 G: 900 INJECTION INTRAVENOUS at 22:56

## 2020-04-25 RX ADMIN — MIDODRINE HYDROCHLORIDE 10 MG: 5 TABLET ORAL at 17:05

## 2020-04-25 RX ADMIN — SODIUM CHLORIDE 3 G: 900 INJECTION INTRAVENOUS at 17:17

## 2020-04-25 RX ADMIN — FAMOTIDINE 20 MG: 20 TABLET ORAL at 17:05

## 2020-04-25 RX ADMIN — VANCOMYCIN HYDROCHLORIDE 125 MG: 500 INJECTION, POWDER, LYOPHILIZED, FOR SOLUTION INTRAVENOUS at 08:22

## 2020-04-25 RX ADMIN — OYSTER SHELL CALCIUM WITH VITAMIN D 1 TABLET: 500; 200 TABLET, FILM COATED ORAL at 08:19

## 2020-04-26 LAB
BACTERIA BLD CULT: NORMAL
BACTERIA BLD CULT: NORMAL

## 2020-04-26 PROCEDURE — 99232 SBSQ HOSP IP/OBS MODERATE 35: CPT | Performed by: INTERNAL MEDICINE

## 2020-04-26 RX ADMIN — MIDODRINE HYDROCHLORIDE 10 MG: 5 TABLET ORAL at 09:10

## 2020-04-26 RX ADMIN — SODIUM CHLORIDE 3 G: 900 INJECTION INTRAVENOUS at 04:58

## 2020-04-26 RX ADMIN — VANCOMYCIN HYDROCHLORIDE 125 MG: 500 INJECTION, POWDER, LYOPHILIZED, FOR SOLUTION INTRAVENOUS at 09:10

## 2020-04-26 RX ADMIN — Medication 250 MG: at 17:34

## 2020-04-26 RX ADMIN — MELATONIN 1000 UNITS: at 09:10

## 2020-04-26 RX ADMIN — OYSTER SHELL CALCIUM WITH VITAMIN D 1 TABLET: 500; 200 TABLET, FILM COATED ORAL at 09:12

## 2020-04-26 RX ADMIN — VANCOMYCIN HYDROCHLORIDE 125 MG: 500 INJECTION, POWDER, LYOPHILIZED, FOR SOLUTION INTRAVENOUS at 21:05

## 2020-04-26 RX ADMIN — SODIUM CHLORIDE TAB 1 GM 2 G: 1 TAB at 17:34

## 2020-04-26 RX ADMIN — HYOSCYAMINE SULFATE 1 APPLICATION: 16 SOLUTION at 09:12

## 2020-04-26 RX ADMIN — SODIUM CHLORIDE 3 G: 900 INJECTION INTRAVENOUS at 13:33

## 2020-04-26 RX ADMIN — Medication 250 MG: at 09:10

## 2020-04-26 RX ADMIN — FAMOTIDINE 20 MG: 20 TABLET ORAL at 17:34

## 2020-04-26 RX ADMIN — ENOXAPARIN SODIUM 40 MG: 100 INJECTION SUBCUTANEOUS at 09:10

## 2020-04-26 RX ADMIN — MIDODRINE HYDROCHLORIDE 10 MG: 5 TABLET ORAL at 17:34

## 2020-04-26 RX ADMIN — SODIUM CHLORIDE 3 G: 900 INJECTION INTRAVENOUS at 17:34

## 2020-04-26 RX ADMIN — FAMOTIDINE 20 MG: 20 TABLET ORAL at 09:10

## 2020-04-26 RX ADMIN — SODIUM CHLORIDE TAB 1 GM 2 G: 1 TAB at 09:12

## 2020-04-27 PROBLEM — E88.09 HYPOALBUMINEMIA: Status: RESOLVED | Noted: 2020-04-22 | Resolved: 2020-04-27

## 2020-04-27 PROBLEM — M85.80 OSTEOPENIA: Chronic | Status: RESOLVED | Noted: 2018-09-19 | Resolved: 2020-04-27

## 2020-04-27 PROBLEM — D72.829 LEUKOCYTOSIS: Status: RESOLVED | Noted: 2020-04-19 | Resolved: 2020-04-27

## 2020-04-27 PROBLEM — T78.40XA RASH DUE TO ALLERGY: Status: RESOLVED | Noted: 2018-09-21 | Resolved: 2020-04-27

## 2020-04-27 PROBLEM — S72.402D CLOSED FRACTURE OF LOWER END OF LEFT FEMUR WITH ROUTINE HEALING: Status: RESOLVED | Noted: 2018-10-18 | Resolved: 2020-04-27

## 2020-04-27 PROBLEM — S72.402D CLOSED FRACTURE OF DISTAL END OF LEFT FEMUR WITH ROUTINE HEALING: Status: RESOLVED | Noted: 2018-10-18 | Resolved: 2020-04-27

## 2020-04-27 PROBLEM — I95.9 HYPOTENSION: Status: RESOLVED | Noted: 2020-02-05 | Resolved: 2020-04-27

## 2020-04-27 PROBLEM — R21 RASH DUE TO ALLERGY: Status: RESOLVED | Noted: 2018-09-21 | Resolved: 2020-04-27

## 2020-04-27 LAB
ANION GAP SERPL CALCULATED.3IONS-SCNC: 5 MMOL/L (ref 4–13)
BUN SERPL-MCNC: 8 MG/DL (ref 5–25)
CALCIUM SERPL-MCNC: 7.3 MG/DL (ref 8.3–10.1)
CHLORIDE SERPL-SCNC: 100 MMOL/L (ref 100–108)
CO2 SERPL-SCNC: 28 MMOL/L (ref 21–32)
CREAT SERPL-MCNC: 0.44 MG/DL (ref 0.6–1.3)
ERYTHROCYTE [DISTWIDTH] IN BLOOD BY AUTOMATED COUNT: 21.7 % (ref 11.6–15.1)
GFR SERPL CREATININE-BSD FRML MDRD: 103 ML/MIN/1.73SQ M
GLUCOSE SERPL-MCNC: 107 MG/DL (ref 65–140)
HCT VFR BLD AUTO: 26.8 % (ref 34.8–46.1)
HGB BLD-MCNC: 8.2 G/DL (ref 11.5–15.4)
MCH RBC QN AUTO: 26 PG (ref 26.8–34.3)
MCHC RBC AUTO-ENTMCNC: 30.6 G/DL (ref 31.4–37.4)
MCV RBC AUTO: 85 FL (ref 82–98)
PLATELET # BLD AUTO: 686 THOUSANDS/UL (ref 149–390)
PMV BLD AUTO: 9.2 FL (ref 8.9–12.7)
POTASSIUM SERPL-SCNC: 3.8 MMOL/L (ref 3.5–5.3)
RBC # BLD AUTO: 3.15 MILLION/UL (ref 3.81–5.12)
SODIUM SERPL-SCNC: 133 MMOL/L (ref 136–145)
WBC # BLD AUTO: 9.78 THOUSAND/UL (ref 4.31–10.16)

## 2020-04-27 PROCEDURE — 80048 BASIC METABOLIC PNL TOTAL CA: CPT | Performed by: INTERNAL MEDICINE

## 2020-04-27 PROCEDURE — 02PYX3Z REMOVAL OF INFUSION DEVICE FROM GREAT VESSEL, EXTERNAL APPROACH: ICD-10-PCS | Performed by: INTERNAL MEDICINE

## 2020-04-27 PROCEDURE — 85027 COMPLETE CBC AUTOMATED: CPT | Performed by: INTERNAL MEDICINE

## 2020-04-27 PROCEDURE — 99232 SBSQ HOSP IP/OBS MODERATE 35: CPT | Performed by: INTERNAL MEDICINE

## 2020-04-27 PROCEDURE — 99221 1ST HOSP IP/OBS SF/LOW 40: CPT | Performed by: SURGERY

## 2020-04-27 RX ORDER — AMOXICILLIN AND CLAVULANATE POTASSIUM 875; 125 MG/1; MG/1
1 TABLET, FILM COATED ORAL EVERY 12 HOURS SCHEDULED
Status: DISCONTINUED | OUTPATIENT
Start: 2020-04-27 | End: 2020-04-29 | Stop reason: HOSPADM

## 2020-04-27 RX ORDER — ONDANSETRON 4 MG/1
4 TABLET, ORALLY DISINTEGRATING ORAL EVERY 6 HOURS PRN
Status: DISCONTINUED | OUTPATIENT
Start: 2020-04-27 | End: 2020-04-29 | Stop reason: HOSPADM

## 2020-04-27 RX ADMIN — AMOXICILLIN AND CLAVULANATE POTASSIUM 1 TABLET: 875; 125 TABLET, FILM COATED ORAL at 22:16

## 2020-04-27 RX ADMIN — FAMOTIDINE 20 MG: 20 TABLET ORAL at 09:36

## 2020-04-27 RX ADMIN — SODIUM CHLORIDE 3 G: 900 INJECTION INTRAVENOUS at 00:31

## 2020-04-27 RX ADMIN — VANCOMYCIN HYDROCHLORIDE 125 MG: 500 INJECTION, POWDER, LYOPHILIZED, FOR SOLUTION INTRAVENOUS at 22:15

## 2020-04-27 RX ADMIN — HYOSCYAMINE SULFATE 1 APPLICATION: 16 SOLUTION at 15:24

## 2020-04-27 RX ADMIN — FAMOTIDINE 20 MG: 20 TABLET ORAL at 17:01

## 2020-04-27 RX ADMIN — OYSTER SHELL CALCIUM WITH VITAMIN D 1 TABLET: 500; 200 TABLET, FILM COATED ORAL at 09:36

## 2020-04-27 RX ADMIN — ENOXAPARIN SODIUM 40 MG: 100 INJECTION SUBCUTANEOUS at 09:35

## 2020-04-27 RX ADMIN — SODIUM CHLORIDE 3 G: 900 INJECTION INTRAVENOUS at 11:36

## 2020-04-27 RX ADMIN — Medication 250 MG: at 17:01

## 2020-04-27 RX ADMIN — Medication 250 MG: at 09:36

## 2020-04-27 RX ADMIN — MIDODRINE HYDROCHLORIDE 10 MG: 5 TABLET ORAL at 09:36

## 2020-04-27 RX ADMIN — SODIUM CHLORIDE TAB 1 GM 2 G: 1 TAB at 16:50

## 2020-04-27 RX ADMIN — SODIUM CHLORIDE TAB 1 GM 2 G: 1 TAB at 09:36

## 2020-04-27 RX ADMIN — VANCOMYCIN HYDROCHLORIDE 125 MG: 500 INJECTION, POWDER, LYOPHILIZED, FOR SOLUTION INTRAVENOUS at 09:40

## 2020-04-27 RX ADMIN — MELATONIN 1000 UNITS: at 09:36

## 2020-04-27 RX ADMIN — MIDODRINE HYDROCHLORIDE 10 MG: 5 TABLET ORAL at 17:01

## 2020-04-27 RX ADMIN — SODIUM CHLORIDE 3 G: 900 INJECTION INTRAVENOUS at 05:45

## 2020-04-28 PROCEDURE — 99232 SBSQ HOSP IP/OBS MODERATE 35: CPT | Performed by: NURSE PRACTITIONER

## 2020-04-28 PROCEDURE — 99233 SBSQ HOSP IP/OBS HIGH 50: CPT | Performed by: INTERNAL MEDICINE

## 2020-04-28 PROCEDURE — NC001 PR NO CHARGE: Performed by: NURSE PRACTITIONER

## 2020-04-28 PROCEDURE — 99232 SBSQ HOSP IP/OBS MODERATE 35: CPT | Performed by: INTERNAL MEDICINE

## 2020-04-28 PROCEDURE — 97530 THERAPEUTIC ACTIVITIES: CPT

## 2020-04-28 PROCEDURE — 99221 1ST HOSP IP/OBS SF/LOW 40: CPT | Performed by: PHYSICIAN ASSISTANT

## 2020-04-28 RX ADMIN — Medication 250 MG: at 17:41

## 2020-04-28 RX ADMIN — VANCOMYCIN HYDROCHLORIDE 125 MG: 500 INJECTION, POWDER, LYOPHILIZED, FOR SOLUTION INTRAVENOUS at 12:17

## 2020-04-28 RX ADMIN — HYOSCYAMINE SULFATE 1 APPLICATION: 16 SOLUTION at 09:12

## 2020-04-28 RX ADMIN — MIDODRINE HYDROCHLORIDE 10 MG: 5 TABLET ORAL at 09:13

## 2020-04-28 RX ADMIN — FAMOTIDINE 20 MG: 20 TABLET ORAL at 17:41

## 2020-04-28 RX ADMIN — FAMOTIDINE 20 MG: 20 TABLET ORAL at 09:13

## 2020-04-28 RX ADMIN — AMOXICILLIN AND CLAVULANATE POTASSIUM 1 TABLET: 875; 125 TABLET, FILM COATED ORAL at 09:13

## 2020-04-28 RX ADMIN — ONDANSETRON 4 MG: 4 TABLET, ORALLY DISINTEGRATING ORAL at 16:15

## 2020-04-28 RX ADMIN — Medication 250 MG: at 09:12

## 2020-04-28 RX ADMIN — MIDODRINE HYDROCHLORIDE 10 MG: 5 TABLET ORAL at 17:41

## 2020-04-28 RX ADMIN — OYSTER SHELL CALCIUM WITH VITAMIN D 1 TABLET: 500; 200 TABLET, FILM COATED ORAL at 09:12

## 2020-04-28 RX ADMIN — AMOXICILLIN AND CLAVULANATE POTASSIUM 1 TABLET: 875; 125 TABLET, FILM COATED ORAL at 22:42

## 2020-04-28 RX ADMIN — SODIUM CHLORIDE TAB 1 GM 2 G: 1 TAB at 09:12

## 2020-04-28 RX ADMIN — ENOXAPARIN SODIUM 40 MG: 100 INJECTION SUBCUTANEOUS at 09:12

## 2020-04-28 RX ADMIN — MELATONIN 1000 UNITS: at 09:13

## 2020-04-28 RX ADMIN — VANCOMYCIN HYDROCHLORIDE 125 MG: 500 INJECTION, POWDER, LYOPHILIZED, FOR SOLUTION INTRAVENOUS at 22:42

## 2020-04-28 RX ADMIN — SODIUM CHLORIDE TAB 1 GM 2 G: 1 TAB at 17:41

## 2020-04-29 VITALS
WEIGHT: 100.09 LBS | HEART RATE: 103 BPM | RESPIRATION RATE: 17 BRPM | OXYGEN SATURATION: 95 % | TEMPERATURE: 96.6 F | SYSTOLIC BLOOD PRESSURE: 111 MMHG | BODY MASS INDEX: 19.55 KG/M2 | DIASTOLIC BLOOD PRESSURE: 64 MMHG

## 2020-04-29 PROCEDURE — 99239 HOSP IP/OBS DSCHRG MGMT >30: CPT | Performed by: INTERNAL MEDICINE

## 2020-04-29 RX ORDER — AMOXICILLIN AND CLAVULANATE POTASSIUM 875; 125 MG/1; MG/1
1 TABLET, FILM COATED ORAL EVERY 12 HOURS SCHEDULED
Qty: 7 TABLET | Refills: 0 | Status: SHIPPED | OUTPATIENT
Start: 2020-04-29 | End: 2020-05-03

## 2020-04-29 RX ORDER — LOPERAMIDE HYDROCHLORIDE 2 MG/1
2 CAPSULE ORAL 3 TIMES DAILY PRN
Qty: 30 CAPSULE | Refills: 0
Start: 2020-04-29 | End: 2021-12-09 | Stop reason: ALTCHOICE

## 2020-04-29 RX ORDER — SODIUM CHLORIDE 1000 MG
2 TABLET, SOLUBLE MISCELLANEOUS 2 TIMES DAILY WITH MEALS
Refills: 0 | Status: ON HOLD
Start: 2020-04-29 | End: 2021-06-13 | Stop reason: ALTCHOICE

## 2020-04-29 RX ADMIN — VANCOMYCIN HYDROCHLORIDE 125 MG: 500 INJECTION, POWDER, LYOPHILIZED, FOR SOLUTION INTRAVENOUS at 08:49

## 2020-04-29 RX ADMIN — MIDODRINE HYDROCHLORIDE 10 MG: 5 TABLET ORAL at 08:47

## 2020-04-29 RX ADMIN — OYSTER SHELL CALCIUM WITH VITAMIN D 1 TABLET: 500; 200 TABLET, FILM COATED ORAL at 08:47

## 2020-04-29 RX ADMIN — FAMOTIDINE 20 MG: 20 TABLET ORAL at 08:47

## 2020-04-29 RX ADMIN — Medication 250 MG: at 08:47

## 2020-04-29 RX ADMIN — SODIUM CHLORIDE TAB 1 GM 2 G: 1 TAB at 08:47

## 2020-04-29 RX ADMIN — MELATONIN 1000 UNITS: at 08:48

## 2020-04-29 RX ADMIN — AMOXICILLIN AND CLAVULANATE POTASSIUM 1 TABLET: 875; 125 TABLET, FILM COATED ORAL at 08:47

## 2020-04-29 RX ADMIN — ENOXAPARIN SODIUM 40 MG: 100 INJECTION SUBCUTANEOUS at 08:47

## 2020-04-30 ENCOUNTER — TELEPHONE (OUTPATIENT)
Dept: NEPHROLOGY | Facility: CLINIC | Age: 70
End: 2020-04-30

## 2020-04-30 ENCOUNTER — PATIENT OUTREACH (OUTPATIENT)
Dept: CASE MANAGEMENT | Facility: OTHER | Age: 70
End: 2020-04-30

## 2020-05-04 ENCOUNTER — PATIENT OUTREACH (OUTPATIENT)
Dept: CASE MANAGEMENT | Facility: OTHER | Age: 70
End: 2020-05-04

## 2020-05-05 ENCOUNTER — EPISODE CHANGES (OUTPATIENT)
Dept: CASE MANAGEMENT | Facility: HOSPITAL | Age: 70
End: 2020-05-05

## 2020-05-05 ENCOUNTER — PATIENT OUTREACH (OUTPATIENT)
Dept: CASE MANAGEMENT | Facility: OTHER | Age: 70
End: 2020-05-05

## 2020-05-06 ENCOUNTER — APPOINTMENT (OUTPATIENT)
Dept: WOUND CARE | Facility: HOSPITAL | Age: 70
End: 2020-05-06
Payer: COMMERCIAL

## 2020-05-06 PROCEDURE — 99215 OFFICE O/P EST HI 40 MIN: CPT

## 2020-05-06 PROCEDURE — G0463 HOSPITAL OUTPT CLINIC VISIT: HCPCS

## 2020-05-13 LAB
BACTERIA WND AEROBE CULT: ABNORMAL
GRAM STN SPEC: ABNORMAL

## 2020-05-28 NOTE — UTILIZATION REVIEW
Initial Clinical Review  Was notified Patient is [employer] today  Previously listed as Medicare Prime  [facility] [place] is 5 day DRG facilty  Attending- [facility] internal medicine  Admission: Date/Time/Statement: Admission Orders (From admission, onward)     Ordered        03/28/20 1718  Inpatient Admission (expected length of stay for this patient Order details is greater than two midnights)  Once                   Orders Placed This Encounter   Procedures    Inpatient Admission (expected length of stay for this patient Order details is greater than two midnights)     Standing Status:   Standing     Number of Occurrences:   1     Order Specific Question:   Admitting Physician     Answer:   Liset Cisneros     Order Specific Question:   Level of Care     Answer:   Med Surg [16]     Order Specific Question:   Estimated length of stay     Answer:   More than 2 Midnights     Order Specific Question:   Certification     Answer:   I certify that inpatient services are medically necessary for this patient for a duration of greater than two midnights  See H&P and MD Progress Notes for additional information about the patient's course of treatment  ED Arrival Information     Expected Arrival Acuity Means of Arrival Escorted By Service Admission Type    - 3/28/2020 15:30 Urgent Ambulance [facility] EMS Hospitalist Urgent    Arrival Complaint    Fever        Chief Complaint   Patient presents with    Fever - 9 weeks to 74 years     Pt from home, is wheelchair bound and sees wound care, was noted to have fever yesterday when wound care saw her and told her she was "going septic"  Assessment/Plan: this is a 71year old female from home to ED per [facility] via ems admitted inpatient due to sepsis with presumed source of chronic pustula hip wounds and chronic isabel/hyponatremia   History of spinal cord injury with resultant neurogenic bladder and chronic catheter placement, multiple chronic pelvic and sacral ulcers, MRSA and VRE  Presented  Due to concern for infection of chronic pelvic wounds, febrile yesterday to 100  On exam tachycardia  She has numerous large decubiti to the sacrum, left and right gluteal regions, as well as the right hip greater trochanteric region  Wounds are generally malodorous with copious discharge  There is also an indwelling Hays catheter with cloudy, malodorous urine  UA innumerable bacteria  Small leukocytes  + nitrites    Albumin 1 6  Wbc 10 73  H&H 9 2/29  4  Blood cultures sent  CT abdomen showed cellulites of chronic right hip, left ischial and sacral decubitus, rectal impaction and constipation  Large hiatal hernia  IV antibiotics and hydrate with IVF in progress, prophylactic Vanco for history of C diff, wound cultures and follow results  Consult ID    Left thigh                         Coccyx                      Buttocks                    Right thigh        On  3/30/2020 per ID - patient with concern of infected sacral decubs  To continue zosyn and oral vanco   Await final cultures       On 3/30/2020 per wound care -Wounds do not appear to be acutely infected at this time--no induration, odor, fluctuance, or purulent drainage noted     Discharged on 4/1/2020      ED Triage Vitals [03/28/20 1532]   Temperature Pulse Respirations Blood Pressure SpO2   (!) 97 4 °F (36 3 °C) (!) 124 20 114/62 98 %      Temp Source Heart Rate Source Patient Position - Orthostatic VS BP Location FiO2 (%)   Temporal Monitor Lying Right arm --      Pain Score       No Pain        Wt Readings from Last 1 Encounters:   04/29/20 45 4 kg (100 lb 1 4 oz)     Additional Vital Signs:       03/29/20  07:54:55  03/28/20  23:28:05    03/28/20  18:04:42  03/28/20  1700   Vital Signs   Temp  97 5 °F (36 4  °C)      99 °F (37 2 °C)     Temp src        Oral     Pulse  95  110Abnormal     121Abnormal   121Abnormal    Heart Rate Source             Resp  19 18  22 28Abnormal    BP  97/53  91/52    112/62     MAP (mmHg)  68  65    79       Pertinent Labs/Diagnostic Test Results:   3/28/2020 CT abdomen Chronic right hip, left ischial and sacral decubitus ulcers with grossly stable surrounding cellulitis  Small focus of gas within the sacral decubitus ulcer  2   Findings suggestive of rectal impaction and constipation  No evidence of bowel obstruction, colitis, diverticulitis or appendicitis  3   Stable large hiatal hernia      3/28/2020 EKG Sinus tachycardia  Possible Left atrial enlargement  Septal infarct (cited on or before 13-DEC-2018)  Abnormal ECG  When compared with ECG of 10-MAR-2020 11:44,  Premature ventricular complexes are no longer Present     Results from last 7 days   Lab Units 03/30/20  0409 03/29/20  0415 03/28/20  1600   WBC Thousand/uL 7 99 7 72 10 73*   HEMOGLOBIN g/dL 7 2* 7 5* 9 2*   HEMATOCRIT % 23 7* 24 1* 29 4*   MCV fL 82 79* 80*   PLATELETS Thousands/uL 616* 599* 698*   INR    --   --  1 39*             Results from last 7 days   Lab Units 03/30/20  0409 03/29/20  0415 03/28/20  1600   SODIUM mmol/L 134* 134* 128*   POTASSIUM mmol/L 4 6 3 0* 4 0   CHLORIDE mmol/L 103 101 95*   CO2 mmol/L 24 25 27   BUN mg/dL 12 11 9   CREATININE mg/dL 0 51* 0 60 0 57*   CALCIUM mg/dL 7 3* 7 1* 7 9*   ALBUMIN g/dL  --  1 1* 1 6*   TOTAL BILIRUBIN mg/dL  --  0 20 0 30   ALK PHOS U/L  --  107 135*   ALT U/L  --  39 45   AST U/L  --  50* 63*   EGFR ml/min/1 73sq m 98 93 95   GLUCOSE RANDOM mg/dL 130 124 131            Results from last 7 days   Lab Units 03/29/20  0415 03/28/20  1600   LACTIC ACID mmol/L  --  1 5   PROCALCITONIN ng/ml 0 18 0 19        Cultures:        Results from last 7 days   Lab Units 03/28/20  1600   COLOR UA   Yellow   CLARITY UA   Turbid   SPEC GRAV UA   1 015   PH UA   7 0   LEUKOCYTES UA   Small*   NITRITE UA   Positive*   GLUCOSE UA mg/dl Negative   KETONES UA mg/dl Negative   BILIRUBIN UA   Negative   BLOOD UA   Moderate*           Results from last 7 days   Lab Units 03/28/20  1600   RBC UA /hpf None Seen   WBC UA /hpf 1-2*   EPITHELIAL CELLS WET PREP /hpf Occasional   BACTERIA UA /hpf Innumerable*            Results from last 7 days   Lab Units 03/28/20  1600 03/28/20  1559   BLOOD CULTURE   No Growth at 72 hrs  No Growth at 72 hrs         ED Treatment:   Medication Administration from 03/28/2020 1530 to 03/28/2020 1748       Date/Time Order Dose Route Action Comments     03/28/2020 1640 piperacillin-tazobactam (ZOSYN) 3 375 g in sodium chloride 0 9 % 100 mL IVPB 3 375 g Intravenous New Bag         Past Medical History:   Diagnosis Date    Anemia     iron defiencey    Arthritis     osteo    Back injury     Chronic kidney disease     nephritis    Depression     MRSA (methicillin resistant staph aureus) culture positive 12/07/2018    BONE-TISSUE     Osteopenia     Osteoporosis     Paralysis ([address] )     paraplegic due to spinal cord injury    Paraplegia (HCC)     Poor circulation     Pressure injury of skin     right hip, stage 3    Pressure sore of hip     right    Tibial plateau fracture     Underweight      Present on Admission:   Open wound of left hip   C  difficile diarrhea   Iron deficiency anemia   Hyponatremia   Thrombocytosis (HCC)   Urinary tract infection associated with indwelling urethral catheter (HCC)   Sepsis (HCC)   Chronic idiopathic constipation      Admitting Diagnosis: Paraplegia (HCC) [G82 20]  Cellulitis [L03 90]  Hyponatremia [E87 1]  Chronic indwelling Hays catheter [Z96 0]  Open wound of right hip, initial encounter [S71 001A]  Sacral decubitus ulcer, stage IV ([address] ) [L89 154]  Fever in adult [R50 9]  Age/Sex: 71 y o  female  Admission Orders: 3/28/2020 1718 inpatient   Scheduled Medications:  acetaminophen 650 mg Oral Q6H PRN     artificial tear   Both Eyes HS     bisacodyl 10 mg Rectal Daily PRN     calcium carbonate-vitamin D 1 tablet Oral Daily     [START ON 3/30/2020] calcium carbonate-vitamin D 1 tablet Oral Daily With Breakfast     cyanocobalamin 100 mcg Oral Daily     dextran 70-hypromellose 1 drop Both Eyes Q3H PRN     enoxaparin 40 mg Subcutaneous Daily     famotidine 20 mg Oral BID PRN     midodrine 10 mg Oral BID     mineral oil 1 enema Rectal Once     nystatin   Topical BID     pantoprazole 40 mg Oral Early Morning     piperacillin-tazobactam 3 375 g Intravenous Q6H Last Rate: 3 375 g (03/29/20 1044)   potassium-sodium phosphates 2 packet Oral BID With Meals     saccharomyces boulardii 250 mg Oral Daily     senna 1 tablet Oral HS PRN     vancomycin 125 mg Oral Q12H [facility]              IP CONSULT TO WOUND CARE  IP CONSULT TO INFECTIOUS DISEASES    Network Utilization Review Department  [email or web address]  ATTENTION: Please call with any questions or concerns to [phone] and carefully listen to the prompts so that you are directed to the right person  All voicemails are confidential   Claudell Patient all requests for admission clinical reviews, approved or denied determinations and any other requests to dedicated fax number below belonging to the campus where the patient is receiving treatment   List of dedicated fax numbers for the Facilities:  [address] [place] [address] DENIALS (Administrative/Medical Necessity) [phone]   [address]  (Maternity/NICU/Pediatrics) [phone]   [facility] [phone]   Jerilyn Merino [phone]   Melo Stephenson [phone]   Jaden  [phone]   [address] [facility] [address] [facility] [phone]   [facility]  [phone]   [address] [facility] [address]  [address] [facility] [address] [facility] [phone]

## 2020-06-09 ENCOUNTER — TELEPHONE (OUTPATIENT)
Dept: OBGYN CLINIC | Facility: MEDICAL CENTER | Age: 70
End: 2020-06-09

## 2020-09-04 ENCOUNTER — OFFICE VISIT (OUTPATIENT)
Dept: FAMILY MEDICINE CLINIC | Facility: CLINIC | Age: 70
End: 2020-09-04
Payer: COMMERCIAL

## 2020-09-04 VITALS
OXYGEN SATURATION: 95 % | SYSTOLIC BLOOD PRESSURE: 104 MMHG | HEIGHT: 60 IN | WEIGHT: 100 LBS | TEMPERATURE: 97 F | HEART RATE: 95 BPM | BODY MASS INDEX: 19.63 KG/M2 | DIASTOLIC BLOOD PRESSURE: 60 MMHG

## 2020-09-04 DIAGNOSIS — L89.324 DECUBITUS ULCER OF LEFT ISCHIUM, STAGE IV (HCC): ICD-10-CM

## 2020-09-04 DIAGNOSIS — Z13.220 ENCOUNTER FOR LIPID SCREENING FOR CARDIOVASCULAR DISEASE: ICD-10-CM

## 2020-09-04 DIAGNOSIS — Z76.89 ENCOUNTER TO ESTABLISH CARE: Primary | ICD-10-CM

## 2020-09-04 DIAGNOSIS — D63.8 ANEMIA OF CHRONIC DISEASE: ICD-10-CM

## 2020-09-04 DIAGNOSIS — M81.8 OTHER OSTEOPOROSIS WITHOUT CURRENT PATHOLOGICAL FRACTURE: ICD-10-CM

## 2020-09-04 DIAGNOSIS — I95.9 HYPOTENSION, UNSPECIFIED HYPOTENSION TYPE: ICD-10-CM

## 2020-09-04 DIAGNOSIS — N31.9 NEUROGENIC BLADDER: ICD-10-CM

## 2020-09-04 DIAGNOSIS — K44.9 HIATAL HERNIA: ICD-10-CM

## 2020-09-04 DIAGNOSIS — R53.83 FATIGUE, UNSPECIFIED TYPE: ICD-10-CM

## 2020-09-04 DIAGNOSIS — M46.28 CHRONIC OSTEOMYELITIS OF COCCYX (HCC): ICD-10-CM

## 2020-09-04 DIAGNOSIS — K59.04 CHRONIC IDIOPATHIC CONSTIPATION: ICD-10-CM

## 2020-09-04 DIAGNOSIS — G82.20 PARAPLEGIA FOLLOWING SPINAL CORD INJURY (HCC): ICD-10-CM

## 2020-09-04 DIAGNOSIS — Z13.6 ENCOUNTER FOR LIPID SCREENING FOR CARDIOVASCULAR DISEASE: ICD-10-CM

## 2020-09-04 DIAGNOSIS — K21.9 GASTROESOPHAGEAL REFLUX DISEASE, ESOPHAGITIS PRESENCE NOT SPECIFIED: ICD-10-CM

## 2020-09-04 PROCEDURE — 3288F FALL RISK ASSESSMENT DOCD: CPT | Performed by: FAMILY MEDICINE

## 2020-09-04 PROCEDURE — 1101F PT FALLS ASSESS-DOCD LE1/YR: CPT | Performed by: FAMILY MEDICINE

## 2020-09-04 PROCEDURE — 99215 OFFICE O/P EST HI 40 MIN: CPT | Performed by: FAMILY MEDICINE
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
## 2020-09-08 ENCOUNTER — TELEPHONE (OUTPATIENT)
Dept: FAMILY MEDICINE CLINIC | Facility: CLINIC | Age: 70
End: 2020-09-08

## 2020-09-08 NOTE — TELEPHONE ENCOUNTER
Patient called and stated that she was to receive a phone call regarding what you had talked about on Friday    Please call to advise

## 2020-09-11 NOTE — TELEPHONE ENCOUNTER
Please let patient know that I will be reaching out to our social work department to see what we can do to help her  In addition, can you ask her if she set up a meeting with the facility she is residing in and her POA as well?   Thank you

## 2020-09-13 ENCOUNTER — TELEPHONE (OUTPATIENT)
Dept: FAMILY MEDICINE CLINIC | Facility: CLINIC | Age: 70
End: 2020-09-13

## 2020-09-14 ENCOUNTER — PATIENT OUTREACH (OUTPATIENT)
Dept: FAMILY MEDICINE CLINIC | Facility: CLINIC | Age: 70
End: 2020-09-14

## 2020-09-14 DIAGNOSIS — Z78.9 NEED FOR FOLLOW-UP BY SOCIAL WORKER: Primary | ICD-10-CM

## 2020-09-14 NOTE — PROGRESS NOTES
Inbasket message received from [facility] STEPAN Urbano; request from Dr Vipin Bennett office to assist patient with transferring from [facility] to another SNF, per patient's request     Per chart review, patient was limited on accepting SNFs during previous hospitalization  No accepting SNFs near patient's home at the time  [address]  Aleta Iqbal was involved; patient initially called [address] requesting help in her home and Protective Services became involved due to poor living conditions and concern of patient not caring for herself  Patient needing extensive Wound Care  During hospitalization in February 2020, patient completed a neuropsych evaluation where she was found to lack capacity to make decisions  Patient then requested to be re-evaluated, where she was then found to have capacity  Patient has friend, Maria L Serrano listed as emergency contact and POA  Patient agreed to SNF discharged  OPCM SW [facility] [place] [facility] to confirm the above information and discuss any additional concerns  Aleta Iqbal stated no additional concerns at this time and shared that patient's case had been closed since her discharge to [facility]  Aleta Iqbal is unaware of the current state of patient's home/living conditions  OPCM SW attempted to outreach patient to introduce self, explain role, and discuss her request to transfer to another SNF  No answer; voicemail left and awaiting return call to further assist   Dr Vipin Bennett office encouraged patient to request for a meeting to be scheduled with patient's  at the SNF in order to discuss a transfer; unknown if this meeting has been arranged at this time  OPCM SW to attempt additional outreach to patient on 9/16; pending success, OPCM SW to outreach [facility] and will request to speak with Admissions Team   Update routed to [facility] STEPAN Urbano and Dr Chelsi Marino

## 2020-09-14 NOTE — TELEPHONE ENCOUNTER
Can you please fax the blood work orders and DEXA scan to [facility] where patient is currently a resident of? Can you also please call the nursing home with my verbal orders asking them to turn the patient every 4 hours and get her up into a chair every day    Thank you
Spoke to nurse at the facility that is residing  They stated that she has a PCP at the facility that has orders for BW and rotation   Pt is non compliant
no

## 2020-09-16 ENCOUNTER — PATIENT OUTREACH (OUTPATIENT)
Dept: FAMILY MEDICINE CLINIC | Facility: CLINIC | Age: 70
End: 2020-09-16

## 2020-09-16 NOTE — PROGRESS NOTES
2nd outreach attempt to discuss patient's request to discharge from [facility] to a closer SNF vs discharge home with services  OPCM SW as able to speak with patient  Patient stated she does not want to transfer to new SNF; rather, she would like to discharge home with services  Patient stated she has been trying to get in touch with her personal doctor, set up VNA services, order needed prescriptions, and arrange [employer] and Laundry services but has not been able to complete these tasks as she is needing physicians to sign off on these services  OPCM SW discussed that [facility] has Social Workers in the facility that are able to assist with these discharge plans and arrange appropriate services  Patient stated she has tried to discuss these things with the Social Workers but they have not been helpful to her at this time  Patient also mentioned that an aide in [facility] informed her that she was on [facility]; patient is upset about this as she did not sign onto hospice services and her friend Carmen Monsivais did not sign off on this either  OPCM SW received permission from patient to discuss patient's discharge plan with the [facility] Social Workers and clarify if she is on hospice services  Patient is also requesting for Dr Ibrahima Giles to call her directly to further discuss things  OPCM SW to inform Dr Ibrahima Giles of patient's request to speak with her  OPCM SW spoke with Cristel Burrell, [facility]   Cristel Burrell was unaware that patient was requesting to discharge home at this time  To Casandra's knowledge, patient was planned to be at [facility] [place] for [facility] [address] due to her need for 24/7 care  Cristel Burrell shared that patient's roommate at the SNF recently discharged, which could be triggering patient's request to discharge home  Cristel Burrell is unsure if patient is able to afford to pay for 24/7 care in the home    Cristel Burrell also confirmed that patient is not on hospice services at this time   OPCLUCÍA SW requested for Obadiah Cancer to meet with patient and discuss patient's needs and home services that would need to be in place prior to patient discharging home  Obadimarcial Cancer confirmed that she will meet with patient to further discuss  Update routed to Dr Alejandro LEYVA to follow-up in 1 week to check patient's status at [facility]

## 2020-09-16 NOTE — PROGRESS NOTES
POC reviwed,verbalized understanding.Pt remained free from falls. Fall precautions in place.  VSS. PIV removed. AVS reviewed and all questions answered. Stressed importance of f/u appointments and checking 02 with pulse ox. Educated on new medications with no questions.   No c/o at this time. Reminded to call for assistance. Will continue to monitor.    Progress Note - Infectious Disease   Neo Weller 76 y o  female MRN: [id number]  Unit/Bed#:  Encounter: [id number]      Impression/Recommendations:  1  Septic shock  Evolving since admission  Fever, leukocytosis, refractory hypotension  Consider UTI (see below)  Blood cultures negative  Chest x-ray shows no pneumonia  Wounds without infection per photo review  Clinically stable and nontoxic despite low level pressor requirement  Rec:  ? Continue aztreonam for now  ? Follow up final blood cultures  ? Follow up urine cultures as below  ? Follow temperatures and WBC closely  ? Supportive care as per the primary service     2  GNR UTI  In the setting of bladder spasms and [place] removal   No history of MDR-GNRs  Rec:  ? Continue aztreonam for now  ? Follow up urine culture and tailor antibiotics as indicated  ? Follow urine output closely     3   Left femur ORIF infection   In setting of recent displaced femur fracture 10/2018 with localized hematoma   Draining sinus with air at fracture site now noted on admission CT   Now status post I&D of purulent/necrotic sinus tract that communicated to bone   OR culture with Corynebacterium from wound and bone  Clinically stable without sepsis  Rec:  ? Continue daptomycin for 6 weeks of IV antibiotics followed by suppressive PO antibiotics until bone healed  ? Check weekly CBC-diff, creatinine, CPK while on IV antibiotics  ? D/C PICC after last dose of IV antibiotics  ? Will ask micro lab to run susceptibilities to tetracycline, FQ, linezolid to allow choice of eventual PO option  ? Continue LWC/VAC per ortho     4  Right hip pressure ulcer with flap necrosis   Status post debridement and flap readvancement   CT negative for osteomyelitis   Bone visualized intraoperatively after debridment but not overtly soft or infected and was subsequently covered   OR wound cultures with Staph pseudointermedius/B  Fragilis   OR bone cultures with 3 colonies MRSA   Status post 7 days daptomycin/Flagyl for soft tissue infection  Rec  ? [address] and offloading per Plastics  ? Prolonged antibiotic course for # 1 should treat any potential residual osteomyelitis     5   Left hip pressure ulcer   No evidence of acute infection   Status post local debridement without purulence noted intraoperatively   No exposed bone  Suspect Pseudomonas from recent wound cultures are colonizers  Rec:  ? No additional antibiotics for now  ? Continue LWC and offloading per Plastics  ? 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 No additional antibiotics for now  ? Continue LWC and offloading per Plastics     7  Paraplegia      Antibiotics:  Daptomycin #6  Aztreonam # 2    Subjective:  Patient seen on AM rounds  More alert today  Offers no complaints  Denies fevers, chills, sweats, nausea, vomiting, or diarrhea  24 Hour Events:  Was seen by wound care yesterday  Evaluation of wound showed no acute infection  Low-grade fever overnight  No documented nausea, vomiting, or diarrhea  Off pressors  Objective:  Vitals:  Temp:  [97 1 °F (36 2 °C)-100 °F (37 8 °C)] 99 3 °F (37 4 °C)  HR:  [] 68  Resp:  [16-67] 16  BP: ()/(22-77) 93/52  SpO2:  [92 %-100 %] 97 %  Temp (24hrs), Av 9 °F (37 2 °C), Min:97 1 °F (36 2 °C), Max:100 °F (37 8 °C)  Current: Temperature: 99 3 °F (37 4 °C)    Physical Exam:   General:  No acute distress  Psychiatric:  Awake and alert  Pulmonary:  Normal respiratory excursion without accessory muscle use  Abdomen:  Soft, nontender  Extremities:  No edema  Skin:  No rashes    Lab Results:  I have personally reviewed pertinent labs      Results from last 7 days  Lab Units 01/07/19  0555 01/06/19  0801 01/06/19  0544   POTASSIUM mmol/L 3 8 3 8 3 4*   CHLORIDE mmol/L 106 107 105   CO2 mmol/L 24 26 25   BUN mg/dL 7 17 18   CREATININE mg/dL 0 31* 0 53* 0 59*   EGFR ml/min/1 73sq m 117 98 94   CALCIUM mg/dL 7 7* 7 9* 7 8*   AST U/L  --   --  24   ALT U/L  --   --  29   ALK PHOS U/L  --   --  71       Results from last 7 days  Lab Units 01/07/19  0555 01/06/19  0801 01/06/19  0544   WBC Thousand/uL 9 50 15 20* 16 10*   HEMOGLOBIN g/dL 8 6* 8 9* 8 9*   PLATELETS Thousands/uL 331 348 343       Results from last 7 days  Lab Units 01/06/19  1245 01/06/19  1239 01/06/19  0927 01/06/19  0633 01/05/19  2242 01/03/19  1517 01/03/19  1429 01/03/19  1428   BLOOD CULTURE   --   --  No Growth at 24 hrs  No Growth at 24 hrs   --  No Growth at 24 hrs  No Growth at 24 hrs   --   --   --    Tori Cramp STAIN RESULT  No Polys or Bacteria seen No Polys or Bacteria seen  No polys seen  2+ Gram negative rods  1+ Gram positive cocci in pairs  --   --   --  1+ Polys  No bacteria seen Rare Polys  2+ RBC's  No bacteria seen Rare Polys  2+ RBC's  No bacteria seen   URINE CULTURE   --   --   --  >100,000 cfu/ml Gram Negative Jose J Enteric Like*  --   --   --   --    WOUND CULTURE  Few Colonies of Pseudomonas aeruginosa* Few Colonies of Escherichia coli*  Few Colonies of Pseudomonas aeruginosa*  4+ Growth of Pseudomonas aeruginosa*  3+ Growth of   --   --   --  1+ Growth of Corynebacterium striatum*  --   --        Imaging Studies:   I have personally reviewed pertinent imaging study reports and images in PACS  EKG, Pathology, and Other Studies:   I have personally reviewed pertinent reports

## 2020-09-17 NOTE — PROGRESS NOTES
Call all 3 phone # pt have no answer I let message and details to call office and make  Virtually appoitment

## 2020-09-17 NOTE — PROGRESS NOTES
Thank you Carlos Manuel Lowery for all of your help  I really appreciate it  I will work on reaching out to the patient

## 2020-09-18 ENCOUNTER — TELEMEDICINE (OUTPATIENT)
Dept: FAMILY MEDICINE CLINIC | Facility: CLINIC | Age: 70
End: 2020-09-18
Payer: COMMERCIAL

## 2020-09-18 ENCOUNTER — TELEPHONE (OUTPATIENT)
Dept: FAMILY MEDICINE CLINIC | Facility: CLINIC | Age: 70
End: 2020-09-18

## 2020-09-18 VITALS
WEIGHT: 100 LBS | BODY MASS INDEX: 19.63 KG/M2 | DIASTOLIC BLOOD PRESSURE: 74 MMHG | SYSTOLIC BLOOD PRESSURE: 123 MMHG | HEIGHT: 60 IN

## 2020-09-18 DIAGNOSIS — L89.324 DECUBITUS ULCER OF LEFT ISCHIUM, STAGE IV (HCC): ICD-10-CM

## 2020-09-18 DIAGNOSIS — G82.20 PARAPLEGIA FOLLOWING SPINAL CORD INJURY (HCC): Primary | ICD-10-CM

## 2020-09-18 DIAGNOSIS — K59.04 CHRONIC IDIOPATHIC CONSTIPATION: ICD-10-CM

## 2020-09-18 DIAGNOSIS — N31.9 NEUROGENIC BLADDER: ICD-10-CM

## 2020-09-18 DIAGNOSIS — M46.28 CHRONIC OSTEOMYELITIS OF COCCYX (HCC): ICD-10-CM

## 2020-09-18 PROCEDURE — 1170F FXNL STATUS ASSESSED: CPT | Performed by: FAMILY MEDICINE

## 2020-09-18 PROCEDURE — G0438 PPPS, INITIAL VISIT: HCPCS | Performed by: FAMILY MEDICINE

## 2020-09-18 PROCEDURE — 3725F SCREEN DEPRESSION PERFORMED: CPT | Performed by: FAMILY MEDICINE

## 2020-09-18 PROCEDURE — 99214 OFFICE O/P EST MOD 30 MIN: CPT | Performed by: FAMILY MEDICINE

## 2020-09-18 PROCEDURE — 1036F TOBACCO NON-USER: CPT | Performed by: FAMILY MEDICINE

## 2020-09-18 PROCEDURE — 1160F RVW MEDS BY RX/DR IN RCRD: CPT | Performed by: FAMILY MEDICINE

## 2020-09-18 PROCEDURE — 1125F AMNT PAIN NOTED PAIN PRSNT: CPT | Performed by: FAMILY MEDICINE

## 2020-09-18 NOTE — TELEPHONE ENCOUNTER
Patient had a meeting with  Neftali Cadena and she really isn't doing anything  She will be having an evaluation with the nurse and Dr  She met with nurse today and she had given her opinion; Since she cant walk she isn't able to go home, that she is a paraplegic and she will never go home  She was supposed to speak with  and he never showed  The  stated that if she goes home she has to have a Dr come to her home 3 days a week  She also has to have someone to move in with her, which she has noone  She really wants to go home

## 2020-09-18 NOTE — PROGRESS NOTES
Assessment and Plan:     Problem List Items Addressed This Visit     None           Preventive health issues were discussed with patient, and age appropriate screening tests were ordered as noted in patient's After Visit Summary  Personalized health advice and appropriate referrals for health education or preventive services given if needed, as noted in patient's After Visit Summary       History of Present Illness:     Patient presents for Medicare Annual Wellness visit    Patient Care Team:  Mira Mora MD as PCP - General (Family Medicine)  MD [employer] [facility] as Outpatient Care Manager (Care Coordination)     Problem List:     Patient Active Problem List   Diagnosis    Paraplegia following spinal cord injury ([address] )    Iron deficiency anemia    Underweight    Decubitus ulcer of left ischium, stage IV (HCC)    Chronic osteomyelitis of coccyx ([address] )    Anemia of chronic disease    GERD (gastroesophageal reflux disease)    Thrombocytosis (HCC)    Hyperglycemia    BMI less than 19,adult    Hypermagnesemia    Sepsis due to anaerobes ([address] )    C  difficile diarrhea    Pressure injury of sacral region, unstageable ([address] )    Acute blood loss anemia    SIRS (systemic inflammatory response syndrome) (HCC)    Neurogenic bladder    Other osteoporosis without current pathological fracture    Closed dislocation of right hip (HCC)    Elevated liver function tests    Physical deconditioning    Open wound of left hip    Severe protein-calorie malnutrition (HCC)    Depression    Abnormal CT of the chest    Lung nodule    Cognitive decline    Abnormal CT scan    Closed lateral dislocation of distal end of right femur    Hyponatremia    Urinary tract infection associated with indwelling urethral catheter ([address] )    Goals of care, counseling/discussion    Mild protein-calorie malnutrition ([address] )    Sepsis (HCC)    Chronic idiopathic constipation      Past Medical and Surgical History:     Past Medical History:   Diagnosis Date    Anemia     iron defiencey    Arthritis     osteo    Back injury     Depression     MRSA (methicillin resistant staph aureus) culture positive 12/07/2018    BONE-TISSUE     Osteopenia     Osteoporosis     Paralysis ([address] )     paraplegic due to spinal cord injury    Paraplegia (HCC)     Poor circulation     Pressure injury of skin     right hip, stage 3    Pressure sore of hip     right    Tibial plateau fracture     Underweight      Past Surgical History:   Procedure Laterality Date    CLOSURE DELAYED PRIMARY N/A 3/20/2019    Procedure: OPHELIA ANAL WOUND RECLOSURE;  Surgeon: Jose R Kerr MD;  Location: [address] [place] [address];  Service: Plastics    DECUBITUS ULCER EXCISION Bilateral 11/12/2019    Procedure: DEBRIDEMENT DECUBITI ([facility]); Surgeon: Froilan Ortiz DO;  Location: [facility];  Service: General    FLAP LOCAL EXTREMITY Left 1/28/2019    Procedure: THIGH FLAP & STSG TO CLOSE HIP WOUND;  Surgeon: Jose R Kerr MD;  Location: [address] [place] [address];  Service: Plastics    FLAP LOCAL TRUNK Right 12/17/2018    Procedure: DEBRIDE/FLAP CLOSURE HIP PRESSURE SORE Aris Zohaib GRAFT;  Surgeon: Jose R Kerr MD;  Location: [address] [place] [address] OR;  Service: Plastics    FLAP LOCAL TRUNK Left 2/27/2019    Procedure: BUTTOCK FLAP TO ISCHIAL SORE;  Surgeon: Jose R Kerr MD;  Location: [address] [place] [address];  Service: Plastics    HIP DEBRIDEMENT Right 2017    right hip sore debridement    INCISION AND DRAINAGE OF WOUND Left 1/3/2019    Procedure: INCISION AND DRAINAGE (I&D) left distal femur  With deep wound cultures   Application of VAC DRAIN SPONGE;  Surgeon: Delroy Puentes MD;  Location: [facility] OR;  Service: Orthopedics    IR PICC LINE  12/19/2018    IR PICC LINE  3/12/2019    OK DEBRIDEMENT, SKIN, SUB-Q TISSUE,MUSCLE,BONE,=<20 SQ CM Right 9/19/2018    Procedure: DEBRIDEMENT OF HIP PRESSURE SORE;  Surgeon: Jose R Kerr MD; Location:  MAIN OR;  Service: Plastics    CT OPEN RX FEMUR FX+INTRAMED FELIX Left 10/22/2018    Procedure: INSERTION NAIL IM LEFT FEMUR RETROGRADE;  Surgeon: Fátima Argutea MD;  Location:  MAIN OR;  Service: Orthopedics    TOE AMPUTATION Left 2017    small toe left foot amputation    WISDOM TOOTH EXTRACTION      WOUND DEBRIDEMENT Left 12/17/2018    Procedure: DEBRIDEMENT HIP PRESSURE SORE;  Surgeon: Gio Yoder MD;  Location: [address] [place] [address] MAIN OR;  Service: Plastics    WOUND DEBRIDEMENT N/A 11/10/2019    Procedure: EXCISIONAL DEBRIDEMENT;  Surgeon: Caswell Skiff, MD;  Location:  MAIN OR;  Service: General      Family History:     Family History   Problem Relation Age of Onset    Depression Father       Social History:     E-Cigarette/Vaping    E-Cigarette Use Never User      E-Cigarette/Vaping Substances    Nicotine No     THC No     CBD No     Flavoring No     Other No     Unknown No      Social History     Socioeconomic History    Marital status: Single     Spouse name: None    Number of children: None    Years of education: None    Highest education level: None   Occupational History    None   Social Needs    Financial resource strain: None    Food insecurity     Worry: None     Inability: None    Transportation needs     Medical: None     Non-medical: None   Tobacco Use    Smoking status: Never Smoker    Smokeless tobacco: Never Used   Substance and Sexual Activity    Alcohol use: Not Currently     Frequency: Never     Comment: occasional    Drug use: No    Sexual activity: Not Currently   Lifestyle    Physical activity     Days per week: None     Minutes per session: None    Stress: None   Relationships    Social connections     Talks on phone: None     Gets together: None     Attends Hinduism service: None     Active member of club or organization: None     Attends meetings of clubs or organizations: None     Relationship status: None    Intimate partner violence     Fear of current or ex partner: None     Emotionally abused: None     Physically abused: None     Forced sexual activity: None   Other Topics Concern    None   Social History Narrative    Lives at home alone  Pt denies getting assistance from outside persons or agencies including wound care         Medications and Allergies:     Current Outpatient Medications   Medication Sig Dispense Refill    acetaminophen (TYLENOL) 325 mg tablet Take 2 tablets (650 mg total) by mouth every 6 (six) hours as needed for mild pain, headaches or fever 30 tablet 0    Calcium Carb-Cholecalciferol 600-200 MG-UNIT TABS Take 1 tablet by mouth daily      loperamide (IMODIUM) 2 mg capsule Take 1 capsule (2 mg total) by mouth 3 (three) times a day as needed (loose stool) for up to 3 doses 30 capsule 0    midodrine (PROAMATINE) 10 MG tablet Take 1 tablet (10 mg total) by mouth 2 (two) times a day (Patient taking differently: Take 5 mg by mouth 2 (two) times a day )  0    multivitamin (THERAGRAN) TABS Take 1 tablet by mouth daily      pantoprazole (PROTONIX) 40 mg tablet Take 40 mg by mouth daily      artificial tear (LUBRIFRESH P M ) 83-15 % ophthalmic ointment Administer to both eyes daily at bedtime (Patient not taking: Reported on 4/17/2020)  0    bisacodyl (DULCOLAX) 10 mg suppository Insert 1 suppository (10 mg total) into the rectum daily as needed (2nd line for constipation) (Patient not taking: Reported on 4/17/2020) 12 suppository 0    cholecalciferol (VITAMIN D3) 1,000 units tablet Take 1,000 Units by mouth daily      cyanocobalamin 100 MCG tablet Take 100 mcg by mouth daily      saccharomyces boulardii (FLORASTOR) 250 mg capsule Take 1 capsule (250 mg total) by mouth 2 (two) times a day (Patient not taking: Reported on 4/17/2020) 60 capsule 0    sodium chloride 1 g tablet Take 2 tablets (2 g total) by mouth 2 (two) times a day with meals (Patient not taking: Reported on 9/4/2020)  0     No current facility-administered medications for this visit  Facility-Administered Medications Ordered in Other Visits   Medication Dose Route Frequency Provider Last Rate Last Dose    dexamethasone (DECADRON) injection 4 mg  4 mg Intravenous Once PRN Diamond Spearing, DO        lactated ringers infusion  75 mL/hr Intravenous Continuous Diamond Spearing, DO 75 mL/hr at 11/10/19 2105      lactated ringers infusion  50 mL/hr Intravenous Continuous Nelly Smith ALEXANDRA        lactated ringers infusion  75 mL/hr Intravenous Continuous Diamond Spearing, DO   Stopped at 03/20/19 1841    lactated ringers infusion  75 mL/hr Intravenous Continuous Jenniffer Felix MD   Stopped at 03/20/19 1842    ondansetron (ZOFRAN) injection 4 mg  4 mg Intravenous Once PRN Abdullahi Salmon MD        promethazine (PHENERGAN) injection 12 5 mg  12 5 mg Intravenous Once PRN Abdullahi Salmon MD         Allergies   Allergen Reactions    Iv Contrast [Iodinated Diagnostic Agents]      Tingling face, itching    Cefepime Rash    Ciprofloxacin Rash and Swelling     Looked like suburn, itching  Bed sores  Swelling, itching    Latex Rash    Vancomycin Rash     Unknown       Immunizations:     Immunization History   Administered Date(s) Administered    INFLUENZA 12/16/2013    Tuberculin Skin Test-PPD Intradermal 01/15/2019, 02/07/2019      Health Maintenance:         Topic Date Due    Hepatitis C Screening  1950    MAMMOGRAM  07/20/2016         Topic Date Due    DTaP,Tdap,and Td Vaccines (1 - Tdap) 10/02/1971    Pneumococcal Vaccine: 65+ Years (1 of 1 - PPSV23) 10/02/2015    Influenza Vaccine  07/01/2020      Medicare Health Risk Assessment:     /74   Ht 5' (1 524 m)   Wt 45 4 kg (100 lb)   LMP  (LMP Unknown)   BMI 19 53 kg/m²      Ramon Le is here for her Subsequent Wellness visit  Health Risk Assessment:   Patient rates overall health as good  Patient feels that their physical health rating is same  Eyesight was rated as same   Hearing was rated as same  Patient feels that their emotional and mental health rating is slightly worse  Pain experienced in the last 7 days has been none  Patient states that she has experienced no weight loss or gain in last 6 months  Depression Screening:   PHQ-2 Score: 4  PHQ-9 Score: 11      Fall Risk Screening: In the past year, patient has experienced: no history of falling in past year      Urinary Incontinence Screening:   Patient has leaked urine accidently in the last six months  Home Safety:  Patient does not have trouble with stairs inside or outside of their home  Patient has working smoke alarms and has working carbon monoxide detector  Nutrition:   Current diet is Regular  Medications:   Patient is not currently taking any over-the-counter supplements  Patient is not able to manage medications  Activities of Daily Living (ADLs)/Instrumental Activities of Daily Living (IADLs):   Walk and transfer into and out of bed and chair?: No  Dress and groom yourself?: No    Bathe or shower yourself?: No    Feed yourself?  No  Do your laundry/housekeeping?: No  Manage your money, pay your bills and track your expenses?: No  Make your own meals?: No    Do your own shopping?: No    ADL comments: Pt is in a nursing home     Previous Hospitalizations:   Any hospitalizations or ED visits within the last 12 months?: Yes    How many hospitalizations have you had in the last year?: 1-2    Advance Care Planning:   Living will: Yes    Advanced directive: Yes      PREVENTIVE SCREENINGS      Cardiovascular Screening:    General: Screening Current and Risks and Benefits Discussed    Due for: Lipid Panel      Diabetes Screening:     General: Screening Current and Risks and Benefits Discussed    Due for: Blood Glucose      Cervical Cancer Screening:    General: Screening Not Indicated      Osteoporosis Screening:    General: Screening Not Indicated and History Osteoporosis      Lung Cancer Screening:     General: Screening Not Indicated      Drew Dejesus MD  Virtual AWV Consent    Reason for visit is discussed discharge to patient's home from SNF    Encounter provider Drew Dejesus MD    Provider located at [address] [place] [phone]      Recent Visits  Date Type Provider Dept   09/13/20 Telephone Drew Dejesus MD [facility] recent visits within past 7 days and meeting all other requirements     Today's Visits  Date Type Provider Dept   09/18/20 Telemedicine Drew Dejesus MD [facility] today's visits and meeting all other requirements     Future Appointments  No visits were found meeting these conditions  Showing future appointments within next 150 days and meeting all other requirements        After connecting through [employer], the patient was identified by name and date of birth  Jose Manuel Mendoza was informed that this is a telemedicine visit and that the visit is being conducted through [employer] and patient was informed that this is not a secure, HIPAA-complaint platform  She agrees to proceed  My office door was closed  No one else was in the room  She acknowledged consent and understanding of privacy and security of the video platform  The patient has agreed to participate and understands they can discontinue the visit at any time    Patient is aware this is a billable service

## 2020-09-18 NOTE — PROGRESS NOTES
Virtual Regular Visit      Assessment/Plan:    Problem List Items Addressed This Visit        Digestive    Chronic idiopathic constipation       Nervous and Auditory    Paraplegia following spinal cord injury ([facility] [address] ) - Primary       Musculoskeletal and Integument    Decubitus ulcer of left ischium, stage IV (HCC)    Chronic osteomyelitis of coccyx ([facility] [address] )       Other    Neurogenic bladder        72-year-old female with a history of paraplegia following spinal cord injury, chronic osteomyelitis of the coccyx, decubitus ulcers, chronic constipation presents today via virtual video telemedicine visit to discuss discharge plan and plans for going home  She has been in touch with our case management social worker, Chase Garrido who has also reached out to [address] to start a dialogue on procedures and how patient can go home  I have reviewed all the notes from Chase Garrido who did speak to [address] who states she was not aware that patient had the desire to go home  Patient states she has express this multiple times  Patient states that she was told she needs 24 hour care however patient feels this is not appropriate  She states she can take care of personal care  She states she does not want to drag this out  Patient states she does not want to die at [address] [place] [address]  Patient states she has never seen the physician once in the past 4 months she has been at this nursing home  Patient states she has never been moved to a chair for the past 4 months  Patient states they do not turn her appropriately when they are supposed to  She states she was told that until she cannot walk, she cannot go home  Patient states she has not walked since 1981 after having a hang gliding accident  I had a long discussion with the patient regarding safety  I feel she will need PT/OT, safe transferring techniques    I would like her ESTEFANÍA Funez and patient to schedule a meeting with the , [address] regarding options on how to go about eventually going home  I have asked patient to obtain the name of the physician at [address] [place] [address] so I can speak with them regarding her care  She states she will call me back in regards to this  Patient states she is also supposed to meet with a  today  Regarding 24 hour care, patient states she is unable to for this  She cannot qualify for medical assistance either  She is agreeable on paying out of pocket for a couple of sessions of PT/OT at the as if she is residing at [facility]  I have explained to her that I am unable to make any decisions as I have not physically examined her and it would be appropriate for physician at the SNF to see her and examine her and help with discharge planning  We will be in touch again within a week  Addendum:  Our [facility] ,Tammy has had detailed conversations with the patient,[facility] and [facility]? Rosalina Cortes who is also now involved with patient's case  It appears that patient has attempted to call BLS services to have her transported back to her home however SNF has turned away the services due to concern for patient safety  It appears that the physician of the SNF and care team was to meet with the patient this week  I do not know the out, this yet  They have also requested psychiatry evaluation as a feel patient is in denial of her medical conditions as she does require extensive wound care and 24 hour care  They have also offered her hospice services which patient has declined  It appears or bird a will look into the state of patient's apartment and be in close contact with SNF GORDON Guajardo         Reason for visit is to discuss being discharged from nursing home and going back to her home  Chief Complaint   Patient presents with    Follow-up    Anxiety    Virtual Awv    Virtual Regular Visit        Encounter provider Maxi Wang MD    Provider located at [address] [place] [address] [facility]  [address] [facility] [id number]      Recent Visits  Date Type Provider Dept   09/18/20 Telephone Mykel Davalos [address]   09/18/20 Telemedicine Becky Urbina, [address] recent visits within past 7 days and meeting all other requirements     Future Appointments  No visits were found meeting these conditions  Showing future appointments within next 150 days and meeting all other requirements        The patient was identified by name and date of birth  Dixon Delacruz was informed that this is a telemedicine visit and that the visit is being conducted through [employer] and patient was informed that this is not a secure, HIPAA-complaint platform  She agrees to proceed     My office door was closed  No one else was in the room  She acknowledged consent and understanding of privacy and security of the video platform  The patient has agreed to participate and understands they can discontinue the visit at any time  Patient is aware this is a billable service  Subjective  Dixon Delacruz is a 71 y o  female presents today via virtual video telemedicine visit to discuss plans for going home  She has been in touch with our case management social worker, Leanna Vincent who has also reached out to [address] Mason to start a dialogue on procedures and how patient can finally go home  I have reviewed all the notes from Leanna Vincent who did speak to Sia Mason who states she was not aware that patient had the desire to go home  Patient states that she was told she needs 24 hour care and feels this is not appropriate  She states she can take care of personal care  She states she does not want to try this out  Patient states she does not want to die at [address] [place] [address]  Patient states she has never seen the physician once in the past 4 months she has been at this nursing home    Patient states she has never been moved to a chair for the past 4 months  Patient states they do not turn her appropriately when they are supposed to  She states she was told that until she cannot walk, she cannot go home  Patient states she has been walk since 1981 after having a hang gliding accident  HPI     Past Medical History:   Diagnosis Date    Anemia     iron defiencey    Arthritis     osteo    Back injury     Depression     MRSA (methicillin resistant staph aureus) culture positive 12/07/2018    BONE-TISSUE     Osteopenia     Osteoporosis     Paralysis ([address] )     paraplegic due to spinal cord injury    Paraplegia (HCC)     Poor circulation     Pressure injury of skin     right hip, stage 3    Pressure sore of hip     right    Tibial plateau fracture     Underweight        Past Surgical History:   Procedure Laterality Date    CLOSURE DELAYED PRIMARY N/A 3/20/2019    Procedure: OPHELIA ANAL WOUND RECLOSURE;  Surgeon: Reina Ortega MD;  Location: [address] [place] [address];  Service: Plastics    DECUBITUS ULCER EXCISION Bilateral 11/12/2019    Procedure: DEBRIDEMENT DECUBITI ([facility] OUT); Surgeon: Faizan Pedroza DO;  Location: [facility];  Service: General    FLAP LOCAL EXTREMITY Left 1/28/2019    Procedure: THIGH FLAP & STSG TO CLOSE HIP WOUND;  Surgeon: Reina Ortega MD;  Location: [address] [place] [address] OR;  Service: Plastics    FLAP LOCAL TRUNK Right 12/17/2018    Procedure: DEBRIDE/FLAP CLOSURE HIP PRESSURE SORE Beverlyn Mari GRAFT;  Surgeon: Reina Ortega MD;  Location: [address] [place] [address] OR;  Service: Plastics    FLAP LOCAL TRUNK Left 2/27/2019    Procedure: BUTTOCK FLAP TO ISCHIAL SORE;  Surgeon: Reina Ortega MD;  Location: [address] [place] [address] OR;  Service: Plastics    HIP DEBRIDEMENT Right 2017    right hip sore debridement    INCISION AND DRAINAGE OF WOUND Left 1/3/2019    Procedure: INCISION AND DRAINAGE (I&D) left distal femur  With deep wound cultures   Application of VAC DRAIN SPONGE;  Surgeon: Gonzalez Diane MD;  Location: [facility]; Service: Orthopedics    IR PICC LINE  12/19/2018    IR PICC LINE  3/12/2019    MS DEBRIDEMENT, SKIN, SUB-Q TISSUE,MUSCLE,BONE,=<20 SQ CM Right 9/19/2018    Procedure: DEBRIDEMENT OF HIP PRESSURE SORE;  Surgeon: Annabelle Jordan MD;  Location: [facility];  Service: Plastics    MS OPEN RX FEMUR FX+INTRAMED FELIX Left 10/22/2018    Procedure: INSERTION NAIL IM LEFT FEMUR RETROGRADE;  Surgeon: Stevo Billingsely MD;  [facility];  Service: Orthopedics    TOE AMPUTATION Left 2017    small toe left foot amputation    WISDOM TOOTH EXTRACTION      WOUND DEBRIDEMENT Left 12/17/2018    Procedure: DEBRIDEMENT HIP PRESSURE SORE;  Surgeon: Annabelle Jordan MD;  Location: [facility];  Service: Plastics    WOUND DEBRIDEMENT N/A 11/10/2019    Procedure: EXCISIONAL DEBRIDEMENT;  Surgeon: Abdi Thrasher MD;  [facility];  Service: General       Current Outpatient Medications   Medication Sig Dispense Refill    acetaminophen (TYLENOL) 325 mg tablet Take 2 tablets (650 mg total) by mouth every 6 (six) hours as needed for mild pain, headaches or fever 30 tablet 0    Calcium Carb-Cholecalciferol 600-200 MG-UNIT TABS Take 1 tablet by mouth daily      loperamide (IMODIUM) 2 mg capsule Take 1 capsule (2 mg total) by mouth 3 (three) times a day as needed (loose stool) for up to 3 doses 30 capsule 0    midodrine (PROAMATINE) 10 MG tablet Take 1 tablet (10 mg total) by mouth 2 (two) times a day (Patient taking differently: Take 5 mg by mouth 2 (two) times a day )  0    multivitamin (THERAGRAN) TABS Take 1 tablet by mouth daily      pantoprazole (PROTONIX) 40 mg tablet Take 40 mg by mouth daily      artificial tear (LUBRIFRESH P M ) 83-15 % ophthalmic ointment Administer to both eyes daily at bedtime (Patient not taking: Reported on 4/17/2020)  0    bisacodyl (DULCOLAX) 10 mg suppository Insert 1 suppository (10 mg total) into the rectum daily as needed (2nd line for constipation) (Patient not taking: Reported on 4/17/2020) 12 suppository 0    cholecalciferol (VITAMIN D3) 1,000 units tablet Take 1,000 Units by mouth daily      cyanocobalamin 100 MCG tablet Take 100 mcg by mouth daily      saccharomyces boulardii (FLORASTOR) 250 mg capsule Take 1 capsule (250 mg total) by mouth 2 (two) times a day (Patient not taking: Reported on 4/17/2020) 60 capsule 0    sodium chloride 1 g tablet Take 2 tablets (2 g total) by mouth 2 (two) times a day with meals (Patient not taking: Reported on 9/4/2020)  0     No current facility-administered medications for this visit  Facility-Administered Medications Ordered in Other Visits   Medication Dose Route Frequency Provider Last Rate Last Dose    dexamethasone (DECADRON) injection 4 mg  4 mg Intravenous Once PRN Abraham Stare, DO        lactated ringers infusion  75 mL/hr Intravenous Continuous Abraham Stare, DO 75 mL/hr at 11/10/19 2105      lactated ringers infusion  50 mL/hr Intravenous Continuous Tabby Quevedo CRNA        lactated ringers infusion  75 mL/hr Intravenous Continuous Abraham Stare, DO   Stopped at 03/20/19 1841    lactated ringers infusion  75 mL/hr Intravenous Continuous Rashmi Servin MD   Stopped at 03/20/19 1842    ondansetron (ZOFRAN) injection 4 mg  4 mg Intravenous Once PRN Shreyas Trejo MD        promethazine (PHENERGAN) injection 12 5 mg  12 5 mg Intravenous Once PRN Shreyas Trejo MD            Allergies   Allergen Reactions    Iv Contrast [Iodinated Diagnostic Agents]      Tingling face, itching    Cefepime Rash    Ciprofloxacin Rash and Swelling     Looked like suburn, itching  Bed sores  Swelling, itching    Latex Rash    Vancomycin Rash     Unknown        Review of Systems   Constitutional: Negative for appetite change  HENT: Negative  Respiratory: Negative for shortness of breath  Cardiovascular: Negative  Gastrointestinal: Negative for abdominal pain     Genitourinary:        Have catheter in place Psychiatric/Behavioral:        Concerned about her situation       Video Exam    Vitals:    09/18/20 1055   BP: 123/74   Weight: 45 4 kg (100 lb)   Height: 5' (1 524 m)       Physical Exam  Vitals signs and nursing note reviewed  Constitutional:       General: She is not in acute distress  Appearance: Normal appearance  HENT:      Head: Normocephalic  Pulmonary:      Effort: No respiratory distress  Neurological:      Mental Status: She is alert and oriented to person, place, and time  Psychiatric:      Comments: Concerned          I have spent 40 minutes with Patient  today in which greater than 50% of this time was spent in counseling/coordination of care regarding Prognosis, Risks and benefits of tx options, Intructions for management, Patient and family education, Importance of tx compliance and Risk factor reductions  VIRTUAL VISIT DISCLAIMER    Sabine Little acknowledges that she has consented to an online visit or consultation  She understands that the online visit is based solely on information provided by her, and that, in the absence of a face-to-face physical evaluation by the physician, the diagnosis she receives is both limited and provisional in terms of accuracy and completeness  This is not intended to replace a full medical face-to-face evaluation by the physician  Sabine Little understands and accepts these terms

## 2020-09-18 NOTE — PATIENT INSTRUCTIONS
Medicare Preventive Visit Patient Instructions  Thank you for completing your Welcome to Medicare Visit or Medicare Annual Wellness Visit today  Your next wellness visit will be due in one year (9/18/2021)  The screening/preventive services that you may require over the next 5-10 years are detailed below  Some tests may not apply to you based off risk factors and/or age  Screening tests ordered at today's visit but not completed yet may show as past due  Also, please note that scanned in results may not display below  Preventive Screenings:  Service Recommendations Previous Testing/Comments   Colorectal Cancer Screening  * Colonoscopy    * Fecal Occult Blood Test (FOBT)/Fecal Immunochemical Test (FIT)  * Fecal DNA/Cologuard Test  * Flexible Sigmoidoscopy Age: 54-65 years old   Colonoscopy: every 10 years (may be performed more frequently if at higher risk)  OR  FOBT/FIT: every 1 year  OR  Cologuard: every 3 years  OR  Sigmoidoscopy: every 5 years  Screening may be recommended earlier than age 48 if at higher risk for colorectal cancer  Also, an individualized decision between you and your healthcare provider will decide whether screening between the ages of 74-80 would be appropriate  Colonoscopy: Not on file  FOBT/FIT: 12/25/2018  Cologuard: Not on file  Sigmoidoscopy: Not on file         Breast Cancer Screening Age: 36 years old  Frequency: every 1-2 years  Not required if history of left and right mastectomy Mammogram: 07/20/2015       Cervical Cancer Screening Between the ages of 21-29, pap smear recommended once every 3 years  Between the ages of 33-67, can perform pap smear with HPV co-testing every 5 years     Recommendations may differ for women with a history of total hysterectomy, cervical cancer, or abnormal pap smears in past  Pap Smear: Not on file    Screening Not Indicated   Hepatitis C Screening Once for adults born between 1945 and 1965  More frequently in patients at high risk for Hepatitis C Hep C Antibody: Not on file       Diabetes Screening 1-2 times per year if you're at risk for diabetes or have pre-diabetes Fasting glucose: 83 mg/dL   A1C: 6 4 %    Screening Current   Cholesterol Screening Once every 5 years if you don't have a lipid disorder  May order more often based on risk factors  Lipid panel: 05/21/2019    Screening Current     Other Preventive Screenings Covered by Medicare:  1  Abdominal Aortic Aneurysm (AAA) Screening: covered once if your at risk  You're considered to be at risk if you have a family history of AAA  2  Lung Cancer Screening: covers low dose CT scan once per year if you meet all of the following conditions: (1) Age 50-69; (2) No signs or symptoms of lung cancer; (3) Current smoker or have quit smoking within the last 15 years; (4) You have a tobacco smoking history of at least 30 pack years (packs per day multiplied by number of years you smoked); (5) You get a written order from a healthcare provider  3  Glaucoma Screening: covered annually if you're considered high risk: (1) You have diabetes OR (2) Family history of glaucoma OR (3)  aged 48 and older OR (3)  American aged 72 and older  3  Osteoporosis Screening: covered every 2 years if you meet one of the following conditions: (1) You're estrogen deficient and at risk for osteoporosis based off medical history and other findings; (2) Have a vertebral abnormality; (3) On glucocorticoid therapy for more than 3 months; (4) Have primary hyperparathyroidism; (5) On osteoporosis medications and need to assess response to drug therapy  · Last bone density test (DXA Scan): 12/20/2017  5  HIV Screening: covered annually if you're between the age of 12-76  Also covered annually if you are younger than 13 and older than 72 with risk factors for HIV infection  For pregnant patients, it is covered up to 3 times per pregnancy      Immunizations:  Immunization Recommendations   Influenza Vaccine Annual influenza vaccination during flu season is recommended for all persons aged >= 6 months who do not have contraindications   Pneumococcal Vaccine (Prevnar and Pneumovax)  * Prevnar = PCV13  * Pneumovax = PPSV23   Adults 25-60 years old: 1-3 doses may be recommended based on certain risk factors  Adults 72 years old: Prevnar (PCV13) vaccine recommended followed by Pneumovax (PPSV23) vaccine  If already received PPSV23 since turning 65, then PCV13 recommended at least one year after PPSV23 dose  Hepatitis B Vaccine 3 dose series if at intermediate or high risk (ex: diabetes, end stage renal disease, liver disease)   Tetanus (Td) Vaccine - COST NOT COVERED BY MEDICARE PART B Following completion of primary series, a booster dose should be given every 10 years to maintain immunity against tetanus  Td may also be given as tetanus wound prophylaxis  Tdap Vaccine - COST NOT COVERED BY MEDICARE PART B Recommended at least once for all adults  For pregnant patients, recommended with each pregnancy  Shingles Vaccine (Shingrix) - COST NOT COVERED BY MEDICARE PART B  2 shot series recommended in those aged 48 and above     Health Maintenance Due:      Topic Date Due    Hepatitis C Screening  1950    MAMMOGRAM  07/20/2016     Immunizations Due:      Topic Date Due    DTaP,Tdap,and Td Vaccines (1 - Tdap) 10/02/1971    Pneumococcal Vaccine: 65+ Years (1 of 1 - PPSV23) 10/02/2015    Influenza Vaccine  07/01/2020     Advance Directives   What are advance directives? Advance directives are legal documents that state your wishes and plans for medical care  These plans are made ahead of time in case you lose your ability to make decisions for yourself  Advance directives can apply to any medical decision, such as the treatments you want, and if you want to donate organs  What are the types of advance directives? There are many types of advance directives, and each state has rules about how to use them   You may choose a combination of any of the following:  · Living will: This is a written record of the treatment you want  You can also choose which treatments you do not want, which to limit, and which to stop at a certain time  This includes surgery, medicine, IV fluid, and tube feedings  · Durable power of  for healthcare [facility]): This is a written record that states who you want to make healthcare choices for you when you are unable to make them for yourself  This person, called a proxy, is usually a family member or a friend  You may choose more than 1 proxy  · Do not resuscitate (DNR) order:  A DNR order is used in case your heart stops beating or you stop breathing  It is a request not to have certain forms of treatment, such as CPR  A DNR order may be included in other types of advance directives  · Medical directive: This covers the care that you want if you are in a coma, near death, or unable to make decisions for yourself  You can list the treatments you want for each condition  Treatment may include pain medicine, surgery, blood transfusions, dialysis, IV or tube feedings, and a ventilator (breathing machine)  · Values history: This document has questions about your views, beliefs, and how you feel and think about life  This information can help others choose the care that you would choose  Why are advance directives important? An advance directive helps you control your care  Although spoken wishes may be used, it is better to have your wishes written down  Spoken wishes can be misunderstood, or not followed  Treatments may be given even if you do not want them  An advance directive may make it easier for your family to make difficult choices about your care  Urinary Incontinence   Urinary incontinence (UI)  is when you lose control of your bladder  UI develops because your bladder cannot store or empty urine properly   The 3 most common types of UI are stress incontinence, urge incontinence, or both  Medicines:   · May be given to help strengthen your bladder control  Report any side effects of medication to your healthcare provider  Do pelvic muscle exercises often:  Your pelvic muscles help you stop urinating  Squeeze these muscles tight for 5 seconds, then relax for 5 seconds  Gradually work up to squeezing for 10 seconds  Do 3 sets of 15 repetitions a day, or as directed  This will help strengthen your pelvic muscles and improve bladder control  Train your bladder:  Go to the bathroom at set times, such as every 2 hours, even if you do not feel the urge to go  You can also try to hold your urine when you feel the urge to go  For example, hold your urine for 5 minutes when you feel the urge to go  As that becomes easier, hold your urine for 10 minutes  Self-care:   · Keep a UI record  Write down how often you leak urine and how much you leak  Make a note of what you were doing when you leaked urine  · Drink liquids as directed  You may need to limit the amount of liquid you drink to help control your urine leakage  Do not drink any liquid right before you go to bed  Limit or do not have drinks that contain caffeine or alcohol  · Prevent constipation  Eat a variety of high-fiber foods  Good examples are high-fiber cereals, beans, vegetables, and whole-grain breads  Walking is the best way to trigger your intestines to have a bowel movement  · Exercise regularly and maintain a healthy weight  Weight loss and exercise will decrease pressure on your bladder and help you control your leakage  · Use a catheter as directed  to help empty your bladder  A catheter is a tiny, plastic tube that is put into your bladder to drain your urine  · Go to behavior therapy as directed  Behavior therapy may be used to help you learn to control your urge to urinate         © Copyright [employer] 2018 Information is for End User's use only and may not be sold, redistributed or otherwise used for commercial purposes   All illustrations and images included in CareNotes® are the copyrighted property of A D A M , Inc  or [facility] Naida Villanueva

## 2020-09-21 ENCOUNTER — PATIENT OUTREACH (OUTPATIENT)
Dept: FAMILY MEDICINE CLINIC | Facility: CLINIC | Age: 70
End: 2020-09-21

## 2020-09-21 NOTE — PROGRESS NOTES
IZZY LEYVA received voicemail from patient requesting return call from [facility]  OPCLUCÍA LEYVA to return patient's call  OPCLUCÍA LEYVA spoke with patient who stated she is going to discharge home, whether services are in place or not  OPCLUCÍA LEYVA asked how conversation went with Jered Bateman ( at [facility] [place]); patient stated Jered Bateman and Nurses at the SNF are telling her she is not able to discharge home because she does not have 24/7 help in the home and is not able to walk  OPCLUCÍA LEYVA asked if patient's EC/POA Leena Castilloant is able to move in with her or if patient is able to move in with Blregan Fredrick and patient denied these options  OPCLUCÍA LEYVA asked how patient would complete her ADLs, prep her meals, etc   Patient stated she would order food to her door and is able to complete all of her other ADLs independently  OPCLUCÍA LEYVA inquired if patient felt she would be able to transfer from bed to chair independently; patient stated yes  OPCLUCÍA LEYVA asked if PT/OT has been working with her at the SNF and patient denied, stating insurance will not cover PT/OT anymore  Patient agreeable to [facility] contacting [facility]  Jered Bateman to further discuss patient's case but stated she will not continue to stay at the SNF  IZZY LEYVA spoke with Jered Bateman who stated she did speak with patient and also had the Director of Nursing and Nursing Supervisor talk with patient about her complex medical needs to explain why she is unable to go home at this time  Per Jered Cancer, patient is denying that she has complex medical needs, extensive wound care, and believes she can independently live in her home; SNF is recommending 24/7 care for patient at home, which patient stated she does not have  Obadiah Cancer has been unable to reach patient's [address] and is unaware of the current state of patient's apartment  Jered Cancer believes patient is in denial about her medical conditions and has requested for psychiatry to evaluate patient's capacity this coming Friday 9/25  SNF has discussed hospice services with patient as well and patient has denied this  Patient has made attempts to arrange BLS transport to pick her up from SNF and take her home; [facility] SNF has turned away these transport companies  IZZY LEYVA requested to Geovani Peres that PT/OT and SNF physician meet with patient and discuss patient's current conditions with her  OPCM SW also provided Geovani Peres with [facility] information [facility]  [place], who was previously involved in patient's case  Geovani Peres to call Nora CARRILLOM GORDON also called Nora Horton to inform of recent update and patient's request to return home  Nora Horton confirmed that the SNF is able to deny patient's discharge to home request if they feel her safety is at risk returning home  Nora Horton offered to look into her current apartment through the tax office and will call Geovani Peres to further discuss patient's case  IZZY LEYVA will route these updates to Dr Dakota Porter and PIOTR LEYVA to close patient's case as discharge plan from SNF will need to be arranged with SNF  and [facility] at this time

## 2020-09-22 ENCOUNTER — PATIENT OUTREACH (OUTPATIENT)
Dept: FAMILY MEDICINE CLINIC | Facility: CLINIC | Age: 70
End: 2020-09-22

## 2020-09-22 NOTE — PROGRESS NOTES
IZZY LEYVA received call from patient requesting assistance with leaving SNF  IZZY LEYVA explained that patient's discharge from SNF to home needs to be arranged by the SNF /Care Team; [facility] [place]note 2[facility] GORDON attempted to explain this to patient during previous conversations as well  Patient frustrated about this and stated she will never get the "okay" to discharge home from the SNF Physician  Patient stated she plans to leave and will arrange her own BLS transportation  IZZY LEYVA called [facility]  to inform of patient's call and plan to leave  Sia Mason will continue to follow  IZZY LEYVA attempted to reach patient's Nursing Station at the SNF to inform of the same but the phone continuously rang (no answer)  IZZY LEYVA closed patient's case

## 2020-11-20 ENCOUNTER — TELEPHONE (OUTPATIENT)
Dept: UROLOGY | Facility: AMBULATORY SURGERY CENTER | Age: 70
End: 2020-11-20

## 2020-12-01 ENCOUNTER — VBI (OUTPATIENT)
Dept: ADMINISTRATIVE | Facility: OTHER | Age: 70
End: 2020-12-01

## 2020-12-01 ENCOUNTER — TELEPHONE (OUTPATIENT)
Dept: OBGYN CLINIC | Facility: HOSPITAL | Age: 70
End: 2020-12-01

## 2020-12-09 ENCOUNTER — TELEPHONE (OUTPATIENT)
Dept: OBGYN CLINIC | Facility: HOSPITAL | Age: 70
End: 2020-12-09

## 2020-12-29 ENCOUNTER — TELEPHONE (OUTPATIENT)
Dept: OBGYN CLINIC | Facility: HOSPITAL | Age: 70
End: 2020-12-29

## 2021-01-11 NOTE — PROGRESS NOTES
Progress Note - Infectious Disease   [place] 76 y o  female MRN: [id number]  Unit/Bed#: 5T -01 Encounter: [id number]      Impression/Plan:  1  Displaced femur fracture status post jony placement now with drainage and bone infection:  Patient was noted with a prior hematoma at this area   She reports that the overall lesion decreased in size and had small punctate lesion that seemed to be draining on initial evaluations that stopped subsequently   Drainage worsened last week and cultures were collected   CT imaging consistent with bone infection and drainage tracks straight to the fracture site  Wound cultures only with skin naun  Patient seen by Orthopedics and attempt made at bedside needle aspiration  Cultures are without growth  Have contacted orthopedic surgery given cultures are negative and as per prior discussion there is need for washout along with deep surgical cultures given she has isolated various multi-drug resistant organisms in options for antibiotics or limited, and the plan is for maintenance of the jony until the bone heals  She is otherwise afebrile remains hemodynamically stable   -continue to monitor off antibiotics  -continue to trend fever curve/vitals as above  -would repeat CBC and procalcitonin if there is a clinical change   -additional surgical intervention as per Orthopedics     2  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:  Patient is status post wound debridement of her right hip in the OR   Reviewed images with Radiology and no signs of changes of osteomyelitis on the right hip  Reviewed OR findings with plastics though the bone was visualized on debridement there was no concern for bone being infected at this time   Blood cultures were without growth   Patient remains hemodynamically stable   Culture results noted  This area was treated largely as a skin and soft tissue infection with graft failure   -no further antibiotics for this issue  -continue to monitor fever curve/vitals  -continue to monitor site on exam       3  Leukocytosis:  Patient noted to have elevation in her white blood cell count after procedure on 12/17   White blood cell count noted to remain elevated   This is likely multifactorial with the patient's recent procedure, her multiple other wounds and now due to problem 2  Currently normalized on labs today   -would repeat CBC and procalcitonin if there is a clinical change   -will continue to monitor exam   -additional care as per primary     4  Pressure ulcer of the sacrum and chronic osteomyelitis of the coccyx:   based on patient's imaging along with exam this is likely a chronic osteomyelitis with a draining sinus   The site remains stable on subsequent evaluations   After discussion with plastics there is plan for possible skin grafting in the future   -would discuss ostomy with patient to prevent further contamination of wound  -would continue to monitor this site clinically for now  -continue local care as per plastics     5  Decubitus ulcer of left hip: Basim Primmer on the patient's right hip was clearly infected on admission   Left hip wound appears stable on subsequent evaluations   CT was without findings consistent for osteo at this site  Mandie Ricketts is unclear at this time the timeline for closure for this wound   If closure is undertaken on this admission will need to evaluate OR findings to determine course of antibiotics for this issue   -continue local care as per plastics        6  Paraplegia:  Patient at baseline is wheelchair bound and provides for ongoing care at home   If she is ultimately planned for skin grafting would discuss possibility ostomy with the patient to promote further wound healing  Above plan discussed in detail with the primary and with Orthopedic service and with patient  ID consult service will continue to follow      Antibiotics:  None     24 hr events:  No acute events noted overnight on chart  Wound cultures remain without growth  White blood cell count 10 8  No new images  Patient's other vitals are stable, afebrile    Subjective:  Patient has no acute events overnight  She denies any nausea, vomiting, chest pain or shortness of breath  She denies any new or progressive pain  She noted some continued drainage from her knee but more of a dark brownish color  She has no other complaints at this time  Objective:  Vitals:  Temp:  [97 2 °F (36 2 °C)-98 3 °F (36 8 °C)] 97 2 °F (36 2 °C)  HR:  [] 92  Resp:  [18] 18  BP: (101-114)/(55-66) 101/55  SpO2:  [94 %-97 %] 94 %  Temp (24hrs), Av 9 °F (36 6 °C), Min:97 2 °F (36 2 °C), Max:98 3 °F (36 8 °C)  Current: Temperature: (!) 97 2 °F (36 2 °C)    Physical Exam:   General Appearance:  Alert, interactive, nontoxic, no acute distress  Again appears older than stated age  Throat: Oropharynx moist without lesions  Lungs:   Clear to auscultation bilaterally; no wheezes, rhonchi or rales; respirations unlabored   Heart:  RRR; no murmur, rub or gallop   Abdomen:   Soft, non-tender, non-distended, positive bowel sounds  Extremities: No clubbing, cyanosis or edema; chronically contracted in word lower extremities   Skin: Patient's right hip surgical site remains intact there is no drainage noted from the suture sites or skin flap  The graft site is noted with some debris and mild drainage but no smell  The site on the medial aspect of the left knee again only has some brownish drainage from it but no erythema or other skin changes otherwise         Labs, Imaging, & Other studies:   All pertinent labs and imaging studies were personally reviewed    Results from last 7 days  Lab Units 18  043   WBC Thousand/uL 10 80 13 50* 10 80   HEMOGLOBIN g/dL 10 5* 11 8* 11 8*   PLATELETS Thousands/uL 411 506* 485*       Results from last 7 days  Lab Units 18  043   POTASSIUM mmol/L 3 8 3 7 3 9   CHLORIDE mmol/L 98 99 101   CO2 mmol/L 29 29 29   BUN mg/dL 18 21 21   CREATININE mg/dL 0 55* 0 52* 0 59*   EGFR ml/min/1 73sq m 97 98 94   CALCIUM mg/dL 8 4 8 8 8 8   AST U/L 19 25 22   ALT U/L 25 24 26   ALK PHOS U/L 80 88 87       Results from last 7 days  Lab Units 12/27/18  1118   GRAM STAIN RESULT  No Polys or Bacteria seen  Rare Polys  No bacteria seen   WOUND CULTURE  No growth  Few Colonies of Diphtheroids No

## 2021-01-17 NOTE — PROGRESS NOTES
Progress Note - Edson Render 1950, 71 y o  female MRN: [id number]    Unit/Bed#: [facility] 615-01 Encounter: [id number]    Primary Care Provider: Zeus Monsivais   Date and time admitted to hospital: 2/5/2020  3:25 PM      * Sepsis [facility])  Assessment & Plan  · POA with fever and leukocytosis with infectious source likely being R hip wound with underlying iliopsoas myositis  · No signs of PNA or intraabdominal infection  · CT C/A/P negative for infectious focus  · Blood cultures 1/2 positive for coag-neg staph, other negative  · Urine culture + for proteus and e coli; likely asymptomatic bacteriuria per ID; isabel exchanged   · Continue IV abx per ID recommendations  · Continue to monitor closely  · ID recommending repeat CT scan today WITH contrast (will need to premedicate, d/w patient) to better evaluate area and see if patient would potentially be candidate for IR evaluation  · Surgery and orthopedics not currently offering any surgical management     Open wound of right hip  Assessment & Plan  · Known to have hip osteo and dislocation of the R femur  On admission had fever, chills and increased drainge from R hip wound  · Ortho following; no plans for any surgical intervention at this time  · General surgery following: continue localized wound care  No plans for any surgical intervention at this time  · ID following: continue IV abx for now  repeat CT scan to evaluate for signs of pyomyositis as initial CT scan done on admission shows small locules of gas and edema in right iliopsoas muscle, and was not able to exclude possibility of pyomyositis  · Per ID: if no plan for girdle stone procedure and flap to cover the wound, the role of long-term IV antibiotics is limited with risk of side effects from prolonged antibiotics outweighs the benefit   This has been explained at length to the patient  Will likely continue IV antibiotic to finish 10 days course for treatment of skin and soft tissue infection  Stage IV pressure ulcer of right buttock (HCC)  Assessment & Plan  · Unstageable sacral decubitus ulcer  · ID following  · Wound Care following, frequent repositioning    Paraplegia following spinal cord injury [facility])  Assessment & Plan  · History of spinal cord injury T8 with paraplegia  · Will need PT/OT  · Presented from [facility]; unclear of living arrangements prior to that     Coagulase negative Staphylococcus bacteremia  Assessment & Plan  · Could be secondary to wound infection or contaminant  · Infectious Disease following  · Continue antibiotics as above  · Repeat blood cultures ordered    History of Clostridium difficile infection  Assessment & Plan  · With infection in April of 2019  · Currently without any complaints of diarrhea  · Continue vancomycin prophylactics, will likely need to continue for 72 hours after last dose of systemic antibiotics    Hypotension  Assessment & Plan  · Chronic low blood pressures on midodrine, overall stable      Neurogenic bladder  Assessment & Plan  · History of neurogenic bladder with chronic indwelling Hays catheter  · Urine culture noted, likely represents asymptomatic bacteriuria per infectious disease  · Hays was exchanged      Anemia of chronic disease  Assessment & Plan  · Labs appear consistent with anemia of chronic disease, continue to monitor closely, did slightly decrease today  · Consider further workup if decreases further        VTE Pharmacologic Prophylaxis:   Pharmacologic: Enoxaparin (Lovenox)  Mechanical VTE Prophylaxis in Place: No    Patient Centered Rounds: I have performed bedside rounds with nursing staff today  Discussions with Specialists or Other Care Team Provider:  Discussed with ID in detail    Education and Discussions with Family / Patient:  Patient    Time Spent for Care: 45 minutes  More than 50% of total time spent on counseling and coordination of care as described above      Current Length of Stay: 3 day(s)    Current Patient Status: Inpatient   Certification Statement: The patient will continue to require additional inpatient hospital stay due to Ongoing issues as above    Discharge Plan:  Not yet medically stable, will also need PT/OT prior to discharge  Patient reports she came from [facility] but prior to that is not able to give me much more detail about her living situation  Primarily bed/wheelchair bound  Code Status: Level 1 - Full Code      Subjective:   Patient reports feeling okay today  We discussed the need for a CT with contrast per ID recommendation, and she is agreeable with premedication  She denies any anaphylactic type reaction in the past   She states that her hip pain is well managed  Has no other concerns  Objective:     Vitals:   Temp (24hrs), Av 1 °F (36 7 °C), Min:97 3 °F (36 3 °C), Max:98 5 °F (36 9 °C)    Temp:  [97 3 °F (36 3 °C)-98 5 °F (36 9 °C)] 97 3 °F (36 3 °C)  HR:  [73-79] 79  Resp:  [18] 18  BP: ()/(45-55) 103/50  SpO2:  [95 %-96 %] 95 %  Body mass index is 17 97 kg/m²  Input and Output Summary (last 24 hours): Intake/Output Summary (Last 24 hours) at 2020 1151  Last data filed at 2020 8235  Gross per 24 hour   Intake 1390 ml   Output 525 ml   Net 865 ml       Physical Exam:     Physical Exam   Constitutional: She is oriented to person, place, and time  Cachectic, malnourished, no acute distress   Cardiovascular: Normal rate and regular rhythm  Abdominal: Bowel sounds are normal  She exhibits no distension  Genitourinary:   Genitourinary Comments: Chronic Hays noted   Musculoskeletal: She exhibits no edema  Right hip wound bandaged   Neurological: She is alert and oriented to person, place, and time  Skin: There is pallor  Psychiatric:   Flat affect   Nursing note and vitals reviewed        Additional Data:     Labs:    Results from last 7 days   Lab Units 20  0504   WBC Thousand/uL 11 84*   HEMOGLOBIN g/dL 7 8*   HEMATOCRIT % 24 8*   PLATELETS Thousands/uL 573*   NEUTROS PCT % 66   LYMPHS PCT % 23   MONOS PCT % 8   EOS PCT % 2     Results from last 7 days   Lab Units 02/08/20  0504  02/06/20  0451   POTASSIUM mmol/L 3 2*   < > 3 8   CHLORIDE mmol/L 105   < > 100   CO2 mmol/L 24   < > 22   BUN mg/dL 6   < > 7   CREATININE mg/dL 0 31*   < > 0 38*   CALCIUM mg/dL 7 8*   < > 8 0*   ALK PHOS U/L  --   --  147*   ALT U/L  --   --  21   AST U/L  --   --  24    < > = values in this interval not displayed  Results from last 7 days   Lab Units 02/05/20  1538   INR  1 42*       * I Have Reviewed All Lab Data Listed Above  * Additional Pertinent Lab Tests Reviewed: All Labs Within Last 24 Hours Reviewed    Imaging:    Imaging Reports Reviewed Today Include: all  Imaging Personally Reviewed by Myself Includes:  none    Recent Cultures (last 7 days):     Results from last 7 days   Lab Units 02/05/20  1745 02/05/20  1544 02/05/20  1538   BLOOD CULTURE   --  Staphylococcus coagulase negative* No Growth at 48 hrs     GRAM STAIN RESULT   --  Gram positive cocci in clusters*  --    URINE CULTURE  >100,000 cfu/ml Proteus mirabilis*  50,000-59,000 cfu/ml Escherichia coli*  --   --        Last 24 Hours Medication List:     Current Facility-Administered Medications:  acetaminophen 650 mg Oral Q6H PRN Andrew Arroyo MD    artificial tear  Both Eyes HS Domenic Ferguson MD    bisacodyl 10 mg Rectal Daily Andrew Arroyo MD    calcium carbonate-vitamin D 1 tablet Oral BID With Meals Andrew Arroyo MD    cholecalciferol 1,000 Units Oral Daily Andrew Arroyo MD    cyanocobalamin 100 mcg Oral Daily Andrew Arroyo MD    diphenhydrAMINE 50 mg Oral 60 Min Pre-Op Sagrario Dumont PA-C    enoxaparin 40 mg Subcutaneous Daily Andrew Arroyo MD    famotidine 20 mg Oral BID PRN Domenic Ferguson MD    methylPREDNISolone 32 mg Oral Q10H Sagrario Dumont PA-C    midodrine 15 mg Oral Q8H Andrew Arroyo MD multivitamin-minerals 1 tablet Oral Daily Andrew Arroyo MD    nystatin  Topical BID Andrew Arroyo MD    ondansetron 4 mg Intravenous Q6H PRN Andrew Arroyo MD    pantoprazole 40 mg Oral BID AC Domenic Ferguson MD    piperacillin-tazobactam 3 375 g Intravenous Q6H Andrew Arroyo MD Last Rate: 3 375 g (02/08/20 1114)   polyethylene glycol 17 g Oral Daily Andrew Arroyo MD    saccharomyces boulardii 250 mg Oral BID Andrew Arroyo MD    senna 1 tablet Oral HS Andrew Arroyo MD    vancomycin 125 mg Oral Q12H [address] Andrew Arroyo MD      Facility-Administered Medications Ordered in Other Encounters:  dexamethasone 4 mg Intravenous Once PRN Jesika Pal, DO    diphenhydrAMINE 12 5 mg Intravenous Once Jesika Pal, DO    lactated ringers 75 mL/hr Intravenous Continuous Jesika Pal, DO Last Rate: 75 mL/hr (11/10/19 2105)   lactated ringers 50 mL/hr Intravenous Continuous Wyatt Baptiste CRNA    lactated ringers 75 mL/hr Intravenous Continuous Jesika Pal, DO Last Rate: Stopped (03/20/19 1841)   lactated ringers 75 mL/hr Intravenous Continuous Mini Bailey MD Last Rate: Stopped (03/20/19 1842)   ondansetron 4 mg Intravenous Once PRN Petra Trevino MD    promethazine 12 5 mg Intravenous Once PRN Petra Trevino MD         Today, Patient Was Seen By: Mila Anglin PA-C    ** Please Note: Dictation voice to text software may have been used in the creation of this document   ** raul

## 2021-03-09 DIAGNOSIS — Z23 ENCOUNTER FOR IMMUNIZATION: ICD-10-CM

## 2021-03-09 NOTE — SOCIAL WORK
Patient has agreed to a 20 mile radius search for accepting facilities for rehab  Referrals pending in [facility]  No

## 2021-06-13 ENCOUNTER — APPOINTMENT (EMERGENCY)
Dept: RADIOLOGY | Facility: HOSPITAL | Age: 71
DRG: 592 | End: 2021-06-13
Payer: COMMERCIAL

## 2021-06-13 ENCOUNTER — HOSPITAL ENCOUNTER (INPATIENT)
Facility: HOSPITAL | Age: 71
LOS: 4 days | Discharge: NURSING, REHAB OR LONG-TERM CARE | DRG: 592 | End: 2021-06-17
Attending: EMERGENCY MEDICINE | Admitting: INTERNAL MEDICINE
Payer: COMMERCIAL

## 2021-06-13 DIAGNOSIS — E87.2 METABOLIC ACIDOSIS: ICD-10-CM

## 2021-06-13 DIAGNOSIS — H04.129 DRY EYE: ICD-10-CM

## 2021-06-13 DIAGNOSIS — S82.209A TIBIA/FIBULA FRACTURE: Primary | ICD-10-CM

## 2021-06-13 DIAGNOSIS — L89.159 SACRAL DECUBITUS ULCER: ICD-10-CM

## 2021-06-13 DIAGNOSIS — L89.324 DECUBITUS ULCER OF LEFT ISCHIUM, STAGE IV (HCC): ICD-10-CM

## 2021-06-13 DIAGNOSIS — A04.72 C. DIFFICILE DIARRHEA: ICD-10-CM

## 2021-06-13 DIAGNOSIS — S82.409A TIBIA/FIBULA FRACTURE: Primary | ICD-10-CM

## 2021-06-13 DIAGNOSIS — Z78.9 UNABLE TO CARE FOR SELF: ICD-10-CM

## 2021-06-13 PROBLEM — S82.201A TIBIA/FIBULA FRACTURE, RIGHT, CLOSED, INITIAL ENCOUNTER: Status: ACTIVE | Noted: 2021-06-13

## 2021-06-13 PROBLEM — S99.919A ANKLE INJURY: Status: ACTIVE | Noted: 2021-06-13

## 2021-06-13 PROBLEM — S82.401A TIBIA/FIBULA FRACTURE, RIGHT, CLOSED, INITIAL ENCOUNTER: Status: ACTIVE | Noted: 2021-06-13

## 2021-06-13 LAB
ABO GROUP BLD: NORMAL
ALBUMIN SERPL BCP-MCNC: 3 G/DL (ref 3.5–5)
ALP SERPL-CCNC: 166 U/L (ref 46–116)
ALT SERPL W P-5'-P-CCNC: 19 U/L (ref 12–78)
ANION GAP SERPL CALCULATED.3IONS-SCNC: 28 MMOL/L (ref 4–13)
APTT PPP: 37 SECONDS (ref 23–37)
AST SERPL W P-5'-P-CCNC: 23 U/L (ref 5–45)
BACTERIA UR QL AUTO: ABNORMAL /HPF
BASOPHILS # BLD AUTO: 0.07 THOUSANDS/ΜL (ref 0–0.1)
BASOPHILS NFR BLD AUTO: 0 % (ref 0–1)
BILIRUB SERPL-MCNC: 0.6 MG/DL (ref 0.2–1)
BILIRUB UR QL STRIP: ABNORMAL
BLD GP AB SCN SERPL QL: NEGATIVE
BUN SERPL-MCNC: 21 MG/DL (ref 5–25)
CALCIUM ALBUM COR SERPL-MCNC: 9.9 MG/DL (ref 8.3–10.1)
CALCIUM SERPL-MCNC: 9.1 MG/DL (ref 8.3–10.1)
CHLORIDE SERPL-SCNC: 98 MMOL/L (ref 100–108)
CLARITY UR: CLEAR
CO2 SERPL-SCNC: 10 MMOL/L (ref 21–32)
COLOR UR: YELLOW
CREAT SERPL-MCNC: 0.81 MG/DL (ref 0.6–1.3)
CRP SERPL QL: >250 MG/L
EOSINOPHIL # BLD AUTO: 0.01 THOUSAND/ΜL (ref 0–0.61)
EOSINOPHIL NFR BLD AUTO: 0 % (ref 0–6)
ERYTHROCYTE [DISTWIDTH] IN BLOOD BY AUTOMATED COUNT: 16.8 % (ref 11.6–15.1)
GFR SERPL CREATININE-BSD FRML MDRD: 74 ML/MIN/1.73SQ M
GLUCOSE SERPL-MCNC: 108 MG/DL (ref 65–140)
GLUCOSE UR STRIP-MCNC: NEGATIVE MG/DL
HCT VFR BLD AUTO: 40.2 % (ref 34.8–46.1)
HGB BLD-MCNC: 12 G/DL (ref 11.5–15.4)
HGB UR QL STRIP.AUTO: ABNORMAL
IMM GRANULOCYTES # BLD AUTO: 0.11 THOUSAND/UL (ref 0–0.2)
IMM GRANULOCYTES NFR BLD AUTO: 1 % (ref 0–2)
INR PPP: 1.24 (ref 0.84–1.19)
KETONES UR STRIP-MCNC: ABNORMAL MG/DL
LACTATE SERPL-SCNC: 1.2 MMOL/L (ref 0.5–2)
LEUKOCYTE ESTERASE UR QL STRIP: ABNORMAL
LYMPHOCYTES # BLD AUTO: 2.15 THOUSANDS/ΜL (ref 0.6–4.47)
LYMPHOCYTES NFR BLD AUTO: 13 % (ref 14–44)
MCH RBC QN AUTO: 27 PG (ref 26.8–34.3)
MCHC RBC AUTO-ENTMCNC: 29.9 G/DL (ref 31.4–37.4)
MCV RBC AUTO: 90 FL (ref 82–98)
MONOCYTES # BLD AUTO: 1.75 THOUSAND/ΜL (ref 0.17–1.22)
MONOCYTES NFR BLD AUTO: 10 % (ref 4–12)
NEUTROPHILS # BLD AUTO: 12.92 THOUSANDS/ΜL (ref 1.85–7.62)
NEUTS SEG NFR BLD AUTO: 76 % (ref 43–75)
NITRITE UR QL STRIP: NEGATIVE
NON-SQ EPI CELLS URNS QL MICRO: ABNORMAL /HPF
NRBC BLD AUTO-RTO: 0 /100 WBCS
PH UR STRIP.AUTO: 5 [PH]
PLATELET # BLD AUTO: 406 THOUSANDS/UL (ref 149–390)
PMV BLD AUTO: 10.7 FL (ref 8.9–12.7)
POTASSIUM SERPL-SCNC: 4.1 MMOL/L (ref 3.5–5.3)
PROT SERPL-MCNC: 8.8 G/DL (ref 6.4–8.2)
PROT UR STRIP-MCNC: ABNORMAL MG/DL
PROTHROMBIN TIME: 15.6 SECONDS (ref 11.6–14.5)
RBC # BLD AUTO: 4.45 MILLION/UL (ref 3.81–5.12)
RBC #/AREA URNS AUTO: ABNORMAL /HPF
RH BLD: POSITIVE
SODIUM SERPL-SCNC: 136 MMOL/L (ref 136–145)
SP GR UR STRIP.AUTO: >=1.03 (ref 1–1.03)
SPECIMEN EXPIRATION DATE: NORMAL
URINE COMMENT: ABNORMAL
UROBILINOGEN UR QL STRIP.AUTO: 0.2 E.U./DL
WBC # BLD AUTO: 17.01 THOUSAND/UL (ref 4.31–10.16)
WBC #/AREA URNS AUTO: ABNORMAL /HPF

## 2021-06-13 PROCEDURE — 86901 BLOOD TYPING SEROLOGIC RH(D): CPT | Performed by: EMERGENCY MEDICINE

## 2021-06-13 PROCEDURE — 86850 RBC ANTIBODY SCREEN: CPT | Performed by: EMERGENCY MEDICINE

## 2021-06-13 PROCEDURE — 85730 THROMBOPLASTIN TIME PARTIAL: CPT | Performed by: EMERGENCY MEDICINE

## 2021-06-13 PROCEDURE — 36415 COLL VENOUS BLD VENIPUNCTURE: CPT | Performed by: EMERGENCY MEDICINE

## 2021-06-13 PROCEDURE — 85025 COMPLETE CBC W/AUTO DIFF WBC: CPT | Performed by: EMERGENCY MEDICINE

## 2021-06-13 PROCEDURE — 85610 PROTHROMBIN TIME: CPT | Performed by: EMERGENCY MEDICINE

## 2021-06-13 PROCEDURE — 29515 APPLICATION SHORT LEG SPLINT: CPT | Performed by: EMERGENCY MEDICINE

## 2021-06-13 PROCEDURE — 99222 1ST HOSP IP/OBS MODERATE 55: CPT | Performed by: INTERNAL MEDICINE

## 2021-06-13 PROCEDURE — 71045 X-RAY EXAM CHEST 1 VIEW: CPT

## 2021-06-13 PROCEDURE — 99285 EMERGENCY DEPT VISIT HI MDM: CPT

## 2021-06-13 PROCEDURE — 73590 X-RAY EXAM OF LOWER LEG: CPT

## 2021-06-13 PROCEDURE — 96361 HYDRATE IV INFUSION ADD-ON: CPT

## 2021-06-13 PROCEDURE — 73630 X-RAY EXAM OF FOOT: CPT

## 2021-06-13 PROCEDURE — 81001 URINALYSIS AUTO W/SCOPE: CPT | Performed by: EMERGENCY MEDICINE

## 2021-06-13 PROCEDURE — 96365 THER/PROPH/DIAG IV INF INIT: CPT

## 2021-06-13 PROCEDURE — 80053 COMPREHEN METABOLIC PANEL: CPT | Performed by: EMERGENCY MEDICINE

## 2021-06-13 PROCEDURE — 87040 BLOOD CULTURE FOR BACTERIA: CPT | Performed by: EMERGENCY MEDICINE

## 2021-06-13 PROCEDURE — 86140 C-REACTIVE PROTEIN: CPT | Performed by: INTERNAL MEDICINE

## 2021-06-13 PROCEDURE — 83605 ASSAY OF LACTIC ACID: CPT | Performed by: EMERGENCY MEDICINE

## 2021-06-13 PROCEDURE — 93005 ELECTROCARDIOGRAM TRACING: CPT

## 2021-06-13 PROCEDURE — 2W3QX1Z IMMOBILIZATION OF RIGHT LOWER LEG USING SPLINT: ICD-10-PCS | Performed by: ORTHOPAEDIC SURGERY

## 2021-06-13 PROCEDURE — 99285 EMERGENCY DEPT VISIT HI MDM: CPT | Performed by: EMERGENCY MEDICINE

## 2021-06-13 PROCEDURE — 86900 BLOOD TYPING SEROLOGIC ABO: CPT | Performed by: EMERGENCY MEDICINE

## 2021-06-13 RX ORDER — HYDROMORPHONE HCL IN WATER/PF 6 MG/30 ML
0.2 PATIENT CONTROLLED ANALGESIA SYRINGE INTRAVENOUS EVERY 4 HOURS PRN
Status: DISCONTINUED | OUTPATIENT
Start: 2021-06-13 | End: 2021-06-17 | Stop reason: HOSPADM

## 2021-06-13 RX ORDER — OXYCODONE HYDROCHLORIDE 5 MG/1
5 TABLET ORAL EVERY 4 HOURS PRN
Status: DISCONTINUED | OUTPATIENT
Start: 2021-06-13 | End: 2021-06-17 | Stop reason: HOSPADM

## 2021-06-13 RX ORDER — LOPERAMIDE HYDROCHLORIDE 2 MG/1
2 CAPSULE ORAL 3 TIMES DAILY PRN
Status: DISCONTINUED | OUTPATIENT
Start: 2021-06-13 | End: 2021-06-17 | Stop reason: HOSPADM

## 2021-06-13 RX ORDER — MIDODRINE HYDROCHLORIDE 5 MG/1
10 TABLET ORAL 2 TIMES DAILY
Status: DISCONTINUED | OUTPATIENT
Start: 2021-06-13 | End: 2021-06-13

## 2021-06-13 RX ORDER — SACCHAROMYCES BOULARDII 250 MG
250 CAPSULE ORAL 2 TIMES DAILY
Status: DISCONTINUED | OUTPATIENT
Start: 2021-06-13 | End: 2021-06-17 | Stop reason: HOSPADM

## 2021-06-13 RX ORDER — BISACODYL 10 MG
10 SUPPOSITORY, RECTAL RECTAL DAILY PRN
Status: DISCONTINUED | OUTPATIENT
Start: 2021-06-13 | End: 2021-06-17 | Stop reason: HOSPADM

## 2021-06-13 RX ORDER — MINERAL OIL AND PETROLATUM 150; 830 MG/G; MG/G
OINTMENT OPHTHALMIC
Status: DISCONTINUED | OUTPATIENT
Start: 2021-06-13 | End: 2021-06-14

## 2021-06-13 RX ORDER — DIPHENHYDRAMINE HCL 25 MG
25 TABLET ORAL EVERY 6 HOURS PRN
Status: DISCONTINUED | OUTPATIENT
Start: 2021-06-13 | End: 2021-06-17 | Stop reason: HOSPADM

## 2021-06-13 RX ORDER — PANTOPRAZOLE SODIUM 40 MG/1
40 TABLET, DELAYED RELEASE ORAL DAILY
Status: DISCONTINUED | OUTPATIENT
Start: 2021-06-14 | End: 2021-06-17 | Stop reason: HOSPADM

## 2021-06-13 RX ORDER — OXYCODONE HYDROCHLORIDE 5 MG/1
2.5 TABLET ORAL EVERY 4 HOURS PRN
Status: DISCONTINUED | OUTPATIENT
Start: 2021-06-13 | End: 2021-06-17 | Stop reason: HOSPADM

## 2021-06-13 RX ORDER — ACETAMINOPHEN 325 MG/1
650 TABLET ORAL EVERY 6 HOURS PRN
Status: DISCONTINUED | OUTPATIENT
Start: 2021-06-13 | End: 2021-06-17 | Stop reason: HOSPADM

## 2021-06-13 RX ADMIN — DIPHENHYDRAMINE HYDROCHLORIDE 25 MG: 25 TABLET ORAL at 20:25

## 2021-06-13 RX ADMIN — MINERAL OIL, WHITE PETROLATUM: 42.5; 57.3 OINTMENT OPHTHALMIC at 21:50

## 2021-06-13 RX ADMIN — PIPERACILLIN AND TAZOBACTAM 3.38 G: 3; .375 INJECTION, POWDER, LYOPHILIZED, FOR SOLUTION INTRAVENOUS at 21:51

## 2021-06-13 RX ADMIN — Medication 500 MG: at 19:49

## 2021-06-13 RX ADMIN — PIPERACILLIN AND TAZOBACTAM 3.38 G: 3; .375 INJECTION, POWDER, LYOPHILIZED, FOR SOLUTION INTRAVENOUS at 15:44

## 2021-06-13 RX ADMIN — SODIUM CHLORIDE 1000 ML: 0.9 INJECTION, SOLUTION INTRAVENOUS at 15:06

## 2021-06-14 PROBLEM — Z78.9 UNABLE TO CARE FOR SELF: Status: ACTIVE | Noted: 2021-06-14

## 2021-06-14 LAB
ANION GAP SERPL CALCULATED.3IONS-SCNC: 18 MMOL/L (ref 4–13)
ATRIAL RATE: 94 BPM
BASOPHILS # BLD AUTO: 0 THOUSANDS/ΜL (ref 0–0.1)
BASOPHILS NFR BLD AUTO: 0 % (ref 0–1)
BUN SERPL-MCNC: 17 MG/DL (ref 5–25)
CALCIUM SERPL-MCNC: 7.9 MG/DL (ref 8.3–10.1)
CHLORIDE SERPL-SCNC: 105 MMOL/L (ref 100–108)
CO2 SERPL-SCNC: 14 MMOL/L (ref 21–32)
CREAT SERPL-MCNC: 0.94 MG/DL (ref 0.6–1.3)
EOSINOPHIL # BLD AUTO: 0.04 THOUSAND/ΜL (ref 0–0.61)
EOSINOPHIL NFR BLD AUTO: 1 % (ref 0–6)
ERYTHROCYTE [DISTWIDTH] IN BLOOD BY AUTOMATED COUNT: 16.8 % (ref 11.6–15.1)
GFR SERPL CREATININE-BSD FRML MDRD: 62 ML/MIN/1.73SQ M
GLUCOSE SERPL-MCNC: 158 MG/DL (ref 65–140)
HCT VFR BLD AUTO: 32.6 % (ref 34.8–46.1)
HGB BLD-MCNC: 10.6 G/DL (ref 11.5–15.4)
IMM GRANULOCYTES # BLD AUTO: 0.03 THOUSAND/UL (ref 0–0.2)
IMM GRANULOCYTES NFR BLD AUTO: 0 % (ref 0–2)
LYMPHOCYTES # BLD AUTO: 0.12 THOUSANDS/ΜL (ref 0.6–4.47)
LYMPHOCYTES NFR BLD AUTO: 1 % (ref 14–44)
MCH RBC QN AUTO: 27.1 PG (ref 26.8–34.3)
MCHC RBC AUTO-ENTMCNC: 32.5 G/DL (ref 31.4–37.4)
MCV RBC AUTO: 83 FL (ref 82–98)
MONOCYTES # BLD AUTO: 0.2 THOUSAND/ΜL (ref 0.17–1.22)
MONOCYTES NFR BLD AUTO: 2 % (ref 4–12)
NEUTROPHILS # BLD AUTO: 8.19 THOUSANDS/ΜL (ref 1.85–7.62)
NEUTS SEG NFR BLD AUTO: 96 % (ref 43–75)
NRBC BLD AUTO-RTO: 0 /100 WBCS
P AXIS: 58 DEGREES
PLATELET # BLD AUTO: 348 THOUSANDS/UL (ref 149–390)
PMV BLD AUTO: 10.7 FL (ref 8.9–12.7)
POTASSIUM SERPL-SCNC: 3.4 MMOL/L (ref 3.5–5.3)
PR INTERVAL: 142 MS
QRS AXIS: 6 DEGREES
QRSD INTERVAL: 80 MS
QT INTERVAL: 348 MS
QTC INTERVAL: 435 MS
RBC # BLD AUTO: 3.91 MILLION/UL (ref 3.81–5.12)
SODIUM SERPL-SCNC: 137 MMOL/L (ref 136–145)
T WAVE AXIS: 25 DEGREES
VENTRICULAR RATE: 94 BPM
WBC # BLD AUTO: 8.58 THOUSAND/UL (ref 4.31–10.16)

## 2021-06-14 PROCEDURE — 99222 1ST HOSP IP/OBS MODERATE 55: CPT | Performed by: ORTHOPAEDIC SURGERY

## 2021-06-14 PROCEDURE — 85025 COMPLETE CBC W/AUTO DIFF WBC: CPT | Performed by: INTERNAL MEDICINE

## 2021-06-14 PROCEDURE — 27750 TREATMENT OF TIBIA FRACTURE: CPT | Performed by: ORTHOPAEDIC SURGERY

## 2021-06-14 PROCEDURE — 80048 BASIC METABOLIC PNL TOTAL CA: CPT | Performed by: INTERNAL MEDICINE

## 2021-06-14 PROCEDURE — 93010 ELECTROCARDIOGRAM REPORT: CPT | Performed by: INTERNAL MEDICINE

## 2021-06-14 PROCEDURE — 97163 PT EVAL HIGH COMPLEX 45 MIN: CPT

## 2021-06-14 PROCEDURE — 99232 SBSQ HOSP IP/OBS MODERATE 35: CPT | Performed by: INTERNAL MEDICINE

## 2021-06-14 PROCEDURE — 97167 OT EVAL HIGH COMPLEX 60 MIN: CPT

## 2021-06-14 RX ADMIN — ACETAMINOPHEN 650 MG: 325 TABLET, FILM COATED ORAL at 05:34

## 2021-06-14 RX ADMIN — PIPERACILLIN AND TAZOBACTAM 3.38 G: 3; .375 INJECTION, POWDER, LYOPHILIZED, FOR SOLUTION INTRAVENOUS at 21:45

## 2021-06-14 RX ADMIN — DIPHENHYDRAMINE HYDROCHLORIDE 25 MG: 25 TABLET ORAL at 03:16

## 2021-06-14 RX ADMIN — Medication 250 MG: at 17:43

## 2021-06-14 RX ADMIN — ENOXAPARIN SODIUM 40 MG: 100 INJECTION SUBCUTANEOUS at 10:22

## 2021-06-14 RX ADMIN — Medication 250 MG: at 10:21

## 2021-06-14 RX ADMIN — DIPHENHYDRAMINE HYDROCHLORIDE 25 MG: 25 TABLET ORAL at 16:52

## 2021-06-14 RX ADMIN — PIPERACILLIN AND TAZOBACTAM 3.38 G: 3; .375 INJECTION, POWDER, LYOPHILIZED, FOR SOLUTION INTRAVENOUS at 03:15

## 2021-06-14 RX ADMIN — Medication 125 MG: at 21:45

## 2021-06-14 RX ADMIN — GLYCERIN, HYPROMELLOSE, POLYETHYLENE GLYCOL 1 DROP: .2; .2; 1 LIQUID OPHTHALMIC at 22:34

## 2021-06-14 RX ADMIN — PIPERACILLIN AND TAZOBACTAM 3.38 G: 3; .375 INJECTION, POWDER, LYOPHILIZED, FOR SOLUTION INTRAVENOUS at 10:24

## 2021-06-14 RX ADMIN — PANTOPRAZOLE SODIUM 40 MG: 40 TABLET, DELAYED RELEASE ORAL at 10:21

## 2021-06-14 RX ADMIN — PIPERACILLIN AND TAZOBACTAM 3.38 G: 3; .375 INJECTION, POWDER, LYOPHILIZED, FOR SOLUTION INTRAVENOUS at 16:47

## 2021-06-14 RX ADMIN — DIPHENHYDRAMINE HYDROCHLORIDE 25 MG: 25 TABLET ORAL at 10:21

## 2021-06-15 LAB
ANION GAP SERPL CALCULATED.3IONS-SCNC: 13 MMOL/L (ref 4–13)
BUN SERPL-MCNC: 14 MG/DL (ref 5–25)
CALCIUM SERPL-MCNC: 7.6 MG/DL (ref 8.3–10.1)
CHLORIDE SERPL-SCNC: 106 MMOL/L (ref 100–108)
CO2 SERPL-SCNC: 18 MMOL/L (ref 21–32)
CREAT SERPL-MCNC: 0.59 MG/DL (ref 0.6–1.3)
ERYTHROCYTE [DISTWIDTH] IN BLOOD BY AUTOMATED COUNT: 17.1 % (ref 11.6–15.1)
GFR SERPL CREATININE-BSD FRML MDRD: 93 ML/MIN/1.73SQ M
GLUCOSE SERPL-MCNC: 103 MG/DL (ref 65–140)
HCT VFR BLD AUTO: 30.4 % (ref 34.8–46.1)
HGB BLD-MCNC: 9.4 G/DL (ref 11.5–15.4)
MCH RBC QN AUTO: 26.1 PG (ref 26.8–34.3)
MCHC RBC AUTO-ENTMCNC: 30.9 G/DL (ref 31.4–37.4)
MCV RBC AUTO: 84 FL (ref 82–98)
PLATELET # BLD AUTO: 261 THOUSANDS/UL (ref 149–390)
PMV BLD AUTO: 11.6 FL (ref 8.9–12.7)
POTASSIUM SERPL-SCNC: 3 MMOL/L (ref 3.5–5.3)
POTASSIUM SERPL-SCNC: 3.4 MMOL/L (ref 3.5–5.3)
RBC # BLD AUTO: 3.6 MILLION/UL (ref 3.81–5.12)
SODIUM SERPL-SCNC: 137 MMOL/L (ref 136–145)
WBC # BLD AUTO: 6.41 THOUSAND/UL (ref 4.31–10.16)

## 2021-06-15 PROCEDURE — 99232 SBSQ HOSP IP/OBS MODERATE 35: CPT | Performed by: INTERNAL MEDICINE

## 2021-06-15 PROCEDURE — 99221 1ST HOSP IP/OBS SF/LOW 40: CPT | Performed by: PHYSICIAN ASSISTANT

## 2021-06-15 PROCEDURE — 80048 BASIC METABOLIC PNL TOTAL CA: CPT | Performed by: INTERNAL MEDICINE

## 2021-06-15 PROCEDURE — 97530 THERAPEUTIC ACTIVITIES: CPT

## 2021-06-15 PROCEDURE — 84132 ASSAY OF SERUM POTASSIUM: CPT | Performed by: INTERNAL MEDICINE

## 2021-06-15 PROCEDURE — 85027 COMPLETE CBC AUTOMATED: CPT | Performed by: INTERNAL MEDICINE

## 2021-06-15 RX ORDER — DOXYCYCLINE HYCLATE 100 MG/1
100 CAPSULE ORAL EVERY 12 HOURS SCHEDULED
Status: DISCONTINUED | OUTPATIENT
Start: 2021-06-16 | End: 2021-06-17 | Stop reason: HOSPADM

## 2021-06-15 RX ORDER — POTASSIUM CHLORIDE 20 MEQ/1
40 TABLET, EXTENDED RELEASE ORAL ONCE
Status: COMPLETED | OUTPATIENT
Start: 2021-06-15 | End: 2021-06-15

## 2021-06-15 RX ADMIN — DIPHENHYDRAMINE HYDROCHLORIDE 25 MG: 25 TABLET ORAL at 05:45

## 2021-06-15 RX ADMIN — Medication 125 MG: at 09:18

## 2021-06-15 RX ADMIN — PANTOPRAZOLE SODIUM 40 MG: 40 TABLET, DELAYED RELEASE ORAL at 09:18

## 2021-06-15 RX ADMIN — PIPERACILLIN AND TAZOBACTAM 3.38 G: 3; .375 INJECTION, POWDER, LYOPHILIZED, FOR SOLUTION INTRAVENOUS at 09:19

## 2021-06-15 RX ADMIN — PIPERACILLIN AND TAZOBACTAM 3.38 G: 3; .375 INJECTION, POWDER, LYOPHILIZED, FOR SOLUTION INTRAVENOUS at 04:20

## 2021-06-15 RX ADMIN — PIPERACILLIN AND TAZOBACTAM 3.38 G: 3; .375 INJECTION, POWDER, LYOPHILIZED, FOR SOLUTION INTRAVENOUS at 15:51

## 2021-06-15 RX ADMIN — Medication 250 MG: at 09:18

## 2021-06-15 RX ADMIN — Medication 250 MG: at 17:41

## 2021-06-15 RX ADMIN — PIPERACILLIN AND TAZOBACTAM 3.38 G: 3; .375 INJECTION, POWDER, LYOPHILIZED, FOR SOLUTION INTRAVENOUS at 21:03

## 2021-06-15 RX ADMIN — Medication 125 MG: at 21:03

## 2021-06-15 RX ADMIN — GLYCERIN, HYPROMELLOSE, POLYETHYLENE GLYCOL 1 DROP: .2; .2; 1 LIQUID OPHTHALMIC at 21:04

## 2021-06-15 RX ADMIN — POTASSIUM CHLORIDE 40 MEQ: 1500 TABLET, EXTENDED RELEASE ORAL at 06:22

## 2021-06-15 RX ADMIN — ENOXAPARIN SODIUM 40 MG: 100 INJECTION SUBCUTANEOUS at 09:17

## 2021-06-16 PROBLEM — A41.9 SEPSIS (HCC): Status: RESOLVED | Noted: 2020-03-28 | Resolved: 2021-06-16

## 2021-06-16 LAB
ANION GAP SERPL CALCULATED.3IONS-SCNC: 12 MMOL/L (ref 4–13)
BUN SERPL-MCNC: 11 MG/DL (ref 5–25)
CALCIUM SERPL-MCNC: 7.5 MG/DL (ref 8.3–10.1)
CHLORIDE SERPL-SCNC: 107 MMOL/L (ref 100–108)
CO2 SERPL-SCNC: 20 MMOL/L (ref 21–32)
CREAT SERPL-MCNC: 0.48 MG/DL (ref 0.6–1.3)
ERYTHROCYTE [DISTWIDTH] IN BLOOD BY AUTOMATED COUNT: 17 % (ref 11.6–15.1)
GFR SERPL CREATININE-BSD FRML MDRD: 100 ML/MIN/1.73SQ M
GLUCOSE SERPL-MCNC: 108 MG/DL (ref 65–140)
HCT VFR BLD AUTO: 27.3 % (ref 34.8–46.1)
HGB BLD-MCNC: 8.9 G/DL (ref 11.5–15.4)
MCH RBC QN AUTO: 26.7 PG (ref 26.8–34.3)
MCHC RBC AUTO-ENTMCNC: 32.6 G/DL (ref 31.4–37.4)
MCV RBC AUTO: 82 FL (ref 82–98)
PLATELET # BLD AUTO: 293 THOUSANDS/UL (ref 149–390)
PMV BLD AUTO: 10.5 FL (ref 8.9–12.7)
POTASSIUM SERPL-SCNC: 3.1 MMOL/L (ref 3.5–5.3)
POTASSIUM SERPL-SCNC: 3.7 MMOL/L (ref 3.5–5.3)
RBC # BLD AUTO: 3.33 MILLION/UL (ref 3.81–5.12)
SODIUM SERPL-SCNC: 139 MMOL/L (ref 136–145)
WBC # BLD AUTO: 5.95 THOUSAND/UL (ref 4.31–10.16)

## 2021-06-16 PROCEDURE — 80048 BASIC METABOLIC PNL TOTAL CA: CPT | Performed by: INTERNAL MEDICINE

## 2021-06-16 PROCEDURE — 84132 ASSAY OF SERUM POTASSIUM: CPT | Performed by: INTERNAL MEDICINE

## 2021-06-16 PROCEDURE — 97542 WHEELCHAIR MNGMENT TRAINING: CPT

## 2021-06-16 PROCEDURE — 99232 SBSQ HOSP IP/OBS MODERATE 35: CPT | Performed by: PHYSICIAN ASSISTANT

## 2021-06-16 PROCEDURE — 85027 COMPLETE CBC AUTOMATED: CPT | Performed by: INTERNAL MEDICINE

## 2021-06-16 PROCEDURE — 97530 THERAPEUTIC ACTIVITIES: CPT

## 2021-06-16 RX ORDER — POTASSIUM CHLORIDE 20 MEQ/1
40 TABLET, EXTENDED RELEASE ORAL ONCE
Status: COMPLETED | OUTPATIENT
Start: 2021-06-16 | End: 2021-06-16

## 2021-06-16 RX ADMIN — Medication 125 MG: at 22:28

## 2021-06-16 RX ADMIN — Medication 250 MG: at 09:56

## 2021-06-16 RX ADMIN — ENOXAPARIN SODIUM 40 MG: 100 INJECTION SUBCUTANEOUS at 09:56

## 2021-06-16 RX ADMIN — Medication 250 MG: at 18:46

## 2021-06-16 RX ADMIN — PANTOPRAZOLE SODIUM 40 MG: 40 TABLET, DELAYED RELEASE ORAL at 09:56

## 2021-06-16 RX ADMIN — DOXYCYCLINE 100 MG: 100 CAPSULE ORAL at 22:27

## 2021-06-16 RX ADMIN — POTASSIUM CHLORIDE 40 MEQ: 1500 TABLET, EXTENDED RELEASE ORAL at 10:45

## 2021-06-16 RX ADMIN — POTASSIUM CHLORIDE 40 MEQ: 1500 TABLET, EXTENDED RELEASE ORAL at 05:45

## 2021-06-16 RX ADMIN — Medication 125 MG: at 10:45

## 2021-06-16 RX ADMIN — DOXYCYCLINE 100 MG: 100 CAPSULE ORAL at 09:56

## 2021-06-16 RX ADMIN — GLYCERIN, HYPROMELLOSE, POLYETHYLENE GLYCOL 1 DROP: .2; .2; 1 LIQUID OPHTHALMIC at 22:28

## 2021-06-16 RX ADMIN — GLYCERIN, HYPROMELLOSE, POLYETHYLENE GLYCOL 1 DROP: .2; .2; 1 LIQUID OPHTHALMIC at 10:44

## 2021-06-17 VITALS
OXYGEN SATURATION: 93 % | HEIGHT: 60 IN | BODY MASS INDEX: 22.58 KG/M2 | SYSTOLIC BLOOD PRESSURE: 110 MMHG | DIASTOLIC BLOOD PRESSURE: 60 MMHG | RESPIRATION RATE: 14 BRPM | HEART RATE: 96 BPM | TEMPERATURE: 97.9 F | WEIGHT: 115 LBS

## 2021-06-17 LAB
ANION GAP SERPL CALCULATED.3IONS-SCNC: 7 MMOL/L (ref 4–13)
BUN SERPL-MCNC: 11 MG/DL (ref 5–25)
CALCIUM SERPL-MCNC: 8 MG/DL (ref 8.3–10.1)
CHLORIDE SERPL-SCNC: 107 MMOL/L (ref 100–108)
CO2 SERPL-SCNC: 25 MMOL/L (ref 21–32)
CREAT SERPL-MCNC: 0.51 MG/DL (ref 0.6–1.3)
ERYTHROCYTE [DISTWIDTH] IN BLOOD BY AUTOMATED COUNT: 18.3 % (ref 11.6–15.1)
GFR SERPL CREATININE-BSD FRML MDRD: 98 ML/MIN/1.73SQ M
GLUCOSE SERPL-MCNC: 109 MG/DL (ref 65–140)
HCT VFR BLD AUTO: 31.7 % (ref 34.8–46.1)
HGB BLD-MCNC: 10.1 G/DL (ref 11.5–15.4)
MCH RBC QN AUTO: 26.6 PG (ref 26.8–34.3)
MCHC RBC AUTO-ENTMCNC: 31.9 G/DL (ref 31.4–37.4)
MCV RBC AUTO: 83 FL (ref 82–98)
PLATELET # BLD AUTO: 359 THOUSANDS/UL (ref 149–390)
PMV BLD AUTO: 10.2 FL (ref 8.9–12.7)
POTASSIUM SERPL-SCNC: 4 MMOL/L (ref 3.5–5.3)
RBC # BLD AUTO: 3.8 MILLION/UL (ref 3.81–5.12)
SARS-COV-2 RNA RESP QL NAA+PROBE: NEGATIVE
SODIUM SERPL-SCNC: 139 MMOL/L (ref 136–145)
WBC # BLD AUTO: 6.96 THOUSAND/UL (ref 4.31–10.16)

## 2021-06-17 PROCEDURE — 85027 COMPLETE CBC AUTOMATED: CPT | Performed by: INTERNAL MEDICINE

## 2021-06-17 PROCEDURE — 97530 THERAPEUTIC ACTIVITIES: CPT

## 2021-06-17 PROCEDURE — 99239 HOSP IP/OBS DSCHRG MGMT >30: CPT | Performed by: PHYSICIAN ASSISTANT

## 2021-06-17 PROCEDURE — U0003 INFECTIOUS AGENT DETECTION BY NUCLEIC ACID (DNA OR RNA); SEVERE ACUTE RESPIRATORY SYNDROME CORONAVIRUS 2 (SARS-COV-2) (CORONAVIRUS DISEASE [COVID-19]), AMPLIFIED PROBE TECHNIQUE, MAKING USE OF HIGH THROUGHPUT TECHNOLOGIES AS DESCRIBED BY CMS-2020-01-R: HCPCS | Performed by: PHYSICIAN ASSISTANT

## 2021-06-17 PROCEDURE — U0005 INFEC AGEN DETEC AMPLI PROBE: HCPCS | Performed by: PHYSICIAN ASSISTANT

## 2021-06-17 PROCEDURE — 97110 THERAPEUTIC EXERCISES: CPT

## 2021-06-17 PROCEDURE — 80048 BASIC METABOLIC PNL TOTAL CA: CPT | Performed by: INTERNAL MEDICINE

## 2021-06-17 RX ORDER — SACCHAROMYCES BOULARDII 250 MG
250 CAPSULE ORAL 2 TIMES DAILY
Qty: 60 CAPSULE | Refills: 0
Start: 2021-06-17 | End: 2022-07-06

## 2021-06-17 RX ORDER — DOXYCYCLINE HYCLATE 100 MG/1
100 CAPSULE ORAL EVERY 12 HOURS SCHEDULED
Qty: 60 CAPSULE | Refills: 0
Start: 2021-06-17 | End: 2021-07-17

## 2021-06-17 RX ADMIN — Medication 125 MG: at 09:48

## 2021-06-17 RX ADMIN — Medication 250 MG: at 09:44

## 2021-06-17 RX ADMIN — ENOXAPARIN SODIUM 40 MG: 100 INJECTION SUBCUTANEOUS at 09:45

## 2021-06-17 RX ADMIN — PANTOPRAZOLE SODIUM 40 MG: 40 TABLET, DELAYED RELEASE ORAL at 09:44

## 2021-06-17 RX ADMIN — DOXYCYCLINE 100 MG: 100 CAPSULE ORAL at 09:44

## 2021-06-18 ENCOUNTER — TELEPHONE (OUTPATIENT)
Dept: OBGYN CLINIC | Facility: MEDICAL CENTER | Age: 71
End: 2021-06-18

## 2021-06-19 LAB
BACTERIA BLD CULT: NORMAL
BACTERIA BLD CULT: NORMAL

## 2021-07-01 ENCOUNTER — APPOINTMENT (OUTPATIENT)
Dept: RADIOLOGY | Facility: CLINIC | Age: 71
End: 2021-07-01
Payer: COMMERCIAL

## 2021-07-01 ENCOUNTER — OFFICE VISIT (OUTPATIENT)
Dept: OBGYN CLINIC | Facility: CLINIC | Age: 71
End: 2021-07-01

## 2021-07-01 VITALS — HEIGHT: 60 IN | BODY MASS INDEX: 22.58 KG/M2 | WEIGHT: 115 LBS

## 2021-07-01 DIAGNOSIS — S82.871A CLOSED DISPLACED PILON FRACTURE OF RIGHT TIBIA, INITIAL ENCOUNTER: ICD-10-CM

## 2021-07-01 DIAGNOSIS — S82.871A CLOSED DISPLACED PILON FRACTURE OF RIGHT TIBIA, INITIAL ENCOUNTER: Primary | ICD-10-CM

## 2021-07-01 PROCEDURE — 73610 X-RAY EXAM OF ANKLE: CPT

## 2021-07-01 PROCEDURE — 99024 POSTOP FOLLOW-UP VISIT: CPT | Performed by: ORTHOPAEDIC SURGERY

## 2021-07-01 NOTE — PROGRESS NOTES
LUCÍA Montaño  Attending, Orthopaedic Surgery  Foot and Ankle  [facility]        ORTHOPAEDIC FOOT AND ANKLE CLINIC VISIT-ANKLE FRACTURE     Assessment:     Encounter Diagnosis   Name Primary?  Closed displaced pilon fracture of right tibia, initial encounter Yes              Plan:   · The patient verbalized understanding of exam findings and treatment plan  We engaged in the shared decision-making process and treatment options were discussed at length with the patient  Surgical and conservative management discussed today along with risks and benefits  · The patient has an unstable pilon variant fracture which will be treated conservatively due to her baseline functional status  She has been nonambulatory since the 1980s  This fracture is typically treated with surgery but given her history, the risks of surgery would be high compared to the theoretical benefits  · Cast placed in clinic today  · XR reviewed that shows stable fracture alignment post cast with some mild shortening and recurvatum  · Will see patient back in 3 weeks for XR evaluation AFTER cast exchange             History of Present Illness:   Chief Complaint: Right tibia fracture  Crow Barba is a 79 y o  female who is being seen for  right distal tibia and fibula fractures  Patient was seen in the hospital 2 weeks ago, patient at baseline is non ambulatory and discussions in the hospital at that time were had with the patient and given her baseline functional status non operative management was elected  Patient denies any issues with her splint since her hospitalization, at baseline she has no motor and very limited sensory function to the right foot             Past Medical, Surgical and Social History:  Past Medical History:  has a past medical history of Anemia, Arthritis, Back injury, Depression, MRSA (methicillin resistant staph aureus) culture positive (12/07/2018), Osteopenia, Osteoporosis, Paralysis ([facility] [address] ), Paraplegia ([facility] [address] ), Poor circulation, Pressure injury of skin, Pressure sore of hip, Sepsis ([facility] [address] ) (3/28/2020), Tibial plateau fracture, and Underweight  Problem List: does not have any pertinent problems on file  Past Surgical History:  has a past surgical history that includes [place] tooth extraction; Toe amputation (Left, 2017); Hip debridement (Right, 2017); pr debridement, skin, sub-q tissue,muscle,bone,=<20 sq cm (Right, 9/19/2018); pr open rx femur fx+intramed jony (Left, 10/22/2018); FLAP LOCAL TRUNK (Right, 12/17/2018); Wound debridement (Left, 12/17/2018); Incision and drainage of wound (Left, 1/3/2019); FLAP LOCAL EXTREMITY (Left, 1/28/2019); FLAP LOCAL TRUNK (Left, 2/27/2019); CLOSURE DELAYED PRIMARY (N/A, 3/20/2019); IR PICC line (12/19/2018); IR PICC line (3/12/2019); Wound debridement (N/A, 11/10/2019); and Decubitus ulcer excision (Bilateral, 11/12/2019)  Family History: family history includes Depression in her father  Social History:  reports that she has never smoked  She has never used smokeless tobacco  She reports previous alcohol use  She reports that she does not use drugs  Current Medications: has a current medication list which includes the following prescription(s): doxycycline hyclate, glycerin-hypromellose-peg 400, multivitamin, pantoprazole, saccharomyces boulardii, acetaminophen, bisacodyl, and loperamide, and the following Facility-Administered Medications: dexamethasone, lactated ringers, lactated ringers, lactated ringers, lactated ringers, ondansetron, and promethazine  Allergies: is allergic to iv contrast [iodinated diagnostic agents], cefepime, ciprofloxacin, latex, and vancomycin       Review of Systems:  General- denies fever/chills  HEENT- denies hearing loss or sore throat  Eyes- denies eye pain or visual disturbances, denies red eyes  Respiratory- denies cough or SOB  Cardio- denies chest pain or palpitations  GI- denies abdominal pain  Endocrine- denies urinary frequency  Urinary- denies pain with urination  Musculoskeletal- Negative except noted above  Skin- denies rashes or wounds  Neurological- denies dizziness or headache  Psychiatric- denies anxiety or difficulty concentrating    Physical Exam:   Ht 5' (1 524 m)   Wt 52 2 kg (115 lb)   LMP  (LMP Unknown)   BMI 22 46 kg/m²   General/Constitutional: No apparent distress  Eyes: normal ocular motion  Cardio: RRR, Normal S1S2, No m/r/g  Lymphatic: No appreciable lymphadenopathy  Respiratory: Non-labored breathing, CTA b/l no w/c/r  Vascular: No edema, swelling or tenderness, except as noted in detailed exam   Integumentary: Patient with large pressure ulcer to the right hip area   Neuro: Patient has no motor and very limited sensory function to the right leg  Psych: Normal mood and affect, oriented to person, place and time  Appropriate affect  Musculoskeletal: patient has very limited function of the lower extremities     Examination    right    Gait Not assessed due to baseline functional status    Musculoskeletal     Skin Splint taken down skin is well appearing, mild swelling     Nails Normal    Range of Motion  Not able to perform    Stability Unstable    Muscle Strength N/A tibialis anterior  N/A gastrocnemius-soleus  N/A posterior tibialis  N/A peroneal/eversion strength      Neurologic Minimal sensation to the right foot     Sensation       Cardiovascular Brisk capillary refill < 2 seconds    Special Tests None      Imaging Studies:   3 views of the  right ankle were taken, reviewed and interpreted independently that demonstrate acceptable alignment of her distal tibia and fibula fractures  Reviewed by me personally  Sallyanne Papa Lachman, MD  Foot & Ankle Surgery   Department of [address] [place] [address]      I personally performed the service  Sallyanne Papa Lachman, MD

## 2021-07-22 ENCOUNTER — OFFICE VISIT (OUTPATIENT)
Dept: OBGYN CLINIC | Facility: CLINIC | Age: 71
End: 2021-07-22

## 2021-07-22 ENCOUNTER — APPOINTMENT (OUTPATIENT)
Dept: RADIOLOGY | Facility: CLINIC | Age: 71
End: 2021-07-22
Payer: COMMERCIAL

## 2021-07-22 VITALS — BODY MASS INDEX: 22.58 KG/M2 | HEIGHT: 60 IN | WEIGHT: 115 LBS

## 2021-07-22 DIAGNOSIS — S82.871A CLOSED DISPLACED PILON FRACTURE OF RIGHT TIBIA, INITIAL ENCOUNTER: Primary | ICD-10-CM

## 2021-07-22 DIAGNOSIS — S82.871A CLOSED DISPLACED PILON FRACTURE OF RIGHT TIBIA, INITIAL ENCOUNTER: ICD-10-CM

## 2021-07-22 PROCEDURE — 99024 POSTOP FOLLOW-UP VISIT: CPT | Performed by: ORTHOPAEDIC SURGERY

## 2021-07-22 PROCEDURE — 73610 X-RAY EXAM OF ANKLE: CPT

## 2021-07-22 NOTE — PROGRESS NOTES
LUCÍA Burgess  Attending, Orthopaedic Surgery  [facility] and [facility] [address] [facility]      ORTHOPAEDIC FOOT AND ANKLE CLINIC VISIT     Assessment:     Encounter Diagnosis   Name Primary?  Closed displaced pilon fracture of right tibia, initial encounter Yes            Plan:   · The patient verbalized understanding of exam findings and treatment plan  We engaged in the shared decision-making process and treatment options were discussed at length with the patient  Surgical and conservative management discussed today along with risks and benefits  · Cast change performed today  · X-ray shows acceptable alignment of fracture after new cast was placed  · Continue non-op treatment  Return in about 3 weeks (around 8/12/2021)  with cast change and repeat NWB xrays after new cast      History of Present Illness:   Chief Complaint: Right pilon fracture  Rolo Lopez is a 79 y o  female who is being seen in follow-up for Right distal tibia and fibula fractures  When we last saw she we recommended non-op treatment due to patient being nonambulatory at baseline  Patient is paraplegic and does not have any pain       Pain/symptom timing:  Worse during the day when active  Pain/symptom context:  Worse with activites and work  Pain/symptom modifying factors:  Rest makes better, activities make worse  Pain/symptom associated signs/symptoms: none    Prior treatment   · NSAIDsNo   · Injections No   · Bracing/Orthotics Yes    · Physical Therapy No     Orthopedic Surgical History:   See below     Past Medical, Surgical and Social History:  Past Medical History:  has a past medical history of Anemia, Arthritis, Back injury, Depression, MRSA (methicillin resistant staph aureus) culture positive (12/07/2018), Osteopenia, Osteoporosis, Paralysis ([address] ), Paraplegia ([address] ), Poor circulation, Pressure injury of skin, Pressure sore of hip, Sepsis ([address] ) (3/28/2020), Tibial plateau fracture, and Underweight  Problem List: does not have any pertinent problems on file  Past Surgical History:  has a past surgical history that includes [place] tooth extraction; Toe amputation (Left, 2017); Hip debridement (Right, 2017); pr debridement, skin, sub-q tissue,muscle,bone,=<20 sq cm (Right, 9/19/2018); pr open rx femur fx+intramed jony (Left, 10/22/2018); FLAP LOCAL TRUNK (Right, 12/17/2018); Wound debridement (Left, 12/17/2018); Incision and drainage of wound (Left, 1/3/2019); FLAP LOCAL EXTREMITY (Left, 1/28/2019); FLAP LOCAL TRUNK (Left, 2/27/2019); CLOSURE DELAYED PRIMARY (N/A, 3/20/2019); IR PICC line (12/19/2018); IR PICC line (3/12/2019); Wound debridement (N/A, 11/10/2019); and Decubitus ulcer excision (Bilateral, 11/12/2019)  Family History: family history includes Depression in her father  Social History:  reports that she has never smoked  She has never used smokeless tobacco  She reports previous alcohol use  She reports that she does not use drugs  Current Medications: has a current medication list which includes the following prescription(s): glycerin-hypromellose-peg 400, multivitamin, pantoprazole, saccharomyces boulardii, acetaminophen, bisacodyl, and loperamide, and the following Facility-Administered Medications: dexamethasone, lactated ringers, lactated ringers, lactated ringers, lactated ringers, ondansetron, and promethazine  Allergies: is allergic to iv contrast [iodinated diagnostic agents], cefepime, ciprofloxacin, latex, and vancomycin       Review of Systems:  General- denies fever/chills  HEENT- denies hearing loss or sore throat  Eyes- denies eye pain or visual disturbances, denies red eyes  Respiratory- denies cough or SOB  Cardio- denies chest pain or palpitations  GI- denies abdominal pain  Endocrine- denies urinary frequency  Urinary- denies pain with urination  Musculoskeletal- Negative except noted above  Skin- denies rashes or wounds  Neurological- denies dizziness or headache  Psychiatric- denies anxiety or difficulty concentrating    Physical Exam:   Ht 5' (1 524 m)   Wt 52 2 kg (115 lb)   LMP  (LMP Unknown)   BMI 22 46 kg/m²   General/Constitutional: No apparent distress: well-nourished and well developed  Eyes: normal ocular motion  Lymphatic: No appreciable lymphadenopathy  Respiratory: Non-labored breathing  Vascular: No edema, swelling or tenderness, except as noted in detailed exam   Integumentary: No impressive skin lesions present, except as noted in detailed exam   Neuro: No ataxia or tremors noted  Psych: Normal mood and affect, oriented to person, place and time  Appropriate affect  Musculoskeletal: Normal, except as noted in detailed exam and in HPI  Examination    Right    Gait Nonambulatory    Musculoskeletal Nontender to palpation    Skin Normal   No wounds  There is an area of pressure in the anterior ankle but no skin breakdown    Nails Normal    Range of Motion  Ankle is unstable    Stability Stable    Muscle Strength Not assessed     Neurologic Normal    Sensation Minimal sensation in right foot   [place] 5 07 filament (10g) testing deferred  Cardiovascular Brisk capillary refill < 2 seconds,intact DP and PT pulses    Special Tests None      Imaging Studies:   3 views of the Right ankle after casting were obtained, reviewed and interpreted independently which demonstrate acceptable alignment of distal tibia and fibula fractures   Reviewed by me personally  Scribe Attestation    I,:  Altagracia Borrego PA-C am acting as a scribe while in the presence of the attending physician :       I,:  Bo Lopez MD personally performed the services described in this documentation    as scribed in my presence :             Renford Lean Lachman, MD  Foot & Ankle Surgery   Department of [address] [place] [address]      I personally performed the service  Renford Lean Lachman, MD

## 2021-07-22 NOTE — PATIENT INSTRUCTIONS
LUCÍA Meng  Attending, Orthopaedic Surgery  [facility] [address] [facility]      ORTHOPAEDIC FOOT AND ANKLE CLINIC VISIT     Assessment:     Encounter Diagnosis   Name Primary?  Closed displaced pilon fracture of right tibia, initial encounter Yes            Plan:   · The patient verbalized understanding of exam findings and treatment plan  We engaged in the shared decision-making process and treatment options were discussed at length with the patient  Surgical and conservative management discussed today along with risks and benefits  · Cast change performed today  · X-ray shows acceptable alignment of fracture  · Continue non-op treatment  Return in about 3 weeks (around 8/12/2021)   with cast change and repeat NWB xrays after new cast

## 2021-08-12 ENCOUNTER — OFFICE VISIT (OUTPATIENT)
Dept: OBGYN CLINIC | Facility: CLINIC | Age: 71
End: 2021-08-12

## 2021-08-12 ENCOUNTER — APPOINTMENT (OUTPATIENT)
Dept: RADIOLOGY | Facility: CLINIC | Age: 71
End: 2021-08-12
Payer: COMMERCIAL

## 2021-08-12 VITALS — HEIGHT: 60 IN | WEIGHT: 115 LBS | BODY MASS INDEX: 22.58 KG/M2

## 2021-08-12 DIAGNOSIS — S82.871A CLOSED DISPLACED PILON FRACTURE OF RIGHT TIBIA, INITIAL ENCOUNTER: ICD-10-CM

## 2021-08-12 DIAGNOSIS — S82.871A CLOSED DISPLACED PILON FRACTURE OF RIGHT TIBIA, INITIAL ENCOUNTER: Primary | ICD-10-CM

## 2021-08-12 PROCEDURE — 73610 X-RAY EXAM OF ANKLE: CPT

## 2021-08-12 PROCEDURE — 99024 POSTOP FOLLOW-UP VISIT: CPT | Performed by: ORTHOPAEDIC SURGERY

## 2021-08-12 NOTE — PATIENT INSTRUCTIONS
LUCÍA Haro  Attending, Orthopaedic Surgery  [facility] [address] [facility]      ORTHOPAEDIC FOOT AND ANKLE CLINIC VISIT     Assessment:     Encounter Diagnosis   Name Primary?  Closed displaced pilon fracture of right tibia, initial encounter Yes            Plan:   · The patient verbalized understanding of exam findings and treatment plan  We engaged in the shared decision-making process and treatment options were discussed at length with the patient  Surgical and conservative management discussed today along with risks and benefits  · Vaibhav Balloon is now 2 months out from her initial injury date  · Cast was removed and new SLC was applied  · Continue NWB of RLE  · X-rays were obtained post cast application and is in acceptable alignment  · We will see the patient back in 3 weeks for repeat evaluation  No follow-ups on file  History of Present Illness:   Chief Complaint: right ankle fracture follow up     Sixto Rodriguez is a 79 y o  female who is being seen in follow-up for Right closed displaced pilon fracture  When we last saw she we recommended non-operative treatment  NWB in [facility] for 3 weeks  Pain has  improved  No Residual pain    Pain/symptom timing:  Worse during the day when active  Pain/symptom context:  Worse with activites and work  Pain/symptom modifying factors:  Rest makes better, activities make worse  Pain/symptom associated signs/symptoms: none    Prior treatment   · NSAIDsYes   · Injections No   · Bracing/Orthotics Yes Cast   · Physical Therapy No     Orthopedic Surgical History:   See Below    Past Medical, Surgical and Social History:  Past Medical History:  has a past medical history of Anemia, Arthritis, Back injury, Depression, MRSA (methicillin resistant staph aureus) culture positive (12/07/2018), Osteopenia, Osteoporosis, Paralysis ([address] ), Paraplegia ([address] ), Poor circulation, Pressure injury of skin, Pressure sore of hip, Sepsis ([address] ) (3/28/2020), Tibial plateau fracture, and Underweight  Problem List: does not have any pertinent problems on file  Past Surgical History:  has a past surgical history that includes [place] tooth extraction; Toe amputation (Left, 2017); Hip debridement (Right, 2017); pr debridement, skin, sub-q tissue,muscle,bone,=<20 sq cm (Right, 9/19/2018); pr open rx femur fx+intramed jony (Left, 10/22/2018); FLAP LOCAL TRUNK (Right, 12/17/2018); Wound debridement (Left, 12/17/2018); Incision and drainage of wound (Left, 1/3/2019); FLAP LOCAL EXTREMITY (Left, 1/28/2019); FLAP LOCAL TRUNK (Left, 2/27/2019); CLOSURE DELAYED PRIMARY (N/A, 3/20/2019); IR PICC line (12/19/2018); IR PICC line (3/12/2019); Wound debridement (N/A, 11/10/2019); and Decubitus ulcer excision (Bilateral, 11/12/2019)  Family History: family history includes Depression in her father  Social History:  reports that she has never smoked  She has never used smokeless tobacco  She reports previous alcohol use  She reports that she does not use drugs  Current Medications: has a current medication list which includes the following prescription(s): glycerin-hypromellose-peg 400, multivitamin, pantoprazole, saccharomyces boulardii, acetaminophen, bisacodyl, and loperamide, and the following Facility-Administered Medications: dexamethasone, lactated ringers, lactated ringers, lactated ringers, lactated ringers, ondansetron, and promethazine  Allergies: is allergic to iv contrast [iodinated diagnostic agents], cefepime, ciprofloxacin, latex, and vancomycin       Review of Systems:  General- denies fever/chills  HEENT- denies hearing loss or sore throat  Eyes- denies eye pain or visual disturbances, denies red eyes  Respiratory- denies cough or SOB  Cardio- denies chest pain or palpitations  GI- denies abdominal pain  Endocrine- denies urinary frequency  Urinary- denies pain with urination  Musculoskeletal- Negative except noted above  Skin- denies rashes or wounds  Neurological- denies dizziness or headache  Psychiatric- denies anxiety or difficulty concentrating    Physical Exam:   Ht 5' (1 524 m)   Wt 52 2 kg (115 lb)   LMP  (LMP Unknown)   BMI 22 46 kg/m²   General/Constitutional: No apparent distress: well-nourished and well developed  Eyes: normal ocular motion  Lymphatic: No appreciable lymphadenopathy  Respiratory: Non-labored breathing  Vascular: No edema, swelling or tenderness, except as noted in detailed exam   Integumentary: No impressive skin lesions present, except as noted in detailed exam   Neuro: No ataxia or tremors noted  Psych: Normal mood and affect, oriented to person, place and time  Appropriate affect  Musculoskeletal: Normal, except as noted in detailed exam and in HPI  Examination    Right    Gait Unambulatory   Musculoskeletal No TTP    Skin Normal       Nails Normal    Range of Motion  Unable to assess degrees dorsiflexion, unable to assess degrees plantarflexion  Subtalar motion: unable to assess    Stability Stable    Muscle Strength 5/5 tibialis anterior  5/5 gastrocnemius-soleus  5/5 posterior tibialis  5/5 peroneal/eversion strength  5/5 EHL  5/5 FHL    Neurologic Normal    Sensation  Intact to light touch throughout sural, saphenous, superficial peroneal, deep peroneal and medial/lateral plantar nerve distributions  [place] [employer]-Rodney 5 07 filament (10g) testing  deferred  Cardiovascular Brisk capillary refill < 2 seconds,intact DP and PT pulses    Special Tests None      Imaging Studies:   3 views of the Right ankle were obtained, reviewed and interpreted independently which demonstrate stable alignment with minimal healing  Reviewed by me personally  Willow Neth Lachman, MD  Foot & Ankle Surgery   Department of [address] [place] [address]      I personally performed the service  Willow Neth Lachman, MD    Nonweightbearing     Continue aspirin/lovenox to prevent blood clots  Recommend taking the following supplements: Vitamin D 4000 units per day and Calcium 1200 mg per day  This will help with bone healing  Care of Casts and Splints    Casts and splints support and protect injured bones and soft tissue  When you break a bone, your doctor will put the pieces back together in the right position  Casts and splints hold the bones in place while they heal  They also reduce pain, swelling, and muscle spasm  In some cases, splints and casts are applied following surgery  Splints or "half-casts" provide less support than casts  However, splints can be adjusted to accommodate swelling from injuries easier than enclosed casts  Your doctor will decide which type of support is best for you  Types of Splints and Casts  Casts are custom-made  They must fit the shape of your injured limb correctly to provide the best support  Casts can be made of plaster or fiberglass -- a plastic that can be shaped  Splints or half-casts can also be custom-made, especially if an exact fit is necessary  Other times, a ready-made splint will be used  These off-the-shelf splints are made in a variety of shapes and sizes, and are much easier and faster to use  They have Velcro straps which make the splints easy to put on, take off, and adjust     Materials  Fiberglass or plaster materials form the hard supportive layer in splints and casts  Fiberglass is lighter in weight and stronger than plaster  In addition, x-rays can "see through" fiberglass better than through plaster  This is important because your doctor will probably schedule additional x-rays after your splint or cast has been applied  X-rays can show whether the bones are healing well or have moved out of place  Plaster is less expensive than fiberglass and shapes better than fiberglass for some uses  Application  Both fiberglass and plaster splints and casts use padding, usually cotton, as a protective layer next to the skin   Both materials come in strips or rolls which are dipped in water and applied over the padding covering the injured area  The splint or cast must fit the shape of the injured arm or leg correctly to provide the best possible support  Generally, the splint or cast also covers the joint above and below the broken bone  In many cases, a splint is applied to a fresh injury first  As swelling subsides, a full cast may replace the splint  Sometimes, it may be necessary to replace a cast as swelling goes down and the cast gets "too big " As a fracture heals, the cast may be replaced by a splint to make it easier to perform physical therapy exercises  Apply ice to the splint or cast and elevate your leg to reduce swelling  Warning Signs  Swelling can create a lot of pressure under your cast  This can lead to problems  If you experience any of the following symptoms, contact your doctor's office immediately for advice   Increased pain and the feeling that the splint or cast is too tight  This may be caused by swelling   Numbness and tingling in your hand or foot  This may be caused by too much pressure on the nerves   Burning and stinging  This may be caused by too much pressure on the skin   Excessive swelling below the cast  This may mean the cast is slowing your blood circulation   Loss of active movement of toes or fingers  This requires an urgent evaluation by your doctor  Getting Used to a Splint or Cast  Swelling due to your injury may cause pressure in your splint or cast for the first 48 to 72 hours  This may cause your injured arm or leg to feel snug or tight in the splint or cast  If you have a splint, your doctor will show you how to adjust it to accommodate the swelling  It is very important to keep the swelling down  This will lessen pain and help your injury heal  To help reduce swelling:   Elevate  It is very important to elevate your injured arm or leg for the first 24 to 72 hours   Prop your injured arm or leg up above your heart by putting it on pillows or some other support  You will have to recline if the splint or cast is on your leg  Elevation allows clear fluid and blood to drain "downhill" to your heart   Exercise  Move your uninjured, but swollen fingers or toes gently and often  Moving them often will prevent stiffness   Ice  Apply ice to the splint or cast  Place the ice in a dry plastic bag or ice pack and loosely wrap it around the splint or cast at the level of the injury  Ice that is packed in a rigid container and touches the cast at only one point will not be effective  Taking Care of Your Splint or Cast  Your doctor will explain any restrictions on using your injured arm or leg while it is healing  You must follow your doctor's instructions carefully to make sure your bone heals properly  The following information provides general guidelines only, and is not a substitute for your doctor's advice  After you have adjusted to your splint or cast for a few days, it is important to keep it in good condition  This will help your recovery   Keep your splint or cast dry  Moisture weakens plaster and damp padding next to the skin can cause irritation  Use two layers of plastic or purchase waterproof shields to keep your splint or cast dry while you shower or bathe  Even if the cast is covered, do not submerge it or hold it under running water  A small pinhole in the cast cover can cause the injury to get soaked   Walking casts  Do not walk on a "walking cast" until it is completely dry and hard  It takes about one hour for fiberglass, and two to three days for plaster to become hard enough to walk on   Avoid dirt  Keep dirt, sand, and powder away from the inside of your splint or cast     Padding  Do not pull out the padding from your splint or cast     Itching  Do not stick objects such as coat hangers inside the splint or cast to scratch itching skin   Do not apply powders or deodorants to itching skin  If itching persists, contact your doctor   Trimming  Do not break off rough edges of the cast or trim the cast before asking your doctor   Skin  Inspect the skin around the cast  If your skin becomes red or raw around the cast, contact your doctor   Inspect the cast regularly  If it becomes cracked or develops soft spots, contact your doctor's office  Use common sense  You have a serious injury and you must protect your cast from damage so it can protect your injury while it heals  After the initial swelling has subsided, proper splint or cast support will usually allow you to continue your daily activities with a minimum of inconvenience  Cast Removal  Never remove the cast yourself  You may cut your skin or prevent proper healing of your injury  Your doctor will use a cast saw to remove your cast  The saw vibrates, but does not rotate  If the blade of the saw touches the padding inside the hard shell of the cast, the padding will vibrate with the blade and will protect your skin  Cast saws make noise and may feel "hot" from friction, but will not harm you -- "their bark is worse than their bite "  If you do feel pain while the cast is being removed, let your doctor or an assistant know and they will be able to make adjustments  The saw vibrates but does not rotate  Cast saws make noise but will not harm you

## 2021-08-12 NOTE — PROGRESS NOTES
LUCÍA Barton  Attending, Orthopaedic Surgery  [facility] [address] [facility]      ORTHOPAEDIC FOOT AND ANKLE CLINIC VISIT     Assessment:     Encounter Diagnosis   Name Primary?  Closed displaced pilon fracture of right tibia, initial encounter Yes            Plan:   · The patient verbalized understanding of exam findings and treatment plan  We engaged in the shared decision-making process and treatment options were discussed at length with the patient  Surgical and conservative management discussed today along with risks and benefits  · Q2ebanking is now 2 months out from her initial injury date  · Cast was removed and new SLC was applied  · Continue NWB of RLE  · X-rays were obtained post cast application and is in acceptable alignment  · We will see the patient back in 3 weeks for repeat evaluation  Return in about 3 weeks (around 9/2/2021) for Recheck  History of Present Illness:   Chief Complaint: right ankle fracture follow up     Juan Francisco Sprague is a 79 y o  female who is being seen in follow-up for Right closed displaced pilon fracture  When we last saw she we recommended non-operative treatment  NWB in [facility] for 3 weeks  Pain has  improved  No Residual pain    Pain/symptom timing:  Worse during the day when active  Pain/symptom context:  Worse with activites and work  Pain/symptom modifying factors:  Rest makes better, activities make worse  Pain/symptom associated signs/symptoms: none    Prior treatment   · NSAIDsYes   · Injections No   · Bracing/Orthotics Yes Cast   · Physical Therapy No     Orthopedic Surgical History:   See Below    Past Medical, Surgical and Social History:  Past Medical History:  has a past medical history of Anemia, Arthritis, Back injury, Depression, MRSA (methicillin resistant staph aureus) culture positive (12/07/2018), Osteopenia, Osteoporosis, Paralysis ([facility] [address] ), Paraplegia ([facility] [address] ), Poor circulation, Pressure injury of skin, Pressure sore of hip, Sepsis ([facility] [address] ) (3/28/2020), Tibial plateau fracture, and Underweight  Problem List: does not have any pertinent problems on file  Past Surgical History:  has a past surgical history that includes [place] tooth extraction; Toe amputation (Left, 2017); Hip debridement (Right, 2017); pr debridement, skin, sub-q tissue,muscle,bone,=<20 sq cm (Right, 9/19/2018); pr open rx femur fx+intramed jony (Left, 10/22/2018); FLAP LOCAL TRUNK (Right, 12/17/2018); Wound debridement (Left, 12/17/2018); Incision and drainage of wound (Left, 1/3/2019); FLAP LOCAL EXTREMITY (Left, 1/28/2019); FLAP LOCAL TRUNK (Left, 2/27/2019); CLOSURE DELAYED PRIMARY (N/A, 3/20/2019); IR PICC line (12/19/2018); IR PICC line (3/12/2019); Wound debridement (N/A, 11/10/2019); and Decubitus ulcer excision (Bilateral, 11/12/2019)  Family History: family history includes Depression in her father  Social History:  reports that she has never smoked  She has never used smokeless tobacco  She reports previous alcohol use  She reports that she does not use drugs  Current Medications: has a current medication list which includes the following prescription(s): glycerin-hypromellose-peg 400, multivitamin, pantoprazole, saccharomyces boulardii, acetaminophen, bisacodyl, and loperamide, and the following Facility-Administered Medications: dexamethasone, lactated ringers, lactated ringers, lactated ringers, lactated ringers, ondansetron, and promethazine  Allergies: is allergic to iv contrast [iodinated diagnostic agents], cefepime, ciprofloxacin, latex, and vancomycin       Review of Systems:  General- denies fever/chills  HEENT- denies hearing loss or sore throat  Eyes- denies eye pain or visual disturbances, denies red eyes  Respiratory- denies cough or SOB  Cardio- denies chest pain or palpitations  GI- denies abdominal pain  Endocrine- denies urinary frequency  Urinary- denies pain with urination  Musculoskeletal- Negative except noted above  Skin- denies rashes or wounds  Neurological- denies dizziness or headache  Psychiatric- denies anxiety or difficulty concentrating    Physical Exam:   Ht 5' (1 524 m)   Wt 52 2 kg (115 lb)   LMP  (LMP Unknown)   BMI 22 46 kg/m²   General/Constitutional: No apparent distress: well-nourished and well developed  Eyes: normal ocular motion  Lymphatic: No appreciable lymphadenopathy  Respiratory: Non-labored breathing  Vascular: No edema, swelling or tenderness, except as noted in detailed exam   Integumentary: No impressive skin lesions present, except as noted in detailed exam   Neuro: No ataxia or tremors noted  Psych: Normal mood and affect, oriented to person, place and time  Appropriate affect  Musculoskeletal: Normal, except as noted in detailed exam and in HPI  Examination    Right    Gait Unambulatory   Musculoskeletal No TTP    Skin Normal       Nails Normal    Range of Motion  Unable to assess degrees dorsiflexion, unable to assess degrees plantarflexion  Subtalar motion: unable to assess    Stability Stable    Muscle Strength 5/5 tibialis anterior  5/5 gastrocnemius-soleus  5/5 posterior tibialis  5/5 peroneal/eversion strength  5/5 EHL  5/5 FHL    Neurologic Normal    Sensation  Intact to light touch throughout sural, saphenous, superficial peroneal, deep peroneal and medial/lateral plantar nerve distributions  [employer]-Rodney 5 07 filament (10g) testing  deferred  Cardiovascular Brisk capillary refill < 2 seconds,intact DP and PT pulses    Special Tests None      Imaging Studies:   3 views of the Right ankle were obtained, reviewed and interpreted independently which demonstrate stable alignment with minimal healing  Reviewed by me personally  Betty Patten Lachman, MD  Foot & Ankle Surgery   Department of [address] [place] [address]      I personally performed the service  Betty Patten Lachman, MD    Scribe Attestation    I,:  Linda Sarmiento MA am acting as a scribe while in the presence of the attending physician :       I,:  Jarrett Louis MD personally performed the services described in this documentation    as scribed in my presence :

## 2021-09-02 ENCOUNTER — OFFICE VISIT (OUTPATIENT)
Dept: OBGYN CLINIC | Facility: CLINIC | Age: 71
End: 2021-09-02

## 2021-09-02 ENCOUNTER — APPOINTMENT (OUTPATIENT)
Dept: RADIOLOGY | Facility: CLINIC | Age: 71
End: 2021-09-02
Payer: COMMERCIAL

## 2021-09-02 VITALS — BODY MASS INDEX: 22.58 KG/M2 | WEIGHT: 115 LBS | HEIGHT: 60 IN

## 2021-09-02 DIAGNOSIS — S82.871A CLOSED DISPLACED PILON FRACTURE OF RIGHT TIBIA, INITIAL ENCOUNTER: ICD-10-CM

## 2021-09-02 PROCEDURE — 73610 X-RAY EXAM OF ANKLE: CPT

## 2021-09-02 PROCEDURE — 99024 POSTOP FOLLOW-UP VISIT: CPT | Performed by: ORTHOPAEDIC SURGERY

## 2021-09-02 NOTE — PROGRESS NOTES
LUCÍA Duncan  Attending, Orthopaedic Surgery  Foot and [address] [facility] [address] Associates      ORTHOPAEDIC FOOT AND ANKLE CLINIC VISIT     Assessment:     Encounter Diagnosis   Name Primary?  Closed displaced pilon fracture of right tibia, initial encounter             Plan:   · The patient verbalized understanding of exam findings and treatment plan  We engaged in the shared decision-making process and treatment options were discussed at length with the patient  Surgical and conservative management discussed today along with risks and benefits  · Jana Becker is now 11 weeks out from her injury  · Cast was removed and a new SLC was applied today  We will plan on 4 more weeks of immobilization in [facility] and then transition to cam boot   · X-rays obtained post cast application shows fracture in acceptable alignment  · Minimal fracture healing at this point and we would expect to see more this far out  Return in about 4 weeks (around 9/30/2021)  x-rays of right ankle out of cast at next visit  History of Present Illness:   Chief Complaint:   Chief Complaint   Patient presents with    Right Ankle - Follow-up     Bertha Sykes is a 79 y o  female who is being seen in follow-up for Right closed displaced pilon fracture  When we last saw she we recommended immobilization in [facility]  Patient is nonambulatory at baseline  Pain has improved  She denies any residual pain       Pain/symptom timing:  Worse during the day when active  Pain/symptom context:  Worse with activites and work  Pain/symptom modifying factors:  Rest makes better, activities make worse  Pain/symptom associated signs/symptoms: none    Prior treatment   · NSAIDsNo   · Injections No   · Bracing/Orthotics Yes    · Physical Therapy No     Orthopedic Surgical History:   See below    Past Medical, Surgical and Social History:  Past Medical History:  has a past medical history of Anemia, Arthritis, Back injury, Depression, MRSA (methicillin resistant staph aureus) culture positive (12/07/2018), Osteopenia, Osteoporosis, Paralysis ([facility] [address] Paraplegia ([facility] [address] ), Poor circulation, Pressure injury of skin, Pressure sore of hip, Sepsis ([facility] [address] ) (3/28/2020), Tibial plateau fracture, and Underweight  Problem List: does not have any pertinent problems on file  Past Surgical History:  has a past surgical history that includes [place] tooth extraction; Toe amputation (Left, 2017); Hip debridement (Right, 2017); pr debridement, skin, sub-q tissue,muscle,bone,=<20 sq cm (Right, 9/19/2018); pr open rx femur fx+intramed jony (Left, 10/22/2018); FLAP LOCAL TRUNK (Right, 12/17/2018); Wound debridement (Left, 12/17/2018); Incision and drainage of wound (Left, 1/3/2019); FLAP LOCAL EXTREMITY (Left, 1/28/2019); FLAP LOCAL TRUNK (Left, 2/27/2019); CLOSURE DELAYED PRIMARY (N/A, 3/20/2019); IR PICC line (12/19/2018); IR PICC line (3/12/2019); Wound debridement (N/A, 11/10/2019); and Decubitus ulcer excision (Bilateral, 11/12/2019)  Family History: family history includes Depression in her father  Social History:  reports that she has never smoked  She has never used smokeless tobacco  She reports previous alcohol use  She reports that she does not use drugs  Current Medications: has a current medication list which includes the following prescription(s): glycerin-hypromellose-peg 400, multivitamin, pantoprazole, saccharomyces boulardii, acetaminophen, bisacodyl, and loperamide, and the following Facility-Administered Medications: dexamethasone, lactated ringers, lactated ringers, lactated ringers, lactated ringers, ondansetron, and promethazine  Allergies: is allergic to iv contrast [iodinated diagnostic agents], cefepime, ciprofloxacin, latex, and vancomycin       Review of Systems:  General- denies fever/chills  HEENT- denies hearing loss or sore throat  Eyes- denies eye pain or visual disturbances, denies red eyes  Respiratory- denies cough or SOB  Cardio- denies chest pain or palpitations  GI- denies abdominal pain  Endocrine- denies urinary frequency  Urinary- denies pain with urination  Musculoskeletal- Negative except noted above  Skin- denies rashes or wounds  Neurological- denies dizziness or headache  Psychiatric- denies anxiety or difficulty concentrating    Physical Exam:   Ht 5' (1 524 m)   Wt 52 2 kg (115 lb)   LMP  (LMP Unknown)   BMI 22 46 kg/m²   General/Constitutional: No apparent distress: well-nourished and well developed  Eyes: normal ocular motion  Lymphatic: No appreciable lymphadenopathy  Respiratory: Non-labored breathing  Vascular: No edema, swelling or tenderness, except as noted in detailed exam   Integumentary: No impressive skin lesions present, except as noted in detailed exam   Neuro: No ataxia or tremors noted  Psych: Normal mood and affect, oriented to person, place and time  Appropriate affect  Musculoskeletal: Normal, except as noted in detailed exam and in HPI  Examination    Right    Gait Nonambulatory    Musculoskeletal Nontender to palpation     Skin Normal       Nails Normal    Range of Motion  Unable to assess range of motion due to fracture     Stability Stable    Muscle Strength 5/5 tibialis anterior  5/5 gastrocnemius-soleus  5/5 posterior tibialis  5/5 peroneal/eversion strength  5/5 EHL  5/5 FHL    Neurologic Normal    Sensation Minimal sensation throughout all nerve distributions of right lower extremity   [place] 5 07 filament (10g) testing  deferred  Cardiovascular Brisk capillary refill < 2 seconds,intact DP and PT pulses    Special Tests None      Imaging Studies:   3 views of the Right ankle were obtained, reviewed and interpreted independently which demonstrate acceptable alignment of distal tibia and fibula fractures in [facility]  Reviewed by me personally      Scribe Attestation    I,:  Clarita Thompson PA-C am acting as a scribe while in the presence of the attending physician :       I,:  Anna Gómez MD personally performed the services described in this documentation    as scribed in my presence :                 Halbert Blalock Lachman, MD  Foot & Ankle Surgery   Department of [address] [place] [address]      I personally performed the service  Halbert Blalock Lachman, MD

## 2021-09-30 ENCOUNTER — APPOINTMENT (OUTPATIENT)
Dept: RADIOLOGY | Facility: CLINIC | Age: 71
End: 2021-09-30
Payer: COMMERCIAL

## 2021-09-30 ENCOUNTER — OFFICE VISIT (OUTPATIENT)
Dept: OBGYN CLINIC | Facility: CLINIC | Age: 71
End: 2021-09-30
Payer: COMMERCIAL

## 2021-09-30 VITALS — SYSTOLIC BLOOD PRESSURE: 118 MMHG | DIASTOLIC BLOOD PRESSURE: 62 MMHG

## 2021-09-30 DIAGNOSIS — S82.871K CLOSED DISPLACED PILON FRACTURE OF RIGHT TIBIA WITH NONUNION: Primary | ICD-10-CM

## 2021-09-30 DIAGNOSIS — S82.871A CLOSED DISPLACED PILON FRACTURE OF RIGHT TIBIA, INITIAL ENCOUNTER: ICD-10-CM

## 2021-09-30 PROCEDURE — 99213 OFFICE O/P EST LOW 20 MIN: CPT | Performed by: ORTHOPAEDIC SURGERY

## 2021-09-30 PROCEDURE — 73610 X-RAY EXAM OF ANKLE: CPT

## 2021-09-30 NOTE — PROGRESS NOTES
LUCÍA Aguayo  Attending, Orthopaedic Surgery  Foot and [address] [facility] [address] [facility]      ORTHOPAEDIC FOOT AND ANKLE CLINIC VISIT     Assessment:     Encounter Diagnosis   Name Primary?  Closed displaced pilon fracture of right tibia, initial encounter Yes            Plan:   · The patient verbalized understanding of exam findings and treatment plan  We engaged in the shared decision-making process and treatment options were discussed at length with the patient  Surgical and conservative management discussed today along with risks and benefits  · She is not healing this fracture  She has been casted for 3 months and still has significant instability in the fracture site on exam with minimal healing on Xray  · She is non-ambulatory at baseline and does not use this leg even for transfers  · We fitted her for an ASO brace to use temporarily while she obtains a custom made AFO brace  · An off the shelf brace will not provide the necessary support of her distal tibial plafond fracture nonunion  A custom brace is necessary for this purpose  · The other option is a TTC arthrodesis which I do not think would lead to a good outcome considering she does not use this leg for any daily function  · Follow-up in 6 weeks for repeat evaluate of the brace  She should obtain the brace prior to this appointment      History of Present Illness:   Chief Complaint:   Chief Complaint   Patient presents with    Right Ankle - Fracture, Follow-up     Good Roberts is a 79 y o  female who is being seen in follow-up for Right pilon fracture  When we last saw she we recommended casting  The fracture continues to be unstable on Xray   No pain     Pain/symptom timing:  Worse during the day when active  Pain/symptom context:  Worse with activites and work  Pain/symptom modifying factors:  Rest makes better, activities make worse  Pain/symptom associated signs/symptoms: none    Prior treatment   · NSAIDsNo · Injections No   · Bracing/Orthotics Yes    · Physical Therapy No     Orthopedic Surgical History:   See below    Past Medical, Surgical and Social History:  Past Medical History:  has a past medical history of Anemia, Arthritis, Back injury, Depression, MRSA (methicillin resistant staph aureus) culture positive (12/07/2018), Osteopenia, Osteoporosis, Paralysis ([facility] [address] Paraplegia ([facility] [address] ), Poor circulation, Pressure injury of skin, Pressure sore of hip, Sepsis ([facility] [address] ) (3/28/2020), Tibial plateau fracture, and Underweight  Problem List: does not have any pertinent problems on file  Past Surgical History:  has a past surgical history that includes [place] tooth extraction; Toe amputation (Left, 2017); Hip debridement (Right, 2017); pr debridement, skin, sub-q tissue,muscle,bone,=<20 sq cm (Right, 9/19/2018); pr open rx femur fx+intramed jony (Left, 10/22/2018); FLAP LOCAL TRUNK (Right, 12/17/2018); Wound debridement (Left, 12/17/2018); Incision and drainage of wound (Left, 1/3/2019); FLAP LOCAL EXTREMITY (Left, 1/28/2019); FLAP LOCAL TRUNK (Left, 2/27/2019); CLOSURE DELAYED PRIMARY (N/A, 3/20/2019); IR PICC line (12/19/2018); IR PICC line (3/12/2019); Wound debridement (N/A, 11/10/2019); and Decubitus ulcer excision (Bilateral, 11/12/2019)  Family History: family history includes Depression in her father  Social History:  reports that she has never smoked  She has never used smokeless tobacco  She reports previous alcohol use  She reports that she does not use drugs  Current Medications: has a current medication list which includes the following prescription(s): glycerin-hypromellose-peg 400, multivitamin, pantoprazole, saccharomyces boulardii, acetaminophen, bisacodyl, and loperamide, and the following Facility-Administered Medications: dexamethasone, lactated ringers, lactated ringers, lactated ringers, lactated ringers, ondansetron, and promethazine    Allergies: is allergic to iv contrast [iodinated diagnostic agents], cefepime, ciprofloxacin, latex, and vancomycin  Review of Systems:  General- denies fever/chills  HEENT- denies hearing loss or sore throat  Eyes- denies eye pain or visual disturbances, denies red eyes  Respiratory- denies cough or SOB  Cardio- denies chest pain or palpitations  GI- denies abdominal pain  Endocrine- denies urinary frequency  Urinary- denies pain with urination  Musculoskeletal- Negative except noted above  Skin- denies rashes or wounds  Neurological- denies dizziness or headache  Psychiatric- denies anxiety or difficulty concentrating    Physical Exam:   /62   LMP  (LMP Unknown)   General/Constitutional: No apparent distress: well-nourished and well developed  Eyes: normal ocular motion  Lymphatic: No appreciable lymphadenopathy  Respiratory: Non-labored breathing  Vascular: No edema, swelling or tenderness, except as noted in detailed exam   Integumentary: No impressive skin lesions present, except as noted in detailed exam   Neuro: No ataxia or tremors noted  Psych: Normal mood and affect, oriented to person, place and time  Appropriate affect  Musculoskeletal: Normal, except as noted in detailed exam and in HPI  Examination    Right    Gait Nonambulatory   Musculoskeletal No TTP    Skin Normal       Nails Normal    Range of Motion  Not assessed    Stability Unstable    Muscle Strength No motor function    Neurologic No sensation         Cardiovascular Brisk capillary refill < 2 seconds,intact DP and PT pulses    Special Tests None      Imaging Studies:     3 views of the Right ankle were obtained, reviewed and interpreted independently which demonstrate distal tibial plafond fracture without interval healing and comminuted and displaced  Reviewed by me personally  Willow Neth Lachman, MD  Foot & Ankle Surgery   Department of [address] [place] [address]      I personally performed the service  Willow Neth Lachman, MD Statement Selected

## 2021-09-30 NOTE — PATIENT INSTRUCTIONS
LUCÍA Huber  Attending, Orthopaedic Surgery  Foot and [address] [facility] [address] [facility]      ORTHOPAEDIC FOOT AND ANKLE CLINIC VISIT     Assessment:     Encounter Diagnosis   Name Primary?  Closed displaced pilon fracture of right tibia, initial encounter Yes            Plan:   · The patient verbalized understanding of exam findings and treatment plan  We engaged in the shared decision-making process and treatment options were discussed at length with the patient  Surgical and conservative management discussed today along with risks and benefits  · She is not healing this fracture  She has been casted for 3 months and still has significant instability in the fracture site on exam with minimal healing on Xray  · She is non-ambulatory at baseline and does not use this leg even for transfers  · We fitted her for an ASO brace to use temporarily while she obtains a custom made AFO brace  · An off the shelf brace will not provide the necessary support of her distal tibial plafond fracture nonunion  A custom brace is necessary for this purpose  · Follow-up in 6 weeks for repeat evaluate of the brace  She should obtain the brace prior to this appointment      History of Present Illness:   Chief Complaint:   Chief Complaint   Patient presents with    Right Ankle - Fracture, Follow-up     Jose Raul Rosado is a 79 y o  female who is being seen in follow-up for Right pilon fracture  When we last saw she we recommended casting  The fracture continues to be unstable on Xray   No pain     Pain/symptom timing:  Worse during the day when active  Pain/symptom context:  Worse with activites and work  Pain/symptom modifying factors:  Rest makes better, activities make worse  Pain/symptom associated signs/symptoms: none    Prior treatment   · NSAIDsNo   · Injections No   · Bracing/Orthotics Yes    · Physical Therapy No     Orthopedic Surgical History:   See below    Past Medical, Surgical and Social History:  Past Medical History:  has a past medical history of Anemia, Arthritis, Back injury, Depression, MRSA (methicillin resistant staph aureus) culture positive (12/07/2018), Osteopenia, Osteoporosis, Paralysis ([facility] [address] ), Paraplegia ([facility] [address] ), Poor circulation, Pressure injury of skin, Pressure sore of hip, Sepsis ([facility] [address] ) (3/28/2020), Tibial plateau fracture, and Underweight  Problem List: does not have any pertinent problems on file  Past Surgical History:  has a past surgical history that includes [place] tooth extraction; Toe amputation (Left, 2017); Hip debridement (Right, 2017); pr debridement, skin, sub-q tissue,muscle,bone,=<20 sq cm (Right, 9/19/2018); pr open rx femur fx+intramed jony (Left, 10/22/2018); FLAP LOCAL TRUNK (Right, 12/17/2018); Wound debridement (Left, 12/17/2018); Incision and drainage of wound (Left, 1/3/2019); FLAP LOCAL EXTREMITY (Left, 1/28/2019); FLAP LOCAL TRUNK (Left, 2/27/2019); CLOSURE DELAYED PRIMARY (N/A, 3/20/2019); IR PICC line (12/19/2018); IR PICC line (3/12/2019); Wound debridement (N/A, 11/10/2019); and Decubitus ulcer excision (Bilateral, 11/12/2019)  Family History: family history includes Depression in her father  Social History:  reports that she has never smoked  She has never used smokeless tobacco  She reports previous alcohol use  She reports that she does not use drugs  Current Medications: has a current medication list which includes the following prescription(s): glycerin-hypromellose-peg 400, multivitamin, pantoprazole, saccharomyces boulardii, acetaminophen, bisacodyl, and loperamide, and the following Facility-Administered Medications: dexamethasone, lactated ringers, lactated ringers, lactated ringers, lactated ringers, ondansetron, and promethazine  Allergies: is allergic to iv contrast [iodinated diagnostic agents], cefepime, ciprofloxacin, latex, and vancomycin       Review of Systems:  General- denies fever/chills  HEENT- denies hearing loss or sore throat  Eyes- denies eye pain or visual disturbances, denies red eyes  Respiratory- denies cough or SOB  Cardio- denies chest pain or palpitations  GI- denies abdominal pain  Endocrine- denies urinary frequency  Urinary- denies pain with urination  Musculoskeletal- Negative except noted above  Skin- denies rashes or wounds  Neurological- denies dizziness or headache  Psychiatric- denies anxiety or difficulty concentrating    Physical Exam:   /62   LMP  (LMP Unknown)   General/Constitutional: No apparent distress: well-nourished and well developed  Eyes: normal ocular motion  Lymphatic: No appreciable lymphadenopathy  Respiratory: Non-labored breathing  Vascular: No edema, swelling or tenderness, except as noted in detailed exam   Integumentary: No impressive skin lesions present, except as noted in detailed exam   Neuro: No ataxia or tremors noted  Psych: Normal mood and affect, oriented to person, place and time  Appropriate affect  Musculoskeletal: Normal, except as noted in detailed exam and in HPI  Examination    Right    Gait Nonambulatory   Musculoskeletal No TTP    Skin Normal       Nails Normal    Range of Motion  Not assessed    Stability Unstable    Muscle Strength No motor function    Neurologic No sensation         Cardiovascular Brisk capillary refill < 2 seconds,intact DP and PT pulses    Special Tests None      Imaging Studies:     3 views of the Right ankle were obtained, reviewed and interpreted independently which demonstrate distal tibial plafond fracture without interval healing and comminuted and displaced  Reviewed by me personally  Ona Pringle Lachman, MD  Foot & Ankle Surgery   Department of [address] [place] [address]      I personally performed the service  Ona Pringle Lachman, MD        Today, We recommended a brace/prosthesis for you  [facility] certified pedorthotists make orthotics but not braces or prosthesis    Below are the companies in the area that make these, Please call ahead for an appointment and to ensure the company accepts your insurance  Make sure to bring the prescription given to you in the office today to the company for the brace/prosthesis  [address]  · [place]  [address] Marlene AlemanCarissa wolff 1 Phone: [phone]  [facility] Fax: [phone]  ·   [address]  [address]  (By Appointment Only)  Directions  [address] [facility], [address] [place] [address] [address]  [place] Phone: [phone]  [place] Fax: [address] [facility] Location  [address]   [address] [facility]  [phone] (fax)    [facility]  [address] [facility], [address] [place] [address],[address]  [place], [facility]  [phone] (fax)    [facility]  [address]  [phone] (fax)    [facility] Location  [address] [facility], [address] [address]  [phone] (fax)    [facility]  [address] , [address], Susana Ramos 3  [phone] (fax)

## 2021-10-20 ENCOUNTER — TELEPHONE (OUTPATIENT)
Dept: OBGYN CLINIC | Facility: HOSPITAL | Age: 71
End: 2021-10-20

## 2021-12-09 ENCOUNTER — OFFICE VISIT (OUTPATIENT)
Dept: FAMILY MEDICINE CLINIC | Facility: CLINIC | Age: 71
End: 2021-12-09
Payer: COMMERCIAL

## 2021-12-09 VITALS
SYSTOLIC BLOOD PRESSURE: 124 MMHG | HEART RATE: 92 BPM | OXYGEN SATURATION: 99 % | DIASTOLIC BLOOD PRESSURE: 80 MMHG | TEMPERATURE: 97.8 F | RESPIRATION RATE: 16 BRPM

## 2021-12-09 DIAGNOSIS — M86.60 CHRONIC OSTEOMYELITIS (HCC): ICD-10-CM

## 2021-12-09 DIAGNOSIS — Z00.00 MEDICARE ANNUAL WELLNESS VISIT, SUBSEQUENT: Primary | ICD-10-CM

## 2021-12-09 DIAGNOSIS — K21.9 GASTROESOPHAGEAL REFLUX DISEASE WITHOUT ESOPHAGITIS: ICD-10-CM

## 2021-12-09 DIAGNOSIS — F33.9 DEPRESSION, RECURRENT (HCC): ICD-10-CM

## 2021-12-09 DIAGNOSIS — K59.04 CHRONIC IDIOPATHIC CONSTIPATION: ICD-10-CM

## 2021-12-09 DIAGNOSIS — M81.8 OTHER OSTEOPOROSIS WITHOUT CURRENT PATHOLOGICAL FRACTURE: ICD-10-CM

## 2021-12-09 DIAGNOSIS — S82.871S CLOSED DISPLACED PILON FRACTURE OF RIGHT TIBIA, SEQUELA: ICD-10-CM

## 2021-12-09 DIAGNOSIS — S83.194S: ICD-10-CM

## 2021-12-09 DIAGNOSIS — F32.89 OTHER DEPRESSION: ICD-10-CM

## 2021-12-09 DIAGNOSIS — E87.1 HYPONATREMIA: ICD-10-CM

## 2021-12-09 DIAGNOSIS — N31.9 NEUROGENIC BLADDER: ICD-10-CM

## 2021-12-09 PROBLEM — D75.839 THROMBOCYTOSIS: Status: RESOLVED | Noted: 2018-12-27 | Resolved: 2021-12-09

## 2021-12-09 PROBLEM — A41.4 SEPSIS DUE TO ANAEROBES (HCC): Status: RESOLVED | Noted: 2019-01-06 | Resolved: 2021-12-09

## 2021-12-09 PROBLEM — Z71.89 GOALS OF CARE, COUNSELING/DISCUSSION: Status: RESOLVED | Noted: 2020-03-11 | Resolved: 2021-12-09

## 2021-12-09 PROBLEM — S82.401A TIBIA/FIBULA FRACTURE, RIGHT, CLOSED, INITIAL ENCOUNTER: Status: RESOLVED | Noted: 2021-06-13 | Resolved: 2021-12-09

## 2021-12-09 PROBLEM — S82.201A TIBIA/FIBULA FRACTURE, RIGHT, CLOSED, INITIAL ENCOUNTER: Status: RESOLVED | Noted: 2021-06-13 | Resolved: 2021-12-09

## 2021-12-09 PROBLEM — R53.81 PHYSICAL DECONDITIONING: Status: RESOLVED | Noted: 2020-02-03 | Resolved: 2021-12-09

## 2021-12-09 PROBLEM — R41.89 COGNITIVE DECLINE: Status: RESOLVED | Noted: 2020-02-13 | Resolved: 2021-12-09

## 2021-12-09 PROBLEM — Z78.9 UNABLE TO CARE FOR SELF: Status: RESOLVED | Noted: 2021-06-14 | Resolved: 2021-12-09

## 2021-12-09 PROBLEM — R93.89 ABNORMAL CT SCAN: Status: RESOLVED | Noted: 2020-02-21 | Resolved: 2021-12-09

## 2021-12-09 PROCEDURE — 1125F AMNT PAIN NOTED PAIN PRSNT: CPT | Performed by: FAMILY MEDICINE

## 2021-12-09 PROCEDURE — 1160F RVW MEDS BY RX/DR IN RCRD: CPT | Performed by: FAMILY MEDICINE

## 2021-12-09 PROCEDURE — G0439 PPPS, SUBSEQ VISIT: HCPCS | Performed by: FAMILY MEDICINE

## 2021-12-09 PROCEDURE — 3288F FALL RISK ASSESSMENT DOCD: CPT | Performed by: FAMILY MEDICINE

## 2021-12-09 PROCEDURE — 99215 OFFICE O/P EST HI 40 MIN: CPT | Performed by: FAMILY MEDICINE

## 2021-12-09 PROCEDURE — 1170F FXNL STATUS ASSESSED: CPT | Performed by: FAMILY MEDICINE

## 2021-12-09 PROCEDURE — 3725F SCREEN DEPRESSION PERFORMED: CPT | Performed by: FAMILY MEDICINE

## 2021-12-09 PROCEDURE — 1036F TOBACCO NON-USER: CPT | Performed by: FAMILY MEDICINE

## 2021-12-09 RX ORDER — FAMOTIDINE 40 MG/1
40 TABLET, FILM COATED ORAL DAILY
Qty: 90 TABLET | Refills: 3 | Status: SHIPPED | OUTPATIENT
Start: 2021-12-09

## 2021-12-09 RX ORDER — DOXYCYCLINE HYCLATE 100 MG/1
CAPSULE ORAL
COMMUNITY
Start: 2021-11-12 | End: 2021-12-09 | Stop reason: SDUPTHER

## 2021-12-09 RX ORDER — OMEPRAZOLE MAGNESIUM 20 MG
CAPSULE,DELAYED RELEASE (ENTERIC COATED) ORAL
COMMUNITY
Start: 2021-11-12

## 2021-12-09 RX ORDER — DOXYCYCLINE HYCLATE 100 MG/1
100 CAPSULE ORAL EVERY 12 HOURS SCHEDULED
Qty: 180 CAPSULE | Refills: 3 | Status: SHIPPED | OUTPATIENT
Start: 2021-12-09 | End: 2022-01-11 | Stop reason: SDUPTHER

## 2021-12-09 RX ORDER — PANTOPRAZOLE SODIUM 40 MG/1
40 TABLET, DELAYED RELEASE ORAL DAILY
Qty: 90 TABLET | Refills: 3 | Status: SHIPPED | OUTPATIENT
Start: 2021-12-09

## 2021-12-11 NOTE — ASSESSMENT & PLAN NOTE
· History of neurogenic bladder 2/2 SCI  · UTI associated with chronic isabel catheter present on admission  · Known history of VRE  · Received dose of Zosyn however ID recommending monitoring off of antibiotics for now    · Urine culture negative English
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
## 2021-12-14 PROBLEM — S82.871A CLOSED DISPLACED PILON FRACTURE OF RIGHT TIBIA: Status: RESOLVED | Noted: 2021-09-30 | Resolved: 2021-12-14

## 2021-12-14 PROBLEM — E87.6 HYPOKALEMIA: Status: RESOLVED | Noted: 2018-09-19 | Resolved: 2021-12-14

## 2021-12-14 PROBLEM — A04.72 C. DIFFICILE DIARRHEA: Status: RESOLVED | Noted: 2019-04-07 | Resolved: 2021-12-14

## 2021-12-14 PROBLEM — S83.194A: Status: RESOLVED | Noted: 2020-03-10 | Resolved: 2021-12-14

## 2021-12-14 PROBLEM — R93.89 ABNORMAL CT OF THE CHEST: Status: RESOLVED | Noted: 2020-02-09 | Resolved: 2021-12-14

## 2022-01-11 ENCOUNTER — OFFICE VISIT (OUTPATIENT)
Dept: FAMILY MEDICINE CLINIC | Facility: CLINIC | Age: 72
End: 2022-01-11
Payer: COMMERCIAL

## 2022-01-11 VITALS
SYSTOLIC BLOOD PRESSURE: 120 MMHG | DIASTOLIC BLOOD PRESSURE: 80 MMHG | HEART RATE: 86 BPM | RESPIRATION RATE: 16 BRPM | TEMPERATURE: 98.5 F | OXYGEN SATURATION: 96 %

## 2022-01-11 DIAGNOSIS — K21.9 GASTROESOPHAGEAL REFLUX DISEASE WITHOUT ESOPHAGITIS: ICD-10-CM

## 2022-01-11 DIAGNOSIS — M86.60 CHRONIC OSTEOMYELITIS (HCC): ICD-10-CM

## 2022-01-11 DIAGNOSIS — M81.8 OTHER OSTEOPOROSIS WITHOUT CURRENT PATHOLOGICAL FRACTURE: Primary | ICD-10-CM

## 2022-01-11 DIAGNOSIS — L89.324 PRESSURE ULCER OF ISCHIUM, LEFT, STAGE IV (HCC): ICD-10-CM

## 2022-01-11 DIAGNOSIS — G82.20 PARAPLEGIA FOLLOWING SPINAL CORD INJURY (HCC): ICD-10-CM

## 2022-01-11 DIAGNOSIS — D63.8 ANEMIA OF CHRONIC DISEASE: ICD-10-CM

## 2022-01-11 DIAGNOSIS — L89.324 DECUBITUS ULCER OF LEFT ISCHIUM, STAGE IV (HCC): ICD-10-CM

## 2022-01-11 DIAGNOSIS — M46.28 CHRONIC OSTEOMYELITIS OF COCCYX (HCC): ICD-10-CM

## 2022-01-11 DIAGNOSIS — N31.9 NEUROGENIC BLADDER: ICD-10-CM

## 2022-01-11 DIAGNOSIS — E43 UNSPECIFIED SEVERE PROTEIN-CALORIE MALNUTRITION (HCC): ICD-10-CM

## 2022-01-11 DIAGNOSIS — F33.9 DEPRESSION, RECURRENT (HCC): ICD-10-CM

## 2022-01-11 DIAGNOSIS — G82.20 PARAPLEGIA (HCC): ICD-10-CM

## 2022-01-11 PROBLEM — R73.9 HYPERGLYCEMIA: Status: RESOLVED | Noted: 2018-12-27 | Resolved: 2022-01-11

## 2022-01-11 PROBLEM — S71.002A OPEN WOUND OF LEFT HIP: Status: RESOLVED | Noted: 2020-02-08 | Resolved: 2022-01-11

## 2022-01-11 PROBLEM — D62 ACUTE BLOOD LOSS ANEMIA: Status: RESOLVED | Noted: 2019-11-11 | Resolved: 2022-01-11

## 2022-01-11 PROBLEM — T83.511A URINARY TRACT INFECTION ASSOCIATED WITH INDWELLING URETHRAL CATHETER (HCC): Status: RESOLVED | Noted: 2020-03-11 | Resolved: 2022-01-11

## 2022-01-11 PROBLEM — R63.6 UNDERWEIGHT: Chronic | Status: RESOLVED | Noted: 2018-09-19 | Resolved: 2022-01-11

## 2022-01-11 PROBLEM — R65.10 SIRS (SYSTEMIC INFLAMMATORY RESPONSE SYNDROME) (HCC): Status: RESOLVED | Noted: 2019-11-13 | Resolved: 2022-01-11

## 2022-01-11 PROBLEM — R91.1 LUNG NODULE: Status: RESOLVED | Noted: 2020-02-10 | Resolved: 2022-01-11

## 2022-01-11 PROBLEM — R79.89 ELEVATED LIVER FUNCTION TESTS: Status: RESOLVED | Noted: 2020-01-27 | Resolved: 2022-01-11

## 2022-01-11 PROBLEM — E87.1 HYPONATREMIA: Status: RESOLVED | Noted: 2020-03-11 | Resolved: 2022-01-11

## 2022-01-11 PROBLEM — E83.41 HYPERMAGNESEMIA: Status: RESOLVED | Noted: 2018-12-28 | Resolved: 2022-01-11

## 2022-01-11 PROBLEM — S73.004A CLOSED DISLOCATION OF RIGHT HIP (HCC): Status: RESOLVED | Noted: 2020-01-27 | Resolved: 2022-01-11

## 2022-01-11 PROBLEM — N39.0 URINARY TRACT INFECTION ASSOCIATED WITH INDWELLING URETHRAL CATHETER (HCC): Status: RESOLVED | Noted: 2020-03-11 | Resolved: 2022-01-11

## 2022-01-11 PROCEDURE — 1036F TOBACCO NON-USER: CPT | Performed by: FAMILY MEDICINE

## 2022-01-11 PROCEDURE — 99214 OFFICE O/P EST MOD 30 MIN: CPT | Performed by: FAMILY MEDICINE

## 2022-01-11 PROCEDURE — 1160F RVW MEDS BY RX/DR IN RCRD: CPT | Performed by: FAMILY MEDICINE

## 2022-01-11 RX ORDER — DOXYCYCLINE HYCLATE 100 MG/1
100 CAPSULE ORAL DAILY
Qty: 180 CAPSULE | Refills: 3
Start: 2022-01-11 | End: 2022-06-22 | Stop reason: SDUPTHER
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
## 2022-01-11 NOTE — PATIENT INSTRUCTIONS
Arrange for Prolia injections for her osteoporosis  Decreased doxycycline to once a day which hopefully will help her stomach  Continue pantoprazole and famotidine for stomach symptoms  Suggest using Metamucil starting with may be every other day to see if bowels can be formed but soft  Continue with Hays catheter which makes her life much more manageable  Multiple wounds are being cared for at the wound center at [facility]   Hopefully they can forward their progress notes although it is available in [facility]  By history, she had Covid vaccines and Pneumovax  Recheck in about 6 months hopefully coinciding with a Prolia injection

## 2022-02-04 ENCOUNTER — TELEPHONE (OUTPATIENT)
Dept: FAMILY MEDICINE CLINIC | Facility: CLINIC | Age: 72
End: 2022-02-04

## 2022-02-04 NOTE — TELEPHONE ENCOUNTER
Left message for patient to let her know she can schedule her Prolia injection at her convenience, as it's been approved by her insurance

## 2022-02-22 ENCOUNTER — CLINICAL SUPPORT (OUTPATIENT)
Dept: FAMILY MEDICINE CLINIC | Facility: CLINIC | Age: 72
End: 2022-02-22
Payer: COMMERCIAL

## 2022-02-22 VITALS — TEMPERATURE: 98.4 F

## 2022-02-22 DIAGNOSIS — M81.8 OTHER OSTEOPOROSIS WITHOUT CURRENT PATHOLOGICAL FRACTURE: Primary | ICD-10-CM

## 2022-02-22 PROCEDURE — 96372 THER/PROPH/DIAG INJ SC/IM: CPT | Performed by: FAMILY MEDICINE

## 2022-06-22 ENCOUNTER — TELEPHONE (OUTPATIENT)
Dept: FAMILY MEDICINE CLINIC | Facility: CLINIC | Age: 72
End: 2022-06-22

## 2022-06-22 DIAGNOSIS — M86.60 CHRONIC OSTEOMYELITIS (HCC): ICD-10-CM

## 2022-06-22 DIAGNOSIS — G82.20 PARAPLEGIA FOLLOWING SPINAL CORD INJURY (HCC): Primary | ICD-10-CM

## 2022-06-22 NOTE — TELEPHONE ENCOUNTER
Patient called and stated that she would like to know if she could have a script for PT to help her get n and out of her car  She will be going to [facility] and asked if we can fax the script to Attn:  Juan Juarez at [phone]    Please call to advise

## 2022-06-23 RX ORDER — DOXYCYCLINE HYCLATE 100 MG/1
100 CAPSULE ORAL DAILY
Qty: 90 CAPSULE | Refills: 3
Start: 2022-06-23 | End: 2025-06-23

## 2022-06-24 NOTE — TELEPHONE ENCOUNTER
Patient called back, she stated the referral for pt needs to stated that this is for home physical therapy, she is requesting it be sent to darrell june at [phone], she stated this is to help her get in and out of her car

## 2022-07-06 ENCOUNTER — TELEMEDICINE (OUTPATIENT)
Dept: FAMILY MEDICINE CLINIC | Facility: CLINIC | Age: 72
End: 2022-07-06
Payer: COMMERCIAL

## 2022-07-06 VITALS — WEIGHT: 115 LBS | BODY MASS INDEX: 22.46 KG/M2

## 2022-07-06 DIAGNOSIS — M86.60 CHRONIC OSTEOMYELITIS (HCC): ICD-10-CM

## 2022-07-06 DIAGNOSIS — F33.9 DEPRESSION, RECURRENT (HCC): ICD-10-CM

## 2022-07-06 DIAGNOSIS — L89.324 PRESSURE ULCER OF ISCHIUM, LEFT, STAGE IV (HCC): ICD-10-CM

## 2022-07-06 DIAGNOSIS — K21.9 GASTROESOPHAGEAL REFLUX DISEASE WITHOUT ESOPHAGITIS: ICD-10-CM

## 2022-07-06 DIAGNOSIS — G82.20 PARAPLEGIA FOLLOWING SPINAL CORD INJURY (HCC): Primary | ICD-10-CM

## 2022-07-06 DIAGNOSIS — E61.1 IRON DEFICIENCY: ICD-10-CM

## 2022-07-06 DIAGNOSIS — M81.8 OTHER OSTEOPOROSIS WITHOUT CURRENT PATHOLOGICAL FRACTURE: ICD-10-CM

## 2022-07-06 PROCEDURE — 99214 OFFICE O/P EST MOD 30 MIN: CPT | Performed by: FAMILY MEDICINE

## 2022-07-06 PROCEDURE — 1160F RVW MEDS BY RX/DR IN RCRD: CPT | Performed by: FAMILY MEDICINE

## 2022-07-06 NOTE — PROGRESS NOTES
Virtual Regular Visit    Verification of patient location:    Patient is located in the following state in which I hold an active license [place]      Assessment/Plan:    Problem List Items Addressed This Visit     Paraplegia following spinal cord injury ([facility] ) - Primary    Other osteoporosis without current pathological fracture    Relevant Orders    Basic metabolic panel    GERD (gastroesophageal reflux disease)    Depression, recurrent ([facility] )      Other Visit Diagnoses     Chronic osteomyelitis ([facility] )        Pressure ulcer of ischium, left, stage IV (HCC)        Iron deficiency        Relevant Orders    CBC and differential    Iron Panel (Includes Ferritin, Iron Sat%, Iron, and TIBC)        Patient Instructions   Form is completed for Donnell Harper to come out and provide physical therapy to help her transfer to and from her car  Continue at the wound center  Continue doxycycline 100 mg daily with food  For stomach, can continue famotidine and decrease pantoprazole to every other day for 2 weeks  If doing okay, then every 3rd day for 2 weeks and if okay, can stop  If reflux symptoms recur, would use the pantoprazole every day on a regular basis and try stopping famotidine  Due for a Prolia injection after August 22nd  Will check a BMP and calcium prior to the injection and also recheck a blood count and iron stores  It is recommended to use 325 mg of iron every other day  Prescriptions for blood work faxed to the [facility] where she goes for her wound care  Follow-up in 6 months  Reason for visit is   Chief Complaint   Patient presents with    Follow-up     6 mos f/u    Virtual Regular Visit        Encounter provider Crow Gillespie MD    Provider located at [address] [place] [id number]      Recent Visits  No visits were found meeting these conditions    Showing recent visits within past 7 days and meeting all other requirements  Today's Visits  Date Type Provider Dept   07/06/22 Telemedicine Hemanth Iglesias MD [facility] today's visits and meeting all other requirements  Future Appointments  No visits were found meeting these conditions  Showing future appointments within next 150 days and meeting all other requirements       The patient was identified by name and date of birth  Darrell Doan was informed that this is a telemedicine visit and that the visit is being conducted through [address] and patient was informed this is a secure, HIPAA-complaint platform  She agrees to proceed     My office door was closed  No one else was in the room  She acknowledged consent and understanding of privacy and security of the video platform  The patient has agreed to participate and understands they can discontinue the visit at any time  Patient is aware this is a billable service  Subjective  Darrell Doan is a 70 y o  female for follow-up of paraplegia, osteoporosis, decubiti, neurogenic bladder, GERD, and anemia  Continues with Hays catheter although they are back ordered  Doxycycline was decreased to once a day because of stomach upset  Stomach is drive his doing reasonably well  Better when she takes doxycycline with food  Goes monthly to the wound center at [facility]  States that her decubiti I are doing okay  Last Prolia injection was February 22nd     Due for Prolia again on August 22nd  Wound Care note is reviewed  Six pressure ulcers present graded stage III or 4 on right ankle, right foot, left buttock, right hip, sacral region, and left hip  She has requested Physical therapy's through [facility] home to assist in helping her to be able to transfer in and out of her car  Last January, blood count was 12  Iron level was low at 27         Past Medical History:   Diagnosis Date    Anemia     iron defiencey    Arthritis     osteo    Back injury     C  difficile diarrhea 4/7/2019    Closed lateral dislocation of distal end of right femur 3/10/2020    Depression     MRSA (methicillin resistant staph aureus) culture positive 12/07/2018    BONE-TISSUE     Osteopenia     Osteoporosis     Paralysis ([facility] )     paraplegic due to spinal cord injury    Paraplegia (HCC)     Poor circulation     Pressure injury of skin     right hip, stage 3    Pressure sore of hip     right    Sepsis ([facility] ) 3/28/2020    Sepsis due to anaerobes ([facility] ) 1/6/2019    Tibial plateau fracture     Underweight        Past Surgical History:   Procedure Laterality Date    CLOSURE DELAYED PRIMARY N/A 3/20/2019    Procedure: OPHELIA ANAL WOUND RECLOSURE;  Surgeon: Dane Landers MD;  Location: [address] [place] [address] OR;  Service: Plastics    DECUBITUS ULCER EXCISION Bilateral 11/12/2019    Procedure: DEBRIDEMENT DECUBITI ([address] OUT); Surgeon: Niki Arias DO;  Location:  MAIN OR;  Service: General    FLAP LOCAL EXTREMITY Left 1/28/2019    Procedure: THIGH FLAP & STSG TO CLOSE HIP WOUND;  Surgeon: Dane Landers MD;  Location: [address] [place] [address] OR;  Service: Plastics    FLAP LOCAL TRUNK Right 12/17/2018    Procedure: DEBRIDE/FLAP CLOSURE HIP PRESSURE SORE Bere Vivi GRAFT;  Surgeon: Dane Landers MD;  Location: [address] [place] [address] OR;  Service: Plastics    FLAP LOCAL TRUNK Left 2/27/2019    Procedure: BUTTOCK FLAP TO ISCHIAL SORE;  Surgeon: Dane Landers MD;  Location: [address] [place] [address] OR;  Service: Plastics    HIP DEBRIDEMENT Right 2017    right hip sore debridement    INCISION AND DRAINAGE OF WOUND Left 1/3/2019    Procedure: INCISION AND DRAINAGE (I&D) left distal femur  With deep wound cultures   Application of VAC DRAIN SPONGE;  Surgeon: Glenys Rdz MD;  Location:  MAIN OR;  Service: Orthopedics    IR PICC LINE  12/19/2018    IR PICC LINE  3/12/2019    WI DEBRIDEMENT, SKIN, SUB-Q TISSUE,MUSCLE,BONE,=<20 SQ CM Right 9/19/2018    Procedure: DEBRIDEMENT OF HIP PRESSURE SORE;  Surgeon: Eric Levi Michelle Bal MD;  Location: [address] [place] [address] MAIN OR;  Service: Plastics    MS OPEN RX FEMUR FX+INTRAMED FELIX Left 10/22/2018    Procedure: INSERTION NAIL IM LEFT FEMUR RETROGRADE;  Surgeon: Rafal Brush MD;  Location: [facility] MAIN OR;  Service: Orthopedics    TOE AMPUTATION Left 2017    small toe left foot amputation    WISDOM TOOTH EXTRACTION      WOUND DEBRIDEMENT Left 12/17/2018    Procedure: DEBRIDEMENT HIP PRESSURE SORE;  Surgeon: Lidia Rendon MD;  Location: [address] [place] [address] MAIN OR;  Service: Plastics    WOUND DEBRIDEMENT N/A 11/10/2019    Procedure: EXCISIONAL DEBRIDEMENT;  Surgeon: Tristan Kenny MD;  Location: [facility] MAIN OR;  Service: General       Current Outpatient Medications   Medication Sig Dispense Refill    doxycycline hyclate (VIBRAMYCIN) 100 mg capsule Take 1 capsule (100 mg total) by mouth in the morning 90 capsule 3    famotidine (PEPCID) 40 MG tablet Take 1 tablet (40 mg total) by mouth daily 90 tablet 3    glycerin-hypromellose- (ARTIFICIAL TEARS) 0 2-0 2-1 % SOLN Administer 1 drop to both eyes daily at bedtime 15 mL 0    multivitamin (THERAGRAN) TABS Take 1 tablet by mouth daily      pantoprazole (PROTONIX) 40 mg tablet Take 1 tablet (40 mg total) by mouth daily 90 tablet 3    Probiotic Product ([employer] Digestive Probiotic) CAPS        No current facility-administered medications for this visit  Allergies   Allergen Reactions    Iv Contrast [Iodinated Diagnostic Agents]      Tingling face, itching    Cefepime Rash    Ciprofloxacin Rash and Swelling     Looked like suburn, itching  Bed sores  Swelling, itching    Vancomycin Rash     Unknown        Review of Systems    Video Exam    Vitals:    07/06/22 1204   Weight: 52 2 kg (115 lb)       Physical Exam   Appears comfortable  I spent 18 minutes directly with the patient during this visit    VIRTUAL VISIT DISCLAIMER      Nimco Sinha verbally agrees to participate in [employer]   Pt is aware that Virtual Care Services could be limited without vital signs or the ability to perform a full hands-on physical exam  Lizbeth Espinal understands she or the provider may request at any time to terminate the video visit and request the patient to seek care or treatment in person

## 2022-07-06 NOTE — PATIENT INSTRUCTIONS
Form is completed for [facility] to come out and provide physical therapy to help her transfer to and from her car  Continue at the wound center  Continue doxycycline 100 mg daily with food  For stomach, can continue famotidine and decrease pantoprazole to every other day for 2 weeks  If doing okay, then every 3rd day for 2 weeks and if okay, can stop  If reflux symptoms recur, would use the pantoprazole every day on a regular basis and try stopping famotidine  Due for a Prolia injection after August 22nd  Will check a BMP and calcium prior to the injection and also recheck a blood count and iron stores  It is recommended to use 325 mg of iron every other day  Prescriptions for blood work faxed to the [facility] where she goes for her wound care  Follow-up in 6 months

## 2022-07-07 ENCOUNTER — TELEPHONE (OUTPATIENT)
Dept: FAMILY MEDICINE CLINIC | Facility: CLINIC | Age: 72
End: 2022-07-07

## 2022-07-11 DIAGNOSIS — G82.20 PARAPLEGIA (HCC): Primary | ICD-10-CM

## 2022-08-05 ENCOUNTER — TELEPHONE (OUTPATIENT)
Dept: FAMILY MEDICINE CLINIC | Facility: CLINIC | Age: 72
End: 2022-08-05

## 2022-08-05 NOTE — TELEPHONE ENCOUNTER
She can have her Prolia injection any time after August 22nd  Check with Namrata [facility] to see if she needs a new prescription or if we have it available

## 2022-08-25 ENCOUNTER — TELEPHONE (OUTPATIENT)
Dept: FAMILY MEDICINE CLINIC | Facility: CLINIC | Age: 72
End: 2022-08-25

## 2022-08-25 DIAGNOSIS — G82.20 PARAPLEGIA FOLLOWING SPINAL CORD INJURY (HCC): Primary | ICD-10-CM

## 2022-08-25 NOTE — TELEPHONE ENCOUNTER
Paradise Alfonso from [facility] called and stated that she would like a script for Occupational therapy for patient  Please fax to [phone]    If there are any questions or concerns please call

## 2022-08-31 NOTE — TELEPHONE ENCOUNTER
Donnell Noyola just called and now they stated that the script needs to be in Home OT for the patient  Please add in new order and fax to number listed below

## 2022-09-12 ENCOUNTER — TELEPHONE (OUTPATIENT)
Dept: FAMILY MEDICINE CLINIC | Facility: CLINIC | Age: 72
End: 2022-09-12

## 2022-09-12 DIAGNOSIS — E43 SEVERE PROTEIN-CALORIE MALNUTRITION (HCC): ICD-10-CM

## 2022-09-12 DIAGNOSIS — G82.20 PARAPLEGIA FOLLOWING SPINAL CORD INJURY (HCC): Primary | ICD-10-CM

## 2022-09-12 DIAGNOSIS — M46.28 CHRONIC OSTEOMYELITIS OF COCCYX (HCC): ICD-10-CM

## 2022-09-23 ENCOUNTER — TELEPHONE (OUTPATIENT)
Dept: FAMILY MEDICINE CLINIC | Facility: CLINIC | Age: 72
End: 2022-09-23

## 2022-09-23 NOTE — TELEPHONE ENCOUNTER
Liya Macedo from [facility] called to let us know that she has contacted Office of Aging regarding the living conditions of [facility] due to hoarding, cleanliness, and general wellbeing concerns of patients living environment

## 2022-10-06 PROBLEM — R62.7 FAILURE TO THRIVE IN ADULT: Status: ACTIVE | Noted: 2022-10-06

## 2022-10-06 PROBLEM — R45.851 SUICIDAL IDEATION: Status: ACTIVE | Noted: 2022-10-06

## 2022-10-12 PROBLEM — L98.9 SKIN LESION OF BACK: Status: ACTIVE | Noted: 2022-10-12

## 2022-10-12 PROBLEM — H61.22 IMPACTED CERUMEN OF LEFT EAR: Status: ACTIVE | Noted: 2022-10-12

## 2022-10-16 ENCOUNTER — TELEPHONE (OUTPATIENT)
Dept: UROLOGY | Facility: AMBULATORY SURGERY CENTER | Age: 72
End: 2022-10-16

## 2022-10-16 NOTE — TELEPHONE ENCOUNTER
Please arrange for Hays catheter exchange in the office in 4 weeks    Also needs a visit to discuss cystoscopy with botulinum toxin injection and suprapubic catheter with a MD in the [facility] office

## 2022-10-17 NOTE — TELEPHONE ENCOUNTER
Patient remains inpatient  Holding 11/14 for AP visit and NV after if patient wishes to transfer care  Patient has a chronic isabel and VNA through [facility]  May want to just keep care with them  Will need to confirm at discharge

## 2022-10-20 NOTE — TELEPHONE ENCOUNTER
Patient remains inpatient  Holding 11/14 for AP visit and NV after if patient wishes to transfer care  Patient has a chronic isabel and VNA through [facility]  May want to just keep care with them  Will need to confirm at discharge

## 2022-11-10 PROBLEM — R19.7 DIARRHEA: Status: ACTIVE | Noted: 2022-11-10

## 2022-11-14 PROBLEM — L89.899: Status: ACTIVE | Noted: 2022-11-14

## 2022-11-14 PROBLEM — Z59.9 INABILITY TO RETURN TO LIVING SITUATION: Status: ACTIVE | Noted: 2022-10-06

## 2022-11-14 PROBLEM — Z91.199 NONCOMPLIANCE: Status: ACTIVE | Noted: 2022-11-14

## 2022-12-20 PROBLEM — H61.22 IMPACTED CERUMEN OF LEFT EAR: Status: RESOLVED | Noted: 2022-10-12 | Resolved: 2022-12-20

## 2022-12-25 PROBLEM — K59.00 CONSTIPATION: Status: ACTIVE | Noted: 2022-12-25

## 2022-12-30 NOTE — ASSESSMENT & PLAN NOTE
History of paraplegia with neurogenic bladder  Chronic Hays catheter in place 61 y/o female presents to the ED s/p fall down 8 steps today. Pt was carrying suitcases when she tripped and fell, tumbling down steps and landing on her back and left side. No LOC. Pt currently c/o left knee pain and left foot/ankle pain. No pain to neck or back. Pt also has skin avulsion to left third finger. No other injuries.

## 2023-01-05 PROBLEM — H92.02 LEFT EAR PAIN: Status: ACTIVE | Noted: 2023-01-05

## 2023-01-16 PROBLEM — D04.71 SQUAMOUS CELL CARCINOMA IN SITU (SCCIS) OF SKIN OF RIGHT LOWER LEG: Status: ACTIVE | Noted: 2023-01-16

## 2023-01-17 PROBLEM — E87.6 HYPOKALEMIA: Status: RESOLVED | Noted: 2018-09-19 | Resolved: 2023-01-17

## 2023-02-18 NOTE — ASSESSMENT & PLAN NOTE
· Could be secondary to wound infection or contaminant  · Infectious Disease following and cleared 2/12 for discharge good balance

## 2023-04-20 ENCOUNTER — TRANSITIONAL CARE MANAGEMENT (OUTPATIENT)
Dept: FAMILY MEDICINE CLINIC | Facility: CLINIC | Age: 73
End: 2023-04-20

## 2023-04-24 ENCOUNTER — PATIENT OUTREACH (OUTPATIENT)
Dept: FAMILY MEDICINE CLINIC | Facility: CLINIC | Age: 73
End: 2023-04-24

## 2023-04-27 ENCOUNTER — PATIENT OUTREACH (OUTPATIENT)
Dept: FAMILY MEDICINE CLINIC | Facility: CLINIC | Age: 73
End: 2023-04-27

## 2023-04-27 NOTE — PROGRESS NOTES
2nd outreach attempt to patient to check status and assess needs since hospital discharge on 4/19  No answer, voicemail left, and awaiting return call  Will mail Unable to Reach letter to the [facility], as patient is staying there  Discussed case with OP RNCM Tika Stovall  Will attempt additional outreach in roughly 1 week

## 2023-04-27 NOTE — LETTER
[address] [address]    Re: Unable to Reach   4/27/2023       Dear Uche Delgado am carl [address] [place] [address] [facility] Work  and wanted to be certain you had information to contact me should you desire assistance with or have questions about non-medical aspects of your care such as [but not limited to] medical insurance, housing, transportation, material needs, or emergency needs, as I have been unable to reach you  If I do not have an answer I will assist you in finding the appropriate agency or individual who can help  Please feel free to contact me at [phone]  Thank You      Sincerely,         Kiran Rawls

## 2023-04-28 ENCOUNTER — PATIENT OUTREACH (OUTPATIENT)
Dept: FAMILY MEDICINE CLINIC | Facility: CLINIC | Age: 73
End: 2023-04-28

## 2023-05-02 DIAGNOSIS — N31.9 NEUROGENIC BLADDER: ICD-10-CM

## 2023-05-04 ENCOUNTER — PATIENT OUTREACH (OUTPATIENT)
Dept: FAMILY MEDICINE CLINIC | Facility: CLINIC | Age: 73
End: 2023-05-04

## 2023-05-04 NOTE — PROGRESS NOTES
No return call received since mailing Unable to Reach letter to patient at the [facility]  Per chart review, patient was admitted to [facility] on 4/30 3rd outreach attempt to patient to check status and assess needs  Spoke with patient who confirmed she is at [address] [place] [address] [address]  States her friend helping her at the hotel but was unable to continue managing her care  Patient states hotel room was not accessible and wheelchair did not fit her properly  Patient feels she is able to manage her own needs and does not feel nursing home placement is necessary  Per chart review, patient's sister informed Inpatient  that she was touring four ALFs on patient's behalf  Encouraged patient to continue working with Inpatient Case Management Team   Patient agreed for OP SWCM to outreach again in roughly 1 week to check status  Will follow  Routed to [place] as well

## 2023-05-11 ENCOUNTER — PATIENT OUTREACH (OUTPATIENT)
Dept: FAMILY MEDICINE CLINIC | Facility: CLINIC | Age: 73
End: 2023-05-11

## 2023-05-11 NOTE — PROGRESS NOTES
Chart reviewed  Patient remains admitted to [facility]   Inpatient  continues to work on discharge plan to short-term rehab facility and has attempted to reach patient's sister to discuss long-term placement at [address]  Discussed case with OP RNDEL Castillo  Will follow

## 2023-05-25 ENCOUNTER — PATIENT OUTREACH (OUTPATIENT)
Dept: FAMILY MEDICINE CLINIC | Facility: CLINIC | Age: 73
End: 2023-05-25

## 2023-05-26 ENCOUNTER — PATIENT OUTREACH (OUTPATIENT)
Dept: FAMILY MEDICINE CLINIC | Facility: CLINIC | Age: 73
End: 2023-05-26

## 2023-05-26 NOTE — PROGRESS NOTES
Call received from patient requesting return call from OP [facility]  Return call placed to patient  Patient asked if OP SWCM received update from [facility] regarding discharge plan and possible guardianship  No update received at this time  Informed patient of the same  Encouraged patient to request update from [facility] team when next medical team member speaks with her  Will follow

## 2023-06-02 ENCOUNTER — PATIENT OUTREACH (OUTPATIENT)
Dept: FAMILY MEDICINE CLINIC | Facility: CLINIC | Age: 73
End: 2023-06-02

## 2023-06-02 NOTE — PROGRESS NOTES
Call placed to [facility] [phone] AX speak with the  Ben Ch and discuss discharge plan status  Spoke with Ben Ch who stated Complex Care Manager Robert Gooden [phone] is working on patient's case  Ben Ch stated psychiatry evaluated patient and determined that she lacks capacity at this time to make decisions  The hospital is working toward guardianship; no update on this per Ben Ch  SNFs are following for discharge pending guardianship  Will reach out to Robert Gooden in roughly 2 weeks to check status  Update provided to patient  Encouraged her to relay any questions to [facility]   Will follow

## 2023-06-15 ENCOUNTER — PATIENT OUTREACH (OUTPATIENT)
Dept: FAMILY MEDICINE CLINIC | Facility: CLINIC | Age: 73
End: 2023-06-15

## 2023-06-15 NOTE — PROGRESS NOTES
Return call received from [facility] confirming patient remains at [facility] at this time  They are filing for guardianship; this is in process  SNFs are following for long-term placement pending guardianship  Will follow-up in 3-4 weeks to check status

## 2023-06-15 NOTE — PROGRESS NOTES
Call placed to [address] [place] [address] Claudine [phone] to check status of patient's hospitalization and discharge plan  No answer, voicemail left, and awaiting return call

## 2023-07-04 RX ORDER — OXYBUTYNIN CHLORIDE 10 MG/1
TABLET, EXTENDED RELEASE ORAL
Qty: 90 TABLET | Refills: 1 | OUTPATIENT
Start: 2023-07-04

## 2023-07-12 ENCOUNTER — PATIENT OUTREACH (OUTPATIENT)
Dept: FAMILY MEDICINE CLINIC | Facility: CLINIC | Age: 73
End: 2023-07-12

## 2023-07-12 NOTE — PROGRESS NOTES
Call placed to [address] [phone] to check status of patient's hospitalization/SNF admission. HERON was working on guardianship and long-term placement. Voicemail left for [address] Renae requesting update; awaiting return call.

## 2023-07-18 ENCOUNTER — PATIENT OUTREACH (OUTPATIENT)
Dept: FAMILY MEDICINE CLINIC | Facility: CLINIC | Age: 73
End: 2023-07-18

## 2023-07-18 NOTE — PROGRESS NOTES
Additional call placed to [address] [phone] to check status of patient's hospitalization/SNF admission. SUDHA was working on guardianship and long-term placement. Spoke with Ada Joiner who states guardianship will take place on 7/31 as patient has her own [place]. OP SWCM will follow-up after hearing to check status.

## 2023-08-03 ENCOUNTER — PATIENT OUTREACH (OUTPATIENT)
Dept: FAMILY MEDICINE CLINIC | Facility: CLINIC | Age: 73
End: 2023-08-03

## 2023-08-03 NOTE — PROGRESS NOTES
Message received from [address] [place] [address] stating patient discharged to [facility] yesterday 8/2. Court hearing decision is that patient's sister will now be her POA. Permanent guardianship hearing will take place on September 1st.  Oxana Irvin will attend this hearing. Attempted to reach  through [facility] to discuss patient/patient's sister's place for long-term placement vs returning to the community. Voicemail left and awaiting return call.

## 2023-08-03 NOTE — PROGRESS NOTES
Voicemail left for Gee Mems [address], [phone] to check status of patient's guardianship hearing that occurred on 7/31. Awaiting return call.

## 2023-08-04 ENCOUNTER — VBI (OUTPATIENT)
Dept: FAMILY MEDICINE CLINIC | Facility: CLINIC | Age: 73
End: 2023-08-04

## 2023-08-04 NOTE — LETTER
8/4/2023     [address] [place] [address] [address] [address]       Dear Veronica Lama,    [facility] records indicate that your last Annual Wellness Visit was on 04/26/2021. To achieve a high level of care, we would like to take this time to remind you to schedule your annual visit. Please contact our office at your earliest convenience to schedule an appointment with your primary care provider. Thank you for allowing us to participate in your healthcare needs.        Sincerely,      [address] [facility],[address]

## 2023-08-10 ENCOUNTER — PATIENT OUTREACH (OUTPATIENT)
Dept: FAMILY MEDICINE CLINIC | Facility: CLINIC | Age: 73
End: 2023-08-10

## 2023-08-10 NOTE — PROGRESS NOTES
Additional call placed to [address] [place] [address]  [phone] to check status of patient's stay and plan for possible long-term placement. No answer, voicemail left, and awaiting return call.

## 2023-08-10 NOTE — PROGRESS NOTES
Sister meeting  Return call received from Yves with [facility]. 8/21 therapy.   Competent  9/1 another hearing

## 2023-09-05 ENCOUNTER — PATIENT OUTREACH (OUTPATIENT)
Dept: FAMILY MEDICINE CLINIC | Facility: CLINIC | Age: 73
End: 2023-09-05

## 2023-09-05 NOTE — PROGRESS NOTES
Call placed to [facility] at [address] [phone] to check status of hearing that was scheduled for patient on 9/1. Monico Kebede states hearing was cancelled but is unsure of reason. She plans to discuss further with patient and patient's sister. Unknown if patient will be at SNF long-term or if she and her sister have discussed alternate discharge plan. Requested Monico Kebede outreach OP SW once long-term plan is known. Will close case at this time.

## 2023-09-09 NOTE — PLAN OF CARE
ED Attending Physician Note      Patient : Tre Antunez Age: 83 year old Sex: male   MRN: [id number] Encounter Date: 9/9/2023  Time Seen: 6:47 AM    Chief Complaint   Patient presents with   • Shortness of Breath        History of Present Illness   83-year-old male with a past medical history of atrial fibrillation, hypertension, and metastatic prostate cancer presenting with shortness of breath.  History is limited from patient.  Per daughter and wife at bedside, patient is meeting with hospice on Monday, he is likely going to work on hospice.  This evening, patient looked like he was struggling to breathe, they debated calling EMS, but they were worried he was uncomfortable, so they called EMS.      PMD: Wili Weber, DO    Medical History     Allergies   Allergen Reactions   • Atorvastatin Other (See Comments)     Oral, leg ache and rubbery feeling   • Pravastatin Sodium Other (See Comments)     Oral, muscle aches       Prior to Admission medications    Medication Sig Start Date End Date Taking? Authorizing Provider   amLODIPine (NORVASC) 5 MG tablet Take 5 mg by mouth daily.    Provider, Outside   Enasidenib Mesylate (IDHIFA) 100 MG Tab Take 1 tablet by mouth daily.    Provider, Outside   prochlorperazine (COMPAZINE) 10 MG tablet Take 10 mg by mouth. 8/4/23   Provider, Outside   ondansetron (ZOFRAN) 8 MG tablet Take 8 mg by mouth every 8 hours as needed for Nausea. 8/4/23   Provider, Outside   predniSONE (DELTASONE) 5 MG tablet Take 5 mg by mouth 2 times daily.    Provider, Outside   lisinopril (ZESTRIL) 10 MG tablet TAKE 1 TABLET BY MOUTH  DAILY 5/22/23   Hadley Carias MD   AMIODarone (PACERONE) 200 MG tablet TAKE ONE-HALF TABLET BY MOUTH  DAILY  Patient taking differently: Take 100 mg by mouth daily. 5/11/23   Hadley Cairas MD   ezetimibe (ZETIA) 10 MG tablet TAKE 1 TABLET BY MOUTH DAILY 5/11/23   Hadley Carias MD   levothyroxine 50 MCG tablet Take 50 mcg by mouth daily. 1/5/21 8/11/23   Problem: OCCUPATIONAL THERAPY ADULT  Goal: Performs self-care activities at highest level of function for planned discharge setting  See evaluation for individualized goals  Description  Treatment Interventions: ADL retraining, Functional transfer training, UE strengthening/ROM, Endurance training, Cognitive reorientation, Patient/family training, Compensatory technique education, Equipment evaluation/education, Energy conservation, Activityengagement          See flowsheet documentation for full assessment, interventions and recommendations  Note:   Limitation: Decreased ADL status, Decreased Safe judgement during ADL, Decreased endurance, Decreased self-care trans, Decreased high-level ADLs  Prognosis: Good, Fair  Assessment: 70 YO Female SEEN FOR INITIAL OCCUPATIONAL THERAPY EVALUATION FOLLOWING ADMISSION TO [facility] WITH LOW BP AND FEVER  DX INCLUDES SEPSIS  EXTENSIVE PROBLEMS LIST INCLUDES H/O SCI WITH PARAPLEGIA, NEUROGENIC BLADDER, CHRONIC INDWELLING AYALA CATHETER, STAGE IV PRESSURE ULCER OF R BUTTOCKS, ANEMIA, HYPONATREMIA, CKA, DEPRESSION, ARTHRITIS, AND OSTEOPOROSIS  PT ALSO WITH R HIP OPEN WOUND, WITH UNDERLYING OSTEOMYELITIS AND DISLOCATION OF R FEMUR- PER ORTHO, NO PLAN FOR GIRDLE STONE PROCEDURE OR FLAP TO COVER AT THIS TIME, NO RESTRICTIONS  PER JILLIAN WHITE FROM GENERAL SX, REQUEST REPOSITION WITH NO THERAPY RESRTICTIONS AND MICHELLE BAIELY FROM WOUND CARE, "LIMIT OOB TO 1-2 HOURS AT A TIME, 2-3 TIMES/DAY, WITH AIR CUSHION"  PT IS FROM AN APT ALONE WHERE SHE REPORTS BEING INDEPENDENT/MOD I WITH ADLS/IADLS/DRIVING AT BASELINE HOWEVER PT REPORTS BEING ADMITTED FROM LTAC/SNF FOR [facility], STATING SHE HAS NOT BEEN HOME SINCE November  PT REPORTS REQUIRING ADDITIONAL ASSIST AT [facility] FOR ADLS/IADLS AND COMPLETING LIMITED OOB TRANSFERS WITH MARIA ISABEL PENG WITH NON-THERAPY STAFF  PT CURRENTLY REQUIRES OVERALL MOD A WITH ADLS AND MIN A X2 WITH BEASY BOARD TXF FROM BED->DROP ARM CHAIR   PT AND RN EDUCATION ON OOB FOR MAX 1 HOUR AT THIS TIME, AGREEABLE  PT IS LIMITED 2' PAIN, FATIGUE, IMPAIRED BALANCE, FALL RISK , OVERALL WEAKNESS/DECONDITIONING , BASELINE PARAPLEGIA, DIZZINESS WITH CHANGE OF POSITIONING, INCONTINENCE,  LIMITED FAMILY/FRIEND SUPPORT  and OVERALL LIMITED ACTIVITY TOLERANCE  PT EDUCATED ON DEEP BREATHING TECHNIQUES T/O ACTIVITY, SLOWING OF PACE, FREQUENT REPOSITIONING and CONTINUE PARTICIPATION IN SELF-CARE/MOBILITY WITH STAFF WHILE IN THE HOSPITAL   FROM AN OCCUPATIONAL THERAPY PERSPECTIVE, PT WOULD BENEFIT FROM ADDITIONAL OT SERVICES IN AN INPT REHAB SETTING UPON D/C  WILL CONT TO FOLLOW TO ADDRESS THE BELOW DESCRIBED GOALS  OT Discharge Recommendation: Short Term Rehab  OT - OK to Discharge:  Yes Provider, Outside       Past Medical History:   Diagnosis Date   • Abdominal aortic aneurysm (AAA) (CMD)    • Atrial flutter (CMD)    • Dyslipidemia    • H/O prostate cancer    • H/O: stroke    • HTN (hypertension)    • Malignant neoplasm (CMD)    • Right inguinal hernia 2018       Past Surgical History:   Procedure Laterality Date   • CASE REQUEST CLINIC TO CATH/VASC     • EGD  2020   • INGUINAL HERNIA REPAIR Right 07/10/2019    Open right inguinal hernia repair with mesh -- Dr. Jose Saxena   • LAPAROSCOPY, APPENDECTOMY N/A 2018    Laparoscopic appendectomy with partial cecectomy -- Dr. Juve Howell   • PROSTATECTOMY     • RADIATION THERAPY     • STENT IMPLANT      stent graft placement 05/19/15.   • STENT IMPLANT      Abd. stent graft Medtronic Endurant 5/19/15,       Family History   Problem Relation Age of Onset   • Patient is unaware of any medical problems Mother    • Myocardial Infarction Father    • Myocardial Infarction Brother    • Coronary Artery Disease Neg Hx          Negative for premature CAD.   • Aneurysm Neg Hx          Negative for AAA.       Social History     Tobacco Use   • Smoking status: Former     Current packs/day: 0.00     Types: Cigarettes     Quit date: 2022     Years since quittin.7   • Smokeless tobacco: Never   • Tobacco comments:     smoked this last month   Vaping Use   • Vaping Use: never used   Substance Use Topics   • Alcohol use: No   • Drug use: No         Review of Systems     Review of Systems   Unable to perform ROS: Mental status change       Physical Exam     Vitals:    23 0308 23 0329 23 0501 23 0513   BP: 109/55  90/59 107/53   Pulse: 92 89 82 82   Resp: (!) 31 (!) 31 (!) 26 (!) 28   Temp: 99.7 °F (37.6 °C)      TempSrc: Axillary      SpO2: 95% 94% 92% 94%    23 0536   BP: 111/52   Pulse: 79   Resp: 20   Temp: 97.7 °F (36.5 °C)   TempSrc: Oral   SpO2: 94%        Physical Exam  Constitutional:       Appearance: He  is cachectic. He is ill-appearing.   HENT:      Head: Atraumatic.      Mouth/Throat:      Mouth: Mucous membranes are dry.      Comments: Blood noted in the mouth.     Neck: Normal range of motion. No rigidity.   Eyes:      Comments: Scleral icterus.   Cardiovascular:      Rate and Rhythm: Normal rate and regular rhythm.      Heart sounds: No murmur heard.  Pulmonary:      Breath sounds: No wheezing.      Comments: Slight tachypnea.  Crackles on the right.  Abdominal:      General: There is no distension.      Palpations: Abdomen is soft.      Tenderness: There is no abdominal tenderness.   Musculoskeletal:         General: No deformity or signs of injury.   Skin:     General: Skin is warm and dry.      Comments: Jaundice with petechiae across the chest and purpura of the bilateral shins.   Neurological:      Mental Status: He is alert. Mental status is at baseline.      Comments: Moving all extremities.   Psychiatric:         Behavior: Behavior normal.         Diagnostic Studies / Procedures   Procedures    LABORATORY STUDIES:  Results for orders placed or performed during the hospital encounter of 09/09/23   Comprehensive Metabolic Panel   Result Value Ref Range    Fasting Status      Sodium 141 135 - 145 mmol/L    Potassium 4.9 3.4 - 5.1 mmol/L    Chloride 109 97 - 110 mmol/L    Carbon Dioxide 23 21 - 32 mmol/L    Anion Gap 14 7 - 19 mmol/L    Glucose 112 (H) 70 - 99 mg/dL    BUN 42 (H) 6 - 20 mg/dL    Creatinine 1.51 (H) 0.67 - 1.17 mg/dL    Glomerular Filtration Rate 46 (L) >=60    BUN/Cr 28 (H) 7 - 25    Calcium 8.1 (L) 8.4 - 10.2 mg/dL    Bilirubin, Total 5.6 (H) 0.2 - 1.0 mg/dL    GOT/ (H) <=37 Units/L    GPT/ALT 55 <64 Units/L    Alkaline Phosphatase 491 (H) 45 - 117 Units/L    Albumin 2.2 (L) 3.6 - 5.1 g/dL    Protein, Total 5.6 (L) 6.4 - 8.2 g/dL    Globulin 3.4 2.0 - 4.0 g/dL    A/G Ratio 0.6 (L) 1.0 - 2.4   TROPONIN I, HIGH SENSITIVITY   Result Value Ref Range    Troponin I, High Sensitivity 80  (HH) <77 ng/L   NT proBNP   Result Value Ref Range    NT-proBNP 5,299 (H) <=450 pg/mL   CBC with Automated Differential (performable only)   Result Value Ref Range    WBC 2.7 (L) 4.2 - 11.0 K/mcL    RBC 2.58 (L) 4.50 - 5.90 mil/mcL    HGB 8.1 (L) 13.0 - 17.0 g/dL    HCT 26.8 (L) 39.0 - 51.0 %    .9 (H) 78.0 - 100.0 fl    MCH 31.4 26.0 - 34.0 pg    MCHC 30.2 (L) 32.0 - 36.5 g/dL    RDW-CV 20.1 (H) 11.0 - 15.0 %    RDW-SD 73.7 (H) 39.0 - 50.0 fL    PLT 20 (LL) 140 - 450 K/mcL    NRBC 10 (H) <=0 /100 WBC   Manual Differential   Result Value Ref Range    Neutrophil, Percent 57 %    Lymphocytes, Percent 29 %    Mono, Percent 8 %    Bands, Percent 6 0 - 10 %    Absolute Neutrophil 1.7 (L) 1.8 - 7.7 K/mcL    Absolute Lymphocytes 0.8 (L) 1.0 - 4.0 K/mcL    Absolute Monocytes 0.2 (L) 0.3 - 0.9 K/mcL    WBC Morphology Normal Normal    Acanthocytes Few     Keila Cells Few     Ovalocytes Few     Platelet Morphology Normal Normal    Polychromasia Few     Tear Drop Cells Few        IMAGING STUDIES  XR CHEST PA OR AP 1 VIEW    (Results Pending)       ED Course / Medical Decision Making     Independent historian: Daughter and wife  External data reviewed: labs, radiology, EKGs, and prior notes when available. Triage notes and available nursing notes reviewed.  Previous medical record reviewed when available.    Labs ordered. Details documented in ED Course   Radiology studies ordered. Details documented in ED Course   EKG ordered. Independent interpretation below. Details documented in ED Course     Risks discussed:   OTC Drugs, Prescription drug management, Parenteral controlled substances  Decision regarding hospitalization, Decision not to resuscitate or to de-escalate care because of poor prognosis    Pulse Ox Interpretation:  Saturation: 94  Oxygen Delivery: Room air  Interpretation:  No hypoxia at this time.     Rhythm strip interpretation:  Rhythm sinus Rate 79 No arrhythmia noted.      EC23 at 0254  Rhythm:  sinus Rate 92  Axis: left axis deviation.  NM and QRS intervals: normal  ST segments: No ST elevation   No acute change noted from previous ECG 2/14/23     MDM/PLAN  83-year-old male with metastatic prostate cancer presenting with shortness of breath.  On exam, patient is frail, jaundiced, slight tachypnea, crackles in the right lung.  Family at bedside, explaining patient will likely go on hospice.  Ddx: Pneumonia, worsening metastatic disease, PE, ACS, CHF, viral illness including COVID.  Prior to family arrival, patient did have labs and chest x-ray done.    I had lengthy discussion with wife and daughter at bedside.  They would like patient to be placed on comfort measures, made DNR/DNI, and placed on hospice.  They do not want antibiotics, blood transfusions, or any treatment.     Patient with significant lab abnormalities including low WBCs, anemia to 8.1, thrombocytopenia, elevated troponin, elevated bilirubin, elevated creatinine, elevated BNP.  Chest x-ray shows peripheral opacities in the right mid and lower lung zones, possibly pneumonia, other process including pulmonary infarct could have similar appearance.    Again, family would like patient placed on hospice.  Plan for admission for hospice.      ED Course as of 09/09/23 0707   Sat Sep 09, 2023   0440 Discussed patient with Dr. Bess, agrees with admission for hospice.  [MH]      ED Course User Index  [MH] Jen Sage MD       Medications   AMIODarone (PACERONE) tablet 100 mg (has no administration in time range)   levothyroxine (SYNTHROID, LEVOTHROID) tablet 50 mcg (50 mcg Oral Not Given 9/9/23 0616)   predniSONE (DELTASONE) tablet 5 mg (has no administration in time range)   sodium chloride 0.9 % flush bag 25 mL (has no administration in time range)   sodium chloride (PF) 0.9 % injection 2 mL (has no administration in time range)   heparin (porcine) injection 5,000 Units (5,000 Units Subcutaneous Not Given 9/9/23 0615)   sodium chloride  0.9% infusion ( Intravenous New Bag 9/9/23 0541)   morphine injection 1 mg (has no administration in time range)   LORazepam (ATIVAN) injection 1 mg (1 mg Intravenous Given 9/9/23 5273)         Clinical Impression     ED Diagnosis   1. Dyspnea, unspecified type        2. Comfort measures only status          Acute, Complicated, systemic symptoms, threat to life or bodily functions     Disposition        Admit 9/9/2023  4:41 AM  Telemetry Bed?: No  Admitting Physician: DOUG LINO [[id number]]  Is this a telephone or verbal order?: This is a telephone order from the admitting physician    Astrid Sage MD  [facility]  9/9/2023 7:07 AM    The 21st Century Cures Act makes medical notes like these available to patients in the interest of transparency. Please be advised that this is a medical document. Medical documents are intended to carry relevant information and the clinical opinion of the practitioner. The medical note is intended as medical provider to provider communication, and may appear blunt or direct. It is written in medical language, and may contain abbreviations or verbiage that are unfamiliar.              Jen Sage MD  09/09/23 0791

## 2024-01-16 ENCOUNTER — TELEPHONE (OUTPATIENT)
Dept: FAMILY MEDICINE CLINIC | Facility: CLINIC | Age: 74
End: 2024-01-16

## 2024-01-16 NOTE — TELEPHONE ENCOUNTER
Lizbeth Ribeiro is no longer being seen by Florin Rockwell MD.  The patient has established care with an [facility] PCP. She resides permanently at [facility].  Please update the PCP field and remove from our patient panel.

## 2024-01-24 NOTE — TELEPHONE ENCOUNTER
01/24/24 2:07 PM        The office's request has been received, reviewed, and the patient chart updated. The PCP has successfully been removed with a patient attribution note. This message will now be completed.        Thank you  Emiliano Teixeira

## 2024-06-25 NOTE — PROGRESS NOTES
Chart reviewed and case discussed with OP Freddie Delgadillo  Call placed to patient who states she remains admitted to [facility] at this time  States she does not know the current status of short-term rehab/correction search but shared the hospital wants to file for guardianship of the patient as she is not able to make decisions  Patient states capacity evaluation was done and she met with psychiatrist   Patient agreed for OP SWCM to outreach IPCM to check status of discharge plan and requested OP SWCM call her back to provide update once known  Voicemail left for [facility] requesting return call from Adis Hu to discuss patient's case  Email also sent to [facility] requesting return call  Awaiting response  Stop Twinkies.    Stop pioglitazone 1 week prior to ablation.    Tresiba 55 units nightly - use 20 units the night prior to ablation.    Rybelsus after ablation  First month: Take 3mg daily in morning with 4 ounces of water, wait 30 minutes before any other medication/food.    Second month: If not side effect first month, increase dose to 7mg daily in morning with 4 ounces of water, wait 30 minutes before any other medication/food.    Third month: If not side effect first 2 months, increase dose to 14mg daily in morning with 4 ounces of water, wait 30 minutes before any other medication/food.

## 2024-12-02 ENCOUNTER — IN HOME VISIT (OUTPATIENT)
Dept: WOUND CARE | Facility: HOSPITAL | Age: 74
End: 2024-12-02
Payer: COMMERCIAL

## 2024-12-02 DIAGNOSIS — G82.20 PARAPLEGIA FOLLOWING SPINAL CORD INJURY (HCC): ICD-10-CM

## 2024-12-02 DIAGNOSIS — L89.893 PRESSURE INJURY OF RIGHT KNEE, STAGE 3 (HCC): ICD-10-CM

## 2024-12-02 DIAGNOSIS — R26.2 AMBULATORY DYSFUNCTION: Primary | ICD-10-CM

## 2024-12-02 DIAGNOSIS — L89.44 PRESSURE INJURY OF CONTIGUOUS REGION INVOLVING LEFT BUTTOCK AND HIP, STAGE 4 (HCC): ICD-10-CM

## 2024-12-02 DIAGNOSIS — E44.1 MILD PROTEIN-CALORIE MALNUTRITION (HCC): ICD-10-CM

## 2024-12-02 DIAGNOSIS — L89.154 PRESSURE INJURY OF SACRAL REGION, STAGE 4 (HCC): ICD-10-CM

## 2024-12-02 DIAGNOSIS — L89.510 PRESSURE INJURY OF RIGHT ANKLE, UNSTAGEABLE (HCC): ICD-10-CM

## 2024-12-02 PROCEDURE — 99344 HOME/RES VST NEW MOD MDM 60: CPT | Performed by: NURSE PRACTITIONER

## 2024-12-02 PROCEDURE — 97598 DBRDMT OPN WND ADDL 20CM/<: CPT | Performed by: NURSE PRACTITIONER

## 2024-12-02 PROCEDURE — 97597 DBRDMT OPN WND 1ST 20 CM/<: CPT | Performed by: NURSE PRACTITIONER

## 2024-12-02 NOTE — LETTER
Patient:  Lizbeth Ribeiro   1950           CONCEPCION Pruitt saw Lizbeth Ribeiro for a wound care visit on 12/2/2024. See below for information relating to this visit.      Chief Complaint   Patient presents with    New Patient Visit     Multiple pressure injuries        Assessment/Plan:  1. Ambulatory dysfunction  Assessment & Plan:  On 24/7 personal care  2. Paraplegia following spinal cord injury  3. Pressure injury of sacral region, stage 4 ([facility])  Assessment & Plan:  Sacrum  Wound is covered with slough, no obvious sign of infection  Selective debridement performed  Local wound care - calcium alginate and bordered foam  Patient refused protein supplement  Facility already ordered air mattress  Turn and reposition frequently  On VNA  Follow up in one week  4. Mild protein-calorie malnutrition (HCC)  Assessment & Plan:  Patient refused protein supplement  5. Pressure injury of contiguous region involving left buttock and hip, stage 4 ([facility])  Assessment & Plan:  Right ankle - with slough, no obvious sign of infection  Right knee - 100% granulation, no obvious sign of infection  Right hip - 100% slough, no obvious sign of infection  Left hip - with chronic osteomyelitis, no obvious sign of infection during visit  Wound is covered with slough, no obvious sign of infection  Selective debridement performed on the left hip  Local wound care - calcium alginate and bordered foam  Patient refused protein supplement  Facility already ordered air mattress  Turn and reposition frequently  On VNA  Follow up in one week  6. Pressure injury of right ankle, unstageable ([facility])  Assessment & Plan:  Right ankle - with slough, no obvious sign of infection  Right knee - 100% granulation, no obvious sign of infection  Right hip - 100% slough, no obvious sign of infection  Left hip - with chronic osteomyelitis, no obvious sign of infection during visit  Wound is covered with slough, no obvious sign of  infection  Selective debridement performed on the left hip  Local wound care - calcium alginate and bordered foam  Patient refused protein supplement  Facility already ordered air mattress  Turn and reposition frequently  On VNA  Follow up in one week  7. Pressure injury of right knee, stage 3 (HCC)  Assessment & Plan:  Right ankle - with slough, no obvious sign of infection  Right knee - 100% granulation, no obvious sign of infection  Right hip - 100% slough, no obvious sign of infection  Left hip - with chronic osteomyelitis, no obvious sign of infection during visit  Wound is covered with slough, no obvious sign of infection  Selective debridement performed on the left hip  Local wound care - calcium alginate and bordered foam  Patient refused protein supplement  Facility already ordered air mattress  Turn and reposition frequently  On VNA  Follow up in one week         Orders:  Lizbeth Ribeiro  1950  Wound:  Right hip, Left hip, sacrum, right ankle and right knee  Discontinue previous wound order  Cleanse the wound bed with NSS   Apply non-sting skin prep to periwound area  Apply calcium alginate to wound bed, then cover with bordered foam   Frequency : three times a week and prn for soiling  Offload all wounds  Turn and reposition frequently  Instruct / Assist with weight shifting in wheelchair  Increase protein intake.  Monitor for any sign of infection or worsening, inform PCP or patient's primary physician in your facility.        Follow Up:  Return in about 1 week (around 12/9/2024).       [facility] hours are 8:00 am - 4:30 pm Monday through Friday. The center phone number is [phone]. You can also contact me directly thru my email at [email or web address] or thru tiger text. If it is an emergency, please contact the PCP or patient's attending physician in your facility.     Sincerely,    Electronically signed by CONCEPCION Pruitt    Patient :  Lizbeth Ribeiro    1950
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
## 2024-12-02 NOTE — ASSESSMENT & PLAN NOTE
Right ankle - with slough, no obvious sign of infection  Right knee - 100% granulation, no obvious sign of infection  Right hip - 100% slough, no obvious sign of infection  Left hip - with chronic osteomyelitis, no obvious sign of infection during visit  Wound is covered with slough, no obvious sign of infection  Selective debridement performed on the left hip  Local wound care - calcium alginate and bordered foam  Patient refused protein supplement  Facility already ordered air mattress  Turn and reposition frequently  On VNA  Follow up in one week

## 2024-12-02 NOTE — ASSESSMENT & PLAN NOTE
Sacrum  Wound is covered with slough, no obvious sign of infection  Selective debridement performed  Local wound care - calcium alginate and bordered foam  Patient refused protein supplement  Facility already ordered air mattress  Turn and reposition frequently  On VNA  Follow up in one week

## 2024-12-09 ENCOUNTER — IN HOME VISIT (OUTPATIENT)
Dept: WOUND CARE | Facility: HOSPITAL | Age: 74
End: 2024-12-09
Payer: COMMERCIAL

## 2024-12-09 DIAGNOSIS — G82.20 PARAPLEGIA FOLLOWING SPINAL CORD INJURY (HCC): Primary | ICD-10-CM

## 2024-12-09 DIAGNOSIS — E44.1 MILD PROTEIN-CALORIE MALNUTRITION (HCC): ICD-10-CM

## 2024-12-09 DIAGNOSIS — L89.90 PRESSURE ULCERS OF SKIN OF MULTIPLE TOPOGRAPHIC SITES: ICD-10-CM

## 2024-12-09 PROCEDURE — 99310 SBSQ NF CARE HIGH MDM 45: CPT | Performed by: NURSE PRACTITIONER

## 2024-12-09 NOTE — LETTER
Patient:  Lizbeth Ribeiro   1950           CONCEPCION Pruitt saw Lizbeth Ribeiro for a wound care visit on 12/9/2024. See below for information relating to this visit.      Chief Complaint   Patient presents with    Follow Up Wound Care Visit        Assessment/Plan:  1. Paraplegia following spinal cord injury  Assessment & Plan:  Patient is nonambulatory, wheelchair-bound    2. Mild protein-calorie malnutrition (HCC)  Assessment & Plan:  Patient refused protein supplement  3. Pressure ulcers of skin of multiple topographic sites  Assessment & Plan:  Patient have multiple pressure ulcers  Deep tissue injury on the left heel, left foot lateral, left foot dorsal, and right heel  The wound on the left lower leg medial is stage II pressure ulcer  The wound on the right foot medial is unstageable pressure ulcer  Wound on the right knee is unstageable pressure ulcer  Wound on the right hip is stage IV pressure ulcer  The wound of the sacrum is stage IV pressure ulcer and wound on the left buttock is unstageable pressure ulcer  All the wounds are stable, the wound on the left heel, left foot dorsal, and left foot lateral are all new wounds  Patient will need a heel boots, air mattress, and pressure-relief cushion for the wheelchair.  Patient will also benefit from a bed cane so she can reposition herself independently.  Continue calcium alginate on the right knee, right foot medial, right hip, sacrum, and left buttock  Skin-Prep on all the deep tissue injury  I provided education to patient, made her aware that all the wounds will probably worsen and high risk for infection.  Patient was also given education on the importance of offloading.    Follow-up next week         Orders:  Lizbeth Ribeiro  1950  Wound:  Right hip, Left hip, sacrum, right ankle and right knee, right thigh, left lower leg medial  Discontinue previous wound order  Cleanse the wound bed with NSS   Apply non-sting skin prep  to periwound area  Apply calcium alginate to wound bed, then cover with bordered foam   Frequency : three times a week and prn for soiling    Wound: Left heel, left foot lateral, left foot dorsal, right heel  Cleanse with normal saline solution  Apply Skin-Prep to periwound and wound bed  Daily and as needed for soiling    Heel boots at all times on both feet  Patient will need an air mattress and wheelchair pressure-relief cushion  Patient will need bed cane for positioning when in bed  Visiting nurse for wound management    Offload all wounds  Turn and reposition frequently  Instruct / Assist with weight shifting in wheelchair  Increase protein intake.  Monitor for any sign of infection or worsening, inform PCP or patient's primary physician in your facility.      Follow Up:  Return in about 1 week (around 12/16/2024).       [facility] hours are 8:00 am - 4:30 pm Monday through Friday. The center phone number is [phone]. You can also contact me directly thru my email at [email or web address] or thru tiger text. If it is an emergency, please contact the PCP or patient's attending physician in your facility.     Sincerely,    Electronically signed by CONCEPCION Pruitt    Patient : Lizbeth Ribeiro    1950

## 2024-12-09 NOTE — ASSESSMENT & PLAN NOTE
Patient have multiple pressure ulcers L89.894  Deep tissue injury on the left heel, left foot lateral, left foot dorsal, and right heel  The wound on the left lower leg medial is stage II pressure ulcer  The wound on the right foot medial is unstageable pressure ulcer  Wound on the right knee is unstageable pressure ulcer  Wound on the right hip is stage IV pressure ulcer  The wound of the sacrum is stage IV pressure ulcer and wound on the left buttock is unstageable pressure ulcer  All the wounds are stable, the wound on the left heel, left foot dorsal, and left foot lateral are all new wounds  Patient will need a heel boots, air mattress, and pressure-relief cushion for the wheelchair.  Patient will also benefit from a bed cane so she can reposition herself independently.  Continue calcium alginate on the right knee, right foot medial, right hip, sacrum, and left buttock  Skin-Prep on all the deep tissue injury  I provided education to patient, made her aware that all the wounds will probably worsen and high risk for infection.  Patient was also given education on the importance of offloading.    Follow-up next week

## 2024-12-09 NOTE — LETTER
Patient:  Lizbeth Ribeiro   1950           CONCEPCION Pruitt saw Lizbeth Ribeiro for a wound care visit on 12/9/2024. See below for information relating to this visit.      Chief Complaint   Patient presents with    Follow Up Wound Care Visit        Assessment/Plan:  1. Paraplegia following spinal cord injury  Assessment & Plan:  Patient is nonambulatory, wheelchair-bound    2. Mild protein-calorie malnutrition (HCC)  Assessment & Plan:  Patient refused protein supplement  3. Pressure ulcers of skin of multiple topographic sites  Assessment & Plan:  Patient have multiple pressure ulcers L89.894  Deep tissue injury on the left heel, left foot lateral, left foot dorsal, and right heel  The wound on the left lower leg medial is stage II pressure ulcer  The wound on the right foot medial is unstageable pressure ulcer  Wound on the right knee is unstageable pressure ulcer  Wound on the right hip is stage IV pressure ulcer  The wound of the sacrum is stage IV pressure ulcer and wound on the left buttock is unstageable pressure ulcer  All the wounds are stable, the wound on the left heel, left foot dorsal, and left foot lateral are all new wounds  Patient will need a heel boots, air mattress, and pressure-relief cushion for the wheelchair.  Patient will also benefit from a bed cane so she can reposition herself independently.  Continue calcium alginate on the right knee, right foot medial, right hip, sacrum, and left buttock  Skin-Prep on all the deep tissue injury  I provided education to patient, made her aware that all the wounds will probably worsen and high risk for infection.  Patient was also given education on the importance of offloading.    Follow-up next week         Orders:  Lizbeth Ribeiro  1950  Wound:  Right hip, Left hip, sacrum, right ankle and right knee, right thigh, left lower leg medial  Discontinue previous wound order  Cleanse the wound bed with NSS   Apply non-sting  skin prep to periwound area  Apply calcium alginate to wound bed, then cover with bordered foam   Frequency : three times a week and prn for soiling    Wound: Left heel, left foot lateral, left foot dorsal, right heel  Cleanse with normal saline solution  Apply Skin-Prep to periwound and wound bed  Daily and as needed for soiling    Heel boots at all times on both feet  Patient will need an air mattress and wheelchair pressure-relief cushion ( L89.90 ) Pressure ulcer on the following sites : Right hip, Left hip, sacrum, right ankle and right knee, right thigh, left lower leg medial, Left heel, left foot lateral, left foot dorsal, right heel  Patient will need bed cane for positioning when in bed  Visiting nurse for wound management    Offload all wounds  Turn and reposition frequently  Instruct / Assist with weight shifting in wheelchair  Increase protein intake.  Monitor for any sign of infection or worsening, inform PCP or patient's primary physician in your facility.      Follow Up:  Return in about 1 week (around 12/16/2024).       [facility] hours are 8:00 am - 4:30 pm Monday through Friday. The center phone number is [phone]. You can also contact me directly thru my email at [email or web address] or thru tiger text. If it is an emergency, please contact the PCP or patient's attending physician in your facility.     Sincerely,    Electronically signed by CONCEPCION Pruitt    Patient : Lizbeth Ribeiro    1950
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
## 2024-12-16 ENCOUNTER — IN HOME VISIT (OUTPATIENT)
Dept: WOUND CARE | Facility: HOSPITAL | Age: 74
End: 2024-12-16
Payer: COMMERCIAL

## 2024-12-16 DIAGNOSIS — G82.20 PARAPLEGIA FOLLOWING SPINAL CORD INJURY (HCC): Primary | ICD-10-CM

## 2024-12-16 DIAGNOSIS — L89.90 PRESSURE ULCERS OF SKIN OF MULTIPLE TOPOGRAPHIC SITES: ICD-10-CM

## 2024-12-16 PROCEDURE — 99349 HOME/RES VST EST MOD MDM 40: CPT | Performed by: NURSE PRACTITIONER

## 2024-12-16 NOTE — ASSESSMENT & PLAN NOTE
Patient have multiple pressure ulcers L89.894   Wound on the left heel worsened, increasing wound size, deep tissue injury  Left foot lateral, unstageable pressure ulcer, covered with stable eschar  Left foot dorsal: Improved, still purple, deep tissue injury  The wound on the left lower leg medial is stage II pressure ulcer -wound is healed  The wound on the right foot medial is unstageable pressure ulcer  Wound on the right knee is unstageable pressure ulcer -stable  Wound on the right hip is stage IV pressure ulcer -worsened, copious amount of drainage.  The wound of the sacrum is stage IV pressure ulcer and wound on the left buttock is unstageable pressure ulcer -stable  All the wounds are stable, the wound on the left heel, left foot dorsal, and left foot lateral are all new wounds  Will benefit from a bed cane so she can reposition herself independently.  Continue calcium alginate on the right knee, right foot medial, right hip, sacrum, and left buttock  Skin-Prep on all the deep tissue injury  Patient is incontinent of bladder, causing more injury on the patient's wound.  I recommend indwelling catheter to manage incontinence.  Patient refused suprapubic catheter.  Facility nurse was made aware.  She will take care of calling the primary care physician for indwelling catheter.  I provided education to patient, made her aware that all the wounds will probably worsen and high risk for infection.  Patient was also given education on the importance of offloading.    Follow-up next week   Attending to bill
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
## 2024-12-23 ENCOUNTER — IN HOME VISIT (OUTPATIENT)
Dept: WOUND CARE | Facility: HOSPITAL | Age: 74
End: 2024-12-23
Payer: COMMERCIAL

## 2024-12-23 DIAGNOSIS — G82.20 PARAPLEGIA FOLLOWING SPINAL CORD INJURY (HCC): ICD-10-CM

## 2024-12-23 DIAGNOSIS — E44.1 MILD PROTEIN-CALORIE MALNUTRITION (HCC): ICD-10-CM

## 2024-12-23 DIAGNOSIS — L89.90 PRESSURE ULCERS OF SKIN OF MULTIPLE TOPOGRAPHIC SITES: Primary | ICD-10-CM

## 2024-12-23 PROCEDURE — 97597 DBRDMT OPN WND 1ST 20 CM/<: CPT | Performed by: NURSE PRACTITIONER

## 2024-12-23 PROCEDURE — 99349 HOME/RES VST EST MOD MDM 40: CPT | Performed by: NURSE PRACTITIONER

## 2024-12-23 PROCEDURE — 97598 DBRDMT OPN WND ADDL 20CM/<: CPT | Performed by: NURSE PRACTITIONER
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
## 2024-12-23 NOTE — ASSESSMENT & PLAN NOTE
Patient have multiple pressure ulcers L89.894   All wounds improved, patient now have a overlay pressure-relief mattress, bed cane, heel boots, and indwelling catheter.  The wounds on the right medial ankle, left lateral foot, right heel, left heel, left hip, sacrum, right hip, and right knee all improved.  The wound on the right heel is healed.  Selective debridement performed on the right medial ankle and left hip  Continue calcium alginate on the right knee, right foot medial, right hip, sacrum, and left buttock  Skin-Prep on all the deep tissue injury  I provided education to patient, made her aware that all the wounds will probably worsen and high risk for infection.  Patient was also given education on the importance of offloading.    Follow-up 2 weeks

## 2025-01-09 ENCOUNTER — IN HOME VISIT (OUTPATIENT)
Dept: WOUND CARE | Facility: HOSPITAL | Age: 75
End: 2025-01-09
Payer: COMMERCIAL

## 2025-01-09 DIAGNOSIS — G82.20 PARAPLEGIA FOLLOWING SPINAL CORD INJURY (HCC): ICD-10-CM

## 2025-01-09 DIAGNOSIS — L89.90 PRESSURE ULCERS OF SKIN OF MULTIPLE TOPOGRAPHIC SITES: Primary | ICD-10-CM

## 2025-01-09 DIAGNOSIS — R26.2 AMBULATORY DYSFUNCTION: ICD-10-CM

## 2025-01-09 PROCEDURE — 99349 HOME/RES VST EST MOD MDM 40: CPT | Performed by: NURSE PRACTITIONER

## 2025-01-09 PROCEDURE — 97597 DBRDMT OPN WND 1ST 20 CM/<: CPT | Performed by: NURSE PRACTITIONER

## 2025-01-09 NOTE — LETTER
Patient:  Lizbeth Ribeiro   1950           CONCEPCION Pruitt saw Lizbeth Ribeiro for a wound care visit on 1/9/2025. See below for information relating to this visit.      Chief Complaint   Patient presents with    Follow Up Wound Care Visit        Assessment/Plan:  1. Pressure ulcers of skin of multiple topographic sites  Assessment & Plan:  Patient have wound on the left lateral foot, left heel, left hip, sacrum, right knee, and right ankle  All the wounds improved, decreasing wound size and drainage, no obvious sign of infection increased granulation also  Selective debridement performed on the left hip  Change local wound care to Z guard on the wound on the sacrum daily, patient is incontinence of bowel, dressing changed cannot be 3 times a week.  Continue calcium alginate on the left lateral foot,  left hip, right knee and right ankle.  Change frequency to 3 times a week due to decreased drainage  Continue Skin-Prep on the left heel and right heel  Continue to offload, on air mattress  Seen at wheelchair clinic yesterday, recommended power chair  Continue visiting nurse for wound management  Follow-up in 3 weeks.  2. Paraplegia following spinal cord injury  Assessment & Plan:  Patient is nonambulatory, wheelchair-bound    Seen at [facility] yesterday, recommended power chair  3. Ambulatory dysfunction         Orders:  Lizbeth Ribeiro  1950  Orders Placed This Encounter   Procedures    Debridement     This order was created via procedure documentation   Wound:  Right hip, Left hip, right ankle and right knee,  left lower foot lateral, and right foot  Discontinue previous wound order  Cleanse the wound bed with NSS   Apply non-sting skin prep to periwound area  Apply calcium alginate to wound bed, then cover with bordered foam or bordered gauze  Frequency : 3 times a week and prn for soiling    Wound: Left heel, left foot lateral, left foot dorsal, right heel  Cleanse with  normal saline solution  Apply Skin-Prep to periwound and wound bed  Daily and as needed for soiling    Wound: Sacrum  Cleanse with soap and water, pat dry  Apply Z guard to wound bed and cover with ABD pad  Daily and as needed for soiling    Heel boots at all times on both feet  Offload all wounds  Turn and reposition frequently  Instruct / Assist with weight shifting in wheelchair  Increase protein intake.  Monitor for any sign of infection or worsening, inform PCP or patient's primary physician in your facility.      Follow Up:  Return in about 18 days (around 1/27/2025).       [facility] hours are 8:00 am - 4:30 pm Monday through Friday. The center phone number is [phone]. You can also contact me directly thru my email at [email or web address] or thru tiger text. If it is an emergency, please contact the PCP or patient's attending physician in your facility.     Sincerely,    Electronically signed by CONCEPCION Pruitt    Patient : Lizbeth Ribeiro    1950

## 2025-01-09 NOTE — ASSESSMENT & PLAN NOTE
Patient have wound on the left lateral foot, left heel, left hip, sacrum, right knee, and right ankle  All the wounds improved, decreasing wound size and drainage, no obvious sign of infection increased granulation also  Selective debridement performed on the left hip  Change local wound care to Z guard on the wound on the sacrum daily, patient is incontinence of bowel, dressing changed cannot be 3 times a week.  Continue calcium alginate on the left lateral foot,  left hip, right knee and right ankle.  Change frequency to 3 times a week due to decreased drainage  Continue Skin-Prep on the left heel and right heel  Continue to offload, on air mattress  Seen at wheelchair clinic yesterday, recommended power chair  Continue visiting nurse for wound management  Follow-up in 3 weeks.

## 2025-01-09 NOTE — ASSESSMENT & PLAN NOTE
Patient is nonambulatory, wheelchair-bound    Seen at [facility] yesterday, recommended power chair

## 2025-04-09 ENCOUNTER — HOSPITAL ENCOUNTER (INPATIENT)
Facility: HOSPITAL | Age: 75
LOS: 3 days | Discharge: HOME HEALTH | DRG: 871 | End: 2025-04-12
Attending: EMERGENCY MEDICINE | Admitting: INTERNAL MEDICINE
Payer: COMMERCIAL

## 2025-04-09 ENCOUNTER — APPOINTMENT (EMERGENCY)
Dept: CT IMAGING | Facility: HOSPITAL | Age: 75
DRG: 871 | End: 2025-04-09
Payer: COMMERCIAL

## 2025-04-09 DIAGNOSIS — G82.20 PARAPLEGIA FOLLOWING SPINAL CORD INJURY (HCC): ICD-10-CM

## 2025-04-09 DIAGNOSIS — M86.9 HIP OSTEOMYELITIS, RIGHT (HCC): ICD-10-CM

## 2025-04-09 DIAGNOSIS — Z51.89 VISIT FOR WOUND CHECK: Primary | ICD-10-CM

## 2025-04-09 DIAGNOSIS — M86.60 CHRONIC OSTEOMYELITIS (HCC): ICD-10-CM

## 2025-04-09 DIAGNOSIS — E43 SEVERE PROTEIN-CALORIE MALNUTRITION (HCC): ICD-10-CM

## 2025-04-09 DIAGNOSIS — L03.90 CELLULITIS: ICD-10-CM

## 2025-04-09 LAB
2HR DELTA HS TROPONIN: <-1 NG/L
4HR DELTA HS TROPONIN: <-1 NG/L
ALBUMIN SERPL BCG-MCNC: 2.7 G/DL (ref 3.5–5)
ALP SERPL-CCNC: 136 U/L (ref 34–104)
ALT SERPL W P-5'-P-CCNC: 7 U/L (ref 7–52)
ANION GAP SERPL CALCULATED.3IONS-SCNC: 7 MMOL/L (ref 4–13)
APTT PPP: 34 SECONDS (ref 23–34)
AST SERPL W P-5'-P-CCNC: 11 U/L (ref 13–39)
BASOPHILS # BLD AUTO: 0.11 THOUSANDS/ÂΜL (ref 0–0.1)
BASOPHILS NFR BLD AUTO: 1 % (ref 0–1)
BILIRUB DIRECT SERPL-MCNC: 0.17 MG/DL (ref 0–0.2)
BILIRUB SERPL-MCNC: 0.44 MG/DL (ref 0.2–1)
BUN SERPL-MCNC: 10 MG/DL (ref 5–25)
CALCIUM SERPL-MCNC: 8 MG/DL (ref 8.4–10.2)
CARDIAC TROPONIN I PNL SERPL HS: 3 NG/L (ref ?–50)
CARDIAC TROPONIN I PNL SERPL HS: <2 NG/L (ref ?–50)
CARDIAC TROPONIN I PNL SERPL HS: <2 NG/L (ref ?–50)
CHLORIDE SERPL-SCNC: 97 MMOL/L (ref 96–108)
CO2 SERPL-SCNC: 27 MMOL/L (ref 21–32)
CREAT SERPL-MCNC: 0.41 MG/DL (ref 0.6–1.3)
EOSINOPHIL # BLD AUTO: 0.16 THOUSAND/ÂΜL (ref 0–0.61)
EOSINOPHIL NFR BLD AUTO: 2 % (ref 0–6)
ERYTHROCYTE [DISTWIDTH] IN BLOOD BY AUTOMATED COUNT: 26.4 % (ref 11.6–15.1)
GFR SERPL CREATININE-BSD FRML MDRD: 102 ML/MIN/1.73SQ M
GLUCOSE SERPL-MCNC: 103 MG/DL (ref 65–140)
HCT VFR BLD AUTO: 28.6 % (ref 34.8–46.1)
HGB BLD-MCNC: 8.2 G/DL (ref 11.5–15.4)
IMM GRANULOCYTES # BLD AUTO: 0.05 THOUSAND/UL (ref 0–0.2)
IMM GRANULOCYTES NFR BLD AUTO: 1 % (ref 0–2)
INR PPP: 1.26 (ref 0.85–1.19)
LACTATE SERPL-SCNC: 1.4 MMOL/L (ref 0.5–2)
LYMPHOCYTES # BLD AUTO: 2.64 THOUSANDS/ÂΜL (ref 0.6–4.47)
LYMPHOCYTES NFR BLD AUTO: 25 % (ref 14–44)
MCH RBC QN AUTO: 20.1 PG (ref 26.8–34.3)
MCHC RBC AUTO-ENTMCNC: 28.7 G/DL (ref 31.4–37.4)
MCV RBC AUTO: 70 FL (ref 82–98)
MONOCYTES # BLD AUTO: 0.68 THOUSAND/ÂΜL (ref 0.17–1.22)
MONOCYTES NFR BLD AUTO: 6 % (ref 4–12)
NEUTROPHILS # BLD AUTO: 7.03 THOUSANDS/ÂΜL (ref 1.85–7.62)
NEUTS SEG NFR BLD AUTO: 65 % (ref 43–75)
NRBC BLD AUTO-RTO: 0 /100 WBCS
PLATELET # BLD AUTO: 528 THOUSANDS/UL (ref 149–390)
PMV BLD AUTO: 10.3 FL (ref 8.9–12.7)
POTASSIUM SERPL-SCNC: 3.2 MMOL/L (ref 3.5–5.3)
PROCALCITONIN SERPL-MCNC: 0.24 NG/ML
PROT SERPL-MCNC: 7.7 G/DL (ref 6.4–8.4)
PROTHROMBIN TIME: 16 SECONDS (ref 12.3–15)
RBC # BLD AUTO: 4.08 MILLION/UL (ref 3.81–5.12)
SODIUM SERPL-SCNC: 131 MMOL/L (ref 135–147)
WBC # BLD AUTO: 10.67 THOUSAND/UL (ref 4.31–10.16)

## 2025-04-09 PROCEDURE — 84484 ASSAY OF TROPONIN QUANT: CPT | Performed by: EMERGENCY MEDICINE

## 2025-04-09 PROCEDURE — 84484 ASSAY OF TROPONIN QUANT: CPT | Performed by: STUDENT IN AN ORGANIZED HEALTH CARE EDUCATION/TRAINING PROGRAM

## 2025-04-09 PROCEDURE — 80048 BASIC METABOLIC PNL TOTAL CA: CPT | Performed by: EMERGENCY MEDICINE

## 2025-04-09 PROCEDURE — 85730 THROMBOPLASTIN TIME PARTIAL: CPT | Performed by: EMERGENCY MEDICINE

## 2025-04-09 PROCEDURE — 87070 CULTURE OTHR SPECIMN AEROBIC: CPT | Performed by: EMERGENCY MEDICINE

## 2025-04-09 PROCEDURE — 87186 SC STD MICRODIL/AGAR DIL: CPT | Performed by: EMERGENCY MEDICINE

## 2025-04-09 PROCEDURE — 87077 CULTURE AEROBIC IDENTIFY: CPT | Performed by: EMERGENCY MEDICINE

## 2025-04-09 PROCEDURE — 85025 COMPLETE CBC W/AUTO DIFF WBC: CPT | Performed by: EMERGENCY MEDICINE

## 2025-04-09 PROCEDURE — 74176 CT ABD & PELVIS W/O CONTRAST: CPT

## 2025-04-09 PROCEDURE — 85610 PROTHROMBIN TIME: CPT | Performed by: EMERGENCY MEDICINE

## 2025-04-09 PROCEDURE — 36415 COLL VENOUS BLD VENIPUNCTURE: CPT | Performed by: EMERGENCY MEDICINE

## 2025-04-09 PROCEDURE — 87081 CULTURE SCREEN ONLY: CPT | Performed by: STUDENT IN AN ORGANIZED HEALTH CARE EDUCATION/TRAINING PROGRAM

## 2025-04-09 PROCEDURE — 84145 PROCALCITONIN (PCT): CPT | Performed by: EMERGENCY MEDICINE

## 2025-04-09 PROCEDURE — 87040 BLOOD CULTURE FOR BACTERIA: CPT | Performed by: EMERGENCY MEDICINE

## 2025-04-09 PROCEDURE — 80076 HEPATIC FUNCTION PANEL: CPT | Performed by: EMERGENCY MEDICINE

## 2025-04-09 PROCEDURE — 93005 ELECTROCARDIOGRAM TRACING: CPT

## 2025-04-09 PROCEDURE — 87147 CULTURE TYPE IMMUNOLOGIC: CPT | Performed by: EMERGENCY MEDICINE

## 2025-04-09 PROCEDURE — 83605 ASSAY OF LACTIC ACID: CPT | Performed by: EMERGENCY MEDICINE

## 2025-04-09 PROCEDURE — 99284 EMERGENCY DEPT VISIT MOD MDM: CPT

## 2025-04-09 PROCEDURE — 99223 1ST HOSP IP/OBS HIGH 75: CPT | Performed by: STUDENT IN AN ORGANIZED HEALTH CARE EDUCATION/TRAINING PROGRAM

## 2025-04-09 PROCEDURE — 96365 THER/PROPH/DIAG IV INF INIT: CPT

## 2025-04-09 PROCEDURE — 87205 SMEAR GRAM STAIN: CPT | Performed by: EMERGENCY MEDICINE

## 2025-04-09 RX ORDER — ENOXAPARIN SODIUM 100 MG/ML
40 INJECTION SUBCUTANEOUS DAILY
Status: DISCONTINUED | OUTPATIENT
Start: 2025-04-10 | End: 2025-04-12 | Stop reason: HOSPADM

## 2025-04-09 RX ORDER — SACCHAROMYCES BOULARDII 250 MG
250 CAPSULE ORAL 2 TIMES DAILY
Status: DISCONTINUED | OUTPATIENT
Start: 2025-04-09 | End: 2025-04-12 | Stop reason: HOSPADM

## 2025-04-09 RX ORDER — PANTOPRAZOLE SODIUM 40 MG/1
40 TABLET, DELAYED RELEASE ORAL
Status: DISCONTINUED | OUTPATIENT
Start: 2025-04-10 | End: 2025-04-12 | Stop reason: HOSPADM

## 2025-04-09 RX ORDER — CEFAZOLIN SODIUM 2 G/50ML
2000 SOLUTION INTRAVENOUS ONCE
Status: DISCONTINUED | OUTPATIENT
Start: 2025-04-09 | End: 2025-04-09

## 2025-04-09 RX ORDER — OXYBUTYNIN CHLORIDE 10 MG/1
10 TABLET, EXTENDED RELEASE ORAL DAILY
Status: DISCONTINUED | OUTPATIENT
Start: 2025-04-10 | End: 2025-04-12 | Stop reason: HOSPADM

## 2025-04-09 RX ORDER — SODIUM CHLORIDE, SODIUM GLUCONATE, SODIUM ACETATE, POTASSIUM CHLORIDE, MAGNESIUM CHLORIDE, SODIUM PHOSPHATE, DIBASIC, AND POTASSIUM PHOSPHATE .53; .5; .37; .037; .03; .012; .00082 G/100ML; G/100ML; G/100ML; G/100ML; G/100ML; G/100ML; G/100ML
1000 INJECTION, SOLUTION INTRAVENOUS ONCE
Status: COMPLETED | OUTPATIENT
Start: 2025-04-09 | End: 2025-04-09

## 2025-04-09 RX ORDER — POLYETHYLENE GLYCOL 3350 17 G/17G
17 POWDER, FOR SOLUTION ORAL DAILY
Status: DISCONTINUED | OUTPATIENT
Start: 2025-04-10 | End: 2025-04-11

## 2025-04-09 RX ORDER — ONDANSETRON 2 MG/ML
4 INJECTION INTRAMUSCULAR; INTRAVENOUS EVERY 6 HOURS PRN
Status: DISCONTINUED | OUTPATIENT
Start: 2025-04-09 | End: 2025-04-12 | Stop reason: HOSPADM

## 2025-04-09 RX ORDER — ACETAMINOPHEN 325 MG/1
650 TABLET ORAL EVERY 6 HOURS PRN
Status: DISCONTINUED | OUTPATIENT
Start: 2025-04-09 | End: 2025-04-12 | Stop reason: HOSPADM

## 2025-04-09 RX ORDER — FAMOTIDINE 20 MG/1
40 TABLET, FILM COATED ORAL
Status: DISCONTINUED | OUTPATIENT
Start: 2025-04-10 | End: 2025-04-12

## 2025-04-09 RX ADMIN — Medication 250 MG: at 21:33

## 2025-04-09 RX ADMIN — SODIUM CHLORIDE, SODIUM GLUCONATE, SODIUM ACETATE, POTASSIUM CHLORIDE, MAGNESIUM CHLORIDE, SODIUM PHOSPHATE, DIBASIC, AND POTASSIUM PHOSPHATE 1000 ML: .53; .5; .37; .037; .03; .012; .00082 INJECTION, SOLUTION INTRAVENOUS at 13:22

## 2025-04-09 RX ADMIN — ARTIFICIAL TEARS 1 DROP: 1; 2; 3 SOLUTION/ DROPS OPHTHALMIC at 21:33

## 2025-04-09 RX ADMIN — DAPTOMYCIN 175 MG: 500 INJECTION, POWDER, LYOPHILIZED, FOR SOLUTION INTRAVENOUS at 17:16

## 2025-04-09 NOTE — ASSESSMENT & PLAN NOTE
Has chronic wound over right hip which is likely chronic osteomyelitis given exposure of bone in the area. This was being managed by [facility] ID and surgery previously. Was noted by facility to be draining more over the past week and wound evolving and getting bigger, concern for infection prompting ED visit  ED gave 1 dose of Daptomycin  Further antibiotics to be determined following discussion with ID  General surgery consulted on the case  Wound care nurse consult placed

## 2025-04-09 NOTE — ED PROVIDER NOTES
ED Disposition       None          Assessment & Plan   {Hyperlinks  Risk Stratification - NIHSS - HEART SCORE - Fill out sepsis note and make sure you call [phone] if severe or septic shock:[id number]}    Medical Decision Making  Amount and/or Complexity of Data Reviewed  Labs: ordered.  Radiology: ordered.    Risk  Prescription drug management.             Medications   ceFAZolin (ANCEF) IVPB (premix in dextrose) 2,000 mg 50 mL (2,000 mg Intravenous New Bag 4/9/25 1548)   multi-electrolyte (Plasmalyte-A/Isolyte-S PH 7.4/Normosol-R) IV bolus 1,000 mL (0 mL Intravenous Stopped 4/9/25 1422)       ED Risk Strat Scores                    No data recorded        SBIRT 20yo+      Flowsheet Row Most Recent Value   Initial Alcohol Screen: US AUDIT-C     1. How often do you have a drink containing alcohol? 0 Filed at: 04/09/2025 1206   2. How many drinks containing alcohol do you have on a typical day you are drinking?  0 Filed at: 04/09/2025 1206   3a. Male UNDER 65: How often do you have five or more drinks on one occasion? 0 Filed at: 04/09/2025 1206   3b. FEMALE Any Age, or MALE 65+: How often do you have 4 or more drinks on one occassion? 0 Filed at: 04/09/2025 1206   Audit-C Score 0 Filed at: 04/09/2025 1206   JANET: How many times in the past year have you...    Used an illegal drug or used a prescription medication for non-medical reasons? Never Filed at: 04/09/2025 1206                            History of Present Illness   {Hyperlinks  History (Med, Surg, Fam, Social) - Current Medications - Allergies  :[id number]}    Chief Complaint   Patient presents with    Wound Check     Pt came from nursing home for worsening R hip wound. Per ems wound was 2 separate wounds and is now one large wound with drainage       Past Medical History:   Diagnosis Date    Anemia     iron defiencey    Arthritis     osteo    Back injury     C. difficile diarrhea 4/7/2019    Closed lateral dislocation of distal end of right femur  Pt is a 40year old female admitted to [facility]. Patient presents with:  Non-stress Test: Non scheduled NST sent from OFFICE to begin NST 2x week. Starting today. Per Dr Stephanie Rivas     Pt is [id number] intra-uterine pregnancy. History obtained, consents signed. Oriented to room, staff, and plan of care. 3/10/2020    Depression     MRSA (methicillin resistant staph aureus) culture positive 12/07/2018    BONE-TISSUE     Osteopenia     Osteoporosis     Paralysis (HCC)     paraplegic due to spinal cord injury    Paraplegia ([facility])     Poor circulation     Pressure injury of skin     right hip, stage 3    Pressure sore of hip     right    Sepsis (HCC) 3/28/2020    Sepsis due to anaerobes ([facility]) 1/6/2019    Tibial plateau fracture     Underweight       Past Surgical History:   Procedure Laterality Date    CLOSURE DELAYED PRIMARY N/A 3/20/2019    Procedure: OPHELIA ANAL WOUND RECLOSURE;  Surgeon: Jarrod Jacobo MD;  Location:  MAIN OR;  Service: Plastics    DECUBITUS ULCER EXCISION Bilateral 11/12/2019    Procedure: DEBRIDEMENT DECUBITI (WASH OUT);  Surgeon: Zach Casarez DO;  Location:  MAIN OR;  Service: General    FLAP LOCAL EXTREMITY Left 1/28/2019    Procedure: THIGH FLAP & STSG TO CLOSE HIP WOUND;  Surgeon: Jarrod Jacobo MD;  Location:  MAIN OR;  Service: Plastics    FLAP LOCAL TRUNK Right 12/17/2018    Procedure: DEBRIDE/FLAP CLOSURE HIP PRESSURE SORE W/SKIN GRAFT;  Surgeon: Jarrod Jacobo MD;  Location:  MAIN OR;  Service: Plastics    FLAP LOCAL TRUNK Left 2/27/2019    Procedure: BUTTOCK FLAP TO ISCHIAL SORE;  Surgeon: Jarrod Jacobo MD;  Location:  MAIN OR;  Service: Plastics    HIP DEBRIDEMENT Right 2017    right hip sore debridement    INCISION AND DRAINAGE OF WOUND Left 1/3/2019    Procedure: INCISION AND DRAINAGE (I&D) left distal femur. With deep wound cultures. Application of VAC DRAIN SPONGE;  Surgeon: Chidi Bingham MD;  Location:  MAIN OR;  Service: Orthopedics    IR PICC LINE  12/19/2018    IR PICC LINE  3/12/2019    IL DEBRIDEMENT BONE 1ST 20 SQ CM/< Right 9/19/2018    Procedure: DEBRIDEMENT OF HIP PRESSURE SORE;  Surgeon: Jarrod Jacobo MD;  Location:  MAIN OR;  Service: Plastics    IL OPTX FEM SHFT FX W/INSJ IMED IMPLT W/WO SCREW Left 10/22/2018    Procedure: INSERTION NAIL IM  LEFT FEMUR RETROGRADE;  Surgeon: Ciaran Noriega MD;  Location: [facility];  Service: Orthopedics    TOE AMPUTATION Left 2017    small toe left foot amputation    WISDOM TOOTH EXTRACTION      WOUND DEBRIDEMENT Left 12/17/2018    Procedure: DEBRIDEMENT HIP PRESSURE SORE;  Surgeon: Jarrod Jacobo MD;  Location:  MAIN OR;  Service: Plastics    WOUND DEBRIDEMENT N/A 11/10/2019    Procedure: EXCISIONAL DEBRIDEMENT;  Surgeon: Melva Guo MD;  Location: [facility];  Service: General      Family History   Problem Relation Age of Onset    Depression Father       Social History     Tobacco Use    Smoking status: Never    Smokeless tobacco: Never   Vaping Use    Vaping status: Never Used   Substance Use Topics    Alcohol use: Not Currently     Comment: occasional    Drug use: No      E-Cigarette/Vaping    E-Cigarette Use Never User       E-Cigarette/Vaping Substances    Nicotine No     THC No     CBD No     Flavoring No     Other No     Unknown No       I have reviewed and agree with the history as documented.     HPI    Review of Systems        Objective   {Hyperlinks  Historical Vitals - Historical Labs - Chart Review/Microbiology - Last Echo - Code Status  :[id number]}    ED Triage Vitals   Temperature Pulse Blood Pressure Respirations SpO2 Patient Position - Orthostatic VS   04/09/25 1206 04/09/25 1206 04/09/25 1207 04/09/25 1206 04/09/25 1206 04/09/25 1206   97.9 °F (36.6 °C) (!) 106 114/56 17 99 % Lying      Temp Source Heart Rate Source BP Location FiO2 (%) Pain Score    04/09/25 1206 04/09/25 1206 04/09/25 1206 -- --    Oral Monitor Right arm        Vitals      Date and Time Temp Pulse SpO2 Resp BP Pain Score FACES Pain Rating User   04/09/25 1415 -- 90 98 % 19 107/54 -- -- SS   04/09/25 1345 -- 50 99 % 20 106/56 -- -- SS   04/09/25 1207 -- -- -- -- 114/56 -- -- SS   04/09/25 1206 97.9 °F (36.6 °C) 106 99 % 17 -- -- -- SS            Physical Exam    Results Reviewed       Procedure Component Value Units  Date/Time    HS Troponin I 2hr [[id number]] Collected: 04/09/25 1532    Lab Status: In process Specimen: Blood from Arm, Right Updated: 04/09/25 1536    HS Troponin I 4hr [[id number]] Collected: 04/09/25 1531    Lab Status: No result Specimen: Blood from Arm, Right     Procalcitonin [[id number]]  (Normal) Collected: 04/09/25 1316    Lab Status: Final result Specimen: Blood from Arm, Right Updated: 04/09/25 1357     Procalcitonin 0.24 ng/ml     HS Troponin 0hr (reflex protocol) [[id number]]  (Normal) Collected: 04/09/25 1316    Lab Status: Final result Specimen: Blood from Arm, Right Updated: 04/09/25 1353     hs TnI 0hr 3 ng/L     Protime-INR [[id number]]  (Abnormal) Collected: 04/09/25 1316    Lab Status: Final result Specimen: Blood from Arm, Right Updated: 04/09/25 1348     Protime 16.0 seconds      INR 1.26    Narrative:      INR Therapeutic Range    Indication                                             INR Range      Atrial Fibrillation                                               2.0-3.0  Hypercoagulable State                                    2.0.2.3  Left Ventricular Asist Device                            2.0-3.0  Mechanical Heart Valve                                  -    Aortic(with afib, MI, embolism, HF, LA enlargement,    and/or coagulopathy)                                     2.0-3.0 (2.5-3.5)     Mitral                                                             2.5-3.5  Prosthetic/Bioprosthetic Heart Valve               2.0-3.0  Venous thromboembolism (VTE: VT, PE        2.0-3.0    APTT [[id number]]  (Normal) Collected: 04/09/25 1316    Lab Status: Final result Specimen: Blood from Arm, Right Updated: 04/09/25 1348     PTT 34 seconds     Basic metabolic panel [[id number]]  (Abnormal) Collected: 04/09/25 1316    Lab Status: Final result Specimen: Blood from Arm, Right Updated: 04/09/25 1347     Sodium 131 mmol/L      Potassium 3.2 mmol/L      Chloride 97 mmol/L      CO2 27 mmol/L      ANION GAP 7  mmol/L      BUN 10 mg/dL      Creatinine 0.41 mg/dL      Glucose 103 mg/dL      Calcium 8.0 mg/dL      eGFR 102 ml/min/1.73sq m     Narrative:      National Kidney Disease Foundation guidelines for Chronic Kidney Disease (CKD):     Stage 1 with normal or high GFR (GFR > 90 mL/min/1.73 square meters)    Stage 2 Mild CKD (GFR = 60-89 mL/min/1.73 square meters)    Stage 3A Moderate CKD (GFR = 45-59 mL/min/1.73 square meters)    Stage 3B Moderate CKD (GFR = 30-44 mL/min/1.73 square meters)    Stage 4 Severe CKD (GFR = 15-29 mL/min/1.73 square meters)    Stage 5 End Stage CKD (GFR <15 mL/min/1.73 square meters)  Note: GFR calculation is accurate only with a steady state creatinine    Hepatic function panel [[id number]]  (Abnormal) Collected: 04/09/25 1316    Lab Status: Final result Specimen: Blood from Arm, Right Updated: 04/09/25 1347     Total Bilirubin 0.44 mg/dL      Bilirubin, Direct 0.17 mg/dL      Alkaline Phosphatase 136 U/L      AST 11 U/L      ALT 7 U/L      Total Protein 7.7 g/dL      Albumin 2.7 g/dL     Lactic acid, plasma (w/reflex if result > 2.0) [[id number]]  (Normal) Collected: 04/09/25 1316    Lab Status: Final result Specimen: Blood from Arm, Right Updated: 04/09/25 1346     LACTIC ACID 1.4 mmol/L     Narrative:      Result may be elevated if tourniquet was used during collection.    CBC and differential [[id number]]  (Abnormal) Collected: 04/09/25 1316    Lab Status: Final result Specimen: Blood from Arm, Right Updated: 04/09/25 1333     WBC 10.67 Thousand/uL      RBC 4.08 Million/uL      Hemoglobin 8.2 g/dL      Hematocrit 28.6 %      MCV 70 fL      MCH 20.1 pg      MCHC 28.7 g/dL      RDW 26.4 %      MPV 10.3 fL      Platelets 528 Thousands/uL      nRBC 0 /100 WBCs      Segmented % 65 %      Immature Grans % 1 %      Lymphocytes % 25 %      Monocytes % 6 %      Eosinophils Relative 2 %      Basophils Relative 1 %      Absolute Neutrophils 7.03 Thousands/µL      Absolute Immature Grans 0.05  Thousand/uL      Absolute Lymphocytes 2.64 Thousands/µL      Absolute Monocytes 0.68 Thousand/µL      Eosinophils Absolute 0.16 Thousand/µL      Basophils Absolute 0.11 Thousands/µL     Wound culture and Gram stain [[id number]] Collected: 04/09/25 1330    Lab Status: In process Specimen: Wound from Buttock Updated: 04/09/25 1332    Blood culture #2 [[id number]] Collected: 04/09/25 1316    Lab Status: In process Specimen: Blood from Arm, Right Updated: 04/09/25 1327    Blood culture #1 [[id number]] Collected: 04/09/25 1316    Lab Status: In process Specimen: Blood from Arm, Right Updated: 04/09/25 1327            CT abdomen pelvis wo contrast   Final Interpretation by Ana Barbosa MD (04/09 1455)   No acute intra-abdominal pathology.   Fecal distention of the rectum, concerning for fecal impaction.   Right posterior hip wound, packed. Limited evaluation for fluid collection without contrast. No obvious fluid collection. The wound extends to the expected region of the greater trochanter, with bony exposure evident on image 181 series 2.   Redemonstrated fragmentation and extensive bony remodeling at the proximal right femur and the right acetabulum.   Similar chronic changes in the left hip, not evident in 2020. Currently,  fragmentation and dislocation of the left femoral head, and marked hyperostosis along the left femoral shaft and intramedullary jony. Correlate with prior work-up for possible    etiology. Chronic osteomyelitis is considered in the appropriate history.   Probable pressure wound in the posterior left hip image 160 series 2 without demonstrable extension to the bone.         Workstation performed: [id number]             Procedures    ED Medication and Procedure Management   Prior to Admission Medications   Prescriptions Last Dose Informant Patient Reported? Taking?   Catheters ([place] Hydrogel Isabel Cath 16FR) MISC   No No   Sig: Replace isabel catheter one time monthly. Inflate balloon with 5 CC of  normal saline.Can substitute brand for what is covered/available.   Incontinence Supplies (Urinary Leg Bag) KIT   No No   Sig: Use if needed (as needed) Size medium   Probiotic Product ([employer] Digestive Probiotic) CAPS   Yes No   Wound Dressings (Allevyn Life) PADS   No No   Sig: Apply 10 each topically in the morning Sterile Allevyn life foam dressing 8x9   famotidine (PEPCID) 40 MG tablet   No No   Sig: Take 1 tablet (40 mg total) by mouth daily   glycerin-hypromellose- (ARTIFICIAL TEARS) 0.2-0.2-1 % SOLN  Other (Specify) No No   Sig: Administer 1 drop to both eyes daily at bedtime   loperamide (IMODIUM) 2 mg capsule   No No   Sig: Take 1 capsule (2 mg total) by mouth 3 (three) times a day as needed for diarrhea   melatonin 3 mg   No No   Sig: Take 1 tablet (3 mg total) by mouth daily at bedtime as needed (insomnia)   multivitamin (THERAGRAN) TABS  Other (Specify) Yes No   Sig: Take 1 tablet by mouth daily   oxybutynin (DITROPAN-XL) 10 MG 24 hr tablet   No No   Sig: Take 1 tablet (10 mg total) by mouth daily   pantoprazole (PROTONIX) 40 mg tablet   No No   Sig: Take 1 tablet (40 mg total) by mouth daily      Facility-Administered Medications: None     Patient's Medications   Discharge Prescriptions    No medications on file     No discharge procedures on file.  ED SEPSIS DOCUMENTATION          103 mg/dL      Calcium 8.0 mg/dL      eGFR 102 ml/min/1.73sq m     Narrative:      National Kidney Disease Foundation guidelines for Chronic Kidney Disease (CKD):     Stage 1 with normal or high GFR (GFR > 90 mL/min/1.73 square meters)    Stage 2 Mild CKD (GFR = 60-89 mL/min/1.73 square meters)    Stage 3A Moderate CKD (GFR = 45-59 mL/min/1.73 square meters)    Stage 3B Moderate CKD (GFR = 30-44 mL/min/1.73 square meters)    Stage 4 Severe CKD (GFR = 15-29 mL/min/1.73 square meters)    Stage 5 End Stage CKD (GFR <15 mL/min/1.73 square meters)  Note: GFR calculation is accurate only with a steady state creatinine    Hepatic function panel [[id number]]  (Abnormal) Collected: 04/09/25 1316    Lab Status: Final result Specimen: Blood from Arm, Right Updated: 04/09/25 1347     Total Bilirubin 0.44 mg/dL      Bilirubin, Direct 0.17 mg/dL      Alkaline Phosphatase 136 U/L      AST 11 U/L      ALT 7 U/L      Total Protein 7.7 g/dL      Albumin 2.7 g/dL     Lactic acid, plasma (w/reflex if result > 2.0) [[id number]]  (Normal) Collected: 04/09/25 1316    Lab Status: Final result Specimen: Blood from Arm, Right Updated: 04/09/25 1346     LACTIC ACID 1.4 mmol/L     Narrative:      Result may be elevated if tourniquet was used during collection.    CBC and differential [[id number]]  (Abnormal) Collected: 04/09/25 1316    Lab Status: Final result Specimen: Blood from Arm, Right Updated: 04/09/25 1333     WBC 10.67 Thousand/uL      RBC 4.08 Million/uL      Hemoglobin 8.2 g/dL      Hematocrit 28.6 %      MCV 70 fL      MCH 20.1 pg      MCHC 28.7 g/dL      RDW 26.4 %      MPV 10.3 fL      Platelets 528 Thousands/uL      nRBC 0 /100 WBCs      Segmented % 65 %      Immature Grans % 1 %      Lymphocytes % 25 %      Monocytes % 6 %      Eosinophils Relative 2 %      Basophils Relative 1 %      Absolute Neutrophils 7.03 Thousands/µL      Absolute Immature Grans 0.05 Thousand/uL      Absolute Lymphocytes 2.64 Thousands/µL      Absolute  Monocytes 0.68 Thousand/µL      Eosinophils Absolute 0.16 Thousand/µL      Basophils Absolute 0.11 Thousands/µL             CT abdomen pelvis wo contrast   Final Interpretation by Ana Barbosa MD (04/09 4784)   No acute intra-abdominal pathology.   Fecal distention of the rectum, concerning for fecal impaction.   Right posterior hip wound, packed. Limited evaluation for fluid collection without contrast. No obvious fluid collection. The wound extends to the expected region of the greater trochanter, with bony exposure evident on image 181 series 2.   Redemonstrated fragmentation and extensive bony remodeling at the proximal right femur and the right acetabulum.   Similar chronic changes in the left hip, not evident in 2020. Currently,  fragmentation and dislocation of the left femoral head, and marked hyperostosis along the left femoral shaft and intramedullary jony. Correlate with prior work-up for possible    etiology. Chronic osteomyelitis is considered in the appropriate history.   Probable pressure wound in the posterior left hip image 160 series 2 without demonstrable extension to the bone.         Workstation performed: [id number]             Procedures    ED Medication and Procedure Management   Prior to Admission Medications   Prescriptions Last Dose Informant Patient Reported? Taking?   Catheters ([place] Hydrogel Isabel Cath 16FR) MISC   No No   Sig: Replace isabel catheter one time monthly. Inflate balloon with 5 CC of normal saline.Can substitute brand for what is covered/available.   Incontinence Supplies (Urinary Leg Bag) KIT   No No   Sig: Use if needed (as needed) Size medium   Probiotic Product ([employer] Digestive Probiotic) CAPS   Yes No   Wound Dressings (Allevyn Life) PADS   No No   Sig: Apply 10 each topically in the morning Sterile Allevyn life foam dressing 8x9   famotidine (PEPCID) 40 MG tablet Not Taking  No Yes   Sig: Take 1 tablet (40 mg total) by mouth daily   glycerin-hypromellose-  (ARTIFICIAL TEARS) 0.2-0.2-1 % SOLN  Other (Specify) No No   Sig: Administer 1 drop to both eyes daily at bedtime   loperamide (IMODIUM) 2 mg capsule   No No   Sig: Take 1 capsule (2 mg total) by mouth 3 (three) times a day as needed for diarrhea   melatonin 3 mg   No No   Sig: Take 1 tablet (3 mg total) by mouth daily at bedtime as needed (insomnia)   multivitamin (THERAGRAN) TABS  Other (Specify) Yes No   Sig: Take 1 tablet by mouth daily   oxybutynin (DITROPAN-XL) 10 MG 24 hr tablet   No No   Sig: Take 1 tablet (10 mg total) by mouth daily   pantoprazole (PROTONIX) 40 mg tablet   No No   Sig: Take 1 tablet (40 mg total) by mouth daily      Facility-Administered Medications: None     Discharge Medication List as of 4/12/2025 12:58 PM        CONTINUE these medications which have NOT CHANGED    Details   famotidine (PEPCID) 40 MG tablet Take 1 tablet (40 mg total) by mouth daily, Starting Thu 12/9/2021, Normal      Catheters ([employer] Hydrogel Isabel Cath 16FR) MISC Replace isabel catheter one time monthly. Inflate balloon with 5 CC of normal saline.Can substitute brand for what is covered/available., Print      glycerin-hypromellose- (ARTIFICIAL TEARS) 0.2-0.2-1 % SOLN Administer 1 drop to both eyes daily at bedtime, Starting Thu 6/17/2021, No Print      Incontinence Supplies (Urinary Leg Bag) KIT Use if needed (as needed) Size medium, Starting Wed 4/19/2023, Print      loperamide (IMODIUM) 2 mg capsule Take 1 capsule (2 mg total) by mouth 3 (three) times a day as needed for diarrhea, Starting Tue 4/18/2023, Normal      melatonin 3 mg Take 1 tablet (3 mg total) by mouth daily at bedtime as needed (insomnia), Starting Tue 4/18/2023, Normal      multivitamin (THERAGRAN) TABS Take 1 tablet by mouth daily, Historical Med      oxybutynin (DITROPAN-XL) 10 MG 24 hr tablet Take 1 tablet (10 mg total) by mouth daily, Starting Tue 4/18/2023, Normal      pantoprazole (PROTONIX) 40 mg tablet Take 1 tablet (40 mg total) by  mouth daily, Starting Thu 12/9/2021, Normal      Probiotic Product ([employer] Digestive Probiotic) CAPS Starting Fri 11/12/2021, Historical Med      Wound Dressings (Allevyn Life) PADS Apply 10 each topically in the morning Sterile Allevyn life foam dressing 8x9, Starting Wed 4/19/2023, Print           Outpatient Discharge Orders   Discharge Diet     Discharge Condtion:  Stabilized     Patient Aware of Diagnosis: Yes     Free of Communicable Disease:   Yes     Activity:  Per Rehab Recommendations     ED SEPSIS DOCUMENTATION   Time reflects when diagnosis was documented in both MDM as applicable and the Disposition within this note       Time User Action Codes Description Comment    4/9/2025  4:12 PM Anuj Avina [Z51.89] Visit for wound check     4/9/2025  4:12 PM Anuj Avina [L03.90] Cellulitis     4/9/2025  4:32 PM Anuj Avina [M86.60] Chronic osteomyelitis (HCC)     4/9/2025  4:58 PM Yuki Hayes Add [E43] Severe protein-calorie malnutrition      4/10/2025  7:59 AM Ana Chiu Add [M86.9] Hip osteomyelitis, right (HCC)     4/11/2025  9:50 AM Ana Chiu Add [G82.20] Paraplegia following spinal cord injury                  Anuj Avina MD  04/22/25 3331

## 2025-04-09 NOTE — H&P
H&P - Hospitalist   Name: Lizbeth Ribeiro 74 y.o. female I MRN: [id number]  Unit/Bed#: E4 -01 I Date of Admission: 4/9/2025   Date of Service: 4/9/2025 I Hospital Day: 0     Assessment & Plan  Sepsis without acute organ dysfunction (HCC)  POA, meets criteria given tachycardia, hypotension, leukocytosis in setting of right hip infection  Wound cultures sent  Received Daptomycin in the ED, course of antibiotics to be discussed further with ID  Monitor fever curve and WBC count  F/u BCx  Hip osteomyelitis, right (HCC)  Has chronic wound over right hip which is likely chronic osteomyelitis given exposure of bone in the area. This was being managed by [facility] ID and surgery previously. Was noted by facility to be draining more over the past week and wound evolving and getting bigger, concern for infection prompting ED visit  ED gave 1 dose of Daptomycin  Further antibiotics to be determined following discussion with ID  General surgery consulted on the case  Wound care nurse consult placed  Paraplegia following spinal cord injury  Bedbound  Lives in [facility]  GERD (gastroesophageal reflux disease)  Continue famotidine and protonix  Severe protein-calorie malnutrition   Nutrition consult placed    Hays catheter in place  In setting of paraplegia  Constipation  Was recently admitted for severe constipation at [facility]  Now improved  Continue bowel regimen  Ambulatory dysfunction  In setting of paraplegia  Completely bed bound  Pressure ulcers of skin of multiple topographic sites  Q2h turns  Wound care nurse consulted      VTE Pharmacologic Prophylaxis:   Moderate Risk (Score 3-4) - Pharmacological DVT Prophylaxis Ordered: enoxaparin (Lovenox).  Code Status: Level 1 - Full Code   Discussion with family:  patient.     Anticipated Length of Stay: Patient will be admitted on an inpatient basis with an anticipated length of stay of greater than 2 midnights secondary to right hip  "osteomyelitis.    History of Present Illness   Chief Complaint: evolving wound over right hip with increasing discharge    Lizbeth Ribeiro is a 74 y.o. female with a PMH of paraplegia following spinal cord injury, GERD who was brought in from [facility] for \"evolving right hip wound\". Patient states that the facility was noting an increase in size and drainage of the wound and were concerned for ongoing infection. Patient denies any fevers or chills. She notes that [facility] ID and surgery were managing her wound in the outpatient setting and wound care was being done regularly at the facility. She denies any pain over the site however feels that she is seeing more discharge and bleeding coming from the ulcer.     Of note, patient was recently admitted at [facility] for severe constipation which patient notes has improved at this time. Notes her appetite is slowly coming back to baseline and that this was down when she had the constipation.     Review of Systems   Constitutional:  Positive for appetite change. Negative for chills and fever.   HENT:  Negative for ear pain and sore throat.    Eyes:  Negative for pain and visual disturbance.   Respiratory:  Negative for cough and shortness of breath.    Cardiovascular:  Negative for chest pain and palpitations.   Gastrointestinal:  Negative for abdominal pain and vomiting.   Genitourinary:  Negative for dysuria and hematuria.   Musculoskeletal:  Negative for arthralgias and back pain.   Skin:  Positive for wound. Negative for color change and rash.        Right hip wound with discharge   Neurological:  Negative for seizures and syncope.   All other systems reviewed and are negative.        Historical Information   Past Medical History:   Diagnosis Date    Anemia     iron defiencey    Arthritis     osteo    Back injury     C. difficile diarrhea 4/7/2019    Closed lateral dislocation of distal end of right femur 3/10/2020    Depression     MRSA " (methicillin resistant staph aureus) culture positive 12/07/2018    BONE-TISSUE     Osteopenia     Osteoporosis     Paralysis (HCC)     paraplegic due to spinal cord injury    Paraplegia ([facility])     Poor circulation     Pressure injury of skin     right hip, stage 3    Pressure sore of hip     right    Sepsis ([facility]) 3/28/2020    Sepsis due to anaerobes ([facility]) 1/6/2019    Tibial plateau fracture     Underweight      Past Surgical History:   Procedure Laterality Date    CLOSURE DELAYED PRIMARY N/A 3/20/2019    Procedure: OPHELIA ANAL WOUND RECLOSURE;  Surgeon: Jarrod Jacobo MD;  Location:  MAIN OR;  Service: Plastics    DECUBITUS ULCER EXCISION Bilateral 11/12/2019    Procedure: DEBRIDEMENT DECUBITI (WASH OUT);  Surgeon: Zach Casarez DO;  Location:  MAIN OR;  Service: General    FLAP LOCAL EXTREMITY Left 1/28/2019    Procedure: THIGH FLAP & STSG TO CLOSE HIP WOUND;  Surgeon: Jarrod Jacobo MD;  Location:  MAIN OR;  Service: Plastics    FLAP LOCAL TRUNK Right 12/17/2018    Procedure: DEBRIDE/FLAP CLOSURE HIP PRESSURE SORE W/SKIN GRAFT;  Surgeon: Jarrod Jacobo MD;  Location:  MAIN OR;  Service: Plastics    FLAP LOCAL TRUNK Left 2/27/2019    Procedure: BUTTOCK FLAP TO ISCHIAL SORE;  Surgeon: Jarrod Jacobo MD;  Location:  MAIN OR;  Service: Plastics    HIP DEBRIDEMENT Right 2017    right hip sore debridement    INCISION AND DRAINAGE OF WOUND Left 1/3/2019    Procedure: INCISION AND DRAINAGE (I&D) left distal femur. With deep wound cultures. Application of VAC DRAIN SPONGE;  Surgeon: Chidi Bingham MD;  Location:  MAIN OR;  Service: Orthopedics    IR PICC LINE  12/19/2018    IR PICC LINE  3/12/2019    MA DEBRIDEMENT BONE 1ST 20 SQ CM/< Right 9/19/2018    Procedure: DEBRIDEMENT OF HIP PRESSURE SORE;  Surgeon: Jarrod Jacobo MD;  Location:  MAIN OR;  Service: Plastics    MA OPTX FEM SHFT FX W/INSJ IMED IMPLT W/WO SCREW Left 10/22/2018    Procedure: INSERTION NAIL IM LEFT FEMUR RETROGRADE;  Surgeon:  Ciaran Noriega MD;  Location:  MAIN OR;  Service: Orthopedics    TOE AMPUTATION Left 2017    small toe left foot amputation    WISDOM TOOTH EXTRACTION      WOUND DEBRIDEMENT Left 12/17/2018    Procedure: DEBRIDEMENT HIP PRESSURE SORE;  Surgeon: Jarrod Jacobo MD;  Location:  MAIN OR;  Service: Plastics    WOUND DEBRIDEMENT N/A 11/10/2019    Procedure: EXCISIONAL DEBRIDEMENT;  Surgeon: Melva Guo MD;  Location:  MAIN OR;  Service: General     Social History     Tobacco Use    Smoking status: Never    Smokeless tobacco: Never   Vaping Use    Vaping status: Never Used   Substance and Sexual Activity    Alcohol use: Not Currently     Comment: occasional    Drug use: No    Sexual activity: Not Currently     E-Cigarette/Vaping    E-Cigarette Use Never User      E-Cigarette/Vaping Substances    Nicotine No     THC No     CBD No     Flavoring No     Other No     Unknown No      Family history non-contributory  Social History:  Marital Status: Single   Occupation: disabled/retired  Patient Pre-hospital Living Situation: Skilled Nursing Facility: [facility]  Patient Pre-hospital Level of Mobility: non-ambulatory/bed bound  Patient Pre-hospital Diet Restrictions: none    Meds/Allergies   I have reviewed home medications with patient personally.  Prior to Admission medications    Medication Sig Start Date End Date Taking? Authorizing Provider   Catheters (Jordan Hydrogel Isabel Cath 16FR) MISC Replace isabel catheter one time monthly. Inflate balloon with 5 CC of normal saline.Can substitute brand for what is covered/available. 4/19/23   Satnam Pyle DO   famotidine (PEPCID) 40 MG tablet Take 1 tablet (40 mg total) by mouth daily 12/9/21   Florin Rockwell MD   glycerin-hypromellose- (ARTIFICIAL TEARS) 0.2-0.2-1 % SOLN Administer 1 drop to both eyes daily at bedtime 6/17/21   Katya Johnson PA-C   Incontinence Supplies (Urinary Leg Bag) KIT Use if needed (as needed) Size medium 4/19/23   Satnam Pyle  DO   loperamide (IMODIUM) 2 mg capsule Take 1 capsule (2 mg total) by mouth 3 (three) times a day as needed for diarrhea 4/18/23   Satnam Pyle, DO   melatonin 3 mg Take 1 tablet (3 mg total) by mouth daily at bedtime as needed (insomnia) 4/18/23   Satnam Justinai, DO   multivitamin (THERAGRAN) TABS Take 1 tablet by mouth daily    Historical Provider, MD   oxybutynin (DITROPAN-XL) 10 MG 24 hr tablet Take 1 tablet (10 mg total) by mouth daily 4/18/23   Satnam Pyle, DO   pantoprazole (PROTONIX) 40 mg tablet Take 1 tablet (40 mg total) by mouth daily 12/9/21   Florin Rockwell MD   Probiotic Product ([employer] Digestive Probiotic) CAPS  11/12/21   Historical Provider, MD   Wound Dressings (Allevyn Life) PADS Apply 10 each topically in the morning Sterile Allevyn life foam dressing 8x9 4/19/23   Satnam Justinai, DO     Allergies   Allergen Reactions    Iv Contrast [Iodinated Contrast Media]      Tingling face, itching    Cefepime Rash    Ciprofloxacin Rash and Swelling     Looked like suburn, itching  Bed sores  Swelling, itching    Vancomycin Rash     Unknown        Objective :  Temp:  [97.9 °F (36.6 °C)] 97.9 °F (36.6 °C)  HR:  [] 109  BP: ()/(53-59) 90/53  Resp:  [17-22] 22  SpO2:  [97 %-99 %] 98 %  O2 Device: None (Room air)    Physical Exam  Vitals and nursing note reviewed.   Constitutional:       General: She is not in acute distress.     Appearance: She is well-developed.   HENT:      Head: Normocephalic and atraumatic.   Eyes:      Conjunctiva/sclera: Conjunctivae normal.   Cardiovascular:      Rate and Rhythm: Normal rate and regular rhythm.      Heart sounds: No murmur heard.  Pulmonary:      Effort: Pulmonary effort is normal. No respiratory distress.      Breath sounds: Normal breath sounds.   Abdominal:      Palpations: Abdomen is soft.      Tenderness: There is no abdominal tenderness.   Musculoskeletal:         General: No swelling.      Cervical back: Neck supple.   Skin:     General: Skin is warm and  dry.      Capillary Refill: Capillary refill takes less than 2 seconds.      Comments: +deep ulcer over right hip with bloody discharge. Packing placed.    Neurological:      Mental Status: She is alert.   Psychiatric:         Mood and Affect: Mood normal.            Lines/Drains:  Lines/Drains/Airways       Active Status       Name Placement date Placement time Site Days    Urethral Catheter Latex;Straight-tip 16 Fr. 04/09/25  1515  Latex;Straight-tip  less than 1                  Urinary Catheter:  Goal for removal: N/A - Chronic Hays               Lab Results: I have reviewed the following results:  Results from last 7 days   Lab Units 04/09/25  1316   WBC Thousand/uL 10.67*   HEMOGLOBIN g/dL 8.2*   HEMATOCRIT % 28.6*   PLATELETS Thousands/uL 528*   SEGS PCT % 65   LYMPHO PCT % 25   MONO PCT % 6   EOS PCT % 2     Results from last 7 days   Lab Units 04/09/25  1316   SODIUM mmol/L 131*   POTASSIUM mmol/L 3.2*   CHLORIDE mmol/L 97   CO2 mmol/L 27   BUN mg/dL 10   CREATININE mg/dL 0.41*   ANION GAP mmol/L 7   CALCIUM mg/dL 8.0*   ALBUMIN g/dL 2.7*   TOTAL BILIRUBIN mg/dL 0.44   ALK PHOS U/L 136*   ALT U/L 7   AST U/L 11*   GLUCOSE RANDOM mg/dL 103     Results from last 7 days   Lab Units 04/09/25  1316   INR  1.26*         Lab Results   Component Value Date    HGBA1C 6.4 (H) 12/28/2018     Results from last 7 days   Lab Units 04/09/25  1316   LACTIC ACID mmol/L 1.4   PROCALCITONIN ng/ml 0.24       Imaging Results Review: I reviewed radiology reports from this admission including: CT abdomen/pelvis.  Other Study Results Review: No additional pertinent studies reviewed.    Administrative Statements       ** Please Note: This note has been constructed using a voice recognition system. **

## 2025-04-09 NOTE — PLAN OF CARE
Problem: Potential for Falls  Goal: Patient will remain free of falls  Description: INTERVENTIONS:- Educate patient/family on patient safety including physical limitations- Instruct patient to call for assistance with activity - Consult OT/PT to assist with strengthening/mobility - Keep Call bell within reach- Keep bed low and locked with side rails adjusted as appropriate- Keep care items and personal belongings within reach- Initiate and maintain comfort rounds- Make Fall Risk Sign visible to staff- Offer Toileting every 2 Hours, in advance of need- Initiate/Maintain bed alarm- Obtain necessary fall risk management equipment: socks- Apply yellow socks and bracelet for high fall risk patients- Consider moving patient to room near nurses station  Outcome: Progressing     Problem: Prexisting or High Potential for Compromised Skin Integrity  Goal: Skin integrity is maintained or improved  Description: INTERVENTIONS:- Identify patients at risk for skin breakdown- Assess and monitor skin integrity- Assess and monitor nutrition and hydration status- Monitor labs - Assess for incontinence - Turn and reposition patient- Assist with mobility/ambulation- Relieve pressure over bony prominences- Avoid friction and shearing- Provide appropriate hygiene as needed including keeping skin clean and dry- Evaluate need for skin moisturizer/barrier cream- Collaborate with interdisciplinary team - Patient/family teaching- Consider wound care consult   Outcome: Progressing     Problem: GENITOURINARY - ADULT  Goal: Urinary catheter remains patent  Description: INTERVENTIONS:- Assess patency of urinary catheter- If patient has a chronic isabel, consider changing catheter if non-functioning- Follow guidelines for intermittent irrigation of non-functioning urinary catheter  Outcome: Progressing     Problem: METABOLIC, FLUID AND ELECTROLYTES - ADULT  Goal: Electrolytes maintained within normal limits  Description: INTERVENTIONS:- Monitor  labs and assess patient for signs and symptoms of electrolyte imbalances- Administer electrolyte replacement as ordered- Monitor response to electrolyte replacements, including repeat lab results as appropriate- Instruct patient on fluid and nutrition as appropriate  Outcome: Progressing  Goal: Fluid balance maintained  Description: INTERVENTIONS:- Monitor labs - Monitor I/O and WT- Instruct patient on fluid and nutrition as appropriate- Assess for signs & symptoms of volume excess or deficit  Outcome: Progressing     Problem: SKIN/TISSUE INTEGRITY - ADULT  Goal: Skin Integrity remains intact(Skin Breakdown Prevention)  Description: Assess:-Perform Jeffery assessment every shift-Clean and moisturize skin every 2 hours-Inspect skin when repositioning, toileting, and assisting with ADLS-Assess under medical devices such as socks every shift-Assess extremities for adequate circulation and sensation Bed Management:-Have minimal linens on bed & keep smooth, unwrinkled-Change linens as needed when moist or perspiring-Avoid sitting or lying in one position for more than 2 hours while in bed-Keep HOB at 30degrees Toileting:-Offer bedside commode-Assess for incontinence every 2 hours-Use incontinent care products after each incontinent episode such as barrier creamActivity:-Mobilize patient 2 times a day-Encourage activity and walks on unit-Encourage or provide ROM exercises -Turn and reposition patient every 2 Hours-Use appropriate equipment to lift or move patient in bed-Instruct/ Assist with weight shifting every 2 when out of bed in chair-Consider limitation of chair time 2 hour intervalsSkin Care:-Avoid use of baby powder, tape, friction and shearing, hot water or constrictive clothing-Relieve pressure over bony prominences using barrier cream-Do not massage red bony areasNext Steps:-Teach patient strategies to minimize risks such as socks -Consider consults to  interdisciplinary teams such as pt/ot  Outcome:  Progressing  Goal: Incision(s), wounds(s) or drain site(s) healing without S/S of infection  Description: INTERVENTIONS- Assess and document dressing, incision, wound bed, drain sites and surrounding tissue- Provide patient and family education- Perform skin care/dressing changes every 2 hours  Outcome: Progressing  Goal: Pressure injury heals and does not worsen  Description: Interventions:- Implement low air loss mattress or specialty surface (Criteria met)- Apply silicone foam dressing- Instruct/assist with weight shifting every 5 minutes when in chair - Limit chair time to 2 hour intervals- Use special pressure reducing interventions such as barrier cream and foam when in chair - Apply fecal or urinary incontinence containment device - Perform passive or active ROM every 2- Turn and reposition patient & offload bony prominences every 2 hours - Utilize friction reducing device or surface for transfers - Consider consults to  interdisciplinary teams such as pt/ot- Use incontinent care products after each incontinent episode such as socks- Consider nutrition services referral as needed  Outcome: Progressing     Problem: HEMATOLOGIC - ADULT  Goal: Maintains hematologic stability  Description: INTERVENTIONS- Assess for signs and symptoms of bleeding or hemorrhage- Monitor labs- Administer supportive blood products/factors as ordered and appropriate  Outcome: Progressing

## 2025-04-09 NOTE — ASSESSMENT & PLAN NOTE
POA, meets criteria given tachycardia, hypotension, leukocytosis in setting of right hip infection  Wound cultures sent  Received Daptomycin in the ED, course of antibiotics to be discussed further with ID  Monitor fever curve and WBC count  F/u BCx

## 2025-04-09 NOTE — ASSESSMENT & PLAN NOTE
Was recently admitted for severe constipation at [facility]  Now improved  Continue bowel regimen

## 2025-04-10 PROBLEM — E83.42 HYPOMAGNESEMIA: Status: ACTIVE | Noted: 2025-04-10

## 2025-04-10 PROBLEM — I95.9 HYPOTENSION: Status: ACTIVE | Noted: 2025-04-10

## 2025-04-10 PROBLEM — L89.219: Status: ACTIVE | Noted: 2025-04-10

## 2025-04-10 PROBLEM — D64.9 ANEMIA: Status: ACTIVE | Noted: 2025-04-10

## 2025-04-10 PROBLEM — Z88.1 ALLERGY TO ANTIBIOTIC: Status: ACTIVE | Noted: 2025-04-10

## 2025-04-10 LAB
ABO GROUP BLD: NORMAL
ALBUMIN SERPL BCG-MCNC: 2.2 G/DL (ref 3.5–5)
ALP SERPL-CCNC: 103 U/L (ref 34–104)
ALT SERPL W P-5'-P-CCNC: 6 U/L (ref 7–52)
ANION GAP SERPL CALCULATED.3IONS-SCNC: 6 MMOL/L (ref 4–13)
AST SERPL W P-5'-P-CCNC: 8 U/L (ref 13–39)
ATRIAL RATE: 101 BPM
ATRIAL RATE: 103 BPM
ATRIAL RATE: 97 BPM
BASOPHILS # BLD AUTO: 0.08 THOUSANDS/ÂΜL (ref 0–0.1)
BASOPHILS NFR BLD AUTO: 1 % (ref 0–1)
BILIRUB SERPL-MCNC: 0.33 MG/DL (ref 0.2–1)
BLD GP AB SCN SERPL QL: NEGATIVE
BUN SERPL-MCNC: 10 MG/DL (ref 5–25)
CALCIUM ALBUM COR SERPL-MCNC: 8.7 MG/DL (ref 8.3–10.1)
CALCIUM SERPL-MCNC: 7.3 MG/DL (ref 8.4–10.2)
CHLORIDE SERPL-SCNC: 99 MMOL/L (ref 96–108)
CK SERPL-CCNC: 64 U/L (ref 26–192)
CO2 SERPL-SCNC: 26 MMOL/L (ref 21–32)
CREAT SERPL-MCNC: 0.27 MG/DL (ref 0.6–1.3)
EOSINOPHIL # BLD AUTO: 0.11 THOUSAND/ÂΜL (ref 0–0.61)
EOSINOPHIL NFR BLD AUTO: 1 % (ref 0–6)
ERYTHROCYTE [DISTWIDTH] IN BLOOD BY AUTOMATED COUNT: 25.7 % (ref 11.6–15.1)
GFR SERPL CREATININE-BSD FRML MDRD: 117 ML/MIN/1.73SQ M
GLUCOSE SERPL-MCNC: 112 MG/DL (ref 65–140)
HCT VFR BLD AUTO: 21.4 % (ref 34.8–46.1)
HCT VFR BLD AUTO: 23.3 % (ref 34.8–46.1)
HCT VFR BLD AUTO: 24.6 % (ref 34.8–46.1)
HGB BLD-MCNC: 6.5 G/DL (ref 11.5–15.4)
HGB BLD-MCNC: 6.9 G/DL (ref 11.5–15.4)
HGB BLD-MCNC: 7.7 G/DL (ref 11.5–15.4)
IMM GRANULOCYTES # BLD AUTO: 0.04 THOUSAND/UL (ref 0–0.2)
IMM GRANULOCYTES NFR BLD AUTO: 0 % (ref 0–2)
LYMPHOCYTES # BLD AUTO: 2.82 THOUSANDS/ÂΜL (ref 0.6–4.47)
LYMPHOCYTES NFR BLD AUTO: 27 % (ref 14–44)
MAGNESIUM SERPL-MCNC: 1.6 MG/DL (ref 1.9–2.7)
MCH RBC QN AUTO: 20.7 PG (ref 26.8–34.3)
MCHC RBC AUTO-ENTMCNC: 30.4 G/DL (ref 31.4–37.4)
MCV RBC AUTO: 68 FL (ref 82–98)
MONOCYTES # BLD AUTO: 1.04 THOUSAND/ÂΜL (ref 0.17–1.22)
MONOCYTES NFR BLD AUTO: 10 % (ref 4–12)
NEUTROPHILS # BLD AUTO: 6.25 THOUSANDS/ÂΜL (ref 1.85–7.62)
NEUTS SEG NFR BLD AUTO: 61 % (ref 43–75)
NRBC BLD AUTO-RTO: 0 /100 WBCS
P AXIS: 51 DEGREES
P AXIS: 54 DEGREES
P AXIS: 54 DEGREES
PLATELET # BLD AUTO: 534 THOUSANDS/UL (ref 149–390)
PMV BLD AUTO: 9.6 FL (ref 8.9–12.7)
POTASSIUM SERPL-SCNC: 3.2 MMOL/L (ref 3.5–5.3)
PR INTERVAL: 130 MS
PR INTERVAL: 132 MS
PR INTERVAL: 142 MS
PROCALCITONIN SERPL-MCNC: 0.32 NG/ML
PROT SERPL-MCNC: 6 G/DL (ref 6.4–8.4)
QRS AXIS: 112 DEGREES
QRS AXIS: 29 DEGREES
QRS AXIS: 4 DEGREES
QRSD INTERVAL: 70 MS
QRSD INTERVAL: 74 MS
QRSD INTERVAL: 76 MS
QT INTERVAL: 308 MS
QT INTERVAL: 342 MS
QT INTERVAL: 348 MS
QTC INTERVAL: 403 MS
QTC INTERVAL: 441 MS
QTC INTERVAL: 443 MS
RBC # BLD AUTO: 3.14 MILLION/UL (ref 3.81–5.12)
RH BLD: POSITIVE
SODIUM SERPL-SCNC: 131 MMOL/L (ref 135–147)
SPECIMEN EXPIRATION DATE: NORMAL
T WAVE AXIS: 22 DEGREES
T WAVE AXIS: 30 DEGREES
T WAVE AXIS: 9 DEGREES
VENTRICULAR RATE: 101 BPM
VENTRICULAR RATE: 103 BPM
VENTRICULAR RATE: 97 BPM
WBC # BLD AUTO: 10.34 THOUSAND/UL (ref 4.31–10.16)

## 2025-04-10 PROCEDURE — 85025 COMPLETE CBC W/AUTO DIFF WBC: CPT | Performed by: STUDENT IN AN ORGANIZED HEALTH CARE EDUCATION/TRAINING PROGRAM

## 2025-04-10 PROCEDURE — NC001 PR NO CHARGE: Performed by: SPECIALIST

## 2025-04-10 PROCEDURE — 85014 HEMATOCRIT: CPT | Performed by: PHYSICIAN ASSISTANT

## 2025-04-10 PROCEDURE — 86850 RBC ANTIBODY SCREEN: CPT | Performed by: PHYSICIAN ASSISTANT

## 2025-04-10 PROCEDURE — 85018 HEMOGLOBIN: CPT | Performed by: PHYSICIAN ASSISTANT

## 2025-04-10 PROCEDURE — 83735 ASSAY OF MAGNESIUM: CPT | Performed by: STUDENT IN AN ORGANIZED HEALTH CARE EDUCATION/TRAINING PROGRAM

## 2025-04-10 PROCEDURE — 86901 BLOOD TYPING SEROLOGIC RH(D): CPT | Performed by: PHYSICIAN ASSISTANT

## 2025-04-10 PROCEDURE — 85018 HEMOGLOBIN: CPT | Performed by: INTERNAL MEDICINE

## 2025-04-10 PROCEDURE — 86920 COMPATIBILITY TEST SPIN: CPT

## 2025-04-10 PROCEDURE — 86900 BLOOD TYPING SEROLOGIC ABO: CPT | Performed by: PHYSICIAN ASSISTANT

## 2025-04-10 PROCEDURE — 99233 SBSQ HOSP IP/OBS HIGH 50: CPT | Performed by: PHYSICIAN ASSISTANT

## 2025-04-10 PROCEDURE — 84145 PROCALCITONIN (PCT): CPT | Performed by: STUDENT IN AN ORGANIZED HEALTH CARE EDUCATION/TRAINING PROGRAM

## 2025-04-10 PROCEDURE — P9040 RBC LEUKOREDUCED IRRADIATED: HCPCS

## 2025-04-10 PROCEDURE — G0545 PR INHERENT VISIT TO INPT: HCPCS | Performed by: STUDENT IN AN ORGANIZED HEALTH CARE EDUCATION/TRAINING PROGRAM

## 2025-04-10 PROCEDURE — 80053 COMPREHEN METABOLIC PANEL: CPT | Performed by: STUDENT IN AN ORGANIZED HEALTH CARE EDUCATION/TRAINING PROGRAM

## 2025-04-10 PROCEDURE — 99233 SBSQ HOSP IP/OBS HIGH 50: CPT | Performed by: INTERNAL MEDICINE

## 2025-04-10 PROCEDURE — 99223 1ST HOSP IP/OBS HIGH 75: CPT | Performed by: STUDENT IN AN ORGANIZED HEALTH CARE EDUCATION/TRAINING PROGRAM

## 2025-04-10 PROCEDURE — 93010 ELECTROCARDIOGRAM REPORT: CPT | Performed by: INTERNAL MEDICINE

## 2025-04-10 PROCEDURE — 85014 HEMATOCRIT: CPT | Performed by: INTERNAL MEDICINE

## 2025-04-10 PROCEDURE — 82550 ASSAY OF CK (CPK): CPT | Performed by: NURSE PRACTITIONER

## 2025-04-10 RX ORDER — MAGNESIUM SULFATE HEPTAHYDRATE 40 MG/ML
2 INJECTION, SOLUTION INTRAVENOUS ONCE
Status: COMPLETED | OUTPATIENT
Start: 2025-04-10 | End: 2025-04-10

## 2025-04-10 RX ORDER — POTASSIUM CHLORIDE 1500 MG/1
40 TABLET, EXTENDED RELEASE ORAL ONCE
Status: DISCONTINUED | OUTPATIENT
Start: 2025-04-10 | End: 2025-04-12 | Stop reason: HOSPADM

## 2025-04-10 RX ADMIN — FAMOTIDINE 40 MG: 20 TABLET, FILM COATED ORAL at 06:15

## 2025-04-10 RX ADMIN — PANTOPRAZOLE SODIUM 40 MG: 40 TABLET, DELAYED RELEASE ORAL at 06:15

## 2025-04-10 RX ADMIN — MAGNESIUM SULFATE HEPTAHYDRATE 2 G: 40 INJECTION, SOLUTION INTRAVENOUS at 09:45

## 2025-04-10 RX ADMIN — DAPTOMYCIN 275 MG: 500 INJECTION, POWDER, LYOPHILIZED, FOR SOLUTION INTRAVENOUS at 12:05

## 2025-04-10 RX ADMIN — Medication 250 MG: at 09:13

## 2025-04-10 RX ADMIN — OXYBUTYNIN CHLORIDE 10 MG: 10 TABLET, EXTENDED RELEASE ORAL at 09:13

## 2025-04-10 RX ADMIN — B-COMPLEX W/ C & FOLIC ACID TAB 1 TABLET: TAB at 09:13

## 2025-04-10 RX ADMIN — ENOXAPARIN SODIUM 40 MG: 40 INJECTION SUBCUTANEOUS at 09:13

## 2025-04-10 RX ADMIN — Medication 250 MG: at 21:20

## 2025-04-10 RX ADMIN — ARTIFICIAL TEARS 1 DROP: 1; 2; 3 SOLUTION/ DROPS OPHTHALMIC at 21:20

## 2025-04-10 NOTE — ASSESSMENT & PLAN NOTE
Upon arrival extensive imaging obtained of multiple wounds along patient's R hip, buttocks, sacrum/coccyx, lower and middle back, R leg, posterior thighs, L heel and foot, R ankle, and left groin. Patient is paraplegic which increases risk for skin injury. She has long history of evaluation at  [facility] and with [facility] who visited her at a previous nursing facility, as well as multiple admissions at [facility] and [facility] for wound infections. Patient has required previous surgical debridements, flapping, and skin grafts. Patient would benefit from formal wound management evaluation while inpatient.  -serial skin exams  -recommend frequent turning/repositioning to offload pressure from the wounds  -recommend formal wound management team evaluation

## 2025-04-10 NOTE — ASSESSMENT & PLAN NOTE
Chronic osteomyelitis in the setting of chronic wounds, pressure, malnutrition, and paraplegia. Patient with previous wound care, flapping, and skin graft with Dr. Jacobo. Then under care at [facility] with surgery, plastic surgery, and ID teams. Patient has been deemed poor surgical candidate for hemipelvectomy and conservative management was favored. I personally reviewed patient's newest CT A/P wo contrast from 4/9/2025 which showed R posterior hip wound with packing which extends to region of the greater trochanter with no obvious fluid collection although not fully visualized without contrast, fragmentation and bony remodeling at the R femur and acetabulum. Would not recommend patient pursue long term antibiotic treatment as there would be no benefit without extensive bony debridement, optimized nutrition, and flap closure.   -monitor for acute wound infection and treat as needed  -on role for long term antibiotic treatment for this time

## 2025-04-10 NOTE — ASSESSMENT & PLAN NOTE
Has chronic wound over right hip which is likely chronic osteomyelitis given exposure of bone in the area. This was being managed by [facility] ID and surgery previously. Was noted by facility to be draining more over the past week and wound evolving and getting bigger, concern for infection prompting ED visit  ED gave 1 dose of Daptomycin  General surgery consulted on the case  Wound care nurse consult placed  Prior [facility] records reviewed  from 2024 and in the past patient declined any type of surgical intervention for her hip as risk of surgery would outweigh benefit in regards to quality of life and possibility of not making it through surgery.  Patient knowledgeable of chronic right hip osteomyelitis and was concerned for infection on top of this.  She is agreeable to IV antibiotics here but knows there is no cure for her chronic hip osteomyelitis.   Surgery evaluated and no surgical intervention recommended, wound can be packed with dry Kerlix  ID input appreciated on antibiotics.   Consider palliative care consult to help with management

## 2025-04-10 NOTE — MALNUTRITION/BMI
This medical record reflects one or more clinical indicators suggestive of malnutrition and/or morbid obesity.    Malnutrition Findings:   Adult Malnutrition type: Chronic illness  Adult Degree of Malnutrition: Other severe protein calorie malnutrition  Malnutrition Characteristics: Muscle loss, Fat loss, Inadequate energy, Weight loss                360 Statement: Severe protein/calorie malnutrition r/t inadequate intake as evidenced by 12% unplanned wt loss since 3/11/24, consuming < 75% energy intake compared to estimated energy needs, fat/muscle wasting or orbital/temple/clavicle areas observed. Treated with Regular diet, pt refusing oral nutrition supplements    BMI Findings:           Body mass index is 20.11 kg/m².     See Nutrition note dated 4/10/25 for additional details.  Completed nutrition assessment is viewable in the nutrition documentation.

## 2025-04-10 NOTE — ASSESSMENT & PLAN NOTE
Per available medical recorders past injury was at T8 level. This is risk factor for skin injuries.  -serial skin exams  -recommend frequent turning/repositioning to offload pressure

## 2025-04-10 NOTE — ASSESSMENT & PLAN NOTE
Patient sent from facility after they noted increased drainage from site. New CT A/P obtained wo contrast which showed packed R posterior hip wound with no obvious fluid collection, however not fully visually without contrast. There is also fragmentation and bony remodeling at the R femur and acetabulum consistent with chronic osteomyelitis. ED collected swab wound culture, however with chronicity of wound from patient in a long term care facility, results will likely be polymicrobial with some pathogens being colonizers and skin naun. General surgery has assessed the wound, no plans for surgical intervention. Recommend formal wound management evaluation. The patient has been started on IV Daptomycin which she appears to be tolerating without difficulty. CK has not been checked, will need to send now. Wound without purulent drainage on exam this morning and does not appears overtly infected on exam. I will continue Daptomycin for now while we await blood culture results and monitor her vitals and labs.  -continue IV Daptomycin for now  -check CK now to establish baseline  -check CBCD and BMP tomorrow  -follow up blood cultures  -monitor vitals  -serial wound exams  -wound management per general surgery  -continue follow up with general surgery   -recommend formal wound management team evaluation  -recommend formal RD evaluation to optimize nutrition for wound healing  -recommend frequent turning/repositioning to offload pressure from the wound

## 2025-04-10 NOTE — CONSULTS
Severe protein calorie malnutrition is suggestive based on clinical indicators described in the malnutrition note dated 4/10/25 found in progress notes. Pt does have good appetite today, consumed 100% lunch. Refusing all oral nutrition supplements due to dislike. Encouraged pt to consume adequate protein via diet

## 2025-04-10 NOTE — ASSESSMENT & PLAN NOTE
POA, meets criteria given tachycardia, hypotension, leukocytosis in setting of suspected right hip infection  This is in the setting of chronic right hip osteomyelitis  Received Daptomycin in the ED  Blood cultures obtained  ID consulted for antibiotic recommendations

## 2025-04-10 NOTE — ASSESSMENT & PLAN NOTE
Results from last 7 days   Lab Units 04/10/25  0822 04/10/25  0521 04/09/25  1316   HEMOGLOBIN g/dL 6.9* 6.5* 8.2*   A.m. drop in hemoglobin noted and again on repeat  No evidence of active bleeding from right hip wound  Obtained blood consent  Transfused with 1 unit PRBCs

## 2025-04-10 NOTE — UTILIZATION REVIEW
Initial Clinical Review    Admission: Date/Time/Statement:   Admission Orders (From admission, onward)       Ordered        04/09/25 1633  INPATIENT ADMISSION  Once                          Orders Placed This Encounter   Procedures    INPATIENT ADMISSION     Standing Status:   Standing     Number of Occurrences:   1     Level of Care:   Med Surg [16]     Estimated length of stay:   More than 2 Midnights     Certification:   I certify that inpatient services are medically necessary for this patient for a duration of greater than two midnights. See H&P and MD Progress Notes for additional information about the patient's course of treatment.     ED Arrival Information       Expected   -    Arrival   4/9/2025 12:03    Acuity   Urgent              Means of arrival   Ambulance    Escorted by   [facility]    Service   Hospitalist    Admission type   Emergency              Arrival complaint   wound check             Chief Complaint   Patient presents with    Wound Check     Pt came from nursing home for worsening R hip wound. Per ems wound was 2 separate wounds and is now one large wound with drainage       Initial Presentation: 74 y.o. female presents to the ED via EMS from nursing facility with c/o worsening R hip wound with increased size, bleeding, drainage, c/f infection, no fevers, chills, follows at OP wound center and has daily wound care in facility.  PMH:  paraplegia following spinal cord injury, GERD, indwelling isabel, . In the ED mild tachycardia, afebrile, no c/o pain.  Treated with IV fluids, IV Daptomycin.  Labs - mild leukocytosis, hgb 8.2, elevated alk phos, low NA, K, calcium.  Imaging - no intra-abd path; fecal rectal distention, R post hip wound, packed, chronic OM, poss pressure wound post L hip, not to bone.  On exam normal breath sounds, Heart RRR, abd soft, +deep ulcer over right hip with bloody discharge. Packing placed, alert.  Admitted to INPATIENT status with Sepsis, R hip OM,  malnutrition,  constipation, multiple pressure ulcers skin - PLAN: IV antibiotics, blood cultures, ID and surgery consults, nutrition consult, bowel regimen, q 2 hr turns, wound care consult.      Anticipated Length of Stay/Certification Statement: Patient will be admitted on an inpatient basis with an anticipated length of stay of greater than 2 midnights secondary to right hip osteomyelitis.     Date: 4/10   Day 2:    Sepsis, R hip OM, malnutrition,  constipation, multiple pressure ulcers skin - per surgery - no intervention, continue IV antibiotics, wound care, poss Palliative Care consult, transfused with 1 u PRBC for hgb down to 6.5 and 6.9.  constipation improving. Replete K and Mag and trend. On exam is ill-appearing, normal breath sounds, R hip dsg in place, oriented x 2.  Remains tachycardic w/ soft BP today.      4/10 ID Consult - Decubitus ulcer of trochanteric area R hip, chronic R hip OM, multiple pressure ulcers skin, hypotension, allergies to antibiotics - continue IV Daptomycin, check CK, blood cultures, serial wound exams, forma wound care eval, maximize nutrition, freq turns, mon for acute infectious changes.     4/10 Surgery Consult -  wounds appear clean with no surrounding fluid collections, purulent drainage, crepitus, erythema or cause for concern.  Continue wound care per wound care team, no surgical intervention, pack with dry Kerlix.        Date: 4/11  Day 3: Has surpassed a 2nd midnight with active treatments and services.  Sepsis, R hip OM, malnutrition,  constipation, multiple pressure ulcers skin - had been on IV antibiotics, which are d/c today.  Blood cultures negative.  Geriatric consult.  BP Improved  but still tachycardic. Hgb 7.8, improving .     4/11 Palliative Care Consult - pt is alert and has capacity to make complex decisions.  She understands her potentially high risk situation. Not ready for hospice.  Will continue with Palliative care w/ OP services at her facility.       ED Treatment-Medication Administration from 04/09/2025 1203 to 04/09/2025 1734         Date/Time Order Dose Route Action     04/09/2025 1322 multi-electrolyte (Plasmalyte-A/Isolyte-S PH 7.4/Normosol-R) IV bolus 1,000 mL 1,000 mL Intravenous New Bag     04/09/2025 1716 DAPTOmycin (CUBICIN) 175 mg in sodium chloride 0.9 % 50 mL IVPB 175 mg Intravenous New Bag            Scheduled Medications:  Artificial Tears, 1 drop, Both Eyes, HS  enoxaparin, 40 mg, Subcutaneous, Daily  famotidine, 40 mg, Oral, Daily Before Breakfast  multivitamin stress formula, 1 tablet, Oral, Daily  oxybutynin, 10 mg, Oral, Daily  pantoprazole, 40 mg, Oral, Daily Before Breakfast  potassium chloride, 40 mEq, Oral, Once  saccharomyces boulardii, 250 mg, Oral, BID  senna-docusate sodium, 1 tablet, Oral, BID  IV Daptomycin daily - d/c 10/11    Continuous IV Infusions:     PRN Meds:  acetaminophen, 650 mg, Oral, Q6H PRN  melatonin, 3 mg, Oral, HS PRN  ondansetron, 4 mg, Intravenous, Q6H PRN      ED Triage Vitals   Temperature Pulse Respirations Blood Pressure SpO2 Pain Score   04/09/25 1206 04/09/25 1206 04/09/25 1206 04/09/25 1207 04/09/25 1206 04/09/25 1735   97.9 °F (36.6 °C) (!) 106 17 114/56 99 % No Pain     Weight (last 2 days)       Date/Time Weight    04/09/25 1805 46.7 (102.96)    04/09/25 1207 46.7 (102.96)            Vital Signs (last 3 days)       Date/Time Temp Pulse Resp BP MAP (mmHg) SpO2 O2 Device Patient Position - Orthostatic VS [place] Coma Scale Score Pain    04/11/25 0918 -- -- -- -- -- -- -- -- 15 No Pain    04/11/25 0800 97.8 °F (36.6 °C) 101 18 98/59 65 97 % None (Room air) Lying -- --    04/10/25 2343 96.7 °F (35.9 °C) 67 21 104/56 73 98 % -- Lying -- --    04/10/25 1930 -- -- -- -- -- -- None (Room air) -- 15 No Pain    04/10/25 1745 98.1 °F (36.7 °C) 107 18 101/60 -- -- -- -- -- --    04/10/25 1531 97.9 °F (36.6 °C) 110 18 104/73 -- -- -- -- -- --    04/10/25 1519 98 °F (36.7 °C) 107 18 101/62 -- -- -- -- -- --     04/10/25 1514 98.1 °F (36.7 °C) 105 18 98/62 -- -- -- -- -- --    04/10/25 1050 98.2 °F (36.8 °C) 109 18 89/57 -- 97 % None (Room air) Lying -- --    04/10/25 0900 -- -- -- -- -- -- -- -- -- No Pain    04/10/25 0807 97.5 °F (36.4 °C) 107 18 82/49 -- 97 % None (Room air) Lying -- --    04/09/25 2242 99.7 °F (37.6 °C) 122 16 102/50 70 96 % None (Room air) Lying -- --    04/09/25 2058 100 °F (37.8 °C) 110 18 97/57 66 97 % None (Room air) Lying -- --    04/09/25 2000 -- -- -- -- -- -- -- -- 15 No Pain    04/09/25 1806 -- -- -- -- -- -- -- -- 15 No Pain    04/09/25 1805 97.9 °F (36.6 °C) 109 22 90/53 77 98 % -- -- -- --    04/09/25 1735 97.9 °F (36.6 °C) 109 22 90/53 -- 98 % None (Room air) Lying -- No Pain    04/09/25 1630 -- 106 22 105/59 77 99 % None (Room air) Sitting -- --    04/09/25 1621 -- 97 18 105/59 77 97 % -- Sitting -- --    04/09/25 1415 -- 90 19 107/54 77 98 % None (Room air) Lying -- --    04/09/25 1345 -- 50 20 106/56 79 99 % None (Room air) Lying -- --    04/09/25 1227 -- -- -- -- -- -- -- -- 15 --    04/09/25 1207 -- -- -- 114/56 79 -- -- Lying -- --    04/09/25 1206 97.9 °F (36.6 °C) 106 17 -- -- 99 % None (Room air) Lying -- --              Pertinent Labs/Diagnostic Test Results:   Radiology:  CT abdomen pelvis wo contrast   Final Interpretation by Ana Barbosa MD (04/09 2523)   No acute intra-abdominal pathology.   Fecal distention of the rectum, concerning for fecal impaction.   Right posterior hip wound, packed. Limited evaluation for fluid collection without contrast. No obvious fluid collection. The wound extends to the expected region of the greater trochanter, with bony exposure evident on image 181 series 2.   Redemonstrated fragmentation and extensive bony remodeling at the proximal right femur and the right acetabulum.   Similar chronic changes in the left hip, not evident in 2020. Currently,  fragmentation and dislocation of the left femoral head, and marked hyperostosis along the left  femoral shaft and intramedullary jony. Correlate with prior work-up for possible    etiology. Chronic osteomyelitis is considered in the appropriate history.   Probable pressure wound in the posterior left hip image 160 series 2 without demonstrable extension to the bone.         Workstation performed: [id number]           Cardiology:  ECG 12 lead   Final Result by Demian Hough MD (04/10 1618)   Age and gender specific ECG analysis    Sinus tachycardia   Right axis deviation   Poor anterior R wave progression is noted   Abnormal ECG      Confirmed by Demian Hough ([id number]) on 4/10/2025 4:18:20 PM      ECG 12 lead   Final Result by Demian Hough MD (04/10 1614)   Age and gender specific ECG analysis    Normal sinus rhythm   Poor anterior R wave progression is noted   ST & T wave abnormality, consider anterior ischemia   Abnormal ECG      Confirmed by Demian Hough ([id number]) on 4/10/2025 4:13:58 PM      ECG 12 lead   Final Result by Demian Hough MD (04/10 1611)   Age and gender specific ECG analysis    Sinus tachycardia   Poor anterior R wave progression is noted   ST & T wave abnormality, consider anterior ischemia   Abnormal ECG      Confirmed by Demian Hough ([id number]) on 4/10/2025 4:11:21 PM        GI:  No orders to display                 Results from last 7 days   Lab Units 04/11/25  0618 04/10/25  0521 04/09/25  1316   SODIUM mmol/L 134* 131* 131*   POTASSIUM mmol/L 3.6 3.2* 3.2*   CHLORIDE mmol/L 101 99 97   CO2 mmol/L 28 26 27   ANION GAP mmol/L 5 6 7   BUN mg/dL 8 10 10   CREATININE mg/dL 0.32* 0.27* 0.41*   EGFR ml/min/1.73sq m 110 117 102   CALCIUM mg/dL 7.8* 7.3* 8.0*   MAGNESIUM mg/dL 2.0 1.6*  --      Results from last 7 days   Lab Units 04/11/25  0618 04/10/25  0521 04/09/25  1316   AST U/L 9* 8* 11*   ALT U/L 6* 6* 7   ALK PHOS U/L 109* 103 136*   TOTAL PROTEIN g/dL 6.5 6.0* 7.7   ALBUMIN g/dL 2.2* 2.2* 2.7*   TOTAL BILIRUBIN mg/dL 0.44 0.33 0.44   BILIRUBIN DIRECT mg/dL  --   --  0.17          Results from last 7 days   Lab Units 04/11/25  0618 04/10/25  0521 04/09/25  1316   GLUCOSE RANDOM mg/dL 105 112 103       Results from last 7 days   Lab Units 04/10/25  1051   CK TOTAL U/L 64     Results from last 7 days   Lab Units 04/09/25  1716 04/09/25  1532 04/09/25  1316   HS TNI 0HR ng/L  --   --  3   HS TNI 2HR ng/L  --  <2  --    HSTNI D2 ng/L  --  <-1  --    HS TNI 4HR ng/L <2  --   --    HSTNI D4 ng/L <-1  --   --          Results from last 7 days   Lab Units 04/09/25  1316   PROTIME seconds 16.0*   INR  1.26*   PTT seconds 34         Results from last 7 days   Lab Units 04/10/25  0521 04/09/25  1316   PROCALCITONIN ng/ml 0.32* 0.24     Results from last 7 days   Lab Units 04/09/25  1316   LACTIC ACID mmol/L 1.4       Results from last 7 days   Lab Units 04/09/25  1330 04/09/25  1316   BLOOD CULTURE   --  No Growth at 24 hrs.  No Growth at 24 hrs.   GRAM STAIN RESULT  2+ Gram positive cocci in pairs and chains*  2+ Gram positive rods*  1+ Polys*  --    WOUND CULTURE  2+ Growth of Proteus species*  2+ Growth of Beta Hemolytic Streptococcus Group A*  --                    Past Medical History:   Diagnosis Date    Anemia     iron defiencey    Arthritis     osteo    Back injury     C. difficile diarrhea 4/7/2019    Closed lateral dislocation of distal end of right femur 3/10/2020    Depression     MRSA (methicillin resistant staph aureus) culture positive 12/07/2018    BONE-TISSUE     Osteopenia     Osteoporosis     Paralysis ([facility])     paraplegic due to spinal cord injury    Paraplegia ([facility])     Poor circulation     Pressure injury of skin     right hip, stage 3    Pressure sore of hip     right    Sepsis ([facility]) 3/28/2020    Sepsis due to anaerobes ([facility]) 1/6/2019    Tibial plateau fracture     Underweight      Present on Admission:   Sepsis without acute organ dysfunction ([facility])   Severe protein-calorie malnutrition    Pressure ulcers of skin of multiple topographic sites   GERD (gastroesophageal reflux  disease)   Constipation   Ambulatory dysfunction   Hypokalemia      Admitting Diagnosis: Cellulitis [L03.90]  Severe protein-calorie malnutrition (HCC) [E43]  Visit for wound check [Z51.89]  Chronic osteomyelitis (HCC) [M86.60]  Age/Sex: 74 y.o. female    Network Utilization Review Department  ATTENTION: Please call with any questions or concerns to [phone] and carefully listen to the prompts so that you are directed to the right person. All voicemails are confidential.   For Discharge needs, contact Care Management DC Support Team at [phone] opt. [phone]  Send all requests for admission clinical reviews, approved or denied determinations and any other requests to dedicated fax number below belonging to the campus where the patient is receiving treatment. List of dedicated fax numbers for the Facilities:  FACILITY NAME UR FAX NUMBER   ADMISSION DENIALS (Administrative/Medical Necessity) [phone]   DISCHARGE SUPPORT TEAM (NETWORK) [phone]   PARENT CHILD HEALTH (Maternity/NICU/Pediatrics) [phone]   [facility] [phone]   [facility] [phone]   [facility] [phone]   [facility] [phone]   [facility] [phone]   [facility] [phone]   [facility] [phone]   [facility] [phone]   [facility] [phone]   [facility] [phone]   [facility] [phone]   [facility] [phone]

## 2025-04-10 NOTE — ASSESSMENT & PLAN NOTE
74-year-old female with history of right hip osteomyelitis and chronic wounds    Plan  Wounds were examined at bedside, wounds appear clean with no surrounding fluid collections, purulent drainage, crepitus, erythema or cause for concern.  Would continue wound care per wound care service.  No surgical intervention.  Wounds can be packed with dry Kerlix.  Remainder of care per primary service.

## 2025-04-10 NOTE — CONSULTS
Consultation - Infectious Disease   Name: Lizbeth Ribeiro 74 y.o. female I MRN: [id number]  Unit/Bed#: E4 -01 I Date of Admission: 4/9/2025   Date of Service: 4/10/2025 I Hospital Day: 1   Inpatient consult to Infectious Diseases  Consult performed by: CONCEPCION Kumar  Consult ordered by: Ana Chiu PA-C      Physician Requesting Evaluation: Marlene Mcneill DO   Reason for Evaluation / Principal Problem: visit for wound check, cellulitis, chronic osteomyelitis  Assessment & Plan  Decubitus ulcer of trochanteric region of right hip  Patient sent from facility after they noted increased drainage from site. New CT A/P obtained wo contrast which showed packed R posterior hip wound with no obvious fluid collection, however not fully visually without contrast. There is also fragmentation and bony remodeling at the R femur and acetabulum consistent with chronic osteomyelitis. ED collected swab wound culture, however with chronicity of wound from patient in a long term care facility, results will likely be polymicrobial with some pathogens being colonizers and skin naun. General surgery has assessed the wound, no plans for surgical intervention. Recommend formal wound management evaluation. The patient has been started on IV Daptomycin which she appears to be tolerating without difficulty. CK has not been checked, will need to send now. Wound without purulent drainage on exam this morning and does not appears overtly infected on exam. I will continue Daptomycin for now while we await blood culture results and monitor her vitals and labs.  -continue IV Daptomycin for now  -check CK now to establish baseline  -check CBCD and BMP tomorrow  -follow up blood cultures  -monitor vitals  -serial wound exams  -wound management per general surgery  -continue follow up with general surgery   -recommend formal wound management team evaluation  -recommend formal RD evaluation to optimize nutrition for wound  healing  -recommend frequent turning/repositioning to offload pressure from the wound   Hip osteomyelitis, right (HCC)  Chronic osteomyelitis in the setting of chronic wounds, pressure, malnutrition, and paraplegia. Patient with previous wound care, flapping, and skin graft with Dr. Jacobo. Then under care at [facility] with surgery, plastic surgery, and ID teams. Patient has been deemed poor surgical candidate for hemipelvectomy and conservative management was favored. I personally reviewed patient's newest CT A/P wo contrast from 4/9/2025 which showed R posterior hip wound with packing which extends to region of the greater trochanter with no obvious fluid collection although not fully visualized without contrast, fragmentation and bony remodeling at the R femur and acetabulum. Would not recommend patient pursue long term antibiotic treatment as there would be no benefit without extensive bony debridement, optimized nutrition, and flap closure.   -monitor for acute wound infection and treat as needed  -on role for long term antibiotic treatment for this time  Pressure ulcers of skin of multiple topographic sites  Upon arrival extensive imaging obtained of multiple wounds along patient's R hip, buttocks, sacrum/coccyx, lower and middle back, R leg, posterior thighs, L heel and foot, R ankle, and left groin. Patient is paraplegic which increases risk for skin injury. She has long history of evaluation at  [facility] and with [facility] who visited her at a previous nursing facility, as well as multiple admissions at [facility] and [facility] for wound infections. Patient has required previous surgical debridements, flapping, and skin grafts. Patient would benefit from formal wound management evaluation while inpatient.  -serial skin exams  -recommend frequent turning/repositioning to offload pressure from the wounds  -recommend formal wound management team evaluation  Hypotension  Blood pressures noted to be low  this morning during and after first morning vitals. Patient with low RBC, H&H. Consider role of patient's chronic issues with anemia, iron. Consider role of volume although patient does tell me she accepted IV fluids yesterday. Less likely related to wound above, but will follow up on blood cultures and wound evaluation.  -ongoing wound assessment/care as above  -check CBCD tomorrow   -monitor vitals  -management per primary service   Paraplegia following spinal cord injury  Per available medical recorders past injury was at T8 level. This is risk factor for skin injuries.  -serial skin exams  -recommend frequent turning/repositioning to offload pressure  Severe protein-calorie malnutrition   Malnutrition Findings:   Body mass index is 20.11 kg/m².   This is risk factor for wounds and complications with healing.  -recommend formal RD evaluation  Allergy to antibiotic  Patient reports rash with cefepime, rash and swelling with ciprofloxacin, and rash with vancomycin. We will avoid these antibiotics for now.  -monitor patient for adverse medication reactions    Above plan was discussed in detail with patient at the bedside.  Above plan was discussed in detail with SLIM regarding plan for continued antibiotic.     HISTORY OF PRESENT ILLNESS:  HPI: Lizbeth Ribeiro is a 74 y.o. year old female who presented to the [facility] ED from nursing facility on 4/9/2025 with increased drainage from chronic R hip wound. Per available medical records wound is chronic and pressure related. She has extensive history at [facility] with surgery, plastic surgery, and ID teams, and has been deemed not a surgical candidate.    Upon arrival to the ED the patient's temperature was 97.9. HR was 106. RR was 17. O2 saturation was 99% on room air. BP was 114/56. WBC count was 10.67. Lactic acid was 1.4. Creatinine was 0.41 with GFR = 102. Alk phos was elevated. Na was 131. Blood cultures were sent. Swab wound culture was sent. She  completed a CT A/P wo contrast which showed R posterior hip wound with packing extending to region of the greater trochanter with no obvious fluid collection although not fully visualized without contrast, fragmentation and bony remodeling at the R femur and acetabulum, fragmentation and dislocation of the left femoral head, and hyperostosis along the left femoral shaft and intramedullary jony. The patient was given Daptomycin. She was admitted for additional management.    After her admission the patient was evaluated by general surgery. Ongoing packing recommended, no plan for surgical intervention. We have been asked for formal consult for visit for wound check, cellulitis, chronic osteomyelitis.    The patient has paraplegia due to spinal cord injury, osteoporosis, arthritis, anemia, GERD, malnutrition, chronic idiopathic constipation, neurogenic bladder, chronic decubitus ulcers, osteopenia, monoclonal gammopathy of unclear significance, ambulatory dysfunction, and recurrent depression. She has a history of ear pain, squamous cell carcinoma on RLE, diarrhea, suicidal ideations, skin lesions, wounds, c diff diarrhea, back injury, R femur dislocation, MRSA infection, sepsis, tibial plateau fracture, local trunk flaps, ulcer excision, DPC on perianal wound, hip debridement, L hip ORIF prosthetic infection, PICC treatment, toe amputation, wisdom tooth extraction, L femur screw, and surgical wound debridements. She has allergies to IV contrast, cefepime, ciprofloxacin, and vancomycin.    REVIEW OF SYSTEMS:  Patient reports she was doing fine last night but is now worried about her blood pressure. She reports she was told it was low this morning and patient reports she doesn't understand why. She denies headache, dizziness, and neck stiffness. She denies chest pain, difficulty breathing, cough, and SOB. She denies nausea, vomiting, abdominal pain. She reports no concerns with her isabel. She cannot recall her last  "bowel movement but tells me she gets constipated frequently. Patient denies pain at site of her R hip wounds. Reports that she gets regular wound care and doesn't think that anyone is \"in charge\" of her wounds anymore as she doesn't think she was told to go back anywhere. Patient reports she was told in the past she would need a major surgery that would involve her hip and the top of her leg and that is something she doesn't want. She reports \"the wounds sometimes change and I think that's what's happening.\" Patient was agreeable and cooperative to me examining the wounds and removing her dressing. RN at bedside was gathering supplies to replace with clean packing/ABD after I finished. A complete 12 point system-based review of systems is otherwise negative.    PAST MEDICAL HISTORY:  Past Medical History:   Diagnosis Date    Anemia     iron defiencey    Arthritis     osteo    Back injury     C. difficile diarrhea 4/7/2019    Closed lateral dislocation of distal end of right femur 3/10/2020    Depression     MRSA (methicillin resistant staph aureus) culture positive 12/07/2018    BONE-TISSUE     Osteopenia     Osteoporosis     Paralysis (HCC)     paraplegic due to spinal cord injury    Paraplegia ([facility])     Poor circulation     Pressure injury of skin     right hip, stage 3    Pressure sore of hip     right    Sepsis ([facility]) 3/28/2020    Sepsis due to anaerobes ([facility]) 1/6/2019    Tibial plateau fracture     Underweight      Past Surgical History:   Procedure Laterality Date    CLOSURE DELAYED PRIMARY N/A 3/20/2019    Procedure: OPHELIA ANAL WOUND RECLOSURE;  Surgeon: Jarrod Jacobo MD;  Location:  MAIN OR;  Service: Plastics    DECUBITUS ULCER EXCISION Bilateral 11/12/2019    Procedure: DEBRIDEMENT DECUBITI (WASH OUT);  Surgeon: Zach Casarez DO;  Location: [facility];  Service: General    FLAP LOCAL EXTREMITY Left 1/28/2019    Procedure: THIGH FLAP & STSG TO CLOSE HIP WOUND;  Surgeon: Jarrod Jacobo MD;  Location: "  MAIN OR;  Service: Plastics    FLAP LOCAL TRUNK Right 12/17/2018    Procedure: DEBRIDE/FLAP CLOSURE HIP PRESSURE SORE W/SKIN GRAFT;  Surgeon: Jarrod Jacobo MD;  Location:  MAIN OR;  Service: Plastics    FLAP LOCAL TRUNK Left 2/27/2019    Procedure: BUTTOCK FLAP TO ISCHIAL SORE;  Surgeon: Jarrod Jacobo MD;  Location:  MAIN OR;  Service: Plastics    HIP DEBRIDEMENT Right 2017    right hip sore debridement    INCISION AND DRAINAGE OF WOUND Left 1/3/2019    Procedure: INCISION AND DRAINAGE (I&D) left distal femur. With deep wound cultures. Application of VAC DRAIN SPONGE;  Surgeon: Chidi Bingham MD;  Location:  MAIN OR;  Service: Orthopedics    IR PICC LINE  12/19/2018    IR PICC LINE  3/12/2019    DE DEBRIDEMENT BONE 1ST 20 SQ CM/< Right 9/19/2018    Procedure: DEBRIDEMENT OF HIP PRESSURE SORE;  Surgeon: Jarrod Jacobo MD;  Location:  MAIN OR;  Service: Plastics    DE OPTX FEM SHFT FX W/INSJ IMED IMPLT W/WO SCREW Left 10/22/2018    Procedure: INSERTION NAIL IM LEFT FEMUR RETROGRADE;  Surgeon: Ciaran Noriega MD;  Location:  MAIN OR;  Service: Orthopedics    TOE AMPUTATION Left 2017    small toe left foot amputation    WISDOM TOOTH EXTRACTION      WOUND DEBRIDEMENT Left 12/17/2018    Procedure: DEBRIDEMENT HIP PRESSURE SORE;  Surgeon: Jarrod Jacobo MD;  Location:  MAIN OR;  Service: Plastics    WOUND DEBRIDEMENT N/A 11/10/2019    Procedure: EXCISIONAL DEBRIDEMENT;  Surgeon: Melva Guo MD;  Location:  MAIN OR;  Service: General     FAMILY HISTORY:  Non-contributory    SOCIAL HISTORY:  Social History   Social History     Substance and Sexual Activity   Alcohol Use Not Currently    Comment: occasional     Social History     Substance and Sexual Activity   Drug Use No     Social History     Tobacco Use   Smoking Status Never   Smokeless Tobacco Never     ALLERGIES:  Allergies   Allergen Reactions    Iv Contrast [Iodinated Contrast Media]      Tingling face, itching    Cefepime Rash     Ciprofloxacin Rash and Swelling     Looked like suburn, itching  Bed sores  Swelling, itching    Vancomycin Rash     Unknown      MEDICATIONS:  All current active medications have been reviewed.    ANTIBIOTICS:  Daptomycin 1  Antibiotics 1    PHYSICAL EXAM:  Temp:  [97.5 °F (36.4 °C)-100 °F (37.8 °C)] 97.5 °F (36.4 °C)  HR:  [] 107  Resp:  [16-22] 18  BP: ()/(49-59) 82/49  SpO2:  [96 %-99 %] 97 %  Temp (24hrs), Av.5 °F (36.9 °C), Min:97.5 °F (36.4 °C), Max:100 °F (37.8 °C)  Current: Temperature: 97.5 °F (36.4 °C)    Intake/Output Summary (Last 24 hours) at 4/10/2025 0906  Last data filed at 4/10/2025 0601  Gross per 24 hour   Intake 1290 ml   Output 0 ml   Net 1290 ml     General Appearance:  Appearing well, nontoxic, and in no distress. She appears comfortable supine in bed, she did agree to rolling slightly to left so I could see the wounds.   Head:  Normocephalic, without obvious abnormality, atraumatic.   Eyes:  Conjunctiva pink and sclera anicteric, both eyes.   Nose: Nares normal, mucosa normal, no drainage.   Throat: Oropharynx moist without lesions.   Lungs:   Clear to auscultation bilaterally, respirations unlabored on room air.   Chest Wall:  No tenderness or deformity.   Heart:  Tachycardic; no murmur, rub or gallop.   Abdomen:   Soft, non-tender, non-distended, positive bowel sounds throughout.   Genitourinary: [place] intact.   Extremities: B/L LE paraplegia, needs assistance with movement. No cyanosis or clubbing. R hip with 2 wounds over great trochanter region. More lateral wound with heavy serous and serosanguinous output saturating gauze packing which I removed for exam, base is pale while and pink fibrous tissue, periwound without erythema. More posterior wound also with heavy serous and serosanguinous output saturating gauze packing which I removed for exam, base is fibrous red/pink with some paler tissue, periwound without erythema. I did not probe to assess for connection.    Skin: No rashes noted on exposed skin. Generalized cool to touch.   Neurologic: Alert and oriented times 3, follows commands within limits of paraplegia, speech is organized and appropriate, symmetric facial movement.         LABS, IMAGING, & OTHER STUDIES:  Lab Results:  I have personally reviewed pertinent labs.  Results from last 7 days   Lab Units 04/10/25  0521 04/09/25  1316   WBC Thousand/uL 10.34* 10.67*   HEMOGLOBIN g/dL 6.5* 8.2*   PLATELETS Thousands/uL 534* 528*     Results from last 7 days   Lab Units 04/10/25  0521 04/09/25  1316   POTASSIUM mmol/L 3.2* 3.2*   CHLORIDE mmol/L 99 97   CO2 mmol/L 26 27   BUN mg/dL 10 10   CREATININE mg/dL 0.27* 0.41*   EGFR ml/min/1.73sq m 117 102   CALCIUM mg/dL 7.3* 8.0*   AST U/L 8* 11*   ALT U/L 6* 7   ALK PHOS U/L 103 136*     Results from last 7 days   Lab Units 04/09/25  1330 04/09/25  1316   BLOOD CULTURE   --  Received in Microbiology Lab. Culture in Progress.  Received in Microbiology Lab. Culture in Progress.   GRAM STAIN RESULT  2+ Gram positive cocci in pairs and chains*  2+ Gram positive rods*  1+ Polys*  --      Imaging Studies:   I have personally reviewed pertinent imaging study reports and images in PACS.    CT ABDOMEN PELVIS WO CONTRAST 4/9/2025 - I personally reviewed this study which showed no abnormalities in the lower chest, left hepatic lobe cyst, unremarkable gallbladder/spleen/pancreas, unremarkable kidneys without hydronephrosis, fecal distention in the rectum, unremarkable bladder, R posterior hip wound with packing which extends to region of the greater trochanter with no obvious fluid collection although not fully visualized without contrast, fragmentation and bony remodeling at the R femur and acetabulum, fragmentation and dislocation of the left femoral head and hyperostosis along the left femoral shaft and intramedullary jony.    Other Studies:   Blood cultures are pending.

## 2025-04-10 NOTE — ASSESSMENT & PLAN NOTE
Was recently admitted for severe constipation at [facility]  Now improved  Continue bowel regimen

## 2025-04-10 NOTE — CONSULTS
Consultation - Surgery-General   Name: Lizbeth Ribeiro 74 y.o. female I MRN: [id number]  Unit/Bed#: E4 -01 I Date of Admission: 4/9/2025   Date of Service: 4/10/2025 I Hospital Day: 1   Inpatient consult to Acute Care Surgery  Consult performed by: Jamal Horne DO  Consult ordered by: Yuki Hayes MD        Physician Requesting Evaluation: Marlene Mcneill DO   Reason for Evaluation / Principal Problem: R hip wound     Assessment & Plan  Hip osteomyelitis, right (HCC)  74-year-old female with history of right hip osteomyelitis and chronic wounds    Plan  Wounds were examined at bedside, wounds appear clean with no surrounding fluid collections, purulent drainage, crepitus, erythema or cause for concern.  Would continue wound care per wound care service.  No surgical intervention.  Wounds can be packed with dry Kerlix.  Remainder of care per primary service.      History of Present Illness   Lizbeth Ribeiro is a 74 y.o. female with history of paraplegia following a spinal cord injury and GERD who originally presented from [facility] for right hip wound.  Patient stated that the facility had noticed an increase in size and drainage of the wound and they were concerned for an infection so patient was sent to the hospital.  She denies any other associated symptoms.  Denies fever/chills, chest pain, shortness of breath.      Objective :  Temp:  [97.9 °F (36.6 °C)-100 °F (37.8 °C)] 99.7 °F (37.6 °C)  HR:  [] 122  BP: ()/(50-59) 102/50  Resp:  [16-22] 16  SpO2:  [96 %-99 %] 96 %  O2 Device: None (Room air)    Lines/Drains/Airways       Active Status       Name Placement date Placement time Site Days    Urethral Catheter Latex;Straight-tip 16 Fr. 04/09/25  1515  Latex;Straight-tip  less than 1                  Physical Exam  Constitutional:       Appearance: Normal appearance.   HENT:      Head: Normocephalic and atraumatic.   Eyes:      Pupils: Pupils are equal,  round, and reactive to light.   Pulmonary:      Effort: Pulmonary effort is normal.   Abdominal:      General: There is no distension.      Palpations: Abdomen is soft.      Tenderness: There is no abdominal tenderness.   Musculoskeletal:      Cervical back: Normal range of motion.   Skin:     Capillary Refill: Capillary refill takes less than 2 seconds.      Comments: Right greater trocar wounds x 2, both wounds clean with no surrounding signs of infection.  The more posterior wound had some degree of tunneling which was clean, or anterior wound superficial superior tunneling.  Both wounds were repacked with dry Kerlix at bedside.  Pictures below for reference.   Neurological:      General: No focal deficit present.      Mental Status: She is alert.   Psychiatric:         Mood and Affect: Mood normal.         Behavior: Behavior normal.               Lab Results: I have reviewed the following results:  Recent Labs     04/09/25  1316 04/09/25  1532 04/10/25  0521   WBC 10.67*  --  10.34*   HGB 8.2*  --  6.5*   HCT 28.6*  --  21.4*   *  --  534*   SODIUM 131*  --  131*   K 3.2*  --  3.2*   CL 97  --  99   CO2 27  --  26   BUN 10  --  10   CREATININE 0.41*  --  0.27*   GLUC 103  --  112   MG  --   --  1.6*   AST 11*  --  8*   ALT 7  --  6*   ALB 2.7*  --  2.2*   TBILI 0.44  --  0.33   ALKPHOS 136*  --  103   PTT 34  --   --    INR 1.26*  --   --    HSTNI0 3  --   --    HSTNI2  --  <2  --    LACTICACID 1.4  --   --

## 2025-04-10 NOTE — ASSESSMENT & PLAN NOTE
Blood pressures noted to be low this morning during and after first morning vitals. Patient with low RBC, H&H. Consider role of patient's chronic issues with anemia, iron. Consider role of volume although patient does tell me she accepted IV fluids yesterday. Less likely related to wound above, but will follow up on blood cultures and wound evaluation.  -ongoing wound assessment/care as above  -check CBCD tomorrow   -monitor vitals  -management per primary service

## 2025-04-10 NOTE — PROGRESS NOTES
Progress Note - Hospitalist   Name: Lizbeth Ribeiro 74 y.o. female I MRN: [id number]  Unit/Bed#: E4 -01 I Date of Admission: 4/9/2025   Date of Service: 4/10/2025 I Hospital Day: 1    Assessment & Plan  Sepsis without acute organ dysfunction (HCC)  POA, meets criteria given tachycardia, hypotension, leukocytosis in setting of suspected right hip infection  This is in the setting of chronic right hip osteomyelitis  Received Daptomycin in the ED  Blood cultures obtained  ID consulted for antibiotic recommendations  Hip osteomyelitis, right (HCC)  Has chronic wound over right hip which is likely chronic osteomyelitis given exposure of bone in the area. This was being managed by [facility] ID and surgery previously. Was noted by facility to be draining more over the past week and wound evolving and getting bigger, concern for infection prompting ED visit  ED gave 1 dose of Daptomycin  General surgery consulted on the case  Wound care nurse consult placed  Prior [facility] records reviewed  from 2024 and in the past patient declined any type of surgical intervention for her hip as risk of surgery would outweigh benefit in regards to quality of life and possibility of not making it through surgery.  Patient knowledgeable of chronic right hip osteomyelitis and was concerned for infection on top of this.  She is agreeable to IV antibiotics here but knows there is no cure for her chronic hip osteomyelitis.   Surgery evaluated and no surgical intervention recommended, wound can be packed with dry Kerlix  ID input appreciated on antibiotics.   Consider palliative care consult to help with management  Anemia  Results from last 7 days   Lab Units 04/10/25  0822 04/10/25  0521 04/09/25  1316   HEMOGLOBIN g/dL 6.9* 6.5* 8.2*   A.m. drop in hemoglobin noted and again on repeat  No evidence of active bleeding from right hip wound  Obtained blood consent  Transfused with 1 unit PRBCs  Paraplegia following spinal cord  injury  Bedbound at baseline  Currently resides at [facility]  GERD (gastroesophageal reflux disease)  Continue famotidine and protonix  Severe protein-calorie malnutrition   Nutrition consult placed  Hays catheter in place  Chronic Hays in place this in setting of paraplegia  Constipation  Was recently admitted for severe constipation at [facility]  Now improved  Continue bowel regimen  Ambulatory dysfunction  In setting of paraplegia  Completely bed bound  Pressure ulcers of skin of multiple topographic sites  Q2h turns  Wound care nurse consulted  Hypokalemia  Potassium low at 3.2-replete  Continue to replete magnesium  Hypomagnesemia  Magnesium low at 1.6-replete with IV magnesium here  Recheck in a.m.    VTE Pharmacologic Prophylaxis:   lovenox    Mobility:   Basic Mobility Inpatient Raw Score: 6  [facility] Goal: 2: Bed activities/Dependent transfer  [facility] Achieved: 1: Laying in bed    Patient Centered Rounds: I performed bedside rounds with nursing staff today.   Discussions with Specialists or Other Care Team Provider: nursing, Dr. Mcneill, Dr. Zhong, Infectious disease team    Education and Discussions with Family / Patient: patient, Prior POA Marie, called sister Savannah - left voicemail     Current Length of Stay: 1 day(s)  Current Patient Status: Inpatient   Certification Statement: with need for continued patient stay for evaluation of wound and IV antibiotics  Discharge Plan: Pending    Code Status: Level 1 - Full Code    Subjective   Patient resting in bed.  She reports increased drainage of her right hip.  Understands she does have chronic osteomyelitis already there.  Concern for possible superficial infection on top of this.  Confirms sister is now her point of contact.    Objective :  Temp:  [97.5 °F (36.4 °C)-100 °F (37.8 °C)] 97.5 °F (36.4 °C)  HR:  [] 107  BP: ()/(49-59) 82/49  Resp:  [16-22] 18  SpO2:  [96 %-99 %] 97 %  O2 Device: None (Room air)    Body mass index is 20.11  kg/m².     Input and Output Summary (last 24 hours):     Intake/Output Summary (Last 24 hours) at 4/10/2025 1028  Last data filed at 4/10/2025 0945  Gross per 24 hour   Intake 1290 ml   Output 350 ml   Net 940 ml       Physical Exam  Vitals and nursing note reviewed.   Constitutional:       General: She is not in acute distress.     Appearance: She is ill-appearing. She is not toxic-appearing or diaphoretic.   HENT:      Head: Normocephalic and atraumatic.   Eyes:      General: No scleral icterus.  Cardiovascular:      Rate and Rhythm: Normal rate and regular rhythm.   Pulmonary:      Effort: Pulmonary effort is normal. No respiratory distress.      Breath sounds: Normal breath sounds. No stridor. No wheezing or rhonchi.   Abdominal:      General: Bowel sounds are normal. There is no distension.      Palpations: Abdomen is soft. There is no mass.      Tenderness: There is no abdominal tenderness.      Hernia: No hernia is present.   Skin:     General: Skin is warm and dry.      Comments: R hip wound with dressing in place   Neurological:      Mental Status: She is alert. Mental status is at baseline.   Psychiatric:         Attention and Perception: Attention normal.         Mood and Affect: Mood normal.         Speech: Speech normal.         Behavior: Behavior is cooperative.         Thought Content: Thought content normal.         Cognition and Memory: Cognition is not impaired.      Comments: Oriented to person and place         Lines/Drains:  Lines/Drains/Airways       Active Status       Name Placement date Placement time Site Days    Urethral Catheter Latex;Straight-tip 16 Fr. 04/09/25  1515  Latex;Straight-tip  less than 1                        Lab Results: I have reviewed the following results:   Results from last 7 days   Lab Units 04/10/25  0822 04/10/25  0521   WBC Thousand/uL  --  10.34*   HEMOGLOBIN g/dL 6.9* 6.5*   HEMATOCRIT % 23.3* 21.4*   PLATELETS Thousands/uL  --  534*   SEGS PCT %  --  61    LYMPHO PCT %  --  27   MONO PCT %  --  10   EOS PCT %  --  1     Results from last 7 days   Lab Units 04/10/25  0521   SODIUM mmol/L 131*   POTASSIUM mmol/L 3.2*   CHLORIDE mmol/L 99   CO2 mmol/L 26   BUN mg/dL 10   CREATININE mg/dL 0.27*   ANION GAP mmol/L 6   CALCIUM mg/dL 7.3*   ALBUMIN g/dL 2.2*   TOTAL BILIRUBIN mg/dL 0.33   ALK PHOS U/L 103   ALT U/L 6*   AST U/L 8*   GLUCOSE RANDOM mg/dL 112     Results from last 7 days   Lab Units 04/09/25  1316   INR  1.26*             Results from last 7 days   Lab Units 04/10/25  0521 04/09/25  1316   LACTIC ACID mmol/L  --  1.4   PROCALCITONIN ng/ml 0.32* 0.24       Recent Cultures (last 7 days):   Results from last 7 days   Lab Units 04/09/25  1330 04/09/25  1316   BLOOD CULTURE   --  Received in Microbiology Lab. Culture in Progress.  Received in Microbiology Lab. Culture in Progress.   GRAM STAIN RESULT  2+ Gram positive cocci in pairs and chains*  2+ Gram positive rods*  1+ Polys*  --    WOUND CULTURE  Culture too young- will reincubate  --              Last 24 Hours Medication List:     Current Facility-Administered Medications:     acetaminophen (TYLENOL) tablet 650 mg, Q6H PRN    Artificial Tears Op Soln 1 drop, HS    enoxaparin (LOVENOX) subcutaneous injection 40 mg, Daily    famotidine (PEPCID) tablet 40 mg, Daily Before Breakfast    magnesium sulfate 2 g/50 mL IVPB (premix) 2 g, Once, Last Rate: 2 g (04/10/25 0945)    melatonin tablet 3 mg, HS PRN    multivitamin stress formula tablet 1 tablet, Daily    ondansetron (ZOFRAN) injection 4 mg, Q6H PRN    oxybutynin (DITROPAN-XL) 24 hr tablet 10 mg, Daily    pantoprazole (PROTONIX) EC tablet 40 mg, Daily Before Breakfast    polyethylene glycol (MIRALAX) packet 17 g, Daily    potassium chloride (Klor-Con M20) CR tablet 40 mEq, Once    saccharomyces boulardii (FLORASTOR) capsule 250 mg, BID    Administrative Statements   Today, Patient Was Seen By: Ana Chiu PA-C  I have spent a total time of 72 minutes in  caring for this patient on the day of the visit/encounter including Diagnostic results, Prognosis, Risks and benefits of tx options, Instructions for management, Importance of tx compliance, Risk factor reductions, Impressions, Counseling / Coordination of care, Documenting in the medical record, Reviewing/placing orders in the medical record (including tests, medications, and/or procedures), Obtaining or reviewing history  , and Communicating with other healthcare professionals .    **Please Note: This note may have been constructed using a voice recognition system.**

## 2025-04-10 NOTE — ASSESSMENT & PLAN NOTE
Patient reports rash with cefepime, rash and swelling with ciprofloxacin, and rash with vancomycin. We will avoid these antibiotics for now.  -monitor patient for adverse medication reactions

## 2025-04-10 NOTE — PLAN OF CARE
Problem: Potential for Falls  Goal: Patient will remain free of falls  Description: INTERVENTIONS:- Educate patient/family on patient safety including physical limitations- Instruct patient to call for assistance with activity - Consult OT/PT to assist with strengthening/mobility - Keep Call bell within reach- Keep bed low and locked with side rails adjusted as appropriate- Keep care items and personal belongings within reach- Initiate and maintain comfort rounds- Make Fall Risk Sign visible to staff- Offer Toileting every 2 Hours, in advance of need- Initiate/Maintain bed alarm- Obtain necessary fall risk management equipment: socks- Apply yellow socks and bracelet for high fall risk patients- Consider moving patient to room near nurses station  Outcome: Progressing     Problem: Prexisting or High Potential for Compromised Skin Integrity  Goal: Skin integrity is maintained or improved  Description: INTERVENTIONS:- Identify patients at risk for skin breakdown- Assess and monitor skin integrity- Assess and monitor nutrition and hydration status- Monitor labs - Assess for incontinence - Turn and reposition patient- Assist with mobility/ambulation- Relieve pressure over bony prominences- Avoid friction and shearing- Provide appropriate hygiene as needed including keeping skin clean and dry- Evaluate need for skin moisturizer/barrier cream- Collaborate with interdisciplinary team - Patient/family teaching- Consider wound care consult   INTERVENTIONS:- Identify patients at risk for skin breakdown- Assess and monitor skin integrity- Assess and monitor nutrition and hydration status- Monitor labs - Assess for incontinence - Turn and reposition patient- Assist with mobility/ambulation- Relieve pressure over bony prominences- Avoid friction and shearing- Provide appropriate hygiene as needed including keeping skin clean and dry- Evaluate need for skin moisturizer/barrier cream- Collaborate with interdisciplinary team -  Patient/family teaching- Consider wound care consult   Outcome: Progressing     Problem: GENITOURINARY - ADULT  Goal: Urinary catheter remains patent  Description: INTERVENTIONS:- Assess patency of urinary catheter- If patient has a chronic isabel, consider changing catheter if non-functioning- Follow guidelines for intermittent irrigation of non-functioning urinary catheter  Outcome: Progressing     Problem: METABOLIC, FLUID AND ELECTROLYTES - ADULT  Goal: Electrolytes maintained within normal limits  Description: INTERVENTIONS:- Monitor labs and assess patient for signs and symptoms of electrolyte imbalances- Administer electrolyte replacement as ordered- Monitor response to electrolyte replacements, including repeat lab results as appropriate- Instruct patient on fluid and nutrition as appropriate  Outcome: Progressing  Goal: Fluid balance maintained  Description: INTERVENTIONS:- Monitor labs - Monitor I/O and WT- Instruct patient on fluid and nutrition as appropriate- Assess for signs & symptoms of volume excess or deficit  Outcome: Progressing     Problem: SKIN/TISSUE INTEGRITY - ADULT  Goal: Skin Integrity remains intact(Skin Breakdown Prevention)  Description: Assess:-Perform Jeffery assessment every shift-Clean and moisturize skin every 2 hours-Inspect skin when repositioning, toileting, and assisting with ADLS-Assess under medical devices such as socks every shift-Assess extremities for adequate circulation and sensation Bed Management:-Have minimal linens on bed & keep smooth, unwrinkled-Change linens as needed when moist or perspiring-Avoid sitting or lying in one position for more than 2 hours while in bed-Keep HOB at 30degrees Toileting:-Offer bedside commode-Assess for incontinence every 2 hours-Use incontinent care products after each incontinent episode such as barrier creamActivity:-Mobilize patient 2 times a day-Encourage activity and walks on unit-Encourage or provide ROM exercises -Turn and  reposition patient every 2 Hours-Use appropriate equipment to lift or move patient in bed-Instruct/ Assist with weight shifting every 2 when out of bed in chair-Consider limitation of chair time 2 hour intervalsSkin Care:-Avoid use of baby powder, tape, friction and shearing, hot water or constrictive clothing-Relieve pressure over bony prominences using barrier cream-Do not massage red bony areasNext Steps:-Teach patient strategies to minimize risks such as socks -Consider consults to  interdisciplinary teams such as pt/ot  Outcome: Progressing  Goal: Incision(s), wounds(s) or drain site(s) healing without S/S of infection  Description: INTERVENTIONS- Assess and document dressing, incision, wound bed, drain sites and surrounding tissue- Provide patient and family education- Perform skin care/dressing changes every 2 hours  Outcome: Progressing  Goal: Pressure injury heals and does not worsen  Description: Interventions:- Implement low air loss mattress or specialty surface (Criteria met)- Apply silicone foam dressing- Instruct/assist with weight shifting every 5 minutes when in chair - Limit chair time to 2 hour intervals- Use special pressure reducing interventions such as barrier cream and foam when in chair - Apply fecal or urinary incontinence containment device - Perform passive or active ROM every 2- Turn and reposition patient & offload bony prominences every 2 hours - Utilize friction reducing device or surface for transfers - Consider consults to  interdisciplinary teams such as pt/ot- Use incontinent care products after each incontinent episode such as socks- Consider nutrition services referral as needed  Outcome: Progressing     Problem: HEMATOLOGIC - ADULT  Goal: Maintains hematologic stability  Description: INTERVENTIONS- Assess for signs and symptoms of bleeding or hemorrhage- Monitor labs- Administer supportive blood products/factors as ordered and appropriate  Outcome: Progressing     Problem:  INFECTION - ADULT  Goal: Absence or prevention of progression during hospitalization  Description: INTERVENTIONS:- Assess and monitor for signs and symptoms of infection- Monitor lab/diagnostic results- Monitor all insertion sites, i.e. indwelling lines, tubes, and drains- Monitor endotracheal if appropriate and nasal secretions for changes in amount and color- [place] appropriate cooling/warming therapies per order- Administer medications as ordered- Instruct and encourage patient and family to use good hand hygiene technique- Identify and instruct in appropriate isolation precautions for identified infection/condition  Outcome: Progressing     Problem: DISCHARGE PLANNING  Goal: Discharge to home or other facility with appropriate resources  Description: INTERVENTIONS:- Identify barriers to discharge w/patient and caregiver- Arrange for needed discharge resources and transportation as appropriate- Identify discharge learning needs (meds, wound care, etc.)- Arrange for interpretive services to assist at discharge as needed- Refer to Case Management Department for coordinating discharge planning if the patient needs post-hospital services based on physician/advanced practitioner order or complex needs related to functional status, cognitive ability, or social support system  Outcome: Progressing     Problem: GASTROINTESTINAL - ADULT  Goal: Maintains or returns to baseline bowel function  Description: INTERVENTIONS:- Assess bowel function- Encourage oral fluids to ensure adequate hydration- Administer IV fluids if ordered to ensure adequate hydration- Administer ordered medications as needed- Encourage mobilization and activity- Consider nutritional services referral to assist patient with adequate nutrition and appropriate food choices  Outcome: Progressing

## 2025-04-10 NOTE — QUICK NOTE
Spoke with sister via telephone - update provided. She is her POA and is in agreement with current plan and palliative care consult.

## 2025-04-11 DIAGNOSIS — G82.20 PARAPLEGIA FOLLOWING SPINAL CORD INJURY (HCC): Primary | ICD-10-CM

## 2025-04-11 DIAGNOSIS — L89.90 DECUBITUS ULCERS: ICD-10-CM

## 2025-04-11 DIAGNOSIS — M46.28 CHRONIC OSTEOMYELITIS OF COCCYX (HCC): ICD-10-CM

## 2025-04-11 PROBLEM — Z51.5 PALLIATIVE CARE ENCOUNTER: Status: ACTIVE | Noted: 2025-04-11

## 2025-04-11 LAB
ABO GROUP BLD BPU: NORMAL
ALBUMIN SERPL BCG-MCNC: 2.2 G/DL (ref 3.5–5)
ALP SERPL-CCNC: 109 U/L (ref 34–104)
ALT SERPL W P-5'-P-CCNC: 6 U/L (ref 7–52)
ANION GAP SERPL CALCULATED.3IONS-SCNC: 5 MMOL/L (ref 4–13)
AST SERPL W P-5'-P-CCNC: 9 U/L (ref 13–39)
BILIRUB SERPL-MCNC: 0.44 MG/DL (ref 0.2–1)
BPU ID: NORMAL
BUN SERPL-MCNC: 8 MG/DL (ref 5–25)
CALCIUM ALBUM COR SERPL-MCNC: 9.2 MG/DL (ref 8.3–10.1)
CALCIUM SERPL-MCNC: 7.8 MG/DL (ref 8.4–10.2)
CHLORIDE SERPL-SCNC: 101 MMOL/L (ref 96–108)
CO2 SERPL-SCNC: 28 MMOL/L (ref 21–32)
CREAT SERPL-MCNC: 0.32 MG/DL (ref 0.6–1.3)
CROSSMATCH: NORMAL
ERYTHROCYTE [DISTWIDTH] IN BLOOD BY AUTOMATED COUNT: 26.1 % (ref 11.6–15.1)
GFR SERPL CREATININE-BSD FRML MDRD: 110 ML/MIN/1.73SQ M
GLUCOSE SERPL-MCNC: 105 MG/DL (ref 65–140)
HCT VFR BLD AUTO: 25.6 % (ref 34.8–46.1)
HGB BLD-MCNC: 7.8 G/DL (ref 11.5–15.4)
MAGNESIUM SERPL-MCNC: 2 MG/DL (ref 1.9–2.7)
MCH RBC QN AUTO: 21.7 PG (ref 26.8–34.3)
MCHC RBC AUTO-ENTMCNC: 30.5 G/DL (ref 31.4–37.4)
MCV RBC AUTO: 71 FL (ref 82–98)
MRSA NOSE QL CULT: NORMAL
PLATELET # BLD AUTO: 580 THOUSANDS/UL (ref 149–390)
PMV BLD AUTO: 9.3 FL (ref 8.9–12.7)
POTASSIUM SERPL-SCNC: 3.6 MMOL/L (ref 3.5–5.3)
PROT SERPL-MCNC: 6.5 G/DL (ref 6.4–8.4)
RBC # BLD AUTO: 3.59 MILLION/UL (ref 3.81–5.12)
SODIUM SERPL-SCNC: 134 MMOL/L (ref 135–147)
UNIT DISPENSE STATUS: NORMAL
UNIT PRODUCT CODE: NORMAL
UNIT PRODUCT VOLUME: 350 ML
UNIT RH: NORMAL
WBC # BLD AUTO: 7.85 THOUSAND/UL (ref 4.31–10.16)

## 2025-04-11 PROCEDURE — 83735 ASSAY OF MAGNESIUM: CPT | Performed by: PHYSICIAN ASSISTANT

## 2025-04-11 PROCEDURE — 85027 COMPLETE CBC AUTOMATED: CPT | Performed by: PHYSICIAN ASSISTANT

## 2025-04-11 PROCEDURE — 99232 SBSQ HOSP IP/OBS MODERATE 35: CPT | Performed by: STUDENT IN AN ORGANIZED HEALTH CARE EDUCATION/TRAINING PROGRAM

## 2025-04-11 PROCEDURE — G0545 PR INHERENT VISIT TO INPT: HCPCS | Performed by: STUDENT IN AN ORGANIZED HEALTH CARE EDUCATION/TRAINING PROGRAM

## 2025-04-11 PROCEDURE — 99223 1ST HOSP IP/OBS HIGH 75: CPT | Performed by: INTERNAL MEDICINE

## 2025-04-11 PROCEDURE — 99233 SBSQ HOSP IP/OBS HIGH 50: CPT | Performed by: PHYSICIAN ASSISTANT

## 2025-04-11 PROCEDURE — 80053 COMPREHEN METABOLIC PANEL: CPT | Performed by: PHYSICIAN ASSISTANT

## 2025-04-11 RX ORDER — POLYETHYLENE GLYCOL 3350 17 G/17G
17 POWDER, FOR SOLUTION ORAL DAILY PRN
Status: DISCONTINUED | OUTPATIENT
Start: 2025-04-11 | End: 2025-04-12 | Stop reason: HOSPADM

## 2025-04-11 RX ORDER — AMOXICILLIN 250 MG
1 CAPSULE ORAL 2 TIMES DAILY
Status: DISCONTINUED | OUTPATIENT
Start: 2025-04-11 | End: 2025-04-12 | Stop reason: HOSPADM

## 2025-04-11 RX ADMIN — DAPTOMYCIN 275 MG: 500 INJECTION, POWDER, LYOPHILIZED, FOR SOLUTION INTRAVENOUS at 12:08

## 2025-04-11 RX ADMIN — ENOXAPARIN SODIUM 40 MG: 40 INJECTION SUBCUTANEOUS at 08:24

## 2025-04-11 RX ADMIN — SENNOSIDES AND DOCUSATE SODIUM 1 TABLET: 50; 8.6 TABLET ORAL at 17:33

## 2025-04-11 RX ADMIN — SENNOSIDES AND DOCUSATE SODIUM 1 TABLET: 50; 8.6 TABLET ORAL at 08:24

## 2025-04-11 RX ADMIN — Medication 250 MG: at 21:59

## 2025-04-11 RX ADMIN — ARTIFICIAL TEARS 1 DROP: 1; 2; 3 SOLUTION/ DROPS OPHTHALMIC at 22:09

## 2025-04-11 RX ADMIN — B-COMPLEX W/ C & FOLIC ACID TAB 1 TABLET: TAB at 08:24

## 2025-04-11 RX ADMIN — OXYBUTYNIN CHLORIDE 10 MG: 10 TABLET, EXTENDED RELEASE ORAL at 08:24

## 2025-04-11 RX ADMIN — PANTOPRAZOLE SODIUM 40 MG: 40 TABLET, DELAYED RELEASE ORAL at 06:11

## 2025-04-11 RX ADMIN — Medication 250 MG: at 08:24

## 2025-04-11 RX ADMIN — FAMOTIDINE 40 MG: 20 TABLET, FILM COATED ORAL at 06:12

## 2025-04-11 NOTE — CONSULTS
Consultation - Palliative Care   Name: Lizbeth Ribeiro 74 y.o. female I MRN: [id number]  Unit/Bed#: E4 -01 I Date of Admission: 4/9/2025   Date of Service: 4/11/2025 I Hospital Day: 2   Inpatient consult to Palliative Care  Consult performed by: Anaya Sunshine MD  Consult ordered by: Ana Chiu PA-C        Physician Requesting Evaluation: Marlene Mcneill DO   Reason for Evaluation / Principal Problem: GOC    Assessment & Plan  Sepsis without acute organ dysfunction (HCC)  Continue management per primary/ID  Paraplegia following spinal cord injury  Resides at [facility] x 3 months, prior to that was a [facility]  Severe protein-calorie malnutrition   Malnutrition Findings:   Adult Malnutrition type: Chronic illness  Adult Degree of Malnutrition: Other severe protein calorie malnutrition  Malnutrition Characteristics: Muscle loss, Fat loss, Inadequate energy, Weight loss                  360 Statement: Severe protein/calorie malnutrition r/t inadequate intake as evidenced by 12% unplanned wt loss since 3/11/24, consuming < 75% energy intake compared to estimated energy needs, fat/muscle wasting or orbital/temple/clavicle areas observed. Treated with Regular diet, pt refusing oral nutrition supplements    BMI Findings:           Body mass index is 20.11 kg/m².     Pressure ulcers of skin of multiple topographic sites  At continued risk for this given paraplegia, frailty, advancing age, malnutrition  Continue precautionary measures  Hip osteomyelitis, right (HCC)  Per notes, she has previously been evaluated by ID, plastics, orthopedic surgery at [facility]; the only surgical option would be hemipelvectomy but conservative management with local wound care was recommended - she is not interested in surgery anyway  Should continue wound care then and other precautionary measures to prevent more pressure wounds  She is aware this is only going to get worse and that she can have more wounds in the future  because she is at risk   Thankfully, she has no pain given paraplegia  Decubitus ulcer of trochanteric region of right hip    Goals of care, counseling/discussion  She has capacity to make complex medical decision, her sister is her mPOA.   She is aware that she is at continued high risk for more wounds/worsening of current wounds  She is aware that she is unlikely to heal on her own  She displays awareness and understanding that her condition can worsen to the point that no antibiotics or aggressive wound care can 'fix' anymore, in which case a transition to comfort measures to assist with symptoms of end stage sepsis will be recommended   She asked what palliative medicine can offer - can offer to assist with symptoms cathryn pain from this wound, but she has little to no symptoms given paraplegia  Given that she is not ready for hospice, she should continue with palliative measures, of which she is receiving already. Discussed difference between palliative care and palliative medicine.  Palliative care is a multidisciplinary approach to a patient suffering from serious illnesses with the goal of continuing to prolong life for as long as possible. This will involve the collective efforts of her PCP, other appropriate specialists, PT/OT, VNA, HHA, and even the hospital team/services for acute medical issues. Palliative Medicine is a specialty in medicine that is part of this multidisciplinary care and can continue to follow as an outpatient to address goals and ensure smooth transition to hospice when ready/appropriate.   She should continue OP wound care, antibiotics by PCP/ID, hospital use if acutely septic, palliative medicine for pain (if any)/GOC/ =transition to hospice when ready. Will refer [facility].     Palliative care encounter  Recommend a non-urgent referral to outpatient home palliative that can visit her in her facility to continue goals of care  discussion and possible transition to hospice when ready/appropriate  Will place a referral to [facility] palliative  Palliative medicine will sign off  D/w SLIM  I have discussed the above management plan in detail with the primary service.   Palliative Care service signing off.    Decisional apparatus: Patient is competent on my exam today. If competence is lost, patient's substitute decision maker would default to sister by [place] 169.     PDMP Review: I have reviewed the patient's controlled substance dispensing history in the Prescription Drug Monitoring Program in compliance with the [facility] regulations before prescribing any controlled substances.    History of Present Illness   HPI: Lizbeth Ribeiro is a 74 y.o. year old female from a long term facility with Hx of spinal cord injury resulting in paraplegia, Hx of known stage IV R ischial and hip decubitus ulcers, chronic osteomyelitis of the hip, who was admitted on 4/9/2025 from her facility due to worsening wound and drainage from the R hip. Per notes, was evaluated by ID, plastics, orthopedic surgery at [facility]; the only surgical option would be hemipelvectomy but conservative management with local wound care was recommended; long-term antibiotics were not recommended as this would be futile without surgical intervention. CT imaging on admission showed no acute intra-abdominal pathology, fecal impaction, chronic osteo. ID on board for IV antibiotics. Gen Surg for wound management, no surgical interventions recommended, wound packing. Palliative medicine for goals of care.     Saw patient. AAO x 3, comfortable. Introduced palliative medicine. Discussed goals and recommendations. No pain. She said her sister is her POA. Rest of conversation as noted above.    Review of Systems   Constitutional:  Positive for activity change.   HENT:  Negative for trouble swallowing.    Respiratory:  Negative for shortness of breath.    Cardiovascular:  Negative for chest  pain.   Gastrointestinal:  Negative for abdominal pain.   Musculoskeletal:  Negative for back pain.   Psychiatric/Behavioral:  Negative for sleep disturbance. The patient is not nervous/anxious.      Medical History Review: I have reviewed the patient's PMH, PSH, Social History, Family History, Meds, and Allergies     Objective :  Temp:  [96.7 °F (35.9 °C)-98.1 °F (36.7 °C)] 97.8 °F (36.6 °C)  HR:  [] 101  BP: ()/(56-73) 98/59  Resp:  [18-21] 18  SpO2:  [97 %-98 %] 97 %  O2 Device: None (Room air)    Physical Exam  Constitutional:       General: She is not in acute distress.     Appearance: She is ill-appearing. She is not toxic-appearing or diaphoretic.      Comments: Chronically ill looking, frail, as stated age, comfortable, pleasant   HENT:      Head: Normocephalic and atraumatic.   Eyes:      General: No scleral icterus.        Right eye: No discharge.         Left eye: No discharge.   Pulmonary:      Effort: Pulmonary effort is normal. No respiratory distress.   Abdominal:      General: Abdomen is flat. There is no distension.   Musculoskeletal:         General: No swelling.   Skin:     General: Skin is dry.      Coloration: Skin is not jaundiced or pale.   Neurological:      Mental Status: She is alert and oriented to person, place, and time.   Psychiatric:         Mood and Affect: Mood normal.         Behavior: Behavior normal.         Thought Content: Thought content normal.         Judgment: Judgment normal.            Lab Results: I have reviewed the following results:  Lab Results   Component Value Date/Time    SODIUM 134 (L) 04/11/2025 06:18 AM    SODIUM 135 03/27/2025 04:10 AM    K 3.6 04/11/2025 06:18 AM    K 3.6 03/27/2025 04:10 AM    BUN 8 04/11/2025 06:18 AM    BUN 4 (L) 03/27/2025 04:10 AM    CREATININE 0.32 (L) 04/11/2025 06:18 AM    CREATININE 0.25 (L) 03/27/2025 04:10 AM    GLUC 105 04/11/2025 06:18 AM    GLUC 106 (H) 03/27/2025 04:10 AM    CALCIUM 7.8 (L) 04/11/2025 06:18 AM     CALCIUM 8 (L) 03/27/2025 04:10 AM    AST 9 (L) 04/11/2025 06:18 AM    AST 22 03/24/2025 03:53 AM    ALT 6 (L) 04/11/2025 06:18 AM    ALT 10 03/24/2025 03:53 AM    ALB 2.2 (L) 04/11/2025 06:18 AM    ALB 2.3 (L) 03/24/2025 03:53 AM    TP 6.5 04/11/2025 06:18 AM    TP 6.5 03/24/2025 03:53 AM    EGFR 110 04/11/2025 06:18 AM    EGFR 116 03/27/2025 04:10 AM    EGFR 109 02/04/2020 05:44 AM     Lab Results   Component Value Date/Time    HGB 7.8 (L) 04/11/2025 06:18 AM    HGB 7.7 (L) 03/25/2025 07:34 AM    WBC 7.85 04/11/2025 06:18 AM    WBC 6.4 05/28/2018 05:30 AM     (H) 04/11/2025 06:18 AM     05/28/2018 05:30 AM    INR 1.26 (H) 04/09/2025 01:16 PM    PTT 34 04/09/2025 01:16 PM     Lab Results   Component Value Date/Time    YHZ8PHUXYRVM 1.520 10/06/2022 02:32 PM       Imaging Results Review: I reviewed radiology reports from this admission including: CT abdomen/pelvis.  Other Study Results Review: No additional pertinent studies reviewed.    Code Status: Level 1 - Full Code  Advance Directive and Living Will: Yes    Power of : Yes  POLST:      Administrative Statements   I have spent a total time of 30 minutes in caring for this patient on the day of the visit/encounter including Diagnostic results, Prognosis, Risks and benefits of tx options, Instructions for management, Patient and family education, Importance of tx compliance, Risk factor reductions, Impressions, Counseling / Coordination of care, Documenting in the medical record, Reviewing/placing orders in the medical record (including tests, medications, and/or procedures), Obtaining or reviewing history  , and Communicating with other healthcare professionals .    Anaya Sunshine MD  Palliative Medicine & Supportive Care  Internal Medicine  Available via [place] [employer]  Office: [phone]  Fax: [phone]

## 2025-04-11 NOTE — PLAN OF CARE
Problem: Potential for Falls  Goal: Patient will remain free of falls  Description: INTERVENTIONS:- Educate patient/family on patient safety including physical limitations- Instruct patient to call for assistance with activity - Consult OT/PT to assist with strengthening/mobility - Keep Call bell within reach- Keep bed low and locked with side rails adjusted as appropriate- Keep care items and personal belongings within reach- Initiate and maintain comfort rounds- Make Fall Risk Sign visible to staff- Offer Toileting every 2 Hours, in advance of need- Initiate/Maintain bed alarm- Obtain necessary fall risk management equipment: socks- Apply yellow socks and bracelet for high fall risk patients- Consider moving patient to room near nurses station  Outcome: Progressing     Problem: Prexisting or High Potential for Compromised Skin Integrity  Goal: Skin integrity is maintained or improved  Description: INTERVENTIONS:- Identify patients at risk for skin breakdown- Assess and monitor skin integrity- Assess and monitor nutrition and hydration status- Monitor labs - Assess for incontinence - Turn and reposition patient- Assist with mobility/ambulation- Relieve pressure over bony prominences- Avoid friction and shearing- Provide appropriate hygiene as needed including keeping skin clean and dry- Evaluate need for skin moisturizer/barrier cream- Collaborate with interdisciplinary team - Patient/family teaching- Consider wound care consult   INTERVENTIONS:- Identify patients at risk for skin breakdown- Assess and monitor skin integrity- Assess and monitor nutrition and hydration status- Monitor labs - Assess for incontinence - Turn and reposition patient- Assist with mobility/ambulation- Relieve pressure over bony prominences- Avoid friction and shearing- Provide appropriate hygiene as needed including keeping skin clean and dry- Evaluate need for skin moisturizer/barrier cream- Collaborate with interdisciplinary team -  Patient/family teaching- Consider wound care consult   Outcome: Progressing     Problem: GENITOURINARY - ADULT  Goal: Urinary catheter remains patent  Description: INTERVENTIONS:- Assess patency of urinary catheter- If patient has a chronic isabel, consider changing catheter if non-functioning- Follow guidelines for intermittent irrigation of non-functioning urinary catheter  Outcome: Progressing     Problem: METABOLIC, FLUID AND ELECTROLYTES - ADULT  Goal: Electrolytes maintained within normal limits  Description: INTERVENTIONS:- Monitor labs and assess patient for signs and symptoms of electrolyte imbalances- Administer electrolyte replacement as ordered- Monitor response to electrolyte replacements, including repeat lab results as appropriate- Instruct patient on fluid and nutrition as appropriate  Outcome: Progressing  Goal: Fluid balance maintained  Description: INTERVENTIONS:- Monitor labs - Monitor I/O and WT- Instruct patient on fluid and nutrition as appropriate- Assess for signs & symptoms of volume excess or deficit  Outcome: Progressing     Problem: SKIN/TISSUE INTEGRITY - ADULT  Goal: Skin Integrity remains intact(Skin Breakdown Prevention)  Description: Assess:-Perform Jeffery assessment every shift-Clean and moisturize skin every 2 hours-Inspect skin when repositioning, toileting, and assisting with ADLS-Assess under medical devices such as socks every shift-Assess extremities for adequate circulation and sensation Bed Management:-Have minimal linens on bed & keep smooth, unwrinkled-Change linens as needed when moist or perspiring-Avoid sitting or lying in one position for more than 2 hours while in bed-Keep HOB at 30degrees Toileting:-Offer bedside commode-Assess for incontinence every 2 hours-Use incontinent care products after each incontinent episode such as barrier creamActivity:-Mobilize patient 2 times a day-Encourage activity and walks on unit-Encourage or provide ROM exercises -Turn and  reposition patient every 2 Hours-Use appropriate equipment to lift or move patient in bed-Instruct/ Assist with weight shifting every 2 when out of bed in chair-Consider limitation of chair time 2 hour intervalsSkin Care:-Avoid use of baby powder, tape, friction and shearing, hot water or constrictive clothing-Relieve pressure over bony prominences using barrier cream-Do not massage red bony areasNext Steps:-Teach patient strategies to minimize risks such as socks -Consider consults to  interdisciplinary teams such as pt/ot  Outcome: Progressing  Goal: Incision(s), wounds(s) or drain site(s) healing without S/S of infection  Description: INTERVENTIONS- Assess and document dressing, incision, wound bed, drain sites and surrounding tissue- Provide patient and family education- Perform skin care/dressing changes every 2 hours  Outcome: Progressing  Goal: Pressure injury heals and does not worsen  Description: Interventions:- Implement low air loss mattress or specialty surface (Criteria met)- Apply silicone foam dressing- Instruct/assist with weight shifting every 5 minutes when in chair - Limit chair time to 2 hour intervals- Use special pressure reducing interventions such as barrier cream and foam when in chair - Apply fecal or urinary incontinence containment device - Perform passive or active ROM every 2- Turn and reposition patient & offload bony prominences every 2 hours - Utilize friction reducing device or surface for transfers - Consider consults to  interdisciplinary teams such as pt/ot- Use incontinent care products after each incontinent episode such as socks- Consider nutrition services referral as needed  Outcome: Progressing     Problem: HEMATOLOGIC - ADULT  Goal: Maintains hematologic stability  Description: INTERVENTIONS- Assess for signs and symptoms of bleeding or hemorrhage- Monitor labs- Administer supportive blood products/factors as ordered and appropriate  Outcome: Progressing     Problem:  INFECTION - ADULT  Goal: Absence or prevention of progression during hospitalization  Description: INTERVENTIONS:- Assess and monitor for signs and symptoms of infection- Monitor lab/diagnostic results- Monitor all insertion sites, i.e. indwelling lines, tubes, and drains- Monitor endotracheal if appropriate and nasal secretions for changes in amount and color- [place] appropriate cooling/warming therapies per order- Administer medications as ordered- Instruct and encourage patient and family to use good hand hygiene technique- Identify and instruct in appropriate isolation precautions for identified infection/condition  Outcome: Progressing     Problem: DISCHARGE PLANNING  Goal: Discharge to home or other facility with appropriate resources  Description: INTERVENTIONS:- Identify barriers to discharge w/patient and caregiver- Arrange for needed discharge resources and transportation as appropriate- Identify discharge learning needs (meds, wound care, etc.)- Arrange for interpretive services to assist at discharge as needed- Refer to Case Management Department for coordinating discharge planning if the patient needs post-hospital services based on physician/advanced practitioner order or complex needs related to functional status, cognitive ability, or social support system  Outcome: Progressing     Problem: GASTROINTESTINAL - ADULT  Goal: Maintains or returns to baseline bowel function  Description: INTERVENTIONS:- Assess bowel function- Encourage oral fluids to ensure adequate hydration- Administer IV fluids if ordered to ensure adequate hydration- Administer ordered medications as needed- Encourage mobilization and activity- Consider nutritional services referral to assist patient with adequate nutrition and appropriate food choices  Outcome: Progressing     Problem: Nutrition/Hydration-ADULT  Goal: Nutrient/Hydration intake appropriate for improving, restoring or maintaining nutritional needs  Description:  Monitor and assess patient's nutrition/hydration status for malnutrition. Collaborate with interdisciplinary team and initiate plan and interventions as ordered.  Monitor patient's weight and dietary intake as ordered or per policy. Utilize nutrition screening tool and intervene as necessary. Determine patient's food preferences and provide high-protein, high-caloric foods as appropriate. INTERVENTIONS:- Monitor oral intake, urinary output, labs, and treatment plans- Assess nutrition and hydration status and recommend course of action- Evaluate amount of meals eaten- Assist patient with eating if necessary - Allow adequate time for meals- Recommend/ encourage appropriate diets, oral nutritional supplements, and vitamin/mineral supplements- Order, calculate, and assess calorie counts as needed- Recommend, monitor, and adjust tube feedings and TPN/PPN based on assessed needs- Assess need for intravenous fluids- Provide specific nutrition/hydration education as appropriate- Include patient/family/caregiver in decisions related to nutrition  Outcome: Progressing

## 2025-04-11 NOTE — PROGRESS NOTES
Progress Note - Hospitalist   Name: Lizbeth Ribeiro 74 y.o. female I MRN: [id number]  Unit/Bed#: E4 -01 I Date of Admission: 4/9/2025   Date of Service: 4/11/2025 I Hospital Day: 2    Assessment & Plan  Sepsis without acute organ dysfunction (HCC)  POA, meets criteria given tachycardia, hypotension, leukocytosis in setting of suspected right hip infection  This is in the setting of chronic right hip osteomyelitis  Received Daptomycin in the ED  Blood cultures obtained  ID consulted for antibiotic recommendations  Continue IV daptomycin while awaiting blood culture results  Per ID, if blood cultures remain negative at 48 hours, consider discontinuation of antibiotics  Hip osteomyelitis, right (HCC)  Has chronic wound over right hip which is likely chronic osteomyelitis given exposure of bone in the area. This was being managed by [facility] ID and surgery previously. Was noted by facility to be draining more over the past week and wound evolving and getting bigger, concern for infection prompting ED visit  ED gave 1 dose of Daptomycin  Prior [facility] records reviewed - ortho previously stated that her only surgical option for cure would be high risk for her and would cause significant decline in her quality of life. Patient has declined any type of surgical intervention for her hip as risk of surgery would outweigh benefit in regards to quality of life. Patient knowledgeable of chronic right hip osteomyelitis and was concerned for infection on top of this. She is agreeable to IV antibiotics here but knows there is no cure for her chronic hip osteomyelitis.   Surgery evaluated and no surgical intervention recommended, wound can be packed with dry Kerlix  ID input appreciated on antibiotics.   Palliative care consult to help with management  Wound care consult pending  Anemia  Results from last 7 days   Lab Units 04/11/25  0618 04/10/25  2129 04/10/25  0822 04/10/25  0521   HEMOGLOBIN g/dL 7.8* 7.7*  6.9* 6.5*   History of deficiency anemia noted-iron less than 10 and ferritin 126 on 3/24/2025  A.m. drop in hemoglobin to 6.5 and on repeat 6.9  No evidence of active bleeding from right hip wound  Obtained blood consent and transfused with 1 unit PRBCs 4/10/25  Noted improvement in hemoglobin  History of iron transfusion during recent stay at [facility] on 3/23/2025  Paraplegia following spinal cord injury  Bedbound at baseline  Currently resides at [facility]  GERD (gastroesophageal reflux disease)  Continue famotidine and protonix  Severe protein-calorie malnutrition   Nutrition consult placed  Hays catheter in place  Chronic Hays in place in the setting of paraplegia  Constipation  Was recently admitted for severe constipation at [facility]  Now improved  Continue bowel regimen  Ambulatory dysfunction  In setting of paraplegia  Completely bed bound  Pressure ulcers of skin of multiple topographic sites  Second to paraplegia, poor nutrition, limited mobility  Q2h turns  Wound care nurse consulted  Hypokalemia  Potassium low at 3.2-repleted  Resolved  Hypomagnesemia  Magnesium low at 1.6-repleted with IV magnesium  Resolved  Hypotension  Had some hypotension in the 80s systolically  Has been proved after 1 unit of blood    VTE Pharmacologic Prophylaxis:   Lovenox    Mobility:   Basic Mobility Inpatient Raw Score: 6  -[facility] Goal: 2: Bed activities/Dependent transfer  -[facility] Achieved: 2: Bed activities/Dependent transfer    Patient Centered Rounds: I performed bedside rounds with nursing staff today.   Discussions with Specialists or Other Care Team Provider: Nursing, case management    Education and Discussions with Family / Patient: Patient    Current Length of Stay: 2 day(s)  Current Patient Status: Inpatient   Certification Statement: With need for continued inpatient stay for IV antibiotics and pending blood cultures.  Hopeful discharge in the next 24 hours  Discharge Plan: Hopeful discharge in  the next 24 hours    Code Status: Level 1 - Full Code    Subjective   Resting in bed.  Has no acute complaints.  Understands continuation of IV antibiotics until blood cultures are resulted.  Tolerated blood transfusion well yesterday.  Did also receive iron infusion about 2 weeks ago when she was hospitalized at [facility]-reports hyperpigmentation of her left arm after transfusion.  Requesting bowel regimen to be adjusted.     Objective :  Temp:  [96.7 °F (35.9 °C)-98.2 °F (36.8 °C)] 97.8 °F (36.6 °C)  HR:  [] 101  BP: ()/(56-73) 98/59  Resp:  [18-21] 18  SpO2:  [97 %-98 %] 97 %  O2 Device: None (Room air)    Body mass index is 20.11 kg/m².     Input and Output Summary (last 24 hours):     Intake/Output Summary (Last 24 hours) at 4/11/2025 0917  Last data filed at 4/11/2025 0624  Gross per 24 hour   Intake 1190 ml   Output 1075 ml   Net 115 ml       Physical Exam  Vitals and nursing note reviewed.   Constitutional:       General: She is not in acute distress.     Appearance: She is not ill-appearing, toxic-appearing or diaphoretic.      Comments: Frail appearing   HENT:      Head: Normocephalic and atraumatic.   Eyes:      General: No scleral icterus.  Cardiovascular:      Rate and Rhythm: Normal rate and regular rhythm.   Pulmonary:      Effort: Pulmonary effort is normal. No respiratory distress.      Breath sounds: No stridor. No wheezing or rhonchi.   Abdominal:      General: Bowel sounds are normal. There is no distension.      Palpations: Abdomen is soft. There is no mass.      Tenderness: There is no abdominal tenderness.      Hernia: No hernia is present.   Genitourinary:     Comments: Hays catheter in place  Musculoskeletal:         General: No swelling.      Cervical back: Neck supple.      Comments: Bandage to left hip   Skin:     General: Skin is warm and dry.   Neurological:      Mental Status: She is alert and oriented to person, place, and time. Mental status is at baseline.    Psychiatric:         Mood and Affect: Mood normal.         Behavior: Behavior normal.           Lines/Drains:  Lines/Drains/Airways       Active Status       Name Placement date Placement time Site Days    Urethral Catheter Latex 16 Fr. 04/10/25  1433  Latex  less than 1                  Urinary Catheter:  Goal for removal: N/A - Chronic Hays                 Lab Results: I have reviewed the following results:   Results from last 7 days   Lab Units 04/11/25  0618 04/10/25  0822 04/10/25  0521   WBC Thousand/uL 7.85  --  10.34*   HEMOGLOBIN g/dL 7.8*   < > 6.5*   HEMATOCRIT % 25.6*   < > 21.4*   PLATELETS Thousands/uL 580*  --  534*   SEGS PCT %  --   --  61   LYMPHO PCT %  --   --  27   MONO PCT %  --   --  10   EOS PCT %  --   --  1    < > = values in this interval not displayed.     Results from last 7 days   Lab Units 04/11/25  0618   SODIUM mmol/L 134*   POTASSIUM mmol/L 3.6   CHLORIDE mmol/L 101   CO2 mmol/L 28   BUN mg/dL 8   CREATININE mg/dL 0.32*   ANION GAP mmol/L 5   CALCIUM mg/dL 7.8*   ALBUMIN g/dL 2.2*   TOTAL BILIRUBIN mg/dL 0.44   ALK PHOS U/L 109*   ALT U/L 6*   AST U/L 9*   GLUCOSE RANDOM mg/dL 105     Results from last 7 days   Lab Units 04/09/25  1316   INR  1.26*             Results from last 7 days   Lab Units 04/10/25  0521 04/09/25  1316   LACTIC ACID mmol/L  --  1.4   PROCALCITONIN ng/ml 0.32* 0.24       Recent Cultures (last 7 days):   Results from last 7 days   Lab Units 04/09/25  1330 04/09/25  1316   BLOOD CULTURE   --  No Growth at 24 hrs.  No Growth at 24 hrs.   GRAM STAIN RESULT  2+ Gram positive cocci in pairs and chains*  2+ Gram positive rods*  1+ Polys*  --    WOUND CULTURE  Culture too young- will reincubate  --              Last 24 Hours Medication List:     Current Facility-Administered Medications:     acetaminophen (TYLENOL) tablet 650 mg, Q6H PRN    Artificial Tears Op Soln 1 drop, HS    DAPTOmycin (CUBICIN) 275 mg in sodium chloride 0.9 % 50 mL IVPB, Q24H, Last Rate:  Stopped (04/10/25 1242)    enoxaparin (LOVENOX) subcutaneous injection 40 mg, Daily    famotidine (PEPCID) tablet 40 mg, Daily Before Breakfast    melatonin tablet 3 mg, HS PRN    multivitamin stress formula tablet 1 tablet, Daily    ondansetron (ZOFRAN) injection 4 mg, Q6H PRN    oxybutynin (DITROPAN-XL) 24 hr tablet 10 mg, Daily    pantoprazole (PROTONIX) EC tablet 40 mg, Daily Before Breakfast    polyethylene glycol (MIRALAX) packet 17 g, Daily PRN    potassium chloride (Klor-Con M20) CR tablet 40 mEq, Once    saccharomyces boulardii (FLORASTOR) capsule 250 mg, BID    senna-docusate sodium (SENOKOT S) 8.6-50 mg per tablet 1 tablet, BID    Administrative Statements   Today, Patient Was Seen By: Ana Chiu PA-C      **Please Note: This note may have been constructed using a voice recognition system.**

## 2025-04-11 NOTE — ASSESSMENT & PLAN NOTE
Malnutrition Findings:   Body mass index is 20.11 kg/m².   This is risk factor for wounds and complications with healing.  -recommend formal RD evaluation

## 2025-04-11 NOTE — NURSING NOTE
"Patient at this time refusing to transfer to the P500 bed because as per patient \"no point if I will only be here a day or two\". RN explained the possibility of worsening condition/benefits of P500 use.  Patient continued to refuse bed at this time.  "

## 2025-04-11 NOTE — ASSESSMENT & PLAN NOTE
Has chronic wound over right hip which is likely chronic osteomyelitis given exposure of bone in the area. This was being managed by [facility] ID and surgery previously. Was noted by facility to be draining more over the past week and wound evolving and getting bigger, concern for infection prompting ED visit  ED gave 1 dose of Daptomycin  Prior [facility] records reviewed - ortho previously stated that her only surgical option for cure would be high risk for her and would cause significant decline in her quality of life. Patient has declined any type of surgical intervention for her hip as risk of surgery would outweigh benefit in regards to quality of life. Patient knowledgeable of chronic right hip osteomyelitis and was concerned for infection on top of this. She is agreeable to IV antibiotics here but knows there is no cure for her chronic hip osteomyelitis.   Surgery evaluated and no surgical intervention recommended, wound can be packed with dry Kerlix  ID input appreciated on antibiotics.   Palliative care consult to help with management  Wound care consult pending

## 2025-04-11 NOTE — CASE MANAGEMENT
Case Management Discharge Planning Note    Patient name Lizbeth Ribeiro  Location [facility] /E4 -* MRN [id number]  : 1950 Date 2025       Current Admission Date: 2025  Current Admission Diagnosis:Sepsis without acute organ dysfunction (HCC)   Patient Active Problem List    Diagnosis Date Noted Date Diagnosed    Palliative care encounter 2025     Allergy to antibiotic 04/10/2025     Decubitus ulcer of trochanteric region of right hip 04/10/2025     Anemia 04/10/2025     Hypomagnesemia 04/10/2025     Hypotension 04/10/2025     Hip osteomyelitis, right (HCC) 2025     Pressure ulcers of skin of multiple topographic sites 2024     Ambulatory dysfunction 2024     Squamous cell carcinoma in situ (SCCIS) of skin of right lower leg 2023     Left ear pain 2023     Constipation 2022     Pressure injury of skin of foot 2022     Hays catheter in place      Diarrhea 11/10/2022     Skin lesion of back 10/12/2022     Inability to return to living situation 10/06/2022     Suicidal ideations 10/06/2022     Sepsis without acute organ dysfunction (HCC) 2020     Chronic idiopathic constipation 2020     Goals of care, counseling/discussion 2020     Mild protein-calorie malnutrition (HCC) 2020     Severe protein-calorie malnutrition  2020     Recurrent depression 2020     Neurogenic bladder 2020     Other osteoporosis without current pathological fracture 2020     Pressure injury of sacral region, unstageable (HCC) 11/10/2019     GERD (gastroesophageal reflux disease) 2018     Chronic osteomyelitis of coccyx (HCC) 12/15/2018     Anemia of chronic disease 12/15/2018     Decubitus ulcers      Paraplegia following spinal cord injury 2018     Hypokalemia 2018       LOS (days): 2  Geometric Mean LOS (GMLOS) (days): 4.9  Days to GMLOS:3     OBJECTIVE:  Risk of Unplanned Readmission Score: 15.99          Current admission status: Inpatient   Preferred Pharmacy:   [id number] [place] - [address]  [address] [place] [address]  Phone: [phone] Fax: [phone]    Primary Care Provider: No primary care provider on file.    Primary Insurance: [facility] REP  Secondary Insurance:     DISCHARGE DETAILS:                                                                                                 Additional Comments: CM informed that the pt can return to [facility] if cultures neg on 4/12. CM call  [facility] and spoke to Sakina.  She can return if they get orders for meds before 1400 faxed to [phone] and she must return before 1600.  No pharmacy services Sunday.  Provider made aware that if we can meet parameters for return on 4/12 she will need to wait until 4/14.

## 2025-04-11 NOTE — ASSESSMENT & PLAN NOTE
Was recently admitted for severe constipation at [facility]  Now improved  Continue bowel regimen

## 2025-04-11 NOTE — ASSESSMENT & PLAN NOTE
Upon arrival extensive imaging obtained of multiple wounds along patient's R hip, buttocks, sacrum/coccyx, lower and middle back, R leg, posterior thighs, L heel and foot, R ankle, and left groin. Patient is paraplegic which increases risk for skin injury. She has long history of evaluation at  [facility] and with [facility] who visited her at a previous nursing facility, as well as multiple admissions at [facility] and [facility] for wound infections. Patient has required previous surgical debridements, flapping, and skin grafts.   -serial skin exams  -recommend frequent turning/repositioning to offload pressure from the wounds  -recommend formal wound management team evaluation

## 2025-04-11 NOTE — ASSESSMENT & PLAN NOTE
Chronic osteomyelitis in the setting of chronic wounds, pressure, malnutrition, and paraplegia. Patient with previous wound care, flapping, and skin graft with Dr. Jacobo. Then under care at [facility] with surgery, plastic surgery, and ID teams. Patient has been deemed poor surgical candidate for hemipelvectomy and conservative management was favored. CT A/P wo contrast from 4/9/2025 showed R posterior hip wound with packing which extends to region of the greater trochanter with no obvious fluid collection although not fully visualized without contrast, fragmentation and bony remodeling at the R femur and acetabulum. Would not recommend patient pursue long term antibiotic treatment as there would be no benefit without extensive bony debridement, optimized nutrition, and flap closure.   -monitor for acute wound infection and treat as needed  -no role for long term antibiotic treatment at this time

## 2025-04-11 NOTE — ASSESSMENT & PLAN NOTE
Recommend a non-urgent referral to outpatient home palliative that can visit her in her facility to continue goals of care discussion and possible transition to hospice when ready/appropriate  Will place a referral to [facility] palliative  Palliative medicine will sign off  D/w SLIM

## 2025-04-11 NOTE — PROGRESS NOTES
Progress Note - Infectious Disease   Name: Lizbeth Ribeiro 74 y.o. female I MRN: [id number]  Unit/Bed#: E4 -01 I Date of Admission: 4/9/2025   Date of Service: 4/11/2025 I Hospital Day: 2    Assessment & Plan  Decubitus ulcer of trochanteric region of right hip  Patient sent from facility after they noted increased drainage from site. New CT A/P obtained wo contrast which showed packed R posterior hip wound with no obvious fluid collection. There is also fragmentation and bony remodeling at the R femur and acetabulum consistent with chronic osteomyelitis. ED collected swab wound culture which is growing Proteus and group A strep, however with chronicity of wound this represents colonization and should not guide antibiotic therapy.  General surgery has assessed the wound, no plans for surgical intervention.  Blood culture showed no growth.  There are no acute signs of infection of the wounds.   -discontinue antibiotics   -follow up blood cultures  -monitor vitals  -serial wound exams  -continue local wound care  -recommend frequent turning/repositioning to offload pressure from the wound , nutrition optimization  Hip osteomyelitis, right (HCC)  Chronic osteomyelitis in the setting of chronic wounds, pressure, malnutrition, and paraplegia. Patient with previous wound care, flapping, and skin graft with Dr. Jacobo. Then under care at [facility] with surgery, plastic surgery, and ID teams. Patient has been deemed poor surgical candidate for hemipelvectomy and conservative management was favored. CT A/P wo contrast from 4/9/2025 showed R posterior hip wound with packing which extends to region of the greater trochanter with no obvious fluid collection although not fully visualized without contrast, fragmentation and bony remodeling at the R femur and acetabulum. Would not recommend patient pursue long term antibiotic treatment as there would be no benefit without extensive bony debridement, optimized nutrition, and  flap closure.   -monitor for acute wound infection and treat as needed  -no role for long term antibiotic treatment at this time  Pressure ulcers of skin of multiple topographic sites  Upon arrival extensive imaging obtained of multiple wounds along patient's R hip, buttocks, sacrum/coccyx, lower and middle back, R leg, posterior thighs, L heel and foot, R ankle, and left groin. Patient is paraplegic which increases risk for skin injury. She has long history of evaluation at  [facility] and with [facility] who visited her at a previous nursing facility, as well as multiple admissions at [facility] and [facility] for wound infections. Patient has required previous surgical debridements, flapping, and skin grafts.   -serial skin exams  -recommend frequent turning/repositioning to offload pressure from the wounds  -recommend formal wound management team evaluation  Hypotension  Blood pressures noted to be low yesterday.  Likely multifactorial due to malnutrition, poor p.o. intake, anemia.  No evidence of infection.  Blood culture showed no growth.  Patient is asymptomatic and blood pressure improved  -monitor vitals  -management per primary service   Paraplegia following spinal cord injury  Per available medical recorders past injury was at T8 level. This is risk factor for skin injuries.  -serial skin exams  -recommend frequent turning/repositioning to offload pressure  Severe protein-calorie malnutrition   Malnutrition Findings:   Body mass index is 20.11 kg/m².   This is risk factor for wounds and complications with healing.  -recommend formal RD evaluation  Allergy to antibiotic  Patient reports rash with cefepime, rash and swelling with ciprofloxacin, and rash with vancomycin. We will avoid these antibiotics for now.  -monitor patient for adverse medication reactions    Above management plan to discontinue antibiotics discussed with the Dameon Perez.  Discussed with the patient at bedside.  ID will sign  off, please call with questions.    Antibiotics:  Daptomycin day 3    Subjective   BP improved today. She is feeling tired but has no other complaints. Received PRBC transfusion yesterday. No fevers.    Objective :  Temp:  [96.7 °F (35.9 °C)-98.1 °F (36.7 °C)] 97.8 °F (36.6 °C)  HR:  [] 101  BP: ()/(56-73) 98/59  Resp:  [18-21] 18  SpO2:  [97 %-98 %] 97 %  O2 Device: None (Room air)    Exam:  General: Chronically ill-appearing but no acute distress, nontoxic. Paraplegia   CV: RRR, no murmurs   Lungs: clear to auscultation bilaterally   : isabel catheter in place  Extremities: Lower extremity contractures  Skin: Multiple pressure injuries overlying the sacrum, back, lower extremities, right hip.  Right hip wounds without cellulitis or purulence  Psych: calm, cooperative       Lab Results: I have reviewed the following results:  Results from last 7 days   Lab Units 04/11/25  0618 04/10/25  2129 04/10/25  0822 04/10/25  0521 04/09/25  1316   WBC Thousand/uL 7.85  --   --  10.34* 10.67*   HEMOGLOBIN g/dL 7.8* 7.7* 6.9* 6.5* 8.2*   PLATELETS Thousands/uL 580*  --   --  534* 528*     Results from last 7 days   Lab Units 04/11/25  0618 04/10/25  0521 04/09/25  1316   SODIUM mmol/L 134* 131* 131*   POTASSIUM mmol/L 3.6 3.2* 3.2*   CHLORIDE mmol/L 101 99 97   CO2 mmol/L 28 26 27   BUN mg/dL 8 10 10   CREATININE mg/dL 0.32* 0.27* 0.41*   EGFR ml/min/1.73sq m 110 117 102   CALCIUM mg/dL 7.8* 7.3* 8.0*   AST U/L 9* 8* 11*   ALT U/L 6* 6* 7   ALK PHOS U/L 109* 103 136*   ALBUMIN g/dL 2.2* 2.2* 2.7*     Results from last 7 days   Lab Units 04/09/25 2138 04/09/25  1330 04/09/25  1316   BLOOD CULTURE   --   --  No Growth at 24 hrs.  No Growth at 24 hrs.   GRAM STAIN RESULT   --  2+ Gram positive cocci in pairs and chains*  2+ Gram positive rods*  1+ Polys*  --    WOUND CULTURE   --  2+ Growth of Proteus species*  2+ Growth of Beta Hemolytic Streptococcus Group A*  --    MRSA CULTURE ONLY  No Methicillin Resistant  Staphlyococcus aureus (MRSA) isolated  --   --      Results from last 7 days   Lab Units 04/10/25  0521 04/09/25  1316   PROCALCITONIN ng/ml 0.32* 0.24

## 2025-04-11 NOTE — DISCHARGE INSTR - OTHER ORDERS
Wound Care Plan:   1-P500 low air-loss mattress.  2-Elevate heels off of bed/chair surface-float heels/feet off of pillows and place a pillow between patient's knees.  3-Offloading air cushion in chair when out of bed, if able.  4-Apply lotion to body daily and as needed.  5-Turn/reposition every 2 hours for pressure re-distribution on skin.   6-Mid back, right medial ankle/heel, left ischium, sacrum--cleanse with Vashe, pat dry.  Apply silver alginate (Melgisorb AG) and cover with silicone bordered foam dressings.  Change dressings every other day and as needed with soilage.  7-Left heel/lateral foot/ankle (use heel foam), right knee, and left proximal posterior thigh--cleanse, pat dry.  Apply silicone bordered foam dressings.  Change dressings every other day and as needed.  8-Right hip--remove dressing and cleanse with normal saline, pat dry.  Apply skin prep to surya-wound skin.  Place silver alginate (Melgisorb AG) to shallow open area anteriorly. Loosely fluff 2 inch plain packing slightly moistened with Vashe into deeper open area (tape end of packing to intact skin).  Cover with silicone bordered foam dressing. Change dressing daily and as needed with soilage.

## 2025-04-11 NOTE — WOUND OSTOMY CARE
Consult Note - Wound   Lizbeth Ribeiro 74 y.o. female MRN: [id number]  Unit/Bed#: E4 -01 Encounter: [id number]      History and Present Illness:  74 year old female presented to the hospital from [facility] with concern for right hip wound infection.  Patient's history significant for spinal cord injury with paraplegia, chronic isabel catheter, constipation, chronic wounds.    Assessment Findings:   Patient agreeable to assessment.  She requires assist x 1 to turn in bed.  Isabel catheter in place, incontinent of stool at times.  Nutrition team following.  Right heel intact, pink, blanchable.  Groin folds pink, dry, intact--no evidence of fungal component at this time.  Intact scar tissue to left lateral hip.    Present on admission evolving deep tissue pressure injury to mid back--areas of intact, non-blanchable erythema/maroon and yellow slough along spine measured together as one wound.  Scant serous drainage.  Yasemin-wound intact with blanchable erythema.  Present on admission stage 3 pressure injury to right medial ankle--beefy red, full thickness tissue loss with moderate serosanguinous/tan drainage.  Yasemin-wound intact without redness.  Present on admission deep tissue pressure injury to left lateral foot--dark maroon/purple, dry, intact epidermis with scaling in oval pattern over bony prominence.  Yasemin-wound intact.  Present on admission deep tissue pressure injury to left heel--scattered areas of non-blanchable maroon intact epidermis surrounded by intact blanchable erythema.    Present on admission stage 3 pressure injury to left ischium--beefy red linear slit with scant serosanguinous drainage.  Yasemin-wound pink, intact, and blanchable.  Present on admission stage 3 pressure injury to left/mid sacrum--beefy red linear full thickness tissue loss with area of round, yellow slough.  Scant serosanguinous drainage from linear area.  Yasemin-wound intact, pink, blanchable with scar tissue.  Present on  admission evolving deep tissue pressure injury to left proximal posterior thigh--20% yellow slough with 80% pink, partial thickness tissue loss and epithelialization.  No drainage.  Yasemin-wound pink, intact, and blanchable.  Present on admission deep tissue pressure injury to right knee--light purple/maroon, non-blanchable intact epidermis over bony prominence.  Yasemin-wound intact.  Present on admission unstageable pressure injury to left lateral ankle/malleolus--dry, brown well adhered eschar.  Yasemin-wound intact.  Present on admission stage 4 pressure injury to right hip--two open, communicating areas measured together as one.  30% yellow, 70% pink full thickness tissue loss with undermining from 6-11 and 1-5 o'clock.  There is large serosanguinous and serous drainage.  Yasemin-wound intact with erythema.  No induration or foul odor at this time.  On review of recent photos, it appears the more anterior open area is significantly larger.  Patient with known chronic osteomyelitis.  Evaluated by surgery and ID teams--receiving IV antibiotics.      See flowsheet for wound details.  Deep tissue pressure injuries can be stage 3, 4, or unstageable when fully evolved.    Wound Care Plan:   1-P500 low air-loss mattress.  2-Elevate heels off of bed/chair surface-float heels/feet off of pillows and place a pillow between patient's knees.  3-Offloading air cushion in chair when out of bed, if able.  4-Apply moisturizing skin cream to body daily and as needed.  5-Turn/reposition every 2 hours for pressure re-distribution on skin.   6-Mid back, right medial ankle/heel, left ischium, sacrum--cleanse with Vashe, pat dry.  Apply silver alginate (Melgisorb AG) and cover with silicone bordered foam dressings.  Change dressings every other day and as needed with soilage.  7-Left heel/lateral foot/ankle (use heel foam), right knee, and left proximal posterior thigh--cleanse, pat dry.  Apply silicone bordered foam dressings.  Change  dressings every other day and as needed.  8-Right hip--remove dressing and cleanse with normal saline, pat dry.  Apply skin prep to surya-wound skin.  Place silver alginate (Melgisorb AG) to shallow open area anteriorly. Loosely fluff 2 inch plain packing slightly moistened with Vashe into deeper open area (tape end of packing to intact skin).  Cover with silicone bordered foam dressing. Change dressing daily and as needed with soilage.    Wound care team to follow.  Plan of care reviewed with primary RN.    Wound 04/17/20 Pressure Injury Hip Right;Outer (Active)   Wound Image   04/11/25 0919   Wound Description Beefy red;Pink;Yellow;Other (Comment) 04/11/25 0919   Pressure Injury Stage 4 04/11/25 0919   Non-staged Wound Description Full thickness 04/11/25 0919   Wound Length (cm) 5 cm 04/11/25 0919   Wound Width (cm) 9 cm 04/11/25 0919   Wound Depth (cm) 3.7 cm 04/11/25 0919   Wound Surface Area (cm^2) 45 cm^2 04/11/25 0919   Wound Volume (cm^3) 166.5 cm^3 04/11/25 0919   Calculated Wound Volume (cm^3) 166.5 cm^3 04/11/25 0919   Change in Wound Size % -40.21 04/11/25 0919   Number of underminings 2 04/11/25 0919   Undermining 1 3.2 04/11/25 0919   Undermining 2 2.2 04/11/25 0919   Undermining 1 is depth extending from 6-11 o'clock 04/11/25 0919   Undermining 2 is depth extending from 1-5 o'clock 04/11/25 0919   Drainage Amount Large 04/11/25 0919   Drainage Description Serosanguineous;Serous 04/11/25 0919   Surya-wound Assessment Erythema;Intact 04/11/25 0919   Treatments Cleansed 04/11/25 0919   Dressing Calcium Alginate with Silver;Plain packing strip;Foam, Silicon (eg. Allevyn, etc) 04/11/25 0919   Wound packed? Yes 04/11/25 0919   Packing- # inserted 1 04/11/25 0919   Packing- # removed 2 04/11/25 0919   Dressing Changed Changed 04/11/25 0919   Patient Tolerance Tolerated well 04/11/25 0919   Dressing Status Clean;Dry;Intact 04/11/25 0919       Wound 10/09/22 Pressure Injury Ankle Outer;Left (Active)   Wound  Image   04/11/25 0908   Wound Description Dry;Brown;Eschar 04/11/25 0908   Pressure Injury Stage U 04/11/25 0908   Wound Length (cm) 0.5 cm 04/11/25 0908   Wound Width (cm) 0.5 cm 04/11/25 0908   Wound Depth (cm) 0 cm 04/11/25 0908   Wound Surface Area (cm^2) 0.25 cm^2 04/11/25 0908   Wound Volume (cm^3) 0 cm^3 04/11/25 0908   Calculated Wound Volume (cm^3) 0 cm^3 04/11/25 0908   Yasemin-wound Assessment Intact 04/11/25 0908   Treatments Elevated;Cleansed 04/11/25 0908   Dressing Foam, Silicon (eg. Allevyn, etc) 04/11/25 0908   Dressing Changed Changed 04/11/25 0908   Patient Tolerance Tolerated well 04/11/25 0908   Dressing Status Clean;Dry;Intact 04/11/25 0908       Wound 04/09/25 Pressure Injury Back Mid (Active)   Wound Image   04/11/25 0932   Wound Description Non-blanchable erythema;Light purple;Yellow 04/11/25 0932   Pressure Injury Stage DTPI 04/11/25 0932   Wound Length (cm) 5.5 cm 04/11/25 0932   Wound Width (cm) 1.3 cm 04/11/25 0932   Wound Depth (cm) 0.1 cm 04/11/25 0932   Wound Surface Area (cm^2) 7.15 cm^2 04/11/25 0932   Wound Volume (cm^3) 0.715 cm^3 04/11/25 0932   Calculated Wound Volume (cm^3) 0.72 cm^3 04/11/25 0932   Drainage Amount Scant 04/11/25 0932   Drainage Description Serous 04/11/25 0932   Yasemin-wound Assessment Erythema;Intact 04/11/25 0932   Treatments Cleansed 04/11/25 0932   Dressing Calcium Alginate with Silver;Foam, Silicon (eg. Allevyn, etc) 04/11/25 0932   Dressing Changed New 04/11/25 0932   Patient Tolerance Tolerated well 04/11/25 0932   Dressing Status Clean;Dry;Intact 04/11/25 0932       Wound 04/09/25 Pressure Injury Ankle Right;Medial (Active)   Wound Image   04/11/25 0905   Pressure Injury Stage 3 04/11/25 0905   Non-staged Wound Description Full thickness 04/11/25 0905   Wound Length (cm) 4 cm 04/11/25 0905   Wound Width (cm) 4 cm 04/11/25 0905   Wound Depth (cm) 0.5 cm 04/11/25 0905   Wound Surface Area (cm^2) 16 cm^2 04/11/25 0905   Wound Volume (cm^3) 8 cm^3 04/11/25 0905    Calculated Wound Volume (cm^3) 8 cm^3 04/11/25 0905   Drainage Amount Moderate 04/11/25 0905   Drainage Description Serosanguineous;Tan 04/11/25 0905   Yasemin-wound Assessment Intact 04/11/25 0905   Treatments Elevated;Cleansed 04/11/25 0905   Dressing Calcium Alginate with Silver;Foam, Silicon (eg. Allevyn, etc) 04/11/25 0905   Dressing Changed New 04/11/25 0905   Patient Tolerance Tolerated well 04/11/25 0905   Dressing Status Clean;Dry;Intact 04/11/25 0918       Wound 04/09/25 Pressure Injury Foot Left;Lateral (Active)   Wound Image   04/11/25 0907   Pressure Injury Stage DTPI 04/11/25 0907   Wound Length (cm) 2.5 cm 04/11/25 0907   Wound Width (cm) 1 cm 04/11/25 0907   Wound Depth (cm) 0 cm 04/11/25 0907   Wound Surface Area (cm^2) 2.5 cm^2 04/11/25 0907   Wound Volume (cm^3) 0 cm^3 04/11/25 0907   Calculated Wound Volume (cm^3) 0 cm^3 04/11/25 0907   Drainage Amount None 04/11/25 0907   Yasemin-wound Assessment Scaly 04/11/25 0907   Treatments Elevated;Cleansed 04/11/25 0907   Dressing Foam, Silicon (eg. Allevyn, etc) 04/11/25 0907   Dressing Changed Changed 04/11/25 0907   Patient Tolerance Tolerated well 04/11/25 0907   Dressing Status Clean;Dry;Intact 04/11/25 0918       Wound 04/09/25 Pressure Injury Heel Left (Active)   Wound Image   04/11/25 0908   Pressure Injury Stage DTPI 04/11/25 0908   Wound Length (cm) 5 cm 04/11/25 0908   Wound Width (cm) 1.5 cm 04/11/25 0908   Wound Depth (cm) 0 cm 04/11/25 0908   Wound Surface Area (cm^2) 7.5 cm^2 04/11/25 0908   Wound Volume (cm^3) 0 cm^3 04/11/25 0908   Calculated Wound Volume (cm^3) 0 cm^3 04/11/25 0908   Yasemin-wound Assessment Erythema;Intact 04/11/25 0908   Treatments Elevated 04/11/25 0908   Dressing Foam, Silicon (eg. Allevyn, etc) 04/11/25 0908   Dressing Changed Changed 04/11/25 0908   Patient Tolerance Tolerated well 04/11/25 0908   Dressing Status Clean;Dry;Intact 04/11/25 0918       Wound 04/09/25 Pressure Injury Ischium Left (Active)   Wound Image    04/11/25 0935   Wound Description Beefy red 04/11/25 0935   Pressure Injury Stage 3 04/11/25 0935   Non-staged Wound Description Full thickness 04/11/25 0935   Wound Length (cm) 3 cm 04/11/25 0935   Wound Width (cm) 1.5 cm 04/11/25 0935   Wound Depth (cm) 0.4 cm 04/11/25 0935   Wound Surface Area (cm^2) 4.5 cm^2 04/11/25 0935   Wound Volume (cm^3) 1.8 cm^3 04/11/25 0935   Calculated Wound Volume (cm^3) 1.8 cm^3 04/11/25 0935   Drainage Amount Scant 04/11/25 0935   Drainage Description Serosanguineous 04/11/25 0935   Yasemin-wound Assessment Pink;Intact 04/11/25 0935   Treatments Cleansed 04/11/25 0935   Dressing Calcium Alginate with Silver;Foam, Silicon (eg. Allevyn, etc) 04/11/25 0935   Dressing Changed New 04/11/25 0935   Patient Tolerance Tolerated well 04/11/25 0935   Dressing Status Clean;Dry;Intact 04/11/25 0935       Wound 04/09/25 Pressure Injury Sacrum Left;Mid (Active)   Wound Image   04/11/25 0935   Wound Description Beefy red;Yellow;Slough 04/11/25 0935   Pressure Injury Stage 3 04/11/25 0935   Non-staged Wound Description Full thickness 04/11/25 0935   Wound Length (cm) 2.2 cm 04/11/25 0935   Wound Width (cm) 2.3 cm 04/11/25 0935   Wound Depth (cm) 0.3 cm 04/11/25 0935   Wound Surface Area (cm^2) 5.06 cm^2 04/11/25 0935   Wound Volume (cm^3) 1.518 cm^3 04/11/25 0935   Calculated Wound Volume (cm^3) 1.52 cm^3 04/11/25 0935   Drainage Amount Scant 04/11/25 0935   Drainage Description Serosanguineous 04/11/25 0935   Yasemin-wound Assessment Pink;Scar Tissue;Intact 04/11/25 0935   Treatments Cleansed 04/11/25 0935   Dressing Calcium Alginate with Silver;Foam, Silicon (eg. Allevyn, etc) 04/11/25 0935   Dressing Changed New 04/11/25 0935   Patient Tolerance Tolerated well 04/11/25 0935   Dressing Status Clean;Dry;Intact 04/11/25 0935       Wound 04/09/25 Pressure Injury Thigh Left;Posterior;Proximal (Active)   Wound Image   04/11/25 0917   Pressure Injury Stage DTPI 04/11/25 0917   Wound Length (cm) 1 cm 04/11/25  0917   Wound Width (cm) 2.5 cm 04/11/25 0917   Wound Depth (cm) 0.1 cm 04/11/25 0918   Wound Surface Area (cm^2) 2.5 cm^2 04/11/25 0917   Wound Volume (cm^3) 0.25 cm^3 04/11/25 0917   Calculated Wound Volume (cm^3) 0.25 cm^3 04/11/25 0917   Drainage Amount None 04/11/25 0917   Yasemin-wound Assessment Pink;Intact 04/11/25 0917   Treatments Cleansed 04/11/25 0917   Dressing Open to air 04/11/25 0918       Wound 04/11/25 Pressure Injury Knee Anterior;Right (Active)   Wound Image   04/11/25 0909   Wound Description Light purple;Non-blanchable erythema 04/11/25 0909   Pressure Injury Stage DTPI 04/11/25 0909   Wound Length (cm) 6 cm 04/11/25 0909   Wound Width (cm) 2.2 cm 04/11/25 0909   Wound Depth (cm) 0 cm 04/11/25 0909   Wound Surface Area (cm^2) 13.2 cm^2 04/11/25 0909   Wound Volume (cm^3) 0 cm^3 04/11/25 0909   Calculated Wound Volume (cm^3) 0 cm^3 04/11/25 0909   Drainage Amount None 04/11/25 0909   Yasemin-wound Assessment Intact 04/11/25 0909   Treatments Cleansed 04/11/25 0909   Dressing Foam, Silicon (eg. Allevyn, etc) 04/11/25 0909   Dressing Changed New 04/11/25 0909   Patient Tolerance Tolerated well 04/11/25 0909   Dressing Status Clean;Dry;Intact 04/11/25 0909       Danielle Parker RN, BSN, CWON

## 2025-04-11 NOTE — ASSESSMENT & PLAN NOTE
Blood pressures noted to be low yesterday.  Likely multifactorial due to malnutrition, poor p.o. intake, anemia.  No evidence of infection.  Blood culture showed no growth.  Patient is asymptomatic and blood pressure improved  -monitor vitals  -management per primary service

## 2025-04-11 NOTE — ASSESSMENT & PLAN NOTE
Results from last 7 days   Lab Units 04/11/25  0618 04/10/25  2129 04/10/25  0822 04/10/25  0521   HEMOGLOBIN g/dL 7.8* 7.7* 6.9* 6.5*   History of deficiency anemia noted-iron less than 10 and ferritin 126 on 3/24/2025  A.m. drop in hemoglobin to 6.5 and on repeat 6.9  No evidence of active bleeding from right hip wound  Obtained blood consent and transfused with 1 unit PRBCs 4/10/25  Noted improvement in hemoglobin  History of iron transfusion during recent stay at [facility] on 3/23/2025

## 2025-04-11 NOTE — ASSESSMENT & PLAN NOTE
POA, meets criteria given tachycardia, hypotension, leukocytosis in setting of suspected right hip infection  This is in the setting of chronic right hip osteomyelitis  Received Daptomycin in the ED  Blood cultures obtained  ID consulted for antibiotic recommendations  Continue IV daptomycin while awaiting blood culture results  Per ID, if blood cultures remain negative at 48 hours, consider discontinuation of antibiotics

## 2025-04-11 NOTE — ASSESSMENT & PLAN NOTE
At continued risk for this given paraplegia, frailty, advancing age, malnutrition  Continue precautionary measures

## 2025-04-11 NOTE — ASSESSMENT & PLAN NOTE
She has capacity to make complex medical decision, her sister is her mPOA.   She is aware that she is at continued high risk for more wounds/worsening of current wounds  She is aware that she is unlikely to heal on her own  She displays awareness and understanding that her condition can worsen to the point that no antibiotics or aggressive wound care can 'fix' anymore, in which case a transition to comfort measures to assist with symptoms of end stage sepsis will be recommended   She asked what palliative medicine can offer - can offer to assist with symptoms cathryn pain from this wound, but she has little to no symptoms given paraplegia  Given that she is not ready for hospice, she should continue with palliative measures, of which she is receiving already. Discussed difference between palliative care and palliative medicine.  Palliative care is a multidisciplinary approach to a patient suffering from serious illnesses with the goal of continuing to prolong life for as long as possible. This will involve the collective efforts of her PCP, other appropriate specialists, PT/OT, VNA, HHA, and even the hospital team/services for acute medical issues. Palliative Medicine is a specialty in medicine that is part of this multidisciplinary care and can continue to follow as an outpatient to address goals and ensure smooth transition to hospice when ready/appropriate.   She should continue OP wound care, antibiotics by PCP/ID, hospital use if acutely septic, palliative medicine for pain (if any)/GOC/ =transition to hospice when ready. Will refer to [facility] home palliative that can visit her at [facility].

## 2025-04-11 NOTE — ASSESSMENT & PLAN NOTE
Per notes, she has previously been evaluated by ID, plastics, orthopedic surgery at [facility]; the only surgical option would be hemipelvectomy but conservative management with local wound care was recommended - she is not interested in surgery anyway  Should continue wound care then and other precautionary measures to prevent more pressure wounds  She is aware this is only going to get worse and that she can have more wounds in the future because she is at risk   Thankfully, she has no pain given paraplegia

## 2025-04-11 NOTE — ASSESSMENT & PLAN NOTE
Patient sent from facility after they noted increased drainage from site. New CT A/P obtained wo contrast which showed packed R posterior hip wound with no obvious fluid collection. There is also fragmentation and bony remodeling at the R femur and acetabulum consistent with chronic osteomyelitis. ED collected swab wound culture which is growing Proteus and group A strep, however with chronicity of wound this represents colonization and should not guide antibiotic therapy.  General surgery has assessed the wound, no plans for surgical intervention.  Blood culture showed no growth.  There are no acute signs of infection of the wounds.   -discontinue antibiotics   -follow up blood cultures  -monitor vitals  -serial wound exams  -continue local wound care  -recommend frequent turning/repositioning to offload pressure from the wound , nutrition optimization

## 2025-04-11 NOTE — ASSESSMENT & PLAN NOTE
Malnutrition Findings:   Adult Malnutrition type: Chronic illness  Adult Degree of Malnutrition: Other severe protein calorie malnutrition  Malnutrition Characteristics: Muscle loss, Fat loss, Inadequate energy, Weight loss                  360 Statement: Severe protein/calorie malnutrition r/t inadequate intake as evidenced by 12% unplanned wt loss since 3/11/24, consuming < 75% energy intake compared to estimated energy needs, fat/muscle wasting or orbital/temple/clavicle areas observed. Treated with Regular diet, pt refusing oral nutrition supplements    BMI Findings:           Body mass index is 20.11 kg/m².

## 2025-04-11 NOTE — ASSESSMENT & PLAN NOTE
Resides at [facility] x 3 months, prior to that was a [facility]

## 2025-04-12 VITALS
HEIGHT: 60 IN | SYSTOLIC BLOOD PRESSURE: 107 MMHG | WEIGHT: 102.95 LBS | RESPIRATION RATE: 18 BRPM | HEART RATE: 96 BPM | DIASTOLIC BLOOD PRESSURE: 74 MMHG | TEMPERATURE: 96.9 F | BODY MASS INDEX: 20.21 KG/M2 | OXYGEN SATURATION: 99 %

## 2025-04-12 LAB
BACTERIA WND AEROBE CULT: ABNORMAL
GRAM STN SPEC: ABNORMAL

## 2025-04-12 PROCEDURE — 99239 HOSP IP/OBS DSCHRG MGMT >30: CPT | Performed by: PHYSICIAN ASSISTANT

## 2025-04-12 RX ADMIN — Medication 250 MG: at 09:45

## 2025-04-12 RX ADMIN — ENOXAPARIN SODIUM 40 MG: 40 INJECTION SUBCUTANEOUS at 09:45

## 2025-04-12 RX ADMIN — OXYBUTYNIN CHLORIDE 10 MG: 10 TABLET, EXTENDED RELEASE ORAL at 09:45

## 2025-04-12 RX ADMIN — PANTOPRAZOLE SODIUM 40 MG: 40 TABLET, DELAYED RELEASE ORAL at 06:16

## 2025-04-12 NOTE — PLAN OF CARE
Problem: Potential for Falls  Goal: Patient will remain free of falls  Description: INTERVENTIONS:- Educate patient/family on patient safety including physical limitations- Instruct patient to call for assistance with activity - Consult OT/PT to assist with strengthening/mobility - Keep Call bell within reach- Keep bed low and locked with side rails adjusted as appropriate- Keep care items and personal belongings within reach- Initiate and maintain comfort rounds- Make Fall Risk Sign visible to staff- Offer Toileting every 2 Hours, in advance of need- Initiate/Maintain bed alarm- Obtain necessary fall risk management equipment: socks- Apply yellow socks and bracelet for high fall risk patients- Consider moving patient to room near nurses station  Outcome: Progressing     Problem: Prexisting or High Potential for Compromised Skin Integrity  Goal: Skin integrity is maintained or improved  Description: INTERVENTIONS:- Identify patients at risk for skin breakdown- Assess and monitor skin integrity- Assess and monitor nutrition and hydration status- Monitor labs - Assess for incontinence - Turn and reposition patient- Assist with mobility/ambulation- Relieve pressure over bony prominences- Avoid friction and shearing- Provide appropriate hygiene as needed including keeping skin clean and dry- Evaluate need for skin moisturizer/barrier cream- Collaborate with interdisciplinary team - Patient/family teaching- Consider wound care consult   INTERVENTIONS:- Identify patients at risk for skin breakdown- Assess and monitor skin integrity- Assess and monitor nutrition and hydration status- Monitor labs - Assess for incontinence - Turn and reposition patient- Assist with mobility/ambulation- Relieve pressure over bony prominences- Avoid friction and shearing- Provide appropriate hygiene as needed including keeping skin clean and dry- Evaluate need for skin moisturizer/barrier cream- Collaborate with interdisciplinary team -  Patient/family teaching- Consider wound care consult   Outcome: Progressing     Problem: GENITOURINARY - ADULT  Goal: Urinary catheter remains patent  Description: INTERVENTIONS:- Assess patency of urinary catheter- If patient has a chronic isabel, consider changing catheter if non-functioning- Follow guidelines for intermittent irrigation of non-functioning urinary catheter  Outcome: Progressing     Problem: METABOLIC, FLUID AND ELECTROLYTES - ADULT  Goal: Electrolytes maintained within normal limits  Description: INTERVENTIONS:- Monitor labs and assess patient for signs and symptoms of electrolyte imbalances- Administer electrolyte replacement as ordered- Monitor response to electrolyte replacements, including repeat lab results as appropriate- Instruct patient on fluid and nutrition as appropriate  Outcome: Progressing  Goal: Fluid balance maintained  Description: INTERVENTIONS:- Monitor labs - Monitor I/O and WT- Instruct patient on fluid and nutrition as appropriate- Assess for signs & symptoms of volume excess or deficit  Outcome: Progressing     Problem: SKIN/TISSUE INTEGRITY - ADULT  Goal: Skin Integrity remains intact(Skin Breakdown Prevention)  Description: Assess:-Perform Jeffery assessment every shift-Clean and moisturize skin every 2 hours-Inspect skin when repositioning, toileting, and assisting with ADLS-Assess under medical devices such as socks every shift-Assess extremities for adequate circulation and sensation Bed Management:-Have minimal linens on bed & keep smooth, unwrinkled-Change linens as needed when moist or perspiring-Avoid sitting or lying in one position for more than 2 hours while in bed-Keep HOB at 30degrees Toileting:-Offer bedside commode-Assess for incontinence every 2 hours-Use incontinent care products after each incontinent episode such as barrier creamActivity:-Mobilize patient 2 times a day-Encourage activity and walks on unit-Encourage or provide ROM exercises -Turn and  reposition patient every 2 Hours-Use appropriate equipment to lift or move patient in bed-Instruct/ Assist with weight shifting every 2 when out of bed in chair-Consider limitation of chair time 2 hour intervalsSkin Care:-Avoid use of baby powder, tape, friction and shearing, hot water or constrictive clothing-Relieve pressure over bony prominences using barrier cream-Do not massage red bony areasNext Steps:-Teach patient strategies to minimize risks such as socks -Consider consults to  interdisciplinary teams such as pt/ot  Outcome: Progressing  Goal: Incision(s), wounds(s) or drain site(s) healing without S/S of infection  Description: INTERVENTIONS- Assess and document dressing, incision, wound bed, drain sites and surrounding tissue- Provide patient and family education- Perform skin care/dressing changes every 2 hours  Outcome: Progressing  Goal: Pressure injury heals and does not worsen  Description: Interventions:- Implement low air loss mattress or specialty surface (Criteria met)- Apply silicone foam dressing- Instruct/assist with weight shifting every 5 minutes when in chair - Limit chair time to 2 hour intervals- Use special pressure reducing interventions such as barrier cream and foam when in chair - Apply fecal or urinary incontinence containment device - Perform passive or active ROM every 2- Turn and reposition patient & offload bony prominences every 2 hours - Utilize friction reducing device or surface for transfers - Consider consults to  interdisciplinary teams such as pt/ot- Use incontinent care products after each incontinent episode such as socks- Consider nutrition services referral as needed  Outcome: Progressing     Problem: HEMATOLOGIC - ADULT  Goal: Maintains hematologic stability  Description: INTERVENTIONS- Assess for signs and symptoms of bleeding or hemorrhage- Monitor labs- Administer supportive blood products/factors as ordered and appropriate  Outcome: Progressing     Problem:  INFECTION - ADULT  Goal: Absence or prevention of progression during hospitalization  Description: INTERVENTIONS:- Assess and monitor for signs and symptoms of infection- Monitor lab/diagnostic results- Monitor all insertion sites, i.e. indwelling lines, tubes, and drains- Monitor endotracheal if appropriate and nasal secretions for changes in amount and color- [place] appropriate cooling/warming therapies per order- Administer medications as ordered- Instruct and encourage patient and family to use good hand hygiene technique- Identify and instruct in appropriate isolation precautions for identified infection/condition  Outcome: Progressing     Problem: DISCHARGE PLANNING  Goal: Discharge to home or other facility with appropriate resources  Description: INTERVENTIONS:- Identify barriers to discharge w/patient and caregiver- Arrange for needed discharge resources and transportation as appropriate- Identify discharge learning needs (meds, wound care, etc.)- Arrange for interpretive services to assist at discharge as needed- Refer to Case Management Department for coordinating discharge planning if the patient needs post-hospital services based on physician/advanced practitioner order or complex needs related to functional status, cognitive ability, or social support system  Outcome: Progressing     Problem: GASTROINTESTINAL - ADULT  Goal: Maintains or returns to baseline bowel function  Description: INTERVENTIONS:- Assess bowel function- Encourage oral fluids to ensure adequate hydration- Administer IV fluids if ordered to ensure adequate hydration- Administer ordered medications as needed- Encourage mobilization and activity- Consider nutritional services referral to assist patient with adequate nutrition and appropriate food choices  Outcome: Progressing     Problem: Nutrition/Hydration-ADULT  Goal: Nutrient/Hydration intake appropriate for improving, restoring or maintaining nutritional needs  Description:  Monitor and assess patient's nutrition/hydration status for malnutrition. Collaborate with interdisciplinary team and initiate plan and interventions as ordered.  Monitor patient's weight and dietary intake as ordered or per policy. Utilize nutrition screening tool and intervene as necessary. Determine patient's food preferences and provide high-protein, high-caloric foods as appropriate. INTERVENTIONS:- Monitor oral intake, urinary output, labs, and treatment plans- Assess nutrition and hydration status and recommend course of action- Evaluate amount of meals eaten- Assist patient with eating if necessary - Allow adequate time for meals- Recommend/ encourage appropriate diets, oral nutritional supplements, and vitamin/mineral supplements- Order, calculate, and assess calorie counts as needed- Recommend, monitor, and adjust tube feedings and TPN/PPN based on assessed needs- Assess need for intravenous fluids- Provide specific nutrition/hydration education as appropriate- Include patient/family/caregiver in decisions related to nutrition  Outcome: Progressing

## 2025-04-12 NOTE — ASSESSMENT & PLAN NOTE
Has chronic wound over right hip which is likely chronic osteomyelitis given exposure of bone in the area. This was being managed by [facility] ID and surgery previously. Was noted by facility to be draining more over the past week and wound evolving and getting bigger, concern for infection prompting ED visit  ED gave dose of Daptomycin  Prior [facility] records reviewed - ortho previously stated that her only surgical option for cure would be high risk for her and would cause significant decline in her quality of life. Patient has declined any type of surgical intervention for her hip as risk of surgery would outweigh benefit in regards to quality of life. Patient knowledgeable of chronic right hip osteomyelitis and was concerned for infection on top of this. She is agreeable to IV antibiotics here but knows there is no cure for her chronic hip osteomyelitis.  She understands that she is at risk for developing life-threatening infection and at that point if antibiotics could not help, she would be amenable to discussing hospice/palliative measures for end-of-life care however she is not currently ready for that.  Surgery evaluated and no surgical intervention recommended, wound can be packed with dry Kerlix  ID input appreciated on antibiotics -  status post 3 days of IV daptomycin  Palliative care consulted to help with symptom management and ongoing goals of care conversation- ongoing outpatient follow-up - Recommend a non-urgent referral to outpatient home palliative that can visit her in her facility to continue goals of care discussion and possible transition to hospice when ready/appropriate   A referral was placed to [facility] palliative

## 2025-04-12 NOTE — ASSESSMENT & PLAN NOTE
POA, meets criteria given tachycardia, hypotension, leukocytosis in setting of suspected right hip infection  This is in the setting of chronic right hip osteomyelitis  Patient was treated for superficial infection in the setting of sepsis  Received Daptomycin in the ED  Blood cultures obtained  ID consulted for antibiotic recommendations- received 3 days of IV daptomycin  Blood cultures remained negative at 48 hours and further antibiotics discontinued per ID

## 2025-04-12 NOTE — ASSESSMENT & PLAN NOTE
Results from last 7 days   Lab Units 04/11/25  0618 04/10/25  2129 04/10/25  0822 04/10/25  0521   HEMOGLOBIN g/dL 7.8* 7.7* 6.9* 6.5*   History of deficiency anemia noted-iron less than 10 and ferritin 126 on 3/24/2025  A.m. drop in hemoglobin to 6.5 and on repeat 6.9  No evidence of active bleeding from right hip wound  Obtained blood consent and transfused with 1 unit PRBCs 4/10/25  Noted improvement in hemoglobin  History of iron transfusion during recent stay at [facility] on 3/23/2025

## 2025-04-12 NOTE — ASSESSMENT & PLAN NOTE
Had some hypotension in the 80s systolically  Has improved after 1 unit of blood, IV fluids and IV abx

## 2025-04-12 NOTE — ASSESSMENT & PLAN NOTE
Wounds POA second to paraplegia, poor nutrition, limited mobility  Q2h turns  Wound care nurse evaluated while inpatient

## 2025-04-12 NOTE — DISCHARGE SUMMARY
Discharge Summary - Hospitalist   Name: Lizbeth Ribeiro 74 y.o. female I MRN: [id number]  Unit/Bed#: E4 -01 I Date of Admission: 4/9/2025   Date of Service: 4/12/2025 I Hospital Day: 3     Assessment & Plan  Sepsis without acute organ dysfunction (HCC)  POA, meets criteria given tachycardia, hypotension, leukocytosis in setting of suspected right hip infection  This is in the setting of chronic right hip osteomyelitis  Patient was treated for superficial infection in the setting of sepsis  Received Daptomycin in the ED  Blood cultures obtained  ID consulted for antibiotic recommendations- received 3 days of IV daptomycin  Blood cultures remained negative at 48 hours and further antibiotics discontinued per ID  Hip osteomyelitis, right (HCC)  Has chronic wound over right hip which is likely chronic osteomyelitis given exposure of bone in the area. This was being managed by [facility] ID and surgery previously. Was noted by facility to be draining more over the past week and wound evolving and getting bigger, concern for infection prompting ED visit  ED gave dose of Daptomycin  Prior [facility] records reviewed - ortho previously stated that her only surgical option for cure would be high risk for her and would cause significant decline in her quality of life. Patient has declined any type of surgical intervention for her hip as risk of surgery would outweigh benefit in regards to quality of life. Patient knowledgeable of chronic right hip osteomyelitis and was concerned for infection on top of this. She is agreeable to IV antibiotics here but knows there is no cure for her chronic hip osteomyelitis.  She understands that she is at risk for developing life-threatening infection and at that point if antibiotics could not help, she would be amenable to discussing hospice/palliative measures for end-of-life care however she is not currently ready for that.  Surgery evaluated and no surgical intervention  recommended, wound can be packed with dry Kerlix  ID input appreciated on antibiotics -  status post 3 days of IV daptomycin  Palliative care consulted to help with symptom management and ongoing goals of care conversation- ongoing outpatient follow-up - Recommend a non-urgent referral to outpatient home palliative that can visit her in her facility to continue goals of care discussion and possible transition to hospice when ready/appropriate   A referral was placed to [facility] palliative  Anemia  Results from last 7 days   Lab Units 04/11/25  0618 04/10/25  2129 04/10/25  0822 04/10/25  0521   HEMOGLOBIN g/dL 7.8* 7.7* 6.9* 6.5*   History of deficiency anemia noted-iron less than 10 and ferritin 126 on 3/24/2025  A.m. drop in hemoglobin to 6.5 and on repeat 6.9  No evidence of active bleeding from right hip wound  Obtained blood consent and transfused with 1 unit PRBCs 4/10/25  Noted improvement in hemoglobin  History of iron transfusion during recent stay at [facility] on 3/23/2025  Paraplegia following spinal cord injury  Bedbound at baseline  Currently resides at [facility]  GERD (gastroesophageal reflux disease)  Continue famotidine and protonix  Severe protein-calorie malnutrition   Nutrition consult placed  Hays catheter in place  Chronic Hays in place in the setting of paraplegia  Constipation  Was recently admitted for severe constipation at [facility]  Now improved  Continue bowel regimen  Ambulatory dysfunction  In setting of paraplegia  Completely bed bound  Pressure ulcers of skin of multiple topographic sites  Wounds POA second to paraplegia, poor nutrition, limited mobility  Q2h turns  Wound care nurse evaluated while inpatient  Hypokalemia  Potassium low at 3.2-repleted  Resolved  Hypomagnesemia  Magnesium low at 1.6-repleted with IV magnesium  Resolved  Hypotension  Had some hypotension in the 80s systolically  Has improved after 1 unit of blood, IV fluids and IV  abx    Consultations During Hospital Stay:  Infectious disease  Palliative care  General Surgery  Nutrition    Procedures Performed:   CT abdomen pelvis wo contrast  Result Date: 4/9/2025  Impression: No acute intra-abdominal pathology. Fecal distention of the rectum, concerning for fecal impaction. Right posterior hip wound, packed. Limited evaluation for fluid collection without contrast. No obvious fluid collection. The wound extends to the expected region of the greater trochanter, with bony exposure evident on image 181 series 2. Redemonstrated fragmentation and extensive bony remodeling at the proximal right femur and the right acetabulum. Similar chronic changes in the left hip, not evident in 2020. Currently,  fragmentation and dislocation of the left femoral head, and marked hyperostosis along the left femoral shaft and intramedullary jony. Correlate with prior work-up for possible etiology. Chronic osteomyelitis is considered in the appropriate history. Probable pressure wound in the posterior left hip image 160 series 2 without demonstrable extension to the bone. Workstation performed: [id number]     Significant Findings / Test Results:   Sepsis  Superficial hip infection  Chronic osteomyelitis of right hip  Acute anemia  Hypokalemia  Hypomagnesemia  Chronic wounds     Outpatient follow-up requested:  Palliative care    Hospital Course:     Lizbeth Ribeiro is a 74 y.o. female patient who originally presented to the hospital on 4/9/2025 due to increased drainage from right hip wound in the setting of her known chronic osteomyelitis.  She was treated for sepsis secondary to superficial infection with IV antibiotics guided by ID.  Her hemoglobin was noted to have drop and she received 1 unit of PRBCs.  After blood cultures were negative x 48 hours, antibiotics were further discontinued.  She received a total of 3 days of IV daptomycin.  She was seen in consultation by palliative care for ongoing goals of  care and symptom management.  She should be followed in the outpatient setting by palliative care to ensure smooth transition to hospice appropriate.  In conclusion, patient is stable for discharge back to her facility at [facility].  For further details regarding hospital stay, please refer to hospital problem course above.    Condition at Discharge: stable     Discharge Day Visit / Exam:     Subjective: Resting in bed.  Ate entire omelette for breakfast this morning.  Discussed blood cultures remaining negative and the stop of antibiotics.  Stable for transfer back to facility today.    Vitals: Blood Pressure: 107/74 (04/12/25 0735)  Pulse: 96 (04/12/25 0735)  Temperature: (!) 96.9 °F (36.1 °C) (04/12/25 0735)  Temp Source: Temporal (04/12/25 0735)  Respirations: 18 (04/12/25 0735)  Height: 5' (152.4 cm) (04/09/25 1805)  Weight - Scale: 46.7 kg (102 lb 15.3 oz) (04/09/25 1805)  SpO2: 99 % (04/12/25 0735)    Exam:   Physical Exam  Vitals and nursing note reviewed.   Constitutional:       General: She is not in acute distress.     Appearance: She is not ill-appearing, toxic-appearing or diaphoretic.      Comments: Chronically ill-appearing   HENT:      Head: Normocephalic and atraumatic.   Eyes:      General: No scleral icterus.  Cardiovascular:      Rate and Rhythm: Normal rate and regular rhythm.   Pulmonary:      Effort: Pulmonary effort is normal. No respiratory distress.      Breath sounds: Normal breath sounds. No stridor. No wheezing or rhonchi.   Abdominal:      General: Bowel sounds are normal. There is no distension.      Palpations: Abdomen is soft. There is no mass.      Tenderness: There is no abdominal tenderness.      Hernia: No hernia is present.   Musculoskeletal:         General: Deformity present.      Cervical back: Neck supple.      Comments: Open right hip wound   Skin:     General: Skin is warm and dry.   Neurological:      Mental Status: She is alert and oriented to person, place, and  time. Mental status is at baseline.   Psychiatric:         Mood and Affect: Mood normal.         Behavior: Behavior normal.       Discussion with Family: Called sister via telephone-unable to reach    Discharge instructions/Information to patient and family:   See after visit summary for information provided to patient and family.      Provisions for Follow-Up Care:  See after visit summary for information related to follow-up care and any pertinent home health orders.      Planned Readmission: no     Discharge Statement:   This time was spent on the day of discharge. I had direct contact with the patient on the day of discharge. Greater than 50% of the total time was spent examining patient, answering all patient questions, arranging and discussing plan of care with patient as well as directly providing post-discharge instructions.  Additional time then spent on discharge activities.    Discharge Medications:  See after visit summary for reconciled discharge medications provided to patient and family.      ** Please Note: This note has been constructed using a voice recognition system **

## 2025-04-12 NOTE — PLAN OF CARE
Problem: Potential for Falls  Goal: Patient will remain free of falls  Description: INTERVENTIONS:- Educate patient/family on patient safety including physical limitations- Instruct patient to call for assistance with activity - Consult OT/PT to assist with strengthening/mobility - Keep Call bell within reach- Keep bed low and locked with side rails adjusted as appropriate- Keep care items and personal belongings within reach- Initiate and maintain comfort rounds- Make Fall Risk Sign visible to staff- Offer Toileting every 2 Hours, in advance of need- Initiate/Maintain bed alarm- Obtain necessary fall risk management equipment: socks- Apply yellow socks and bracelet for high fall risk patients- Consider moving patient to room near nurses station  Outcome: Progressing     Problem: Prexisting or High Potential for Compromised Skin Integrity  Goal: Skin integrity is maintained or improved  Description: INTERVENTIONS:- Identify patients at risk for skin breakdown- Assess and monitor skin integrity- Assess and monitor nutrition and hydration status- Monitor labs - Assess for incontinence - Turn and reposition patient- Assist with mobility/ambulation- Relieve pressure over bony prominences- Avoid friction and shearing- Provide appropriate hygiene as needed including keeping skin clean and dry- Evaluate need for skin moisturizer/barrier cream- Collaborate with interdisciplinary team - Patient/family teaching- Consider wound care consult   INTERVENTIONS:- Identify patients at risk for skin breakdown- Assess and monitor skin integrity- Assess and monitor nutrition and hydration status- Monitor labs - Assess for incontinence - Turn and reposition patient- Assist with mobility/ambulation- Relieve pressure over bony prominences- Avoid friction and shearing- Provide appropriate hygiene as needed including keeping skin clean and dry- Evaluate need for skin moisturizer/barrier cream- Collaborate with interdisciplinary team -  Patient/family teaching- Consider wound care consult   Outcome: Progressing     Problem: GENITOURINARY - ADULT  Goal: Urinary catheter remains patent  Description: INTERVENTIONS:- Assess patency of urinary catheter- If patient has a chronic isabel, consider changing catheter if non-functioning- Follow guidelines for intermittent irrigation of non-functioning urinary catheter  Outcome: Progressing     Problem: METABOLIC, FLUID AND ELECTROLYTES - ADULT  Goal: Electrolytes maintained within normal limits  Description: INTERVENTIONS:- Monitor labs and assess patient for signs and symptoms of electrolyte imbalances- Administer electrolyte replacement as ordered- Monitor response to electrolyte replacements, including repeat lab results as appropriate- Instruct patient on fluid and nutrition as appropriate  Outcome: Progressing  Goal: Fluid balance maintained  Description: INTERVENTIONS:- Monitor labs - Monitor I/O and WT- Instruct patient on fluid and nutrition as appropriate- Assess for signs & symptoms of volume excess or deficit  Outcome: Progressing     Problem: SKIN/TISSUE INTEGRITY - ADULT  Goal: Skin Integrity remains intact(Skin Breakdown Prevention)  Description: Assess:-Perform Jeffery assessment every shift-Clean and moisturize skin every 2 hours-Inspect skin when repositioning, toileting, and assisting with ADLS-Assess under medical devices such as socks every shift-Assess extremities for adequate circulation and sensation Bed Management:-Have minimal linens on bed & keep smooth, unwrinkled-Change linens as needed when moist or perspiring-Avoid sitting or lying in one position for more than 2 hours while in bed-Keep HOB at 30degrees Toileting:-Offer bedside commode-Assess for incontinence every 2 hours-Use incontinent care products after each incontinent episode such as barrier creamActivity:-Mobilize patient 2 times a day-Encourage activity and walks on unit-Encourage or provide ROM exercises -Turn and  reposition patient every 2 Hours-Use appropriate equipment to lift or move patient in bed-Instruct/ Assist with weight shifting every 2 when out of bed in chair-Consider limitation of chair time 2 hour intervalsSkin Care:-Avoid use of baby powder, tape, friction and shearing, hot water or constrictive clothing-Relieve pressure over bony prominences using barrier cream-Do not massage red bony areasNext Steps:-Teach patient strategies to minimize risks such as socks -Consider consults to  interdisciplinary teams such as pt/ot  Outcome: Progressing  Goal: Incision(s), wounds(s) or drain site(s) healing without S/S of infection  Description: INTERVENTIONS- Assess and document dressing, incision, wound bed, drain sites and surrounding tissue- Provide patient and family education- Perform skin care/dressing changes every 2 hours  Outcome: Progressing  Goal: Pressure injury heals and does not worsen  Description: Interventions:- Implement low air loss mattress or specialty surface (Criteria met)- Apply silicone foam dressing- Instruct/assist with weight shifting every 5 minutes when in chair - Limit chair time to 2 hour intervals- Use special pressure reducing interventions such as barrier cream and foam when in chair - Apply fecal or urinary incontinence containment device - Perform passive or active ROM every 2- Turn and reposition patient & offload bony prominences every 2 hours - Utilize friction reducing device or surface for transfers - Consider consults to  interdisciplinary teams such as pt/ot- Use incontinent care products after each incontinent episode such as socks- Consider nutrition services referral as needed  Outcome: Progressing     Problem: HEMATOLOGIC - ADULT  Goal: Maintains hematologic stability  Description: INTERVENTIONS- Assess for signs and symptoms of bleeding or hemorrhage- Monitor labs- Administer supportive blood products/factors as ordered and appropriate  Outcome: Progressing     Problem:  INFECTION - ADULT  Goal: Absence or prevention of progression during hospitalization  Description: INTERVENTIONS:- Assess and monitor for signs and symptoms of infection- Monitor lab/diagnostic results- Monitor all insertion sites, i.e. indwelling lines, tubes, and drains- Monitor endotracheal if appropriate and nasal secretions for changes in amount and color- [place] appropriate cooling/warming therapies per order- Administer medications as ordered- Instruct and encourage patient and family to use good hand hygiene technique- Identify and instruct in appropriate isolation precautions for identified infection/condition  Outcome: Progressing     Problem: DISCHARGE PLANNING  Goal: Discharge to home or other facility with appropriate resources  Description: INTERVENTIONS:- Identify barriers to discharge w/patient and caregiver- Arrange for needed discharge resources and transportation as appropriate- Identify discharge learning needs (meds, wound care, etc.)- Arrange for interpretive services to assist at discharge as needed- Refer to Case Management Department for coordinating discharge planning if the patient needs post-hospital services based on physician/advanced practitioner order or complex needs related to functional status, cognitive ability, or social support system  Outcome: Progressing     Problem: GASTROINTESTINAL - ADULT  Goal: Maintains or returns to baseline bowel function  Description: INTERVENTIONS:- Assess bowel function- Encourage oral fluids to ensure adequate hydration- Administer IV fluids if ordered to ensure adequate hydration- Administer ordered medications as needed- Encourage mobilization and activity- Consider nutritional services referral to assist patient with adequate nutrition and appropriate food choices  Outcome: Progressing     Problem: Nutrition/Hydration-ADULT  Goal: Nutrient/Hydration intake appropriate for improving, restoring or maintaining nutritional needs  Description:  Monitor and assess patient's nutrition/hydration status for malnutrition. Collaborate with interdisciplinary team and initiate plan and interventions as ordered.  Monitor patient's weight and dietary intake as ordered or per policy. Utilize nutrition screening tool and intervene as necessary. Determine patient's food preferences and provide high-protein, high-caloric foods as appropriate. INTERVENTIONS:- Monitor oral intake, urinary output, labs, and treatment plans- Assess nutrition and hydration status and recommend course of action- Evaluate amount of meals eaten- Assist patient with eating if necessary - Allow adequate time for meals- Recommend/ encourage appropriate diets, oral nutritional supplements, and vitamin/mineral supplements- Order, calculate, and assess calorie counts as needed- Recommend, monitor, and adjust tube feedings and TPN/PPN based on assessed needs- Assess need for intravenous fluids- Provide specific nutrition/hydration education as appropriate- Include patient/family/caregiver in decisions related to nutrition  Outcome: Progressing

## 2025-04-12 NOTE — ASSESSMENT & PLAN NOTE
Was recently admitted for severe constipation at [facility]  Now improved  Continue bowel regimen

## 2025-04-12 NOTE — CASE MANAGEMENT
Case Management Discharge Planning Note    Patient name Lizbeth Ribeiro  Location [facility] /E4 -* MRN [id number]  : 1950 Date 2025       Current Admission Date: 2025  Current Admission Diagnosis:Sepsis without acute organ dysfunction (HCC)   Patient Active Problem List    Diagnosis Date Noted Date Diagnosed    Palliative care encounter 2025     Allergy to antibiotic 04/10/2025     Decubitus ulcer of trochanteric region of right hip 04/10/2025     Anemia 04/10/2025     Hypomagnesemia 04/10/2025     Hypotension 04/10/2025     Hip osteomyelitis, right (HCC) 2025     Pressure ulcers of skin of multiple topographic sites 2024     Ambulatory dysfunction 2024     Squamous cell carcinoma in situ (SCCIS) of skin of right lower leg 2023     Left ear pain 2023     Constipation 2022     Pressure injury of skin of foot 2022     Hays catheter in place      Diarrhea 11/10/2022     Skin lesion of back 10/12/2022     Inability to return to living situation 10/06/2022     Suicidal ideations 10/06/2022     Sepsis without acute organ dysfunction (HCC) 2020     Chronic idiopathic constipation 2020     Goals of care, counseling/discussion 2020     Mild protein-calorie malnutrition (HCC) 2020     Severe protein-calorie malnutrition  2020     Recurrent depression 2020     Neurogenic bladder 2020     Other osteoporosis without current pathological fracture 2020     Pressure injury of sacral region, unstageable (HCC) 11/10/2019     GERD (gastroesophageal reflux disease) 2018     Chronic osteomyelitis of coccyx (HCC) 12/15/2018     Anemia of chronic disease 12/15/2018     Decubitus ulcers      Paraplegia following spinal cord injury 2018     Hypokalemia 2018       LOS (days): 3  Geometric Mean LOS (GMLOS) (days): 4.9  Days to GMLOS:2     OBJECTIVE:  Risk of Unplanned Readmission Score: 16.06          Current admission status: Inpatient   Preferred Pharmacy:   [id number] - [place] - [address]  [address] [address]  Phone: [phone] Fax: [phone]    Primary Care Provider: No primary care provider on file.    Primary Insurance: [facility]  Secondary Insurance:     DISCHARGE DETAILS:    Discharge planning discussed with:: Patient, [facility] center supervisor Erica  Freedom of Choice: Yes  Comments - Freedom of Choice: Pt resuming LTC at [facility]    Discharge Destination Plan:: Facility Return, Assisted Living  Transport at Discharge : [facility]  Number/Name of Dispatcher: [facility]  Transported by (Company and Unit #): [employer]  ETA of Transport (Date): 04/12/25  ETA of Transport (Time): 1500    Additional Comments: Pt is stable for discharge, will resume LTC at [facility]. No new medications. [facility] confirmed Pt's maintenance medications are available to Pt upon her return. "[employer] to provide supportive wound care. CM remains available as needed.

## 2025-04-14 ENCOUNTER — TELEPHONE (OUTPATIENT)
Dept: PALLIATIVE MEDICINE | Facility: CLINIC | Age: 75
End: 2025-04-14

## 2025-04-14 LAB
BACTERIA BLD CULT: NORMAL
BACTERIA BLD CULT: NORMAL

## 2025-04-14 NOTE — UTILIZATION REVIEW
NOTIFICATION OF ADMISSION DISCHARGE   This is a Notification of Discharge from [facility]. Please be advised that this patient has been discharge from our facility. Below you will find the admission and discharge date and time including the patient’s disposition.   UTILIZATION REVIEW CONTACT:  Utilization Review Assistants  Network Utilization Review Department  Phone: [phone] x carefully listen to the prompts. All voicemails are confidential.  Email: [email or web address]     ADMISSION INFORMATION  PRESENTATION DATE: 4/9/2025 12:03 PM  OBERVATION ADMISSION DATE: N/A  INPATIENT ADMISSION DATE: 4/9/25  4:33 PM   DISCHARGE DATE: 4/12/2025  3:17 PM   DISPOSITION:Non [facility] SNF/TCU/SNU    Network Utilization Review Department  ATTENTION: Please call with any questions or concerns to [phone] and carefully listen to the prompts so that you are directed to the right person. All voicemails are confidential.   For Discharge needs, contact Care Management DC Support Team at [phone] opt. [phone]  Send all requests for admission clinical reviews, approved or denied determinations and any other requests to dedicated fax number below belonging to the campus where the patient is receiving treatment. List of dedicated fax numbers for the Facilities:  FACILITY NAME UR FAX NUMBER   ADMISSION DENIALS (Administrative/Medical Necessity) [phone]   DISCHARGE SUPPORT TEAM ([facility]) [phone]   PARENT CHILD HEALTH (Maternity/NICU/Pediatrics) [phone]   [facility] [phone]   [facility] [phone]   [facility] [phone]   [facility] [phone]   [facility] [phone]   [facility] [phone]   [facility] [phone]   [facility] [phone]   [facility]  [facility] [phone]   [facility] [phone]   [facility] [phone]   [facility] [phone]

## 2025-05-01 NOTE — ANESTHESIA PREPROCEDURE EVALUATION
Removal of rt chestport.  Comp-none.   Review of Systems/Medical History  Patient summary reviewed  Chart reviewed      Cardiovascular  Negative cardio ROS    Pulmonary  Negative pulmonary ROS        GI/Hepatic    GERD ,             Endo/Other  Negative endo/other ROS      GYN       Hematology  Anemia ,     Musculoskeletal  Osteoarthritis,   Arthritis     Neurology      Comment: Paraplegia T8 injury 1981 Psychology   Depression ,              Physical Exam    Airway    Mallampati score: I  TM Distance: >3 FB  Neck ROM: full     Dental       Cardiovascular  Comment: Negative ROS, Cardiovascular exam normal    Pulmonary  Pulmonary exam normal     Other Findings        Anesthesia Plan  ASA Score- 3     Anesthesia Type- IV sedation with anesthesia with ASA Monitors  Additional Monitors:   Airway Plan:     Comment: Autonomic hyperreflexia  Plan Factors-    Induction-     Postoperative Plan- Plan for postoperative opioid use  Informed Consent- Anesthetic plan and risks discussed with patient  I personally reviewed this patient with the CRNA  Discussed and agreed on the Anesthesia Plan with the CRNA  Joselo Gonzalez

## 2025-06-17 ENCOUNTER — NURSING HOME VISIT (OUTPATIENT)
Dept: WOUND CARE | Facility: HOSPITAL | Age: 75
End: 2025-06-17
Payer: COMMERCIAL

## 2025-06-17 DIAGNOSIS — E44.1 MILD PROTEIN-CALORIE MALNUTRITION (HCC): ICD-10-CM

## 2025-06-17 DIAGNOSIS — R26.2 AMBULATORY DYSFUNCTION: ICD-10-CM

## 2025-06-17 DIAGNOSIS — L89.90 PRESSURE ULCERS OF SKIN OF MULTIPLE TOPOGRAPHIC SITES: Primary | ICD-10-CM

## 2025-06-17 PROCEDURE — 99349 HOME/RES VST EST MOD MDM 40: CPT | Performed by: NURSE PRACTITIONER

## 2025-06-19 NOTE — ASSESSMENT & PLAN NOTE
Patient had multiple pressure ulcer  Right medial ankle: Unstageable pressure ulcer, covered with 50% slough, deepest tissue not visible, local wound care with calcium alginate  Left lateral ankle: Unstageable pressure ulcer: Covered with slough, deepest tissue not visible, with exposed tendon, recently diagnosed as osteomyelitis.  Cleanse with Dakin's quarter strength, on calcium alginate  Left heel: Unstageable pressure ulcer, 100% eschar, cleansed with Betadine  Right hip: Stage IV pressure ulcer, stable, continue wound VAC  Left ischium: Full-thickness, with no obvious sign of infection, continue calcium alginate  The wound on the right lower leg is healed  Left lower leg: Left lower leg proximal is almost healed.  The wound on the left lower leg distal is covered with 100% slough, unstageable pressure ulcer.  Ordered Dakin's to clean the wound.  Left foot second time posterior left,: Wound is healed  Wound will be difficult to heal.  Clinical factors affecting wound healing includes chronicity of the wound, location of the wound, patient with paraplegia, poor nutrition, and other medical complexity  Increase protein intake: Under the care of registered dietitian  On air mattress  Follow-up next week

## 2025-06-19 NOTE — PROGRESS NOTES
[facility]OUND C[facility]NAGEMENT   AND [facility]PER[facility]INE [facility]       Patient ID: Lizbeth Ribeiro is a 74 y.o. female Date of Birth 1950     Location of Service: [facility]    Performed wound round with: Wound team     Chief Complaint : Multiple pressure injuries    Wound Instructions:  Wound: Right medial ankle, left lateral ankle, left lower leg distal, sacrum, left ischium  Discontinue previous wound order  Cleanse the wound bed with Dakin's quarter strength  Apply non-sting skin prep to periwound area  Apply calcium alginate to wound bed, then cover with bordered foam, or bordered gauze or ABD pad  Frequency : Daily and as needed for soiling    Wound: Continue wound VAC on the right hip    Wound: Left heel  Cleanse with normal saline solution wound cleanser  Apply Betadine to wound bed  Daily and as needed for soiling    Moisturizing lotion on the lower legs without wound    Offload all wounds  Turn and reposition frequently  Instruct / Assist with weight shifting in wheelchair  Increase protein intake.  Monitor for any sign of infection or worsening, inform PCP or patient's primary physician in your facility.      Allergies  Iv contrast [iodinated contrast media], Cefepime, Ciprofloxacin, and Vancomycin      Assessment & Plan:  1. Pressure ulcers of skin of multiple topographic sites  Assessment & Plan:  Patient had multiple pressure ulcer  Right medial ankle: Unstageable pressure ulcer, covered with 50% slough, deepest tissue not visible, local wound care with calcium alginate  Left lateral ankle: Unstageable pressure ulcer: Covered with slough, deepest tissue not visible, with exposed tendon, recently diagnosed as osteomyelitis.  Cleanse with Dakin's quarter strength, on calcium alginate  Left heel: Unstageable pressure ulcer, 100% eschar, cleansed with Betadine  Right hip: Stage IV pressure ulcer, stable, continue wound VAC  Left ischium: Full-thickness, with no obvious sign of infection,  continue calcium alginate  The wound on the right lower leg is healed  Left lower leg: Left lower leg proximal is almost healed.  The wound on the left lower leg distal is covered with 100% slough, unstageable pressure ulcer.  Ordered Dakin's to clean the wound.  Left foot second time posterior left,: Wound is healed  Wound will be difficult to heal.  Clinical factors affecting wound healing includes chronicity of the wound, location of the wound, patient with paraplegia, poor nutrition, and other medical complexity  Increase protein intake: Under the care of registered dietitian  On air mattress  Follow-up next week    2. Mild protein-calorie malnutrition (HCC)  3. Ambulatory dysfunction                     Subjective:   December 2, 2024.  New consult for wound on the right medial ankle, right lateral knee, left hip, sacrum, right hip, and right upper thigh.  Patient was referred by primary care physician.  Medical problem includes but not limited to paraplegia, osteomyelitis of the right hip.  I introduced myself to patient and patient agreed to be seen for wound consult.  Patient was seen with facility nurse.    Wound history: As per medical record review, patient was recently admitted at [facility] on March 11, 2024 for abscess on the right hip.  As per record, patient have right hip chronic osteomyelitis.  As per record, patient was seen by infectious disease and patient was told that the wound probably will not heal.  There is no surgical intervention recommended by orthopedic and plastic surgery per Ortho, the wound cannot be debrided without actually doing a Hemipelvectomy.  As per patient, all her other wounds are chronic.    Received patient in bed, currently using a regular bed.  As per report, facility already ordered an air mattress.  Patient needs assistance with turning repositioning in bed.  Patient is incontinent of both bowel and bladder.  Patient refused protein supplement.  Currently  under the care of visiting nurse.    December 9, 2024.  Follow-up for multiple pressure ulcers.  As per report, patient had been noncompliance with turning and repositioning bed.  Patient under the care of visiting nurse.  Patient currently using a regular air mattress and 1 heel boot.    December 16, 2024.  Follow-up for wound on the multiple area, received patient in bed, patient is incontinent of bladder.  As per report, the wound on left hip worsened.  Patient is using only 1 heel boot.     December 23, 2024.  Follow-up for wound on the multiple area.  Received patient in bed, seems comfortable.  Patient have indwelling catheter, overlay mattress, and heel boots.  Denies pain.    January 9, 2024.  Follow-up for wound on the multiple area, received patient in bed, seems comfortable.  Denies pain.  As per patient, she was seen at wheelchair clinic yesterday and recommended power chair.    June 17, 2025.  New consult for multiple wounds.  Patient was last seen on January 2024.  Patient was under the care of visiting nurse.  Recently transferred to [facility] for short-term rehab.She was recently admitted at [facility] on April 9, 2025 for sepsis.                       Review of Systems   Constitutional: Negative.    Respiratory: Negative.     Cardiovascular: Negative.    Musculoskeletal:  Positive for gait problem.   Skin:  Positive for wound.   Psychiatric/Behavioral: Negative.         Objective:    Physical Exam  Constitutional:       Appearance: Normal appearance.   Pulmonary:      Effort: Pulmonary effort is normal.   Abdominal:      Comments: Incontinent of bowel during visit   Genitourinary:     Comments: Incontinent of bladder during visit    Musculoskeletal:      Right lower leg: Edema present.      Left lower leg: Edema present.      Comments: Moderate edema on bilateral lower legs  Paraplegia     Skin:     Findings: Lesion present.      Comments: Right knee: Wound is healed    Right medial  ankle: Wound size is 1.5 x 1.5 x 0.1 cm.,  50% slough, 50% granulation, with moderate amount of serous drainage, very, with no obvious sign of infection    Left lateral ankle: Wound size is 6.5 x 5 x 1 cm.,  100% slough, large amount of purulent discharge, positive tendon, periwound normal, with no obvious sign of infection    Left heel: Wound size is 6 x 8 cm.,  100% eschar Improved, no obvious sign of infection    Right hip: Wound size is 8 x 7 x 3 cm.,  With undermining all edges, about 1 cm, 100% granulation, with no obvious sign of infection    Left ischium: Wound size is 0.2 x 0.2 cm.,  100 position, small amount of serous drainage, periwound normal, with no obvious sign of infection    Right lower leg: Wound is healed    Left lower leg proximal: Wound size is 2 x 2 cm.,  100% epithelial, no drainage, no obvious sign of infection    Left lower leg distal: Wound size is 4 x 4.5 x 0.1 cm.,  100% slough, moderate amount of serous drainage, with no obvious sign of infection    Left foot second toe and posterior left calf: Wound is healed     Neurological:      Mental Status: She is alert.      Gait: Gait abnormal.     Psychiatric:         Mood and Affect: Mood normal.              Procedures     Results from last 6 Months   Lab Units 04/09/25  1330   WOUND CULTURE  2+ Growth of Proteus mirabilis*  2+ Growth of Beta Hemolytic Streptococcus Group A*  1+ Growth of       Patient's care was coordinated with nursing facility staff. Recent vitals, labs and updated medications were reviewed on EMR or chart system of facility. Past Medical, surgical, social, medication and allergy history and patient's previous records were reviewed and updated as appropriate: Most up-to date information is available in the facility EMR where the patient is currently admitted.    Patient Active Problem List   Diagnosis    Paraplegia following spinal cord injury    Hypokalemia    Decubitus ulcers    Chronic osteomyelitis of coccyx (HCC)     Anemia of chronic disease    GERD (gastroesophageal reflux disease)    Pressure injury of sacral region, unstageable (HCC)    Neurogenic bladder    Other osteoporosis without current pathological fracture    Severe protein-calorie malnutrition     Recurrent depression    Goals of care, counseling/discussion    Mild protein-calorie malnutrition (HCC)    Sepsis without acute organ dysfunction (HCC)    Chronic idiopathic constipation    Inability to return to living situation    Suicidal ideations    Skin lesion of back    Diarrhea    Hays catheter in place    Pressure injury of skin of foot    Constipation    Left ear pain    Squamous cell carcinoma in situ (SCCIS) of skin of right lower leg    Ambulatory dysfunction    Pressure ulcers of skin of multiple topographic sites    Hip osteomyelitis, right (HCC)    Allergy to antibiotic    Decubitus ulcer of trochanteric region of right hip    Anemia    Hypomagnesemia    Hypotension    Palliative care encounter     Past Medical History:   Diagnosis Date    Anemia     iron defiencey    Arthritis     osteo    Back injury     C. difficile diarrhea 4/7/2019    Closed lateral dislocation of distal end of right femur 3/10/2020    Depression     MRSA (methicillin resistant staph aureus) culture positive 12/07/2018    BONE-TISSUE     Osteopenia     Osteoporosis     Paralysis (HCC)     paraplegic due to spinal cord injury    Paraplegia ([facility])     Poor circulation     Pressure injury of skin     right hip, stage 3    Pressure sore of hip     right    Sepsis ([facility]) 3/28/2020    Sepsis due to anaerobes ([facility]) 1/6/2019    Tibial plateau fracture     Underweight      Past Surgical History:   Procedure Laterality Date    CLOSURE DELAYED PRIMARY N/A 3/20/2019    Procedure: OPHELIA ANAL WOUND RECLOSURE;  Surgeon: Jarrod Jacobo MD;  Location:  MAIN OR;  Service: Plastics    DECUBITUS ULCER EXCISION Bilateral 11/12/2019    Procedure: DEBRIDEMENT DECUBITI (WASH OUT);  Surgeon: Zach  DO Leida;  Location:  MAIN OR;  Service: General    FLAP LOCAL EXTREMITY Left 1/28/2019    Procedure: THIGH FLAP & STSG TO CLOSE HIP WOUND;  Surgeon: Jarrod Jacobo MD;  Location:  MAIN OR;  Service: Plastics    FLAP LOCAL TRUNK Right 12/17/2018    Procedure: DEBRIDE/FLAP CLOSURE HIP PRESSURE SORE W/SKIN GRAFT;  Surgeon: Jarrod Jacobo MD;  Location:  MAIN OR;  Service: Plastics    FLAP LOCAL TRUNK Left 2/27/2019    Procedure: BUTTOCK FLAP TO ISCHIAL SORE;  Surgeon: Jarrod Jacobo MD;  Location:  MAIN OR;  Service: Plastics    HIP DEBRIDEMENT Right 2017    right hip sore debridement    INCISION AND DRAINAGE OF WOUND Left 1/3/2019    Procedure: INCISION AND DRAINAGE (I&D) left distal femur. With deep wound cultures. Application of VAC DRAIN SPONGE;  Surgeon: Chidi Bingham MD;  Location:  MAIN OR;  Service: Orthopedics    IR PICC LINE  12/19/2018    IR PICC LINE  3/12/2019    NV DEBRIDEMENT BONE 1ST 20 SQ CM/< Right 9/19/2018    Procedure: DEBRIDEMENT OF HIP PRESSURE SORE;  Surgeon: Jarrod Jacobo MD;  Location:  MAIN OR;  Service: Plastics    NV OPTX FEM SHFT FX W/INSJ IMED IMPLT W/WO SCREW Left 10/22/2018    Procedure: INSERTION NAIL IM LEFT FEMUR RETROGRADE;  Surgeon: Ciaran Noriega MD;  Location:  MAIN OR;  Service: Orthopedics    TOE AMPUTATION Left 2017    small toe left foot amputation    WISDOM TOOTH EXTRACTION      WOUND DEBRIDEMENT Left 12/17/2018    Procedure: DEBRIDEMENT HIP PRESSURE SORE;  Surgeon: Jarrod Jacobo MD;  Location:  MAIN OR;  Service: Plastics    WOUND DEBRIDEMENT N/A 11/10/2019    Procedure: EXCISIONAL DEBRIDEMENT;  Surgeon: Melva Guo MD;  Location:  MAIN OR;  Service: General     Social History     Socioeconomic History    Marital status: Single   Tobacco Use    Smoking status: Never    Smokeless tobacco: Never   Vaping Use    Vaping status: Never Used   Substance and Sexual Activity    Alcohol use: Not Currently     Comment: occasional     Drug use: No    Sexual activity: Not Currently   Social History Narrative    Paraplegic since 1981 from a hang gliding accident.  Lives at home alone. Pt denies getting assistance from outside persons or agencies including wound care.     Previously worked doing accounting in taxes until couple years ago.    Use to drive.    Uses sliding were to get from chair to car.    Lives in a 1st floor apartment.     Social Drivers of Health     Financial Resource Strain: Not At Risk (3/23/2025)    Received from [facility]    Financial Insecurity     In the last 12 months did you skip medications to save money?: No     In the last 12 months was there a time when you needed to see a doctor but could not because of cost?: No   Food Insecurity: No Food Insecurity (4/9/2025)    Nursing - Inadequate Food Risk Classification     Ran Out of Food in the Last Year: Never true   Transportation Needs: No Transportation Needs (4/9/2025)    Nursing - Transportation Risk Classification     Lack of Transportation: No   Social Connections: Patient Unable To Answer (3/23/2025)    Received from [facility]    Social Connection     Do you often feel lonely?: Unable to Assess   Intimate Partner Violence: Unknown (4/9/2025)    Nursing IPS     Physically Hurt by Someone: No     Hurt or Threatened by Someone: No   Housing Stability: At Risk (4/9/2025)    Nursing: Inadequate Housing Risk Classification     Unable to Pay for Housing in the Last Year: Yes     Has Housing: Yes        Family History   Problem Relation Name Age of Onset    Depression Father                Coordination of Care: Wound team aware of the treatment plan. Facility nurse will provide wound treatment and monitor the wound for any changes.     Patient / Staff education : Patient / Staff was given education on sign of infection and pressure ulcer prevention. Patient/ Staff verbalized understanding     Follow up :  Next week    Voice-recognition  "software may have been used in the preparation of this document. Occasional wrong word or \"sound-alike\" substitutions may have occurred due to the inherent limitations of voice recognition software. Interpretation should be guided by context.      CONCEPCION Pruitt  "

## 2025-06-24 ENCOUNTER — NURSING HOME VISIT (OUTPATIENT)
Dept: WOUND CARE | Facility: HOSPITAL | Age: 75
End: 2025-06-24
Payer: COMMERCIAL

## 2025-06-24 DIAGNOSIS — E44.1 MILD PROTEIN-CALORIE MALNUTRITION (HCC): ICD-10-CM

## 2025-06-24 DIAGNOSIS — L89.90 PRESSURE ULCERS OF SKIN OF MULTIPLE TOPOGRAPHIC SITES: Primary | ICD-10-CM

## 2025-06-24 PROCEDURE — 99309 SBSQ NF CARE MODERATE MDM 30: CPT | Performed by: NURSE PRACTITIONER

## 2025-06-25 NOTE — ASSESSMENT & PLAN NOTE
Patient refused protein supplement   The patient was offered a chaperone declines.    Accompanied by: unaccompanied    Subjective:  Date of procedure:06/12/2023    Procedure: EXCISIONAL DEBRIDEMENT OF SACRAL DECUBITUS WOUND

## 2025-06-25 NOTE — ASSESSMENT & PLAN NOTE
Patient had multiple pressure ulcer  Right medial ankle: 100% granulation, with no obvious sign of infection  Left lateral ankle and left heel: Wound worsened, 100% necrotic tissue, positive erythema the surrounding tissue, with purple discoloration  Right hip: Stage IV pressure ulcer, stable, continue wound VAC  Left ischium: Full-thickness, with no obvious sign of infection, continue calcium alginate   The wound on the left lower leg distal is covered with 100% slough, unstageable pressure ulcer.  Ordered Dakin's to clean the wound.  The wound on the left lower leg significantly worsened, with erythema and cyanosis.  I spoke to patient regarding the status of the wound.  Based on the wound presentation, the wound seems infected.  Legs probably needs an amputation to prevent sepsis or conservative treatment using local wound care or hospice care.  I recommended to send the patient out for possible surgical intervention.  Wound will be difficult to heal.  Clinical factors affecting wound healing includes chronicity of the wound, location of the wound, patient with paraplegia, poor nutrition, and other medical complexity  Increase protein intake: Under the care of registered dietitian  On air mattress

## 2025-06-25 NOTE — PROGRESS NOTES
[facility] WOUND CARE MANAGEMENT   AND HYPERBARIC MEDICINE CENTER       Patient ID: Lizbeth Ribeiro is a 74 y.o. female Date of Birth 1950     Location of Service: [facility]    Performed wound round with: Wound team     Chief Complaint : Multiple pressure injuries    Wound Instructions:  Wound: Right medial ankle, left lateral ankle, left lower leg distal, sacrum, left ischium  Discontinue previous wound order  Cleanse the wound bed with Dakin's quarter strength  Apply non-sting skin prep to periwound area  Apply calcium alginate to wound bed, then cover with bordered foam, or bordered gauze or ABD pad  Frequency : Daily and as needed for soiling    Sent patient out to emergency room for worsening of wound on the left lower leg    Wound: Continue wound VAC on the right hip    Wound: Left heel  Cleanse with normal saline solution wound cleanser  Apply Betadine to wound bed  Daily and as needed for soiling    Moisturizing lotion on the lower legs without wound    Offload all wounds  Turn and reposition frequently  Instruct / Assist with weight shifting in wheelchair  Increase protein intake.  Monitor for any sign of infection or worsening, inform PCP or patient's primary physician in your facility.      Allergies  Iv contrast [iodinated contrast media], Cefepime, Ciprofloxacin, and Vancomycin      Assessment & Plan:  1. Pressure ulcers of skin of multiple topographic sites  Assessment & Plan:  Patient had multiple pressure ulcer  Right medial ankle: 100% granulation, with no obvious sign of infection  Left lateral ankle and left heel: Wound worsened, 100% necrotic tissue, positive erythema the surrounding tissue, with purple discoloration  Right hip: Stage IV pressure ulcer, stable, continue wound VAC  Left ischium: Full-thickness, with no obvious sign of infection, continue calcium alginate   The wound on the left lower leg distal is covered with 100% slough, unstageable pressure ulcer.   Ordered Dakin's to clean the wound.  The wound on the left lower leg significantly worsened, with erythema and cyanosis.  I spoke to patient regarding the status of the wound.  Based on the wound presentation, the wound seems infected.  Legs probably needs an amputation to prevent sepsis or conservative treatment using local wound care or hospice care.  I recommended to send the patient out for possible surgical intervention.  Wound will be difficult to heal.  Clinical factors affecting wound healing includes chronicity of the wound, location of the wound, patient with paraplegia, poor nutrition, and other medical complexity  Increase protein intake: Under the care of registered dietitian  On air mattress      2. Mild protein-calorie malnutrition (HCC)  Assessment & Plan:  Patient refused protein supplement                       Subjective:   December 2, 2024.  New consult for wound on the right medial ankle, right lateral knee, left hip, sacrum, right hip, and right upper thigh.  Patient was referred by primary care physician.  Medical problem includes but not limited to paraplegia, osteomyelitis of the right hip.  I introduced myself to patient and patient agreed to be seen for wound consult.  Patient was seen with facility nurse.    Wound history: As per medical record review, patient was recently admitted at [facility] on March 11, 2024 for abscess on the right hip.  As per record, patient have right hip chronic osteomyelitis.  As per record, patient was seen by infectious disease and patient was told that the wound probably will not heal.  There is no surgical intervention recommended by orthopedic and plastic surgery per Ortho, the wound cannot be debrided without actually doing a Hemipelvectomy.  As per patient, all her other wounds are chronic.    Received patient in bed, currently using a regular bed.  As per report, facility already ordered an air mattress.  Patient needs assistance with  turning repositioning in bed.  Patient is incontinent of both bowel and bladder.  Patient refused protein supplement.  Currently under the care of visiting nurse.    December 9, 2024.  Follow-up for multiple pressure ulcers.  As per report, patient had been noncompliance with turning and repositioning bed.  Patient under the care of visiting nurse.  Patient currently using a regular air mattress and 1 heel boot.    December 16, 2024.  Follow-up for wound on the multiple area, received patient in bed, patient is incontinent of bladder.  As per report, the wound on left hip worsened.  Patient is using only 1 heel boot.     December 23, 2024.  Follow-up for wound on the multiple area.  Received patient in bed, seems comfortable.  Patient have indwelling catheter, overlay mattress, and heel boots.  Denies pain.    January 9, 2024.  Follow-up for wound on the multiple area, received patient in bed, seems comfortable.  Denies pain.  As per patient, she was seen at wheelchair clinic yesterday and recommended power chair.    June 17, 2025.  New consult for multiple wounds.  Patient was last seen on January 2024.  Patient was under the care of visiting nurse.  Recently transferred to [facility] for short-term rehab.She was recently admitted at [facility] on April 9, 2025 for sepsis.     June 24, 2025.  Follow-up for multiple wounds.  As per report, the wound on the lower leg is worsening.                      Review of Systems   Constitutional: Negative.    Respiratory: Negative.     Cardiovascular: Negative.    Musculoskeletal:  Positive for gait problem.   Skin:  Positive for wound.   Psychiatric/Behavioral: Negative.         Objective:    Physical Exam  Constitutional:       Appearance: Normal appearance.   Pulmonary:      Effort: Pulmonary effort is normal.   Abdominal:      Comments: Incontinent of bowel during visit   Genitourinary:     Comments: Incontinent of bladder during visit    Musculoskeletal:       Right lower leg: Edema present.      Left lower leg: Edema present.      Comments: Moderate edema on bilateral lower legs  Paraplegia     Skin:     Findings: Lesion present.      Comments: Right knee: Wound is healed    Left ankle, left heel, left second toe, left calf: Wound worsened, 100% necrotic tissue, positive erythema the surrounding tissue, with purple discoloration, positive malodor    Right hip: Wound improved, 100% granulation, with no obvious sign of infection     Neurological:      Mental Status: She is alert.      Gait: Gait abnormal.     Psychiatric:         Mood and Affect: Mood normal.              Procedures     Results from last 6 Months   Lab Units 04/09/25  1330   WOUND CULTURE  2+ Growth of Proteus mirabilis*  2+ Growth of Beta Hemolytic Streptococcus Group A*  1+ Growth of       Patient's care was coordinated with nursing facility staff. Recent vitals, labs and updated medications were reviewed on EMR or chart system of facility. Past Medical, surgical, social, medication and allergy history and patient's previous records were reviewed and updated as appropriate: Most up-to date information is available in the facility EMR where the patient is currently admitted.    Patient Active Problem List   Diagnosis    Paraplegia following spinal cord injury    Hypokalemia    Decubitus ulcers    Chronic osteomyelitis of coccyx (HCC)    Anemia of chronic disease    GERD (gastroesophageal reflux disease)    Pressure injury of sacral region, unstageable (HCC)    Neurogenic bladder    Other osteoporosis without current pathological fracture    Severe protein-calorie malnutrition     Recurrent depression    Goals of care, counseling/discussion    Mild protein-calorie malnutrition (HCC)    Sepsis without acute organ dysfunction (HCC)    Chronic idiopathic constipation    Inability to return to living situation    Suicidal ideations    Skin lesion of back    Diarrhea    Hays catheter in place    Pressure  injury of skin of foot    Constipation    Left ear pain    Squamous cell carcinoma in situ (SCCIS) of skin of right lower leg    Ambulatory dysfunction    Pressure ulcers of skin of multiple topographic sites    Hip osteomyelitis, right (HCC)    Allergy to antibiotic    Decubitus ulcer of trochanteric region of right hip    Anemia    Hypomagnesemia    Hypotension    Palliative care encounter     Past Medical History:   Diagnosis Date    Anemia     iron defiencey    Arthritis     osteo    Back injury     C. difficile diarrhea 4/7/2019    Closed lateral dislocation of distal end of right femur 3/10/2020    Depression     MRSA (methicillin resistant staph aureus) culture positive 12/07/2018    BONE-TISSUE     Osteopenia     Osteoporosis     Paralysis ([facility])     paraplegic due to spinal cord injury    Paraplegia ([facility])     Poor circulation     Pressure injury of skin     right hip, stage 3    Pressure sore of hip     right    Sepsis ([facility]) 3/28/2020    Sepsis due to anaerobes ([facility]) 1/6/2019    Tibial plateau fracture     Underweight      Past Surgical History:   Procedure Laterality Date    CLOSURE DELAYED PRIMARY N/A 3/20/2019    Procedure: OPHELIA ANAL WOUND RECLOSURE;  Surgeon: Jarrod Jacobo MD;  Location:  MAIN OR;  Service: Plastics    DECUBITUS ULCER EXCISION Bilateral 11/12/2019    Procedure: DEBRIDEMENT DECUBITI (WASH OUT);  Surgeon: Zach Casarez DO;  Location:  MAIN OR;  Service: General    FLAP LOCAL EXTREMITY Left 1/28/2019    Procedure: THIGH FLAP & STSG TO CLOSE HIP WOUND;  Surgeon: Jarrod Jacobo MD;  Location:  MAIN OR;  Service: Plastics    FLAP LOCAL TRUNK Right 12/17/2018    Procedure: DEBRIDE/FLAP CLOSURE HIP PRESSURE SORE W/SKIN GRAFT;  Surgeon: Jarrod Jacobo MD;  Location:  MAIN OR;  Service: Plastics    FLAP LOCAL TRUNK Left 2/27/2019    Procedure: BUTTOCK FLAP TO ISCHIAL SORE;  Surgeon: Jarrod Jacobo MD;  Location:  MAIN OR;  Service: Plastics    HIP DEBRIDEMENT Right 2017     right hip sore debridement    INCISION AND DRAINAGE OF WOUND Left 1/3/2019    Procedure: INCISION AND DRAINAGE (I&D) left distal femur. With deep wound cultures. Application of VAC DRAIN SPONGE;  Surgeon: Chidi Bingham MD;  Location:  MAIN OR;  Service: Orthopedics    IR PICC LINE  12/19/2018    IR PICC LINE  3/12/2019    MS DEBRIDEMENT BONE 1ST 20 SQ CM/< Right 9/19/2018    Procedure: DEBRIDEMENT OF HIP PRESSURE SORE;  Surgeon: Jarrod Jacobo MD;  Location:  MAIN OR;  Service: Plastics    MS OPTX FEM SHFT FX W/INSJ IMED IMPLT W/WO SCREW Left 10/22/2018    Procedure: INSERTION NAIL IM LEFT FEMUR RETROGRADE;  Surgeon: Ciaran Noriega MD;  Location:  MAIN OR;  Service: Orthopedics    TOE AMPUTATION Left 2017    small toe left foot amputation    WISDOM TOOTH EXTRACTION      WOUND DEBRIDEMENT Left 12/17/2018    Procedure: DEBRIDEMENT HIP PRESSURE SORE;  Surgeon: Jarrod Jacobo MD;  Location:  MAIN OR;  Service: Plastics    WOUND DEBRIDEMENT N/A 11/10/2019    Procedure: EXCISIONAL DEBRIDEMENT;  Surgeon: Melva Guo MD;  Location:  MAIN OR;  Service: General     Social History     Socioeconomic History    Marital status: Single   Tobacco Use    Smoking status: Never    Smokeless tobacco: Never   Vaping Use    Vaping status: Never Used   Substance and Sexual Activity    Alcohol use: Not Currently     Comment: occasional    Drug use: No    Sexual activity: Not Currently   Social History Narrative    Paraplegic since 1981 from a hang gliding accident.  Lives at home alone. Pt denies getting assistance from outside persons or agencies including wound care.     Previously worked doing accounting in taxes until couple years ago.    Use to drive.    Uses sliding were to get from chair to car.    Lives in a 1st floor apartment.     Social Drivers of Health     Financial Resource Strain: Not At Risk (3/23/2025)    Received from [facility]    Financial Insecurity     In the last 12 months  "did you skip medications to save money?: No     In the last 12 months was there a time when you needed to see a doctor but could not because of cost?: No   Food Insecurity: No Food Insecurity (4/9/2025)    Nursing - Inadequate Food Risk Classification     Ran Out of Food in the Last Year: Never true   Transportation Needs: No Transportation Needs (4/9/2025)    Nursing - Transportation Risk Classification     Lack of Transportation: No   Social Connections: Patient Unable To Answer (3/23/2025)    Received from [facility]    Social Connection     Do you often feel lonely?: Unable to Assess   Intimate Partner Violence: Unknown (4/9/2025)    Nursing IPS     Physically Hurt by Someone: No     Hurt or Threatened by Someone: No   Housing Stability: At Risk (4/9/2025)    Nursing: Inadequate Housing Risk Classification     Unable to Pay for Housing in the Last Year: Yes     Has Housing: Yes        Family History   Problem Relation Name Age of Onset    Depression Father                Coordination of Care: Wound team aware of the treatment plan. Facility nurse will provide wound treatment and monitor the wound for any changes.     Patient / Staff education : Patient / Staff was given education on sign of infection and pressure ulcer prevention. Patient/ Staff verbalized understanding     Follow up :  Next week    Voice-recognition software may have been used in the preparation of this document. Occasional wrong word or \"sound-alike\" substitutions may have occurred due to the inherent limitations of voice recognition software. Interpretation should be guided by context.      CONCEPCION Pruitt  "

## 2025-07-08 ENCOUNTER — NURSING HOME VISIT (OUTPATIENT)
Dept: WOUND CARE | Facility: HOSPITAL | Age: 75
End: 2025-07-08
Payer: COMMERCIAL

## 2025-07-08 DIAGNOSIS — Z89.512 S/P BKA (BELOW KNEE AMPUTATION) UNILATERAL, LEFT (HCC): ICD-10-CM

## 2025-07-08 DIAGNOSIS — G82.20 PARAPLEGIA FOLLOWING SPINAL CORD INJURY (HCC): ICD-10-CM

## 2025-07-08 DIAGNOSIS — R26.2 AMBULATORY DYSFUNCTION: ICD-10-CM

## 2025-07-08 DIAGNOSIS — E44.1 MILD PROTEIN-CALORIE MALNUTRITION (HCC): ICD-10-CM

## 2025-07-08 DIAGNOSIS — L89.90 PRESSURE ULCERS OF SKIN OF MULTIPLE TOPOGRAPHIC SITES: Primary | ICD-10-CM

## 2025-07-08 PROCEDURE — 99309 SBSQ NF CARE MODERATE MDM 30: CPT | Performed by: NURSE PRACTITIONER

## 2025-07-09 PROBLEM — Z89.512 S/P BKA (BELOW KNEE AMPUTATION) UNILATERAL, LEFT (HCC): Status: ACTIVE | Noted: 2025-07-09

## 2025-07-09 NOTE — ASSESSMENT & PLAN NOTE
Patient had multiple pressure ulcer  Right medial ankle: 100% scab, noted with bleeding  Right hip: Stage IV pressure ulcer, stable, continue wound VAC  Left ischium: Healed  Increase protein intake: Under the care of registered dietitian  On air mattress

## 2025-07-09 NOTE — ASSESSMENT & PLAN NOTE
Left BKA  Incision sutured, intact, approximated with no obvious sign of infection-wound care with dry sterile dressing  Local wound care - DSD  Continue to follow up with surgeon  Continue to offload

## 2025-07-09 NOTE — PROGRESS NOTES
[facility] WOUND CARE MANAGEMENT   AND HYPERBARIC MEDICINE CENTER       Patient ID: Lizbeth Ribeiro is a 74 y.o. female Date of Birth 1950     Location of Service: [facility]    Performed wound round with: Wound team     Chief Complaint : Multiple pressure injuries    Wound Instructions:  Wound: Right medial ankle,  Discontinue previous wound order  Cleanse the wound bed with Dakin's quarter strength  Apply non-sting skin prep to periwound area  Apply betadine to wound bed  Frequency : Daily and as needed for soiling    Wound: Continue wound VAC on the right hip    Wound:  Sacrum, ischium and buttocks  Discontinue previous wound order  Cleanse the skin/wound bed with soap and water, pat dry  Apply zguard to wound bed/skin  Frequency : twice a day and prn for soiling    Wound : LBKA  Cleanse the wound bed with NSS or wound cleanser  Apply skin prep to periwound area and cover with ABD pad and wrap with kerlix  daily and PRN for soiling      Moisturizing lotion on the lower leg without wound    Offload all wounds  Turn and reposition frequently  Instruct / Assist with weight shifting in wheelchair  Increase protein intake.  Monitor for any sign of infection or worsening, inform PCP or patient's primary physician in your facility.      Allergies  Iv contrast [iodinated contrast media], Cefepime, Ciprofloxacin, and Vancomycin      Assessment & Plan:  1. Pressure ulcers of skin of multiple topographic sites  Assessment & Plan:  Patient had multiple pressure ulcer  Right medial ankle: 100% scab, noted with bleeding  Right hip: Stage IV pressure ulcer, stable, continue wound VAC  Left ischium: Healed  Increase protein intake: Under the care of registered dietitian  On air mattress      2. Mild protein-calorie malnutrition (HCC)  Assessment & Plan:  Under the care of of registered dietitian  3. Paraplegia following spinal cord injury  Assessment & Plan:  Patient is nonambulatory, wheelchair-bound  4.  Ambulatory dysfunction  Assessment & Plan:  On 24/7 personal care  5. S/P BKA (below knee amputation) unilateral, left (HCC)  Assessment & Plan:  Left BKA  Incision sutured, intact, approximated with no obvious sign of infection-wound care with dry sterile dressing  Local wound care - DSD  Continue to follow up with surgeon  Continue to offload                         Subjective:   December 2, 2024.  New consult for wound on the right medial ankle, right lateral knee, left hip, sacrum, right hip, and right upper thigh.  Patient was referred by primary care physician.  Medical problem includes but not limited to paraplegia, osteomyelitis of the right hip.  I introduced myself to patient and patient agreed to be seen for wound consult.  Patient was seen with facility nurse.    Wound history: As per medical record review, patient was recently admitted at [facility] on March 11, 2024 for abscess on the right hip.  As per record, patient have right hip chronic osteomyelitis.  As per record, patient was seen by infectious disease and patient was told that the wound probably will not heal.  There is no surgical intervention recommended by orthopedic and plastic surgery per Ortho, the wound cannot be debrided without actually doing a Hemipelvectomy.  As per patient, all her other wounds are chronic.    Received patient in bed, currently using a regular bed.  As per report, facility already ordered an air mattress.  Patient needs assistance with turning repositioning in bed.  Patient is incontinent of both bowel and bladder.  Patient refused protein supplement.  Currently under the care of visiting nurse.    December 9, 2024.  Follow-up for multiple pressure ulcers.  As per report, patient had been noncompliance with turning and repositioning bed.  Patient under the care of visiting nurse.  Patient currently using a regular air mattress and 1 heel boot.    December 16, 2024.  Follow-up for wound on the multiple  area, received patient in bed, patient is incontinent of bladder.  As per report, the wound on left hip worsened.  Patient is using only 1 heel boot.     December 23, 2024.  Follow-up for wound on the multiple area.  Received patient in bed, seems comfortable.  Patient have indwelling catheter, overlay mattress, and heel boots.  Denies pain.    January 9, 2024.  Follow-up for wound on the multiple area, received patient in bed, seems comfortable.  Denies pain.  As per patient, she was seen at wheelchair clinic yesterday and recommended power chair.    June 17, 2025.  New consult for multiple wounds.  Patient was last seen on January 2024.  Patient was under the care of visiting nurse.  Recently transferred to [facility] for short-term rehab.She was recently admitted at [facility] on April 9, 2025 for sepsis.     June 24, 2025.  Follow-up for multiple wounds.  As per report, the wound on the lower leg is worsening.    July 8, 2025.  Follow-up for multiple wounds.  As per medical record review, patient was recently admitted at [facility] for gangrene of extremity.  Patient had left BKA.                      Review of Systems   Constitutional: Negative.    Respiratory: Negative.     Cardiovascular: Negative.    Musculoskeletal:  Positive for gait problem.   Skin:  Positive for wound.   Psychiatric/Behavioral: Negative.         Objective:    Physical Exam  Constitutional:       Appearance: Normal appearance.   Pulmonary:      Effort: Pulmonary effort is normal.   Abdominal:      Comments: Incontinent of bowel during visit   Genitourinary:     Comments: Incontinent of bladder during visit    Musculoskeletal:      Right lower leg: Edema present.      Comments: Paraplegia     Skin:     Findings: Lesion present.      Comments: Right medial ankle: Wound sizes 0.5 x 0.5 cm.,  100% scab, no drainage, no obvious sign of infection    Right hip: Wound size is 8 x 8.5 x 2.5 cm.,  10% necrotic tissue, 90%  granulation, bulla with serous drainage, minimal, with no obvious sign of infection    Sacral wound Ischium : Wound is healed    Lower leg: Wound is healed    Left knee: Surgical incision with 13 sutures 11 cm, approximated, with no obvious sign of infection, no drainage     Neurological:      Mental Status: She is alert.      Gait: Gait abnormal.     Psychiatric:         Mood and Affect: Mood normal.              Procedures     Results from last 6 Months   Lab Units 04/09/25  1330   WOUND CULTURE  2+ Growth of Proteus mirabilis*  2+ Growth of Beta Hemolytic Streptococcus Group A*  1+ Growth of       Patient's care was coordinated with nursing facility staff. Recent vitals, labs and updated medications were reviewed on EMR or chart system of facility. Past Medical, surgical, social, medication and allergy history and patient's previous records were reviewed and updated as appropriate: Most up-to date information is available in the facility EMR where the patient is currently admitted.    Patient Active Problem List   Diagnosis    Paraplegia following spinal cord injury    Hypokalemia    Decubitus ulcers    Chronic osteomyelitis of coccyx (HCC)    Anemia of chronic disease    GERD (gastroesophageal reflux disease)    Pressure injury of sacral region, unstageable (HCC)    Neurogenic bladder    Other osteoporosis without current pathological fracture    Severe protein-calorie malnutrition     Recurrent depression    Goals of care, counseling/discussion    Mild protein-calorie malnutrition (HCC)    Sepsis without acute organ dysfunction (HCC)    Chronic idiopathic constipation    Inability to return to living situation    Suicidal ideations    Skin lesion of back    Diarrhea    Hays catheter in place    Pressure injury of skin of foot    Constipation    Left ear pain    Squamous cell carcinoma in situ (SCCIS) of skin of right lower leg    Ambulatory dysfunction    Pressure ulcers of skin of multiple topographic sites     Hip osteomyelitis, right (HCC)    Allergy to antibiotic    Decubitus ulcer of trochanteric region of right hip    Anemia    Hypomagnesemia    Hypotension    Palliative care encounter    S/P BKA (below knee amputation) unilateral, left (HCC)     Past Medical History:   Diagnosis Date    Anemia     iron defiencey    Arthritis     osteo    Back injury     C. difficile diarrhea 4/7/2019    Closed lateral dislocation of distal end of right femur 3/10/2020    Depression     MRSA (methicillin resistant staph aureus) culture positive 12/07/2018    BONE-TISSUE     Osteopenia     Osteoporosis     Paralysis ([facility])     paraplegic due to spinal cord injury    Paraplegia ([facility])     Poor circulation     Pressure injury of skin     right hip, stage 3    Pressure sore of hip     right    Sepsis ([facility]) 3/28/2020    Sepsis due to anaerobes ([facility]) 1/6/2019    Tibial plateau fracture     Underweight      Past Surgical History:   Procedure Laterality Date    CLOSURE DELAYED PRIMARY N/A 3/20/2019    Procedure: OPHELIA ANAL WOUND RECLOSURE;  Surgeon: Jarrod Jacobo MD;  Location:  MAIN OR;  Service: Plastics    DECUBITUS ULCER EXCISION Bilateral 11/12/2019    Procedure: DEBRIDEMENT DECUBITI (WASH OUT);  Surgeon: Zach Casarez DO;  Location:  MAIN OR;  Service: General    FLAP LOCAL EXTREMITY Left 1/28/2019    Procedure: THIGH FLAP & STSG TO CLOSE HIP WOUND;  Surgeon: Jarrod Jacobo MD;  Location:  MAIN OR;  Service: Plastics    FLAP LOCAL TRUNK Right 12/17/2018    Procedure: DEBRIDE/FLAP CLOSURE HIP PRESSURE SORE W/SKIN GRAFT;  Surgeon: Jarrod Jacobo MD;  Location:  MAIN OR;  Service: Plastics    FLAP LOCAL TRUNK Left 2/27/2019    Procedure: BUTTOCK FLAP TO ISCHIAL SORE;  Surgeon: Jarrod Jacobo MD;  Location:  MAIN OR;  Service: Plastics    HIP DEBRIDEMENT Right 2017    right hip sore debridement    INCISION AND DRAINAGE OF WOUND Left 1/3/2019    Procedure: INCISION AND DRAINAGE (I&D) left distal femur. With deep wound  cultures. Application of VAC DRAIN SPONGE;  Surgeon: Chidi Bingham MD;  Location:  MAIN OR;  Service: Orthopedics    IR PICC LINE  12/19/2018    IR PICC LINE  3/12/2019    AL DEBRIDEMENT BONE 1ST 20 SQ CM/< Right 9/19/2018    Procedure: DEBRIDEMENT OF HIP PRESSURE SORE;  Surgeon: Jarrod Jacobo MD;  Location:  MAIN OR;  Service: Plastics    AL OPTX FEM SHFT FX W/INSJ IMED IMPLT W/WO SCREW Left 10/22/2018    Procedure: INSERTION NAIL IM LEFT FEMUR RETROGRADE;  Surgeon: Ciaarn Noriega MD;  Location:  MAIN OR;  Service: Orthopedics    TOE AMPUTATION Left 2017    small toe left foot amputation    WISDOM TOOTH EXTRACTION      WOUND DEBRIDEMENT Left 12/17/2018    Procedure: DEBRIDEMENT HIP PRESSURE SORE;  Surgeon: Jarrod Jacobo MD;  Location:  MAIN OR;  Service: Plastics    WOUND DEBRIDEMENT N/A 11/10/2019    Procedure: EXCISIONAL DEBRIDEMENT;  Surgeon: Melva Guo MD;  Location:  MAIN OR;  Service: General     Social History     Socioeconomic History    Marital status: Single   Tobacco Use    Smoking status: Never    Smokeless tobacco: Never   Vaping Use    Vaping status: Never Used   Substance and Sexual Activity    Alcohol use: Not Currently     Comment: occasional    Drug use: No    Sexual activity: Not Currently   Social History Narrative    Paraplegic since 1981 from a hang gliding accident.  Lives at home alone. Pt denies getting assistance from outside persons or agencies including wound care.     Previously worked doing accounting in taxes until couple years ago.    Use to drive.    Uses sliding were to get from chair to car.    Lives in a 1st floor apartment.     Social Drivers of Health     Financial Resource Strain: Not At Risk (6/24/2025)    Received from [facility]    Financial Insecurity     In the last 12 months did you skip medications to save money?: No     In the last 12 months was there a time when you needed to see a doctor but could not because of cost?:  "No   Food Insecurity: No Food Insecurity (6/24/2025)    Received from [facility]    Food Insecurity     In the last 12 months did you ever eat less than you felt you should because there wasn't enough money for food?: No   Transportation Needs: No Transportation Needs (6/24/2025)    Received from [facility]    Transportation Needs     In the last 12 months have you ever had to go without healthcare because you didn't have a way to get there?: No   Social Connections: Socially Isolated (6/24/2025)    Received from [facility]    Social Connection     Do you often feel lonely?: Yes   Intimate Partner Violence: Unknown (4/9/2025)    Nursing IPS     Physically Hurt by Someone: No     Hurt or Threatened by Someone: No   Housing Stability: Not At Risk (6/24/2025)    Received from [facility]    Housing Stability     Are you worried that in the next 2 months you may not have stable housing?: No   Recent Concern: Housing Stability - At Risk (4/9/2025)    Nursing: Inadequate Housing Risk Classification     Unable to Pay for Housing in the Last Year: Yes     Has Housing: Yes        Family History   Problem Relation Name Age of Onset    Depression Father                Coordination of Care: Wound team aware of the treatment plan. Facility nurse will provide wound treatment and monitor the wound for any changes.     Patient / Staff education : Patient / Staff was given education on sign of infection and pressure ulcer prevention. Patient/ Staff verbalized understanding     Follow up :  Next week    Voice-recognition software may have been used in the preparation of this document. Occasional wrong word or \"sound-alike\" substitutions may have occurred due to the inherent limitations of voice recognition software. Interpretation should be guided by context.      CONCEPCION Pruitt  "

## 2025-07-15 ENCOUNTER — NURSING HOME VISIT (OUTPATIENT)
Dept: WOUND CARE | Facility: HOSPITAL | Age: 75
End: 2025-07-15
Payer: COMMERCIAL

## 2025-07-15 DIAGNOSIS — G82.20 PARAPLEGIA FOLLOWING SPINAL CORD INJURY (HCC): ICD-10-CM

## 2025-07-15 DIAGNOSIS — L89.90 PRESSURE ULCERS OF SKIN OF MULTIPLE TOPOGRAPHIC SITES: Primary | ICD-10-CM

## 2025-07-15 DIAGNOSIS — R26.2 AMBULATORY DYSFUNCTION: ICD-10-CM

## 2025-07-15 DIAGNOSIS — Z89.512 S/P BKA (BELOW KNEE AMPUTATION) UNILATERAL, LEFT (HCC): ICD-10-CM

## 2025-07-15 DIAGNOSIS — E44.1 MILD PROTEIN-CALORIE MALNUTRITION (HCC): ICD-10-CM

## 2025-07-15 PROCEDURE — 99309 SBSQ NF CARE MODERATE MDM 30: CPT | Performed by: NURSE PRACTITIONER

## 2025-07-16 NOTE — ASSESSMENT & PLAN NOTE
Patient had multiple pressure ulcer  Right medial ankle: Wound is healed  Right hip: Stage IV pressure ulcer, stable, continue wound VAC -improved, increased granulation  Left ischium: Healed  Increase protein intake: Under the care of registered dietitian  On air mattress

## 2025-07-16 NOTE — PROGRESS NOTES
[facility] WOUND CARE MANAGEMENT   AND HYPERBARIC MEDICINE CENTER       Patient ID: Lizbeth Ribeiro is a 74 y.o. female Date of Birth 1950     Location of Service: [facility]    Performed wound round with: Wound team     Chief Complaint : Multiple pressure injuries    Wound Instructions:  Wound: Right medial ankle,  Discontinue previous wound order    Wound: Continue wound VAC on the right hip    Wound:  Sacrum, ischium and buttocks  Discontinue previous wound order  Cleanse the skin/wound bed with soap and water, pat dry  Apply zguard to wound bed/skin  Frequency : twice a day and prn for soiling    Wound : LBKA  Cleanse the wound bed with NSS or wound cleanser  Apply skin prep to periwound area and cover with ABD pad and wrap with kerlix  daily and PRN for soiling      Moisturizing lotion on the lower leg without wound    Offload all wounds  Turn and reposition frequently  Instruct / Assist with weight shifting in wheelchair  Increase protein intake.  Monitor for any sign of infection or worsening, inform PCP or patient's primary physician in your facility.      Allergies  Iv contrast [iodinated contrast media], Cefepime, Ciprofloxacin, and Vancomycin      Assessment & Plan:  1. Pressure ulcers of skin of multiple topographic sites  Assessment & Plan:  Patient had multiple pressure ulcer  Right medial ankle: Wound is healed  Right hip: Stage IV pressure ulcer, stable, continue wound VAC -improved, increased granulation  Left ischium: Healed  Increase protein intake: Under the care of registered dietitian  On air mattress      2. Mild protein-calorie malnutrition (HCC)  3. Paraplegia following spinal cord injury  Assessment & Plan:  Patient is nonambulatory, wheelchair-bound  4. Ambulatory dysfunction  Assessment & Plan:  On 24/7 personal care  5. S/P BKA (below knee amputation) unilateral, left (HCC)  Assessment & Plan:  Left BKA  Incision sutured, intact, approximated with no obvious sign  of infection-wound care with dry sterile dressing -stable  Local wound care - DSD  Continue to follow up with surgeon  Continue to offload                           Subjective:   December 2, 2024.  New consult for wound on the right medial ankle, right lateral knee, left hip, sacrum, right hip, and right upper thigh.  Patient was referred by primary care physician.  Medical problem includes but not limited to paraplegia, osteomyelitis of the right hip.  I introduced myself to patient and patient agreed to be seen for wound consult.  Patient was seen with facility nurse.    Wound history: As per medical record review, patient was recently admitted at [facility] on March 11, 2024 for abscess on the right hip.  As per record, patient have right hip chronic osteomyelitis.  As per record, patient was seen by infectious disease and patient was told that the wound probably will not heal.  There is no surgical intervention recommended by orthopedic and plastic surgery per Ortho, the wound cannot be debrided without actually doing a Hemipelvectomy.  As per patient, all her other wounds are chronic.    Received patient in bed, currently using a regular bed.  As per report, facility already ordered an air mattress.  Patient needs assistance with turning repositioning in bed.  Patient is incontinent of both bowel and bladder.  Patient refused protein supplement.  Currently under the care of visiting nurse.    December 9, 2024.  Follow-up for multiple pressure ulcers.  As per report, patient had been noncompliance with turning and repositioning bed.  Patient under the care of visiting nurse.  Patient currently using a regular air mattress and 1 heel boot.    December 16, 2024.  Follow-up for wound on the multiple area, received patient in bed, patient is incontinent of bladder.  As per report, the wound on left hip worsened.  Patient is using only 1 heel boot.     December 23, 2024.  Follow-up for wound on the  multiple area.  Received patient in bed, seems comfortable.  Patient have indwelling catheter, overlay mattress, and heel boots.  Denies pain.    January 9, 2024.  Follow-up for wound on the multiple area, received patient in bed, seems comfortable.  Denies pain.  As per patient, she was seen at wheelchair clinic yesterday and recommended power chair.    June 17, 2025.  New consult for multiple wounds.  Patient was last seen on January 2024.  Patient was under the care of visiting nurse.  Recently transferred to [facility] for short-term rehab.She was recently admitted at [facility] on April 9, 2025 for sepsis.     June 24, 2025.  Follow-up for multiple wounds.  As per report, the wound on the lower leg is worsening.    July 8, 2025.  Follow-up for multiple wounds.  As per medical record review, patient was recently admitted at [facility] for gangrene of extremity.  Patient had left BKA.    July 15, 2025.  Follow-up for multiple wounds.  No significant issues reported related to the wound.                      Review of Systems   Constitutional: Negative.    Respiratory: Negative.     Cardiovascular: Negative.    Musculoskeletal:  Positive for gait problem.   Skin:  Positive for wound.   Psychiatric/Behavioral: Negative.         Objective:    Physical Exam  Constitutional:       Appearance: Normal appearance.   Pulmonary:      Effort: Pulmonary effort is normal.   Abdominal:      Comments: Incontinent of bowel during visit   Genitourinary:     Comments: Incontinent of bladder during visit    Musculoskeletal:      Right lower leg: Edema present.      Comments: Paraplegia     Skin:     Findings: Lesion present.      Comments: Right medial ankle: Wound is healed    Right hip: Wound size is 8 x 9 time 0.5 cm, cm.,  100% granulation, with serous drainage, minimal, with no obvious sign of infection    Left knee: Surgical incision with 13 sutures 11 cm, approximated, with no obvious sign of  infection, no drainage     Neurological:      Mental Status: She is alert.      Gait: Gait abnormal.     Psychiatric:         Mood and Affect: Mood normal.              Procedures     Results from last 6 Months   Lab Units 04/09/25  1330   WOUND CULTURE  2+ Growth of Proteus mirabilis*  2+ Growth of Beta Hemolytic Streptococcus Group A*  1+ Growth of       Patient's care was coordinated with nursing facility staff. Recent vitals, labs and updated medications were reviewed on EMR or chart system of facility. Past Medical, surgical, social, medication and allergy history and patient's previous records were reviewed and updated as appropriate: Most up-to date information is available in the facility EMR where the patient is currently admitted.    Patient Active Problem List   Diagnosis    Paraplegia following spinal cord injury    Hypokalemia    Decubitus ulcers    Chronic osteomyelitis of coccyx ([facility])    Anemia of chronic disease    GERD (gastroesophageal reflux disease)    Pressure injury of sacral region, unstageable ([facility])    Neurogenic bladder    Other osteoporosis without current pathological fracture    Severe protein-calorie malnutrition     Recurrent depression    Goals of care, counseling/discussion    Mild protein-calorie malnutrition (HCC)    Sepsis without acute organ dysfunction (HCC)    Chronic idiopathic constipation    Inability to return to living situation    Suicidal ideations    Skin lesion of back    Diarrhea    Hays catheter in place    Pressure injury of skin of foot    Constipation    Left ear pain    Squamous cell carcinoma in situ (SCCIS) of skin of right lower leg    Ambulatory dysfunction    Pressure ulcers of skin of multiple topographic sites    Hip osteomyelitis, right ([facility])    Allergy to antibiotic    Decubitus ulcer of trochanteric region of right hip    Anemia    Hypomagnesemia    Hypotension    Palliative care encounter    S/P BKA (below knee amputation) unilateral, left ([facility])      Past Medical History:   Diagnosis Date    Anemia     iron defiencey    Arthritis     osteo    Back injury     C. difficile diarrhea 4/7/2019    Closed lateral dislocation of distal end of right femur 3/10/2020    Depression     MRSA (methicillin resistant staph aureus) culture positive 12/07/2018    BONE-TISSUE     Osteopenia     Osteoporosis     Paralysis (HCC)     paraplegic due to spinal cord injury    Paraplegia ([facility])     Poor circulation     Pressure injury of skin     right hip, stage 3    Pressure sore of hip     right    Sepsis ([facility]) 3/28/2020    Sepsis due to anaerobes ([facility]) 1/6/2019    Tibial plateau fracture     Underweight      Past Surgical History:   Procedure Laterality Date    CLOSURE DELAYED PRIMARY N/A 3/20/2019    Procedure: OPHELIA ANAL WOUND RECLOSURE;  Surgeon: Jarrod Jacobo MD;  Location:  MAIN OR;  Service: Plastics    DECUBITUS ULCER EXCISION Bilateral 11/12/2019    Procedure: DEBRIDEMENT DECUBITI (WASH OUT);  Surgeon: Zach Casarez DO;  Location:  MAIN OR;  Service: General    FLAP LOCAL EXTREMITY Left 1/28/2019    Procedure: THIGH FLAP & STSG TO CLOSE HIP WOUND;  Surgeon: Jarrod Jacobo MD;  Location:  MAIN OR;  Service: Plastics    FLAP LOCAL TRUNK Right 12/17/2018    Procedure: DEBRIDE/FLAP CLOSURE HIP PRESSURE SORE W/SKIN GRAFT;  Surgeon: Jarrod Jacobo MD;  Location:  MAIN OR;  Service: Plastics    FLAP LOCAL TRUNK Left 2/27/2019    Procedure: BUTTOCK FLAP TO ISCHIAL SORE;  Surgeon: Jarrod Jacobo MD;  Location:  MAIN OR;  Service: Plastics    HIP DEBRIDEMENT Right 2017    right hip sore debridement    INCISION AND DRAINAGE OF WOUND Left 1/3/2019    Procedure: INCISION AND DRAINAGE (I&D) left distal femur. With deep wound cultures. Application of VAC DRAIN SPONGE;  Surgeon: Chidi Bingham MD;  Location:  MAIN OR;  Service: Orthopedics    IR PICC LINE  12/19/2018    IR PICC LINE  3/12/2019    MO DEBRIDEMENT BONE 1ST 20 SQ CM/< Right 9/19/2018    Procedure:  DEBRIDEMENT OF HIP PRESSURE SORE;  Surgeon: Jarrod Jacobo MD;  Location:  MAIN OR;  Service: Plastics    MN OPTX FEM SHFT FX W/INSJ IMED IMPLT W/WO SCREW Left 10/22/2018    Procedure: INSERTION NAIL IM LEFT FEMUR RETROGRADE;  Surgeon: Ciaran Noriega MD;  Location:  MAIN OR;  Service: Orthopedics    TOE AMPUTATION Left 2017    small toe left foot amputation    WISDOM TOOTH EXTRACTION      WOUND DEBRIDEMENT Left 12/17/2018    Procedure: DEBRIDEMENT HIP PRESSURE SORE;  Surgeon: Jarrod Jacobo MD;  Location:  MAIN OR;  Service: Plastics    WOUND DEBRIDEMENT N/A 11/10/2019    Procedure: EXCISIONAL DEBRIDEMENT;  Surgeon: Melva Guo MD;  Location:  MAIN OR;  Service: General     Social History     Socioeconomic History    Marital status: Single   Tobacco Use    Smoking status: Never    Smokeless tobacco: Never   Vaping Use    Vaping status: Never Used   Substance and Sexual Activity    Alcohol use: Not Currently     Comment: occasional    Drug use: No    Sexual activity: Not Currently   Social History Narrative    Paraplegic since 1981 from a hang gliding accident.  Lives at home alone. Pt denies getting assistance from outside persons or agencies including wound care.     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    Use to drive.    Uses sliding were to get from chair to car.    Lives in a 1st floor apartment.     Social Drivers of Health     Financial Resource Strain: Not At Risk (6/24/2025)    Received from [facility]    Financial Insecurity     In the last 12 months did you skip medications to save money?: No     In the last 12 months was there a time when you needed to see a doctor but could not because of cost?: No   Food Insecurity: No Food Insecurity (6/24/2025)    Received from [facility]    Food Insecurity     In the last 12 months did you ever eat less than you felt you should because there wasn't enough money for food?: No  "  Transportation Needs: No Transportation Needs (6/24/2025)    Received from [facility]    Transportation Needs     In the last 12 months have you ever had to go without healthcare because you didn't have a way to get there?: No   Social Connections: Socially Isolated (6/24/2025)    Received from [facility]    Social Connection     Do you often feel lonely?: Yes   Intimate Partner Violence: Unknown (4/9/2025)    Nursing IPS     Physically Hurt by Someone: No     Hurt or Threatened by Someone: No   Housing Stability: Not At Risk (6/24/2025)    Received from [facility]    Housing Stability     Are you worried that in the next 2 months you may not have stable housing?: No   Recent Concern: Housing Stability - At Risk (4/9/2025)    Nursing: Inadequate Housing Risk Classification     Unable to Pay for Housing in the Last Year: Yes     Has Housing: Yes        Family History   Problem Relation Name Age of Onset    Depression Father                Coordination of Care: Wound team aware of the treatment plan. Facility nurse will provide wound treatment and monitor the wound for any changes.     Patient / Staff education : Patient / Staff was given education on sign of infection and pressure ulcer prevention. Patient/ Staff verbalized understanding     Follow up :  Next week    Voice-recognition software may have been used in the preparation of this document. Occasional wrong word or \"sound-alike\" substitutions may have occurred due to the inherent limitations of voice recognition software. Interpretation should be guided by context.      CONCEPCION Pruitt  "

## 2025-07-16 NOTE — ASSESSMENT & PLAN NOTE
Left BKA  Incision sutured, intact, approximated with no obvious sign of infection-wound care with dry sterile dressing -stable  Local wound care - DSD  Continue to follow up with surgeon  Continue to offload

## 2025-07-22 ENCOUNTER — NURSING HOME VISIT (OUTPATIENT)
Dept: WOUND CARE | Facility: HOSPITAL | Age: 75
End: 2025-07-22
Payer: COMMERCIAL

## 2025-07-22 DIAGNOSIS — Z89.512 S/P BKA (BELOW KNEE AMPUTATION) UNILATERAL, LEFT (HCC): ICD-10-CM

## 2025-07-22 DIAGNOSIS — E44.1 MILD PROTEIN-CALORIE MALNUTRITION (HCC): ICD-10-CM

## 2025-07-22 DIAGNOSIS — L89.90 PRESSURE ULCERS OF SKIN OF MULTIPLE TOPOGRAPHIC SITES: Primary | ICD-10-CM

## 2025-07-22 DIAGNOSIS — G82.20 PARAPLEGIA FOLLOWING SPINAL CORD INJURY (HCC): ICD-10-CM

## 2025-07-22 PROCEDURE — 99309 SBSQ NF CARE MODERATE MDM 30: CPT | Performed by: NURSE PRACTITIONER

## 2025-07-24 NOTE — ASSESSMENT & PLAN NOTE
Right hip: Stage IV pressure ulcer, stable, continue wound VAC -improved, increased granulation.  No significant changes from last week  Increase protein intake: Under the care of registered dietitian  On air mattress

## 2025-07-24 NOTE — ASSESSMENT & PLAN NOTE
Left BKA  Incision sutured, intact, approximated with no obvious sign of infection-wound care with dry sterile dressing.  With superficial purple area on the surrounding tissue, probably from wound dressing  Local wound care -change to Hydraguard  Continue to follow up with surgeon  Continue to offload

## 2025-07-24 NOTE — PROGRESS NOTES
[facility] WOUND CARE MANAGEMENT   AND HYPERBARIC MEDICINE CENTER       Patient ID: Lizbeth Ribeiro is a 74 y.o. female Date of Birth 1950     Location of Service: [facility]    Performed wound round with: Wound team     Chief Complaint : Left BKA and right hip    Wound Instructions:  Wound: Continue wound VAC on the right hip    Wound:  Sacrum, ischium and buttocks  Discontinue previous wound order  Cleanse the skin/wound bed with soap and water, pat dry  Apply zguard to wound bed/skin  Frequency : twice a day and prn for soiling    Wound : LBKA  Cleanse the wound bed with NSS or wound cleanser  Apply skin prep to periwound area and wound bed  Apply Hydraguard to purple area on the surrounding tissue  daily and PRN for soiling    Offload all wounds  Turn and reposition frequently  Instruct / Assist with weight shifting in wheelchair  Increase protein intake.  Monitor for any sign of infection or worsening, inform PCP or patient's primary physician in your facility.      Allergies  Iv contrast [iodinated contrast media], Cefepime, Ciprofloxacin, and Vancomycin      Assessment & Plan:  1. Pressure ulcers of skin of multiple topographic sites  Assessment & Plan:  Right hip: Stage IV pressure ulcer, stable, continue wound VAC -improved, increased granulation.  No significant changes from last week  Increase protein intake: Under the care of registered dietitian  On air mattress      2. Paraplegia following spinal cord injury  Assessment & Plan:  Patient is nonambulatory, wheelchair-bound  3. S/P BKA (below knee amputation) unilateral, left (HCC)  Assessment & Plan:  Left BKA  Incision sutured, intact, approximated with no obvious sign of infection-wound care with dry sterile dressing.  With superficial purple area on the surrounding tissue, probably from wound dressing  Local wound care -change to Hydraguard  Continue to follow up with surgeon  Continue to offload  4. Mild protein-calorie  malnutrition (HCC)  Assessment & Plan:  Under the care of of registered dietitian                             Subjective:   December 2, 2024.  New consult for wound on the right medial ankle, right lateral knee, left hip, sacrum, right hip, and right upper thigh.  Patient was referred by primary care physician.  Medical problem includes but not limited to paraplegia, osteomyelitis of the right hip.  I introduced myself to patient and patient agreed to be seen for wound consult.  Patient was seen with facility nurse.    Wound history: As per medical record review, patient was recently admitted at [facility] on March 11, 2024 for abscess on the right hip.  As per record, patient have right hip chronic osteomyelitis.  As per record, patient was seen by infectious disease and patient was told that the wound probably will not heal.  There is no surgical intervention recommended by orthopedic and plastic surgery per Ortho, the wound cannot be debrided without actually doing a Hemipelvectomy.  As per patient, all her other wounds are chronic.    Received patient in bed, currently using a regular bed.  As per report, facility already ordered an air mattress.  Patient needs assistance with turning repositioning in bed.  Patient is incontinent of both bowel and bladder.  Patient refused protein supplement.  Currently under the care of visiting nurse.    December 9, 2024.  Follow-up for multiple pressure ulcers.  As per report, patient had been noncompliance with turning and repositioning bed.  Patient under the care of visiting nurse.  Patient currently using a regular air mattress and 1 heel boot.    December 16, 2024.  Follow-up for wound on the multiple area, received patient in bed, patient is incontinent of bladder.  As per report, the wound on left hip worsened.  Patient is using only 1 heel boot.     December 23, 2024.  Follow-up for wound on the multiple area.  Received patient in bed, seems comfortable.   Patient have indwelling catheter, overlay mattress, and heel boots.  Denies pain.    January 9, 2024.  Follow-up for wound on the multiple area, received patient in bed, seems comfortable.  Denies pain.  As per patient, she was seen at wheelchair clinic yesterday and recommended power chair.    June 17, 2025.  New consult for multiple wounds.  Patient was last seen on January 2024.  Patient was under the care of visiting nurse.  Recently transferred to [facility] for short-term rehab.She was recently admitted at [facility] on April 9, 2025 for sepsis.     June 24, 2025.  Follow-up for multiple wounds.  As per report, the wound on the lower leg is worsening.    July 8, 2025.  Follow-up for multiple wounds.  As per medical record review, patient was recently admitted at [facility] for gangrene of extremity.  Patient had left BKA.    July 15, 2025.  Follow-up for multiple wounds.  No significant issues reported related to the wound.    July 22, 2025.  Follow-up for wound on right hip and left BKA.  No significant issues reported related to the wound.                      Review of Systems   Constitutional: Negative.    Respiratory: Negative.     Cardiovascular: Negative.    Musculoskeletal:  Positive for gait problem.   Skin:  Positive for wound.   Psychiatric/Behavioral: Negative.         Objective:    Physical Exam  Constitutional:       Appearance: Normal appearance.   Pulmonary:      Effort: Pulmonary effort is normal.   Abdominal:      Comments: Incontinent of bowel during visit   Genitourinary:     Comments: Incontinent of bladder during visit    Musculoskeletal:      Right lower leg: Edema present.      Comments: Paraplegia     Skin:     Findings: Lesion present.      Comments: Right medial ankle: Wound is healed    Right hip: Wound size is 8 x 8 x 0.5 cm.,  100% granulation, with moderate amount of serous sanguinous drainage, with no obvious sign of infection    Left knee: Surgical  incision with 13 sutures , approximated, with no obvious sign of infection.  Surrounding tissue  -with purple area probably from the wound dressing      Neurological:      Mental Status: She is alert.      Gait: Gait abnormal.     Psychiatric:         Mood and Affect: Mood normal.              Procedures     Results from last 6 Months   Lab Units 04/09/25  1330   WOUND CULTURE  2+ Growth of Proteus mirabilis*  2+ Growth of Beta Hemolytic Streptococcus Group A*  1+ Growth of       Patient's care was coordinated with nursing facility staff. Recent vitals, labs and updated medications were reviewed on EMR or chart system of facility. Past Medical, surgical, social, medication and allergy history and patient's previous records were reviewed and updated as appropriate: Most up-to date information is available in the facility EMR where the patient is currently admitted.    Patient Active Problem List   Diagnosis    Paraplegia following spinal cord injury    Hypokalemia    Decubitus ulcers    Chronic osteomyelitis of coccyx (HCC)    Anemia of chronic disease    GERD (gastroesophageal reflux disease)    Pressure injury of sacral region, unstageable (HCC)    Neurogenic bladder    Other osteoporosis without current pathological fracture    Severe protein-calorie malnutrition     Recurrent depression    Goals of care, counseling/discussion    Mild protein-calorie malnutrition (HCC)    Sepsis without acute organ dysfunction (HCC)    Chronic idiopathic constipation    Inability to return to living situation    Suicidal ideations    Skin lesion of back    Diarrhea    Hays catheter in place    Pressure injury of skin of foot    Constipation    Left ear pain    Squamous cell carcinoma in situ (SCCIS) of skin of right lower leg    Ambulatory dysfunction    Pressure ulcers of skin of multiple topographic sites    Hip osteomyelitis, right (HCC)    Allergy to antibiotic    Decubitus ulcer of trochanteric region of right hip     Anemia    Hypomagnesemia    Hypotension    Palliative care encounter    S/P BKA (below knee amputation) unilateral, left ([facility])     Past Medical History:   Diagnosis Date    Anemia     iron defiencey    Arthritis     osteo    Back injury     C. difficile diarrhea 4/7/2019    Closed lateral dislocation of distal end of right femur 3/10/2020    Depression     MRSA (methicillin resistant staph aureus) culture positive 12/07/2018    BONE-TISSUE     Osteopenia     Osteoporosis     Paralysis ([facility])     paraplegic due to spinal cord injury    Paraplegia ([facility])     Poor circulation     Pressure injury of skin     right hip, stage 3    Pressure sore of hip     right    Sepsis ([facility]) 3/28/2020    Sepsis due to anaerobes ([facility]) 1/6/2019    Tibial plateau fracture     Underweight      Past Surgical History:   Procedure Laterality Date    CLOSURE DELAYED PRIMARY N/A 3/20/2019    Procedure: OPHELIA ANAL WOUND RECLOSURE;  Surgeon: Jarrod Jacobo MD;  Location: [facility];  Service: Plastics    DECUBITUS ULCER EXCISION Bilateral 11/12/2019    Procedure: DEBRIDEMENT DECUBITI (WASH OUT);  Surgeon: Zach Casarez DO;  Location: [facility];  Service: General    FLAP LOCAL EXTREMITY Left 1/28/2019    Procedure: THIGH FLAP & STSG TO CLOSE HIP WOUND;  Surgeon: Jarrod Jacobo MD;  Location:  MAIN OR;  Service: Plastics    FLAP LOCAL TRUNK Right 12/17/2018    Procedure: DEBRIDE/FLAP CLOSURE HIP PRESSURE SORE W/SKIN GRAFT;  Surgeon: Jarrod Jacobo MD;  Location:  MAIN OR;  Service: Plastics    FLAP LOCAL TRUNK Left 2/27/2019    Procedure: BUTTOCK FLAP TO ISCHIAL SORE;  Surgeon: Jarrod Jacobo MD;  Location: [facility] OR;  Service: Plastics    HIP DEBRIDEMENT Right 2017    right hip sore debridement    INCISION AND DRAINAGE OF WOUND Left 1/3/2019    Procedure: INCISION AND DRAINAGE (I&D) left distal femur. With deep wound cultures. Application of VAC DRAIN SPONGE;  Surgeon: Chidi Bingham MD;  Location:  MAIN OR;  Service: Orthopedics     IR PICC LINE  12/19/2018    IR PICC LINE  3/12/2019    MI DEBRIDEMENT BONE 1ST 20 SQ CM/< Right 9/19/2018    Procedure: DEBRIDEMENT OF HIP PRESSURE SORE;  Surgeon: Jarrod Jacobo MD;  Location:  MAIN OR;  Service: Plastics    MI OPTX FEM SHFT FX W/INSJ IMED IMPLT W/WO SCREW Left 10/22/2018    Procedure: INSERTION NAIL IM LEFT FEMUR RETROGRADE;  Surgeon: Ciaran Noriega MD;  Location:  MAIN OR;  Service: Orthopedics    TOE AMPUTATION Left 2017    small toe left foot amputation    WISDOM TOOTH EXTRACTION      WOUND DEBRIDEMENT Left 12/17/2018    Procedure: DEBRIDEMENT HIP PRESSURE SORE;  Surgeon: Jarrod Jacobo MD;  Location:  MAIN OR;  Service: Plastics    WOUND DEBRIDEMENT N/A 11/10/2019    Procedure: EXCISIONAL DEBRIDEMENT;  Surgeon: Melva Guo MD;  Location:  MAIN OR;  Service: General     Social History     Socioeconomic History    Marital status: Single   Tobacco Use    Smoking status: Never    Smokeless tobacco: Never   Vaping Use    Vaping status: Never Used   Substance and Sexual Activity    Alcohol use: Not Currently     Comment: occasional    Drug use: No    Sexual activity: Not Currently   Social History Narrative    Paraplegic since 1981 from a hang gliding accident.  Lives at home alone. Pt denies getting assistance from outside persons or agencies including wound care.     Previously worked doing accounting in taxes until couple years ago.    Use to drive.    Uses sliding were to get from chair to car.    Lives in a 1st floor apartment.     Social Drivers of Health     Financial Resource Strain: Not At Risk (6/24/2025)    Received from [facility]    Financial Insecurity     In the last 12 months did you skip medications to save money?: No     In the last 12 months was there a time when you needed to see a doctor but could not because of cost?: No   Food Insecurity: No Food Insecurity (6/24/2025)    Received from [facility]    Food Insecurity  "    In the last 12 months did you ever eat less than you felt you should because there wasn't enough money for food?: No   Transportation Needs: No Transportation Needs (6/24/2025)    Received from [facility]    Transportation Needs     In the last 12 months have you ever had to go without healthcare because you didn't have a way to get there?: No   Social Connections: Socially Isolated (6/24/2025)    Received from [facility]    Social Connection     Do you often feel lonely?: Yes   Intimate Partner Violence: Unknown (4/9/2025)    Nursing IPS     Physically Hurt by Someone: No     Hurt or Threatened by Someone: No   Housing Stability: Not At Risk (6/24/2025)    Received from [facility]    Housing Stability     Are you worried that in the next 2 months you may not have stable housing?: No   Recent Concern: Housing Stability - At Risk (4/9/2025)    Nursing: Inadequate Housing Risk Classification     Unable to Pay for Housing in the Last Year: Yes     Has Housing: Yes        Family History   Problem Relation Name Age of Onset    Depression Father                Coordination of Care: Wound team aware of the treatment plan. Facility nurse will provide wound treatment and monitor the wound for any changes.     Patient / Staff education : Patient / Staff was given education on sign of infection and pressure ulcer prevention. Patient/ Staff verbalized understanding     Follow up :  Next week    Voice-recognition software may have been used in the preparation of this document. Occasional wrong word or \"sound-alike\" substitutions may have occurred due to the inherent limitations of voice recognition software. Interpretation should be guided by context.      CONCEPCION Pruitt  "

## 2025-07-29 ENCOUNTER — NURSING HOME VISIT (OUTPATIENT)
Dept: WOUND CARE | Facility: HOSPITAL | Age: 75
End: 2025-07-29
Payer: COMMERCIAL

## 2025-07-29 DIAGNOSIS — T14.8XXA ABRASION: ICD-10-CM

## 2025-07-29 DIAGNOSIS — G82.20 PARAPLEGIA FOLLOWING SPINAL CORD INJURY (HCC): Primary | ICD-10-CM

## 2025-07-29 DIAGNOSIS — L89.90 PRESSURE ULCERS OF SKIN OF MULTIPLE TOPOGRAPHIC SITES: ICD-10-CM

## 2025-07-29 DIAGNOSIS — Z89.512 S/P BKA (BELOW KNEE AMPUTATION) UNILATERAL, LEFT (HCC): ICD-10-CM

## 2025-07-29 PROCEDURE — 99308 SBSQ NF CARE LOW MDM 20: CPT | Performed by: NURSE PRACTITIONER

## 2025-07-30 PROBLEM — T14.8XXA ABRASION: Status: ACTIVE | Noted: 2025-07-30

## 2025-08-07 ENCOUNTER — NURSING HOME VISIT (OUTPATIENT)
Dept: WOUND CARE | Facility: HOSPITAL | Age: 75
End: 2025-08-07
Payer: COMMERCIAL

## 2025-08-07 DIAGNOSIS — Z89.512 S/P BKA (BELOW KNEE AMPUTATION) UNILATERAL, LEFT (HCC): ICD-10-CM

## 2025-08-07 DIAGNOSIS — L89.90 PRESSURE ULCERS OF SKIN OF MULTIPLE TOPOGRAPHIC SITES: ICD-10-CM

## 2025-08-07 DIAGNOSIS — G82.20 PARAPLEGIA FOLLOWING SPINAL CORD INJURY (HCC): Primary | ICD-10-CM

## 2025-08-07 DIAGNOSIS — T14.8XXA ABRASION: ICD-10-CM

## 2025-08-07 PROCEDURE — 99309 SBSQ NF CARE MODERATE MDM 30: CPT | Performed by: NURSE PRACTITIONER

## 2025-08-07 PROCEDURE — 97597 DBRDMT OPN WND 1ST 20 CM/<: CPT | Performed by: NURSE PRACTITIONER

## 2025-08-12 ENCOUNTER — NURSING HOME VISIT (OUTPATIENT)
Dept: WOUND CARE | Facility: HOSPITAL | Age: 75
End: 2025-08-12
Payer: COMMERCIAL

## 2025-08-15 ENCOUNTER — TELEPHONE (OUTPATIENT)
Age: 75
End: 2025-08-15

## 2025-08-21 ENCOUNTER — NURSING HOME VISIT (OUTPATIENT)
Dept: WOUND CARE | Facility: HOSPITAL | Age: 75
End: 2025-08-21

## 2025-08-21 DIAGNOSIS — L89.90 PRESSURE ULCERS OF SKIN OF MULTIPLE TOPOGRAPHIC SITES: ICD-10-CM

## 2025-08-21 DIAGNOSIS — T14.8XXA ABRASION: ICD-10-CM

## 2025-08-21 DIAGNOSIS — Z89.512 S/P BKA (BELOW KNEE AMPUTATION) UNILATERAL, LEFT (HCC): ICD-10-CM

## 2025-08-21 DIAGNOSIS — G82.20 PARAPLEGIA FOLLOWING SPINAL CORD INJURY (HCC): Primary | ICD-10-CM

## 2025-08-21 DIAGNOSIS — Z48.02 ENCOUNTER FOR REMOVAL OF SUTURES: ICD-10-CM

## (undated) DEVICE — DRESSING MEPILEX AG BORDER 3 X 3 IN

## (undated) DEVICE — NEEDLE BLUNT 18 G X 1 1/2IN

## (undated) DEVICE — BASIC PACK: Brand: CONVERTORS

## (undated) DEVICE — HEAD/BAR DRAPE: Brand: CONVERTORS

## (undated) DEVICE — SYRINGE 30ML LL

## (undated) DEVICE — MINOR PROCEDURE DRAPE: Brand: CONVERTORS

## (undated) DEVICE — CHLORAPREP HI-LITE 26ML ORANGE

## (undated) DEVICE — PLUMEPEN PRO 10FT

## (undated) DEVICE — BETHLEHEM UNIVERSAL  MIONR EXT: Brand: CARDINAL HEALTH

## (undated) DEVICE — GAUZE SPONGES,16 PLY: Brand: CURITY

## (undated) DEVICE — STERILE POLYISOPRENE POWDER-FREE SURGICAL GLOVES WITH EMOLLIENT COATING: Brand: PROTEXIS

## (undated) DEVICE — VESSEL LOOP MAXI RED

## (undated) DEVICE — STERILE POLYISOPRENE POWDER-FREE SURGICAL GLOVES: Brand: PROTEXIS

## (undated) DEVICE — 4.2MM THREE-FLUTED DRILL BIT QC/330MM/100MM CALIBRATION

## (undated) DEVICE — 3M™ TEGADERM™ TRANSPARENT FILM DRESSING FRAME STYLE, 1628, 6 IN X 8 IN (15 CM X 20 CM), 10/CT 8CT/CASE: Brand: 3M™ TEGADERM™

## (undated) DEVICE — 3000CC GUARDIAN II: Brand: GUARDIAN

## (undated) DEVICE — INTENDED FOR TISSUE SEPARATION, AND OTHER PROCEDURES THAT REQUIRE A SHARP SURGICAL BLADE TO PUNCTURE OR CUT.: Brand: BARD-PARKER SAFETY BLADES SIZE 15, STERILE

## (undated) DEVICE — SUT ETHILON 3-0 FSLX 30 IN 1673H

## (undated) DEVICE — COBAN 4 IN STERILE

## (undated) DEVICE — NEEDLE BLUNT 18 G X 1 1/2 W FILTER

## (undated) DEVICE — SPONGE PVP SCRUB WING STERILE

## (undated) DEVICE — CURITY NON-ADHERENT STRIPS: Brand: CURITY

## (undated) DEVICE — 40601 PROLONGED POSITIONING SYSTEM: Brand: 40601 PROLONGED POSITIONING SYSTEM

## (undated) DEVICE — SKIN MARKER DUAL TIP WITH RULER CAP, FLEXIBLE RULER AND LABELS: Brand: DEVON

## (undated) DEVICE — SUT SILK 2-0 FS 18 IN 685G

## (undated) DEVICE — 4-PORT MANIFOLD: Brand: NEPTUNE 2

## (undated) DEVICE — DRESSING XEROFORM 5 X 9

## (undated) DEVICE — CLIPS HORIZON MEDIUM BLUE

## (undated) DEVICE — UNDERGLOVE PROTEXIS  BLUE SZ 7

## (undated) DEVICE — SUT SILK 0 FSL 18 IN 678G

## (undated) DEVICE — SPECIMEN CONTAINER STERILE PEEL PACK

## (undated) DEVICE — SUT VICRYL PLUS 2-0 CTB-1 27 IN VCPB259H

## (undated) DEVICE — PACK MAJOR ORTHO W/SPLITS PBDS

## (undated) DEVICE — BULB SYRINGE,IRRIGATION WITH PROTECTIVE CAP: Brand: DOVER

## (undated) DEVICE — DRILL BIT 4.3MM THREE FLUTED

## (undated) DEVICE — SPONGE LAP 18 X 18 IN

## (undated) DEVICE — CULTURE TUBE ANAEROBIC

## (undated) DEVICE — X-RAY DETECTABLE SPONGES,16 PLY: Brand: VISTEC

## (undated) DEVICE — SYRINGE 10ML LL

## (undated) DEVICE — VAC CANISTER 500ML

## (undated) DEVICE — PENCIL ELECTROSURG E-Z CLEAN -0035H

## (undated) DEVICE — ASTOUND STANDARD SURGICAL GOWN, XL: Brand: CONVERTORS

## (undated) DEVICE — 3M™ TEGADERM™ TRANSPARENT FILM DRESSING FRAME STYLE, 1626W, 4 IN X 4-3/4 IN (10 CM X 12 CM), 50/CT 4CT/CASE: Brand: 3M™ TEGADERM™

## (undated) DEVICE — KERLIX BANDAGE ROLL: Brand: KERLIX

## (undated) DEVICE — TUBING SUCTION 5MM X 12 FT

## (undated) DEVICE — NDL CNTR 20CT FM MAG: Brand: MEDLINE INDUSTRIES, INC.

## (undated) DEVICE — PAD GROUNDING ADULT

## (undated) DEVICE — SYRINGE 10ML LL CONTROL TOP

## (undated) DEVICE — DRAPE SHEET THREE QUARTER

## (undated) DEVICE — 3.2MM GUIDE WIRE 290MM

## (undated) DEVICE — GARMENT,MEDLINE,DVT,INT,CALF,FOAM,MED: Brand: MEDLINE

## (undated) DEVICE — GLOVE INDICATOR PI UNDERGLOVE SZ 8.5 BLUE

## (undated) DEVICE — ASTOUND STANDARD SURGICAL GOWN, XXL: Brand: CONVERTORS

## (undated) DEVICE — SMARTGOWN SURGICAL GOWN, LARGE: Brand: CONVERTORS

## (undated) DEVICE — PROXIMATE PLUS MD MULTI-DIRECTIONAL RELEASE SKIN STAPLERS CONTAINS 35 STAINLESS STEEL STAPLES APPROXIMATE CLOSED DIMENSIONS: 6.9MM X 3.9MM WIDE: Brand: PROXIMATE

## (undated) DEVICE — MEDI-VAC YANK SUCT HNDL W/TPRD BULBOUS TIP: Brand: CARDINAL HEALTH

## (undated) DEVICE — SPONGE LAP STERILE 18X18

## (undated) DEVICE — GLOVE INDICATOR PI UNDERGLOVE SZ 9 BLUE

## (undated) DEVICE — ABDOMINAL PAD: Brand: DERMACEA

## (undated) DEVICE — DRAPE C-ARM X-RAY

## (undated) DEVICE — NEEDLE 25G X 1 1/2

## (undated) DEVICE — SUT VICRYL UNDYED 2-0 FSL 27IN J589H

## (undated) DEVICE — THE SIMPULSE SOLO SYSTEM WITH ULTREX RETRACTABLE SPLASH SHIELD TIP: Brand: SIMPULSE SOLO

## (undated) DEVICE — PROXIMATE SKIN STAPLERS (35 WIDE) CONTAINS 35 STAINLESS STEEL STAPLES (FIXED HEAD): Brand: PROXIMATE

## (undated) DEVICE — SUT ETHILON 3-0 PS-1 18 IN 1663H

## (undated) DEVICE — SUT VICRYL PLUS 0 CTB-1 27 IN VCPB260H

## (undated) DEVICE — ULTRASOUND GEL STERILE FOIL PK

## (undated) DEVICE — VAC DRESSING SENSATRAC SMALL

## (undated) DEVICE — IMPERVIOUS STOCKINETTE: Brand: DEROYAL

## (undated) DEVICE — STOCKINETTE 2P PREROLLD 6X60

## (undated) DEVICE — SUT VICRYL PLUS 1 CTB-1 36 IN VCPB947H

## (undated) DEVICE — CABLE BIPOLAR DISP MEGADYNE

## (undated) DEVICE — VERSAJET II HYDROSURGERY SYSTEM HANDSET, 45DEG 14MM EXACT: Brand: VERSAJET

## (undated) DEVICE — SPONGE STICK WITH PVP-I: Brand: KENDALL

## (undated) DEVICE — TIBURON SPLIT SHEET: Brand: CONVERTORS

## (undated) DEVICE — DERMACARRIERS II RATIO 1.5:1  SKIN GRAFT CARRIER

## (undated) DEVICE — DRAIN CHANNEL RND FULL FLUTED 19FR W/TROCAR

## (undated) DEVICE — NEEDLE 25G X 5/8 SAFETY

## (undated) DEVICE — CHEST/BREAST DRAPE: Brand: CONVERTORS

## (undated) DEVICE — ACE WRAP 6 IN UNSTERILE

## (undated) DEVICE — INTENDED FOR TISSUE SEPARATION, AND OTHER PROCEDURES THAT REQUIRE A SHARP SURGICAL BLADE TO PUNCTURE OR CUT.: Brand: BARD-PARKER SAFETY BLADES SIZE 10, STERILE

## (undated) DEVICE — Device

## (undated) DEVICE — IV CATH INTROCAN 18G X 1 1/4 SAFETY

## (undated) DEVICE — PADDING CAST 4 IN  COTTON STRL

## (undated) DEVICE — CULTURE TUBE AEROBIC

## (undated) DEVICE — BETHLEHEM UNIVERSAL OUTPATIENT: Brand: CARDINAL HEALTH

## (undated) DEVICE — PENROSE DRAIN 18" X 5/8": Brand: CARDINAL HEALTH

## (undated) DEVICE — HYDROGEN PEROXIDE 3 PCT 16 OZ

## (undated) DEVICE — REM POLYHESIVE ADULT PATIENT RETURN ELECTRODE: Brand: VALLEYLAB

## (undated) DEVICE — BETHLEHEM UNIVERSAL MINOR GEN: Brand: CARDINAL HEALTH

## (undated) DEVICE — SURGICAL CLIPPER BLADE GENERAL USE

## (undated) DEVICE — SILVER-COATED ANTIMICROBIAL BARRIER DRESSING: Brand: ACTICOAT   4" X 8"

## (undated) DEVICE — MAYO STAND COVER: Brand: CONVERTORS

## (undated) DEVICE — SPONGE LAP 18 X 18 IN STRL RFD

## (undated) DEVICE — GLOVE SRG BIOGEL 9

## (undated) DEVICE — CYSTO/BLADDER IRRIGATION SET, REGULATING CLAMP

## (undated) DEVICE — 2000CC GUARDIAN II: Brand: GUARDIAN

## (undated) DEVICE — ELECTRODE BLADE E-Z CLEAN 6.5IN -0014

## (undated) DEVICE — GLOVE PI ULTRA TOUCH SZ.6.5

## (undated) DEVICE — DRAPE C-ARMOUR

## (undated) DEVICE — JACKSON-PRATT 100CC BULB RESERVOIR: Brand: CARDINAL HEALTH

## (undated) DEVICE — OCCLUSIVE GAUZE STRIP,3% BISMUTH TRIBROMOPHENATE IN PETROLATUM BLEND: Brand: XEROFORM

## (undated) DEVICE — CLIP HORIZON SM RED

## (undated) DEVICE — SPONGE PACKING 4.5 X 22 IN STRL X-RAY DETECT

## (undated) DEVICE — SUT VICRYL 3-0 SH 27 IN J416H

## (undated) DEVICE — SUT SILK 3-0 FS-1 684G

## (undated) DEVICE — 2.5MM REAMING ROD WITH BALL TIP/950MM-STERILE

## (undated) DEVICE — NEEDLE FILTER 5 MICR 19G X 1.5IN

## (undated) DEVICE — ARGYLE YANKAUER BULB TIP, NO VENT WITH TUBING 1/4” X 6’ (6 MM X 1.8 M): Brand: ARGYLE

## (undated) DEVICE — SUPER SPONGES,MEDIUM: Brand: KERLIX

## (undated) DEVICE — STOCKINETTE 2P PREROLLD 4X48

## (undated) DEVICE — GLOVE INDICATOR PI UNDERGLOVE SZ 7 BLUE

## (undated) DEVICE — SUT SILK 2-0 SH 30 IN K833H

## (undated) DEVICE — PAD CAST 4 IN COTTON NON STERILE

## (undated) DEVICE — STANDARD SURGICAL GOWN, L: Brand: CONVERTORS

## (undated) DEVICE — RULER 4022800 10PK MICRO-GRID GREEN SIL